# Patient Record
Sex: FEMALE | Race: WHITE | NOT HISPANIC OR LATINO | Employment: UNEMPLOYED | URBAN - METROPOLITAN AREA
[De-identification: names, ages, dates, MRNs, and addresses within clinical notes are randomized per-mention and may not be internally consistent; named-entity substitution may affect disease eponyms.]

---

## 2017-01-03 DIAGNOSIS — G43.709 CHRONIC MIGRAINE WITHOUT AURA WITHOUT STATUS MIGRAINOSUS, NOT INTRACTABLE: Primary | ICD-10-CM

## 2017-01-03 NOTE — TELEPHONE ENCOUNTER
Pt will start Botox in a couple of weeks    Controlled Substance Refill Request for butalbital-acetaminophen-caffeine (FIORICET/ESGIC) -40 MG per tablet  Problem List Complete:  No     PROVIDER TO CONSIDER COMPLETION OF PROBLEM LIST AND OVERVIEW/CONTROLLED SUBSTANCE AGREEMENT    Last Written Prescription Date:  12/22/16  Last Fill Quantity: 20,   # refills: 0    [place] with [facility] [place]: 11/28/16    Future Office visit:     Controlled substance agreement on file: Yes:  Date 9/8/15 but with Dr Husain .     Processing:  Fax Rx to [facility]'s  pharmacy   checked in past 6 months?  Yes 7/25/16

## 2017-01-04 NOTE — TELEPHONE ENCOUNTER
Patient called again stating she is wondering when this prescription will be faxed since Dr Machado isn't in the clinic tomorrow.

## 2017-01-05 RX ORDER — BUTALBITAL, ACETAMINOPHEN AND CAFFEINE 50; 325; 40 MG/1; MG/1; MG/1
TABLET ORAL
Qty: 20 TABLET | Refills: 0 | Status: SHIPPED | OUTPATIENT
Start: 2017-01-05 | End: 2017-01-16

## 2017-01-07 DIAGNOSIS — G89.4 CHRONIC PAIN SYNDROME: Primary | ICD-10-CM

## 2017-01-09 RX ORDER — PREGABALIN 150 MG/1
CAPSULE ORAL
Qty: 60 CAPSULE | Refills: 0 | Status: SHIPPED | OUTPATIENT
Start: 2017-01-13 | End: 2017-02-09

## 2017-01-09 NOTE — TELEPHONE ENCOUNTER
LYRICA 150MG CAPSULES      Last Written Prescription Date:  12/15/2016  Last Fill Quantity: 60,   # refills: 0  Last Office Visit with [facility] or  Health prescribing provider: 11/28/2016  Future Office visit:       Routing refill request to provider for review/approval because:  Drug not on the [facility], [facility] or [facility] refill protocol or controlled substance

## 2017-01-09 NOTE — TELEPHONE ENCOUNTER
Controlled Substance Refill Request for Lyrica  Problem List Complete:  Yes      Patient is followed by [facility] for ongoing prescription of pain medication.  All refills should be approved by this provider, or covering partner.    Medication(s):  Lyrica 150 mg bid, Fioricet with codeine prn, klonopin and ambien to be prescribed by psych, not [place].  Maximum quantity per month: 60, 20  Clinic visit frequency required: Q 3 months     Controlled substance agreement on file: Yes       Date(s): 9/8/2015  New CSA needed.  Pain Clinic evaluation in the past: No    DIRE Total Score(s):  No flowsheet data found.    Last [facility] website verification: 07/25/2016-provider to review

## 2017-01-16 ENCOUNTER — TRANSFERRED RECORDS (OUTPATIENT)
Dept: HEALTH INFORMATION MANAGEMENT | Facility: CLINIC | Age: 42
End: 2017-01-16

## 2017-01-16 DIAGNOSIS — G43.709 CHRONIC MIGRAINE WITHOUT AURA WITHOUT STATUS MIGRAINOSUS, NOT INTRACTABLE: Primary | ICD-10-CM

## 2017-01-16 DIAGNOSIS — F41.1 GENERALIZED ANXIETY DISORDER: Chronic | ICD-10-CM

## 2017-01-16 NOTE — TELEPHONE ENCOUNTER
Reason for Call:  Medication or medication refill:    Do you use a [facility]?  Name of the pharmacy and phone number for the current request: [facility]    Name of the medication requested: butalbital-acetaminophen-caffeine (FIORICET/ESGIC) -40 MG  And clonazepam    Other request: Told patient 24 to 72 hours     Can we leave a detailed message on this number? YES    Phone number patient can be reached at: Home number on file [phone] (home)    Best Time: any    Call taken on 1/16/2017 at 8:18 AM by NANCY MAYO

## 2017-01-18 ENCOUNTER — TELEPHONE (OUTPATIENT)
Dept: FAMILY MEDICINE | Facility: CLINIC | Age: 42
End: 2017-01-18

## 2017-01-18 NOTE — TELEPHONE ENCOUNTER
Reason for Call:  Other prescription    Detailed comments: Patient called to check status of refill request, states needs medication asap    Phone Number Patient can be reached at: Home number on file [phone] (home)    Best Time: Any    Can we leave a detailed message on this number? YES    Call taken on 1/18/2017 at 10:57 AM by Pat Robin

## 2017-01-18 NOTE — TELEPHONE ENCOUNTER
Controlled Substance Refill Request for butalbital-acetaminophen-caffeine (fioricet/esgic)-40 mg,Clonazepam (klonopin) 1 mg  Problem List Complete:  Yes   checked in past 6 months?  Yes 7/25/16

## 2017-01-18 NOTE — TELEPHONE ENCOUNTER
Patient calling again asking for status of meds.  I explained that it may take longer to fill as we are training new staff.    Always allow 72 hours for refill.  Viktoria Lanier RN- Triage [employer]

## 2017-01-19 RX ORDER — CLONAZEPAM 1 MG/1
TABLET ORAL
Qty: 90 TABLET | Refills: 0 | Status: SHIPPED | OUTPATIENT
Start: 2017-01-19 | End: 2017-05-01

## 2017-01-19 RX ORDER — BUTALBITAL, ACETAMINOPHEN AND CAFFEINE 50; 325; 40 MG/1; MG/1; MG/1
TABLET ORAL
Qty: 20 TABLET | Refills: 0 | Status: SHIPPED | OUTPATIENT
Start: 2017-01-19 | End: 2017-01-27

## 2017-01-26 ENCOUNTER — TELEPHONE (OUTPATIENT)
Dept: FAMILY MEDICINE | Facility: CLINIC | Age: 42
End: 2017-01-26

## 2017-01-26 DIAGNOSIS — G43.709 CHRONIC MIGRAINE WITHOUT AURA WITHOUT STATUS MIGRAINOSUS, NOT INTRACTABLE: Primary | ICD-10-CM

## 2017-01-26 RX ORDER — BUTALBITAL, ACETAMINOPHEN AND CAFFEINE 50; 325; 40 MG/1; MG/1; MG/1
TABLET ORAL
Qty: 20 TABLET | Refills: 0 | Status: CANCELLED | OUTPATIENT
Start: 2017-01-26

## 2017-01-26 NOTE — TELEPHONE ENCOUNTER
Reason for Call:  Medication or medication refill:    Do you use a [facility]?  Name of the pharmacy and phone number for the current request:  [facility] in [place]    Name of the medication requested:   butalbital-acetaminophen-caffeine (FIORICET/ESGIC) -40 MG per tablet     Pbutalbital-acetaminophen-caffeine (FIORICET/ESGIC) -40 MG per tablet  atient requests this medication with codeine   She states she is not able to sleep and the medication with codeine seems to help      Other request:   As soon as possible    Can we leave a detailed message on this number? YES    Phone number patient can be reached at: Home number on file [phone] (home)    Best Time:   Anytime    Call taken on 1/26/2017 at 4:05 PM by KYLER DE LA PAZ

## 2017-01-26 NOTE — TELEPHONE ENCOUNTER
"Codeine prescriptions are not appropriate for use with headaches syndromes. Patient should be advised an office visit or ED visit is necessary if recurring headache and need for \"stronger\" or different medications. Nicole Joy Siegler, PA-C     "

## 2017-01-26 NOTE — TELEPHONE ENCOUNTER
Pt asking Rx with codeine.  Controlled Substance Refill Request for butalbital-acetaminophen-caffeine (FIORICET/ESGIC) -40 MG per tablet  Problem List Complete:  No     PROVIDER TO CONSIDER COMPLETION OF PROBLEM LIST AND OVERVIEW/CONTROLLED SUBSTANCE AGREEMENT    Last Written Prescription Date:  01/19/2017  Last Fill Quantity: 20,   # refills: 0    Last Office Visit with [facility] primary care provider: 11/28/2016    Future Office visit:     Controlled substance agreement on file: No. Last CSA 09/2014 with doctor Lisha.     Processing:  Fax Rx to [facility] pharmacy   checked in past 6 months?  Yes 01/26/2017   RX monitoring program ([place]) reviewed:  reviewed- MODAFINIL 200 MG TABLET is prescribed by other provider.  Lyrica clonazepam and butalbital  approved by by PCP.       [place] profile:  [email or web address]

## 2017-01-27 RX ORDER — BUTALBITAL, ACETAMINOPHEN AND CAFFEINE 50; 325; 40 MG/1; MG/1; MG/1
TABLET ORAL
Qty: 20 TABLET | Refills: 0 | Status: SHIPPED | OUTPATIENT
Start: 2017-01-27 | End: 2017-02-21

## 2017-01-27 NOTE — TELEPHONE ENCOUNTER
Patient calling to request that this phone message be routed to Dr. Otilio Hollins, who she states knows her history and that this would work for her.  She stated that her son was diagnosed with epilepsy on Tues and that has been stressful for her.  She declined an offer for an office visit as her son is scheduled for an overnight study and therefore she would not be able to come in.  She is requesting a call back.  Amena Siddiqi, TC

## 2017-01-27 NOTE — TELEPHONE ENCOUNTER
She had Rx filled on 1/19/17 for # 20 tabs with max of 4 per day.  She is using near the maximum  I will do a one time this time only fill.  She needs to see Dr Machado next week to discuss and work out plan for future refills.  No other refills until she sees him  Rx approved, printed given to Team 2 staff to be faxed

## 2017-02-09 DIAGNOSIS — G89.4 CHRONIC PAIN SYNDROME: Primary | ICD-10-CM

## 2017-02-09 RX ORDER — PREGABALIN 150 MG/1
CAPSULE ORAL
Qty: 60 CAPSULE | Refills: 0 | Status: SHIPPED | OUTPATIENT
Start: 2017-02-09 | End: 2017-03-06

## 2017-02-09 NOTE — TELEPHONE ENCOUNTER
Lyrica      Last Written Prescription Date:  1/13/2017  Last Fill Quantity: 60,   # refills: 0  Last Office Visit with [facility] provider: 11/28/2016  Future Office visit:       Routing refill request to provider for review/approval because:  Drug not on the [facility] refill protocol or controlled substance

## 2017-02-13 ENCOUNTER — TELEPHONE (OUTPATIENT)
Dept: FAMILY MEDICINE | Facility: CLINIC | Age: 42
End: 2017-02-13

## 2017-02-13 NOTE — TELEPHONE ENCOUNTER
Melody Muñoz is a 41 year old female who calls with chest pain with deep breathing.    NURSING ASSESSMENT:  Description: New pain or pressurein the middle of chest at sternum or between breasts, pain occurs everytime she breathes and touches it, started last night. Feels like she is hyperventilating.   Onset/duration: started last night at the ER for her son's seizure  Precip. factors: Had a panic attack at the ER - whole body was tingling and disoriented. She left room and put water on face with helped.  Associated symptoms: No difficulty breathing or coughing, pt does not don't think it's pneumonia or URI related. No chest pain going down left chest, back, jaw or arms. Not wheezing, no fever, no numbness or tingling at the face.   Improves/worsens symptoms: Regular clonazepam helped a little bit.  Pain scale (0-10)   4/10  LMP/preg/breast feeding: NA  Last exam/Treatment:  11/28/16  Allergies: No Known Allergies    MEDICATIONS:   Taking medication(s) as prescribed? Yes  Taking over the counter medication(s?) Yes - will try tylenol/ibuprofen   Any medication side effects? No significant side effects    Any barriers to taking medication(s) as prescribed?  No  Medication(s) improving/managing symptoms?  Yes  Medication reconciliation completed: No      NURSING PLAN: Nursing advice to patient given     Rest, relax, take usually anxiety and pain medications, avoid eating spicy foods, drink plenty of water.   Patient to be seen sooner if chest pain increases, SOB, heart palpitations, pain in the neck, shoulder, jaw, back or arms, fever.     RECOMMENDED DISPOSITION:  See in 72 hours - patient made an appt for today.   Will comply with recommendation: Yes  If further questions/concerns or if symptoms do not improve, worsen or new symptoms develop, call your PCP or [facility] as soon as possible.      Guideline used:  Telephone Triage Protocols for Nurses, Fourth Edition, Marge FERGUSON  LESLI Hoyt

## 2017-02-21 DIAGNOSIS — F41.1 GENERALIZED ANXIETY DISORDER: Chronic | ICD-10-CM

## 2017-02-21 DIAGNOSIS — G43.709 CHRONIC MIGRAINE WITHOUT AURA WITHOUT STATUS MIGRAINOSUS, NOT INTRACTABLE: ICD-10-CM

## 2017-02-21 NOTE — TELEPHONE ENCOUNTER
Reason for Call:  Medication or medication refill:    Do you use a [facility]?  Name of the pharmacy and phone number for the current request:  [facility], [place]    Name of the medication requested: butalbital-acetaminophen-caffeine (FIORICET/ESGIC) -40 MG per tablet    Other request:     Can we leave a detailed message on this number? YES    Phone number patient can be reached at: Home number on file [phone] (home)    Best Time:     Call taken on 2/21/2017 at 1:03 PM by JACQUELINE SMITH

## 2017-02-21 NOTE — TELEPHONE ENCOUNTER
Fioricet      Last Written Prescription Date:  1/27/2016  Last Fill Quantity: 20,   # refills: 0  Last Office Visit with [facility] provider: 11/28/2016  Future Office visit:       Routing refill request to provider for review/approval because:  Drug not on the [facility] or [facility] refill protocol or controlled substance

## 2017-02-22 RX ORDER — BUTALBITAL, ACETAMINOPHEN AND CAFFEINE 50; 325; 40 MG/1; MG/1; MG/1
TABLET ORAL
Qty: 20 TABLET | Refills: 0 | Status: SHIPPED | OUTPATIENT
Start: 2017-02-22 | End: 2017-03-01

## 2017-03-01 ENCOUNTER — TELEPHONE (OUTPATIENT)
Dept: FAMILY MEDICINE | Facility: CLINIC | Age: 42
End: 2017-03-01

## 2017-03-01 DIAGNOSIS — G43.709 CHRONIC MIGRAINE WITHOUT AURA WITHOUT STATUS MIGRAINOSUS, NOT INTRACTABLE: ICD-10-CM

## 2017-03-01 RX ORDER — BUTALBITAL, ACETAMINOPHEN AND CAFFEINE 50; 325; 40 MG/1; MG/1; MG/1
TABLET ORAL
Qty: 20 TABLET | Refills: 0 | Status: SHIPPED | OUTPATIENT
Start: 2017-03-01 | End: 2017-03-13

## 2017-03-01 NOTE — TELEPHONE ENCOUNTER
fioricet plain Q 6 hrs, no more than 4/day and gets 20 at a time    butalbital-acetaminophen-caffeine (FIORICET/ESGIC) -40 MG per tablet  Last Written Prescription Date: 02/22/2017  Last Fill Quantity: 20,  # refills: 0   Last Office Visit with [facility] or [facility] prescribing provider: 11/28/2016                                         Next 5 appointments (look out 90 days)     Mar 02, 2017 11:00 AM CST   SHORT with [facility]til[facility]ins [facility]   [facility]    [address] [place] [id number]   [phone]                 Routing refill request to provider for review/approval because:  Drug not on the [facility] refill protocol     RX monitoring program ([place]NPMP) reviewed:  reviewed- recommend provider review    [place]MP profile:  [email or web address]

## 2017-03-01 NOTE — TELEPHONE ENCOUNTER
Reason for Call:  Other call back    Detailed comments: High glucose. Patient states her glucose has been high the last few times and is wondering what she should do. Does she need to come in for a lab or see the doctor. Please call to advise    Phone Number Patient can be reached at: Home number on file [phone] (home)    Best Time: Any     Can we leave a detailed message on this number? YES    Call taken on 3/1/2017 at 7:54 AM by TRIXIE KRUEGER

## 2017-03-01 NOTE — TELEPHONE ENCOUNTER
Patient called requesting advise about high glucose. She reports increased thrust and fatigue. Fatigue started 5 days ago. Was dx with flu. Also reports increased stress with son having seizures. Known other symptoms. Advised office visit to review symptoms with provider appt was scheduled 03/02/2017. Please advice if ok to keep appt as scheduled or reschedule until flu symptoms clear.

## 2017-03-01 NOTE — TELEPHONE ENCOUNTER
Reason for Call:  Medication or medication refill:    Do you use a [facility]?  Name of the pharmacy and phone number for the current request:  [facility] [address]     Name of the medication requested: Fioricet    Other request: Please send to the pharmacy. If any questions please call. Patient was diagnosed with the flu on Monday. States she is having to use her medication.    Can we leave a detailed message on this number? YES    Phone number patient can be reached at: Home number on file [phone] (home)    Best Time: Any    Call taken on 3/1/2017 at 7:53 AM by TRIXIE KRUEGER

## 2017-03-02 ENCOUNTER — OFFICE VISIT (OUTPATIENT)
Dept: FAMILY MEDICINE | Facility: CLINIC | Age: 42
End: 2017-03-02
Payer: COMMERCIAL

## 2017-03-02 VITALS
RESPIRATION RATE: 16 BRPM | SYSTOLIC BLOOD PRESSURE: 100 MMHG | HEART RATE: 99 BPM | TEMPERATURE: 99.4 F | WEIGHT: 215.5 LBS | HEIGHT: 62 IN | OXYGEN SATURATION: 97 % | DIASTOLIC BLOOD PRESSURE: 62 MMHG | BODY MASS INDEX: 39.66 KG/M2

## 2017-03-02 DIAGNOSIS — R73.09 ELEVATED GLUCOSE: Primary | ICD-10-CM

## 2017-03-02 DIAGNOSIS — F31.9 BIPOLAR 1 DISORDER (H): Chronic | ICD-10-CM

## 2017-03-02 LAB
ALBUMIN SERPL-MCNC: 3.5 G/DL (ref 3.4–5)
ALP SERPL-CCNC: 104 U/L (ref 40–150)
ALT SERPL W P-5'-P-CCNC: 21 U/L (ref 0–50)
ANION GAP SERPL CALCULATED.3IONS-SCNC: 11 MMOL/L (ref 3–14)
AST SERPL W P-5'-P-CCNC: 13 U/L (ref 0–45)
BILIRUB SERPL-MCNC: 0.2 MG/DL (ref 0.2–1.3)
BUN SERPL-MCNC: 12 MG/DL (ref 7–30)
CALCIUM SERPL-MCNC: 9.5 MG/DL (ref 8.5–10.1)
CHLORIDE SERPL-SCNC: 101 MMOL/L (ref 94–109)
CO2 SERPL-SCNC: 24 MMOL/L (ref 20–32)
CREAT SERPL-MCNC: 0.56 MG/DL (ref 0.52–1.04)
GFR SERPL CREATININE-BSD FRML MDRD: ABNORMAL ML/MIN/1.7M2
GLUCOSE SERPL-MCNC: 173 MG/DL (ref 70–99)
HBA1C MFR BLD: 8.1 % (ref 4.3–6)
POTASSIUM SERPL-SCNC: 4 MMOL/L (ref 3.4–5.3)
PROT SERPL-MCNC: 7.5 G/DL (ref 6.8–8.8)
SODIUM SERPL-SCNC: 136 MMOL/L (ref 133–144)
TSH SERPL DL<=0.005 MIU/L-ACNC: 1.36 MU/L (ref 0.4–4)

## 2017-03-02 PROCEDURE — 84443 ASSAY THYROID STIM HORMONE: CPT | Performed by: FAMILY MEDICINE

## 2017-03-02 PROCEDURE — 36415 COLL VENOUS BLD VENIPUNCTURE: CPT | Performed by: FAMILY MEDICINE

## 2017-03-02 PROCEDURE — 99213 OFFICE O/P EST LOW 20 MIN: CPT | Performed by: FAMILY MEDICINE

## 2017-03-02 PROCEDURE — 83036 HEMOGLOBIN GLYCOSYLATED A1C: CPT | Performed by: FAMILY MEDICINE

## 2017-03-02 PROCEDURE — 80053 COMPREHEN METABOLIC PANEL: CPT | Performed by: FAMILY MEDICINE

## 2017-03-02 PROCEDURE — 80178 ASSAY OF LITHIUM: CPT | Performed by: FAMILY MEDICINE

## 2017-03-02 NOTE — PROGRESS NOTES
"  SUBJECTIVE:                                                    Melody Muñoz is a 41 year old female who presents to clinic today for the following health issues:        ED/UC Followup:    Facility:  [facility]  Date of visit: 02/27/2017  Reason for visit: Flu  Current Status: On Tamiflu       Elevated glucose       Duration: intermittent    Description (location/character/radiation): Pt has had elevated glucose results on lab tests and want to have them rechecked    Intensity:  moderate    Accompanying signs and symptoms: none    History (similar episodes/previous evaluation): labs over past 2-3 years    Precipitating or alleviating factors: None    Therapies tried and outcome: None       Pt also wants her thyroid tested and Lithium level    Problem list and histories reviewed & adjusted, as indicated.  Additional history: as documented    Labs reviewed in EPIC    Reviewed and updated as needed this visit by clinical staff  Tobacco  Allergies  Meds  Problems  Med Hx  Surg Hx  Fam Hx  Soc Hx        Reviewed and updated as needed this visit by Provider  Allergies  Meds  Problems         ROS:  CONSTITUTIONAL:POSITIVE  for fever   ENT/MOUTH: POSITIVE for nasal congestion, postnasal drainage and sore throat  RESP:NEGATIVE for significant cough or SOB  PSYCHIATRIC: POSITIVE foranxiety, Hx anxiety and Hx depression    OBJECTIVE:                                                    /62 (BP Location: Left arm, Patient Position: Chair, Cuff Size: Adult Large)  Pulse 99  Temp 99.4  F (37.4  C) (Tympanic)  Resp 16  Ht 5' 2\" (1.575 m)  Wt 215 lb 8 oz (97.8 kg)  LMP 02/06/2017 (Approximate)  SpO2 97%  Breastfeeding? No  BMI 39.42 kg/m2  Body mass index is 39.42 kg/(m^2).  GENERAL APPEARANCE: healthy, alert and no distress  HENT: ear canals and TM's normal and nose and mouth without ulcers or lesions  RESP: lungs clear to auscultation - no rales, rhonchi or wheezes  PSYCH: mentation " appears normal, affect flat and anxious    Diagnostic test results:  Lab: see below, results pending     ASSESSMENT/PLAN:                                                        ICD-10-CM    1. Elevated glucose R73.09 Hemoglobin A1c     Comprehensive metabolic panel     TSH with free T4 reflex   2. Bipolar 1 disorder  with psychosis F31.9 Lithium level       Follow up with Provider - as needed  Pt asks about restarting Soma.  Advised that she needs to see Dr Machado to discuss that    Patient Instructions   Symptomatic treatment.  Will use saline gargles, tylenol and/or advil. Suck on  lozenges as needed. Push fluids. Salt water nasal spray as needed.      Otilio Hollins MD  [facility]

## 2017-03-02 NOTE — NURSING NOTE
"Chief Complaint   Patient presents with     Blood Draw     Requesting labs for glucose/liver enzymen     ER F/U     Urgent [facility] on Monday+       Initial /62 (BP Location: Left arm, Patient Position: Chair, Cuff Size: Adult Large)  Pulse 99  Temp 99.4  F (37.4  C) (Tympanic)  Resp 16  Ht 5' 2\" (1.575 m)  Wt 215 lb 8 oz (97.8 kg)  LMP 02/06/2017 (Approximate)  SpO2 97%  Breastfeeding? No  BMI 39.42 kg/m2 Estimated body mass index is 39.42 kg/(m^2) as calculated from the following:    Height as of this encounter: 5' 2\" (1.575 m).    Weight as of this encounter: 215 lb 8 oz (97.8 kg).  Medication Reconciliation: complete     Hannah Sood LPN  "

## 2017-03-02 NOTE — MR AVS SNAPSHOT
After Visit Summary   3/2/2017    Melody Muñoz    MRN: [id number]           Patient Information     Date Of Birth          1975        Visit Information        Provider Department      3/2/2017 11:00 AM Otilio Hollins MD [facility]        Today's Diagnoses     Elevated glucose    -  1    Bipolar 1 disorder  with psychosis          Care Instructions    Symptomatic treatment.  Will use saline gargles, tylenol and/or advil. Suck on  lozenges as needed. Push fluids. Salt water nasal spray as needed.        Follow-ups after your visit        Follow-up notes from your care team     Return if symptoms worsen or fail to improve.      Who to contact     If you have questions or need follow up information about today's clinic visit or your schedule please contact [facility] directly at [phone].  Normal or non-critical lab and imaging results will be communicated to you by [employer], letter or phone within 4 business days after the clinic has received the results. If you do not hear from us within 7 days, please contact the clinic through [employer] or phone. If you have a critical or abnormal lab result, we will notify you by phone as soon as possible.  Submit refill requests through [employer] or call your pharmacy and they will forward the refill request to us. Please allow 3 business days for your refill to be completed.          Additional Information About Your Visit        MyChart Information     [employer] gives you secure access to your electronic health record. If you see a primary care provider, you can also send messages to your care team and make appointments. If you have questions, please call your primary care clinic.  If you do not have a primary care provider, please call [phone] and they will assist you.        Care EveryWhere ID     This is your Care EveryWhere ID. This could be used by other organizations to access your  "[facility] medical records  [id number]        Your Vitals Were     Pulse Temperature Respirations Height Last Period Pulse Oximetry    99 99.4  F (37.4  C) (Tympanic) 16 5' 2\" (1.575 m) 02/06/2017 (Approximate) 97%    Breastfeeding? BMI (Body Mass Index)                No 39.42 kg/m2           Blood Pressure from Last 3 Encounters:   03/02/17 100/62   11/28/16 134/86   10/29/16 116/66    Weight from Last 3 Encounters:   03/02/17 215 lb 8 oz (97.8 kg)   11/28/16 201 lb (91.2 kg)   10/29/16 193 lb 8 oz (87.8 kg)              We Performed the Following     Comprehensive metabolic panel     Hemoglobin A1c     Lithium level     TSH with free T4 reflex        Primary Care Provider Office Phone # Fax #    Evaristo Machado [phone]       [facility] [address] [facility] [address]        Thank you!     Thank you for choosing [facility]  for your care. Our goal is always to provide you with excellent care. Hearing back from our patients is one way we can continue to improve our services. Please take a few minutes to complete the written survey that you may receive in the mail after your visit with us. Thank you!             Your Updated Medication List - Protect others around you: Learn how to safely use, store and throw away your medicines at www.disposemymeds.org.          This list is accurate as of: 3/2/17 12:16 PM.  Always use your most recent med list.                   Brand Name Dispense Instructions for use    * albuterol 108 (90 BASE) MCG/ACT Inhaler    PROAIR HFA/PROVENTIL HFA/VENTOLIN HFA    54 g    Inhale 1-2 puffs into the lungs every 4 hours as needed for wheezing or shortness of breath / dyspnea       * albuterol (2.5 MG/3ML) 0.083% neb solution     25 vial    Take 1 vial (2.5 mg) by nebulization every 6 hours as needed for shortness of breath / dyspnea or wheezing       butalbital-acetaminophen-caffeine -40 MG per tablet    " FIORICET/ESGIC    20 tablet    TAKE ONE TABLET BY MOUTH EVERY 6 HOURS AS NEEDED(MAX OF 4 TABLETS PER DAY)       clonazePAM 1 MG tablet    klonoPIN    90 tablet    TAKE 1 TABLET BY MOUTH THREE TIMES DAILY AS NEEDED FOR ANXIETY       fluticasone 110 MCG/ACT Inhaler    FLOVENT HFA    2 Inhaler    Inhale 2 puffs into the lungs 2 times daily       fluticasone 50 MCG/ACT spray    FLONASE    16 g    Spray 2 sprays into both nostrils daily       guanFACINE 2 MG tablet    TENEX    180 tablet    TAKE TWO TABLETS BY MOUTH AT BEDTIME       isometheptene-dichloralphenazone-acetaminophen -325 MG per capsule    MIDRIN    30 capsule    Take 1 capsule by mouth every 4 hours as needed for headaches       lamoTRIgine 200 MG tablet    LAMICTAL    90 tablet    Take 1 tablet (200 mg) by mouth every morning       levothyroxine 25 MCG tablet    SYNTHROID/LEVOTHROID    90 tablet    TAKE 1 TABLET(25 MCG) BY MOUTH DAILY       lithium 450 MG CR tablet    ESKALITH    60 tablet    TAKE 2 TABLETS BY MOUTH AT BEDTIME       LYRICA 150 MG capsule   Generic drug:  pregabalin     60 capsule    TAKE 1 CAPSULE BY MOUTH TWICE DAILY       meclizine 25 MG tablet    ANTIVERT    30 tablet    Take 1 tablet (25 mg) by mouth every 6 hours as needed for dizziness       MELATONIN PO      Take 6 mg by mouth At Bedtime       modafinil 200 MG tablet    PROVIGIL    30 tablet    Take 0.5 tablets (100 mg) by mouth daily       ondansetron 4 MG ODT tab    ZOFRAN-ODT    30 tablet    DISSOLVE 1 TO 2 TABLETS UNDER THE TONGUE EVERY 8 HOURS AS NEEDED FOR NAUSEA       pantoprazole 40 MG EC tablet    PROTONIX    90 tablet    TAKE 1 TABLET(40 MG) BY MOUTH DAILY       tretinoin 0.05 % cream    RETIN-A    45 g    Spread a pea size amount into affected area topically at bedtime.  Use sunscreen SPF>20.       * Notice:  This list has 2 medication(s) that are the same as other medications prescribed for you. Read the directions carefully, and ask your doctor or other care  provider to review them with you.

## 2017-03-03 LAB — LITHIUM SERPL-SCNC: 0.6 MMOL/L (ref 0.6–1.2)

## 2017-03-06 DIAGNOSIS — G89.4 CHRONIC PAIN SYNDROME: ICD-10-CM

## 2017-03-06 DIAGNOSIS — E03.9 ACQUIRED HYPOTHYROIDISM: ICD-10-CM

## 2017-03-06 DIAGNOSIS — G43.709 CHRONIC MIGRAINE WITHOUT AURA WITHOUT STATUS MIGRAINOSUS, NOT INTRACTABLE: ICD-10-CM

## 2017-03-06 NOTE — TELEPHONE ENCOUNTER
Patient called checking on the status of this message, informed it has been routed to the provider and were waiting for a response.

## 2017-03-06 NOTE — TELEPHONE ENCOUNTER
Reason for Call:  Other call back    Detailed comments: Patient states she used to be on Soma and would like to get back on it. She states she is having back spasms. Please call to advise    Phone Number Patient can be reached at: Home number on file [phone] (home)    Best Time: Any    Can we leave a detailed message on this number? YES    Call taken on 3/6/2017 at 8:56 AM by TRIXIE KRUEGER

## 2017-03-06 NOTE — TELEPHONE ENCOUNTER
Patient called reporting Back Spasms    Back Pain      Duration: Ever since the patient has stopped the Soma, progressively worse in the past week        Specific cause: none    Description:   Location of pain: low back bilateral  Character of pain: cramping  Pain radiation:none  New numbness or weakness in legs, not attributed to pain:  no     Intensity: Currently 4/10    History:   Pain interferes with job: YES, hard to work, stay at home Mom. Hard to do any heavy lifting, go up and down stairs  History of back problems: recurrent self limited episodes of low back pain in the past  Any previous MRI or X-rays: None  Sees a specialist for back pain:  No  Therapies tried without relief: muscle relaxants and Physical Therapy    Alleviating factors:   Improved by: Soma       Precipitating factors:  Worsened by: Lifting, Bending, Standing and Walking     Accompanying Signs & Symptoms:  Risk of Fracture:  None  Risk of Cauda Equina:  None  Risk of Infection:  None  Risk of Cancer:  None  Risk of Ankylosing Spondylitis:  Onset at age <35, male, AND morning back stiffness. no               Recent Medication changes: none    Home Care information given: Heat to area  Advised: Follow up with clinic if: gets worse.  Follow up with Emergent Care if: unable to move or sudden onset numbness or tingling.    References used: Telephone Triage Protocols for Nurses Fourth     Please advise as appropriate with further recommendations as appropriate.    Brunilda Messina, RN  Triage RN  [facility]

## 2017-03-06 NOTE — TELEPHONE ENCOUNTER
carisoprodol (SOMA) 350 MG tablet (Discontinued)     Last Written Prescription Date:  10/26/16  Last Fill Quantity: 90,   # refills: 5  Last Office Visit with [facility], [facility] or [facility] prescribing provider: 10/29/16  Future Office visit:       Routing refill request to provider for review/approval because:  Drug not on the [facility], [facility] or [facility] refill protocol or controlled substance    Controlled Substance Refill Request for: carisoprodol (SOMA) 350 MG tablet (Discontinued)   Problem List Complete:  Yes  Patient is followed by [facility] for ongoing prescription of pain medication.  All refills should be approved by this provider, or covering partner.    Medication(s):  Lyrica 150 mg bid, Fioricet with codeine prn, klonopin and ambien to be prescribed by psych, not [facility].  Maximum quantity per month: 60, 20  Clinic visit frequency required: Q 3 months     Controlled substance agreement on file: Yes       Date(s): 9/8/2015  New CSA needed.  Pain Clinic evaluation in the past: No    DIRE Total Score(s):  No flowsheet data found.    Last [facility] website verification: 3/7/17 [email or web address]       checked in past 6 months?  Yes 3/6/17

## 2017-03-07 ENCOUNTER — TELEPHONE (OUTPATIENT)
Dept: FAMILY MEDICINE | Facility: CLINIC | Age: 42
End: 2017-03-07

## 2017-03-07 RX ORDER — LEVOTHYROXINE SODIUM 25 UG/1
TABLET ORAL
Qty: 90 TABLET | Refills: 3 | Status: SHIPPED | OUTPATIENT
Start: 2017-03-07 | End: 2018-05-14

## 2017-03-07 NOTE — TELEPHONE ENCOUNTER
[place]      Last Written Prescription Date:  7/25/16  Last Fill Quantity: 90,   # refills: 5  Last Office Visit with [facility], [facility] or [facility] prescribing provider: 3/2/17  Future Office visit:       Routing refill request to provider for review/approval because:  Drug not on the [facility], [facility] or [facility] refill protocol or controlled substance  Drug not active on patient's medication list

## 2017-03-07 NOTE — TELEPHONE ENCOUNTER
BUTALBITAL/ACETAMINOPHEN/CAFF TABS      Last Written Prescription Date:  3/1/2017  Last Fill Quantity: 20,   # refills: 0  Last Office Visit with [facility] or  Health prescribing provider: 3/2/2017  Future Office visit:       Routing refill request to provider for review/approval because:  Drug not on the [facility], [facility] or [facility] refill protocol or controlled substance      LEVOTHYROXINE 0.025MG (25MCG) TAB     Last Written Prescription Date: 9/2/2016  Last Quantity: 90, # refills: 1  Last Office Visit with [facility], [facility] or  Health prescribing provider: 3/2/2017        TSH   Date Value Ref Range Status   03/02/2017 1.36 0.40 - 4.00 mU/L Final

## 2017-03-07 NOTE — TELEPHONE ENCOUNTER
A refill was faxed in March 1 according to the report in EPIC.         Please get more information regarding this issue.

## 2017-03-07 NOTE — TELEPHONE ENCOUNTER
LYRICA 150MG CAPSULES      Last Written Prescription Date:  2/19/2017  Last Fill Quantity: 60,   # refills: 0  Last Office Visit with [facility], [facility] or  Health prescribing provider: 3/2/2017  Future Office visit:       Routing refill request to provider for review/approval because:  Drug not on the [facility], [facility] or [facility] refill protocol or controlled substance

## 2017-03-08 RX ORDER — BUTALBITAL, ACETAMINOPHEN AND CAFFEINE 50; 325; 40 MG/1; MG/1; MG/1
TABLET ORAL
Qty: 20 TABLET | Refills: 0 | Status: CANCELLED | OUTPATIENT
Start: 2017-03-08

## 2017-03-08 RX ORDER — PREGABALIN 150 MG/1
CAPSULE ORAL
Qty: 60 CAPSULE | Refills: 0 | Status: SHIPPED | OUTPATIENT
Start: 2017-03-08 | End: 2017-04-08

## 2017-03-08 NOTE — TELEPHONE ENCOUNTER
Melody called and is anxious about getting her Lyrica refilled.  She doesn't want to run out.   She stated that Dr Machado is out of the office until 3/13/2017.    She wonders if this message could be routed to another provider to refill.  I'm routing this to [facility] Patient Care Team 2 [id number]  Dotty SCHNEIDER RN [facility]

## 2017-03-09 ENCOUNTER — TELEPHONE (OUTPATIENT)
Dept: FAMILY MEDICINE | Facility: CLINIC | Age: 42
End: 2017-03-09

## 2017-03-09 NOTE — TELEPHONE ENCOUNTER
Reason for Call:  Request for results:    Name of test or procedure: Lab Results    Date of test of procedure: 3/2/17    Location of the test or procedure: [facility]    OK to leave the result message on voice mail or with a family member? YES    Phone number Patient can be reached at:  Cell number on file:    Telephone Information:   Mobile [phone]     Additional comments: Patient requesting lab results from 3/2/17    Call taken on 3/9/2017 at 4:08 PM by Mor Cronin

## 2017-03-09 NOTE — TELEPHONE ENCOUNTER
Called patient and went over lab results and schedule an appointment to go over the new diagnosis of Diabetes with her.  Lab Notes from Dr. Hollins are below, they are what I read to the patient.  Entered by Otilio Hollins MD at 3/3/2017  7:14 AM   Andres SanzMelody,     I just wanted to let you know that your lab results have been reviewed and are attached.     Your Hemoglobin A1c has gone up, now showing that you do indeed have diabetes and it is not in best control.  Goal is to have that number be < 7.0   Metabolic panel shows elevated glucose   Lithium level is in therapeutic goal range   Thyroid test is normal     Schedule a follow up appointment in the next 1-2 weeks, we need to discuss treatment for your diabetes     Please let me know if you have any questions.     Sincerely,     Otilio Hollins MD     [facility]   [address]   [place] [address]   [phone] (p)

## 2017-03-10 NOTE — TELEPHONE ENCOUNTER
Patient scheduled appointment with Dr. Evaristo Machado for 3/21/17 to discuss diabetes and results below

## 2017-03-13 ENCOUNTER — TELEPHONE (OUTPATIENT)
Dept: FAMILY MEDICINE | Facility: CLINIC | Age: 42
End: 2017-03-13

## 2017-03-13 DIAGNOSIS — G43.709 CHRONIC MIGRAINE WITHOUT AURA WITHOUT STATUS MIGRAINOSUS, NOT INTRACTABLE: Primary | Chronic | ICD-10-CM

## 2017-03-13 DIAGNOSIS — G43.709 CHRONIC MIGRAINE WITHOUT AURA WITHOUT STATUS MIGRAINOSUS, NOT INTRACTABLE: ICD-10-CM

## 2017-03-13 RX ORDER — BUTALBITAL, ACETAMINOPHEN AND CAFFEINE 50; 325; 40 MG/1; MG/1; MG/1
TABLET ORAL
Qty: 20 TABLET | Refills: 0 | Status: SHIPPED | OUTPATIENT
Start: 2017-03-13 | End: 2017-03-22

## 2017-03-13 NOTE — TELEPHONE ENCOUNTER
Pt called the clinic stating she has a migraine and she ran out of FIORICET today.   States she has not been taking it everyday, but could not nate how often she used it.     butalbital-acetaminophen-caffeine (FIORICET/ESGIC) -40 MG per tablet  Last Written Prescription Date:  3/1/17  Last Fill Quantity: 20,   # refills: 0  Last Office Visit with [facility] or [facility] prescribing provider: 3/2/17  Future Office visit:    Next 5 appointments (look out 90 days)     Mar 21, 2017 11:00 AM CDT   Office Visit with Evaristo Machado MD   [facility] ([facility])    [address] [place] [id number] [phone]                   Routing refill request to provider for review/approval because:  Drug not on the [facility], [facility] or [facility] refill protocol or controlled substance

## 2017-03-16 DIAGNOSIS — G89.4 CHRONIC PAIN SYNDROME: Primary | ICD-10-CM

## 2017-03-16 RX ORDER — CARISOPRODOL 350 MG/1
350 TABLET ORAL 3 TIMES DAILY PRN
Qty: 90 TABLET | Refills: 0 | Status: SHIPPED | OUTPATIENT
Start: 2017-03-16 | End: 2017-04-08

## 2017-03-21 ENCOUNTER — OFFICE VISIT (OUTPATIENT)
Dept: FAMILY MEDICINE | Facility: CLINIC | Age: 42
End: 2017-03-21
Payer: COMMERCIAL

## 2017-03-21 VITALS
BODY MASS INDEX: 38.28 KG/M2 | TEMPERATURE: 98.4 F | RESPIRATION RATE: 14 BRPM | HEIGHT: 62 IN | DIASTOLIC BLOOD PRESSURE: 80 MMHG | SYSTOLIC BLOOD PRESSURE: 130 MMHG | WEIGHT: 208 LBS | HEART RATE: 100 BPM

## 2017-03-21 DIAGNOSIS — E11.9 DIABETES MELLITUS WITHOUT COMPLICATION (H): Primary | ICD-10-CM

## 2017-03-21 PROCEDURE — 99213 OFFICE O/P EST LOW 20 MIN: CPT | Performed by: FAMILY MEDICINE

## 2017-03-21 NOTE — PROGRESS NOTES
SUBJECTIVE:                                                    Melody Muñoz is a 41 year old female who presents to clinic today for the following health issues:      Diabetes Follow-up      Patient is checking blood sugars: not at all    Diabetic concerns: other - new diagnosis     Symptoms of hypoglycemia (low blood sugar): none     Paresthesias (numbness or burning in feet) or sores: No     Date of last diabetic eye exam: 2016       Amount of exercise or physical activity: 4-5 days/week for an average of 30-45 minutes    Problems taking medications regularly: No    Medication side effects: none    Diet: regular (no restrictions)          Problem list and histories reviewed & adjusted, as indicated.  Additional history: The patient had gestational diabetes in the past.  Otherwise has not been diabetic.  She is completely asymptomatic.    Patient Active Problem List   Diagnosis     Endometriosis     Mantoux: positive     Latent tuberculosis     Major depression     Generalized anxiety disorder     Constipation     Nightmares     Knee pain     Bipolar 1 disorder  with psychosis     Chronic fatigue     Female stress incontinence     Drug-seeking behavior     Vertigo     Suicidal ideation     Acne     Chronic pain syndrome     Insomnia, unspecified     Chronic migraine without aura without status migrainosus, not intractable     Nausea     JASWANT (obstructive sleep apnea)     Elevated liver enzymes     TMJ (temporomandibular joint syndrome)     Tobacco use disorder     Hypothyroidism due to acquired atrophy of thyroid     Hostile behavior     Mild persistent asthma without complication     Diabetes mellitus without complication (H)     Past Surgical History   Procedure Laterality Date     Gyn surgery       laparoscopy- endometriosis     Gyn surgery        for twins     [place] teeth removal       Little finger surgery left  1980     Tubal ligation bilateral       Biopsy       Genitourinary surgery   "May/2014     bladder sling     Colonoscopy  2010       Social History   Substance Use Topics     Smoking status: Current Some Day Smoker     Packs/day: 0.25     Years: 17.00     Types: Cigarettes     Smokeless tobacco: Never Used     Alcohol use No      Comment: no etoh since 2013     Family History   Problem Relation Age of Onset     Hypertension Mother      HEART DISEASE Father      palpitations     Depression Sister      Anxiety Disorder Sister      HEART DISEASE Maternal Grandmother      CHF     CANCER Maternal Grandfather 72     Pancreatic Cancer     Alzheimer Disease Paternal Grandfather      Bipolar Disorder Other      Autism Spectrum Disorder Other            Reviewed and updated as needed this visit by clinical staff  Tobacco  Allergies  Meds       Reviewed and updated as needed this visit by Provider         ROS:  Constitutional, neuro, ENT, endocrine, pulmonary, cardiac, gastrointestinal, genitourinary, musculoskeletal, integument and psychiatric systems are negative, except as otherwise noted.  ENDOCRINE: NEGATIVE for temperature intolerance, skin/hair changes    OBJECTIVE:                                                    /80  Pulse 100  Temp 98.4  F (36.9  C) (Tympanic)  Resp 14  Ht 5' 2\" (1.575 m)  Wt 208 lb (94.3 kg)  LMP 02/06/2017 (Approximate)  BMI 38.04 kg/m2  Body mass index is 38.04 kg/(m^2).  GENERAL APPEARANCE: healthy, alert and no distress         ASSESSMENT/PLAN:                                                        ICD-10-CM    1. Diabetes mellitus without complication (H) E11.9 DIABETES EDUCATOR REFERRAL       Patient Instructions   The patient joined [employer] last week.  She has also started exercising in the past week.  She has not checked her blood sugars since she was a gestational diabetic.    We discussed treatment options.  At present we will send her for complete diabetic education and have her start checking her blood sugars.  She will follow-up with us in 2 " months.  We did we did not start medication today hoping that diet and exercise will be sufficient at least in the short haul.  Once she starts checking her blood sugars we can make adjustments as needed.  She has lost 7 pounds in the past month.      Evaristo Machado MD  [facility]

## 2017-03-21 NOTE — MR AVS SNAPSHOT
After Visit Summary   3/21/2017    Melody Muñoz    MRN: [id number]           Patient Information     Date Of Birth          1975        Visit Information        Provider Department      3/21/2017 11:00 AM Evaristo Machado MD [facility]        Today's Diagnoses     Diabetes mellitus without complication (H)    -  1       Follow-ups after your visit        Additional Services     DIABETES EDUCATOR REFERRAL       DIABETES SELF MANAGEMENT TRAINING (DSMT)      Your provider has referred you to Diabetes Education: [facility]: Diabetes Education - All [facility] [place] [facility] [phone]   [email or web address]    Type of training and number of hours: New Diagnosis: Initial group DSMT - 10 hours.      Medicare covers: 10 hours of initial DSMT in 12 month period from the time of first visit, plus 2 hours of follow-up DSMT annually, and additional hours as requested for insulin training.    Diabetes Type: Type 2 - Diet Control             Diabetes Co-Morbidities: none               A1C Goal:  <8.0       A1C is: Lab Results       Component                Value               Date                       A1C                      8.1                 03/02/2017              If an urgent visit is needed or A1C is above 12, Care Team to call the Diabetes Education Team at [phone] or send an In Basket message to the Diabetes Education Pool (P DIAB ED-PATIENT CARE).    Diabetes Education Topics: Comprehensive Knowledge Assessment and Instruction and Blood glucose meter instruction     Special Educational Needs Requiring Individual DSMT: None       MEDICAL NUTRITION THERAPY (MNT) for Diabetes    Medical Nutrition Therapy with a Registered Dietitian can be provided in coordination with Diabetes Self-Management Training to assist in achieving optimal diabetes management.    MNT Type and Hours: Do not initiate MNT at this time.                        Medicare will cover: 3 hours initial MNT in 12 month period after first visit, plus 2 hours of follow-up MNT annually    Please be aware that coverage of these services is subject to the terms and limitations of your health insurance plan.  Call member services at your health plan to determine Diabetes Self-Management Training benefits and ask which blood glucose monitor brands are covered by your plan.      Please bring the following with you to your appointment:    (1)  List of current medications   (2)  List of Blood Glucose Monitor brands that are covered by your insurance plan  (3)  Blood Glucose Monitor and log book  (4)   Food records for the 3 days prior to your visit    The Certified Diabetes Educator may make diabetes medication adjustments per the CDE Protocol and Collaborative Practice Agreement.                  Who to contact     If you have questions or need follow up information about today's clinic visit or your schedule please contact [facility] directly at [phone].  Normal or non-critical lab and imaging results will be communicated to you by MyChart, letter or phone within 4 business days after the clinic has received the results. If you do not hear from us within 7 days, please contact the clinic through [employer] or phone. If you have a critical or abnormal lab result, we will notify you by phone as soon as possible.  Submit refill requests through [employer] or call your pharmacy and they will forward the refill request to us. Please allow 3 business days for your refill to be completed.          Additional Information About Your Visit        [employer] Information     [employer] gives you secure access to your electronic health record. If you see a primary care provider, you can also send messages to your care team and make appointments. If you have questions, please call your primary care clinic.  If you do not have a primary care provider, please call  "[phone] and they will assist you.        Care EveryWhere ID     This is your Care EveryWhere ID. This could be used by other organizations to access your [place] medical records  [id number]        Your Vitals Were     Pulse Temperature Respirations Height Last Period BMI (Body Mass Index)    100 98.4  F (36.9  C) (Tympanic) 14 5' 2\" (1.575 m) 02/06/2017 (Approximate) 38.04 kg/m2       Blood Pressure from Last 3 Encounters:   03/21/17 130/80   03/02/17 100/62   11/28/16 134/86    Weight from Last 3 Encounters:   03/21/17 208 lb (94.3 kg)   03/02/17 215 lb 8 oz (97.8 kg)   11/28/16 201 lb (91.2 kg)              We Performed the Following     DIABETES EDUCATOR REFERRAL        Primary Care Provider Office Phone # Fax #    Evaristo Machado [phone]       [facility] [address] [facility] [address]        Thank you!     Thank you for choosing [facility]  for your care. Our goal is always to provide you with excellent care. Hearing back from our patients is one way we can continue to improve our services. Please take a few minutes to complete the written survey that you may receive in the mail after your visit with us. Thank you!             Your Updated Medication List - Protect others around you: Learn how to safely use, store and throw away your medicines at www.disposemymeds.org.          This list is accurate as of: 3/21/17 11:29 AM.  Always use your most recent med list.                   Brand Name Dispense Instructions for use    * albuterol 108 (90 BASE) MCG/ACT Inhaler    PROAIR HFA/PROVENTIL HFA/VENTOLIN HFA    54 g    Inhale 1-2 puffs into the lungs every 4 hours as needed for wheezing or shortness of breath / dyspnea       * albuterol (2.5 MG/3ML) 0.083% neb solution     25 vial    Take 1 vial (2.5 mg) by nebulization every 6 hours as needed for shortness of breath / dyspnea or wheezing       butalbital-acetaminophen-caffeine " -40 MG per tablet    FIORICET/ESGIC    20 tablet    TAKE ONE TABLET BY MOUTH EVERY 6 HOURS AS NEEDED(MAX OF 4 TABLETS PER DAY)       carisoprodol 350 MG tablet    SOMA    90 tablet    Take 1 tablet (350 mg) by mouth 3 times daily as needed for muscle spasms       clonazePAM 1 MG tablet    klonoPIN    90 tablet    TAKE 1 TABLET BY MOUTH THREE TIMES DAILY AS NEEDED FOR ANXIETY       fluticasone 110 MCG/ACT Inhaler    FLOVENT HFA    2 Inhaler    Inhale 2 puffs into the lungs 2 times daily       fluticasone 50 MCG/ACT spray    FLONASE    16 g    Spray 2 sprays into both nostrils daily       guanFACINE 2 MG tablet    TENEX    180 tablet    TAKE TWO TABLETS BY MOUTH AT BEDTIME       lamoTRIgine 200 MG tablet    LAMICTAL    90 tablet    Take 1 tablet (200 mg) by mouth every morning       levothyroxine 25 MCG tablet    SYNTHROID/LEVOTHROID    90 tablet    TAKE 1 TABLET(25 MCG) BY MOUTH DAILY       lithium 450 MG CR tablet    ESKALITH    60 tablet    TAKE 2 TABLETS BY MOUTH AT BEDTIME       LYRICA 150 MG capsule   Generic drug:  pregabalin     60 capsule    TAKE 1 CAPSULE BY MOUTH TWICE DAILY       meclizine 25 MG tablet    ANTIVERT    30 tablet    Take 1 tablet (25 mg) by mouth every 6 hours as needed for dizziness       MELATONIN PO      Take 6 mg by mouth At Bedtime       modafinil 200 MG tablet    PROVIGIL    30 tablet    Take 0.5 tablets (100 mg) by mouth daily       ondansetron 4 MG ODT tab    ZOFRAN-ODT    30 tablet    DISSOLVE 1 TO 2 TABLETS UNDER THE TONGUE EVERY 8 HOURS AS NEEDED FOR NAUSEA       pantoprazole 40 MG EC tablet    PROTONIX    90 tablet    TAKE 1 TABLET(40 MG) BY MOUTH DAILY       tretinoin 0.05 % cream    RETIN-A    45 g    Spread a pea size amount into affected area topically at bedtime.  Use sunscreen SPF>20.       * Notice:  This list has 2 medication(s) that are the same as other medications prescribed for you. Read the directions carefully, and ask your doctor or other care provider to  review them with you.

## 2017-03-21 NOTE — LETTER
My Asthma Action Plan  Name: Melody Muñoz   YOB: 1975  Date: 3/21/2017   My doctor: Evaristo Machado MD   My clinic: [facility]        My Control Medicine: { :933000}  My Rescue Medicine: { :826045}  {AAP include Oral Steroid:744312} My Asthma Severity: { :033930}  Avoid your asthma triggers: { :948607}        {Is patient a child or adult?:644660:::0}       GREEN ZONE     Good Control    I feel good    No cough or wheeze    Can work, sleep and play without asthma symptoms       Take your asthma control medicine every day.     1. If exercise triggers your asthma, take your rescue medication    15 minutes before exercise or sports, and    During exercise if you have asthma symptoms  2. Spacer to use with inhaler: If you have a spacer, make sure to use it with your inhaler             YELLOW ZONE     Getting Worse  I have ANY of these:    I do not feel good    Cough or wheeze    Chest feels tight    Wake up at night   1. Keep taking your Green Zone medications  2. Start taking your rescue medicine:    every 20 minutes for up to 1 hour. Then every 4 hours for 24-48 hours.  3. If you stay in the Yellow Zone for more than 12-24 hours, contact your doctor.  4. If you do not return to the Green Zone in 12-24 hours or you get worse, start taking your oral steroid medicine if prescribed by your provider.           RED ZONE     Medical Alert - Get Help  I have ANY of these:    I feel awful    Medicine is not helping    Breathing getting harder    Trouble walking or talking    Nose opens wide to breathe       1. Take your rescue medicine NOW  2. If your provider has prescribed an oral steroid medicine, start taking it NOW  3. Call your doctor NOW  4. If you are still in the Red Zone after 20 minutes and you have not reached your doctor:    Take your rescue medicine again and    Call 911 or go to the emergency room right away    See your regular doctor within 2 weeks of  an Emergency Room or Urgent Care visit for follow-up treatment.        Electronically signed by: Genesis Stone, March 21, 2017    Annual Reminders:  Meet with Asthma Educator,  Flu Shot in the Fall, consider Pneumonia Vaccination for patients with asthma (aged 19 and older).    Pharmacy: [facility] [address] [place] [address] [id number] AT [place] [address]                    Asthma Triggers  How To Control Things That Make Your Asthma Worse    Triggers are things that make your asthma worse.  Look at the list below to help you find your triggers and what you can do about them.  You can help prevent asthma flare-ups by staying away from your triggers.      Trigger                                                          What you can do   Cigarette Smoke  Tobacco smoke can make asthma worse. Do not allow smoking in your home, car or around you.  Be sure no one smokes at a child s day care or school.  If you smoke, ask your health care provider for ways to help you quit.  Ask family members to quit too.  Ask your health care provider for a referral to Quit Plan to help you quit smoking, or call 8-561-462-PLAN.     Colds, Flu, Bronchitis  These are common triggers of asthma. Wash your hands often.  Don t touch your eyes, nose or mouth.  Get a flu shot every year.     Dust Mites  These are tiny bugs that live in cloth or carpet. They are too small to see. Wash sheets and blankets in hot water every week.   Encase pillows and mattress in dust mite proof covers.  Avoid having carpet if you can. If you have carpet, vacuum weekly.   Use a dust mask and HEPA vacuum.   Pollen and Outdoor Mold  Some people are allergic to trees, grass, or weed pollen, or molds. Try to keep your windows closed.  Limit time out doors when pollen count is high.   Ask you health care provider about taking medicine during allergy season.     Animal Dander  Some people are allergic to skin flakes, urine or saliva from  pets with fur or feathers. Keep pets with fur or feathers out of your home.    If you can t keep the pet outdoors, then keep the pet out of your bedroom.  Keep the bedroom door closed.  Keep pets off cloth furniture and away from stuffed toys.     Mice, Rats, and Cockroaches  Some people are allergic to the waste from these pests.   Cover food and garbage.  Clean up spills and food crumbs.  Store grease in the refrigerator.   Keep food out of the bedroom.   Indoor Mold  This can be a trigger if your home has high moisture. Fix leaking faucets, pipes, or other sources of water.   Clean moldy surfaces.  Dehumidify basement if it is damp and smelly.   Smoke, Strong Odors, and Sprays  These can reduce air quality. Stay away from strong odors and sprays, such as perfume, powder, hair spray, paints, smoke incense, paint, cleaning products, candles and new carpet.   Exercise or Sports  Some people with asthma have this trigger. Be active!  Ask your doctor about taking medicine before sports or exercise to prevent symptoms.    Warm up for 5-10 minutes before and after sports or exercise.     Other Triggers of Asthma  Cold air:  Cover your nose and mouth with a scarf.  Sometimes laughing or crying can be a trigger.  Some medicines and food can trigger asthma.

## 2017-03-21 NOTE — NURSING NOTE
"Chief Complaint   Patient presents with     Diabetes       Initial /80  Pulse 100  Temp 98.4  F (36.9  C) (Tympanic)  Resp 14  Ht 5' 2\" (1.575 m)  Wt 208 lb (94.3 kg)  LMP 02/06/2017 (Approximate)  BMI 38.04 kg/m2 Estimated body mass index is 38.04 kg/(m^2) as calculated from the following:    Height as of this encounter: 5' 2\" (1.575 m).    Weight as of this encounter: 208 lb (94.3 kg).  Medication Reconciliation: complete     Genesis Stone CMA      "

## 2017-03-21 NOTE — PATIENT INSTRUCTIONS
The patient joined Weight [employer] last week.  She has also started exercising in the past week.  She has not checked her blood sugars since she was a gestational diabetic.    We discussed treatment options.  At present we will send her for complete diabetic education and have her start checking her blood sugars.  She will follow-up with us in 2 months.  We did we did not start medication today hoping that diet and exercise will be sufficient at least in the short haul.  Once she starts checking her blood sugars we can make adjustments as needed.  She has lost 7 pounds in the past month.

## 2017-03-22 DIAGNOSIS — G43.709 CHRONIC MIGRAINE WITHOUT AURA WITHOUT STATUS MIGRAINOSUS, NOT INTRACTABLE: ICD-10-CM

## 2017-03-22 RX ORDER — BUTALBITAL, ACETAMINOPHEN AND CAFFEINE 50; 325; 40 MG/1; MG/1; MG/1
TABLET ORAL
Qty: 20 TABLET | Refills: 0 | Status: SHIPPED | OUTPATIENT
Start: 2017-03-22 | End: 2017-04-04

## 2017-03-22 NOTE — TELEPHONE ENCOUNTER
Reason for Call:  Medication or medication refill:    Do you use a [facility]?  Name of the pharmacy and phone number for the current request:  [facility], [place]    Name of the medication requested: butalbital-acetaminophen-caffeine (FIORICET/ESGIC) -40 MG per tablet    Other request: Patient states she is going out of town for a week and will run out before she returns. She would also like to know if she can get refills on this medication.    Can we leave a detailed message on this number? YES    Phone number patient can be reached at: Home number on file [phone] (home)    Best Time:     Call taken on 3/22/2017 at 8:22 AM by JACQUELINE SMITH

## 2017-03-22 NOTE — TELEPHONE ENCOUNTER
Controlled Substance Refill Request for butalbital-acetaminophen-caffeine (FIORICET/ESGIC) -40 MG per tablet    Problem List Complete:  Yes       Patient is followed by [facility] for ongoing prescription of pain medication.  All refills should be approved by this provider, or covering partner.    Medication(s):  Lyrica 150 mg bid, Fioricet with codeine prn, klonopin and ambien to be prescribed by psych, not [facility].  Maximum quantity per month: 60, 20  Clinic visit frequency required: Q 3 months     Controlled substance agreement on file: Yes       Date(s): 9/8/2015  New CSA needed.  Pain Clinic evaluation in the past: No    DIRE Total Score(s):  No flowsheet data found.    Last [facility] website verification: 3/7/17 [email or web address] in past 6 months?  Yes 3/7/17

## 2017-03-23 ENCOUNTER — TELEPHONE (OUTPATIENT)
Dept: FAMILY MEDICINE | Facility: CLINIC | Age: 42
End: 2017-03-23

## 2017-03-23 NOTE — TELEPHONE ENCOUNTER
"Patient called reporting she was seen at urgent care [place] for a\"really bad ear infections\". States she has left ear pain and was prescribed amoxicillin and naproxen which is not helping relief her pain. She is requesting something stronger for pain refief. Advised she contact UC location were she had OV for medication change. Advised she schedule OV if symptoms persist. Pt verbalized understanding and agreement with plan.  "

## 2017-03-27 ENCOUNTER — TELEPHONE (OUTPATIENT)
Dept: FAMILY MEDICINE | Facility: CLINIC | Age: 42
End: 2017-03-27

## 2017-03-28 ENCOUNTER — OFFICE VISIT (OUTPATIENT)
Dept: FAMILY MEDICINE | Facility: CLINIC | Age: 42
End: 2017-03-28
Payer: COMMERCIAL

## 2017-03-28 VITALS
HEART RATE: 107 BPM | SYSTOLIC BLOOD PRESSURE: 120 MMHG | HEIGHT: 62 IN | WEIGHT: 207 LBS | RESPIRATION RATE: 14 BRPM | BODY MASS INDEX: 38.09 KG/M2 | DIASTOLIC BLOOD PRESSURE: 70 MMHG | TEMPERATURE: 99 F

## 2017-03-28 DIAGNOSIS — E11.9 TYPE 2 DIABETES MELLITUS WITHOUT COMPLICATION, WITHOUT LONG-TERM CURRENT USE OF INSULIN (H): Primary | ICD-10-CM

## 2017-03-28 PROCEDURE — 99214 OFFICE O/P EST MOD 30 MIN: CPT | Performed by: FAMILY MEDICINE

## 2017-03-28 NOTE — LETTER
My Asthma Action Plan  Name: Melody Muñoz   YOB: 1975  Date: 3/28/2017   My doctor: Evaristo Machado MD   My clinic: [facility]        My Control Medicine: { :264256}  My Rescue Medicine: { :050637}  {AAP include Oral Steroid:072659} My Asthma Severity: { :676449}  Avoid your asthma triggers: { :211824}               GREEN ZONE     Good Control    I feel good    No cough or wheeze    Can work, sleep and play without asthma symptoms       Take your asthma control medicine every day.     1. If exercise triggers your asthma, take your rescue medication    15 minutes before exercise or sports, and    During exercise if you have asthma symptoms  2. Spacer to use with inhaler: If you have a spacer, make sure to use it with your inhaler             YELLOW ZONE     Getting Worse  I have ANY of these:    I do not feel good    Cough or wheeze    Chest feels tight    Wake up at night   1. Keep taking your Green Zone medications  2. Start taking your rescue medicine:    every 20 minutes for up to 1 hour. Then every 4 hours for 24-48 hours.  3. If you stay in the Yellow Zone for more than 12-24 hours, contact your doctor.  4. If you do not return to the Green Zone in 12-24 hours or you get worse, start taking your oral steroid medicine if prescribed by your provider.           RED ZONE     Medical Alert - Get Help  I have ANY of these:    I feel awful    Medicine is not helping    Breathing getting harder    Trouble walking or talking    Nose opens wide to breathe       1. Take your rescue medicine NOW  2. If your provider has prescribed an oral steroid medicine, start taking it NOW  3. Call your doctor NOW  4. If you are still in the Red Zone after 20 minutes and you have not reached your doctor:    Take your rescue medicine again and    Call 911 or go to the emergency room right away    See your regular doctor within 2 weeks of an Emergency Room or Urgent Care visit  for follow-up treatment.        Electronically signed by: Genesis Stone, March 28, 2017    Annual Reminders:  Meet with Asthma Educator,  Flu Shot in the Fall, consider Pneumonia Vaccination for patients with asthma (aged 19 and older).    Pharmacy: [facility] [address] [place] [address] [address] AT [place] [address]                    Asthma Triggers  How To Control Things That Make Your Asthma Worse    Triggers are things that make your asthma worse.  Look at the list below to help you find your triggers and what you can do about them.  You can help prevent asthma flare-ups by staying away from your triggers.      Trigger                                                          What you can do   Cigarette Smoke  Tobacco smoke can make asthma worse. Do not allow smoking in your home, car or around you.  Be sure no one smokes at a child s day care or school.  If you smoke, ask your health care provider for ways to help you quit.  Ask family members to quit too.  Ask your health care provider for a referral to Quit Plan to help you quit smoking, or call 7-031-528-PLAN.     Colds, Flu, Bronchitis  These are common triggers of asthma. Wash your hands often.  Don t touch your eyes, nose or mouth.  Get a flu shot every year.     Dust Mites  These are tiny bugs that live in cloth or carpet. They are too small to see. Wash sheets and blankets in hot water every week.   Encase pillows and mattress in dust mite proof covers.  Avoid having carpet if you can. If you have carpet, vacuum weekly.   Use a dust mask and HEPA vacuum.   Pollen and Outdoor Mold  Some people are allergic to trees, grass, or weed pollen, or molds. Try to keep your windows closed.  Limit time out doors when pollen count is high.   Ask you health care provider about taking medicine during allergy season.     Animal Dander  Some people are allergic to skin flakes, urine or saliva from pets with fur or feathers. Keep pets with  fur or feathers out of your home.    If you can t keep the pet outdoors, then keep the pet out of your bedroom.  Keep the bedroom door closed.  Keep pets off cloth furniture and away from stuffed toys.     Mice, Rats, and Cockroaches  Some people are allergic to the waste from these pests.   Cover food and garbage.  Clean up spills and food crumbs.  Store grease in the refrigerator.   Keep food out of the bedroom.   Indoor Mold  This can be a trigger if your home has high moisture. Fix leaking faucets, pipes, or other sources of water.   Clean moldy surfaces.  Dehumidify basement if it is damp and smelly.   Smoke, Strong Odors, and Sprays  These can reduce air quality. Stay away from strong odors and sprays, such as perfume, powder, hair spray, paints, smoke incense, paint, cleaning products, candles and new carpet.   Exercise or Sports  Some people with asthma have this trigger. Be active!  Ask your doctor about taking medicine before sports or exercise to prevent symptoms.    Warm up for 5-10 minutes before and after sports or exercise.     Other Triggers of Asthma  Cold air:  Cover your nose and mouth with a scarf.  Sometimes laughing or crying can be a trigger.  Some medicines and food can trigger asthma.

## 2017-03-28 NOTE — PROGRESS NOTES
SUBJECTIVE:                                                    Melody Muñoz is a 41 year old female who presents to clinic today for the following health issues:      Diabetes Follow-up      Patient is checking blood sugars: not at all-  New diagnosed     Diabetic concerns: None     Symptoms of hypoglycemia (low blood sugar): none     Paresthesias (numbness or burning in feet) or sores: No     Date of last diabetic eye exam: last eye exam was 6/2016       Amount of exercise or physical activity: 6-7 days/week for an average of 15-30 minutes    Problems taking medications regularly: No    Medication side effects: none    Diet: regular (no restrictions)          Problem list and histories reviewed & adjusted, as indicated.  Additional history:   Patient did have a diagnosis of gestational diabetes when she was pregnant with her twins.  So she is somewhat familiar with diabetes and testing.  She already has her appointment set up with a CDE.  Patient Active Problem List   Diagnosis     Endometriosis     Mantoux: positive     Latent tuberculosis     Major depression     Generalized anxiety disorder     Constipation     Nightmares     Knee pain     Bipolar 1 disorder  with psychosis     Chronic fatigue     Female stress incontinence     Drug-seeking behavior     Vertigo     Suicidal ideation     Acne     Chronic pain syndrome     Insomnia, unspecified     Chronic migraine without aura without status migrainosus, not intractable     Nausea     JASWANT (obstructive sleep apnea)     Elevated liver enzymes     TMJ (temporomandibular joint syndrome)     Tobacco use disorder     Hypothyroidism due to acquired atrophy of thyroid     Hostile behavior     Mild persistent asthma without complication     Diabetes mellitus without complication (H)     Past Surgical History:   Procedure Laterality Date     BIOPSY       COLONOSCOPY  2010     GENITOURINARY SURGERY  May/2014    bladder sling     GYN SURGERY      laparoscopy-  "endometriosis     GYN SURGERY  2009     for twins     little finger surgery left  1980     tubal ligation bilateral       wisdom teeth removal         Social History   Substance Use Topics     Smoking status: Current Some Day Smoker     Packs/day: 0.25     Years: 17.00     Types: Cigarettes     Smokeless tobacco: Never Used     Alcohol use No      Comment: no etoh since      Family History   Problem Relation Age of Onset     Hypertension Mother      HEART DISEASE Father      palpitations     Depression Sister      Anxiety Disorder Sister      HEART DISEASE Maternal Grandmother      CHF     CANCER Maternal Grandfather 72     Pancreatic Cancer     Alzheimer Disease Paternal Grandfather      Bipolar Disorder Other      Autism Spectrum Disorder Other            Reviewed and updated as needed this visit by clinical staff  Tobacco  Allergies  Meds  Med Hx  Surg Hx  Fam Hx  Soc Hx      Reviewed and updated as needed this visit by Provider         ROS:  Constitutional, HEENT, cardiovascular, pulmonary, gi and gu systems are negative, except as otherwise noted.    OBJECTIVE:                                                    /70  Pulse 107  Temp 99  F (37.2  C) (Tympanic)  Resp 14  Ht 5' 2\" (1.575 m)  Wt 207 lb (93.9 kg)  LMP 2017 (Approximate)  BMI 37.86 kg/m2  Body mass index is 37.86 kg/(m^2).  GENERAL APPEARANCE: healthy, alert and no distress         ASSESSMENT/PLAN:                                                        ICD-10-CM    1. Type 2 diabetes mellitus without complication, without long-term current use of insulin (H) E11.9 metFORMIN (GLUCOPHAGE) 500 MG tablet       There are no Patient Instructions on file for this visit.    Evaristo Machado MD  [facility]    "

## 2017-03-28 NOTE — NURSING NOTE
"Chief Complaint   Patient presents with     Diabetes       Initial /70  Pulse 107  Temp 99  F (37.2  C) (Tympanic)  Resp 14  Ht 5' 2\" (1.575 m)  Wt 207 lb (93.9 kg)  LMP 02/06/2017 (Approximate)  BMI 37.86 kg/m2 Estimated body mass index is 37.86 kg/(m^2) as calculated from the following:    Height as of this encounter: 5' 2\" (1.575 m).    Weight as of this encounter: 207 lb (93.9 kg).  Medication Reconciliation: complete     Health Maintenance Due   Topic Date Due     URINE DRUG SCREEN Q1 YR  10/25/1991     ASTHMA ACTION PLAN Q1 YR (NO INBASKET)  01/11/2017     PAP Q3 YR  01/29/2017     PHQ-9 Q6 MONTHS (NO INBASKET)  04/11/2017     Health Maintenance reviewed at today's visit patient asked to schedule/complete:   Cervical Cancer:  Patient agrees to schedule- [facility]        Genesis Stone CMA      "

## 2017-03-28 NOTE — MR AVS SNAPSHOT
After Visit Summary   3/28/2017    Melody Muñoz    MRN: [id number]           Patient Information     Date Of Birth          1975        Visit Information        Provider Department      3/28/2017 4:30 PM Evaristo Machado MD [facility]        Today's Diagnoses     Type 2 diabetes mellitus without complication, without long-term current use of insulin (H)    -  1      Care Instructions    Melody and I had a 25 minute discussion about diabetes and its management.  We also discussed how diabetes affects every single organ system and how crucial it is to maintain good control.  She was anxious to start metformin.  I put her on 500 mg of metformin twice daily.  She will get a full session of education from the CDE.  We will check her blood tests again in June.        Follow-ups after your visit        Your next 10 appointments already scheduled     Apr 05, 2017  9:30 AM CDT   Diabetic Education with Y[facility]elin Salazar   [facility]    [address] [place] [address] [phone]              Who to contact     If you have questions or need follow up information about today's clinic visit or your schedule please contact [facility] directly at [phone].  Normal or non-critical lab and imaging results will be communicated to you by MyChart, letter or phone within 4 business days after the clinic has received the results. If you do not hear from us within 7 days, please contact the clinic through [employer] or phone. If you have a critical or abnormal lab result, we will notify you by phone as soon as possible.  Submit refill requests through [employer] or call your pharmacy and they will forward the refill request to us. Please allow 3 business days for your refill to be completed.          Additional Information About Your Visit        MyChart Information     [employer] gives  "you secure access to your electronic health record. If you see a primary care provider, you can also send messages to your care team and make appointments. If you have questions, please call your primary care clinic.  If you do not have a primary care provider, please call [phone] and they will assist you.        Care EveryWhere ID     This is your Care EveryWhere ID. This could be used by other organizations to access your [facility] medical records  [id number]        Your Vitals Were     Pulse Temperature Respirations Height Last Period BMI (Body Mass Index)    107 99  F (37.2  C) (Tympanic) 14 5' 2\" (1.575 m) 02/06/2017 (Approximate) 37.86 kg/m2       Blood Pressure from Last 3 Encounters:   03/28/17 120/70   03/21/17 130/80   03/02/17 100/62    Weight from Last 3 Encounters:   03/28/17 207 lb (93.9 kg)   03/21/17 208 lb (94.3 kg)   03/02/17 215 lb 8 oz (97.8 kg)              Today, you had the following     No orders found for display         Today's Medication Changes          These changes are accurate as of: 3/28/17 11:59 PM.  If you have any questions, ask your nurse or doctor.               Start taking these medicines.        Dose/Directions    metFORMIN 500 MG tablet   Commonly known as:  GLUCOPHAGE   Used for:  Type 2 diabetes mellitus without complication, without long-term current use of insulin (H)   Started by:  Evaristo Machado MD        Dose:  500 mg   Take 1 tablet (500 mg) by mouth 2 times daily (with meals)   Quantity:  60 tablet   Refills:  3            Where to get your medicines      [facility] [address] [place] [address] [address] AT [place] [address]  [id number] [place] [id number]    Hours:  24-hours Phone:  [phone]     metFORMIN 500 MG tablet                Primary Care Provider Office Phone # Fax #    Evaristo Machado [phone]       [facility] [address]  [facility] [address]        Thank you!     Thank you for choosing [facility]  for your care. Our goal is always to provide you with excellent care. Hearing back from our patients is one way we can continue to improve our services. Please take a few minutes to complete the written survey that you may receive in the mail after your visit with us. Thank you!             Your Updated Medication List - Protect others around you: Learn how to safely use, store and throw away your medicines at www.disposemymeds.org.          This list is accurate as of: 3/28/17 11:59 PM.  Always use your most recent med list.                   Brand Name Dispense Instructions for use    * albuterol 108 (90 BASE) MCG/ACT Inhaler    PROAIR HFA/PROVENTIL HFA/VENTOLIN HFA    54 g    Inhale 1-2 puffs into the lungs every 4 hours as needed for wheezing or shortness of breath / dyspnea       * albuterol (2.5 MG/3ML) 0.083% neb solution     25 vial    Take 1 vial (2.5 mg) by nebulization every 6 hours as needed for shortness of breath / dyspnea or wheezing       butalbital-acetaminophen-caffeine -40 MG per tablet    FIORICET/ESGIC    20 tablet    TAKE ONE TABLET BY MOUTH EVERY 6 HOURS AS NEEDED(MAX OF 4 TABLETS PER DAY)       carisoprodol 350 MG tablet    SOMA    90 tablet    Take 1 tablet (350 mg) by mouth 3 times daily as needed for muscle spasms       clonazePAM 1 MG tablet    klonoPIN    90 tablet    TAKE 1 TABLET BY MOUTH THREE TIMES DAILY AS NEEDED FOR ANXIETY       fluticasone 110 MCG/ACT Inhaler    FLOVENT HFA    2 Inhaler    Inhale 2 puffs into the lungs 2 times daily       fluticasone 50 MCG/ACT spray    FLONASE    16 g    Spray 2 sprays into both nostrils daily       guanFACINE 2 MG tablet    TENEX    180 tablet    TAKE TWO TABLETS BY MOUTH AT BEDTIME       lamoTRIgine 200 MG tablet    LAMICTAL    90 tablet    Take 1 tablet (200 mg) by mouth every morning       levothyroxine 25 MCG tablet     SYNTHROID/LEVOTHROID    90 tablet    TAKE 1 TABLET(25 MCG) BY MOUTH DAILY       lithium 450 MG CR tablet    ESKALITH    60 tablet    TAKE 2 TABLETS BY MOUTH AT BEDTIME       LYRICA 150 MG capsule   Generic drug:  pregabalin     60 capsule    TAKE 1 CAPSULE BY MOUTH TWICE DAILY       meclizine 25 MG tablet    ANTIVERT    30 tablet    Take 1 tablet (25 mg) by mouth every 6 hours as needed for dizziness       MELATONIN PO      Take 6 mg by mouth At Bedtime       metFORMIN 500 MG tablet    GLUCOPHAGE    60 tablet    Take 1 tablet (500 mg) by mouth 2 times daily (with meals)       modafinil 200 MG tablet    PROVIGIL    30 tablet    Take 0.5 tablets (100 mg) by mouth daily       ondansetron 4 MG ODT tab    ZOFRAN-ODT    30 tablet    DISSOLVE 1 TO 2 TABLETS UNDER THE TONGUE EVERY 8 HOURS AS NEEDED FOR NAUSEA       pantoprazole 40 MG EC tablet    PROTONIX    90 tablet    TAKE 1 TABLET(40 MG) BY MOUTH DAILY       tretinoin 0.05 % cream    RETIN-A    45 g    Spread a pea size amount into affected area topically at bedtime.  Use sunscreen SPF>20.       * Notice:  This list has 2 medication(s) that are the same as other medications prescribed for you. Read the directions carefully, and ask your doctor or other care provider to review them with you.

## 2017-03-29 NOTE — PATIENT INSTRUCTIONS
Melody and I had a 25 minute discussion about diabetes and its management.  We also discussed how diabetes affects every single organ system and how crucial it is to maintain good control.  She was anxious to start metformin.  I put her on 500 mg of metformin twice daily.  She will get a full session of education from the CDE.  We will check her blood tests again in June.

## 2017-03-30 ENCOUNTER — TELEPHONE (OUTPATIENT)
Dept: FAMILY MEDICINE | Facility: CLINIC | Age: 42
End: 2017-03-30

## 2017-03-30 DIAGNOSIS — G43.709 CHRONIC MIGRAINE WITHOUT AURA WITHOUT STATUS MIGRAINOSUS, NOT INTRACTABLE: ICD-10-CM

## 2017-03-30 DIAGNOSIS — R11.0 NAUSEA: ICD-10-CM

## 2017-03-30 NOTE — TELEPHONE ENCOUNTER
Called patient with providers response. She verbalized understanding and agreement with plan. Message will be routed to PCP for further direction on long term use.

## 2017-03-30 NOTE — TELEPHONE ENCOUNTER
Yes, I would advise her to reduce to one tab per day if she is having diarrhea, then follow up with Dr Machado upon his return to discuss her questions/concerns about long term use.   Nicole Joy Siegler, PA-C

## 2017-03-30 NOTE — TELEPHONE ENCOUNTER
Patient was called states father suggested she stop taking metformin b/c is bad for kidneys and liver. Would like to know if alternative available. She has diarrhea with metformin unknown other side effects. Advised she push fluids have BRATdiet and hold medication until further directed. Please advice. Can she try taking  1 tablets daily? Please advice.

## 2017-03-30 NOTE — TELEPHONE ENCOUNTER
Reason for Call:  Other call back    Detailed comments: Patient was just put on Metformin for diabetes and her father told her it was bad for her. Now she is concerned that she should not be taking it.     Phone Number Patient can be reached at: Home number on file [phone] (home)    Best Time: any     Can we leave a detailed message on this number? YES    Call taken on 3/30/2017 at 4:20 PM by Naz Lutz

## 2017-04-01 ENCOUNTER — TRANSFERRED RECORDS (OUTPATIENT)
Dept: HEALTH INFORMATION MANAGEMENT | Facility: CLINIC | Age: 42
End: 2017-04-01

## 2017-04-01 LAB — PAP SMEAR - HIM PATIENT REPORTED: NEGATIVE

## 2017-04-03 DIAGNOSIS — R11.0 NAUSEA: ICD-10-CM

## 2017-04-03 DIAGNOSIS — G43.709 CHRONIC MIGRAINE WITHOUT AURA WITHOUT STATUS MIGRAINOSUS, NOT INTRACTABLE: ICD-10-CM

## 2017-04-03 RX ORDER — ONDANSETRON 4 MG/1
TABLET, ORALLY DISINTEGRATING ORAL
Qty: 30 TABLET | Refills: 5 | Status: CANCELLED | OUTPATIENT
Start: 2017-04-03

## 2017-04-03 RX ORDER — BUTALBITAL, ACETAMINOPHEN AND CAFFEINE 50; 325; 40 MG/1; MG/1; MG/1
TABLET ORAL
Qty: 20 TABLET | Refills: 0 | Status: CANCELLED | OUTPATIENT
Start: 2017-04-03

## 2017-04-04 RX ORDER — ONDANSETRON 4 MG/1
TABLET, ORALLY DISINTEGRATING ORAL
Qty: 30 TABLET | Refills: 5 | Status: SHIPPED | OUTPATIENT
Start: 2017-04-04 | End: 2017-06-19

## 2017-04-04 RX ORDER — BUTALBITAL, ACETAMINOPHEN AND CAFFEINE 50; 325; 40 MG/1; MG/1; MG/1
TABLET ORAL
Qty: 20 TABLET | Refills: 0 | Status: SHIPPED | OUTPATIENT
Start: 2017-04-04 | End: 2017-04-17

## 2017-04-04 NOTE — TELEPHONE ENCOUNTER
Ondansetron 4 mg  Last Written Prescription Date: 12/12/16  Last Fill Quantity: 30,  # refills: 5   Last Office Visit with [facility], P or  Health prescribing provider: 3/28/17                                              Fioricet   Last Written Prescription Date:  3/22/17  Last Fill Quantity: 20,   # refills: 0  Last Office Visit with [facility], P or  [place] prescribing provider: 3/28/17  Future Office visit:       Routing refill request to provider for review/approval because:  Drug not on the [facility], [facility] or [employer] refill protocol or controlled substance

## 2017-04-05 ENCOUNTER — ALLIED HEALTH/NURSE VISIT (OUTPATIENT)
Dept: EDUCATION SERVICES | Facility: CLINIC | Age: 42
End: 2017-04-05
Payer: COMMERCIAL

## 2017-04-05 VITALS — BODY MASS INDEX: 36.95 KG/M2 | WEIGHT: 202 LBS

## 2017-04-05 DIAGNOSIS — E11.9 DIABETES MELLITUS WITHOUT COMPLICATION (H): Primary | ICD-10-CM

## 2017-04-05 PROCEDURE — G0108 DIAB MANAGE TRN  PER INDIV: HCPCS | Performed by: DIETITIAN, REGISTERED

## 2017-04-05 NOTE — Clinical Note
Andres Machado, Just fyi- patient seen for initial DM education. She has already started Weight Watchers and has lost 6# since diagnosis. BG at today's visit (post breakfast) was 123 mg/dl.   Thanks! Yoselin Salazar RD, CDE Diabetes

## 2017-04-05 NOTE — NURSING NOTE
Diabetes Self Management Training: Initial Assessment Visit for Newly Diagnosed Patients (Complete AADE Goals Flowsheet)    Melody Muñoz presents today for education related to Type 2 diabetes.    She is accompanied by self    Patient's diabetes management related comments/concerns: reports that she had GDM 8 yrs ago, and suspected her BG's have been high for awhile. Has started Weight Watchers, weight is down 6# so far.    Patient's emotional response to diabetes: expresses readiness to learn and concern for health and well-being    Patient would like this visit to be focused around the following diabetes-related behaviors and goals: Healthy Eating and Monitoring    ASSESSMENT:  Patient Problem List and Family Medical History reviewed for relevant medical history, current medical status, and diabetes risk factors.    Current Diabetes Management per Patient:  Taking diabetes medications?   yes:     Diabetes Medication(s)     Biguanides Sig    metFORMIN (GLUCOPHAGE) 500 MG tablet Take 1 tablet (500 mg) by mouth 2 times daily (with meals)        Problems taking diabetes medications regularly? No , Side effects? No     Past Diabetes Education: Newly diagnosed (GDM education 8 yrs ago)    Patient glucose self monitoring as follows: BG meter taught today.   BG meter: Contour Next EZ meter  BG result today 123 mg/dl (post-breakfast glucose)     Lab Results   Component Value Date    A1C 8.1 03/02/2017       Nutrition:  Patient eats 3 meals per day, is using Weight Watcher's point system since last week.     Breakfast - energy shake, WW vanilla iris bar  Lunch - pulled chicken, V8 juice  Dinner - sugar snap peas, raw carrots, 1 slice ww pizza, ww dessert  Snacks - cottage cheese and fruit    Beverages: usually water, has reduced diet soda from 8 cans/day to 2 cans/day    Cultural/Church diet restrictions: No     Biggest Challenge to Healthy Eating: increased hunger due to a medication that she was on    Physical  "Activity:    Limitations: back pain and shin splints  Is walking and doing exercise video    Diabetes Risk Factors:  overweight/obesity, inactivity and history of gestational diabetes    Diabetes Complications:  Not discussed today.    Vitals:  Wt Readings from Last 3 Encounters:   04/05/17 91.6 kg (202 lb)   03/28/17 93.9 kg (207 lb)   03/21/17 94.3 kg (208 lb)       Estimated body mass index is 37.86 kg/(m^2) as calculated from the following:    Height as of 3/28/17: 1.575 m (5' 2\").    Weight as of 3/28/17: 93.9 kg (207 lb).   Last 3 BP:   BP Readings from Last 3 Encounters:   03/28/17 120/70   03/21/17 130/80   03/02/17 100/62       History   Smoking Status     Current Some Day Smoker     Packs/day: 0.25     Years: 17.00     Types: Cigarettes   Smokeless Tobacco     Never Used       Labs:  Lab Results   Component Value Date    A1C 8.1 03/02/2017     Lab Results   Component Value Date     03/02/2017     Lab Results   Component Value Date    LDL  06/14/2016     Cannot estimate LDL when triglyceride exceeds 400 mg/dL   Above desirable:  100-129 mg/dl   Borderline High:  130-159 mg/dL   High:             160-189 mg/dL   Very high:       >189 mg/dl       HDL Cholesterol   Date Value Ref Range Status   06/14/2016 35 (L) >49 mg/dL Final   ]  GFR Estimate   Date Value Ref Range Status   03/02/2017 >90  Non  GFR Calc   >60 mL/min/1.7m2 Final     GFR Estimate If Black   Date Value Ref Range Status   03/02/2017 >90   GFR Calc   >60 mL/min/1.7m2 Final     Lab Results   Component Value Date    CR 0.56 03/02/2017     No results found for: MICROALBUMIN    Socio/Economic Considerations:    Support system: not assessed    Health Beliefs and Attitudes:   Patient Activation Measure Survey Score:  MARTIN Score (Last Two) 10/15/2012 1/29/2014   MARTIN Raw Score 41 47   Activation Score 63.2 77.5   MARTIN Level 3 4       Stage of Change: PREPARATION (Decided to change - considering how)      Diabetes " knowledge and skills assessment:     Patient is knowledgeable in diabetes management concepts related to: new dx    Patient needs further education on the following diabetes management concepts: Healthy Eating, Being Active, Monitoring and Taking Medication    Barriers to Learning Assessment: No Barriers identified    Based on learning assessment above, most appropriate setting for further diabetes education would be: Group class or Individual setting.    INTERVENTION:   Education provided today on:  AADE Self-Care Behaviors:  Healthy Eating: carbohydrate counting, consistency in amount, composition, and timing of food intake, weight reduction, portion control, plate planning method and label reading  Being Active: relationship to blood glucose  Monitoring: purpose, proper technique, log and interpret results, individual blood glucose targets, frequency of monitoring, use of glucose control solution and proper sharps disposal  Taking Medication: action of prescribed medication  Patient was instructed on Contour Next EZ meter and was able to provide an accurate return demonstration. Patient's blood glucose reading today was 123 mg/dL.    Opportunities for ongoing education and support in diabetes-self management were discussed.    Pt verbalized understanding of concepts discussed and recommendations provided today.       Education Materials Provided:  Ivette Understanding Diabetes Booklet, Safe Disposal Options for Needles & Syringes, BG Log Sheet, Carbohydrate Counting and Contour Next EZ meter kit    PLAN:  Meal Plan Recommendation: continue Weight Watchers program, and Aim for 2-3 carb servings at meals and 0-1 carb servings at snacks  Exercise / activity plan: continue to increase as able  Check blood sugars 1-2x/day, can vary testing times  Keep a blood glucose record for next visit.    FOLLOW-UP:  Follow-up appointment scheduled on July 12.  Chart routed to referring provider.    Ongoing plan for education  and support: Written resources (magazines, books, etc.), Follow-up visit with diabetes educator  and Follow-up with primary care provider    Yoselin Salazar RD, CDE  Diabetes     Time Spent: 50 minutes  Encounter Type: Individual    Any diabetes medication dose changes were made via the CDE Protocol and Collaborative Practice Agreement with the patient's primary care provider. A copy of this encounter was shared with the provider.

## 2017-04-08 DIAGNOSIS — G89.4 CHRONIC PAIN SYNDROME: ICD-10-CM

## 2017-04-10 ENCOUNTER — TELEPHONE (OUTPATIENT)
Dept: FAMILY MEDICINE | Facility: CLINIC | Age: 42
End: 2017-04-10

## 2017-04-10 RX ORDER — PREGABALIN 150 MG/1
CAPSULE ORAL
Qty: 60 CAPSULE | Refills: 0 | Status: SHIPPED | OUTPATIENT
Start: 2017-04-10 | End: 2017-05-17

## 2017-04-10 NOTE — TELEPHONE ENCOUNTER
LYRICA 150MG CAPSULES      Last Written Prescription Date:  3/8/2017  Last Fill Quantity: 60,   # refills: 0  Last Office Visit with [facility], [facility] or  Health prescribing provider: 3/28/2017  Future Office visit:       Routing refill request to provider for review/approval because:  Drug not on the [facility], [facility] or [facility] refill protocol or controlled substance

## 2017-04-10 NOTE — TELEPHONE ENCOUNTER
Reason for Call:  Other call back    Detailed comments: Patient states she has been dieting and on metformin for a while. She states her blood sugars are still high. Patient is wondering what she should do. Please call to advise    Phone Number Patient can be reached at: Home number on file [phone] (home)    Best Time: any    Can we leave a detailed message on this number? YES    Call taken on 4/10/2017 at 7:47 AM by TRIXIE KRUEGER

## 2017-04-10 NOTE — TELEPHONE ENCOUNTER
Controlled Substance Refill Request for carisoprodol (SOMA) 350 MG tablet  Problem List Complete:  No     PROVIDER TO CONSIDER COMPLETION OF PROBLEM LIST AND OVERVIEW/CONTROLLED SUBSTANCE AGREEMENT        Controlled substance agreement on file: No. Not with current provider     Processing:  Patient will  in clinic   checked in past 6 months?  Yes 04/10/2017   [place] reviewed:  reviewed- recommend provider review getting medication from other providers    [place]  [email or web address]

## 2017-04-10 NOTE — TELEPHONE ENCOUNTER
CARISOPRODOL 350MG TABLETS      Last Written Prescription Date:  3/16/2017  Last Fill Quantity: 90,   # refills: 0  Last Office Visit with [facility], [facility] or  Health prescribing provider: 3/28/2017  Future Office visit:       Routing refill request to provider for review/approval because:  Drug not on the [facility], [facility] or [facility] refill protocol or controlled substance

## 2017-04-10 NOTE — TELEPHONE ENCOUNTER
Patient reports blood sugars are high. Fasting blood sugars for the past week range 152-170. She has nausea and fatigue. Denies any other symptoms. Notes she has been exercising and dieting. Advised pt continue exercising, taking medication as prescribed, avoid carbohydrates and and sweets. Message will sent to PCP for further directives.Please advice.

## 2017-04-11 DIAGNOSIS — E11.9 TYPE 2 DIABETES MELLITUS WITHOUT COMPLICATION, WITHOUT LONG-TERM CURRENT USE OF INSULIN (H): ICD-10-CM

## 2017-04-11 RX ORDER — CARISOPRODOL 350 MG/1
TABLET ORAL
Qty: 90 TABLET | Refills: 0 | Status: SHIPPED | OUTPATIENT
Start: 2017-04-15 | End: 2017-05-08

## 2017-04-11 NOTE — TELEPHONE ENCOUNTER
Called patient and let her know of the message below. Patient is requesting that her Soma be refilled as well, has been previously routed to provider for approval.  Brunilda Messina RN  04/11/17  9:09 AM

## 2017-04-17 ENCOUNTER — TELEPHONE (OUTPATIENT)
Dept: FAMILY MEDICINE | Facility: CLINIC | Age: 42
End: 2017-04-17

## 2017-04-17 DIAGNOSIS — G43.709 CHRONIC MIGRAINE WITHOUT AURA WITHOUT STATUS MIGRAINOSUS, NOT INTRACTABLE: ICD-10-CM

## 2017-04-17 RX ORDER — BUTALBITAL, ACETAMINOPHEN AND CAFFEINE 50; 325; 40 MG/1; MG/1; MG/1
TABLET ORAL
Qty: 20 TABLET | Refills: 0 | Status: SHIPPED | OUTPATIENT
Start: 2017-04-17 | End: 2017-05-01

## 2017-04-17 NOTE — TELEPHONE ENCOUNTER
Last OV  3-28-17  Last refill  4-4-17  #20 only    Controlled Substance Refill Request for Fioricet  Problem List Complete:  Yes    fioricet plain Q 6 hrs, no more than 4/day and gets 20 at a time      CSA on file from Dr Husain, dated 9/8/15  (update with current primary care provider)  Last  check - 4-17-17  Provider to review please

## 2017-04-17 NOTE — TELEPHONE ENCOUNTER
Pt called reporting BS of 220 today. Asking for further directives on medication. States she is really tired but denies other hyperglycemic symptoms.Verfied pt takes metformin TID. Pt admits she took a big meal yesterday. Suggested continue taking medication as prescribe and decrease starchy products and sweets. Please advice if any other directions.

## 2017-04-17 NOTE — TELEPHONE ENCOUNTER
Reason for Call:  Medication or medication refill:    Do you use a [facility]?  Name of the pharmacy and phone number for the current request:  [facility], [place]    Name of the medication requested: butalbital-acetaminophen-caffeine (FIORICET/ESGIC) -40 MG per tablet    Other request: Patient states she is completely out of her medication and she was up all night with a migraine.    Can we leave a detailed message on this number? YES    Phone number patient can be reached at: Home number on file [phone] (home)    Best Time:     Call taken on 4/17/2017 at 8:16 AM by JACQUELINE SMITH

## 2017-05-01 ENCOUNTER — TELEPHONE (OUTPATIENT)
Dept: FAMILY MEDICINE | Facility: CLINIC | Age: 42
End: 2017-05-01

## 2017-05-01 DIAGNOSIS — G43.709 CHRONIC MIGRAINE WITHOUT AURA WITHOUT STATUS MIGRAINOSUS, NOT INTRACTABLE: ICD-10-CM

## 2017-05-01 DIAGNOSIS — F41.1 GENERALIZED ANXIETY DISORDER: Chronic | ICD-10-CM

## 2017-05-01 RX ORDER — BUTALBITAL, ACETAMINOPHEN AND CAFFEINE 50; 325; 40 MG/1; MG/1; MG/1
TABLET ORAL
Qty: 20 TABLET | Refills: 0 | Status: SHIPPED | OUTPATIENT
Start: 2017-05-01 | End: 2017-05-17

## 2017-05-01 RX ORDER — BUTALBITAL, ACETAMINOPHEN AND CAFFEINE 50; 325; 40 MG/1; MG/1; MG/1
TABLET ORAL
Qty: 20 TABLET | Refills: 0 | Status: CANCELLED | OUTPATIENT
Start: 2017-05-01

## 2017-05-01 RX ORDER — CLONAZEPAM 1 MG/1
TABLET ORAL
Qty: 90 TABLET | Refills: 0 | Status: SHIPPED | OUTPATIENT
Start: 2017-05-01 | End: 2017-05-30

## 2017-05-01 RX ORDER — CLONAZEPAM 1 MG/1
TABLET ORAL
Qty: 90 TABLET | Refills: 0 | Status: CANCELLED | OUTPATIENT
Start: 2017-05-01

## 2017-05-01 NOTE — TELEPHONE ENCOUNTER
Reason for Call:  Medication or medication refill:    Do you use a [facility]?  Name of the pharmacy and phone number for the current request:  [facility]    Name of the medication requested: butalbital-acetaminophen-caffeine (FIORICET/ESGIC) -40 MG per tablet   clonazePAM (KLONOPIN) 1 MG tablet  Other request: call pt so knows is done    Can we leave a detailed message on this number? YES    Phone number patient can be reached at: Home number on file [phone] (home)    Best Time:     Call taken on 5/1/2017 at 8:21 AM by MADELIN PADRON

## 2017-05-01 NOTE — TELEPHONE ENCOUNTER
Reason for call:  Patient reporting a symptom    Symptom or request: Blood sugar this morning was 199.     Duration (how long have symptoms been present): today    Have you been treated for this before? Yes    Additional comments: Patient's blood sugar was 199 this morning. Dr. Machado told her to call if 200 or higher and this was closes. She is exercising 45 minutes daily, on Weight Watchers and taking Metformin 3 times a day. Not sure if this is concerning and would like a return call regarding this.     Phone Number patient can be reached at:  Home number on file [phone] (home)    Best Time:  any    Can we leave a detailed message on this number:  YES    Call taken on 5/1/2017 at 10:04 AM by Naz Lutz

## 2017-05-03 ENCOUNTER — TELEPHONE (OUTPATIENT)
Dept: FAMILY MEDICINE | Facility: CLINIC | Age: 42
End: 2017-05-03

## 2017-05-03 NOTE — TELEPHONE ENCOUNTER
Pt was called with message below.   Continue monitoring BS and take medication as prescribed.  Follow up with PCP is symptoms persist.

## 2017-05-03 NOTE — TELEPHONE ENCOUNTER
Patient calling to report high BS's -272 today.  Yesterday she was 153.  They have been ranging between 150-180 pre meal.    Went to  yesterday and was diagnosed with Bronchitis.  No fever.  Has h/o of Asthma.  Using inhaler 1-2 times per day.  No oral asthma meds.  On amoxicillin per  (not Martha).    She has been taking the metformin 500 mg TID.  I let her know that her illness may have made her BS spike.  Continue to monitor. I will let Dr Machado know and will call her back with his advice  Triage: call [phone] with Dr Machado's advice  Viktoria Lanier RN- Triage [employer]

## 2017-05-03 NOTE — TELEPHONE ENCOUNTER
Reason for call:  Patient reporting a symptom  Symptom or request: blood sugar high 272  Duration (how long have symptoms been present): 1 day  Have you been treated for this before? Yes  Additional comments: please call patient  Phone Number patient can be reached at:  Home number on file [phone] (home)  Best Time:  any  Can we leave a detailed message on this number:  YES  Call taken on 5/3/2017 at 7:50 AM by BREANN WARREN

## 2017-05-08 ENCOUNTER — TELEPHONE (OUTPATIENT)
Dept: FAMILY MEDICINE | Facility: CLINIC | Age: 42
End: 2017-05-08

## 2017-05-08 DIAGNOSIS — G89.4 CHRONIC PAIN SYNDROME: ICD-10-CM

## 2017-05-08 NOTE — TELEPHONE ENCOUNTER
carisoprodol (SOMA) 350 MG tablet   Last Written Prescription Date:  4/15/17  Last Fill Quantity: 90,   # refills: 0  Last Office Visit with [facility], [facility] or [facility] prescribing provider: 3/28/17  Future Office visit:       Routing refill request to provider for review/approval because:  Drug not on the [facility], [facility] or [facility] refill protocol or controlled substance    Controlled Substance Refill Request for carisoprodol (SOMA) 350 MG tablet  Problem List Complete:  Yes  Patient is followed by VIRAL Deleon for ongoing prescription of pain medication.  All refills should be approved by this provider, or covering partner.    Medication(s):  Lyrica 150 mg bid, Fioricet with codeine prn, klonopin and ambien to be prescribed by psych, not [facility].  Maximum quantity per month: 60, 20  Clinic visit frequency required: Q 3 months     Controlled substance agreement on file: Yes       Date(s): 9/8/2015  New CSA needed.  Pain Clinic evaluation in the past: No    DIRE Total Score(s):  No flowsheet data found.    Last [facility] website verification: 3/7/17 [email or web address]     checked in past 6 months?  Yes 3/7/17, no concerns

## 2017-05-08 NOTE — TELEPHONE ENCOUNTER
Reason for Call:  Other     Detailed comments: patient received a letter from the state re: her drivers license.  Said Dr. Husain wrote one a year ago.  Said needs the letter by 5:00 today.  Will be in to .  if ok to drive on current medication.    Phone Number Patient can be reached at: Home number on file [phone] (home)    Best Time: any    Can we leave a detailed message on this number? YES    Call taken on 5/8/2017 at 8:11 AM by NANCY MAYO

## 2017-05-08 NOTE — TELEPHONE ENCOUNTER
Patient would like a letter similar to the one Dr. Husain wrote in January 2016 in regard to her using her CPAP and being able to drive, except for one that states it is safe for her to drive while taking all of her medications that she is currently on.

## 2017-05-08 NOTE — LETTER
[facility]  [address]  [place] [address]  [phone]                                                                                                           Melody CASTANEDA [address]  [place] [address]    May 8, 2017      To whom it may concern:  Melody is my patient. She is safe to drive while taking all of her prescribed medications.          Sincerely,    Evaristo Machado MD

## 2017-05-09 RX ORDER — CARISOPRODOL 350 MG/1
TABLET ORAL
Qty: 90 TABLET | Refills: 0 | Status: SHIPPED | OUTPATIENT
Start: 2017-05-09 | End: 2017-06-07

## 2017-05-17 ENCOUNTER — OFFICE VISIT (OUTPATIENT)
Dept: FAMILY MEDICINE | Facility: CLINIC | Age: 42
End: 2017-05-17
Payer: COMMERCIAL

## 2017-05-17 VITALS
DIASTOLIC BLOOD PRESSURE: 70 MMHG | SYSTOLIC BLOOD PRESSURE: 120 MMHG | WEIGHT: 196 LBS | TEMPERATURE: 98.7 F | HEART RATE: 92 BPM | RESPIRATION RATE: 16 BRPM | BODY MASS INDEX: 35.85 KG/M2

## 2017-05-17 DIAGNOSIS — G89.4 CHRONIC PAIN SYNDROME: ICD-10-CM

## 2017-05-17 DIAGNOSIS — G43.709 CHRONIC MIGRAINE WITHOUT AURA WITHOUT STATUS MIGRAINOSUS, NOT INTRACTABLE: Primary | ICD-10-CM

## 2017-05-17 DIAGNOSIS — E11.9 DIABETES MELLITUS WITHOUT COMPLICATION (H): ICD-10-CM

## 2017-05-17 LAB — HBA1C MFR BLD: 6.6 % (ref 4.3–6)

## 2017-05-17 PROCEDURE — 99214 OFFICE O/P EST MOD 30 MIN: CPT | Performed by: FAMILY MEDICINE

## 2017-05-17 PROCEDURE — 36415 COLL VENOUS BLD VENIPUNCTURE: CPT | Performed by: FAMILY MEDICINE

## 2017-05-17 PROCEDURE — 83036 HEMOGLOBIN GLYCOSYLATED A1C: CPT | Performed by: FAMILY MEDICINE

## 2017-05-17 RX ORDER — PREGABALIN 150 MG/1
150 CAPSULE ORAL 2 TIMES DAILY
Qty: 60 CAPSULE | Refills: 0 | Status: SHIPPED | OUTPATIENT
Start: 2017-05-17 | End: 2017-06-12

## 2017-05-17 RX ORDER — BUTALBITAL, ACETAMINOPHEN, CAFFEINE AND CODEINE PHOSPHATE 300; 50; 40; 30 MG/1; MG/1; MG/1; MG/1
1 CAPSULE ORAL 3 TIMES DAILY PRN
Qty: 15 CAPSULE | Refills: 0 | Status: SHIPPED | OUTPATIENT
Start: 2017-05-17 | End: 2017-05-25

## 2017-05-17 NOTE — PROGRESS NOTES
SUBJECTIVE:                                                    Melody Muñoz is a 41 year old female who presents to clinic today for the following health issues:      Medication Followup of Lyrica and Fioricet    Taking Medication as prescribed: yes    Side Effects:  None    Medication Helping Symptoms:  Yes but Plain fioricet without codeine is not helping with migraines       Migraine Follow-Up    Headaches symptoms:  Stable 2-3/week    Frequency: 2-3/ week     Duration of headaches: hours    Able to do normal daily activities/work with migraines: No - not usually    Rescue/Relief medication:fioricet works some but fioricet with codeine works better              Effectiveness: moderate relief    Preventative medication: Lyrica    Neurologic complications: No new stroke-like symptoms, loss of vision or speech, numbness or weakness    In the past 4 weeks, how often have you gone to Urgent Care or the emergency room because of your headaches?  1       Problem list and histories reviewed & adjusted, as indicated.  Additional history: as documented    Patient Active Problem List   Diagnosis     Endometriosis     Mantoux: positive     Latent tuberculosis     Major depression     Generalized anxiety disorder     Constipation     Nightmares     Knee pain     Bipolar 1 disorder  with psychosis     Chronic fatigue     Female stress incontinence     Drug-seeking behavior     Vertigo     Suicidal ideation     Acne     Chronic pain syndrome     Insomnia, unspecified     Chronic migraine without aura without status migrainosus, not intractable     Nausea     JASWANT (obstructive sleep apnea)     Elevated liver enzymes     TMJ (temporomandibular joint syndrome)     Tobacco use disorder     Hypothyroidism due to acquired atrophy of thyroid     Hostile behavior     Mild persistent asthma without complication     Diabetes mellitus without complication (H)     Past Surgical History:   Procedure Laterality Date     BIOPSY        COLONOSCOPY  2010     GENITOURINARY SURGERY  May/2014    bladder sling     GYN SURGERY      laparoscopy- endometriosis     GYN SURGERY  2009     for twins     little finger surgery left  1980     tubal ligation bilateral       wisdom teeth removal         Social History   Substance Use Topics     Smoking status: Current Some Day Smoker     Packs/day: 0.25     Years: 17.00     Types: Cigarettes     Smokeless tobacco: Never Used     Alcohol use No      Comment: no etoh since      Family History   Problem Relation Age of Onset     Hypertension Mother      HEART DISEASE Father      palpitations     Depression Sister      Anxiety Disorder Sister      HEART DISEASE Maternal Grandmother      CHF     CANCER Maternal Grandfather 72     Pancreatic Cancer     Alzheimer Disease Paternal Grandfather      Bipolar Disorder Other      Autism Spectrum Disorder Other            Reviewed and updated as needed this visit by clinical staff  Tobacco  Allergies  Meds       Reviewed and updated as needed this visit by Provider         ROS:  CONSTITUTIONAL:NEGATIVE for fever, chills, change in weight  NEURO: NEGATIVE for weakness, dizziness or paresthesias and POSITIVE for HX headaches-migraine  PSYCHIATRIC: NEGATIVE for changes in mood or affect  ENDO: blood sugars have been higher of late  OBJECTIVE:                                                    /70  Pulse 92  Temp 98.7  F (37.1  C) (Tympanic)  Resp 16  Wt 196 lb (88.9 kg)  BMI 35.85 kg/m2  Body mass index is 35.85 kg/(m^2).  GENERAL APPEARANCE: healthy, alert and no distress         ASSESSMENT/PLAN:                                                        ICD-10-CM    1. Chronic migraine without aura without status migrainosus, not intractable G43.709 Butalbital-APAP-Caff-Cod (FIORICET/CODEINE) -34-30 MG CAPS     NEUROLOGY ADULT REFERRAL   2. Chronic pain syndrome G89.4 pregabalin (LYRICA) 150 MG capsule   3. Diabetes mellitus without complication (H)  E11.9 Hemoglobin A1c     Albumin Random Urine Quantitative     CANCELED: Albumin Random Urine Quantitative       Patient Instructions   We'll check labs and see back after review to determine when.  I wrote her a new prescription for Fioricet with codeine for her migraines.  She has been to [place] [facility] where they tried numerous things to help with her headaches, none of which worked.  I referred her to the [facility] neurology clinics for a consult.  She will check back after that consult.  Lyrica was refilled.      Evaristo Machado MD  [facility]

## 2017-05-17 NOTE — PATIENT INSTRUCTIONS
We'll check labs and see back after review to determine when.  I wrote her a new prescription for Fioricet with codeine for her migraines.  She has been to [facility] where they tried numerous things to help with her headaches, none of which worked.  I referred her to the [facility] neurology clinics for a consult.  She will check back after that consult.  Lyrica was refilled.

## 2017-05-17 NOTE — MR AVS SNAPSHOT
After Visit Summary   5/17/2017    Melody Muñoz    MRN: [id number]           Patient Information     Date Of Birth          1975        Visit Information        Provider Department      5/17/2017 10:00 AM Evaristo Machado MD [facility]        Today's Diagnoses     Chronic pain syndrome        Chronic migraine without aura without status migrainosus, not intractable           Follow-ups after your visit        Additional Services     NEUROLOGY ADULT REFERRAL       Your provider has referred you to: [facility]: Neurology Clinic [facility] [phone]   [email or web address]  General Neurology    Reason for Referral: Consult    Please be aware that coverage of these services is subject to the terms and limitations of your health insurance plan.  Call member services at your health plan with any benefit or coverage questions.      Please bring the following with you to your appointment:    (1) Any X-Rays, CTs or MRIs which have been performed.  Contact the facility where they were done to arrange for  prior to your scheduled appointment.    (2) List of current medications  (3) This referral request   (4) Any documents/labs given to you for this referral                  Your next 10 appointments already scheduled     Jul 12, 2017 10:30 AM CDT   [facility] with [facility]    [address] [address] [phone]              Who to contact     If you have questions or need follow up information about today's clinic visit or your schedule please contact [facility] directly at [phone].  Normal or non-critical lab and imaging results will be communicated to you by MyChart, letter or phone within 4 business days after the clinic has received the results. If you do not hear from us within 7  days, please contact the clinic through Spangle or phone. If you have a critical or abnormal lab result, we will notify you by phone as soon as possible.  Submit refill requests through Spangle or call your pharmacy and they will forward the refill request to us. Please allow 3 business days for your refill to be completed.          Additional Information About Your Visit        [employer] Information     Spangle gives you secure access to your electronic health record. If you see a primary care provider, you can also send messages to your care team and make appointments. If you have questions, please call your primary care clinic.  If you do not have a primary care provider, please call [phone] and they will assist you.        Care EveryWhere ID     This is your Care EveryWhere ID. This could be used by other organizations to access your [facility] medical records  [id number]        Your Vitals Were     Pulse Temperature Respirations BMI (Body Mass Index)          92 98.7  F (37.1  C) (Tympanic) 16 35.85 kg/m2         Blood Pressure from Last 3 Encounters:   05/17/17 120/70   03/28/17 120/70   03/21/17 130/80    Weight from Last 3 Encounters:   05/17/17 196 lb (88.9 kg)   04/05/17 202 lb (91.6 kg)   03/28/17 207 lb (93.9 kg)              We Performed the Following     NEUROLOGY ADULT REFERRAL          Today's Medication Changes          These changes are accurate as of: 5/17/17 10:17 AM.  If you have any questions, ask your nurse or doctor.               Start taking these medicines.        Dose/Directions    Butalbital-APAP-Caff-Cod -98-30 MG Caps   Commonly known as:  FIORICET/CODEINE   Used for:  Chronic migraine without aura without status migrainosus, not intractable   Started by:  Evaristo Machado MD        Dose:  1 capsule   Take 1 capsule by mouth 3 times daily as needed   Quantity:  15 capsule   Refills:  0         These medicines have changed or have updated prescriptions.         Dose/Directions    pregabalin 150 MG capsule   Commonly known as:  LYRICA   This may have changed:  See the new instructions.   Used for:  Chronic pain syndrome   Changed by:  Evaristo Machado MD        Dose:  150 mg   Take 1 capsule (150 mg) by mouth 2 times daily   Quantity:  60 capsule   Refills:  0         Stop taking these medicines if you haven't already. Please contact your care team if you have questions.     butalbital-acetaminophen-caffeine -40 MG per tablet   Commonly known as:  FIORICET/ESGIC   Stopped by:  Evaristo Machado MD                Where to get your medicines      Some of these will need a paper prescription and others can be bought over the counter.  Ask your nurse if you have questions.     Bring a paper prescription for each of these medications     Butalbital-APAP-Caff-Cod -60-30 MG Caps    pregabalin 150 MG capsule                Primary Care Provider Office Phone # Fax #    Evaristo Machado [phone]       [facility] [address] [address]        Thank you!     Thank you for choosing [facility]  for your care. Our goal is always to provide you with excellent care. Hearing back from our patients is one way we can continue to improve our services. Please take a few minutes to complete the written survey that you may receive in the mail after your visit with us. Thank you!             Your Updated Medication List - Protect others around you: Learn how to safely use, store and throw away your medicines at www.disposemymeds.org.          This list is accurate as of: 5/17/17 10:17 AM.  Always use your most recent med list.                   Brand Name Dispense Instructions for use    * albuterol 108 (90 BASE) MCG/ACT Inhaler    PROAIR HFA/PROVENTIL HFA/VENTOLIN HFA    54 g    Inhale 1-2 puffs into the lungs every 4 hours as needed for wheezing or shortness of breath / dyspnea       *  albuterol (2.5 MG/3ML) 0.083% neb solution     25 vial    Take 1 vial (2.5 mg) by nebulization every 6 hours as needed for shortness of breath / dyspnea or wheezing       blood glucose monitoring lancets     1 Box    Use to test blood sugar 2 times daily or as directed.  Ok to substitute alternative if insurance prefers.       blood glucose monitoring test strip    LAURA CONTOUR NEXT    200 each    Use to test blood sugar 2 times daily or as directed.       Butalbital-APAP-Caff-Cod -36-30 MG Caps    FIORICET/CODEINE    15 capsule    Take 1 capsule by mouth 3 times daily as needed       carisoprodol 350 MG tablet    SOMA    90 tablet    TAKE 1 TABLET BY MOUTH THREE TIMES DAILY       clonazePAM 1 MG tablet    klonoPIN    90 tablet    TAKE 1 TABLET BY MOUTH THREE TIMES DAILY AS NEEDED FOR ANXIETY       fluticasone 110 MCG/ACT Inhaler    FLOVENT HFA    2 Inhaler    Inhale 2 puffs into the lungs 2 times daily       fluticasone 50 MCG/ACT spray    FLONASE    16 g    Spray 2 sprays into both nostrils daily       guanFACINE 2 MG tablet    TENEX    180 tablet    TAKE TWO TABLETS BY MOUTH AT BEDTIME       lamoTRIgine 200 MG tablet    LAMICTAL    90 tablet    Take 1 tablet (200 mg) by mouth every morning       levothyroxine 25 MCG tablet    SYNTHROID/LEVOTHROID    90 tablet    TAKE 1 TABLET(25 MCG) BY MOUTH DAILY       lithium 450 MG CR tablet    ESKALITH    60 tablet    TAKE 2 TABLETS BY MOUTH AT BEDTIME       meclizine 25 MG tablet    ANTIVERT    30 tablet    Take 1 tablet (25 mg) by mouth every 6 hours as needed for dizziness       MELATONIN PO      Take 6 mg by mouth At Bedtime       metFORMIN 500 MG tablet    GLUCOPHAGE    90 tablet    Take 1 tablet (500 mg) by mouth 3 times daily (with meals)       modafinil 200 MG tablet    PROVIGIL    30 tablet    Take 0.5 tablets (100 mg) by mouth daily       ondansetron 4 MG ODT tab    ZOFRAN-ODT    30 tablet    DISSOLVE 1 TO 2 TABLETS UNDER THE TONGUE EVERY 8 HOURS AS NEEDED  FOR NAUSEA       pantoprazole 40 MG EC tablet    PROTONIX    90 tablet    TAKE 1 TABLET(40 MG) BY MOUTH DAILY       pregabalin 150 MG capsule    LYRICA    60 capsule    Take 1 capsule (150 mg) by mouth 2 times daily       tretinoin 0.05 % cream    RETIN-A    45 g    Spread a pea size amount into affected area topically at bedtime.  Use sunscreen SPF>20.       * Notice:  This list has 2 medication(s) that are the same as other medications prescribed for you. Read the directions carefully, and ask your doctor or other care provider to review them with you.

## 2017-05-17 NOTE — NURSING NOTE
"Chief Complaint   Patient presents with     Recheck Medication     LYRICA     Headache     DISCUSS [facility]att with juliette        Initial /70  Pulse 92  Temp 98.7  F (37.1  C) (Tympanic)  Resp 16  Wt 196 lb (88.9 kg)  BMI 35.85 kg/m2 Estimated body mass index is 35.85 kg/(m^2) as calculated from the following:    Height as of 3/28/17: 5' 2\" (1.575 m).    Weight as of this encounter: 196 lb (88.9 kg).  Medication Reconciliation: complete     Genesis Stone CMA      "

## 2017-05-18 ASSESSMENT — ASTHMA QUESTIONNAIRES: ACT_TOTALSCORE: 19

## 2017-05-22 ENCOUNTER — TELEPHONE (OUTPATIENT)
Dept: FAMILY MEDICINE | Facility: CLINIC | Age: 42
End: 2017-05-22

## 2017-05-22 NOTE — TELEPHONE ENCOUNTER
Reason for Call:  Request for results:    Name of test or procedure: A1C     Date of test of procedure: 5/17/17    Location of the test or procedure: [facility]    OK to leave the result message on voice mail or with a family member? YES    Phone number Patient can be reached at:  Cell number on file:    Telephone Information:   Mobile [phone]     Additional comments: Patient questioning the results of the A1C lab     Call taken on 5/22/2017 at 11:59 AM by Mor Cronin

## 2017-05-22 NOTE — TELEPHONE ENCOUNTER
Called patient and went over the lab results with her.   Component Results   Component Value Flag Ref Range Units Status Collected Lab   Hemoglobin A1C 6.6 (H) 4.3 - 6.0 % Final 05/17/2017 10:21    Lab and Collection   Hemoglobin A1c (Order #[id number]) on 5/17/2017 - Lab and Collection Information   Result History   Hemoglobin A1c (Order #[id number]) on 5/17/2017 - Order Result History Report   Hemoglobin A1c [[id number]]   Electronically signed by: Evaristo Machado MD on 05/17/17 1019 Status: Completed   Ordering user: Evaristo Machado MD 05/17/17 1019           Order Providers   Authorizing Provider Encounter Provider   Evaristo Machado MD Bjorklund, John Robert, MD   Patient Result Comments   Entered by Evaristo Machado MD at 5/17/2017  8:07 PM   Melody,     Great job! Let's see you in 3 months and repeat the tests.   Keep up the great work!     Sincerely,       Evaristo Machado M.D.     Patient will call back and make an appointment to be seen in 3 months.

## 2017-05-24 ENCOUNTER — TELEPHONE (OUTPATIENT)
Dept: FAMILY MEDICINE | Facility: CLINIC | Age: 42
End: 2017-05-24

## 2017-05-24 DIAGNOSIS — G43.709 CHRONIC MIGRAINE WITHOUT AURA WITHOUT STATUS MIGRAINOSUS, NOT INTRACTABLE: ICD-10-CM

## 2017-05-25 RX ORDER — BUTALBITAL, ACETAMINOPHEN, CAFFEINE AND CODEINE PHOSPHATE 300; 50; 40; 30 MG/1; MG/1; MG/1; MG/1
1 CAPSULE ORAL 3 TIMES DAILY PRN
Qty: 5 CAPSULE | Refills: 0 | Status: SHIPPED | OUTPATIENT
Start: 2017-05-25 | End: 2017-06-03

## 2017-05-25 NOTE — TELEPHONE ENCOUNTER
Clinic Action Needed: Yes  Reason for Call: Patient requesting refill of Fioricet. Please call patient at [phone]. Recommended urgent care tonight if unable to wait until clinic open in AM. Pharmacy [facility] on file.    Routed to: Patient care team    aMry Jennings RN  [facility]  [phone]

## 2017-05-25 NOTE — TELEPHONE ENCOUNTER
Patient calling to let the doctor know that she does have a prescription but her and her spouse keep all medications locked up. She states that her spouse went out of town and accidentally took the key with him so she doesn't have access to the medications

## 2017-05-25 NOTE — TELEPHONE ENCOUNTER
Sent pt. My Chart Message that script had been faxed to her pharmacy, and that the Quantity is #5.    Hannah Sood LPN

## 2017-05-25 NOTE — TELEPHONE ENCOUNTER
I will do a very limited Rx refill of her Fioricet.  This should last thru the weekend until her spouse gets back.   Rx approved, printed given to Team 2 staff to be faxed

## 2017-05-30 DIAGNOSIS — F41.1 GENERALIZED ANXIETY DISORDER: Chronic | ICD-10-CM

## 2017-05-30 NOTE — TELEPHONE ENCOUNTER
Reason for Call:  Medication or medication refill:    Do you use a [facility]?  Name of the pharmacy and phone number for the current request:  [facility] [address]    Name of the medication requested: Clonazepam 1 mg    Other request: Please send to pharmacy. If any questions please.    Can we leave a detailed message on this number? YES    Phone number patient can be reached at: Home number on file [phone] (home)    Best Time: Any    Call taken on 5/30/2017 at 7:51 AM by TRIXIE KRUEGER

## 2017-05-31 RX ORDER — CLONAZEPAM 1 MG/1
TABLET ORAL
Qty: 90 TABLET | Refills: 0 | Status: SHIPPED | OUTPATIENT
Start: 2017-06-01 | End: 2017-06-23

## 2017-05-31 NOTE — TELEPHONE ENCOUNTER
Clonazepam 1 mg      Last Written Prescription Date:  5/1/17  Last Fill Quantity: 90,   # refills: 0  Last Office Visit with [facility] or  Health prescribing provider: 5/17/17  Future Office visit:       Routing refill request to provider for review/approval because:  Drug not on the [facility], [facility] or  [facility] refill protocol or controlled substance

## 2017-05-31 NOTE — TELEPHONE ENCOUNTER
Patient called looking for her prescription; Informed it was reordered and printed  By provider  But no documentation that it was faxed to pharmacy yet; advised to call pharmacy  in a.m.  Neema Suaer RN  FNA

## 2017-05-31 NOTE — TELEPHONE ENCOUNTER
Patient calling to follow up on the refill request below, requesting that thi be addressed and approved today

## 2017-06-01 NOTE — TELEPHONE ENCOUNTER
Rx faxed to pharmacy. Also called patient and informed her this was done.    Princess LUPIS Conn CMA

## 2017-06-03 DIAGNOSIS — G43.709 CHRONIC MIGRAINE WITHOUT AURA WITHOUT STATUS MIGRAINOSUS, NOT INTRACTABLE: ICD-10-CM

## 2017-06-03 NOTE — TELEPHONE ENCOUNTER
Reason for Call:  Medication or medication refill:  Do you use a [facility]?  Name of the pharmacy and phone number for the current request[facility] [address] [place] [address] AT [place] [address]  Name of the medication requested: Butalbital-APAP-Caff-Cod (FIORICET/CODEINE) -59-30 MG CAPS  Other request: none  Can we leave a detailed message on this number? YES  Phone number patient can be reached at: Home number on file [phone] (home)  Best Time: any  Call taken on 6/3/2017 at 10:51 AM by BREANN WARREN

## 2017-06-05 NOTE — TELEPHONE ENCOUNTER
Last OV  5-17-17  Last refill  5-25-17  #5 only    Controlled Substance Refill Request for Fioricet with Codeine  Problem List Complete:  Yes    Fioricet plain Q 6 hrs, no more than 4/day and gets 20 at a time      CSA on file from Dr Husain, dated 9/8/15  (update with current primary care provider)  Last  check - 4-17-17 provider to review

## 2017-06-06 RX ORDER — BUTALBITAL, ACETAMINOPHEN, CAFFEINE AND CODEINE PHOSPHATE 300; 50; 40; 30 MG/1; MG/1; MG/1; MG/1
1 CAPSULE ORAL 3 TIMES DAILY PRN
Qty: 5 CAPSULE | Refills: 0 | Status: SHIPPED | OUTPATIENT
Start: 2017-06-06 | End: 2017-06-07

## 2017-06-07 ENCOUNTER — TELEPHONE (OUTPATIENT)
Dept: FAMILY MEDICINE | Facility: CLINIC | Age: 42
End: 2017-06-07

## 2017-06-07 DIAGNOSIS — G43.709 CHRONIC MIGRAINE WITHOUT AURA WITHOUT STATUS MIGRAINOSUS, NOT INTRACTABLE: ICD-10-CM

## 2017-06-07 DIAGNOSIS — G89.4 CHRONIC PAIN SYNDROME: ICD-10-CM

## 2017-06-07 RX ORDER — BUTALBITAL, ACETAMINOPHEN, CAFFEINE AND CODEINE PHOSPHATE 300; 50; 40; 30 MG/1; MG/1; MG/1; MG/1
1 CAPSULE ORAL 3 TIMES DAILY PRN
Qty: 15 CAPSULE | Refills: 0 | Status: SHIPPED | OUTPATIENT
Start: 2017-06-07 | End: 2017-06-23

## 2017-06-07 RX ORDER — CARISOPRODOL 350 MG/1
TABLET ORAL
Qty: 90 TABLET | Refills: 0 | Status: SHIPPED | OUTPATIENT
Start: 2017-06-07 | End: 2017-06-30

## 2017-06-07 NOTE — TELEPHONE ENCOUNTER
Patient called the clinic asking why Fioricet/Codeine was only a 5 capsule Rx, normally she gets 15.   She said she tries to limited it, but never knows when stress and menstrual headaches will come on.  [place] does not want to be out of town and not prepared.   Will route to PCP.

## 2017-06-07 NOTE — TELEPHONE ENCOUNTER
Reason for Call:  Other prescription    Detailed comments: Patient is calling to find out why she only go 5 tablets of Butalbital-APAP-Caff-Cod (FIORICET/CODEINE) -61-30 MG CAPS    She states [place] and will need more    Phone Number Patient can be reached at: Home number on file [phone] (home)    Best Time:    Anytime    Can we leave a detailed message on this number? YES    Call taken on 6/7/2017 at 9:45 AM by KYLER DE LA PAZ

## 2017-06-12 DIAGNOSIS — G89.4 CHRONIC PAIN SYNDROME: ICD-10-CM

## 2017-06-13 NOTE — TELEPHONE ENCOUNTER
LYRICA 150MG CAPSULES       Last Written Prescription Date:  05/17/2017  Last Fill Quantity: 60,   # refills: 0  Last Office Visit with [facility], [facility] or  Health prescribing provider: 05/17/2017  Future Office visit:       Routing refill request to provider for review/approval because:  Drug not on the [facility], [facility] or [facility] refill protocol or controlled substance

## 2017-06-14 ENCOUNTER — TRANSFERRED RECORDS (OUTPATIENT)
Dept: HEALTH INFORMATION MANAGEMENT | Facility: CLINIC | Age: 42
End: 2017-06-14

## 2017-06-14 RX ORDER — PREGABALIN 150 MG/1
CAPSULE ORAL
Qty: 60 CAPSULE | Refills: 5 | Status: SHIPPED | OUTPATIENT
Start: 2017-06-16 | End: 2017-11-27

## 2017-06-14 NOTE — TELEPHONE ENCOUNTER
Controlled Substance Refill Request for Lyrica  Problem List Complete:  Yes    Patient is followed by VIRAL Deleon for ongoing prescription of pain medication.  All refills should be approved by this provider, or covering partner.    Medication(s):  Lyrica 150 mg bid, Fioricet with codeine prn, klonopin and ambien to be prescribed by psych, not [place].  Maximum quantity per month: 60, 20  Clinic visit frequency required: Q 3 months     Controlled substance agreement on file: Yes       Date(s): 9/8/2015  New CSA needed.  Pain Clinic evaluation in the past: No    DIRE Total Score(s):  No flowsheet data found.    Last [facility] website verification: 3/7/17 [email or web address]

## 2017-06-17 ENCOUNTER — HEALTH MAINTENANCE LETTER (OUTPATIENT)
Age: 42
End: 2017-06-17

## 2017-06-19 DIAGNOSIS — R11.0 NAUSEA: ICD-10-CM

## 2017-06-20 NOTE — TELEPHONE ENCOUNTER
ONDANSETRON ODT 4MG TABLETS    Last Written Prescription Date: 04/04/2017  Last Fill Quantity: 30,  # refills: 5   Last Office Visit with [facility], [facility] or [facility] prescribing provider: 05/17/2017

## 2017-06-21 RX ORDER — ONDANSETRON 4 MG/1
TABLET, ORALLY DISINTEGRATING ORAL
Qty: 30 TABLET | Refills: 3 | Status: SHIPPED | OUTPATIENT
Start: 2017-06-21 | End: 2017-08-28

## 2017-06-23 DIAGNOSIS — F41.1 GENERALIZED ANXIETY DISORDER: Chronic | ICD-10-CM

## 2017-06-23 DIAGNOSIS — G43.709 CHRONIC MIGRAINE WITHOUT AURA WITHOUT STATUS MIGRAINOSUS, NOT INTRACTABLE: ICD-10-CM

## 2017-06-23 NOTE — TELEPHONE ENCOUNTER
Reason for Call:  Medication or medication refill:    Do you use a [facility]?  Name of the pharmacy and phone number for the current request:  [facility], [place]    Name of the medication requested: Butalbital-APAP-Caff-Cod (FIORICET/CODEINE) -58-30 MG CAPS and clonazePAM (KLONOPIN) 1 MG tablet    Other request: Patient states she is leaving town on 6/29/2017 and that's why she is requesting her medication early.    Can we leave a detailed message on this number? YES    Phone number patient can be reached at: Home number on file [phone] (home)    Best Time:     Call taken on 6/23/2017 at 10:24 AM by JACQUELINE SMITH

## 2017-06-26 RX ORDER — BUTALBITAL, ACETAMINOPHEN, CAFFEINE AND CODEINE PHOSPHATE 300; 50; 40; 30 MG/1; MG/1; MG/1; MG/1
1 CAPSULE ORAL 3 TIMES DAILY PRN
Qty: 15 CAPSULE | Refills: 0 | Status: SHIPPED | OUTPATIENT
Start: 2017-06-26 | End: 2017-07-17

## 2017-06-26 RX ORDER — CLONAZEPAM 1 MG/1
TABLET ORAL
Qty: 90 TABLET | Refills: 0 | Status: SHIPPED | OUTPATIENT
Start: 2017-07-01 | End: 2018-04-23 | Stop reason: ALTCHOICE

## 2017-06-26 NOTE — TELEPHONE ENCOUNTER
Patient states she is leaving [facility] 6-29-17 so requesting early    Controlled Substance Refill Request for Fioricet  Last refill  6-7-17  #15 only  Problem List Complete:  Yes    fioricet plain Q 6 hrs, no more than 4/day and gets 20 at a time  CSA on file from Dr Husain, dated 9/8/15  (update with current primary care provider)  Last  check - 4-17-17 provider to review        Controlled Substance Refill Request for Klonopin  Last refill  6-1-17  #90 only    Problem List Complete:  Yes  1/11/16 Dr. Husain: Melody you must find a psychiatrist to take over your complex medications before I retire at the end of the month. I recommend you make another appointment with the NP Adriana at [facility] and have her prescribe your meds even though you don't like her until you can be seen by Dr. Cisneros at the end of March and talk to him about Latuda.    2/23/16 - Dr Adriana Caputo will be the only provider ordering pt's Zolpidem from now on. Pt is going back up to 10 mg tab. Do not refill Zolpidem ([facility])    3/29/16- Adriana Caputo CNP will be prescribing clonazepam, not FVBX    CSA on file from Dr Husain dated 9/8/16 - needs with current primary care provider   Last  check - 4-17-17

## 2017-06-30 DIAGNOSIS — G89.4 CHRONIC PAIN SYNDROME: ICD-10-CM

## 2017-06-30 NOTE — TELEPHONE ENCOUNTER
Reason for Call:  Medication or medication refill:    Do you use a [facility]?  Name of the pharmacy and phone number for the current request:  [facility],  [place]    Name of the medication requested: carisoprodol (SOMA) 350 MG tablet    Other request:     Can we leave a detailed message on this number? YES    Phone number patient can be reached at: Home number on file [phone] (home)    Best Time:     Call taken on 6/30/2017 at 3:01 PM by JACQUELINE SMITH

## 2017-07-01 NOTE — TELEPHONE ENCOUNTER
carisoprodol (SOMA) 350 MG tablet      Last Written Prescription Date:  6/7/2017  Last Fill Quantity: 90,   # refills: 0  Last Office Visit with [facility], [facility] or [facility] prescribing provider: 5/17/2017  Future Office visit:       Routing refill request to provider for review/approval because:  Drug not on the [facility], [facility] or [facility] refill protocol or controlled substance

## 2017-07-03 RX ORDER — CARISOPRODOL 350 MG/1
TABLET ORAL
Qty: 90 TABLET | Refills: 0 | Status: SHIPPED | OUTPATIENT
Start: 2017-07-03 | End: 2017-07-31

## 2017-07-10 ENCOUNTER — TELEPHONE (OUTPATIENT)
Dept: FAMILY MEDICINE | Facility: CLINIC | Age: 42
End: 2017-07-10

## 2017-07-10 NOTE — LETTER
[facility]  [address]  [facility] [place] [address]  [phone]                                                                                                           Melody Muñoz  [address] [place], [place] [address]  [place] [address]    July 10, 2017          Dear Melody Muñoz,      We care about your well-being!  In order to ensure we are providing the best quality care, we have reviewed your chart and see that you are due for:     _____ Physical   __X___ Pap Smear   _____ Mammogram   _____ Diabetes Followup   _____ Hypertension Followup   _____ Cholesterol Followup (Provider Appointment)   _____ Cholesterol Test (Lab-Only Appointment)   _____ Other:       We can assist you in scheduling these appointments at [phone].    If you have had (or plan to have) any of these tests done at a facility other than [facility] or a [facility], please have the results from these tests sent to your primary physician at [facility].         Sincerely,    Evaristo Machado MD

## 2017-07-10 NOTE — TELEPHONE ENCOUNTER
Panel Management Review      Patient has the following on her problem list:     Depression / Dysthymia review  PHQ-9 SCORE 1/11/2016 6/14/2016 10/11/2016   Total Score - - -   Total Score - - -   Total Score 9 9 5   Total Score - - -      Patient is due for:  None    Asthma review     ACT Total Scores 5/17/2017   ACT TOTAL SCORE (Goal Greater than or Equal to 20) 19   In the past 12 months, how many times did you visit the emergency room for your asthma without being admitted to the hospital? 0   In the past 12 months, how many times were you hospitalized overnight because of your asthma? 0      1. Is Asthma diagnosis on the Problem List? Yes    2. Is Asthma listed on Health Maintenance? Yes    3. Patient is due for:  none      Composite cancer screening  Chart review shows that this patient is due/due soon for the following Pap Smear  Summary:    Patient is due/failing the following:   PAP and PHYSICAL    Action needed:   Patient needs office visit for PAP.    Type of outreach:    Sent letter.    Questions for provider review:    None                                                                                                                                    Genesis Stone CMA

## 2017-07-17 DIAGNOSIS — G43.709 CHRONIC MIGRAINE WITHOUT AURA WITHOUT STATUS MIGRAINOSUS, NOT INTRACTABLE: ICD-10-CM

## 2017-07-17 NOTE — TELEPHONE ENCOUNTER
Clinic Action Needed:Yes  Reason for Call: Pt is calling for her Butalbital-APAP-Caff-Cod (FIORICET/CODEINE) -55-30 MG CAPS. She has a headache today and is leaving town in 2 days. Would like it filled tomorrow morning.Refused RN triage at this time.   Routed to: Dr Jeannette Marquez RN, [facility]

## 2017-07-18 NOTE — TELEPHONE ENCOUNTER
Last OV   5-17-17  Last refill  6-26-17  #15 only    Controlled Substance Refill Request for Fioricet with Codeine  Problem List Complete:  Yes    Patient is followed by VIRAL Deleon for ongoing prescription of pain medication.  All refills should be approved by this provider, or covering partner.    Medication(s):  Lyrica 150 mg bid, Fioricet with codeine prn, klonopin and ambien to be prescribed by psych, not [place].  Maximum quantity per month: 60, 20  Clinic visit frequency required: Q 3 months     Controlled substance agreement on file: Yes       Date(s): 9/8/2015  New CSA needed.  Pain Clinic evaluation in the past: No    DIRE Total Score(s):  No flowsheet data found.    Last [facility] website verification: 3/7/17 [email or web address]

## 2017-07-18 NOTE — TELEPHONE ENCOUNTER
Patient states she has a migraine since last night and would like a call when the prescription is set to the [facility].

## 2017-07-19 RX ORDER — BUTALBITAL, ACETAMINOPHEN, CAFFEINE AND CODEINE PHOSPHATE 300; 50; 40; 30 MG/1; MG/1; MG/1; MG/1
CAPSULE ORAL
Qty: 15 CAPSULE | Refills: 0 | Status: SHIPPED | OUTPATIENT
Start: 2017-07-19 | End: 2017-08-21

## 2017-07-24 DIAGNOSIS — G43.709 CHRONIC MIGRAINE WITHOUT AURA WITHOUT STATUS MIGRAINOSUS, NOT INTRACTABLE: Primary | Chronic | ICD-10-CM

## 2017-07-26 ENCOUNTER — ALLIED HEALTH/NURSE VISIT (OUTPATIENT)
Dept: EDUCATION SERVICES | Facility: CLINIC | Age: 42
End: 2017-07-26
Payer: COMMERCIAL

## 2017-07-26 ENCOUNTER — TELEPHONE (OUTPATIENT)
Dept: FAMILY MEDICINE | Facility: CLINIC | Age: 42
End: 2017-07-26

## 2017-07-26 VITALS — WEIGHT: 194.9 LBS | BODY MASS INDEX: 35.65 KG/M2

## 2017-07-26 DIAGNOSIS — E11.9 DIABETES MELLITUS WITHOUT COMPLICATION (H): Primary | ICD-10-CM

## 2017-07-26 PROCEDURE — G0108 DIAB MANAGE TRN  PER INDIV: HCPCS | Performed by: DIETITIAN, REGISTERED

## 2017-07-26 NOTE — MR AVS SNAPSHOT
After Visit Summary   7/26/2017    Melody Muñoz    MRN: [id number]           Patient Information     Date Of Birth          1975        Visit Information        Provider Department      7/26/2017 9:30 AM Yoselin Salazar [facility]        Care Instructions    My Diabetes Care Goals:    Healthy Eating: Aim for 2-3 carb choices per meal. Re-start Weight Watchers as planned.   Being Active: Re-start exercise program as discussed.   Monitoring: Check blood sugar 1-2 times per day.   Taking Medication: Re-start Metformin 1 tab with breakfast, 2 tabs with evening meal.     Follow up:  Return in 1 month.   Call ([phone]), e-mail ([email or web address]), or send [employer] message with questions, concerns or if follow-up is needed.     Bring blood glucose meter and logbook with you to all doctor and follow-up appointments.     [facility] [facility] for the [facility]:  For Your Diabetes Education and Nutrition Appointments Call:  [phone]   For Diabetes Education or Nutrition Related Questions:   Phone: [phone]  E-mail: [email or web address]  Fax: [phone]   If you need a medication refill please contact your pharmacy. Please allow 3 business days for your refills to be completed.    Instructions for emailing the Diabetes Educators    If you need to communicate a non-urgent message to a Diabetes Educator via email, please send to [email or web address].    Please follow the following email guidelines:    Subject line: Secure: your clinic name (example: Secure: [place])  In the email please include: First name, middle initial, last name and date of birth.    We will be in touch with you within one (1) business day.             Follow-ups after your visit        Your next 10 appointments already scheduled     Aug 23, 2017  9:30 AM CDT   Diabetic Education with Ysoelin Salazar   [facility] ([facility]  [place])    [address]  [facility] [address] [phone]              Who to contact     If you have questions or need follow up information about today's clinic visit or your schedule please contact [facility] directly at [phone].  Normal or non-critical lab and imaging results will be communicated to you by MyChart, letter or phone within 4 business days after the clinic has received the results. If you do not hear from us within 7 days, please contact the clinic through MyChart or phone. If you have a critical or abnormal lab result, we will notify you by phone as soon as possible.  Submit refill requests through Vaavud or call your pharmacy and they will forward the refill request to us. Please allow 3 business days for your refill to be completed.          Additional Information About Your Visit        MyChart Information     Vaavud gives you secure access to your electronic health record. If you see a primary care provider, you can also send messages to your care team and make appointments. If you have questions, please call your primary care clinic.  If you do not have a primary care provider, please call [phone] and they will assist you.        Care EveryWhere ID     This is your Care EveryWhere ID. This could be used by other organizations to access your [place] medical records  [id number]         Blood Pressure from Last 3 Encounters:   05/17/17 120/70   03/28/17 120/70   03/21/17 130/80    Weight from Last 3 Encounters:   05/17/17 88.9 kg (196 lb)   04/05/17 91.6 kg (202 lb)   03/28/17 93.9 kg (207 lb)              Today, you had the following     No orders found for display       Primary Care Provider Office Phone # Fax #    Evaristo Machado [phone]       [facility] [address] [facility] [address]        Equal Access to Services     OCTAVIO TRUJILLO AH: Madi Vee, mitesh graham, qaybta  aida teranpal german. So [facility] [phone].    ATENCIÓN: Si corie ogden, tiene a mendoza disposición servicios gratuitos de asistencia lingüística. Joan al [phone].    We comply with applicable federal civil rights laws and [place] laws. We do not discriminate on the basis of race, color, national origin, age, disability sex, sexual orientation or gender identity.            Thank you!     Thank you for choosing [facility]  for your care. Our goal is always to provide you with excellent care. Hearing back from our patients is one way we can continue to improve our services. Please take a few minutes to complete the written survey that you may receive in the mail after your visit with us. Thank you!             Your Updated Medication List - Protect others around you: Learn how to safely use, store and throw away your medicines at www.disposemymeds.org.          This list is accurate as of: 7/26/17 10:04 AM.  Always use your most recent med list.                   Brand Name Dispense Instructions for use Diagnosis    * albuterol 108 (90 BASE) MCG/ACT Inhaler    PROAIR HFA/PROVENTIL HFA/VENTOLIN HFA    54 g    Inhale 1-2 puffs into the lungs every 4 hours as needed for wheezing or shortness of breath / dyspnea    Intermittent asthma, uncomplicated       * albuterol (2.5 MG/3ML) 0.083% neb solution     25 vial    Take 1 vial (2.5 mg) by nebulization every 6 hours as needed for shortness of breath / dyspnea or wheezing    Mild persistent asthma without complication       blood glucose monitoring lancets     1 Box    Use to test blood sugar 2 times daily or as directed.  Ok to substitute alternative if insurance prefers.    Diabetes mellitus without complication (H)       blood glucose monitoring test strip    LAURA CONTOUR NEXT    200 each    Use to test blood sugar 2 times daily or as directed.    Diabetes mellitus without complication (H)        Butalbital-APAP-Caff-Cod -26-30 MG Caps     15 capsule    TAKE 1 CAPSULE BY MOUTH THREE TIMES DAILY AS NEEDED    Chronic migraine without aura without status migrainosus, not intractable       carisoprodol 350 MG tablet    SOMA    90 tablet    TAKE 1 TABLET BY MOUTH THREE TIMES DAILY    Chronic pain syndrome       clonazePAM 1 MG tablet    klonoPIN    90 tablet    TAKE 1 TABLET BY MOUTH THREE TIMES DAILY AS NEEDED FOR ANXIETY    Generalized anxiety disorder       fluticasone 110 MCG/ACT Inhaler    FLOVENT HFA    2 Inhaler    Inhale 2 puffs into the lungs 2 times daily    Mild persistent asthma without complication       fluticasone 50 MCG/ACT spray    FLONASE    16 g    Spray 2 sprays into both nostrils daily    Chronic maxillary sinusitis       guanFACINE 2 MG tablet    TENEX    180 tablet    TAKE TWO TABLETS BY MOUTH AT BEDTIME    Nightmares       lamoTRIgine 200 MG tablet    LAMICTAL    90 tablet    Take 1 tablet (200 mg) by mouth every morning    Bipolar 1 disorder (H)       levothyroxine 25 MCG tablet    SYNTHROID/LEVOTHROID    90 tablet    TAKE 1 TABLET(25 MCG) BY MOUTH DAILY    Acquired hypothyroidism       lithium 450 MG CR tablet    ESKALITH    60 tablet    TAKE 2 TABLETS BY MOUTH AT BEDTIME    Bipolar disorder with psychotic features (H)       LYRICA 150 MG capsule   Generic drug:  pregabalin     60 capsule    TAKE ONE CAPSULE BY MOUTH TWICE DAILY    Chronic pain syndrome       meclizine 25 MG tablet    ANTIVERT    30 tablet    Take 1 tablet (25 mg) by mouth every 6 hours as needed for dizziness    Dizziness       MELATONIN PO      Take 6 mg by mouth At Bedtime        metFORMIN 500 MG tablet    GLUCOPHAGE    90 tablet    Take 1 tablet (500 mg) by mouth 3 times daily (with meals)    Type 2 diabetes mellitus without complication, without long-term current use of insulin (H)       modafinil 200 MG tablet    PROVIGIL    30 tablet    Take 0.5 tablets (100 mg) by mouth daily    Chronic fatigue        ondansetron 4 MG ODT tab    ZOFRAN-ODT    30 tablet    DISSOLVE 1 TO 2 TABLETS UNDER THE TONGUE EVERY 8 HOURS AS NEEDED FOR NAUSEA    Nausea       pantoprazole 40 MG EC tablet    PROTONIX    90 tablet    TAKE 1 TABLET(40 MG) BY MOUTH DAILY    Other chronic gastritis without hemorrhage       tretinoin 0.05 % cream    RETIN-A    45 g    Spread a pea size amount into affected area topically at bedtime.  Use sunscreen SPF>20.    Acne       * Notice:  This list has 2 medication(s) that are the same as other medications prescribed for you. Read the directions carefully, and ask your doctor or other care provider to review them with you.

## 2017-07-26 NOTE — TELEPHONE ENCOUNTER
Reason for call:  Patient reporting a symptom    Symptom or request: diarrhea for three days wants to know if this is normal    Duration (how long have symptoms been present): 3 days    Have you been treated for this before? unclear    Additional comments: wants call from nurse    Phone Number patient can be reached at:  Home number on file [phone] (home)    Best Time:      Can we leave a detailed message on this number:  YES    Call taken on 7/26/2017 at 7:38 AM by MADELIN PADRON

## 2017-07-26 NOTE — TELEPHONE ENCOUNTER
"Patient complains of diarrhea for 3 days. She was at a family reunion and thinks she picked up a \"bug\". Denied vomiting or any other signs of dehydration. Denied use of any new medications States she has tried BRAT . Advised patient to start with liquid diet in the next 24 hrs and add food as tolerated. HC instructions given- push fluids , BRAT diet, avoid fresh fruits and vegetables. Avoid diuretics.  Schedule OV if symptoms persist or worsen. Warning signs reviewed. Patient verbalized understanding and agreement with plan.  "

## 2017-07-26 NOTE — PROGRESS NOTES
Diabetes Self Management Training: Follow-up Visit    Melody Muñoz presents today for education and evaluation of glucose control related to Type 2 diabetes.    She is accompanied by 7 yo son and daughter (twins)    Patient's diabetes management related comments/concerns: reports that she has gained 4# back, is stressed about 15 yo son in an intensive depression treatment facility for the next 8 months. She has not been taking her medication, or testing, or tracking her food intake. Has been eating a lot of sugary snacks and grazing in the evening Has not attended WW meeting for 3 weeks.     Patient would like this visit to be focused around the following diabetes-related behaviors and goals: Self-care behavioral goal setting    ASSESSMENT:  Patient Problem List reviewed for relevant medical history and current medical status.    Current Diabetes Management per Patient:       Diabetes Medication(s)     Biguanides Sig    metFORMIN (GLUCOPHAGE) 500 MG tablet Take 1 tablet (500 mg) by mouth 3 times daily (with meals)        Medication adherence? Not taking Metformin     Patient glucose self monitoring as follows: rarely, BG in office today 161 mg/dl (fasting)  BG meter: Contour Next EZ meter  BG results: not available     BG values are: unable to assess  Patient's most recent   Lab Results   Component Value Date    A1C 6.6 05/17/2017    is meeting goal of <7.0    Nutrition:  Patient reports not following recommended meal plan. Plans to go back to Weight Watchers.     Biggest Challenge to Healthy Eating: portion control, emotional eating and evening snacking    Physical Activity:    Limitations: none  Not currently exercising    Diabetes Complications:  Not discussed today.    Vitals:  Wt Readings from Last 3 Encounters:   05/17/17 88.9 kg (196 lb)   04/05/17 91.6 kg (202 lb)   03/28/17 93.9 kg (207 lb)       Estimated body mass index is 35.85 kg/(m^2) as calculated from the following:    Height as of 3/28/17:  "1.575 m (5' 2\").    Weight as of 5/17/17: 88.9 kg (196 lb).   Last 3 BP:   BP Readings from Last 3 Encounters:   05/17/17 120/70   03/28/17 120/70   03/21/17 130/80       History   Smoking Status     Current Some Day Smoker     Packs/day: 0.25     Years: 17.00     Types: Cigarettes   Smokeless Tobacco     Never Used       Labs:  Lab Results   Component Value Date    A1C 6.6 05/17/2017     Lab Results   Component Value Date     03/02/2017     Lab Results   Component Value Date    LDL  06/14/2016     Cannot estimate LDL when triglyceride exceeds 400 mg/dL   Above desirable:  100-129 mg/dl   Borderline High:  130-159 mg/dL   High:             160-189 mg/dL   Very high:       >189 mg/dl       HDL Cholesterol   Date Value Ref Range Status   06/14/2016 35 (L) >49 mg/dL Final   ]  GFR Estimate   Date Value Ref Range Status   03/02/2017 >90  Non  GFR Calc   >60 mL/min/1.7m2 Final     GFR Estimate If Black   Date Value Ref Range Status   03/02/2017 >90   GFR Calc   >60 mL/min/1.7m2 Final     Lab Results   Component Value Date    CR 0.56 03/02/2017     No results found for: MICROALBUMIN    Health Beliefs and Attitudes:   Patient Activation Measure Survey Score:  MARTIN Score (Last Two) 10/15/2012 1/29/2014   MARTIN Raw Score 41 47   Activation Score 63.2 77.5   MARTIN Level 3 4       Stage of Change: PREPARATION (Decided to change - considering how)    Progress toward meeting diabetes-related behavioral goals:  Currently meeting 0% of goals.     Diabetes knowledge and skills assessment:     Patient is knowledgeable in diabetes management concepts related to: Healthy Eating, Being Active, Monitoring and Taking Medication    Patient needs further education on the following diabetes management concepts: Reducing Risks and Healthy Coping    Barriers to Learning Assessment: No Barriers identified    Based on learning assessment above, most appropriate setting for further diabetes education would be: " Individual setting.    INTERVENTION:  Education provided today on:  AADE Self-Care Behaviors:  Reducing Risks: prevention, early diagnostic measures and treatment of complications  Healthy Coping: recognize feelings about diagnosis, benefits of making appropriate lifestyle changes, utilize support systems, methods for coping with stress and when to seek professional counseling    Opportunities for ongoing education and support in diabetes-self management were discussed.    Pt verbalized understanding of concepts discussed and recommendations provided today.       Education Materials Provided:  No new materials provided today    PLAN:  See Patient Instructions for co-developed, patient-stated behavior change goals.  Healthy Eating: Aim for 2-3 carb choices per meal. Re-start Weight Watchers as planned.   Being Active: Re-start exercise program as discussed.   Monitoring: Check blood sugar 1-2 times per day.   Taking Medication: Re-start Metformin 1 tab with breakfast, 2 tabs with evening meal.   AVS printed and provided to patient today.    FOLLOW-UP:  Follow-up appointment scheduled 1 month.  Health  referral - patient agreeable to additional support,   Chart routed to referring provider.    Ongoing plan for education and support: Smartphone Karyn(s), Weight loss program, Written resources (magazines, books, etc.), Follow-up visit with diabetes educator  and Follow-up with primary care provider    Yoselin Saalzar RD, RUDDY  Diabetes     Time Spent: 30 minutes  Encounter Type: Individual    Any diabetes medication dose changes were made via the CDE Protocol and Collaborative Practice Agreement with the patient's primary care provider. A copy of this encounter was shared with the provider.

## 2017-07-26 NOTE — PATIENT INSTRUCTIONS
My Diabetes Care Goals:    Healthy Eating: Aim for 2-3 carb choices per meal. Re-start Weight Watchers as planned.   Being Active: Re-start exercise program as discussed.   Monitoring: Check blood sugar 1-2 times per day.   Taking Medication: Re-start Metformin 1 tab with breakfast, 2 tabs with evening meal.     Follow up:  Return in 1 month.   Call [phone]), e-mail ([email or web address]), or send [employer] message with questions, concerns or if follow-up is needed.     Bring blood glucose meter and logbook with you to all doctor and follow-up appointments.     [place] [place] [facility] for the [facility]:  For Your Diabetes Education and Nutrition Appointments Call:  [phone]   For Diabetes Education or Nutrition Related Questions:   Phone: [phone]  E-mail: [email or web address]  Fax: [phone]   If you need a medication refill please contact your pharmacy. Please allow 3 business days for your refills to be completed.    Instructions for emailing the Diabetes Educators    If you need to communicate a non-urgent message to a Diabetes Educator via email, please send to [email or web address].    Please follow the following email guidelines:    Subject line: Secure: your clinic name (example: Secure: [place])  In the email please include: First name, middle initial, last name and date of birth.    We will be in touch with you within one (1) business day.

## 2017-07-31 ENCOUNTER — NURSE TRIAGE (OUTPATIENT)
Dept: NURSING | Facility: CLINIC | Age: 42
End: 2017-07-31

## 2017-07-31 ENCOUNTER — TELEPHONE (OUTPATIENT)
Dept: NURSING | Facility: CLINIC | Age: 42
End: 2017-07-31

## 2017-07-31 DIAGNOSIS — G89.4 CHRONIC PAIN SYNDROME: ICD-10-CM

## 2017-07-31 NOTE — TELEPHONE ENCOUNTER
Caller is requesting information on refill of SOMA;  Review of EMR reveals it is being processed by RX pool; should have at least one day left per  RX.  Has a  Pain Contract.    Advised to call clinic in a.m: understands  and will comply    Reason for Disposition    Caller requesting a NON-URGENT new prescription or refill and triager unable to refill per unit policy    Protocols used: MEDICATION QUESTION CALL-ADULT-  Neema Sauer RN  FNA

## 2017-07-31 NOTE — TELEPHONE ENCOUNTER
Reason for Call:  Medication or medication refill:    Do you use a [facility]?  Name of the pharmacy and phone number for the current request:  [facility]    Name of the medication requested: carisoprodol (SOMA) 350 MG tablet    Other request: Patient states she will be out of her medication today.    Can we leave a detailed message on this number? YES    Phone number patient can be reached at: Home number on file [phone] (home)    Best Time:     Call taken on 7/31/2017 at 8:34 AM by JACQUELINE SMITH

## 2017-08-01 NOTE — TELEPHONE ENCOUNTER
Carisoprodol 350 mg      Last Written Prescription Date:  7/3/17  Last Fill Quantity: 90,   # refills: 0  Last Office Visit with [facility], [facility] or [facility] prescribing provider: 5/17/17  Future Office visit:       Routing refill request to provider for review/approval because:  Drug not on the [facility], [facility] or [facility] refill protocol or controlled substance

## 2017-08-01 NOTE — TELEPHONE ENCOUNTER
Controlled Substance Refill Request for Soma  Problem List Complete:  Yes    Patient is followed by VIRAL Deleon for ongoing prescription of pain medication.  All refills should be approved by this provider, or covering partner.    Medication(s):  Lyrica 150 mg bid, Fioricet with codeine prn, klonopin and ambien to be prescribed by psych, not [place].  Maximum quantity per month: 60, 20  Clinic visit frequency required: Q 3 months     Controlled substance agreement on file: Yes       Date(s): 9/8/2015  New CSA needed.  Pain Clinic evaluation in the past: No    DIRE Total Score(s):  No flowsheet data found.    Last [facility] website verification: 3/7/17 [email or web address]

## 2017-08-01 NOTE — TELEPHONE ENCOUNTER
CSA on file from Dr Husain, dated 9/8/15  (update with current primary care provider)  Last  check - 4-17-17 provider to review

## 2017-08-02 RX ORDER — CARISOPRODOL 350 MG/1
TABLET ORAL
Qty: 90 TABLET | Refills: 5 | Status: SHIPPED | OUTPATIENT
Start: 2017-08-02 | End: 2018-01-10

## 2017-08-02 NOTE — TELEPHONE ENCOUNTER
carisoprodol (SOMA) 350 MG tablet    Last Written Prescription Date:  07/03/2017  Last Fill Quantity: 90,   # refills: 0  Last Office Visit with [facility] or [facility] prescribing provider: 03/28/2017  Future Office visit:       Routing refill request to provider for review/approval because:  Drug not on the [facility], [facility] or [facility] refill protocol or controlled substance

## 2017-08-02 NOTE — TELEPHONE ENCOUNTER
Clinic Action Needed: Yes, please contact patient  FNA Triage Call  Presenting Problem:  Requesting a refill of Soma  Routed to: LESLI Smith RN/FNA

## 2017-08-08 ENCOUNTER — TELEPHONE (OUTPATIENT)
Dept: NURSING | Facility: CLINIC | Age: 42
End: 2017-08-08

## 2017-08-14 ENCOUNTER — TELEPHONE (OUTPATIENT)
Dept: FAMILY MEDICINE | Facility: CLINIC | Age: 42
End: 2017-08-14

## 2017-08-14 NOTE — TELEPHONE ENCOUNTER
Panel Management Review      Patient has the following on her problem list:     Asthma review     ACT Total Scores 5/17/2017   ACT TOTAL SCORE -   ASTHMA ER VISITS -   ASTHMA HOSPITALIZATIONS -   ACT TOTAL SCORE (Goal Greater than or Equal to 20) 19   In the past 12 months, how many times did you visit the emergency room for your asthma without being admitted to the hospital? 0   In the past 12 months, how many times were you hospitalized overnight because of your asthma? 0      1. Is Asthma diagnosis on the Problem List? Yes    2. Is Asthma listed on Health Maintenance? Yes    3. Patient is due for:  ACT        Composite cancer screening  Chart review shows that this patient is due/due soon for the following None  Summary:    Patient is due/failing the following:   ACT    Action needed:   Patient needs office visit for asthma f/u.    Type of outreach:    Sent letter.    Questions for provider review:    None                                                                                                                                    Genesis Stone CMA

## 2017-08-14 NOTE — LETTER
[facility]  [address]  [place] [facility] [address]  [phone]                                                                                                           Melody Muñoz  [address] [place] [address] [address]  [place] [address]    August 14, 2017      Dear Melody,    We care about your well-being!  In order to ensure we are providing the best quality care, we have reviewed your chart and see that you are due for:     _____ Physical   _____ Pap Smear   _____ Mammogram   _____ Diabetes Followup   _____ Hypertension Followup   _____ Cholesterol Followup (Provider Appointment)   _____ Cholesterol Test (Lab-Only Appointment)   __X___ Other:  Asthma follow up     We can assist you in scheduling these appointments at [phone].    If you have had (or plan to have) any of these tests done at a facility other than [facility] or a , please have the results from these tests sent to your primary physician at [facility].       Thank you for choosing [facility].  We appreciate the opportunity to serve you and look forward to supporting your healthcare needs in the future.    If you have any questions or concerns, please call me or my staff at [phone].      Sincerely,    Evaristo Machado MD

## 2017-08-21 DIAGNOSIS — G43.709 CHRONIC MIGRAINE WITHOUT AURA WITHOUT STATUS MIGRAINOSUS, NOT INTRACTABLE: ICD-10-CM

## 2017-08-22 NOTE — TELEPHONE ENCOUNTER
Clinic Action Needed: Yes, contact patient and send script if advised.   FNA Triage Call  Presenting Problem: Requesting a refill of Fioricet with Codeine  Pharmacy: [facility] off of [address]   Routed to: LESLI Smith RN/FNA

## 2017-08-23 ENCOUNTER — TELEPHONE (OUTPATIENT)
Dept: NURSING | Facility: CLINIC | Age: 42
End: 2017-08-23

## 2017-08-23 RX ORDER — BUTALBITAL, ACETAMINOPHEN, CAFFEINE AND CODEINE PHOSPHATE 300; 50; 40; 30 MG/1; MG/1; MG/1; MG/1
CAPSULE ORAL
Qty: 15 CAPSULE | Refills: 0 | Status: SHIPPED | OUTPATIENT
Start: 2017-08-23 | End: 2017-09-24

## 2017-08-23 NOTE — TELEPHONE ENCOUNTER
Reason for Call:  Other prescription    Detailed comments: PT Calling to check on the status of her prescription she is out     Phone Number Patient can be reached at: Home number on file [phone] (home)    Best Time: anytime    Can we leave a detailed message on this number? YES    Call taken on 8/23/2017 at 12:21 PM by Leann Mackenzie

## 2017-08-23 NOTE — TELEPHONE ENCOUNTER
Controlled Substance Refill Request for Fioicet with codeine  Last OV  5-17-17  Last refill  7-19-17  #15    Problem List Complete:  Yes    Patient is followed by VIRAL Deleon for ongoing prescription of pain medication.  All refills should be approved by this provider, or covering partner.    Medication(s):  Lyrica 150 mg bid, Fioricet with codeine prn, klonopin and ambien to be prescribed by psych, not [place].  Maximum quantity per month: 60, 20  Clinic visit frequency required: Q 3 months     Controlled substance agreement on file: Yes       Date(s): 9/8/2015  New CSA needed.  Pain Clinic evaluation in the past: No    DIRE Total Score(s):  No flowsheet data found.    Last [facility] website verification: 3/7/17 [email or web address]

## 2017-08-28 DIAGNOSIS — R11.0 NAUSEA: ICD-10-CM

## 2017-08-29 ENCOUNTER — TELEPHONE (OUTPATIENT)
Dept: NURSING | Facility: CLINIC | Age: 42
End: 2017-08-29

## 2017-08-29 RX ORDER — ONDANSETRON 4 MG/1
TABLET, ORALLY DISINTEGRATING ORAL
Qty: 30 TABLET | Refills: 3 | Status: SHIPPED | OUTPATIENT
Start: 2017-08-29 | End: 2017-11-15

## 2017-09-06 ENCOUNTER — TELEPHONE (OUTPATIENT)
Dept: FAMILY MEDICINE | Facility: CLINIC | Age: 42
End: 2017-09-06

## 2017-09-06 NOTE — LETTER
September 6, 2017    Melody Muñoz  [address]  [place] [address]    Dear Ivette Oliver cares about your health and your health plan.  I have reviewed your medical conditions, medication list and lab results, and am making recommendations based on this review to better manage your health.    You are in particular need of attention regarding:  -Asthma  -Depression/Anxiety  -Diabetes  -Cervical Cancer Screening  -Wellness (Physical) Visit     I am recommending that you:     -schedule a WELLNESS (Physical) APPOINTMENT with me.   I will check fasting labs the same day - nothing to eat except water and meds for 8-10 hours prior.    -schedule a PAP SMEAR EXAM which is due.  Please disregard this reminder if you have had this exam elsewhere within the last year.  It would be helpful for us to have a copy of your recent pap smear report in our file so that we can best coordinate your care.    If you are under/uninsured, we recommend you contact the [facility]. They offer pap smears at no charge or on a sliding fee charge. You can schedule with them at [phone]. Please have them send us the results.      Please call us at the [email or web address] location:  [phone] or use [employer] to address the above recommendations.     Thank you for trusting [facility].  We appreciate the opportunity to serve you and look forward to supporting your healthcare in the future.    If you have (or plan to have) any of these tests done at a facility other than a [facility] or a [facility], please have the results sent to the [facility] location noted above.      Best Regards,    Evaristo Machado MD

## 2017-09-13 ENCOUNTER — TELEPHONE (OUTPATIENT)
Dept: NURSING | Facility: CLINIC | Age: 42
End: 2017-09-13

## 2017-09-14 ENCOUNTER — HOSPITAL PATHOLOGY (OUTPATIENT)
Dept: OTHER | Facility: CLINIC | Age: 42
End: 2017-09-14

## 2017-09-15 LAB — COPATH REPORT: NORMAL

## 2017-09-20 ENCOUNTER — TELEPHONE (OUTPATIENT)
Dept: NURSING | Facility: CLINIC | Age: 42
End: 2017-09-20

## 2017-09-24 DIAGNOSIS — G43.709 CHRONIC MIGRAINE WITHOUT AURA WITHOUT STATUS MIGRAINOSUS, NOT INTRACTABLE: ICD-10-CM

## 2017-09-26 RX ORDER — BUTALBITAL, ACETAMINOPHEN, CAFFEINE AND CODEINE PHOSPHATE 300; 50; 40; 30 MG/1; MG/1; MG/1; MG/1
CAPSULE ORAL
Qty: 15 CAPSULE | Refills: 0 | Status: SHIPPED | OUTPATIENT
Start: 2017-09-26 | End: 2017-10-27

## 2017-09-26 NOTE — TELEPHONE ENCOUNTER
"Controlled Substance Refill Request for But/caff/codeine    Last OV  5-17-17  Last refill  8-23-17  #15 with note \"OV needed before more refills\"    Problem List Complete:  Yes    fioricet plain Q 6 hrs, no more than 4/day and gets 20 at a time      CSA on file from Dr Husain, dated 9/8/15  (update with current primary care provider)  Last  check - 4-17-17 provider to review          "

## 2017-09-26 NOTE — TELEPHONE ENCOUNTER
Patient is calling to check the status of her medication refill. She states she have a terrible migraine and need this approved as soon as possible.

## 2017-10-10 DIAGNOSIS — J45.20 INTERMITTENT ASTHMA, UNCOMPLICATED: ICD-10-CM

## 2017-10-10 NOTE — TELEPHONE ENCOUNTER
PROAIR HFA ORAL INH (200 PFS) 8.5G       Last Written Prescription Date: 8/16/2016  Last Fill Quantity: 54g, # refills: 6    Last Office Visit with [facility], [facility] or [facility] prescribing provider:  5/17/2017   Future Office Visit:       Date of Last Asthma Action Plan Letter:   Asthma Action Plan Q1 Year    Topic Date Due     Asthma Action Plan - yearly  01/11/2017      Asthma Control Test:   ACT Total Scores 5/17/2017   ACT TOTAL SCORE -   ASTHMA ER VISITS -   ASTHMA HOSPITALIZATIONS -   ACT TOTAL SCORE (Goal Greater than or Equal to 20) 19   In the past 12 months, how many times did you visit the emergency room for your asthma without being admitted to the hospital? 0   In the past 12 months, how many times were you hospitalized overnight because of your asthma? 0       Date of Last Spirometry Test:   No results found for this or any previous visit.

## 2017-10-11 RX ORDER — ALBUTEROL SULFATE 90 UG/1
AEROSOL, METERED RESPIRATORY (INHALATION)
Qty: 51 G | Refills: 2 | Status: SHIPPED | OUTPATIENT
Start: 2017-10-11 | End: 2018-11-11

## 2017-10-25 ENCOUNTER — TELEPHONE (OUTPATIENT)
Dept: FAMILY MEDICINE | Facility: CLINIC | Age: 42
End: 2017-10-25

## 2017-10-25 NOTE — LETTER
October 25, 2017    Melody Muñoz  [address]  [place] [address]    Dear Ivette Oliver cares about your health and your health plan.  I have reviewed your medical conditions, medication list and lab results, and am making recommendations based on this review to better manage your health.    You are in particular need of attention regarding:  -Depression/Anxiety  -Cervical Cancer Screening    I am recommending that you:     -schedule a WELLNESS (Physical) APPOINTMENT with me.   I will check fasting labs the same day - nothing to eat except water and meds for 8-10 hours prior.    -schedule a PAP SMEAR EXAM which is due.  Please disregard this reminder if you have had this exam elsewhere within the last year.  It would be helpful for us to have a copy of your recent pap smear report in our file so that we can best coordinate your care.    If you are under/uninsured, we recommend you contact the [facility]. They offer pap smears at no charge or on a sliding fee charge. You can schedule with them at [phone]. Please have them send us the results.      Please call us at the [place] location:  [phone] or use [employer] to address the above recommendations.     Thank you for trusting [facility].  We appreciate the opportunity to serve you and look forward to supporting your healthcare in the future.    If you have (or plan to have) any of these tests done at a facility other than a [facility] or a [facility], please have the results sent to the [facility] location noted above.      Best Regards,    Evaristo Machado MD

## 2017-10-25 NOTE — TELEPHONE ENCOUNTER
Panel Management Review      Patient has the following on her problem list:     Depression / Dysthymia review    Measure:  Needs PHQ-9 score of 4 or less during index window.  Administer PHQ-9 and if score is 5 or more, send encounter to provider for next steps.    5 - 7 month window range:       PHQ-9 SCORE 1/11/2016 6/14/2016 10/11/2016   Total Score - - -   Total Score - - -   Total Score 9 9 5   Total Score - - -       If PHQ-9 recheck is 5 or more, route to provider for next steps.    Patient is due for:  PHQ9    Diabetes    ASA: Passed    Last A1C  Lab Results   Component Value Date    A1C 6.6 05/17/2017    A1C 8.1 03/02/2017    A1C 6.0 09/23/2015    A1C 5.4 07/27/2013     A1C tested: Passed    Last LDL:    Lab Results   Component Value Date    CHOL 253 06/14/2016     Lab Results   Component Value Date    HDL 35 06/14/2016     Lab Results   Component Value Date    LDL  06/14/2016     Cannot estimate LDL when triglyceride exceeds 400 mg/dL   Above desirable:  100-129 mg/dl   Borderline High:  130-159 mg/dL   High:             160-189 mg/dL   Very high:       >189 mg/dl       Lab Results   Component Value Date    TRIG 545 06/14/2016     Lab Results   Component Value Date    CHOLHDLRATIO 4.7 04/04/2014     Lab Results   Component Value Date    NHDL 218 06/14/2016       Is the patient on a Statin? NO             Is the patient on Aspirin? NO        Last three blood pressure readings:  BP Readings from Last 3 Encounters:   05/17/17 120/70   03/28/17 120/70   03/21/17 130/80       Date of last diabetes office visit: 5/17/17     Tobacco History:     History   Smoking Status     Current Some Day Smoker     Packs/day: 0.25     Years: 17.00     Types: Cigarettes   Smokeless Tobacco     Never Used               Composite cancer screening  Chart review shows that this patient is due/due soon for the following Pap Smear  Summary:    Patient is due/failing the following:   PAP and PHQ9    Action needed:   Patient needs  office visit for PAP.    Type of outreach:    Sent letter.    Questions for provider review:    None                                                                                                                                    Genesis Stone CMA

## 2017-10-27 DIAGNOSIS — G43.709 CHRONIC MIGRAINE WITHOUT AURA WITHOUT STATUS MIGRAINOSUS, NOT INTRACTABLE: ICD-10-CM

## 2017-10-27 RX ORDER — BUTALBITAL, ACETAMINOPHEN, CAFFEINE AND CODEINE PHOSPHATE 300; 50; 40; 30 MG/1; MG/1; MG/1; MG/1
1 CAPSULE ORAL 3 TIMES DAILY PRN
Qty: 15 CAPSULE | Refills: 0 | Status: SHIPPED | OUTPATIENT
Start: 2017-10-27 | End: 2017-11-10

## 2017-10-27 NOTE — TELEPHONE ENCOUNTER
[facility] called to see if the acetaminophen can be changed from 300mg to 325mg.  Per Dr. Machado this is ok.

## 2017-10-27 NOTE — TELEPHONE ENCOUNTER
Reason for Call:  Medication or medication refill:    Do you use a [facility]?  Name of the pharmacy and phone number for the current request:  [facility]    Name of the medication requested: butalbital with codeine     Other request: nonw    Can we leave a detailed message on this number? YES    Phone number patient can be reached at: Home number on file [phone] (home)    Best Time: any    Call taken on 10/27/2017 at 11:52 AM by NANCY MAYO

## 2017-10-27 NOTE — TELEPHONE ENCOUNTER
Controlled Substance Refill Request for Butalbital-APAP-Caff-Cod -40-3 Problem List Complete:  Yes     checked in past 6 months?  Yes 10/27/17 Received # 20 10/16/17 from another provider outside of clinic.    Butalbital-APAP-cAFF-cod -40-3  Last Written Prescription Date:  10/16/17  Last Fill Quantity: 20,   # refills: 0  Future Office visit:    Next 5 appointments (look out 90 days)     Oct 30, 2017 11:45 AM CDT   SHORT with Evaristo Machado MD   [facility] ([facility]    [address] [place] [address] [id number] [phone]                   Routing refill request to provider for review/approval because:  Drug not on the [facility], [facility] or [facility] refill protocol or controlled substance

## 2017-10-30 ENCOUNTER — TELEPHONE (OUTPATIENT)
Dept: FAMILY MEDICINE | Facility: CLINIC | Age: 42
End: 2017-10-30

## 2017-10-30 NOTE — TELEPHONE ENCOUNTER
Reason for Call:  Other call back    Detailed comments: Patient state she is having pins and needles in her feet and is wondering if she should increase her Lyrica from 2 times a day to 3 times a day. Please call to advise    Phone Number Patient can be reached at: Home number on file [phone] (home)    Best Time: any    Can we leave a detailed message on this number? YES    Call taken on 10/30/2017 at 3:38 PM by TRIXIE KRUEGER

## 2017-10-30 NOTE — TELEPHONE ENCOUNTER
Patient was called and informed she is due for OV. Future OV was scheduled. Asked that patient talk to PCP re: dose increase then. She verbalized understanding and agreement with plan.

## 2017-11-05 DIAGNOSIS — K29.50 OTHER CHRONIC GASTRITIS WITHOUT HEMORRHAGE: ICD-10-CM

## 2017-11-07 ENCOUNTER — TELEPHONE (OUTPATIENT)
Dept: FAMILY MEDICINE | Facility: CLINIC | Age: 42
End: 2017-11-07

## 2017-11-07 RX ORDER — PANTOPRAZOLE SODIUM 40 MG/1
TABLET, DELAYED RELEASE ORAL
Qty: 90 TABLET | Refills: 1 | Status: SHIPPED | OUTPATIENT
Start: 2017-11-07 | End: 2018-06-09

## 2017-11-07 NOTE — TELEPHONE ENCOUNTER
Panel Management Review      Patient has the following on her problem list:     Asthma review     ACT Total Scores 5/17/2017   ACT TOTAL SCORE -   ASTHMA ER VISITS -   ASTHMA HOSPITALIZATIONS -   ACT TOTAL SCORE (Goal Greater than or Equal to 20) 19   In the past 12 months, how many times did you visit the emergency room for your asthma without being admitted to the hospital? 0   In the past 12 months, how many times were you hospitalized overnight because of your asthma? 0      1. Is Asthma diagnosis on the Problem List? Yes    2. Is Asthma listed on Health Maintenance? Yes    3. Patient is due for:  none    Diabetes    ASA: Passed    Last A1C  Lab Results   Component Value Date    A1C 6.6 05/17/2017    A1C 8.1 03/02/2017    A1C 6.0 09/23/2015    A1C 5.4 07/27/2013     A1C tested: Passed    Last LDL:    Lab Results   Component Value Date    CHOL 253 06/14/2016     Lab Results   Component Value Date    HDL 35 06/14/2016     Lab Results   Component Value Date    LDL  06/14/2016     Cannot estimate LDL when triglyceride exceeds 400 mg/dL   Above desirable:  100-129 mg/dl   Borderline High:  130-159 mg/dL   High:             160-189 mg/dL   Very high:       >189 mg/dl       Lab Results   Component Value Date    TRIG 545 06/14/2016     Lab Results   Component Value Date    CHOLHDLRATIO 4.7 04/04/2014     Lab Results   Component Value Date    NHDL 218 06/14/2016       Is the patient on a Statin? NO             Is the patient on Aspirin? NO        Last three blood pressure readings:  BP Readings from Last 3 Encounters:   05/17/17 120/70   03/28/17 120/70   03/21/17 130/80       Date of last diabetes office visit:      Tobacco History:     History   Smoking Status     Current Some Day Smoker     Packs/day: 0.25     Years: 17.00     Types: Cigarettes   Smokeless Tobacco     Never Used               Composite cancer screening  Chart review shows that this patient is due/due soon for the following Pap  Smear  Summary:    Patient is due/failing the following:   PAP    Action needed:   Patient needs office visit for pap.    Type of outreach:    Sent letter.    Questions for provider review:    None                                                                                                                                    Genesis Stone CMA

## 2017-11-07 NOTE — LETTER
November 7, 2017    Melody Muñoz  [address]  [place] [address]    Dear Ivette Oliver cares about your health and your health plan.  I have reviewed your medical conditions, medication list and lab results, and am making recommendations based on this review to better manage your health.    You are in particular need of attention regarding:  -Cervical Cancer Screening    I am recommending that you:     -schedule a PAP SMEAR EXAM which is due.  Please disregard this reminder if you have had this exam elsewhere within the last year.  It would be helpful for us to have a copy of your recent pap smear report in our file so that we can best coordinate your care.    If you are under/uninsured, we recommend you contact the [facility]. They offer pap smears at no charge or on a sliding fee charge. You can schedule with them at [phone]. Please have them send us the results.      Please call us at the [place] location:  [phone] or use [employer] to address the above recommendations.     Thank you for trusting [facility].  We appreciate the opportunity to serve you and look forward to supporting your healthcare in the future.    If you have (or plan to have) any of these tests done at a facility other than a [facility] or a [facility], please have the results sent to the [facility] location noted above.      Best Regards,    Evaristo Machado MD

## 2017-11-10 ENCOUNTER — OFFICE VISIT (OUTPATIENT)
Dept: FAMILY MEDICINE | Facility: CLINIC | Age: 42
End: 2017-11-10
Payer: COMMERCIAL

## 2017-11-10 VITALS
HEIGHT: 62 IN | TEMPERATURE: 97.8 F | OXYGEN SATURATION: 98 % | HEART RATE: 108 BPM | BODY MASS INDEX: 36.8 KG/M2 | DIASTOLIC BLOOD PRESSURE: 80 MMHG | RESPIRATION RATE: 14 BRPM | SYSTOLIC BLOOD PRESSURE: 120 MMHG | WEIGHT: 200 LBS

## 2017-11-10 DIAGNOSIS — R74.8 ELEVATED LIVER ENZYMES: ICD-10-CM

## 2017-11-10 DIAGNOSIS — G43.709 CHRONIC MIGRAINE WITHOUT AURA WITHOUT STATUS MIGRAINOSUS, NOT INTRACTABLE: ICD-10-CM

## 2017-11-10 DIAGNOSIS — G89.4 CHRONIC PAIN SYNDROME: ICD-10-CM

## 2017-11-10 DIAGNOSIS — N80.9 ENDOMETRIOSIS: ICD-10-CM

## 2017-11-10 DIAGNOSIS — F31.9 BIPOLAR 1 DISORDER (H): Chronic | ICD-10-CM

## 2017-11-10 DIAGNOSIS — E11.9 DIABETES MELLITUS WITHOUT COMPLICATION (H): ICD-10-CM

## 2017-11-10 DIAGNOSIS — E03.4 HYPOTHYROIDISM DUE TO ACQUIRED ATROPHY OF THYROID: ICD-10-CM

## 2017-11-10 DIAGNOSIS — F41.1 GENERALIZED ANXIETY DISORDER: Chronic | ICD-10-CM

## 2017-11-10 DIAGNOSIS — E78.2 MIXED HYPERLIPIDEMIA: ICD-10-CM

## 2017-11-10 DIAGNOSIS — F32.0 MILD SINGLE CURRENT EPISODE OF MAJOR DEPRESSIVE DISORDER (H): Chronic | ICD-10-CM

## 2017-11-10 DIAGNOSIS — G43.709 CHRONIC MIGRAINE WITHOUT AURA WITHOUT STATUS MIGRAINOSUS, NOT INTRACTABLE: Primary | ICD-10-CM

## 2017-11-10 DIAGNOSIS — J45.30 MILD PERSISTENT ASTHMA WITHOUT COMPLICATION: Chronic | ICD-10-CM

## 2017-11-10 PROBLEM — R32 INCONTINENCE OF URINE IN FEMALE: Status: ACTIVE | Noted: 2017-11-10

## 2017-11-10 PROBLEM — Z23 NEED FOR PROPHYLACTIC VACCINATION AGAINST STREPTOCOCCUS PNEUMONIAE (PNEUMOCOCCUS): Status: ACTIVE | Noted: 2017-11-10

## 2017-11-10 PROBLEM — Z13.89 SCREENING FOR DIABETIC PERIPHERAL NEUROPATHY: Status: ACTIVE | Noted: 2017-11-10

## 2017-11-10 LAB
HBA1C MFR BLD: 7.2 % (ref 4.3–6)
LITHIUM SERPL-SCNC: 0.68 MMOL/L (ref 0.6–1.2)

## 2017-11-10 PROCEDURE — 80061 LIPID PANEL: CPT | Performed by: FAMILY MEDICINE

## 2017-11-10 PROCEDURE — 99214 OFFICE O/P EST MOD 30 MIN: CPT | Performed by: FAMILY MEDICINE

## 2017-11-10 PROCEDURE — 36415 COLL VENOUS BLD VENIPUNCTURE: CPT | Performed by: FAMILY MEDICINE

## 2017-11-10 PROCEDURE — 84450 TRANSFERASE (AST) (SGOT): CPT | Performed by: FAMILY MEDICINE

## 2017-11-10 PROCEDURE — 83036 HEMOGLOBIN GLYCOSYLATED A1C: CPT | Performed by: FAMILY MEDICINE

## 2017-11-10 PROCEDURE — 80048 BASIC METABOLIC PNL TOTAL CA: CPT | Performed by: FAMILY MEDICINE

## 2017-11-10 PROCEDURE — 84443 ASSAY THYROID STIM HORMONE: CPT | Performed by: FAMILY MEDICINE

## 2017-11-10 PROCEDURE — 80178 ASSAY OF LITHIUM: CPT | Performed by: FAMILY MEDICINE

## 2017-11-10 PROCEDURE — 84460 ALANINE AMINO (ALT) (SGPT): CPT | Performed by: FAMILY MEDICINE

## 2017-11-10 RX ORDER — BUTALBITAL, ACETAMINOPHEN, CAFFEINE AND CODEINE PHOSPHATE 300; 50; 40; 30 MG/1; MG/1; MG/1; MG/1
1 CAPSULE ORAL 3 TIMES DAILY PRN
Qty: 15 CAPSULE | Refills: 0 | Status: CANCELLED | OUTPATIENT
Start: 2017-11-10

## 2017-11-10 RX ORDER — BUTALBITAL, ACETAMINOPHEN, CAFFEINE AND CODEINE PHOSPHATE 300; 50; 40; 30 MG/1; MG/1; MG/1; MG/1
1 CAPSULE ORAL 3 TIMES DAILY PRN
Qty: 15 CAPSULE | Refills: 0 | Status: SHIPPED | OUTPATIENT
Start: 2017-11-10 | End: 2017-11-27

## 2017-11-10 ASSESSMENT — ANXIETY QUESTIONNAIRES
5. BEING SO RESTLESS THAT IT IS HARD TO SIT STILL: NOT AT ALL
6. BECOMING EASILY ANNOYED OR IRRITABLE: SEVERAL DAYS
3. WORRYING TOO MUCH ABOUT DIFFERENT THINGS: SEVERAL DAYS
7. FEELING AFRAID AS IF SOMETHING AWFUL MIGHT HAPPEN: SEVERAL DAYS
GAD7 TOTAL SCORE: 4
1. FEELING NERVOUS, ANXIOUS, OR ON EDGE: SEVERAL DAYS
2. NOT BEING ABLE TO STOP OR CONTROL WORRYING: NOT AT ALL
IF YOU CHECKED OFF ANY PROBLEMS ON THIS QUESTIONNAIRE, HOW DIFFICULT HAVE THESE PROBLEMS MADE IT FOR YOU TO DO YOUR WORK, TAKE CARE OF THINGS AT HOME, OR GET ALONG WITH OTHER PEOPLE: SOMEWHAT DIFFICULT

## 2017-11-10 ASSESSMENT — PATIENT HEALTH QUESTIONNAIRE - PHQ9
SUM OF ALL RESPONSES TO PHQ QUESTIONS 1-9: 5
5. POOR APPETITE OR OVEREATING: NOT AT ALL

## 2017-11-10 NOTE — TELEPHONE ENCOUNTER
Reason for Call:  Medication or medication refill:    Do you use a [facility]?  Name of the pharmacy and phone number for the current request:  [facility] [address]    Name of the medication requested: Butalbital-APAP -86-30 mg    Other request: Please send to pharmacy. Patient is out of medication and wants to  this morning as she has a migraine    Can we leave a detailed message on this number? YES    Phone number patient can be reached at: Home number on file [phone] (home)    Best Time: any    Call taken on 11/10/2017 at 7:42 AM by TRIXIE KRUEGER

## 2017-11-10 NOTE — PATIENT INSTRUCTIONS
1.  Weight Loss Tips  1. Do not eat after 6 hrs before your expected bedtime  2. Have your heaviest meal for breakfast, a slightly lighter meal at lunch and a snack 6 hrs before bed  3. No sugar/calorie drinks except milk ie no fruit juice, pop, alcohol.  4. Drink milk 30min before meals to decrease your hunger. Also it is excellent as part of your last meal of the day snack  5. Drink lots of water  6. Increase fiber in diet: all bran cereal, salads, popcorn etc  7. Have only one small serving of fruit a day about 1/2 cup (as this is high in sugar)  8. EXERCISE is the bottom line. Without it, you will gain weight even on a low calorie diet. Best if done 2-3X a day as can    Being overweight contributes to high blood pressure and high cholesterol, both of which cause heart attacks, strokes and kidney failure, prediabetes and diabetes, arthritis, and liver disease     2. SEE DR QUIJANO  FOR MORE !!!! As you have not seen him for 6 mo     PT NEEDS TO BE SEEN AS HAS Controlled substance agreement  AND NEEDS TO BE SEEN Q 3 MO !!!    NEEDS a PAP

## 2017-11-10 NOTE — LETTER
My Asthma Action Plan  Name: Melody Muñoz   YOB: 1975  Date: 11/10/2017   My doctor: Renee Winn MD   My clinic: [facility]        My Control Medicine: None  My Rescue Medicine: Albuterol (Proair/Ventolin/Proventil) inhaler prn   My Asthma Severity: mild persistent  Avoid your asthma triggers: Patient is unaware of triggers  upper respiratory infections   n         GREEN ZONE   Good Control    I feel good    No cough or wheeze    Can work, sleep and play without asthma symptoms       Take your asthma control medicine every day.     1. If exercise triggers your asthma, take your rescue medication    15 minutes before exercise or sports, and    During exercise if you have asthma symptoms  2. Spacer to use with inhaler: If you have a spacer, make sure to use it with your inhaler             YELLOW ZONE Getting Worse  I have ANY of these:    I do not feel good    Cough or wheeze    Chest feels tight    Wake up at night   1. Keep taking your Green Zone medications  2. Start taking your rescue medicine:    every 20 minutes for up to 1 hour. Then every 4 hours for 24-48 hours.  3. If you stay in the Yellow Zone for more than 12-24 hours, contact your doctor.  4. If you do not return to the Green Zone in 12-24 hours or you get worse, start taking your oral steroid medicine if prescribed by your provider.           RED ZONE Medical Alert - Get Help  I have ANY of these:    I feel awful    Medicine is not helping    Breathing getting harder    Trouble walking or talking    Nose opens wide to breathe       1. Take your rescue medicine NOW  2. If your provider has prescribed an oral steroid medicine, start taking it NOW  3. Call your doctor NOW  4. If you are still in the Red Zone after 20 minutes and you have not reached your doctor:    Take your rescue medicine again and    Call 911 or go to the emergency room right away    See your regular doctor within 2 weeks of an  Emergency Room or Urgent Care visit for follow-up treatment.        Electronically signed by: Renee Winn, November 10, 2017    Annual Reminders:  Meet with Asthma Educator,  Flu Shot in the Fall, consider Pneumonia Vaccination for patients with asthma (aged 19 and older).    [facility] [address] [place] [address] [id number] AT [facility] [address] [place] [id number]                    Asthma Triggers  How To Control Things That Make Your Asthma Worse    Triggers are things that make your asthma worse.  Look at the list below to help you find your triggers and what you can do about them.  You can help prevent asthma flare-ups by staying away from your triggers.      Trigger                                                          What you can do   Cigarette Smoke  Tobacco smoke can make asthma worse. Do not allow smoking in your home, car or around you.  Be sure no one smokes at a child s day care or school.  If you smoke, ask your health care provider for ways to help you quit.  Ask family members to quit too.  Ask your health care provider for a referral to Quit Plan to help you quit smoking, or call 4-660-216-PLAN.     Colds, Flu, Bronchitis  These are common triggers of asthma. Wash your hands often.  Don t touch your eyes, nose or mouth.  Get a flu shot every year.     Dust Mites  These are tiny bugs that live in cloth or carpet. They are too small to see. Wash sheets and blankets in hot water every week.   Encase pillows and mattress in dust mite proof covers.  Avoid having carpet if you can. If you have carpet, vacuum weekly.   Use a dust mask and HEPA vacuum.   Pollen and Outdoor Mold  Some people are allergic to trees, grass, or weed pollen, or molds. Try to keep your windows closed.  Limit time out doors when pollen count is high.   Ask you health care provider about taking medicine during allergy season.     Animal Dander  Some people are allergic to skin flakes, urine or saliva from  pets with fur or feathers. Keep pets with fur or feathers out of your home.    If you can t keep the pet outdoors, then keep the pet out of your bedroom.  Keep the bedroom door closed.  Keep pets off cloth furniture and away from stuffed toys.     Mice, Rats, and Cockroaches  Some people are allergic to the waste from these pests.   Cover food and garbage.  Clean up spills and food crumbs.  Store grease in the refrigerator.   Keep food out of the bedroom.   Indoor Mold  This can be a trigger if your home has high moisture. Fix leaking faucets, pipes, or other sources of water.   Clean moldy surfaces.  Dehumidify basement if it is damp and smelly.   Smoke, Strong Odors, and Sprays  These can reduce air quality. Stay away from strong odors and sprays, such as perfume, powder, hair spray, paints, smoke incense, paint, cleaning products, candles and new carpet.   Exercise or Sports  Some people with asthma have this trigger. Be active!  Ask your doctor about taking medicine before sports or exercise to prevent symptoms.    Warm up for 5-10 minutes before and after sports or exercise.     Other Triggers of Asthma  Cold air:  Cover your nose and mouth with a scarf.  Sometimes laughing or crying can be a trigger.  Some medicines and food can trigger asthma.

## 2017-11-10 NOTE — Clinical Note
Please abstract the following data from this visit with this patient into the appropriate field in Epic:  Pap smear done on this date: 04/2017 (approximately), by this group: NA, results were Negative.

## 2017-11-10 NOTE — LETTER
My Depression Action Plan  Name: Melody Muñoz   Date of Birth 1975  Date: 11/10/2017    My doctor: Evaristo Machado   My clinic: [address] [address]  [phone]          GREEN    ZONE   Good Control    What it looks like:     Things are going generally well. You have normal up s and down s. You may even feel depressed from time to time, but bad moods usually last less than a day.   What you need to do:  1. Continue to care for yourself (see self care plan)  2. Check your depression survival kit and update it as needed  3. Follow your physician s recommendations including any medication.  4. Do not stop taking medication unless you consult with your physician first.           YELLOW         ZONE Getting Worse    What it looks like:     Depression is starting to interfere with your life.     It may be hard to get out of bed; you may be starting to isolate yourself from others.    Symptoms of depression are starting to last most all day and this has happened for several days.     You may have suicidal thoughts but they are not constant.   What you need to do:     1. Call your care team, your response to treatment will improve if you keep your care team informed of your progress. Yellow periods are signs an adjustment may need to be made.     2. Continue your self-care, even if you have to fake it!    3. Talk to someone in your support network    4. Open up your depression survival kit           RED    ZONE Medical Alert - Get Help    What it looks like:     Depression is seriously interfering with your life.     You may experience these or other symptoms: You can t get out of bed most days, can t work or engage in other necessary activities, you have trouble taking care of basic hygiene, or basic responsibilities, thoughts of suicide or death that will not go away, self-injurious behavior.     What you  need to do:  1. Call your care team and request a same-day appointment. If they are not available (weekends or after hours) call your local crisis line, emergency room or 911.      Electronically signed by: Renee Winn, November 10, 2017    Depression Self Care Plan / Survival Kit    Self-Care for Depression  Here s the deal. Your body and mind are really not as separate as most people think.  What you do and think affects how you feel and how you feel influences what you do and think. This means if you do things that people who feel good do, it will help you feel better.  Sometimes this is all it takes.  There is also a place for medication and therapy depending on how severe your depression is, so be sure to consult with your medical provider and/ or Behavioral Health Consultant if your symptoms are worsening or not improving.     In order to better manage my stress, I will:    Exercise  Get some form of exercise, every day. This will help reduce pain and release endorphins, the  feel good  chemicals in your brain. This is almost as good as taking antidepressants!  This is not the same as joining a gym and then never going! (they count on that by the way ) It can be as simple as just going for a walk or doing some gardening, anything that will get you moving.      Hygiene   Maintain good hygiene (Get out of bed in the morning, Make your bed, Brush your teeth, Take a shower, and Get dressed like you were going to work, even if you are unemployed).  If your clothes don't fit try to get ones that do.    Diet  I will strive to eat foods that are good for me, drink plenty of water, and avoid excessive sugar, caffeine, alcohol, and other mood-altering substances.  Some foods that are helpful in depression are: complex carbohydrates, B vitamins, flaxseed, fish or fish oil, fresh fruits and vegetables.    Psychotherapy  I agree to participate in Individual Therapy (if recommended).    Medication  If prescribed  medications, I agree to take them.  Missing doses can result in serious side effects.  I understand that drinking alcohol, or other illicit drug use, may cause potential side effects.  I will not stop my medication abruptly without first discussing it with my provider.    Staying Connected With Others  I will stay in touch with my friends, family members, and my primary care provider/team.    Use your imagination  Be creative.  We all have a creative side; it doesn t matter if it s oil painting, sand castles, or mud pies! This will also kick up the endorphins.    Witness Beauty  (AKA stop and smell the roses) Take a look outside, even in mid-winter. Notice colors, textures. Watch the squirrels and birds.     Service to others  Be of service to others.  There is always someone else in need.  By helping others we can  get out of ourselves  and remember the really important things.  This also provides opportunities for practicing all the other parts of the program.    Humor  Laugh and be silly!  Adjust your TV habits for less news and crime-drama and more comedy.    Control your stress  Try breathing deep, massage therapy, biofeedback, and meditation. Find time to relax each day.     My support system    Clinic Contact:  Phone number:    Contact 1:  Phone number:    Contact 2:  Phone number:    Zoro[employer]rianism/:  Phone number:    Therapist:  Phone number:    [facility] crisis center:    Phone number:    Other community support:  Phone number:

## 2017-11-10 NOTE — PROGRESS NOTES
SUBJECTIVE:   Melody Muñoz is a 42 year old female who presents to clinic today for the following health issues:    Diabetes Follow-up      Patient is checking blood sugars: 3 times a week    HgbA!C= 6.6 in 5-17     Fears will be higher now     Diabetic concerns: blood sugar frequently over 200     Symptoms of hypoglycemia (low blood sugar): none     Paresthesias (numbness or burning in feet) or sores: No     Date of last diabetic eye exam: 05/2017    Depression and Anxiety/Bipolar Follow-Up      Status since last visit: improved--> in remission  Under care of psych     Other associated symptoms:None    Complicating factors:     Significant life event: No     Current substance abuse: None    PHQ-9 Score and MyChart F/U Questions 10/11/2016 5/17/2017 11/10/2017   Total Score 5 - 5   Q9: Suicide Ideation Not at all Not at all Not at all     ROWDY-7 SCORE 6/14/2016 11/28/2016 11/10/2017   Total Score - - -   Total Score 10 5 4   Total Score - - -   Total Score BEH Adult - - -     Already being seen by a psychiatrist who added the lithium which is working    PHQ-9  English=4  PHQ-9   Any Language  GAD7=4  Suicide Assessment Five-step Evaluation and Treatment (SAFE-T)    Asthma Follow-Up    Was ACT completed today?    Yes  = 22  ACT Total Scores 11/10/2017   ACT TOTAL SCORE -   ASTHMA ER VISITS -   ASTHMA HOSPITALIZATIONS -   ACT TOTAL SCORE (Goal Greater than or Equal to 20) 22   In the past 12 months, how many times did you visit the emergency room for your asthma without being admitted to the hospital? 0   In the past 12 months, how many times were you hospitalized overnight because of your asthma? 0       Recent asthma triggers that patient is dealing with: upper respiratory infections     Rare albut MDI        Migraine Follow-Up      Headaches symptoms:  Worsened     Frequency: Daily interittent for 2 weeks     Has had several wk episodes like this for yrs     Duration of headaches: Hours-Days    Able to do  "normal daily activities/work with migraines: No -     Rescue/Relief medication:Butalibital             Effectiveness: Some Relief    Preventative medication: None--takes prn fiorecette     Per Controlled substance agreement is to only get #20 / mo     Was getting #15 / mo till rec'd this on 9-27-17 , 10-16-17 and 10-27-17     Last ov with PCP for the Controlled substance agreement= Dr Machado was in 5-17     Pt states that she cannot get in between sick children AND HER  HAs     Has followed with [facility] till 5-17 when was given referral to [facility]--she states she \" was just called\" by them and her appt is not till 12-17????    Neurologic complications: No new stroke-like symptoms, loss of vision or speech, numbness or weakness    In the past 4 weeks, how often have you gone to Urgent Care or the emergency room because of your headaches?  0    Hypothyroidism Follow-up      Since last visit, patient describes the following symptoms: Weight stable, no hair loss, no skin changes, no constipation, no loose stools      Amount of exercise or physical activity: None    Problems taking medications regularly: No    Medication side effects: none    Diet: regular (no restrictions)    NONMORBID OBESITY    -BMI= 36.6  -sedentary  -conts to gain  -comorbid hi LDL     MIxed Hyperlipidemia Follow-Up  With 545 trigs and cannot calc the LDL       Rate your low fat/cholesterol diet?: good    Taking statin?  No    Other lipid medications/supplements?:  none          Problem list and histories reviewed & adjusted, as indicated.  Additional history: as documented    Labs reviewed in EPIC    Reviewed and updated as needed this visit by clinical staffTobacco  Allergies  Meds  Problems  Med Hx  Surg Hx  Fam Hx  Soc Hx        Reviewed and updated as needed this visit by Provider         ROS:  C: NEGATIVE for fever, chills, change in weight- gain   I: NEGATIVE for worrisome rashes, moles or lesions  E: NEGATIVE for " "vision changes or irritation  E/M: NEGATIVE for ear, mouth and throat problems  R: NEGATIVE for significant cough or SOB  B: NEGATIVE for masses, tenderness or discharge  CV: NEGATIVE for chest pain, palpitations or peripheral edema  GI: NEGATIVE for nausea, abdominal pain, heartburn, or change in bowel habits  : NEGATIVE for frequency, dysuria, or hematuria  M: NEGATIVE for significant arthralgias or myalgia  N: NEGATIVE for weakness, dizziness or paresthesias- HA   E: NEGATIVE for temperature intolerance, skin/hair changes  H: NEGATIVE for bleeding problems  P: NEGATIVE for changes in mood or affect--depressed mood     OBJECTIVE:     /80  Pulse 108  Temp 97.8  F (36.6  C) (Tympanic)  Resp 14  Ht 5' 2\" (1.575 m)  Wt 200 lb (90.7 kg)  LMP  (LMP Unknown)  SpO2 98%  Breastfeeding? No  BMI 36.58 kg/m2  Body mass index is 36.58 kg/(m^2).   GENERAL: healthy, alert and no distress  EYES: Eyes grossly normal to inspection, PERRL and conjunctivae and sclerae normal  NECK: no adenopathy, no asymmetry, masses, or scars and thyroid normal to palpation  RESP: lungs clear to auscultation - no rales, rhonchi or wheezes  CV: regular rate and rhythm, normal S1 S2, no S3 or S4, no murmur, click or rub, no peripheral edema and peripheral pulses strong  MS: no gross musculoskeletal defects noted, no edema  SKIN: no suspicious lesions or rashes  NEURO: Normal strength and tone, mentation intact and speech normal  PSYCH: mentation appears normal, affect normal/htbrig    Diagnostic Test Results:  none     ASSESSMENT/PLAN:     Problem List Items Addressed This Visit     Migraine - Primary   Relevant Medications   Butalbital-APAP-Caff-Cod -09-30 MG CAPS       Major depression (Chronic)    Relevant Orders    Basic metabolic panel (Completed)    TSH with free T4 reflex (Completed)    Lithium level (Completed)    ALT (Completed)    AST (Completed)    Generalized anxiety disorder (Chronic)    Relevant Orders    Basic " metabolic panel (Completed)    TSH with free T4 reflex (Completed)    Lithium level (Completed)    ALT (Completed)    AST (Completed)    Bipolar 1 disorder  with psychosis (Chronic)    Relevant Orders    Basic metabolic panel (Completed)    TSH with free T4 reflex (Completed)    Lithium level (Completed)    ALT (Completed)    AST (Completed)    Mild persistent asthma without complication (Chronic)    Endometriosis s/po EDWIGE 10-17    Chronic pain syndrome    Elevated liver enzymes    Relevant Orders    ALT (Completed)    AST (Completed)    Hypothyroidism due to acquired atrophy of thyroid    Diabetes mellitus without complication (H)    Relevant Orders    HEMOGLOBIN A1C (Completed)    Albumin Random Urine Quantitative with Creat Ratio (Completed)    Basic metabolic panel (Completed)    TSH with free T4 reflex (Completed)                  Other Visit Diagnoses     Mixed hyperlipidemia        Relevant Orders    Lipid panel reflex to direct LDL Fasting (Completed)    ALT (Completed)    AST (Completed)    Class 2 obesity due to excess calories with serious comorbidity and body mass index (BMI) of 36.0 to 36.9 in adult               .    Patient Instructions   1.  Weight Loss Tips  1. Do not eat after 6 hrs before your expected bedtime  2. Have your heaviest meal for breakfast, a slightly lighter meal at lunch and a snack 6 hrs before bed  3. No sugar/calorie drinks except milk ie no fruit juice, pop, alcohol.  4. Drink milk 30min before meals to decrease your hunger. Also it is excellent as part of your last meal of the day snack  5. Drink lots of water  6. Increase fiber in diet: all bran cereal, salads, popcorn etc  7. Have only one small serving of fruit a day about 1/2 cup (as this is high in sugar)  8. EXERCISE is the bottom line. Without it, you will gain weight even on a low calorie diet. Best if done 2-3X a day as can    Being overweight contributes to high blood pressure and high cholesterol, both of which cause  heart attacks, strokes and kidney failure, prediabetes and diabetes, arthritis, and liver disease     2. SEE DR QUIJANO  FOR MORE !!!! As you have not seen him for 6 mo     PT NEEDS TO BE SEEN AS HAS Controlled substance agreement  AND NEEDS TO BE SEEN Q 3 MO !!!    NEEDS a PAP       Renee Winn MD  [facility] [address]    Time spent with the patient 28mins, more than 50% in counseling and coordinating care, Re above medical problems.    Reviewed all her meds, incl [place]   And her use of them. Discussed all with pt  And the patient expresses understanding  Re the need to be seen re the Controlled substance agreement  For the fifacundocette  Last ov 5-17 & needs q 3 mo   Also re the neurol appt at [facility]  -- not till 12-17  When reeferred 5-17--she has not followed up on this either it appears     Weight management plan: Discussed healthy diet and exercise guidelines and patient will follow up in 1 month in clinic to re-evaluate.    Renee Winn MD

## 2017-11-10 NOTE — LETTER
November 15, 2017      Melody Muñoz  [address]  [place] [address]        Dear ,    We are writing to inform you of your test results.    All of your lab results are normal, except as noted below.     The following are explanations of some of our lab tests     THIS DOES NOT MEAN THAT YOU HAD ALL OF THESE DONE     Please continue on the same medications unless a change is noted above     These are some general explanations for tests:     Hgb is the blood iron level   WBC means White Blood Cells   Platelets are small blood cells that help with forming the blood clots along with other blood factors.   Electrolytes are Sodium, Potassium, Calcium, Magnesium, Phosphorus.   Liver tests are: AST, ALT, Bilirubin, Alkaline Phosphatase.   Kidney tests are Creatinine, GFR.   HDL Cholesterol - is the good cholesterol and it is good to have it high.   LDL cholesterol is the bad cholesterol and it is good to have it low.   It is recommended to have LDL less than 130 for people with hypertension and to have it less than 100 for people with heart disease, diabetes and chronic kidney disease.   Triglycerides are another type of lipid and can also cause heart disease so should be kept low.   Thyroid tests are TSH, T4, T3   Glucose is sugar   A1c is a test that gives us an idea about how well was controlled the diabetes for the last 3 months.   PSA stands for Prostate Specific Antigen and it can be elevated with prostate cancer or prostate inflammation.     Resulted Orders   HEMOGLOBIN A1C   Result Value Ref Range    Hemoglobin A1C 7.2 (H) 4.3 - 6.0 %   Lipid panel reflex to direct LDL Fasting   Result Value Ref Range    Cholesterol 255 (H) <200 mg/dL      Comment:      Desirable:       <200 mg/dl    Triglycerides 339 (H) <150 mg/dL      Comment:      Borderline high:  150-199 mg/dl  High:             200-499 mg/dl  Very high:       >499 mg/dl  Fasting specimen      HDL Cholesterol 54 >49 mg/dL    LDL  Cholesterol Calculated 133 (H) <100 mg/dL      Comment:      Above desirable:  100-129 mg/dl  Borderline High:  130-159 mg/dL  High:             160-189 mg/dL  Very high:       >189 mg/dl      Non HDL Cholesterol 201 (H) <130 mg/dL      Comment:      Above Desirable:  130-159 mg/dl  Borderline high:  160-189 mg/dl  High:             190-219 mg/dl  Very high:       >219 mg/dl     Basic metabolic panel   Result Value Ref Range    Sodium 138 133 - 144 mmol/L    Potassium 4.5 3.4 - 5.3 mmol/L    Chloride 104 94 - 109 mmol/L    Carbon Dioxide 21 20 - 32 mmol/L    Anion Gap 13 3 - 14 mmol/L    Glucose 159 (H) 70 - 99 mg/dL      Comment:      Fasting specimen    Urea Nitrogen 11 7 - 30 mg/dL    Creatinine 0.60 0.52 - 1.04 mg/dL    GFR Estimate >90 >60 mL/min/1.7m2      Comment:      Non  GFR Calc    GFR Estimate If Black >90 >60 mL/min/1.7m2      Comment:       GFR Calc    Calcium 9.8 8.5 - 10.1 mg/dL   TSH with free T4 reflex   Result Value Ref Range    TSH 1.20 0.40 - 4.00 mU/L   Lithium level   Result Value Ref Range    Lithium Level 0.68 0.60 - 1.20 mmol/L   ALT   Result Value Ref Range    ALT 19 0 - 50 U/L   AST   Result Value Ref Range    AST 13 0 - 45 U/L       If you have any questions or concerns, please call the clinic at the number listed above.       Sincerely,        Renee Winn MD

## 2017-11-10 NOTE — NURSING NOTE
"Chief Complaint   Patient presents with     Headache     /80  Pulse 108  Temp 97.8  F (36.6  C) (Tympanic)  Resp 14  Ht 5' 2\" (1.575 m)  Wt 200 lb (90.7 kg)  LMP  (LMP Unknown)  SpO2 98%  Breastfeeding? No  BMI 36.58 kg/m2 Estimated body mass index is 36.58 kg/(m^2) as calculated from the following:    Height as of this encounter: 5' 2\" (1.575 m).    Weight as of this encounter: 200 lb (90.7 kg).  BP completed using cuff size: regular   Torie Odell CMA    Health Maintenance Due   Topic Date Due     FOOT EXAM Q1 YEAR  [birth year]25/1977     EYE EXAM Q1 YEAR  [birth year]25/1977     MICROALBUMIN Q1 YEAR  10/25/1977     PNEUMOVAX 1X HI RISK PATIENT < 65 (NO IB MSG)  10/25/1978     URINE DRUG SCREEN Q1 YR  06/02/2016     ASTHMA ACTION PLAN Q1 YR  01/11/2017     PAP Q3 YR  01/29/2017     PHQ-9 Q6 MONTHS  04/11/2017     ROWDY QUESTIONNAIRE 6 MONTHS  05/28/2017     LIPID MONITORING Q1 YEAR  06/14/2017     INFLUENZA VACCINE (SYSTEM ASSIGNED)  09/01/2017     DEPRESSION ACTION PLAN Q1 YR  10/11/2017     ASTHMA CONTROL TEST Q6 MOS  11/17/2017     A1C Q6 MO  11/17/2017     Health Maintenance reviewed at today's visit patient asked to schedule/complete:   Asthma:  Patient agrees to schedule  Breast Cancer:  Patient agrees to schedule  Diabetes:  Patient agrees to schedule  Immunizations:  Patient agrees to schedule    "

## 2017-11-10 NOTE — TELEPHONE ENCOUNTER
FYI- patient has OV scheduled today.  Patient was called and advice to schedule OV. States she is using Rx daily or sometimes every other day. She filled medication 10/16/2017 & 10/27/2017.      Controlled Substance Refill Request for Butalbital-APAP -46-30 mg  Problem List Complete:  Yes    Patient is followed by VIRAL Deleon for ongoing prescription of pain medication.  All refills should be approved by this provider, or covering partner.    Medication(s):  Lyrica 150 mg bid, Fioricet with codeine prn, klonopin and ambien to be prescribed by psych, not [place].  Maximum quantity per month: 60, 20  Clinic visit frequency required: Q 3 months     Controlled substance agreement on file: Yes       Date(s): 9/8/2015  New CSA needed.  Pain Clinic evaluation in the past: No    DIRE Total Score(s):  No flowsheet data found.    Last [facility] website verification: 3/7/17 [email or web address]     checked in past 6 months?  Yes 11/10/2017   [place]:  reviewed- recommend provider review- patient has filled clonazepam and butalbital from other non [facility] providers.

## 2017-11-10 NOTE — MR AVS SNAPSHOT
After Visit Summary   11/10/2017    Melody Muñoz    MRN: [id number]           Patient Information     Date Of Birth          1975        Visit Information        Provider Department      11/10/2017 10:00 AM Renee Winn MD [facility]        Today's Diagnoses     Mild single current episode of major depressive disorder (H)    -  1    Screening for diabetic peripheral neuropathy        Need for prophylactic vaccination against Streptococcus pneumoniae (pneumococcus)        Chronic migraine without aura without status migrainosus, not intractable        Generalized anxiety disorder        Bipolar 1 disorder  with psychosis        Mild persistent asthma without complication        Endometriosis s/po EDWIGE 10-17        Chronic pain syndrome        Hypothyroidism due to acquired atrophy of thyroid        Diabetes mellitus without complication (H)        Elevated liver enzymes        Mixed hyperlipidemia          Care Instructions    1.  Weight Loss Tips  1. Do not eat after 6 hrs before your expected bedtime  2. Have your heaviest meal for breakfast, a slightly lighter meal at lunch and a snack 6 hrs before bed  3. No sugar/calorie drinks except milk ie no fruit juice, pop, alcohol.  4. Drink milk 30min before meals to decrease your hunger. Also it is excellent as part of your last meal of the day snack  5. Drink lots of water  6. Increase fiber in diet: all bran cereal, salads, popcorn etc  7. Have only one small serving of fruit a day about 1/2 cup (as this is high in sugar)  8. EXERCISE is the bottom line. Without it, you will gain weight even on a low calorie diet. Best if done 2-3X a day as can    Being overweight contributes to high blood pressure and high cholesterol, both of which cause heart attacks, strokes and kidney failure, prediabetes and diabetes, arthritis, and liver disease     2. SEE DR QUIJANO  FOR MORE !!!! As you have not seen him  "for 6 mo           Follow-ups after your visit        Follow-up notes from your care team     Return in about 4 days (around 11/14/2017) for med chek for narcotic usage .      Who to contact     If you have questions or need follow up information about today's clinic visit or your schedule please [facility] at [phone].  Normal or non-critical lab and imaging results will be communicated to you by HoneyCombhart, letter or phone within 4 business days after the clinic has received the results. If you do not hear from us within 7 days, please contact the clinic through RaisedDigitalt or phone. If you have a critical or abnormal lab result, we will notify you by phone as soon as possible.  Submit refill requests through Hansoft or call your pharmacy and they will forward the refill request to us. Please allow 3 business days for your refill to be completed.          Additional Information About Your Visit        HoneyCombhart Information     Hansoft gives you secure access to your electronic health record. If you see a primary care provider, you can also send messages to your care team and make appointments. If you have questions, please call your primary care clinic.  If you do not have a primary care provider, please call [phone] and they will assist you.        Care EveryWhere ID     This is your Care EveryWhere ID. This could be used by other organizations to access your [facility] medical records  [id number]        Your Vitals Were     Pulse Temperature Respirations Height Last Period Pulse Oximetry    108 97.8  F (36.6  C) (Tympanic) 14 5' 2\" (1.575 m) (LMP Unknown) 98%    Breastfeeding? BMI (Body Mass Index)                No 36.58 kg/m2           Blood Pressure from Last 3 Encounters:   11/10/17 120/80   05/17/17 120/70   03/28/17 120/70    Weight from Last 3 Encounters:   11/10/17 200 lb (90.7 kg)   07/26/17 194 lb 14.4 oz (88.4 kg)   05/17/17 196 lb (88.9 kg)            "   We Performed the Following     Albumin Random Urine Quantitative with Creat Ratio     ALT     AST     Asthma Action Plan (AAP)     Basic metabolic panel     DEPRESSION ACTION PLAN (DAP)     HEMOGLOBIN A1C     Lipid panel reflex to direct LDL Fasting     Lithium level     TSH with free T4 reflex          Where to get your medicines      Some of these will need a paper prescription and others can be bought over the counter.  Ask your nurse if you have questions.     Bring a paper prescription for each of these medications     Butalbital-APAP-Caff-Cod -68-30 MG Caps          Primary Care Provider Office Phone # Fax #    Evaristo Machado [phone] [address] [facility] [address]        Equal Access to Services     OCTAVIO TRUJILLO : Hadii aad ku hadasho Sodominique, waaxda luqadaha, qaybta kaalmada adeegyada, aida german. So [facility] [phone].    ATENCIÓN: Si habla español, tiene a mendoza disposición servicios gratuitos de asistencia lingüística. Llame al [phone].    We comply with applicable federal civil rights laws and [place] laws. We do not discriminate on the basis of race, color, national origin, age, disability, sex, sexual orientation, or gender identity.            Thank you!     Thank you for choosing [facility]  for your care. Our goal is always to provide you with excellent care. Hearing back from our patients is one way we can continue to improve our services. Please take a few minutes to complete the written survey that you may receive in the mail after your visit with us. Thank you!             Your Updated Medication List - Protect others around you: Learn how to safely use, store and throw away your medicines at www.disposemymeds.org.          This list is accurate as of: 11/10/17 11:09 AM.  Always use your most recent med list.                   Brand Name Dispense Instructions for use Diagnosis    *  albuterol (2.5 MG/3ML) 0.083% neb solution     25 vial    Take 1 vial (2.5 mg) by nebulization every 6 hours as needed for shortness of breath / dyspnea or wheezing    Mild persistent asthma without complication       * PROAIR  (90 BASE) MCG/ACT Inhaler   Generic drug:  albuterol     51 g    INHALE 1 TO 2 PUFFS INTO THE LUNGS EVERY 4 HOURS AS NEEDED FOR WHEEZING, SHORTNESS OF BREATH OR DYSPNEA    Intermittent asthma, uncomplicated       blood glucose monitoring lancets     1 Box    Use to test blood sugar 2 times daily or as directed.  Ok to substitute alternative if insurance prefers.    Diabetes mellitus without complication (H)       blood glucose monitoring test strip    LAURA CONTOUR NEXT    200 each    Use to test blood sugar 2 times daily or as directed.    Diabetes mellitus without complication (H)       Butalbital-APAP-Caff-Cod -50-30 MG Caps     15 capsule    Take 1 capsule by mouth 3 times daily as needed    Chronic migraine without aura without status migrainosus, not intractable       carisoprodol 350 MG tablet    SOMA    90 tablet    TAKE 1 TABLET BY MOUTH THREE TIMES DAILY    Chronic pain syndrome       clonazePAM 1 MG tablet    klonoPIN    90 tablet    TAKE 1 TABLET BY MOUTH THREE TIMES DAILY AS NEEDED FOR ANXIETY    Generalized anxiety disorder       fluticasone 110 MCG/ACT Inhaler    FLOVENT HFA    2 Inhaler    Inhale 2 puffs into the lungs 2 times daily    Mild persistent asthma without complication       fluticasone 50 MCG/ACT spray    FLONASE    16 g    Spray 2 sprays into both nostrils daily    Chronic maxillary sinusitis       guanFACINE 2 MG tablet    TENEX    180 tablet    TAKE TWO TABLETS BY MOUTH AT BEDTIME    Nightmares       lamoTRIgine 200 MG tablet    LAMICTAL    90 tablet    Take 1 tablet (200 mg) by mouth every morning    Bipolar 1 disorder (H)       levothyroxine 25 MCG tablet    SYNTHROID/LEVOTHROID    90 tablet    TAKE 1 TABLET(25 MCG) BY MOUTH DAILY    Acquired  hypothyroidism       lithium 450 MG CR tablet    ESKALITH    60 tablet    TAKE 2 TABLETS BY MOUTH AT BEDTIME    Bipolar disorder with psychotic features (H)       LYRICA 150 MG capsule   Generic drug:  pregabalin     60 capsule    TAKE ONE CAPSULE BY MOUTH TWICE DAILY    Chronic pain syndrome       meclizine 25 MG tablet    ANTIVERT    30 tablet    Take 1 tablet (25 mg) by mouth every 6 hours as needed for dizziness    Dizziness       MELATONIN PO      Take 6 mg by mouth At Bedtime        metFORMIN 500 MG tablet    GLUCOPHAGE    90 tablet    Take 1 tablet (500 mg) by mouth 3 times daily (with meals)    Type 2 diabetes mellitus without complication, without long-term current use of insulin (H)       modafinil 200 MG tablet    PROVIGIL    30 tablet    Take 0.5 tablets (100 mg) by mouth daily    Chronic fatigue       ondansetron 4 MG ODT tab    ZOFRAN-ODT    30 tablet    DISSOLVE 1 TO 2 TABLETS UNDER THE TONGUE EVERY 8 HOURS AS NEEDED FOR NAUSEA    Nausea       pantoprazole 40 MG EC tablet    PROTONIX    90 tablet    TAKE 1 TABLET(40 MG) BY MOUTH DAILY    Other chronic gastritis without hemorrhage       tretinoin 0.05 % cream    RETIN-A    45 g    Spread a pea size amount into affected area topically at bedtime.  Use sunscreen SPF>20.    Acne       * Notice:  This list has 2 medication(s) that are the same as other medications prescribed for you. Read the directions carefully, and ask your doctor or other care provider to review them with you.

## 2017-11-10 NOTE — LETTER
November 14, 2017      Melody Muñoz  [address]  [place] [address]        Dear ,    We are writing to inform you of your test results.    1) normal lithium level     2) Hgb A1 C  At 7.2 is not bad   Would like it to be < 7 , but not  As high as past of 8.1   Ave sugar over 2 mo of 160 ( <80)     Resulted Orders   HEMOGLOBIN A1C   Result Value Ref Range    Hemoglobin A1C 7.2 (H) 4.3 - 6.0 %   Lithium level   Result Value Ref Range    Lithium Level 0.68 0.60 - 1.20 mmol/L       If you have any questions or concerns, please call the clinic at the number listed above.       Sincerely,        Renee Winn MD

## 2017-11-10 NOTE — LETTER
My Migraine Action Plan      Date: 11/10/2017     My Name: Melody Muñoz   YOB: 1975  My Pharmacy: [facility] [address] [place] [address] [id number] AT [facility] & [place] [address]       My (Preventative) Control Medicine: lyrica        My Rescue Medicine: fiorocette    My Doctor: Evaristo Machado     My Clinic: [address] [id number] [phone]        GREEN ZONE = Good Control    My headache plan is working.   I can do what I need to do.           I WILL:     ? Keep managing my triggers.  ? Write in my migraine diary each time I have a headache.  ? Keep taking my preventive (controller) medicine daily.  ? Take my relief and rescue medicine as needed.             YELLOW ZONE = Not Enough Control    My headache plan isn t always working.   My headaches keep me from doing   some of the things I need to do.       I WILL:     ? Set goals to control my triggers and act on them.  ? Write in my migraine diary each time I have a headache and review it for                      patterns or new triggers.  ? Keep taking my preventive (controller) medicine daily.  ? Take my relief and rescue medicine as needed.  ? Call my doctor or clinic at if I stay in the Yellow Zone.             RED ZONE = Poor or No Control    My headache plan has  failed. I can t do anything  when I have one. My  medicines aren t working.           I WILL:   ? Set goals to control my triggers and act on them.  ? Write in my migraine diary each time I have a headache and review it for                      patterns or new triggers.  ? Keep taking my preventive (controller) medicine daily.  ? Take my relief and rescue medicine as needed.  ? Call my doctor or clinic or go to urgent care or an ER if I m having the worst                  headache of my life.  ? Call my doctor or clinic or go to urgent care or an ER if my medicine  doesn t work.  ? Let my doctor or clinic know within 2 weeks if I have gone to an urgent care or             emergency department.          Provider specific instructions:  ---

## 2017-11-11 LAB
ALT SERPL W P-5'-P-CCNC: 19 U/L (ref 0–50)
ANION GAP SERPL CALCULATED.3IONS-SCNC: 13 MMOL/L (ref 3–14)
AST SERPL W P-5'-P-CCNC: 13 U/L (ref 0–45)
BUN SERPL-MCNC: 11 MG/DL (ref 7–30)
CALCIUM SERPL-MCNC: 9.8 MG/DL (ref 8.5–10.1)
CHLORIDE SERPL-SCNC: 104 MMOL/L (ref 94–109)
CHOLEST SERPL-MCNC: 255 MG/DL
CO2 SERPL-SCNC: 21 MMOL/L (ref 20–32)
CREAT SERPL-MCNC: 0.6 MG/DL (ref 0.52–1.04)
GFR SERPL CREATININE-BSD FRML MDRD: >90 ML/MIN/1.7M2
GLUCOSE SERPL-MCNC: 159 MG/DL (ref 70–99)
HDLC SERPL-MCNC: 54 MG/DL
LDLC SERPL CALC-MCNC: 133 MG/DL
NONHDLC SERPL-MCNC: 201 MG/DL
POTASSIUM SERPL-SCNC: 4.5 MMOL/L (ref 3.4–5.3)
SODIUM SERPL-SCNC: 138 MMOL/L (ref 133–144)
TRIGL SERPL-MCNC: 339 MG/DL
TSH SERPL DL<=0.005 MIU/L-ACNC: 1.2 MU/L (ref 0.4–4)

## 2017-11-11 ASSESSMENT — ANXIETY QUESTIONNAIRES: GAD7 TOTAL SCORE: 4

## 2017-11-11 ASSESSMENT — ASTHMA QUESTIONNAIRES: ACT_TOTALSCORE: 22

## 2017-11-14 ENCOUNTER — HOSPITAL ENCOUNTER (OUTPATIENT)
Facility: CLINIC | Age: 42
Discharge: HOME | End: 2017-11-14
Attending: PHYSICIAN ASSISTANT | Admitting: SURGERY
Payer: COMMERCIAL

## 2017-11-14 ENCOUNTER — ANESTHESIA EVENT (OUTPATIENT)
Dept: SURGERY | Facility: CLINIC | Age: 42
End: 2017-11-14
Payer: COMMERCIAL

## 2017-11-14 ENCOUNTER — APPOINTMENT (OUTPATIENT)
Dept: CT IMAGING | Facility: CLINIC | Age: 42
End: 2017-11-14
Attending: PHYSICIAN ASSISTANT
Payer: COMMERCIAL

## 2017-11-14 ENCOUNTER — ANESTHESIA (OUTPATIENT)
Dept: SURGERY | Facility: CLINIC | Age: 42
End: 2017-11-14
Payer: COMMERCIAL

## 2017-11-14 VITALS
SYSTOLIC BLOOD PRESSURE: 123 MMHG | TEMPERATURE: 99.1 F | RESPIRATION RATE: 16 BRPM | DIASTOLIC BLOOD PRESSURE: 72 MMHG | OXYGEN SATURATION: 95 % | HEART RATE: 93 BPM

## 2017-11-14 DIAGNOSIS — K35.30 ACUTE APPENDICITIS WITH LOCALIZED PERITONITIS: ICD-10-CM

## 2017-11-14 LAB
ALBUMIN SERPL-MCNC: 3.1 G/DL (ref 3.4–5)
ALBUMIN UR-MCNC: NEGATIVE MG/DL
ALP SERPL-CCNC: 122 U/L (ref 40–150)
ALT SERPL W P-5'-P-CCNC: 20 U/L (ref 0–50)
ANION GAP SERPL CALCULATED.3IONS-SCNC: 9 MMOL/L (ref 3–14)
APPEARANCE UR: ABNORMAL
AST SERPL W P-5'-P-CCNC: 21 U/L (ref 0–45)
BACTERIA #/AREA URNS HPF: ABNORMAL /HPF
BASOPHILS # BLD AUTO: 0 10E9/L (ref 0–0.2)
BASOPHILS NFR BLD AUTO: 0.3 %
BILIRUB SERPL-MCNC: 0.3 MG/DL (ref 0.2–1.3)
BILIRUB UR QL STRIP: NEGATIVE
BUN SERPL-MCNC: 10 MG/DL (ref 7–30)
CALCIUM SERPL-MCNC: 8.8 MG/DL (ref 8.5–10.1)
CHLORIDE SERPL-SCNC: 101 MMOL/L (ref 94–109)
CO2 SERPL-SCNC: 25 MMOL/L (ref 20–32)
COLOR UR AUTO: YELLOW
CREAT SERPL-MCNC: 0.53 MG/DL (ref 0.52–1.04)
DIFFERENTIAL METHOD BLD: ABNORMAL
EOSINOPHIL # BLD AUTO: 0.3 10E9/L (ref 0–0.7)
EOSINOPHIL NFR BLD AUTO: 3.1 %
ERYTHROCYTE [DISTWIDTH] IN BLOOD BY AUTOMATED COUNT: 13.5 % (ref 10–15)
GFR SERPL CREATININE-BSD FRML MDRD: >90 ML/MIN/1.7M2
GLUCOSE BLDC GLUCOMTR-MCNC: 167 MG/DL (ref 70–99)
GLUCOSE SERPL-MCNC: 272 MG/DL (ref 70–99)
GLUCOSE UR STRIP-MCNC: >499 MG/DL
HCT VFR BLD AUTO: 38.5 % (ref 35–47)
HGB BLD-MCNC: 12.1 G/DL (ref 11.7–15.7)
HGB UR QL STRIP: NEGATIVE
IMM GRANULOCYTES # BLD: 0.1 10E9/L (ref 0–0.4)
IMM GRANULOCYTES NFR BLD: 0.8 %
KETONES UR STRIP-MCNC: 5 MG/DL
LEUKOCYTE ESTERASE UR QL STRIP: ABNORMAL
LIPASE SERPL-CCNC: 143 U/L (ref 73–393)
LYMPHOCYTES # BLD AUTO: 2.7 10E9/L (ref 0.8–5.3)
LYMPHOCYTES NFR BLD AUTO: 24.5 %
MCH RBC QN AUTO: 25.8 PG (ref 26.5–33)
MCHC RBC AUTO-ENTMCNC: 31.4 G/DL (ref 31.5–36.5)
MCV RBC AUTO: 82 FL (ref 78–100)
MONOCYTES # BLD AUTO: 0.4 10E9/L (ref 0–1.3)
MONOCYTES NFR BLD AUTO: 3.5 %
MUCOUS THREADS #/AREA URNS LPF: PRESENT /LPF
NEUTROPHILS # BLD AUTO: 7.5 10E9/L (ref 1.6–8.3)
NEUTROPHILS NFR BLD AUTO: 67.8 %
NITRATE UR QL: NEGATIVE
NRBC # BLD AUTO: 0 10*3/UL
NRBC BLD AUTO-RTO: 0 /100
PH UR STRIP: 7 PH (ref 5–7)
PLATELET # BLD AUTO: 343 10E9/L (ref 150–450)
POTASSIUM SERPL-SCNC: 4.3 MMOL/L (ref 3.4–5.3)
PROT SERPL-MCNC: 7.2 G/DL (ref 6.8–8.8)
RBC # BLD AUTO: 4.69 10E12/L (ref 3.8–5.2)
RBC #/AREA URNS AUTO: 2 /HPF (ref 0–2)
SODIUM SERPL-SCNC: 135 MMOL/L (ref 133–144)
SOURCE: ABNORMAL
SP GR UR STRIP: 1.02 (ref 1–1.03)
SQUAMOUS #/AREA URNS AUTO: 18 /HPF (ref 0–1)
UROBILINOGEN UR STRIP-MCNC: 0 MG/DL (ref 0–2)
WBC # BLD AUTO: 11.1 10E9/L (ref 4–11)
WBC #/AREA URNS AUTO: 10 /HPF (ref 0–2)

## 2017-11-14 PROCEDURE — 71000013 ZZH RECOVERY PHASE 1 LEVEL 1 EA ADDTL HR: Performed by: SURGERY

## 2017-11-14 PROCEDURE — 27210794 ZZH OR GENERAL SUPPLY STERILE: Performed by: SURGERY

## 2017-11-14 PROCEDURE — 25000132 ZZH RX MED GY IP 250 OP 250 PS 637: Performed by: ANESTHESIOLOGY

## 2017-11-14 PROCEDURE — 37000008 ZZH ANESTHESIA TECHNICAL FEE, 1ST 30 MIN: Performed by: SURGERY

## 2017-11-14 PROCEDURE — 85025 COMPLETE CBC W/AUTO DIFF WBC: CPT | Performed by: PHYSICIAN ASSISTANT

## 2017-11-14 PROCEDURE — 25000125 ZZHC RX 250: Performed by: NURSE ANESTHETIST, CERTIFIED REGISTERED

## 2017-11-14 PROCEDURE — 25000566 ZZH SEVOFLURANE, EA 15 MIN: Performed by: SURGERY

## 2017-11-14 PROCEDURE — 25000128 H RX IP 250 OP 636: Performed by: ANESTHESIOLOGY

## 2017-11-14 PROCEDURE — 37000009 ZZH ANESTHESIA TECHNICAL FEE, EACH ADDTL 15 MIN: Performed by: SURGERY

## 2017-11-14 PROCEDURE — 00000146 ZZHCL STATISTIC GLUCOSE BY METER IP

## 2017-11-14 PROCEDURE — 25800025 ZZH RX 258: Performed by: SURGERY

## 2017-11-14 PROCEDURE — 88304 TISSUE EXAM BY PATHOLOGIST: CPT | Mod: 26 | Performed by: SURGERY

## 2017-11-14 PROCEDURE — 36000058 ZZH SURGERY LEVEL 3 EA 15 ADDTL MIN: Performed by: SURGERY

## 2017-11-14 PROCEDURE — 36000056 ZZH SURGERY LEVEL 3 1ST 30 MIN: Performed by: SURGERY

## 2017-11-14 PROCEDURE — 87086 URINE CULTURE/COLONY COUNT: CPT | Performed by: PHYSICIAN ASSISTANT

## 2017-11-14 PROCEDURE — 74177 CT ABD & PELVIS W/CONTRAST: CPT

## 2017-11-14 PROCEDURE — 81001 URINALYSIS AUTO W/SCOPE: CPT | Performed by: PHYSICIAN ASSISTANT

## 2017-11-14 PROCEDURE — 25000128 H RX IP 250 OP 636: Performed by: PHYSICIAN ASSISTANT

## 2017-11-14 PROCEDURE — 83690 ASSAY OF LIPASE: CPT | Performed by: PHYSICIAN ASSISTANT

## 2017-11-14 PROCEDURE — 40000306 ZZH STATISTIC PRE PROC ASSESS II: Performed by: SURGERY

## 2017-11-14 PROCEDURE — 96374 THER/PROPH/DIAG INJ IV PUSH: CPT

## 2017-11-14 PROCEDURE — 93010 ELECTROCARDIOGRAM REPORT: CPT | Performed by: INTERNAL MEDICINE

## 2017-11-14 PROCEDURE — 99285 EMERGENCY DEPT VISIT HI MDM: CPT | Mod: 25

## 2017-11-14 PROCEDURE — 88304 TISSUE EXAM BY PATHOLOGIST: CPT | Performed by: SURGERY

## 2017-11-14 PROCEDURE — 71000012 ZZH RECOVERY PHASE 1 LEVEL 1 FIRST HR: Performed by: SURGERY

## 2017-11-14 PROCEDURE — 44970 LAPAROSCOPY APPENDECTOMY: CPT | Performed by: SURGERY

## 2017-11-14 PROCEDURE — 25000128 H RX IP 250 OP 636: Performed by: NURSE ANESTHETIST, CERTIFIED REGISTERED

## 2017-11-14 PROCEDURE — 99203 OFFICE O/P NEW LOW 30 MIN: CPT | Mod: 57 | Performed by: SURGERY

## 2017-11-14 PROCEDURE — 25000125 ZZHC RX 250: Performed by: SURGERY

## 2017-11-14 PROCEDURE — 80053 COMPREHEN METABOLIC PANEL: CPT | Performed by: PHYSICIAN ASSISTANT

## 2017-11-14 PROCEDURE — 71000027 ZZH RECOVERY PHASE 2 EACH 15 MINS: Performed by: SURGERY

## 2017-11-14 PROCEDURE — 44970 LAPAROSCOPY APPENDECTOMY: CPT | Mod: AS | Performed by: PHYSICIAN ASSISTANT

## 2017-11-14 PROCEDURE — 25000125 ZZHC RX 250: Performed by: ANESTHESIOLOGY

## 2017-11-14 PROCEDURE — 96375 TX/PRO/DX INJ NEW DRUG ADDON: CPT

## 2017-11-14 RX ORDER — MEPERIDINE HYDROCHLORIDE 25 MG/ML
12.5 INJECTION INTRAMUSCULAR; INTRAVENOUS; SUBCUTANEOUS
Status: DISCONTINUED | OUTPATIENT
Start: 2017-11-14 | End: 2017-11-14 | Stop reason: HOSPADM

## 2017-11-14 RX ORDER — FENTANYL CITRATE 50 UG/ML
25-50 INJECTION, SOLUTION INTRAMUSCULAR; INTRAVENOUS
Status: DISCONTINUED | OUTPATIENT
Start: 2017-11-14 | End: 2017-11-14 | Stop reason: HOSPADM

## 2017-11-14 RX ORDER — ONDANSETRON 2 MG/ML
4 INJECTION INTRAMUSCULAR; INTRAVENOUS EVERY 30 MIN PRN
Status: DISCONTINUED | OUTPATIENT
Start: 2017-11-14 | End: 2017-11-14 | Stop reason: HOSPADM

## 2017-11-14 RX ORDER — ONDANSETRON 4 MG/1
4 TABLET, ORALLY DISINTEGRATING ORAL EVERY 30 MIN PRN
Status: DISCONTINUED | OUTPATIENT
Start: 2017-11-14 | End: 2017-11-14 | Stop reason: HOSPADM

## 2017-11-14 RX ORDER — SODIUM CHLORIDE, SODIUM LACTATE, POTASSIUM CHLORIDE, CALCIUM CHLORIDE 600; 310; 30; 20 MG/100ML; MG/100ML; MG/100ML; MG/100ML
INJECTION, SOLUTION INTRAVENOUS CONTINUOUS
Status: DISCONTINUED | OUTPATIENT
Start: 2017-11-14 | End: 2017-11-14 | Stop reason: HOSPADM

## 2017-11-14 RX ORDER — ONDANSETRON 2 MG/ML
INJECTION INTRAMUSCULAR; INTRAVENOUS PRN
Status: DISCONTINUED | OUTPATIENT
Start: 2017-11-14 | End: 2017-11-14

## 2017-11-14 RX ORDER — OXYCODONE HYDROCHLORIDE 5 MG/1
5 TABLET ORAL ONCE
Status: COMPLETED | OUTPATIENT
Start: 2017-11-14 | End: 2017-11-14

## 2017-11-14 RX ORDER — PROPOFOL 10 MG/ML
INJECTION, EMULSION INTRAVENOUS PRN
Status: DISCONTINUED | OUTPATIENT
Start: 2017-11-14 | End: 2017-11-14

## 2017-11-14 RX ORDER — BUPIVACAINE HYDROCHLORIDE AND EPINEPHRINE 5; 5 MG/ML; UG/ML
INJECTION, SOLUTION PERINEURAL PRN
Status: DISCONTINUED | OUTPATIENT
Start: 2017-11-14 | End: 2017-11-14 | Stop reason: HOSPADM

## 2017-11-14 RX ORDER — GLYCOPYRROLATE 0.2 MG/ML
INJECTION, SOLUTION INTRAMUSCULAR; INTRAVENOUS PRN
Status: DISCONTINUED | OUTPATIENT
Start: 2017-11-14 | End: 2017-11-14

## 2017-11-14 RX ORDER — LIDOCAINE HYDROCHLORIDE 10 MG/ML
INJECTION, SOLUTION INFILTRATION; PERINEURAL PRN
Status: DISCONTINUED | OUTPATIENT
Start: 2017-11-14 | End: 2017-11-14

## 2017-11-14 RX ORDER — IOPAMIDOL 755 MG/ML
500 INJECTION, SOLUTION INTRAVASCULAR ONCE
Status: COMPLETED | OUTPATIENT
Start: 2017-11-14 | End: 2017-11-14

## 2017-11-14 RX ORDER — NALOXONE HYDROCHLORIDE 0.4 MG/ML
.1-.4 INJECTION, SOLUTION INTRAMUSCULAR; INTRAVENOUS; SUBCUTANEOUS
Status: DISCONTINUED | OUTPATIENT
Start: 2017-11-14 | End: 2017-11-14 | Stop reason: HOSPADM

## 2017-11-14 RX ORDER — ONDANSETRON 2 MG/ML
4 INJECTION INTRAMUSCULAR; INTRAVENOUS ONCE
Status: COMPLETED | OUTPATIENT
Start: 2017-11-14 | End: 2017-11-14

## 2017-11-14 RX ORDER — HYDROMORPHONE HYDROCHLORIDE 1 MG/ML
.3-.5 INJECTION, SOLUTION INTRAMUSCULAR; INTRAVENOUS; SUBCUTANEOUS EVERY 10 MIN PRN
Status: DISCONTINUED | OUTPATIENT
Start: 2017-11-14 | End: 2017-11-14 | Stop reason: HOSPADM

## 2017-11-14 RX ORDER — LIDOCAINE 40 MG/G
CREAM TOPICAL
Status: DISCONTINUED | OUTPATIENT
Start: 2017-11-14 | End: 2017-11-14 | Stop reason: HOSPADM

## 2017-11-14 RX ORDER — KETOROLAC TROMETHAMINE 30 MG/ML
30 INJECTION, SOLUTION INTRAMUSCULAR; INTRAVENOUS ONCE
Status: COMPLETED | OUTPATIENT
Start: 2017-11-14 | End: 2017-11-14

## 2017-11-14 RX ORDER — HYDROMORPHONE HYDROCHLORIDE 1 MG/ML
0.5 INJECTION, SOLUTION INTRAMUSCULAR; INTRAVENOUS; SUBCUTANEOUS ONCE
Status: COMPLETED | OUTPATIENT
Start: 2017-11-14 | End: 2017-11-14

## 2017-11-14 RX ORDER — FENTANYL CITRATE 50 UG/ML
INJECTION, SOLUTION INTRAMUSCULAR; INTRAVENOUS PRN
Status: DISCONTINUED | OUTPATIENT
Start: 2017-11-14 | End: 2017-11-14

## 2017-11-14 RX ORDER — MORPHINE SULFATE 4 MG/ML
4 INJECTION, SOLUTION INTRAMUSCULAR; INTRAVENOUS ONCE
Status: COMPLETED | OUTPATIENT
Start: 2017-11-14 | End: 2017-11-14

## 2017-11-14 RX ORDER — NEOSTIGMINE METHYLSULFATE 1 MG/ML
VIAL (ML) INJECTION PRN
Status: DISCONTINUED | OUTPATIENT
Start: 2017-11-14 | End: 2017-11-14

## 2017-11-14 RX ORDER — FENTANYL CITRATE 50 UG/ML
50 INJECTION, SOLUTION INTRAMUSCULAR; INTRAVENOUS ONCE
Status: COMPLETED | OUTPATIENT
Start: 2017-11-14 | End: 2017-11-14

## 2017-11-14 RX ORDER — OXYCODONE HYDROCHLORIDE 5 MG/1
5-10 TABLET ORAL
Qty: 30 TABLET | Refills: 0 | Status: ON HOLD | OUTPATIENT
Start: 2017-11-14 | End: 2017-11-22

## 2017-11-14 RX ADMIN — FENTANYL CITRATE 50 MCG: 50 INJECTION INTRAMUSCULAR; INTRAVENOUS at 14:04

## 2017-11-14 RX ADMIN — SODIUM CHLORIDE 65 ML: 9 INJECTION, SOLUTION INTRAVENOUS at 11:22

## 2017-11-14 RX ADMIN — ONDANSETRON 4 MG: 4 TABLET, ORALLY DISINTEGRATING ORAL at 17:56

## 2017-11-14 RX ADMIN — GLYCOPYRROLATE 0.4 MG: 0.2 INJECTION, SOLUTION INTRAMUSCULAR; INTRAVENOUS at 16:12

## 2017-11-14 RX ADMIN — HYDROMORPHONE HYDROCHLORIDE 0.5 MG: 1 INJECTION, SOLUTION INTRAMUSCULAR; INTRAVENOUS; SUBCUTANEOUS at 12:33

## 2017-11-14 RX ADMIN — FENTANYL CITRATE 50 MCG: 50 INJECTION INTRAMUSCULAR; INTRAVENOUS at 16:47

## 2017-11-14 RX ADMIN — FENTANYL CITRATE 50 MCG: 50 INJECTION, SOLUTION INTRAMUSCULAR; INTRAVENOUS at 15:43

## 2017-11-14 RX ADMIN — MORPHINE SULFATE 4 MG: 4 INJECTION INTRAVENOUS at 11:39

## 2017-11-14 RX ADMIN — ROCURONIUM BROMIDE 40 MG: 10 INJECTION INTRAVENOUS at 15:22

## 2017-11-14 RX ADMIN — MIDAZOLAM HYDROCHLORIDE 2 MG: 1 INJECTION, SOLUTION INTRAMUSCULAR; INTRAVENOUS at 15:15

## 2017-11-14 RX ADMIN — FENTANYL CITRATE 50 MCG: 50 INJECTION, SOLUTION INTRAMUSCULAR; INTRAVENOUS at 15:57

## 2017-11-14 RX ADMIN — KETOROLAC TROMETHAMINE 30 MG: 30 INJECTION, SOLUTION INTRAMUSCULAR at 10:52

## 2017-11-14 RX ADMIN — GLYCOPYRROLATE 0.2 MG: 0.2 INJECTION, SOLUTION INTRAMUSCULAR; INTRAVENOUS at 15:22

## 2017-11-14 RX ADMIN — FENTANYL CITRATE 50 MCG: 50 INJECTION INTRAMUSCULAR; INTRAVENOUS at 17:53

## 2017-11-14 RX ADMIN — ONDANSETRON 4 MG: 2 INJECTION INTRAMUSCULAR; INTRAVENOUS at 15:59

## 2017-11-14 RX ADMIN — Medication 3 MG: at 16:12

## 2017-11-14 RX ADMIN — LIDOCAINE HYDROCHLORIDE 50 MG: 10 INJECTION, SOLUTION INFILTRATION; PERINEURAL at 15:22

## 2017-11-14 RX ADMIN — FENTANYL CITRATE 150 MCG: 50 INJECTION, SOLUTION INTRAMUSCULAR; INTRAVENOUS at 15:22

## 2017-11-14 RX ADMIN — ROCURONIUM BROMIDE 10 MG: 10 INJECTION INTRAVENOUS at 15:57

## 2017-11-14 RX ADMIN — PROPOFOL 200 MG: 10 INJECTION, EMULSION INTRAVENOUS at 15:22

## 2017-11-14 RX ADMIN — SODIUM CHLORIDE, POTASSIUM CHLORIDE, SODIUM LACTATE AND CALCIUM CHLORIDE: 600; 310; 30; 20 INJECTION, SOLUTION INTRAVENOUS at 15:32

## 2017-11-14 RX ADMIN — TAZOBACTAM SODIUM AND PIPERACILLIN SODIUM 3.38 G: 375; 3 INJECTION, SOLUTION INTRAVENOUS at 12:47

## 2017-11-14 RX ADMIN — SODIUM CHLORIDE, POTASSIUM CHLORIDE, SODIUM LACTATE AND CALCIUM CHLORIDE: 600; 310; 30; 20 INJECTION, SOLUTION INTRAVENOUS at 14:59

## 2017-11-14 RX ADMIN — IOPAMIDOL 100 ML: 755 INJECTION, SOLUTION INTRAVENOUS at 11:22

## 2017-11-14 RX ADMIN — ONDANSETRON 4 MG: 2 INJECTION INTRAMUSCULAR; INTRAVENOUS at 10:52

## 2017-11-14 RX ADMIN — OXYCODONE HYDROCHLORIDE 5 MG: 5 TABLET ORAL at 17:28

## 2017-11-14 ASSESSMENT — ENCOUNTER SYMPTOMS
NAUSEA: 1
BLOOD IN STOOL: 0
HEMATURIA: 0
ABDOMINAL PAIN: 1
DYSURIA: 0
VOMITING: 0
DIARRHEA: 1
FEVER: 0

## 2017-11-14 NOTE — PROGRESS NOTES
Please see attached lab results  All of your lab results are normal, except as noted below.    The following are explanations of some of our lab tests    THIS DOES NOT MEAN THAT YOU HAD ALL OF THESE DONE    Please continue on the same medications unless a change is noted above    These are some general explanations for tests:    Hgb is the blood iron level  WBC means White Blood Cells  Platelets are small blood cells that help with forming the blood clots along with other blood factors.  Electrolytes are Sodium, Potassium, Calcium, Magnesium, Phosphorus.  Liver tests are: AST, ALT, Bilirubin, Alkaline Phosphatase.  Kidney tests are Creatinine, GFR.  HDL Cholesterol - is the good cholesterol and it is good to have it high.  LDL cholesterol is the bad cholesterol and it is good to have it low.  It is recommended to have LDL less than 130 for people with hypertension and to have it less than 100 for people with heart disease, diabetes and chronic kidney disease.  Triglycerides are another type of lipid and can also cause heart disease so should be kept low.   Thyroid tests are TSH, T4, T3  Glucose is sugar  A1c is a test that gives us an idea about how well was controlled the diabetes for the last 3 months.   PSA stands for Prostate Specific Antigen and it can be elevated with prostate cancer or prostate inflammation.

## 2017-11-14 NOTE — ANESTHESIA POSTPROCEDURE EVALUATION
Patient: Melody Muñoz    Procedure(s):  LAPAROSCOPIC APPENDECTOMY and resection of epiploic tag - Wound Class: III-Contaminated    Diagnosis:appendicitis  Diagnosis Additional Information: Pre-operative diagnosis: acute appendicitis  Post-operative diagnosis: Epiploic infarction vs mass  Procedure: laparoscopic appendectomy and resection of epiploic mass        Anesthesia Type:  General, ETT, RSI    Note:  Anesthesia Post Evaluation    Patient location during evaluation: PACU  Patient participation: Able to fully participate in evaluation  Level of consciousness: awake  Pain management: adequate  Airway patency: patent  Cardiovascular status: acceptable  Respiratory status: acceptable  Hydration status: euvolemic  PONV: controlled     Anesthetic complications: None          Last vitals:  Vitals:    11/14/17 1628 11/14/17 1630 11/14/17 1645   BP: 145/88 141/84 128/82   Pulse:      Resp: 18 16 14   Temp: 98.3  F (36.8  C)     SpO2: 94% 96% 97%         Electronically Signed By: Evaristo Whelan MD  November 14, 2017  4:55 PM

## 2017-11-14 NOTE — IP AVS SNAPSHOT
[facility] PreOP/PostOP    [address]    [facility] [address]    Phone:  [phone]    Fax:  [phone]                                       After Visit Summary   11/14/2017    Melody Muñoz    MRN: [id number]           After Visit Summary Signature Page     I have received my discharge instructions, and my questions have been answered. I have discussed any challenges I see with this plan with the nurse or doctor.    ..........................................................................................................................................  Patient/Patient Representative Signature      ..........................................................................................................................................  Patient Representative Print Name and Relationship to Patient    ..................................................               ................................................  Date                                            Time    ..........................................................................................................................................  Reviewed by Signature/Title    ...................................................              ..............................................  Date                                                            Time

## 2017-11-14 NOTE — ANESTHESIA CARE TRANSFER NOTE
Patient: Melody Muñoz    Procedure(s):  LAPAROSCOPIC APPENDECTOMY and resection of epiploic tag - Wound Class: III-Contaminated    Diagnosis: appendicitis  Diagnosis Additional Information: No value filed.    Anesthesia Type:   General, ETT, RSI     Note:  Airway :Face Mask  Patient transferred to:PACU  Comments:   4/4, moving all extremities, pt follows commands, suctioned orally, extubated without complications.Pt tx to PACU, VSS, pt comfortable. Report given to  PACU RN.Handoff Report: Identifed the Patient, Identified the Reponsible Provider, Reviewed the pertinent medical history, Discussed the surgical course, Reviewed Intra-OP anesthesia mangement and issues during anesthesia, Set expectations for post-procedure period and Allowed opportunity for questions and acknowledgement of understanding      Vitals: (Last set prior to Anesthesia Care Transfer)    CRNA VITALS  11/14/2017 1601 - 11/14/2017 1632      11/14/2017             EKG: NSR                Electronically Signed By: KIRAN Benito CRNA  November 14, 2017  4:32 PM

## 2017-11-14 NOTE — H&P
"[facility] - H&P     Melody Muñoz MRN# [id number]   Age: 42 year old YOB: 1975     HPI:  Patient has been experiencing acute RLQ and right flank abdominal pain for the past 2 days associated with nausea, diarrhea and anorexia.  These symptoms have been increasing in severity. She has a history of kidney stones. She is here with mother.       History is obtained from the patient, electronic health record and emergency department physician    Review Of Systems:  Respiratory: No shortness of breath, dyspnea on exertion, cough, or hemoptysis, Rxed for asthma  Cardiovascular: negative  Gastrointestinal: as above, has \"IBS' and had a colonoscopy (normal)  Genitourinary: endometriosis and negative    PMH:  Past Medical History:   Diagnosis Date     Abnormal pap age 24    and paps normal ever since and no other testing needed per patient     Anxiety      Bipolar 1 disorder (H)     with psychosis      Diabetes mellitus without complication (H) 3/21/2017     Endometriosis      Intermittent asthma 2012     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     Major depression     Ted Pope  [phone]     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     Migraines     otc excedrin prn     Sleep apnea 2014    uses Cpap     Substance abuse     no use since summer 2013     Suicidal ideation 2013    in past, not current, sees psychiatrist and therapist       PSH:  Past Surgical History:   Procedure Laterality Date     BIOPSY       COLONOSCOPY       GENITOURINARY SURGERY  May/2014    bladder sling     GYN SURGERY      laparoscopy- endometriosis     GYN SURGERY  2009     for twins     little finger surgery left  1980     tubal ligation bilateral       wisdom teeth removal         Allergies:  No Known Allergies    Home Medications:  Current Outpatient Prescriptions   Medication Sig Dispense Refill     " Butalbital-APAP-Caff-Cod -87-30 MG CAPS Take 1 capsule by mouth 3 times daily as needed 15 capsule 0     pantoprazole (PROTONIX) 40 MG EC tablet TAKE 1 TABLET(40 MG) BY MOUTH DAILY 90 tablet 1     PROAIR  (90 BASE) MCG/ACT inhaler INHALE 1 TO 2 PUFFS INTO THE LUNGS EVERY 4 HOURS AS NEEDED FOR WHEEZING, SHORTNESS OF BREATH OR DYSPNEA 51 g 2     ondansetron (ZOFRAN-ODT) 4 MG ODT tab DISSOLVE 1 TO 2 TABLETS UNDER THE TONGUE EVERY 8 HOURS AS NEEDED FOR NAUSEA 30 tablet 3     carisoprodol (SOMA) 350 MG tablet TAKE 1 TABLET BY MOUTH THREE TIMES DAILY 90 tablet 5     clonazePAM (KLONOPIN) 1 MG tablet TAKE 1 TABLET BY MOUTH THREE TIMES DAILY AS NEEDED FOR ANXIETY 90 tablet 0     LYRICA 150 MG capsule TAKE ONE CAPSULE BY MOUTH TWICE DAILY 60 capsule 5     metFORMIN (GLUCOPHAGE) 500 MG tablet Take 1 tablet (500 mg) by mouth 3 times daily (with meals) 90 tablet 1     blood glucose monitoring (LAURA CONTOUR NEXT) test strip Use to test blood sugar 2 times daily or as directed. 200 each 12     blood glucose monitoring (LAURA MICROLET) lancets Use to test blood sugar 2 times daily or as directed.  Ok to substitute alternative if insurance prefers. 1 Box 3     levothyroxine (SYNTHROID/LEVOTHROID) 25 MCG tablet TAKE 1 TABLET(25 MCG) BY MOUTH DAILY 90 tablet 3     meclizine (ANTIVERT) 25 MG tablet Take 1 tablet (25 mg) by mouth every 6 hours as needed for dizziness 30 tablet 3     fluticasone (FLOVENT HFA) 110 MCG/ACT inhaler Inhale 2 puffs into the lungs 2 times daily 2 Inhaler 3     modafinil (PROVIGIL) 200 MG tablet Take 0.5 tablets (100 mg) by mouth daily 30 tablet 0     albuterol (2.5 MG/3ML) 0.083% nebulizer solution Take 1 vial (2.5 mg) by nebulization every 6 hours as needed for shortness of breath / dyspnea or wheezing 25 vial 0     guanFACINE (TENEX) 2 MG tablet TAKE TWO TABLETS BY MOUTH AT BEDTIME 180 tablet 3     lithium (ESKALITH) 450 MG CR tablet TAKE 2 TABLETS BY MOUTH AT BEDTIME 60 tablet 5      lamoTRIgine (LAMICTAL) 200 MG tablet Take 1 tablet (200 mg) by mouth every morning 90 tablet 4     fluticasone (FLONASE) 50 MCG/ACT nasal spray Spray 2 sprays into both nostrils daily 16 g 11     tretinoin (RETIN-A) 0.05 % cream Spread a pea size amount into affected area topically at bedtime.  Use sunscreen SPF>20. 45 g 11     MELATONIN PO Take 6 mg by mouth At Bedtime         Social History:  Social History   Substance Use Topics     Smoking status: Current Some Day Smoker     Packs/day: 0.25     Years: 17.00     Types: Cigarettes     Smokeless tobacco: Never Used     Alcohol use No      Comment: no etoh since 2013       Family History:  No family history chronic diarrhea, inflammatory bowel disease or colon cancer.several members with appendicitis    Objective:  /75  Pulse 93  Temp 99.6  F (37.6  C) (Temporal)  Resp 16  LMP  (LMP Unknown)  SpO2 96%    General appearance: healthy, alert and mild distress  Hydration: well hydrated  Neck: normal, supple and no adenopathy  Lungs: normal and clear to auscultation  Heart: regular rate and rhythm and no murmurs, clicks, or gallops  Abdomen: rounded and obese, .   Tenderness: present: RLQ mild  Masses: none  Organomegaly: none    Labs Reviewed:  Lab Results   Component Value Date    WBC 11.1 11/14/2017     Lab Results   Component Value Date    HGB 12.1 11/14/2017     Lab Results   Component Value Date     11/14/2017       Last Basic Metabolic Panel:  Lab Results   Component Value Date     11/14/2017      Lab Results   Component Value Date    POTASSIUM 4.3 11/14/2017     Lab Results   Component Value Date    CHLORIDE 101 11/14/2017     Lab Results   Component Value Date    DENITA 8.8 11/14/2017     Lab Results   Component Value Date    CO2 25 11/14/2017     Lab Results   Component Value Date    BUN 10 11/14/2017     Lab Results   Component Value Date    CR 0.53 11/14/2017     Lab Results   Component Value Date     11/14/2017          Radiology:  CT abdomen reveals a slightly dilated appendix with surrounding fat stranding.no abscess. Fatty infiltration of the liver is noted  US of GB is normal    ASSESSMENT/PLAN:  The patient's history, physical exam, laboratory and imaging studies are suspicious for acute appendicitis.  I have offered the patient  appendectomy.  The risks, benefits, and alternatives have been discussed in detail. She understands the CT shows only mild changes of appendicitis and this may represent a viral infection that could resolve on its own without surgery. Discussed her prior surgery and possible adhesions which could make this surgery more difficult and possibly lead to adjacent organ injury.  All of the patient's questions have been answered.  They elect to proceed and we will go to the OR at the soonest availability.  Pre-operative antibiotics have been ordered.   Additional problems: JASWANT - mother is going to get CPAP                                     Diabetes, asthma, hypothyroidism, chronic pain, migraine, bipolar with psychosis, depression. Will ask for hospitalists assistance if requires overnight stay.     Roberto Robins MD

## 2017-11-14 NOTE — OP NOTE
General Surgery Operative Note      Pre-operative diagnosis: acute appendicitis   Post-operative diagnosis: Epiploic infarction vs mass   Procedure: laparoscopic appendectomy and resection of epiploic mass   Surgeon: Roberto Robins MD   Assistant(s): Lexis Meadows PA-C  The physicians assistant was medically necessary for their expertise in camera management, suctioning, suturing, and retraction.     Anesthesia: general    Estimated blood loss:  Complications: 10 cc  none   Specimens:   ID Type Source Tests Collected by Time Destination   A : Appendix and epiploic tag Tissue Appendix SURGICAL PATHOLOGY EXAM Roberto Robins MD 11/14/2017  4:00 PM         DESCRIPTION OF PROCEDURE:  The patient was placed on the table in supine position.  General endotracheal anesthesia was induced and the abdomen was prepped and draped in standard sterile fashion.  An incision was made just above the umbilicus.  The incision was carried down to the fascia.  Fascia was incised.  The peritoneum was entered bluntly with a Carmalt clamp.  Two interrupted 0 Vicryl sutures were placed at the extremes of this fascial incision.  The Ella trocar was introduced and the abdomen was insufflated with CO2. The 10mm straight viewing laparoscope was advanced.   Two 5 mm trocars were placed in the infraumbilical midline with laparoscopic guidance.  The patient was placed in Trendelenburg and right side up.  The appendix was identified in the right lower quadrant.  It appeared dilated but not inflamed. Adjacent to the appendix was a mass of inflamed tissue. This was mobilized from the cecum and appeared to be an epiploic tag. This was resected by dividing the base of the epiploic tag with a BONITA stapler. The appendix was further mobilized.  A window was created between the base of the appendix and its mesentery.  The laparoscopic BONITA stapled was carefully postioned to remove the entire appendix and a thin cuff of cecum but not to impinge on  the ileo-cecal valve. Once this positioning was achieved, the stapler was closed and fired thus  the appendix from the cecum.  The mesoappendix was ligated and divided with a reload of the Endo-BONITA stapler.  The appendix and epiploic tag  was passed into an Endocatch bag and removed through the umbilical trocar site.  The right lower quadrant was inspected for hemostasis.  Hemostasis was assured.  We irrigated with copious amounts of sterile saline and aspirated the effluent.  Local anesthetic was placed at the trochar sites for post op pain control.  The 2 infraumbilical trocars were removed under direct visualization.  There was no bleeding from either of these sites. Gas was aspirated and the Ella trocar was removed. The fascia was closed under direct vision with heavy vicryl sutures.  The skin of all 3 incisions was  closed with interrupted 4-0 Vicryl subcuticular sutures and Steri-Strips.  The patient tolerated the procedure well.  Sponge and instrument counts were correct.        Roberto Robins MD

## 2017-11-14 NOTE — ED PROVIDER NOTES
History     Chief Complaint:  Abdominal pain     HPI:  The history is provided by the patient.     Melody Muñoz is a 42 year old female with a history of kidney stones who presents with abdominal pain. The patient reports onset of right-sided abdominal pain about two days ago which radiates to her right back. She has had some associated nausea and an episode of diarrhea this morning. She took tylenol before presenting today for management of symptoms. The patient rates her pain a 8/10 in severity. She has not eaton since last night. She denies any vomiting, dysuria, fever, hematuria, or blood in stool and has no other complaints. Of note, she has had an abdominal hysterectomy and bladder suspension surgery.     Allergies:  No known drug allergies     Medications:    Butalbital-APAP-Caff-Cod -89-30 MG CAPS  pantoprazole (PROTONIX) 40 MG EC tablet  PROAIR  (90 BASE) MCG/ACT inhaler  ondansetron (ZOFRAN-ODT) 4 MG ODT tab  carisoprodol (SOMA) 350 MG tablet  clonazePAM (KLONOPIN) 1 MG tablet  LYRICA 150 MG capsule  metFORMIN (GLUCOPHAGE) 500 MG tablet  blood glucose monitoring (LAURA CONTOUR NEXT) test strip  blood glucose monitoring (LAURA MICROLET) lancets  levothyroxine (SYNTHROID/LEVOTHROID) 25 MCG tablet    Past Medical History:    Abnormal pap   Anxiety   Bipolar disorder   DM  Endmoetriosis   Asthma   Latent tuberculosis   Depression   Sleep apnea  Substance abuse   Suicidal ideation   Chronic fatigue     Past Surgical History:    Biopsy   Colonoscopy    GI surgery bladder   Laparoscopy   Hysterectomy     Tubal ligation bilateral   Little finger surgery left    Family History:    HTN  CHF  Anxiety     Social History:  Presents with no one    Tobacco use: 0.25 PPD  Alcohol use: Not since   PCP: Evaristo Machado    Marital Status:       Review of Systems   Constitutional: Negative for fever.   Gastrointestinal: Positive for abdominal pain, diarrhea and nausea. Negative  for blood in stool and vomiting.   Genitourinary: Negative for dysuria and hematuria.   All other systems reviewed and are negative.    Physical Exam     Patient Vitals for the past 24 hrs:   BP Temp Temp src Pulse Resp SpO2   11/14/17 1300 116/61 - - - - 95 %   11/14/17 1245 117/69 - - - - 96 %   11/14/17 1230 119/74 - - - - 94 %   11/14/17 1215 122/79 - - - - 93 %   11/14/17 1200 114/75 - - - - 96 %   11/14/17 1145 116/78 - - - - 95 %   11/14/17 1130 128/69 - - 93 16 98 %   11/14/17 1100 127/76 - - - - 94 %   11/14/17 1044 138/82 - - - - 98 %   11/14/17 1009 133/76 99.6  F (37.6  C) Temporal 105 16 97 %        Physical Exam  Nursing note and vitals reviewed.     GENERAL: Alert, mild distress, non toxic appearing.   HEENT: Normal conjunctiva. No scleral icterus. MMM.   NECK: Supple.  CARDIAC: Normal rate and regular rhythm. Normal heart sounds. No murmurs, rubs, or gallops appreciated.   PULMONARY: CTA bilaterally. Normal breath sounds. No wheezing, crackles, or rhonchi appreciated.  ABDOMEN: Soft, non distended abdomen. Right lower quadrant abdominal tenderness, rebound, no guarding. Positive bowel sounds.   NEURO: Alert and oriented. Non-focal.   MUSCULOSKELETAL: Normal range of motion. No peripheral edema. No CVA tenderness.   SKIN: Skin is warm and dry. No rashes. No pallor or jaundice.   PSYCH: Normal affect and mood.      Emergency Department Course     Imaging:  Radiographic findings were communicated with the patient who voiced understanding of the findings.     CT Abd/pelvis with contrast:  IMPRESSION:  1. The appendix is upper normal in size at 6 mm and there is mild surrounding fatty infiltration. This may represent early appendicitis. No evidence of perforation or abscess.  2. Mild hepatomegaly.    Imaging independently reviewed and agree with radiologist interpretation.      Laboratory:  CBC: WBC 11.1 (high), ow WNL  (HGB 12.1, )    CMP: Glucose 272 (high), Albumin 3.1 (low), ow WNL (Creatinine  0.563)   Lipase: 143   UA with Microscopic: Glucose >499 (A), Urineketon 5 (A), Leukocyte esterase moderate (A), WBC 10 (high), Bacteria moderate (A), Squamous epithelial 18 (high), Mucous present (A), ow Negative     Interventions:  1052: Toradol 30 mg IV   1052: Zofran 4 mg IV   1122: ISOVUE-370 100 IV   1122: NS 1L IV Bolus    1139: Morphine 4 mg IV   1233: Dilaudid 0.5 mg IV   1247: Zosyn 3.375 g IV Infusion     Emergency Department Course:  Past medical records, nursing notes, and vitals reviewed.  1039: I performed an exam of the patient and obtained history, as documented above.  IV inserted and blood drawn.   Above interventions provided.   The patient was sent for a CT while in the emergency department, findings above.   I personally reviewed the laboratory results with the Patient and answered all related questions prior to admission.    Toradol did not help with pain.   1223: I rechecked the patient. Explained findings to patient.   1235: I spoke with Dr. Robins of general surgery regarding this patient.   Findings and plan explained to the Patient who consents to admission.   1235: Discussed the patient with Dr. Robins, who will admit the patient to a observation bed for further monitoring, evaluation, and treatment.    1325: I spoke with Dr. Robins of general surgery, regarding the patient.   1326: I rechecked and updated the patient.    Impression & Plan      Medical Decision Making:  Melody Muñoz is a 42 year old female who presents with abdominal pain concerning for right sided abdominal pain, greatest in right lower quadrant over the past 24-48 hours with nausea and vomiting. White count is elevated. Given her focal tenderness on exam, CT abdomen pelvis obtained and showed evidence of possible early appendicitis, with borderline enlargement of appendix and some inflammatory changes surrounding. There is no evidence of perforation or abscess and no  other acute abnormalities to explain her  pain. Given these findings and her exam, I am concerned for early appendicitis. The patient s symptoms have been controlled with interventions in the Emergency Department, and she appears more comfortable on recheck. I discussed patient with Dr. Robins of general surgery who will take patient to OR. I discussed with patient that given her history of surgeries, the surgery may be slightly more difficult due to adhesions. She was still in agreement with this. Parenteral antibiotics have been ordered and administered in ED. She remained stable in the ED and will go to OR from here.     Diagnosis:    ICD-10-CM    1. Acute appendicitis with localized peritonitis K35.3      Disposition:  Admitted to Dr. Julienne Ryan  11/14/2017   [facility] EMERGENCY DEPARTMENT  I, Oscar Ryan, am serving as a scribe at 10:39 AM on 11/14/2017 to document services personally performed by Kassidy Avalos PA-C based on my observations and the provider's statements to me.         Kassidy Avalos PA-C  11/14/17 [id number]

## 2017-11-14 NOTE — ANESTHESIA PREPROCEDURE EVALUATION
Anesthesia Evaluation     . Pt has had prior anesthetic.            ROS/MED HX    ENT/Pulmonary:     (+)sleep apnea, Intermittent asthma , . .    Neurologic:  - neg neurologic ROS     Cardiovascular:  - neg cardiovascular ROS       METS/Exercise Tolerance:     Hematologic:  - neg hematologic  ROS       Musculoskeletal:  - neg musculoskeletal ROS       GI/Hepatic:     (+) appendicitis,       Renal/Genitourinary:  - ROS Renal section negative       Endo:     (+) type II DM thyroid problem hypothyroidism, Obesity, .      Psychiatric:  - neg psychiatric ROS       Infectious Disease:  - neg infectious disease ROS       Malignancy:         Other:    - neg other ROS                 Physical Exam  Normal systems: cardiovascular, pulmonary and dental    Airway   Mallampati: II  TM distance: >3 FB  Neck ROM: full    Dental     Cardiovascular       Pulmonary                     Anesthesia Plan      History & Physical Review  History and physical reviewed and following examination; no interval change.    ASA Status:  3 .    NPO Status:  > 8 hours    Plan for General, ETT and RSI with Intravenous induction. Maintenance will be Balanced.    PONV prophylaxis:  Ondansetron (or other 5HT-3) and Dexamethasone or Solumedrol       Postoperative Care  Postoperative pain management:  IV analgesics, Oral pain medications and Multi-modal analgesia.      Consents  Anesthetic plan, risks, benefits and alternatives discussed with:  Patient..                          .

## 2017-11-14 NOTE — DISCHARGE INSTRUCTIONS
HOME CARE FOLLOWING APPENDECTOMY  MICHAEL Xiong E. Gavin, N. Guttormson, D. Maurer, IRAESMA Hughes, LUPIS Cardoza    INCISIONAL CARE:  Replace the bandage over your incision (or incisions) until all drainage stops, or if more comfortable to have in place.  If present, leave the steri-strips (white paper tapes) in place for 14 days after surgery.  If you have staples in your incision at the time of discharge, they will be removed at your follow-up appointment.     BATHING:  Avoid baths for 1 week after surgery.  Showers are okay.  You may wash your hair at any time.  Gently pat your incision dry after bathing.    ACTIVITY:  Light Activity -- you may immediately be up and about as tolerated.  Driving -- you may drive when comfortable and off narcotic pain medications.  Light Work -- resume when comfortable off pain medications.  (If you can drive, you probably can work.)  Strenuous Work/Activity -- limit lifting to 20 pounds for 1 week.  Progressively increase with time.  Active Sports (running, biking, etc.) -- cautiously resume after 2 weeks.    DISCOMFORT:  Use pain medications as prescribed by your surgeon.  Take the pain medication with some food, when possible, to minimize side effects.  Intermittent use of ice packs at the incision sites may help during the first 48 hours.  Expect gradual improvement.    DIET:  No restrictions.  Drink plenty of fluids.  While taking pain medications, increase dietary fiber or add a fiber supplementation like Metamucil or Citrucel to help prevent constipation - a possible side effect of pain medications.    NAUSEA:  If nauseated from the anesthetic/pain meds; rest in bed, get up cautiously with assistance, and drink clear liquids (juice, tea, broth).    RETURN APPOINTMENT:  Schedule a follow-up visit 2-3 weeks post-op.  Office Phone:  [phone]     CONTACT US IF THE FOLLOWING DEVELOPS:   1. A fever that is above 101     2. If there is a large amount of drainage,  bleeding, or swelling.   3. Severe pain that is not relieved by your prescription.   4. Drainage that is thick, cloudy, yellow, green or white.   5. Any other questions not answered by  Frequently Asked Questions  sheet.      FREQUENTLY ASKED QUESTIONS:    Q:  How should my incision look?    A:  Normally your incision will appear slightly swollen with light redness directly along the incision itself as it heals.  It may feel like a bump or ridge as the healing/scarring happens, and over time (3-4 months) this bump or ridge feeling should slowly go away.  In general, clear or pink watery drainage can be normal at first as your incision heals, but should decrease over time.    Q:  How do I know if my incision is infected?  A:  Look at your incision for signs of infection, like redness around the incision spreading to surrounding skin, or drainage of cloudy or foul-smelling drainage.  If you feel warm, check your temperature to see if you are running a fever.    **If any of these things occur, please notify the nurse at our office.  We may need you to come into the office for an incision check.      Q:  How do I take care of my incision?  A:  If you have a dressing in place - Starting the day after surgery, replace the dressing 1-2 times a day until there is no further drainage from the incision.  At that time, a dressing is no longer needed.  Try to minimize tape on the skin if irritation is occurring at the tape sites.  If you have significant irritation from tape on the skin, please call the office to discuss other method of dressing your incision.    Small pieces of tape called  steri-strips  may be present directly overlying your incision; these may be removed 10 days after surgery unless otherwise specified by your surgeon.  If these tapes start to loosen at the ends, you may trim them back until they fall off or are removed.      Q:  There is a piece of tape or a sticky  lead  still on my skin.  Can I remove  this?  A:  Sometimes the sticky  leads  used for monitoring during surgery or for evaluation in the emergency department are not all removed while you are in the hospital.  These sometimes have a tab or metal dot on them.  You can easily remove these on your own, like taking off a band-aid.  If there is a gel substance under the  lead , simply wipe/clean it off with a washcloth or paper towel.      Q:  What can I do to minimize constipation (very hard stools, or lack of stools)?  A:  Stay well hydrated.  Increase your dietary fiber intake or take a fiber supplement -with plenty of water.  Walk around frequently.  You may consider an over-the-counter stool-softener.  Your Pharmacist can assist you with choosing one that is stocked at your pharmacy.  Constipation is also one of the most common side effects of pain medication.  If you are using pain medication, be pro-active and try to PREVENT problems with constipation by taking the steps above BEFORE constipation becomes a problem.    Q:  What do I do if I need more pain medications?  A:  Call the office to receive refills.  Be aware that certain pain meds cannot be called into a pharmacy and actually require a paper prescription.  A change may be made in your pain med as you progress thru your recovery period or if you have side effects to certain meds.    --Pain meds are NOT refilled after 5pm on weekdays, and NOT AT ALL on the weekends, so please look ahead to prevent problems.      Q:  Why am I having a hard time sleeping now that I am at home?  A:  Many medications you receive while you are in the hospital can impact your sleep for a number of days after your surgery/hospitalization.  Decreased level of activity and naps during the day may also make sleeping at night difficult.  Try to minimize day-time naps, and get up frequently during the day to walk around your home during your recovery time.  Sleep aides may be of some help, but are not recommended for  long-term use.      Q:  I am having some back discomfort.  What should I do?  A:  This may be related to certain positioning that was required for your surgery, extended periods of time in bed, or other changes in your overall activity level.  You may try ice, heat, acetaminophen, or ibuprofen to treat this temporarily.  Note that many pain medications have acetaminophen in them and would state this on the prescription bottle.  Be sure not to exceed the maximum of 4000mg per day of acetaminophen.     **If the pain you are having does not resolve, is severe, or is a flare of back pain you have had on other occasions prior to surgery, please contact your primary physician for further recommendations or for an appointment to be examined at their office.    Q:  Why am I having headaches?  A:  Headaches can be caused by many things:  caffeine withdrawal, use of pain meds, dehydration, high blood pressure, lack of sleep, over-activity/exhaustion, flare-up of usual migraine headaches.  If you feel this is related to muscle tension (a band-like feeling around the head, or a pressure at the low-back of the head) you may try ice or heat to this area.  You may need to drink more fluids (try electrolyte drink like Gatorade), rest, or take your usual migraine medications.   **If your headaches do not resolve, worsen, are accompanied by other symptoms, or if your blood pressure is high, please call your primary physician for recommendation and/or examination.    Q:  I am unable to urinate.  What do I do?  A:  A small percentage of people can have difficulty urinating initially after surgery.  This includes being able to urinate only a very small amount at a time and feeling discomfort or pressure in the very low abdomen.  This is called  urinary retention , and is actually an urgent situation.  Proceed to your nearest Emergency department for evaluation (not an Urgent Care Center).  Sometimes the bladder does not work correctly  after certain medications you receive during surgery, or related to certain procedures.  You may need to have a catheter placed until your bladder recovers.  When planning to go to an Emergency department, it may help to call the ER to let them know you are coming in for this problem after a surgery.  This may help you get in quicker to be evaluated.  **If you have symptoms of a urinary tract infection, please contact your primary physician for the proper evaluation and treatment.          If you have other questions, please call the office Monday thru Friday between 8am and 5pm to discuss with the nurse or physician assistant.  #[phone]    There is a surgeon ON CALL on weekday evenings and over the weekend in case of urgent need only, and may be contacted at the same number.    If you are having an emergency, call 911 or proceed to your nearest emergency department.      GENERAL ANESTHESIA OR SEDATION ADULT DISCHARGE INSTRUCTIONS   SPECIAL PRECAUTIONS FOR 24 HOURS AFTER SURGERY    IT IS NOT UNUSUAL TO FEEL LIGHT-HEADED OR FAINT, UP TO 24 HOURS AFTER SURGERY OR WHILE TAKING PAIN MEDICATION.  IF YOU HAVE THESE SYMPTOMS; SIT FOR A FEW MINUTES BEFORE STANDING AND HAVE SOMEONE ASSIST YOU WHEN YOU GET UP TO WALK OR USE THE BATHROOM.    YOU SHOULD REST AND RELAX FOR THE NEXT 24 HOURS AND YOU MUST MAKE ARRANGEMENTS TO HAVE SOMEONE STAY WITH YOU FOR AT LEAST 24 HOURS AFTER YOUR DISCHARGE.  AVOID HAZARDOUS AND STRENUOUS ACTIVITIES.  DO NOT MAKE IMPORTANT DECISIONS FOR 24 HOURS.    DO NOT DRIVE ANY VEHICLE OR OPERATE MECHANICAL EQUIPMENT FOR 24 HOURS FOLLOWING THE END OF YOUR SURGERY.  EVEN THOUGH YOU MAY FEEL NORMAL, YOUR REACTIONS MAY BE AFFECTED BY THE MEDICATION YOU HAVE RECEIVED.    DO NOT DRINK ALCOHOLIC BEVERAGES FOR 24 HOURS FOLLOWING YOUR SURGERY.    DRINK CLEAR LIQUIDS (APPLE JUICE, GINGER ALE, 7-UP, BROTH, ETC.).  PROGRESS TO YOUR REGULAR DIET AS YOU FEEL ABLE.    YOU MAY HAVE A DRY MOUTH, A SORE THROAT,  MUSCLES ACHES OR TROUBLE SLEEPING.  THESE SHOULD GO AWAY AFTER 24 HOURS.    CALL YOUR DOCTOR FOR ANY OF THE FOLLOWING:  SIGNS OF INFECTION (FEVER, GROWING TENDERNESS AT THE SURGERY SITE, A LARGE AMOUNT OF DRAINAGE OR BLEEDING, SEVERE PAIN, FOUL-SMELLING DRAINAGE, REDNESS OR SWELLING.    IT HAS BEEN OVER 8 TO 10 HOURS SINCE SURGERY AND YOU ARE STILL NOT ABLE TO URINATE (PASS WATER).     You had a pain pill (Oxycodone 5 mg) at 5:30 PM

## 2017-11-14 NOTE — ED NOTES
Patient presents to the ED reporting mid and RLQ abdominal pain x 2 days. States pain radiates to the back and has been vomiting.

## 2017-11-14 NOTE — IP AVS SNAPSHOT
MRN:[id number]                      After Visit Summary   11/14/2017    Melody Muñoz    MRN: [id number]           Thank you!     Thank you for choosing [facility] for your care. Our goal is always to provide you with excellent care. Hearing back from our patients is one way we can continue to improve our services. Please take a few minutes to complete the written survey that you may receive in the mail after you visit. If you would like to speak to someone directly about your visit please contact Patient Relations at [phone]. Thank you!          Patient Information     Date Of Birth          1975        About your hospital stay     You were admitted on:  N/A You last received care in the:  [facility] [place] Pre[facility]/Post[facility]    You were discharged on:  November 14, 2017       Who to Call     For medical emergencies, please call 911.  For non-urgent questions about your medical care, please call your primary care provider or clinic, [phone]  For questions related to your surgery, please call your surgery clinic        Attending Provider     Provider Kassidy Huber PA-C Physician Assistant       Primary Care Provider Office Phone # Fax #    Evaristo Orestes Machado [phone]      Further instructions from your care team       HOME CARE FOLLOWING APPENDECTOMY  MICHAEL Xiong E. Gavin, N. Guttormson, D. Maurer, R. O Donnell, L. Thomas    INCISIONAL CARE:  Replace the bandage over your incision (or incisions) until all drainage stops, or if more comfortable to have in place.  If present, leave the steri-strips (white paper tapes) in place for 14 days after surgery.  If you have staples in your incision at the time of discharge, they will be removed at your follow-up appointment.     BATHING:  Avoid baths for 1 week after surgery.  Showers are okay.  You may wash your hair at any time.  Gently pat your incision dry  after bathing.    ACTIVITY:  Light Activity -- you may immediately be up and about as tolerated.  Driving -- you may drive when comfortable and off narcotic pain medications.  Light Work -- resume when comfortable off pain medications.  (If you can drive, you probably can work.)  Strenuous Work/Activity -- limit lifting to 20 pounds for 1 week.  Progressively increase with time.  Active Sports (running, biking, etc.) -- cautiously resume after 2 weeks.    DISCOMFORT:  Use pain medications as prescribed by your surgeon.  Take the pain medication with some food, when possible, to minimize side effects.  Intermittent use of ice packs at the incision sites may help during the first 48 hours.  Expect gradual improvement.    DIET:  No restrictions.  Drink plenty of fluids.  While taking pain medications, increase dietary fiber or add a fiber supplementation like Metamucil or Citrucel to help prevent constipation - a possible side effect of pain medications.    NAUSEA:  If nauseated from the anesthetic/pain meds; rest in bed, get up cautiously with assistance, and drink clear liquids (juice, tea, broth).    RETURN APPOINTMENT:  Schedule a follow-up visit 2-3 weeks post-op.  Office Phone:  [phone]     CONTACT US IF THE FOLLOWING DEVELOPS:   1. A fever that is above 101     2. If there is a large amount of drainage, bleeding, or swelling.   3. Severe pain that is not relieved by your prescription.   4. Drainage that is thick, cloudy, yellow, green or white.   5. Any other questions not answered by  Frequently Asked Questions  sheet.      FREQUENTLY ASKED QUESTIONS:    Q:  How should my incision look?    A:  Normally your incision will appear slightly swollen with light redness directly along the incision itself as it heals.  It may feel like a bump or ridge as the healing/scarring happens, and over time (3-4 months) this bump or ridge feeling should slowly go away.  In general, clear or pink watery drainage can be  normal at first as your incision heals, but should decrease over time.    Q:  How do I know if my incision is infected?  A:  Look at your incision for signs of infection, like redness around the incision spreading to surrounding skin, or drainage of cloudy or foul-smelling drainage.  If you feel warm, check your temperature to see if you are running a fever.    **If any of these things occur, please notify the nurse at our office.  We may need you to come into the office for an incision check.      Q:  How do I take care of my incision?  A:  If you have a dressing in place - Starting the day after surgery, replace the dressing 1-2 times a day until there is no further drainage from the incision.  At that time, a dressing is no longer needed.  Try to minimize tape on the skin if irritation is occurring at the tape sites.  If you have significant irritation from tape on the skin, please call the office to discuss other method of dressing your incision.    Small pieces of tape called  steri-strips  may be present directly overlying your incision; these may be removed 10 days after surgery unless otherwise specified by your surgeon.  If these tapes start to loosen at the ends, you may trim them back until they fall off or are removed.      Q:  There is a piece of tape or a sticky  lead  still on my skin.  Can I remove this?  A:  Sometimes the sticky  leads  used for monitoring during surgery or for evaluation in the emergency department are not all removed while you are in the hospital.  These sometimes have a tab or metal dot on them.  You can easily remove these on your own, like taking off a band-aid.  If there is a gel substance under the  lead , simply wipe/clean it off with a washcloth or paper towel.      Q:  What can I do to minimize constipation (very hard stools, or lack of stools)?  A:  Stay well hydrated.  Increase your dietary fiber intake or take a fiber supplement -with plenty of water.  Walk around  frequently.  You may consider an over-the-counter stool-softener.  Your Pharmacist can assist you with choosing one that is stocked at your pharmacy.  Constipation is also one of the most common side effects of pain medication.  If you are using pain medication, be pro-active and try to PREVENT problems with constipation by taking the steps above BEFORE constipation becomes a problem.    Q:  What do I do if I need more pain medications?  A:  Call the office to receive refills.  Be aware that certain pain meds cannot be called into a pharmacy and actually require a paper prescription.  A change may be made in your pain med as you progress thru your recovery period or if you have side effects to certain meds.    --Pain meds are NOT refilled after 5pm on weekdays, and NOT AT ALL on the weekends, so please look ahead to prevent problems.      Q:  Why am I having a hard time sleeping now that I am at home?  A:  Many medications you receive while you are in the hospital can impact your sleep for a number of days after your surgery/hospitalization.  Decreased level of activity and naps during the day may also make sleeping at night difficult.  Try to minimize day-time naps, and get up frequently during the day to walk around your home during your recovery time.  Sleep aides may be of some help, but are not recommended for long-term use.      Q:  I am having some back discomfort.  What should I do?  A:  This may be related to certain positioning that was required for your surgery, extended periods of time in bed, or other changes in your overall activity level.  You may try ice, heat, acetaminophen, or ibuprofen to treat this temporarily.  Note that many pain medications have acetaminophen in them and would state this on the prescription bottle.  Be sure not to exceed the maximum of 4000mg per day of acetaminophen.     **If the pain you are having does not resolve, is severe, or is a flare of back pain you have had on other  occasions prior to surgery, please contact your primary physician for further recommendations or for an appointment to be examined at their office.    Q:  Why am I having headaches?  A:  Headaches can be caused by many things:  caffeine withdrawal, use of pain meds, dehydration, high blood pressure, lack of sleep, over-activity/exhaustion, flare-up of usual migraine headaches.  If you feel this is related to muscle tension (a band-like feeling around the head, or a pressure at the low-back of the head) you may try ice or heat to this area.  You may need to drink more fluids (try electrolyte drink like Gatorade), rest, or take your usual migraine medications.   **If your headaches do not resolve, worsen, are accompanied by other symptoms, or if your blood pressure is high, please call your primary physician for recommendation and/or examination.    Q:  I am unable to urinate.  What do I do?  A:  A small percentage of people can have difficulty urinating initially after surgery.  This includes being able to urinate only a very small amount at a time and feeling discomfort or pressure in the very low abdomen.  This is called  urinary retention , and is actually an urgent situation.  Proceed to your nearest Emergency department for evaluation (not an Urgent Care Center).  Sometimes the bladder does not work correctly after certain medications you receive during surgery, or related to certain procedures.  You may need to have a catheter placed until your bladder recovers.  When planning to go to an Emergency department, it may help to call the ER to let them know you are coming in for this problem after a surgery.  This may help you get in quicker to be evaluated.  **If you have symptoms of a urinary tract infection, please contact your primary physician for the proper evaluation and treatment.          If you have other questions, please call the office Monday thru Friday between 8am and 5pm to discuss with the nurse or  physician assistant.  #[phone]    There is a surgeon ON CALL on weekday evenings and over the weekend in case of urgent need only, and may be contacted at the same number.    If you are having an emergency, call 911 or proceed to your nearest emergency department.      GENERAL ANESTHESIA OR SEDATION ADULT DISCHARGE INSTRUCTIONS   SPECIAL PRECAUTIONS FOR 24 HOURS AFTER SURGERY    IT IS NOT UNUSUAL TO FEEL LIGHT-HEADED OR FAINT, UP TO 24 HOURS AFTER SURGERY OR WHILE TAKING PAIN MEDICATION.  IF YOU HAVE THESE SYMPTOMS; SIT FOR A FEW MINUTES BEFORE STANDING AND HAVE SOMEONE ASSIST YOU WHEN YOU GET UP TO WALK OR USE THE BATHROOM.    YOU SHOULD REST AND RELAX FOR THE NEXT 24 HOURS AND YOU MUST MAKE ARRANGEMENTS TO HAVE SOMEONE STAY WITH YOU FOR AT LEAST 24 HOURS AFTER YOUR DISCHARGE.  AVOID HAZARDOUS AND STRENUOUS ACTIVITIES.  DO NOT MAKE IMPORTANT DECISIONS FOR 24 HOURS.    DO NOT DRIVE ANY VEHICLE OR OPERATE MECHANICAL EQUIPMENT FOR 24 HOURS FOLLOWING THE END OF YOUR SURGERY.  EVEN THOUGH YOU MAY FEEL NORMAL, YOUR REACTIONS MAY BE AFFECTED BY THE MEDICATION YOU HAVE RECEIVED.    DO NOT DRINK ALCOHOLIC BEVERAGES FOR 24 HOURS FOLLOWING YOUR SURGERY.    DRINK CLEAR LIQUIDS (APPLE JUICE, GINGER ALE, 7-UP, BROTH, ETC.).  PROGRESS TO YOUR REGULAR DIET AS YOU FEEL ABLE.    YOU MAY HAVE A DRY MOUTH, A SORE THROAT, MUSCLES ACHES OR TROUBLE SLEEPING.  THESE SHOULD GO AWAY AFTER 24 HOURS.    CALL YOUR DOCTOR FOR ANY OF THE FOLLOWING:  SIGNS OF INFECTION (FEVER, GROWING TENDERNESS AT THE SURGERY SITE, A LARGE AMOUNT OF DRAINAGE OR BLEEDING, SEVERE PAIN, FOUL-SMELLING DRAINAGE, REDNESS OR SWELLING.    IT HAS BEEN OVER 8 TO 10 HOURS SINCE SURGERY AND YOU ARE STILL NOT ABLE TO URINATE (PASS WATER).     You had a pain pill (Oxycodone 5 mg) at 5:30 PM      Pending Results     Date and Time Order Name Status Description    11/14/2017 1603 Surgical pathology exam In process     11/14/2017 1152 Urine Culture Preliminary              Admission Information     Date & Time Department Dept. Phone    11/14/2017 [facility] [phone]      Your Vitals Were     Blood Pressure Pulse Temperature Respirations Last Period Pulse Oximetry    110/64 93 99.1  F (37.3  C) (Temporal) 16 (LMP Unknown) 91%      MyChart Information     Your Policy Managerhart gives you secure access to your electronic health record. If you see a primary care provider, you can also send messages to your care team and make appointments. If you have questions, please call your primary care clinic.  If you do not have a primary care provider, please call [phone] and they will assist you.        Care EveryWhere ID     This is your Care EveryWhere ID. This could be used by other organizations to access your [facility] medical records  [id number]        Equal Access to Services     OCTAVIO TRUJILLO : Madi Vee, mitesh graham, raquel farnsworth, aida german. So [facility] [phone].    ATENCIÓN: Si habla español, tiene a mendoza disposición servicios gratuitos de asistencia lingüística. Llame al [phone].    We comply with applicable federal civil rights laws and [place] laws. We do not discriminate on the basis of race, color, national origin, age, disability, sex, sexual orientation, or gender identity.               Review of your medicines      START taking        Dose / Directions    oxyCODONE IR 5 MG tablet   Commonly known as:  ROXICODONE   Used for:  Acute appendicitis with localized peritonitis        Dose:  5-10 mg   Take 1-2 tablets (5-10 mg) by mouth every 3 hours as needed for pain or other (Moderate to Severe)   Quantity:  30 tablet   Refills:  0         CONTINUE these medicines which have NOT CHANGED        Dose / Directions    * albuterol (2.5 MG/3ML) 0.083% neb solution   Used for:  Mild persistent asthma without complication        Dose:  1 vial   Take 1 vial (2.5 mg) by nebulization every 6 hours as needed  for shortness of breath / dyspnea or wheezing   Quantity:  25 vial   Refills:  0       * PROAIR  (90 BASE) MCG/ACT Inhaler   Used for:  Intermittent asthma, uncomplicated   Generic drug:  albuterol        INHALE 1 TO 2 PUFFS INTO THE LUNGS EVERY 4 HOURS AS NEEDED FOR WHEEZING, SHORTNESS OF BREATH OR DYSPNEA   Quantity:  51 g   Refills:  2       blood glucose monitoring lancets   Used for:  Diabetes mellitus without complication (H)        Use to test blood sugar 2 times daily or as directed.  Ok to substitute alternative if insurance prefers.   Quantity:  1 Box   Refills:  3       blood glucose monitoring test strip   Commonly known as:  LAURA CONTOUR NEXT   Used for:  Diabetes mellitus without complication (H)        Use to test blood sugar 2 times daily or as directed.   Quantity:  200 each   Refills:  12       Butalbital-APAP-Caff-Cod -84-30 MG Caps   Used for:  Chronic migraine without aura without status migrainosus, not intractable        Dose:  1 capsule   Take 1 capsule by mouth 3 times daily as needed   Quantity:  15 capsule   Refills:  0       carisoprodol 350 MG tablet   Commonly known as:  SOMA   Used for:  Chronic pain syndrome        TAKE 1 TABLET BY MOUTH THREE TIMES DAILY   Quantity:  90 tablet   Refills:  5       clonazePAM 1 MG tablet   Commonly known as:  klonoPIN   Used for:  Generalized anxiety disorder        TAKE 1 TABLET BY MOUTH THREE TIMES DAILY AS NEEDED FOR ANXIETY   Quantity:  90 tablet   Refills:  0       fluticasone 110 MCG/ACT Inhaler   Commonly known as:  FLOVENT HFA   Used for:  Mild persistent asthma without complication        Dose:  2 puff   Inhale 2 puffs into the lungs 2 times daily   Quantity:  2 Inhaler   Refills:  3       fluticasone 50 MCG/ACT spray   Commonly known as:  FLONASE   Used for:  Chronic maxillary sinusitis        Dose:  2 spray   Spray 2 sprays into both nostrils daily   Quantity:  16 g   Refills:  11       guanFACINE 2 MG tablet   Commonly known  as:  TENEX   Used for:  Nightmares        TAKE TWO TABLETS BY MOUTH AT BEDTIME   Quantity:  180 tablet   Refills:  3       lamoTRIgine 200 MG tablet   Commonly known as:  LAMICTAL   Used for:  Bipolar 1 disorder (H)        Dose:  200 mg   Take 1 tablet (200 mg) by mouth every morning   Quantity:  90 tablet   Refills:  4       levothyroxine 25 MCG tablet   Commonly known as:  SYNTHROID/LEVOTHROID   Used for:  Acquired hypothyroidism        TAKE 1 TABLET(25 MCG) BY MOUTH DAILY   Quantity:  90 tablet   Refills:  3       lithium 450 MG CR tablet   Commonly known as:  ESKALITH   Used for:  Bipolar disorder with psychotic features (H)        TAKE 2 TABLETS BY MOUTH AT BEDTIME   Quantity:  60 tablet   Refills:  5       LYRICA 150 MG capsule   Used for:  Chronic pain syndrome   Generic drug:  pregabalin        TAKE ONE CAPSULE BY MOUTH TWICE DAILY   Quantity:  60 capsule   Refills:  5       meclizine 25 MG tablet   Commonly known as:  ANTIVERT   Used for:  Dizziness        Dose:  25 mg   Take 1 tablet (25 mg) by mouth every 6 hours as needed for dizziness   Quantity:  30 tablet   Refills:  3       MELATONIN PO        Dose:  6 mg   Take 6 mg by mouth At Bedtime   Refills:  0       metFORMIN 500 MG tablet   Commonly known as:  GLUCOPHAGE   Used for:  Type 2 diabetes mellitus without complication, without long-term current use of insulin (H)        Dose:  500 mg   Take 1 tablet (500 mg) by mouth 3 times daily (with meals)   Quantity:  90 tablet   Refills:  1       modafinil 200 MG tablet   Commonly known as:  PROVIGIL   Used for:  Chronic fatigue        Dose:  100 mg   Take 0.5 tablets (100 mg) by mouth daily   Quantity:  30 tablet   Refills:  0       ondansetron 4 MG ODT tab   Commonly known as:  ZOFRAN-ODT   Used for:  Nausea        DISSOLVE 1 TO 2 TABLETS UNDER THE TONGUE EVERY 8 HOURS AS NEEDED FOR NAUSEA   Quantity:  30 tablet   Refills:  3       pantoprazole 40 MG EC tablet   Commonly known as:  PROTONIX   Used for:   Other chronic gastritis without hemorrhage        TAKE 1 TABLET(40 MG) BY MOUTH DAILY   Quantity:  90 tablet   Refills:  1       tretinoin 0.05 % cream   Commonly known as:  RETIN-A   Used for:  Acne        Spread a pea size amount into affected area topically at bedtime.  Use sunscreen SPF>20.   Quantity:  45 g   Refills:  11       * Notice:  This list has 2 medication(s) that are the same as other medications prescribed for you. Read the directions carefully, and ask your doctor or other care provider to review them with you.         Where to get your medicines      Some of these will need a paper prescription and others can be bought over the counter. Ask your nurse if you have questions.     Bring a paper prescription for each of these medications     oxyCODONE IR 5 MG tablet                Protect others around you: Learn how to safely use, store and throw away your medicines at www.disposemymeds.org.             Medication List: This is a list of all your medications and when to take them. Check marks below indicate your daily home schedule. Keep this list as a reference.      Medications           Morning Afternoon Evening Bedtime As Needed    * albuterol (2.5 MG/3ML) 0.083% neb solution   Take 1 vial (2.5 mg) by nebulization every 6 hours as needed for shortness of breath / dyspnea or wheezing                                * PROAIR  (90 BASE) MCG/ACT Inhaler   INHALE 1 TO 2 PUFFS INTO THE LUNGS EVERY 4 HOURS AS NEEDED FOR WHEEZING, SHORTNESS OF BREATH OR DYSPNEA   Generic drug:  albuterol                                blood glucose monitoring lancets   Use to test blood sugar 2 times daily or as directed.  Ok to substitute alternative if insurance prefers.                                blood glucose monitoring test strip   Commonly known as:  [employer] CONTOUR NEXT   Use to test blood sugar 2 times daily or as directed.                                Butalbital-APAP-Caff-Cod -59-30 MG Caps   Take  1 capsule by mouth 3 times daily as needed                                carisoprodol 350 MG tablet   Commonly known as:  SOMA   TAKE 1 TABLET BY MOUTH THREE TIMES DAILY                                clonazePAM 1 MG tablet   Commonly known as:  klonoPIN   TAKE 1 TABLET BY MOUTH THREE TIMES DAILY AS NEEDED FOR ANXIETY                                fluticasone 110 MCG/ACT Inhaler   Commonly known as:  FLOVENT HFA   Inhale 2 puffs into the lungs 2 times daily                                fluticasone 50 MCG/ACT spray   Commonly known as:  FLONASE   Spray 2 sprays into both nostrils daily                                guanFACINE 2 MG tablet   Commonly known as:  TENEX   TAKE TWO TABLETS BY MOUTH AT BEDTIME                                lamoTRIgine 200 MG tablet   Commonly known as:  LAMICTAL   Take 1 tablet (200 mg) by mouth every morning                                levothyroxine 25 MCG tablet   Commonly known as:  SYNTHROID/LEVOTHROID   TAKE 1 TABLET(25 MCG) BY MOUTH DAILY                                lithium 450 MG CR tablet   Commonly known as:  ESKALITH   TAKE 2 TABLETS BY MOUTH AT BEDTIME                                LYRICA 150 MG capsule   TAKE ONE CAPSULE BY MOUTH TWICE DAILY   Generic drug:  pregabalin                                meclizine 25 MG tablet   Commonly known as:  ANTIVERT   Take 1 tablet (25 mg) by mouth every 6 hours as needed for dizziness                                MELATONIN PO   Take 6 mg by mouth At Bedtime                                metFORMIN 500 MG tablet   Commonly known as:  GLUCOPHAGE   Take 1 tablet (500 mg) by mouth 3 times daily (with meals)                                modafinil 200 MG tablet   Commonly known as:  PROVIGIL   Take 0.5 tablets (100 mg) by mouth daily                                ondansetron 4 MG ODT tab   Commonly known as:  ZOFRAN-ODT   DISSOLVE 1 TO 2 TABLETS UNDER THE TONGUE EVERY 8 HOURS AS NEEDED FOR NAUSEA   Last time this was given:  4 mg  on 11/14/2017  5:56 PM                                oxyCODONE IR 5 MG tablet   Commonly known as:  ROXICODONE   Take 1-2 tablets (5-10 mg) by mouth every 3 hours as needed for pain or other (Moderate to Severe)   Last time this was given:  5 mg on 11/14/2017  5:28 PM                                pantoprazole 40 MG EC tablet   Commonly known as:  PROTONIX   TAKE 1 TABLET(40 MG) BY MOUTH DAILY                                tretinoin 0.05 % cream   Commonly known as:  RETIN-A   Spread a pea size amount into affected area topically at bedtime.  Use sunscreen SPF>20.                                * Notice:  This list has 2 medication(s) that are the same as other medications prescribed for you. Read the directions carefully, and ask your doctor or other care provider to review them with you.

## 2017-11-15 ENCOUNTER — TELEPHONE (OUTPATIENT)
Dept: FAMILY MEDICINE | Facility: CLINIC | Age: 42
End: 2017-11-15

## 2017-11-15 DIAGNOSIS — G89.18 ACUTE POST-OPERATIVE PAIN: Primary | ICD-10-CM

## 2017-11-15 DIAGNOSIS — R11.0 NAUSEA: ICD-10-CM

## 2017-11-15 LAB
BACTERIA SPEC CULT: NORMAL
BACTERIA SPEC CULT: NORMAL
INTERPRETATION ECG - MUSE: NORMAL
Lab: NORMAL
SPECIMEN SOURCE: NORMAL

## 2017-11-15 RX ORDER — HYDROCODONE BITARTRATE AND ACETAMINOPHEN 5; 325 MG/1; MG/1
1-2 TABLET ORAL EVERY 4 HOURS PRN
Qty: 30 TABLET | Refills: 0 | Status: ON HOLD | OUTPATIENT
Start: 2017-11-15 | End: 2017-11-22

## 2017-11-15 NOTE — TELEPHONE ENCOUNTER
Patient states that she had an appendectomy yesterday and she states that she is having surgical pain but is still having the abdominal pain that she has been dealing with for awhile.Advised because of the surgery to monitor for a few days until sxs from the surgery settles down.

## 2017-11-15 NOTE — TELEPHONE ENCOUNTER
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 Robins    Patient reports that oxycodone that was prescribed for her on discharge is causing nausea.  She has been taking with food.  States that she has had this problem with oxycodone in the past.  Requests rx for hydrocodone.  Discussed Norco . She is aware this contains Tylenol and will take stool softener/laxative prn while on this medication.  She understands that it will require someone to  the paper prescription in our office.      Added oxycodone to pts list of allergies - listed as intolerance.

## 2017-11-15 NOTE — TELEPHONE ENCOUNTER
Reason for Call:  Medication or medication refill:    Do you use a [facility]?  Name of the pharmacy and phone number for the current request:  [facility] [address] [facility] [phone]    Name of the medication requested: Zofran    Other request:     Can we leave a detailed message on this number? YES    Phone number patient can be reached at: Home number on file [phone] (home)    Best Time: any    Call taken on 11/15/2017 at 11:04 AM by Azul Lepe

## 2017-11-15 NOTE — TELEPHONE ENCOUNTER
Patient was called with providers message. She was expecting to have results for A1C. Informed her A1C was not done.

## 2017-11-15 NOTE — TELEPHONE ENCOUNTER
Reason for Call:  Request for results:    Name of test or procedure: Multiple    Date of test of procedure: 11/10/17    Location of the test or procedure: [facility]    OK to leave the result message on voice mail or with a family member? YES    Phone number Patient can be reached at:  Home number on file [phone] (home)    Additional comments: Patient is wondering if her test results are back. Please call to discuss    Call taken on 11/15/2017 at 12:56 PM by TRIXIE KRUEGER

## 2017-11-15 NOTE — TELEPHONE ENCOUNTER
Reason for call:  Patient reporting a symptom    Symptom or request: pt would like to have a nurse call her, she states that she has a question.  I think she said about surgery and pain, but not for sure.    Duration (how long have symptoms been present):     Have you been treated for this before? No    Additional comments: none    Phone Number patient can be reached at:  Home number on file [phone] (home)    Best Time:  Any      Can we leave a detailed message on this number:  YES    Call taken on 11/15/2017 at 11:05 AM by Azul Lepe

## 2017-11-16 LAB — COPATH REPORT: NORMAL

## 2017-11-16 RX ORDER — ONDANSETRON 4 MG/1
TABLET, ORALLY DISINTEGRATING ORAL
Qty: 30 TABLET | Refills: 3 | Status: SHIPPED | OUTPATIENT
Start: 2017-11-16 | End: 2018-01-08

## 2017-11-16 RX ORDER — ONDANSETRON 4 MG/1
TABLET, ORALLY DISINTEGRATING ORAL
Qty: 30 TABLET | Refills: 0 | OUTPATIENT
Start: 2017-11-16

## 2017-11-16 NOTE — TELEPHONE ENCOUNTER
Requested Prescriptions   Pending Prescriptions Disp Refills     ondansetron (ZOFRAN-ODT) 4 MG ODT tab 30 tablet 3    There is no refill protocol information for this order

## 2017-11-19 ENCOUNTER — APPOINTMENT (OUTPATIENT)
Dept: CT IMAGING | Facility: CLINIC | Age: 42
End: 2017-11-19
Attending: INTERNAL MEDICINE
Payer: COMMERCIAL

## 2017-11-19 ENCOUNTER — HOSPITAL ENCOUNTER (INPATIENT)
Facility: CLINIC | Age: 42
LOS: 1 days | Discharge: HOME | End: 2017-11-22
Attending: INTERNAL MEDICINE | Admitting: SURGERY
Payer: COMMERCIAL

## 2017-11-19 ENCOUNTER — APPOINTMENT (OUTPATIENT)
Dept: GENERAL RADIOLOGY | Facility: CLINIC | Age: 42
End: 2017-11-19
Attending: INTERNAL MEDICINE
Payer: COMMERCIAL

## 2017-11-19 DIAGNOSIS — R10.30 LOWER ABDOMINAL PAIN: Primary | ICD-10-CM

## 2017-11-19 DIAGNOSIS — R65.20 SEVERE SEPSIS (H): ICD-10-CM

## 2017-11-19 DIAGNOSIS — A41.9 SEVERE SEPSIS (H): ICD-10-CM

## 2017-11-19 DIAGNOSIS — G43.709 CHRONIC MIGRAINE WITHOUT AURA WITHOUT STATUS MIGRAINOSUS, NOT INTRACTABLE: Chronic | ICD-10-CM

## 2017-11-19 LAB
ALBUMIN SERPL-MCNC: 3.3 G/DL (ref 3.4–5)
ALBUMIN UR-MCNC: NEGATIVE MG/DL
ALP SERPL-CCNC: 117 U/L (ref 40–150)
ALT SERPL W P-5'-P-CCNC: 20 U/L (ref 0–50)
ANION GAP SERPL CALCULATED.3IONS-SCNC: 7 MMOL/L (ref 3–14)
APPEARANCE UR: CLEAR
AST SERPL W P-5'-P-CCNC: 15 U/L (ref 0–45)
BASOPHILS # BLD AUTO: 0.1 10E9/L (ref 0–0.2)
BASOPHILS NFR BLD AUTO: 0.3 %
BILIRUB SERPL-MCNC: 0.4 MG/DL (ref 0.2–1.3)
BILIRUB UR QL STRIP: NEGATIVE
BUN SERPL-MCNC: 6 MG/DL (ref 7–30)
CALCIUM SERPL-MCNC: 9 MG/DL (ref 8.5–10.1)
CHLORIDE SERPL-SCNC: 101 MMOL/L (ref 94–109)
CO2 SERPL-SCNC: 26 MMOL/L (ref 20–32)
COLOR UR AUTO: ABNORMAL
CREAT SERPL-MCNC: 0.73 MG/DL (ref 0.52–1.04)
DIFFERENTIAL METHOD BLD: ABNORMAL
EOSINOPHIL # BLD AUTO: 0.2 10E9/L (ref 0–0.7)
EOSINOPHIL NFR BLD AUTO: 0.8 %
ERYTHROCYTE [DISTWIDTH] IN BLOOD BY AUTOMATED COUNT: 13.7 % (ref 10–15)
GFR SERPL CREATININE-BSD FRML MDRD: 88 ML/MIN/1.7M2
GLUCOSE SERPL-MCNC: 179 MG/DL (ref 70–99)
GLUCOSE UR STRIP-MCNC: NEGATIVE MG/DL
HCT VFR BLD AUTO: 38.3 % (ref 35–47)
HGB BLD-MCNC: 12.2 G/DL (ref 11.7–15.7)
HGB UR QL STRIP: NEGATIVE
IMM GRANULOCYTES # BLD: 0.2 10E9/L (ref 0–0.4)
IMM GRANULOCYTES NFR BLD: 0.7 %
KETONES UR STRIP-MCNC: NEGATIVE MG/DL
LACTATE BLD-SCNC: 2.2 MMOL/L (ref 0.7–2)
LACTATE BLD-SCNC: 3.4 MMOL/L (ref 0.7–2)
LEUKOCYTE ESTERASE UR QL STRIP: NEGATIVE
LYMPHOCYTES # BLD AUTO: 1.5 10E9/L (ref 0.8–5.3)
LYMPHOCYTES NFR BLD AUTO: 7.2 %
MCH RBC QN AUTO: 26 PG (ref 26.5–33)
MCHC RBC AUTO-ENTMCNC: 31.9 G/DL (ref 31.5–36.5)
MCV RBC AUTO: 82 FL (ref 78–100)
MONOCYTES # BLD AUTO: 0.6 10E9/L (ref 0–1.3)
MONOCYTES NFR BLD AUTO: 2.8 %
NEUTROPHILS # BLD AUTO: 18.9 10E9/L (ref 1.6–8.3)
NEUTROPHILS NFR BLD AUTO: 88.2 %
NITRATE UR QL: NEGATIVE
NRBC # BLD AUTO: 0 10*3/UL
NRBC BLD AUTO-RTO: 0 /100
PH UR STRIP: 7 PH (ref 5–7)
PLATELET # BLD AUTO: 438 10E9/L (ref 150–450)
POTASSIUM SERPL-SCNC: 4 MMOL/L (ref 3.4–5.3)
PROT SERPL-MCNC: 7.5 G/DL (ref 6.8–8.8)
RBC # BLD AUTO: 4.69 10E12/L (ref 3.8–5.2)
RBC #/AREA URNS AUTO: 1 /HPF (ref 0–2)
SODIUM SERPL-SCNC: 134 MMOL/L (ref 133–144)
SOURCE: ABNORMAL
SP GR UR STRIP: 1.02 (ref 1–1.03)
SQUAMOUS #/AREA URNS AUTO: 4 /HPF (ref 0–1)
UROBILINOGEN UR STRIP-MCNC: 0 MG/DL (ref 0–2)
WBC # BLD AUTO: 21.4 10E9/L (ref 4–11)
WBC #/AREA URNS AUTO: 3 /HPF (ref 0–2)

## 2017-11-19 PROCEDURE — 96376 TX/PRO/DX INJ SAME DRUG ADON: CPT

## 2017-11-19 PROCEDURE — 25000128 H RX IP 250 OP 636: Performed by: INTERNAL MEDICINE

## 2017-11-19 PROCEDURE — 85025 COMPLETE CBC W/AUTO DIFF WBC: CPT | Performed by: INTERNAL MEDICINE

## 2017-11-19 PROCEDURE — 25000132 ZZH RX MED GY IP 250 OP 250 PS 637: Performed by: SURGERY

## 2017-11-19 PROCEDURE — 80053 COMPREHEN METABOLIC PANEL: CPT | Performed by: INTERNAL MEDICINE

## 2017-11-19 PROCEDURE — 36415 COLL VENOUS BLD VENIPUNCTURE: CPT | Performed by: INTERNAL MEDICINE

## 2017-11-19 PROCEDURE — 74177 CT ABD & PELVIS W/CONTRAST: CPT

## 2017-11-19 PROCEDURE — 96365 THER/PROPH/DIAG IV INF INIT: CPT | Mod: 59

## 2017-11-19 PROCEDURE — G0378 HOSPITAL OBSERVATION PER HR: HCPCS

## 2017-11-19 PROCEDURE — 96361 HYDRATE IV INFUSION ADD-ON: CPT

## 2017-11-19 PROCEDURE — 81001 URINALYSIS AUTO W/SCOPE: CPT | Performed by: INTERNAL MEDICINE

## 2017-11-19 PROCEDURE — 99285 EMERGENCY DEPT VISIT HI MDM: CPT | Mod: 25

## 2017-11-19 PROCEDURE — 83605 ASSAY OF LACTIC ACID: CPT | Performed by: INTERNAL MEDICINE

## 2017-11-19 PROCEDURE — 87040 BLOOD CULTURE FOR BACTERIA: CPT | Performed by: INTERNAL MEDICINE

## 2017-11-19 PROCEDURE — 96375 TX/PRO/DX INJ NEW DRUG ADDON: CPT

## 2017-11-19 PROCEDURE — 25000128 H RX IP 250 OP 636: Performed by: SURGERY

## 2017-11-19 PROCEDURE — 71020 XR CHEST 2 VW: CPT

## 2017-11-19 RX ORDER — FLUTICASONE PROPIONATE 50 MCG
2 SPRAY, SUSPENSION (ML) NASAL DAILY
Status: DISCONTINUED | OUTPATIENT
Start: 2017-11-20 | End: 2017-11-22 | Stop reason: HOSPADM

## 2017-11-19 RX ORDER — IOPAMIDOL 755 MG/ML
500 INJECTION, SOLUTION INTRAVASCULAR ONCE
Status: COMPLETED | OUTPATIENT
Start: 2017-11-19 | End: 2017-11-19

## 2017-11-19 RX ORDER — HYDROMORPHONE HYDROCHLORIDE 1 MG/ML
0.5 INJECTION, SOLUTION INTRAMUSCULAR; INTRAVENOUS; SUBCUTANEOUS
Status: COMPLETED | OUTPATIENT
Start: 2017-11-19 | End: 2017-11-19

## 2017-11-19 RX ORDER — ALBUTEROL SULFATE 90 UG/1
1-2 AEROSOL, METERED RESPIRATORY (INHALATION) EVERY 4 HOURS PRN
Status: DISCONTINUED | OUTPATIENT
Start: 2017-11-19 | End: 2017-11-22 | Stop reason: HOSPADM

## 2017-11-19 RX ORDER — PREGABALIN 75 MG/1
150 CAPSULE ORAL 2 TIMES DAILY
Status: DISCONTINUED | OUTPATIENT
Start: 2017-11-19 | End: 2017-11-22 | Stop reason: HOSPADM

## 2017-11-19 RX ORDER — MODAFINIL 100 MG/1
100 TABLET ORAL DAILY
Status: DISCONTINUED | OUTPATIENT
Start: 2017-11-19 | End: 2017-11-22 | Stop reason: HOSPADM

## 2017-11-19 RX ORDER — GUANFACINE 1 MG/1
2 TABLET ORAL AT BEDTIME
Status: DISCONTINUED | OUTPATIENT
Start: 2017-11-19 | End: 2017-11-22 | Stop reason: HOSPADM

## 2017-11-19 RX ORDER — ACETAMINOPHEN 325 MG/1
650 TABLET ORAL EVERY 4 HOURS PRN
Status: DISCONTINUED | OUTPATIENT
Start: 2017-11-19 | End: 2017-11-22

## 2017-11-19 RX ORDER — HYDROMORPHONE HYDROCHLORIDE 1 MG/ML
.3-.5 INJECTION, SOLUTION INTRAMUSCULAR; INTRAVENOUS; SUBCUTANEOUS
Status: DISCONTINUED | OUTPATIENT
Start: 2017-11-19 | End: 2017-11-21

## 2017-11-19 RX ORDER — MECLIZINE HYDROCHLORIDE 25 MG/1
25 TABLET ORAL EVERY 6 HOURS PRN
Status: DISCONTINUED | OUTPATIENT
Start: 2017-11-19 | End: 2017-11-22 | Stop reason: HOSPADM

## 2017-11-19 RX ORDER — PANTOPRAZOLE SODIUM 40 MG/1
40 TABLET, DELAYED RELEASE ORAL
Status: DISCONTINUED | OUTPATIENT
Start: 2017-11-20 | End: 2017-11-22 | Stop reason: HOSPADM

## 2017-11-19 RX ORDER — LITHIUM CARBONATE 450 MG
900 TABLET, EXTENDED RELEASE ORAL AT BEDTIME
Status: DISCONTINUED | OUTPATIENT
Start: 2017-11-19 | End: 2017-11-22 | Stop reason: HOSPADM

## 2017-11-19 RX ORDER — HYDROMORPHONE HYDROCHLORIDE 1 MG/ML
0.5 INJECTION, SOLUTION INTRAMUSCULAR; INTRAVENOUS; SUBCUTANEOUS ONCE
Status: COMPLETED | OUTPATIENT
Start: 2017-11-19 | End: 2017-11-19

## 2017-11-19 RX ORDER — ONDANSETRON 2 MG/ML
4 INJECTION INTRAMUSCULAR; INTRAVENOUS EVERY 6 HOURS PRN
Status: DISCONTINUED | OUTPATIENT
Start: 2017-11-19 | End: 2017-11-22 | Stop reason: HOSPADM

## 2017-11-19 RX ORDER — LEVOTHYROXINE SODIUM 25 UG/1
25 TABLET ORAL DAILY
Status: DISCONTINUED | OUTPATIENT
Start: 2017-11-20 | End: 2017-11-22 | Stop reason: HOSPADM

## 2017-11-19 RX ORDER — ONDANSETRON 4 MG/1
4 TABLET, ORALLY DISINTEGRATING ORAL EVERY 6 HOURS PRN
Status: DISCONTINUED | OUTPATIENT
Start: 2017-11-19 | End: 2017-11-22 | Stop reason: HOSPADM

## 2017-11-19 RX ORDER — NALOXONE HYDROCHLORIDE 0.4 MG/ML
.1-.4 INJECTION, SOLUTION INTRAMUSCULAR; INTRAVENOUS; SUBCUTANEOUS
Status: DISCONTINUED | OUTPATIENT
Start: 2017-11-19 | End: 2017-11-22 | Stop reason: HOSPADM

## 2017-11-19 RX ORDER — CLONAZEPAM 1 MG/1
1 TABLET ORAL 3 TIMES DAILY PRN
Status: DISCONTINUED | OUTPATIENT
Start: 2017-11-19 | End: 2017-11-22 | Stop reason: HOSPADM

## 2017-11-19 RX ORDER — ONDANSETRON 2 MG/ML
4 INJECTION INTRAMUSCULAR; INTRAVENOUS EVERY 30 MIN PRN
Status: DISCONTINUED | OUTPATIENT
Start: 2017-11-19 | End: 2017-11-20

## 2017-11-19 RX ORDER — ACETAMINOPHEN 650 MG/1
650 SUPPOSITORY RECTAL EVERY 4 HOURS PRN
Status: DISCONTINUED | OUTPATIENT
Start: 2017-11-19 | End: 2017-11-22

## 2017-11-19 RX ORDER — ALBUTEROL SULFATE 0.83 MG/ML
1 SOLUTION RESPIRATORY (INHALATION) EVERY 6 HOURS PRN
Status: DISCONTINUED | OUTPATIENT
Start: 2017-11-19 | End: 2017-11-22 | Stop reason: HOSPADM

## 2017-11-19 RX ORDER — SODIUM CHLORIDE, SODIUM LACTATE, POTASSIUM CHLORIDE, CALCIUM CHLORIDE 600; 310; 30; 20 MG/100ML; MG/100ML; MG/100ML; MG/100ML
INJECTION, SOLUTION INTRAVENOUS CONTINUOUS
Status: DISCONTINUED | OUTPATIENT
Start: 2017-11-19 | End: 2017-11-22 | Stop reason: HOSPADM

## 2017-11-19 RX ORDER — CARISOPRODOL 350 MG/1
350 TABLET ORAL 3 TIMES DAILY
Status: DISCONTINUED | OUTPATIENT
Start: 2017-11-19 | End: 2017-11-22 | Stop reason: HOSPADM

## 2017-11-19 RX ORDER — LANOLIN ALCOHOL/MO/W.PET/CERES
6 CREAM (GRAM) TOPICAL AT BEDTIME
Status: DISCONTINUED | OUTPATIENT
Start: 2017-11-19 | End: 2017-11-22 | Stop reason: HOSPADM

## 2017-11-19 RX ADMIN — PREGABALIN 150 MG: 75 CAPSULE ORAL at 20:11

## 2017-11-19 RX ADMIN — SODIUM CHLORIDE, POTASSIUM CHLORIDE, SODIUM LACTATE AND CALCIUM CHLORIDE 2721 ML: 600; 310; 30; 20 INJECTION, SOLUTION INTRAVENOUS at 11:33

## 2017-11-19 RX ADMIN — HYDROMORPHONE HYDROCHLORIDE 0.5 MG: 1 INJECTION, SOLUTION INTRAMUSCULAR; INTRAVENOUS; SUBCUTANEOUS at 11:33

## 2017-11-19 RX ADMIN — HYDROMORPHONE HYDROCHLORIDE 0.5 MG: 1 INJECTION, SOLUTION INTRAMUSCULAR; INTRAVENOUS; SUBCUTANEOUS at 18:14

## 2017-11-19 RX ADMIN — HYDROMORPHONE HYDROCHLORIDE 0.5 MG: 1 INJECTION, SOLUTION INTRAMUSCULAR; INTRAVENOUS; SUBCUTANEOUS at 15:10

## 2017-11-19 RX ADMIN — GUANFACINE 2 MG: 1 TABLET ORAL at 21:17

## 2017-11-19 RX ADMIN — TAZOBACTAM SODIUM AND PIPERACILLIN SODIUM 4.5 G: 500; 4 INJECTION, SOLUTION INTRAVENOUS at 23:59

## 2017-11-19 RX ADMIN — SODIUM CHLORIDE 65 ML: 9 INJECTION, SOLUTION INTRAVENOUS at 11:09

## 2017-11-19 RX ADMIN — LITHIUM CARBONATE 900 MG: 450 TABLET, EXTENDED RELEASE ORAL at 21:17

## 2017-11-19 RX ADMIN — HYDROMORPHONE HYDROCHLORIDE 0.5 MG: 1 INJECTION, SOLUTION INTRAMUSCULAR; INTRAVENOUS; SUBCUTANEOUS at 12:15

## 2017-11-19 RX ADMIN — HYDROMORPHONE HYDROCHLORIDE 0.5 MG: 1 INJECTION, SOLUTION INTRAMUSCULAR; INTRAVENOUS; SUBCUTANEOUS at 10:43

## 2017-11-19 RX ADMIN — MELATONIN TAB 3 MG 6 MG: 3 TAB at 21:17

## 2017-11-19 RX ADMIN — SODIUM CHLORIDE, POTASSIUM CHLORIDE, SODIUM LACTATE AND CALCIUM CHLORIDE: 600; 310; 30; 20 INJECTION, SOLUTION INTRAVENOUS at 14:04

## 2017-11-19 RX ADMIN — CARISOPRODOL 350 MG: 350 TABLET ORAL at 21:17

## 2017-11-19 RX ADMIN — HYDROMORPHONE HYDROCHLORIDE 0.5 MG: 1 INJECTION, SOLUTION INTRAMUSCULAR; INTRAVENOUS; SUBCUTANEOUS at 20:11

## 2017-11-19 RX ADMIN — SODIUM CHLORIDE, POTASSIUM CHLORIDE, SODIUM LACTATE AND CALCIUM CHLORIDE: 600; 310; 30; 20 INJECTION, SOLUTION INTRAVENOUS at 21:23

## 2017-11-19 RX ADMIN — TAZOBACTAM SODIUM AND PIPERACILLIN SODIUM 4.5 G: 500; 4 INJECTION, SOLUTION INTRAVENOUS at 11:33

## 2017-11-19 RX ADMIN — CLONAZEPAM 1 MG: 1 TABLET ORAL at 21:22

## 2017-11-19 RX ADMIN — HYDROMORPHONE HYDROCHLORIDE 0.5 MG: 1 INJECTION, SOLUTION INTRAMUSCULAR; INTRAVENOUS; SUBCUTANEOUS at 13:21

## 2017-11-19 RX ADMIN — ONDANSETRON 4 MG: 2 INJECTION INTRAMUSCULAR; INTRAVENOUS at 10:43

## 2017-11-19 RX ADMIN — TAZOBACTAM SODIUM AND PIPERACILLIN SODIUM 4.5 G: 500; 4 INJECTION, SOLUTION INTRAVENOUS at 18:16

## 2017-11-19 RX ADMIN — IOPAMIDOL 100 ML: 755 INJECTION, SOLUTION INTRAVENOUS at 11:09

## 2017-11-19 RX ADMIN — CARISOPRODOL 350 MG: 350 TABLET ORAL at 16:03

## 2017-11-19 RX ADMIN — HYDROMORPHONE HYDROCHLORIDE 0.5 MG: 1 INJECTION, SOLUTION INTRAMUSCULAR; INTRAVENOUS; SUBCUTANEOUS at 16:18

## 2017-11-19 RX ADMIN — MODAFINIL 100 MG: 100 TABLET ORAL at 16:03

## 2017-11-19 ASSESSMENT — ENCOUNTER SYMPTOMS
NAUSEA: 1
RHINORRHEA: 0
COUGH: 0
FEVER: 1
VOMITING: 0
CHILLS: 1
HEADACHES: 0
DIARRHEA: 0
ABDOMINAL PAIN: 1
SORE THROAT: 0
SHORTNESS OF BREATH: 0

## 2017-11-19 ASSESSMENT — PAIN DESCRIPTION - DESCRIPTORS
DESCRIPTORS: THROBBING;STABBING
DESCRIPTORS: STABBING;RADIATING
DESCRIPTORS: STABBING;THROBBING
DESCRIPTORS: THROBBING;STABBING

## 2017-11-19 NOTE — H&P
[facility]    History and Physical  Surgery     Date of Admission:  11/19/2017  Date of Service (when I saw the patient): 11/19/17    Assessment & Plan   Melody Muñoz is a 42 year old female who presents with sepsis.    No obvious source on labs or imaging but given recent abdominal surgery this is a potential source.     Admission with IV antibiotics. Follow WBC and labs. IV fluid resuscitation. Potentially rescan versus potentially even diagnostic laparoscopy if condition worsens.      Bill Perez MD    This note was created using voice recognition software. Unintended word substitutions or other errors may have occurred.    Code Status   Full Code    Primary Care Physician   Dr. Evaristo Machado    Chief Complaint   Fevers    History is obtained from the patient    History of Present Illness   Melody Muñoz is a 42 year old female who presents with abdominal pain and fevers. She had a laparoscopic appendectomy 5 days ago. There were no problems or deviations from the typical course of surgery based on the operative report. She was able to discharge home. She called today saying that she was having fevers as well as lower abdominal pain and was directed to the emergency room. From there the emergency room doctor was able to examine the patient and ordered testing including a CT scan of the abdomen and pelvis as well as a chest x-ray. Both of these did not show an obvious source of infection. The urinalysis was also not suspicious. She complains of lower abdominal pain which is different from her initial incisional pain and it hurts to move and hurts to walk although she is able to compress her abdomen and was deeply palpating it for me to show me where it hurt. There has been no wound drainage. There was no nausea or vomiting.    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  Past Surgical History:   Procedure Laterality Date     BIOPSY       COLONOSCOPY       GENITOURINARY SURGERY  May/2014    bladder sling     GYN SURGERY      laparoscopy- endometriosis     GYN SURGERY       for twins     LAPAROSCOPIC APPENDECTOMY N/A 2017    Procedure: LAPAROSCOPIC APPENDECTOMY;  LAPAROSCOPIC APPENDECTOMY and resection of epiploic tag;  Surgeon: Roberto Robins MD;  Location: [facility] OR     little finger surgery left  1980     tubal ligation bilateral       wisdom teeth removal         Prior to Admission Medications   Prior to Admission Medications   Prescriptions Last Dose Informant Patient Reported? Taking?   Butalbital-APAP-Caff-Cod -12-30 MG CAPS   No No   Sig: Take 1 capsule by mouth 3 times daily as needed   HYDROcodone-acetaminophen (NORCO) 5-325 MG per tablet   No No   Sig: Take 1-2 tablets by mouth every 4 hours as needed for moderate to severe pain maximum 12 tablet(s) per day   LYRICA 150 MG capsule 2017 at Unknown time  No Yes   Sig: TAKE ONE CAPSULE BY MOUTH TWICE DAILY   MELATONIN PO  Self Yes No   Sig: Take 6 mg by mouth At Bedtime   PROAIR  (90 BASE) MCG/ACT inhaler   No No   Sig: INHALE 1 TO 2 PUFFS INTO THE LUNGS EVERY 4 HOURS AS NEEDED  FOR WHEEZING, SHORTNESS OF BREATH OR DYSPNEA   albuterol (2.5 MG/3ML) 0.083% nebulizer solution   No No   Sig: Take 1 vial (2.5 mg) by nebulization every 6 hours as needed for shortness of breath / dyspnea or wheezing   blood glucose monitoring (LAURA CONTOUR NEXT) test strip   No No   Sig: Use to test blood sugar 2 times daily or as directed.   blood glucose monitoring (LAURA MICROLET) lancets   No No   Sig: Use to test blood sugar 2 times daily or as directed.  Ok to substitute alternative if insurance prefers.   carisoprodol (SOMA) 350 MG tablet 11/18/2017 at Unknown time  No Yes   Sig: TAKE 1 TABLET BY MOUTH THREE TIMES DAILY   clonazePAM (KLONOPIN) 1 MG tablet 11/19/2017 at Unknown time  No Yes   Sig: TAKE 1 TABLET BY MOUTH THREE TIMES DAILY AS NEEDED FOR ANXIETY   fluticasone (FLONASE) 50 MCG/ACT nasal spray 11/19/2017 at Unknown time  No Yes   Sig: Spray 2 sprays into both nostrils daily   fluticasone (FLOVENT HFA) 110 MCG/ACT inhaler   No No   Sig: Inhale 2 puffs into the lungs 2 times daily   guanFACINE (TENEX) 2 MG tablet   No No   Sig: TAKE TWO TABLETS BY MOUTH AT BEDTIME   lamoTRIgine (LAMICTAL) 200 MG tablet   No No   Sig: Take 1 tablet (200 mg) by mouth every morning   levothyroxine (SYNTHROID/LEVOTHROID) 25 MCG tablet 11/19/2017 at Unknown time  No Yes   Sig: TAKE 1 TABLET(25 MCG) BY MOUTH DAILY   lithium (ESKALITH) 450 MG CR tablet 11/18/2017 at Unknown time  No Yes   Sig: TAKE 2 TABLETS BY MOUTH AT BEDTIME   meclizine (ANTIVERT) 25 MG tablet   No No   Sig: Take 1 tablet (25 mg) by mouth every 6 hours as needed for dizziness   metFORMIN (GLUCOPHAGE) 500 MG tablet 11/19/2017 at Unknown time  No Yes   Sig: Take 1 tablet (500 mg) by mouth 3 times daily (with meals)   modafinil (PROVIGIL) 200 MG tablet   No No   Sig: Take 0.5 tablets (100 mg) by mouth daily   ondansetron (ZOFRAN-ODT) 4 MG ODT tab   No No   Sig: DISSOLVE 1 TO 2 TABLETS UNDER THE TONGUE EVERY 8 HOURS AS NEEDED FOR NAUSEA   oxyCODONE IR  (ROXICODONE) 5 MG tablet 11/18/2017 at Unknown time  No Yes   Sig: Take 1-2 tablets (5-10 mg) by mouth every 3 hours as needed for pain or other (Moderate to Severe)   pantoprazole (PROTONIX) 40 MG EC tablet 11/19/2017 at Unknown time  No Yes   Sig: TAKE 1 TABLET(40 MG) BY MOUTH DAILY   tretinoin (RETIN-A) 0.05 % cream   Yes No   Sig: Spread a pea size amount into affected area topically at bedtime.  Use sunscreen SPF>20.      Facility-Administered Medications: None     Allergies   Allergies   Allergen Reactions     Oxycodone GI Disturbance     Nausea.       Social History   I have reviewed this patient's social history and updated it with pertinent information if needed. Melody Muñoz  reports that she has been smoking Cigarettes.  She has a 4.25 pack-year smoking history. She has never used smokeless tobacco. She reports that she does not drink alcohol or use illicit drugs.    Family History   I have reviewed this patient's family history and updated it with pertinent information if needed.   Family History   Problem Relation Age of Onset     Hypertension Mother      HEART DISEASE Father      palpitations     Depression Sister      Anxiety Disorder Sister      HEART DISEASE Maternal Grandmother      CHF     CANCER Maternal Grandfather 72     Pancreatic Cancer     Alzheimer Disease Paternal Grandfather      Bipolar Disorder Other      Autism Spectrum Disorder Other      DIABETES No family hx of      Coronary Artery Disease No family hx of      Hyperlipidemia No family hx of      CEREBROVASCULAR DISEASE No family hx of      Breast Cancer No family hx of      Colon Cancer No family hx of      Prostate Cancer No family hx of      Other Cancer No family hx of      MENTAL ILLNESS No family hx of      Substance Abuse No family hx of      Anesthesia Reaction No family hx of      Asthma No family hx of      OSTEOPOROSIS No family hx of      Genetic Disorder No family hx of      Thyroid Disease No family hx of       Obesity No family hx of      Unknown/Adopted No family hx of        Review of Systems   The 10 point Review of Systems is negative other than noted in the HPI or here.     Physical Exam   Temp: 97.7  F (36.5  C) Temp src: Oral BP: 110/87 Pulse: 90 Heart Rate: 107 Resp: 16 SpO2: 96 % O2 Device: None (Room air)    Vital Signs with Ranges  Temp:  [97.7  F (36.5  C)-98.9  F (37.2  C)] 97.7  F (36.5  C)  Pulse:  [90] 90  Heart Rate:  [107] 107  Resp:  [16-20] 16  BP: (110-153)/(72-99) 110/87  SpO2:  [95 %-97 %] 96 %  200 lbs 0 oz    Constitutional: No acute distress  Eyes: Sclerae anicteric  ENT: Atraumatic  Neck: Supple neck without lymphadenopathy, no thyromegaly  Respiratory: Clear to auscultation bilaterally with normal respiratory effort  Cardiovascular: Regular rate and rhythm, no MRGs  GI: Soft, nondistended; no masses or HSM. There is no obvious cellulitis around the incisions. There is no obvious drainage. The umbilical incision is nontender. There is mild-to-moderate tenderness in the lower abdomen.  Lymph/Hematologic: No pretibial edema  Genitourinary: Deferred  Skin: Normal temperature, turgor and texture; no rash, ulcers or subcutaneous nodules  Musculoskeletal: The old IV sites appear benign without obvious cellulitis or thrombophlebitis from her previous admission.  Neurologic: CN II-XII grossly intact without deficits; sensation intact to light touch over all extremities  Neuropsychiatric: Appropriate affect, alert and oriented to person, place and time     Data   Results for orders placed or performed during the hospital encounter of 11/19/17 (from the past 24 hour(s))   CBC with platelets differential   Result Value Ref Range    WBC 21.4 (H) 4.0 - 11.0 10e9/L    RBC Count 4.69 3.8 - 5.2 10e12/L    Hemoglobin 12.2 11.7 - 15.7 g/dL    Hematocrit 38.3 35.0 - 47.0 %    MCV 82 78 - 100 fl    MCH 26.0 (L) 26.5 - 33.0 pg    MCHC 31.9 31.5 - 36.5 g/dL    RDW 13.7 10.0 - 15.0 %    Platelet Count 438 150 - 450  10e9/L    Diff Method Automated Method     % Neutrophils 88.2 %    % Lymphocytes 7.2 %    % Monocytes 2.8 %    % Eosinophils 0.8 %    % Basophils 0.3 %    % Immature Granulocytes 0.7 %    Nucleated RBCs 0 0 /100    Absolute Neutrophil 18.9 (H) 1.6 - 8.3 10e9/L    Absolute Lymphocytes 1.5 0.8 - 5.3 10e9/L    Absolute Monocytes 0.6 0.0 - 1.3 10e9/L    Absolute Eosinophils 0.2 0.0 - 0.7 10e9/L    Absolute Basophils 0.1 0.0 - 0.2 10e9/L    Abs Immature Granulocytes 0.2 0 - 0.4 10e9/L    Absolute Nucleated RBC 0.0    Comprehensive metabolic panel   Result Value Ref Range    Sodium 134 133 - 144 mmol/L    Potassium 4.0 3.4 - 5.3 mmol/L    Chloride 101 94 - 109 mmol/L    Carbon Dioxide 26 20 - 32 mmol/L    Anion Gap 7 3 - 14 mmol/L    Glucose 179 (H) 70 - 99 mg/dL    Urea Nitrogen 6 (L) 7 - 30 mg/dL    Creatinine 0.73 0.52 - 1.04 mg/dL    GFR Estimate 88 >60 mL/min/1.7m2    GFR Estimate If Black >90 >60 mL/min/1.7m2    Calcium 9.0 8.5 - 10.1 mg/dL    Bilirubin Total 0.4 0.2 - 1.3 mg/dL    Albumin 3.3 (L) 3.4 - 5.0 g/dL    Protein Total 7.5 6.8 - 8.8 g/dL    Alkaline Phosphatase 117 40 - 150 U/L    ALT 20 0 - 50 U/L    AST 15 0 - 45 U/L   Lactic acid whole blood   Result Value Ref Range    Lactic Acid 3.4 (H) 0.7 - 2.0 mmol/L   Blood culture ONE site   Result Value Ref Range    Specimen Description Blood Left Arm     Special Requests Aerobic and anaerobic bottles received     Culture Micro PENDING    CT Abdomen Pelvis w Contrast    Narrative    CT ABDOMEN AND PELVIS WITH CONTRAST   11/19/2017 11:14 AM     HISTORY: Recent lap appy, now abdominal pain and fever.     TECHNIQUE:   100 mL Isovue-370. Radiation dose for this scan was  reduced using automated exposure control, adjustment of the mA and/or  kV according to patient size, or iterative reconstruction technique.    COMPARISON: 11/14/2017.    FINDINGS:   There is mild dependent atelectasis in both lung bases.  Trace pleural effusions. The liver, spleen, pancreas, adrenal  glands,  and kidneys are unremarkable. Postoperative changes in the anterior  abdominal wall, including a small amount of subcutaneous gas.  Postoperative changes of appendectomy. There is no evidence of  abscess. No free intraperitoneal gas. Large and small bowel are normal  in caliber.      Impression    IMPRESSION:   1. Postoperative changes of recent appendectomy. No evidence of  abscess.  2. Trace pleural effusions and bibasilar atelectasis.    REID ACOSTA MD   Blood culture ONE site   Result Value Ref Range    Specimen Description Blood Right Arm     Special Requests Aerobic and anaerobic bottles received     Culture Micro PENDING    UA with Microscopic reflex to Culture   Result Value Ref Range    Color Urine Straw     Appearance Urine Clear     Glucose Urine Negative NEG^Negative mg/dL    Bilirubin Urine Negative NEG^Negative    Ketones Urine Negative NEG^Negative mg/dL    Specific Gravity Urine 1.025 1.003 - 1.035    Blood Urine Negative NEG^Negative    pH Urine 7.0 5.0 - 7.0 pH    Protein Albumin Urine Negative NEG^Negative mg/dL    Urobilinogen mg/dL 0.0 0.0 - 2.0 mg/dL    Nitrite Urine Negative NEG^Negative    Leukocyte Esterase Urine Negative NEG^Negative    Source Midstream Urine     WBC Urine 3 (H) 0 - 2 /HPF    RBC Urine 1 0 - 2 /HPF    Squamous Epithelial /HPF Urine 4 (H) 0 - 1 /HPF   Chest XR,  PA & LAT    Narrative    CHEST TWO VIEWS 11/19/2017 11:59 AM     COMPARISON: Two-view chest x-ray 3/9/2016.    HISTORY: Fever postoperative.     FINDINGS: The cardiac silhouette, pulmonary vasculature, lungs and  pleural spaces are within normal limits.      Impression    IMPRESSION: Clear lungs.    ANT MENDES MD   Lactic acid whole blood   Result Value Ref Range    Lactic Acid 2.2 (H) 0.7 - 2.0 mmol/L

## 2017-11-19 NOTE — ED PROVIDER NOTES
History     Chief Complaint:  Post-op Fever    HPI   Melody Muñoz is a 42 year old female 5 days s/p laparoscopic appendectomy with a history of type 2 diabetes who presents for evaluation of a fever and abdominal pain. The patient reports she was overall recovering well from surgery until she developed a fever of 103.7 and chills in the middle of the night last night. She talked to the on call surgeon this morning and they recommended she come to the ED for evaluation and a CT abdomen. On arrival, the patient also notes she is having lower abdominal pain that is fairly constant, worse in her suprapubic area. She reports she is nauseous as well but denies vomiting. The patient denies any cough, sore throat, urinary symptoms, diarrhea, rash, shortness of breath, vaginal discharge, or leg pain or swelling. Her surgeon was Dr. Robins.     Allergies:  Oxycodone      Medications:    oxyCODONE IR (ROXICODONE) 5 MG tablet  pantoprazole (PROTONIX) 40 MG EC tablet  carisoprodol (SOMA) 350 MG tablet  clonazePAM (KLONOPIN) 1 MG tablet  LYRICA 150 MG capsule  metFORMIN (GLUCOPHAGE) 500 MG tablet  levothyroxine (SYNTHROID/LEVOTHROID) 25 MCG tablet  lithium (ESKALITH) 450 MG CR tablet  ondansetron (ZOFRAN-ODT) 4 MG ODT tab  HYDROcodone-acetaminophen (NORCO) 5-325 MG per tablet  PROAIR  (90 BASE) MCG/ACT inhaler  meclizine (ANTIVERT) 25 MG tablet  fluticasone (FLOVENT HFA) 110 MCG/ACT inhaler  modafinil (PROVIGIL) 200 MG tablet  albuterol (2.5 MG/3ML) 0.083% nebulizer solution  guanFACINE (TENEX) 2 MG tablet  lamoTRIgine (LAMICTAL) 200 MG tablet  MELATONIN PO    Past Medical History:    Type 2 diabetes  Endometriosis  Anxiety  Bipolar disorder  Asthma  Depression  Migraines  Sleep apnea  Substance abuse  Suicidal ideation  Latent TB  Vertigo  History of chronic pain syndrome    Past Surgical History:    Appendectomy,   Laparoscopy for endometriosis   section  Tubal ligation  [place] teeth  "extraction  Finger surgery    Family History:    Hypertension  Palpitations  Anxiety  Depression    Social History:  Smoking status: Current some day smoker  Alcohol use: No  Marital Status:   [2]     Review of Systems   Constitutional: Positive for chills and fever.   HENT: Negative for congestion, rhinorrhea and sore throat.    Respiratory: Negative for cough and shortness of breath.    Gastrointestinal: Positive for abdominal pain and nausea. Negative for diarrhea and vomiting.   Genitourinary: Negative for vaginal bleeding and vaginal discharge.   Skin: Negative for rash.   Neurological: Negative for headaches.   All other systems reviewed and are negative.      Physical Exam   Patient Vitals for the past 24 hrs:   BP Temp Temp src Pulse Heart Rate Resp SpO2 Height Weight   11/19/17 1200 - - - - - - 96 % - -   11/19/17 1147 - - - 90 - 16 - - -   11/19/17 1145 110/87 - - - - - 95 % - -   11/19/17 1130 124/72 - - - - - 96 % - -   11/19/17 1120 - 97.7  F (36.5  C) Oral - - - - - -   11/19/17 1045 134/80 - - - - - 97 % - -   11/19/17 1018 (!) 153/99 - - - - - - - -   11/19/17 1014 - 98.9  F (37.2  C) Temporal - 107 20 97 % 1.575 m (5' 2\") 90.7 kg (200 lb)       Physical Exam   Constitutional: She is cooperative.   HENT:   Right Ear: Tympanic membrane normal.   Left Ear: Tympanic membrane normal.   Mouth/Throat: Oropharynx is clear and moist and mucous membranes are normal.   Eyes: Conjunctivae are normal.   Neck: Normal range of motion.   Cardiovascular: Regular rhythm and normal heart sounds.    Pulmonary/Chest: Effort normal and breath sounds normal.   Abdominal: Soft. Normal appearance and bowel sounds are normal. There is tenderness in the suprapubic area. There is no rebound and no guarding.   Incisions clean, dry intact.  Tender throughout, but most marked suprapubic.   Musculoskeletal: Normal range of motion.   Lymphadenopathy:     She has no cervical adenopathy.   Neurological: She is alert.   Skin: " Skin is warm and dry.   Psychiatric: She has a normal mood and affect.         Emergency Department Course   Imaging:  Radiographic findings were communicated with the patient who voiced understanding of the findings.    CT Abdomen Pelvis w contrast  1. Postoperative changes of recent appendectomy. No evidence of  abscess.  2. Trace pleural effusions and bibasilar atelectasis.  As read by Radiology.    Chest XR 2 views  Clear lungs  As read by Radiology.    Laboratory:  CBC: WBC 21.4(H), o/w  WNL (HGB 12.2, )   CMP: Glucose 179(H), BUN 6(L), Albumin 3.3(L), o/w WNL (Creatinine 0.73)  Lactic acid: 3.4(H)  UA: WBC 3(H), Squamous epithelial 4(H), o/w WNL    Repeat lactate: in process     Blood cultures x2: in process  Urine culture: in process    Interventions:  1043: Zofran 4 mg IV  1043: Dilaudid 0.5 mg IV  1133: Zosyn 4.5 g IV  1133: Lactated ringers bolus 2,721 mL IV  1133: Dilaudid 0.5 mg IV  1215: Dilaudid 0.5 mg IV    Emergency Department Course:  Past medical records, nursing notes, and vitals reviewed.  1023: I performed an exam of the patient and obtained history, as documented above.  IV inserted and blood drawn.    1059: Lactic acid returned at 3.4. Sepsis bundle initiated.   The patient was sent for a CT Abdomen pelvis and chest x-ray while in the emergency department, findings above.    1210: I spoke to Dr. Perez on call for general surgery.     1214: Findings and plan explained to the Patient who consents to admission.   Discussed the patient with Dr. Perez, who will admit the patient to a [facility] bed for further monitoring, evaluation, and treatment.     Impression & Plan      CMS Diagnoses:   The patient has signs of Severe Sepsis as evidenced by:    1. 2 SIRS criteria, AND  2. Suspected infection, AND   3. Organ dysfunction: Lactic Acid >2    Time severe sepsis diagnosis confirmed = 1059 as this was the time when Lactate resulted, and the level was >2      3 Hour Severe Sepsis Bundle  Completion:  1. Initial Lactic Acid Result:   Recent Labs   Lab Test  17   1032  14   1715  11   2342   LACT  3.4*  2.0  0.7     2. Blood Cultures before Antibiotics: Yes  3. Broad Spectrum Antibiotics Administered: Yes     Anti-infectives     Start     Dose/Rate Route Frequency Ordered Stop    17 1055  piperacillin-tazobactam (ZOSYN) intermittent infusion 4.5 g      4.5 g  200 mL/hr over 30 Minutes Intravenous ONCE 17 1054          4. 2,721 ml of IV fluids.      6 Hour Severe Sepsis Bundle Completion:  1. Repeat Lactic Acid Level: 2.2               Medical Decision Makin year old woman status post recent laparoscopic appendectomy presents having had high fever with chills and sweats in the night. She is afebrile here but has marked leukocytosis as well as elevated lactate consistent with bacterial sepsis. Her CT does not show any evidence of intraabdominal abscess but given her symptoms, I still suspect the abdomen as the source of infection. No symptoms of pneumonia and no findings on chest x-ray. Urine has a minimal elevation of white blood cells. Blood and urine cultures were ordered and pending. Initial Zosyn has been given. Repeat lactate is ordered and pending. As noted, I spoke with Dr. Perez of surgical consultants. She will be admitted to a medical bed for further evaluation and management.     Diagnosis:    ICD-10-CM   1. Severe sepsis (H) A41.9    R65.20     Disposition: Admitted    Trisha Hernandezmore  2017   [facility] EMERGENCY DEPARTMENT    I, Trisha Robledo, am serving as a scribe at 10:23 AM on 2017 to document services personally performed by Sally Davis MD based on my observations and the provider's statements to me.        Sally Davis MD  17 [phone]

## 2017-11-19 NOTE — IP AVS SNAPSHOT
[place] [address] [facility]    [address]    [facility] [address]    Phone:  [phone]    Fax:  [phone]                                       After Visit Summary   11/19/2017    Melody Muñoz    MRN: [id number]           After Visit Summary Signature Page     I have received my discharge instructions, and my questions have been answered. I have discussed any challenges I see with this plan with the nurse or doctor.    ..........................................................................................................................................  Patient/Patient Representative Signature      ..........................................................................................................................................  Patient Representative Print Name and Relationship to Patient    ..................................................               ................................................  Date                                            Time    ..........................................................................................................................................  Reviewed by Signature/Title    ...................................................              ..............................................  Date                                                            Time

## 2017-11-19 NOTE — PHARMACY-ADMISSION MEDICATION HISTORY
Admission medication history interview status for this patient is complete. See [facility] admission navigator for allergy information, prior to admission medications and immunization status.     Medication history interview source(s):Patient  Medication history resources (including written lists, pill bottles, clinic record):None  [facility]    Changes made to PTA medication list:  Added: NA  Deleted: Retin-A cream  Changed: NA    Actions taken by pharmacist (provider contacted, etc):None     Additional medication history information:  -Patient stated that she is not allergic to any medications and please take off the oxycodone allergy    Medication reconciliation/reorder completed by provider prior to medication history? Yes    Do you take OTC medications (eg tylenol, ibuprofen, fish oil, eye/ear drops, etc)? Y(Y/N)    For patients on insulin therapy: N (Y/N)  Lantus/levemir/NPH/Mix 70/30 dose:   (Y/N) (see Med list for doses)   Sliding scale Novolog Y/N  If Yes, do you have a baseline novolog pre-meal dose:  units with meals  Patients eat three meals a day:   Y/N    How many episodes of hypoglycemia do you have per week: _______  How many missed doses do you have per week: ______  How many times do you check your blood glucose per day: _______   Any Barriers to therapy - Be specific :  cost of medications, comfortable with giving injections (if applicable), comfortable and confident with current diabetes regimen: Y/N ______________      Prior to Admission medications    Medication Sig Last Dose Taking? Auth Provider   ondansetron (ZOFRAN-ODT) 4 MG ODT tab DISSOLVE 1 TO 2 TABLETS UNDER THE TONGUE EVERY 8 HOURS AS NEEDED FOR NAUSEA 11/19/2017 at Unknown time Yes Evaristo Machado MD   HYDROcodone-acetaminophen (NORCO) 5-325 MG per tablet Take 1-2 tablets by mouth every 4 hours as needed for moderate to severe pain maximum 12 tablet(s) per day 11/19/2017 at AM Yes Abhishek Garcia  KIANNA Peñaloza   oxyCODONE IR (ROXICODONE) 5 MG tablet Take 1-2 tablets (5-10 mg) by mouth every 3 hours as needed for pain or other (Moderate to Severe) 11/18/2017 at Unknown time Yes Roberto Robins MD   pantoprazole (PROTONIX) 40 MG EC tablet TAKE 1 TABLET(40 MG) BY MOUTH DAILY 11/19/2017 at Unknown time Yes Evaristo Machado MD   PROAIR  (90 BASE) MCG/ACT inhaler INHALE 1 TO 2 PUFFS INTO THE LUNGS EVERY 4 HOURS AS NEEDED FOR WHEEZING, SHORTNESS OF BREATH OR DYSPNEA Past Week at Unknown time Yes Evaristo Machado MD   carisoprodol (SOMA) 350 MG tablet TAKE 1 TABLET BY MOUTH THREE TIMES DAILY 11/18/2017 at Unknown time Yes Evaristo Machado MD   clonazePAM (KLONOPIN) 1 MG tablet TAKE 1 TABLET BY MOUTH THREE TIMES DAILY AS NEEDED FOR ANXIETY 11/19/2017 at Unknown time Yes Evaristo Machado MD   LYRICA 150 MG capsule TAKE ONE CAPSULE BY MOUTH TWICE DAILY 11/19/2017 at Unknown time Yes Evaristo Machado MD   metFORMIN (GLUCOPHAGE) 500 MG tablet Take 1 tablet (500 mg) by mouth 3 times daily (with meals) 11/19/2017 at Unknown time Yes Evaristo Machado MD   levothyroxine (SYNTHROID/LEVOTHROID) 25 MCG tablet TAKE 1 TABLET(25 MCG) BY MOUTH DAILY 11/19/2017 at Unknown time Yes Evaristo Machado MD   fluticasone (FLOVENT HFA) 110 MCG/ACT inhaler Inhale 2 puffs into the lungs 2 times daily 11/19/2017 at Unknown time Yes Evaristo Machado MD   modafinil (PROVIGIL) 200 MG tablet Take 0.5 tablets (100 mg) by mouth daily 11/19/2017 at Unknown time Yes Evaristo Machado MD   albuterol (2.5 MG/3ML) 0.083% nebulizer solution Take 1 vial (2.5 mg) by nebulization every 6 hours as needed for shortness of breath / dyspnea or wheezing Past Month at Unknown time Yes Renee Winn MD   guanFACINE (TENEX) 2 MG tablet TAKE TWO TABLETS BY MOUTH AT BEDTIME 11/18/2017 at Unknown time Yes Evaristo Machado MD   lithium (ESKALITH) 450 MG CR tablet TAKE 2 TABLETS BY  MOUTH AT BEDTIME 11/18/2017 at Unknown time Yes Evaristo Machado MD   lamoTRIgine (LAMICTAL) 200 MG tablet Take 1 tablet (200 mg) by mouth every morning 11/19/2017 at Unknown time Yes Kinsey Husain MD   fluticasone (FLONASE) 50 MCG/ACT nasal spray Spray 2 sprays into both nostrils daily 11/19/2017 at Unknown time Yes Kinsey Husain MD   MELATONIN PO Take 6 mg by mouth At Bedtime 11/18/2017 at Unknown time Yes Reported, Patient   Butalbital-APAP-Caff-Cod -68-30 MG CAPS Take 1 capsule by mouth 3 times daily as needed More than a month at Unknown time  Renee Winn MD   blood glucose monitoring (LAURA CONTOUR NEXT) test strip Use to test blood sugar 2 times daily or as directed. [place] [facility]  Evaristo Machado MD   blood glucose monitoring (LAURA MICROLET) lancets Use to test blood sugar 2 times daily or as directed.  Ok to substitute alternative if insurance prefers. [place] [facility]  Evaristo Machado MD   meclizine (ANTIVERT) 25 MG tablet Take 1 tablet (25 mg) by mouth every 6 hours as needed for dizziness More than a month at Unknown time  Evaristo Machado MD

## 2017-11-19 NOTE — ED NOTES
Pt had appendectomy on Tuesday.  She developed fever during the night to 103.7.  She is having lower abdominal pain along with nausea.

## 2017-11-19 NOTE — PROGRESS NOTES
ROOM # 502    Living Situation (if not independent, order SW consult): Independent  Facility name: N/A  : Gee (spouse)    Activity level at baseline: Independent  Activity level on admit: SBA      Patient registered to observation; given Patient Bill of Rights; given the opportunity to ask questions about observation status and their plan of care.  Patient has been oriented to the observation room, bathroom and call light is in place.    Discussed discharge goals and expectations with patient/family.

## 2017-11-19 NOTE — PROGRESS NOTES
"  PRIMARY DIAGNOSIS: \"GENERIC\" NURSING  OUTPATIENT/OBSERVATION GOALS TO BE MET BEFORE DISCHARGE:  1. ADLs back to baseline: Yes    2. Activity and level of assistance: Up with standby assistance.    3. Pain status: Improved but still requiring IV narcotics.    4. Return to near baseline physical activity: Yes     VSS. Afebrile. Abdominal pain managed with IV dilaudid. Lap sites x3 with steri strips - no redness or swelling noted. NPO with ice chips. Will continue to monitor.    Please review provider order for any additional goals.   Nurse to notify provider when observation goals have been met and patient is ready for discharge.  "

## 2017-11-19 NOTE — ED NOTES
[facility]  ED Nurse Handoff Report    Melody Muñoz is a 42 year old female   ED Chief complaint: Post-op Problem  . ED Diagnosis:   Final diagnoses:   Severe sepsis (H)     Allergies:   Allergies   Allergen Reactions     Oxycodone GI Disturbance     Nausea.       Code Status: Full code on 6/2/15  Activity level - Baseline/Home:  Independent. Activity Level - Current:   Stand with Assist. Lift room needed: No. Bariatric: No   Needed: No   Isolation: No. Infection: Not Applicable.     Vital Signs:   Vitals:    11/19/17 1130 11/19/17 1145 11/19/17 1147 11/19/17 1200   BP: 124/72 110/87     Pulse:   90    Resp:   16    Temp:       TempSrc:       SpO2: 96% 95%  96%   Weight:       Height:           Cardiac Rhythm:  ,      Pain level: 0-10 Pain Scale: 5  Patient confused: No. Patient Falls Risk: No.   Elimination Status: Has voided   Patient Report - Initial Complaint: Recent appendectomy. Comes in with lower abdominal pain and reported fever. Focused Assessment: Lower abdominal pain, some nausea.   Tests Performed: CT, CXR, labs. Abnormal Results:   Labs Ordered and Resulted from Time of ED Arrival Up to the Time of Departure from the ED   CBC WITH PLATELETS DIFFERENTIAL - Abnormal; Notable for the following:        Result Value    WBC 21.4 (*)     MCH 26.0 (*)     Absolute Neutrophil 18.9 (*)     All other components within normal limits   COMPREHENSIVE METABOLIC PANEL - Abnormal; Notable for the following:     Glucose 179 (*)     Urea Nitrogen 6 (*)     Albumin 3.3 (*)     All other components within normal limits   LACTIC ACID WHOLE BLOOD - Abnormal; Notable for the following:     Lactic Acid 3.4 (*)     All other components within normal limits   ROUTINE UA WITH MICROSCOPIC REFLEX TO CULTURE - Abnormal; Notable for the following:     WBC Urine 3 (*)     Squamous Epithelial /HPF Urine 4 (*)     All other components within normal limits   LACTIC ACID WHOLE BLOOD   FREE WATER   BLOOD  CULTURE   BLOOD CULTURE     Chest XR,  PA & LAT   Final Result   IMPRESSION: Clear lungs.      NAT MENDES MD      CT Abdomen Pelvis w Contrast   Final Result   IMPRESSION:    1. Postoperative changes of recent appendectomy. No evidence of   abscess.   2. Trace pleural effusions and bibasilar atelectasis.      REID ACOSTA MD          Treatments provided: LR fluid boluses, zosyn, dilaudid  Family Comments: at bedside  OBS brochure/video discussed/provided to patient:  N/A  ED Medications:   Medications   ondansetron (ZOFRAN) injection 4 mg (4 mg Intravenous Given 11/19/17 1043)   lactated ringers infusion (not administered)   HYDROmorphone (PF) (DILAUDID) injection 0.5 mg (0.5 mg Intravenous Given 11/19/17 1215)   piperacillin-tazobactam (ZOSYN) intermittent infusion 4.5 g (0 g Intravenous Stopped 11/19/17 1214)   lactated ringers BOLUS 2,721 mL (2,721 mLs Intravenous New Bag 11/19/17 1133)   0.9% sodium chloride BOLUS (0 mLs Intravenous Stopped 11/19/17 1110)   iopamidol (ISOVUE-370) solution 500 mL (100 mLs Intravenous Given 11/19/17 1109)     Drips infusing:  No  For the majority of the shift, the patient's behavior Green. Interventions performed were NA.     Severe Sepsis OR Septic Shock Diagnosis Present:   Yes    Per the ED Provider, Time Zero for severe sepsis or septic shock is:  1059    3 Hour Severe Sepsis Bundle Completion:  1. Initial Lactic Acid Result:   Recent Labs   Lab Test  11/19/17   1032  03/17/14   1715  01/28/11   2342   LACT  3.4*  2.0  0.7     2. Blood Cultures before Antibiotics: Yes  3. Broad Spectrum Antibiotics Administered:     Anti-infectives     None        4. 2721 ml of IV fluids have been given so far      6 Hour Severe Sepsis Bundle Completion:    1. Repeat Lactic Acid Level: will repeat before going up to the floor  2. Patient currently on Vasopressors =  No        ED Nurse Name/Phone Number: Angela Bucio,   12:47 PM    RECEIVING UNIT ED HANDOFF REVIEW    Above ED Nurse  Handoff Report was reviewed: Yes  Reviewed by: Daniella Green on November 19, 2017 at 1:20 PM

## 2017-11-19 NOTE — IP AVS SNAPSHOT
MRN:[id number]                      After Visit Summary   11/19/2017    Melody Muñoz    MRN: [id number]           Thank you!     Thank you for choosing [facility] for your care. Our goal is always to provide you with excellent care. Hearing back from our patients is one way we can continue to improve our services. Please take a few minutes to complete the written survey that you may receive in the mail after you visit. If you would like to speak to someone directly about your visit please contact Patient Relations at [phone]. Thank you!          Patient Information     Date Of Birth          1975        Designated Caregiver       Most Recent Value    Caregiver    Will someone help with your care after discharge? yes    Name of designated caregiver Gee ()    Phone number of caregiver [phone]    Caregiver address same as patient      About your hospital stay     You were admitted on:  November 19, 2017 You last received care in the:  [place] [address] [facility]    You were discharged on:  November 22, 2017       Who to Call     For medical emergencies, please call 911.  For non-urgent questions about your medical care, please call your primary care provider or clinic, [phone]          Attending Provider     Provider Specialty    Sally Davis MD Emergency Medicine    Roberto Robins MD General Surgery       Primary Care Provider Office Phone # Fax #    Evaristo Orestes Machado [phone]      Further instructions from your care team       HOME CARE FOLLOWING APPENDECTOMY  MICHAEL Xiong E. Gavin, N. Guttormson, D. Maurer, LUPIS Grajeda    INCISIONAL CARE:  Replace the bandage over your incision (or incisions) until all drainage stops, or if more comfortable to have in place.  If present, leave the steri-strips (white paper tapes) in place for 14 days after surgery.  If you have staples in your  incision at the time of discharge, they will be removed at your follow-up appointment.  If Dermabond (a type of skin glue) is present, leave in place until it wears/flakes off.     BATHING:  Avoid baths for 1 week after surgery.  Showers are okay.  You may wash your hair at any time.  Gently pat your incision dry after bathing.    ACTIVITY:  Light Activity -- you may immediately be up and about as tolerated.  Driving -- you may drive when comfortable and off narcotic pain medications.  Light Work -- resume when comfortable off pain medications.  (If you can drive, you probably can work.)  Strenuous Work/Activity -- limit lifting to 20 pounds for 1 week.  Progressively increase with time.  Active Sports (running, biking, etc.) -- cautiously resume after 2 weeks.    DISCOMFORT:  Use pain medications as prescribed by your surgeon.  Take the pain medication with some food, when possible, to minimize side effects.  Intermittent use of ice packs at the incision sites may help during the first 48 hours.  Expect gradual improvement.    DIET:  No restrictions.  Drink plenty of fluids.  While taking pain medications, increase dietary fiber or add a fiber supplementation like Metamucil or Citrucel to help prevent constipation - a possible side effect of pain medications.    NAUSEA:  If nauseated from the anesthetic/pain meds; rest in bed, get up cautiously with assistance, and drink clear liquids (juice, tea, broth).    RETURN APPOINTMENT:  Schedule a follow-up visit 2-3 weeks post-op.  Office Phone:  [phone]     CONTACT US IF THE FOLLOWING DEVELOPS:   1. A fever that is above 101     2. If there is a large amount of drainage, bleeding, or swelling.   3. Severe pain that is not relieved by your prescription.   4. Drainage that is thick, cloudy, yellow, green or white.   5. Any other questions not answered by  Frequently Asked Questions  sheet.      FREQUENTLY ASKED QUESTIONS:    Q:  How should my incision look?    A:   Normally your incision will appear slightly swollen with light redness directly along the incision itself as it heals.  It may feel like a bump or ridge as the healing/scarring happens, and over time (3-4 months) this bump or ridge feeling should slowly go away.  In general, clear or pink watery drainage can be normal at first as your incision heals, but should decrease over time.    Q:  How do I know if my incision is infected?  A:  Look at your incision for signs of infection, like redness around the incision spreading to surrounding skin, or drainage of cloudy or foul-smelling drainage.  If you feel warm, check your temperature to see if you are running a fever.    **If any of these things occur, please notify the nurse at our office.  We may need you to come into the office for an incision check.      Q:  How do I take care of my incision?  A:  If you have a dressing in place - Starting the day after surgery, replace the dressing 1-2 times a day until there is no further drainage from the incision.  At that time, a dressing is no longer needed.  Try to minimize tape on the skin if irritation is occurring at the tape sites.  If you have significant irritation from tape on the skin, please call the office to discuss other method of dressing your incision.    Small pieces of tape called  steri-strips  may be present directly overlying your incision; these may be removed 10 days after surgery unless otherwise specified by your surgeon.  If these tapes start to loosen at the ends, you may trim them back until they fall off or are removed.    A:  If you had  Dermabond  tissue glue used as a dressing (this causes your incision to look shiny with a clear covering over it) - This type of dressing wears off with time and does not require more dressings over the top unless it is draining around the glue as it wears off.  Do not apply ointments or lotions over the incisions until the glue has completely worn off.    Q:   There is a piece of tape or a sticky  lead  still on my skin.  Can I remove this?  A:  Sometimes the sticky  leads  used for monitoring during surgery or for evaluation in the emergency department are not all removed while you are in the hospital.  These sometimes have a tab or metal dot on them.  You can easily remove these on your own, like taking off a band-aid.  If there is a gel substance under the  lead , simply wipe/clean it off with a washcloth or paper towel.      Q:  What can I do to minimize constipation (very hard stools, or lack of stools)?  A:  Stay well hydrated.  Increase your dietary fiber intake or take a fiber supplement -with plenty of water.  Walk around frequently.  You may consider an over-the-counter stool-softener.  Your Pharmacist can assist you with choosing one that is stocked at your pharmacy.  Constipation is also one of the most common side effects of pain medication.  If you are using pain medication, be pro-active and try to PREVENT problems with constipation by taking the steps above BEFORE constipation becomes a problem.    Q:  What do I do if I need more pain medications?  A:  Call the office to receive refills.  Be aware that certain pain meds cannot be called into a pharmacy and actually require a paper prescription.  A change may be made in your pain med as you progress thru your recovery period or if you have side effects to certain meds.    --Pain meds are NOT refilled after 5pm on weekdays, and NOT AT ALL on the weekends, so please look ahead to prevent problems.      Q:  Why am I having a hard time sleeping now that I am at home?  A:  Many medications you receive while you are in the hospital can impact your sleep for a number of days after your surgery/hospitalization.  Decreased level of activity and naps during the day may also make sleeping at night difficult.  Try to minimize day-time naps, and get up frequently during the day to walk around your home during your  recovery time.  Sleep aides may be of some help, but are not recommended for long-term use.      Q:  I am having some back discomfort.  What should I do?  A:  This may be related to certain positioning that was required for your surgery, extended periods of time in bed, or other changes in your overall activity level.  You may try ice, heat, acetaminophen, or ibuprofen to treat this temporarily.  Note that many pain medications have acetaminophen in them and would state this on the prescription bottle.  Be sure not to exceed the maximum of 4000mg per day of acetaminophen.     **If the pain you are having does not resolve, is severe, or is a flare of back pain you have had on other occasions prior to surgery, please contact your primary physician for further recommendations or for an appointment to be examined at their office.    Q:  Why am I having headaches?  A:  Headaches can be caused by many things:  caffeine withdrawal, use of pain meds, dehydration, high blood pressure, lack of sleep, over-activity/exhaustion, flare-up of usual migraine headaches.  If you feel this is related to muscle tension (a band-like feeling around the head, or a pressure at the low-back of the head) you may try ice or heat to this area.  You may need to drink more fluids (try electrolyte drink like Gatorade), rest, or take your usual migraine medications.   **If your headaches do not resolve, worsen, are accompanied by other symptoms, or if your blood pressure is high, please call your primary physician for recommendation and/or examination.    Q:  I am unable to urinate.  What do I do?  A:  A small percentage of people can have difficulty urinating initially after surgery.  This includes being able to urinate only a very small amount at a time and feeling discomfort or pressure in the very low abdomen.  This is called  urinary retention , and is actually an urgent situation.  Proceed to your nearest Emergency department for evaluation  "(not an Urgent Care Center).  Sometimes the bladder does not work correctly after certain medications you receive during surgery, or related to certain procedures.  You may need to have a catheter placed until your bladder recovers.  When planning to go to an Emergency department, it may help to call the ER to let them know you are coming in for this problem after a surgery.  This may help you get in quicker to be evaluated.  **If you have symptoms of a urinary tract infection, please contact your primary physician for the proper evaluation and treatment.          If you have other questions, please call the office Monday thru Friday between 8am and 5pm to discuss with the nurse or physician assistant.  #[phone]    There is a surgeon ON CALL on weekday evenings and over the weekend in case of urgent need only, and may be contacted at the same number.    If you are having an emergency, call 911 or proceed to your nearest emergency department.            Pending Results     Date and Time Order Name Status Description    11/19/2017 1107 Blood culture ONE site Preliminary     11/19/2017 1055 Blood culture ONE site Preliminary             Statement of Approval     Ordered          11/22/17 4766  I have reviewed and agree with all the recommendations and orders detailed in this document.  EFFECTIVE NOW     Approved and electronically signed by:  Laura Marion PA-C             Admission Information     Date & Time Provider Department Dept. Phone    11/19/2017 Roberto Robins MD [place] [address] [facility] [phone]      Your Vitals Were     Blood Pressure Pulse Temperature Respirations Height Weight    121/78 (BP Location: Left arm) 86 98.6  F (37  C) (Oral) 16 1.575 m (5' 2\") 90.7 kg (200 lb)    Last Period Pulse Oximetry BMI (Body Mass Index)             (LMP Unknown) 93% 36.58 kg/m2         MyChart Information     [facility] gives you secure access to your electronic health record. If you see a " primary care provider, you can also send messages to your care team and make appointments. If you have questions, please call your primary care clinic.  If you do not have a primary care provider, please call [phone] and they will assist you.        Care EveryWhere ID     This is your Care EveryWhere ID. This could be used by other organizations to access your [place] medical records  [id number]        Equal Access to Services     OCTAVIO TRUJILLO : Hadii aad ku hadasho Soomaali, waaxda luqadaha, qaybta kaalmada adeegyada, waxay ashleein haycitlalyn bayron pauline nilton german. So [facility] [phone].    ATENCIÓN: Si habla melvi, tiene a mendoza disposición servicios gratuitos de asistencia lingüística. Joan al [phone].    We comply with applicable federal civil rights laws and [place] laws. We do not discriminate on the basis of race, color, national origin, age, disability, sex, sexual orientation, or gender identity.               Review of your medicines      START taking        Dose / Directions    amoxicillin-clavulanate 875-125 MG per tablet   Commonly known as:  AUGMENTIN   Used for:  Severe sepsis (H)        Dose:  1 tablet   Take 1 tablet by mouth 2 times daily for 10 days   Quantity:  20 tablet   Refills:  0         CONTINUE these medicines which may have CHANGED, or have new prescriptions. If we are uncertain of the size of tablets/capsules you have at home, strength may be listed as something that might have changed.        Dose / Directions    * Butalbital-APAP-Caff-Cod -89-30 MG Caps   This may have changed:  Another medication with the same name was added. Make sure you understand how and when to take each.   Used for:  Chronic migraine without aura without status migrainosus, not intractable        Dose:  1 capsule   Take 1 capsule by mouth 3 times daily as needed   Quantity:  15 capsule   Refills:  0       * butalbital-APAP-caffeine-codeine -91-30 MG per capsule   Commonly known as:  FIORICET  WITH codeine   This may have changed:  You were already taking a medication with the same name, and this prescription was added. Make sure you understand how and when to take each.   Used for:  Chronic migraine without aura without status migrainosus, not intractable        Dose:  1 capsule   Take 1 capsule by mouth every 8 hours   Quantity:  6 capsule   Refills:  0       oxyCODONE IR 5 MG tablet   Commonly known as:  ROXICODONE   This may have changed:    - reasons to take this  - additional instructions   Used for:  Severe sepsis (H)        Dose:  5-10 mg   Take 1-2 tablets (5-10 mg) by mouth every 3 hours as needed for severe pain Decrease dosing as pain improves.  Take with food to minimize side effects.   Quantity:  30 tablet   Refills:  0       * Notice:  This list has 2 medication(s) that are the same as other medications prescribed for you. Read the directions carefully, and ask your doctor or other care provider to review them with you.      CONTINUE these medicines which have NOT CHANGED        Dose / Directions    * albuterol (2.5 MG/3ML) 0.083% neb solution   Used for:  Mild persistent asthma without complication        Dose:  1 vial   Take 1 vial (2.5 mg) by nebulization every 6 hours as needed for shortness of breath / dyspnea or wheezing   Quantity:  25 vial   Refills:  0       * PROAIR  (90 BASE) MCG/ACT Inhaler   Used for:  Intermittent asthma, uncomplicated   Generic drug:  albuterol        INHALE 1 TO 2 PUFFS INTO THE LUNGS EVERY 4 HOURS AS NEEDED FOR WHEEZING, SHORTNESS OF BREATH OR DYSPNEA   Quantity:  51 g   Refills:  2       blood glucose monitoring lancets   Used for:  Diabetes mellitus without complication (H)        Use to test blood sugar 2 times daily or as directed.  Ok to substitute alternative if insurance prefers.   Quantity:  1 Box   Refills:  3       blood glucose monitoring test strip   Commonly known as:  LAURA CONTOUR NEXT   Used for:  Diabetes mellitus without  complication (H)        Use to test blood sugar 2 times daily or as directed.   Quantity:  200 each   Refills:  12       carisoprodol 350 MG tablet   Commonly known as:  SOMA   Used for:  Chronic pain syndrome        TAKE 1 TABLET BY MOUTH THREE TIMES DAILY   Quantity:  90 tablet   Refills:  5       clonazePAM 1 MG tablet   Commonly known as:  klonoPIN   Used for:  Generalized anxiety disorder        TAKE 1 TABLET BY MOUTH THREE TIMES DAILY AS NEEDED FOR ANXIETY   Quantity:  90 tablet   Refills:  0       fluticasone 110 MCG/ACT Inhaler   Commonly known as:  FLOVENT HFA   Used for:  Mild persistent asthma without complication        Dose:  2 puff   Inhale 2 puffs into the lungs 2 times daily   Quantity:  2 Inhaler   Refills:  3       fluticasone 50 MCG/ACT spray   Commonly known as:  FLONASE   Used for:  Chronic maxillary sinusitis        Dose:  2 spray   Spray 2 sprays into both nostrils daily   Quantity:  16 g   Refills:  11       guanFACINE 2 MG tablet   Commonly known as:  TENEX   Used for:  Nightmares        TAKE TWO TABLETS BY MOUTH AT BEDTIME   Quantity:  180 tablet   Refills:  3       lamoTRIgine 200 MG tablet   Commonly known as:  LAMICTAL   Used for:  Bipolar 1 disorder (H)        Dose:  200 mg   Take 1 tablet (200 mg) by mouth every morning   Quantity:  90 tablet   Refills:  4       levothyroxine 25 MCG tablet   Commonly known as:  SYNTHROID/LEVOTHROID   Used for:  Acquired hypothyroidism        TAKE 1 TABLET(25 MCG) BY MOUTH DAILY   Quantity:  90 tablet   Refills:  3       lithium 450 MG CR tablet   Commonly known as:  ESKALITH   Used for:  Bipolar disorder with psychotic features (H)        TAKE 2 TABLETS BY MOUTH AT BEDTIME   Quantity:  60 tablet   Refills:  5       LYRICA 150 MG capsule   Used for:  Chronic pain syndrome   Generic drug:  pregabalin        TAKE ONE CAPSULE BY MOUTH TWICE DAILY   Quantity:  60 capsule   Refills:  5       meclizine 25 MG tablet   Commonly known as:  ANTIVERT   Used for:   Dizziness        Dose:  25 mg   Take 1 tablet (25 mg) by mouth every 6 hours as needed for dizziness   Quantity:  30 tablet   Refills:  3       MELATONIN PO        Dose:  6 mg   Take 6 mg by mouth At Bedtime   Refills:  0       metFORMIN 500 MG tablet   Commonly known as:  GLUCOPHAGE   Used for:  Type 2 diabetes mellitus without complication, without long-term current use of insulin (H)        Dose:  500 mg   Take 1 tablet (500 mg) by mouth 3 times daily (with meals)   Quantity:  90 tablet   Refills:  1       modafinil 200 MG tablet   Commonly known as:  PROVIGIL   Used for:  Chronic fatigue        Dose:  100 mg   Take 0.5 tablets (100 mg) by mouth daily   Quantity:  30 tablet   Refills:  0       ondansetron 4 MG ODT tab   Commonly known as:  ZOFRAN-ODT   Used for:  Nausea        DISSOLVE 1 TO 2 TABLETS UNDER THE TONGUE EVERY 8 HOURS AS NEEDED FOR NAUSEA   Quantity:  30 tablet   Refills:  3       pantoprazole 40 MG EC tablet   Commonly known as:  PROTONIX   Used for:  Other chronic gastritis without hemorrhage        TAKE 1 TABLET(40 MG) BY MOUTH DAILY   Quantity:  90 tablet   Refills:  1       * Notice:  This list has 2 medication(s) that are the same as other medications prescribed for you. Read the directions carefully, and ask your doctor or other care provider to review them with you.      STOP taking     HYDROcodone-acetaminophen 5-325 MG per tablet   Commonly known as:  NORCO                Where to get your medicines      These medications were sent to [facility] [address] [place] [id number] [facility] [id number]     Phone:  [phone]     amoxicillin-clavulanate 875-125 MG per tablet         Some of these will need a paper prescription and others can be bought over the counter. Ask your nurse if you have questions.     Bring a paper prescription for each of these medications     butalbital-APAP-caffeine-codeine -63-30 MG per capsule    oxyCODONE  IR 5 MG tablet               ANTIBIOTIC INSTRUCTION     You've Been Prescribed an Antibiotic - Now What?  Your healthcare team thinks that you or your loved one might have an infection. Some infections can be treated with antibiotics, which are powerful, life-saving drugs. Like all medications, antibiotics have side effects and should only be used when necessary. There are some important things you should know about your antibiotic treatment.      Your healthcare team may run tests before you start taking an antibiotic.    Your team may take samples (e.g., from your blood, urine or other areas) to run tests to look for bacteria. These test can be important to determine if you need an antibiotic at all and, if you do, which antibiotic will work best.      Within a few days, your healthcare team might change or even stop your antibiotic.    Your team may start you on an antibiotic while they are working to find out what is making you sick.    Your team might change your antibiotic because test results show that a different antibiotic would be better to treat your infection.    In some cases, once your team has more information, they learn that you do not need an antibiotic at all. They may find out that you don't have an infection, or that the antibiotic you're taking won't work against your infection. For example, an infection caused by a virus can't be treated with antibiotics. Staying on an antibiotic when you don't need it is more likely to be harmful than helpful.      You may experience side effects from your antibiotic.    Like all medications, antibiotics have side effects. Some of these can be serious.    Let you healthcare team know if you have any known allergies when you are admitted to the hospital.    One significant side effect of nearly all antibiotics is the risk of severe and sometimes deadly diarrhea caused by Clostridium difficile (C. Difficile). This occurs when a person takes antibiotics because  some good germs are destroyed. Antibiotic use allows C. diificile to take over, putting patients at high risk for this serious infection.    As a patient or caregiver, it is important to understand your or your loved one's antibiotic treatment. It is especially important for caregivers to speak up when patients can't speak for themselves. Here are some important questions to ask your healthcare team.    What infection is this antibiotic treating and how do you know I have that infection?    What side effects might occur from this antibiotic?    How long will I need to take this antibiotic?    Is it safe to take this antibiotic with other medications or supplements (e.g., vitamins) that I am taking?     Are there any special directions I need to know about taking this antibiotic? For example, should I take it with food?    How will I be monitored to know whether my infection is responding to the antibiotic?    What tests may help to make sure the right antibiotic is prescribed for me?      Information provided by:  www.cdc.gov/getsmart  U.S. Department of Health and Human Services  Centers for disease Control and Prevention  National Center for Emerging and Zoonotic Infectious Diseases  Division of Healthcare Quality Promotion         Protect others around you: Learn how to safely use, store and throw away your medicines at www.disposemymeds.org.             Medication List: This is a list of all your medications and when to take them. Check marks below indicate your daily home schedule. Keep this list as a reference.      Medications           Morning Afternoon Evening Bedtime As Needed    * albuterol (2.5 MG/3ML) 0.083% neb solution   Take 1 vial (2.5 mg) by nebulization every 6 hours as needed for shortness of breath / dyspnea or wheezing                                   * PROAIR  (90 BASE) MCG/ACT Inhaler   INHALE 1 TO 2 PUFFS INTO THE LUNGS EVERY 4 HOURS AS NEEDED FOR WHEEZING, SHORTNESS OF BREATH OR  DYSPNEA   Generic drug:  albuterol                                   amoxicillin-clavulanate 875-125 MG per tablet   Commonly known as:  AUGMENTIN   Take 1 tablet by mouth 2 times daily for 10 days   Next Dose Due:  Start this evening 11/22/17                                        blood glucose monitoring lancets   Use to test blood sugar 2 times daily or as directed.  Ok to substitute alternative if insurance prefers.                                blood glucose monitoring test strip   Commonly known as:  LAURA CONTOUR NEXT   Use to test blood sugar 2 times daily or as directed.                                * Butalbital-APAP-Caff-Cod -23-30 MG Caps   Take 1 capsule by mouth 3 times daily as needed                                   * butalbital-APAP-caffeine-codeine -87-30 MG per capsule   Commonly known as:  FIORICET WITH codeine   Take 1 capsule by mouth every 8 hours   Last time this was given:  1 capsule on 11/22/2017  1:09 PM                                carisoprodol 350 MG tablet   Commonly known as:  SOMA   TAKE 1 TABLET BY MOUTH THREE TIMES DAILY   Last time this was given:  350 mg on 11/22/2017  4:40 PM   Next Dose Due:  Take before bedtime 11/23/17                                         clonazePAM 1 MG tablet   Commonly known as:  klonoPIN   TAKE 1 TABLET BY MOUTH THREE TIMES DAILY AS NEEDED FOR ANXIETY   Last time this was given:  1 mg on 11/22/2017  3:25 PM                                   fluticasone 110 MCG/ACT Inhaler   Commonly known as:  FLOVENT HFA   Inhale 2 puffs into the lungs 2 times daily   Next Dose Due:  Resume as previously prescribed                                 fluticasone 50 MCG/ACT spray   Commonly known as:  FLONASE   Spray 2 sprays into both nostrils daily   Last time this was given:  2 sprays on 11/22/2017  8:38 AM   Next Dose Due:  Can take this evening 11/22                                      guanFACINE 2 MG tablet   Commonly known as:  TENEX   TAKE TWO  TABLETS BY MOUTH AT BEDTIME   Last time this was given:  2 mg on 11/21/2017  9:37 PM   Next Dose Due:  Take tonight 11/22/17                                   lamoTRIgine 200 MG tablet   Commonly known as:  LAMICTAL   Take 1 tablet (200 mg) by mouth every morning   Last time this was given:  200 mg on 11/22/2017  8:38 AM   Next Dose Due:  Take tomorrow morning 11/23/17                                   levothyroxine 25 MCG tablet   Commonly known as:  SYNTHROID/LEVOTHROID   TAKE 1 TABLET(25 MCG) BY MOUTH DAILY   Last time this was given:  25 mcg on 11/22/2017  8:38 AM   Next Dose Due:  Take tomorrow morning 11/23/17                                lithium 450 MG CR tablet   Commonly known as:  ESKALITH   TAKE 2 TABLETS BY MOUTH AT BEDTIME   Last time this was given:  900 mg on 11/21/2017  9:37 PM   Next Dose Due:  Take tonight 11/22/17                                    LYRICA 150 MG capsule   TAKE ONE CAPSULE BY MOUTH TWICE DAILY   Last time this was given:  150 mg on 11/22/2017  8:38 AM   Generic drug:  pregabalin   Next Dose Due:  Take this evening 11/22/17                                      meclizine 25 MG tablet   Commonly known as:  ANTIVERT   Take 1 tablet (25 mg) by mouth every 6 hours as needed for dizziness                                   MELATONIN PO   Take 6 mg by mouth At Bedtime   Last time this was given:  6 mg on 11/21/2017  9:37 PM   Next Dose Due:  Take at bedtime tonight 11/23/17                                   metFORMIN 500 MG tablet   Commonly known as:  GLUCOPHAGE   Take 1 tablet (500 mg) by mouth 3 times daily (with meals)                                         modafinil 200 MG tablet   Commonly known as:  PROVIGIL   Take 0.5 tablets (100 mg) by mouth daily   Last time this was given:  100 mg on 11/22/2017  8:38 AM   Next Dose Due:  Take tomorrow morning 11/23/17                                   ondansetron 4 MG ODT tab   Commonly known as:  ZOFRAN-ODT   DISSOLVE 1 TO 2 TABLETS UNDER  THE TONGUE EVERY 8 HOURS AS NEEDED FOR NAUSEA   Last time this was given:  4 mg on 11/22/2017  4:41 PM                                   oxyCODONE IR 5 MG tablet   Commonly known as:  ROXICODONE   Take 1-2 tablets (5-10 mg) by mouth every 3 hours as needed for severe pain Decrease dosing as pain improves.  Take with food to minimize side effects.   Last time this was given:  10 mg on 11/22/2017  4:40 PM   Next Dose Due:  Can take at earliest 11/22/17 7:40 pm                                   pantoprazole 40 MG EC tablet   Commonly known as:  PROTONIX   TAKE 1 TABLET(40 MG) BY MOUTH DAILY   Last time this was given:  40 mg on 11/22/2017  7:05 AM   Next Dose Due:  Take tomorrow morning 11/23/17                                   * Notice:  This list has 4 medication(s) that are the same as other medications prescribed for you. Read the directions carefully, and ask your doctor or other care provider to review them with you.

## 2017-11-20 LAB
BASOPHILS # BLD AUTO: 0 10E9/L (ref 0–0.2)
BASOPHILS NFR BLD AUTO: 0.4 %
DIFFERENTIAL METHOD BLD: ABNORMAL
EOSINOPHIL # BLD AUTO: 0.4 10E9/L (ref 0–0.7)
EOSINOPHIL NFR BLD AUTO: 4.2 %
ERYTHROCYTE [DISTWIDTH] IN BLOOD BY AUTOMATED COUNT: 13.7 % (ref 10–15)
HCT VFR BLD AUTO: 35.3 % (ref 35–47)
HGB BLD-MCNC: 10.9 G/DL (ref 11.7–15.7)
IMM GRANULOCYTES # BLD: 0.1 10E9/L (ref 0–0.4)
IMM GRANULOCYTES NFR BLD: 0.8 %
LYMPHOCYTES # BLD AUTO: 1.9 10E9/L (ref 0.8–5.3)
LYMPHOCYTES NFR BLD AUTO: 22.5 %
MCH RBC QN AUTO: 25.7 PG (ref 26.5–33)
MCHC RBC AUTO-ENTMCNC: 30.9 G/DL (ref 31.5–36.5)
MCV RBC AUTO: 83 FL (ref 78–100)
MONOCYTES # BLD AUTO: 0.3 10E9/L (ref 0–1.3)
MONOCYTES NFR BLD AUTO: 3.9 %
NEUTROPHILS # BLD AUTO: 5.7 10E9/L (ref 1.6–8.3)
NEUTROPHILS NFR BLD AUTO: 68.2 %
NRBC # BLD AUTO: 0 10*3/UL
NRBC BLD AUTO-RTO: 0 /100
PLATELET # BLD AUTO: 349 10E9/L (ref 150–450)
RBC # BLD AUTO: 4.24 10E12/L (ref 3.8–5.2)
WBC # BLD AUTO: 8.4 10E9/L (ref 4–11)

## 2017-11-20 PROCEDURE — 96376 TX/PRO/DX INJ SAME DRUG ADON: CPT

## 2017-11-20 PROCEDURE — 25000132 ZZH RX MED GY IP 250 OP 250 PS 637: Performed by: SURGERY

## 2017-11-20 PROCEDURE — 25000128 H RX IP 250 OP 636: Performed by: SURGERY

## 2017-11-20 PROCEDURE — 85025 COMPLETE CBC W/AUTO DIFF WBC: CPT | Performed by: SURGERY

## 2017-11-20 PROCEDURE — 25000128 H RX IP 250 OP 636: Performed by: INTERNAL MEDICINE

## 2017-11-20 PROCEDURE — 36415 COLL VENOUS BLD VENIPUNCTURE: CPT | Performed by: SURGERY

## 2017-11-20 PROCEDURE — 25000125 ZZHC RX 250: Performed by: SURGERY

## 2017-11-20 PROCEDURE — 25000132 ZZH RX MED GY IP 250 OP 250 PS 637: Performed by: PHYSICIAN ASSISTANT

## 2017-11-20 PROCEDURE — G0378 HOSPITAL OBSERVATION PER HR: HCPCS

## 2017-11-20 RX ORDER — BUTALBITAL, ACETAMINOPHEN, CAFFEINE AND CODEINE PHOSPHATE 50; 325; 40; 30 MG/1; MG/1; MG/1; MG/1
1 CAPSULE ORAL EVERY 8 HOURS
Status: DISCONTINUED | OUTPATIENT
Start: 2017-11-20 | End: 2017-11-22 | Stop reason: HOSPADM

## 2017-11-20 RX ADMIN — TAZOBACTAM SODIUM AND PIPERACILLIN SODIUM 4.5 G: 500; 4 INJECTION, SOLUTION INTRAVENOUS at 23:42

## 2017-11-20 RX ADMIN — FLUTICASONE PROPIONATE 2 SPRAY: 50 SPRAY, METERED NASAL at 08:43

## 2017-11-20 RX ADMIN — SODIUM CHLORIDE, POTASSIUM CHLORIDE, SODIUM LACTATE AND CALCIUM CHLORIDE: 600; 310; 30; 20 INJECTION, SOLUTION INTRAVENOUS at 11:28

## 2017-11-20 RX ADMIN — HYDROMORPHONE HYDROCHLORIDE 0.5 MG: 1 INJECTION, SOLUTION INTRAMUSCULAR; INTRAVENOUS; SUBCUTANEOUS at 08:34

## 2017-11-20 RX ADMIN — PANTOPRAZOLE SODIUM 40 MG: 40 TABLET, DELAYED RELEASE ORAL at 06:21

## 2017-11-20 RX ADMIN — CARISOPRODOL 350 MG: 350 TABLET ORAL at 16:08

## 2017-11-20 RX ADMIN — SODIUM CHLORIDE, POTASSIUM CHLORIDE, SODIUM LACTATE AND CALCIUM CHLORIDE: 600; 310; 30; 20 INJECTION, SOLUTION INTRAVENOUS at 04:02

## 2017-11-20 RX ADMIN — CARISOPRODOL 350 MG: 350 TABLET ORAL at 21:33

## 2017-11-20 RX ADMIN — CARISOPRODOL 350 MG: 350 TABLET ORAL at 08:33

## 2017-11-20 RX ADMIN — HYDROMORPHONE HYDROCHLORIDE 0.5 MG: 1 INJECTION, SOLUTION INTRAMUSCULAR; INTRAVENOUS; SUBCUTANEOUS at 19:57

## 2017-11-20 RX ADMIN — CLONAZEPAM 1 MG: 1 TABLET ORAL at 14:31

## 2017-11-20 RX ADMIN — HYDROMORPHONE HYDROCHLORIDE 0.5 MG: 1 INJECTION, SOLUTION INTRAMUSCULAR; INTRAVENOUS; SUBCUTANEOUS at 23:57

## 2017-11-20 RX ADMIN — HYDROMORPHONE HYDROCHLORIDE 0.5 MG: 1 INJECTION, SOLUTION INTRAMUSCULAR; INTRAVENOUS; SUBCUTANEOUS at 00:05

## 2017-11-20 RX ADMIN — LITHIUM CARBONATE 900 MG: 450 TABLET, EXTENDED RELEASE ORAL at 21:33

## 2017-11-20 RX ADMIN — MELATONIN TAB 3 MG 6 MG: 3 TAB at 21:33

## 2017-11-20 RX ADMIN — GUANFACINE 2 MG: 1 TABLET ORAL at 21:33

## 2017-11-20 RX ADMIN — MODAFINIL 100 MG: 100 TABLET ORAL at 08:33

## 2017-11-20 RX ADMIN — TAZOBACTAM SODIUM AND PIPERACILLIN SODIUM 4.5 G: 500; 4 INJECTION, SOLUTION INTRAVENOUS at 17:58

## 2017-11-20 RX ADMIN — HYDROMORPHONE HYDROCHLORIDE 0.5 MG: 1 INJECTION, SOLUTION INTRAMUSCULAR; INTRAVENOUS; SUBCUTANEOUS at 05:37

## 2017-11-20 RX ADMIN — PREGABALIN 150 MG: 75 CAPSULE ORAL at 08:33

## 2017-11-20 RX ADMIN — CLONAZEPAM 1 MG: 1 TABLET ORAL at 21:33

## 2017-11-20 RX ADMIN — HYDROMORPHONE HYDROCHLORIDE 0.5 MG: 1 INJECTION, SOLUTION INTRAMUSCULAR; INTRAVENOUS; SUBCUTANEOUS at 10:20

## 2017-11-20 RX ADMIN — BUTALBITAL, ACETAMINOPHEN, CAFFEINE, AND CODEINE PHOSPHATE 1 CAPSULE: 30; 50; 40; 325 CAPSULE ORAL at 10:18

## 2017-11-20 RX ADMIN — TAZOBACTAM SODIUM AND PIPERACILLIN SODIUM 4.5 G: 500; 4 INJECTION, SOLUTION INTRAVENOUS at 11:28

## 2017-11-20 RX ADMIN — ONDANSETRON 4 MG: 4 TABLET, ORALLY DISINTEGRATING ORAL at 23:57

## 2017-11-20 RX ADMIN — LEVOTHYROXINE SODIUM 25 MCG: 25 TABLET ORAL at 08:33

## 2017-11-20 RX ADMIN — TAZOBACTAM SODIUM AND PIPERACILLIN SODIUM 4.5 G: 500; 4 INJECTION, SOLUTION INTRAVENOUS at 06:21

## 2017-11-20 RX ADMIN — HYDROMORPHONE HYDROCHLORIDE 0.5 MG: 1 INJECTION, SOLUTION INTRAMUSCULAR; INTRAVENOUS; SUBCUTANEOUS at 12:15

## 2017-11-20 RX ADMIN — PREGABALIN 150 MG: 75 CAPSULE ORAL at 20:00

## 2017-11-20 RX ADMIN — ACETAMINOPHEN 650 MG: 325 TABLET, FILM COATED ORAL at 14:29

## 2017-11-20 RX ADMIN — BUTALBITAL, ACETAMINOPHEN, CAFFEINE, AND CODEINE PHOSPHATE 1 CAPSULE: 30; 50; 40; 325 CAPSULE ORAL at 17:50

## 2017-11-20 RX ADMIN — LAMOTRIGINE 200 MG: 25 TABLET ORAL at 08:33

## 2017-11-20 RX ADMIN — SODIUM CHLORIDE, POTASSIUM CHLORIDE, SODIUM LACTATE AND CALCIUM CHLORIDE: 600; 310; 30; 20 INJECTION, SOLUTION INTRAVENOUS at 17:58

## 2017-11-20 RX ADMIN — HYDROMORPHONE HYDROCHLORIDE 0.5 MG: 1 INJECTION, SOLUTION INTRAMUSCULAR; INTRAVENOUS; SUBCUTANEOUS at 14:26

## 2017-11-20 RX ADMIN — CLONAZEPAM 1 MG: 1 TABLET ORAL at 06:25

## 2017-11-20 RX ADMIN — HYDROMORPHONE HYDROCHLORIDE 0.5 MG: 1 INJECTION, SOLUTION INTRAMUSCULAR; INTRAVENOUS; SUBCUTANEOUS at 17:55

## 2017-11-20 ASSESSMENT — PAIN DESCRIPTION - DESCRIPTORS
DESCRIPTORS: SHARP;THROBBING
DESCRIPTORS: SHARP;SHOOTING
DESCRIPTORS: STABBING
DESCRIPTORS: SHARP;SHOOTING
DESCRIPTORS: SHARP;SHOOTING
DESCRIPTORS: STABBING;THROBBING

## 2017-11-20 NOTE — PROGRESS NOTES
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  Main c/o this am is migraine, requests home med Butalbital.  abd pain controlled.  NPO so far but interested in starting diet.  + passing flatus, last BM was yesterday.           Physical Exam:   Blood pressure 98/58, pulse 90, temperature 96.8  F (36  C), temperature source Oral, resp. rate 18, height 1.575 m (5' 2\"), weight 90.7 kg (200 lb), SpO2 95 %, not currently breastfeeding.    I/O last 3 completed shifts:  In: 1108 [I.V.:1108]  Out: -     Abdomen:   soft, obese, tenderness noted in the suprapubic region and normal bowel sounds   Inc(s) - clean, dry, intact                Data:     Recent Labs   Lab Test  11/20/17   0707  11/19/17   1032  11/14/17   1040   HGB  10.9*  12.2  12.1   WBC  8.4  21.4*  11.1*     CT yesterday showed bibasilar atelectasis.  No abdominal abscess.    Marge Mendosa PA-C     Improved but still sore, wants more to eat, will advance to full liquids  Difficult to identify etiology for illness. Now with no fever and normal WBC and Diff. Unusually rapid normalization of WBC. UA nl.   Reviewed CT with pt, no etiology for Sx apparent  Following  Roberto Robins MD  11/20/2017 5:07 PM    "

## 2017-11-20 NOTE — PROGRESS NOTES
PRIMARY DIAGNOSIS: ACUTE PAIN  OUTPATIENT/OBSERVATION GOALS TO BE MET BEFORE DISCHARGE:  1. Pain Status: Improved but still requiring IV narcotics.    2. Return to near baseline physical activity: Yes    3. Cleared for discharge by consultants (if involved): No    Discharge Planner Nurse   Safe discharge environment identified: Yes  Barriers to discharge: No       Entered by: Josephine Abernathy 11/20/2017 10:54 AM   C/o abd pain, IV dilaudid given. Also c/o migraine-Butalbital given. +Nausea but states it's from the migraine.     Please review provider order for any additional goals.   Nurse to notify provider when observation goals have been met and patient is ready for discharge.

## 2017-11-20 NOTE — PROGRESS NOTES
PRIMARY DIAGNOSIS: ACUTE PAIN  OUTPATIENT/OBSERVATION GOALS TO BE MET BEFORE DISCHARGE:  1. Pain Status: Improved but still requiring IV narcotics.     2. Return to near baseline physical activity: Yes     3. Cleared for discharge by consultants (if involved): No        Discharge Planner Nurse      Safe discharge environment identified: Yes  Barriers to discharge: No       Entered by: Josephine Abernathy 11/20/2017 1322 PM     C/o abd pain, IV dilaudid given. Up ind. Joel clear liquid diet. Zosyn. Lap sites from previous surgery CDI.      Please review provider order for any additional goals.   Nurse to notify provider when observation goals have been met and patient is ready for discharge.

## 2017-11-20 NOTE — PLAN OF CARE
PRIMARY DIAGNOSIS: ACUTE PAIN  OUTPATIENT/OBSERVATION GOALS TO BE MET BEFORE DISCHARGE:  1. Pain Status: Improved but still requiring IV narcotics.    2. Return to near baseline physical activity: Yes    3. Cleared for discharge by consultants (if involved): N/A    Discharge Planner Nurse   Safe discharge environment identified: Yes  Barriers to discharge: No    Patient's VS are stable, A/O, afebrile, reports pain to abdomen and given PRN dilaudid x2 this shift and sleeping comfortably after. Independent with mobility, NPO except ice chips, IVF infusing and and continues IV zosyn Q6H. Discharge TBD.         Entered by: Ana Elam 11/20/2017 6:19 AM     Please review provider order for any additional goals.   Nurse to notify provider when observation goals have been met and patient is ready for discharge.

## 2017-11-20 NOTE — PLAN OF CARE
PRIMARY DIAGNOSIS: ACUTE PAIN  OUTPATIENT/OBSERVATION GOALS TO BE MET BEFORE DISCHARGE:  1. Pain Status: Improved but still requiring IV narcotics.    2. Return to near baseline physical activity: Yes    3. Cleared for discharge by consultants (if involved): N/A    Discharge Planner Nurse   Safe discharge environment identified: Yes  Barriers to discharge: No    Continues IV zosyn Q6H         Entered by: Ana Elam 11/20/2017 6:21 AM     Please review provider order for any additional goals.   Nurse to notify provider when observation goals have been met and patient is ready for discharge.

## 2017-11-20 NOTE — PLAN OF CARE
Problem: Infection, Risk/Actual (Adult)  Goal: Identify Related Risk Factors and Signs and Symptoms  Related risk factors and signs and symptoms are identified upon initiation of Human Response Clinical Practice Guideline (CPG).   Outcome: No Change  PRIMARY DIAGNOSIS: ACUTE PAIN  OUTPATIENT/OBSERVATION GOALS TO BE MET BEFORE DISCHARGE:  1. Pain Status: Improved but still requiring IV narcotics.    2. Return to near baseline physical activity: Yes    3. Cleared for discharge by consultants (if involved): N/A    Discharge Planner Nurse   Safe discharge environment identified: Yes  Barriers to discharge: Yes       Entered by: Bess Gibbons 11/19/2017 10:23 PM     Please review provider order for any additional goals.   Nurse to notify provider when observation goals have been met and patient is ready for discharge.    Pain managed with IV dilaudid. Ambulating independently, NPO with ice chips, denies nausea, Afebrile. Zosyn Q6.

## 2017-11-21 PROBLEM — R10.9 ABDOMINAL PAIN: Status: ACTIVE | Noted: 2017-11-21

## 2017-11-21 PROCEDURE — 96376 TX/PRO/DX INJ SAME DRUG ADON: CPT

## 2017-11-21 PROCEDURE — 25000132 ZZH RX MED GY IP 250 OP 250 PS 637: Performed by: PHYSICIAN ASSISTANT

## 2017-11-21 PROCEDURE — 25000128 H RX IP 250 OP 636: Performed by: SURGERY

## 2017-11-21 PROCEDURE — 25000132 ZZH RX MED GY IP 250 OP 250 PS 637: Performed by: SURGERY

## 2017-11-21 PROCEDURE — 12000000 ZZH R&B MED SURG/OB

## 2017-11-21 PROCEDURE — G0378 HOSPITAL OBSERVATION PER HR: HCPCS

## 2017-11-21 RX ORDER — HYDROMORPHONE HYDROCHLORIDE 1 MG/ML
.3-.5 INJECTION, SOLUTION INTRAMUSCULAR; INTRAVENOUS; SUBCUTANEOUS
Status: DISCONTINUED | OUTPATIENT
Start: 2017-11-21 | End: 2017-11-22 | Stop reason: HOSPADM

## 2017-11-21 RX ORDER — OXYCODONE HYDROCHLORIDE 5 MG/1
5-10 TABLET ORAL
Status: DISCONTINUED | OUTPATIENT
Start: 2017-11-21 | End: 2017-11-22 | Stop reason: HOSPADM

## 2017-11-21 RX ADMIN — PANTOPRAZOLE SODIUM 40 MG: 40 TABLET, DELAYED RELEASE ORAL at 06:43

## 2017-11-21 RX ADMIN — CLONAZEPAM 1 MG: 1 TABLET ORAL at 21:36

## 2017-11-21 RX ADMIN — LEVOTHYROXINE SODIUM 25 MCG: 25 TABLET ORAL at 09:37

## 2017-11-21 RX ADMIN — CLONAZEPAM 1 MG: 1 TABLET ORAL at 09:46

## 2017-11-21 RX ADMIN — TAZOBACTAM SODIUM AND PIPERACILLIN SODIUM 3.38 G: 375; 3 INJECTION, SOLUTION INTRAVENOUS at 11:48

## 2017-11-21 RX ADMIN — MODAFINIL 100 MG: 100 TABLET ORAL at 09:37

## 2017-11-21 RX ADMIN — TAZOBACTAM SODIUM AND PIPERACILLIN SODIUM 3.38 G: 375; 3 INJECTION, SOLUTION INTRAVENOUS at 17:15

## 2017-11-21 RX ADMIN — OXYCODONE HYDROCHLORIDE 10 MG: 5 TABLET ORAL at 12:57

## 2017-11-21 RX ADMIN — LAMOTRIGINE 200 MG: 25 TABLET ORAL at 09:37

## 2017-11-21 RX ADMIN — FLUTICASONE PROPIONATE 2 SPRAY: 50 SPRAY, METERED NASAL at 11:14

## 2017-11-21 RX ADMIN — PREGABALIN 150 MG: 75 CAPSULE ORAL at 21:36

## 2017-11-21 RX ADMIN — NICOTINE POLACRILEX 2 MG: 2 GUM, CHEWING ORAL at 21:36

## 2017-11-21 RX ADMIN — CARISOPRODOL 350 MG: 350 TABLET ORAL at 09:38

## 2017-11-21 RX ADMIN — MELATONIN TAB 3 MG 6 MG: 3 TAB at 21:37

## 2017-11-21 RX ADMIN — TAZOBACTAM SODIUM AND PIPERACILLIN SODIUM 4.5 G: 500; 4 INJECTION, SOLUTION INTRAVENOUS at 05:37

## 2017-11-21 RX ADMIN — OXYCODONE HYDROCHLORIDE 10 MG: 5 TABLET ORAL at 19:23

## 2017-11-21 RX ADMIN — CARISOPRODOL 350 MG: 350 TABLET ORAL at 15:53

## 2017-11-21 RX ADMIN — BUTALBITAL, ACETAMINOPHEN, CAFFEINE, AND CODEINE PHOSPHATE 1 CAPSULE: 30; 50; 40; 325 CAPSULE ORAL at 15:02

## 2017-11-21 RX ADMIN — GUANFACINE 2 MG: 1 TABLET ORAL at 21:37

## 2017-11-21 RX ADMIN — CLONAZEPAM 1 MG: 1 TABLET ORAL at 15:53

## 2017-11-21 RX ADMIN — OXYCODONE HYDROCHLORIDE 10 MG: 5 TABLET ORAL at 15:56

## 2017-11-21 RX ADMIN — HYDROMORPHONE HYDROCHLORIDE 0.5 MG: 1 INJECTION, SOLUTION INTRAMUSCULAR; INTRAVENOUS; SUBCUTANEOUS at 06:43

## 2017-11-21 RX ADMIN — HYDROMORPHONE HYDROCHLORIDE 0.5 MG: 1 INJECTION, SOLUTION INTRAMUSCULAR; INTRAVENOUS; SUBCUTANEOUS at 09:34

## 2017-11-21 RX ADMIN — LITHIUM CARBONATE 900 MG: 450 TABLET, EXTENDED RELEASE ORAL at 21:37

## 2017-11-21 RX ADMIN — HYDROMORPHONE HYDROCHLORIDE 0.5 MG: 1 INJECTION, SOLUTION INTRAMUSCULAR; INTRAVENOUS; SUBCUTANEOUS at 11:28

## 2017-11-21 RX ADMIN — CARISOPRODOL 350 MG: 350 TABLET ORAL at 21:37

## 2017-11-21 RX ADMIN — PREGABALIN 150 MG: 75 CAPSULE ORAL at 09:37

## 2017-11-21 RX ADMIN — BUTALBITAL, ACETAMINOPHEN, CAFFEINE, AND CODEINE PHOSPHATE 1 CAPSULE: 30; 50; 40; 325 CAPSULE ORAL at 21:36

## 2017-11-21 RX ADMIN — ACETAMINOPHEN 650 MG: 325 TABLET, FILM COATED ORAL at 15:02

## 2017-11-21 RX ADMIN — BUTALBITAL, ACETAMINOPHEN, CAFFEINE, AND CODEINE PHOSPHATE 1 CAPSULE: 30; 50; 40; 325 CAPSULE ORAL at 05:43

## 2017-11-21 RX ADMIN — OXYCODONE HYDROCHLORIDE 10 MG: 5 TABLET ORAL at 22:28

## 2017-11-21 ASSESSMENT — ACTIVITIES OF DAILY LIVING (ADL): ADLS_ACUITY_SCORE: 8

## 2017-11-21 ASSESSMENT — PAIN DESCRIPTION - DESCRIPTORS
DESCRIPTORS: THROBBING
DESCRIPTORS: STABBING;THROBBING

## 2017-11-21 NOTE — PLAN OF CARE
Problem: Infection, Risk/Actual (Adult)  Goal: Identify Related Risk Factors and Signs and Symptoms  Related risk factors and signs and symptoms are identified upon initiation of Human Response Clinical Practice Guideline (CPG).   PRIMARY DIAGNOSIS: ACUTE PAIN  OUTPATIENT/OBSERVATION GOALS TO BE MET BEFORE DISCHARGE:  1. Pain Status: Improved but still requiring IV narcotics.    2. Return to near baseline physical activity: Yes    3. Cleared for discharge by consultants (if involved): N/A    Discharge Planner Nurse   Safe discharge environment identified: Yes  Barriers to discharge: Yes       Entered by: Bess Gibobns 11/20/2017 8:27 PM     Please review provider order for any additional goals.   Nurse to notify provider when observation goals have been met and patient is ready for discharge.

## 2017-11-21 NOTE — PROGRESS NOTES
PRIMARY DIAGNOSIS: ACUTE PAIN  OUTPATIENT/OBSERVATION GOALS TO BE MET BEFORE DISCHARGE:  1. Pain Status: Improved but still requiring IV narcotics.    2. Return to near baseline physical activity: Yes    3. Cleared for discharge by consultants (if involved): No    Discharge Planner Nurse   Safe discharge environment identified: Yes  Barriers to discharge: Yes       Entered by: Bess Gibbons 11/20/2017 11:04 PM     Please review provider order for any additional goals.   Nurse to notify provider when observation goals have been met and patient is ready for discharge.    Zosyn continues. IV dilaudid used to manage pain. Tolerating full liquid diet. Ambulating independently. Afebrile.

## 2017-11-21 NOTE — PLAN OF CARE
Problem: Patient Care Overview  Goal: Plan of Care/Patient Progress Review  PRIMARY DIAGNOSIS: ACUTE PAIN  OUTPATIENT/OBSERVATION GOALS TO BE MET BEFORE DISCHARGE:  1. Pain Status: Improved but still requiring IV narcotics.    2. Return to near baseline physical activity: Yes    3. Cleared for discharge by consultants (if involved): No    Discharge Planner Nurse   Safe discharge environment identified: Yes  Barriers to discharge: No       Entered by: Jojo Hearn 11/21/2017 4:16 PM     Please review provider order for any additional goals.   Nurse to notify provider when observation goals have been met and patient is ready for discharge.

## 2017-11-21 NOTE — PROGRESS NOTES
PRIMARY DIAGNOSIS: ACUTE PAIN  OUTPATIENT/OBSERVATION GOALS TO BE MET BEFORE DISCHARGE:  1. Pain Status: c/o abdominal pain 5/10 IV Dilaudid x 1 given    2. Return to near baseline physical activity: Yes    3. Cleared for discharge by consultants (if involved): CARMELITA    Discharge Planner Nurse        Entered by: Alana Chapman 11/21/2017 3:49 AM     Please review provider order for any additional goals.   Nurse to notify provider when observation goals have been met and patient is ready for discharge.

## 2017-11-21 NOTE — PROGRESS NOTES
"[facility]  General Surgery Progress Note           Assessment and Plan:   Assessment:   Abdominal pain of unclear etiology      Plan:   -pain consultation to help with pain management but avoid oversedation and interaction with other meds, will reduce IV dilaudid frequency and make oxycodone available  Decrease IV fluid rate  Advance diet  ID consult ? Benefit from antibiotics         Interval History:   Still with some pain but also quite sedated at times  No fevers  She would like to try switching to oxycodone in hopes of DC soon  She requests to advance diet         Physical Exam:   Blood pressure 133/77, pulse 86, temperature 97.7  F (36.5  C), temperature source Oral, resp. rate 18, height 1.575 m (5' 2\"), weight 90.7 kg (200 lb), SpO2 96 %, not currently breastfeeding.    I/O last 3 completed shifts:  In: 5482 [P.O.:820; I.V.:4662]  Out: -     Abdomen:   Soft , obese and protuberant (normal per patient) with mild suprapubic tenderness               Data:     Recent Labs   Lab Test  11/20/17   0707  11/19/17   1032  11/14/17   1040   HGB  10.9*  12.2  12.1   WBC  8.4  21.4*  11.1*       Roberto Robins MD     "

## 2017-11-21 NOTE — PROGRESS NOTES
PRIMARY DIAGNOSIS: ACUTE PAIN  OUTPATIENT/OBSERVATION GOALS TO BE MET BEFORE DISCHARGE:  1. Pain Status: Continues to need IV Dilaudid     2. Return to near baseline physical activity: Yes up independently    3. Cleared for discharge by consultants (if involved): NA    Discharge Planner Nurse      Please review provider order for any additional goals.   Nurse to notify provider when observation goals have been met and patient is ready for discharge.    A&O but lethargic this shift. Vital signs stable, up independently, voiding without difficulty. Pt reports loose stool x 1 this shift. /hr, continues IV Zosyn. IV Dilaudid x 2 for abdominal pain and oral Zofran x 1. BS hypoactive, lap sites with steri strips. Will continue to monitor.

## 2017-11-21 NOTE — PLAN OF CARE
Problem: Patient Care Overview  Goal: Plan of Care/Patient Progress Review  Outcome: Improving  PRIMARY DIAGNOSIS: ACUTE PAIN  OUTPATIENT/OBSERVATION GOALS TO BE MET BEFORE DISCHARGE:  1. Pain Status: Improved but still requiring IV narcotics.    2. Return to near baseline physical activity: Yes    3. Cleared for discharge by consultants (if involved): No    Discharge Planner Nurse   Safe discharge environment identified: Yes  Barriers to discharge: No       Entered by: Jojo Hearn 11/21/2017 4:15 PM     Please review provider order for any additional goals.   Nurse to notify provider when observation goals have been met and patient is ready for discharge.

## 2017-11-22 VITALS
BODY MASS INDEX: 36.8 KG/M2 | RESPIRATION RATE: 16 BRPM | DIASTOLIC BLOOD PRESSURE: 78 MMHG | HEART RATE: 86 BPM | HEIGHT: 62 IN | TEMPERATURE: 98.6 F | OXYGEN SATURATION: 93 % | WEIGHT: 200 LBS | SYSTOLIC BLOOD PRESSURE: 121 MMHG

## 2017-11-22 LAB
C DIFF TOX B STL QL: NEGATIVE
SPECIMEN SOURCE: NORMAL

## 2017-11-22 PROCEDURE — 25000132 ZZH RX MED GY IP 250 OP 250 PS 637: Performed by: SURGERY

## 2017-11-22 PROCEDURE — 25000132 ZZH RX MED GY IP 250 OP 250 PS 637: Performed by: PHYSICIAN ASSISTANT

## 2017-11-22 PROCEDURE — 25000125 ZZHC RX 250: Performed by: SURGERY

## 2017-11-22 PROCEDURE — 25000128 H RX IP 250 OP 636: Performed by: SURGERY

## 2017-11-22 PROCEDURE — 87493 C DIFF AMPLIFIED PROBE: CPT | Performed by: SURGERY

## 2017-11-22 RX ORDER — ACETAMINOPHEN 325 MG/1
325 TABLET ORAL EVERY 8 HOURS
Status: DISCONTINUED | OUTPATIENT
Start: 2017-11-22 | End: 2017-11-22 | Stop reason: HOSPADM

## 2017-11-22 RX ORDER — ACETAMINOPHEN 325 MG/1
325 TABLET ORAL EVERY 6 HOURS
Status: DISCONTINUED | OUTPATIENT
Start: 2017-11-22 | End: 2017-11-22

## 2017-11-22 RX ORDER — BUTALBITAL, ACETAMINOPHEN, CAFFEINE AND CODEINE PHOSPHATE 50; 325; 40; 30 MG/1; MG/1; MG/1; MG/1
1 CAPSULE ORAL EVERY 8 HOURS
Qty: 6 CAPSULE | Refills: 0 | Status: SHIPPED | OUTPATIENT
Start: 2017-11-22 | End: 2017-12-04

## 2017-11-22 RX ORDER — OXYCODONE HYDROCHLORIDE 5 MG/1
5-10 TABLET ORAL
Qty: 30 TABLET | Refills: 0 | Status: SHIPPED | OUTPATIENT
Start: 2017-11-22 | End: 2018-04-23

## 2017-11-22 RX ORDER — DROSPIRENONE AND ETHINYL ESTRADIOL 0.02-3(28)
1 KIT ORAL DAILY
Status: DISCONTINUED | OUTPATIENT
Start: 2017-11-22 | End: 2017-11-22 | Stop reason: HOSPADM

## 2017-11-22 RX ADMIN — CARISOPRODOL 350 MG: 350 TABLET ORAL at 08:38

## 2017-11-22 RX ADMIN — OXYCODONE HYDROCHLORIDE 10 MG: 5 TABLET ORAL at 10:02

## 2017-11-22 RX ADMIN — ACETAMINOPHEN 325 MG: 325 TABLET, FILM COATED ORAL at 10:22

## 2017-11-22 RX ADMIN — LEVOTHYROXINE SODIUM 25 MCG: 25 TABLET ORAL at 08:38

## 2017-11-22 RX ADMIN — CARISOPRODOL 350 MG: 350 TABLET ORAL at 16:40

## 2017-11-22 RX ADMIN — MODAFINIL 100 MG: 100 TABLET ORAL at 08:38

## 2017-11-22 RX ADMIN — ONDANSETRON 4 MG: 4 TABLET, ORALLY DISINTEGRATING ORAL at 16:41

## 2017-11-22 RX ADMIN — OXYCODONE HYDROCHLORIDE 10 MG: 5 TABLET ORAL at 01:37

## 2017-11-22 RX ADMIN — LAMOTRIGINE 200 MG: 25 TABLET ORAL at 08:38

## 2017-11-22 RX ADMIN — CLONAZEPAM 1 MG: 1 TABLET ORAL at 08:38

## 2017-11-22 RX ADMIN — BUTALBITAL, ACETAMINOPHEN, CAFFEINE, AND CODEINE PHOSPHATE 1 CAPSULE: 30; 50; 40; 325 CAPSULE ORAL at 05:51

## 2017-11-22 RX ADMIN — SODIUM CHLORIDE, POTASSIUM CHLORIDE, SODIUM LACTATE AND CALCIUM CHLORIDE: 600; 310; 30; 20 INJECTION, SOLUTION INTRAVENOUS at 14:34

## 2017-11-22 RX ADMIN — TAZOBACTAM SODIUM AND PIPERACILLIN SODIUM 3.38 G: 375; 3 INJECTION, SOLUTION INTRAVENOUS at 05:51

## 2017-11-22 RX ADMIN — FLUTICASONE PROPIONATE 2 SPRAY: 50 SPRAY, METERED NASAL at 08:38

## 2017-11-22 RX ADMIN — BUTALBITAL, ACETAMINOPHEN, CAFFEINE, AND CODEINE PHOSPHATE 1 CAPSULE: 30; 50; 40; 325 CAPSULE ORAL at 13:09

## 2017-11-22 RX ADMIN — OXYCODONE HYDROCHLORIDE 10 MG: 5 TABLET ORAL at 16:40

## 2017-11-22 RX ADMIN — OXYCODONE HYDROCHLORIDE 10 MG: 5 TABLET ORAL at 13:42

## 2017-11-22 RX ADMIN — PANTOPRAZOLE SODIUM 40 MG: 40 TABLET, DELAYED RELEASE ORAL at 07:05

## 2017-11-22 RX ADMIN — PREGABALIN 150 MG: 75 CAPSULE ORAL at 08:38

## 2017-11-22 RX ADMIN — SODIUM CHLORIDE, POTASSIUM CHLORIDE, SODIUM LACTATE AND CALCIUM CHLORIDE: 600; 310; 30; 20 INJECTION, SOLUTION INTRAVENOUS at 00:06

## 2017-11-22 RX ADMIN — CLONAZEPAM 1 MG: 1 TABLET ORAL at 15:25

## 2017-11-22 RX ADMIN — TAZOBACTAM SODIUM AND PIPERACILLIN SODIUM 3.38 G: 375; 3 INJECTION, SOLUTION INTRAVENOUS at 00:06

## 2017-11-22 RX ADMIN — OXYCODONE HYDROCHLORIDE 10 MG: 5 TABLET ORAL at 05:51

## 2017-11-22 RX ADMIN — TAZOBACTAM SODIUM AND PIPERACILLIN SODIUM 3.38 G: 375; 3 INJECTION, SOLUTION INTRAVENOUS at 11:47

## 2017-11-22 ASSESSMENT — ACTIVITIES OF DAILY LIVING (ADL)
ADLS_ACUITY_SCORE: 8

## 2017-11-22 ASSESSMENT — PAIN DESCRIPTION - DESCRIPTORS
DESCRIPTORS: THROBBING
DESCRIPTORS: THROBBING

## 2017-11-22 NOTE — CONSULTS
ID consult dictated IMP 1 43 yo female acute fever/leukocytosis and increased abd pain after appi(torsion, no inflamm or leak), cxs neg, fever resolved    REc OK home 10 days augmentin 875 bid, if not improving pain or recurrent fever reeval and ? rescan

## 2017-11-22 NOTE — PLAN OF CARE
Problem: Patient Care Overview  Goal: Plan of Care/Patient Progress Review  Outcome: Improving  Ambulatory Status:  Pt up Independtly.  VS:  VSS - afebrile  Pain:  Pt c/o pain 4/10 in lower abdomin. Oxy 10mg given x2 Klonopin x1  Resp: LS Diminished  GI:  Denies nausea.  Fair appetite and on Regular diet.  BS Active.  Passing flatus.  Last BM this shift per pt report.  :  Voiding adequately  Skin:  3 Lap sites on abdomin, upper and lower lap sites covered with steri strips, middle lap site steri strips have fallen off.  Tx:  Currently on IV zosyn.   Consults:  Pain for better pain control and ID  Disposition:  Possibly this afternoon waiting ID consult for recommendation for PO home abx

## 2017-11-22 NOTE — PROGRESS NOTES
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  -Continue diet as tolerated.  Encouraged small meals and soft diet until bowel activity/sensitivity resolved.    -oral oxycodone.  Taking fioricet for HA, but encouraged to continue scheduled acetaminophen for pain control after HA resolved and as she is tapering off oxycodone.  Discussed limits of acetaminophen intake.  -IV zosyn, ID consult pending regarding recommendation for possible DC ABX  -Possible DC later today after ID consult complete.  DC Rx: oxycodone.         Interval History:   Headache is main concern, starting to resolve.  Having somewhat poor appetite this morning which she says is normal with her headaches.  No nausea along with this currently.  +BM this morning and passing flatus normally.    Continued occasional soreness at midline suprapubic location, in vicinity of lowest incision; describes this as a 3/10 and controlled with oxycodone use.  No need for IV pain med for >24 hours.           Physical Exam:   Blood pressure 128/73, pulse 86, temperature 97.7  F (36.5  C), temperature source Oral, resp. rate 14, height 1.575 m (5' 2\"), weight 90.7 kg (200 lb), SpO2 92 %, not currently breastfeeding.    I/O last 3 completed shifts:  In: 3006 [P.O.:840; I.V.:2166]  Out: -     Abdomen:   soft, obese, mild tenderness noted in the suprapubic region and normal bowel sounds   Incs - clean, dry, intact              Data:     Recent Labs  Lab 11/20/17  0707 11/19/17  1032   WBC 8.4 21.4*   HGB 10.9* 12.2   HCT 35.3 38.3   MCV 83 82    438       Recent Labs  Lab 11/19/17  1032      POTASSIUM 4.0   CHLORIDE " 101   CO2 26   ANIONGAP 7   *   BUN 6*   CR 0.73   GFRESTIMATED 88   GFRESTBLACK >90   DENITA 9.0   PROTTOTAL 7.5   ALBUMIN 3.3*   BILITOTAL 0.4   ALKPHOS 117   AST 15   ALT 20       Recent Labs  Lab 11/19/17  1126   COLOR Straw   APPEARANCE Clear   URINEGLC Negative   URINEBILI Negative   URINEKETONE Negative   SG 1.025   UBLD Negative   URINEPH 7.0   PROTEIN Negative   NITRITE Negative   LEUKEST Negative   RBCU 1   WBCU 3*     CT showed bibasilar atelectasis.  No abdominal abscess.    Laura Marion PA-C       Seen and agree.  Her clinical course is confusing.  ID to decide on if continued antibiotics are needed.  Coby Watt MD

## 2017-11-22 NOTE — PLAN OF CARE
Problem: Infection, Risk/Actual (Adult)  Goal: Infection Prevention/Resolution  Patient will demonstrate the desired outcomes by discharge/transition of care.   Outcome: No Change  Pt admitted with post op fever, has been afebrile today.  Abdomen rounded, bowel sounds active.  Lap sites WDL, steri strips in place. Continues to have abdominal pain in lower abdomen, given oxycodone x2 with some relief. Denies nausea and tolerating a regular diet.  Pt reported 4-5 loose stools today, placed on enteric precautions, will try to collect sample.  Independent with ambulation. Continue zosyn, ID consulted.

## 2017-11-22 NOTE — DISCHARGE INSTRUCTIONS
HOME CARE FOLLOWING APPENDECTOMY  MICHAEL Xiong E. Gavin, N. Guttormson, D. Maurer, IRASEMA Hughes, LUPIS Cardoza    INCISIONAL CARE:  Replace the bandage over your incision (or incisions) until all drainage stops, or if more comfortable to have in place.  If present, leave the steri-strips (white paper tapes) in place for 14 days after surgery.  If you have staples in your incision at the time of discharge, they will be removed at your follow-up appointment.  If Dermabond (a type of skin glue) is present, leave in place until it wears/flakes off.     BATHING:  Avoid baths for 1 week after surgery.  Showers are okay.  You may wash your hair at any time.  Gently pat your incision dry after bathing.    ACTIVITY:  Light Activity -- you may immediately be up and about as tolerated.  Driving -- you may drive when comfortable and off narcotic pain medications.  Light Work -- resume when comfortable off pain medications.  (If you can drive, you probably can work.)  Strenuous Work/Activity -- limit lifting to 20 pounds for 1 week.  Progressively increase with time.  Active Sports (running, biking, etc.) -- cautiously resume after 2 weeks.    DISCOMFORT:  Use pain medications as prescribed by your surgeon.  Take the pain medication with some food, when possible, to minimize side effects.  Intermittent use of ice packs at the incision sites may help during the first 48 hours.  Expect gradual improvement.    DIET:  No restrictions.  Drink plenty of fluids.  While taking pain medications, increase dietary fiber or add a fiber supplementation like Metamucil or Citrucel to help prevent constipation - a possible side effect of pain medications.    NAUSEA:  If nauseated from the anesthetic/pain meds; rest in bed, get up cautiously with assistance, and drink clear liquids (juice, tea, broth).    RETURN APPOINTMENT:  Schedule a follow-up visit 2-3 weeks post-op.  Office Phone:  [phone]     CONTACT US IF THE FOLLOWING  DEVELOPS:   1. A fever that is above 101     2. If there is a large amount of drainage, bleeding, or swelling.   3. Severe pain that is not relieved by your prescription.   4. Drainage that is thick, cloudy, yellow, green or white.   5. Any other questions not answered by  Frequently Asked Questions  sheet.      FREQUENTLY ASKED QUESTIONS:    Q:  How should my incision look?    A:  Normally your incision will appear slightly swollen with light redness directly along the incision itself as it heals.  It may feel like a bump or ridge as the healing/scarring happens, and over time (3-4 months) this bump or ridge feeling should slowly go away.  In general, clear or pink watery drainage can be normal at first as your incision heals, but should decrease over time.    Q:  How do I know if my incision is infected?  A:  Look at your incision for signs of infection, like redness around the incision spreading to surrounding skin, or drainage of cloudy or foul-smelling drainage.  If you feel warm, check your temperature to see if you are running a fever.    **If any of these things occur, please notify the nurse at our office.  We may need you to come into the office for an incision check.      Q:  How do I take care of my incision?  A:  If you have a dressing in place - Starting the day after surgery, replace the dressing 1-2 times a day until there is no further drainage from the incision.  At that time, a dressing is no longer needed.  Try to minimize tape on the skin if irritation is occurring at the tape sites.  If you have significant irritation from tape on the skin, please call the office to discuss other method of dressing your incision.    Small pieces of tape called  steri-strips  may be present directly overlying your incision; these may be removed 10 days after surgery unless otherwise specified by your surgeon.  If these tapes start to loosen at the ends, you may trim them back until they fall off or are removed.     A:  If you had  Dermabond  tissue glue used as a dressing (this causes your incision to look shiny with a clear covering over it) - This type of dressing wears off with time and does not require more dressings over the top unless it is draining around the glue as it wears off.  Do not apply ointments or lotions over the incisions until the glue has completely worn off.    Q:  There is a piece of tape or a sticky  lead  still on my skin.  Can I remove this?  A:  Sometimes the sticky  leads  used for monitoring during surgery or for evaluation in the emergency department are not all removed while you are in the hospital.  These sometimes have a tab or metal dot on them.  You can easily remove these on your own, like taking off a band-aid.  If there is a gel substance under the  lead , simply wipe/clean it off with a washcloth or paper towel.      Q:  What can I do to minimize constipation (very hard stools, or lack of stools)?  A:  Stay well hydrated.  Increase your dietary fiber intake or take a fiber supplement -with plenty of water.  Walk around frequently.  You may consider an over-the-counter stool-softener.  Your Pharmacist can assist you with choosing one that is stocked at your pharmacy.  Constipation is also one of the most common side effects of pain medication.  If you are using pain medication, be pro-active and try to PREVENT problems with constipation by taking the steps above BEFORE constipation becomes a problem.    Q:  What do I do if I need more pain medications?  A:  Call the office to receive refills.  Be aware that certain pain meds cannot be called into a pharmacy and actually require a paper prescription.  A change may be made in your pain med as you progress thru your recovery period or if you have side effects to certain meds.    --Pain meds are NOT refilled after 5pm on weekdays, and NOT AT ALL on the weekends, so please look ahead to prevent problems.      Q:  Why am I having a hard time  sleeping now that I am at home?  A:  Many medications you receive while you are in the hospital can impact your sleep for a number of days after your surgery/hospitalization.  Decreased level of activity and naps during the day may also make sleeping at night difficult.  Try to minimize day-time naps, and get up frequently during the day to walk around your home during your recovery time.  Sleep aides may be of some help, but are not recommended for long-term use.      Q:  I am having some back discomfort.  What should I do?  A:  This may be related to certain positioning that was required for your surgery, extended periods of time in bed, or other changes in your overall activity level.  You may try ice, heat, acetaminophen, or ibuprofen to treat this temporarily.  Note that many pain medications have acetaminophen in them and would state this on the prescription bottle.  Be sure not to exceed the maximum of 4000mg per day of acetaminophen.     **If the pain you are having does not resolve, is severe, or is a flare of back pain you have had on other occasions prior to surgery, please contact your primary physician for further recommendations or for an appointment to be examined at their office.    Q:  Why am I having headaches?  A:  Headaches can be caused by many things:  caffeine withdrawal, use of pain meds, dehydration, high blood pressure, lack of sleep, over-activity/exhaustion, flare-up of usual migraine headaches.  If you feel this is related to muscle tension (a band-like feeling around the head, or a pressure at the low-back of the head) you may try ice or heat to this area.  You may need to drink more fluids (try electrolyte drink like Gatorade), rest, or take your usual migraine medications.   **If your headaches do not resolve, worsen, are accompanied by other symptoms, or if your blood pressure is high, please call your primary physician for recommendation and/or examination.    Q:  I am unable to  urinate.  What do I do?  A:  A small percentage of people can have difficulty urinating initially after surgery.  This includes being able to urinate only a very small amount at a time and feeling discomfort or pressure in the very low abdomen.  This is called  urinary retention , and is actually an urgent situation.  Proceed to your nearest Emergency department for evaluation (not an Urgent Care Center).  Sometimes the bladder does not work correctly after certain medications you receive during surgery, or related to certain procedures.  You may need to have a catheter placed until your bladder recovers.  When planning to go to an Emergency department, it may help to call the ER to let them know you are coming in for this problem after a surgery.  This may help you get in quicker to be evaluated.  **If you have symptoms of a urinary tract infection, please contact your primary physician for the proper evaluation and treatment.          If you have other questions, please call the office Monday thru Friday between 8am and 5pm to discuss with the nurse or physician assistant.  #[phone]    There is a surgeon ON CALL on weekday evenings and over the weekend in case of urgent need only, and may be contacted at the same number.    If you are having an emergency, call 911 or proceed to your nearest emergency department.

## 2017-11-22 NOTE — CONSULTS
[facility]  Pain Service Consultation   Text Page    Date of Admission:  11/19/2017    Assessment & Plan   Melody Muñoz is a 42 year old female who was admitted on 11/19/2017. I was asked by Surgeon Roberto Robins MD  to see the patient for pain management.      Discussed case with Coby Watt MD. As patient ready for dismissal consult discontinues.    Shelia James MS, RN, CNS, APRN, ACHPN, FAACVPR  Pain and Palliative Care  Pager [phone]  Office [phone]

## 2017-11-22 NOTE — PLAN OF CARE
Problem: Patient Care Overview  Goal: Plan of Care/Patient Progress Review  PRIMARY DIAGNOSIS: ACUTE BACK PAIN  OUTPATIENT/OBSERVATION GOALS TO BE MET BEFORE DISCHARGE:    1. Adequate pain control via oral regiment Yes  2. Return to near baseline physical activity (including ADL and safe ambulation) Yes  3. Cleared for discharge by consultants (if involved) Yes  4. Safe discharge environment identified by care coordination (if unable to fully meet other goals) Yes    Please review provider order for any additional goals.     Nurse to notify provider when observation goals have been met and patient is ready for discharge.     VSS. Pain being managed with oxycodone. Loose stool noted, sample sent; Enteric precaution in place. Independently ambulating. PIV - LR @ 75 ml/hr. Getting IV Zosyn. Denies nausea. Ate boxed lunch at bedtime, tolerated well.

## 2017-11-22 NOTE — CONSULTS
INFECTIOUS DISEASE CONSULTATION       REFERRING PHYSICIAN:  Dr. Coby Watt.       INFECTIOUS IMPRESSION:   1.  A 42-year-old female with recent appendectomy, had presented with acute abdominal pain without major systemic symptoms, appendix pathology shows torsion, but no acute inflammation, she had no obvious leak or perforation of the appendix at the time of the surgery.   2.  Presents now 2 days post-appendectomy with increased fever to 103 and ongoing or worsening abdominal pain, computerized tomography scan negative, fever is resolved in the hospital, as has the initial leukocytosis, cultures are negative, not entirely clear whether some complication of appendectomy versus the initial illness is cause or something else.  No clear infection, and fever has resolved.      RECOMMENDATIONS:   1.  Unclear picture, but overall clinically improved, still having some abdominal pain, but with negative CT scan and cultures and fever resolved, acceptable to go home on Augmentin 875 b.i.d. for 10 days empiric therapy, assuming may be an early postop complication, although none is seen.   2.  Obviously if major recurrent fever or unresolving abdominal pain, further workup, reimaging, etc.      HISTORY:  Melody Muñoz is a 42-year-old female seen in consultation.  She has a history of generally being healthy without much in the way of major chronic long-term medical problems.  She has had a couple of episodes of pneumonia in the past, but otherwise not major infection problems in general.  She initially presented now a week ago onset of pain, was hospitalized and ended up with an appendectomy being done.  The appendix pathology showed only torsion, no significant inflammation.  Following the surgery she went home, then developed further fever, also increased abdominal pain.  Admission CAT scan without obvious abnormality and cultures have been negative.  Her white count has normalized and she is clinically  improved, except for some degree of lower abdominal pain that is ongoing.      PAST MEDICAL HISTORY:  No major infection problems in general, although has had 2 episodes of pneumonia in the past.      ALLERGIES:  No antibiotic allergies.      SOCIAL AND FAMILY HISTORY:  No recent travel or exposures.      REVIEW OF SYSTEMS:  Otherwise as listed above; no focal symptoms elsewhere, no respiratory or cardiovascular symptoms, fever is down and not having fever symptoms.      PHYSICAL EXAMINATION:   IN GENERAL:  The patient appears stated her age, does not look particularly toxic or ill.   VITAL SIGNS:  Currently are within normal limits including being afebrile, not tachycardic.   HEENT:  No thrush or intraoral lesions.  Pupils reactive.   NECK:  Supple and nontender without lymphadenopathy.   HEART:  Regular rhythm, no murmur.   LUNGS:  Clear bilaterally.   ABDOMEN:  Definite tenderness, more in the lower than upper abdomen, no palpable abnormalities.   EXTREMITIES:  No major rash or skin lesions.      LABORATORY:  Blood cultures negative over 48 hours.  Urinalysis unremarkable.  White count 21,000 initially, now down to 8400.      Thank you very much for this consultation.  We will follow this patient with you.         ASTRID VALADEZ MD             D: 2017 14:54   T: 2017 15:11   MT: [id number]      Name:     ZUNILDA PEARSON   MRN:      [id number]        Account:       [id number]   :      1975           Consult Date:  2017      Document: [id number]       cc: Coby Watt MD

## 2017-11-22 NOTE — PLAN OF CARE
Problem: Infection, Risk/Actual (Adult)  Goal: Infection Prevention/Resolution  Patient will demonstrate the desired outcomes by discharge/transition of care.   Outcome: Adequate for Discharge Date Met: 11/22/17  Discharge education provided to patient and patient verbalized understanding of medications and orders. Augmentin and oxycodone filled at [facility] and sent with patient.  Script for Fioricet sent with patient.  Pt will schedule post op follow up next week.  Patient discharging to home and transportation provided by mom. No further questions at this time.

## 2017-11-22 NOTE — PLAN OF CARE
Problem: Patient Care Overview  Goal: Plan of Care/Patient Progress Review  Outcome: Improving  PRIMARY DIAGNOSIS: ACUTE BACK PAIN  OUTPATIENT/OBSERVATION GOALS TO BE MET BEFORE DISCHARGE:    1. Adequate pain control via oral regiment Yes  2. Return to near baseline physical activity (including ADL and safe ambulation) Yes  3. Cleared for discharge by consultants (if involved) Yes  4. Safe discharge environment identified by care coordination (if unable to fully meet other goals) Yes    Please review provider order for any additional goals.     Nurse to notify provider when observation goals have been met and patient is ready for discharge.     VSS. Received oxy x1. C. Diff results negative. Independently ambulating. Denies nausea. Getting IV Zosyn. Patient is hoping to DC home today.

## 2017-11-25 LAB
BACTERIA SPEC CULT: NO GROWTH
BACTERIA SPEC CULT: NO GROWTH
Lab: NORMAL
Lab: NORMAL
SPECIMEN SOURCE: NORMAL
SPECIMEN SOURCE: NORMAL

## 2017-11-27 DIAGNOSIS — G89.4 CHRONIC PAIN SYNDROME: ICD-10-CM

## 2017-11-27 DIAGNOSIS — G43.709 CHRONIC MIGRAINE WITHOUT AURA WITHOUT STATUS MIGRAINOSUS, NOT INTRACTABLE: ICD-10-CM

## 2017-11-27 DIAGNOSIS — G43.709 CHRONIC MIGRAINE WITHOUT AURA WITHOUT STATUS MIGRAINOSUS, NOT INTRACTABLE: Chronic | ICD-10-CM

## 2017-11-27 RX ORDER — PREGABALIN 150 MG/1
150 CAPSULE ORAL 2 TIMES DAILY
Qty: 60 CAPSULE | Refills: 5 | Status: CANCELLED | OUTPATIENT
Start: 2017-11-27

## 2017-11-27 RX ORDER — BUTALBITAL, ACETAMINOPHEN, CAFFEINE AND CODEINE PHOSPHATE 300; 50; 40; 30 MG/1; MG/1; MG/1; MG/1
1 CAPSULE ORAL 3 TIMES DAILY PRN
Qty: 15 CAPSULE | Refills: 0 | Status: SHIPPED | OUTPATIENT
Start: 2017-11-27 | End: 2017-12-04

## 2017-11-27 RX ORDER — PREGABALIN 150 MG/1
CAPSULE ORAL
Qty: 60 CAPSULE | Refills: 3 | Status: SHIPPED | OUTPATIENT
Start: 2017-11-27 | End: 2018-01-29

## 2017-11-27 NOTE — DISCHARGE SUMMARY
[facility]    Discharge Summary  Surgery    Date of Admission:  11/19/2017  Date of Discharge:  11/22/2017  5:37 PM  Discharging Provider: Laura Marion PA-C and Coby Watt MD  Discharge Summary Note completed by: Laura Marion PA-C on 11/27/2017  Date of Service: The patient was personally seen by Discharging Providers on the day of discharge.    Discharge Diagnoses   Sepsis, uncertain etiology; resolved prior to release  Abdominal pain, s/p lap appy and excision of torsed epiploic tag  Generalized anxiety disorder   Constipation   Migraine   Nausea    History of Present Illness   Melody Muñoz is a 42 year old female who presented with fever, chills, elevated WBC with left shift, tachycardia, (Sepsis) and abdominal discomfort, 5 days postop lap appy with removal of torsed epiploic tag.  Patient was admitted for antibiotic therapy, pain control and eval of sepsis source.     Hospital Course   Melody Muñoz was admitted on 11/19/2017.  The following problems were addressed during her hospitalization:  Patient Active Problem List   Diagnosis     Generalized anxiety disorder     Constipation     Chronic migraine without aura without status migrainosus, not intractable     Nausea     Migraine     Sepsis (H)     Abdominal pain     Antibiotic therapy included: Zosyn.  Pain control: was via IV until able to tolerate PO intake and transitioned to PO pain meds.  Pain team was consulted and involved in her care throughout her hospital course.  Remarkable hospital course events: CT revealed pulmonary findings of trace pleural effusions and bibasilar atelectasis along with abdominal findings of postoperative changes of recent appendectomy without evidence of abscess.  Her leukocytosis, tachycardia, fevers/chills completely resolved within 24 hours of admission, but central low abdominal pain continued to be difficult to control on oral medication.  She also had nausea/HA (flare of  usual migraine) during admission, which complicated transition to oral pain meds.  By 11/22, HA/nausea issues were resolving and she was able to fully remain on oral pain meds without IV pain med needed.  ID was consulted to obtain recommendations for outpatient antibiotic Rx.  She was also restarted on bowel program due to narcotic use and h/o constipation.    Melody met all criteria for release on 11/22/2017  5:37 PM.  She was afebrile, tolerating diet, pain controlled on PO meds, ambulating well, and had return of bowel function.    Medications discontinued or adjusted during this hospitalization: see discharge med list below.    Antibiotics prescribed at discharge: Augmentin, Duration: 10 days     Discharge Instructions and Follow-Up:  Discharge diet: Soft/regular   Discharge activity: Return to postop restrictions.   Discharge follow-up: Follow up with surgery office PA in 2 weeks   Wound/Incision care: Ice to area for comfort  May get incision wet in shower but do not soak or scrub  Steri-strips off in 7 days       Laura Marion PA-C      Discharge Disposition   Discharged to home   Condition at discharge: Stable    Pending Results   Unresulted Labs Ordered in the Past 30 Days of this Admission     No orders found from 9/20/2017 to 11/20/2017.          Primary Care Physician   Evaristo Machado    Consultations This Hospital Stay   PAIN MANAGEMENT ADULT IP CONSULT  INFECTIOUS DISEASES IP CONSULT    Discharge Medications   Discharge Medication List as of 11/22/2017  4:52 PM      START taking these medications    Details   butalbital-APAP-caffeine-codeine (FIORICET WITH CODEINE) -50-30 MG per capsule Take 1 capsule by mouth every 8 hours, Disp-6 capsule, R-0, Local Print      amoxicillin-clavulanate (AUGMENTIN) 875-125 MG per tablet Take 1 tablet by mouth 2 times daily for 10 days, Disp-20 tablet, R-0, E-Prescribe         CONTINUE these medications which have CHANGED    Details   oxyCODONE IR  (ROXICODONE) 5 MG tablet Take 1-2 tablets (5-10 mg) by mouth every 3 hours as needed for severe pain Decrease dosing as pain improves.  Take with food to minimize side effects., Disp-30 tablet, R-0, Local Print         CONTINUE these medications which have NOT CHANGED    Details   ondansetron (ZOFRAN-ODT) 4 MG ODT tab DISSOLVE 1 TO 2 TABLETS UNDER THE TONGUE EVERY 8 HOURS AS NEEDED FOR NAUSEA, Disp-30 tablet, R-3, E-Prescribe      pantoprazole (PROTONIX) 40 MG EC tablet TAKE 1 TABLET(40 MG) BY MOUTH DAILY, Disp-90 tablet, R-1, E-Prescribe      PROAIR  (90 BASE) MCG/ACT inhaler INHALE 1 TO 2 PUFFS INTO THE LUNGS EVERY 4 HOURS AS NEEDED FOR WHEEZING, SHORTNESS OF BREATH OR DYSPNEA, Disp-51 g, R-2, E-PrescribeDue end of Nov 2017 for asthma check OV      carisoprodol (SOMA) 350 MG tablet TAKE 1 TABLET BY MOUTH THREE TIMES DAILY, Disp-90 tablet, R-5, Local Print      clonazePAM (KLONOPIN) 1 MG tablet TAKE 1 TABLET BY MOUTH THREE TIMES DAILY AS NEEDED FOR ANXIETY, Disp-90 tablet, R-0, Local Print      LYRICA 150 MG capsule TAKE ONE CAPSULE BY MOUTH TWICE DAILY, Disp-60 capsule, R-5, Local Print      metFORMIN (GLUCOPHAGE) 500 MG tablet Take 1 tablet (500 mg) by mouth 3 times daily (with meals), Disp-90 tablet, R-1, E-Prescribe      levothyroxine (SYNTHROID/LEVOTHROID) 25 MCG tablet TAKE 1 TABLET(25 MCG) BY MOUTH DAILY, Disp-90 tablet, R-3, E-Prescribe      fluticasone (FLOVENT HFA) 110 MCG/ACT inhaler Inhale 2 puffs into the lungs 2 times daily, Disp-2 Inhaler, R-3, E-Prescribe      modafinil (PROVIGIL) 200 MG tablet Take 0.5 tablets (100 mg) by mouth daily, Disp-30 tablet, R-0, No Print Out      albuterol (2.5 MG/3ML) 0.083% nebulizer solution Take 1 vial (2.5 mg) by nebulization every 6 hours as needed for shortness of breath / dyspnea or wheezing, Disp-25 vial, R-0, E-Prescribe      guanFACINE (TENEX) 2 MG tablet TAKE TWO TABLETS BY MOUTH AT BEDTIME, Disp-180 tablet, R-3, E-Prescribe      lithium (ESKALITH) 450  MG CR tablet TAKE 2 TABLETS BY MOUTH AT BEDTIME, Disp-60 tablet, R-5, E-Prescribe      lamoTRIgine (LAMICTAL) 200 MG tablet Take 1 tablet (200 mg) by mouth every morning, Disp-90 tablet, R-4, E-Prescribe      fluticasone (FLONASE) 50 MCG/ACT nasal spray Spray 2 sprays into both nostrils daily, Disp-16 g, R-11, E-Prescribe      MELATONIN PO Take 6 mg by mouth At Bedtime, Historical      Butalbital-APAP-Caff-Cod -16-30 MG CAPS Take 1 capsule by mouth 3 times daily as needed, Disp-15 capsule, R-0, Local Print      blood glucose monitoring (LAURA CONTOUR NEXT) test strip Use to test blood sugar 2 times daily or as directed., Disp-200 each, R-12, E-Prescribe      blood glucose monitoring (LAURA MICROLET) lancets Use to test blood sugar 2 times daily or as directed.  Ok to substitute alternative if insurance prefers., Disp-1 Box, R-3, E-Prescribe      meclizine (ANTIVERT) 25 MG tablet Take 1 tablet (25 mg) by mouth every 6 hours as needed for dizziness, Disp-30 tablet, R-3, E-Prescribe         STOP taking these medications       HYDROcodone-acetaminophen (NORCO) 5-325 MG per tablet Comments:   Reason for Stopping:             Allergies   Allergies   Allergen Reactions     Oxycodone GI Disturbance     Nausea.     Data   Most Recent 3 CBC's:  Recent Labs   Lab Test  11/20/17   0707  11/19/17   1032  11/14/17   1040   WBC  8.4  21.4*  11.1*   HGB  10.9*  12.2  12.1   MCV  83  82  82   PLT  349  438  343      Most Recent 3 BMP's:  Recent Labs   Lab Test  11/19/17   1032  11/14/17   1040  11/10/17   1028   NA  134  135  138   POTASSIUM  4.0  4.3  4.5   CHLORIDE  101  101  104   CO2  26  25  21   BUN  6*  10  11   CR  0.73  0.53  0.60   ANIONGAP  7  9  13   DENITA  9.0  8.8  9.8   GLC  179*  272*  159*     Most Recent 2 LFT's:  Recent Labs   Lab Test  11/19/17   1032  11/14/17   1040   AST  15  21   ALT  20  20   ALKPHOS  117  122   BILITOTAL  0.4  0.3     Most Recent 6 Bacteria Isolates From Any Culture (See EPIC Reports  for Culture Details):  Recent Labs   Lab Test  11/19/17   1120  11/19/17   1032  11/14/17   1040  05/03/15   2038  03/26/14   0652  03/25/14   2153   CULT  No growth  No growth  <10,000 colonies/mL  mixed urogenital carmen    Susceptibility testing not routinely done  No growth  Canceled, Test credited >10 Squamous epithelial cells/low power field indicates oral contamination.  Please recollect.*  No growth     Results for orders placed or performed during the hospital encounter of 11/19/17   CT Abdomen Pelvis w Contrast    Narrative    CT ABDOMEN AND PELVIS WITH CONTRAST   11/19/2017 11:14 AM     HISTORY: Recent lap appy, now abdominal pain and fever.     TECHNIQUE:   100 mL Isovue-370. Radiation dose for this scan was  reduced using automated exposure control, adjustment of the mA and/or  kV according to patient size, or iterative reconstruction technique.    COMPARISON: 11/14/2017.    FINDINGS:   There is mild dependent atelectasis in both lung bases.  Trace pleural effusions. The liver, spleen, pancreas, adrenal glands,  and kidneys are unremarkable. Postoperative changes in the anterior  abdominal wall, including a small amount of subcutaneous gas.  Postoperative changes of appendectomy. There is no evidence of  abscess. No free intraperitoneal gas. Large and small bowel are normal  in caliber.      Impression    IMPRESSION:   1. Postoperative changes of recent appendectomy. No evidence of  abscess.  2. Trace pleural effusions and bibasilar atelectasis.    REID ACOSTA MD   Chest XR,  PA & LAT    Narrative    CHEST TWO VIEWS 11/19/2017 11:59 AM     COMPARISON: Two-view chest x-ray 3/9/2016.    HISTORY: Fever postoperative.     FINDINGS: The cardiac silhouette, pulmonary vasculature, lungs and  pleural spaces are within normal limits.      Impression    IMPRESSION: Clear lungs.    NAT MENDES MD

## 2017-11-27 NOTE — TELEPHONE ENCOUNTER
Reason for Call:  Medication or medication refill:  Do you use a [facility]?  Name of the pharmacy and phone number for the current request:  [facility] [address] [place] [address] AT [place] [address]  Name of the medication requested: Butalbital-APAP-Caff-Cod -30-30 MG CAPS and LYRICA 150 MG capsule  Other request: none  Can we leave a detailed message on this number? YES  Phone number patient can be reached at: Home number on file [phone] (home)  Best Time: any  Call taken on 11/27/2017 at 8:38 AM by BREANN WARREN

## 2017-11-27 NOTE — TELEPHONE ENCOUNTER
Controlled Substance Refill Request for Fioricet with   Codeine    Last OV  11-10-17  Last refill  11-22-17  #6 only      Problem List Complete:  Yes      fioricet plain Q 6 hrs, no more than 4/day and gets 20 at a time      CSA on file from Dr Husain, dated 9/8/15  (update with current primary care provider)  Last  check - 11-27-17 provider to review=- multiple meds from multiple providers   fax to [place]     checked in past 6 months?

## 2017-11-27 NOTE — TELEPHONE ENCOUNTER
Controlled Substance Refill Request for Lyrica    Last OV  11-10-17  Last refill  6-16-17  #60 with 5 rfs      Problem List Complete:  Yes    Patient is followed by VIRAL Deleon for ongoing prescription of pain medication.  All refills should be approved by this provider, or covering partner.    Medication(s):  Lyrica 150 mg bid, Fioricet with codeine prn, klonopin and ambien to be prescribed by psych, not [facility].  Maximum quantity per month: 60, 20  Clinic visit frequency required: Q 3 months     Controlled substance agreement on file: Yes       Date(s): 9/8/2015  New CSA needed.  Pain Clinic evaluation in the past: No    DIRE Total Score(s):  No flowsheet data found.    Last [facility] website verification: 11-27-17 multiple meds from multiple outside providers [email or web address]

## 2017-11-29 ENCOUNTER — TELEPHONE (OUTPATIENT)
Dept: SURGERY | Facility: CLINIC | Age: 42
End: 2017-11-29

## 2017-11-30 ENCOUNTER — OFFICE VISIT (OUTPATIENT)
Dept: SURGERY | Facility: CLINIC | Age: 42
End: 2017-11-30
Payer: COMMERCIAL

## 2017-11-30 VITALS
BODY MASS INDEX: 36.8 KG/M2 | DIASTOLIC BLOOD PRESSURE: 78 MMHG | SYSTOLIC BLOOD PRESSURE: 122 MMHG | OXYGEN SATURATION: 97 % | RESPIRATION RATE: 16 BRPM | WEIGHT: 200 LBS | HEIGHT: 62 IN | HEART RATE: 102 BPM

## 2017-11-30 DIAGNOSIS — Z09 SURGICAL FOLLOWUP VISIT: Primary | ICD-10-CM

## 2017-11-30 DIAGNOSIS — L03.316 CELLULITIS OF UMBILICUS: ICD-10-CM

## 2017-11-30 PROCEDURE — 99024 POSTOP FOLLOW-UP VISIT: CPT | Performed by: PHYSICIAN ASSISTANT

## 2017-11-30 RX ORDER — CEPHALEXIN 500 MG/1
500 CAPSULE ORAL 3 TIMES DAILY
Qty: 21 CAPSULE | Refills: 0 | Status: SHIPPED | OUTPATIENT
Start: 2017-11-30 | End: 2017-12-07

## 2017-11-30 NOTE — PROGRESS NOTES
[facility] Note     Subjective:  Melody Muñoz is here for her first postoperative visit. She underwent a laparoscopic appendectomy and resection of epiploic mass by Dr. Robins on 11/14/17. It is of note that she was subsequently readmitted to [facility] on 11/19/17 for sepsis. Today she  tells me she has some redness and pain in her incision. She is currently taking a course of Augmentin. She is having formed BMs. Voiding independently. Tolerating a diet. She denies fevers/chills/sweats. Using tylenol for discomfort. She was given 30 tabs of oxycodone on 11/22/17 but states after 3 days she threw them away. She requests a refill at this time.    Objective:  Abd - soft, mild central tenderness, non-distended  Inc - c/d/i, +erythema (outlined with marker), +healing ridge, +tenderness, no drainage, negative valsalva.    Assessment:  Cellulitis  S/p laparoscopic appendectomy and removal of torsed epiploic tag. The pathology confirms no acute appendicitis seen. Appendix epiploica with inflammation and fibrosis consistent with torsed epiploica.    Plan:  Rx: Keflex for cellulitis, will monitor closely  Pain Mgmt: No narcotics given at this time. She was instructed to take 1,000mg of Tylenol TID, but can have up to 4,000mg maximum in a day. Cool packs throughout the day for discomfort.  She will follow up with Dr. Robins next week if she is still having pain and/or redness that is not resolving.      Abhishek Garcia PA-C  11/30/2017      Please route or send letter to:  Primary Care Provider (PCP)

## 2017-11-30 NOTE — MR AVS SNAPSHOT
After Visit Summary   11/30/2017    Melody Muñoz    MRN: [id number]           Patient Information     Date Of Birth          1975        Visit Information        Provider Department      11/30/2017 2:15 PM Abhishek Garcia PA-C [facility] General Surgery      Today's Diagnoses     Surgical followup visit    -  1    Cellulitis of umbilicus           Follow-ups after your visit        Follow-up notes from your care team     Return if symptoms worsen or fail to improve.      Your next 10 appointments already scheduled     Dec 04, 2017 10:45 AM CST   Office Visit with Evaristo Machado MD   [facility]    [address]  [address]  [facility] [id number] [phone]           Bring a current list of meds and any records pertaining to this visit. For Physicals, please bring immunization records and any forms needing to be filled out. Please arrive 10 minutes early to complete paperwork.            Dec 07, 2017  4:15 PM CST   Post Op with Roberto Robins MD   [facility]    [address]  [facility] [address] [phone]              Who to contact     If you have questions or need follow up information about today's clinic visit or your schedule please contact [facility] directly at [phone].  Normal or non-critical lab and imaging results will be communicated to you by MyChart, letter or phone within 4 business days after the clinic has received the results. If you do not hear from us within 7 days, please contact the clinic through MyChart or phone. If you have a critical or abnormal lab result, we will notify you by phone as soon as possible.  Submit refill requests through [employer] or call your pharmacy and they will forward the refill  "request to us. Please allow 3 business days for your refill to be completed.          Additional Information About Your Visit        [employer] Information     [employer] gives you secure access to your electronic health record. If you see a primary care provider, you can also send messages to your care team and make appointments. If you have questions, please call your primary care clinic.  If you do not have a primary care provider, please call [phone] and they will assist you.        Care EveryWhere ID     This is your Care EveryWhere ID. This could be used by other organizations to access your [place] medical records  [id number]        Your Vitals Were     Pulse Respirations Height Last Period Pulse Oximetry Breastfeeding?    102 16 5' 2\" (1.575 m) (LMP Unknown) 97% No    BMI (Body Mass Index)                   36.58 kg/m2            Blood Pressure from Last 3 Encounters:   11/30/17 122/78   11/22/17 121/78   11/14/17 123/72    Weight from Last 3 Encounters:   11/30/17 200 lb (90.7 kg)   11/19/17 200 lb (90.7 kg)   11/10/17 200 lb (90.7 kg)              Today, you had the following     No orders found for display         Today's Medication Changes          These changes are accurate as of: 11/30/17  2:36 PM.  If you have any questions, ask your nurse or doctor.               Start taking these medicines.        Dose/Directions    cephALEXin 500 MG capsule   Commonly known as:  KEFLEX   Used for:  Cellulitis of umbilicus   Started by:  Abhishek Garcia PA-C        Dose:  500 mg   Take 1 capsule (500 mg) by mouth 3 times daily for 7 days   Quantity:  21 capsule   Refills:  0            Where to get your medicines      [facility] [address] [facility] [address] AT [place] [address]  [address] [facility] [address]    Hours:  24-hours Phone:  [phone]     cephALEXin 500 MG capsule                Primary Care Provider Office " Phone # Fax #    Evaristo Machado [phone] [address] [facility] [address]        Equal Access to Services     SANJIVDAVID CASSANDRA : Hadalexey micaela ku tino Sopatali, waaxda luqadaha, qaybta kaalmada ademaliha, aida williamviv maxi. So [facility] [phone].    ATENCIÓN: Si habla español, tiene a mendoza disposición servicios gratuitos de asistencia lingüística. Llame al [phone].    We comply with applicable federal civil rights laws and [place] laws. We do not discriminate on the basis of race, color, national origin, age, disability, sex, sexual orientation, or gender identity.            Thank you!     Thank you for choosing [facility]  for your care. Our goal is always to provide you with excellent care. Hearing back from our patients is one way we can continue to improve our services. Please take a few minutes to complete the written survey that you may receive in the mail after your visit with us. Thank you!             Your Updated Medication List - Protect others around you: Learn how to safely use, store and throw away your medicines at www.disposemymeds.org.          This list is accurate as of: 11/30/17  2:36 PM.  Always use your most recent med list.                   Brand Name Dispense Instructions for use Diagnosis    * albuterol (2.5 MG/3ML) 0.083% neb solution     25 vial    Take 1 vial (2.5 mg) by nebulization every 6 hours as needed for shortness of breath / dyspnea or wheezing    Mild persistent asthma without complication       * PROAIR  (90 BASE) MCG/ACT Inhaler   Generic drug:  albuterol     51 g    INHALE 1 TO 2 PUFFS INTO THE LUNGS EVERY 4 HOURS AS NEEDED FOR WHEEZING, SHORTNESS OF BREATH OR DYSPNEA    Intermittent asthma, uncomplicated       amoxicillin-clavulanate 875-125 MG per tablet    AUGMENTIN    20 tablet    Take 1 tablet by mouth 2 times daily for 10 days    Lower abdominal pain, Severe sepsis (H)        blood glucose monitoring lancets     1 Box    Use to test blood sugar 2 times daily or as directed.  Ok to substitute alternative if insurance prefers.    Diabetes mellitus without complication (H)       blood glucose monitoring test strip    LAURA CONTOUR NEXT    200 each    Use to test blood sugar 2 times daily or as directed.    Diabetes mellitus without complication (H)       * butalbital-APAP-caffeine-codeine -67-30 MG per capsule    FIORICET WITH codeine    6 capsule    Take 1 capsule by mouth every 8 hours    Chronic migraine without aura without status migrainosus, not intractable       * Butalbital-APAP-Caff-Cod -27-30 MG Caps     15 capsule    Take 1 capsule by mouth 3 times daily as needed    Chronic migraine without aura without status migrainosus, not intractable       carisoprodol 350 MG tablet    SOMA    90 tablet    TAKE 1 TABLET BY MOUTH THREE TIMES DAILY    Chronic pain syndrome       cephALEXin 500 MG capsule    KEFLEX    21 capsule    Take 1 capsule (500 mg) by mouth 3 times daily for 7 days    Cellulitis of umbilicus       clonazePAM 1 MG tablet    klonoPIN    90 tablet    TAKE 1 TABLET BY MOUTH THREE TIMES DAILY AS NEEDED FOR ANXIETY    Generalized anxiety disorder       fluticasone 110 MCG/ACT Inhaler    FLOVENT HFA    2 Inhaler    Inhale 2 puffs into the lungs 2 times daily    Mild persistent asthma without complication       fluticasone 50 MCG/ACT spray    FLONASE    16 g    Spray 2 sprays into both nostrils daily    Chronic maxillary sinusitis       guanFACINE 2 MG tablet    TENEX    180 tablet    TAKE TWO TABLETS BY MOUTH AT BEDTIME    Nightmares       lamoTRIgine 200 MG tablet    LAMICTAL    90 tablet    Take 1 tablet (200 mg) by mouth every morning    Bipolar 1 disorder (H)       levothyroxine 25 MCG tablet    SYNTHROID/LEVOTHROID    90 tablet    TAKE 1 TABLET(25 MCG) BY MOUTH DAILY    Acquired hypothyroidism       lithium 450 MG CR tablet    ESKALITH    60 tablet    TAKE 2  TABLETS BY MOUTH AT BEDTIME    Bipolar disorder with psychotic features (H)       LYRICA 150 MG capsule   Generic drug:  pregabalin     60 capsule    TAKE ONE CAPSULE BY MOUTH TWICE DAILY    Chronic pain syndrome       meclizine 25 MG tablet    ANTIVERT    30 tablet    Take 1 tablet (25 mg) by mouth every 6 hours as needed for dizziness    Dizziness       MELATONIN PO      Take 6 mg by mouth At Bedtime        metFORMIN 500 MG tablet    GLUCOPHAGE    90 tablet    Take 1 tablet (500 mg) by mouth 3 times daily (with meals)    Type 2 diabetes mellitus without complication, without long-term current use of insulin (H)       modafinil 200 MG tablet    PROVIGIL    30 tablet    Take 0.5 tablets (100 mg) by mouth daily    Chronic fatigue       ondansetron 4 MG ODT tab    ZOFRAN-ODT    30 tablet    DISSOLVE 1 TO 2 TABLETS UNDER THE TONGUE EVERY 8 HOURS AS NEEDED FOR NAUSEA    Nausea       oxyCODONE IR 5 MG tablet    ROXICODONE    30 tablet    Take 1-2 tablets (5-10 mg) by mouth every 3 hours as needed for severe pain Decrease dosing as pain improves.  Take with food to minimize side effects.    Severe sepsis (H), Lower abdominal pain       pantoprazole 40 MG EC tablet    PROTONIX    90 tablet    TAKE 1 TABLET(40 MG) BY MOUTH DAILY    Other chronic gastritis without hemorrhage       * Notice:  This list has 4 medication(s) that are the same as other medications prescribed for you. Read the directions carefully, and ask your doctor or other care provider to review them with you.

## 2017-11-30 NOTE — LETTER
2017    Re: Melody Muñoz - 1975    Melody Muñoz is here for her first postoperative visit. She underwent a laparoscopic appendectomy and resection of epiploic mass by Dr. Robins on 17. It is of note that she was subsequently readmitted to [facility] on 17 for sepsis. Today she  tells me she has some redness and pain in her incision. She is currently taking a course of Augmentin. She is having formed BMs. Voiding independently. Tolerating a diet. She denies fevers/chills/sweats. Using tylenol for discomfort. She was given 30 tabs of oxycodone on 17 but states after 3 days she threw them away. She requests a refill at this time.     Objective:  Abd - soft, mild central tenderness, non-distended  Inc - c/d/i, +erythema (outlined with marker), +healing ridge, +tenderness, no drainage, negative valsalva.     Assessment:  Cellulitis  S/p laparoscopic appendectomy and removal of torsed epiploic tag. The pathology confirms no acute appendicitis seen. Appendix epiploica with inflammation and fibrosis consistent with torsed epiploica.     Plan:  Rx: Keflex for cellulitis, will monitor closely  Pain Mgmt: No narcotics given at this time. She was instructed to take 1,000mg of Tylenol TID, but can have up to 4,000mg maximum in a day. Cool packs throughout the day for discomfort.  She will follow up with Dr. Robins next week if she is still having pain and/or redness that is not resolving.        Abhishek Garcia PA-C

## 2017-12-04 ENCOUNTER — OFFICE VISIT (OUTPATIENT)
Dept: FAMILY MEDICINE | Facility: CLINIC | Age: 42
End: 2017-12-04
Payer: COMMERCIAL

## 2017-12-04 ENCOUNTER — TELEPHONE (OUTPATIENT)
Dept: FAMILY MEDICINE | Facility: CLINIC | Age: 42
End: 2017-12-04

## 2017-12-04 VITALS
SYSTOLIC BLOOD PRESSURE: 136 MMHG | RESPIRATION RATE: 16 BRPM | TEMPERATURE: 98.8 F | DIASTOLIC BLOOD PRESSURE: 88 MMHG | HEART RATE: 108 BPM | WEIGHT: 205 LBS | BODY MASS INDEX: 37.49 KG/M2

## 2017-12-04 DIAGNOSIS — G43.709 CHRONIC MIGRAINE WITHOUT AURA WITHOUT STATUS MIGRAINOSUS, NOT INTRACTABLE: ICD-10-CM

## 2017-12-04 DIAGNOSIS — L03.316 CELLULITIS OF UMBILICUS: ICD-10-CM

## 2017-12-04 PROCEDURE — 99214 OFFICE O/P EST MOD 30 MIN: CPT | Performed by: FAMILY MEDICINE

## 2017-12-04 RX ORDER — BUTALBITAL, ACETAMINOPHEN, CAFFEINE AND CODEINE PHOSPHATE 300; 50; 40; 30 MG/1; MG/1; MG/1; MG/1
1 CAPSULE ORAL 3 TIMES DAILY PRN
Qty: 15 CAPSULE | Refills: 3 | Status: SHIPPED | OUTPATIENT
Start: 2017-12-04 | End: 2018-01-31

## 2017-12-04 NOTE — NURSING NOTE
"Chief Complaint   Patient presents with     Hospital F/U       Initial /88  Pulse 108  Temp 98.8  F (37.1  C) (Tympanic)  Resp 16  Wt 205 lb (93 kg)  LMP  (LMP Unknown)  BMI 37.49 kg/m2 Estimated body mass index is 37.49 kg/(m^2) as calculated from the following:    Height as of 11/30/17: 5' 2\" (1.575 m).    Weight as of this encounter: 205 lb (93 kg).  Medication Reconciliation: complete     Genesis Stone CMA      "

## 2017-12-04 NOTE — PROGRESS NOTES
SUBJECTIVE:   Melody Muñoz is a 42 year old female who presents to clinic today for the following health issues:          Hospital Follow-up Visit:    Hospital/Nursing Home/IP Rehab Facility: [facility]  Date of Admission: 11/19/17  Date of Discharge: 11/22/17  Reason(s) for Admission: abdominal pain, sepsis            Problems taking medications regularly:  None       Medication changes since discharge: None       Problems adhering to non-medication therapy:  None    Summary of hospitalization:  [facility] discharge summary reviewed  Diagnostic Tests/Treatments reviewed.  Follow up needed: none  Other Healthcare Providers Involved in Patient s Care:         Surgical follow-up appointment - this Thursday  Update since discharge: improved.     Post Discharge Medication Reconciliation: discharge medications reconciled, continue medications without change.  Plan of care communicated with patient     Coding guidelines for this visit:  Type of Medical   Decision Making Face-to-Face Visit       within 7 Days of discharge Face-to-Face Visit        within 14 days of discharge   Moderate Complexity [id number]   High Complexity [id number]                  Problem list and histories reviewed & adjusted, as indicated.  Additional history: as documented    Patient Active Problem List   Diagnosis     Endometriosis s/po EDWIGE 10-17     Mantoux: positive     Latent tuberculosis     Major depression     Generalized anxiety disorder     Constipation     Nightmares     Knee pain     Bipolar 1 disorder  with psychosis     Chronic fatigue     Female stress incontinence     Drug-seeking behavior     Vertigo     Suicidal ideation     Acne     Chronic pain syndrome     Insomnia, unspecified     Chronic migraine without aura without status migrainosus, not intractable     Nausea     JASWANT (obstructive sleep apnea)     Elevated liver enzymes     TMJ (temporomandibular joint syndrome)     Tobacco use disorder      Hypothyroidism due to acquired atrophy of thyroid     Hostile behavior     Mild persistent asthma without complication     Diabetes mellitus without complication (H)     Allergic rhinitis     Incontinence of urine in female     Migraine     Screening for diabetic peripheral neuropathy     Need for prophylactic vaccination against Streptococcus pneumoniae (pneumococcus)     Sepsis (H)     Abdominal pain     Past Surgical History:   Procedure Laterality Date     BIOPSY       COLONOSCOPY       GENITOURINARY SURGERY  May/2014    bladder sling     GYN SURGERY      laparoscopy- endometriosis     GYN SURGERY       for twins     LAPAROSCOPIC APPENDECTOMY N/A 2017    Procedure: LAPAROSCOPIC APPENDECTOMY;  LAPAROSCOPIC APPENDECTOMY and resection of epiploic tag;  Surgeon: Roberto Robins MD;  Location: [facility] OR     little finger surgery left       tubal ligation bilateral       wisdom teeth removal         Social History   Substance Use Topics     Smoking status: Current Some Day Smoker     Packs/day: 0.25     Years: 17.00     Types: Cigarettes     Smokeless tobacco: Never Used     Alcohol use No      Comment: no etoh since      Family History   Problem Relation Age of Onset     Hypertension Mother      HEART DISEASE Father      palpitations     Depression Sister      Anxiety Disorder Sister      HEART DISEASE Maternal Grandmother      CHF     CANCER Maternal Grandfather 72     Pancreatic Cancer     Alzheimer Disease Paternal Grandfather      Bipolar Disorder Other      Autism Spectrum Disorder Other      DIABETES No family hx of      Coronary Artery Disease No family hx of      Hyperlipidemia No family hx of      CEREBROVASCULAR DISEASE No family hx of      Breast Cancer No family hx of      Colon Cancer No family hx of      Prostate Cancer No family hx of      Other Cancer No family hx of      MENTAL ILLNESS No family hx of      Substance Abuse No family hx of      Anesthesia Reaction No family  hx of      Asthma No family hx of      OSTEOPOROSIS No family hx of      Genetic Disorder No family hx of      Thyroid Disease No family hx of      Obesity No family hx of      Unknown/Adopted No family hx of              Reviewed and updated as needed this visit by clinical staffTobacco  Allergies  Meds  Med Hx  Surg Hx  Fam Hx  Soc Hx      Reviewed and updated as needed this visit by Provider         ROS:  Constitutional, neuro, ENT, endocrine, pulmonary, cardiac, gastrointestinal, genitourinary, musculoskeletal, integument and psychiatric systems are negative, except as otherwise noted.      OBJECTIVE:                                                    /88  Pulse 108  Temp 98.8  F (37.1  C) (Tympanic)  Resp 16  Wt 205 lb (93 kg)  LMP  (LMP Unknown)  BMI 37.49 kg/m2  Body mass index is 37.49 kg/(m^2).  GENERAL APPEARANCE: healthy, alert and no distress  ABDOMEN: The abdomen is soft and nontender.  A line has been drawn around the previous redness and swelling by the previous PA that saw the patient.  There is no extension beyond that and may have actually receded some lines that were drawn before.  However, it is still red and slightly swollen.         ASSESSMENT/PLAN:                                                        ICD-10-CM    1. Cellulitis of umbilicus L03.316    2. Chronic migraine without aura without status migrainosus, not intractable G43.709 Butalbital-APAP-Caff-Cod -73-30 MG CAPS       Patient Instructions   The patient is been having more migraines of late.  I did refill her butalbital prescription so that she can get 30 per month over the next 3 months.  I did that with 15 at a time visits which he has been getting in the past.  We clarified that on 12/5 with the pharmacy.    With respect to her infection around the umbilicus I am suspicious she will need another course of cephalexin.  She has enough antibiotics to last until she sees Dr. Contreras on Thursday.  I did not  refill her medications today.  Her infection appears to be holding steady but has not dramatically improved.  She still has a little bit of redness and swelling.  I will defer to Dr. Contreras as to whether or not she needs further antibiotics.  She is nontender in that area.  That was an area where she had a piercing prior to her surgery which may be contributing to the ongoing infection.  She will follow-up with us on her previous schedule at a minimum in 3 months.      Evaristo Machado MD  [facility]

## 2017-12-04 NOTE — TELEPHONE ENCOUNTER
Pharmacy was stating this was too early to fill, but dr. Machado states that it is fine to fill today. I let the pharmacy know this.

## 2017-12-04 NOTE — TELEPHONE ENCOUNTER
Reason for Call:  MED CLARIFICATION    Detailed comments: NEEDS CLARIFICATION ON THE Butalbital-APAP-Caff-Cod -30-30 MG     Phone Number Patient can be reached at: Other phone number:  [phone] PHARMACY    Best Time: ASAP    Can we leave a detailed message on this number? YES    Call taken on 12/4/2017 at 11:45 AM by MARIAN FUENTES

## 2017-12-05 ENCOUNTER — TELEPHONE (OUTPATIENT)
Dept: FAMILY MEDICINE | Facility: CLINIC | Age: 42
End: 2017-12-05

## 2017-12-05 NOTE — TELEPHONE ENCOUNTER
Patient was called she would like to fill more than 15 tabs a month. Huddled with Dr. Machado, pt may fill the 15tabs and get one more refill in a month for a total of 30tabs per month. Pt agrees with this plan.

## 2017-12-05 NOTE — TELEPHONE ENCOUNTER
Reason for Call:  Other prescription    Detailed comments:   Butalbital-APAP-Caff-Cod -22-30 MG CAPS   Patient states newest prescription for this states one fill per month    She wants to have it refilled more frequently if it is necessary   Please call her to discuss    Phone Number Patient can be reached at: Home number on file [phone] (home)    Best Time:   anytime    Can we leave a detailed message on this number? YES    Call taken on 12/5/2017 at 3:01 PM by KYLER DE LA PAZ

## 2017-12-05 NOTE — TELEPHONE ENCOUNTER
Patient called back, requested these directions be called into [facility] in [place] [phone].    [facility] was called for clarification. Huddled with terri Carter for 2 refills a month for a total of 30tabs a month. Pharmacy will make this change.

## 2017-12-06 NOTE — PATIENT INSTRUCTIONS
The patient is been having more migraines of late.  I did refill her butalbital prescription so that she can get 30 per month over the next 3 months.  I did that with 15 at a time visits which he has been getting in the past.  We clarified that on 12/5 with the pharmacy.    With respect to her infection around the umbilicus I am suspicious she will need another course of cephalexin.  She has enough antibiotics to last until she sees Dr. Contreras on Thursday.  I did not refill her medications today.  Her infection appears to be holding steady but has not dramatically improved.  She still has a little bit of redness and swelling.  I will defer to Dr. Contreras as to whether or not she needs further antibiotics.  She is nontender in that area.  That was an area where she had a piercing prior to her surgery which may be contributing to the ongoing infection.  She will follow-up with us on her previous schedule at a minimum in 3 months.

## 2017-12-07 ENCOUNTER — OFFICE VISIT (OUTPATIENT)
Dept: SURGERY | Facility: CLINIC | Age: 42
End: 2017-12-07
Payer: COMMERCIAL

## 2017-12-07 VITALS
OXYGEN SATURATION: 96 % | RESPIRATION RATE: 16 BRPM | HEART RATE: 102 BPM | DIASTOLIC BLOOD PRESSURE: 80 MMHG | BODY MASS INDEX: 37.73 KG/M2 | WEIGHT: 205 LBS | HEIGHT: 62 IN | SYSTOLIC BLOOD PRESSURE: 140 MMHG

## 2017-12-07 DIAGNOSIS — Z09 SURGICAL FOLLOWUP VISIT: Primary | ICD-10-CM

## 2017-12-07 PROCEDURE — 99024 POSTOP FOLLOW-UP VISIT: CPT | Performed by: SURGERY

## 2017-12-07 RX ORDER — OXYCODONE HYDROCHLORIDE 5 MG/1
5 TABLET ORAL EVERY 6 HOURS PRN
Qty: 15 TABLET | Refills: 0 | Status: SHIPPED | OUTPATIENT
Start: 2017-12-07 | End: 2017-12-22

## 2017-12-07 RX ORDER — CEPHALEXIN 500 MG/1
500 CAPSULE ORAL 3 TIMES DAILY
Qty: 30 CAPSULE | Refills: 0 | Status: SHIPPED | OUTPATIENT
Start: 2017-12-07 | End: 2018-04-23

## 2017-12-07 ASSESSMENT — PAIN SCALES - GENERAL: PAINLEVEL: MODERATE PAIN (4)

## 2017-12-07 NOTE — PROGRESS NOTES
She returns today in follow-up after laparoscopic appendectomy and removal of a torsed epiploic tags from her right colon.  Pathology showed no evidence for appendicitis but it does show a torsed epiploic tag.  She is having problems at her supraumbilical incision, she has developed redness here.  This extends for about 1.5 cm around the incision.  There is a small amount of superficial separation.  There is no palpable underlying abscess and no expressible fluid.  She describes significant tenderness at this site and is requesting pain medication.    Exam: As above, there is a 1.5 cm area of old redness surrounding her incision.  There is no purulence or palpable underlying abscess cavity.  There is a small area of superficial separation which should heal well by secondary intent.  Her abdomen is otherwise obese and protuberant but nontender.  She reports normal GI function.    Impression/plan: While its unclear whether this represents active infection, I think it would be most prudent to continue her on cephalexin for another 7-10 days.  Additionally, she is requesting pain medication and I think it's reasonable to give her a small amount.  I did stress to her that this should not be taken at the same time as her butalbital as this also contains oxycodone.  I've asked her to use this only one pill at a time and primarily taken at night when she needs to go to sleep.  I've asked her to return to our office in 7-10 days for a follow-up to check on her progress.    Roberto Robins MD  12/7/2017 4:28 PM    Please route or send letter to:  Primary Care Provider (PCP) and Include Progress Note

## 2017-12-07 NOTE — LETTER
2017    Re: Melody Muñoz, : 1975    She returns today in follow-up after laparoscopic appendectomy and removal of a torsed epiploic tags from her right colon.  Pathology showed no evidence for appendicitis but it does show a torsed epiploic tag.  She is having problems at her supraumbilical incision, she has developed redness here.  This extends for about 1.5 cm around the incision.  There is a small amount of superficial separation.  There is no palpable underlying abscess and no expressible fluid.  She describes significant tenderness at this site and is requesting pain medication.     Exam: As above, there is a 1.5 cm area of old redness surrounding her incision.  There is no purulence or palpable underlying abscess cavity.  There is a small area of superficial separation which should heal well by secondary intent.  Her abdomen is otherwise obese and protuberant but nontender.  She reports normal GI function.     Impression/plan: While its unclear whether this represents active infection, I think it would be most prudent to continue her on cephalexin for another 7-10 days.  Additionally, she is requesting pain medication and I think it's reasonable to give her a small amount.  I did stress to her that this should not be taken at the same time as her butalbital as this also contains oxycodone.  I've asked her to use this only one pill at a time and primarily taken at night when she needs to go to sleep.  I've asked her to return to our office in 7-10 days for a follow-up to check on her progress.     Roberto Robins MD

## 2017-12-07 NOTE — MR AVS SNAPSHOT
"              After Visit Summary   12/7/2017    Melody Muñoz    MRN: [id number]           Patient Information     Date Of Birth          1975        Visit Information        Provider Department      12/7/2017 4:15 PM Roberto Robins MD [facility] General Surgery      Today's Diagnoses     Surgical followup visit    -  1       Follow-ups after your visit        Who to contact     If you have questions or need follow up information about today's clinic visit or your schedule please contact [facility] directly at [phone].  Normal or non-critical lab and imaging results will be communicated to you by ClickBushart, letter or phone within 4 business days after the clinic has received the results. If you do not hear from us within 7 days, please contact the clinic through [employer] or phone. If you have a critical or abnormal lab result, we will notify you by phone as soon as possible.  Submit refill requests through [employer] or call your pharmacy and they will forward the refill request to us. Please allow 3 business days for your refill to be completed.          Additional Information About Your Visit        MyChart Information     [employer] gives you secure access to your electronic health record. If you see a primary care provider, you can also send messages to your care team and make appointments. If you have questions, please call your primary care clinic.  If you do not have a primary care provider, please call [phone] and they will assist you.        Care EveryWhere ID     This is your Care EveryWhere ID. This could be used by other organizations to access your [facility] medical records  [id number]        Your Vitals Were     Pulse Respirations Height Last Period Pulse Oximetry Breastfeeding?    102 16 5' 2\" (1.575 m) (LMP Unknown) 96% No    BMI (Body Mass Index)                   37.49 kg/m2            Blood Pressure from Last 3 " Encounters:   12/07/17 140/80   12/04/17 136/88   11/30/17 122/78    Weight from Last 3 Encounters:   12/07/17 205 lb (93 kg)   12/04/17 205 lb (93 kg)   11/30/17 200 lb (90.7 kg)              Today, you had the following     No orders found for display         Today's Medication Changes          These changes are accurate as of: 12/7/17  4:32 PM.  If you have any questions, ask your nurse or doctor.               Start taking these medicines.        Dose/Directions    cephALEXin 500 MG capsule   Commonly known as:  KEFLEX   Used for:  Surgical followup visit   Started by:  Roberto Robins MD        Dose:  500 mg   Take 1 capsule (500 mg) by mouth 3 times daily   Quantity:  30 capsule   Refills:  0         These medicines have changed or have updated prescriptions.        Dose/Directions    * oxyCODONE IR 5 MG tablet   Commonly known as:  ROXICODONE   This may have changed:  Another medication with the same name was added. Make sure you understand how and when to take each.   Used for:  Severe sepsis (H), Lower abdominal pain        Dose:  5-10 mg   Take 1-2 tablets (5-10 mg) by mouth every 3 hours as needed for severe pain Decrease dosing as pain improves.  Take with food to minimize side effects.   Quantity:  30 tablet   Refills:  0       * oxyCODONE IR 5 MG tablet   Commonly known as:  ROXICODONE   This may have changed:  You were already taking a medication with the same name, and this prescription was added. Make sure you understand how and when to take each.   Used for:  Surgical followup visit   Changed by:  Roberto Robins MD        Dose:  5 mg   Take 1 tablet (5 mg) by mouth every 6 hours as needed for moderate to severe pain   Quantity:  15 tablet   Refills:  0       * Notice:  This list has 2 medication(s) that are the same as other medications prescribed for you. Read the directions carefully, and ask your doctor or other care provider to review them with you.         Where to get your medicines       Some of these will need a paper prescription and others can be bought over the counter.  Ask your nurse if you have questions.     Bring a paper prescription for each of these medications     cephALEXin 500 MG capsule    oxyCODONE IR 5 MG tablet                Primary Care Provider Office Phone # Fax #    Evaristo Machado [phone] [address]  [facility] [address]        Equal Access to Services     DAVID TRUJILLO : Hadii aad ku hadasho Soomaali, waaxda luqadaha, qaybta kaalmada adeegyada, waxay idiin hayaan adeeg pauline lanithinviv . So [facility] [phone].    ATENCIÓN: Si habla español, tiene a mendoza disposición servicios gratuitos de asistencia lingüística. Lydiaame al [phone].    We comply with applicable federal civil rights laws and [place] laws. We do not discriminate on the basis of race, color, national origin, age, disability, sex, sexual orientation, or gender identity.            Thank you!     Thank you for choosing [facility]  for your care. Our goal is always to provide you with excellent care. Hearing back from our patients is one way we can continue to improve our services. Please take a few minutes to complete the written survey that you may receive in the mail after your visit with us. Thank you!             Your Updated Medication List - Protect others around you: Learn how to safely use, store and throw away your medicines at www.disposemymeds.org.          This list is accurate as of: 12/7/17  4:32 PM.  Always use your most recent med list.                   Brand Name Dispense Instructions for use Diagnosis    * albuterol (2.5 MG/3ML) 0.083% neb solution     25 vial    Take 1 vial (2.5 mg) by nebulization every 6 hours as needed for shortness of breath / dyspnea or wheezing    Mild persistent asthma without complication       * PROAIR  (90 BASE) MCG/ACT Inhaler   Generic drug:  albuterol     51 g    INHALE 1 TO 2 PUFFS INTO THE  LUNGS EVERY 4 HOURS AS NEEDED FOR WHEEZING, SHORTNESS OF BREATH OR DYSPNEA    Intermittent asthma, uncomplicated       blood glucose monitoring lancets     1 Box    Use to test blood sugar 2 times daily or as directed.  Ok to substitute alternative if insurance prefers.    Diabetes mellitus without complication (H)       blood glucose monitoring test strip    LAURA CONTOUR NEXT    200 each    Use to test blood sugar 2 times daily or as directed.    Diabetes mellitus without complication (H)       Butalbital-APAP-Caff-Cod -55-30 MG Caps     15 capsule    Take 1 capsule by mouth 3 times daily as needed    Chronic migraine without aura without status migrainosus, not intractable       carisoprodol 350 MG tablet    SOMA    90 tablet    TAKE 1 TABLET BY MOUTH THREE TIMES DAILY    Chronic pain syndrome       cephALEXin 500 MG capsule    KEFLEX    30 capsule    Take 1 capsule (500 mg) by mouth 3 times daily    Surgical followup visit       clonazePAM 1 MG tablet    klonoPIN    90 tablet    TAKE 1 TABLET BY MOUTH THREE TIMES DAILY AS NEEDED FOR ANXIETY    Generalized anxiety disorder       fluticasone 110 MCG/ACT Inhaler    FLOVENT HFA    2 Inhaler    Inhale 2 puffs into the lungs 2 times daily    Mild persistent asthma without complication       fluticasone 50 MCG/ACT spray    FLONASE    16 g    Spray 2 sprays into both nostrils daily    Chronic maxillary sinusitis       guanFACINE 2 MG tablet    TENEX    180 tablet    TAKE TWO TABLETS BY MOUTH AT BEDTIME    Nightmares       lamoTRIgine 200 MG tablet    LAMICTAL    90 tablet    Take 1 tablet (200 mg) by mouth every morning    Bipolar 1 disorder (H)       levothyroxine 25 MCG tablet    SYNTHROID/LEVOTHROID    90 tablet    TAKE 1 TABLET(25 MCG) BY MOUTH DAILY    Acquired hypothyroidism       lithium 450 MG CR tablet    ESKALITH    60 tablet    TAKE 2 TABLETS BY MOUTH AT BEDTIME    Bipolar disorder with psychotic features (H)       LYRICA 150 MG capsule   Generic drug:   pregabalin     60 capsule    TAKE ONE CAPSULE BY MOUTH TWICE DAILY    Chronic pain syndrome       meclizine 25 MG tablet    ANTIVERT    30 tablet    Take 1 tablet (25 mg) by mouth every 6 hours as needed for dizziness    Dizziness       MELATONIN PO      Take 6 mg by mouth At Bedtime        metFORMIN 500 MG tablet    GLUCOPHAGE    90 tablet    Take 1 tablet (500 mg) by mouth 3 times daily (with meals)    Type 2 diabetes mellitus without complication, without long-term current use of insulin (H)       modafinil 200 MG tablet    PROVIGIL    30 tablet    Take 0.5 tablets (100 mg) by mouth daily    Chronic fatigue       ondansetron 4 MG ODT tab    ZOFRAN-ODT    30 tablet    DISSOLVE 1 TO 2 TABLETS UNDER THE TONGUE EVERY 8 HOURS AS NEEDED FOR NAUSEA    Nausea       * oxyCODONE IR 5 MG tablet    ROXICODONE    30 tablet    Take 1-2 tablets (5-10 mg) by mouth every 3 hours as needed for severe pain Decrease dosing as pain improves.  Take with food to minimize side effects.    Severe sepsis (H), Lower abdominal pain       * oxyCODONE IR 5 MG tablet    ROXICODONE    15 tablet    Take 1 tablet (5 mg) by mouth every 6 hours as needed for moderate to severe pain    Surgical followup visit       pantoprazole 40 MG EC tablet    PROTONIX    90 tablet    TAKE 1 TABLET(40 MG) BY MOUTH DAILY    Other chronic gastritis without hemorrhage       * Notice:  This list has 4 medication(s) that are the same as other medications prescribed for you. Read the directions carefully, and ask your doctor or other care provider to review them with you.

## 2017-12-18 ENCOUNTER — TELEPHONE (OUTPATIENT)
Dept: FAMILY MEDICINE | Facility: CLINIC | Age: 42
End: 2017-12-18

## 2017-12-18 NOTE — TELEPHONE ENCOUNTER
See 12-5-17 phone encounter:  [facility] was notified that patient may have 2 refills (30 capsules) per month of the Butalbital.    Call made to [facility]- she picked up #15 12-5-17 at another [facility] and #15 on 12-8-17 at [place] [facility].  She will have one more refill available for #30 then the first part of January 2018.      Patient is requesting that her Butalbital be written for #30/month instead of 'making her go in to pharmacy every 2 weeks for 15 capsules'. Patient was advised to request it this way for next refill.    FYI only to Dr. Evaristo Machado --thanks

## 2017-12-18 NOTE — TELEPHONE ENCOUNTER
Reason for Call:  Med question    Detailed comments: Pt would like to discuss the rx for the  Butalbital-APAP-Caff-Cod -83-30 MG .  She says the pharmacy isn't understanding how many she gets per month.     Phone Number Patient can be reached at: Cell number on file:    Telephone Information:   Mobile [phone]       Best Time: any    Can we leave a detailed message on this number? YES    Call taken on 12/18/2017 at 9:06 AM by MARIAN FUENTES

## 2017-12-19 LAB — PHQ9 SCORE: 19

## 2017-12-22 ENCOUNTER — OFFICE VISIT (OUTPATIENT)
Dept: SURGERY | Facility: CLINIC | Age: 42
End: 2017-12-22
Payer: COMMERCIAL

## 2017-12-22 ENCOUNTER — TELEPHONE (OUTPATIENT)
Dept: SURGERY | Facility: CLINIC | Age: 42
End: 2017-12-22

## 2017-12-22 VITALS
DIASTOLIC BLOOD PRESSURE: 84 MMHG | RESPIRATION RATE: 16 BRPM | SYSTOLIC BLOOD PRESSURE: 130 MMHG | WEIGHT: 205 LBS | OXYGEN SATURATION: 95 % | TEMPERATURE: 98.1 F | BODY MASS INDEX: 37.73 KG/M2 | HEART RATE: 93 BPM | HEIGHT: 62 IN

## 2017-12-22 DIAGNOSIS — Z09 SURGICAL FOLLOWUP VISIT: Primary | ICD-10-CM

## 2017-12-22 DIAGNOSIS — Z09 SURGICAL FOLLOWUP VISIT: ICD-10-CM

## 2017-12-22 PROCEDURE — 99024 POSTOP FOLLOW-UP VISIT: CPT | Performed by: PHYSICIAN ASSISTANT

## 2017-12-22 RX ORDER — OXYCODONE HYDROCHLORIDE 5 MG/1
5 TABLET ORAL EVERY 6 HOURS PRN
Qty: 15 TABLET | Refills: 0 | Status: SHIPPED | OUTPATIENT
Start: 2017-12-22 | End: 2018-04-23

## 2017-12-22 NOTE — TELEPHONE ENCOUNTER
Name of caller: Patient    Reason for Call:  Infected incision leaking yellowish puss and blood.  Surgeon:  Dr. Robins    Recent Surgery:  Yes.    If yes, when & what type:   laparoscopic appendectomy and resection of epiploic mass        Best phone number to reach pt at is: [phone]  Ok to leave a message with medical info? Yes.    Pharmacy preferred (if calling for a refill): N/A

## 2017-12-22 NOTE — PROGRESS NOTES
"12/22/2017    [facility] Note     Subjective:  Melody Muñoz is here for evaluation of incision drainage/pain. She underwent lap appendectomy and resection of epiploic mass by Dr. Robins on 11/14/2017, and was hospitalized for several days at that time for sepsis of unclear etiology.  She was noted to have cellulitis at her follow up visit and treated with Keflex.  She received an extension of her Keflex Rx when seen for a recheck of cellulitis.  She finished her Keflex last week, and reports that the redness has gotten worse since that time.  She reports a fever of 102 last night which resolved after taking Tylenol.  She also reports bloody \"pus\" draining from her incision today.  She reports pain as well, and requests pain meds.    Objective:  Abd - Abdomen soft, non-tender.   Inc - minimal superficisal separation of incision, no drainage.  + erythema, 2cm X 4 cm.      After numbing area with 5mL of 1% lidocaine, her incision was opened with blade.  + bloody drainage.  Incision was made through woody tissues until wound was 2 cm deep, with no purulent drainage found.  Wound was packed with corner of gauze and gauze dressing was taped in place.    Assessment:  S/p lap appendectomy and resection of epiploic mass.   Cellulitis, now with open wound at umbilical incision    Plan:  Pack wound with corner of gauze daily after shower  Rx Oxycodone #15  RTC next week for recheck      Marge Mendosa PA-C      Please route or send letter to:  *None*      "

## 2017-12-22 NOTE — MR AVS SNAPSHOT
After Visit Summary   12/22/2017    Melody Muñoz    MRN: [id number]           Patient Information     Date Of Birth          1975        Visit Information        Provider Department      12/22/2017 2:15 PM Marge Mendosa PA-C [facility] General Surgery      Today's Diagnoses     Surgical followup visit    -  1    Surgical followup visit           Follow-ups after your visit        Follow-up notes from your care team     Return in about 1 week (around 12/29/2017).      Who to contact     If you have questions or need follow up information about today's clinic visit or your schedule please contact [facility] directly at [phone].  Normal or non-critical lab and imaging results will be communicated to you by [employer], letter or phone within 4 business days after the clinic has received the results. If you do not hear from us within 7 days, please contact the clinic through [employer] or phone. If you have a critical or abnormal lab result, we will notify you by phone as soon as possible.  Submit refill requests through [employer] or call your pharmacy and they will forward the refill request to us. Please allow 3 business days for your refill to be completed.          Additional Information About Your Visit        MyChart Information     [employer] gives you secure access to your electronic health record. If you see a primary care provider, you can also send messages to your care team and make appointments. If you have questions, please call your primary care clinic.  If you do not have a primary care provider, please call [phone] and they will assist you.        Care EveryWhere ID     This is your Care EveryWhere ID. This could be used by other organizations to access your [place] medical records  [id number]        Your Vitals Were     Pulse Temperature Respirations Height Last Period Pulse Oximetry    93 98.1  F  "(36.7  C) (Oral) 16 1.575 m (5' 2\") (LMP Unknown) 95%    Breastfeeding? BMI (Body Mass Index)                No 37.49 kg/m2           Blood Pressure from Last 3 Encounters:   12/22/17 130/84   12/07/17 140/80   12/04/17 136/88    Weight from Last 3 Encounters:   12/22/17 93 kg (205 lb)   12/07/17 93 kg (205 lb)   12/04/17 93 kg (205 lb)              Today, you had the following     No orders found for display         Where to get your medicines      Some of these will need a paper prescription and others can be bought over the counter.  Ask your nurse if you have questions.     Bring a paper prescription for each of these medications     oxyCODONE IR 5 MG tablet          Primary Care Provider Office Phone # Fax #    Evaristo Machado [phone] [address] [facility] [address]        Equal Access to Services     [facility] Hospital[facility] : Hadii micaela mccauley hadasho Soomaali, waaxda luqadaha, qaybta kaalmada adeegyada, aida callaway . So [facility] [phone].    ATENCIÓN: Si habla español, tiene a mendoza disposición servicios gratuitos de asistencia lingüística. Llame al [phone].    We comply with applicable federal civil rights laws and [place] laws. We do not discriminate on the basis of race, color, national origin, age, disability, sex, sexual orientation, or gender identity.            Thank you!     Thank you for choosing [facility]  for your care. Our goal is always to provide you with excellent care. Hearing back from our patients is one way we can continue to improve our services. Please take a few minutes to complete the written survey that you may receive in the mail after your visit with us. Thank you!             Your Updated Medication List - Protect others around you: Learn how to safely use, store and throw away your medicines at www.disposemymeds.org.          This list is accurate as of: 12/22/17  3:33 PM.  Always use your most recent " med list.                   Brand Name Dispense Instructions for use Diagnosis    * albuterol (2.5 MG/3ML) 0.083% neb solution     25 vial    Take 1 vial (2.5 mg) by nebulization every 6 hours as needed for shortness of breath / dyspnea or wheezing    Mild persistent asthma without complication       * PROAIR  (90 BASE) MCG/ACT Inhaler   Generic drug:  albuterol     51 g    INHALE 1 TO 2 PUFFS INTO THE LUNGS EVERY 4 HOURS AS NEEDED FOR WHEEZING, SHORTNESS OF BREATH OR DYSPNEA    Intermittent asthma, uncomplicated       blood glucose monitoring lancets     1 Box    Use to test blood sugar 2 times daily or as directed.  Ok to substitute alternative if insurance prefers.    Diabetes mellitus without complication (H)       blood glucose monitoring test strip    LAURA CONTOUR NEXT    200 each    Use to test blood sugar 2 times daily or as directed.    Diabetes mellitus without complication (H)       Butalbital-APAP-Caff-Cod -87-30 MG Caps     15 capsule    Take 1 capsule by mouth 3 times daily as needed    Chronic migraine without aura without status migrainosus, not intractable       carisoprodol 350 MG tablet    SOMA    90 tablet    TAKE 1 TABLET BY MOUTH THREE TIMES DAILY    Chronic pain syndrome       cephALEXin 500 MG capsule    KEFLEX    30 capsule    Take 1 capsule (500 mg) by mouth 3 times daily    Surgical followup visit       clonazePAM 1 MG tablet    klonoPIN    90 tablet    TAKE 1 TABLET BY MOUTH THREE TIMES DAILY AS NEEDED FOR ANXIETY    Generalized anxiety disorder       fluticasone 110 MCG/ACT Inhaler    FLOVENT HFA    2 Inhaler    Inhale 2 puffs into the lungs 2 times daily    Mild persistent asthma without complication       fluticasone 50 MCG/ACT spray    FLONASE    16 g    Spray 2 sprays into both nostrils daily    Chronic maxillary sinusitis       guanFACINE 2 MG tablet    TENEX    180 tablet    TAKE TWO TABLETS BY MOUTH AT BEDTIME    Nightmares       lamoTRIgine 200 MG tablet    LAMICTAL     90 tablet    Take 1 tablet (200 mg) by mouth every morning    Bipolar 1 disorder (H)       levothyroxine 25 MCG tablet    SYNTHROID/LEVOTHROID    90 tablet    TAKE 1 TABLET(25 MCG) BY MOUTH DAILY    Acquired hypothyroidism       lithium 450 MG CR tablet    ESKALITH    60 tablet    TAKE 2 TABLETS BY MOUTH AT BEDTIME    Bipolar disorder with psychotic features (H)       LYRICA 150 MG capsule   Generic drug:  pregabalin     60 capsule    TAKE ONE CAPSULE BY MOUTH TWICE DAILY    Chronic pain syndrome       meclizine 25 MG tablet    ANTIVERT    30 tablet    Take 1 tablet (25 mg) by mouth every 6 hours as needed for dizziness    Dizziness       MELATONIN PO      Take 6 mg by mouth At Bedtime        metFORMIN 500 MG tablet    GLUCOPHAGE    90 tablet    Take 1 tablet (500 mg) by mouth 3 times daily (with meals)    Type 2 diabetes mellitus without complication, without long-term current use of insulin (H)       modafinil 200 MG tablet    PROVIGIL    30 tablet    Take 0.5 tablets (100 mg) by mouth daily    Chronic fatigue       ondansetron 4 MG ODT tab    ZOFRAN-ODT    30 tablet    DISSOLVE 1 TO 2 TABLETS UNDER THE TONGUE EVERY 8 HOURS AS NEEDED FOR NAUSEA    Nausea       * oxyCODONE IR 5 MG tablet    ROXICODONE    30 tablet    Take 1-2 tablets (5-10 mg) by mouth every 3 hours as needed for severe pain Decrease dosing as pain improves.  Take with food to minimize side effects.    Severe sepsis (H), Lower abdominal pain       * oxyCODONE IR 5 MG tablet    ROXICODONE    15 tablet    Take 1 tablet (5 mg) by mouth every 6 hours as needed for moderate to severe pain    Surgical followup visit       pantoprazole 40 MG EC tablet    PROTONIX    90 tablet    TAKE 1 TABLET(40 MG) BY MOUTH DAILY    Other chronic gastritis without hemorrhage       * Notice:  This list has 4 medication(s) that are the same as other medications prescribed for you. Read the directions carefully, and ask your doctor or other care provider to review them  with you.

## 2017-12-27 ENCOUNTER — OFFICE VISIT (OUTPATIENT)
Dept: SURGERY | Facility: CLINIC | Age: 42
End: 2017-12-27
Payer: COMMERCIAL

## 2017-12-27 VITALS
RESPIRATION RATE: 16 BRPM | HEART RATE: 96 BPM | WEIGHT: 205 LBS | HEIGHT: 62 IN | DIASTOLIC BLOOD PRESSURE: 84 MMHG | BODY MASS INDEX: 37.73 KG/M2 | OXYGEN SATURATION: 96 % | TEMPERATURE: 98.6 F | SYSTOLIC BLOOD PRESSURE: 136 MMHG

## 2017-12-27 DIAGNOSIS — Z09 SURGICAL FOLLOWUP VISIT: Primary | ICD-10-CM

## 2017-12-27 PROCEDURE — 99024 POSTOP FOLLOW-UP VISIT: CPT | Performed by: PHYSICIAN ASSISTANT

## 2017-12-27 NOTE — PROGRESS NOTES
"12/22/2017    [facility] Note     Subjective:  Melody Muñoz is here for a wound check. She underwent lap appendectomy and resection of epiploic mass by Dr. Robins on 11/14/2017, and was hospitalized for several days at that time for sepsis of unclear etiology. She was noted to have cellulitis at her follow up visit and treated with Keflex. She furthermore received an extension of her Keflex Rx when seen for a recheck of cellulitis. She finished her Keflex last week, and reported that the redness has gotten worse since that time. The wound was then opened up and drained by our clinic PA. She returns today stating her dad has been changing the bandage daily. She does not have much drainage. She has noted some green/yellow on the bandage that comes out of the wound. She has pain as expected. She denies fevers/chills.     Objective:  /84 (BP Location: Left arm, Cuff Size: Adult Large)  Pulse 96  Temp 98.6  F (37  C) (Oral)  Resp 16  Ht 1.575 m (5' 2\")  Wt 93 kg (205 lb)  LMP  (LMP Unknown)  SpO2 96%  Breastfeeding? No  BMI 37.49 kg/m2    Abd - Abdomen soft, non-tender.   Wound - small wound about 1.5cm wide, +irritation around the wound, wound bed is clean with granulation. There was one vicryl stitch a the edge of the wound and this was removed with iris scisors. I then irrigated with MicroKlens spray. I placed the moistened corner of guaze in the wound and dry gauze surrounding it. She had minimal pain with wound cares.    Assessment:  S/p lap appendectomy and resection of epiploic mass.   Open wound at umbilical site    Plan:  Ok to simply cover wound. Recommended placing moist corner of gauze in wound if it does not cause her too much pain. Additional gauze, tape, and MicroKlens spray given to patient.  No pain meds given at this visit  RTC 1-2 weeks for recheck      Abhishek Garcia PA-C  12/27/17    Please route or send letter to:  *None*      "

## 2017-12-27 NOTE — MR AVS SNAPSHOT
After Visit Summary   12/27/2017    Melody Muñoz    MRN: [id number]           Patient Information     Date Of Birth          1975        Visit Information        Provider Department      12/27/2017 4:00 PM Abhishek Garcia PA-C [facility] General Surgery      Today's Diagnoses     Surgical followup visit    -  1       Follow-ups after your visit        Follow-up notes from your care team     Return in about 1 week (around 1/3/2018) for Wound check.      Who to contact     If you have questions or need follow up information about today's clinic visit or your schedule please contact [facility] directly at [phone].  Normal or non-critical lab and imaging results will be communicated to you by MyChart, letter or phone within 4 business days after the clinic has received the results. If you do not hear from us within 7 days, please contact the clinic through [employer] or phone. If you have a critical or abnormal lab result, we will notify you by phone as soon as possible.  Submit refill requests through [employer] or call your pharmacy and they will forward the refill request to us. Please allow 3 business days for your refill to be completed.          Additional Information About Your Visit        MyChart Information     [employer] gives you secure access to your electronic health record. If you see a primary care provider, you can also send messages to your care team and make appointments. If you have questions, please call your primary care clinic.  If you do not have a primary care provider, please call [phone] and they will assist you.        Care EveryWhere ID     This is your Care EveryWhere ID. This could be used by other organizations to access your [place] medical records  [id number]        Your Vitals Were     Pulse Temperature Respirations Height Last Period Pulse Oximetry    96 98.6  F (37  C)  "(Oral) 16 1.575 m (5' 2\") (LMP Unknown) 96%    Breastfeeding? BMI (Body Mass Index)                No 37.49 kg/m2           Blood Pressure from Last 3 Encounters:   12/27/17 136/84   12/22/17 130/84   12/07/17 140/80    Weight from Last 3 Encounters:   12/27/17 93 kg (205 lb)   12/22/17 93 kg (205 lb)   12/07/17 93 kg (205 lb)              Today, you had the following     No orders found for display       Primary Care Provider Office Phone # Fax #    Evaristo Machado [phone] [address] [facility] [address]        Equal Access to Services     OCTAVIO TRUJILLO : Hadii micaela castleo Sodominique, waaxda luqadaha, qaybta kaalmada adeegyada, aida callaway . So [facility] [phone].    ATENCIÓN: Si habla español, tiene a mendoza disposición servicios gratuitos de asistencia lingüística. [facility] [phone].    We comply with applicable federal civil rights laws and [place] laws. We do not discriminate on the basis of race, color, national origin, age, disability, sex, sexual orientation, or gender identity.            Thank you!     Thank you for choosing [facility]  for your care. Our goal is always to provide you with excellent care. Hearing back from our patients is one way we can continue to improve our services. Please take a few minutes to complete the written survey that you may receive in the mail after your visit with us. Thank you!             Your Updated Medication List - Protect others around you: Learn how to safely use, store and throw away your medicines at www.disposemymeds.org.          This list is accurate as of: 12/27/17  4:35 PM.  Always use your most recent med list.                   Brand Name Dispense Instructions for use Diagnosis    * albuterol (2.5 MG/3ML) 0.083% neb solution     25 vial    Take 1 vial (2.5 mg) by nebulization every 6 hours as needed for shortness of breath / dyspnea or wheezing    Mild persistent " asthma without complication       * PROAIR  (90 BASE) MCG/ACT Inhaler   Generic drug:  albuterol     51 g    INHALE 1 TO 2 PUFFS INTO THE LUNGS EVERY 4 HOURS AS NEEDED FOR WHEEZING, SHORTNESS OF BREATH OR DYSPNEA    Intermittent asthma, uncomplicated       blood glucose monitoring lancets     1 Box    Use to test blood sugar 2 times daily or as directed.  Ok to substitute alternative if insurance prefers.    Diabetes mellitus without complication (H)       blood glucose monitoring test strip    LAURA CONTOUR NEXT    200 each    Use to test blood sugar 2 times daily or as directed.    Diabetes mellitus without complication (H)       Butalbital-APAP-Caff-Cod -42-30 MG Caps     15 capsule    Take 1 capsule by mouth 3 times daily as needed    Chronic migraine without aura without status migrainosus, not intractable       carisoprodol 350 MG tablet    SOMA    90 tablet    TAKE 1 TABLET BY MOUTH THREE TIMES DAILY    Chronic pain syndrome       cephALEXin 500 MG capsule    KEFLEX    30 capsule    Take 1 capsule (500 mg) by mouth 3 times daily    Surgical followup visit       clonazePAM 1 MG tablet    klonoPIN    90 tablet    TAKE 1 TABLET BY MOUTH THREE TIMES DAILY AS NEEDED FOR ANXIETY    Generalized anxiety disorder       fluticasone 110 MCG/ACT Inhaler    FLOVENT HFA    2 Inhaler    Inhale 2 puffs into the lungs 2 times daily    Mild persistent asthma without complication       fluticasone 50 MCG/ACT spray    FLONASE    16 g    Spray 2 sprays into both nostrils daily    Chronic maxillary sinusitis       guanFACINE 2 MG tablet    TENEX    180 tablet    TAKE TWO TABLETS BY MOUTH AT BEDTIME    Nightmares       lamoTRIgine 200 MG tablet    LAMICTAL    90 tablet    Take 1 tablet (200 mg) by mouth every morning    Bipolar 1 disorder (H)       levothyroxine 25 MCG tablet    SYNTHROID/LEVOTHROID    90 tablet    TAKE 1 TABLET(25 MCG) BY MOUTH DAILY    Acquired hypothyroidism       lithium 450 MG CR tablet     ESKALITH    60 tablet    TAKE 2 TABLETS BY MOUTH AT BEDTIME    Bipolar disorder with psychotic features (H)       LYRICA 150 MG capsule   Generic drug:  pregabalin     60 capsule    TAKE ONE CAPSULE BY MOUTH TWICE DAILY    Chronic pain syndrome       meclizine 25 MG tablet    ANTIVERT    30 tablet    Take 1 tablet (25 mg) by mouth every 6 hours as needed for dizziness    Dizziness       MELATONIN PO      Take 6 mg by mouth At Bedtime        metFORMIN 500 MG tablet    GLUCOPHAGE    90 tablet    Take 1 tablet (500 mg) by mouth 3 times daily (with meals)    Type 2 diabetes mellitus without complication, without long-term current use of insulin (H)       modafinil 200 MG tablet    PROVIGIL    30 tablet    Take 0.5 tablets (100 mg) by mouth daily    Chronic fatigue       ondansetron 4 MG ODT tab    ZOFRAN-ODT    30 tablet    DISSOLVE 1 TO 2 TABLETS UNDER THE TONGUE EVERY 8 HOURS AS NEEDED FOR NAUSEA    Nausea       * oxyCODONE IR 5 MG tablet    ROXICODONE    30 tablet    Take 1-2 tablets (5-10 mg) by mouth every 3 hours as needed for severe pain Decrease dosing as pain improves.  Take with food to minimize side effects.    Severe sepsis (H), Lower abdominal pain       * oxyCODONE IR 5 MG tablet    ROXICODONE    15 tablet    Take 1 tablet (5 mg) by mouth every 6 hours as needed for moderate to severe pain    Surgical followup visit       pantoprazole 40 MG EC tablet    PROTONIX    90 tablet    TAKE 1 TABLET(40 MG) BY MOUTH DAILY    Other chronic gastritis without hemorrhage       * Notice:  This list has 4 medication(s) that are the same as other medications prescribed for you. Read the directions carefully, and ask your doctor or other care provider to review them with you.

## 2018-01-10 DIAGNOSIS — G89.4 CHRONIC PAIN SYNDROME: ICD-10-CM

## 2018-01-11 RX ORDER — CARISOPRODOL 350 MG/1
TABLET ORAL
Qty: 90 TABLET | Refills: 0 | Status: SHIPPED | OUTPATIENT
Start: 2018-01-11 | End: 2018-02-05

## 2018-01-11 NOTE — TELEPHONE ENCOUNTER
Controlled Substance Refill Request for carisoprodol (SOMA) 350 MG tablet  Problem List Complete:  Yes  Patient is followed by VIRAL Deleon for ongoing prescription of pain medication.  All refills should be approved by this provider, or covering partner.    Medication(s):  Lyrica 150 mg bid, Fioricet with codeine prn, klonopin and ambien to be prescribed by psych, not [facility].  Maximum quantity per month: 60, 20  Clinic visit frequency required: Q 3 months     Controlled substance agreement on file: Yes       Date(s): 9/8/2015  New CSA needed.  Pain Clinic evaluation in the past: No    DIRE Total Score(s):  No flowsheet data found.    Last [facility] website verification: 11-27-17 multiple meds from multiple outside providers     checked in past 6 months?  Yes 11/27/2017     Last OV 12/04/2017    carisoprodol (SOMA) 350 MG tablet 90 tablet 5 8/2/2017

## 2018-01-11 NOTE — TELEPHONE ENCOUNTER
Patient is calling to check on the status of refill. States she needs filled this morning. See if another physician will fill

## 2018-01-29 DIAGNOSIS — G89.4 CHRONIC PAIN SYNDROME: ICD-10-CM

## 2018-01-29 RX ORDER — PREGABALIN 150 MG/1
150 CAPSULE ORAL 2 TIMES DAILY
Qty: 60 CAPSULE | Refills: 3 | Status: SHIPPED | OUTPATIENT
Start: 2018-01-29 | End: 2018-04-04
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
## 2018-01-29 NOTE — TELEPHONE ENCOUNTER
LYRICA 150 MG capsule  Last Written Prescription Date:  11/27/17  Last Fill Quantity: 60,   # refills: 3  Last Office Visit: 12/4/17  Future Office visit:       Routing refill request to provider for review/approval because:  Pt requesting an early fill!

## 2018-01-31 DIAGNOSIS — G43.709 CHRONIC MIGRAINE WITHOUT AURA WITHOUT STATUS MIGRAINOSUS, NOT INTRACTABLE: ICD-10-CM

## 2018-01-31 NOTE — TELEPHONE ENCOUNTER
Patient is wanted a quantity of 30 a month with 1 refill. She doesn't want to do the quantity 15 will refills. Call if any questions

## 2018-01-31 NOTE — TELEPHONE ENCOUNTER
Clinic Action Needed:Yes  Reason for Call: Pt calling to see if refill of Butalbital-APAP is available yet. I explained that the clinic is closed now and she would need to wait until tomorrow to speak with someone directly at the clinic about this refill. Explained it can take up to 72 hours for a refill. She will call tomorrow am to speak with Dr Machado,s care team as she thinks Dr Machado is not in clinic tomorrow.   Routed to: Dr Machado's care team     Jennifer Marquez RN, [facility]

## 2018-01-31 NOTE — TELEPHONE ENCOUNTER
Reason for Call:  Medication or medication refill:    Do you use a [facility]?  Name of the pharmacy and phone number for the current request:  [facility]    Name of the medication requested: Butalbital-APAP-Caff-Cod -34-30 MG CAPS    Other request: Patient states her pharmacy is confused every month when it is time to get her refill. She would not like her prescription to say 1 refill per month    Can we leave a detailed message on this number? YES    Phone number patient can be reached at: Home number on file [phone] (home)    Best Time:     Call taken on 1/31/2018 at 2:31 PM by JACQUELINE SMITH

## 2018-01-31 NOTE — TELEPHONE ENCOUNTER
Pt called again.  She wants this filled today.  I requested she allow more  time as in may take up to 72 hours.

## 2018-01-31 NOTE — TELEPHONE ENCOUNTER
Last [facility] website verification: 11-27-17 multiple meds from multiple outside providers    Requested Prescriptions   Pending Prescriptions Disp Refills     Butalbital-APAP-Caff-Cod -09-30 MG CAPS 15 capsule 3     Sig: Take 1 capsule by mouth 3 times daily as needed    There is no refill protocol information for this order        Butalbital-APAP-Caff-Cod -43-30 MG caps     Last Written Prescription Date12/4/17Last Fill Quantity:15   # refills: 3  Last Office Visit: 12/27/17  Future Office visit:       Routing refill request to provider for review/approval because:  Drug not on the [facility], P or  Health refill protocol or controlled substance

## 2018-02-01 RX ORDER — BUTALBITAL, ACETAMINOPHEN, CAFFEINE AND CODEINE PHOSPHATE 300; 50; 40; 30 MG/1; MG/1; MG/1; MG/1
1 CAPSULE ORAL 3 TIMES DAILY PRN
Qty: 15 CAPSULE | Refills: 3 | Status: SHIPPED | OUTPATIENT
Start: 2018-02-01 | End: 2018-02-27

## 2018-02-01 NOTE — TELEPHONE ENCOUNTER
Script printed and signed, in my Outbasket.      Please remind pt that she needs to give 72 hour notice for refills.

## 2018-02-01 NOTE — TELEPHONE ENCOUNTER
Patient calling again to check on the status of her refill. Patient is wondering if Dr Hollins can refill as she states she cannot wait until Monday

## 2018-02-01 NOTE — TELEPHONE ENCOUNTER
Faxed Rx (butalbital-apap-caff-cod) to the [facility] [address] in [place].  Called pt to let her know that this has been done and that she needs to give us a 72 hour notice before her refill is due.

## 2018-02-05 DIAGNOSIS — R11.0 NAUSEA: ICD-10-CM

## 2018-02-05 DIAGNOSIS — G89.4 CHRONIC PAIN SYNDROME: ICD-10-CM

## 2018-02-05 NOTE — TELEPHONE ENCOUNTER
Reason for Call:  Medication or medication refill:    Do you use a [facility]?  Name of the pharmacy and phone number for the current request:  [facility]    Name of the medication requested: carisoprodol (SOMA) 350 MG tablet and zofran 4mg    Other request: none    Can we leave a detailed message on this number? YES    Phone number patient can be reached at: Home number on file [phone] (home)    Best Time: any    Call taken on 2/5/2018 at 11:04 AM by NANCY MAYO

## 2018-02-07 RX ORDER — ONDANSETRON 4 MG/1
TABLET, ORALLY DISINTEGRATING ORAL
Qty: 30 TABLET | Refills: 0 | Status: SHIPPED | OUTPATIENT
Start: 2018-02-07 | End: 2018-04-04

## 2018-02-07 RX ORDER — CARISOPRODOL 350 MG/1
TABLET ORAL
Qty: 90 TABLET | Refills: 0 | Status: SHIPPED | OUTPATIENT
Start: 2018-02-07 | End: 2018-03-02

## 2018-02-07 NOTE — TELEPHONE ENCOUNTER
"Requested Prescriptions   Pending Prescriptions Disp Refills     ondansetron (ZOFRAN-ODT) 4 MG ODT tab  Last Written Prescription Date:  1/11/2018  Last Fill Quantity: 30,  # refills: 0   Last Office Visit  12/4/2017        with  [facility] or [facility] prescribing provider:     Future Office Visit:        30 tablet 0     Antivertigo/Antiemetic Agents Passed    2/5/2018 11:05 AM       Passed - Recent or future visit with authorizing provider's specialty    Patient had office visit in the last year or has a visit in the next 30 days with authorizing provider.  See \"Patient Info\" tab in inbasket, or \"Choose Columns\" in Meds & Orders section of the refill encounter.            Passed - Patient is 18 years of age or older        carisoprodol (SOMA) 350 MG tablet 90 tablet 0    There is no refill protocol information for this order        carisoprodol (SOMA) 350 MG tablet      Last Written Prescription Date:  1/11/2018  Last Fill Quantity: 90,   # refills: 0  Last Office Visit: 12/4/2017  Future Office visit:       Routing refill request to provider for review/approval because:  Drug not on the [facility], [facility] or [facility] refill protocol or controlled substance    "

## 2018-02-07 NOTE — TELEPHONE ENCOUNTER
Routing refill request to provider for review/approval because:  Dr. Evaristo Machado - please advise on how many Zofran to give patient ; with last refill she is using #30/30 days    Controlled Substance Refill Request for Soma    Last OV 12-4-17  Last refill  1-11-18  #90 only      Problem List Complete:  Yes  Patient is followed by VIRAL Deleon for ongoing prescription of pain medication.  All refills should be approved by this provider, or covering partner.    Medication(s):  Lyrica 150 mg bid, Fioricet with codeine prn, klonopin and ambien to be prescribed by psych, not [place].  Maximum quantity per month: 60, 20  Clinic visit frequency required: Q 3 months     Controlled substance agreement on file: Yes       Date(s): 9/8/2015  New CSA needed.  Pain Clinic evaluation in the past: No    DIRE Total Score(s):  No flowsheet data found.    Last [facility] website verification: 11-27-17 multiple meds from multiple outside providers

## 2018-02-22 ENCOUNTER — TELEPHONE (OUTPATIENT)
Dept: FAMILY MEDICINE | Facility: CLINIC | Age: 43
End: 2018-02-22

## 2018-02-22 NOTE — TELEPHONE ENCOUNTER
Advised to check with her insurance to see if she needs a referral and if not she can call [phone],podiatry at [facility]. Advised to soak her foot too in warm soapy water.

## 2018-02-22 NOTE — TELEPHONE ENCOUNTER
Reason for Call:  Other call back    Detailed comments: Patient states she has ingrown toe nails and they hurt really bad. Wondering what to do. Possible referral. Please call to advise    Phone Number Patient can be reached at: Home number on file [phone] (home)    Best Time: any    Can we leave a detailed message on this number? YES    Call taken on 2/22/2018 at 2:34 PM by TRIXIE KRUEGER

## 2018-02-27 DIAGNOSIS — G43.709 CHRONIC MIGRAINE WITHOUT AURA WITHOUT STATUS MIGRAINOSUS, NOT INTRACTABLE: ICD-10-CM

## 2018-02-27 DIAGNOSIS — G89.4 CHRONIC PAIN SYNDROME: ICD-10-CM

## 2018-02-27 RX ORDER — BUTALBITAL, ACETAMINOPHEN, CAFFEINE AND CODEINE PHOSPHATE 300; 50; 40; 30 MG/1; MG/1; MG/1; MG/1
1 CAPSULE ORAL 3 TIMES DAILY PRN
Qty: 15 CAPSULE | Refills: 3 | Status: SHIPPED | OUTPATIENT
Start: 2018-02-27 | End: 2018-05-09

## 2018-02-27 NOTE — TELEPHONE ENCOUNTER
Reason for Call:  Medication or medication refill:  Do you use a [facility]?  Name of the pharmacy and phone number for the current request:  [facility] [address] [place] [address] [id number] [place], [place] [address] AT [place] [address]  Name of the medication requested: Butalbital-APAP-Caff-Cod -20-30 MG CAPS  Other request: none  Can we leave a detailed message on this number? YES  Phone number patient can be reached at: Home number on file [phone] (home)  Best Time: any  Call taken on 2/27/2018 at 10:54 AM by BREANN WARREN

## 2018-02-27 NOTE — TELEPHONE ENCOUNTER
Controlled Substance Refill Request for butalbitial APAP CaffCod  Pharmacy concerrned because limited to 30 tabs a month.  Wants more and has been # 45 so far this month  2/1 #15, 2/6 # 15 and 2/22 # 15. Should not be due until 3/3/2018    Problem List Complete:  Yes     checked in past 6 months?  Yes 2/27/2018 Concern with multi docs  and multi locations

## 2018-03-02 ENCOUNTER — TELEPHONE (OUTPATIENT)
Dept: FAMILY MEDICINE | Facility: CLINIC | Age: 43
End: 2018-03-02

## 2018-03-02 DIAGNOSIS — G89.4 CHRONIC PAIN SYNDROME: ICD-10-CM

## 2018-03-02 NOTE — TELEPHONE ENCOUNTER
Panel Management Review      Patient has the following on her problem list:     Diabetes    ASA: Passed    Last A1C  Lab Results   Component Value Date    A1C 7.2 11/10/2017    A1C 6.6 05/17/2017    A1C 8.1 03/02/2017    A1C 6.0 09/23/2015    A1C 5.4 07/27/2013     A1C tested: Passed    Last LDL:    Lab Results   Component Value Date    CHOL 255 11/10/2017     Lab Results   Component Value Date    HDL 54 11/10/2017     Lab Results   Component Value Date     11/10/2017     Lab Results   Component Value Date    TRIG 339 11/10/2017     Lab Results   Component Value Date    CHOLHDLRATIO 4.7 04/04/2014     Lab Results   Component Value Date    NHDL 201 11/10/2017       Is the patient on a Statin? NO             Is the patient on Aspirin? YES        Last three blood pressure readings:  BP Readings from Last 3 Encounters:   12/27/17 136/84   12/22/17 130/84   12/07/17 140/80       Date of last diabetes office visit: 11/10/17     Tobacco History:     History   Smoking Status     Current Some Day Smoker     Packs/day: 0.25     Years: 17.00     Types: Cigarettes   Smokeless Tobacco     Never Used           Composite cancer screening  Chart review shows that this patient is due/due soon for the following None  Summary:    Patient is due/failing the following:   A1C and FOLLOW UP    Action needed:   Patient needs office visit for diabetes f/u.    Type of outreach:    Sent letter.    Questions for provider review:    None                                                                                                                                    Genesis Stone CMA

## 2018-03-02 NOTE — TELEPHONE ENCOUNTER
Reason for Call:  Medication or medication refill:  Do you use a [facility]?  Name of the pharmacy and phone number for the current request:  [facility] [address] [place] [address] AT [place] [address]  Name of the medication requested: carisoprodol (SOMA) 350 MG tablet  Other request: none  Can we leave a detailed message on this number? YES  Phone number patient can be reached at: Home number on file [phone] (home)  Best Time: any  Call taken on 3/2/2018 at 10:56 AM by BREANN WARREN

## 2018-03-02 NOTE — LETTER
March 2, 2018    Melody Muñoz  [address]  [place] [address]    Dear Ivette Oliver cares about your health and your health plan.  I have reviewed your medical conditions, medication list and lab results, and am making recommendations based on this review to better manage your health.    You are in particular need of attention regarding:  -Diabetes    I am recommending that you:     -schedule a FOLLOWUP OFFICE APPOINTMENT with me.  I will recheck your: A1c and Lipids (fasting cholesterol - nothing to eat except water and/or meds for 8+ hours) tests.      Please call us at the [facility] location:  [phone] or use [employer] to address the above recommendations.     Thank you for trusting [facility].  We appreciate the opportunity to serve you and look forward to supporting your healthcare in the future.    If you have (or plan to have) any of these tests done at a facility other than a [facility] or a [facility], please have the results sent to the [facility] location noted above.      Best Regards,    Evaristo Machado MD

## 2018-03-05 RX ORDER — CARISOPRODOL 350 MG/1
TABLET ORAL
Qty: 90 TABLET | Refills: 0 | Status: SHIPPED | OUTPATIENT
Start: 2018-03-09 | End: 2018-03-29

## 2018-03-05 NOTE — TELEPHONE ENCOUNTER
Controlled Substance Refill Request for Avtar    Last office visit:  12/4/2017  Last refill  2-7-18  #90        Problem List Complete:  Yes    Patient is followed by VIRAL Deleon for ongoing prescription of pain medication.  All refills should be approved by this provider, or covering partner.    Medication(s):  Lyrica 150 mg bid, Fioricet with codeine prn, klonopin and ambien to be prescribed by psych, not [place].  Maximum quantity per month: 60, 20  Clinic visit frequency required: Q 3 months     Controlled substance agreement on file: Yes       Date(s): 9/8/2015  New CSA needed.  Pain Clinic evaluation in the past: No    DIRE Total Score(s):  No flowsheet data found.    Last [facility] website verification: 2/27/2018 multiple meds from multiple outside providers

## 2018-03-05 NOTE — TELEPHONE ENCOUNTER
Faxed Rx (smoa) to the pharmacy[facility] in [place] on [address]

## 2018-03-09 ENCOUNTER — MEDICAL CORRESPONDENCE (OUTPATIENT)
Dept: HEALTH INFORMATION MANAGEMENT | Facility: CLINIC | Age: 43
End: 2018-03-09

## 2018-03-09 ENCOUNTER — TRANSFERRED RECORDS (OUTPATIENT)
Dept: HEALTH INFORMATION MANAGEMENT | Facility: CLINIC | Age: 43
End: 2018-03-09

## 2018-03-12 DIAGNOSIS — F51.01 PRIMARY INSOMNIA: Primary | ICD-10-CM

## 2018-03-12 RX ORDER — ZOLPIDEM TARTRATE 10 MG/1
10 TABLET ORAL
Qty: 30 TABLET | Refills: 1 | Status: SHIPPED | OUTPATIENT
Start: 2018-03-12 | End: 2018-04-23

## 2018-03-12 NOTE — TELEPHONE ENCOUNTER
Please see patient's phone call msg    Controlled Substance Refill Request for Zolpidem  Problem List Complete:  No , not for insomnia    PROVIDER TO CONSIDER COMPLETION OF PROBLEM LIST AND OVERVIEW/CONTROLLED SUBSTANCE AGREEMENT    Last Written Prescription Date:   Not active on med list- cannot find in med hx= checking  patient has gotten last two refills in June and July 2017 from a Dr Peguero for the AMbien Quantity: 30,   # refills: 0 both times    Last Office Visit with [facility] care provider: 1-24-17 with Dr. Evaristo Machado     Future Office visit:     Controlled substance agreement on file: Yes:  Date 9-8-2015.     Processing:  Fax Rx to [facility] pharmacy   checked in past 6 months?  Yes 3-12-18 multiple prescribers for her meds

## 2018-03-12 NOTE — TELEPHONE ENCOUNTER
Reason for Call:  Medication or medication refill:    Do you use a [facility]?  Name of the pharmacy and phone number for the [facility]    Name of the medication requested: generic ambien    Other request: states mental health provider is not available she cant sleep and her bi polar will get out of sorts must have     Can we leave a detailed message on this number? YES    Phone number patient can be reached at: Home number on file [phone] (home)    Best Time: call pt states needs today    Call taken on 3/12/2018 at 12:58 PM by MADELIN PADRON

## 2018-03-23 DIAGNOSIS — G43.709 CHRONIC MIGRAINE WITHOUT AURA WITHOUT STATUS MIGRAINOSUS, NOT INTRACTABLE: ICD-10-CM

## 2018-03-23 RX ORDER — BUTALBITAL, ACETAMINOPHEN, CAFFEINE AND CODEINE PHOSPHATE 300; 50; 40; 30 MG/1; MG/1; MG/1; MG/1
1 CAPSULE ORAL 3 TIMES DAILY PRN
Qty: 15 CAPSULE | Refills: 3 | OUTPATIENT
Start: 2018-03-23

## 2018-03-23 NOTE — TELEPHONE ENCOUNTER
PHARMACY REQUESTING CLARIFICATION ON CONTROLLED SUBSTANCE. PHARMACY MESSAGE: REQUESTING A REFILL ON BUT/APAP/CAFF CODEINE, JUST FILLED ON 3-17, OK TO FILL? CONCERNED ON USAGE AND NEED.    Controlled Substance Refill Request for Butalbital-APAP-Caff-Cod -86-30 MG CAPS  Problem List Complete:  Yes  Overview Addendum 2/27/2018 11:45 AM by Rivka Coles RN      Patient is followed by VIRAL Deleon for ongoing prescription of pain medication.  All refills should be approved by this provider, or covering partner.     Medication(s):  Lyrica 150 mg bid, Fioricet with codeine prn, klonopin and ambien to be prescribed by psych, not [place].  Maximum quantity per month: 60, 20  Clinic visit frequency required: Q 3 months      Controlled substance agreement on file: Yes       Date(s): 9/8/2015  New CSA needed.  Pain Clinic evaluation in the past: No     DIRE Total Score(s):  No flowsheet data found.     Last [facility] website verification: 2/27/2018 multiple meds from multiple outside providers       Last Written Prescription Date:  2/27/18  Last Fill Quantity: 15 CAPSULE,   # refills: 3    Last Office Visit with [facility] primary care provider: 12/04/2017 WITH SAMM    Clinic visit frequency required: Q 3 months     Future Office visit:     Controlled substance agreement on file: Yes:  Date 09/08/2015.     Processing:  Fax Rx to [facility] pharmacy   checked in past 6 months?  Yes 11/10/2017

## 2018-03-23 NOTE — TELEPHONE ENCOUNTER
[place] reviewed:  reviewed- recommend provider review Pt last filled prescription 03/15/2018 #15 tablets.    Pt was called states she has enough medication refill not needed. Writer asked that she clarify with pharmacy. She verbalized agreement with plan.

## 2018-03-29 ENCOUNTER — TELEPHONE (OUTPATIENT)
Dept: FAMILY MEDICINE | Facility: CLINIC | Age: 43
End: 2018-03-29

## 2018-03-29 DIAGNOSIS — Z09 SURGICAL FOLLOWUP VISIT: ICD-10-CM

## 2018-03-29 DIAGNOSIS — G89.4 CHRONIC PAIN SYNDROME: ICD-10-CM

## 2018-03-29 RX ORDER — CARISOPRODOL 350 MG/1
TABLET ORAL
Qty: 90 TABLET | Refills: 0 | Status: SHIPPED | OUTPATIENT
Start: 2018-03-29 | End: 2018-04-03

## 2018-03-29 NOTE — TELEPHONE ENCOUNTER
carisoprodol (SOMA) 350 MG tablet      Last Written Prescription Date:  3/9/2018  Last Fill Quantity: 90,   # refills: 0  Last Office Visit: 12/4/2017  Future Office visit:       Routing refill request to provider for review/approval because:  Drug not on the [facility], P or [facility] refill protocol or controlled substance  Last [facility] website verification: 2/27/2018 multiple meds from multiple outside providers      Previous Version

## 2018-03-29 NOTE — TELEPHONE ENCOUNTER
Reason for Call:  Medication or medication refill:    Do you use a [facility]?  Name of the pharmacy and phone number for the current request:  [facility]    Name of the medication requested: carisoprodol (SOMA) 350 MG tablet    Other request:     Can we leave a detailed message on this number? YES    Phone number patient can be reached at: Home number on file [phone] (home)    Best Time:     Call taken on 3/29/2018 at 9:43 AM by JACQUELINE SMITH

## 2018-04-03 DIAGNOSIS — G89.4 CHRONIC PAIN SYNDROME: ICD-10-CM

## 2018-04-03 RX ORDER — CARISOPRODOL 350 MG/1
TABLET ORAL
Qty: 90 TABLET | Refills: 0 | Status: SHIPPED | OUTPATIENT
Start: 2018-04-03 | End: 2018-04-27

## 2018-04-03 NOTE — TELEPHONE ENCOUNTER
Last [facility] website verification: 2/27/2018 multiple meds from multiple outside providers      Previous Version

## 2018-04-04 ENCOUNTER — TELEPHONE (OUTPATIENT)
Dept: FAMILY MEDICINE | Facility: CLINIC | Age: 43
End: 2018-04-04

## 2018-04-04 DIAGNOSIS — G89.4 CHRONIC PAIN SYNDROME: ICD-10-CM

## 2018-04-04 DIAGNOSIS — G89.4 CHRONIC PAIN SYNDROME: Primary | ICD-10-CM

## 2018-04-04 DIAGNOSIS — R11.0 NAUSEA: ICD-10-CM

## 2018-04-04 RX ORDER — ONDANSETRON 4 MG/1
TABLET, ORALLY DISINTEGRATING ORAL
Qty: 30 TABLET | Refills: 1 | Status: SHIPPED | OUTPATIENT
Start: 2018-04-04 | End: 2018-05-07

## 2018-04-04 RX ORDER — PREGABALIN 100 MG/1
100 CAPSULE ORAL 3 TIMES DAILY
Qty: 270 CAPSULE | Refills: 3 | Status: CANCELLED | OUTPATIENT
Start: 2018-04-04

## 2018-04-04 RX ORDER — PREGABALIN 100 MG/1
100 CAPSULE ORAL 3 TIMES DAILY
Qty: 90 CAPSULE | Refills: 1 | Status: SHIPPED | OUTPATIENT
Start: 2018-04-04 | End: 2018-05-21

## 2018-04-04 NOTE — TELEPHONE ENCOUNTER
"Last Written Prescription Date:  zofran  2-7-18  Last Fill Quantity: 30,  # refills: 0   Last office visit: 12/4/2017 with prescribing provider:  12-4-17   Future Office Visit:   Next 5 appointments (look out 90 days)     Apr 06, 2018 10:15 AM CDT   SHORT with Otilio Hollins MD   [facility]    [address] [place] [id number] [phone]                 Requested Prescriptions   Pending Prescriptions Disp Refills     ondansetron (ZOFRAN-ODT) 4 MG ODT tab 30 tablet 0     Sig: DISSOLVE 1 TO 2 TABLETS UNDER THE TONGUE EVERY 8 HOURS AS NEEDED FOR NAUSEA     Antivertigo/Antiemetic Agents Passed    4/4/2018  9:11 AM       Passed - Recent (12 mo) or future (30 days) visit within the authorizing provider's specialty    Patient had office visit in the last 12 months or has a visit in the next 30 days with authorizing provider or within the authorizing provider's specialty.  See \"Patient Info\" tab in inbasket, or \"Choose Columns\" in Meds & Orders section of the refill encounter.           Passed - Patient is 18 years of age or older      Prescription approved per [facility] Refill Protocol.    "

## 2018-04-04 NOTE — TELEPHONE ENCOUNTER
Jayeshdled with terri Carter for early Lyrica fill. [employer] [facility] [email or web address] was called with this approval.     Per pharmacist Yury pt filled #60 on 1/29/18, #60 on 3/1/18, & #60 on 3/29/18. On 2/26/18 pt filled 4 capsules for cash. She does have #56 available for an early fill.

## 2018-04-04 NOTE — TELEPHONE ENCOUNTER
Left detailed voice message informing pt Rx for Lyrica 100 mg capsule was approved and sent to her  [facility]'s pharmacy.

## 2018-04-04 NOTE — TELEPHONE ENCOUNTER
Reason for Call:  Medication or medication refill:    Do you use a [facility]?  Name of the pharmacy and phone number for the current request:  [facility]    Name of the medication requested: ondansetron (ZOFRAN-ODT) 4 MG ODT tab    Other request: Patient states she feels sick to her stomach and she took her last pill today. She would like this filled as soon as possible.    Can we leave a detailed message on this number? YES    Phone number patient can be reached at: Home number on file [phone] (home)    Best Time:     Call taken on 4/4/2018 at 9:10 AM by JACQUELINE SMITH

## 2018-04-04 NOTE — TELEPHONE ENCOUNTER
Pt states she called her insurance and they will not cover Lyrica script. Pt asked if provider would change prescription to Lyrica 100 mg TID to find out if insurance would approve then. She is requesting a call with provider response today. Please advice.

## 2018-04-04 NOTE — TELEPHONE ENCOUNTER
Pharmacy was called with Lyrica early refill approval. Pharmacist states insurance will not do override because pt threw her medication. Pt was called with information above. She agreed to contact her insurance.

## 2018-04-06 ENCOUNTER — TELEPHONE (OUTPATIENT)
Dept: FAMILY MEDICINE | Facility: CLINIC | Age: 43
End: 2018-04-06

## 2018-04-06 DIAGNOSIS — F31.9 BIPOLAR 1 DISORDER (H): Chronic | ICD-10-CM

## 2018-04-06 DIAGNOSIS — F41.1 GENERALIZED ANXIETY DISORDER: Primary | Chronic | ICD-10-CM

## 2018-04-06 RX ORDER — OLANZAPINE 10 MG/1
10 TABLET ORAL AT BEDTIME
Qty: 5 TABLET | Refills: 0 | Status: SHIPPED | OUTPATIENT
Start: 2018-04-06 | End: 2018-12-31

## 2018-04-06 NOTE — TELEPHONE ENCOUNTER
Patient called requesting Rx for Zyprexa 10 mg tablets QD. Reports her psychiatrist is out of the office and no one at his office will fill it. She is requesting 5 tablet until she sees her psychiatrist 04/10/2018. Please advice.  Routing refill request to provider for review/approval because:  Drug not active on patient's medication list

## 2018-04-06 NOTE — TELEPHONE ENCOUNTER
Is there a reason why she is out of it?  Did her Psychiatrist stop it?  She should have refills available if Psychiatry wanted her to continue this med.  --Dr. Perez

## 2018-04-06 NOTE — TELEPHONE ENCOUNTER
Call to patient and told that a prescription was sent in for 5 tablets of her zyprexa to cover until she sees psych.

## 2018-04-06 NOTE — TELEPHONE ENCOUNTER
Pt reported she lost her prescription. [facility] pharmacy was called and verified pt has no refills.

## 2018-04-06 NOTE — TELEPHONE ENCOUNTER
Reason for Call:  Other,  patient called said picked up zyprexa 10mg from pharmacy and lost medication and her psychiatrist isn't in today. Doesn't get medication from our clinic,    Detailed comments: also told her Dr. Machado on vacation.    Phone Number Patient can be reached at: Home number on file [phone] (home)    Best Time: any said [email or web address]    Can we leave a detailed message on this number? YES    Call taken on 4/6/2018 at 8:47 AM by NANCY MAYO

## 2018-04-06 NOTE — TELEPHONE ENCOUNTER
I will fill her Zyprexa 10 mgwith just the limited amount until she sees her psychiatrist next week Rx sent to her [facility] pharmacy

## 2018-04-19 ENCOUNTER — NURSE TRIAGE (OUTPATIENT)
Dept: NURSING | Facility: CLINIC | Age: 43
End: 2018-04-19

## 2018-04-19 NOTE — TELEPHONE ENCOUNTER
"  Reason for Disposition    Caller has NON-URGENT medication question about med that PCP prescribed and triager unable to answer question     \"I need to have the on call doctor paged. I have been trying for 2 days to call my psych doctor and no one is getting back to me. I am taking clonazepam and it's not working. My son is in the hospital because he tried to commit suicide. Another friend did commit suicide, and another friend  of a heart attack. My anxiety is really bad.\" Patient denies triage. I advised her to either call/page the MD that originally gave the RX(non FV), or option of ER. \"I am not going to do that, I want the MD on call for [place] paged.\" I advised that we do not page out for this medication, and the on call MD will be calling me(FNA) back not the patient.\"Well then page to see who it is.\" I advised her that Dr. Hollins was on call for the group. But we do not page out for medications over the phone, either ER or call the MD in am for the request.\" I am not doing that, I always get meds over the phone when I have the doctor paged! I will just go to .\" Patient hung up on me at this point. Reviewed patients' problem list per epic(See epic).    Additional Information    Negative: Drug overdose and nurse unable to answer question    Negative: Caller requesting information not related to medicine    Negative: Caller requesting a prescription for Strep throat and has a positive culture result    Negative: Rash while taking a medication or within 3 days of stopping it    Negative: Immunization reaction suspected    Negative: [1] Asthma and [2] having symptoms of asthma (cough, wheezing, etc)    Negative: MORE THAN A DOUBLE DOSE of a prescription or over-the-counter (OTC) drug    Negative: [1] DOUBLE DOSE (an extra dose or lesser amount) of over-the-counter (OTC) drug AND [2] any symptoms (e.g., dizziness, nausea, pain, sleepiness)    Negative: [1] DOUBLE DOSE (an extra dose or lesser amount) of " "prescription drug AND [2] any symptoms (e.g., dizziness, nausea, pain, sleepiness)    Negative: Took another person's prescription drug    Negative: [1] DOUBLE DOSE (an extra dose or lesser amount) of prescription drug AND [2] NO symptoms (Exception: a double dose of antibiotics)    Negative: Diabetes drug error or overdose (e.g., insulin or extra dose)    Negative: [1] Request for URGENT new prescription or refill of \"essential\" medication (i.e., likelihood of harm to patient if not taken) AND [2] triager unable to fill per unit policy    Negative: [1] Prescription not at pharmacy AND [2] was prescribed today by PCP    Negative: Pharmacy calling with prescription questions and triager unable to answer question    Negative: Caller has URGENT medication question about med that PCP prescribed and triager unable to answer question    Protocols used: MEDICATION QUESTION CALL-ADULT-    "

## 2018-04-23 ENCOUNTER — OFFICE VISIT (OUTPATIENT)
Dept: FAMILY MEDICINE | Facility: CLINIC | Age: 43
End: 2018-04-23
Payer: COMMERCIAL

## 2018-04-23 VITALS
BODY MASS INDEX: 38.14 KG/M2 | HEART RATE: 111 BPM | OXYGEN SATURATION: 97 % | RESPIRATION RATE: 20 BRPM | SYSTOLIC BLOOD PRESSURE: 138 MMHG | DIASTOLIC BLOOD PRESSURE: 88 MMHG | WEIGHT: 208.5 LBS | TEMPERATURE: 99.6 F

## 2018-04-23 DIAGNOSIS — F41.1 GENERALIZED ANXIETY DISORDER: Primary | Chronic | ICD-10-CM

## 2018-04-23 PROCEDURE — 99213 OFFICE O/P EST LOW 20 MIN: CPT | Performed by: FAMILY MEDICINE

## 2018-04-23 RX ORDER — SERTRALINE HYDROCHLORIDE 100 MG/1
TABLET, FILM COATED ORAL
Refills: 2 | COMMUNITY
Start: 2018-03-21 | End: 2019-05-08 | Stop reason: ALTCHOICE

## 2018-04-23 RX ORDER — DIAZEPAM 10 MG
10 TABLET ORAL EVERY 6 HOURS PRN
COMMUNITY
End: 2018-04-23

## 2018-04-23 RX ORDER — DIAZEPAM 10 MG
TABLET ORAL
Qty: 21 TABLET | Refills: 0 | Status: SHIPPED | OUTPATIENT
Start: 2018-04-23 | End: 2018-10-01

## 2018-04-23 ASSESSMENT — ANXIETY QUESTIONNAIRES
GAD7 TOTAL SCORE: 11
3. WORRYING TOO MUCH ABOUT DIFFERENT THINGS: MORE THAN HALF THE DAYS
5. BEING SO RESTLESS THAT IT IS HARD TO SIT STILL: NOT AT ALL
GAD7 TOTAL SCORE: 11
7. FEELING AFRAID AS IF SOMETHING AWFUL MIGHT HAPPEN: SEVERAL DAYS
2. NOT BEING ABLE TO STOP OR CONTROL WORRYING: NEARLY EVERY DAY
1. FEELING NERVOUS, ANXIOUS, OR ON EDGE: NEARLY EVERY DAY
6. BECOMING EASILY ANNOYED OR IRRITABLE: SEVERAL DAYS
7. FEELING AFRAID AS IF SOMETHING AWFUL MIGHT HAPPEN: SEVERAL DAYS
4. TROUBLE RELAXING: SEVERAL DAYS
GAD7 TOTAL SCORE: 11

## 2018-04-23 ASSESSMENT — PATIENT HEALTH QUESTIONNAIRE - PHQ9
SUM OF ALL RESPONSES TO PHQ QUESTIONS 1-9: 9
SUM OF ALL RESPONSES TO PHQ QUESTIONS 1-9: 9
10. IF YOU CHECKED OFF ANY PROBLEMS, HOW DIFFICULT HAVE THESE PROBLEMS MADE IT FOR YOU TO DO YOUR WORK, TAKE CARE OF THINGS AT HOME, OR GET ALONG WITH OTHER PEOPLE: SOMEWHAT DIFFICULT

## 2018-04-23 NOTE — MR AVS SNAPSHOT
After Visit Summary   4/23/2018    Melody Muñoz    MRN: [id number]           Patient Information     Date Of Birth          1975        Visit Information        Provider Department      4/23/2018 8:45 AM Otilio Hollins MD [facility]        Today's Diagnoses     Generalized anxiety disorder    -  1      Care Instructions    Limited Rx for the Diazepam.  Decrease, take just 1/2 tab max of 3 times daily after funerals are done          Follow-ups after your visit        Follow-up notes from your care team     Return if symptoms worsen or fail to improve.      Who to contact     If you have questions or need follow up information about today's clinic visit or your schedule please contact [facility] directly at [phone].  Normal or non-critical lab and imaging results will be communicated to you by MyChart, letter or phone within 4 business days after the clinic has received the results. If you do not hear from us within 7 days, please contact the clinic through MyChart or phone. If you have a critical or abnormal lab result, we will notify you by phone as soon as possible.  Submit refill requests through [employer] or call your pharmacy and they will forward the refill request to us. Please allow 3 business days for your refill to be completed.          Additional Information About Your Visit        MyChart Information     [employer] gives you secure access to your electronic health record. If you see a primary care provider, you can also send messages to your care team and make appointments. If you have questions, please call your primary care clinic.  If you do not have a primary care provider, please call [phone] and they will assist you.        Care EveryWhere ID     This is your Care EveryWhere ID. This could be used by other organizations to access your [facility] medical records  [id number]        Your Vitals Were     Pulse  Temperature Respirations Last Period Pulse Oximetry BMI (Body Mass Index)    111 99.6  F (37.6  C) (Tympanic) 20 (LMP Unknown) 97% 38.14 kg/m2       Blood Pressure from Last 3 Encounters:   04/23/18 138/88   12/27/17 136/84   12/22/17 130/84    Weight from Last 3 Encounters:   04/23/18 208 lb 8 oz (94.6 kg)   12/27/17 205 lb (93 kg)   12/22/17 205 lb (93 kg)              Today, you had the following     No orders found for display         Today's Medication Changes          These changes are accurate as of 4/23/18  9:11 AM.  If you have any questions, ask your nurse or doctor.               These medicines have changed or have updated prescriptions.        Dose/Directions    diazepam 10 MG tablet   Commonly known as:  VALIUM   This may have changed:    - how much to take  - how to take this  - when to take this  - reasons to take this  - additional instructions   Used for:  Generalized anxiety disorder   Changed by:  Otilio Hollins MD        Max of 3 times daily   Quantity:  21 tablet   Refills:  0         Stop taking these medicines if you haven't already. Please contact your care team if you have questions.     clonazePAM 1 MG tablet   Commonly known as:  klonoPIN   Stopped by:  Otilio Hollins MD                Where to get your medicines      Some of these will need a paper prescription and others can be bought over the counter.  Ask your nurse if you have questions.     Bring a paper prescription for each of these medications     diazepam 10 MG tablet                Primary Care Provider Office Phone # Fax #    Evaristo Machado [phone] [address] [facility] [address]        Equal Access to Services     [facility]: Hadii micaela mccauley hadasho Soomaali, waaxda luqadaha, qaybta kaalmada aida farnsworth. So [facility] [phone].    ATENCIÓN: Si habla español, tiene a mendoza disposición servicios gratuitos de asistencia lingüística. Llame al  [phone].    We comply with applicable federal civil rights laws and [place] laws. We do not discriminate on the basis of race, color, national origin, age, disability, sex, sexual orientation, or gender identity.            Thank you!     Thank you for choosing [facility]  for your care. Our goal is always to provide you with excellent care. Hearing back from our patients is one way we can continue to improve our services. Please take a few minutes to complete the written survey that you may receive in the mail after your visit with us. Thank you!             Your Updated Medication List - Protect others around you: Learn how to safely use, store and throw away your medicines at www.disposemymeds.org.          This list is accurate as of 4/23/18  9:11 AM.  Always use your most recent med list.                   Brand Name Dispense Instructions for use Diagnosis    * albuterol (2.5 MG/3ML) 0.083% neb solution     25 vial    Take 1 vial (2.5 mg) by nebulization every 6 hours as needed for shortness of breath / dyspnea or wheezing    Mild persistent asthma without complication       * PROAIR  (90 Base) MCG/ACT Inhaler   Generic drug:  albuterol     51 g    INHALE 1 TO 2 PUFFS INTO THE LUNGS EVERY 4 HOURS AS NEEDED FOR WHEEZING, SHORTNESS OF BREATH OR DYSPNEA    Intermittent asthma, uncomplicated       blood glucose monitoring lancets     1 Box    Use to test blood sugar 2 times daily or as directed.  Ok to substitute alternative if insurance prefers.    Diabetes mellitus without complication (H)       blood glucose monitoring test strip    LAURA CONTOUR NEXT    200 each    Use to test blood sugar 2 times daily or as directed.    Diabetes mellitus without complication (H)       Butalbital-APAP-Caff-Cod -72-30 MG Caps     15 capsule    Take 1 capsule by mouth 3 times daily as needed    Chronic migraine without aura without status migrainosus, not intractable        carisoprodol 350 MG tablet    SOMA    90 tablet    TAKE 1 TABLET BY MOUTH THREE TIMES DAILY    Chronic pain syndrome       diazepam 10 MG tablet    VALIUM    21 tablet    Max of 3 times daily    Generalized anxiety disorder       fluticasone 110 MCG/ACT Inhaler    FLOVENT HFA    2 Inhaler    Inhale 2 puffs into the lungs 2 times daily    Mild persistent asthma without complication       fluticasone 50 MCG/ACT spray    FLONASE    16 g    Spray 2 sprays into both nostrils daily    Chronic maxillary sinusitis       guanFACINE 2 MG tablet    TENEX    180 tablet    TAKE TWO TABLETS BY MOUTH AT BEDTIME    Nightmares       lamoTRIgine 200 MG tablet    LAMICTAL    90 tablet    Take 1 tablet (200 mg) by mouth every morning    Bipolar 1 disorder (H)       levothyroxine 25 MCG tablet    SYNTHROID/LEVOTHROID    90 tablet    TAKE 1 TABLET(25 MCG) BY MOUTH DAILY    Acquired hypothyroidism       lithium 450 MG CR tablet    ESKALITH    60 tablet    TAKE 2 TABLETS BY MOUTH AT BEDTIME    Bipolar disorder with psychotic features (H)       meclizine 25 MG tablet    ANTIVERT    30 tablet    Take 1 tablet (25 mg) by mouth every 6 hours as needed for dizziness    Dizziness       MELATONIN PO      Take 6 mg by mouth At Bedtime        metFORMIN 500 MG tablet    GLUCOPHAGE    90 tablet    Take 1 tablet (500 mg) by mouth 3 times daily (with meals)    Type 2 diabetes mellitus without complication, without long-term current use of insulin (H)       modafinil 200 MG tablet    PROVIGIL    30 tablet    Take 0.5 tablets (100 mg) by mouth daily    Chronic fatigue       OLANZapine 10 MG tablet    zyPREXA    5 tablet    Take 1 tablet (10 mg) by mouth At Bedtime    Bipolar 1 disorder (H), Generalized anxiety disorder       ondansetron 4 MG ODT tab    ZOFRAN-ODT    30 tablet    DISSOLVE 1 TO 2 TABLETS UNDER THE TONGUE EVERY 8 HOURS AS NEEDED FOR NAUSEA    Nausea       pantoprazole 40 MG EC tablet    PROTONIX    90 tablet    TAKE 1 TABLET(40 MG) BY MOUTH  DAILY    Other chronic gastritis without hemorrhage       pregabalin 100 MG capsule    LYRICA    90 capsule    Take 1 capsule (100 mg) by mouth 3 times daily    Chronic pain syndrome       sertraline 100 MG tablet    ZOLOFT     TK 1 T PO  QD        * Notice:  This list has 2 medication(s) that are the same as other medications prescribed for you. Read the directions carefully, and ask your doctor or other care provider to review them with you.

## 2018-04-23 NOTE — NURSING NOTE
"Chief Complaint   Patient presents with     Anxiety       Initial /88 (BP Location: Left arm, Patient Position: Sitting, Cuff Size: Adult Regular)  Pulse 111  Temp 99.6  F (37.6  C) (Tympanic)  Resp 20  Wt 208 lb 8 oz (94.6 kg)  LMP  (LMP Unknown)  SpO2 97%  BMI 38.14 kg/m2 Estimated body mass index is 38.14 kg/(m^2) as calculated from the following:    Height as of 12/27/17: 5' 2\" (1.575 m).    Weight as of this encounter: 208 lb 8 oz (94.6 kg).  Medication Reconciliation: complete     Princess LUPIS Conn CMA      "

## 2018-04-23 NOTE — PROGRESS NOTES
SUBJECTIVE:   Melody Muñoz is a 42 year old female who presents to clinic today for the following health issues:      Medication Followup of Diazepam    Taking Medication as prescribed: yes    Side Effects:  None    Medication Helping Symptoms:  yes     Has not been able to see psychiatrist. OB prescribed Diazepam last week, gave her Rx for just 10 tablets.  She stopped taking the Clonazepam as of Thursday 4/19/18    She has call into the psychiatrist's office, has not heard back yet today from them    She is not pregnant    Depression and Anxiety Follow-Up    Status since last visit: Worsened     Other associated symptoms:None    Complicating factors:     Significant life event: Yes-  2 friends recently passed, one committed suicide    Current substance abuse: None    PHQ-9 5/17/2017 11/10/2017 4/23/2018   Total Score - 5 9   Q9: Suicide Ideation Not at all Not at all Not at all     ROWDY-7 SCORE 11/28/2016 11/10/2017 4/23/2018   Total Score - - -   Total Score - - 11 (moderate anxiety)   Total Score 5 4 11   Total Score - - -   Total Score BEH Adult - - -       PHQ-9  English  PHQ-9   Any Language  ROWDY-7  Suicide Assessment Five-step Evaluation and Treatment (SAFE-T)    Amount of exercise or physical activity: None    Problems taking medications regularly: No    Medication side effects: none    Diet: regular (no restrictions)    Answers for HPI/ROS submitted by the patient on 4/23/2018   If you checked off any problems, how difficult have these problems made it for you to do your work, take care of things at home, or get along with other people?: Somewhat difficult  PHQ9 TOTAL SCORE: 9  ROWDY 7 TOTAL SCORE: 11          Problem list and histories reviewed & adjusted, as indicated.  Additional history: as documented    Labs reviewed in EPIC    Reviewed and updated as needed this visit by clinical staff  Tobacco  Allergies  Meds  Problems  Med Hx  Surg Hx  Fam Hx  Soc Hx        Reviewed and updated as  needed this visit by Provider  Allergies  Meds  Problems         ROS:  CONSTITUTIONAL:NEGATIVE for fever, chills, change in weight  PSYCHIATRIC: POSITIVE foranxiety, Hx anxiety and Hx depression    OBJECTIVE:                                                    /88 (BP Location: Left arm, Patient Position: Sitting, Cuff Size: Adult Regular)  Pulse 111  Temp 99.6  F (37.6  C) (Tympanic)  Resp 20  Wt 208 lb 8 oz (94.6 kg)  LMP  (LMP Unknown)  SpO2 97%  BMI 38.14 kg/m2  Body mass index is 38.14 kg/(m^2).  GENERAL APPEARANCE: healthy, alert, mild distress and crying  PSYCH: mentation appears normal, affect flat, anxious and crying    Diagnostic test results:  none      ASSESSMENT/PLAN:                                                        ICD-10-CM    1. Generalized anxiety disorder F41.1 diazepam (VALIUM) 10 MG tablet       Follow up with Provider - as needed  Limited Rx.  She needs to be seen by psychiatry   Patient Instructions   Limited Rx for the Diazepam.  Decrease, take just 1/2 tab max of 3 times daily after funerals are done      Otilio Hollins MD  [facility]

## 2018-04-24 ASSESSMENT — ANXIETY QUESTIONNAIRES: GAD7 TOTAL SCORE: 11

## 2018-04-24 ASSESSMENT — PATIENT HEALTH QUESTIONNAIRE - PHQ9: SUM OF ALL RESPONSES TO PHQ QUESTIONS 1-9: 9

## 2018-04-27 DIAGNOSIS — G89.4 CHRONIC PAIN SYNDROME: ICD-10-CM

## 2018-04-27 NOTE — TELEPHONE ENCOUNTER
Reason for Call:  Medication or medication refill:    Do you use a [facility]?  Name of the pharmacy and phone number for the current request:  [facility]    Name of the medication requested: SOMA    Other request: NONE    Can we leave a detailed message on this number? YES    Phone number patient can be reached at: Cell number on file:    Telephone Information:   Mobile [phone]       Best Time: ANY    Call taken on 4/27/2018 at 4:11 PM by MARIAN FUENTES

## 2018-04-30 RX ORDER — CARISOPRODOL 350 MG/1
TABLET ORAL
Qty: 90 TABLET | Refills: 0 | Status: SHIPPED | OUTPATIENT
Start: 2018-04-30 | End: 2018-05-21

## 2018-04-30 NOTE — TELEPHONE ENCOUNTER
carisoprodol (SOMA) 350 MG tablet  Last Written Prescription Date:  04/03/2018  Last Fill Quantity: 90,   # refills: 0  Last Office Visit: 04/23/2018  Future Office visit:       Routing refill request to provider for review/approval because:  Drug not on the [facility], [facility] or [facility] refill protocol or controlled substance

## 2018-04-30 NOTE — TELEPHONE ENCOUNTER
Controlled Substance Refill Request for Avtar    Last office visit:  4/23/2018  Last refill  4-3-18  #90      Problem List Complete:  Yes  Patient is followed by VIRAL Deleon for ongoing prescription of pain medication.  All refills should be approved by this provider, or covering partner.    Medication(s):  Lyrica 150 mg bid, Fioricet with codeine prn, klonopin and ambien to be prescribed by psych, not [place].  Maximum quantity per month: 60, 20  Clinic visit frequency required: Q 3 months     Controlled substance agreement on file: Yes       Date(s): 9/8/2015  New CSA needed.  Pain Clinic evaluation in the past: No    DIRE Total Score(s):  No flowsheet data found.    Last [facility] website verification: 2/27/2018 multiple meds from multiple outside providers  Patient is followed by VIRAL Deleon for ongoing prescription of pain medication.  All refills should be approved by this provider, or covering partner.    Medication(s):  Lyrica 150 mg bid, Fioricet with codeine prn, klonopin and ambien to be prescribed by psych, not [place].  Maximum quantity per month: 60, 20  Clinic visit frequency required: Q 3 months     Controlled substance agreement on file: Yes       Date(s): 9/8/2015  New CSA needed.  Pain Clinic evaluation in the past: No    DIRE Total Score(s):  No flowsheet data found.    Last [facility] website verification: 2/27/2018 multiple meds from multiple outside providers

## 2018-04-30 NOTE — TELEPHONE ENCOUNTER
Faxed Rx (soma) to the pharmacy-[facility] in [place] on [address]  Called pt to let her know that this was faxed. Left VM.

## 2018-05-07 DIAGNOSIS — R11.0 NAUSEA: ICD-10-CM

## 2018-05-07 NOTE — TELEPHONE ENCOUNTER
"Requested Prescriptions   Pending Prescriptions Disp Refills     ondansetron (ZOFRAN-ODT) 4 MG ODT tab [Pharmacy Med Name: ONDANSETRON ODT 4MG TABLETS]  Last Written Prescription Date:  4/4/2018  Last Fill Quantity: 30,  # refills: 1   Last office visit: 4/23/2018 with prescribing provider:  Dr Hollins   Future Office Visit:     30 tablet 0     Sig: DISSOLVE 1 TO 2 TABLETS UNDER THE TONGUE EVERY 8 HOURS AS NEEDED FOR NAUSEA     Antivertigo/Antiemetic Agents Passed    5/7/2018  1:13 PM       Passed - Recent (12 mo) or future (30 days) visit within the authorizing provider's specialty    Patient had office visit in the last 12 months or has a visit in the next 30 days with authorizing provider or within the authorizing provider's specialty.  See \"Patient Info\" tab in inbasket, or \"Choose Columns\" in Meds & Orders section of the refill encounter.           Passed - Patient is 18 years of age or older          "

## 2018-05-08 RX ORDER — ONDANSETRON 4 MG/1
TABLET, ORALLY DISINTEGRATING ORAL
Qty: 30 TABLET | Refills: 1 | Status: SHIPPED | OUTPATIENT
Start: 2018-05-08 | End: 2019-02-21

## 2018-05-09 DIAGNOSIS — G43.709 CHRONIC MIGRAINE WITHOUT AURA WITHOUT STATUS MIGRAINOSUS, NOT INTRACTABLE: ICD-10-CM

## 2018-05-09 RX ORDER — BUTALBITAL, ACETAMINOPHEN, CAFFEINE AND CODEINE PHOSPHATE 300; 50; 40; 30 MG/1; MG/1; MG/1; MG/1
1 CAPSULE ORAL 3 TIMES DAILY PRN
Qty: 15 CAPSULE | Refills: 3 | Status: SHIPPED | OUTPATIENT
Start: 2018-05-09 | End: 2018-06-13

## 2018-05-09 NOTE — TELEPHONE ENCOUNTER
Butalbital-APAP-Caff-Cod -68-30 MG CAPS  Last Written Prescription Date:  02/27/18  Last Fill Quantity: 15,  # refills: 3   Last office visit: 4/23/2018 with prescribing provider:     Future Office Visit:    Requested Prescriptions   Pending Prescriptions Disp Refills     Butalbital-APAP-Caff-Cod -74-30 MG CAPS 15 capsule 3     Sig: Take 1 capsule by mouth 3 times daily as needed    There is no refill protocol information for this order

## 2018-05-09 NOTE — TELEPHONE ENCOUNTER
Controlled Substance Refill Request for Butalbital- please see patient's note below    Last office visit:  4/23/2018  Last OV  4-23-18      Problem List Complete:  Yes    fioricet plain Q 6 hrs, no more than 4/day and gets 20 at a time      CSA on file from Dr Husain, dated 9/8/15  (update with current primary care provider)  Last  check - 11-27-17 provider to review=- multiple meds from multiple providers

## 2018-05-09 NOTE — TELEPHONE ENCOUNTER
Reason for Call:  Medication or medication refill:    Do you use a [facility]?  Name of the pharmacy and phone number for the current request:  [facility]    Name of the medication requested: Butalbital-APAP-Caff-Cod -46-30 MG CAPS    Other request: Patient states she has a terrible migraine and know this is early but she really need it refilled and she would like a call when this is done.    Can we leave a detailed message on this number? YES    Phone number patient can be reached at: Home number on file [phone] (home)    Best Time:     Call taken on 5/9/2018 at 12:57 PM by JACQUELINE SMITH

## 2018-05-10 NOTE — TELEPHONE ENCOUNTER
Call made to pharmacy. Although the prescription was written for 5/9/18, they do not feel comfortable filling it as they think it should be filled on 5/15/18, since historically the patient gets this filled on the 15th and the 24th of the month. Dr. ALBERTS has stated that the patient can only have a max qty of 30 tablets a month.       They wanted to make sure before filling it, the patient has called a couple of times. Routing to provider for approval/denial.     Routing to team as well, since PCP is not in office today.

## 2018-05-10 NOTE — TELEPHONE ENCOUNTER
Patient called stating she had requested this be filled early   She states she is going out of town    The pharmacy has it but they were not instructed it was OK to fill early     Please look into this and call pharmacy

## 2018-05-14 DIAGNOSIS — E03.9 ACQUIRED HYPOTHYROIDISM: ICD-10-CM

## 2018-05-15 NOTE — TELEPHONE ENCOUNTER
"Requested Prescriptions   Pending Prescriptions Disp Refills     levothyroxine (SYNTHROID/LEVOTHROID) 25 MCG tablet [Pharmacy Med Name: LEVOTHYROXINE 0.025MG (25MCG) TAB]  Last Written Prescription Date:  3/7/17  Last Fill Quantity: 90,  # refills: 3   Last office visit: 4/23/2018 with prescribing provider:  Gurvinder   Future Office Visit:     90 tablet 0     Sig: TAKE 1 TABLET(25 MCG) BY MOUTH DAILY    Thyroid Protocol Passed    5/14/2018 10:00 AM       Passed - Patient is 12 years or older       Passed - Recent (12 mo) or future (30 days) visit within the authorizing provider's specialty    Patient had office visit in the last 12 months or has a visit in the next 30 days with authorizing provider or within the authorizing provider's specialty.  See \"Patient Info\" tab in inbasket, or \"Choose Columns\" in Meds & Orders section of the refill encounter.           Passed - Normal TSH on file in past 12 months    Recent Labs   Lab Test  11/10/17   1028   TSH  1.20             Passed - No active pregnancy on record    If patient is pregnant or has had a positive pregnancy test, please check TSH.         Passed - No positive pregnancy test in past 12 months    If patient is pregnant or has had a positive pregnancy test, please check TSH.            "

## 2018-05-16 RX ORDER — LEVOTHYROXINE SODIUM 25 UG/1
TABLET ORAL
Qty: 90 TABLET | Refills: 1 | Status: SHIPPED | OUTPATIENT
Start: 2018-05-16 | End: 2018-12-09

## 2018-05-17 ENCOUNTER — TELEPHONE (OUTPATIENT)
Dept: FAMILY MEDICINE | Facility: CLINIC | Age: 43
End: 2018-05-17

## 2018-05-17 DIAGNOSIS — F51.01 PRIMARY INSOMNIA: ICD-10-CM

## 2018-05-17 RX ORDER — ZOLPIDEM TARTRATE 10 MG/1
10 TABLET ORAL
Qty: 30 TABLET | Refills: 0 | Status: SHIPPED | OUTPATIENT
Start: 2018-05-17 | End: 2018-08-21

## 2018-05-17 NOTE — TELEPHONE ENCOUNTER
Left voice message asking pt to call triage back. Triage please note Ambien is not listed on pt's medication list.

## 2018-05-17 NOTE — TELEPHONE ENCOUNTER
Med was on historical meds (at bottom with the alphabetized). [facility] showed that Rx was last filled on 4/8/18.  I will ok 1 Rx but she should be scheduling appt to come in to see Dr Machado to review her medications.   Rx approved, printed given to Team 2 staff to be faxed   Notify Pt Rx was sent

## 2018-05-17 NOTE — TELEPHONE ENCOUNTER
Reason for Call:  Medication or medication refill:    Do you use a [facility]?  Name of the pharmacy and phone number for the current request:  [facility]     Name of the medication requested: zolpidem    Other request: none    Can we leave a detailed message on this number? YES    Phone number patient can be reached at: Cell number on file:    Telephone Information:   Mobile [phone]       Best Time: any    Call taken on 5/17/2018 at 1:09 PM by MARIAN FUENTES

## 2018-05-21 ENCOUNTER — NURSE TRIAGE (OUTPATIENT)
Dept: NURSING | Facility: CLINIC | Age: 43
End: 2018-05-21

## 2018-05-21 DIAGNOSIS — G89.4 CHRONIC PAIN SYNDROME: ICD-10-CM

## 2018-05-21 NOTE — TELEPHONE ENCOUNTER
Patient states she is going out of town and need this prescription to be dated 5/25/2018 for a start date.

## 2018-05-21 NOTE — TELEPHONE ENCOUNTER
"Caller is inquiring \"the status of my refills\" Has spoken to pharmacy and told RX not there   Review  Of EMR reveals  RX  For Lyrica  and SOMA are pending;   Understands   Will call clinic in morning  Neema Sauer RN  FNA    Additional Information    Caller has medication question only, adult not sick, and triager answers question    Protocols used: MEDICATION QUESTION CALL-ADULT-    "

## 2018-05-21 NOTE — TELEPHONE ENCOUNTER
Requested Prescriptions   Pending Prescriptions Disp Refills     pregabalin (LYRICA) 100 MG capsule      Last Written Prescription Date:  4/4/18  Last Fill Quantity: 90,   # refills: 1  Last Office Visit: 4/23/18 [facility]  Future Office visit:       Routing refill request to provider for review/approval because:  Drug not on the [facility], [facility] or [facility] refill protocol or controlled substance   90 capsule 1     Sig: Take 1 capsule (100 mg) by mouth 3 times daily    There is no refill protocol information for this order        carisoprodol (SOMA) 350 MG tablet      Last Written Prescription Date:  4/30/18  Last Fill Quantity: 90,   # refills: 0  Last Office Visit: 4/23/18 [facility]  Future Office visit:       Routing refill request to provider for review/approval because:  Drug not on the [facility], [facility] or [facility] refill protocol or controlled substance   90 tablet 0     Sig: TAKE 1 TABLET BY MOUTH THREE TIMES DAILY    There is no refill protocol information for this order

## 2018-05-21 NOTE — TELEPHONE ENCOUNTER
Reason for Call:  Medication or medication refill:  Do you use a [facility]?  Name of the pharmacy and phone number for the current request:  [facility] [address] [place] [address] AT [place] [address]  Name of the medication requested: carisoprodol (SOMA) 350 MG tablet and pregabalin (LYRICA) 100 MG capsule  Other request: none  Can we leave a detailed message on this number? YES  Phone number patient can be reached at: Home number on file [phone] (home)  Best Time: any  Call taken on 5/21/2018 at 7:43 AM by BREANN WARREN

## 2018-05-22 RX ORDER — PREGABALIN 100 MG/1
100 CAPSULE ORAL 3 TIMES DAILY
Qty: 90 CAPSULE | Refills: 1 | Status: SHIPPED | OUTPATIENT
Start: 2018-05-22 | End: 2018-06-04

## 2018-05-22 RX ORDER — CARISOPRODOL 350 MG/1
TABLET ORAL
Qty: 90 TABLET | Refills: 0 | Status: SHIPPED | OUTPATIENT
Start: 2018-05-22 | End: 2018-06-20

## 2018-05-22 NOTE — TELEPHONE ENCOUNTER
Patient states she is going out of town and need this prescription to be dated 5/25/2018 for a start date.             Controlled Substance Refill Request for Soma and Lyrica  Problem List Complete:  Yes    Patient is followed by VIRAL Deleon for ongoing prescription of pain medication.  All refills should be approved by this provider, or covering partner.    Medication(s):  Lyrica 150 mg bid, Fioricet with codeine prn, klonopin and ambien to be prescribed by psych, not [facility].  Maximum quantity per month: 60, 20  Clinic visit frequency required: Q 3 months     Controlled substance agreement on file: Yes       Date(s): 9/8/2015  New CSA needed.  Pain Clinic evaluation in the past: No    DIRE Total Score(s):  No flowsheet data found.    Last [facility] website verification: 2/27/2018 multiple meds from multiple outside providers

## 2018-05-22 NOTE — TELEPHONE ENCOUNTER
Faxed Rx (soma and lyrica) to the pharmacy-[facility] in [place] on [address].

## 2018-06-01 ENCOUNTER — TELEPHONE (OUTPATIENT)
Dept: FAMILY MEDICINE | Facility: CLINIC | Age: 43
End: 2018-06-01

## 2018-06-01 NOTE — TELEPHONE ENCOUNTER
Reason for Call:  Other     Detailed comments: for the past year having trouble with deodorant.  Breaks out in a rash, what kind should she use     Phone Number Patient can be reached at: Home number on file [phone] (home)    Best Time  any    Can we leave a detailed message on this number? YES    Call taken on 6/1/2018 at 1:35 PM by NANCY MAYO

## 2018-06-01 NOTE — TELEPHONE ENCOUNTER
Pt complains of itchiness and rash with deodorant. Reports she has tried natural deodorants online but that didn't work.  Writer suggeted trying baby powder. Please advice if any Rx appropriate.

## 2018-06-02 NOTE — TELEPHONE ENCOUNTER
Called pt and let her know that Dr. Machado suggests using [employer] products. She states that she will look into this.

## 2018-06-04 ENCOUNTER — TELEPHONE (OUTPATIENT)
Dept: FAMILY MEDICINE | Facility: CLINIC | Age: 43
End: 2018-06-04

## 2018-06-04 DIAGNOSIS — G89.4 CHRONIC PAIN SYNDROME: ICD-10-CM

## 2018-06-04 RX ORDER — PREGABALIN 150 MG/1
150 CAPSULE ORAL 2 TIMES DAILY
Qty: 60 CAPSULE | Refills: 1 | Status: SHIPPED | OUTPATIENT
Start: 2018-06-04 | End: 2018-06-27

## 2018-06-04 NOTE — TELEPHONE ENCOUNTER
Rx approved and faxed to [facility] pharmacy.Pharmacy notified of change of Lyrica dose from 100 mg to 150 mg.

## 2018-06-09 DIAGNOSIS — K29.50 OTHER CHRONIC GASTRITIS WITHOUT HEMORRHAGE: ICD-10-CM

## 2018-06-10 NOTE — TELEPHONE ENCOUNTER
"PANTOPRAZOLE 40MG TABLETS  Last Written Prescription Date:  11/7/2017  Last Fill Quantity: 90,  # refills: 1   Last office visit: 4/23/2018 with prescribing provider:     Future Office Visit:      Requested Prescriptions   Pending Prescriptions Disp Refills     pantoprazole (PROTONIX) 40 MG EC tablet [Pharmacy Med Name: PANTOPRAZOLE 40MG TABLETS] 90 tablet 0     Sig: TAKE 1 TABLET(40 MG) BY MOUTH DAILY    PPI Protocol Passed    6/9/2018  7:46 AM       Passed - Not on Clopidogrel (unless Pantoprazole ordered)       Passed - No diagnosis of osteoporosis on record       Passed - Recent (12 mo) or future (30 days) visit within the authorizing provider's specialty    Patient had office visit in the last 12 months or has a visit in the next 30 days with authorizing provider or within the authorizing provider's specialty.  See \"Patient Info\" tab in inbasket, or \"Choose Columns\" in Meds & Orders section of the refill encounter.           Passed - Patient is age 18 or older       Passed - No active pregnacy on record       Passed - No positive pregnancy test in past 12 months          "

## 2018-06-11 ENCOUNTER — TELEPHONE (OUTPATIENT)
Dept: FAMILY MEDICINE | Facility: CLINIC | Age: 43
End: 2018-06-11

## 2018-06-11 DIAGNOSIS — G43.009 MIGRAINE WITHOUT AURA AND WITHOUT STATUS MIGRAINOSUS, NOT INTRACTABLE: Primary | ICD-10-CM

## 2018-06-11 DIAGNOSIS — G43.909 MIGRAINE: ICD-10-CM

## 2018-06-11 NOTE — TELEPHONE ENCOUNTER
Reason for Call: Request for an order or referral:    Order or referral being requested: referral to [facility]    Date needed: as soon as possible    Has the patient been seen by the PCP for this problem? YES    Additional comments: migraines    Phone number Patient can be reached at:  Home number on file [phone] (home)    Best Time:  any    Can we leave a detailed message on this number?  YES    Call taken on 6/11/2018 at 10:08 AM by MARIAN FUENTES

## 2018-06-11 NOTE — TELEPHONE ENCOUNTER
Prescription approved per [facility] Refill Protocol for 12 months of refills since last appointment, which was 4/23/18

## 2018-06-11 NOTE — TELEPHONE ENCOUNTER
Melody,    If we are going to get a consult from the specialist I would probably suggest sending you to the [facility] before sending you down to [facility].

## 2018-06-11 NOTE — TELEPHONE ENCOUNTER
Pt was called with providers message below. She verbalized agreement with plan. Neurology referral pended. Please complete if agree with order. Referral can be mailed to patient if approved. No need to call pt back.

## 2018-06-12 DIAGNOSIS — R11.0 NAUSEA: ICD-10-CM

## 2018-06-12 RX ORDER — PANTOPRAZOLE SODIUM 40 MG/1
TABLET, DELAYED RELEASE ORAL
Qty: 90 TABLET | Refills: 2 | Status: SHIPPED | OUTPATIENT
Start: 2018-06-12 | End: 2019-09-06

## 2018-06-12 NOTE — TELEPHONE ENCOUNTER
"Requested Prescriptions   Pending Prescriptions Disp Refills     ondansetron (ZOFRAN-ODT) 4 MG ODT tab [Pharmacy Med Name: ONDANSETRON ODT 4MG TABLETS]  Last Written Prescription Date:  5/8/18  Last Fill Quantity: 30,  # refills: 1   Last office visit: 4/23/2018 with prescribing provider:  Gurvinder   Future Office Visit:     30 tablet 0     Sig: DISSOLVE 1 TO 2 TABLETS UNDER THE TONGUE EVERY 8 HOURS AS NEEDED FOR NAUSEA     Antivertigo/Antiemetic Agents Passed    6/12/2018  9:33 AM       Passed - Recent (12 mo) or future (30 days) visit within the authorizing provider's specialty    Patient had office visit in the last 12 months or has a visit in the next 30 days with authorizing provider or within the authorizing provider's specialty.  See \"Patient Info\" tab in inbasket, or \"Choose Columns\" in Meds & Orders section of the refill encounter.           Passed - Patient is 18 years of age or older          "

## 2018-06-13 ENCOUNTER — TELEPHONE (OUTPATIENT)
Dept: FAMILY MEDICINE | Facility: CLINIC | Age: 43
End: 2018-06-13

## 2018-06-13 DIAGNOSIS — G43.709 CHRONIC MIGRAINE WITHOUT AURA WITHOUT STATUS MIGRAINOSUS, NOT INTRACTABLE: ICD-10-CM

## 2018-06-13 DIAGNOSIS — G89.4 CHRONIC PAIN SYNDROME: ICD-10-CM

## 2018-06-13 RX ORDER — BUTALBITAL, ACETAMINOPHEN, CAFFEINE AND CODEINE PHOSPHATE 300; 50; 40; 30 MG/1; MG/1; MG/1; MG/1
1 CAPSULE ORAL 3 TIMES DAILY PRN
Qty: 15 CAPSULE | Refills: 3 | Status: SHIPPED | OUTPATIENT
Start: 2018-06-13 | End: 2018-06-25

## 2018-06-13 RX ORDER — ONDANSETRON 4 MG/1
TABLET, ORALLY DISINTEGRATING ORAL
Qty: 30 TABLET | Refills: 9 | Status: SHIPPED | OUTPATIENT
Start: 2018-06-13 | End: 2018-10-01

## 2018-06-13 RX ORDER — HYDROCODONE BITARTRATE AND ACETAMINOPHEN 5; 325 MG/1; MG/1
1 TABLET ORAL EVERY 4 HOURS PRN
Qty: 18 TABLET | Refills: 0 | Status: CANCELLED | OUTPATIENT
Start: 2018-06-13

## 2018-06-13 NOTE — TELEPHONE ENCOUNTER
I won't fill based on pt history.  She should not need norco for mono and she should not know that that is the only think that works on headaches from mono when a person usually only gets mono once in their life.  Can see if another provider wants to fill it for her.

## 2018-06-13 NOTE — TELEPHONE ENCOUNTER
Reason for Call:  Other prescription    Detailed comments: Patient called in this morning requesting Hydrocodone. She states she has Donley and was seen at urgent care yesterday and was given Hydrocodone and that is the only thing that will work for her headaches from the Mono. States she was given 7 pills to get her through until she spoke to her primary regarding refill. Patient does not want the Rx for Butalbital that was sent to her pharmacy. Please call to advise if Hydrocodone will be filled.     Phone Number Patient can be reached at: Home number on file [phone] (home)    Best Time: any    Can we leave a detailed message on this number? YES    Call taken on 6/13/2018 at 10:23 AM by Taylor Moreira

## 2018-06-13 NOTE — TELEPHONE ENCOUNTER
No I will not do Rx for Hydrocodone for her without her being seen. My schedule is packed today, but I could see her on Thursday.  No guarantees about giving Rx then. Would not expect severe headaches from Page

## 2018-06-13 NOTE — TELEPHONE ENCOUNTER
Pt was called with providers message below insist Norco is the only thing that works to relieve her pain. Asked that message be reviewed by other providers. She does not want to go to [facility] clinic. Routing message to  provider pool.

## 2018-06-13 NOTE — TELEPHONE ENCOUNTER
Called [facility] pharmacy with approval for early fill. Informed pt this was dose. Advised she keep appointment scheduled.

## 2018-06-13 NOTE — TELEPHONE ENCOUNTER
Call back to patient who has scheduled an appointment with Dr erickson tomorrow.She has requested a refill for her butalbital which has been faxed to the pharmacy but they are telling her it is too early to fill so she is asking if provider would ok it to be filled early. She said she needs something for her headache.

## 2018-06-13 NOTE — TELEPHONE ENCOUNTER
Reason for Call:  Other call back    Detailed comments: Pt is out of Butalbital-APAP-Caff-Cod -62-30 MG CAPS and needs a refill approved for early refill. She is having head and sinus pain. Refill will be at [facility] in [place] [address].    Phone Number Patient can be reached at: Cell number on file:    Telephone Information:   Mobile [phone]       Best Time: any    Can we leave a detailed message on this number? YES    Call taken on 6/13/2018 at 2:24 PM by Mary Rodriguez

## 2018-06-13 NOTE — TELEPHONE ENCOUNTER
Pt is requesting Tylenol 3 so she doesn't need to come in. Advised she schedule OV to discuss as advised by provider. She declines. UC information given.States she will contact UC.

## 2018-06-13 NOTE — TELEPHONE ENCOUNTER
Faxed Rx (Butalbital-APAP-Caff-Cod -37-30 MG CAPS) to the pharmacy-[facility] in [place] on [address]

## 2018-06-13 NOTE — TELEPHONE ENCOUNTER
Please review message below. Would provider consider short supply of Hydrocodone? Script for Butalbital was approved today. Last script for butalbital was filled 06/09/2018. She will not be able to fill now. Pt reports last night she was given 7 pills of Hydrocodone but needs more until seen with PCP. Please advice. No record found on  re: Norco.

## 2018-06-13 NOTE — TELEPHONE ENCOUNTER
Reason for Call:  Medication or medication refill:    Do you use a [facility]?  Name of the pharmacy and phone number for the current request:   [facility]    Name of the medication requested: hydrocodone for headaches    Other request: none    Can we leave a detailed message on this number? YES    Phone number patient can be reached at: Home number on file [phone] (home)    Best Time: any    Call taken on 6/13/2018 at 8:46 AM by MARIAN FUENTES

## 2018-06-13 NOTE — TELEPHONE ENCOUNTER
Pt was informed of providers message below. States UC prescribed Norco and also taking prednisone during acute phase of mono. Asked that message be reviewed by doctor Gurvinder since PCP is out of the office.She takes Norco every 4 hours and would like to  Rx  today. She is willing to  at [facility] if approved. Routing message to doctor Hollins.

## 2018-06-13 NOTE — TELEPHONE ENCOUNTER
Routing refill request to provider for review/approval because:  Drug not on the [facility] refill protocol   Drug not active on patient's medication list    Please advice if OV needed.     Last OV 04/23/2018.        Controlled Substance Refill Request for hydrocodone   Problem List Complete:  Yes  Patient is followed by VIRAL Deleon for ongoing prescription of pain medication.  All refills should be approved by this provider, or covering partner.    Medication(s):  Lyrica 150 mg bid, Fioricet with codeine prn, klonopin and ambien to be prescribed by psych, not [facility].  Maximum quantity per month: 60, 20  Clinic visit frequency required: Q 3 months     Controlled substance agreement on file: Yes       Date(s): 9/8/2015  New CSA needed.  Pain Clinic evaluation in the past: No    DIRE Total Score(s):  No flowsheet data found.    Last [facility] website verification: 2/27/2018 multiple meds from multiple outside providers   checked in past 6 months?  Yes 02/27/2018

## 2018-06-14 ENCOUNTER — TELEPHONE (OUTPATIENT)
Dept: FAMILY MEDICINE | Facility: CLINIC | Age: 43
End: 2018-06-14

## 2018-06-14 NOTE — TELEPHONE ENCOUNTER
Reason for Call:  Other call back    Detailed comments:   Patient called stating she has to work today and cannot keep the appointment with Dr Hollins today 06/14/18   She is wondering if prednisone would help her sinus headache   She states had seen two doctors in the last two days    Please call her to discuss    Phone Number Patient can be reached at: Home number on file [phone] (home)    Best Time:   anytime    Can we leave a detailed message on this number? YES    Call taken on 6/14/2018 at 9:33 AM by KYLER DE LA PAZ

## 2018-06-14 NOTE — TELEPHONE ENCOUNTER
Butalbital-APAP-Caff-Cod -70-30 MG CAPS  We gave the 325 mg of tylenol for first fill , we will fill the refills with 300 mg tylenol   this is an FYI

## 2018-06-14 NOTE — TELEPHONE ENCOUNTER
Call back to patient who states that she is wanting to know if provider could prescribe prednisone for her h/a she just does not want to drive anywhere with how she is feeling and her h/a. Will send request to provider.

## 2018-06-15 ENCOUNTER — TELEPHONE (OUTPATIENT)
Dept: FAMILY MEDICINE | Facility: CLINIC | Age: 43
End: 2018-06-15

## 2018-06-15 NOTE — TELEPHONE ENCOUNTER
Agree that she can cut the Amoxicillin in half, take at least 3 times daily (4 would be preferable)

## 2018-06-15 NOTE — TELEPHONE ENCOUNTER
Reason for Call:  Other call back    Detailed comments: Patient called in wondering if she needs to take the whole pill of amoxicillin as it is making her throw up. Please call to advise.     Phone Number Patient can be reached at: Home number on file [phone] (home)    Best Time: any    Can we leave a detailed message on this number? YES    Call taken on 6/15/2018 at 9:00 AM by Taylor Moreira

## 2018-06-15 NOTE — TELEPHONE ENCOUNTER
Call to patient who states that she is not able to come in to clinic today as she is just too sick.Wants to know if she can cut her amoxicillin in half as it is making her sick to her stomach.Advised she could but would need to take it 4 x a day instead of the one tab 2x a day.Pt said she would try that. Will cancel her appointment.

## 2018-06-18 DIAGNOSIS — G89.4 CHRONIC PAIN SYNDROME: ICD-10-CM

## 2018-06-18 RX ORDER — CARISOPRODOL 350 MG/1
TABLET ORAL
Qty: 90 TABLET | Refills: 0 | Status: CANCELLED | OUTPATIENT
Start: 2018-06-18

## 2018-06-18 NOTE — TELEPHONE ENCOUNTER
Reason for Call:  Medication or medication refill:    Do you use a [facility]?  Name of the pharmacy and phone number for the current request:  [address]    Name of the medication requested: SOMA 350 MG    Other request: None    Can we leave a detailed message on this number? YES    Phone number patient can be reached at: Home number on file [phone] (home)    Best Time: any    Call taken on 6/18/2018 at 8:53 AM by Taylor Moreira

## 2018-06-19 ENCOUNTER — OFFICE VISIT (OUTPATIENT)
Dept: FAMILY MEDICINE | Facility: CLINIC | Age: 43
End: 2018-06-19
Payer: COMMERCIAL

## 2018-06-19 VITALS
BODY MASS INDEX: 36.4 KG/M2 | TEMPERATURE: 99.4 F | RESPIRATION RATE: 18 BRPM | DIASTOLIC BLOOD PRESSURE: 80 MMHG | WEIGHT: 199 LBS | SYSTOLIC BLOOD PRESSURE: 136 MMHG | HEART RATE: 89 BPM | OXYGEN SATURATION: 99 %

## 2018-06-19 DIAGNOSIS — Z13.89 SCREENING FOR DIABETIC PERIPHERAL NEUROPATHY: ICD-10-CM

## 2018-06-19 DIAGNOSIS — E11.9 DIABETES MELLITUS WITHOUT COMPLICATION (H): ICD-10-CM

## 2018-06-19 DIAGNOSIS — Z11.4 SCREENING FOR HIV (HUMAN IMMUNODEFICIENCY VIRUS): ICD-10-CM

## 2018-06-19 DIAGNOSIS — R51.9 SINUS HEADACHE: ICD-10-CM

## 2018-06-19 DIAGNOSIS — J01.90 ACUTE SINUSITIS TREATED WITH ANTIBIOTICS IN THE PAST 60 DAYS: Primary | ICD-10-CM

## 2018-06-19 LAB — HBA1C MFR BLD: 7.5 % (ref 0–5.6)

## 2018-06-19 PROCEDURE — 99207 C FOOT EXAM  NO CHARGE: CPT | Performed by: FAMILY MEDICINE

## 2018-06-19 PROCEDURE — 82043 UR ALBUMIN QUANTITATIVE: CPT | Performed by: FAMILY MEDICINE

## 2018-06-19 PROCEDURE — 99214 OFFICE O/P EST MOD 30 MIN: CPT | Mod: 25 | Performed by: FAMILY MEDICINE

## 2018-06-19 PROCEDURE — 83036 HEMOGLOBIN GLYCOSYLATED A1C: CPT | Performed by: FAMILY MEDICINE

## 2018-06-19 PROCEDURE — 87389 HIV-1 AG W/HIV-1&-2 AB AG IA: CPT | Performed by: FAMILY MEDICINE

## 2018-06-19 PROCEDURE — 36415 COLL VENOUS BLD VENIPUNCTURE: CPT | Performed by: FAMILY MEDICINE

## 2018-06-19 RX ORDER — AMOXICILLIN 500 MG/1
1000 CAPSULE ORAL 3 TIMES DAILY
Qty: 60 CAPSULE | Refills: 0 | Status: SHIPPED | OUTPATIENT
Start: 2018-06-19 | End: 2019-02-21

## 2018-06-19 RX ORDER — HYDROCODONE BITARTRATE AND ACETAMINOPHEN 5; 325 MG/1; MG/1
1 TABLET ORAL EVERY 4 HOURS PRN
Qty: 18 TABLET | Refills: 0 | Status: SHIPPED | OUTPATIENT
Start: 2018-06-19 | End: 2018-09-13

## 2018-06-19 NOTE — MR AVS SNAPSHOT
After Visit Summary   6/19/2018    Melody Muñoz    MRN: [id number]           Patient Information     Date Of Birth          1975        Visit Information        Provider Department      6/19/2018 11:15 AM Evaristo Machado MD [facility]        Today's Diagnoses     Acute sinusitis treated with antibiotics in the past 60 days    -  1    Diabetes mellitus without complication (H)        Sinus headache        Screening for HIV (human immunodeficiency virus)        Screening for diabetic peripheral neuropathy          Care Instructions    I think the patient's course of treatment was inadequate.  I placed her on amoxicillin 1000 mg 3 times daily for the next 10 days.  Gave her a prescription for Norco No. 18.  If she is not showing any improvement over the next week would consider getting a CT scan of her sinuses.  Further plan will be pending response to treatment.  We did do labs to follow-up on her diabetes.  CMS was intact to both feet.          Follow-ups after your visit        Who to contact     If you have questions or need follow up information about today's clinic visit or your schedule please contact [facility] directly at [phone].  Normal or non-critical lab and imaging results will be communicated to you by [employer], letter or phone within 4 business days after the clinic has received the results. If you do not hear from us within 7 days, please contact the clinic through [employer] or phone. If you have a critical or abnormal lab result, we will notify you by phone as soon as possible.  Submit refill requests through [employer] or call your pharmacy and they will forward the refill request to us. Please allow 3 business days for your refill to be completed.          Additional Information About Your Visit        [employer] Information     [employer] gives you secure access to your electronic health record. If you see a  primary care provider, you can also send messages to your care team and make appointments. If you have questions, please call your primary care clinic.  If you do not have a primary care provider, please call [phone] and they will assist you.        Care EveryWhere ID     This is your Care EveryWhere ID. This could be used by other organizations to access your [facility] medical records  [id number]        Your Vitals Were     Pulse Temperature Respirations Last Period Pulse Oximetry BMI (Body Mass Index)    89 99.4  F (37.4  C) (Tympanic) 18 (LMP Unknown) 99% 36.4 kg/m2       Blood Pressure from Last 3 Encounters:   06/19/18 136/80   04/23/18 138/88   12/27/17 136/84    Weight from Last 3 Encounters:   06/19/18 199 lb (90.3 kg)   04/23/18 208 lb 8 oz (94.6 kg)   12/27/17 205 lb (93 kg)              We Performed the Following     Albumin Random Urine Quantitative with Creat Ratio     FOOT EXAM  NO CHARGE [18174.114]     HEMOGLOBIN A1C     HIV Screening          Today's Medication Changes          These changes are accurate as of 6/19/18 12:16 PM.  If you have any questions, ask your nurse or doctor.               Start taking these medicines.        Dose/Directions    amoxicillin 500 MG capsule   Commonly known as:  AMOXIL   Used for:  Acute sinusitis treated with antibiotics in the past 60 days   Started by:  Evaristo Machado MD        Dose:  1000 mg   Take 2 capsules (1,000 mg) by mouth 3 times daily for 10 days   Quantity:  60 capsule   Refills:  0       HYDROcodone-acetaminophen 5-325 MG per tablet   Commonly known as:  NORCO   Used for:  Acute sinusitis treated with antibiotics in the past 60 days, Sinus headache   Started by:  Evaristo Machado MD        Dose:  1 tablet   Take 1 tablet by mouth every 4 hours as needed for pain   Quantity:  18 tablet   Refills:  0            Where to get your medicines      These medications were sent to [facility] [address] [place] [id number]  [address] [facility] [place] [address]  [address] [address] [facility] [address]     Phone:  [phone]     amoxicillin 500 MG capsule         Some of these will need a paper prescription and others can be bought over the counter.  Ask your nurse if you have questions.     Bring a paper prescription for each of these medications     HYDROcodone-acetaminophen 5-325 MG per tablet               Information about OPIOIDS     PRESCRIPTION OPIOIDS: WHAT YOU NEED TO KNOW   We gave you an opioid (narcotic) pain medicine. It is important to manage your pain, but opioids are not always the best choice. You should first try all the other options your care team gave you. Take this medicine for as short a time (and as few doses) as possible.     These medicines have risks:    DO NOT drive when on new or higher doses of pain medicine. These medicines can affect your alertness and reaction times, and you could be arrested for driving under the influence (DUI). If you need to use opioids long-term, talk to your care team about driving.    DO NOT operate heave machinery    DO NOT do any other dangerous activities while taking these medicines.     DO NOT drink any alcohol while taking these medicines.      If the opioid prescribed includes acetaminophen, DO NOT take with any other medicines that contain acetaminophen. Read all labels carefully. Look for the word  acetaminophen  or  Tylenol.  Ask your pharmacist if you have questions or are unsure.    You can get addicted to pain medicines, especially if you have a history of addiction (chemical, alcohol or substance dependence). Talk to your care team about ways to reduce this risk.    Store your pills in a secure place, locked if possible. We will not replace any lost or stolen medicine. If you don t finish your medicine, please throw away (dispose) as directed by your pharmacist. The [place] Pollution Control Agency has more information about safe disposal:  https://www.pca.[facility].mn.us/living-green/managing-unwanted-medications.     All opioids tend to cause constipation. Drink plenty of water and eat foods that have a lot of fiber, such as fruits, vegetables, prune juice, apple juice and high-fiber cereal. Take a laxative (Miralax, milk of magnesia, Colace, Senna) if you don t move your bowels at least every other day.          Primary Care Provider Office Phone # Fax #    Evaristo Machado [phone] [address] [facility] [address]        Equal Access to Services     OCTAVIO TRUJILLO : Hadii micaela mccauley hadasho Sodominique, waaxda luqadaha, qaybta kaalmada ademaliha, aida german. So [facility] [phone].    ATENCIÓN: Si habla español, tiene a mendoza disposición servicios gratuitos de asistencia lingüística. Llame al [phone].    We comply with applicable federal civil rights laws and [place] laws. We do not discriminate on the basis of race, color, national origin, age, disability, sex, sexual orientation, or gender identity.            Thank you!     Thank you for choosing [facility]  for your care. Our goal is always to provide you with excellent care. Hearing back from our patients is one way we can continue to improve our services. Please take a few minutes to complete the written survey that you may receive in the mail after your visit with us. Thank you!             Your Updated Medication List - Protect others around you: Learn how to safely use, store and throw away your medicines at www.disposemymeds.org.          This list is accurate as of 6/19/18 12:16 PM.  Always use your most recent med list.                   Brand Name Dispense Instructions for use Diagnosis    * albuterol (2.5 MG/3ML) 0.083% neb solution     25 vial    Take 1 vial (2.5 mg) by nebulization every 6 hours as needed for shortness of breath / dyspnea or wheezing    Mild persistent asthma without  complication       * PROAIR  (90 Base) MCG/ACT Inhaler   Generic drug:  albuterol     51 g    INHALE 1 TO 2 PUFFS INTO THE LUNGS EVERY 4 HOURS AS NEEDED FOR WHEEZING, SHORTNESS OF BREATH OR DYSPNEA    Intermittent asthma, uncomplicated       amoxicillin 500 MG capsule    AMOXIL    60 capsule    Take 2 capsules (1,000 mg) by mouth 3 times daily for 10 days    Acute sinusitis treated with antibiotics in the past 60 days       blood glucose monitoring lancets     1 Box    Use to test blood sugar 2 times daily or as directed.  Ok to substitute alternative if insurance prefers.    Diabetes mellitus without complication (H)       blood glucose monitoring test strip    LAURA CONTOUR NEXT    200 each    Use to test blood sugar 2 times daily or as directed.    Diabetes mellitus without complication (H)       Butalbital-APAP-Caff-Cod -34-30 MG Caps     15 capsule    Take 1 capsule by mouth 3 times daily as needed    Chronic migraine without aura without status migrainosus, not intractable       carisoprodol 350 MG tablet    SOMA    90 tablet    TAKE 1 TABLET BY MOUTH THREE TIMES DAILY    Chronic pain syndrome       diazepam 10 MG tablet    VALIUM    21 tablet    Max of 3 times daily    Generalized anxiety disorder       fluticasone 110 MCG/ACT Inhaler    FLOVENT HFA    2 Inhaler    Inhale 2 puffs into the lungs 2 times daily    Mild persistent asthma without complication       fluticasone 50 MCG/ACT spray    FLONASE    16 g    Spray 2 sprays into both nostrils daily    Chronic maxillary sinusitis       guanFACINE 2 MG tablet    TENEX    180 tablet    TAKE TWO TABLETS BY MOUTH AT BEDTIME    Nightmares       HYDROcodone-acetaminophen 5-325 MG per tablet    NORCO    18 tablet    Take 1 tablet by mouth every 4 hours as needed for pain    Acute sinusitis treated with antibiotics in the past 60 days, Sinus headache       lamoTRIgine 200 MG tablet    LAMICTAL    90 tablet    Take 1 tablet (200 mg) by mouth every morning     Bipolar 1 disorder (H)       levothyroxine 25 MCG tablet    SYNTHROID/LEVOTHROID    90 tablet    TAKE 1 TABLET(25 MCG) BY MOUTH DAILY    Acquired hypothyroidism       lithium 450 MG CR tablet    ESKALITH    60 tablet    TAKE 2 TABLETS BY MOUTH AT BEDTIME    Bipolar disorder with psychotic features (H)       meclizine 25 MG tablet    ANTIVERT    30 tablet    Take 1 tablet (25 mg) by mouth every 6 hours as needed for dizziness    Dizziness       MELATONIN PO      Take 6 mg by mouth At Bedtime        metFORMIN 500 MG tablet    GLUCOPHAGE    90 tablet    Take 1 tablet (500 mg) by mouth 3 times daily (with meals)    Type 2 diabetes mellitus without complication, without long-term current use of insulin (H)       modafinil 200 MG tablet    PROVIGIL    30 tablet    Take 0.5 tablets (100 mg) by mouth daily    Chronic fatigue       OLANZapine 10 MG tablet    zyPREXA    5 tablet    Take 1 tablet (10 mg) by mouth At Bedtime    Bipolar 1 disorder (H), Generalized anxiety disorder       * ondansetron 4 MG ODT tab    ZOFRAN-ODT    30 tablet    DISSOLVE 1 TO 2 TABLETS UNDER THE TONGUE EVERY 8 HOURS AS NEEDED FOR NAUSEA    Nausea       * ondansetron 4 MG ODT tab    ZOFRAN-ODT    30 tablet    DISSOLVE 1 TO 2 TABLETS UNDER THE TONGUE EVERY 8 HOURS AS NEEDED FOR NAUSEA    Nausea       pantoprazole 40 MG EC tablet    PROTONIX    90 tablet    TAKE 1 TABLET(40 MG) BY MOUTH DAILY    Other chronic gastritis without hemorrhage       pregabalin 150 MG capsule    LYRICA    60 capsule    Take 1 capsule (150 mg) by mouth 2 times daily    Chronic pain syndrome       sertraline 100 MG tablet    ZOLOFT     TK 1 T PO  QD        zolpidem 10 MG tablet    AMBIEN    30 tablet    Take 1 tablet (10 mg) by mouth nightly as needed for sleep    Primary insomnia       * Notice:  This list has 4 medication(s) that are the same as other medications prescribed for you. Read the directions carefully, and ask your doctor or other care provider to review them  with you.

## 2018-06-19 NOTE — PROGRESS NOTES
SUBJECTIVE:   Melody Muñoz is a 42 year old female who presents to clinic today for the following health issues:      Acute Illness   Acute illness concerns: sinus pain and pressure  Onset: 9 days    Fever: YES    Chills/Sweats: YES    Headache (location?): YES- frontal    Sinus Pressure:YES- tender, facial pain and teeth pain    Conjunctivitis:  no    Ear Pain: no    Rhinorrhea: no    Congestion: YES    Sore Throat: no     Cough: YES    Wheeze: no    Decreased Appetite: no     Nausea: YES    Vomiting: YES    Diarrhea:  no    Dysuria/Freq.: no    Fatigue/Achiness: YES    Sick/Strep Exposure: no     Therapies Tried and outcome: amoxicillin- no relief for sinus pain, hydrocodone for headache- effective          Problem list and histories reviewed & adjusted, as indicated.  Additional history: The patient was seen at an urgent care in [place].  She was placed on amoxicillin 875 mg twice daily for 1 week.  She was also given a prescription for Norco for her headaches.  She has not improved with those treatments.  The Norco does help with the headache.  She had tried her codeine medicine but that did not work.    Patient Active Problem List   Diagnosis     Endometriosis s/po EDWIGE 10-17     Mantoux: positive     Latent tuberculosis     Major depression     Generalized anxiety disorder     Constipation     Nightmares     Knee pain     Bipolar 1 disorder  with psychosis     Chronic fatigue     Female stress incontinence     Drug-seeking behavior     Vertigo     Suicidal ideation     Acne     Chronic pain syndrome     Insomnia     Chronic migraine without aura without status migrainosus, not intractable     Nausea     JASWANT (obstructive sleep apnea)     Elevated liver enzymes     TMJ (temporomandibular joint syndrome)     Tobacco use disorder     Hypothyroidism due to acquired atrophy of thyroid     Hostile behavior     Mild persistent asthma without complication     Diabetes mellitus without complication (H)      Allergic rhinitis     Incontinence of urine in female     Migraine     Screening for diabetic peripheral neuropathy     Need for prophylactic vaccination against Streptococcus pneumoniae (pneumococcus)     Sepsis (H)     Abdominal pain     Past Surgical History:   Procedure Laterality Date     BIOPSY       COLONOSCOPY       GENITOURINARY SURGERY  May/2014    bladder sling     GYN SURGERY      laparoscopy- endometriosis     GYN SURGERY  2009     for twins     LAPAROSCOPIC APPENDECTOMY N/A 2017    Procedure: LAPAROSCOPIC APPENDECTOMY;  LAPAROSCOPIC APPENDECTOMY and resection of epiploic tag;  Surgeon: Roberto Robins MD;  Location: [facility] OR     little finger surgery left  1980     tubal ligation bilateral       wisdom teeth removal         Social History   Substance Use Topics     Smoking status: Current Some Day Smoker     Packs/day: 0.25     Years: 17.00     Types: Cigarettes     Smokeless tobacco: Never Used     Alcohol use No      Comment: no etoh since      Family History   Problem Relation Age of Onset     Hypertension Mother      HEART DISEASE Father      palpitations     Depression Sister      Anxiety Disorder Sister      HEART DISEASE Maternal Grandmother      CHF     Cancer Maternal Grandfather 72     Pancreatic Cancer     Alzheimer Disease Paternal Grandfather      Bipolar Disorder Other      Autism Spectrum Disorder Other            Reviewed and updated as needed this visit by clinical staff  Tobacco  Allergies  Meds  Med Hx  Surg Hx  Fam Hx  Soc Hx      Reviewed and updated as needed this visit by Provider         ROS:  Constitutional, HEENT, cardiovascular, pulmonary, gi and gu systems are negative, except as otherwise noted.  CONSTITUTIONAL:POSITIVE  for fatigue  HEME/ALLERGY/IMMUNE: POSITIVE  for diagnosis of mono at urgent care    OBJECTIVE:                                                    /80 (Cuff Size: Adult Large)  Pulse 89  Temp 99.4  F (37.4  C) (Tympanic)   Resp 18  Wt 199 lb (90.3 kg)  LMP  (LMP Unknown)  SpO2 99%  BMI 36.4 kg/m2  Body mass index is 36.4 kg/(m^2).  GENERAL APPEARANCE: healthy, alert, no distress and over weight  EYES: Eyes grossly normal to inspection, PERRL and conjunctivae and sclerae normal  HENT: ear canals and TM's normal, nose and mouth without ulcers or lesions, frontal sinus tenderness left and maxillary sinus tenderness left  NECK: no adenopathy and no asymmetry, masses, or scars  RESP: lungs clear to auscultation - no rales, rhonchi or wheezes  LYMPHATICS: no cervical adenopathy         ASSESSMENT/PLAN:                                                        ICD-10-CM    1. Acute sinusitis treated with antibiotics in the past 60 days J01.90 amoxicillin (AMOXIL) 500 MG capsule     HYDROcodone-acetaminophen (NORCO) 5-325 MG per tablet   2. Diabetes mellitus without complication (H) E11.9 HEMOGLOBIN A1C     Albumin Random Urine Quantitative with Creat Ratio   3. Sinus headache R51 HYDROcodone-acetaminophen (NORCO) 5-325 MG per tablet   4. Screening for HIV (human immunodeficiency virus) Z11.4 HIV Screening   5. Screening for diabetic peripheral neuropathy Z13.89 FOOT EXAM  NO CHARGE [46318.114]       Patient Instructions   I think the patient's course of treatment was inadequate.  I placed her on amoxicillin 1000 mg 3 times daily for the next 10 days.  Gave her a prescription for Norco No. 18.  If she is not showing any improvement over the next week would consider getting a CT scan of her sinuses.  Further plan will be pending response to treatment.  We did do labs to follow-up on her diabetes.  CMS was intact to both feet.      Evaristo Machdao MD  [facility]

## 2018-06-19 NOTE — PATIENT INSTRUCTIONS
I think the patient's course of treatment was inadequate.  I placed her on amoxicillin 1000 mg 3 times daily for the next 10 days.  Gave her a prescription for Norco No. 18.  If she is not showing any improvement over the next week would consider getting a CT scan of her sinuses.  Further plan will be pending response to treatment.  We did do labs to follow-up on her diabetes.  CMS was intact to both feet.

## 2018-06-19 NOTE — TELEPHONE ENCOUNTER
carisoprodol (SOMA) 350 MG tablet      Last Written Prescription Date:  5-22-18  Last Fill Quantity: 90tab,   # refills: 0  Last Office Visit: 4-23-18  Future Office visit:    Next 5 appointments (look out 90 days)     Jun 19, 2018 11:15 AM CDT   SHORT with Evaristo Machado MD   [facility]    [address] [place] [id number]   [phone]                   Routing refill request to provider for review/approval because:  Drug not on the [facility], [facility] or  Health refill protocol or controlled substance

## 2018-06-20 ENCOUNTER — TELEPHONE (OUTPATIENT)
Dept: FAMILY MEDICINE | Facility: CLINIC | Age: 43
End: 2018-06-20

## 2018-06-20 DIAGNOSIS — G89.4 CHRONIC PAIN SYNDROME: ICD-10-CM

## 2018-06-20 DIAGNOSIS — J01.90 ACUTE SINUSITIS TREATED WITH ANTIBIOTICS IN THE PAST 60 DAYS: Primary | ICD-10-CM

## 2018-06-20 LAB
CREAT UR-MCNC: 21 MG/DL
HIV 1+2 AB+HIV1 P24 AG SERPL QL IA: NONREACTIVE
MICROALBUMIN UR-MCNC: 8 MG/L
MICROALBUMIN/CREAT UR: 37.38 MG/G CR (ref 0–25)

## 2018-06-20 RX ORDER — CARISOPRODOL 350 MG/1
TABLET ORAL
Qty: 90 TABLET | Refills: 0 | Status: SHIPPED | OUTPATIENT
Start: 2018-06-20 | End: 2018-07-16

## 2018-06-20 RX ORDER — PREDNISONE 20 MG/1
20 TABLET ORAL DAILY
Qty: 10 TABLET | Refills: 0 | Status: SHIPPED | OUTPATIENT
Start: 2018-06-20 | End: 2018-06-30

## 2018-06-20 ASSESSMENT — ASTHMA QUESTIONNAIRES: ACT_TOTALSCORE: 22

## 2018-06-20 NOTE — TELEPHONE ENCOUNTER
Reason for call:  Patient reporting a symptom    Symptom or request:   Patient is asking for prednisone   She forgot to ask Dr Machado yesterday at an office visit    Duration (how long have symptoms been present):   unknown    Have you been treated for this before? Yes    Additional comments:    Please call    Phone Number patient can be reached at:  Home number on file [phone] (home)    Best Time:  anytime  Can we leave a detailed message on this number:  YES    Call taken on 6/20/2018 at 11:39 AM by KYLER DE LA PAZ

## 2018-06-20 NOTE — TELEPHONE ENCOUNTER
"Pt is requesting a refill for Soma and Rx for prednisone to help with headache with sinus infection. She has tried tylenol and ibuprofen but \"nothing helps\".     Controlled Substance Refill Request for carisoprodol (SOMA) 350 MG tablet  Problem List Complete:  Yes     chPatient is followed by VIRAL Deleon for ongoing prescription of pain medication.  All refills should be approved by this provider, or covering partner.    Medication(s):  Lyrica 150 mg bid, Fioricet with codeine prn, klonopin and ambien to be prescribed by psych, not [facility].  Maximum quantity per month: 60, 20  Clinic visit frequency required: Q 3 months     Controlled substance agreement on file: Yes       Date(s): 9/8/2015  New CSA needed.  Pain Clinic evaluation in the past: No    DIRE Total Score(s):  No flowsheet data found.    Last [facility] website verification: 2/27/2018 multiple meds from multiple outside providersecked in past 6 months?  Yes 02/27/2018    Last OV 06/19/2018.    carisoprodol (SOMA) 350 MG tablet 90 tablet 0 5/22/2018       "

## 2018-06-25 ENCOUNTER — TRANSFERRED RECORDS (OUTPATIENT)
Dept: HEALTH INFORMATION MANAGEMENT | Facility: CLINIC | Age: 43
End: 2018-06-25

## 2018-06-25 DIAGNOSIS — G43.709 CHRONIC MIGRAINE WITHOUT AURA WITHOUT STATUS MIGRAINOSUS, NOT INTRACTABLE: ICD-10-CM

## 2018-06-25 DIAGNOSIS — G89.4 CHRONIC PAIN SYNDROME: ICD-10-CM

## 2018-06-25 RX ORDER — PREGABALIN 150 MG/1
150 CAPSULE ORAL 2 TIMES DAILY
Qty: 60 CAPSULE | Refills: 1 | Status: CANCELLED | OUTPATIENT
Start: 2018-06-25

## 2018-06-25 NOTE — TELEPHONE ENCOUNTER
Reason for Call:  Medication or medication refill:    Do you use a [facility]?  Name of the pharmacy and phone number for the current request:  [facility] [id number]    Name of the medication requested: Butalbital-APAP-Caff-Cod -16-30 mg (wants name brand not generic) and Pregabalin 150 mg    Other request: Patient states she is going out of town this Friday until the weekend after the 4th of July and needs an early refill. Patient also states that the prescriptions need to be called in with the ok to refill early. Please call if any questions. Patient says she wants the name brand of her migraine medicine as she was nauseous on the generic brand    Can we leave a detailed message on this number? YES    Phone number patient can be reached at: Home number on file [phone] (home)    Best Time: any    Call taken on 6/25/2018 at 7:41 AM by TRIXIE KRUEGER

## 2018-06-26 NOTE — TELEPHONE ENCOUNTER
Requested Prescriptions   Pending Prescriptions Disp Refills     pregabalin (LYRICA) 150 MG capsule      Last Written Prescription Date:  6/4/18  Last Fill Quantity: 60,   # refills: 1  Last Office Visit: 6/19/18 [facility]  Future Office visit:       Routing refill request to provider for review/approval because:  Drug not on the [facility], [facility] or [facility] refill protocol or controlled substance   60 capsule 1     Sig: Take 1 capsule (150 mg) by mouth 2 times daily    There is no refill protocol information for this order        Butalbital-APAP-Caff-Cod -86-30 MG CAPS      Last Written Prescription Date:  6/13/18  Last Fill Quantity: 15,   # refills: 3  Last Office Visit: 6/19/18 [facility]  Future Office visit:       Routing refill request to provider for review/approval because:  Drug not on the [facility], [facility] or  Health refill protocol or controlled substance   15 capsule 3     Sig: Take 1 capsule by mouth 3 times daily as needed    There is no refill protocol information for this order

## 2018-06-27 ENCOUNTER — TELEPHONE (OUTPATIENT)
Dept: FAMILY MEDICINE | Facility: CLINIC | Age: 43
End: 2018-06-27

## 2018-06-27 DIAGNOSIS — G89.4 CHRONIC PAIN SYNDROME: ICD-10-CM

## 2018-06-27 RX ORDER — BUTALBITAL, ACETAMINOPHEN, CAFFEINE AND CODEINE PHOSPHATE 300; 50; 40; 30 MG/1; MG/1; MG/1; MG/1
1 CAPSULE ORAL 3 TIMES DAILY PRN
Qty: 15 CAPSULE | Refills: 3 | Status: SHIPPED | OUTPATIENT
Start: 2018-06-27 | End: 2018-09-05

## 2018-06-27 RX ORDER — PREGABALIN 150 MG/1
150 CAPSULE ORAL 2 TIMES DAILY
Qty: 60 CAPSULE | Refills: 1 | Status: SHIPPED | OUTPATIENT
Start: 2018-07-04 | End: 2018-08-21

## 2018-06-27 NOTE — TELEPHONE ENCOUNTER
Faxed Rx (Butalbital-APAP-Caff-Cod -49-30 mg) to the pharmacy-[facility] in [place] on [address]

## 2018-06-27 NOTE — TELEPHONE ENCOUNTER
Reason for Call:  Other call back  Detailed comments: please call patient has a few questions  Phone Number Patient can be reached at: Home number on file [phone] (home)  Best Time: any  Can we leave a detailed message on this number? YES  Call taken on 6/27/2018 at 9:23 AM by BREANN WARREN

## 2018-06-27 NOTE — TELEPHONE ENCOUNTER
Pt called requesting Rx with and early refill approval for Lyrica. She will [place] out in her cabin next week.. She also reports has not taken metformin daily due to symptoms of nauses and diarrhea. Advised she schedule appt to discuss alternative. States she will call back for appt but unable to schedule now.     Routing refill request to provider for review/approval because:  Drug not on the [facility] refill protocol    was reviewed, last Lryrica refill was 06/04/2018.

## 2018-07-10 DIAGNOSIS — R11.0 NAUSEA: ICD-10-CM

## 2018-07-10 RX ORDER — ONDANSETRON 4 MG/1
TABLET, ORALLY DISINTEGRATING ORAL
Qty: 30 TABLET | Refills: 9 | Status: CANCELLED | OUTPATIENT
Start: 2018-07-10

## 2018-07-10 NOTE — TELEPHONE ENCOUNTER
Reason for Call:  Medication or medication refill:    Do you use a [facility]?  Name of the pharmacy and phone number for the current request:  [place] [address]    Name of the medication requested: Zofran-ODT 4 mg    Other request: Patient states that the pharmacy doesn't have any disintegrating pills so now they need a rx for the tabs that you swallow    Can we leave a detailed message on this number? YES    Phone number patient can be reached at: Home number on file [phone] (home)    Best Time: any    Call taken on 7/10/2018 at 1:15 PM by TRIXIE KRUEGER

## 2018-07-11 ENCOUNTER — TELEPHONE (OUTPATIENT)
Dept: FAMILY MEDICINE | Facility: CLINIC | Age: 43
End: 2018-07-11

## 2018-07-11 RX ORDER — ONDANSETRON 4 MG/1
4 TABLET, FILM COATED ORAL EVERY 8 HOURS PRN
Qty: 30 TABLET | Refills: 3 | Status: SHIPPED | OUTPATIENT
Start: 2018-07-11 | End: 2018-10-17

## 2018-07-11 NOTE — TELEPHONE ENCOUNTER
"Requested Prescriptions   Pending Prescriptions Disp Refills     ondansetron (ZOFRAN-ODT) 4 MG ODT tab  Last Written Prescription Date:  6/13/18  Last Fill Quantity: 30,  # refills: 9   Last office visit: 6/19/2018 with prescribing provider:  Jeannette   Future Office Visit:     30 tablet 9     Antivertigo/Antiemetic Agents Passed    7/10/2018  1:17 PM       Passed - Recent (12 mo) or future (30 days) visit within the authorizing provider's specialty    Patient had office visit in the last 12 months or has a visit in the next 30 days with authorizing provider or within the authorizing provider's specialty.  See \"Patient Info\" tab in inbasket, or \"Choose Columns\" in Meds & Orders section of the refill encounter.           Passed - Patient is 18 years of age or older          "

## 2018-07-11 NOTE — TELEPHONE ENCOUNTER
ondansetron (ZOFRAN-ODT) 4 MG ODT tab is on back order. Requesting a medication change until manufacturing is back to normal

## 2018-07-16 DIAGNOSIS — G89.4 CHRONIC PAIN SYNDROME: ICD-10-CM

## 2018-07-17 RX ORDER — CARISOPRODOL 350 MG/1
TABLET ORAL
Qty: 90 TABLET | Refills: 5 | Status: SHIPPED | OUTPATIENT
Start: 2018-07-17 | End: 2018-12-24

## 2018-07-17 NOTE — TELEPHONE ENCOUNTER
CARISOPRODOL 350MG TABLETS      Last Written Prescription Date:  6/20/2018  Last Fill Quantity: 90,   # refills: 0  Last Office Visit: 6/19/2018  Future Office visit:       Routing refill request to provider for review/approval because:  Drug not on the [facility], [facility] or [facility] refill protocol or controlled substance

## 2018-07-17 NOTE — TELEPHONE ENCOUNTER
checked today: 7/17/18    Multiple prescribers, routing to PCP.  I placed  on your desk    Viktoria Lanier RN- Triage [employer]

## 2018-08-02 ENCOUNTER — TELEPHONE (OUTPATIENT)
Dept: FAMILY MEDICINE | Facility: CLINIC | Age: 43
End: 2018-08-02

## 2018-08-02 NOTE — TELEPHONE ENCOUNTER
Called [facility] with fill approval of butalbital. Pt was called and informed this was done.

## 2018-08-02 NOTE — TELEPHONE ENCOUNTER
Reason for call:  Medication   If this is a refill request, has the caller requested the refill from the pharmacy already? No  Will the patient be using a [facility]? No  Name of the pharmacy and phone number for the current request: [facility] [address]    Name of the medication requested: Butalbital. Patient out of migrane medication.     Other request: It will have to be called in as an early refill. Please call in asa as patient is leaving for out of town.     Phone number to reach patient:  Home number on file [phone] (home)    Best Time:  anytime    Can we leave a detailed message on this number?  YES

## 2018-08-09 ENCOUNTER — OFFICE VISIT (OUTPATIENT)
Dept: FAMILY MEDICINE | Facility: CLINIC | Age: 43
End: 2018-08-09
Payer: COMMERCIAL

## 2018-08-09 VITALS
WEIGHT: 198 LBS | SYSTOLIC BLOOD PRESSURE: 132 MMHG | HEART RATE: 108 BPM | DIASTOLIC BLOOD PRESSURE: 84 MMHG | TEMPERATURE: 99.3 F | RESPIRATION RATE: 18 BRPM | HEIGHT: 62 IN | OXYGEN SATURATION: 98 % | BODY MASS INDEX: 36.44 KG/M2

## 2018-08-09 DIAGNOSIS — E11.65 TYPE 2 DIABETES MELLITUS WITH HYPERGLYCEMIA, WITHOUT LONG-TERM CURRENT USE OF INSULIN (H): ICD-10-CM

## 2018-08-09 DIAGNOSIS — E66.01 MORBID OBESITY (H): ICD-10-CM

## 2018-08-09 DIAGNOSIS — G89.4 CHRONIC PAIN SYNDROME: ICD-10-CM

## 2018-08-09 DIAGNOSIS — R11.0 NAUSEA: ICD-10-CM

## 2018-08-09 DIAGNOSIS — J40 BRONCHITIS: Primary | ICD-10-CM

## 2018-08-09 DIAGNOSIS — J01.01 ACUTE RECURRENT MAXILLARY SINUSITIS: ICD-10-CM

## 2018-08-09 DIAGNOSIS — Z87.891 FORMER TOBACCO USE: ICD-10-CM

## 2018-08-09 DIAGNOSIS — Z76.5 DRUG-SEEKING BEHAVIOR: ICD-10-CM

## 2018-08-09 PROCEDURE — 99214 OFFICE O/P EST MOD 30 MIN: CPT | Performed by: FAMILY MEDICINE

## 2018-08-09 RX ORDER — GUAIFENESIN/DEXTROMETHORPHAN 100-10MG/5
5 SYRUP ORAL 3 TIMES DAILY PRN
Qty: 560 ML | Refills: 0 | Status: SHIPPED | OUTPATIENT
Start: 2018-08-09 | End: 2018-08-09

## 2018-08-09 RX ORDER — PREDNISONE 20 MG/1
40 TABLET ORAL DAILY
Qty: 10 TABLET | Refills: 0 | Status: SHIPPED | OUTPATIENT
Start: 2018-08-09 | End: 2018-08-14

## 2018-08-09 RX ORDER — CODEINE PHOSPHATE AND GUAIFENESIN 10; 100 MG/5ML; MG/5ML
1 SOLUTION ORAL
Qty: 30 ML | Refills: 0 | Status: SHIPPED | OUTPATIENT
Start: 2018-08-09 | End: 2018-12-31

## 2018-08-09 NOTE — MR AVS SNAPSHOT
After Visit Summary   8/9/2018    Melody Muñoz    MRN: [id number]           Patient Information     Date Of Birth          1975        Visit Information        Provider Department      8/9/2018 10:10 AM Rizwana Perez DO [facility]        Today's Diagnoses     Acute recurrent maxillary sinusitis    -  1      Care Instructions      Bronchitis, Antibiotic Treatment (Adult)    Bronchitis is an infection of the air passages (bronchial tubes) in your lungs. It often occurs when you have a cold. This illness is contagious during the first few days and is spread through the air by coughing and sneezing, or by direct contact (touching the sick person and then touching your own eyes, nose, or mouth).  Symptoms of bronchitis include cough with mucus (phlegm) and low-grade fever. Bronchitis usually lasts 7 to 14 days. Mild cases can be treated with simple home remedies. More severe infection is treated with an antibiotic.  Home care  Follow these guidelines when caring for yourself at home:    If your symptoms are severe, rest at home for the first 2 to 3 days. When you go back to your usual activities, don't let yourself get too tired.    Do not smoke. Also avoid being exposed to secondhand smoke.    You may use over-the-counter medicines to control fever or pain, unless another medicine was prescribed. If you have chronic liver or kidney disease or have ever had a stomach ulcer or gastrointestinal bleeding, talk with your healthcare provider before using these medicines. Also talk to your provider if you are taking medicine to prevent blood clots. Aspirin should never be given to anyone younger than 18 years of age who is ill with a viral infection or fever. It may cause severe liver or brain damage.    Your appetite may be poor, so a light diet is fine. Avoid dehydration by drinking 6 to 8 glasses of fluids per day (such as water, soft drinks, sports drinks,  juices, tea, or soup). Extra fluids will help loosen secretions in the nose and lungs.    Over-the-counter cough, cold, and sore-throat medicines will not shorten the length of the illness, but they may be helpful to reduce symptoms. (Note: Do not use decongestants if you have high blood pressure.)    Finish all antibiotic medicine. Do this even if you are feeling better after only a few days.  Follow-up care  Follow up with your healthcare provider, or as advised. If you had an X-ray or ECG (electrocardiogram), a specialist will review it. You will be notified of any new findings that may affect your care.  If you are age 65 or older, or if you have a chronic lung disease or condition that affects your immune system, or you smoke, ask your healthcare provider about getting a pneumococcal vaccine and a yearly flu shot (influenza vaccine).  When to seek medical advice  Call your healthcare provider right away if any of these occur:    Fever of 100.4 F (38 C) or higher, or as directed by your healthcare provider    Coughing up increased amounts of colored sputum    Weakness, drowsiness, headache, facial pain, ear pain, or a stiff neck  Call 911  Call 911 if any of these occur.    Coughing up blood    Worsening weakness, drowsiness, headache, or stiff neck    Trouble breathing, wheezing, or pain with breathing  Date Last Reviewed: 9/13/2015 2000-2017 [employer]. [address] [place] [address]. All rights reserved. This information is not intended as a substitute for professional medical care. Always follow your healthcare professional's instructions.                Follow-ups after your visit        Who to contact     If you have questions or need follow up information about today's clinic visit or your schedule please contact [facility] directly at [phone].  Normal or non-critical lab and imaging results will be communicated to you by MyChart, letter or  "phone within 4 business days after the clinic has received the results. If you do not hear from us within 7 days, please contact the clinic through [employer] or phone. If you have a critical or abnormal lab result, we will notify you by phone as soon as possible.  Submit refill requests through [employer] or call your pharmacy and they will forward the refill request to us. Please allow 3 business days for your refill to be completed.          Additional Information About Your Visit        [employer] Information     [employer] gives you secure access to your electronic health record. If you see a primary care provider, you can also send messages to your care team and make appointments. If you have questions, please call your primary care clinic.  If you do not have a primary care provider, please call [phone] and they will assist you.        Care EveryWhere ID     This is your Care EveryWhere ID. This could be used by other organizations to access your [facility] medical records  [id number]        Your Vitals Were     Pulse Temperature Respirations Height Last Period Pulse Oximetry    108 99.3  F (37.4  C) (Tympanic) 18 5' 2\" (1.575 m) (LMP Unknown) 98%    Breastfeeding? BMI (Body Mass Index)                No 36.21 kg/m2           Blood Pressure from Last 3 Encounters:   08/09/18 132/84   06/19/18 136/80   04/23/18 138/88    Weight from Last 3 Encounters:   08/09/18 198 lb (89.8 kg)   06/19/18 199 lb (90.3 kg)   04/23/18 208 lb 8 oz (94.6 kg)              Today, you had the following     No orders found for display         Today's Medication Changes          These changes are accurate as of 8/9/18 10:37 AM.  If you have any questions, ask your nurse or doctor.               Start taking these medicines.        Dose/Directions    amoxicillin-clavulanate 875-125 MG per tablet   Commonly known as:  AUGMENTIN   Used for:  Acute recurrent maxillary sinusitis   Started by:  Rizwana Perez, DO        Dose:  1 tablet "   Take 1 tablet by mouth 2 times daily   Quantity:  20 tablet   Refills:  0       predniSONE 20 MG tablet   Commonly known as:  DELTASONE   Used for:  Acute recurrent maxillary sinusitis   Started by:  Rizwana Perez DO        Dose:  40 mg   Take 2 tablets (40 mg) by mouth daily for 5 days   Quantity:  10 tablet   Refills:  0            Where to get your medicines      These medications were sent to [facility] [address] [place] [address] AT [place] [address]  [id number] [place] [id number]     Phone:  [phone]     amoxicillin-clavulanate 875-125 MG per tablet    predniSONE 20 MG tablet                Primary Care Provider Office Phone # Fax #    Evaristo Machado [phone] [address] [facility] [address]        Equal Access to Services     [facility]: Hadii micaela mccauley hadasho Soomaali, waaxda luqadaha, qaybta kaalmada adeegyada, waxay ashleein hayjorge l callaway . So [facility] [phone].    ATENCIÓN: Si habla español, tiene a mendoza disposición servicios gratuitos de asistencia lingüística. Llame al [phone].    We comply with applicable federal civil rights laws and [place] laws. We do not discriminate on the basis of race, color, national origin, age, disability, sex, sexual orientation, or gender identity.            Thank you!     Thank you for choosing [facility]  for your care. Our goal is always to provide you with excellent care. Hearing back from our patients is one way we can continue to improve our services. Please take a few minutes to complete the written survey that you may receive in the mail after your visit with us. Thank you!             Your Updated Medication List - Protect others around you: Learn how to safely use, store and throw away your medicines at www.disposemymeds.org.          This list is accurate as of 8/9/18 10:37 AM.  Always use your most recent med  list.                   Brand Name Dispense Instructions for use Diagnosis    * albuterol (2.5 MG/3ML) 0.083% neb solution     25 vial    Take 1 vial (2.5 mg) by nebulization every 6 hours as needed for shortness of breath / dyspnea or wheezing    Mild persistent asthma without complication       * PROAIR  (90 Base) MCG/ACT Inhaler   Generic drug:  albuterol     51 g    INHALE 1 TO 2 PUFFS INTO THE LUNGS EVERY 4 HOURS AS NEEDED FOR WHEEZING, SHORTNESS OF BREATH OR DYSPNEA    Intermittent asthma, uncomplicated       amoxicillin-clavulanate 875-125 MG per tablet    AUGMENTIN    20 tablet    Take 1 tablet by mouth 2 times daily    Acute recurrent maxillary sinusitis       blood glucose monitoring lancets     1 Box    Use to test blood sugar 2 times daily or as directed.  Ok to substitute alternative if insurance prefers.    Diabetes mellitus without complication (H)       blood glucose monitoring test strip    LAURA CONTOUR NEXT    200 each    Use to test blood sugar 2 times daily or as directed.    Diabetes mellitus without complication (H)       Butalbital-APAP-Caff-Cod -00-30 MG Caps     15 capsule    Take 1 capsule by mouth 3 times daily as needed    Chronic migraine without aura without status migrainosus, not intractable       carisoprodol 350 MG tablet    SOMA    90 tablet    TAKE 1 TABLET BY MOUTH THREE TIMES DAILY    Chronic pain syndrome       diazepam 10 MG tablet    VALIUM    21 tablet    Max of 3 times daily    Generalized anxiety disorder       fluticasone 110 MCG/ACT Inhaler    FLOVENT HFA    2 Inhaler    Inhale 2 puffs into the lungs 2 times daily    Mild persistent asthma without complication       fluticasone 50 MCG/ACT spray    FLONASE    16 g    Spray 2 sprays into both nostrils daily    Chronic maxillary sinusitis       guanFACINE 2 MG tablet    TENEX    180 tablet    TAKE TWO TABLETS BY MOUTH AT BEDTIME    Nightmares       HYDROcodone-acetaminophen 5-325 MG per tablet    NORCO    18  tablet    Take 1 tablet by mouth every 4 hours as needed for pain    Acute sinusitis treated with antibiotics in the past 60 days, Sinus headache       lamoTRIgine 200 MG tablet    LAMICTAL    90 tablet    Take 1 tablet (200 mg) by mouth every morning    Bipolar 1 disorder (H)       levothyroxine 25 MCG tablet    SYNTHROID/LEVOTHROID    90 tablet    TAKE 1 TABLET(25 MCG) BY MOUTH DAILY    Acquired hypothyroidism       lithium 450 MG CR tablet    ESKALITH    60 tablet    TAKE 2 TABLETS BY MOUTH AT BEDTIME    Bipolar disorder with psychotic features (H)       meclizine 25 MG tablet    ANTIVERT    30 tablet    Take 1 tablet (25 mg) by mouth every 6 hours as needed for dizziness    Dizziness       MELATONIN PO      Take 6 mg by mouth At Bedtime        metFORMIN 500 MG tablet    GLUCOPHAGE    90 tablet    Take 1 tablet (500 mg) by mouth 3 times daily (with meals)    Type 2 diabetes mellitus without complication, without long-term current use of insulin (H)       modafinil 200 MG tablet    PROVIGIL    30 tablet    Take 0.5 tablets (100 mg) by mouth daily    Chronic fatigue       OLANZapine 10 MG tablet    zyPREXA    5 tablet    Take 1 tablet (10 mg) by mouth At Bedtime    Bipolar 1 disorder (H), Generalized anxiety disorder       * ondansetron 4 MG ODT tab    ZOFRAN-ODT    30 tablet    DISSOLVE 1 TO 2 TABLETS UNDER THE TONGUE EVERY 8 HOURS AS NEEDED FOR NAUSEA    Nausea       * ondansetron 4 MG ODT tab    ZOFRAN-ODT    30 tablet    DISSOLVE 1 TO 2 TABLETS UNDER THE TONGUE EVERY 8 HOURS AS NEEDED FOR NAUSEA    Nausea       ondansetron 4 MG tablet    ZOFRAN    30 tablet    Take 1 tablet (4 mg) by mouth every 8 hours as needed for nausea    Nausea       pantoprazole 40 MG EC tablet    PROTONIX    90 tablet    TAKE 1 TABLET(40 MG) BY MOUTH DAILY    Other chronic gastritis without hemorrhage       predniSONE 20 MG tablet    DELTASONE    10 tablet    Take 2 tablets (40 mg) by mouth daily for 5 days    Acute recurrent maxillary  sinusitis       pregabalin 150 MG capsule    LYRICA    60 capsule    Take 1 capsule (150 mg) by mouth 2 times daily    Chronic pain syndrome       sertraline 100 MG tablet    ZOLOFT     TK 1 T PO  QD        zolpidem 10 MG tablet    AMBIEN    30 tablet    Take 1 tablet (10 mg) by mouth nightly as needed for sleep    Primary insomnia       * Notice:  This list has 4 medication(s) that are the same as other medications prescribed for you. Read the directions carefully, and ask your doctor or other care provider to review them with you.

## 2018-08-09 NOTE — PROGRESS NOTES
"  SUBJECTIVE:   Melody Muñoz is a 42 year old female who presents to clinic today for the following health issues:      RESPIRATORY SYMPTOMS      Duration: X4 days    Description  nasal congestion, cough, fever, chills, headache, fatigue/malaise and nausea    Severity: moderate    Accompanying signs and symptoms: See above    History (predisposing factors):  asthma    Precipitating or alleviating factors: None    Therapies tried and outcome:  acetaminophen      Used to smoke tobacco,  Quit 3-4 months ago.  Has been using albuterol 3-4 times daily.  Takes Flovent daily.      Problem list and histories reviewed & adjusted, as indicated.  Additional history: as documented    Labs reviewed in EPIC    Reviewed and updated as needed this visit by clinical staff  Tobacco  Allergies  Meds  Problems  Med Hx  Surg Hx  Fam Hx  Soc Hx        Reviewed and updated as needed this visit by Provider  Allergies  Meds  Problems         ROS:  CONSTITUTIONAL: NEGATIVE for fever, chills, change in weight  INTEGUMENTARY/SKIN: NEGATIVE for worrisome rashes, moles or lesions  RESP: NEGATIVE for significant cough or SOB  CV: NEGATIVE for chest pain, palpitations or peripheral edema  GI: NEGATIVE for nausea, abdominal pain, heartburn, or change in bowel habits  : NEGATIVE for frequency, dysuria, or hematuria  HEME: NEGATIVE for bleeding problems    OBJECTIVE:     /84 (BP Location: Right arm, Patient Position: Sitting, Cuff Size: Adult Regular)  Pulse 108  Temp 99.3  F (37.4  C) (Tympanic)  Resp 18  Ht 5' 2\" (1.575 m)  Wt 198 lb (89.8 kg)  LMP  (LMP Unknown)  SpO2 98%  Breastfeeding? No  BMI 36.21 kg/m2  Body mass index is 36.21 kg/(m^2).   GENERAL: alert and no acute distress  EYES: Eyes grossly normal to inspection, PERRL and conjunctivae and sclerae normal  HENT: ear canals and TM's normal, mouth without ulcers or lesions.  +b/l boggy turbinates, no active nasal drainage.  NECK: no adenopathy, no asymmetry, " masses, or scars and thyroid normal to palpation  RESP: lungs clear to auscultation - no rales, rhonchi or wheezes  CV: regular rate and rhythm, normal S1 S2, no S3 or S4, no murmur, click or rub, no peripheral edema and peripheral pulses strong  SKIN: no suspicious lesions or rashes      Diagnostic Test Results:  none     ASSESSMENT/PLAN:     Problem List Items Addressed This Visit     Drug-seeking behavior    Chronic pain syndrome    Nausea    Morbid obesity (H)    Former tobacco use      Other Visit Diagnoses     Bronchitis    -  Primary    Acute recurrent maxillary sinusitis        Relevant Medications    amoxicillin-clavulanate (AUGMENTIN) 875-125 MG per tablet    predniSONE (DELTASONE) 20 MG tablet    guaiFENesin-codeine (ROBITUSSIN AC) 100-10 MG/5ML SOLN solution    Type 2 diabetes mellitus with hyperglycemia, without long-term current use of insulin (H)        Relevant Medications    predniSONE (DELTASONE) 20 MG tablet    STATIN NOT PRESCRIBED, INTENTIONAL,           --Requesting cough syrup with codeine.  Tells me she is allergic to robitussin (makes her nauseous) and codeine helps her sleep. Declined tessalon pearls.  Has chronic pain syndrome, sees Pain clinic.  Only 30 ml provided.  Will take her Zofran as needed for nausea.  No refills for cough syrup with codeine allowed.  --push fluids, rest, and symptomatic treatment as needed.  --Has morbid obesity. Working on diet/exercise.    --Does not take statins due to muscle pain side effects and has chronic pain syndrome.  Chart updated.   Continue Metformin for DM 2  --Will RTC as needed.      Rizwana Perez, DO  [facility]

## 2018-08-09 NOTE — PATIENT INSTRUCTIONS

## 2018-08-13 ENCOUNTER — TELEPHONE (OUTPATIENT)
Dept: FAMILY MEDICINE | Facility: CLINIC | Age: 43
End: 2018-08-13

## 2018-08-13 NOTE — TELEPHONE ENCOUNTER
Per  review, last fill for butalbital, was 08/02/2018 #15 tablets. Please advice if ok to call pharmacy with early fill approval.

## 2018-08-13 NOTE — TELEPHONE ENCOUNTER
Pharmacist was called with Butalbital early fill approval. Pharmacist wanted PCP aware pt will be filling two weeks early. Explained to him PCP is aware per message below and may proceed with early fill.

## 2018-08-13 NOTE — TELEPHONE ENCOUNTER
Reason for Call:  Other call back    Detailed comments: please call pharmacy pt wants early refill of Butalbital-APAP-Caff-Cod -91-30 MG CAPS states has had two refills since end of June and this is not due until at least the 21st. States early refills does not mean extra refills.    Phone Number Patient can be reached at: Other phone number:      Best Time:     Can we leave a detailed message on this number? YES    Call taken on 8/13/2018 at 8:48 AM by MADELIN PADRON

## 2018-08-14 ENCOUNTER — TELEPHONE (OUTPATIENT)
Dept: FAMILY MEDICINE | Facility: CLINIC | Age: 43
End: 2018-08-14

## 2018-08-14 DIAGNOSIS — B37.31 CANDIDIASIS OF VULVA AND VAGINA: Primary | ICD-10-CM

## 2018-08-14 RX ORDER — FLUCONAZOLE 150 MG/1
150 TABLET ORAL ONCE
Qty: 1 TABLET | Refills: 0 | Status: SHIPPED | OUTPATIENT
Start: 2018-08-14 | End: 2019-02-21

## 2018-08-14 NOTE — TELEPHONE ENCOUNTER
Call to patient who reports that she has vaginal itching with a white discharge.Was seen on last Thursday and put on amoxicillin for 10 days. Has tried vagisol with only temporary relief.Is asking if she could have diflucan prescribed.Will forward to provider for advice.

## 2018-08-14 NOTE — TELEPHONE ENCOUNTER
Reason for call:  Patient reporting a symptom  Symptom or request: yeast infection  Duration (how long have symptoms been present): 1 day  Have you been treated for this before? No  Additional comments: please call patient  Phone Number patient can be reached at:  Home number on file [phone] (home)  Best Time:  any  Can we leave a detailed message on this number:  YES  Call taken on 8/14/2018 at 7:56 AM by BREANN WARREN

## 2018-08-14 NOTE — TELEPHONE ENCOUNTER
Called patient and told her [facility] sent prescription to her [facility] in [place]( # in [place]ht.).  Kaley Cano LPN

## 2018-08-17 ENCOUNTER — TELEPHONE (OUTPATIENT)
Dept: FAMILY MEDICINE | Facility: CLINIC | Age: 43
End: 2018-08-17

## 2018-08-17 NOTE — TELEPHONE ENCOUNTER
Reason for call:  Patient reporting a symptom  Symptom or request: fever  Duration (how long have symptoms been present): 11 days  Have you been treated for this before? Yes  Additional comments: not better from last vist  Phone Number patient can be reached at:  Home number on file [phone] (home)  Best Time:  any  Can we leave a detailed message on this number:  YES  Call taken on 8/17/2018 at 7:56 AM by BREANN WARREN

## 2018-08-17 NOTE — TELEPHONE ENCOUNTER
With a fever and continued symptoms, needs to be seen.  Urgent Care or ED recommended and they will help provide her with any cough syrup if needed.  --Dr. Perez

## 2018-08-21 DIAGNOSIS — G89.4 CHRONIC PAIN SYNDROME: ICD-10-CM

## 2018-08-21 DIAGNOSIS — F51.01 PRIMARY INSOMNIA: ICD-10-CM

## 2018-08-21 NOTE — TELEPHONE ENCOUNTER
Requested Prescriptions   Pending Prescriptions Disp Refills     LYRICA 150 MG capsule [Pharmacy Med Name: LYRICA 150MG CAPSULES]      Last Written Prescription Date:  7/4/18  Last Fill Quantity: 60,   # refills: 1  Last Office Visit: 8/9/18 Chris  Future Office visit:       Routing refill request to provider for review/approval because:  Drug not on the [facility], P or  Health refill protocol or controlled substance     60 capsule 0     Sig: TAKE ONE CAPSULE BY MOUTH TWICE DAILY    There is no refill protocol information for this order

## 2018-08-22 ENCOUNTER — NURSE TRIAGE (OUTPATIENT)
Dept: NURSING | Facility: CLINIC | Age: 43
End: 2018-08-22

## 2018-08-22 RX ORDER — PREGABALIN 150 MG/1
CAPSULE ORAL
Qty: 60 CAPSULE | Refills: 0 | Status: SHIPPED | OUTPATIENT
Start: 2018-08-22 | End: 2018-09-19

## 2018-08-22 RX ORDER — ZOLPIDEM TARTRATE 10 MG/1
TABLET ORAL
Qty: 30 TABLET | Refills: 0 | Status: SHIPPED | OUTPATIENT
Start: 2018-08-22 | End: 2018-09-20

## 2018-08-22 NOTE — TELEPHONE ENCOUNTER
Called pt and left a message on vm for her to return my call. It states in her chart that psych prescribes her Ambien. Dr Motta wanted me to call pt to find out if they still do.

## 2018-08-22 NOTE — TELEPHONE ENCOUNTER
"  Additional Information    Negative: Drug overdose and nurse unable to answer question    Negative: Caller requesting information not related to medicine    Negative: Caller requesting a prescription for Strep throat and has a positive culture result    Negative: Rash while taking a medication or within 3 days of stopping it    Negative: Immunization reaction suspected    Negative: [1] Asthma and [2] having symptoms of asthma (cough, wheezing, etc)    Negative: MORE THAN A DOUBLE DOSE of a prescription or over-the-counter (OTC) drug    Negative: [1] DOUBLE DOSE (an extra dose or lesser amount) of over-the-counter (OTC) drug AND [2] any symptoms (e.g., dizziness, nausea, pain, sleepiness)    Negative: [1] DOUBLE DOSE (an extra dose or lesser amount) of prescription drug AND [2] any symptoms (e.g., dizziness, nausea, pain, sleepiness)    Negative: Took another person's prescription drug    Negative: [1] DOUBLE DOSE (an extra dose or lesser amount) of prescription drug AND [2] NO symptoms (Exception: a double dose of antibiotics)    Negative: Diabetes drug error or overdose (e.g., insulin or extra dose)    Negative: [1] Request for URGENT new prescription or refill of \"essential\" medication (i.e., likelihood of harm to patient if not taken) AND [2] triager unable to fill per unit policy    Negative: [1] Prescription not at pharmacy AND [2] was prescribed today by PCP    Negative: Pharmacy calling with prescription questions and triager unable to answer question    Negative: Caller has URGENT medication question about med that PCP prescribed and triager unable to answer question    Negative: Caller has NON-URGENT medication question about med that PCP prescribed and triager unable to answer question    Negative: Caller requesting a NON-URGENT new prescription or refill and triager unable to refill per unit policy    Negative: Caller has medication question about med not prescribed by PCP and triager unable to answer " question (e.g., compatibility with other med, storage)    Negative: [1] DOUBLE DOSE (an extra dose or lesser amount) of over-the-counter (OTC) drug AND [2] NO symptoms (all triage questions negative)    Negative: [1] DOUBLE DOSE (an extra dose or lesser amount) of antibiotic drug AND [2] NO symptoms (all triage questions negative)    Caller has medication question only, adult not sick, and triager answers question    Protocols used: [facility]ST[facility]

## 2018-08-22 NOTE — TELEPHONE ENCOUNTER
Patient is checking on her refill requests of Lyrica and zolpidem.  FNA advised both were re-ordered today and per chart says they were printed.  Patient stated that she will check with her pharmacy.

## 2018-08-22 NOTE — TELEPHONE ENCOUNTER
Reason for call:  Other   Patient called regarding (reason for call): prescription  Additional comments: Patient is calling back. She stated that she spoke with someone in the office that stated both prescriptions (Lyrica and Zolpidem) were approved. She went to the pharmacy and they're not there. Informed her they have not been approved yet. Can a covering physician approve these for her? Please call to confirm once they have been approved.    Phone number to reach patient:  Home number on file [phone] (home)    Best Time:  Any time, preferably as soon as possible    Can we leave a detailed message on this number?  YES     Huma BAUGH  Central Scheduler

## 2018-08-22 NOTE — TELEPHONE ENCOUNTER
Controlled Substance Refill Request for   zolpidem (AMBIEN) 10 MG tablet 30 tablet 0 5/17/2018       Problem List Complete:  Not for this medication    PROVIDER TO CONSIDER COMPLETION OF PROBLEM LIST AND OVERVIEW/CONTROLLED SUBSTANCE AGREEMENT  Patient is followed by VIRAL Deleon for ongoing prescription of pain medication.  All refills should be approved by this provider, or covering partner.    Medication(s):  Lyrica 150 mg bid, Fioricet with codeine prn, klonopin and ambien to be prescribed by psych, not [place].  Maximum quantity per month: 60, 20  Clinic visit frequency required: Q 3 months     Controlled substance agreement on file: Yes       Date(s): 9/8/2015  New CSA needed.  Pain Clinic evaluation in the past: No    DIRE Total Score(s):  No flowsheet data found.    Last [facility] website verification: 08/22/2018 multiple meds from multiple outside providers  Controlled substance agreement on file: No.     Processing:  Fax Rx to [facility]  pharmacy   checked in past 3 months?  Yes 08/22/2018

## 2018-08-22 NOTE — TELEPHONE ENCOUNTER
Reason for call:  Other   Patient called regarding (reason for call): prescription  Additional comments: Patient is calling back. She stated that she spoke with someone in the office that stated both prescriptions (Lyrica and Zolpidem) were approved. She went to the pharmacy and they're not there. Informed her they have not been approved yet. Dr. Hollins will not be in the office tomorrow. Can a covering physician approve these for her? Please call to confirm once they have been approved.    Phone number to reach patient:  Home number on file [phone] (home)    Best Time:  Any time, preferably as soon as possible    Can we leave a detailed message on this number?  YES     Huma BAUGH  Central Scheduler

## 2018-08-22 NOTE — TELEPHONE ENCOUNTER
Pt states Dr Hollins refilled it last time and she wants to continue having it filled though here.

## 2018-08-23 NOTE — TELEPHONE ENCOUNTER
Pt called to say not filled and wants to know why-states someone told her it was told her that is not what it says in chart

## 2018-08-23 NOTE — TELEPHONE ENCOUNTER
Faxed Rx (zolpidem) to the pharmacy-[facility] in [place] on [address]  Called pt to let her know.

## 2018-09-05 ENCOUNTER — NURSE TRIAGE (OUTPATIENT)
Dept: NURSING | Facility: CLINIC | Age: 43
End: 2018-09-05

## 2018-09-05 DIAGNOSIS — G43.709 CHRONIC MIGRAINE WITHOUT AURA WITHOUT STATUS MIGRAINOSUS, NOT INTRACTABLE: ICD-10-CM

## 2018-09-05 DIAGNOSIS — G89.4 CHRONIC PAIN SYNDROME: ICD-10-CM

## 2018-09-05 NOTE — TELEPHONE ENCOUNTER
Reason for Call:  Medication or medication refill:    Do you use a [facility]?  Name of the pharmacy and phone number for the current request:  [email or web address]    Name of the medication requested: Butalbital-APAP-Caff-Cod -94-30 MG CAPS    Other request: Patient states she will be out of town for 1.5 weeks and will run out before she returns. She would like a nurse to call this in asap.    Can we leave a detailed message on this number? YES    Phone number patient can be reached at: Cell number on file:    Telephone Information:   Mobile [phone]       Best Time:     Call taken on 9/5/2018 at 3:09 PM by JACQUELINE SMITH

## 2018-09-05 NOTE — TELEPHONE ENCOUNTER
Butalbital-APAP-Caff-Cod -51-30 MG CAPS      Last Written Prescription Date:  6/27/2018  Last Fill Quantity: 15,   # refills: 3  Last Office Visit: 8/9/2018  Future Office visit:       Routing refill request to provider for review/approval because:  Drug not on the [facility], P or [facility] refill protocol or controlled substance

## 2018-09-05 NOTE — TELEPHONE ENCOUNTER
Pt called back to see if Dr. Machado had reviewed her medication vacation refill request, advised that the message was sent but there was no update yet from the provider on if medication approved or not. She will check with clinic tomorrow by noon if she has not heard back yet, as she is concerned about the doctor's normal day off is tomorrow.     Protocol and care advice reviewed.  Caller states understanding of the recommended disposition.   Advised to call back if further questions or concerns.    Reason for Disposition    [1] Follow-up call from patient regarding patient's clinical status AND [2] information NON-URGENT    Protocols used: PCP CALL - NO TRIAGE-ADULT-

## 2018-09-06 RX ORDER — BUTALBITAL, ACETAMINOPHEN, CAFFEINE AND CODEINE PHOSPHATE 300; 50; 40; 30 MG/1; MG/1; MG/1; MG/1
1 CAPSULE ORAL 3 TIMES DAILY PRN
Qty: 15 CAPSULE | Refills: 3 | Status: SHIPPED | OUTPATIENT
Start: 2018-09-06 | End: 2018-11-23

## 2018-09-06 NOTE — TELEPHONE ENCOUNTER
Butalbital-APAP-Caff-Cod -52-30 MG CAPS 15 capsule 3 9/6/2018  No      Sig - Route: Take 1 capsule by mouth 3 times daily as needed - Oral     Class: Local Print     Order: [id number]     Pharmacy had previous instruction from Dr. ALBERTS stating this medication should only be filled twice a month and not more frequently than every 15 days.     However, it was just filled on 8/31/18, which was only 5 days ago.     Is this okay?     Huddled with covering provider, No, it is not okay. The patient should wait 15 days from the last time it was filled.     Called the pharmacy and let them know, also called the patient and let her know.

## 2018-09-06 NOTE — TELEPHONE ENCOUNTER
Melody called and said the [facility] in [place] [phone] needs a nurse to call the pharmacy regarding the Butalbital refill.

## 2018-09-06 NOTE — TELEPHONE ENCOUNTER
Controlled Substance Refill Request for   Butalbital-APAP-Caff-Cod -80-30 MG CAPS 15 capsule       Problem List Complete:  Yes  Patient is followed by VIRAL Deleon for ongoing prescription of pain medication.  All refills should be approved by this provider, or covering partner.  Patient is requesting early fill. Please advice.   Medication(s):  Lyrica 150 mg bid, Fioricet with codeine prn, klonopin and ambien to be prescribed by psych, not [facility].  Maximum quantity per month: 60, 20  Clinic visit frequency required: Q 3 months     Controlled substance agreement on file: Yes       Date(s): 9/8/2015  New CSA needed.  Pain Clinic evaluation in the past: No    DIRE Total Score(s):  No flowsheet data found.    Last [facility] website verification: 08/22/2018 multiple meds from multiple outside providers   checked in past 3 months?  Yes 08/22/2018 and 09/06/2018- Patient filled Rx last 08/31/2018 #15 tablets.

## 2018-09-10 ENCOUNTER — TELEPHONE (OUTPATIENT)
Dept: FAMILY MEDICINE | Facility: CLINIC | Age: 43
End: 2018-09-10

## 2018-09-10 DIAGNOSIS — J01.01 ACUTE RECURRENT MAXILLARY SINUSITIS: ICD-10-CM

## 2018-09-10 RX ORDER — CODEINE PHOSPHATE AND GUAIFENESIN 10; 100 MG/5ML; MG/5ML
1 SOLUTION ORAL
Qty: 30 ML | Refills: 0 | OUTPATIENT
Start: 2018-09-10

## 2018-09-10 NOTE — TELEPHONE ENCOUNTER
Requested Prescriptions   Pending Prescriptions Disp Refills     guaiFENesin-codeine (ROBITUSSIN AC) 100-10 MG/5ML SOLN solution  Last Written Prescription Date:  8/9/18  Last Fill Quantity: 30,  # refills: 0   Last office visit: 8/9/2018 with prescribing provider:     Future Office Visit:   Next 5 appointments (look out 90 days)     Sep 11, 2018 11:30 AM CDT   [facility]    [address] [place] [id number]   [phone]                  30 mL 0     Sig: Take 5 mLs by mouth nightly as needed for cough    There is no refill protocol information for this order        Controlled Substance Refill Request for guaiFENesin-codeine (ROBITUSSIN AC) 100-10 MG/5ML SOLN solution  Problem List Complete:  No   checked in past 3 months?  Yes 9/10/18 no concerns

## 2018-09-10 NOTE — TELEPHONE ENCOUNTER
Huddled with provider, will not prescribe cough syrup with codeine for the patient at this time.     09/10/18  3:14 PM      Message given to the patient.

## 2018-09-10 NOTE — TELEPHONE ENCOUNTER
Reason for Call:  Other call back    Detailed comments: Melody went to Urgent care today, and requests Dr. Machado prescribe a cough medicine.    Phone Number Patient can be reached at: Cell number on file:    Telephone Information:   Mobile [phone]       Best Time: any    Can we leave a detailed message on this number? YES    Call taken on 9/10/2018 at 12:37 PM by Mary Rodriguez

## 2018-09-10 NOTE — TELEPHONE ENCOUNTER
The patient was seen in urgent care (at [facility]) and said that they would not prescribe a cough medicine for her. They said that they wanted the primary to prescribe one for her.     She states that in the past promethazine with codeine is what has worked for her, and the robitussin with codeine has made her jittery in the past.

## 2018-09-10 NOTE — TELEPHONE ENCOUNTER
Went to [facility]    Promethazine with Codeine syrup is what she is looking for, cannot take robitussin, states she gets too jittery.     Patient would like to prescription sent over today if possible.

## 2018-09-10 NOTE — TELEPHONE ENCOUNTER
If the patient got her cough medicine from [facility] that is also where she should be getting her refill

## 2018-09-10 NOTE — TELEPHONE ENCOUNTER
Physician, Renee Thomas MD in [facility] ([phone]) calling because she is currently seeing that patient who is stating she has bronchitis, called [facility] for an appointment but was told to come in to [facility] for suspected pneumonia and is in need of narcotocs - Patient mentioned Joana Bravo's name to provider. Dr. Thomas is wondering if this patient is hunting for meds?     Huddled with Joana. Has never seen this patient.    Notified Dr. Thomas that patient's PCP is Dr. Evaristo Machado out of  [place] on [address] and Joana has never seen this patient before. Notified of latest communication regarding Butalbital-APAP-Caff-Cod -48-30 refill. Provided Dr. DAY Jacobs phone number to follow up.     Alyx CHAPA, Triage RN

## 2018-09-11 ENCOUNTER — TELEPHONE (OUTPATIENT)
Dept: FAMILY MEDICINE | Facility: CLINIC | Age: 43
End: 2018-09-11

## 2018-09-11 DIAGNOSIS — J40 BRONCHITIS: Primary | ICD-10-CM

## 2018-09-11 RX ORDER — BENZONATATE 100 MG/1
100 CAPSULE ORAL 3 TIMES DAILY PRN
Qty: 42 CAPSULE | Refills: 0 | Status: SHIPPED | OUTPATIENT
Start: 2018-09-11 | End: 2019-09-06

## 2018-09-11 NOTE — TELEPHONE ENCOUNTER
Patient was called with provider's message. She went to urgent care on 9/10/18 and was prescribed doxycycline monohydrate 100mg cap (take 1 by mouth 2 times daily for 10 days) for high WBC. She now feels sick to her stomach, hard to eat, nausea (taking zofran), diarrhea (4-5x today), pushing fluids. Advised to call that urgent care to report side effects since they have her labs. Or she can obtain records of labs and fax them to [facility]. Pt agreed with this plan.

## 2018-09-11 NOTE — TELEPHONE ENCOUNTER
Call to patient who states that she is on doxycycline 100 mg twice daily. She wants to know what non narcotic cough medicine she could take she has tried mucinex,robitussin and delsym and no relief she is not getting any sleep because of the coughing.C/O feeling miserable has body aches and fever. She is wondering if it is her C pat that is making her sick.She cleans it once a week and changes the hose.

## 2018-09-11 NOTE — TELEPHONE ENCOUNTER
Reason for Call:  Other call back    Detailed comments: Melody was in Urgent Care yesterday and given an antibiotic.  She is questioning if she is on the correct antibiotic.  Also she is wondering for an alternative non-narcotic cough medicine.    Phone Number Patient can be reached at: Cell number on file:    Telephone Information:   Mobile [phone]       Best Time: any    Can we leave a detailed message on this number? YES    Call taken on 9/11/2018 at 8:39 AM by Mary Rodriguez

## 2018-09-12 ENCOUNTER — TELEPHONE (OUTPATIENT)
Dept: FAMILY MEDICINE | Facility: CLINIC | Age: 43
End: 2018-09-12

## 2018-09-12 DIAGNOSIS — B37.31 CANDIDIASIS OF VULVA AND VAGINA: Primary | ICD-10-CM

## 2018-09-12 NOTE — TELEPHONE ENCOUNTER
Pt complains of painful in between her tight measuring 2 inches. She would like this drained. Per pt there possible redness and blood. Pt was advised to schedule OV for further evaluation of symptoms.

## 2018-09-12 NOTE — TELEPHONE ENCOUNTER
Please review message below and advice if provider would need OV, evisit or would consider Rx for diflucan ?

## 2018-09-12 NOTE — TELEPHONE ENCOUNTER
Reason for Call: Request for an order or referral:  Order or referral being requested: drain a cyst  Date needed: as soon as possible  Has the patient been seen by the PCP for this problem? YES  Additional comments: please call patient  Phone number Patient can be reached at:  Home number on file [phone] (home)  Best Time:  any  Can we leave a detailed message on this number?  YES  Call taken on 9/12/2018 at 7:40 AM by BREANN WARREN

## 2018-09-12 NOTE — TELEPHONE ENCOUNTER
Reason for call:  Patient reporting a symptom    Symptom or request: Yeast inf    Duration (how long have symptoms been present): 1 day    Have you been treated for this before? No    Additional comments: Pt went to urgent caremonday and was dx with bronchitis and flu and was given  Antibiotics.  Now she has yeast inf symptoms.       Phone Number patient can be reached at:  Home number on file [phone] (home)    Best Time:  any    Can we leave a detailed message on this number:  YES    Call taken on 9/12/2018 at 2:34 PM by MARIAN FUENTES

## 2018-09-13 ENCOUNTER — OFFICE VISIT (OUTPATIENT)
Dept: FAMILY MEDICINE | Facility: CLINIC | Age: 43
End: 2018-09-13
Payer: COMMERCIAL

## 2018-09-13 ENCOUNTER — NURSE TRIAGE (OUTPATIENT)
Dept: NURSING | Facility: CLINIC | Age: 43
End: 2018-09-13

## 2018-09-13 VITALS
DIASTOLIC BLOOD PRESSURE: 86 MMHG | SYSTOLIC BLOOD PRESSURE: 134 MMHG | OXYGEN SATURATION: 98 % | HEIGHT: 62 IN | RESPIRATION RATE: 18 BRPM | BODY MASS INDEX: 36.44 KG/M2 | WEIGHT: 198 LBS | TEMPERATURE: 98.7 F | HEART RATE: 118 BPM

## 2018-09-13 DIAGNOSIS — G43.709 CHRONIC MIGRAINE WITHOUT AURA WITHOUT STATUS MIGRAINOSUS, NOT INTRACTABLE: Chronic | ICD-10-CM

## 2018-09-13 DIAGNOSIS — F41.1 GENERALIZED ANXIETY DISORDER: Chronic | ICD-10-CM

## 2018-09-13 DIAGNOSIS — F31.9 BIPOLAR 1 DISORDER (H): Chronic | ICD-10-CM

## 2018-09-13 DIAGNOSIS — E78.2 MIXED HYPERLIPIDEMIA: ICD-10-CM

## 2018-09-13 DIAGNOSIS — J41.8 MIXED SIMPLE AND MUCOPURULENT CHRONIC BRONCHITIS (H): ICD-10-CM

## 2018-09-13 DIAGNOSIS — F32.0 MILD SINGLE CURRENT EPISODE OF MAJOR DEPRESSIVE DISORDER (H): Chronic | ICD-10-CM

## 2018-09-13 DIAGNOSIS — E11.9 DIABETES MELLITUS WITHOUT COMPLICATION (H): ICD-10-CM

## 2018-09-13 DIAGNOSIS — N30.00 ACUTE CYSTITIS WITHOUT HEMATURIA: Primary | ICD-10-CM

## 2018-09-13 LAB
ALBUMIN UR-MCNC: NEGATIVE MG/DL
APPEARANCE UR: CLEAR
BACTERIA #/AREA URNS HPF: ABNORMAL /HPF
BILIRUB UR QL STRIP: NEGATIVE
COLOR UR AUTO: YELLOW
GLUCOSE UR STRIP-MCNC: 100 MG/DL
HGB UR QL STRIP: ABNORMAL
KETONES UR STRIP-MCNC: NEGATIVE MG/DL
LEUKOCYTE ESTERASE UR QL STRIP: ABNORMAL
NITRATE UR QL: NEGATIVE
NON-SQ EPI CELLS #/AREA URNS LPF: ABNORMAL /LPF
PH UR STRIP: 7 PH (ref 5–7)
RBC #/AREA URNS AUTO: ABNORMAL /HPF
SOURCE: ABNORMAL
SP GR UR STRIP: <=1.005 (ref 1–1.03)
UROBILINOGEN UR STRIP-ACNC: 0.2 EU/DL (ref 0.2–1)
WBC #/AREA URNS AUTO: ABNORMAL /HPF

## 2018-09-13 PROCEDURE — 81001 URINALYSIS AUTO W/SCOPE: CPT | Performed by: FAMILY MEDICINE

## 2018-09-13 PROCEDURE — 99214 OFFICE O/P EST MOD 30 MIN: CPT | Performed by: FAMILY MEDICINE

## 2018-09-13 PROCEDURE — 87086 URINE CULTURE/COLONY COUNT: CPT | Performed by: FAMILY MEDICINE

## 2018-09-13 RX ORDER — CIPROFLOXACIN 250 MG/1
250 TABLET, FILM COATED ORAL 2 TIMES DAILY
Qty: 6 TABLET | Refills: 0 | Status: SHIPPED | OUTPATIENT
Start: 2018-09-13 | End: 2018-10-02

## 2018-09-13 RX ORDER — HYDROCODONE BITARTRATE AND ACETAMINOPHEN 5; 325 MG/1; MG/1
1 TABLET ORAL EVERY 4 HOURS PRN
Qty: 10 TABLET | Refills: 0 | Status: SHIPPED | OUTPATIENT
Start: 2018-09-13 | End: 2018-10-01

## 2018-09-13 ASSESSMENT — ANXIETY QUESTIONNAIRES
6. BECOMING EASILY ANNOYED OR IRRITABLE: SEVERAL DAYS
2. NOT BEING ABLE TO STOP OR CONTROL WORRYING: NOT AT ALL
3. WORRYING TOO MUCH ABOUT DIFFERENT THINGS: NOT AT ALL
1. FEELING NERVOUS, ANXIOUS, OR ON EDGE: MORE THAN HALF THE DAYS
5. BEING SO RESTLESS THAT IT IS HARD TO SIT STILL: SEVERAL DAYS
IF YOU CHECKED OFF ANY PROBLEMS ON THIS QUESTIONNAIRE, HOW DIFFICULT HAVE THESE PROBLEMS MADE IT FOR YOU TO DO YOUR WORK, TAKE CARE OF THINGS AT HOME, OR GET ALONG WITH OTHER PEOPLE: SOMEWHAT DIFFICULT
7. FEELING AFRAID AS IF SOMETHING AWFUL MIGHT HAPPEN: NOT AT ALL
GAD7 TOTAL SCORE: 5

## 2018-09-13 ASSESSMENT — PATIENT HEALTH QUESTIONNAIRE - PHQ9: 5. POOR APPETITE OR OVEREATING: SEVERAL DAYS

## 2018-09-13 NOTE — PROGRESS NOTES
SUBJECTIVE:   Melody Muñoz is a 42 year old female who presents to clinic today for the following health issues:      URINARY TRACT SYMPTOMS       Duration: 09/12/18    Description  dysuria, frequency, urgency and back pain    Intensity:  Moderate- Severe    Accompanying signs and symptoms:  Fever/chills: YES  Flank pain no   Nausea and vomiting: YES  Vaginal symptoms: none  Abdominal/Pelvic Pain: YES    History  History of frequent UTI's: YES  History of kidney stones: YES  Sexually Active: no   Possibility of pregnancy: No    Precipitating or alleviating factors: None    Therapies tried and outcome: Tylenol   Outcome: No Relief    Diabetes Follow-up      Patient is checking blood sugars: rarely.  Results range no record     Diabetic concerns: None     Symptoms of hypoglycemia (low blood sugar): none     Paresthesias (numbness or burning in feet) or sores: No     Date of last diabetic eye exam: ?    BP Readings from Last 2 Encounters:   09/13/18 134/86   08/09/18 132/84     Hemoglobin A1C (%)   Date Value   06/19/2018 7.5 (H)   11/10/2017 7.2 (H)     LDL Cholesterol Calculated (mg/dL)   Date Value   11/10/2017 133 (H)   06/14/2016     Cannot estimate LDL when triglyceride exceeds 400 mg/dL   Above desirable:  100-129 mg/dl   Borderline High:  130-159 mg/dL   High:             160-189 mg/dL   Very high:       >189 mg/dl         Diabetes Management Resources  MIxed Hyperlipidemia Follow-Up      Rate your low fat/cholesterol diet?: not monitoring fat    Taking statin?  No    Other lipid medications/supplements?:  none     COPD Follow-Up    Symptoms are currently: stable    Current fatigue or dyspnea with ambulation: stable     Shortness of breath: stable    Increased or change in Cough/Sputum: No    Fever(s): No    Baseline ambulation without stopping to rest:  Many  blocks. Able to walk up 2 flights of stairs without stopping to rest.    Any ER/UC or hospital admissions since your last visit? No     History    Smoking Status     Current Some Day Smoker     Packs/day: 0.25     Years: 17.00     Types: Cigarettes   Smokeless Tobacco     Never Used     Lab Results   Component Value Date    FEV1 2.09 06/14/2016    [id number] 73 06/14/2016     NONMORBID OBESITY    -BMI= 36  -comorbid DM , migraine, hi LDL       Migraine Follow-Up    Headaches symptoms:  Worsened lately with sinus and UTI     Frequency: q d      Duration of headaches: much of d    Able to do normal daily activities/work with migraines: Yes    Rescue/Relief medication:narcotics ( hydrocodone)              Effectiveness: moderate relief    Preventative medication: None    Neurologic complications: No new stroke-like symptoms, loss of vision or speech, numbness or weakness    In the past 4 weeks, how often have you gone to Urgent Care or the emergency room because of your headaches?  0      Depression and Anxiety /Bipolar with  Psychosis         Status since last visit: No change    Other associated symptoms:None    Complicating factors:     Significant life event: No     Current substance abuse: narcotic use    PHQ-9 11/10/2017 4/23/2018 9/13/2018   Total Score 5 9 7   Q9: Suicide Ideation Not at all Not at all Not at all     ROWDY-7 SCORE 11/10/2017 4/23/2018 9/13/2018   Total Score - - -   Total Score - 11 (moderate anxiety) -   Total Score 4 11 5   Total Score - - -   Total Score BEH Adult - - -       PHQ-9  English  PHQ-9   Any Language  ROWDY-7  Suicide Assessment Five-step Evaluation and Treatment (SAFE-T)      Problem list and histories reviewed & adjusted, as indicated.  Additional history: as documented    Labs reviewed in EPIC    Reviewed and updated as needed this visit by clinical staff  Tobacco  Allergies  Meds  Problems       Reviewed and updated as needed this visit by Provider  Allergies  Problems         ROS:  CONSTITUTIONAL: NEGATIVE for fever, chills, change in weight  INTEGUMENTARY/SKIN: NEGATIVE for worrisome rashes, moles or lesions  EYES:  "NEGATIVE for vision changes or irritation  ENT/MOUTH: NEGATIVE for ear, mouth and throat problems  RESP: NEGATIVE for significant cough or SOB  BREAST: NEGATIVE for masses, tenderness or discharge  CV: NEGATIVE for chest pain, palpitations or peripheral edema  GI: NEGATIVE for nausea, abdominal pain, heartburn, or change in bowel habits   female: dysuria, frequency and urgency  MUSCULOSKELETAL: NEGATIVE for significant arthralgias or myalgia  NEURO: NEGATIVE for weakness, dizziness or paresthesias  POS HA  ENDOCRINE: NEGATIVE for temperature intolerance, skin/hair changes  HEME: NEGATIVE for bleeding problems  PSYCHIATRIC: NEGATIVE for changes in mood or affect    OBJECTIVE:     /86  Pulse 118  Temp 98.7  F (37.1  C) (Tympanic)  Resp 18  Ht 5' 2\" (1.575 m)  Wt 198 lb (89.8 kg)  LMP  (LMP Unknown)  SpO2 98%  Breastfeeding? No  BMI 36.21 kg/m2  Body mass index is 36.21 kg/(m^2).  GENERAL: healthy, alert, no distress and obese  EYES: Eyes grossly normal to inspection, PERRL and conjunctivae and sclerae normal  RESP: lungs clear to auscultation - no rales, rhonchi or wheezes  CV: regular rate and rhythm, normal S1 S2, no S3 or S4, no murmur, click or rub, no peripheral edema and peripheral pulses strong  ABDOMEN: soft, nontender, no hepatosplenomegaly, no masses and bowel sounds normal--POS  Slight tender CVAs and suprapubic  MS: no gross musculoskeletal defects noted, no edema  SKIN: no suspicious lesions or rashes  NEURO: Normal strength and tone, mentation intact and speech normal  PSYCH: mentation appears normal, tangential, affect normal/bright, anxious and appearance well groomed    Diagnostic Test Results:  Results for orders placed or performed in visit on 09/13/18   *UA reflex to Microscopic and Culture ([facility] (except [place] and [place])   Result Value Ref Range    Color Urine Yellow     Appearance Urine Clear     Glucose Urine 100 (A) NEG^Negative mg/dL    Bilirubin " Urine Negative NEG^Negative    Ketones Urine Negative NEG^Negative mg/dL    Specific Gravity Urine <=1.005 1.003 - 1.035    Blood Urine Large (A) NEG^Negative    pH Urine 7.0 5.0 - 7.0 pH    Protein Albumin Urine Negative NEG^Negative mg/dL    Urobilinogen Urine 0.2 0.2 - 1.0 EU/dL    Nitrite Urine Negative NEG^Negative    Leukocyte Esterase Urine Moderate (A) NEG^Negative    Source Midstream Urine    Urine Microscopic   Result Value Ref Range    WBC Urine 5-10 (A) OTO5^0 - 5 /HPF    RBC Urine 10-25 (A) OTO2^O - 2 /HPF    Squamous Epithelial /LPF Urine Moderate (A) FEW^Few /LPF    Bacteria Urine Few (A) NEG^Negative /HPF   Urine Culture Aerobic Bacterial   Result Value Ref Range    Specimen Description Midstream Urine     Culture Micro No growth        ASSESSMENT/PLAN:               ICD-10-CM    1. Acute cystitis without hematuria N30.00 ciprofloxacin (CIPRO) 250 MG tablet     Urine Culture Aerobic Bacterial   2. Bipolar 1 disorder  with psychosis F31.9 DEPRESSION ACTION PLAN (DAP)   3. Generalized anxiety disorder F41.1    4. Mild single current episode of major depressive disorder (H) F32.0    5. Chronic migraine without aura without status migrainosus, not intractable G43.709 MIGRAINE ACTION PLAN   6. Mixed simple and mucopurulent chronic bronchitis (H): Spirometry 6-4-16 lung age = 63y/o in 41 y/o FVC=90%&FEV1=78% J41.8 COPD ACTION PLAN   7. Mixed hyperlipidemia E78.2    8. Class 2 obesity due to excess calories with serious comorbidity and body mass index (BMI) of 36.0 to 36.9 in adult E66.01     Z68.36        Patient Instructions   1. You have  a bladder infection Please increase your water intake  Do not drink cranberry juice as this does nothing different from water except cause weight gain.   We  Will notify you re the results of today's urine culture  You may need to return to be sure the infection is gone.  Oftentimes sexual intercourse causes the bacteria from the rectum to go into the bladder and cause  infection.  To avoid this, please get up and go to the bathroom after intercourse and drink a large glass of water.  This allows the urine to wash out the infective bacteria and thus speed healing of an infection, and prevent one from occurring.  You may buy pyridium over the counter to help with the burning  It is a purple pill that turns the urine orange and helps with the burning     2.  Weight Loss Tips  1. Do not eat after 6 hrs before your expected bedtime  2. Have your heaviest meal for breakfast, a slightly lighter meal at lunch and a snack 6 hrs before bed  3. No sugar/calorie drinks except milk ie no fruit juice, pop, alcohol.  4. Drink milk 30min before meals to decrease your hunger. Also it is excellent as part of your last meal of the day snack  5. Drink lots of water  6. Increase fiber in diet: all bran cereal, salads, popcorn etc  7. Have only one small serving of fruit a day about 1/2 cup (as this is high in sugar)  8. EXERCISE is the bottom line. Without it, you will gain weight even on a low calorie diet. Best if done 2-3X a day as can    Being overweight contributes to high blood pressure and high cholesterol, both of which cause heart attacks, strokes and kidney failure, prediabetes and diabetes, arthritis, and liver disease     3. See DR Machado soon in follow up of medical/ refills  Etc     4.  Run a cold air vaporizer as much as possible. If you cannot,  boil water and breath the warm vapors 2-3 times a day to try to open up the sinuses take 2400mgm of guaifenesin per 24 hours   You can do this by taking  Mucinex plain blue  1200 mg  One tablet twice a day (This may come as 600mg/tablet and you need to take 2 tabs twice a day) or you could buy the cheaper  generic 400mgm / tab and take 2 tablets 3 x a day or 1 and 1/2 tablets 4 x a day . .Guaifenesin is  the major component of most cough syrups, because it makes the mucus less thick, and therefore it drains out better and you are less likely  to cough from it dripping on the back of your throat.  Irrigate the  nose with plain water under the kitchen sink faucet or the shower.  Daisy pots, spray bottles, etc accumulate bacteria and are not recommended.   The tickle in the throat is also helped by gargling with vinegar and honey mixture, or pop or mouth wash as these coat the throat.  Please try to rinse teeth with water after using these .   Do not use sudafed or pseudephedrine as it dries the mucus up so it is harder to get it out, and it can raise your BP     4. No difference was  Noted by patients in a double blind study when given codeine, tylenol ( acetaminophen) or ibuprofen (all in identical pills). They felt no difference in pain relief. Since ibuprofen and the NSAIDs  causes kidney damage, esophageal damage with heartburn, and can increase the risk of esophageal and stomach cancer and ulcers,and colonic strictures. They also cause increased risk of heart attack .   I recommend that you use tylenol(acetaminophen) for pain. Use the acetaminophen ES  Which has 500mgm/tablet You can take up to 2 tablets 4 times a day as need for pain.  If this is not enough, you can add in ibuprofen or aleve(naprosyn) with 2 glasses of fluid and some food-to protect the stomach and esophagus. Please let us know if you are continuing to take ibuprofen or aleve, as we will need to periodically check your kidney function with a blood test.        Renee Winn MD  [facility]    Weight management plan: Discussed healthy diet and exercise guidelines and patient will follow up in 1 month in clinic to re-evaluate.     Given only #10 of norco as is overdue for chronic pain ov  Wants it for the UTI dysuria, also told to get pyridium     Renee Winn MD

## 2018-09-13 NOTE — PATIENT INSTRUCTIONS
1. You have  a bladder infection Please increase your water intake  Do not drink cranberry juice as this does nothing different from water except cause weight gain.   We  Will notify you re the results of today's urine culture  You may need to return to be sure the infection is gone.  Oftentimes sexual intercourse causes the bacteria from the rectum to go into the bladder and cause infection.  To avoid this, please get up and go to the bathroom after intercourse and drink a large glass of water.  This allows the urine to wash out the infective bacteria and thus speed healing of an infection, and prevent one from occurring.  You may buy pyridium over the counter to help with the burning  It is a purple pill that turns the urine orange and helps with the burning     2.  Weight Loss Tips  1. Do not eat after 6 hrs before your expected bedtime  2. Have your heaviest meal for breakfast, a slightly lighter meal at lunch and a snack 6 hrs before bed  3. No sugar/calorie drinks except milk ie no fruit juice, pop, alcohol.  4. Drink milk 30min before meals to decrease your hunger. Also it is excellent as part of your last meal of the day snack  5. Drink lots of water  6. Increase fiber in diet: all bran cereal, salads, popcorn etc  7. Have only one small serving of fruit a day about 1/2 cup (as this is high in sugar)  8. EXERCISE is the bottom line. Without it, you will gain weight even on a low calorie diet. Best if done 2-3X a day as can    Being overweight contributes to high blood pressure and high cholesterol, both of which cause heart attacks, strokes and kidney failure, prediabetes and diabetes, arthritis, and liver disease     3. See DR Machado soon in follow up of medical/ refills  Etc     4.  Run a cold air vaporizer as much as possible. If you cannot,  boil water and breath the warm vapors 2-3 times a day to try to open up the sinuses take 2400mgm of guaifenesin per 24 hours   You can do this by taking  Mucinex  plain blue  1200 mg  One tablet twice a day (This may come as 600mg/tablet and you need to take 2 tabs twice a day) or you could buy the cheaper  generic 400mgm / tab and take 2 tablets 3 x a day or 1 and 1/2 tablets 4 x a day . .Guaifenesin is  the major component of most cough syrups, because it makes the mucus less thick, and therefore it drains out better and you are less likely to cough from it dripping on the back of your throat.  Irrigate the  nose with plain water under the kitchen sink faucet or the shower.  Daisy pots, spray bottles, etc accumulate bacteria and are not recommended.   The tickle in the throat is also helped by gargling with vinegar and honey mixture, or pop or mouth wash as these coat the throat.  Please try to rinse teeth with water after using these .   Do not use sudafed or pseudephedrine as it dries the mucus up so it is harder to get it out, and it can raise your BP     4. No difference was  Noted by patients in a double blind study when given codeine, tylenol ( acetaminophen) or ibuprofen (all in identical pills). They felt no difference in pain relief. Since ibuprofen and the NSAIDs  causes kidney damage, esophageal damage with heartburn, and can increase the risk of esophageal and stomach cancer and ulcers,and colonic strictures. They also cause increased risk of heart attack .   I recommend that you use tylenol(acetaminophen) for pain. Use the acetaminophen ES  Which has 500mgm/tablet You can take up to 2 tablets 4 times a day as need for pain.  If this is not enough, you can add in ibuprofen or aleve(naprosyn) with 2 glasses of fluid and some food-to protect the stomach and esophagus. Please let us know if you are continuing to take ibuprofen or aleve, as we will need to periodically check your kidney function with a blood test.

## 2018-09-13 NOTE — MR AVS SNAPSHOT
After Visit Summary   9/13/2018    Melody Muñoz    MRN: [id number]           Patient Information     Date Of Birth          1975        Visit Information        Provider Department      9/13/2018 1:00 PM Renee Winn MD [facility]        Today's Diagnoses     Acute cystitis without hematuria    -  1    Bipolar 1 disorder  with psychosis        Generalized anxiety disorder        Mild single current episode of major depressive disorder (H)        Chronic migraine without aura without status migrainosus, not intractable        Mixed simple and mucopurulent chronic bronchitis (H): Spirometry 6-4-16 lung age = 63y/o in 41 y/o FVC=90%&FEV1=78%        Mixed hyperlipidemia        Mild persistent asthma without complication        Class 2 obesity due to excess calories with serious comorbidity and body mass index (BMI) of 36.0 to 36.9 in adult        Acute sinusitis treated with antibiotics in the past 60 days        Sinus headache          Care Instructions    1. You have  a bladder infection Please increase your water intake  Do not drink cranberry juice as this does nothing different from water except cause weight gain.   We  Will notify you re the results of today's urine culture  You may need to return to be sure the infection is gone.  Oftentimes sexual intercourse causes the bacteria from the rectum to go into the bladder and cause infection.  To avoid this, please get up and go to the bathroom after intercourse and drink a large glass of water.  This allows the urine to wash out the infective bacteria and thus speed healing of an infection, and prevent one from occurring.  You may buy pyridium over the counter to help with the burning  It is a purple pill that turns the urine orange and helps with the burning     2.  Weight Loss Tips  1. Do not eat after 6 hrs before your expected bedtime  2. Have your heaviest meal for breakfast, a slightly  lighter meal at lunch and a snack 6 hrs before bed  3. No sugar/calorie drinks except milk ie no fruit juice, pop, alcohol.  4. Drink milk 30min before meals to decrease your hunger. Also it is excellent as part of your last meal of the day snack  5. Drink lots of water  6. Increase fiber in diet: all bran cereal, salads, popcorn etc  7. Have only one small serving of fruit a day about 1/2 cup (as this is high in sugar)  8. EXERCISE is the bottom line. Without it, you will gain weight even on a low calorie diet. Best if done 2-3X a day as can    Being overweight contributes to high blood pressure and high cholesterol, both of which cause heart attacks, strokes and kidney failure, prediabetes and diabetes, arthritis, and liver disease     3. See DR Machado soon in follow up of medical/ refills  Etc     4.  Run a cold air vaporizer as much as possible. If you cannot,  boil water and breath the warm vapors 2-3 times a day to try to open up the sinuses take 2400mgm of guaifenesin per 24 hours   You can do this by taking  Mucinex plain blue  1200 mg  One tablet twice a day (This may come as 600mg/tablet and you need to take 2 tabs twice a day) or you could buy the cheaper  generic 400mgm / tab and take 2 tablets 3 x a day or 1 and 1/2 tablets 4 x a day . .Guaifenesin is  the major component of most cough syrups, because it makes the mucus less thick, and therefore it drains out better and you are less likely to cough from it dripping on the back of your throat.  Irrigate the  nose with plain water under the kitchen sink faucet or the shower.  Daisy pots, spray bottles, etc accumulate bacteria and are not recommended.   The tickle in the throat is also helped by gargling with vinegar and honey mixture, or pop or mouth wash as these coat the throat.  Please try to rinse teeth with water after using these .   Do not use sudafed or pseudephedrine as it dries the mucus up so it is harder to get it out, and it can raise your  BP     4. No difference was  Noted by patients in a double blind study when given codeine, tylenol ( acetaminophen) or ibuprofen (all in identical pills). They felt no difference in pain relief. Since ibuprofen and the NSAIDs  causes kidney damage, esophageal damage with heartburn, and can increase the risk of esophageal and stomach cancer and ulcers,and colonic strictures. They also cause increased risk of heart attack .   I recommend that you use tylenol(acetaminophen) for pain. Use the acetaminophen ES  Which has 500mgm/tablet You can take up to 2 tablets 4 times a day as need for pain.  If this is not enough, you can add in ibuprofen or aleve(naprosyn) with 2 glasses of fluid and some food-to protect the stomach and esophagus. Please let us know if you are continuing to take ibuprofen or aleve, as we will need to periodically check your kidney function with a blood test.            Follow-ups after your visit        Follow-up notes from your care team     Return in about 4 days (around 9/17/2018) for abel Machado .      Who to contact     If you have questions or need follow up information about today's clinic visit or your schedule please contact [facility] directly at [phone].  Normal or non-critical lab and imaging results will be communicated to you by MyChart, letter or phone within 4 business days after the clinic has received the results. If you do not hear from us within 7 days, please contact the clinic through [facility] or phone. If you have a critical or abnormal lab result, we will notify you by phone as soon as possible.  Submit refill requests through [employer] or call your pharmacy and they will forward the refill request to us. Please allow 3 business days for your refill to be completed.          Additional Information About Your Visit        [employer] Information     [employer] gives you secure access to your electronic health record. If you see a primary  "care provider, you can also send messages to your care team and make appointments. If you have questions, please call your primary care clinic.  If you do not have a primary care provider, please call [phone] and they will assist you.        Care EveryWhere ID     This is your Care EveryWhere ID. This could be used by other organizations to access your [facility] medical records  [id number]        Your Vitals Were     Pulse Temperature Respirations Height Last Period Pulse Oximetry    118 98.7  F (37.1  C) (Tympanic) 18 5' 2\" (1.575 m) (LMP Unknown) 98%    Breastfeeding? BMI (Body Mass Index)                No 36.21 kg/m2           Blood Pressure from Last 3 Encounters:   09/13/18 134/86   08/09/18 132/84   06/19/18 136/80    Weight from Last 3 Encounters:   09/13/18 198 lb (89.8 kg)   08/09/18 198 lb (89.8 kg)   06/19/18 199 lb (90.3 kg)              We Performed the Following     *UA reflex to Microscopic and Culture ([place] and [facility] (except [place] and [place])     Asthma Action Plan (AAP)     COPD ACTION PLAN     DEPRESSION ACTION PLAN (DAP)     MIGRAINE ACTION PLAN     Urine Microscopic          Today's Medication Changes          These changes are accurate as of 9/13/18  1:43 PM.  If you have any questions, ask your nurse or doctor.               Start taking these medicines.        Dose/Directions    ciprofloxacin 250 MG tablet   Commonly known as:  CIPRO   Used for:  Acute cystitis without hematuria   Started by:  Renee Winn MD        Dose:  250 mg   Take 1 tablet (250 mg) by mouth 2 times daily   Quantity:  6 tablet   Refills:  0            Where to get your medicines      These medications were sent to [facility] [id number] [place] [address] [id number] AT [place] [address]  [id number] [place] [address]     Phone:  [phone]     ciprofloxacin 250 MG tablet                Primary Care Provider Office Phone # Fax #    Evaristo Carlisle " MD Jeannette [phone]       [address] [facility] [address] [facility] [address]        Equal Access to Services     OCTAVIO TRUJILLO : Hadalexey micaela mccauley sudha Sodominique, wajulida luqadaha, qaybta kaalmada daja, aida carpenter lanithinviv german. So [facility] [phone].    ATENCIÓN: Si habla español, tiene a mendoza disposición servicios gratuitos de asistencia lingüística. Llame al [phone].    We comply with applicable federal civil rights laws and [place] laws. We do not discriminate on the basis of race, color, national origin, age, disability, sex, sexual orientation, or gender identity.            Thank you!     Thank you for choosing [facility]  for your care. Our goal is always to provide you with excellent care. Hearing back from our patients is one way we can continue to improve our services. Please take a few minutes to complete the written survey that you may receive in the mail after your visit with us. Thank you!             Your Updated Medication List - Protect others around you: Learn how to safely use, store and throw away your medicines at www.disposemymeds.org.          This list is accurate as of 9/13/18  1:43 PM.  Always use your most recent med list.                   Brand Name Dispense Instructions for use Diagnosis    * albuterol (2.5 MG/3ML) 0.083% neb solution     25 vial    Take 1 vial (2.5 mg) by nebulization every 6 hours as needed for shortness of breath / dyspnea or wheezing    Mild persistent asthma without complication       * PROAIR  (90 Base) MCG/ACT inhaler   Generic drug:  albuterol     51 g    INHALE 1 TO 2 PUFFS INTO THE LUNGS EVERY 4 HOURS AS NEEDED FOR WHEEZING, SHORTNESS OF BREATH OR DYSPNEA    Intermittent asthma, uncomplicated       benzonatate 100 MG capsule    TESSALON    42 capsule    Take 1 capsule (100 mg) by mouth 3 times daily as needed for cough    Bronchitis       blood glucose monitoring lancets     1 Box    Use to  test blood sugar 2 times daily or as directed.  Ok to substitute alternative if insurance prefers.    Diabetes mellitus without complication (H)       blood glucose monitoring test strip    LAURA CONTOUR NEXT    200 each    Use to test blood sugar 2 times daily or as directed.    Diabetes mellitus without complication (H)       Butalbital-APAP-Caff-Cod -84-30 MG Caps     15 capsule    Take 1 capsule by mouth 3 times daily as needed    Chronic migraine without aura without status migrainosus, not intractable       carisoprodol 350 MG tablet    SOMA    90 tablet    TAKE 1 TABLET BY MOUTH THREE TIMES DAILY    Chronic pain syndrome       ciprofloxacin 250 MG tablet    CIPRO    6 tablet    Take 1 tablet (250 mg) by mouth 2 times daily    Acute cystitis without hematuria       diazepam 10 MG tablet    VALIUM    21 tablet    Max of 3 times daily    Generalized anxiety disorder       fluticasone 110 MCG/ACT Inhaler    FLOVENT HFA    2 Inhaler    Inhale 2 puffs into the lungs 2 times daily    Mild persistent asthma without complication       fluticasone 50 MCG/ACT spray    FLONASE    16 g    Spray 2 sprays into both nostrils daily    Chronic maxillary sinusitis       guaiFENesin-codeine 100-10 MG/5ML Soln solution    ROBITUSSIN AC    30 mL    Take 5 mLs by mouth nightly as needed for cough    Acute recurrent maxillary sinusitis       guanFACINE 2 MG tablet    TENEX    180 tablet    TAKE TWO TABLETS BY MOUTH AT BEDTIME    Nightmares       HYDROcodone-acetaminophen 5-325 MG per tablet    NORCO    18 tablet    Take 1 tablet by mouth every 4 hours as needed for pain    Acute sinusitis treated with antibiotics in the past 60 days, Sinus headache       lamoTRIgine 200 MG tablet    LAMICTAL    90 tablet    Take 1 tablet (200 mg) by mouth every morning    Bipolar 1 disorder (H)       levothyroxine 25 MCG tablet    SYNTHROID/LEVOTHROID    90 tablet    TAKE 1 TABLET(25 MCG) BY MOUTH DAILY    Acquired hypothyroidism       lithium  450 MG CR tablet    ESKALITH    60 tablet    TAKE 2 TABLETS BY MOUTH AT BEDTIME    Bipolar disorder with psychotic features (H)       LYRICA 150 MG capsule   Generic drug:  pregabalin     60 capsule    TAKE ONE CAPSULE BY MOUTH TWICE DAILY    Chronic pain syndrome       meclizine 25 MG tablet    ANTIVERT    30 tablet    Take 1 tablet (25 mg) by mouth every 6 hours as needed for dizziness    Dizziness       MELATONIN PO      Take 6 mg by mouth At Bedtime        metFORMIN 500 MG tablet    GLUCOPHAGE    90 tablet    Take 1 tablet (500 mg) by mouth 3 times daily (with meals)    Type 2 diabetes mellitus without complication, without long-term current use of insulin (H)       miconazole 100 MG vaginal suppository    MICATIN    7 suppository    Place 1 suppository (100 mg) vaginally At Bedtime    Candidiasis of vulva and vagina       modafinil 200 MG tablet    PROVIGIL    30 tablet    Take 0.5 tablets (100 mg) by mouth daily    Chronic fatigue       OLANZapine 10 MG tablet    zyPREXA    5 tablet    Take 1 tablet (10 mg) by mouth At Bedtime    Bipolar 1 disorder (H), Generalized anxiety disorder       * ondansetron 4 MG ODT tab    ZOFRAN-ODT    30 tablet    DISSOLVE 1 TO 2 TABLETS UNDER THE TONGUE EVERY 8 HOURS AS NEEDED FOR NAUSEA    Nausea       * ondansetron 4 MG ODT tab    ZOFRAN-ODT    30 tablet    DISSOLVE 1 TO 2 TABLETS UNDER THE TONGUE EVERY 8 HOURS AS NEEDED FOR NAUSEA    Nausea       ondansetron 4 MG tablet    ZOFRAN    30 tablet    Take 1 tablet (4 mg) by mouth every 8 hours as needed for nausea    Nausea       pantoprazole 40 MG EC tablet    PROTONIX    90 tablet    TAKE 1 TABLET(40 MG) BY MOUTH DAILY    Other chronic gastritis without hemorrhage       sertraline 100 MG tablet    ZOLOFT     TK 1 T PO  QD        STATIN NOT PRESCRIBED (INTENTIONAL)      1 each daily Please choose reason not prescribed, below    Type 2 diabetes mellitus with hyperglycemia, without long-term current use of insulin (H)       zolpidem  10 MG tablet    AMBIEN    30 tablet    TAKE 1 TABLET BY MOUTH NIGHTLY AS NEEDED FOR SLEEP    Primary insomnia       * Notice:  This list has 4 medication(s) that are the same as other medications prescribed for you. Read the directions carefully, and ask your doctor or other care provider to review them with you.

## 2018-09-13 NOTE — LETTER
My COPD Action Plan     Name: Melody Muñoz    YOB: 1975   Date: 9/13/2018    My doctor: Renee Winn MD   My clinic: [address] [address]  [phone]  My Controller Medicine: 0   Dose: 0       My Rescue Medicine: Albuterol (Proair/Ventolin/Proventil) inhaler   Dose: prn     My Flare Up Medicine: 0   Dose: 0 FEV-1 (no units)   Date Value   06/14/2016 2.09     FEV1/FVC (no units)   Date Value   06/14/2016 73      My COPD Severity: Moderate = FeV1 < 79% -50%      Use of Oxygen: Oxygen Not Prescribed      Make sure you've had your pneumonia   vaccines.          GREEN ZONE       Doing well today      Usual level of activity and exercise    Usual amount of cough and mucus    No shortness of breath    Usual level of health (thinking clearly, sleeping well, feel like eating) Actions:      Take daily medicines    Use oxygen as prescribed    Follow regular exercise and diet plan    Avoid cigarette smoke and other irritants that harm the lungs           YELLOW ZONE          Having a bad day or flare up      Short of breath more than usual    A lot more sputum (mucus) than usual    Sputum looks yellow, green, tan, brown or bloody    More coughing or wheezing    Fever or chills    Less energy; trouble completing activities    Trouble thinking or focusing    Using quick relief inhaler or nebulizer more often    Poor sleep; symptoms wake me up    Do not feel like eating Actions:      Get plenty of rest    Take daily medicines    Use quick relief inhaler every -- hours    If you use oxygen, call you doctor to see if you should adjust your oxygen    Do breathing exercises or other things to help you relax    Let a loved one, friend or neighbor know you are feeling worse    Call your care team if you have 2 or more symptoms.  Start taking steroids or antibiotics if directed by your care team           RED ZONE        Need medical care now      Severe shortness of breath (feel you can't breathe)    Fever, chills    Not enough breath to do any activity    Trouble coughing up mucus, walking or talking    Blood in mucus    Frequent coughing   Rescue medicines are not working    Not able to sleep because of breathing    Feel confused or drowsy    Chest pain    Actions:      Call your health care team.  If you cannot reach your care team, call 911 or go to the emergency room.        Annual Reminders:  Meet with Care Team, Flu Shot every Fall  Pharmacy:    [facility] [address] [place] [address] - [id number] AT [address] - [address] [facility]

## 2018-09-13 NOTE — LETTER
My Depression Action Plan  Name: Melody Muñoz   Date of Birth 1975  Date: 9/13/2018    My doctor: Evaristo Machado   My clinic: [address] [address]  [phone]          GREEN    ZONE   Good Control    What it looks like:     Things are going generally well. You have normal up s and down s. You may even feel depressed from time to time, but bad moods usually last less than a day.   What you need to do:  1. Continue to care for yourself (see self care plan)  2. Check your depression survival kit and update it as needed  3. Follow your physician s recommendations including any medication.  4. Do not stop taking medication unless you consult with your physician first.           YELLOW         ZONE Getting Worse    What it looks like:     Depression is starting to interfere with your life.     It may be hard to get out of bed; you may be starting to isolate yourself from others.    Symptoms of depression are starting to last most all day and this has happened for several days.     You may have suicidal thoughts but they are not constant.   What you need to do:     1. Call your care team, your response to treatment will improve if you keep your care team informed of your progress. Yellow periods are signs an adjustment may need to be made.     2. Continue your self-care, even if you have to fake it!    3. Talk to someone in your support network    4. Open up your depression survival kit           RED    ZONE Medical Alert - Get Help    What it looks like:     Depression is seriously interfering with your life.     You may experience these or other symptoms: You can t get out of bed most days, can t work or engage in other necessary activities, you have trouble taking care of basic hygiene, or basic responsibilities, thoughts of suicide or death that will not go away, self-injurious behavior.     What you  need to do:  1. Call your care team and request a same-day appointment. If they are not available (weekends or after hours) call your local crisis line, emergency room or 911.            Depression Self Care Plan / Survival Kit    Self-Care for Depression  Here s the deal. Your body and mind are really not as separate as most people think.  What you do and think affects how you feel and how you feel influences what you do and think. This means if you do things that people who feel good do, it will help you feel better.  Sometimes this is all it takes.  There is also a place for medication and therapy depending on how severe your depression is, so be sure to consult with your medical provider and/ or Behavioral Health Consultant if your symptoms are worsening or not improving.     In order to better manage my stress, I will:    Exercise  Get some form of exercise, every day. This will help reduce pain and release endorphins, the  feel good  chemicals in your brain. This is almost as good as taking antidepressants!  This is not the same as joining a gym and then never going! (they count on that by the way ) It can be as simple as just going for a walk or doing some gardening, anything that will get you moving.      Hygiene   Maintain good hygiene (Get out of bed in the morning, Make your bed, Brush your teeth, Take a shower, and Get dressed like you were going to work, even if you are unemployed).  If your clothes don't fit try to get ones that do.    Diet  I will strive to eat foods that are good for me, drink plenty of water, and avoid excessive sugar, caffeine, alcohol, and other mood-altering substances.  Some foods that are helpful in depression are: complex carbohydrates, B vitamins, flaxseed, fish or fish oil, fresh fruits and vegetables.    Psychotherapy  I agree to participate in Individual Therapy (if recommended).    Medication  If prescribed medications, I agree to take them.  Missing doses can result in  serious side effects.  I understand that drinking alcohol, or other illicit drug use, may cause potential side effects.  I will not stop my medication abruptly without first discussing it with my provider.    Staying Connected With Others  I will stay in touch with my friends, family members, and my primary care provider/team.    Use your imagination  Be creative.  We all have a creative side; it doesn t matter if it s oil painting, sand castles, or mud pies! This will also kick up the endorphins.    Witness Beauty  (AKA stop and smell the roses) Take a look outside, even in mid-winter. Notice colors, textures. Watch the squirrels and birds.     Service to others  Be of service to others.  There is always someone else in need.  By helping others we can  get out of ourselves  and remember the really important things.  This also provides opportunities for practicing all the other parts of the program.    Humor  Laugh and be silly!  Adjust your TV habits for less news and crime-drama and more comedy.    Control your stress  Try breathing deep, massage therapy, biofeedback, and meditation. Find time to relax each day.     My support system    Clinic Contact:  Phone number:    Contact 1:  Phone number:    Contact 2:  Phone number:    Bahai/:  Phone number:    Therapist:  Phone number:    Local crisis center:    Phone number:    Other community support:  Phone number:

## 2018-09-13 NOTE — LETTER
My Migraine Action Plan      Date: 9/13/2018     My Name: Melody Muñoz   YOB: 1975  My Pharmacy:    [facility] [id number] [place] [address] [address] AT [facility] & [address] [facility] [place], [place] - [address] [address]       My (Preventative) Control Medicine: 0        My Rescue Medicine: narcotics   My Doctor: Evaristo Machado     My Clinic: [facility]  [address] [place] [address]  [phone]        GREEN ZONE = Good Control    My headache plan is working.   I can do what I need to do.           I WILL:     ? Keep managing my triggers.  ? Write in my migraine diary each time I have a headache.  ? Keep taking my preventive (controller) medicine daily.  ? Take my relief and rescue medicine as needed.             YELLOW ZONE = Not Enough Control    My headache plan isn t always working.   My headaches keep me from doing   some of the things I need to do.       I WILL:     ? Set goals to control my triggers and act on them.  ? Write in my migraine diary each time I have a headache and review it for                      patterns or new triggers.  ? Keep taking my preventive (controller) medicine daily.  ? Take my relief and rescue medicine as needed.  ? Call my doctor or clinic at if I stay in the Yellow Zone.             RED ZONE = Poor or No Control    My headache plan has  failed. I can t do anything  when I have one. My  medicines aren t working.           I WILL:   ? Set goals to control my triggers and act on them.  ? Write in my migraine diary each time I have a headache and review it for                      patterns or new triggers.  ? Keep taking my preventive (controller) medicine daily.  ? Take my relief and rescue medicine as needed.  ? Call my doctor or clinic or go to urgent care or an ER if I m having the worst                  headache of my life.  ? Call my  doctor or clinic or go to urgent care or an ER if my medicine doesn t work.  ? Let my doctor or clinic know within 2 weeks if I have gone to an urgent care or             emergency department.          Provider specific instructions:  ---

## 2018-09-13 NOTE — TELEPHONE ENCOUNTER
"Requested Prescriptions   Pending Prescriptions Disp Refills     MICROLET LANCETS MISC [Pharmacy Med Name: MICROLET COLORED LANCETS 100'S]  Last Written Prescription Date:  4/5/2017  Last Fill Quantity: 1 box,  # refills: 3   Last Office Visit  No previous visit found        with  G, P or [facility] prescribing provider:     Future Office Visit:        100 each 0     Sig: TEST TWICE DAILY AS DIRECTED    Diabetic Supplies Protocol Passed    9/13/2018 11:22 AM       Passed - Patient is 18 years of age or older       Passed - Recent (6 mo) or future (30 days) visit within the authorizing provider's specialty    Patient had office visit in the last 6 months or has a visit in the next 30 days with authorizing provider.  See \"Patient Info\" tab in inbasket, or \"Choose Columns\" in Meds & Orders section of the refill encounter.              "

## 2018-09-13 NOTE — LETTER
My Asthma Action Plan  Name: Melody Muñoz   YOB: 1975  Date: 9/13/2018   My doctor: Renee Winn MD   My clinic: [facility]        My Control Medicine: None  My Rescue Medicine: 0   My Asthma Severity: intermittent  Avoid your asthma triggers: Patient is unaware of triggers  upper respiratory infections            GREEN ZONE   Good Control    I feel good    No cough or wheeze    Can work, sleep and play without asthma symptoms       Take your asthma control medicine every day.     1. If exercise triggers your asthma, take your rescue medication    15 minutes before exercise or sports, and    During exercise if you have asthma symptoms  2. Spacer to use with inhaler: If you have a spacer, make sure to use it with your inhaler             YELLOW ZONE Getting Worse  I have ANY of these:    I do not feel good    Cough or wheeze    Chest feels tight    Wake up at night   1. Keep taking your Green Zone medications  2. Start taking your rescue medicine:    every 20 minutes for up to 1 hour. Then every 4 hours for 24-48 hours.  3. If you stay in the Yellow Zone for more than 12-24 hours, contact your doctor.  4. If you do not return to the Green Zone in 12-24 hours or you get worse, start taking your oral steroid medicine if prescribed by your provider.           RED ZONE Medical Alert - Get Help  I have ANY of these:    I feel awful    Medicine is not helping    Breathing getting harder    Trouble walking or talking    Nose opens wide to breathe       1. Take your rescue medicine NOW  2. If your provider has prescribed an oral steroid medicine, start taking it NOW  3. Call your doctor NOW  4. If you are still in the Red Zone after 20 minutes and you have not reached your doctor:    Take your rescue medicine again and    Call 911 or go to the emergency room right away    See your regular doctor within 2 weeks of an Emergency Room or Urgent Care visit for follow-up  treatment.          Annual Reminders:  Meet with Asthma Educator,  Flu Shot in the Fall, consider Pneumonia Vaccination for patients with asthma (aged 19 and older).    Pharmacy:    [facility] [address] [place] [address] [id number] AT [address] - [address] [facility]                      Asthma Triggers  How To Control Things That Make Your Asthma Worse    Triggers are things that make your asthma worse.  Look at the list below to help you find your triggers and what you can do about them.  You can help prevent asthma flare-ups by staying away from your triggers.      Trigger                                                          What you can do   Cigarette Smoke  Tobacco smoke can make asthma worse. Do not allow smoking in your home, car or around you.  Be sure no one smokes at a child s day care or school.  If you smoke, ask your health care provider for ways to help you quit.  Ask family members to quit too.  Ask your health care provider for a referral to Quit Plan to help you quit smoking, or call 7-637-149-PLAN.     Colds, Flu, Bronchitis  These are common triggers of asthma. Wash your hands often.  Don t touch your eyes, nose or mouth.  Get a flu shot every year.     Dust Mites  These are tiny bugs that live in cloth or carpet. They are too small to see. Wash sheets and blankets in hot water every week.   Encase pillows and mattress in dust mite proof covers.  Avoid having carpet if you can. If you have carpet, vacuum weekly.   Use a dust mask and HEPA vacuum.   Pollen and Outdoor Mold  Some people are allergic to trees, grass, or weed pollen, or molds. Try to keep your windows closed.  Limit time out doors when pollen count is high.   Ask you health care provider about taking medicine during allergy season.     Animal Dander  Some people are allergic to skin flakes, urine or saliva from pets with fur or feathers. Keep pets  with fur or feathers out of your home.    If you can t keep the pet outdoors, then keep the pet out of your bedroom.  Keep the bedroom door closed.  Keep pets off cloth furniture and away from stuffed toys.     Mice, Rats, and Cockroaches  Some people are allergic to the waste from these pests.   Cover food and garbage.  Clean up spills and food crumbs.  Store grease in the refrigerator.   Keep food out of the bedroom.   Indoor Mold  This can be a trigger if your home has high moisture. Fix leaking faucets, pipes, or other sources of water.   Clean moldy surfaces.  Dehumidify basement if it is damp and smelly.   Smoke, Strong Odors, and Sprays  These can reduce air quality. Stay away from strong odors and sprays, such as perfume, powder, hair spray, paints, smoke incense, paint, cleaning products, candles and new carpet.   Exercise or Sports  Some people with asthma have this trigger. Be active!  Ask your doctor about taking medicine before sports or exercise to prevent symptoms.    Warm up for 5-10 minutes before and after sports or exercise.     Other Triggers of Asthma  Cold air:  Cover your nose and mouth with a scarf.  Sometimes laughing or crying can be a trigger.  Some medicines and food can trigger asthma.

## 2018-09-13 NOTE — NURSING NOTE
"Chief Complaint   Patient presents with     UTI     /86  Pulse 118  Temp 98.7  F (37.1  C) (Tympanic)  Resp 18  Ht 5' 2\" (1.575 m)  Wt 198 lb (89.8 kg)  LMP  (LMP Unknown)  SpO2 98%  Breastfeeding? No  BMI 36.21 kg/m2 Estimated body mass index is 36.21 kg/(m^2) as calculated from the following:    Height as of this encounter: 5' 2\" (1.575 m).    Weight as of this encounter: 198 lb (89.8 kg).  BP completed using cuff size: large   Torie Odell CMA    Health Maintenance Due   Topic Date Due     EYE EXAM Q1 YEAR  10/25/1977     PNEUMOVAX 1X HI RISK PATIENT < 65 (NO IB MSG)  10/25/1978     URINE DRUG SCREEN Q1 YR  06/02/2016     INFLUENZA VACCINE (1) 09/01/2018     Health Maintenance reviewed at today's visit patient asked to schedule/complete:   Diabetes:  Patient agrees to schedule  Immunizations:  Patient agrees to schedule    "

## 2018-09-13 NOTE — LETTER
[facility]  [address]  [facility] [id number] [phone]                                                                                                           Melody Muñoz  [address] [place] [address] [address]  [place] [address]    September 14, 2018      Dear Melody,    The urine was positive but the culture has come back negative   I am hoping you are feeling better     Enclosed is a copy of the results.     Thank you for choosing [facility].  We appreciate the opportunity to serve you and look forward to supporting your healthcare needs in the future.    If you have any questions or concerns, please call me or my staff at [phone].      Sincerely,    Renee Winn MD    Results for orders placed or performed in visit on 09/13/18   *UA reflex to Microscopic and Culture ([place] and [facility] (except [place] and [place])   Result Value Ref Range    Color Urine Yellow     Appearance Urine Clear     Glucose Urine 100 (A) NEG^Negative mg/dL    Bilirubin Urine Negative NEG^Negative    Ketones Urine Negative NEG^Negative mg/dL    Specific Gravity Urine <=1.005 1.003 - 1.035    Blood Urine Large (A) NEG^Negative    pH Urine 7.0 5.0 - 7.0 pH    Protein Albumin Urine Negative NEG^Negative mg/dL    Urobilinogen Urine 0.2 0.2 - 1.0 EU/dL    Nitrite Urine Negative NEG^Negative    Leukocyte Esterase Urine Moderate (A) NEG^Negative    Source Midstream Urine    Urine Microscopic   Result Value Ref Range    WBC Urine 5-10 (A) OTO5^0 - 5 /HPF    RBC Urine 10-25 (A) OTO2^O - 2 /HPF    Squamous Epithelial /LPF Urine Moderate (A) FEW^Few /LPF    Bacteria Urine Few (A) NEG^Negative /HPF   Urine Culture Aerobic Bacterial   Result Value Ref Range    Specimen Description Midstream Urine     Culture Micro No growth

## 2018-09-14 ENCOUNTER — TELEPHONE (OUTPATIENT)
Dept: FAMILY MEDICINE | Facility: CLINIC | Age: 43
End: 2018-09-14

## 2018-09-14 LAB
BACTERIA SPEC CULT: NO GROWTH
SPECIMEN SOURCE: NORMAL

## 2018-09-14 RX ORDER — LANCETS
EACH MISCELLANEOUS
Qty: 100 EACH | Refills: 0 | Status: SHIPPED | OUTPATIENT
Start: 2018-09-14 | End: 2020-03-12

## 2018-09-14 ASSESSMENT — ANXIETY QUESTIONNAIRES: GAD7 TOTAL SCORE: 5

## 2018-09-14 ASSESSMENT — PATIENT HEALTH QUESTIONNAIRE - PHQ9: SUM OF ALL RESPONSES TO PHQ QUESTIONS 1-9: 7

## 2018-09-14 NOTE — TELEPHONE ENCOUNTER
Reason for Call:  Other     Detailed comments: in 9/13/18 saw Dr. FERNANDO Winn, told her she had a UTI.  In to much pain thinks it a Kidney infection.  Dr. Winn gave her hydrocodone.  Is there a stronger antibiotic she can take     Phone Number Patient can be reached at: Home number on file [phone] (home)    Best Time: today    Can we leave a detailed message on this number? YES    Call taken on 9/14/2018 at 7:47 AM by NANCY MAYO

## 2018-09-14 NOTE — TELEPHONE ENCOUNTER
Patient Contact    Attempt # 1    Was call answered?  No. Unable to leave message, mailbox full.

## 2018-09-14 NOTE — TELEPHONE ENCOUNTER
Additional Information    Caller has medication question only, adult not sick, and triager answers question     Caller needed to clarify RX ciprofloxacin (CIPRO) 250 MG tablet 6 tablet 0 9/13/2018  --  Sig - Route: Take 1 tablet (250 mg) by mouth 2 times daily - Oral    Gave per epic    Protocols used: MEDICATION QUESTION CALL-ADULT-

## 2018-09-14 NOTE — TELEPHONE ENCOUNTER
Patient called reporting UTI, possibly a kidney infection    URINARY TRACT SYMPTOMS  Onset: was seen yesterday by Dr. Winn.     Description:   Painful urination (Dysuria): YES  Blood in urine (Hematuria): no   Delay in urine (Hesitency): no     Intensity: moderate    Progression of Symptoms:  same    Accompanying Signs & Symptoms:  Fever/chills: no   Flank pain YES  Nausea and vomiting: YES  Any vaginal symptoms: none  Abdominal/Pelvic Pain: no     History:   History of frequent UTI's: no   History of kidney stones: YES, had one when she was 18.   Sexually Active: not indicated   Possibility of pregnancy: Don't Know    Precipitating factors:   None identified    Therapies Tried and outcome: AZO, cipro, hydrocodone and Increase fluid intake (states it is hard to keep hydrated because she is throwing up)    Recent Medication changes: Cipro & Hydrocodone    Home Care information given: increase fluid intake (as much as you can).   Advised: Follow up with clinic if: wants to schedule with Primary, going to call back and schedule.  Follow up with Emergent Care if: blood in urine. Advised to rest and let the antibiotics start to work, advised that it can be up to 3-4 days before feeling the effects of them, patient still wanted to schedule a follow up with Dr. BURDEN       References used: Telephone Triage Protocols for Nurses fifth edition         Brunilda Messina RN  Triage RN  [facility]

## 2018-09-17 ENCOUNTER — TELEPHONE (OUTPATIENT)
Dept: FAMILY MEDICINE | Facility: CLINIC | Age: 43
End: 2018-09-17

## 2018-09-17 NOTE — TELEPHONE ENCOUNTER
Patient called reporting symptoms of the UTI.     URINARY TRACT SYMPTOMS  Onset: 7 days    Description:   Painful urination (Dysuria): YES  Blood in urine (Hematuria): no   Delay in urine (Hesitency): no     Intensity: moderate    Progression of Symptoms:  same    Accompanying Signs & Symptoms:  Fever/chills: YES  Flank pain YES  Nausea and vomiting: YES  Any vaginal symptoms: none  Abdominal/Pelvic Pain: no     History:   History of frequent UTI's: no   History of kidney stones: no   Sexually Active: not indicated  Possibility of pregnancy: No    Precipitating factors:   Finished  Cipro for apparent UTI. Still having symptoms, wants another course of antibiotics or direction on what to do next.     *routing to provider to ask for another round of antibiotics or what to advise the patient to do. Patient refused an appointment.      Therapies Tried and outcome: Cipro and Increase fluid intake & no relief, symptoms still the same    Recent Medication changes: none      References used: Telephone Triage Protocols for Nurses fifth edition         Brunilda Messina, RN  Triage RN  [facility]    ciprofloxacin (CIPRO) 250 MG tablet 6 tablet 0 9/13/2018  --      Sig - Route: Take 1 tablet (250 mg) by mouth 2 times daily - Oral     Class: E-Prescribe     Order: [id number]     E-Prescribing Status: Receipt confirmed by pharmacy (9/13/2018  1:35 PM CDT)

## 2018-09-17 NOTE — TELEPHONE ENCOUNTER
Reason for Call:  Other call back    Detailed comments: Melody started antibiotics last week for UTI, and has finished them. Symptoms are getting worse and she would like a nurse to return a call.    Phone Number Patient can be reached at: Cell number on file:    Telephone Information:   Mobile [phone]       Best Time: any    Can we leave a detailed message on this number? YES    Call taken on 9/17/2018 at 7:32 AM by Mary Rodriguez

## 2018-09-17 NOTE — TELEPHONE ENCOUNTER
Patient called back, she is not able to come to the 1:50 appt. Her kids are sick and she could not find a . She will go to the urgent care [facility] when her  gets home. She still has radiating back and side pain, cramping, burning and pain with urination. Norco maker her so feel sick.

## 2018-09-17 NOTE — TELEPHONE ENCOUNTER
Patient should be seen again and leave another urine sample.     Urine culture from last week had no growth.    Ok to add to my schedule today.    Christofer Hennessy PA-C

## 2018-09-17 NOTE — TELEPHONE ENCOUNTER
Called pt and she doesn't want to come in, she doesn't understand what else can be done.  Pt is scheduled for 1:50 today and will call if she cant make it

## 2018-09-20 ENCOUNTER — NURSE TRIAGE (OUTPATIENT)
Dept: NURSING | Facility: CLINIC | Age: 43
End: 2018-09-20

## 2018-09-20 DIAGNOSIS — G89.4 CHRONIC PAIN SYNDROME: ICD-10-CM

## 2018-09-20 DIAGNOSIS — F51.01 PRIMARY INSOMNIA: ICD-10-CM

## 2018-09-20 NOTE — TELEPHONE ENCOUNTER
Reason for Call:  Medication or medication refill:    Do you use a [facility]?  Name of the pharmacy and phone number for the current request:  [facility] in [place]    Name of the medication requested: zolpidem (AMBIEN) 10 MG tablet    Other request:  Patient states she does not get this prescription from her psychiatrist    Can we leave a detailed message on this number? YES    Phone number patient can be reached at: Home number on file [phone] (home)    Best Time: anytime    Call taken on 9/20/2018 at 11:02 AM by KYLER DE LA PAZ

## 2018-09-20 NOTE — TELEPHONE ENCOUNTER
Requested Prescriptions   Pending Prescriptions Disp Refills     zolpidem (AMBIEN) 10 MG tablet  Last Written Prescription Date:  8/22/18  Last Fill Quantity: 30,   # refills: 0  Last Office Visit: 9/13/18 [facility] Office visit:       Routing refill request to provider for review/approval because:  Drug not on the [facility], [facility] or [facility] refill protocol or controlled substance   30 tablet 0     Sig: Take 1 tablet (10 mg) by mouth nightly as needed    There is no refill protocol information for this order

## 2018-09-20 NOTE — TELEPHONE ENCOUNTER
Message red-flag routed to provider via telephone encounterto refill Ambien, which she would like before the weekend.  Teri Carmona RN  [facility]      Reason for Disposition    Caller requesting a NON-URGENT new prescription or refill and triager unable to refill per unit policy    Protocols used: MEDICATION QUESTION CALL-ADULT-AH

## 2018-09-20 NOTE — TELEPHONE ENCOUNTER
Controlled Substance Refill Request for zolpidem (AMBIEN) 10 MG tablet  Problem List Complete:  Yes  Patient is followed by VIRAL Deleon for ongoing prescription of pain medication.  All refills should be approved by this provider, or covering partner.    Medication(s):  Lyrica 150 mg bid, Fioricet with codeine prn, klonopin and ambien to be prescribed by psych, not [facility].  Maximum quantity per month: 60, 20  Clinic visit frequency required: Q 3 months     Controlled substance agreement on file: Yes       Date(s): 9/8/2015  New CSA needed.  Pain Clinic evaluation in the past: No    DIRE Total Score(s):  No flowsheet data found.    Last [facility] website verification: 08/22/2018 multiple meds from multiple outside provider provider review   checked in past 3 months?  Yes 08/22/2018

## 2018-09-21 RX ORDER — ZOLPIDEM TARTRATE 10 MG/1
10 TABLET ORAL
Qty: 30 TABLET | Refills: 0 | Status: SHIPPED | OUTPATIENT
Start: 2018-09-21 | End: 2018-10-19

## 2018-09-21 NOTE — TELEPHONE ENCOUNTER
Rx faxed to pharmacy. I will call patient and inform her this was done.    Princess LUPIS Conn, CMA

## 2018-09-24 NOTE — TELEPHONE ENCOUNTER
Could not find contour next Ez blood glucose testing machine under patient's med history list. I put in a order for DME in order to send to escribe pool for RN's to look at.

## 2018-09-24 NOTE — TELEPHONE ENCOUNTER
Reason for Call:  Medication or medication refill:    Do you use a [facility]?  Name of the pharmacy and phone number for the current request:     [facility] [address] [place] [address] AVE AT [place] [address]    Name of the medication requested: Contour Next Ez blood glucose testing machine    Other request:     Can we leave a detailed message on this number? YES    Phone number patient can be reached at: Home number on file [phone] (home)    Best Time: anytime     Call taken on 9/24/2018 at 1:25 PM by Mattie Lockhart

## 2018-09-25 NOTE — TELEPHONE ENCOUNTER
Requested Prescriptions   Pending Prescriptions Disp Refills     order for DME      Last Written Prescription Date:  n/a  Last Fill Quantity: n/a,   # refills: n/a  Last Office Visit: 9/13/18 [facility] Office visit:       Routing refill request to provider for review/approval because:  Not listed in pt's medicine history list         Sig: Equipment being ordered: Glucose monitoring kit    There is no refill protocol information for this order

## 2018-09-26 DIAGNOSIS — E11.9 DIABETES MELLITUS WITHOUT COMPLICATION (H): Primary | ICD-10-CM

## 2018-09-28 ENCOUNTER — TRANSFERRED RECORDS (OUTPATIENT)
Dept: HEALTH INFORMATION MANAGEMENT | Facility: CLINIC | Age: 43
End: 2018-09-28

## 2018-10-01 ENCOUNTER — OFFICE VISIT (OUTPATIENT)
Dept: FAMILY MEDICINE | Facility: CLINIC | Age: 43
End: 2018-10-01
Payer: COMMERCIAL

## 2018-10-01 VITALS
DIASTOLIC BLOOD PRESSURE: 80 MMHG | TEMPERATURE: 99.2 F | BODY MASS INDEX: 36.21 KG/M2 | SYSTOLIC BLOOD PRESSURE: 130 MMHG | HEART RATE: 99 BPM | OXYGEN SATURATION: 98 % | WEIGHT: 198 LBS

## 2018-10-01 DIAGNOSIS — R10.11 RUQ ABDOMINAL PAIN: ICD-10-CM

## 2018-10-01 DIAGNOSIS — R53.82 CHRONIC FATIGUE: ICD-10-CM

## 2018-10-01 DIAGNOSIS — E11.9 TYPE 2 DIABETES MELLITUS WITHOUT COMPLICATION, WITHOUT LONG-TERM CURRENT USE OF INSULIN (H): Primary | ICD-10-CM

## 2018-10-01 DIAGNOSIS — K58.2 IRRITABLE BOWEL SYNDROME WITH BOTH CONSTIPATION AND DIARRHEA: ICD-10-CM

## 2018-10-01 DIAGNOSIS — E03.9 ACQUIRED HYPOTHYROIDISM: ICD-10-CM

## 2018-10-01 LAB
ALBUMIN UR-MCNC: NEGATIVE MG/DL
APPEARANCE UR: CLEAR
BACTERIA #/AREA URNS HPF: ABNORMAL /HPF
BASOPHILS # BLD AUTO: 0 10E9/L (ref 0–0.2)
BASOPHILS NFR BLD AUTO: 0.2 %
BILIRUB UR QL STRIP: NEGATIVE
COLOR UR AUTO: YELLOW
DIFFERENTIAL METHOD BLD: ABNORMAL
EOSINOPHIL # BLD AUTO: 0.7 10E9/L (ref 0–0.7)
EOSINOPHIL NFR BLD AUTO: 4.8 %
ERYTHROCYTE [DISTWIDTH] IN BLOOD BY AUTOMATED COUNT: 14.1 % (ref 10–15)
GLUCOSE UR STRIP-MCNC: NEGATIVE MG/DL
HBA1C MFR BLD: 9.6 % (ref 0–5.6)
HCT VFR BLD AUTO: 36.2 % (ref 35–47)
HGB BLD-MCNC: 12.2 G/DL (ref 11.7–15.7)
HGB UR QL STRIP: NEGATIVE
KETONES UR STRIP-MCNC: NEGATIVE MG/DL
LEUKOCYTE ESTERASE UR QL STRIP: ABNORMAL
LYMPHOCYTES # BLD AUTO: 1.9 10E9/L (ref 0.8–5.3)
LYMPHOCYTES NFR BLD AUTO: 14 %
MCH RBC QN AUTO: 26.9 PG (ref 26.5–33)
MCHC RBC AUTO-ENTMCNC: 33.7 G/DL (ref 31.5–36.5)
MCV RBC AUTO: 80 FL (ref 78–100)
MONOCYTES # BLD AUTO: 1.6 10E9/L (ref 0–1.3)
MONOCYTES NFR BLD AUTO: 11.4 %
NEUTROPHILS # BLD AUTO: 9.7 10E9/L (ref 1.6–8.3)
NEUTROPHILS NFR BLD AUTO: 69.6 %
NITRATE UR QL: NEGATIVE
NON-SQ EPI CELLS #/AREA URNS LPF: ABNORMAL /LPF
PH UR STRIP: 5.5 PH (ref 5–7)
PLATELET # BLD AUTO: 394 10E9/L (ref 150–450)
RBC # BLD AUTO: 4.53 10E12/L (ref 3.8–5.2)
RBC #/AREA URNS AUTO: ABNORMAL /HPF
SOURCE: ABNORMAL
SP GR UR STRIP: 1.01 (ref 1–1.03)
UROBILINOGEN UR STRIP-ACNC: 0.2 EU/DL (ref 0.2–1)
WBC # BLD AUTO: 13.9 10E9/L (ref 4–11)
WBC #/AREA URNS AUTO: ABNORMAL /HPF

## 2018-10-01 PROCEDURE — 84520 ASSAY OF UREA NITROGEN: CPT | Performed by: FAMILY MEDICINE

## 2018-10-01 PROCEDURE — 82565 ASSAY OF CREATININE: CPT | Performed by: FAMILY MEDICINE

## 2018-10-01 PROCEDURE — 84439 ASSAY OF FREE THYROXINE: CPT | Performed by: FAMILY MEDICINE

## 2018-10-01 PROCEDURE — 80051 ELECTROLYTE PANEL: CPT | Performed by: FAMILY MEDICINE

## 2018-10-01 PROCEDURE — 85025 COMPLETE CBC W/AUTO DIFF WBC: CPT | Performed by: FAMILY MEDICINE

## 2018-10-01 PROCEDURE — 36415 COLL VENOUS BLD VENIPUNCTURE: CPT | Performed by: FAMILY MEDICINE

## 2018-10-01 PROCEDURE — 84443 ASSAY THYROID STIM HORMONE: CPT | Performed by: FAMILY MEDICINE

## 2018-10-01 PROCEDURE — 81001 URINALYSIS AUTO W/SCOPE: CPT | Performed by: FAMILY MEDICINE

## 2018-10-01 PROCEDURE — 82043 UR ALBUMIN QUANTITATIVE: CPT | Performed by: FAMILY MEDICINE

## 2018-10-01 PROCEDURE — 99214 OFFICE O/P EST MOD 30 MIN: CPT | Performed by: FAMILY MEDICINE

## 2018-10-01 PROCEDURE — 83036 HEMOGLOBIN GLYCOSYLATED A1C: CPT | Performed by: FAMILY MEDICINE

## 2018-10-01 NOTE — PROGRESS NOTES
SUBJECTIVE:   Melody Muñoz is a 42 year old female who presents to clinic today for the following health issues:      Abdominal Pain/ bowels      Duration: 6-8 months    Description (location/character/radiation): abdominal       Associated flank pain: None    Intensity:  moderate    Accompanying signs and symptoms:        Fever/Chills: no        Gas/Bloating: YES       Nausea/vomitting: YES       Diarrhea: YES       Dysuria or Hematuria: no     History (previous similar pain/trauma/previous testing): never treated, has urge to go but nothing happens    Precipitating or alleviating factors:       Pain worse with eating/BM/urination: unsure       Pain relieved by BM: no     Therapies tried and outcome: None    LMP:  not applicable    fatigue      Duration: 3 weeks    Description (location/character/radiation): body    Intensity:  moderate    Accompanying signs and symptoms: sleep apnea    History (similar episodes/previous evaluation): sleep study    Precipitating or alleviating factors: None    Therapies tried and outcome: None           Problem list and histories reviewed & adjusted, as indicated.  Additional history: as documented    Patient Active Problem List   Diagnosis     Endometriosis s/po EDWIGE 10-17     Mantoux: positive     Latent tuberculosis     Major depression     Generalized anxiety disorder     Constipation     Nightmares     Knee pain     Bipolar 1 disorder  with psychosis     Chronic fatigue     Female stress incontinence     Drug-seeking behavior     Vertigo     Suicidal ideation     Acne     Chronic pain syndrome     Insomnia     Chronic migraine without aura without status migrainosus, not intractable     Nausea     JASWANT (obstructive sleep apnea)     Elevated liver enzymes     TMJ (temporomandibular joint syndrome)     Hypothyroidism due to acquired atrophy of thyroid     Hostile behavior     Mild persistent asthma without complication     Diabetes mellitus without complication (H)      Allergic rhinitis     Incontinence of urine in female     Migraine     Screening for diabetic peripheral neuropathy     Need for prophylactic vaccination against Streptococcus pneumoniae (pneumococcus)     Sepsis (H)     Abdominal pain     Morbid obesity (H)     Former tobacco use     Mixed simple and mucopurulent chronic bronchitis (H): Spirometry 16 lung age = 60y/o in 41 y/o FVC=90%&FEV1=78%     Class 2 obesity due to excess calories with serious comorbidity and body mass index (BMI) of 36.0 to 36.9 in adult     Mixed hyperlipidemia     Acquired hypothyroidism     Past Surgical History:   Procedure Laterality Date     BIOPSY       COLONOSCOPY       GENITOURINARY SURGERY  May/2014    bladder sling     GYN SURGERY      laparoscopy- endometriosis     GYN SURGERY       for twins     LAPAROSCOPIC APPENDECTOMY N/A 2017    Procedure: LAPAROSCOPIC APPENDECTOMY;  LAPAROSCOPIC APPENDECTOMY and resection of epiploic tag;  Surgeon: Roberto Robins MD;  Location: [facility] OR     little finger surgery left  1980     tubal ligation bilateral       wisdom teeth removal         Social History   Substance Use Topics     Smoking status: Current Some Day Smoker     Packs/day: 0.25     Years: 17.00     Types: Cigarettes     Smokeless tobacco: Never Used     Alcohol use No      Comment: no etoh since      Family History   Problem Relation Age of Onset     Hypertension Mother      HEART DISEASE Father      palpitations     Depression Sister      Anxiety Disorder Sister      HEART DISEASE Maternal Grandmother      CHF     Cancer Maternal Grandfather 72     Pancreatic Cancer     Alzheimer Disease Paternal Grandfather      Bipolar Disorder Other      Autism Spectrum Disorder Other            Reviewed and updated as needed this visit by clinical staff  Tobacco  Allergies  Meds  Med Hx  Surg Hx  Fam Hx  Soc Hx      Reviewed and updated as needed this visit by Provider         ROS:  Constitutional, neuro, ENT,  endocrine, pulmonary, cardiac, gastrointestinal, genitourinary, musculoskeletal, integument and psychiatric systems are negative, except as otherwise noted.    OBJECTIVE:                                                    /80 (Cuff Size: Adult Large)  Pulse 99  Temp 99.2  F (37.3  C) (Tympanic)  Wt 198 lb (89.8 kg)  LMP  (LMP Unknown)  SpO2 98%  BMI 36.21 kg/m2  Body mass index is 36.21 kg/(m^2).  GENERAL APPEARANCE: healthy, alert, no distress and over weight  EYES: Eyes grossly normal to inspection, PERRL and conjunctivae and sclerae normal  HENT: nose and mouth without ulcers or lesions  NECK: no adenopathy, no asymmetry, masses, or scars and thyroid normal to palpation  CV: regular rates and rhythm, normal S1 S2, no S3 or S4 and no murmur, click or rub  ABDOMEN: bowel sounds normal, no hepatosplenomegaly or masses and there is tenderness to palpation in the right upper quadrant.         ASSESSMENT/PLAN:                                                        ICD-10-CM    1. Type 2 diabetes mellitus without complication, without long-term current use of insulin (H) E11.9 blood glucose (NO BRAND SPECIFIED) lancets standard     blood glucose monitoring (NO BRAND SPECIFIED) test strip     UA with Microscopic     Albumin Random Urine Quantitative with Creat Ratio     Hemoglobin A1c   2. Chronic fatigue R53.82 CBC with platelets differential     Electrolyte panel (Na, K, Cl, CO2, Anion gap)     Creatinine     Urea nitrogen     TSH     T4, free   3. Acquired hypothyroidism E03.9 TSH     T4, free   4. RUQ abdominal pain R10.11 US Abdomen Limited   5. Irritable bowel syndrome with both constipation and diarrhea K58.2      Return in about 4 weeks (around 10/29/2018) for follow up of last visit.  Patient Instructions   I recommended that the patient start fiber supplementation.  She will increase every 5 days until she has soft, regular and no straining bowel movements.  We will get an ultrasound of her right  upper quadrant done.  Labs were checked as noted.  We will follow-up in 1 month sooner as needed.  We did check tests on both her thyroid and her diabetes and for her chronic fatigue.  I refilled her lancets and her test strips.  She tests her blood 3 times a week.      Evaristo Machado MD  [facility]

## 2018-10-01 NOTE — MR AVS SNAPSHOT
After Visit Summary   10/1/2018    Melody Muñoz    MRN: [id number]           Patient Information     Date Of Birth          1975        Visit Information        Provider Department      10/1/2018 2:15 PM Evaristo Machado MD [facility]        Today's Diagnoses     Type 2 diabetes mellitus without complication, without long-term current use of insulin (H)    -  1    Chronic fatigue        Acquired hypothyroidism        RUQ abdominal pain        Irritable bowel syndrome with both constipation and diarrhea          Care Instructions    I recommended that the patient start fiber supplementation.  She will increase every 5 days until she has soft, regular and no straining bowel movements.  We will get an ultrasound of her right upper quadrant done.  Labs were checked as noted.  We will follow-up in 1 month sooner as needed.  We did check tests on both her thyroid and her diabetes and for her chronic fatigue.  I refilled her lancets and her test strips.  She tests her blood 3 times a week.          Follow-ups after your visit        Follow-up notes from your care team     Return in about 4 weeks (around 10/29/2018) for follow up of last visit.      Your next 10 appointments already scheduled     Oct 02, 2018 11:00 AM CDT   US ABDOMEN LIMITED with RSCCUS2   [facility] ([facility])    [address] [address] [address] [phone]           How do I prepare for my exam? (Food and drink instructions) Adults: No eating, smoking, gum chewing or drinking for 8 hours before the exam. You may take medicine with a small sip of water.  Children: * Infants, breast-fed: may have breast milk up to 2 hours before exam. * Infants, formula: may have bottle until 4 hours before exam. * Children 1-5 years: No food or drink for 4 hours before exam. * Children 6 -12 years: No food or drink for 6 hours before  exam. * Children over 12 years: No food or drink for 8 hours before exam.  * J Tube Fed: No need to stop feedings.  What should I wear: Wear comfortable clothes.  How long does the exam take: Most ultrasounds take 30 to 60 minutes.  What should I bring: Bring a list of your medicines, including vitamins, minerals and over-the-counter drugs. It is safest to leave personal items at home.  Do I need a :  No  is needed.  What do I need to tell my doctor: Tell your doctor about any allergies you may have.  What should I do after the exam: No restrictions, You may resume normal activities.  What is this test: An ultrasound uses sound waves to make pictures of the body. Sound waves do not cause pain. The only discomfort may be the pressure of the wand against your skin or full bladder.  Who should I call with questions: If you have any questions, please call the Imaging Department where you will have your exam. Directions, parking instructions, and other information is available on our website, [email or web address].              Future tests that were ordered for you today     Open Future Orders        Priority Expected Expires Ordered    US Abdomen Limited Routine  10/1/2019 10/1/2018            Who to contact     If you have questions or need follow up information about today's clinic visit or your schedule please contact [facility] directly at [phone].  Normal or non-critical lab and imaging results will be communicated to you by MyChart, letter or phone within 4 business days after the clinic has received the results. If you do not hear from us within 7 days, please contact the clinic through MyChart or phone. If you have a critical or abnormal lab result, we will notify you by phone as soon as possible.  Submit refill requests through [employer] or call your pharmacy and they will forward the refill request to us. Please allow 3 business days for your refill to be completed.           Additional Information About Your Visit        [employer] Information     [employer] gives you secure access to your electronic health record. If you see a primary care provider, you can also send messages to your care team and make appointments. If you have questions, please call your primary care clinic.  If you do not have a primary care provider, please call [phone] and they will assist you.        Care EveryWhere ID     This is your Care EveryWhere ID. This could be used by other organizations to access your [facility] medical records  [id number]        Your Vitals Were     Pulse Temperature Last Period Pulse Oximetry BMI (Body Mass Index)       99 99.2  F (37.3  C) (Tympanic) (LMP Unknown) 98% 36.21 kg/m2        Blood Pressure from Last 3 Encounters:   10/01/18 130/80   09/13/18 134/86   08/09/18 132/84    Weight from Last 3 Encounters:   10/01/18 198 lb (89.8 kg)   09/13/18 198 lb (89.8 kg)   08/09/18 198 lb (89.8 kg)              We Performed the Following     Albumin Random Urine Quantitative with Creat Ratio     CBC with platelets differential     Creatinine     Electrolyte panel (Na, K, Cl, CO2, Anion gap)     Hemoglobin A1c     T4, free     TSH     UA with Microscopic     Urea nitrogen          Today's Medication Changes          These changes are accurate as of 10/1/18 11:59 PM.  If you have any questions, ask your nurse or doctor.               Start taking these medicines.        Dose/Directions    blood glucose lancets standard   Commonly known as:  no brand specified   Used for:  Type 2 diabetes mellitus without complication, without long-term current use of insulin (H)   Started by:  Evaristo Machado MD        Use to test blood sugar 3 times weekly or as directed.   Quantity:  100 each   Refills:  3         These medicines have changed or have updated prescriptions.        Dose/Directions    * blood glucose monitoring test strip   Commonly known as:  [employer] CONTOUR NEXT   This may  have changed:  Another medication with the same name was added. Make sure you understand how and when to take each.   Used for:  Diabetes mellitus without complication (H)   Changed by:  Evaristo Machado MD        Use to test blood sugar 2 times daily or as directed.   Quantity:  200 each   Refills:  12       * blood glucose monitoring test strip   Commonly known as:  no brand specified   This may have changed:  You were already taking a medication with the same name, and this prescription was added. Make sure you understand how and when to take each.   Used for:  Type 2 diabetes mellitus without complication, without long-term current use of insulin (H)   Changed by:  Evaristo Machado MD        Use to test blood sugars 3 times weekly or as directed   Quantity:  100 strip   Refills:  3       ondansetron 4 MG ODT tab   Commonly known as:  ZOFRAN-ODT   This may have changed:  Another medication with the same name was removed. Continue taking this medication, and follow the directions you see here.   Used for:  Nausea   Changed by:  Evaristo Machado MD        DISSOLVE 1 TO 2 TABLETS UNDER THE TONGUE EVERY 8 HOURS AS NEEDED FOR NAUSEA   Quantity:  30 tablet   Refills:  1       * Notice:  This list has 2 medication(s) that are the same as other medications prescribed for you. Read the directions carefully, and ask your doctor or other care provider to review them with you.      Stop taking these medicines if you haven't already. Please contact your care team if you have questions.     ciprofloxacin 250 MG tablet   Commonly known as:  CIPRO   Stopped by:  Evaristo Machado MD           diazepam 10 MG tablet   Commonly known as:  VALIUM   Stopped by:  Evaristo Machado MD           HYDROcodone-acetaminophen 5-325 MG per tablet   Commonly known as:  NORCO   Stopped by:  Evaristo Machado MD                Where to get your medicines      These medications were sent to [facility] [id number] [facility] [address] AT [place] [address]  [address], [facility] [address]     Phone:  [phone]     blood glucose lancets standard    blood glucose monitoring test strip                Primary Care Provider Office Phone # Fax #    Evaristo Machado [phone]       [address] [facility] [address]        Equal Access to Services     OCTAVIO TRUJILLO : Hadii aad ku hadasho Soomaali, waaxda luqadaha, qaybta kaalmada adeegyada, waxay idiin hayaan adeeg kharash la'aan . So [facility] [phone].    ATENCIÓN: Si habla español, tiene a mendoza disposición servicios gratuitos de asistencia lingüística. [facility] [phone].    We comply with applicable federal civil rights laws and [place] laws. We do not discriminate on the basis of race, color, national origin, age, disability, sex, sexual orientation, or gender identity.            Thank you!     Thank you for choosing [facility]  for your care. Our goal is always to provide you with excellent care. Hearing back from our patients is one way we can continue to improve our services. Please take a few minutes to complete the written survey that you may receive in the mail after your visit with us. Thank you!             Your Updated Medication List - Protect others around you: Learn how to safely use, store and throw away your medicines at www.disposemymeds.org.          This list is accurate as of 10/1/18 11:59 PM.  Always use your most recent med list.                   Brand Name Dispense Instructions for use Diagnosis    * albuterol (2.5 MG/3ML) 0.083% neb solution     25 vial    Take 1 vial (2.5 mg) by nebulization every 6 hours as needed for shortness of breath / dyspnea or wheezing    Mild persistent asthma without complication       * PROAIR  (90 Base) MCG/ACT inhaler   Generic drug:  albuterol     51 g    INHALE 1 TO 2 PUFFS INTO THE LUNGS EVERY 4  HOURS AS NEEDED FOR WHEEZING, SHORTNESS OF BREATH OR DYSPNEA    Intermittent asthma, uncomplicated       benzonatate 100 MG capsule    TESSALON    42 capsule    Take 1 capsule (100 mg) by mouth 3 times daily as needed for cough    Bronchitis       blood glucose lancets standard    no brand specified    100 each    Use to test blood sugar 3 times weekly or as directed.    Type 2 diabetes mellitus without complication, without long-term current use of insulin (H)       * blood glucose monitoring test strip    LAURA CONTOUR NEXT    200 each    Use to test blood sugar 2 times daily or as directed.    Diabetes mellitus without complication (H)       * blood glucose monitoring test strip    no brand specified    100 strip    Use to test blood sugars 3 times weekly or as directed    Type 2 diabetes mellitus without complication, without long-term current use of insulin (H)       Butalbital-APAP-Caff-Cod -15-30 MG Caps     15 capsule    Take 1 capsule by mouth 3 times daily as needed    Chronic migraine without aura without status migrainosus, not intractable       carisoprodol 350 MG tablet    SOMA    90 tablet    TAKE 1 TABLET BY MOUTH THREE TIMES DAILY    Chronic pain syndrome       fluticasone 110 MCG/ACT Inhaler    FLOVENT HFA    2 Inhaler    Inhale 2 puffs into the lungs 2 times daily    Mild persistent asthma without complication       fluticasone 50 MCG/ACT spray    FLONASE    16 g    Spray 2 sprays into both nostrils daily    Chronic maxillary sinusitis       guaiFENesin-codeine 100-10 MG/5ML Soln solution    ROBITUSSIN AC    30 mL    Take 5 mLs by mouth nightly as needed for cough    Acute recurrent maxillary sinusitis       guanFACINE 2 MG tablet    TENEX    180 tablet    TAKE TWO TABLETS BY MOUTH AT BEDTIME    Nightmares       lamoTRIgine 200 MG tablet    LAMICTAL    90 tablet    Take 1 tablet (200 mg) by mouth every morning    Bipolar 1 disorder (H)       levothyroxine 25 MCG tablet     SYNTHROID/LEVOTHROID    90 tablet    TAKE 1 TABLET(25 MCG) BY MOUTH DAILY    Acquired hypothyroidism       lithium 450 MG CR tablet    ESKALITH    60 tablet    TAKE 2 TABLETS BY MOUTH AT BEDTIME    Bipolar disorder with psychotic features (H)       LYRICA 150 MG capsule   Generic drug:  pregabalin     60 capsule    TAKE 1 CAPSULE BY MOUTH TWICE DAILY    Chronic pain syndrome       meclizine 25 MG tablet    ANTIVERT    30 tablet    Take 1 tablet (25 mg) by mouth every 6 hours as needed for dizziness    Dizziness       MELATONIN PO      Take 6 mg by mouth At Bedtime        metFORMIN 500 MG tablet    GLUCOPHAGE    90 tablet    Take 1 tablet (500 mg) by mouth 3 times daily (with meals)    Type 2 diabetes mellitus without complication, without long-term current use of insulin (H)       miconazole 100 MG vaginal suppository    MICATIN    7 suppository    Place 1 suppository (100 mg) vaginally At Bedtime    Candidiasis of vulva and vagina       MICROLET LANCETS Misc     100 each    TEST TWICE DAILY AS DIRECTED    Diabetes mellitus without complication (H)       modafinil 200 MG tablet    PROVIGIL    30 tablet    Take 0.5 tablets (100 mg) by mouth daily    Chronic fatigue       OLANZapine 10 MG tablet    zyPREXA    5 tablet    Take 1 tablet (10 mg) by mouth At Bedtime    Bipolar 1 disorder (H), Generalized anxiety disorder       ondansetron 4 MG ODT tab    ZOFRAN-ODT    30 tablet    DISSOLVE 1 TO 2 TABLETS UNDER THE TONGUE EVERY 8 HOURS AS NEEDED FOR NAUSEA    Nausea       ondansetron 4 MG tablet    ZOFRAN    30 tablet    Take 1 tablet (4 mg) by mouth every 8 hours as needed for nausea    Nausea       order for DME     1 Device    Equipment being ordered: Blood glucose monitoring kit    Diabetes mellitus without complication (H)       pantoprazole 40 MG EC tablet    PROTONIX    90 tablet    TAKE 1 TABLET(40 MG) BY MOUTH DAILY    Other chronic gastritis without hemorrhage       sertraline 100 MG tablet    ZOLOFT     TK 1 T  PO  QD        STATIN NOT PRESCRIBED (INTENTIONAL)      1 each daily Please choose reason not prescribed, below    Type 2 diabetes mellitus with hyperglycemia, without long-term current use of insulin (H)       zolpidem 10 MG tablet    AMBIEN    30 tablet    Take 1 tablet (10 mg) by mouth nightly as needed    Primary insomnia       * Notice:  This list has 4 medication(s) that are the same as other medications prescribed for you. Read the directions carefully, and ask your doctor or other care provider to review them with you.

## 2018-10-02 ENCOUNTER — HOSPITAL ENCOUNTER (OUTPATIENT)
Dept: ULTRASOUND IMAGING | Facility: CLINIC | Age: 43
Discharge: HOME | End: 2018-10-02
Attending: FAMILY MEDICINE | Admitting: FAMILY MEDICINE
Payer: COMMERCIAL

## 2018-10-02 ENCOUNTER — TELEPHONE (OUTPATIENT)
Dept: FAMILY MEDICINE | Facility: CLINIC | Age: 43
End: 2018-10-02

## 2018-10-02 ENCOUNTER — TELEPHONE (OUTPATIENT)
Dept: MAMMOGRAPHY | Facility: CLINIC | Age: 43
End: 2018-10-02

## 2018-10-02 DIAGNOSIS — R10.11 RUQ ABDOMINAL PAIN: ICD-10-CM

## 2018-10-02 DIAGNOSIS — N30.00 ACUTE CYSTITIS WITHOUT HEMATURIA: Primary | ICD-10-CM

## 2018-10-02 LAB
ANION GAP SERPL CALCULATED.3IONS-SCNC: 13 MMOL/L (ref 3–14)
BUN SERPL-MCNC: 10 MG/DL (ref 7–30)
CHLORIDE SERPL-SCNC: 97 MMOL/L (ref 94–109)
CO2 SERPL-SCNC: 21 MMOL/L (ref 20–32)
CREAT SERPL-MCNC: 0.63 MG/DL (ref 0.52–1.04)
CREAT UR-MCNC: 97 MG/DL
GFR SERPL CREATININE-BSD FRML MDRD: >90 ML/MIN/1.7M2
MICROALBUMIN UR-MCNC: 17 MG/L
MICROALBUMIN/CREAT UR: 17.65 MG/G CR (ref 0–25)
POTASSIUM SERPL-SCNC: 3.5 MMOL/L (ref 3.4–5.3)
SODIUM SERPL-SCNC: 131 MMOL/L (ref 133–144)
T4 FREE SERPL-MCNC: 0.81 NG/DL (ref 0.76–1.46)
TSH SERPL DL<=0.005 MIU/L-ACNC: 1.67 MU/L (ref 0.4–4)

## 2018-10-02 PROCEDURE — 76705 ECHO EXAM OF ABDOMEN: CPT

## 2018-10-02 PROCEDURE — 87086 URINE CULTURE/COLONY COUNT: CPT | Performed by: FAMILY MEDICINE

## 2018-10-02 RX ORDER — SULFAMETHOXAZOLE/TRIMETHOPRIM 800-160 MG
1 TABLET ORAL 2 TIMES DAILY
Qty: 20 TABLET | Refills: 0 | Status: SHIPPED | OUTPATIENT
Start: 2018-10-02 | End: 2018-10-17

## 2018-10-02 NOTE — TELEPHONE ENCOUNTER
Reason for Call:  Other     Detailed comments: patient called said her physic doctor is taking her of modafinil and changing to dexedrine.  Didn't know the dose    Phone Number Patient can be reached at: Home number on file [phone] (home)    Best Time: any    Can we leave a detailed message on this number? YES    Call taken on 10/2/2018 at 10:22 AM by NANCY MAYO

## 2018-10-02 NOTE — PATIENT INSTRUCTIONS
I recommended that the patient start fiber supplementation.  She will increase every 5 days until she has soft, regular and no straining bowel movements.  We will get an ultrasound of her right upper quadrant done.  Labs were checked as noted.  We will follow-up in 1 month sooner as needed.  We did check tests on both her thyroid and her diabetes and for her chronic fatigue.  I refilled her lancets and her test strips.  She tests her blood 3 times a week.

## 2018-10-02 NOTE — TELEPHONE ENCOUNTER
Patient returned call and would like the modafinil removed from medication list.  Patient will call back with dose of dexedrine when she picks it up.    Pam AZEVEDO RN  [facility]

## 2018-10-02 NOTE — TELEPHONE ENCOUNTER
Patient calling for lab results. Labs are complete but provider has not reviewed. Will route to provider.  Patient can be reached at:[phone]    Pam AZEVEDO RN  [facility]

## 2018-10-03 ENCOUNTER — TELEPHONE (OUTPATIENT)
Dept: FAMILY MEDICINE | Facility: CLINIC | Age: 43
End: 2018-10-03

## 2018-10-03 DIAGNOSIS — G89.4 CHRONIC PAIN SYNDROME: ICD-10-CM

## 2018-10-03 DIAGNOSIS — N30.00 ACUTE CYSTITIS WITHOUT HEMATURIA: Primary | ICD-10-CM

## 2018-10-03 LAB
BACTERIA SPEC CULT: NORMAL
SPECIMEN SOURCE: NORMAL

## 2018-10-03 RX ORDER — PHENAZOPYRIDINE HYDROCHLORIDE 100 MG/1
100 TABLET, FILM COATED ORAL 3 TIMES DAILY PRN
Qty: 12 TABLET | Refills: 0 | Status: SHIPPED | OUTPATIENT
Start: 2018-10-03 | End: 2018-10-17

## 2018-10-03 NOTE — TELEPHONE ENCOUNTER
Patient called the clinic, she said she is already taking Azo which is helping with the burning (pharmacist told her it is the same as Pyridium). She is requesting a pain medication for lower abdominal pain and lower back pain that started yesterday (6/10 and intermittnent). She had this pain before 1 month ago with UTI and she said she took hydrocodone. She feels that it was too strong and made her sick. She is requesting something different for pain. She is using heat and laying on the couch which is not helping. Started Bactrim yesterday. PCP to review.

## 2018-10-03 NOTE — TELEPHONE ENCOUNTER
I will send a prescription for Pyridium to the pharmacy for her which will numb up the urinary tract.  Make sure we warn her that it will turn her urine orange.

## 2018-10-03 NOTE — TELEPHONE ENCOUNTER
Reason for Call:  Other call back    Detailed comments: Patient states she has a UTI and is having a lot of pain. Patient states she cannot take ibuprofen and Tylenol is not helping. Cannot take hydrocodone. Maybe Tylenol 3. Patient is taking Azo not really helping. Is laving for [place] next Tuesday. Patient says she didn't really sleep because she was in pain. Please call.    Phone Number Patient can be reached at: Home number on file [phone] (home)    Best Time: any    Can we leave a detailed message on this number? YES    Call taken on 10/3/2018 at 7:34 AM by TRIXIE KRUEGER

## 2018-10-03 NOTE — TELEPHONE ENCOUNTER
Patient calling for Urine cx results.  Informed patient results are not finalized yet and clinic will call with results when provider has reviewed them.  Patient stated understanding.    Pam AZEVEDO RN  [employer] T[facility]

## 2018-10-03 NOTE — TELEPHONE ENCOUNTER
Patient called requesting  A stronger pain medication for dysuria with UTI. She asked if tylenol 3 can be sent to her pharmacy. Verified she is taking sulfamethoxazole-trimethoprim (BACTRIM DS/SEPTRA DS) 800-160 MG per tablet prescribed yesterday. Please advice on request.     Routing refill request to provider for review/approval because:  Drug not on the [facility] refill protocol   Drug not active on patient's medication list      Last [facility] website verification: 08/22/2018 multiple meds from multiple outside providers

## 2018-10-04 ENCOUNTER — TELEPHONE (OUTPATIENT)
Dept: FAMILY MEDICINE | Facility: CLINIC | Age: 43
End: 2018-10-04

## 2018-10-04 DIAGNOSIS — E11.9 DIABETES MELLITUS WITHOUT COMPLICATION (H): Primary | ICD-10-CM

## 2018-10-04 NOTE — TELEPHONE ENCOUNTER
Pt was informed of lab results. She asked for A1C results. Pt would like to know if she needs to schedule an appointment to discuss diabetes medication. Melody stopped taking metformin due to diarrhea. Asked if anything else she should take. She would like to meet again with a diabetic educator. Last referral in epic was dated 07/26/2018. New diabetic educator pended. Please approve if agree with order. Ok to wait for PCP review Monday.

## 2018-10-04 NOTE — TELEPHONE ENCOUNTER
Pt was called with providers message. States she will review her schedule and call clinic back for appointment.

## 2018-10-04 NOTE — TELEPHONE ENCOUNTER
Reason for Call:  Other call back    Detailed comments: Melody called wanting a call back with recent lab results    Phone Number Patient can be reached at: Cell number on file:    Telephone Information:   Mobile [phone]       Best Time: any    Can we leave a detailed message on this number? YES       Call taken on 10/4/2018 at 9:40 AM by Mary Rodriguez

## 2018-10-04 NOTE — TELEPHONE ENCOUNTER
Yes, patient should schedule an appointment to discuss medication options for diabetes treatment, and also meet with the diabetic educator.     Referral was placed.     Christofer Hennessy PA-C

## 2018-10-09 NOTE — MR AVS SNAPSHOT
After Visit Summary   4/5/2017    Melody Muñoz    MRN: [id number]           Patient Information     Date Of Birth          1975        Visit Information        Provider Department      4/5/2017 9:30 AM Yoselin Salazar [facility]        Today's Diagnoses     Diabetes mellitus without complication (H)    -  1       Follow-ups after your visit        Your next 10 appointments already scheduled     Jul 12, 2017 10:30 AM CDT   Diabetic Education with Yoselin CHIU Martin   [facility]    [address]  [facility] [address] [phone]              Who to contact     If you have questions or need follow up information about today's clinic visit or your schedule please contact [facility] directly at [phone].  Normal or non-critical lab and imaging results will be communicated to you by MyChart, letter or phone within 4 business days after the clinic has received the results. If you do not hear from us within 7 days, please contact the clinic through [employer] or phone. If you have a critical or abnormal lab result, we will notify you by phone as soon as possible.  Submit refill requests through [employer] or call your pharmacy and they will forward the refill request to us. Please allow 3 business days for your refill to be completed.          Additional Information About Your Visit        MyChart Information     [employer] gives you secure access to your electronic health record. If you see a primary care provider, you can also send messages to your care team and make appointments. If you have questions, please call your primary care clinic.  If you do not have a primary care provider, please call [phone] and they will assist you.        Care EveryWhere ID     This is your Care EveryWhere ID. This could be used by other organizations to access your [facility] medical records  [id number]        Your  Report received from night RN, assumed Care.   Patient is AOx4, responds appropriately.      Pain controlled at this time with oral medication, heat packs, and positioning.  Patient is reporting increased gas pain to R shoulder, laying flat per MD at bedside.   Heat applied by patient.   Reports incisional pain also, medicated per MAR.  Monitoring any bleeding onto brandon pads- educated patient how to monitor at home.  IVF per active order, educated to have good po intake  Patient is tolerating regular diet, denies nausea/vomiting. + flatus  Up standby with steady gait.    Plan of care discussed, all questions answered.    Explained importance of calling before getting OOB and pt verbalizes understanding.       Call light and belongings within reach, treaded slipper socks on, SCD in use, bed in lowest locked position.  Hourly rounding in place, all needs met at this time   Vitals Were     BMI (Body Mass Index)                   36.95 kg/m2            Blood Pressure from Last 3 Encounters:   03/28/17 120/70   03/21/17 130/80   03/02/17 100/62    Weight from Last 3 Encounters:   04/05/17 91.6 kg (202 lb)   03/28/17 93.9 kg (207 lb)   03/21/17 94.3 kg (208 lb)              We Performed the Following     DIABETES EDUCATION - Individual  []          Today's Medication Changes          These changes are accurate as of: 4/5/17 11:07 AM.  If you have any questions, ask your nurse or doctor.               Start taking these medicines.        Dose/Directions    blood glucose monitoring lancets   Used for:  Diabetes mellitus without complication (H)        Use to test blood sugar 2 times daily or as directed.  Ok to substitute alternative if insurance prefers.   Quantity:  1 Box   Refills:  3       blood glucose monitoring test strip   Commonly known as:  LAURA CONTOUR NEXT   Used for:  Diabetes mellitus without complication (H)        Use to test blood sugar 2 times daily or as directed.   Quantity:  200 each   Refills:  12            Where to get your medicines      These medications were sent to [facility] [address] [place] [address] AT [address] [address]    Hours:  24-hours Phone:  [phone]     blood glucose monitoring lancets    blood glucose monitoring test strip                Primary Care Provider Office Phone # Fax #    Evraisto Machado [phone]       [facility] [place] [address] [facility] [address]        Thank you!     Thank you for choosing [facility]  for your care. Our goal is always to provide you with excellent care. Hearing back from our patients is one way we can continue to improve our services. Please take a few minutes to complete the written survey that you may receive in the mail after your visit with us. Thank you!              Your Updated Medication List - Protect others around you: Learn how to safely use, store and throw away your medicines at www.disposemymeds.org.          This list is accurate as of: 4/5/17 11:07 AM.  Always use your most recent med list.                   Brand Name Dispense Instructions for use    * albuterol 108 (90 BASE) MCG/ACT Inhaler    PROAIR HFA/PROVENTIL HFA/VENTOLIN HFA    54 g    Inhale 1-2 puffs into the lungs every 4 hours as needed for wheezing or shortness of breath / dyspnea       * albuterol (2.5 MG/3ML) 0.083% neb solution     25 vial    Take 1 vial (2.5 mg) by nebulization every 6 hours as needed for shortness of breath / dyspnea or wheezing       blood glucose monitoring lancets     1 Box    Use to test blood sugar 2 times daily or as directed.  Ok to substitute alternative if insurance prefers.       blood glucose monitoring test strip    LAURA CONTOUR NEXT    200 each    Use to test blood sugar 2 times daily or as directed.       butalbital-acetaminophen-caffeine -40 MG per tablet    FIORICET/ESGIC    20 tablet    TAKE ONE TABLET BY MOUTH EVERY 6 HOURS AS NEEDED(MAX OF 4 TABLETS PER DAY)       carisoprodol 350 MG tablet    SOMA    90 tablet    Take 1 tablet (350 mg) by mouth 3 times daily as needed for muscle spasms       clonazePAM 1 MG tablet    klonoPIN    90 tablet    TAKE 1 TABLET BY MOUTH THREE TIMES DAILY AS NEEDED FOR ANXIETY       fluticasone 110 MCG/ACT Inhaler    FLOVENT HFA    2 Inhaler    Inhale 2 puffs into the lungs 2 times daily       fluticasone 50 MCG/ACT spray    FLONASE    16 g    Spray 2 sprays into both nostrils daily       guanFACINE 2 MG tablet    TENEX    180 tablet    TAKE TWO TABLETS BY MOUTH AT BEDTIME       lamoTRIgine 200 MG tablet    LAMICTAL    90 tablet    Take 1 tablet (200 mg) by mouth every morning       levothyroxine 25 MCG tablet    SYNTHROID/LEVOTHROID    90 tablet    TAKE 1 TABLET(25 MCG) BY MOUTH DAILY       lithium 450 MG CR tablet     ESKALITH    60 tablet    TAKE 2 TABLETS BY MOUTH AT BEDTIME       LYRICA 150 MG capsule   Generic drug:  pregabalin     60 capsule    TAKE 1 CAPSULE BY MOUTH TWICE DAILY       meclizine 25 MG tablet    ANTIVERT    30 tablet    Take 1 tablet (25 mg) by mouth every 6 hours as needed for dizziness       MELATONIN PO      Take 6 mg by mouth At Bedtime       metFORMIN 500 MG tablet    GLUCOPHAGE    60 tablet    Take 1 tablet (500 mg) by mouth 2 times daily (with meals)       modafinil 200 MG tablet    PROVIGIL    30 tablet    Take 0.5 tablets (100 mg) by mouth daily       ondansetron 4 MG ODT tab    ZOFRAN-ODT    30 tablet    DISSOLVE 1 TO 2 TABLETS UNDER THE TONGUE EVERY 8 HOURS AS NEEDED FOR NAUSEA       pantoprazole 40 MG EC tablet    PROTONIX    90 tablet    TAKE 1 TABLET(40 MG) BY MOUTH DAILY       tretinoin 0.05 % cream    RETIN-A    45 g    Spread a pea size amount into affected area topically at bedtime.  Use sunscreen SPF>20.       * Notice:  This list has 2 medication(s) that are the same as other medications prescribed for you. Read the directions carefully, and ask your doctor or other care provider to review them with you.

## 2018-10-17 ENCOUNTER — OFFICE VISIT (OUTPATIENT)
Dept: FAMILY MEDICINE | Facility: CLINIC | Age: 43
End: 2018-10-17
Payer: COMMERCIAL

## 2018-10-17 VITALS
RESPIRATION RATE: 14 BRPM | HEART RATE: 104 BPM | WEIGHT: 195 LBS | TEMPERATURE: 99.2 F | OXYGEN SATURATION: 97 % | BODY MASS INDEX: 35.67 KG/M2 | DIASTOLIC BLOOD PRESSURE: 74 MMHG | SYSTOLIC BLOOD PRESSURE: 116 MMHG

## 2018-10-17 DIAGNOSIS — Z23 NEED FOR PROPHYLACTIC VACCINATION AND INOCULATION AGAINST INFLUENZA: ICD-10-CM

## 2018-10-17 DIAGNOSIS — M54.10 RADICULAR LOW BACK PAIN: ICD-10-CM

## 2018-10-17 DIAGNOSIS — F32.0 CURRENT MILD EPISODE OF MAJOR DEPRESSIVE DISORDER WITHOUT PRIOR EPISODE (H): Chronic | ICD-10-CM

## 2018-10-17 DIAGNOSIS — E11.9 DIABETES MELLITUS WITHOUT COMPLICATION (H): ICD-10-CM

## 2018-10-17 DIAGNOSIS — R30.0 DYSURIA: Primary | ICD-10-CM

## 2018-10-17 LAB

## 2018-10-17 PROCEDURE — 90471 IMMUNIZATION ADMIN: CPT | Performed by: FAMILY MEDICINE

## 2018-10-17 PROCEDURE — 90686 IIV4 VACC NO PRSV 0.5 ML IM: CPT | Performed by: FAMILY MEDICINE

## 2018-10-17 PROCEDURE — 99214 OFFICE O/P EST MOD 30 MIN: CPT | Mod: 25 | Performed by: FAMILY MEDICINE

## 2018-10-17 PROCEDURE — 81001 URINALYSIS AUTO W/SCOPE: CPT | Performed by: FAMILY MEDICINE

## 2018-10-17 PROCEDURE — 87086 URINE CULTURE/COLONY COUNT: CPT | Performed by: FAMILY MEDICINE

## 2018-10-17 RX ORDER — METHYLPREDNISOLONE 4 MG
TABLET, DOSE PACK ORAL
Qty: 21 TABLET | Refills: 1 | Status: SHIPPED | OUTPATIENT
Start: 2018-10-17 | End: 2018-12-31

## 2018-10-17 RX ORDER — OXYCODONE HYDROCHLORIDE 5 MG/1
5 TABLET ORAL
Qty: 10 TABLET | Refills: 0 | Status: SHIPPED | OUTPATIENT
Start: 2018-10-17 | End: 2018-10-22 | Stop reason: SINTOL

## 2018-10-17 NOTE — PATIENT INSTRUCTIONS
I placed the patient on a Medrol Dosepak with 1 refill.  I gave her 10 tablets of oxycodone 5 mg to take at bedtime only.  She was cautioned about the highly addictive nature of this medicine prior to prescribing it.  She however does get an upset stomach from Vicodin and from tramadol.  We talked about not treating her urinary symptoms until we get the culture back.  She will continue to push fluids.  If she is not having some improvement over the next 2 weeks she will contact us so we can get her back scanned.  Further treatment of her urinary symptoms will be pending review of the culture.  She is intending to make an appointment with the diabetic educators.  I discontinued her Metformin today as that was causing her to have diarrhea.  I placed her on Januvia 50 mg daily.  She will need follow-up in 2 months.

## 2018-10-17 NOTE — PROGRESS NOTES
SUBJECTIVE:   Melody Muñoz is a 42 year old female who presents to clinic today for the following health issues:      Diabetes Follow-up      Patient is checking blood sugars: not at all    Diabetic concerns: None     Symptoms of hypoglycemia (low blood sugar): none     Paresthesias (numbness or burning in feet) or sores: No     Date of last diabetic eye exam: nothing    BP Readings from Last 2 Encounters:   10/17/18 116/74   10/01/18 130/80     Hemoglobin A1C (%)   Date Value   10/01/2018 9.6 (H)   06/19/2018 7.5 (H)     LDL Cholesterol Calculated (mg/dL)   Date Value   11/10/2017 133 (H)   06/14/2016     Cannot estimate LDL when triglyceride exceeds 400 mg/dL   Above desirable:  100-129 mg/dl   Borderline High:  130-159 mg/dL   High:             160-189 mg/dL   Very high:       >189 mg/dl         Diabetes Management Resources  Depression Followup    Status since last visit: Stable    See PHQ-9 for current symptoms.  Other associated symptoms: None    Complicating factors:   Significant life event:  No   Current substance abuse:  None  Anxiety or Panic symptoms:  No    PHQ 11/10/2017 4/23/2018 9/13/2018   PHQ-9 Total Score 5 9 7   Q9: Suicide Ideation Not at all Not at all Not at all       PHQ-9  English  PHQ-9   Any Language  Suicide Assessment Five-step Evaluation and Treatment (SAFE-T)  Asthma Follow-Up    Was ACT completed today?  No      Respiratory symptoms:   Cough: No   Wheezing: No   Shortness of breath: No    Use of short- acting(rescue) inhaler: Yes    Taking controlled (daily) meds as prescribed: Yes    ER/UC visits or hospital admissions since last visit: none     Recent asthma triggers that patient is dealing with: None     Chronic Pain Follow-Up       Type / Location of Pain: back pain-center of back into buttocks sharp shooting pains, migraines  Analgesia/pain control:       Recent changes:  worse      Overall control: Inadequate pain control  Activity level/function:      Daily activities:   Able to do light housework, cooking    Work:  not applicable  Adverse effects:  No  Adherance    Taking medication as directed?  Yes    Participating in other treatments: not applicable  Risk Factors:    Sleep:  Poor    Mood/anxiety:  controlled    Recent family or social stressors:  none noted    Other aggravating factors: prolonged walking while on vacation led to a marked increase in her back pain.  It has radiated into the right leg and buttocks.  PHQ-9 SCORE 11/10/2017 4/23/2018 9/13/2018   Total Score - - -   Total Score MyChart - 9 (Mild depression) -   Total Score - - -   Total Score 5 9 7   Total Score - - -     ROWDY-7 SCORE 11/10/2017 4/23/2018 9/13/2018   Total Score - - -   Total Score - 11 (moderate anxiety) -   Total Score 4 11 5   Total Score - - -   Total Score BEH Adult - - -     Encounter-Level CSA - 09/08/2015:          Controlled Substance Agreement - Scan on 9/9/2015  2:41 PM : [facility], Controlled Substance Contract, 09-08-15 (below)                Amount of exercise or physical activity: 6-7 days/week for an average of 30-45 minutes    Problems taking medications regularly: No    Medication side effects: none    Diet: regular (no restrictions)      URINARY TRACT SYMPTOMS      Duration: since 10/1/18    Description  Frequency, hesitancy, abdominal pain    Intensity:  mild, moderate    Accompanying signs and symptoms:  Fever/chills: no   Flank pain no   Nausea and vomiting: no   Vaginal symptoms: none  Abdominal/Pelvic Pain: YES    History  History of frequent UTI's: no   History of kidney stones: YES- 2016  Sexually Active: YES  Possibility of pregnancy: No    Precipitating or alleviating factors: None    Therapies tried and outcome: course of antibiotics-bactrim    Outcome: n/a          Problem list and histories reviewed & adjusted, as indicated.  Additional history: as documented    Patient Active Problem List   Diagnosis     Endometriosis s/po EDWIGE 10-17     Mantoux: positive     Latent  tuberculosis     Major depression     Generalized anxiety disorder     Constipation     Nightmares     Knee pain     Bipolar 1 disorder  with psychosis     Chronic fatigue     Female stress incontinence     Drug-seeking behavior     Vertigo     Suicidal ideation     Acne     Chronic pain syndrome     Insomnia     Chronic migraine without aura without status migrainosus, not intractable     Nausea     JASWANT (obstructive sleep apnea)     Elevated liver enzymes     TMJ (temporomandibular joint syndrome)     Hypothyroidism due to acquired atrophy of thyroid     Hostile behavior     Mild persistent asthma without complication     Diabetes mellitus without complication (H)     Allergic rhinitis     Incontinence of urine in female     Migraine     Screening for diabetic peripheral neuropathy     Need for prophylactic vaccination against Streptococcus pneumoniae (pneumococcus)     Sepsis (H)     Abdominal pain     Morbid obesity (H)     Former tobacco use     Mixed simple and mucopurulent chronic bronchitis (H): Spirometry 16 lung age = 63y/o in 39 y/o FVC=90%&FEV1=78%     Class 2 obesity due to excess calories with serious comorbidity and body mass index (BMI) of 36.0 to 36.9 in adult     Mixed hyperlipidemia     Acquired hypothyroidism     Past Surgical History:   Procedure Laterality Date     BIOPSY       COLONOSCOPY       GENITOURINARY SURGERY  May/2014    bladder sling     GYN SURGERY      laparoscopy- endometriosis     GYN SURGERY       for twins     LAPAROSCOPIC APPENDECTOMY N/A 2017    Procedure: LAPAROSCOPIC APPENDECTOMY;  LAPAROSCOPIC APPENDECTOMY and resection of epiploic tag;  Surgeon: Roberto Robins MD;  Location: [facility] OR     little finger surgery left  1980     tubal ligation bilateral       wisdom teeth removal         Social History   Substance Use Topics     Smoking status: Current Some Day Smoker     Packs/day: 0.25     Years: 17.00     Types: Cigarettes     Smokeless tobacco:  Never Used     Alcohol use No      Comment: no etoh since 2013     Family History   Problem Relation Age of Onset     Hypertension Mother      HEART DISEASE Father      palpitations     Depression Sister      Anxiety Disorder Sister      HEART DISEASE Maternal Grandmother      CHF     Cancer Maternal Grandfather 72     Pancreatic Cancer     Alzheimer Disease Paternal Grandfather      Bipolar Disorder Other      Autism Spectrum Disorder Other            Reviewed and updated as needed this visit by clinical staff  Tobacco  Allergies  Med Hx  Surg Hx  Fam Hx  Soc Hx      Reviewed and updated as needed this visit by Provider         ROS:  Constitutional, HEENT, cardiovascular, pulmonary, gi and gu systems are negative, except as otherwise noted.    OBJECTIVE:                                                    /74  Pulse 104  Temp 99.2  F (37.3  C) (Tympanic)  Resp 14  Wt 195 lb (88.5 kg)  LMP  (LMP Unknown)  SpO2 97%  BMI 35.67 kg/m2  Body mass index is 35.67 kg/(m^2).  GENERAL APPEARANCE: healthy, alert and no distress  MS: extremities normal- no gross deformities noted  ORTHO: Lumber/Thoracic Spine Exam: Tender:  right SI joint, right sciatic notch  Non-tender:  lumbar spinous processes, lumbar facet joints, left para lumbar muscles, right para lumbar muscles, left SI joint, left sciatic notch  Range of Motion: Full  Strength: Normal  Special tests:  positive right straight leg raise    Hip Exam: Not examined    NEURO: Normal strength and tone, mentation intact, speech normal, cranial nerves 2-12 intact and gait was normal    Diagnostic test results:  Results for orders placed or performed in visit on 10/17/18 (from the past 24 hour(s))   UA with Microscopic reflex to Culture   Result Value Ref Range    Color Urine Yellow     Appearance Urine Slightly Cloudy     Glucose Urine Negative NEG^Negative mg/dL    Bilirubin Urine Negative NEG^Negative    Ketones Urine Negative NEG^Negative mg/dL    Specific  Gravity Urine 1.025 1.003 - 1.035    pH Urine 5.5 5.0 - 7.0 pH    Protein Albumin Urine Negative NEG^Negative mg/dL    Urobilinogen Urine 0.2 0.2 - 1.0 EU/dL    Nitrite Urine Negative NEG^Negative    Blood Urine Negative NEG^Negative    Leukocyte Esterase Urine Moderate (A) NEG^Negative    Source Midstream Urine     WBC Urine 10-25 (A) OTO5^0 - 5 /HPF    RBC Urine O - 2 OTO2^O - 2 /HPF    Squamous Epithelial /LPF Urine Many (A) FEW^Few /LPF    Bacteria Urine Moderate (A) NEG^Negative /HPF        ASSESSMENT/PLAN:                                                        ICD-10-CM    1. Dysuria R30.0 UA with Microscopic reflex to Culture     Urine Culture Aerobic Bacterial   2. Need for prophylactic vaccination and inoculation against influenza Z23 FLU VACCINE, SPLIT VIRUS, IM (QUADRIVALENT) [99629]- >3 YRS     Vaccine Administration, Initial [65493]   3. Diabetes mellitus without complication (H) E11.9 sitagliptin (JANUVIA) 50 MG tablet   4. Radicular low back pain M54.10 methylPREDNISolone (MEDROL DOSEPAK) 4 MG tablet     oxyCODONE IR (ROXICODONE) 5 MG tablet   5. Current mild episode of major depressive disorder without prior episode (H) F32.0        Patient Instructions   I placed the patient on a Medrol Dosepak with 1 refill.  I gave her 10 tablets of oxycodone 5 mg to take at bedtime only.  She was cautioned about the highly addictive nature of this medicine prior to prescribing it.  She however does get an upset stomach from Vicodin and from tramadol.  We talked about not treating her urinary symptoms until we get the culture back.  She will continue to push fluids.  If she is not having some improvement over the next 2 weeks she will contact us so we can get her back scanned.  Further treatment of her urinary symptoms will be pending review of the culture.  She is intending to make an appointment with the diabetic educators.  I discontinued her Metformin today as that was causing her to have diarrhea.  I placed  her on Januvia 50 mg daily.  She will need follow-up in 2 months.      Evaristo Machado MD  [facility]

## 2018-10-17 NOTE — PROGRESS NOTES

## 2018-10-17 NOTE — MR AVS SNAPSHOT
After Visit Summary   10/17/2018    Melody Muñoz    MRN: [id number]           Patient Information     Date Of Birth          1975        Visit Information        Provider Department      10/17/2018 3:30 PM Evaristo Machado MD [facility]        Today's Diagnoses     Dysuria    -  1    Need for prophylactic vaccination and inoculation against influenza        Diabetes mellitus without complication (H)        Radicular low back pain        Current mild episode of major depressive disorder without prior episode (H)          Care Instructions    I placed the patient on a Medrol Dosepak with 1 refill.  I gave her 10 tablets of oxycodone 5 mg to take at bedtime only.  She was cautioned about the highly addictive nature of this medicine prior to prescribing it.  She however does get an upset stomach from Vicodin and from tramadol.  We talked about not treating her urinary symptoms until we get the culture back.  She will continue to push fluids.  If she is not having some improvement over the next 2 weeks she will contact us so we can get her back scanned.  Further treatment of her urinary symptoms will be pending review of the culture.  She is intending to make an appointment with the diabetic educators.  I discontinued her Metformin today as that was causing her to have diarrhea.  I placed her on Januvia 50 mg daily.  She will need follow-up in 2 months.          Follow-ups after your visit        Follow-up notes from your care team     Return in about 2 weeks (around 10/31/2018), or if symptoms worsen or fail to improve.      Who to contact     If you have questions or need follow up information about today's clinic visit or your schedule please contact [facility] directly at [phone].  Normal or non-critical lab and imaging results will be communicated to you by MyChart, letter or phone within 4 business days after the  clinic has received the results. If you do not hear from us within 7 days, please contact the clinic through [employer] or phone. If you have a critical or abnormal lab result, we will notify you by phone as soon as possible.  Submit refill requests through [employer] or call your pharmacy and they will forward the refill request to us. Please allow 3 business days for your refill to be completed.          Additional Information About Your Visit        [employer] Information     [employer] gives you secure access to your electronic health record. If you see a primary care provider, you can also send messages to your care team and make appointments. If you have questions, please call your primary care clinic.  If you do not have a primary care provider, please call [phone] and they will assist you.        Care EveryWhere ID     This is your Care EveryWhere ID. This could be used by other organizations to access your [facility] medical records  [id number]        Your Vitals Were     Pulse Temperature Respirations Last Period Pulse Oximetry BMI (Body Mass Index)    104 99.2  F (37.3  C) (Tympanic) 14 (LMP Unknown) 97% 35.67 kg/m2       Blood Pressure from Last 3 Encounters:   10/17/18 116/74   10/01/18 130/80   09/13/18 134/86    Weight from Last 3 Encounters:   10/17/18 195 lb (88.5 kg)   10/01/18 198 lb (89.8 kg)   09/13/18 198 lb (89.8 kg)              We Performed the Following     FLU VACCINE, SPLIT VIRUS, IM (QUADRIVALENT) [10148]- >3 YRS     UA with Microscopic reflex to Culture     Urine Culture Aerobic Bacterial     Vaccine Administration, Initial [66086]          Today's Medication Changes          These changes are accurate as of 10/17/18  5:17 PM.  If you have any questions, ask your nurse or doctor.               Start taking these medicines.        Dose/Directions    methylPREDNISolone 4 MG tablet   Commonly known as:  MEDROL DOSEPAK   Used for:  Radicular low back pain   Started by:  Evaristo Machado MD         Follow package instructions   Quantity:  21 tablet   Refills:  1       oxyCODONE IR 5 MG tablet   Commonly known as:  ROXICODONE   Used for:  Radicular low back pain   Started by:  Evaristo Machado MD        Dose:  5 mg   Take 1 tablet (5 mg) by mouth nightly as needed for severe pain   Quantity:  10 tablet   Refills:  0       sitagliptin 50 MG tablet   Commonly known as:  JANUVIA   Used for:  Diabetes mellitus without complication (H)   Started by:  Evaristo Machado MD        Dose:  50 mg   Take 1 tablet (50 mg) by mouth daily   Quantity:  30 tablet   Refills:  1         Stop taking these medicines if you haven't already. Please contact your care team if you have questions.     metFORMIN 500 MG tablet   Commonly known as:  GLUCOPHAGE   Stopped by:  Evaristo Machado MD           ondansetron 4 MG tablet   Commonly known as:  ZOFRAN   Stopped by:  Evaristo Machado MD           phenazopyridine 100 MG tablet   Commonly known as:  PYRIDIUM   Stopped by:  Evaristo Machado MD           sulfamethoxazole-trimethoprim 800-160 MG per tablet   Commonly known as:  BACTRIM DS/SEPTRA DS   Stopped by:  Evaristo Machado MD                Where to get your medicines      These medications were sent to [facility] [address] [place] [id number]     Phone:  [phone]     methylPREDNISolone 4 MG tablet    sitagliptin 50 MG tablet         Some of these will need a paper prescription and others can be bought over the counter.  Ask your nurse if you have questions.     Bring a paper prescription for each of these medications     oxyCODONE IR 5 MG tablet               Information about OPIOIDS     PRESCRIPTION OPIOIDS: WHAT YOU NEED TO KNOW   We gave you an opioid (narcotic) pain medicine. It is important to manage your pain, but opioids are not always the best choice. You should first try  all the other options your care team gave you. Take this medicine for as short a time (and as few doses) as possible.    Some activities can increase your pain, such as bandage changes or therapy sessions. It may help to take your pain medicine 30 to 60 minutes before these activities. Reduce your stress by getting enough sleep, working on hobbies you enjoy and practicing relaxation or meditation. Talk to your care team about ways to manage your pain beyond prescription opioids.    These medicines have risks:    DO NOT drive when on new or higher doses of pain medicine. These medicines can affect your alertness and reaction times, and you could be arrested for driving under the influence (DUI). If you need to use opioids long-term, talk to your care team about driving.    DO NOT operate heavy machinery    DO NOT do any other dangerous activities while taking these medicines.    DO NOT drink any alcohol while taking these medicines.     If the opioid prescribed includes acetaminophen, DO NOT take with any other medicines that contain acetaminophen. Read all labels carefully. Look for the word  acetaminophen  or  Tylenol.  Ask your pharmacist if you have questions or are unsure.    You can get addicted to pain medicines, especially if you have a history of addiction (chemical, alcohol or substance dependence). Talk to your care team about ways to reduce this risk.    All opioids tend to cause constipation. Drink plenty of water and eat foods that have a lot of fiber, such as fruits, vegetables, prune juice, apple juice and high-fiber cereal. Take a laxative (Miralax, milk of magnesia, Colace, Senna) if you don t move your bowels at least every other day. Other side effects include upset stomach, sleepiness, dizziness, throwing up, tolerance (needing more of the medicine to have the same effect), physical dependence and slowed breathing.    Store your pills in a secure place, locked if possible. We will not replace any  lost or stolen medicine. If you don t finish your medicine, please throw away (dispose) as directed by your pharmacist. The [place] Pollution Control Agency has more information about safe disposal: [email or web address]         Primary Care Provider Office Phone # Fax #    Evaristo Machado [phone]       [address] [facility] [address]        Equal Access to Services     OCTAVOI TRUJILLO : Hadii aad ku hadasho Soomaali, waaxda luqadaha, qaybta kaalmada adeegyada, waxay idiin hayaan adeeg kharash la'jorge l . So [facility] [phone].    ATENCIÓN: Si habla español, tiene a mendoza disposición servicios gratuitos de asistencia lingüística. [facility] [phone].    We comply with applicable federal civil rights laws and [place] laws. We do not discriminate on the basis of race, color, national origin, age, disability, sex, sexual orientation, or gender identity.            Thank you!     Thank you for choosing [facility]  for your care. Our goal is always to provide you with excellent care. Hearing back from our patients is one way we can continue to improve our services. Please take a few minutes to complete the written survey that you may receive in the mail after your visit with us. Thank you!             Your Updated Medication List - Protect others around you: Learn how to safely use, store and throw away your medicines at www.disposemymeds.org.          This list is accurate as of 10/17/18  5:17 PM.  Always use your most recent med list.                   Brand Name Dispense Instructions for use Diagnosis    * albuterol (2.5 MG/3ML) 0.083% neb solution     25 vial    Take 1 vial (2.5 mg) by nebulization every 6 hours as needed for shortness of breath / dyspnea or wheezing    Mild persistent asthma without complication       * PROAIR  (90 Base) MCG/ACT inhaler   Generic drug:  albuterol     51 g    INHALE  1 TO 2 PUFFS INTO THE LUNGS EVERY 4 HOURS AS NEEDED FOR WHEEZING, SHORTNESS OF BREATH OR DYSPNEA    Intermittent asthma, uncomplicated       benzonatate 100 MG capsule    TESSALON    42 capsule    Take 1 capsule (100 mg) by mouth 3 times daily as needed for cough    Bronchitis       blood glucose lancets standard    no brand specified    100 each    Use to test blood sugar 3 times weekly or as directed.    Type 2 diabetes mellitus without complication, without long-term current use of insulin (H)       * blood glucose monitoring test strip    LAURA CONTOUR NEXT    200 each    Use to test blood sugar 2 times daily or as directed.    Diabetes mellitus without complication (H)       * blood glucose monitoring test strip    no brand specified    100 strip    Use to test blood sugars 3 times weekly or as directed    Type 2 diabetes mellitus without complication, without long-term current use of insulin (H)       Butalbital-APAP-Caff-Cod -40-30 MG Caps     15 capsule    Take 1 capsule by mouth 3 times daily as needed    Chronic migraine without aura without status migrainosus, not intractable       carisoprodol 350 MG tablet    SOMA    90 tablet    TAKE 1 TABLET BY MOUTH THREE TIMES DAILY    Chronic pain syndrome       fluticasone 110 MCG/ACT Inhaler    FLOVENT HFA    2 Inhaler    Inhale 2 puffs into the lungs 2 times daily    Mild persistent asthma without complication       fluticasone 50 MCG/ACT spray    FLONASE    16 g    Spray 2 sprays into both nostrils daily    Chronic maxillary sinusitis       guaiFENesin-codeine 100-10 MG/5ML Soln solution    ROBITUSSIN AC    30 mL    Take 5 mLs by mouth nightly as needed for cough    Acute recurrent maxillary sinusitis       guanFACINE 2 MG tablet    TENEX    180 tablet    TAKE TWO TABLETS BY MOUTH AT BEDTIME    Nightmares       lamoTRIgine 200 MG tablet    LAMICTAL    90 tablet    Take 1 tablet (200 mg) by mouth every morning    Bipolar 1 disorder (H)       levothyroxine  25 MCG tablet    SYNTHROID/LEVOTHROID    90 tablet    TAKE 1 TABLET(25 MCG) BY MOUTH DAILY    Acquired hypothyroidism       lithium 450 MG CR tablet    ESKALITH    60 tablet    TAKE 2 TABLETS BY MOUTH AT BEDTIME    Bipolar disorder with psychotic features (H)       LYRICA 150 MG capsule   Generic drug:  pregabalin     60 capsule    TAKE 1 CAPSULE BY MOUTH TWICE DAILY    Chronic pain syndrome       meclizine 25 MG tablet    ANTIVERT    30 tablet    Take 1 tablet (25 mg) by mouth every 6 hours as needed for dizziness    Dizziness       MELATONIN PO      Take 6 mg by mouth At Bedtime        methylPREDNISolone 4 MG tablet    MEDROL DOSEPAK    21 tablet    Follow package instructions    Radicular low back pain       miconazole 100 MG vaginal suppository    MICATIN    7 suppository    Place 1 suppository (100 mg) vaginally At Bedtime    Candidiasis of vulva and vagina       MICROLET LANCETS Misc     100 each    TEST TWICE DAILY AS DIRECTED    Diabetes mellitus without complication (H)       OLANZapine 10 MG tablet    zyPREXA    5 tablet    Take 1 tablet (10 mg) by mouth At Bedtime    Bipolar 1 disorder (H), Generalized anxiety disorder       ondansetron 4 MG ODT tab    ZOFRAN-ODT    30 tablet    DISSOLVE 1 TO 2 TABLETS UNDER THE TONGUE EVERY 8 HOURS AS NEEDED FOR NAUSEA    Nausea       order for DME     1 Device    Equipment being ordered: Blood glucose monitoring kit    Diabetes mellitus without complication (H)       oxyCODONE IR 5 MG tablet    ROXICODONE    10 tablet    Take 1 tablet (5 mg) by mouth nightly as needed for severe pain    Radicular low back pain       pantoprazole 40 MG EC tablet    PROTONIX    90 tablet    TAKE 1 TABLET(40 MG) BY MOUTH DAILY    Other chronic gastritis without hemorrhage       sertraline 100 MG tablet    ZOLOFT     TK 1 T PO  QD        sitagliptin 50 MG tablet    JANUVIA    30 tablet    Take 1 tablet (50 mg) by mouth daily    Diabetes mellitus without complication (H)       STATIN NOT  PRESCRIBED (INTENTIONAL)      1 each daily Please choose reason not prescribed, below    Type 2 diabetes mellitus with hyperglycemia, without long-term current use of insulin (H)       zolpidem 10 MG tablet    AMBIEN    30 tablet    Take 1 tablet (10 mg) by mouth nightly as needed    Primary insomnia       * Notice:  This list has 4 medication(s) that are the same as other medications prescribed for you. Read the directions carefully, and ask your doctor or other care provider to review them with you.

## 2018-10-18 DIAGNOSIS — R30.0 DYSURIA: Primary | ICD-10-CM

## 2018-10-18 LAB
BACTERIA SPEC CULT: NORMAL
Lab: NORMAL
SPECIMEN SOURCE: NORMAL

## 2018-10-18 RX ORDER — DOXYCYCLINE 100 MG/1
100 TABLET ORAL 2 TIMES DAILY
Qty: 28 TABLET | Refills: 0 | Status: SHIPPED | OUTPATIENT
Start: 2018-10-18 | End: 2019-02-21

## 2018-10-19 DIAGNOSIS — G89.4 CHRONIC PAIN SYNDROME: ICD-10-CM

## 2018-10-19 DIAGNOSIS — F51.01 PRIMARY INSOMNIA: ICD-10-CM

## 2018-10-19 NOTE — TELEPHONE ENCOUNTER
ZOLPIDEM 10MG TABLETS  Last Written Prescription Date:  09/21/2018  Last Fill Quantity: 30,   # refills: 0  Last Office Visit: 10/17/2019  Future Office visit:       Routing refill request to provider for review/approval because:  Drug not on the [facility], [facility] or [facility] refill protocol or controlled substance        LYRICA 150MG CAPSULES  Last Written Prescription Date:  09/20/2018  Last Fill Quantity: 60,   # refills: 0  Last Office Visit: 10/17/2018  Future Office visit:       Routing refill request to provider for review/approval because:  Drug not on the [facility], [facility] or [facility] refill protocol or controlled substance

## 2018-10-22 ENCOUNTER — TELEPHONE (OUTPATIENT)
Dept: FAMILY MEDICINE | Facility: CLINIC | Age: 43
End: 2018-10-22

## 2018-10-22 DIAGNOSIS — G89.4 CHRONIC PAIN SYNDROME: Primary | ICD-10-CM

## 2018-10-22 RX ORDER — TRAMADOL HYDROCHLORIDE 50 MG/1
50 TABLET ORAL
Qty: 10 TABLET | Refills: 0 | Status: SHIPPED | OUTPATIENT
Start: 2018-10-22 | End: 2018-11-07

## 2018-10-22 NOTE — TELEPHONE ENCOUNTER
Patient called the clinic. She has tried taking oxycodone before bed for a pinched nerve, but it makes her nauseated and keeps her up at night. She is willing to try tramadol. Pharmacy pended. (fygraciela Toradol makes her sick too). PCP to review.

## 2018-10-22 NOTE — TELEPHONE ENCOUNTER
Reason for Call:  Other     Detailed comments: patient in clinic on 10/17/18 for her back, saw Dr. Machado.  Medication getting her sick, needs something else    Phone Number Patient can be reached at: Home number on file [phone] (home)    Best Time:today    Can we leave a detailed message on this number? YES    Call taken on 10/22/2018 at 8:32 AM by NANCY MAYO

## 2018-10-22 NOTE — TELEPHONE ENCOUNTER
Clinic Action Needed:Yes, Please return call  Reason for Call: Melody called larisaight to check on status of her Tramadol order.  Dr. Machado ordered medication tonight and it appears that it was signed at 1740 and locally printed at clinic (after clinic closed).  Checked with [facility] in [place], they have not received a fax or phone in of Rx.  Please follow up with Melody in the morning to verify that Rx was either faxed or phoned into pharmacy.  I was unable to phone in Rx without a [facility] number.  Please return her call, thank you.     Routed to: [facility] Patient Care Team 1    Layne Hyatt, RN  [facility]

## 2018-10-22 NOTE — TELEPHONE ENCOUNTER
Patient was called back. She would like to be tested for celiac disease/gluten intolerance via a blood test. She is not willing to cut out gluten for 1 week to see if it helps. She is also willing to do an endoscopy or biopsy if needed, but not colonoscopy. PCP to review.

## 2018-10-22 NOTE — TELEPHONE ENCOUNTER
Reason for Call:  Other     Detailed comments: would like a call back.  Saw Dr. Machado, said could have IBS.  Said she could go gluten free, doesn't want to do that, said there is a blood test for this.  Could you put in a order    Phone Number Patient can be reached at: Home number on file [phone] (home)    Best Time: today    Can we leave a detailed message on this number? YES    Call taken on 10/22/2018 at 3:25 PM by NANCY MAYO

## 2018-10-22 NOTE — TELEPHONE ENCOUNTER
I called the patient and discussed gluten sensitivity versus celiac disease.  Treatment is the same if she has celiac as it would be if she was gluten sensitive which is getting the gluten out of her diet.  She is going to try that.

## 2018-10-22 NOTE — TELEPHONE ENCOUNTER
Patient called back and would like tramadol. See message below  Viktoria Lanier RN- Triage [employer]

## 2018-10-23 RX ORDER — ZOLPIDEM TARTRATE 10 MG/1
TABLET ORAL
Qty: 30 TABLET | Refills: 0 | Status: SHIPPED | OUTPATIENT
Start: 2018-10-23 | End: 2018-11-15

## 2018-10-23 RX ORDER — PREGABALIN 150 MG/1
CAPSULE ORAL
Qty: 60 CAPSULE | Refills: 0 | Status: SHIPPED | OUTPATIENT
Start: 2018-10-23 | End: 2018-11-19

## 2018-10-25 ENCOUNTER — TELEPHONE (OUTPATIENT)
Dept: FAMILY MEDICINE | Facility: CLINIC | Age: 43
End: 2018-10-25

## 2018-10-25 NOTE — TELEPHONE ENCOUNTER
Reason for Call:  Other call back    Detailed comments:  Patient is wondering if she should see an endocrinologist   She complains of symptoms of a thyroid issue such as weight gain and fatigue   This has been going on for years   Her lab tests are Ok but she is doubting she is getting the correct care     Please call her to discuss    Phone Number Patient can be reached at: Home number on file [phone] (home)    Best Time: anytime    Can we leave a detailed message on this number? YES    Call taken on 10/25/2018 at 8:31 AM by KYLER DE LA PAZ

## 2018-10-25 NOTE — TELEPHONE ENCOUNTER
Call back to patient and discussed her issues with her and advised to continue following diabetic nutritionist and diet. Explained that her diabetes is not in good control and that could be causing her symptoms also.Pt states that she will comply.

## 2018-10-29 ENCOUNTER — TELEPHONE (OUTPATIENT)
Dept: FAMILY MEDICINE | Facility: CLINIC | Age: 43
End: 2018-10-29

## 2018-10-29 NOTE — TELEPHONE ENCOUNTER
Melody is calling to report that her BS's are still elevated. She started exercising right after she saw you. She is walking for 40 minutes every day.      She is also trying gluten free for IBS. She thinks this is helping her IBS. She will keep this up for 8 weeks to blayne if it is working for her. She is eating healthier.     Started januvia the 18th.       In the am before eating BS: 170-180  She tests 6 x/day  Before each meal and she also tests 2 hours after. BS's 200-230.    She is always thirsty and drinking lots of water.   Her uti symptoms are gone.     She sees Yoselin Salazar-diabetic educator next Monday    Routing to Dr Machado for advice    Viktoria Lanier RN- Triage [employer]

## 2018-10-29 NOTE — TELEPHONE ENCOUNTER
Reason for call:  Patient reporting a symptom  Symptom or request: diabetes blood sugar high 187  Duration (how long have symptoms been present): patient not sure how many days  Have you been treated for this before? Yes  Additional comments: please call patient  Phone Number patient can be reached at:  Home number on file [phone] (home)  Best Time:  any  Can we leave a detailed message on this number:  YES  Call taken on 10/29/2018 at 7:34 AM by BREANN WARREN

## 2018-10-30 LAB — PHQ9 SCORE: 7

## 2018-10-30 NOTE — TELEPHONE ENCOUNTER
I let her know to see diabetic educator and let her know she is doing a good job  Viktoria Lanier RN- Triage [employer]

## 2018-11-05 ENCOUNTER — TELEPHONE (OUTPATIENT)
Dept: FAMILY MEDICINE | Facility: CLINIC | Age: 43
End: 2018-11-05

## 2018-11-05 ENCOUNTER — ALLIED HEALTH/NURSE VISIT (OUTPATIENT)
Dept: EDUCATION SERVICES | Facility: CLINIC | Age: 43
End: 2018-11-05
Payer: COMMERCIAL

## 2018-11-05 DIAGNOSIS — E11.65 UNCONTROLLED TYPE 2 DIABETES MELLITUS WITH HYPERGLYCEMIA (H): Primary | ICD-10-CM

## 2018-11-05 DIAGNOSIS — E11.9 DIABETES MELLITUS WITHOUT COMPLICATION (H): Primary | ICD-10-CM

## 2018-11-05 PROCEDURE — G0108 DIAB MANAGE TRN  PER INDIV: HCPCS | Performed by: DIETITIAN, REGISTERED

## 2018-11-05 NOTE — LETTER
"    11/5/2018         RE: Melody Muñoz  [address] [place] [address]        Dear Colleague,    Thank you for referring your patient, Melody Muñoz, to the [facility]. Please see a copy of my visit note below.    Diabetes Self-Management Education & Support    Diabetes Education Self Management & Training    SUBJECTIVE/OBJECTIVE:  Diabetes education in the past 24mo: No  Diabetes type: Type 2  Disease course: Worsening  Diabetes management related comments/concerns: specific diet? exercise ammount? weight loss? belly fat?  Cultural Influences/Ethnic Background:  American    Diabetes Symptoms & Complications  Blurred vision: No  Fatigue: Yes  Foot ulcerations: No  Polydipsia: Yes  Polyphagia: No  Polyuria: Yes  Visual change: No  Weakness: No  Weight loss: Yes  Weight trend: Stable  Autonomic neuropathy: No  CVA: No  Heart disease: No  Nephropathy: No  Peripheral neuropathy: No  Peripheral Vascular Disease: No  Retinopathy: No  Sexual dysfunction: No    Patient Problem List and Family Medical History reviewed for relevant medical history, current medical status, and diabetes risk factors.    Vitals:  LMP  (LMP Unknown)  Estimated body mass index is 35.67 kg/(m^2) as calculated from the following:    Height as of 9/13/18: 1.575 m (5' 2\").    Weight as of 10/17/18: 88.5 kg (195 lb).   Last 3 BP:   BP Readings from Last 3 Encounters:   10/17/18 116/74   10/01/18 130/80   09/13/18 134/86       History   Smoking Status     Current Some Day Smoker     Packs/day: 0.25     Years: 17.00     Types: Cigarettes   Smokeless Tobacco     Never Used       Labs:  Lab Results   Component Value Date    A1C 9.6 10/01/2018     Lab Results   Component Value Date     11/19/2017     Lab Results   Component Value Date     11/10/2017     HDL Cholesterol   Date Value Ref Range Status   11/10/2017 54 >49 mg/dL Final   ]  GFR Estimate   Date Value Ref Range Status   10/01/2018 " >90 >60 mL/min/1.7m2 Final     Comment:     Non  GFR Calc     GFR Estimate If Black   Date Value Ref Range Status   10/01/2018 >90 >60 mL/min/1.7m2 Final     Comment:      GFR Calc     Lab Results   Component Value Date    CR 0.63 10/01/2018     No results found for: MICROALBUMIN    Healthy Eating  Cultural/Orthodox diet restrictions?: No  Meal planning: Smaller portions  Meals include: Breakfast, Lunch, Dinner  Beverages: Water, Diet soda    Being Active  Barrier to exercise: Physical limitation    Monitoring  Blood Glucose Meter: Accu-check  Home Glucose (Sugar) Monitoring: 3-4 times per week  Blood glucose trend: Increasing rapidly  Low Glucose Range (mg/dL): 180-200  High Glucose Range (mg/dL): >200  Overall Range (mg/dL): 180-200    Pt did not bring meter or BG log to today's appointment    Taking Medications  Diabetes Medication(s)     Dipeptidyl Peptidase-4 (DPP-4) Inhibitors Sig    sitagliptin (JANUVIA) 50 MG tablet Take 1 tablet (50 mg) by mouth daily        Current Treatments: Diet, Oral Agent (monotherapy)    Problem Solving  Hypoglycemia Frequency: Never  Patient carries a carbohydrate source: No  Medical alert: No  Severe weather/disaster plan for diabetes management?: No  DKA prevention plan?: No  Sick day plan for diabetes management?: (P) No    Reducing Risks  CAD Risks: Family history, Obesity, Sedentary lifestyle, Stress  Has dilated eye exam at least once a year?: No  Sees dentist every 6 months?: No  Sees podiatrist (foot doctor)?: No    Healthy Coping  Informal Support system:: Children, Family, Friends, Neighbors, Parent, Partner, Spouse  Difficulty affording diabetes management supplies?: No  Patient Activation Measure Survey Score:  MARTIN Score (Last Two) 10/15/2012 1/29/2014   MARTIN Raw Score 41 47   Activation Score 63.2 77.5   MARTIN Level 3 4       INTERVENTION:   Diabetes knowledge and skills assessment:     Patient is knowledgeable in diabetes management  "concepts related to: Being Active and Monitoring    Patient needs further education on the following diabetes management concepts: Healthy Eating, Monitoring, Taking Medication and Problem Solving    Based on learning assessment above, most appropriate setting for further diabetes education would be: Individual setting.    Education provided today on:  AADE Self-Care Behaviors:  Healthy Eating: carbohydrate counting and portion control  Monitoring: individual blood glucose targets and frequency of monitoring  Taking Medication: drawing up, administering and storing injectable diabetes medications and proper site selection and rotation for injections  Problem Solving: high blood glucose - causes, signs/symptoms, treatment and prevention and when to call health care provider    GLP-1 administration technique taught today. Patient verbalized understanding and was able to perform an accurate return demonstration of administration technique. Side effects were discussed, if patient has any abdominal pain, with or without nausea and/or vomiting, stop medication, call provider, clinic or go to the emergency room.    Opportunities for ongoing education and support in diabetes-self management were discussed.    Pt verbalized understanding of concepts discussed and recommendations provided today.       Education Materials Provided:  Carbohydrate Counting  Trulicity saving card      ASSESSMENT:  Per pt report- \"doesn't think the Januvia is helping her blood sugars very much\", previous intolerance to Metformin, use of a sulfonylurea may be challenging due to inconsistencies with schedule, diet, and potential risk of hypoglycemia.     Along with lifestyle modifications- discussed potential start of a once weekly GLP-1, Tulicity or Ozempic.     Patient's most recent   Lab Results   Component Value Date    A1C 9.6 10/01/2018    is not meeting goal of <7.0      Goals Addressed as of 11/5/2018 at 2:17 PM        Patient Stated      " Problem Solving (pt-stated)     Added 11/5/18 by Yoselin Salazar, ANA PAULA           My Goal: I will reduce total carbs in my diet- aim for no more than 45 gms per meal    What I need to meet my goal: read labels, review carb counting informaiton    I plan to meet my goal by this date: 2 weeks (next CDE appt)              PLAN:  See Patient Instructions for co-developed, patient-stated behavior change goals.  Consider GLP-1 start (stop Januvia), pt agreeable to Trulicity (or Ozempic if preferred by insurance).   Will route note and pended Rx to Dr. Machado for signature, if agreeable with plan.  CDE follow up 2 weeks.  AVS printed and provided to patient today. See Follow-Up section for recommended follow-up.    Yoselin Salazar RD, CDE  Diabetes     Time Spent: 60 minutes  Encounter Type: Individual    Any diabetes medication dose changes were made via the CDE Protocol and Collaborative Practice Agreement with the patient's primary care provider. A copy of this encounter was shared with the provider.

## 2018-11-05 NOTE — Clinical Note
Hi Dr. Machado,  Pt seen today for DM ed. Unfortunately, she did not have her BG meter with her today- but reports blood sugars continue to be around 180- 200 mg/dl. I am wondering what you would think of a once weekly GLP-1 in place of the Januvia? She is in favor of trying Trulicity- if on her insurance formulary.  Let me know if you would like me to route a pended Rx to you if you are agreeable to that plan.   Thanks! Yoselin Salazar RD, CDE Diabetes

## 2018-11-05 NOTE — TELEPHONE ENCOUNTER
Reason for Call:  Medication or medication refill:    Do you use a [facility]?  Name of the pharmacy and phone number for the current request:  [facility] [address] [place], [place] [address] AT [place] [id number]    Name of the medication requested: Trulicity     Other request: Patient saw diabetic educator and was told Melody should start trulicity     Can we leave a detailed message on this number? YES    Phone number patient can be reached at: Home number on file [phone] (home)    Best Time: anytime     Call taken on 11/5/2018 at 2:06 PM by Mattie Lockhart

## 2018-11-05 NOTE — PROGRESS NOTES
"Diabetes Self-Management Education & Support    Diabetes Education Self Management & Training    SUBJECTIVE/OBJECTIVE:  Diabetes education in the past 24mo: No  Diabetes type: Type 2  Disease course: Worsening  Diabetes management related comments/concerns: specific diet? exercise ammount? weight loss? belly fat?  Cultural Influences/Ethnic Background:  American    Diabetes Symptoms & Complications  Blurred vision: No  Fatigue: Yes  Foot ulcerations: No  Polydipsia: Yes  Polyphagia: No  Polyuria: Yes  Visual change: No  Weakness: No  Weight loss: Yes  Weight trend: Stable  Autonomic neuropathy: No  CVA: No  Heart disease: No  Nephropathy: No  Peripheral neuropathy: No  Peripheral Vascular Disease: No  Retinopathy: No  Sexual dysfunction: No    Patient Problem List and Family Medical History reviewed for relevant medical history, current medical status, and diabetes risk factors.    Vitals:  LMP  (LMP Unknown)  Estimated body mass index is 35.67 kg/(m^2) as calculated from the following:    Height as of 9/13/18: 1.575 m (5' 2\").    Weight as of 10/17/18: 88.5 kg (195 lb).   Last 3 BP:   BP Readings from Last 3 Encounters:   10/17/18 116/74   10/01/18 130/80   09/13/18 134/86       History   Smoking Status     Current Some Day Smoker     Packs/day: 0.25     Years: 17.00     Types: Cigarettes   Smokeless Tobacco     Never Used       Labs:  Lab Results   Component Value Date    A1C 9.6 10/01/2018     Lab Results   Component Value Date     11/19/2017     Lab Results   Component Value Date     11/10/2017     HDL Cholesterol   Date Value Ref Range Status   11/10/2017 54 >49 mg/dL Final   ]  GFR Estimate   Date Value Ref Range Status   10/01/2018 >90 >60 mL/min/1.7m2 Final     Comment:     Non  GFR Calc     GFR Estimate If Black   Date Value Ref Range Status   10/01/2018 >90 >60 mL/min/1.7m2 Final     Comment:      GFR Calc     Lab Results   Component Value Date    CR 0.63 " 10/01/2018     No results found for: MICROALBUMIN    Healthy Eating  Cultural/Jain diet restrictions?: No  Meal planning: Smaller portions  Meals include: Breakfast, Lunch, Dinner  Beverages: Water, Diet soda    Being Active  Barrier to exercise: Physical limitation    Monitoring  Blood Glucose Meter: Accu-check  Home Glucose (Sugar) Monitoring: 3-4 times per week  Blood glucose trend: Increasing rapidly  Low Glucose Range (mg/dL): 180-200  High Glucose Range (mg/dL): >200  Overall Range (mg/dL): 180-200    Pt did not bring meter or BG log to today's appointment    Taking Medications  Diabetes Medication(s)     Dipeptidyl Peptidase-4 (DPP-4) Inhibitors Sig    sitagliptin (JANUVIA) 50 MG tablet Take 1 tablet (50 mg) by mouth daily        Current Treatments: Diet, Oral Agent (monotherapy)    Problem Solving  Hypoglycemia Frequency: Never  Patient carries a carbohydrate source: No  Medical alert: No  Severe weather/disaster plan for diabetes management?: No  DKA prevention plan?: No  Sick day plan for diabetes management?: (P) No    Reducing Risks  CAD Risks: Family history, Obesity, Sedentary lifestyle, Stress  Has dilated eye exam at least once a year?: No  Sees dentist every 6 months?: No  Sees podiatrist (foot doctor)?: No    Healthy Coping  Informal Support system:: Children, Family, Friends, Neighbors, Parent, Partner, Spouse  Difficulty affording diabetes management supplies?: No  Patient Activation Measure Survey Score:  MARTIN Score (Last Two) 10/15/2012 1/29/2014   MARTIN Raw Score 41 47   Activation Score 63.2 77.5   MARTIN Level 3 4       INTERVENTION:   Diabetes knowledge and skills assessment:     Patient is knowledgeable in diabetes management concepts related to: Being Active and Monitoring    Patient needs further education on the following diabetes management concepts: Healthy Eating, Monitoring, Taking Medication and Problem Solving    Based on learning assessment above, most appropriate setting for  "further diabetes education would be: Individual setting.    Education provided today on:  CHONG Self-Care Behaviors:  Healthy Eating: carbohydrate counting and portion control  Monitoring: individual blood glucose targets and frequency of monitoring  Taking Medication: drawing up, administering and storing injectable diabetes medications and proper site selection and rotation for injections  Problem Solving: high blood glucose - causes, signs/symptoms, treatment and prevention and when to call health care provider    GLP-1 administration technique taught today. Patient verbalized understanding and was able to perform an accurate return demonstration of administration technique. Side effects were discussed, if patient has any abdominal pain, with or without nausea and/or vomiting, stop medication, call provider, clinic or go to the emergency room.    Opportunities for ongoing education and support in diabetes-self management were discussed.    Pt verbalized understanding of concepts discussed and recommendations provided today.       Education Materials Provided:  Carbohydrate Counting  Trulicity saving card      ASSESSMENT:  Per pt report- \"doesn't think the Januvia is helping her blood sugars very much\", previous intolerance to Metformin, use of a sulfonylurea may be challenging due to inconsistencies with schedule, diet, and potential risk of hypoglycemia.     Along with lifestyle modifications- discussed potential start of a once weekly GLP-1, Tulicity or Ozempic.     Patient's most recent   Lab Results   Component Value Date    A1C 9.6 10/01/2018    is not meeting goal of <7.0      Goals Addressed as of 11/5/2018 at 2:17 PM        Patient Stated      Problem Solving (pt-stated)     Added 11/5/18 by Yoselin Salazar RD           My Goal: I will reduce total carbs in my diet- aim for no more than 45 gms per meal    What I need to meet my goal: read labels, review carb counting informaiton    I plan to meet my goal " by this date: 2 weeks (next CDE appt)              PLAN:  See Patient Instructions for co-developed, patient-stated behavior change goals.  Consider GLP-1 start (stop Januvia), pt agreeable to Trulicity (or Ozempic if preferred by insurance).   Will route note and pended Rx to Dr. Machado for signature, if agreeable with plan.  CDE follow up 2 weeks.  AVS printed and provided to patient today. See Follow-Up section for recommended follow-up.    Yoselin Salazar RD, CDE  Diabetes     Time Spent: 60 minutes  Encounter Type: Individual    Any diabetes medication dose changes were made via the CDE Protocol and Collaborative Practice Agreement with the patient's primary care provider. A copy of this encounter was shared with the provider.

## 2018-11-05 NOTE — PATIENT INSTRUCTIONS
1.  Will check with your doctor about the possibility of starting Trulicity.   If starting a once weekly GLP-1 medication (Trulicity or Ozempic), will stop the Januvia.     2. Please continue checking your blood sugars 2-3 times per day, and bring meter to next appointment.    3. Aim for no more than 45 gms of carbs per meal, 15 gms per snack.     Yoselin Salazar RD, LD, CDE  Diabetes     Bring blood glucose meter and logbook with you to all doctor and follow-up appointments.     [facility] for the [facility]:  For Your Diabetes Education and Nutrition Appointments Call:  [phone]   For Diabetes Education or Nutrition Related Questions:   Phone: [phone]  E-mail: [email or web address]  Fax: [phone]   If you need a medication refill please contact your pharmacy. Please allow 3 business days for your refills to be completed.    Instructions for emailing the Diabetes Educators    If you need to communicate a non-urgent message to a Diabetes Educator via email, please send to [email or web address].    Please follow the following email guidelines:    Subject line: Secure: your clinic name (example: Secure: [facility])  In the email please include: First name, middle initial, last name and date of birth.    We will be in touch with you within one (1) business day.

## 2018-11-05 NOTE — MR AVS SNAPSHOT
After Visit Summary   11/5/2018    Melody Muñoz    MRN: [id number]           Patient Information     Date Of Birth          1975        Visit Information        Provider Department      11/5/2018 12:30 PM Yoselin Salazar RD [facility]        Today's Diagnoses     Diabetes mellitus without complication (H)    -  1      Care Instructions    1.  Will check with your doctor about the possibility of starting Trulicity.   If starting a once weekly GLP-1 medication (Trulicity or Ozempic), will stop the Januvia.     2. Please continue checking your blood sugars 2-3 times per day, and bring meter to next appointment.    3. Aim for no more than 45 gms of carbs per meal, 15 gms per snack.     Yoselin Salazar RD, LD, CDE  Diabetes     Bring blood glucose meter and logbook with you to all doctor and follow-up appointments.     [facility] for the [facility]:  For Your Diabetes Education and Nutrition Appointments Call:  [phone]   For Diabetes Education or Nutrition Related Questions:   Phone: [phone]  E-mail: [email or web address]  Fax: [phone]   If you need a medication refill please contact your pharmacy. Please allow 3 business days for your refills to be completed.    Instructions for emailing the Diabetes Educators    If you need to communicate a non-urgent message to a Diabetes Educator via email, please send to [email or web address].    Please follow the following email guidelines:    Subject line: Secure: your clinic name (example: Secure: [facility])  In the email please include: First name, middle initial, last name and date of birth.    We will be in touch with you within one (1) business day.             Follow-ups after your visit        Your next 10 appointments already scheduled     Nov 19, 2018  9:30 AM CST   Diabetes Education with Yoselin Salazar RD   [facility] [place] ([facility])    [address]  [facility] [address] [phone]              Who to contact     If you have questions or need follow up information about today's clinic visit or your schedule please contact [facility] directly at [phone].  Normal or non-critical lab and imaging results will be communicated to you by MyChart, letter or phone within 4 business days after the clinic has received the results. If you do not hear from us within 7 days, please contact the clinic through MyChart or phone. If you have a critical or abnormal lab result, we will notify you by phone as soon as possible.  Submit refill requests through [employer] or call your pharmacy and they will forward the refill request to us. Please allow 3 business days for your refill to be completed.          Additional Information About Your Visit        [employer] Information     [employer] gives you secure access to your electronic health record. If you see a primary care provider, you can also send messages to your care team and make appointments. If you have questions, please call your primary care clinic.  If you do not have a primary care provider, please call [phone] and they will assist you.        Care EveryWhere ID     This is your Care EveryWhere ID. This could be used by other organizations to access your [place] medical records  [id number]        Your Vitals Were     Last Period                   (LMP Unknown)            Blood Pressure from Last 3 Encounters:   10/17/18 116/74   10/01/18 130/80   09/13/18 134/86    Weight from Last 3 Encounters:   10/17/18 88.5 kg (195 lb)   10/01/18 89.8 kg (198 lb)   09/13/18 89.8 kg (198 lb)              Today, you had the following     No orders found for display       Primary Care Provider Office Phone # Fax #    Evaristo Machado [phone] [address]  [facility] [address]         Goals        General    Problem Solving (pt-stated)     Notes - Note created  11/5/2018  2:17 PM by Yoselin Salazar RD    My Goal: I will reduce total carbs in my diet- aim for no more than 45 gms per meal    What I need to meet my goal: read labels, review carb counting informaiton    I plan to meet my goal by this date: 2 weeks (next CDE appt)          Equal Access to Services     OCTAVIO TRUJILLO : Hadii aad ku hadasho Soomaali, waaxda luqadaha, qaybta kaalmada adeegyada, waxay idiin hayaan adeeg kharash la'aan ah. So [facility] [phone].    ATENCIÓN: Si habla espdeborah, tiene a mendoza disposición servicios gratuitos de asistencia lingüística. Llame al [phone].    We comply with applicable federal civil rights laws and [place] laws. We do not discriminate on the basis of race, color, national origin, age, disability, sex, sexual orientation, or gender identity.            Thank you!     Thank you for choosing [facility]  for your care. Our goal is always to provide you with excellent care. Hearing back from our patients is one way we can continue to improve our services. Please take a few minutes to complete the written survey that you may receive in the mail after your visit with us. Thank you!             Your Updated Medication List - Protect others around you: Learn how to safely use, store and throw away your medicines at www.disposemymeds.org.          This list is accurate as of 11/5/18  2:31 PM.  Always use your most recent med list.                   Brand Name Dispense Instructions for use Diagnosis    * albuterol (2.5 MG/3ML) 0.083% neb solution     25 vial    Take 1 vial (2.5 mg) by nebulization every 6 hours as needed for shortness of breath / dyspnea or wheezing    Mild persistent asthma without complication       * PROAIR  (90 Base) MCG/ACT inhaler   Generic drug:  albuterol     51 g    INHALE 1 TO 2 PUFFS INTO THE LUNGS EVERY 4 HOURS AS NEEDED FOR WHEEZING,  SHORTNESS OF BREATH OR DYSPNEA    Intermittent asthma, uncomplicated       benzonatate 100 MG capsule    TESSALON    42 capsule    Take 1 capsule (100 mg) by mouth 3 times daily as needed for cough    Bronchitis       blood glucose lancets standard    no brand specified    100 each    Use to test blood sugar 3 times weekly or as directed.    Type 2 diabetes mellitus without complication, without long-term current use of insulin (H)       * blood glucose monitoring test strip    LAURA CONTOUR NEXT    200 each    Use to test blood sugar 2 times daily or as directed.    Diabetes mellitus without complication (H)       * blood glucose monitoring test strip    no brand specified    100 strip    Use to test blood sugars 3 times weekly or as directed    Type 2 diabetes mellitus without complication, without long-term current use of insulin (H)       Butalbital-APAP-Caff-Cod -33-30 MG Caps     15 capsule    Take 1 capsule by mouth 3 times daily as needed    Chronic migraine without aura without status migrainosus, not intractable       carisoprodol 350 MG tablet    SOMA    90 tablet    TAKE 1 TABLET BY MOUTH THREE TIMES DAILY    Chronic pain syndrome       fluticasone 110 MCG/ACT Inhaler    FLOVENT HFA    2 Inhaler    Inhale 2 puffs into the lungs 2 times daily    Mild persistent asthma without complication       fluticasone 50 MCG/ACT spray    FLONASE    16 g    Spray 2 sprays into both nostrils daily    Chronic maxillary sinusitis       guaiFENesin-codeine 100-10 MG/5ML Soln solution    ROBITUSSIN AC    30 mL    Take 5 mLs by mouth nightly as needed for cough    Acute recurrent maxillary sinusitis       guanFACINE 2 MG tablet    TENEX    180 tablet    TAKE TWO TABLETS BY MOUTH AT BEDTIME    Nightmares       lamoTRIgine 200 MG tablet    LAMICTAL    90 tablet    Take 1 tablet (200 mg) by mouth every morning    Bipolar 1 disorder (H)       levothyroxine 25 MCG tablet    SYNTHROID/LEVOTHROID    90 tablet    TAKE 1  TABLET(25 MCG) BY MOUTH DAILY    Acquired hypothyroidism       lithium 450 MG CR tablet    ESKALITH    60 tablet    TAKE 2 TABLETS BY MOUTH AT BEDTIME    Bipolar disorder with psychotic features (H)       LYRICA 150 MG capsule   Generic drug:  pregabalin     60 capsule    TAKE 1 CAPSULE BY MOUTH TWICE DAILY    Chronic pain syndrome       meclizine 25 MG tablet    ANTIVERT    30 tablet    Take 1 tablet (25 mg) by mouth every 6 hours as needed for dizziness    Dizziness       MELATONIN PO      Take 6 mg by mouth At Bedtime        methylPREDNISolone 4 MG tablet    MEDROL DOSEPAK    21 tablet    Follow package instructions    Radicular low back pain       miconazole 100 MG vaginal suppository    MICATIN    7 suppository    Place 1 suppository (100 mg) vaginally At Bedtime    Candidiasis of vulva and vagina       MICROLET LANCETS Misc     100 each    TEST TWICE DAILY AS DIRECTED    Diabetes mellitus without complication (H)       OLANZapine 10 MG tablet    zyPREXA    5 tablet    Take 1 tablet (10 mg) by mouth At Bedtime    Bipolar 1 disorder (H), Generalized anxiety disorder       ondansetron 4 MG ODT tab    ZOFRAN-ODT    30 tablet    DISSOLVE 1 TO 2 TABLETS UNDER THE TONGUE EVERY 8 HOURS AS NEEDED FOR NAUSEA    Nausea       order for DME     1 Device    Equipment being ordered: Blood glucose monitoring kit    Diabetes mellitus without complication (H)       pantoprazole 40 MG EC tablet    PROTONIX    90 tablet    TAKE 1 TABLET(40 MG) BY MOUTH DAILY    Other chronic gastritis without hemorrhage       sertraline 100 MG tablet    ZOLOFT     TK 1 T PO  QD        sitagliptin 50 MG tablet    JANUVIA    30 tablet    Take 1 tablet (50 mg) by mouth daily    Diabetes mellitus without complication (H)       STATIN NOT PRESCRIBED (INTENTIONAL)      1 each daily Please choose reason not prescribed, below    Type 2 diabetes mellitus with hyperglycemia, without long-term current use of insulin (H)       traMADol 50 MG tablet    ULTRAM     10 tablet    Take 1 tablet (50 mg) by mouth nightly as needed for severe pain    Chronic pain syndrome       zolpidem 10 MG tablet    AMBIEN    30 tablet    TAKE 1 TABLET BY MOUTH EVERY DAY AT BEDTIME    Primary insomnia       * Notice:  This list has 4 medication(s) that are the same as other medications prescribed for you. Read the directions carefully, and ask your doctor or other care provider to review them with you.

## 2018-11-06 ENCOUNTER — TELEPHONE (OUTPATIENT)
Dept: FAMILY MEDICINE | Facility: CLINIC | Age: 43
End: 2018-11-06

## 2018-11-06 ENCOUNTER — OFFICE VISIT (OUTPATIENT)
Dept: FAMILY MEDICINE | Facility: CLINIC | Age: 43
End: 2018-11-06
Payer: COMMERCIAL

## 2018-11-06 VITALS
HEART RATE: 104 BPM | SYSTOLIC BLOOD PRESSURE: 126 MMHG | WEIGHT: 189 LBS | RESPIRATION RATE: 16 BRPM | DIASTOLIC BLOOD PRESSURE: 84 MMHG | OXYGEN SATURATION: 98 % | BODY MASS INDEX: 34.57 KG/M2 | TEMPERATURE: 98.2 F

## 2018-11-06 DIAGNOSIS — E11.65 UNCONTROLLED TYPE 2 DIABETES MELLITUS WITH HYPERGLYCEMIA (H): ICD-10-CM

## 2018-11-06 DIAGNOSIS — R35.0 URINARY FREQUENCY: Primary | ICD-10-CM

## 2018-11-06 DIAGNOSIS — F31.9 BIPOLAR 1 DISORDER (H): Chronic | ICD-10-CM

## 2018-11-06 LAB
ALBUMIN UR-MCNC: NEGATIVE MG/DL
APPEARANCE UR: CLEAR
BACTERIA #/AREA URNS HPF: ABNORMAL /HPF
BILIRUB UR QL STRIP: NEGATIVE
COLOR UR AUTO: YELLOW
GLUCOSE UR STRIP-MCNC: NEGATIVE MG/DL
HGB UR QL STRIP: NEGATIVE
KETONES UR STRIP-MCNC: NEGATIVE MG/DL
LEUKOCYTE ESTERASE UR QL STRIP: ABNORMAL
NITRATE UR QL: NEGATIVE
NON-SQ EPI CELLS #/AREA URNS LPF: ABNORMAL /LPF
PH UR STRIP: 6.5 PH (ref 5–7)
RBC #/AREA URNS AUTO: ABNORMAL /HPF
SOURCE: ABNORMAL
SP GR UR STRIP: 1.01 (ref 1–1.03)
UROBILINOGEN UR STRIP-ACNC: 0.2 EU/DL (ref 0.2–1)
WBC #/AREA URNS AUTO: ABNORMAL /HPF

## 2018-11-06 PROCEDURE — 99214 OFFICE O/P EST MOD 30 MIN: CPT | Performed by: FAMILY MEDICINE

## 2018-11-06 PROCEDURE — 81001 URINALYSIS AUTO W/SCOPE: CPT | Performed by: FAMILY MEDICINE

## 2018-11-06 PROCEDURE — 87086 URINE CULTURE/COLONY COUNT: CPT | Performed by: FAMILY MEDICINE

## 2018-11-06 RX ORDER — CLONAZEPAM 1 MG/1
TABLET ORAL
Refills: 3 | COMMUNITY
Start: 2018-11-01 | End: 2019-05-08 | Stop reason: ALTCHOICE

## 2018-11-06 RX ORDER — SITAGLIPTIN 50 MG/1
TABLET, FILM COATED ORAL
Refills: 1 | COMMUNITY
Start: 2018-10-17 | End: 2018-11-19 | Stop reason: ALTCHOICE

## 2018-11-06 RX ORDER — PROMETHAZINE HCL 50 MG
TABLET ORAL
Refills: 1 | COMMUNITY
Start: 2018-11-01 | End: 2019-09-06

## 2018-11-06 RX ORDER — DEXTROAMPHETAMINE SULFATE 5 MG/1
CAPSULE, EXTENDED RELEASE ORAL
Refills: 0 | COMMUNITY
Start: 2018-10-04 | End: 2020-10-19

## 2018-11-06 RX ORDER — CIPROFLOXACIN 500 MG/1
500 TABLET, FILM COATED ORAL 2 TIMES DAILY
Qty: 6 TABLET | Refills: 0 | Status: SHIPPED | OUTPATIENT
Start: 2018-11-06 | End: 2018-12-31

## 2018-11-06 RX ORDER — VENLAFAXINE HYDROCHLORIDE 75 MG/1
CAPSULE, EXTENDED RELEASE ORAL
Refills: 0 | COMMUNITY
Start: 2018-10-15 | End: 2019-08-23

## 2018-11-06 RX ORDER — DEXTROAMPHETAMINE SULFATE 10 MG/1
TABLET ORAL
Refills: 0 | COMMUNITY
Start: 2018-10-26 | End: 2021-05-27

## 2018-11-06 RX ORDER — TRAZODONE HYDROCHLORIDE 100 MG/1
TABLET ORAL
Refills: 3 | COMMUNITY
Start: 2018-09-20 | End: 2019-05-03

## 2018-11-06 RX ORDER — TOPIRAMATE 50 MG/1
TABLET, FILM COATED ORAL
Refills: 3 | COMMUNITY
Start: 2018-10-14 | End: 2019-09-06

## 2018-11-06 RX ORDER — LURASIDONE HYDROCHLORIDE 120 MG/1
120 TABLET, FILM COATED ORAL
Refills: 3 | COMMUNITY
Start: 2018-09-30

## 2018-11-06 RX ORDER — ALMOTRIPTAN 6.25 MG/1
TABLET, FILM COATED ORAL
Refills: 1 | COMMUNITY
Start: 2018-11-01 | End: 2019-11-12

## 2018-11-06 RX ORDER — NORGESTIMATE AND ETHINYL ESTRADIOL 0.25-0.035
1 KIT ORAL DAILY
COMMUNITY
End: 2019-09-06

## 2018-11-06 ASSESSMENT — ANXIETY QUESTIONNAIRES
GAD7 TOTAL SCORE: 5
6. BECOMING EASILY ANNOYED OR IRRITABLE: SEVERAL DAYS
5. BEING SO RESTLESS THAT IT IS HARD TO SIT STILL: NOT AT ALL
GAD7 TOTAL SCORE: 5
2. NOT BEING ABLE TO STOP OR CONTROL WORRYING: SEVERAL DAYS
3. WORRYING TOO MUCH ABOUT DIFFERENT THINGS: SEVERAL DAYS
7. FEELING AFRAID AS IF SOMETHING AWFUL MIGHT HAPPEN: NOT AT ALL
4. TROUBLE RELAXING: SEVERAL DAYS
GAD7 TOTAL SCORE: 5
7. FEELING AFRAID AS IF SOMETHING AWFUL MIGHT HAPPEN: NOT AT ALL
1. FEELING NERVOUS, ANXIOUS, OR ON EDGE: SEVERAL DAYS

## 2018-11-06 ASSESSMENT — PATIENT HEALTH QUESTIONNAIRE - PHQ9
10. IF YOU CHECKED OFF ANY PROBLEMS, HOW DIFFICULT HAVE THESE PROBLEMS MADE IT FOR YOU TO DO YOUR WORK, TAKE CARE OF THINGS AT HOME, OR GET ALONG WITH OTHER PEOPLE: NOT DIFFICULT AT ALL
SUM OF ALL RESPONSES TO PHQ QUESTIONS 1-9: 3
SUM OF ALL RESPONSES TO PHQ QUESTIONS 1-9: 3

## 2018-11-06 NOTE — MR AVS SNAPSHOT
After Visit Summary   11/6/2018    Melody Muñoz    MRN: [id number]           Patient Information     Date Of Birth          1975        Visit Information        Provider Department      11/6/2018 11:00 AM Evaristo Machado MD [facility]        Today's Diagnoses     Urinary frequency    -  1    Uncontrolled type 2 diabetes mellitus with hyperglycemia (H)        Bipolar 1 disorder  with psychosis          Care Instructions    Urine culture is pending.  I placed her on ciprofloxacin 500 mg twice daily for the next 3 days.  We will treat further pending review of her urine culture.    With respect to her type 2 diabetes, her Trulicity prescription was denied and requires a prior authorization.  Patient was more interested in trying that than trying to do another prescription for Ozempic.  We will await the paperwork to come through on a prior authorization for Trulicity.  Patient has a follow-up with diabetic educator in 2 weeks.  We will follow-up with us in another 2 months.  Will need blood testing at that time.    With respect to her mental health issues, she is been trying to see if she can wean herself off of some of her medications with her psychiatrist.  She tried decreasing her lithium a little bit and that had a very negative effect on her behavior.  We did have a discussion about the fact that she may need all of her medications in order to feel well, which she does at the present time.          Follow-ups after your visit        Follow-up notes from your care team     Return in about 2 months (around 1/6/2019) for diabetes.      Your next 10 appointments already scheduled     Nov 19, 2018  9:30 AM CST   Diabetes Education with Yoselin Salazar RD   [facility]    [address] [facility] [address] [phone]              Who to contact     If you have questions or need  follow up information about today's clinic visit or your schedule please contact [facility] directly at [phone].  Normal or non-critical lab and imaging results will be communicated to you by MyChart, letter or phone within 4 business days after the clinic has received the results. If you do not hear from us within 7 days, please contact the clinic through [employer] or phone. If you have a critical or abnormal lab result, we will notify you by phone as soon as possible.  Submit refill requests through [employer] or call your pharmacy and they will forward the refill request to us. Please allow 3 business days for your refill to be completed.          Additional Information About Your Visit        [employer] Information     [employer] gives you secure access to your electronic health record. If you see a primary care provider, you can also send messages to your care team and make appointments. If you have questions, please call your primary care clinic.  If you do not have a primary care provider, please call [phone] and they will assist you.        Care EveryWhere ID     This is your Care EveryWhere ID. This could be used by other organizations to access your [place] medical records  [id number]        Your Vitals Were     Pulse Temperature Respirations Last Period Pulse Oximetry BMI (Body Mass Index)    104 98.2  F (36.8  C) 16 (LMP Unknown) 98% 34.57 kg/m2       Blood Pressure from Last 3 Encounters:   11/06/18 126/84   10/17/18 116/74   10/01/18 130/80    Weight from Last 3 Encounters:   11/06/18 189 lb (85.7 kg)   10/17/18 195 lb (88.5 kg)   10/01/18 198 lb (89.8 kg)              We Performed the Following     *UA reflex to Microscopic and Culture ([place] [facility] (except [place] and [place])     Urine Culture Aerobic Bacterial     Urine Microscopic          Today's Medication Changes          These changes are accurate as of 11/6/18  3:36 PM.  If you have any  questions, ask your nurse or doctor.               Start taking these medicines.        Dose/Directions    ciprofloxacin 500 MG tablet   Commonly known as:  CIPRO   Used for:  Urinary frequency   Started by:  Evaristo Machado MD        Dose:  500 mg   Take 1 tablet (500 mg) by mouth 2 times daily   Quantity:  6 tablet   Refills:  0            Where to get your medicines      These medications were sent to [facility] [address] [place] [address] [address] AT [place] [address]  [id number] [place] [address]     Phone:  [phone]     ciprofloxacin 500 MG tablet                Primary Care Provider Office Phone # Fax #    Evaristo Machado [phone] [address] [facility] [address]        Goals        General    Problem Solving (pt-stated)     Notes - Note created  11/5/2018  2:17 PM by Yoselin Salazar RD    My Goal: I will reduce total carbs in my diet- aim for no more than 45 gms per meal    What I need to meet my goal: read labels, review carb counting informaiton    I plan to meet my goal by this date: 2 weeks (next CDE appt)          Equal Access to Services     OCTAVIO TRUJILLO AH: Hadii aad ku hadasho Soomaali, waaxda luqadaha, qaybta kaalmada adeegyada, waxay stefan haycitlalyn bayron german. So [place] [phone].    ATENCIÓN: Si habla español, tiene a mendoza disposición servicios gratuitos de asistencia lingüística. Llame al [phone].    We comply with applicable federal civil rights laws and [place] laws. We do not discriminate on the basis of race, color, national origin, age, disability, sex, sexual orientation, or gender identity.            Thank you!     Thank you for choosing [facility]  for your care. Our goal is always to provide you with excellent care. Hearing back from our patients is one way we can continue to improve our services. Please take a few minutes to complete the written  survey that you may receive in the mail after your visit with us. Thank you!             Your Updated Medication List - Protect others around you: Learn how to safely use, store and throw away your medicines at www.disposemymeds.org.          This list is accurate as of 11/6/18  3:36 PM.  Always use your most recent med list.                   Brand Name Dispense Instructions for use Diagnosis    * albuterol (2.5 MG/3ML) 0.083% neb solution     25 vial    Take 1 vial (2.5 mg) by nebulization every 6 hours as needed for shortness of breath / dyspnea or wheezing    Mild persistent asthma without complication       * PROAIR  (90 Base) MCG/ACT inhaler   Generic drug:  albuterol     51 g    INHALE 1 TO 2 PUFFS INTO THE LUNGS EVERY 4 HOURS AS NEEDED FOR WHEEZING, SHORTNESS OF BREATH OR DYSPNEA    Intermittent asthma, uncomplicated       almotriptan 6.25 MG tablet    AXERT     TK 1 T PO ONCE.  MAY REPEAT IN 2 HOURS AS DIRECTED.  MAX 2 DOSES PER DAY        benzonatate 100 MG capsule    TESSALON    42 capsule    Take 1 capsule (100 mg) by mouth 3 times daily as needed for cough    Bronchitis       blood glucose lancets standard    no brand specified    100 each    Use to test blood sugar 3 times weekly or as directed.    Type 2 diabetes mellitus without complication, without long-term current use of insulin (H)       * blood glucose monitoring test strip    LAURA CONTOUR NEXT    200 each    Use to test blood sugar 2 times daily or as directed.    Diabetes mellitus without complication (H)       * blood glucose monitoring test strip    no brand specified    100 strip    Use to test blood sugars 3 times weekly or as directed    Type 2 diabetes mellitus without complication, without long-term current use of insulin (H)       Butalbital-APAP-Caff-Cod -88-30 MG Caps     15 capsule    Take 1 capsule by mouth 3 times daily as needed    Chronic migraine without aura without status migrainosus, not intractable        carisoprodol 350 MG tablet    SOMA    90 tablet    TAKE 1 TABLET BY MOUTH THREE TIMES DAILY    Chronic pain syndrome       ciprofloxacin 500 MG tablet    CIPRO    6 tablet    Take 1 tablet (500 mg) by mouth 2 times daily    Urinary frequency       clonazePAM 1 MG tablet    klonoPIN     TK 1 T PO UP TO TID        * dextroamphetamine 5 MG 24 hr capsule    DEXEDRINE SPANSULE     TK 1 C PO BID        * dextroamphetamine 10 MG tablet    DEXTROSTAT     TK 1 T PO TID        dulaglutide 0.75 MG/0.5ML pen    TRULICITY    0.5 mL    Inject 0.75 mg Subcutaneous every 7 days    Uncontrolled type 2 diabetes mellitus with hyperglycemia (H)       FISH OIL OMEGA-3 PO           fluticasone 110 MCG/ACT Inhaler    FLOVENT HFA    2 Inhaler    Inhale 2 puffs into the lungs 2 times daily    Mild persistent asthma without complication       fluticasone 50 MCG/ACT spray    FLONASE    16 g    Spray 2 sprays into both nostrils daily    Chronic maxillary sinusitis       guaiFENesin-codeine 100-10 MG/5ML Soln solution    ROBITUSSIN AC    30 mL    Take 5 mLs by mouth nightly as needed for cough    Acute recurrent maxillary sinusitis       guanFACINE 2 MG tablet    TENEX    180 tablet    TAKE TWO TABLETS BY MOUTH AT BEDTIME    Nightmares       JANUVIA 50 MG tablet   Generic drug:  sitagliptin           lamoTRIgine 200 MG tablet    LAMICTAL    90 tablet    Take 1 tablet (200 mg) by mouth every morning    Bipolar 1 disorder (H)       LATUDA 120 MG Tabs tablet   Generic drug:  lurasidone      TK 1 T PO QD        levothyroxine 25 MCG tablet    SYNTHROID/LEVOTHROID    90 tablet    TAKE 1 TABLET(25 MCG) BY MOUTH DAILY    Acquired hypothyroidism       lithium 450 MG CR tablet    ESKALITH    60 tablet    TAKE 2 TABLETS BY MOUTH AT BEDTIME    Bipolar disorder with psychotic features (H)       LYRICA 150 MG capsule   Generic drug:  pregabalin     60 capsule    TAKE 1 CAPSULE BY MOUTH TWICE DAILY    Chronic pain syndrome       MAGNESIUM CITRATE PO            meclizine 25 MG tablet    ANTIVERT    30 tablet    Take 1 tablet (25 mg) by mouth every 6 hours as needed for dizziness    Dizziness       MELATONIN PO      Take 6 mg by mouth At Bedtime        methylPREDNISolone 4 MG tablet    MEDROL DOSEPAK    21 tablet    Follow package instructions    Radicular low back pain       miconazole 100 MG vaginal suppository    MICATIN    7 suppository    Place 1 suppository (100 mg) vaginally At Bedtime    Candidiasis of vulva and vagina       MICROLET LANCETS Misc     100 each    TEST TWICE DAILY AS DIRECTED    Diabetes mellitus without complication (H)       norgestimate-ethinyl estradiol 0.25-35 MG-MCG per tablet    ORTHO-CYCLEN, SPRINTEC     Take 1 tablet by mouth daily        OLANZapine 10 MG tablet    zyPREXA    5 tablet    Take 1 tablet (10 mg) by mouth At Bedtime    Bipolar 1 disorder (H), Generalized anxiety disorder       ondansetron 4 MG ODT tab    ZOFRAN-ODT    30 tablet    DISSOLVE 1 TO 2 TABLETS UNDER THE TONGUE EVERY 8 HOURS AS NEEDED FOR NAUSEA    Nausea       order for DME     1 Device    Equipment being ordered: Blood glucose monitoring kit    Diabetes mellitus without complication (H)       pantoprazole 40 MG EC tablet    PROTONIX    90 tablet    TAKE 1 TABLET(40 MG) BY MOUTH DAILY    Other chronic gastritis without hemorrhage       POTASSIUM PO           Promethazine HCl 50 MG Tabs      TK 1 T PO Q 8 H PRN        sertraline 100 MG tablet    ZOLOFT     TK 1 T PO  QD        STATIN NOT PRESCRIBED (INTENTIONAL)      1 each daily Please choose reason not prescribed, below    Type 2 diabetes mellitus with hyperglycemia, without long-term current use of insulin (H)       topiramate 50 MG tablet    TOPAMAX     TK 2 TS PO QHS        traMADol 50 MG tablet    ULTRAM    10 tablet    Take 1 tablet (50 mg) by mouth nightly as needed for severe pain    Chronic pain syndrome       traZODone 100 MG tablet    DESYREL     TK TWO TS PO QHS        venlafaxine 75 MG 24 hr capsule     EFFEXOR-XR     TK 1 C PO D WF        VITAMIN D (CHOLECALCIFEROL) PO      Take 2,000 Units by mouth daily        zolpidem 10 MG tablet    AMBIEN    30 tablet    TAKE 1 TABLET BY MOUTH EVERY DAY AT BEDTIME    Primary insomnia       * Notice:  This list has 6 medication(s) that are the same as other medications prescribed for you. Read the directions carefully, and ask your doctor or other care provider to review them with you.

## 2018-11-06 NOTE — TELEPHONE ENCOUNTER
Prior Authorization Retail Medication Request    Medication/Dose: Trulicity 0.75MG/0.5ML pen-injectors  ICD code (if different than what is on RX):    Previously Tried and Failed:    Rationale:      Insurance Name:    Insurance ID:        Pharmacy Information (if different than what is on RX)  Name:    Phone:        PA started on CMM. Key: [id number]

## 2018-11-06 NOTE — PROGRESS NOTES
SUBJECTIVE:   Melody Muñoz is a 43 year old female who presents to clinic today for the following health issues:      URINARY TRACT SYMPTOMS      Duration: couple days    Description  frequency, back pain and pelvic pain    Intensity:  moderate    Accompanying signs and symptoms:  Fever/chills: no   Flank pain no   Nausea and vomiting: no   Vaginal symptoms: none  Abdominal/Pelvic Pain: YES    History  History of frequent UTI's: YES- 2 in the last 3 months  History of kidney stones: YES  Sexually Active: no   Possibility of pregnancy: No    Precipitating or alleviating factors: None    Therapies tried and outcome: none   Outcome: N/A      Diabetes Follow-up      Patient is checking blood sugars: 3-4 times a week    Diabetic concerns: None     Symptoms of hypoglycemia (low blood sugar): none     Paresthesias (numbness or burning in feet) or sores: No     Date of last diabetic eye exam: 2017    BP Readings from Last 2 Encounters:   11/06/18 126/84   10/17/18 116/74     Hemoglobin A1C (%)   Date Value   10/01/2018 9.6 (H)   06/19/2018 7.5 (H)     LDL Cholesterol Calculated (mg/dL)   Date Value   11/10/2017 133 (H)   06/14/2016     Cannot estimate LDL when triglyceride exceeds 400 mg/dL   Above desirable:  100-129 mg/dl   Borderline High:  130-159 mg/dL   High:             160-189 mg/dL   Very high:       >189 mg/dl         Diabetes Management Resources    Problem list and histories reviewed & adjusted, as indicated.  Additional history: as documented    Patient Active Problem List   Diagnosis     Endometriosis s/po EDWIGE 10-17     Mantoux: positive     Latent tuberculosis     Major depression     Generalized anxiety disorder     Constipation     Nightmares     Knee pain     Bipolar 1 disorder  with psychosis     Chronic fatigue     Female stress incontinence     Drug-seeking behavior     Vertigo     Suicidal ideation     Acne     Chronic pain syndrome     Insomnia     Chronic migraine without aura without status  migrainosus, not intractable     Nausea     JASWANT (obstructive sleep apnea)     Elevated liver enzymes     TMJ (temporomandibular joint syndrome)     Hypothyroidism due to acquired atrophy of thyroid     Hostile behavior     Mild persistent asthma without complication     Uncontrolled type 2 diabetes mellitus with hyperglycemia (H)     Allergic rhinitis     Incontinence of urine in female     Migraine     Screening for diabetic peripheral neuropathy     Need for prophylactic vaccination against Streptococcus pneumoniae (pneumococcus)     Sepsis (H)     Abdominal pain     Morbid obesity (H)     Former tobacco use     Mixed simple and mucopurulent chronic bronchitis (H): Spirometry 16 lung age = 61y/o in 39 y/o FVC=90%&FEV1=78%     Class 2 obesity due to excess calories with serious comorbidity and body mass index (BMI) of 36.0 to 36.9 in adult     Mixed hyperlipidemia     Acquired hypothyroidism     Past Surgical History:   Procedure Laterality Date     BIOPSY       COLONOSCOPY       GENITOURINARY SURGERY  May/2014    bladder sling     GYN SURGERY      laparoscopy- endometriosis     GYN SURGERY       for twins     LAPAROSCOPIC APPENDECTOMY N/A 2017    Procedure: LAPAROSCOPIC APPENDECTOMY;  LAPAROSCOPIC APPENDECTOMY and resection of epiploic tag;  Surgeon: Roberto Robins MD;  Location: [facility] OR     little finger surgery left  1980     tubal ligation bilateral       wisdom teeth removal         Social History   Substance Use Topics     Smoking status: Current Some Day Smoker     Packs/day: 0.25     Years: 17.00     Types: Cigarettes     Smokeless tobacco: Never Used     Alcohol use No      Comment: no etoh since      Family History   Problem Relation Age of Onset     Hypertension Mother      HEART DISEASE Father      palpitations     Depression Sister      Anxiety Disorder Sister      HEART DISEASE Maternal Grandmother      CHF     Cancer Maternal Grandfather 72     Pancreatic Cancer      Alzheimer Disease Paternal Grandfather      Bipolar Disorder Other      Autism Spectrum Disorder Other            Reviewed and updated as needed this visit by clinical staff  Tobacco  Allergies  Meds  Med Hx  Surg Hx  Fam Hx  Soc Hx      Reviewed and updated as needed this visit by Provider         ROS:  Constitutional, HEENT, cardiovascular, pulmonary, gi and gu systems are negative, except as otherwise noted.    OBJECTIVE:                                                    /84  Pulse 104  Temp 98.2  F (36.8  C)  Resp 16  Wt 189 lb (85.7 kg)  LMP  (LMP Unknown)  SpO2 98%  BMI 34.57 kg/m2  Body mass index is 34.57 kg/(m^2).  GENERAL APPEARANCE: healthy, alert, no distress and over weight  ABDOMEN: soft, nontender, without hepatosplenomegaly or masses   (female): There is mild right CVA discomfort to palpation.  There is none on the left.    Diagnostic test results:  Results for orders placed or performed in visit on 11/06/18 (from the past 24 hour(s))   *UA reflex to Microscopic and Culture ([place] [facility] (except [place] and [place])   Result Value Ref Range    Color Urine Yellow     Appearance Urine Clear     Glucose Urine Negative NEG^Negative mg/dL    Bilirubin Urine Negative NEG^Negative    Ketones Urine Negative NEG^Negative mg/dL    Specific Gravity Urine 1.010 1.003 - 1.035    Blood Urine Negative NEG^Negative    pH Urine 6.5 5.0 - 7.0 pH    Protein Albumin Urine Negative NEG^Negative mg/dL    Urobilinogen Urine 0.2 0.2 - 1.0 EU/dL    Nitrite Urine Negative NEG^Negative    Leukocyte Esterase Urine Moderate (A) NEG^Negative    Source Midstream Urine    Urine Microscopic   Result Value Ref Range    WBC Urine 5-10 (A) OTO5^0 - 5 /HPF    RBC Urine O - 2 OTO2^O - 2 /HPF    Squamous Epithelial /LPF Urine Many (A) FEW^Few /LPF    Bacteria Urine Moderate (A) NEG^Negative /HPF        ASSESSMENT/PLAN:                                                        ICD-10-CM    1. Urinary  frequency R35.0 *UA reflex to Microscopic and Culture ([place] [facility] (except [place] and [place])     Urine Microscopic     ciprofloxacin (CIPRO) 500 MG tablet     Urine Culture Aerobic Bacterial   2. Uncontrolled type 2 diabetes mellitus with hyperglycemia (H) E11.65    3. Bipolar 1 disorder  with psychosis F31.9        Patient Instructions   Urine culture is pending.  I placed her on ciprofloxacin 500 mg twice daily for the next 3 days.  We will treat further pending review of her urine culture.    With respect to her type 2 diabetes, her Trulicity prescription was denied and requires a prior authorization.  Patient was more interested in trying that than trying to do another prescription for Ozempic.  We will await the paperwork to come through on a prior authorization for Trulicity.  Patient has a follow-up with diabetic educator in 2 weeks.  We will follow-up with us in another 2 months.  Will need blood testing at that time.    With respect to her mental health issues, she is been trying to see if she can wean herself off of some of her medications with her psychiatrist.  She tried decreasing her lithium a little bit and that had a very negative effect on her behavior.  We did have a discussion about the fact that she may need all of her medications in order to feel well, which she does at the present time.      Evaristo Machado MD  [facility]

## 2018-11-06 NOTE — PATIENT INSTRUCTIONS
Urine culture is pending.  I placed her on ciprofloxacin 500 mg twice daily for the next 3 days.  We will treat further pending review of her urine culture.    With respect to her type 2 diabetes, her Trulicity prescription was denied and requires a prior authorization.  Patient was more interested in trying that than trying to do another prescription for Ozempic.  We will await the paperwork to come through on a prior authorization for Trulicity.  Patient has a follow-up with diabetic educator in 2 weeks.  We will follow-up with us in another 2 months.  Will need blood testing at that time.    With respect to her mental health issues, she is been trying to see if she can wean herself off of some of her medications with her psychiatrist.  She tried decreasing her lithium a little bit and that had a very negative effect on her behavior.  We did have a discussion about the fact that she may need all of her medications in order to feel well, which she does at the present time.

## 2018-11-06 NOTE — TELEPHONE ENCOUNTER
PA Initiation    Medication: TRULICITY 0.75MG/0.5ML  Insurance Company: [employer] - Phone [phone] Fax [phone]  [facility] [address] [place] [address] [address] AT [place] [address]  Filling Pharmacy Phone: [phone]  Filling Pharmacy Fax:    Start Date: 11/6/2018

## 2018-11-07 DIAGNOSIS — G89.4 CHRONIC PAIN SYNDROME: ICD-10-CM

## 2018-11-07 LAB
BACTERIA SPEC CULT: NO GROWTH
SPECIMEN SOURCE: NORMAL

## 2018-11-07 RX ORDER — TRAMADOL HYDROCHLORIDE 50 MG/1
50 TABLET ORAL
Qty: 10 TABLET | Refills: 0 | Status: SHIPPED | OUTPATIENT
Start: 2018-11-07 | End: 2018-11-09

## 2018-11-07 ASSESSMENT — ANXIETY QUESTIONNAIRES: GAD7 TOTAL SCORE: 5

## 2018-11-07 NOTE — TELEPHONE ENCOUNTER
Reason for call:  Patient reporting a symptom    Symptom or request: abd pain along with UTI    Duration (how long have symptoms been present): today    Have you been treated for this before? Yes    Additional comments: states I dont know what to do    Phone Number patient can be reached at:  Home number on file [phone] (home)    Best Time:      Can we leave a detailed message on this number:  YES    Call taken on 11/7/2018 at 10:10 AM by MADELIN PADRON

## 2018-11-07 NOTE — TELEPHONE ENCOUNTER
Patient was called, she reports pain in the lower abdomen and left side of the back. Urinary frequency, 6/10 back pain that comes in waves, 3/10 pain with urination. Taking prn tylenol for pain every four hours. Taking Azo for the bladder pain. Requesting tramadol. Urine culture is still pending.     Controlled Substance Refill Request for traMADol (ULTRAM) 50 MG tablet  Problem List Complete:  Yes    Patient is followed by VIRAL Deleon for ongoing prescription of pain medication.  All refills should be approved by this provider, or covering partner.    Medication(s):  Lyrica 150 mg bid, Fioricet with codeine prn, klonopin and ambien to be prescribed by psych, not [facility].  Maximum quantity per month: 60, 20  Clinic visit frequency required: Q 3 months     Controlled substance agreement on file: Yes       Date(s): 9/8/2015  New CSA needed.  Pain Clinic evaluation in the past: No    DIRE Total Score(s):  No flowsheet data found.    Last [facility] website verification: 08/22/2018 multiple meds from multiple outside providers     checked in past 3 months?  Yes 8/22/18

## 2018-11-07 NOTE — TELEPHONE ENCOUNTER
Prior Authorization Approval    Authorization Effective Date: 11/6/2018  Authorization Expiration Date: 11/6/2019  Medication: TRULICITY 0.75MG/0.5ML - APPROVED  Approved Dose/Quantity: DAILY  Reference #: [id number]   Insurance Company: [employer] - Phone [phone] Fax [phone]  Expected CoPay: NA     CoPay Card Available:      Foundation Assistance Needed:    Which Pharmacy is filling the prescription (Not needed for infusion/clinic administered): [facility] [address] [place] [address] [id number] AT [place] [address]  Pharmacy Notified: Yes  Patient Notified: No

## 2018-11-08 ENCOUNTER — TELEPHONE (OUTPATIENT)
Dept: NURSING | Facility: CLINIC | Age: 43
End: 2018-11-08

## 2018-11-08 ENCOUNTER — TELEPHONE (OUTPATIENT)
Dept: FAMILY MEDICINE | Facility: CLINIC | Age: 43
End: 2018-11-08

## 2018-11-08 NOTE — TELEPHONE ENCOUNTER
"Patient called and stated she went to urgent care because the pain is so bad. They stated they cannot give her anything for the pain.     She states \"they won't do anything, you won't do anything, no one will do anything\"     Writer explained that she has already requested pain meds earlier today and was denied that there is not much more the writer can do for her at this point.     Patient agreed to wait until her appointment tomorrow with dr. madrid to address this.     See previous encounter today for type of pain and location and request.     "

## 2018-11-08 NOTE — TELEPHONE ENCOUNTER
Patient called reporting UTI symptoms    URINARY TRACT SYMPTOMS  Onset: 11/3    Description:   Painful urination (Dysuria): YES  Blood in urine (Hematuria): no   Delay in urine (Hesitency): YES    Intensity: moderate    Progression of Symptoms:  worsening    Accompanying Signs & Symptoms:  Fever/chills: no   Flank pain YES, left side  Nausea and vomiting: no   Any vaginal symptoms: none  Abdominal/Pelvic Pain: YES    History:   History of frequent UTI's: YES  History of kidney stones: no   Sexually Active: not indicated   Possibility of pregnancy: No    Precipitating factors:   Blood sugars have been high    Therapies Tried and outcome:   ciprofloxacin (CIPRO) 500 MG tablet 6 tablet 0 11/6/2018  --      Sig - Route: Take 1 tablet (500 mg) by mouth 2 times daily - Oral     Class: E-Prescribe     Order: [id number]     E-Prescribing Status: Receipt confirmed by pharmacy (11/6/2018 11:26 AM CST)     traMADol (ULTRAM) 50 MG tablet 10 tablet 0 11/7/2018  No      Sig - Route: Take 1 tablet (50 mg) by mouth nightly as needed for severe pain - Oral     Class: Local Print     Order: [id number]        Increase fluid intake and OTC advil or tylenol      Advised: increase her fluid intake, take ABx;     Requested  A prescription for tylenol #3 to treat her pain due to UTI to last her until she can see if the cipro works.     References used: Telephone Triage Protocols for Nurses fifth edition       Routing  to provider for review/advice because:  For prescription request.         Brunilda Messina, INDIRAN RN  Triage RN  [facility]

## 2018-11-08 NOTE — TELEPHONE ENCOUNTER
Reason for Call:  Request for results:    Name of test or procedure: UA     Date of test of procedure: 11/6/18    Location of the test or procedure: [facility]     OK to leave the result message on voice mail or with a family member? YES    Phone number Patient can be reached at:  Home number on file [phone] (home)    Additional comments: Patient states she is having severe lower abdominal pain, would like a pain reliever such as tylenol 3     Call taken on 11/8/2018 at 7:40 AM by Mattie Lockhart

## 2018-11-08 NOTE — TELEPHONE ENCOUNTER
The patient asked if she can take the tramadol more than once a day. Should I tell her that she can?

## 2018-11-08 NOTE — TELEPHONE ENCOUNTER
Patient called and given results of U/C which did not grow out bacteria. Patient is in a lot of pain rating it 6 in her lower abdomin and urethra.She says it hurts to urinate and she is having frequency.She says she knows that sometimes urine cultures can come back with negative readings when it is really positive.She is asking for something for the pain.She says that she takes tramadol at night which helps.She can not take hydrocodone but is asking for tylenol #3 because plain tylenol doesn't help at all. Advised she come in and be seen she said that she can not drive for 24 hours when taking tramadol. Asked her how she would get to the pharmacy to  a prescription and she said that her Mother could pick it up when she got off of work.Will forward her concerns to her provider's team.

## 2018-11-09 ENCOUNTER — OFFICE VISIT (OUTPATIENT)
Dept: FAMILY MEDICINE | Facility: CLINIC | Age: 43
End: 2018-11-09
Payer: COMMERCIAL

## 2018-11-09 ENCOUNTER — TELEPHONE (OUTPATIENT)
Dept: FAMILY MEDICINE | Facility: CLINIC | Age: 43
End: 2018-11-09

## 2018-11-09 VITALS
BODY MASS INDEX: 34.6 KG/M2 | OXYGEN SATURATION: 97 % | HEIGHT: 62 IN | RESPIRATION RATE: 18 BRPM | WEIGHT: 188 LBS | TEMPERATURE: 98.6 F | HEART RATE: 111 BPM | SYSTOLIC BLOOD PRESSURE: 120 MMHG | DIASTOLIC BLOOD PRESSURE: 80 MMHG

## 2018-11-09 DIAGNOSIS — R30.0 DYSURIA: Primary | ICD-10-CM

## 2018-11-09 DIAGNOSIS — G89.4 CHRONIC PAIN SYNDROME: ICD-10-CM

## 2018-11-09 DIAGNOSIS — R82.90 NONSPECIFIC FINDING ON EXAMINATION OF URINE: ICD-10-CM

## 2018-11-09 LAB
ALBUMIN UR-MCNC: NEGATIVE MG/DL
APPEARANCE UR: CLEAR
BACTERIA #/AREA URNS HPF: ABNORMAL /HPF
BILIRUB UR QL STRIP: NEGATIVE
COLOR UR AUTO: YELLOW
GLUCOSE UR STRIP-MCNC: NEGATIVE MG/DL
HGB UR QL STRIP: ABNORMAL
KETONES UR STRIP-MCNC: NEGATIVE MG/DL
LEUKOCYTE ESTERASE UR QL STRIP: ABNORMAL
NITRATE UR QL: POSITIVE
NON-SQ EPI CELLS #/AREA URNS LPF: ABNORMAL /LPF
PH UR STRIP: 7 PH (ref 5–7)
RBC #/AREA URNS AUTO: ABNORMAL /HPF
SOURCE: ABNORMAL
SP GR UR STRIP: 1.01 (ref 1–1.03)
UROBILINOGEN UR STRIP-ACNC: 0.2 EU/DL (ref 0.2–1)
WBC #/AREA URNS AUTO: ABNORMAL /HPF

## 2018-11-09 PROCEDURE — 99213 OFFICE O/P EST LOW 20 MIN: CPT | Performed by: FAMILY MEDICINE

## 2018-11-09 PROCEDURE — 87086 URINE CULTURE/COLONY COUNT: CPT | Performed by: FAMILY MEDICINE

## 2018-11-09 PROCEDURE — 81001 URINALYSIS AUTO W/SCOPE: CPT | Performed by: FAMILY MEDICINE

## 2018-11-09 RX ORDER — TRAMADOL HYDROCHLORIDE 50 MG/1
50 TABLET ORAL 3 TIMES DAILY
Qty: 15 TABLET | Refills: 0 | Status: SHIPPED | OUTPATIENT
Start: 2018-11-09 | End: 2018-12-31

## 2018-11-09 RX ORDER — TAMSULOSIN HYDROCHLORIDE 0.4 MG/1
0.4 CAPSULE ORAL DAILY
Qty: 30 CAPSULE | Refills: 0 | Status: SHIPPED | OUTPATIENT
Start: 2018-11-09 | End: 2019-03-21

## 2018-11-09 RX ORDER — SULFAMETHOXAZOLE/TRIMETHOPRIM 800-160 MG
1 TABLET ORAL 2 TIMES DAILY
Qty: 14 TABLET | Refills: 0 | Status: SHIPPED | OUTPATIENT
Start: 2018-11-09 | End: 2018-12-31

## 2018-11-09 NOTE — PROGRESS NOTES
"  SUBJECTIVE:   Melody Muñoz is a 43 year old female who presents to clinic today for the following health issues:    URINARY TRACT SYMPTOMS      Duration: x 5 days    Description  dysuria, frequency and back pain    Intensity:  severe    Accompanying signs and symptoms:  Fever/chills: no   Flank pain yes on Rt side  Nausea and vomiting: no   Vaginal symptoms: none  Abdominal/Pelvic Pain: YES    History  History of frequent UTI's: YES  History of kidney stones: YES  Sexually Active: no   Possibility of pregnancy: No    Precipitating or alleviating factors: None    Therapies tried and outcome: Tylenol and Tramodol   Outcome: No Relief    She has finished the course of Cipro.  She was seen at [facility] urgent care yesterday.  See report in Care Everywhere  UA done there showed increased white blood cells. Few red cells         Problem list and histories reviewed & adjusted, as indicated.  Additional history: as documented    Labs reviewed in EPIC    Reviewed and updated as needed this visit by clinical staff  Tobacco  Allergies  Meds  Problems  Med Hx  Surg Hx  Fam Hx  Soc Hx        Reviewed and updated as needed this visit by Provider  Allergies  Meds  Problems         ROS:  CONSTITUTIONAL:NEGATIVE for fever, chills, change in weight  GI: POSITIVE for abdominal pain RUQ and nausea  : dysuria and frequency 1-2 hrs  MUSCULOSKELETAL: POSITIVE  for back pain Rt side    OBJECTIVE:                                                    /80  Pulse 111  Temp 98.6  F (37  C) (Tympanic)  Resp 18  Ht 5' 2\" (1.575 m)  Wt 188 lb (85.3 kg)  LMP  (LMP Unknown)  SpO2 97%  Breastfeeding? No  BMI 34.39 kg/m2  Body mass index is 34.39 kg/(m^2).  GENERAL APPEARANCE: healthy, alert and no distress  ABDOMEN: bowel sounds normal, no hepatosplenomegaly or masses and slight tenderness Rt upper quadrant   (female): exam deferred  MS: back pain, slight Rt sided CVA tenderness    Diagnostic test " results:  Diagnostic Test Results:  Urinalysis - moderate pyuria, hematuria     ASSESSMENT/PLAN:                                                        ICD-10-CM    1. Dysuria R30.0 sulfamethoxazole-trimethoprim (BACTRIM DS/SEPTRA DS) 800-160 MG per tablet     tamsulosin (FLOMAX) 0.4 MG capsule   2. Chronic pain syndrome G89.4 traMADol (ULTRAM) 50 MG tablet   3. Nonspecific finding on examination of urine R82.90 Urine Culture Aerobic Bacterial     Ok to increase the Tramadol to 3 times daily just for next 5 days at most, then decrease    Follow up with Provider - 1 week if not improving   If UC is negative, then need to consider getting imaging to look for kidney stone  Patient Instructions   Keep pushing fluids      Otilio Hollins MD  [facility]

## 2018-11-09 NOTE — TELEPHONE ENCOUNTER
Patient called reporting burning while urinating, pain in lower abdomen that comes up the lower part of her back, difficulty urinating. Patient had appointment this morning with Dr. Hollins and had a UA and culture. I reviewed Dr. Hollins's note with the patient:     Urine shows moderate white blood cells, some red cells also. Urine culture started.  Take the Septra antibiotic and the Tamsulosin. Keep pushing fluids.    Patient states she has had kidney stones in the past and says the pain is similar. Asked nurse if the tramadol she was prescribed for the pain would be strong enough to help the back pain. I advised her to take her Tramadol as prescribed, continue taking the antibiotic, and continue taking azo for urinary pain. I advised that if symptoms worsen, it would be appropriate to seek care in the ED.     URINARY TRACT SYMPTOMS  Onset: about a week    Description:   Painful urination (Dysuria): YES  Blood in urine (Hematuria): unable to determine due to patient taking azo  Delay in urine (Hesitency): YES    Intensity: moderate    Progression of Symptoms:  worsening    Accompanying Signs & Symptoms:  Fever/chills: no   Flank pain YES  Nausea and vomiting: no   Any vaginal symptoms: none  Abdominal/Pelvic Pain: YES    History:   History of frequent UTI's: YES  History of kidney stones: YES  Possibility of pregnancy: No    Therapies Tried and outcome: Azo, Tramadol, Antibiotic    Advised: Follow up with clinic if: symptoms continue.  Follow up with Emergent Care if: symptoms worsen    References used: Telephone Triage Protocols for Nurses fifth edition pg 626      Carola JACOBSONN, RN  [facility]

## 2018-11-09 NOTE — NURSING NOTE
"Chief Complaint   Patient presents with     UTI     /80  Pulse 111  Temp 98.6  F (37  C) (Tympanic)  Resp 18  Ht 5' 2\" (1.575 m)  Wt 188 lb (85.3 kg)  LMP  (LMP Unknown)  SpO2 97%  Breastfeeding? No  BMI 34.39 kg/m2 Estimated body mass index is 34.39 kg/(m^2) as calculated from the following:    Height as of this encounter: 5' 2\" (1.575 m).    Weight as of this encounter: 188 lb (85.3 kg).  BP completed using cuff size: harshal Odell CMA    Health Maintenance Due   Topic Date Due     EYE EXAM Q1 YEAR  10/25/1977     PNEUMOVAX 1X HI RISK PATIENT < 65 (NO IB MSG)  10/25/1978     ADVANCE DIRECTIVE PLANNING Q5 YRS  10/25/1994     URINE DRUG SCREEN Q1 YR  06/02/2016     LIPID MONITORING Q1 YEAR  11/10/2018     Health Maintenance reviewed at today's visit patient asked to schedule/complete:   Diabetes:  Patient agrees to schedule    "

## 2018-11-09 NOTE — MR AVS SNAPSHOT
After Visit Summary   11/9/2018    Melody Muñoz    MRN: [id number]           Patient Information     Date Of Birth          1975        Visit Information        Provider Department      11/9/2018 9:15 AM Otilio Hollins MD [facility]        Today's Diagnoses     Dysuria    -  1    Chronic pain syndrome           Follow-ups after your visit        Your next 10 appointments already scheduled     Nov 09, 2018  9:15 AM CST   SHORT with Otilio Hollins MD   [facility] ([facility])    [address] [place] [id number]   [phone]            Nov 19, 2018  9:30 AM CST   Diabetes Education with Yoselin Salazar RD   [facility]    [address] [facility] [id number] [phone]              Who to contact     If you have questions or need follow up information about today's clinic visit or your schedule please contact [facility] directly at [phone].  Normal or non-critical lab and imaging results will be communicated to you by [employer], letter or phone within 4 business days after the clinic has received the results. If you do not hear from us within 7 days, please contact the clinic through [employer] or phone. If you have a critical or abnormal lab result, we will notify you by phone as soon as possible.  Submit refill requests through [employer] or call your pharmacy and they will forward the refill request to us. Please allow 3 business days for your refill to be completed.          Additional Information About Your Visit        MyChart Information     [employer] gives you secure access to your electronic health record. If you see a primary care provider, you can also send messages to your care team and make appointments. If you have questions, please call your primary care  "clinic.  If you do not have a primary care provider, please call [phone] and they will assist you.        Care EveryWhere ID     This is your Care EveryWhere ID. This could be used by other organizations to access your [facility] medical records  [id number]        Your Vitals Were     Pulse Temperature Respirations Height Last Period Pulse Oximetry    111 98.6  F (37  C) (Tympanic) 18 5' 2\" (1.575 m) (LMP Unknown) 97%    Breastfeeding? BMI (Body Mass Index)                No 34.39 kg/m2           Blood Pressure from Last 3 Encounters:   11/09/18 120/80   11/06/18 126/84   10/17/18 116/74    Weight from Last 3 Encounters:   11/09/18 188 lb (85.3 kg)   11/06/18 189 lb (85.7 kg)   10/17/18 195 lb (88.5 kg)              We Performed the Following     *UA reflex to Microscopic and Culture ([facility] (except [place] and [place])     Urine Microscopic          Today's Medication Changes          These changes are accurate as of 11/9/18  9:12 AM.  If you have any questions, ask your nurse or doctor.               Start taking these medicines.        Dose/Directions    sulfamethoxazole-trimethoprim 800-160 MG per tablet   Commonly known as:  BACTRIM DS/SEPTRA DS   Used for:  Dysuria   Started by:  Otilio Hollins MD        Dose:  1 tablet   Take 1 tablet by mouth 2 times daily   Quantity:  14 tablet   Refills:  0       tamsulosin 0.4 MG capsule   Commonly known as:  FLOMAX   Used for:  Dysuria   Started by:  Otilio Hollins MD        Dose:  0.4 mg   Take 1 capsule (0.4 mg) by mouth daily   Quantity:  30 capsule   Refills:  0         These medicines have changed or have updated prescriptions.        Dose/Directions    traMADol 50 MG tablet   Commonly known as:  ULTRAM   This may have changed:    - when to take this  - reasons to take this   Used for:  Chronic pain syndrome   Changed by:  Otilio Hollins MD        Dose:  50 mg   Take 1 tablet (50 mg) by mouth 3 times daily   Quantity:  15 tablet "   Refills:  0            Where to get your medicines      These medications were sent to [facility] [address] [place] [address] [address] [place] [address]  [address] [address] [facility] [facility] [address]     Phone:  [phone]     sulfamethoxazole-trimethoprim 800-160 MG per tablet    tamsulosin 0.4 MG capsule         Some of these will need a paper prescription and others can be bought over the counter.  Ask your nurse if you have questions.     Bring a paper prescription for each of these medications     traMADol 50 MG tablet               Information about OPIOIDS     PRESCRIPTION OPIOIDS: WHAT YOU NEED TO KNOW   We gave you an opioid (narcotic) pain medicine. It is important to manage your pain, but opioids are not always the best choice. You should first try all the other options your care team gave you. Take this medicine for as short a time (and as few doses) as possible.    Some activities can increase your pain, such as bandage changes or therapy sessions. It may help to take your pain medicine 30 to 60 minutes before these activities. Reduce your stress by getting enough sleep, working on hobbies you enjoy and practicing relaxation or meditation. Talk to your care team about ways to manage your pain beyond prescription opioids.    These medicines have risks:    DO NOT drive when on new or higher doses of pain medicine. These medicines can affect your alertness and reaction times, and you could be arrested for driving under the influence (DUI). If you need to use opioids long-term, talk to your care team about driving.    DO NOT operate heavy machinery    DO NOT do any other dangerous activities while taking these medicines.    DO NOT drink any alcohol while taking these medicines.     If the opioid prescribed includes acetaminophen, DO NOT take with any other medicines that contain acetaminophen. Read all labels carefully. Look for the word  acetaminophen  or  Tylenol.  Ask  your pharmacist if you have questions or are unsure.    You can get addicted to pain medicines, especially if you have a history of addiction (chemical, alcohol or substance dependence). Talk to your care team about ways to reduce this risk.    All opioids tend to cause constipation. Drink plenty of water and eat foods that have a lot of fiber, such as fruits, vegetables, prune juice, apple juice and high-fiber cereal. Take a laxative (Miralax, milk of magnesia, Colace, Senna) if you don t move your bowels at least every other day. Other side effects include upset stomach, sleepiness, dizziness, throwing up, tolerance (needing more of the medicine to have the same effect), physical dependence and slowed breathing.    Store your pills in a secure place, locked if possible. We will not replace any lost or stolen medicine. If you don t finish your medicine, please throw away (dispose) as directed by your pharmacist. The [place] [employer]ution [employer] has more information about safe disposal: [email or web address]-medications         Primary Care Provider Office Phone # Fax #    Evaristo Machado [phone]       [address] [facility] [address]        Goals        General    Problem Solving (pt-stated)     Notes - Note created  11/5/2018  2:17 PM by Yoselin Salazar, ANA PAULA    My Goal: I will reduce total carbs in my diet- aim for no more than 45 gms per meal    What I need to meet my goal: read labels, review carb counting informaiton    I plan to meet my goal by this date: 2 weeks (next CDE appt)          Equal Access to Services     OCTAVIO TRUJILLO : Hadii aad ku hadasho Soomaali, waaxda luqadaha, qaybta kaalmada aida farnsworth. So [facility] [phone].    ATENCIÓN: Si habla español, tiene a mendoza disposición servicios gratuitos de asistencia lingüística. Llame al [phone].    We comply with applicable federal civil  rights laws and [place] laws. We do not discriminate on the basis of race, color, national origin, age, disability, sex, sexual orientation, or gender identity.            Thank you!     Thank you for choosing [facility]  for your care. Our goal is always to provide you with excellent care. Hearing back from our patients is one way we can continue to improve our services. Please take a few minutes to complete the written survey that you may receive in the mail after your visit with us. Thank you!             Your Updated Medication List - Protect others around you: Learn how to safely use, store and throw away your medicines at www.disposemymeds.org.          This list is accurate as of 11/9/18  9:12 AM.  Always use your most recent med list.                   Brand Name Dispense Instructions for use Diagnosis    * albuterol (2.5 MG/3ML) 0.083% neb solution     25 vial    Take 1 vial (2.5 mg) by nebulization every 6 hours as needed for shortness of breath / dyspnea or wheezing    Mild persistent asthma without complication       * PROAIR  (90 Base) MCG/ACT inhaler   Generic drug:  albuterol     51 g    INHALE 1 TO 2 PUFFS INTO THE LUNGS EVERY 4 HOURS AS NEEDED FOR WHEEZING, SHORTNESS OF BREATH OR DYSPNEA    Intermittent asthma, uncomplicated       almotriptan 6.25 MG tablet    AXERT     TK 1 T PO ONCE.  MAY REPEAT IN 2 HOURS AS DIRECTED.  MAX 2 DOSES PER DAY        benzonatate 100 MG capsule    TESSALON    42 capsule    Take 1 capsule (100 mg) by mouth 3 times daily as needed for cough    Bronchitis       blood glucose lancets standard    no brand specified    100 each    Use to test blood sugar 3 times weekly or as directed.    Type 2 diabetes mellitus without complication, without long-term current use of insulin (H)       * blood glucose monitoring test strip    LAURA CONTOUR NEXT    200 each    Use to test blood sugar 2 times daily or as directed.    Diabetes mellitus  without complication (H)       * blood glucose monitoring test strip    no brand specified    100 strip    Use to test blood sugars 3 times weekly or as directed    Type 2 diabetes mellitus without complication, without long-term current use of insulin (H)       Butalbital-APAP-Caff-Cod -84-30 MG Caps     15 capsule    Take 1 capsule by mouth 3 times daily as needed    Chronic migraine without aura without status migrainosus, not intractable       carisoprodol 350 MG tablet    SOMA    90 tablet    TAKE 1 TABLET BY MOUTH THREE TIMES DAILY    Chronic pain syndrome       ciprofloxacin 500 MG tablet    CIPRO    6 tablet    Take 1 tablet (500 mg) by mouth 2 times daily    Urinary frequency       clonazePAM 1 MG tablet    klonoPIN     TK 1 T PO UP TO TID        * dextroamphetamine 5 MG 24 hr capsule    DEXEDRINE SPANSULE     TK 1 C PO BID        * dextroamphetamine 10 MG tablet    DEXTROSTAT     TK 1 T PO TID        dulaglutide 0.75 MG/0.5ML pen    TRULICITY    0.5 mL    Inject 0.75 mg Subcutaneous every 7 days    Uncontrolled type 2 diabetes mellitus with hyperglycemia (H)       FISH OIL OMEGA-3 PO           fluticasone 110 MCG/ACT Inhaler    FLOVENT HFA    2 Inhaler    Inhale 2 puffs into the lungs 2 times daily    Mild persistent asthma without complication       fluticasone 50 MCG/ACT spray    FLONASE    16 g    Spray 2 sprays into both nostrils daily    Chronic maxillary sinusitis       guaiFENesin-codeine 100-10 MG/5ML Soln solution    ROBITUSSIN AC    30 mL    Take 5 mLs by mouth nightly as needed for cough    Acute recurrent maxillary sinusitis       guanFACINE 2 MG tablet    TENEX    180 tablet    TAKE TWO TABLETS BY MOUTH AT BEDTIME    Nightmares       JANUVIA 50 MG tablet   Generic drug:  sitagliptin           lamoTRIgine 200 MG tablet    LAMICTAL    90 tablet    Take 1 tablet (200 mg) by mouth every morning    Bipolar 1 disorder (H)       LATUDA 120 MG Tabs tablet   Generic drug:  lurasidone      TK 1 T  PO QD        levothyroxine 25 MCG tablet    SYNTHROID/LEVOTHROID    90 tablet    TAKE 1 TABLET(25 MCG) BY MOUTH DAILY    Acquired hypothyroidism       lithium 450 MG CR tablet    ESKALITH    60 tablet    TAKE 2 TABLETS BY MOUTH AT BEDTIME    Bipolar disorder with psychotic features (H)       LYRICA 150 MG capsule   Generic drug:  pregabalin     60 capsule    TAKE 1 CAPSULE BY MOUTH TWICE DAILY    Chronic pain syndrome       MAGNESIUM CITRATE PO           meclizine 25 MG tablet    ANTIVERT    30 tablet    Take 1 tablet (25 mg) by mouth every 6 hours as needed for dizziness    Dizziness       MELATONIN PO      Take 6 mg by mouth At Bedtime        methylPREDNISolone 4 MG tablet    MEDROL DOSEPAK    21 tablet    Follow package instructions    Radicular low back pain       miconazole 100 MG vaginal suppository    MICATIN    7 suppository    Place 1 suppository (100 mg) vaginally At Bedtime    Candidiasis of vulva and vagina       MICROLET LANCETS Misc     100 each    TEST TWICE DAILY AS DIRECTED    Diabetes mellitus without complication (H)       norgestimate-ethinyl estradiol 0.25-35 MG-MCG per tablet    ORTHO-CYCLEN, SPRINTEC     Take 1 tablet by mouth daily        OLANZapine 10 MG tablet    zyPREXA    5 tablet    Take 1 tablet (10 mg) by mouth At Bedtime    Bipolar 1 disorder (H), Generalized anxiety disorder       ondansetron 4 MG ODT tab    ZOFRAN-ODT    30 tablet    DISSOLVE 1 TO 2 TABLETS UNDER THE TONGUE EVERY 8 HOURS AS NEEDED FOR NAUSEA    Nausea       order for DME     1 Device    Equipment being ordered: Blood glucose monitoring kit    Diabetes mellitus without complication (H)       pantoprazole 40 MG EC tablet    PROTONIX    90 tablet    TAKE 1 TABLET(40 MG) BY MOUTH DAILY    Other chronic gastritis without hemorrhage       POTASSIUM PO           Promethazine HCl 50 MG Tabs      TK 1 T PO Q 8 H PRN        sertraline 100 MG tablet    ZOLOFT     TK 1 T PO  QD        STATIN NOT PRESCRIBED (INTENTIONAL)      1  each daily Please choose reason not prescribed, below    Type 2 diabetes mellitus with hyperglycemia, without long-term current use of insulin (H)       sulfamethoxazole-trimethoprim 800-160 MG per tablet    BACTRIM DS/SEPTRA DS    14 tablet    Take 1 tablet by mouth 2 times daily    Dysuria       tamsulosin 0.4 MG capsule    FLOMAX    30 capsule    Take 1 capsule (0.4 mg) by mouth daily    Dysuria       topiramate 50 MG tablet    TOPAMAX     TK 2 TS PO QHS        traMADol 50 MG tablet    ULTRAM    15 tablet    Take 1 tablet (50 mg) by mouth 3 times daily    Chronic pain syndrome       traZODone 100 MG tablet    DESYREL     TK TWO TS PO QHS        venlafaxine 75 MG 24 hr capsule    EFFEXOR-XR     TK 1 C PO D WF        VITAMIN D (CHOLECALCIFEROL) PO      Take 2,000 Units by mouth daily        zolpidem 10 MG tablet    AMBIEN    30 tablet    TAKE 1 TABLET BY MOUTH EVERY DAY AT BEDTIME    Primary insomnia       * Notice:  This list has 6 medication(s) that are the same as other medications prescribed for you. Read the directions carefully, and ask your doctor or other care provider to review them with you.

## 2018-11-10 LAB
BACTERIA SPEC CULT: NO GROWTH
SPECIMEN SOURCE: NORMAL

## 2018-11-11 DIAGNOSIS — J45.20 INTERMITTENT ASTHMA, UNCOMPLICATED: ICD-10-CM

## 2018-11-12 ENCOUNTER — TELEPHONE (OUTPATIENT)
Dept: FAMILY MEDICINE | Facility: CLINIC | Age: 43
End: 2018-11-12

## 2018-11-12 NOTE — TELEPHONE ENCOUNTER
Patient called, lab message from 11/9/18 UC was given. She went to urgent care, CT scan was done and showed some kidney inflammation. She was given a shot of Rocephin. Abdominal and back pain has decreased.

## 2018-11-12 NOTE — TELEPHONE ENCOUNTER
"Requested Prescriptions   Pending Prescriptions Disp Refills     PROAIR  (90 Base) MCG/ACT inhaler [Pharmacy Med Name: PROAIR HFA ORAL INH (200  PFS) 8.5G]  Last Written Prescription Date:  10/11/2017  Last Fill Quantity: 51 G,  # refills: 2   Last office visit: 11/9/2018 with prescribing provider:  DIANA   Future Office Visit:     51 g 0     Sig: INHALE 1 TO 2 PUFFS INTO THE LUNGS EVERY 4 HOURS AS NEEDED FOR WHEEZING, SHORTNESS OF BREATH, OR DYSPNEA    Asthma Maintenance Inhalers - Anticholinergics Passed    11/11/2018  1:18 PM       Passed - Patient is age 12 years or older       Passed - Asthma control assessment score within normal limits in last 6 months    Please review ACT score.          Passed - Recent (6 mo) or future (30 days) visit within the authorizing provider's specialty    Patient had office visit in the last 6 months or has a visit in the next 30 days with authorizing provider or within the authorizing provider's specialty.  See \"Patient Info\" tab in inbasket, or \"Choose Columns\" in Meds & Orders section of the refill encounter.               "

## 2018-11-13 RX ORDER — ALBUTEROL SULFATE 90 UG/1
AEROSOL, METERED RESPIRATORY (INHALATION)
Qty: 51 G | Refills: 3 | Status: SHIPPED | OUTPATIENT
Start: 2018-11-13 | End: 2020-02-13

## 2018-11-13 NOTE — TELEPHONE ENCOUNTER
Prescription approved per [facility] Refill Protocol.  Viktoria Lanier RN- Triage [employer]

## 2018-11-14 ENCOUNTER — TELEPHONE (OUTPATIENT)
Dept: FAMILY MEDICINE | Facility: CLINIC | Age: 43
End: 2018-11-14

## 2018-11-14 ENCOUNTER — PATIENT OUTREACH (OUTPATIENT)
Dept: EDUCATION SERVICES | Facility: CLINIC | Age: 43
End: 2018-11-14
Payer: COMMERCIAL

## 2018-11-14 DIAGNOSIS — E11.9 DIABETES MELLITUS WITHOUT COMPLICATION (H): Primary | ICD-10-CM

## 2018-11-14 NOTE — TELEPHONE ENCOUNTER
Reason for Call:  Other call back    Detailed comments: is the antibiotic inj she got Friday the cause of her loose stools or is it something more serious states very worried please call her     Phone Number Patient can be reached at: Home number on file [phone] (home)    Best Time:     Can we leave a detailed message on this number? YES    Call taken on 11/14/2018 at 7:52 AM by MADELIN PADRON

## 2018-11-14 NOTE — PROGRESS NOTES
Returned patient phone call. Reviewed BG targets:     Fasting/ before meals:  mg/dl  2 hrs after start of meal: less than 180 mg/dl    Patient verbalized understanding. Also reports started Trulicity this week. No concerns about new medication.     CDE follow up 11/19 as planned.    Yoselin Salazar RD, CDE  Diabetes

## 2018-11-15 DIAGNOSIS — G89.4 CHRONIC PAIN SYNDROME: ICD-10-CM

## 2018-11-15 DIAGNOSIS — F51.01 PRIMARY INSOMNIA: ICD-10-CM

## 2018-11-15 RX ORDER — PREGABALIN 150 MG/1
CAPSULE ORAL
Qty: 60 CAPSULE | Refills: 0 | Status: CANCELLED | OUTPATIENT
Start: 2018-11-15

## 2018-11-15 NOTE — TELEPHONE ENCOUNTER
Called patient regarding her concerns about diarrhea. She stated that she had diarrhea Mon-Wed.  On Friday she stated that she was given an injection of Rocephin and was asking if the diarrhea could have been caused by the injection. I advised her that diarrhea can be a common side effect of this medication (referencing Micromedex). Patient states she has no further concerns. Advised her to call us if her symptoms return.

## 2018-11-15 NOTE — TELEPHONE ENCOUNTER
Requested Prescriptions   Pending Prescriptions Disp Refills     zolpidem (AMBIEN) 10 MG tablet [Pharmacy Med Name: ZOLPIDEM 10MG TABLETS] 30 tablet 0     Sig: TAKE 1 TABLET BY MOUTH EVERY NIGHT AT BEDTIME AS NEEDED FOR SLEEP    There is no refill protocol information for this order     Last Written Prescription Date:  10/23/18  Last Fill Quantity: 30,   # refills: 0  Last Office Visit: 11/9/18  Future Office visit:       Routing refill request to provider for review/approval because:  Drug not on the G, P or [facility] refill protocol or controlled substance          LYRICA 150 MG capsule [Pharmacy Med Name: LYRICA 150MG CAPSULES] 60 capsule 0     Sig: TAKE 1 CAPSULE BY MOUTH TWICE DAILY    There is no refill protocol information for this order          Last Written Prescription Date:  10/23/18  Last Fill Quantity: 60,   # refills: 0  Last Office Visit: 11/9/18  Future Office visit:       Routing refill request to provider for review/approval because:  Drug not on the [facility], [facility] or [facility] refill protocol or controlled substance  Last [facility] website verification: 08/22/2018 multiple meds from multiple outside providers

## 2018-11-19 ENCOUNTER — TELEPHONE (OUTPATIENT)
Dept: EDUCATION SERVICES | Facility: CLINIC | Age: 43
End: 2018-11-19

## 2018-11-19 ENCOUNTER — ALLIED HEALTH/NURSE VISIT (OUTPATIENT)
Dept: EDUCATION SERVICES | Facility: CLINIC | Age: 43
End: 2018-11-19
Payer: COMMERCIAL

## 2018-11-19 DIAGNOSIS — E11.65 UNCONTROLLED TYPE 2 DIABETES MELLITUS WITH HYPERGLYCEMIA (H): Primary | ICD-10-CM

## 2018-11-19 DIAGNOSIS — E11.9 DIABETES MELLITUS WITHOUT COMPLICATION (H): Primary | ICD-10-CM

## 2018-11-19 DIAGNOSIS — G89.4 CHRONIC PAIN SYNDROME: ICD-10-CM

## 2018-11-19 PROCEDURE — G0108 DIAB MANAGE TRN  PER INDIV: HCPCS | Performed by: DIETITIAN, REGISTERED

## 2018-11-19 RX ORDER — FLASH GLUCOSE SENSOR
1 KIT MISCELLANEOUS
Qty: 2 EACH | Refills: 11 | Status: SHIPPED | OUTPATIENT
Start: 2018-11-19 | End: 2020-01-23

## 2018-11-19 RX ORDER — FLASH GLUCOSE SCANNING READER
1 EACH MISCELLANEOUS PRN
Qty: 1 DEVICE | Refills: 0 | Status: SHIPPED | OUTPATIENT
Start: 2018-11-19 | End: 2019-11-21

## 2018-11-19 NOTE — PROGRESS NOTES
"Diabetes Self-Management Education & Support    Diabetes Education Self Management & Training    SUBJECTIVE/OBJECTIVE:  Presents for: Follow-up  Accompanied by: Daughter  Diabetes education in the past 24mo: Yes  Focus of Visit: Problem Solving, Taking Medication  Diabetes type: Type 2  Disease course: Improving  How confident are you filling out medical forms by yourself:: Quite a bit  Transportation concerns: No  Other concerns:: None  Cultural Influences/Ethnic Background:  American      Diabetes Symptoms & Complications  Fatigue: Yes       Patient Problem List and Family Medical History reviewed for relevant medical history, current medical status, and diabetes risk factors.    Vitals:  LMP  (LMP Unknown)  Estimated body mass index is 34.39 kg/(m^2) as calculated from the following:    Height as of 11/9/18: 1.575 m (5' 2\").    Weight as of 11/9/18: 85.3 kg (188 lb).   Last 3 BP:   BP Readings from Last 3 Encounters:   11/09/18 120/80   11/06/18 126/84   10/17/18 116/74       History   Smoking Status     Current Some Day Smoker     Packs/day: 0.25     Years: 17.00     Types: Cigarettes   Smokeless Tobacco     Never Used       Labs:  Lab Results   Component Value Date    A1C 9.6 10/01/2018     Lab Results   Component Value Date     11/19/2017     Lab Results   Component Value Date     11/10/2017     HDL Cholesterol   Date Value Ref Range Status   11/10/2017 54 >49 mg/dL Final   ]  GFR Estimate   Date Value Ref Range Status   10/01/2018 >90 >60 mL/min/1.7m2 Final     Comment:     Non  GFR Calc     GFR Estimate If Black   Date Value Ref Range Status   10/01/2018 >90 >60 mL/min/1.7m2 Final     Comment:      GFR Calc     Lab Results   Component Value Date    CR 0.63 10/01/2018     No results found for: MICROALBUMIN    Healthy Eating  Healthy Eating Assessed Today: Yes  Cultural/Pentecostalism diet restrictions?: No  Patient on a regular basis: Counts carbohydrates   Meal " planning: Smaller portions  Meals include: Breakfast, Lunch, Dinner  Beverages: Water, Diet soda    Being Active  Barrier to exercise: Physical limitation    Monitoring  Monitoring Assessed Today: Yes  Did patient bring glucose meter to appointment? : Yes  Blood Glucose Meter: Accu-check  Home Glucose (Sugar) Monitorin+ times per day  Blood glucose trend:  (improving dramatically since start of Trulicity on )                  Taking Medications  Diabetes Medication(s)     Dipeptidyl Peptidase-4 (DPP-4) Inhibitors Sig    JANUVIA 50 MG tablet Januvia stopped once Trulicity started- Med list updated    Incretin Mimetic Agents (GLP-1 Receptor Agonists) Sig    dulaglutide (TRULICITY) 0.75 MG/0.5ML pen Inject 0.75 mg Subcutaneous every 7 days        Taking Medication Assessed Today: Yes  Current Treatments: Diet, Non-insulin Injectables  Problems taking diabetes medications regularly?: No  Diabetes medication side effects?: Yes (mild nausea)  Treatment Compliance: All of the time    Problem Solving  Problem Solving Assessed Today: Yes  Hypoglycemia Frequency: Never  Patient carries a carbohydrate source: No  Medical alert: No  Severe weather/disaster plan for diabetes management?: No  DKA prevention plan?: No    Reducing Risks  Reducing Risks Assessed Today: No  CAD Risks: Family history, Obesity, Sedentary lifestyle, Stress  Has dilated eye exam at least once a year?: No  Sees dentist every 6 months?: No  Sees podiatrist (foot doctor)?: No    Healthy Coping  Healthy Coping Assessed Today: Yes  Emotional response to diabetes: Concern for health and well-being  Informal Support system:: Children, Family, Friends, Neighbors, Parent, Partner, Spouse  Stage of change: ACTION (Actively working towards change)  Difficulty affording diabetes management supplies?: No  Patient Activation Measure Survey Score:  MARTIN Score (Last Two) 10/15/2012 2014   MARTIN Raw Score 41 47   Activation Score 63.2 77.5   MARTIN Level 3 4  "      ASSESSMENT:  Noted significant BG improvement since starting Trulicity. Fasting BG values are now within or close to target. Pt pleased with progress as well. Is tracking her food intake (carbs)- aiming for 45 gms or less of carbs per meal.     Pt is doing frequent BG testing- \"likes to know what her numbers are doing\". Pt may benefit from personal CGM- is interested in the Jacques system.       Patient's most recent   Lab Results   Component Value Date    A1C 9.6 10/01/2018    is not meeting goal of <7.0    INTERVENTION:   Diabetes knowledge and skills assessment:     Patient is knowledgeable in diabetes management concepts related to: Healthy Eating, Being Active, Monitoring, Taking Medication, Problem Solving, Reducing Risks and Healthy Coping    Patient needs further education on the following diabetes management concepts: Healthy Eating, Monitoring and Taking Medication    Based on learning assessment above, most appropriate setting for further diabetes education would be: Individual setting.    Education provided today on:  AADE Self-Care Behaviors:  Healthy Eating: answered questions about  carbohydrate counting  Monitoring: discussed personal CGM vs fingerstick testing  Taking Medication: action of prescribed medication and side effects of prescribed medications    Opportunities for ongoing education and support in diabetes-self management were discussed.    Pt verbalized understanding of concepts discussed and recommendations provided today.       Education Materials Provided:  Personal Jacques brochure    PLAN:  See Patient Instructions for co-developed, patient-stated behavior change goals.  Continue current dose of Trulicity.  Will send pended Rx for Jacques personal CGM to PCP for signature.   Continue healthy eating efforts- aim for no more than 45 gms of carbs per meal.  AVS printed and provided to patient today. See Follow-Up section for recommended follow-up.    Yoselin Salazar RD, CDE  Diabetes "     Time Spent: 30 minutes  Encounter Type: Individual    Any diabetes medication dose changes were made via the CDE Protocol and Collaborative Practice Agreement with the patient's primary care provider. A copy of this encounter was shared with the provider.

## 2018-11-19 NOTE — Clinical Note
Andres Machado  Melody's blood sugars have improved significantly since starting the Trulicity. She is monitoring up to 6x/day- thus interested in the personal CGM (Jacques). I will send a separate phone encounter with pended Rx for signature- if agreeable.   Thanks! Yoselin Salazar RD, CDE Diabetes

## 2018-11-19 NOTE — TELEPHONE ENCOUNTER
LYRICA 150 MG capsule  Last Written Prescription Date:  10/23/2018  Last Fill Quantity: 60,   # refills: 0  Last Office Visit: 11/09/2018  Future Office visit:       Routing refill request to provider for review/approval because:  Drug not on the [facility], [facility] or [facility] refill protocol or controlled substance

## 2018-11-19 NOTE — LETTER
"    11/19/2018         RE: Melody Muñoz  [address] [place] [address]        Dear Colleague,    Thank you for referring your patient, Melody Muñoz, to the [facility]. Please see a copy of my visit note below.    Diabetes Self-Management Education & Support    Diabetes Education Self Management & Training    SUBJECTIVE/OBJECTIVE:  Presents for: Follow-up  Accompanied by: Daughter  Diabetes education in the past 24mo: Yes  Focus of Visit: Problem Solving, Taking Medication  Diabetes type: Type 2  Disease course: Improving  How confident are you filling out medical forms by yourself:: Quite a bit  Transportation concerns: No  Other concerns:: None  Cultural Influences/Ethnic Background:  American      Diabetes Symptoms & Complications  Fatigue: Yes       Patient Problem List and Family Medical History reviewed for relevant medical history, current medical status, and diabetes risk factors.    Vitals:  LMP  (LMP Unknown)  Estimated body mass index is 34.39 kg/(m^2) as calculated from the following:    Height as of 11/9/18: 1.575 m (5' 2\").    Weight as of 11/9/18: 85.3 kg (188 lb).   Last 3 BP:   BP Readings from Last 3 Encounters:   11/09/18 120/80   11/06/18 126/84   10/17/18 116/74       History   Smoking Status     Current Some Day Smoker     Packs/day: 0.25     Years: 17.00     Types: Cigarettes   Smokeless Tobacco     Never Used       Labs:  Lab Results   Component Value Date    A1C 9.6 10/01/2018     Lab Results   Component Value Date     11/19/2017     Lab Results   Component Value Date     11/10/2017     HDL Cholesterol   Date Value Ref Range Status   11/10/2017 54 >49 mg/dL Final   ]  GFR Estimate   Date Value Ref Range Status   10/01/2018 >90 >60 mL/min/1.7m2 Final     Comment:     Non  GFR Calc     GFR Estimate If Black   Date Value Ref Range Status   10/01/2018 >90 >60 mL/min/1.7m2 Final     Comment:      " GFR Calc     Lab Results   Component Value Date    CR 0.63 10/01/2018     No results found for: MICROALBUMIN    Healthy Eating  Healthy Eating Assessed Today: Yes  Cultural/Denominational diet restrictions?: No  Patient on a regular basis: Counts carbohydrates   Meal planning: Smaller portions  Meals include: Breakfast, Lunch, Dinner  Beverages: Water, Diet soda    Being Active  Barrier to exercise: Physical limitation    Monitoring  Monitoring Assessed Today: Yes  Did patient bring glucose meter to appointment? : Yes  Blood Glucose Meter: Accu-check  Home Glucose (Sugar) Monitorin+ times per day  Blood glucose trend:  (improving dramatically since start of Trulicity on )                  Taking Medications  Diabetes Medication(s)     Dipeptidyl Peptidase-4 (DPP-4) Inhibitors Sig    JANUVIA 50 MG tablet Januvia stopped once Trulicity started- Med list updated    Incretin Mimetic Agents (GLP-1 Receptor Agonists) Sig    dulaglutide (TRULICITY) 0.75 MG/0.5ML pen Inject 0.75 mg Subcutaneous every 7 days        Taking Medication Assessed Today: Yes  Current Treatments: Diet, Non-insulin Injectables  Problems taking diabetes medications regularly?: No  Diabetes medication side effects?: Yes (mild nausea)  Treatment Compliance: All of the time    Problem Solving  Problem Solving Assessed Today: Yes  Hypoglycemia Frequency: Never  Patient carries a carbohydrate source: No  Medical alert: No  Severe weather/disaster plan for diabetes management?: No  DKA prevention plan?: No    Reducing Risks  Reducing Risks Assessed Today: No  CAD Risks: Family history, Obesity, Sedentary lifestyle, Stress  Has dilated eye exam at least once a year?: No  Sees dentist every 6 months?: No  Sees podiatrist (foot doctor)?: No    Healthy Coping  Healthy Coping Assessed Today: Yes  Emotional response to diabetes: Concern for health and well-being  Informal Support system:: Children, Family, Friends, Neighbors, Parent, Partner, Spouse  Stage  "of change: ACTION (Actively working towards change)  Difficulty affording diabetes management supplies?: No  Patient Activation Measure Survey Score:  MARTIN Score (Last Two) 10/15/2012 1/29/2014   MARTIN Raw Score 41 47   Activation Score 63.2 77.5   MARTIN Level 3 4       ASSESSMENT:  Noted significant BG improvement since starting Trulicity. Fasting BG values are now within or close to target. Pt pleased with progress as well. Is tracking her food intake (carbs)- aiming for 45 gms or less of carbs per meal.     Pt is doing frequent BG testing- \"likes to know what her numbers are doing\". Pt may benefit from personal CGM- is interested in the Jacques system.       Patient's most recent   Lab Results   Component Value Date    A1C 9.6 10/01/2018    is not meeting goal of <7.0    INTERVENTION:   Diabetes knowledge and skills assessment:     Patient is knowledgeable in diabetes management concepts related to: Healthy Eating, Being Active, Monitoring, Taking Medication, Problem Solving, Reducing Risks and Healthy Coping    Patient needs further education on the following diabetes management concepts: Healthy Eating, Monitoring and Taking Medication    Based on learning assessment above, most appropriate setting for further diabetes education would be: Individual setting.    Education provided today on:  AADE Self-Care Behaviors:  Healthy Eating: answered questions about  carbohydrate counting  Monitoring: discussed personal CGM vs fingerstick testing  Taking Medication: action of prescribed medication and side effects of prescribed medications    Opportunities for ongoing education and support in diabetes-self management were discussed.    Pt verbalized understanding of concepts discussed and recommendations provided today.       Education Materials Provided:  Personal Jacques brochure    PLAN:  See Patient Instructions for co-developed, patient-stated behavior change goals.  Continue current dose of Trulicity.  Will send pended Rx " for Jacques personal CGM to PCP for signature.   Continue healthy eating efforts- aim for no more than 45 gms of carbs per meal.  AVS printed and provided to patient today. See Follow-Up section for recommended follow-up.    Yoselin Salazar RD, CDE  Diabetes     Time Spent: 30 minutes  Encounter Type: Individual    Any diabetes medication dose changes were made via the CDE Protocol and Collaborative Practice Agreement with the patient's primary care provider. A copy of this encounter was shared with the provider.

## 2018-11-19 NOTE — TELEPHONE ENCOUNTER
Pt seen for DM Ed today- pt interested in personal continuous glucose monitoring (CGM).    Routing pended Rx for Jacques reader and sensors to Dr. Machado for signature, if agreeable with plan.    Yoselin Salazar RD, CDE  Diabetes

## 2018-11-19 NOTE — MR AVS SNAPSHOT
After Visit Summary   11/19/2018    Melody Muñoz    MRN: [id number]           Patient Information     Date Of Birth          1975        Visit Information        Provider Department      11/19/2018 9:30 AM Yoselin Salazar RD [place] [facility]         Follow-ups after your visit        Your next 10 appointments already scheduled     Jan 07, 2019  9:30 AM CST   Diabetes Education with Yoselin Salazar RD   [facility] ([facility])    [address]  [facility] [address] [phone]            Jan 25, 2019  8:30 AM CST   New Visit with KIRAN Carballo CNP   [facility] ([facility])    [address]  [facility] [address] [phone]              Who to contact     If you have questions or need follow up information about today's clinic visit or your schedule please contact [facility] directly at [phone].  Normal or non-critical lab and imaging results will be communicated to you by Continuenthart, letter or phone within 4 business days after the clinic has received the results. If you do not hear from us within 7 days, please contact the clinic through ActiveTrakt or phone. If you have a critical or abnormal lab result, we will notify you by phone as soon as possible.  Submit refill requests through DigitalAdvisor or call your pharmacy and they will forward the refill request to us. Please allow 3 business days for your refill to be completed.          Additional Information About Your Visit        Continuenthartocario Information     DigitalAdvisor gives you secure access to your electronic health record. If you see a primary care provider, you can also send messages to your care team and make appointments. If you have questions, please call your primary care clinic.  If you do not have a primary care provider, please call [phone] and they will assist  you.        Care EveryWhere ID     This is your Care EveryWhere ID. This could be used by other organizations to access your [facility] medical records  [id number]        Your Vitals Were     Last Period                   (LMP Unknown)            Blood Pressure from Last 3 Encounters:   11/09/18 120/80   11/06/18 126/84   10/17/18 116/74    Weight from Last 3 Encounters:   11/09/18 85.3 kg (188 lb)   11/06/18 85.7 kg (189 lb)   10/17/18 88.5 kg (195 lb)              Today, you had the following     No orders found for display       Primary Care Provider Office Phone # Fax #    Evaristo Orestes Machado [phone]       [address]  [facility] [address]        Goals        General    Problem Solving (pt-stated)     Notes - Note created  11/5/2018  2:17 PM by Yoselin Salazar RD    My Goal: I will reduce total carbs in my diet- aim for no more than 45 gms per meal    What I need to meet my goal: read labels, review carb counting informaiton    I plan to meet my goal by this date: 2 weeks (next CDE appt)          Equal Access to Services     OCTAVIO TRUJILLO AH: Hadii aad ku hadasho Soomaali, waaxda luqadaha, qaybta kaalmada adeegyada, aida german. So [facility] [phone].    ATENCIÓN: Si habla español, tiene a mendoza disposición servicios gratuitos de asistencia lingüística. Llame al [phone].    We comply with applicable federal civil rights laws and [place] laws. We do not discriminate on the basis of race, color, national origin, age, disability, sex, sexual orientation, or gender identity.            Thank you!     Thank you for choosing [facility]  for your care. Our goal is always to provide you with excellent care. Hearing back from our patients is one way we can continue to improve our services. Please take a few minutes to complete the written survey that you may receive in the mail after your visit with us. Thank you!             Your  Updated Medication List - Protect others around you: Learn how to safely use, store and throw away your medicines at www.disposemymeds.org.          This list is accurate as of 11/19/18 10:06 AM.  Always use your most recent med list.                   Brand Name Dispense Instructions for use Diagnosis    * albuterol (2.5 MG/3ML) 0.083% neb solution     25 vial    Take 1 vial (2.5 mg) by nebulization every 6 hours as needed for shortness of breath / dyspnea or wheezing    Mild persistent asthma without complication       * PROAIR  (90 Base) MCG/ACT inhaler   Generic drug:  albuterol     51 g    INHALE 1 TO 2 PUFFS INTO THE LUNGS EVERY 4 HOURS AS NEEDED FOR WHEEZING, SHORTNESS OF BREATH, OR DYSPNEA    Intermittent asthma, uncomplicated       almotriptan 6.25 MG tablet    AXERT     TK 1 T PO ONCE.  MAY REPEAT IN 2 HOURS AS DIRECTED.  MAX 2 DOSES PER DAY        benzonatate 100 MG capsule    TESSALON    42 capsule    Take 1 capsule (100 mg) by mouth 3 times daily as needed for cough    Bronchitis       blood glucose lancets standard    no brand specified    100 each    Use to test blood sugar 3 times weekly or as directed.    Type 2 diabetes mellitus without complication, without long-term current use of insulin (H)       * blood glucose monitoring test strip    LAURA CONTOUR NEXT    200 each    Use to test blood sugar 2 times daily or as directed.    Diabetes mellitus without complication (H)       * blood glucose monitoring test strip    no brand specified    100 strip    Use to test blood sugars 3 times weekly or as directed    Type 2 diabetes mellitus without complication, without long-term current use of insulin (H)       Butalbital-APAP-Caff-Cod -38-30 MG Caps     15 capsule    Take 1 capsule by mouth 3 times daily as needed    Chronic migraine without aura without status migrainosus, not intractable       carisoprodol 350 MG tablet    SOMA    90 tablet    TAKE 1 TABLET BY MOUTH THREE TIMES DAILY     Chronic pain syndrome       ciprofloxacin 500 MG tablet    CIPRO    6 tablet    Take 1 tablet (500 mg) by mouth 2 times daily    Urinary frequency       clonazePAM 1 MG tablet    klonoPIN     TK 1 T PO UP TO TID        * dextroamphetamine 5 MG 24 hr capsule    DEXEDRINE SPANSULE     TK 1 C PO BID        * dextroamphetamine 10 MG tablet    DEXTROSTAT     TK 1 T PO TID        dulaglutide 0.75 MG/0.5ML pen    TRULICITY    0.5 mL    Inject 0.75 mg Subcutaneous every 7 days    Uncontrolled type 2 diabetes mellitus with hyperglycemia (H)       FISH OIL OMEGA-3 PO           fluticasone 110 MCG/ACT Inhaler    FLOVENT HFA    2 Inhaler    Inhale 2 puffs into the lungs 2 times daily    Mild persistent asthma without complication       fluticasone 50 MCG/ACT spray    FLONASE    16 g    Spray 2 sprays into both nostrils daily    Chronic maxillary sinusitis       guaiFENesin-codeine 100-10 MG/5ML Soln solution    ROBITUSSIN AC    30 mL    Take 5 mLs by mouth nightly as needed for cough    Acute recurrent maxillary sinusitis       guanFACINE 2 MG tablet    TENEX    180 tablet    TAKE TWO TABLETS BY MOUTH AT BEDTIME    Nightmares       JANUVIA 50 MG tablet   Generic drug:  sitagliptin           lamoTRIgine 200 MG tablet    LAMICTAL    90 tablet    Take 1 tablet (200 mg) by mouth every morning    Bipolar 1 disorder (H)       LATUDA 120 MG Tabs tablet   Generic drug:  lurasidone      TK 1 T PO QD        levothyroxine 25 MCG tablet    SYNTHROID/LEVOTHROID    90 tablet    TAKE 1 TABLET(25 MCG) BY MOUTH DAILY    Acquired hypothyroidism       lithium 450 MG CR tablet    ESKALITH    60 tablet    TAKE 2 TABLETS BY MOUTH AT BEDTIME    Bipolar disorder with psychotic features (H)       LYRICA 150 MG capsule   Generic drug:  pregabalin     60 capsule    TAKE 1 CAPSULE BY MOUTH TWICE DAILY    Chronic pain syndrome       MAGNESIUM CITRATE PO           meclizine 25 MG tablet    ANTIVERT    30 tablet    Take 1 tablet (25 mg) by mouth every 6  hours as needed for dizziness    Dizziness       MELATONIN PO      Take 6 mg by mouth At Bedtime        methylPREDNISolone 4 MG tablet    MEDROL DOSEPAK    21 tablet    Follow package instructions    Radicular low back pain       miconazole 100 MG vaginal suppository    MICATIN    7 suppository    Place 1 suppository (100 mg) vaginally At Bedtime    Candidiasis of vulva and vagina       MICROLET LANCETS Misc     100 each    TEST TWICE DAILY AS DIRECTED    Diabetes mellitus without complication (H)       norgestimate-ethinyl estradiol 0.25-35 MG-MCG per tablet    ORTHO-CYCLEN, SPRINTEC     Take 1 tablet by mouth daily        OLANZapine 10 MG tablet    zyPREXA    5 tablet    Take 1 tablet (10 mg) by mouth At Bedtime    Bipolar 1 disorder (H), Generalized anxiety disorder       ondansetron 4 MG ODT tab    ZOFRAN-ODT    30 tablet    DISSOLVE 1 TO 2 TABLETS UNDER THE TONGUE EVERY 8 HOURS AS NEEDED FOR NAUSEA    Nausea       order for DME     1 Device    Equipment being ordered: Blood glucose monitoring kit    Diabetes mellitus without complication (H)       pantoprazole 40 MG EC tablet    PROTONIX    90 tablet    TAKE 1 TABLET(40 MG) BY MOUTH DAILY    Other chronic gastritis without hemorrhage       POTASSIUM PO           promethazine HCl 50 MG tablet    PHENERGAN     TK 1 T PO Q 8 H PRN        sertraline 100 MG tablet    ZOLOFT     TK 1 T PO  QD        STATIN NOT PRESCRIBED (INTENTIONAL)      1 each daily Please choose reason not prescribed, below    Type 2 diabetes mellitus with hyperglycemia, without long-term current use of insulin (H)       sulfamethoxazole-trimethoprim 800-160 MG per tablet    BACTRIM DS/SEPTRA DS    14 tablet    Take 1 tablet by mouth 2 times daily    Dysuria       tamsulosin 0.4 MG capsule    FLOMAX    30 capsule    Take 1 capsule (0.4 mg) by mouth daily    Dysuria       topiramate 50 MG tablet    TOPAMAX     TK 2 TS PO QHS        traMADol 50 MG tablet    ULTRAM    15 tablet    Take 1 tablet (50  mg) by mouth 3 times daily    Chronic pain syndrome       traZODone 100 MG tablet    DESYREL     TK TWO TS PO QHS        venlafaxine 75 MG 24 hr capsule    EFFEXOR-XR     TK 1 C PO D WF        VITAMIN D (CHOLECALCIFEROL) PO      Take 2,000 Units by mouth daily        zolpidem 10 MG tablet    AMBIEN    30 tablet    TAKE 1 TABLET BY MOUTH EVERY DAY AT BEDTIME    Primary insomnia       * Notice:  This list has 6 medication(s) that are the same as other medications prescribed for you. Read the directions carefully, and ask your doctor or other care provider to review them with you.

## 2018-11-19 NOTE — PATIENT INSTRUCTIONS
Continue current Trulicity dose.     Will send pended order to Dr. Machado for the personal ADOP continuous glucose monitor.     Yoselin Salazar RD, LD, CDE  Diabetes     Diabetes Support Resources:  [employer]     Bring blood glucose meter and logbook with you to all doctor and follow-up appointments.     [facility] for the [facility] Area:  For Your Diabetes Education and Nutrition Appointments Call:  [phone]   For Diabetes Education or Nutrition Related Questions:   Phone: [phone]  E-mail: [email or web address]  Fax: [phone]   If you need a medication refill please contact your pharmacy. Please allow 3 business days for your refills to be completed.    Instructions for emailing the Diabetes Educators    If you need to communicate a non-urgent message to a Diabetes Educator via email, please send to [email or web address].    Please follow the following email guidelines:    Subject line: Secure: your clinic name (example: Secure: [facility])  In the email please include: First name, middle initial, last name and date of birth.    We will be in touch with you within one (1) business day.

## 2018-11-20 RX ORDER — PREGABALIN 150 MG/1
CAPSULE ORAL
Qty: 60 CAPSULE | Refills: 0 | Status: SHIPPED | OUTPATIENT
Start: 2018-11-22 | End: 2018-12-16

## 2018-11-20 RX ORDER — ZOLPIDEM TARTRATE 10 MG/1
TABLET ORAL
Qty: 30 TABLET | Refills: 0 | Status: SHIPPED | OUTPATIENT
Start: 2018-11-20 | End: 2018-12-16

## 2018-11-23 ENCOUNTER — TELEPHONE (OUTPATIENT)
Dept: FAMILY MEDICINE | Facility: CLINIC | Age: 43
End: 2018-11-23

## 2018-11-23 DIAGNOSIS — G43.709 CHRONIC MIGRAINE WITHOUT AURA WITHOUT STATUS MIGRAINOSUS, NOT INTRACTABLE: ICD-10-CM

## 2018-11-23 DIAGNOSIS — G89.4 CHRONIC PAIN SYNDROME: ICD-10-CM

## 2018-11-23 DIAGNOSIS — F41.1 GENERALIZED ANXIETY DISORDER: Chronic | ICD-10-CM

## 2018-11-23 NOTE — TELEPHONE ENCOUNTER
BUT/ACETAMI/CAFF COD -15-30 C  Last Written Prescription Date:  09/06/18  Last Fill Quantity: 15,  # refills: 3   Last office visit: 11/9/2018 with prescribing provider:  11/09/18   Future Office Visit:    Requested Prescriptions   Pending Prescriptions Disp Refills     Butalbital-APAP-Caff-Cod -58-30 MG CAPS [Pharmacy Med Name: BUT/ACETAMI/CAFF COD -20-30 C] 15 capsule 0     Sig: TAKE 1 CAPSULE BY MOUTH THREE TIMES DAILY AS NEEDED    There is no refill protocol information for this order

## 2018-11-26 ENCOUNTER — TRANSFERRED RECORDS (OUTPATIENT)
Dept: HEALTH INFORMATION MANAGEMENT | Facility: CLINIC | Age: 43
End: 2018-11-26

## 2018-11-26 NOTE — TELEPHONE ENCOUNTER
Controlled Substance Refill Request for Butalbital-APAP-Caff-Cod -47-30 MG CAPS  Problem List Complete:  Yes    Patient is followed by VIRAL Deleon for ongoing prescription of pain medication.  All refills should be approved by this provider, or covering partner.    Medication(s):  Lyrica 150 mg bid, Fioricet with codeine prn, klonopin and ambien to be prescribed by psych, not [place].  Maximum quantity per month: 60, 20  Clinic visit frequency required: Q 3 months     Controlled substance agreement on file: Yes       Date(s): 9/8/2015  New CSA needed.  Pain Clinic evaluation in the past: No    DIRE Total Score(s):  No flowsheet data found.    Last [facility] website verification: 08/22/2018 multiple meds from multiple outside providers     checked in past 3 months?  No, route to RN     RX [place] reviewed:  reviewed- recommend provider review    [place]:  [email or web address]

## 2018-11-27 RX ORDER — BUTALBITAL, ACETAMINOPHEN, CAFFEINE AND CODEINE PHOSPHATE 300; 50; 40; 30 MG/1; MG/1; MG/1; MG/1
CAPSULE ORAL
Qty: 15 CAPSULE | Refills: 1 | Status: SHIPPED | OUTPATIENT
Start: 2018-11-27 | End: 2019-01-29

## 2018-11-27 NOTE — TELEPHONE ENCOUNTER
Faxed Rx (Butalbital-APAP-Caff-Cod -80-30 MG CAPS) to the pharmacy-[facility] in [facility]

## 2018-11-28 ENCOUNTER — NURSE TRIAGE (OUTPATIENT)
Dept: NURSING | Facility: CLINIC | Age: 43
End: 2018-11-28

## 2018-11-28 NOTE — TELEPHONE ENCOUNTER
Melody is calling wanting to know why she was only given 1 refill on this medication. She states that she usually gets 3 refills at a time. States she doesn't need them now but doesn't want to get stuck with the holidays coming up not having the refills.

## 2018-11-28 NOTE — TELEPHONE ENCOUNTER
Should 11/27/18 Rx read 15 capsules and 3 refills?     Butalbital-APAP-Caff-Cod -33-30 MG CAPS 15 capsule 1 11/27/2018  No   Sig: TAKE 1 CAPSULE BY MOUTH THREE TIMES DAILY AS NEEDED     I believe the patient would need a new script for the other 2 refills? PCP to review.

## 2018-11-28 NOTE — TELEPHONE ENCOUNTER
Please advice. Pt is questioning number of refills for  Butalbital-APAP-Caff-Cod -33-30 MG CAPS. Should pt schedule an OV to discuss refills?

## 2018-11-29 NOTE — TELEPHONE ENCOUNTER
Called [facility] with providers approval of 3 refills for butalbital Rx. Informed pt this was done.

## 2018-12-05 ENCOUNTER — HOSPITAL ENCOUNTER (OUTPATIENT)
Dept: MAMMOGRAPHY | Facility: CLINIC | Age: 43
Discharge: HOME | End: 2018-12-05
Attending: OBSTETRICS & GYNECOLOGY | Admitting: OBSTETRICS & GYNECOLOGY
Payer: COMMERCIAL

## 2018-12-05 DIAGNOSIS — Z12.31 SCREENING MAMMOGRAM, ENCOUNTER FOR: ICD-10-CM

## 2018-12-05 PROCEDURE — 77067 SCR MAMMO BI INCL CAD: CPT

## 2018-12-09 DIAGNOSIS — E03.9 ACQUIRED HYPOTHYROIDISM: ICD-10-CM

## 2018-12-10 RX ORDER — LEVOTHYROXINE SODIUM 25 UG/1
TABLET ORAL
Qty: 90 TABLET | Refills: 2 | Status: SHIPPED | OUTPATIENT
Start: 2018-12-10 | End: 2019-03-27

## 2018-12-10 NOTE — TELEPHONE ENCOUNTER
"Requested Prescriptions   Pending Prescriptions Disp Refills     levothyroxine (SYNTHROID/LEVOTHROID) 25 MCG tablet [Pharmacy Med Name: LEVOTHYROXINE 0.025MG (25MCG) TAB] 90 tablet 0      Last Written Prescription Date:  5/16/18  Last Fill Quantity: 90,  # refills: 1   Last office visit: 11/9/2018 with prescribing provider:     Future Office Visit:     Sig: TAKE 1 TABLET(25 MCG) BY MOUTH DAILY    Thyroid Protocol Passed - 12/9/2018  9:02 AM       Passed - Patient is 12 years or older       Passed - Recent (12 mo) or future (30 days) visit within the authorizing provider's specialty    Patient had office visit in the last 12 months or has a visit in the next 30 days with authorizing provider or within the authorizing provider's specialty.  See \"Patient Info\" tab in inbasket, or \"Choose Columns\" in Meds & Orders section of the refill encounter.             Passed - Normal TSH on file in past 12 months    Recent Labs   Lab Test 10/01/18  1451   TSH 1.67             Passed - No active pregnancy on record    If patient is pregnant or has had a positive pregnancy test, please check TSH.         Passed - No positive pregnancy test in past 12 months    If patient is pregnant or has had a positive pregnancy test, please check TSH.            "

## 2018-12-16 ENCOUNTER — TELEPHONE (OUTPATIENT)
Dept: FAMILY MEDICINE | Facility: CLINIC | Age: 43
End: 2018-12-16

## 2018-12-16 DIAGNOSIS — F51.01 PRIMARY INSOMNIA: ICD-10-CM

## 2018-12-16 DIAGNOSIS — G89.4 CHRONIC PAIN SYNDROME: ICD-10-CM

## 2018-12-17 NOTE — TELEPHONE ENCOUNTER
ZOLPIDEM 10MG TABLETS  Last Written Prescription Date:  11/20/2018  Last Fill Quantity: 30,   # refills: 0  Last Office Visit: 11/09/2018  Future Office visit:       Routing refill request to provider for review/approval because:  Drug not on the [facility], [facility] or [facility] refill protocol or controlled substance  LYRICA 150MG CAPSULES    Last Written Prescription Date:  11/22/2018  Last Fill Quantity: 60,   # refills: 0  Last Office Visit: 11/20/2018  Future Office visit:       Routing refill request to provider for review/approval because:  Drug not on the [facility], [facility] or [facility] refill protocol or controlled substance

## 2018-12-19 ENCOUNTER — TELEPHONE (OUTPATIENT)
Dept: FAMILY MEDICINE | Facility: CLINIC | Age: 43
End: 2018-12-19

## 2018-12-19 RX ORDER — ZOLPIDEM TARTRATE 10 MG/1
TABLET ORAL
Qty: 30 TABLET | Refills: 0 | Status: SHIPPED | OUTPATIENT
Start: 2018-12-20 | End: 2019-01-17

## 2018-12-19 RX ORDER — PREGABALIN 150 MG/1
CAPSULE ORAL
Qty: 60 CAPSULE | Refills: 0 | Status: SHIPPED | OUTPATIENT
Start: 2018-12-19 | End: 2019-01-18

## 2018-12-19 NOTE — TELEPHONE ENCOUNTER
9/28/2017      Ms. Roxanne G Madhu  [address]  [facility] [address]        Dear MsJolanta Roxanne ROMERO Madhu,     Our records indicate it is time for your follow-up colonoscopy. Your last procedure was on 01-.    Please call the office at [phone].     If you have chosen to complete your colonoscopy outside of [facility], please give us a call so we can update your records. If you have any questions or concerns, we would be happy to discuss them with you.    Sincerely,       Dr. Yoselin Fay  Gastroenterology Department  [facility]   p called states jorge will not transfer so please fax paige script to [facility] fax  phone [phone] pharm name is [facility]

## 2018-12-24 DIAGNOSIS — G89.4 CHRONIC PAIN SYNDROME: ICD-10-CM

## 2018-12-25 ENCOUNTER — NURSE TRIAGE (OUTPATIENT)
Dept: NURSING | Facility: CLINIC | Age: 43
End: 2018-12-25

## 2018-12-25 NOTE — TELEPHONE ENCOUNTER
Melody reports being seen at [facility] on 12/23 for a sinus infection.  She said they prescribed Amoxicillin 875 mg BID x14 days and Prednisone 20 mg BID x7 days.    She is requesting Tylenol 3 to control her pain.  She insists that Dr. Machado would treat her over the phone and any of his co-workers who know her will too.    Advised that we don't page on call providers for medication requests, also advised that on call provider will not prescribe T3 over the phone.    Again, she insists that I page and wants the provider to speak to her directly.  Advised that I would page provider to speak to me at [facility] per our protocol.    Paged on call provider for [facility] to speak to me at [facility].  Dr. Moncada is on call, page sent @ 12:15 and 12:25.  Dr. Moncada returned page and advised that Melody should be seen at either /ER today or follow up with provider at clinic tomorrow.  No  medications will be prescribed over the phone per [facility] policy.    Called Melody back and discussed Dr. Moncada's recommendations, she requested to make an appointment with clinic, warm transfer to scheduling to make an appointment.    Layne Hyatt, RN  [facility]

## 2018-12-26 NOTE — TELEPHONE ENCOUNTER
Requested Prescriptions   Pending Prescriptions Disp Refills     carisoprodol (SOMA) 350 MG tablet [Pharmacy Med Name: CARISOPRODOL 350MG TABLETS]      Last Written Prescription Date:  7/17/18  Last Fill Quantity: 90,   # refills: 5  Last Office Visit: 11/9/18 [facility]  Future Office visit:       Routing refill request to provider for review/approval because:  Drug not on the [facility], [facility] or [facility] refill protocol or controlled substance   90 tablet 0     Sig: TAKE 1 TABLET BY MOUTH THREE TIMES DAILY.    There is no refill protocol information for this order

## 2018-12-28 RX ORDER — CARISOPRODOL 350 MG/1
TABLET ORAL
Qty: 90 TABLET | Refills: 0 | Status: SHIPPED | OUTPATIENT
Start: 2018-12-28 | End: 2019-01-29

## 2018-12-28 NOTE — TELEPHONE ENCOUNTER
Controlled Substance Refill Request for carisoprodol (SOMA) 350 MG tablet  Problem List Complete:  Yes   Patient is followed by VIRAL Deleon for ongoing prescription of pain medication.  All refills should be approved by this provider, or covering partner.    Medication(s):  Lyrica 150 mg bid, Fioricet with codeine prn, klonopin and ambien to be prescribed by psych, not [place].  Maximum quantity per month: 60, 20  Clinic visit frequency required: Q 3 months     Controlled substance agreement on file: Yes       Date(s): 9/8/2015  New CSA needed.  Pain Clinic evaluation in the past: No    DIRE Total Score(s):  No flowsheet data found.    Last [facility] website verification: 11/26/18 multiple meds from multiple outside providers   checked in past 3 months?  Yes 11/26/2018    Last OV 11/09/2018.  carisoprodol (SOMA) 350 MG tablet 90 tablet 5 7/17/2018

## 2018-12-28 NOTE — TELEPHONE ENCOUNTER
Melody called asking that this medication of Carisoprodol please be filled today.  Please call Melody at [phone] today if it can not be filled today.

## 2018-12-31 ENCOUNTER — OFFICE VISIT (OUTPATIENT)
Dept: FAMILY MEDICINE | Facility: CLINIC | Age: 43
End: 2018-12-31
Payer: COMMERCIAL

## 2018-12-31 VITALS
RESPIRATION RATE: 16 BRPM | WEIGHT: 169.5 LBS | DIASTOLIC BLOOD PRESSURE: 80 MMHG | HEART RATE: 100 BPM | SYSTOLIC BLOOD PRESSURE: 122 MMHG | OXYGEN SATURATION: 98 % | BODY MASS INDEX: 31 KG/M2 | TEMPERATURE: 98 F

## 2018-12-31 DIAGNOSIS — G89.4 CHRONIC PAIN SYNDROME: ICD-10-CM

## 2018-12-31 DIAGNOSIS — J01.01 ACUTE RECURRENT MAXILLARY SINUSITIS: Primary | ICD-10-CM

## 2018-12-31 DIAGNOSIS — G43.709 CHRONIC MIGRAINE WITHOUT AURA WITHOUT STATUS MIGRAINOSUS, NOT INTRACTABLE: ICD-10-CM

## 2018-12-31 PROCEDURE — 99214 OFFICE O/P EST MOD 30 MIN: CPT | Performed by: FAMILY MEDICINE

## 2018-12-31 RX ORDER — ACETAMINOPHEN 325 MG/1
325-650 TABLET ORAL EVERY 8 HOURS PRN
Qty: 10 TABLET | Refills: 0 | Status: SHIPPED | OUTPATIENT
Start: 2018-12-31 | End: 2018-12-31 | Stop reason: ALTCHOICE

## 2018-12-31 RX ORDER — LEVOFLOXACIN 500 MG/1
500 TABLET, FILM COATED ORAL DAILY
Qty: 10 TABLET | Refills: 0 | Status: SHIPPED | OUTPATIENT
Start: 2018-12-31 | End: 2019-02-21

## 2018-12-31 NOTE — PROGRESS NOTES
SUBJECTIVE:   Melody Muñoz is a 43 year old female who presents to clinic today for the following health issues:    Pt is feeling anxious today.    ED/UC Followup:    Facility:  [facility]  Date of visit: 12/23/2018  Reason for visit: URI  Current Status: no improvement with Amoxicillin     Acute Illness   Acute illness concerns: URI  Onset: 2 weeks    Severity: severe    Accompanying signs and symptoms: None    History (predisposing factors):  asthma and tobacco abuse      Fever: no     Chills/Sweats: YES-chills     Headache (location?): YES- frontal    Sinus Pressure:YES- tender, post-nasal drainage, facial pain and teeth pain    Conjunctivitis:  no    Ear Pain: YES    Rhinorrhea: YES    Congestion: YES    Sore Throat: YES     Cough: no    Wheeze: YES    Decreased Appetite: no     Nausea: no     Vomiting: no     Diarrhea:  no     Dysuria/Freq.: no     Fatigue/Achiness: no     Sick/Strep Exposure: no        Precipitating or alleviating factors: None  Therapies tried and outcome:  oral decongestant, inhaler, Amoxicillin, and Prednisone     Pt also asking for something stronger for pain from her sinus headaches        Problem list and histories reviewed & adjusted, as indicated.  Additional history: as documented    Labs reviewed in EPIC    Reviewed and updated as needed this visit by clinical staff  Tobacco  Allergies  Meds  Med Hx  Surg Hx  Fam Hx  Soc Hx      Reviewed and updated as needed this visit by Provider         ROS:  CONSTITUTIONAL:NEGATIVE for fever, chills, change in weight  ENT/MOUTH: POSITIVE for Hx sinus infections, nasal congestion, postnasal drainage, rhinorrhea-purulent and sinus pressure  RESP:POSITIVE for cough-non productive  NEURO: POSITIVE for HX headaches-migraine    OBJECTIVE:                                                    /80 (BP Location: Left arm, Patient Position: Sitting, Cuff Size: Adult Regular)   Pulse 100   Temp 98  F (36.7  C) (Tympanic)    Resp 16   Wt 76.9 kg (169 lb 8 oz)   LMP  (LMP Unknown)   SpO2 98%   BMI 31.00 kg/m    Body mass index is 31 kg/m .  GENERAL APPEARANCE: healthy, alert and no distress  HENT: ear canals and TM's normal, nose and mouth without ulcers or lesions, nasal mucosa edematous without rhinorrhea, rhinorrhea yellow, frontal sinus tenderness bilateral and maxillary sinus tenderness bilateral  RESP: lungs clear to auscultation - no rales, rhonchi or wheezes    Diagnostic test results:  none      ASSESSMENT/PLAN:                                                        ICD-10-CM    1. Acute recurrent maxillary sinusitis J01.01 levofloxacin (LEVAQUIN) 500 MG tablet     acetaminophen-codeine (TYLENOL #3) 300-30 MG tablet     DISCONTINUED: acetaminophen (TYLENOL) 325 MG tablet   2. Chronic migraine without aura without status migrainosus, not intractable G43.709    3. Chronic pain syndrome G89.4        Follow up with Provider - 2 weeks with Dr Machado  Very limited Rx for Tylenol # 3.  Just # 10 tabs to use sparingly.  Further medication needs to be from Dr Machado   Patient Instructions   Symptomatic treatment.  Will use saline gargles, tylenol and/or advil. Suck on  lozenges as needed. Push fluids. Salt water nasal spray as needed.  Use expectorant such as Mucinex for congestion      Otilio Hollins MD  [facility]

## 2018-12-31 NOTE — PATIENT INSTRUCTIONS
Symptomatic treatment.  Will use saline gargles, tylenol and/or advil. Suck on  lozenges as needed. Push fluids. Salt water nasal spray as needed.  Use expectorant such as Mucinex for congestion

## 2019-01-07 RX ORDER — BLOOD SUGAR DIAGNOSTIC
STRIP MISCELLANEOUS
Qty: 100 STRIP | Refills: 0 | OUTPATIENT
Start: 2019-01-07

## 2019-01-07 NOTE — TELEPHONE ENCOUNTER
"Requested Prescriptions   Pending Prescriptions Disp Refills     ACCU-CHEK RAISSA PLUS test strip [Pharmacy Med Name: ACCU-CHEK RAISSA PLUS STRIPS 100'S]  Last Written Prescription Date:  10/1/2018  Last Fill Quantity: 100 strip,  # refills: 3   Last office visit: 12/31/2018 with prescribing provider:  Gurvinder   Future Office Visit:     100 strip 0     Sig: TEST 4-7 TIMES DAILY WHILE STARTING TRULICITY    Diabetic Supplies Protocol Passed - 1/5/2019  1:14 PM       Passed - Medication is active on med list       Passed - Patient is 18 years of age or older       Passed - Recent (6 mo) or future (30 days) visit within the authorizing provider's specialty    Patient had office visit in the last 6 months or has a visit in the next 30 days with authorizing provider.  See \"Patient Info\" tab in inbasket, or \"Choose Columns\" in Meds & Orders section of the refill encounter.               "

## 2019-01-08 ENCOUNTER — TELEPHONE (OUTPATIENT)
Dept: FAMILY MEDICINE | Facility: CLINIC | Age: 44
End: 2019-01-08

## 2019-01-08 DIAGNOSIS — J01.01 ACUTE RECURRENT MAXILLARY SINUSITIS: ICD-10-CM

## 2019-01-08 DIAGNOSIS — E11.65 UNCONTROLLED TYPE 2 DIABETES MELLITUS WITH HYPERGLYCEMIA (H): Primary | ICD-10-CM

## 2019-01-08 NOTE — TELEPHONE ENCOUNTER
Talked with Melody.  She is having pain under her eyes. No temp, Greenish mucous,.  Was first on amoxicillin and then Dr Hollins gave her leva ian.  She has 1 day left on that-  No change.  Gurvinder also gave her tylenol #3 but she is out of that.   Using plain mucinex and tessalon perles for cough and saline nasal spray. She does not have insurance until 2/1/2019. She has already been seen twice for this infection. She wants to know if she needs another antibiotic?

## 2019-01-08 NOTE — TELEPHONE ENCOUNTER
Phone message left asking Melody to call back so that we could get some additional information and symptoms

## 2019-01-08 NOTE — TELEPHONE ENCOUNTER
Message to Melody.  She is asking if it would be possible to extra a refill on the tylenol #3 until she gets the insurance

## 2019-01-08 NOTE — TELEPHONE ENCOUNTER
"ACCU-CHEK RAISSA PLUS STRIPS 100'S  Last Written Prescription Date:  04/05/17  Last Fill Quantity: 200,  # refills: 12   Last office visit: 12/31/2018 with prescribing provider:  12/31/18   Future Office Visit:    Requested Prescriptions   Pending Prescriptions Disp Refills     ACCU-CHEK RAISSA PLUS test strip [Pharmacy Med Name: ACCU-CHEK RAISSA PLUS STRIPS 100'S] 100 strip 0     Sig: TEST 4-7 TIMES DAILY WHILE STARTING TRULICITY    Diabetic Supplies Protocol Passed - 1/8/2019  7:15 AM       Passed - Medication is active on med list       Passed - Patient is 18 years of age or older       Passed - Recent (6 mo) or future (30 days) visit within the authorizing provider's specialty    Patient had office visit in the last 6 months or has a visit in the next 30 days with authorizing provider.  See \"Patient Info\" tab in inbasket, or \"Choose Columns\" in Meds & Orders section of the refill encounter.              "

## 2019-01-08 NOTE — TELEPHONE ENCOUNTER
I would say that more antibiotics would not be indicated at this point.  Once she has insurance coverage if she wants to return we can then decide if we need further testing to see if she needs other treatments.

## 2019-01-08 NOTE — TELEPHONE ENCOUNTER
Reason for Call:  Other call back    Detailed comments: Patient states she still is having a sinus infection and has been seen twice for it. She states she doesn't have insurance until February 1st and is just wondering if she can get a different antibiotic. Please call to discuss.    Phone Number Patient can be reached at: Home number on file [phone] (home)    Best Time: any    Can we leave a detailed message on this number? YES    Call taken on 1/8/2019 at 8:19 AM by TRIXIE KRUEGER

## 2019-01-09 RX ORDER — BLOOD SUGAR DIAGNOSTIC
STRIP MISCELLANEOUS
Qty: 100 STRIP | Refills: 3 | Status: SHIPPED | OUTPATIENT
Start: 2019-01-09 | End: 2020-01-23

## 2019-01-09 NOTE — TELEPHONE ENCOUNTER
Melody called saying she is out of the test strips and requests they be refilled today to [facility] in [place] [place] [address]

## 2019-01-13 ENCOUNTER — APPOINTMENT (OUTPATIENT)
Dept: ULTRASOUND IMAGING | Facility: CLINIC | Age: 44
End: 2019-01-13

## 2019-01-13 ENCOUNTER — HOSPITAL ENCOUNTER (EMERGENCY)
Facility: CLINIC | Age: 44
Discharge: HOME | End: 2019-01-13
Attending: EMERGENCY MEDICINE | Admitting: EMERGENCY MEDICINE

## 2019-01-13 VITALS
DIASTOLIC BLOOD PRESSURE: 82 MMHG | HEART RATE: 91 BPM | SYSTOLIC BLOOD PRESSURE: 133 MMHG | TEMPERATURE: 98.2 F | OXYGEN SATURATION: 96 % | RESPIRATION RATE: 16 BRPM

## 2019-01-13 DIAGNOSIS — R10.2 PELVIC PAIN IN FEMALE: ICD-10-CM

## 2019-01-13 DIAGNOSIS — G89.29 OTHER CHRONIC PAIN: ICD-10-CM

## 2019-01-13 LAB
ANION GAP SERPL CALCULATED.3IONS-SCNC: 8 MMOL/L (ref 3–14)
BUN SERPL-MCNC: 10 MG/DL (ref 7–30)
CALCIUM SERPL-MCNC: 8.7 MG/DL (ref 8.5–10.1)
CHLORIDE SERPL-SCNC: 108 MMOL/L (ref 94–109)
CO2 SERPL-SCNC: 24 MMOL/L (ref 20–32)
CREAT SERPL-MCNC: 0.69 MG/DL (ref 0.52–1.04)
ERYTHROCYTE [DISTWIDTH] IN BLOOD BY AUTOMATED COUNT: 14 % (ref 10–15)
GFR SERPL CREATININE-BSD FRML MDRD: >90 ML/MIN/{1.73_M2}
GLUCOSE SERPL-MCNC: 111 MG/DL (ref 70–99)
HCT VFR BLD AUTO: 43.1 % (ref 35–47)
HGB BLD-MCNC: 13.6 G/DL (ref 11.7–15.7)
MCH RBC QN AUTO: 25.9 PG (ref 26.5–33)
MCHC RBC AUTO-ENTMCNC: 31.6 G/DL (ref 31.5–36.5)
MCV RBC AUTO: 82 FL (ref 78–100)
PLATELET # BLD AUTO: 435 10E9/L (ref 150–450)
POTASSIUM SERPL-SCNC: 3.7 MMOL/L (ref 3.4–5.3)
RBC # BLD AUTO: 5.26 10E12/L (ref 3.8–5.2)
SODIUM SERPL-SCNC: 140 MMOL/L (ref 133–144)
WBC # BLD AUTO: 15.2 10E9/L (ref 4–11)

## 2019-01-13 PROCEDURE — 93005 ELECTROCARDIOGRAM TRACING: CPT

## 2019-01-13 PROCEDURE — 96374 THER/PROPH/DIAG INJ IV PUSH: CPT

## 2019-01-13 PROCEDURE — 85027 COMPLETE CBC AUTOMATED: CPT | Performed by: EMERGENCY MEDICINE

## 2019-01-13 PROCEDURE — 25000128 H RX IP 250 OP 636: Performed by: EMERGENCY MEDICINE

## 2019-01-13 PROCEDURE — 80048 BASIC METABOLIC PNL TOTAL CA: CPT | Performed by: EMERGENCY MEDICINE

## 2019-01-13 PROCEDURE — 93976 VASCULAR STUDY: CPT

## 2019-01-13 PROCEDURE — 99285 EMERGENCY DEPT VISIT HI MDM: CPT | Mod: 25

## 2019-01-13 RX ORDER — DIPHENHYDRAMINE HYDROCHLORIDE 50 MG/ML
25 INJECTION INTRAMUSCULAR; INTRAVENOUS ONCE
Status: DISCONTINUED | OUTPATIENT
Start: 2019-01-13 | End: 2019-01-13 | Stop reason: HOSPADM

## 2019-01-13 RX ORDER — KETOROLAC TROMETHAMINE 15 MG/ML
15 INJECTION, SOLUTION INTRAMUSCULAR; INTRAVENOUS ONCE
Status: COMPLETED | OUTPATIENT
Start: 2019-01-13 | End: 2019-01-13

## 2019-01-13 RX ORDER — HALOPERIDOL 5 MG/ML
5 INJECTION INTRAMUSCULAR ONCE
Status: DISCONTINUED | OUTPATIENT
Start: 2019-01-13 | End: 2019-01-13 | Stop reason: HOSPADM

## 2019-01-13 RX ADMIN — KETOROLAC TROMETHAMINE 15 MG: 15 INJECTION, SOLUTION INTRAMUSCULAR; INTRAVENOUS at 12:12

## 2019-01-13 ASSESSMENT — ENCOUNTER SYMPTOMS
DYSURIA: 0
VOMITING: 0
FEVER: 0

## 2019-01-13 NOTE — ED NOTES
RN and MD at bedside multiple times to discuss chronic pain management in the ER. Pt instructed and reinstructed about pain policy and narcotics. MD states he would not prescribe dilaudid, hyrodrocodone, or oxycodone for pain control even though her OB/GYN told her it was okay. Pt reinstructed she has a pain policy with her PMD which states no one else should prescribe narcotics. No evidence of learning. Pt refuses discharge paperwork and signature. Pt discharged.

## 2019-01-13 NOTE — ED AVS SNAPSHOT
[facility] Emergency Department  [address]  [facility] [address]  Phone:  [phone]  Fax:  [phone]                                    Melody Muñoz   MRN: [id number]    Department:  [facility] Emergency Department   Date of Visit:  1/13/2019           After Visit Summary Signature Page    I have received my discharge instructions, and my questions have been answered. I have discussed any challenges I see with this plan with the nurse or doctor.    ..........................................................................................................................................  Patient/Patient Representative Signature      ..........................................................................................................................................  Patient Representative Print Name and Relationship to Patient    ..................................................               ................................................  Date                                   Time    ..........................................................................................................................................  Reviewed by Signature/Title    ...................................................              ..............................................  Date                                               Time          22EPIC Rev 08/18

## 2019-01-13 NOTE — ED PROVIDER NOTES
"  History     Chief Complaint:  Vaginal Bleeding    The history is provided by the patient.      Melody Muñoz is a 43 year old female, s/p hysterectomy due to advanced endometriosis and appendectomy, who presents for evaluation of vaginal bleeding.  The patient reports that she was having sexual intercourse last night when she began to have significant vaginal bleeding shortly afterwards. She denies anything abnormal about this sexual intercourse or any sudden increase in pain during intercourse. Patient has since been having intermittent vaginal bleeding. Patient notes that she has gone through 3 pads since the bleeding began. She then called her OB, Dr. Alexis, who suggested she come into the emergency department. The patient also notes that in the past couple of day she has been having pelvic pain which has persisted through to presentation. This pain radiates somewhat to her back and is described as \"menstruals pains 100x\". Patient has been taking Tylenol for this which has not helped. The patient had an ultrasound 3 week ago which showed a left-sided ovarian cyst; cannot recall the exact size of this and denies past history of ovarian cysts.  Although she initially reported that her pain has only preseent for 3 days she states that she has been having this left lower quadrant pain for the last 3 weeks ever since her ultrasound.  Patient denies vaginal discharge, dysuria, or other complaint.     Allergies:  Hydrocodone      Medications:    Albuterol nebulizer  Tessalon  Almotriptan  Klonopin  Dextroamphetamine   Trulicity  Flonase  Levothyroxine  Lithium  Magnesium citrate  Micatin  Melatonin  Estradiol  Fish oil  Protonix  Promethazine  Zoloft  Flomax  Desyrel  Effexor  Cholecalciferol  Ambien      Past Medical History:    Major depression  ROWDY  Bipolar I disorder  Chronic fatigue  Female stress incontinence  Chronic migraine  JASWANT  Mild persistent  Abnormal pap " smear  Anxiety  Endometriosis  Intermittent asthma  Latent tuberculosis  Suicidal ideation    Past Surgical History:    Biopsy  Colonoscopy  Laparoscopy - endometriosis    Laparoscopic appendectomy   Little finger left surgery  Bilateral tubal ligation  [place] tooth removal     Family History:    Hypertension  Palpitations  Depression  Anxiety disorder  CHF  Pancreatic cancer  Alzheimer disease     Social History:  Presents alone   Tobacco use: Current every day smoker (0.25 ppd for 17 years)   Alcohol use: No (none since )  PCP: Evaristo Machado    Marital Status:        Review of Systems   Constitutional: Negative for fever.   Gastrointestinal: Negative for vomiting.   Genitourinary: Positive for pelvic pain and vaginal bleeding. Negative for dysuria and vaginal discharge.   All other systems reviewed and are negative.    Physical Exam     Patient Vitals for the past 24 hrs:   BP Temp Temp src Heart Rate Resp SpO2   19 1245 -- -- -- -- -- 97 %   19 1230 -- -- -- -- -- 98 %   19 1215 -- -- -- -- -- 99 %   19 1147 (!) 159/99 98.2  F (36.8  C) Oral 109 16 99 %        Physical Exam   Genitourinary:             Constitutional:  Pleasant, age appropriate female who appears in discomfort.  HEENT:    Oropharynx is moist  Eyes:    Conjunctiva normal  Neck:    Supple, no meningismus.     CV:     Regular rate and rhythm.      No murmurs, rubs or gallops.     No lower extremity edema.  PULM:    Clear to auscultation bilateral.       No respiratory distress.      Good air exchange.  ABD:    Soft, non-distended.       Moderate tenderness in the LLQ.      Severity of tenderness is diminished when patient is distracted     Bowel sounds normal.     No pulsatile masses.       No rebound, guarding or rigidity.     No CVA tenderness.   :    Normal external genitalia.     No blood in the in the vaginal vault.     No vaginal discharge.     Cervix surgically absent.  MSK:     No gross  deformity to all four extremities.   LYMPH:   No cervical lymphadenopathy.  NEURO:   Alert.  Good muscular tone, no atrophy.   Skin:    Warm, dry and intact.    Psych:    Mood is good and affect is appropriate.      Emergency Department Course   ECG (13:05:11):  Rate 83 bpm. DE interval 156. QRS duration 96. QT/QTc 400/470. P-R-T axes 48 2 57. Normal sinus rhythm. Normal ECG. Agree with computer interpretation. No significant change when compared to EKG dated 2017.  Interpreted at 1304 by Tavo Alonso MD.     Imaging:  Radiographic findings were communicated with the patient who voiced understanding of the findings.    US Pelvic complete with transvaginal & Abd/Pel Duplex Limited:  IMPRESSION: No torsion demonstrated. No ovarian cysts demonstrated.    Imaging independently reviewed and agree with radiologist interpretation.     Laboratory:  CBC: WBC 15.2 (H) o/w WNL (HGB 13.6, )   BMP:  (H) o/w WNL (Creatinine 0.69)      Interventions:  1212: Toradol 15 mg IV      Emergency Department Course:  Past medical records, nursing notes, and vitals reviewed.  1155: I performed an exam of the patient and obtained history, as documented above.    IV inserted and blood drawn. Above interventions provided. Blood was sent to the lab for further testing, results above.   The patient was sent for a US while in the emergency department, findings above.    1445: I rechecked the patient. Patient is refusing discharge.    1510 I rechecked the patient with nursing staff. Findings and plan explained to the Patient. Patient discharged home with instructions regarding supportive care, medications, and reasons to return. The importance of close follow-up was reviewed.       Impression & Plan      Medical Decision Makin-year-old female presented to the ED with concerns of vaginal bleeding after sexual intercourse.  Her uterus is surgically absent.  On pelvic examination there is no active bleeding at this  "time.  The vaginal exam is unremarkable with no obvious lesions.  There is a small linear abrasion at the labia on the right which could have accounted for some bleeding at the time.  There is no sign of vaginal wall laceration or vaginal cuff perforation.    Patient also complained of left lower quadrant abdominal pain that has been present for 3 weeks since her ultrasound through primary OB/GYN clinic had revealed an ovarian cyst.  She had no signs of vaginitis on examination.  She denies urinary symptoms to draw concern for acute cystitis or pyelonephritis.  Pelvic ultrasound was unrevealing including no ovarian cyst noted.  She does have a history of endometriosis.  I do not have concerns at this point for ureteral stone, diverticulitis, free wall perforation.  I believe that advanced imaging with CT scan is not indicated as radiation risk outweighs the benefit.  Differential diagnosis would include endometriosis, abdominal wall strain, IBS, IBD, pain related to surgical scarring or nerve injury.    Patient was very upset about her level of care particularly surrounding her pain control.  She does have a history of chronic pain and is on a pain contract.  She was made aware of the [facility] chronic pain policy.  She was initially given Toradol without symptom improvement.  I offered Haldol for which she declined.  She specifically asked for opioids during her ED stay.  She told me that her OB/GYN physician told her to come to the emergency department to \"get Dilaudid\" and then go home.  I told her that this is highly unlikely and if so, I strongly disagree with this opinion.  She asked that I call her gynecologist so that a could provide her opioid pain medications.  I again explained to her that opioids are inappropriate for her type of pain and her pain contract.  She needs to follow-up with her primary care providers.  Patient was clearly upset and discharged home.    Diagnosis:    ICD-10-CM   1. Pelvic pain in " female R10.2   2. Other chronic pain G89.29       Disposition:  Discharged to home with plan as outlined.     Scribe Disclosure:  I, Darian Lua, am serving as a scribe at 2:18 PM on 1/13/2019 to document services personally performed by Tavo Alonso MD based on my observations and the provider's statements to me.  1/13/2019   EMERGENCY DEPARTMENT      Tavo Alonso MD  01/13/19 1525

## 2019-01-13 NOTE — ED TRIAGE NOTES
Had sexual intercourse around 2200 last night. States she had bleeding immediately following and there was a significant amount. Patient has a history of hysterectomy. Known cyst on her left ovary. Here with continued bright red bleeding and abdominal pain.

## 2019-01-13 NOTE — ED NOTES
"Patient refused Haldol, \"it's not a pain medicine. I am not taking Haldol\". Spoke to Dr Alonso and returned to patient to explain that at this time she can either take the Haldol or oral Tylenol. Patient is angry. Demands to speak to MD. States she has been waiting \"forever\" to get something for her pain; \"this is ridiculous, he can come in here and tell me why he won't give me something else for pain\". Dr. Alonso updated.   "

## 2019-01-13 NOTE — ED NOTES
Pt requests pain medicine stronger than toradol. Pt instructed toradol is the only medication ordered at this time. Pt verbalizes understanding, but is frustrated.

## 2019-01-14 ENCOUNTER — TELEPHONE (OUTPATIENT)
Dept: FAMILY MEDICINE | Facility: CLINIC | Age: 44
End: 2019-01-14

## 2019-01-14 LAB — INTERPRETATION ECG - MUSE: NORMAL

## 2019-01-14 NOTE — TELEPHONE ENCOUNTER
Was seen in Er 1-13-19 seeking opiods and denied  Was seen  at obgyn 1-14-19 did not see a need for opiods and was denied  Yari Delgado called with concerns from obgyn and wanted to give information for provider for FYI

## 2019-01-14 NOTE — TELEPHONE ENCOUNTER
Pt is requesting a strong pain medication like Oxycodone  since tylenol 3 doesn't help relieve her pain. Norco makes her sick.  Reports she was at [facility] today. Per pt, she explained to provider her symptoms and gynecologist was very dismissive and said she should see PCP for pain medication. Pt complains of sharp abdominal cramps. She was also seen at ED. Please review notes. Per pt, she does not have insurance until  02/2019 for why she expressed frustration that her pain is not being addressed. Routing to provider pool for review.

## 2019-01-14 NOTE — TELEPHONE ENCOUNTER
Reason for call:  Patient reporting a symptom    Symptom or request: pain cramping and bleeding    Duration (how long have symptoms been present): went to er yesterday  obgyn will not give her pain meds.    Have you been treated for this before? Yes    Additional comments: pt wants something for pain    Phone Number patient can be reached at:  Home number on file [phone] (home)    Best Time:  any    Can we leave a detailed message on this number:  YES    Call taken on 1/14/2019 at 9:37 AM by MARIAN FUENTES

## 2019-01-14 NOTE — TELEPHONE ENCOUNTER
I won't fill due to her long history of drug seeking.  Can wait for tomorrow for JRB or see if someone else wants to prescribe something.

## 2019-01-15 ENCOUNTER — TRANSFERRED RECORDS (OUTPATIENT)
Dept: HEALTH INFORMATION MANAGEMENT | Facility: CLINIC | Age: 44
End: 2019-01-15

## 2019-01-15 NOTE — TELEPHONE ENCOUNTER
Dr Machado, Please note that there are 2 messages for this patient. The other one involves a OBGYN Dr calling us regarding her seeking pain meds and she left a phone number for Dr ALBERTS to call.    Patient is calling in today teary she does not have insurance right now and she is in pain and wants oxycodone. She has been to ED and been to OBGY. She is going to GI in Feb. When she has insurnace.        I let her know that controlled substances are not the answer.     Routing to Dr LEXUS Lanier RN- Triage [employer]

## 2019-01-15 NOTE — TELEPHONE ENCOUNTER
Talked with Dr Machado. He said that she could have up to 8 extra strength tylenol a day.  She siad that Gastro said 4 extra strength

## 2019-01-15 NOTE — TELEPHONE ENCOUNTER
Patient calling and would like to talk to a nurse as soon as possible. Please call. Doesn't want to wait all day. Hasn't gotten any sleep and is still having pain. Doesn't have insurance and has already paid put of pocket for ER and OBGYN.

## 2019-01-15 NOTE — TELEPHONE ENCOUNTER
I have reviewed all of her care at both the OB/GYN office and in the emergency room.  There is nothing there that should require narcotic pain medication.  She should certainly keep her appointment with gastroenterology in February.

## 2019-01-15 NOTE — TELEPHONE ENCOUNTER
Do you have any non narcotic option that might work for Melody.  Tylenol is not working and she can not take Advil

## 2019-01-17 ENCOUNTER — TELEPHONE (OUTPATIENT)
Dept: FAMILY MEDICINE | Facility: CLINIC | Age: 44
End: 2019-01-17

## 2019-01-17 DIAGNOSIS — G89.4 CHRONIC PAIN SYNDROME: ICD-10-CM

## 2019-01-17 DIAGNOSIS — F51.01 PRIMARY INSOMNIA: ICD-10-CM

## 2019-01-17 NOTE — TELEPHONE ENCOUNTER
Reason for Call:  Medication or medication refill:    Do you use a [facility]?  Name of the pharmacy and phone number for the current request:     [facility] [address] [place] [address] AT [place] [address]    Name of the medication requested: zolpidem (AMBIEN) 10 MG tablet,     pregabalin (LYRICA) 150 MG capsule    Other request: Lyrica goes to [place]- [phone]    Can we leave a detailed message on this number? YES    Phone number patient can be reached at: Home number on file [phone] (home)    Best Time: anytime     Call taken on 1/17/2019 at 4:39 PM by Mattie Lockhart

## 2019-01-18 RX ORDER — ZOLPIDEM TARTRATE 10 MG/1
TABLET ORAL
Qty: 30 TABLET | Refills: 0 | Status: SHIPPED | OUTPATIENT
Start: 2019-01-18 | End: 2019-02-20

## 2019-01-18 RX ORDER — PREGABALIN 150 MG/1
CAPSULE ORAL
Qty: 60 CAPSULE | Refills: 0 | Status: SHIPPED | OUTPATIENT
Start: 2019-01-18 | End: 2019-02-01

## 2019-01-18 NOTE — TELEPHONE ENCOUNTER
Faxed Rx (lyrica and zolpidem) to the pharmacy-[facility] in [place]

## 2019-01-18 NOTE — TELEPHONE ENCOUNTER
Controlled Substance Refill Request for Ambien and Lyrica  Problem List Complete:  Yes  Patient is followed by VIRAL Deleon for ongoing prescription of pain medication.  All refills should be approved by this provider, or covering partner.    Medication(s):  Lyrica 150 mg bid, Fioricet with codeine prn, klonopin and ambien to be prescribed by psych, not [place].  Maximum quantity per month: 60, 20  Clinic visit frequency required: Q 3 months     Controlled substance agreement on file: Yes       Date(s): 9/8/2015  New CSA needed.  Pain Clinic evaluation in the past: No    DIRE Total Score(s):  No flowsheet data found.    Last [facility] website verification: 11/26/18 multiple meds from multiple outside providers   checked in past 3 months?  Yes 11/26/2018- Rx's were last filled 12/19/2019 for 1 month supply.       Last OV 12/31/2018.  zolpidem (AMBIEN) 10 MG tablet 30 tablet 0 12/20/2018     pregabalin (LYRICA) 150 MG capsule 60 capsule 0 12/19/2018

## 2019-01-21 ENCOUNTER — TELEPHONE (OUTPATIENT)
Dept: FAMILY MEDICINE | Facility: CLINIC | Age: 44
End: 2019-01-21

## 2019-01-21 DIAGNOSIS — J01.01 ACUTE RECURRENT MAXILLARY SINUSITIS: ICD-10-CM

## 2019-01-21 RX ORDER — ZOLPIDEM TARTRATE 10 MG/1
TABLET ORAL
Qty: 30 TABLET | Refills: 0
Start: 2019-01-21

## 2019-01-21 NOTE — TELEPHONE ENCOUNTER
Reason for call:  Patient reporting a symptom    Symptom or request: left ear pain    Duration (how long have symptoms been present):last night    Have you been treated for this before? Yes    Additional comments: states has no ins until 2/1-has 6 days of amoxicillin 875 wants to know if can take that also wants tylenol #3 to help with the pain    Phone Number patient can be reached at:  Home number on file [phone] (home)    Best Time:      Can we leave a detailed message on this number:  YES    Call taken on 1/21/2019 at 12:54 PM by MADELIN PADRON

## 2019-01-21 NOTE — TELEPHONE ENCOUNTER
Pt was called with providers message below. She asked that provider advise if she should start  amoxicillin 875 six day supply she has on hand or would provider recommend and send a longer Rx course?

## 2019-01-21 NOTE — TELEPHONE ENCOUNTER
"Pt is requesting a refill for tylenol 3. Complains of left ear pain for 3 days. She is unclear of fever but notes \" I am coming down with a cold\". Symptoms include ear pain and nasal congestion. She plans to use abx prescribed at urgent care a month ago. That was amoxicillin 875 6 day supply. Advised OV or e-visit but states she doesn't have insurance and will have pay over $5,000.00 in Rx this month. Please advice on Rx request.     Controlled Substance Refill Request for tylenol 3  Problem List Complete:  Yes  Patient is followed by VIRAL Deleon for ongoing prescription of pain medication.  All refills should be approved by this provider, or covering partner.    Medication(s):  Lyrica 150 mg bid, Fioricet with codeine prn, klonopin and ambien to be prescribed by psych, not [place].  Maximum quantity per month: 60, 20  Clinic visit frequency required: Q 3 months     Controlled substance agreement on file: Yes       Date(s): 9/8/2015  New CSA needed.  Pain Clinic evaluation in the past: No    DIRE Total Score(s):  No flowsheet data found.    Last [facility] website verification: 11/26/18 multiple meds from multiple outside providers   checked in past 3 months?  Yes 11/26/2018  Rx was last filled 01/09/2019 & 01/14/2014 #25 tablets.    Last OV 12/31/2018.    acetaminophen-codeine (TYLENOL #3) 300-30 MG tablet 25 tablet 1 1/8/2019       "

## 2019-01-21 NOTE — TELEPHONE ENCOUNTER
Tylenol with codeine for any earache is not an appropriate medication.  She can take 2 extra strength Tylenol up to 4 times a day for the ear pain and she could use some olive oil drops in her ear.

## 2019-01-22 NOTE — TELEPHONE ENCOUNTER
Pt called to ask JRB to reconsider ordering the tyl#3 as pain is so bad-is heating up hot pacs to put on ear with no relief

## 2019-01-22 NOTE — TELEPHONE ENCOUNTER
Routing to provider for review/approval because:  Pt. is again requesting Tylenol #3. States she has been using home remedies with no relief. Patient was triaged yesterday 1/21.

## 2019-01-23 NOTE — TELEPHONE ENCOUNTER
"Noted. Pt was called with providers message. States she has \" severe pain piercing pain\". Advised she schedule OV, UC or ED. States pt is the only one that can prescribe controlled medications. Explained to pt again provider will not approve Rx for tylenol 3 and HC instructions given try heat , olive oil and extra strength tylenol. States She is in severe pain and she will go to UC. .   "

## 2019-01-23 NOTE — TELEPHONE ENCOUNTER
Pt has been calling for pain medication to help with ear pain. She was advised to schedule OV and e-visit but pt declines since she has no insurance. She was advised to take tylenol and notes she can't take ibuprofen due to GI problems. Please advice if any further recommendations.

## 2019-01-23 NOTE — TELEPHONE ENCOUNTER
Melody is again calling requesting pain medication. States she is not getting sleep, can't lay down.

## 2019-01-25 ENCOUNTER — TELEPHONE (OUTPATIENT)
Dept: FAMILY MEDICINE | Facility: CLINIC | Age: 44
End: 2019-01-25

## 2019-01-25 NOTE — TELEPHONE ENCOUNTER
"Left voice message asking pt to call triage back. Triage, please ask pt what \"pain medication\" she needs refilled.   "

## 2019-01-25 NOTE — TELEPHONE ENCOUNTER
Reason for Call:  Other call back    Detailed comments: Patient has a bad tooth that needs to be extracted. Patient can't have it extracted until next Friday. Patient states she needs her pain medication but Can't get it from the dentist or oral surgeon as she has a pain contract. Patient thought it was an ear ache but it wasn't. Patient doesn't have any insurance. Patient is wondering if one of the other provider will refill her pain medication. Please call to discuss as patient can explain.     Phone Number Patient can be reached at: Home number on file [phone] (home)    Best Time: any    Can we leave a detailed message on this number? YES    Call taken on 1/25/2019 at 7:35 AM by TRIXIE KRUEGER

## 2019-01-25 NOTE — TELEPHONE ENCOUNTER
Reviewing MPMP, she did get Rx for Hydrocodone/APAP 7.5/325 on 1/23/19 for # 15 tabs from her dentist.  That should be enough at least thru the weekend.  She needs to see Dr Machado next week before more mediation would be prescribed

## 2019-01-25 NOTE — TELEPHONE ENCOUNTER
Pt states Norco made her sick and said that to reception. Advised she schedule OV with PCP if she would like to discuss a different medication. Pt declines.

## 2019-01-28 NOTE — TELEPHONE ENCOUNTER
Reason for Call:  Other call back  Detailed comments: patient would like the Nurse call her concerning some questions she has  Phone Number Patient can be reached at: Home number on file [phone] (home)  Best Time: any  Can we leave a detailed message on this number? YES  Call taken on 1/28/2019 at 8:54 AM by BREANN WARREN

## 2019-01-28 NOTE — TELEPHONE ENCOUNTER
JEAN CARLOS for provider: patient just wanted to make provider aware that oral surgeon ordered [place] for her at first, but patient could not take because it makes her nauseous. The surgeon ended up giving her Percocet instead for pain.     Also wanted it noted that her ear ache in the last few weeks was also from the tooth infection she was having. States she was started on Augmentin for the infection.     No follow up needed.

## 2019-01-29 DIAGNOSIS — G43.709 CHRONIC MIGRAINE WITHOUT AURA WITHOUT STATUS MIGRAINOSUS, NOT INTRACTABLE: ICD-10-CM

## 2019-01-29 DIAGNOSIS — G89.4 CHRONIC PAIN SYNDROME: ICD-10-CM

## 2019-01-29 NOTE — TELEPHONE ENCOUNTER
Controlled Substance Refill Request for carisoprodol (SOMA) 350 MG tablet  Problem List Complete:  Yes    Patient is followed by VIRAL Deleon for ongoing prescription of pain medication.  All refills should be approved by this provider, or covering partner.     Medication(s):  Lyrica 150 mg bid, Fioricet with codeine prn, klonopin and ambien to be prescribed by psych, not [place].  Maximum quantity per month: 60, 20  Clinic visit frequency required: Q 3 months      Controlled substance agreement on file: Yes       Date(s): 9/8/2015  New CSA needed.  Pain Clinic evaluation in the past: No     DIRE Total Score(s):  No flowsheet data found.     Last [facility] website verification: 11/26/18 multiple meds from multiple outside providers    Last Written Prescription Date:  12/28/2018  Last Fill Quantity: 90 tablet,  # refills: 0   Last office visit: 12/31/2018 with prescribing provider:  Gurvinder   Future Office Visit:      Controlled substance agreement: [unfilled]    Last Urine Drug Screen: No results found for: CDAUT, No results found for: COMDAT, No results found for: THC13, PCP13, COC13, MAMP13, OPI13, AMP13, BZO13, TCA13, MTD13, BAR13, OXY13, PPX13, BUP13     Processing:  Fax Rx to [facility] [address] [place] [address] [id number] AT [place] [address] pharmacy    [email or web address]   checked in past 3 months?  Yes 11/26/2018

## 2019-01-29 NOTE — TELEPHONE ENCOUNTER
Reason for Call:  Medication or medication refill:    Do you use a [facility]?  Name of the pharmacy and phone number for the current request:  [facility], [place]    Name of the medication requested: Butalbital    n/a    Can we leave a detailed message on this number? YES    Phone number patient can be reached at: Cell number on file:    Telephone Information:   Mobile [phone]       Best Time: any    Call taken on 1/29/2019 at 12:00 PM by Mary Rodriguez

## 2019-01-29 NOTE — TELEPHONE ENCOUNTER
Controlled Substance Refill Request for Butalbital-APAP-Caff-Cod -65-30 MG CAPS  Problem List Complete:  Yes    fioricet plain Q 6 hrs, no more than 4/day and gets 20 at a time        CSA on file from Dr Husain, dated 9/8/15  (update with current primary care provider)  Last  check - 11/26/18 provider to review=- multiple meds from multiple providers    Last Written Prescription Date:  11/27/2018  Last Fill Quantity: 15 capsule,  # refills: 1   Last office visit: 12/31/2018 with prescribing provider:  Gurvinder   Future Office Visit:      Controlled substance agreement: [unfilled]    Last Urine Drug Screen: No results found for: CDAUT, No results found for: COMDAT, No results found for: THC13, PCP13, COC13, MAMP13, OPI13, AMP13, BZO13, TCA13, MTD13, BAR13, OXY13, PPX13, BUP13     Processing[facility]  [facility] [address] [place] [address] [id number] AT [place] [address] pharmacy    [email or web address]   checked in past 3 months?  Yes 11/26/2018

## 2019-01-30 ENCOUNTER — NURSE TRIAGE (OUTPATIENT)
Dept: NURSING | Facility: CLINIC | Age: 44
End: 2019-01-30

## 2019-01-30 RX ORDER — BUTALBITAL, ACETAMINOPHEN, CAFFEINE AND CODEINE PHOSPHATE 300; 50; 40; 30 MG/1; MG/1; MG/1; MG/1
1 CAPSULE ORAL 3 TIMES DAILY PRN
Qty: 15 CAPSULE | Refills: 1 | Status: SHIPPED | OUTPATIENT
Start: 2019-01-30 | End: 2019-02-21

## 2019-01-30 RX ORDER — CARISOPRODOL 350 MG/1
TABLET ORAL
Qty: 90 TABLET | Refills: 0 | Status: SHIPPED | OUTPATIENT
Start: 2019-01-30 | End: 2019-02-27

## 2019-01-31 NOTE — TELEPHONE ENCOUNTER
Melody calls and says that she wants to know if her Soma and her Butalbital have been reordered. RN then checked Epic and told pt. That both medications were ordered this noe. Pt. Voiced understanding.    Additional Information    Negative: [1] Caller is not with the adult (patient) AND [2] reporting urgent symptoms    Negative: Lab result questions    Negative: Medication questions    Negative: Caller cannot be reached by phone    Negative: Caller has already spoken to PCP or another triager    Negative: RN needs further essential information from caller in order to complete triage    Negative: Requesting regular office appointment    Negative: [1] Caller requesting NON-URGENT health information AND [2] PCP's office is the best resource    Negative: Health Information question, no triage required and triager able to answer question    Negative: General information question, no triage required and triager able to answer question    Negative: Question about upcoming scheduled test, no triage required and triager able to answer question    Negative: [1] Caller is not with the adult (patient) AND [2] probable NON-URGENT symptoms    [1] Follow-up call to recent contact AND [2] information only call, no triage required    Protocols used: IN[facility]

## 2019-02-01 DIAGNOSIS — G89.4 CHRONIC PAIN SYNDROME: ICD-10-CM

## 2019-02-01 RX ORDER — PREGABALIN 150 MG/1
CAPSULE ORAL
Qty: 60 CAPSULE | Refills: 0 | Status: SHIPPED | OUTPATIENT
Start: 2019-02-01 | End: 2019-03-25

## 2019-02-01 NOTE — TELEPHONE ENCOUNTER
Controlled Substance Refill Request for pregabalin (LYRICA) 150 MG capsule  Problem List Complete:  Yes    Patient is followed by VIRAL Deleon for ongoing prescription of pain medication.  All refills should be approved by this provider, or covering partner.     Medication(s):  Lyrica 150 mg bid, Fioricet with codeine prn, klonopin and ambien to be prescribed by psych, not [facility].  Maximum quantity per month: 60, 20  Clinic visit frequency required: Q 3 months      Controlled substance agreement on file: Yes       Date(s): 9/8/2015  New CSA needed.  Pain Clinic evaluation in the past: No     DIRE Total Score(s):  No flowsheet data found.     Last [facility] website verification: 11/26/18 multiple meds from multiple outside providers    Last Written Prescription Date:  1/18/2019  Last Fill Quantity: 60 capsule,  # refills: 0   Last office visit: 12/31/2018 with prescribing provider:  Gurvinder   Future Office Visit:        Controlled substance agreement:   Encounter-Level CSA - 09/08/2015:    Controlled Substance Agreement - Scan on 9/9/2015  2:41 PM: MARISSA, Controlled Substance Contract, 09-08-15 (below)       Patient-Level CSA:    There are no patient-level csa.         Last Urine Drug Screen: No results found for: CDAUT, No results found for: COMDAT, No results found for: THC13, PCP13, COC13, MAMP13, OPI13, AMP13, BZO13, TCA13, MTD13, BAR13, OXY13, PPX13, BUP13     Processing:  Fax Rx to [facility] [address] [facility] [address] pharmacy    [email or web address]   checked in past 3 months?  Yes 11/26/2018

## 2019-02-01 NOTE — TELEPHONE ENCOUNTER
The Lyrica needs to be refaxed to the [place] pharmacy in pt's chart.  It's much cheaper.  Original was faxed to [place].  Pt wants today.

## 2019-02-01 NOTE — TELEPHONE ENCOUNTER
Called [facility] pharmacy: Lyrica was received via fax, but was not picked up per the pharmacist. Also noted this is was not picked up on .     Patient is requesting this be sent to new pharmacy [facility].      for Lyrica states: 12/20/2018 fill date. Quantity of 60, for 30 days by PCP. Fill seems to be too soon.       Medication ready for approval set for [place] pharmacy.     Routing refill request to provider for review/approval because:  Patient has called multiple times this afternoon  And does not understand what the old up is regarding this medication being filled and sent to new pharmacy requested. Can this med be filled? Approval needed.

## 2019-02-01 NOTE — TELEPHONE ENCOUNTER
RX faxed to pharmacy and patient notified via Phone. There were no additional question or concerns.

## 2019-02-01 NOTE — TELEPHONE ENCOUNTER
[place] pharmacy called to make sure they either get the hard copy faxed to them or a call with vo by end of day for the lyrica phone is  or fax is

## 2019-02-04 ENCOUNTER — APPOINTMENT (OUTPATIENT)
Dept: CT IMAGING | Facility: CLINIC | Age: 44
End: 2019-02-04
Payer: COMMERCIAL

## 2019-02-04 ENCOUNTER — HOSPITAL ENCOUNTER (EMERGENCY)
Facility: CLINIC | Age: 44
Discharge: HOME | End: 2019-02-04
Attending: EMERGENCY MEDICINE | Admitting: EMERGENCY MEDICINE
Payer: COMMERCIAL

## 2019-02-04 ENCOUNTER — APPOINTMENT (OUTPATIENT)
Dept: GENERAL RADIOLOGY | Facility: CLINIC | Age: 44
End: 2019-02-04
Payer: COMMERCIAL

## 2019-02-04 VITALS
TEMPERATURE: 98.1 F | OXYGEN SATURATION: 93 % | SYSTOLIC BLOOD PRESSURE: 117 MMHG | RESPIRATION RATE: 16 BRPM | DIASTOLIC BLOOD PRESSURE: 75 MMHG | HEART RATE: 77 BPM

## 2019-02-04 DIAGNOSIS — S01.01XA LACERATION OF SCALP, INITIAL ENCOUNTER: ICD-10-CM

## 2019-02-04 DIAGNOSIS — S20.229A CONTUSION OF BACK, UNSPECIFIED LATERALITY, INITIAL ENCOUNTER: ICD-10-CM

## 2019-02-04 DIAGNOSIS — R42 DIZZINESS: ICD-10-CM

## 2019-02-04 DIAGNOSIS — S06.0X0A CONCUSSION WITHOUT LOSS OF CONSCIOUSNESS, INITIAL ENCOUNTER: ICD-10-CM

## 2019-02-04 PROCEDURE — 25000132 ZZH RX MED GY IP 250 OP 250 PS 637: Performed by: EMERGENCY MEDICINE

## 2019-02-04 PROCEDURE — 70450 CT HEAD/BRAIN W/O DYE: CPT

## 2019-02-04 PROCEDURE — 12002 RPR S/N/AX/GEN/TRNK2.6-7.5CM: CPT

## 2019-02-04 PROCEDURE — 72072 X-RAY EXAM THORAC SPINE 3VWS: CPT

## 2019-02-04 PROCEDURE — 25000128 H RX IP 250 OP 636: Performed by: EMERGENCY MEDICINE

## 2019-02-04 PROCEDURE — 96372 THER/PROPH/DIAG INJ SC/IM: CPT

## 2019-02-04 PROCEDURE — 25000131 ZZH RX MED GY IP 250 OP 636 PS 637: Performed by: EMERGENCY MEDICINE

## 2019-02-04 PROCEDURE — 99285 EMERGENCY DEPT VISIT HI MDM: CPT | Mod: 25

## 2019-02-04 RX ORDER — OXYCODONE AND ACETAMINOPHEN 5; 325 MG/1; MG/1
1 TABLET ORAL ONCE
Status: COMPLETED | OUTPATIENT
Start: 2019-02-04 | End: 2019-02-04

## 2019-02-04 RX ORDER — METHOCARBAMOL 750 MG/1
750 TABLET, FILM COATED ORAL ONCE
Status: COMPLETED | OUTPATIENT
Start: 2019-02-04 | End: 2019-02-04

## 2019-02-04 RX ORDER — METHOCARBAMOL 500 MG/1
1000 TABLET, FILM COATED ORAL 3 TIMES DAILY PRN
Qty: 30 TABLET | Refills: 0 | Status: SHIPPED | OUTPATIENT
Start: 2019-02-04 | End: 2019-03-21

## 2019-02-04 RX ORDER — ONDANSETRON 4 MG/1
4 TABLET, ORALLY DISINTEGRATING ORAL ONCE
Status: COMPLETED | OUTPATIENT
Start: 2019-02-04 | End: 2019-02-04

## 2019-02-04 RX ORDER — LIDOCAINE HYDROCHLORIDE AND EPINEPHRINE 10; 10 MG/ML; UG/ML
INJECTION, SOLUTION INFILTRATION; PERINEURAL
Status: DISCONTINUED
Start: 2019-02-04 | End: 2019-02-04 | Stop reason: HOSPADM

## 2019-02-04 RX ORDER — TRAMADOL HYDROCHLORIDE 50 MG/1
50 TABLET ORAL EVERY 6 HOURS PRN
Qty: 10 TABLET | Refills: 0 | Status: SHIPPED | OUTPATIENT
Start: 2019-02-04 | End: 2019-02-08

## 2019-02-04 RX ADMIN — OXYCODONE HYDROCHLORIDE AND ACETAMINOPHEN 1 TABLET: 5; 325 TABLET ORAL at 10:55

## 2019-02-04 RX ADMIN — METHOCARBAMOL 750 MG: 750 TABLET ORAL at 10:55

## 2019-02-04 RX ADMIN — ONDANSETRON 4 MG: 4 TABLET, ORALLY DISINTEGRATING ORAL at 12:17

## 2019-02-04 RX ADMIN — ONDANSETRON 4 MG: 4 TABLET, ORALLY DISINTEGRATING ORAL at 10:53

## 2019-02-04 RX ADMIN — HYDROMORPHONE HYDROCHLORIDE 1 MG: 1 INJECTION, SOLUTION INTRAMUSCULAR; INTRAVENOUS; SUBCUTANEOUS at 11:59

## 2019-02-04 ASSESSMENT — ENCOUNTER SYMPTOMS
NECK PAIN: 1
HEADACHES: 1
BACK PAIN: 1
NAUSEA: 1

## 2019-02-04 NOTE — ED NOTES
Bed: ED15  Expected date: 2/4/19  Expected time: 9:39 AM  Means of arrival: Ambulance  Comments:  [id number]

## 2019-02-04 NOTE — DISCHARGE INSTRUCTIONS
Discharge Instructions  Trauma    You were seen today for an injury due to some kind of trauma (crash, fall, etc.).  Some injuries may not show up until after you leave the Emergency Department.  It is important that you pay attention to these instructions and follow-up with your regular doctor as instructed.    Return to the Emergency Department right away if:  You have abdominal pain or bruises, chest pain, pain in a new area, or pain that is getting worse.  You get short of breath.  You develop a fever over 101 degrees.  You have weakness in your arms or legs.  You faint or you are very lightheaded.  You have any new symptoms, you are feeling weak or unusually ill, or something worries you.   Injuries to the brain are possible with any accident.  Return right away if you have confusion, vomiting more than once, difficulty walking or a headache that is getting worse. Bring a child or a person who can?t talk back if they seem to be behaving in an abnormal way.      MORE INFORMATION:    General Injuries:  Aches and pains are usually worse the day after your accident, but should not be severe, and should start getting better after that. Aches and pains are common in the neck and back.  Injuries from your accident may prevent you from working.  Follow-up with your regular doctor to get a work note and to find out how long you will not be able to work.  Pain medications or your injuries may make it unsafe for you to drive or operate machinery.  Use ice to injured areas for the first one or two days. Apply a bag of ice wrapped in a cloth for about 15 minutes at a time. You can do this as often as once an hour. Do not sleep with an ice pack, since it can burn you.   You can use non-prescription pain medicine, like Tylenol  (acetaminophen), Advil  (ibuprofen), Motrin  (ibuprofen), Nuprin  (ibuprofen) if your emergency doctor or your own doctor told you this is okay. Tylenol  (acetaminophen) is in many prescription  medicines and non-prescription medicines--check all of your medicines to be sure you aren?t taking more than 3000 mg per day.  Limit your activity for at least one or two days. Avoid doing things that hurt.  You need to see your doctor if any injured area is not back to normal in 1 week.    Car Accident:  If you have been on a backboard or had a neck collar on, this may make you stiff and sore.  This should get better in 1-2 days.  Return to the Emergency Department if the pain or discomfort is severe or gets worse.  Be careful of shards of glass on your body or in your belongings.    Fractures, Sprains, and Strains:  Return to the Emergency Department right away if your injured area gets more painful, if the splint or dressing seems to be too tight, if it gets numb or tingly past the injury, or if the area past the injury gets pale, blue, or cold.  Use your crutches if you were given them today. Don?t put weight on the injured area until the pain is gone.  Keep the injured area above the level of your heart while laying or sitting down.  This well help lessen the swelling (puffiness) and the pain.  You may use an elastic bandage (Ace  Wrap) if it makes you more comfortable. Wrap it just tight enough to provide mild compression, and loosen it if you get swelling past the bandage.    Note about X-rays: If you had x-rays done today, they were read by your emergency physician. They will also be read later by a radiologist. We will contact you if the radiologist thinks they show something different than the emergency physician did. Remember that there are some fractures (breaks in the bone) that can?t be seen right away. Even if your x-rays today were normal, you must see your doctor in clinic to re-check.     Splints:  A splint put on in the Emergency Department is temporary. Your regular doctor or orthopedic doctor will remove it, and replace it with a cast or boot if needed.  Keep the splint dry. Cover it with a  plastic bag when you wash. Even with a plastic bag, you still can?t get in water or let water get right on it. If it does get wet, you should come back or see your doctor to have it replaced.  Do not put objects inside the splint to scratch.  If there is an elastic bandage (Ace  Wrap) holding the splint on this may be loosened a little to relieve pressure or pain.  If pain continues return to the Emergency Department right away.  Return if the splint starts cutting into your skin.  Do not remove your splint by yourself unless told to by your doctor. You can?t take it off and put it back on again.     Wounds:  Infections can follow many injuries. Watch for fevers, redness spreading from the wound, pus or stitches that open up. Return here or see your doctor if these happen.  There can always be glass, wood, dirt or other things in any wound.  They won?t always show up even on x-rays.  If a wound doesn?t heal, this may be why, and it is important to follow-up with your regular doctor. Small pieces of glass or other materials may work their way out on their own.  Cuts or scrapes may start to bleed after leaving the Emergency Department.  If this happens, hold pressure on the bleeding area with a clean cloth or put pressure over the bandage.  If the bleeding doesn?t stop after you use constant pressure for   hour, you should return to the Emergency Department for further treatment.  Any bandage or dressing put on here should be removed in 12-24 hours, or as your doctor instructs. Remove the dressing sooner if it seems too tight or painful, or if it is getting numb, tingly, or pale past the dressing.  After you take off the dressing, wash the cut or scrape with soap and water once or twice a day.  Apply ointment like Bacitracin  (polypeptide antibiotic) to scrapes or cuts, and keep them covered with a Band-Aid  or gauze if possible, until they heal up or until your stitches are taken out.  Dermabond  or Steri-Strips   should be left alone and will come off by themselves.  Dissolving stitches should go away or fall out within about a week.  Regular stitches need to be taken out by your doctor in clinic.  Call today and schedule an appointment.  Leave your stitches in for as long as you were told today.    Most injuries are preventable!  As your local emergency physicians, we encourage you to:  Wear your seat belt.  Do not talk on your cell phone while driving.  Do not read or send text messages while driving.  Wear a bike or motorcycle helmet.  Wear a helmet while skiing and snowboarding.  Wear personal flotation devices at all times while on the water.  Always have your child in a car seat.  Do not allow children less than 12 years old to ride in the front seat.  Go to the CDC website to find more information on preventing injures:  http://www.cdc.gov/injury/index.html    If you were given a prescription for medicine here today, be sure to read all of the information (including the package insert) that comes with your prescription.  This will include important information about the medicine, its side effects, and any warnings that you need to know about.  The pharmacist who fills the prescription can provide more information and answer questions you may have about the medicine.  If you have questions or concerns that the pharmacist cannot address, please call or return to the Emergency Department.     Opioid Medication Information    Pain medications are among the most commonly prescribed medicines, so we are including this information for all our patients. If you did not receive pain medication or get a prescription for pain medicine, you can ignore it.     You may have been given a prescription for an opioid (narcotic) pain medicine and/or have received a pain medicine while here in the Emergency Department. These medicines can make you drowsy or impaired. You must not drive, operate dangerous equipment, or engage in any other  dangerous activities while taking these medications. If you drive while taking these medications, you could be arrested for DUI, or driving under the influence. Do not drink any alcohol while you are taking these medications.     Opioid pain medications can cause addiction. If you have a history of chemical dependency of any type, you are at a higher risk of becoming addicted to pain medications.  Only take these prescribed medications to treat your pain when all other options have been tried. Take it for as short a time and as few doses as possible. Store your pain pills in a secure place, as they are frequently stolen and provide a dangerous opportunity for children or visitors in your house to start abusing these powerful medications. We will not replace any lost or stolen medicine.  As soon as your pain is better, you should flush all your remaining medication.     Many prescription pain medications contain Tylenol  (acetaminophen), including Vicodin , Tylenol #3 , Norco , Lortab , and Percocet .  You should not take any extra pills of Tylenol  if you are using these prescription medications or you can get very sick.  Do not ever take more than 3000 mg of acetaminophen in any 24 hour period.    All opioids tend to cause constipation. Drink plenty of water and eat foods that have a lot of fiber, such as fruits, vegetables, prune juice, apple juice and high fiber cereal.  Take a laxative if you don?t move your bowels at least every other day. Miralax , Milk of Magnesia, Colace , or Senna  can be used to keep you regular.      Remember that you can always come back to the Emergency Department if you are not able to see your regular doctor in the amount of time listed above, if you get any new symptoms, or if there is anything that worries you.      Staples need to be removed in 7-10 days

## 2019-02-04 NOTE — ED AVS SNAPSHOT
[facility] Emergency Department  [address]  [facility] [address]  Phone:  [phone]  Fax:  [phone]                                    Melody Muñoz   MRN: [id number]    Department:  [facility] Emergency Department   Date of Visit:  2/4/2019           After Visit Summary Signature Page    I have received my discharge instructions, and my questions have been answered. I have discussed any challenges I see with this plan with the nurse or doctor.    ..........................................................................................................................................  Patient/Patient Representative Signature      ..........................................................................................................................................  Patient Representative Print Name and Relationship to Patient    ..................................................               ................................................  Date                                   Time    ..........................................................................................................................................  Reviewed by Signature/Title    ...................................................              ..............................................  Date                                               Time          22EPIC Rev 08/18

## 2019-02-04 NOTE — ED PROVIDER NOTES
History     Chief Complaint:  Fall      HPI   Melody Muñoz is a 43 year old female who presents to the emergency department today for evaluation of fall. The patient reports she was on her way to the dentist to follow up on her pain she was having after a tooth extraction. She was getting out of her car when she slipped on the ice hitting her posterior head on her car door. She immediatly had a throbbing pain in her head, nauseous, and felt like her vision was uneven and fuzzy. Due to this EMS was called and she presented to the emergency department today. En route she was given Zofran, 25 mcg of fentanyl, and her blood sugar was 119. She also endorses neck and back pain.     Allergies:  Hydrocodone        Medications:    acetaminophen-codeine (TYLENOL #3) 300-30 MG tablet  albuterol (2.5 MG/3ML) 0.083% nebulizer solution  almotriptan (AXERT) 6.25 MG tablet  benzonatate (TESSALON) 100 MG capsule  blood glucose (NO BRAND SPECIFIED) lancets standard  Butalbital-APAP-Caff-Cod -84-30 MG CAPS  carisoprodol (SOMA) 350 MG tablet  clonazePAM (KLONOPIN) 1 MG tablet  Continuous Blood Gluc  (FREESTYLE JOSE 14 DAY READER) PUMA  dextroamphetamine (DEXTROSTAT) 10 MG tablet  dulaglutide (TRULICITY) 0.75 MG/0.5ML pen  fluticasone (FLOVENT HFA) 110 MCG/ACT inhaler  guanFACINE (TENEX) 2 MG tablet  lamoTRIgine (LAMICTAL) 200 MG tablet  LATUDA 120 MG TABS tablet  levothyroxine (SYNTHROID/LEVOTHROID) 25 MCG tablet  lithium (ESKALITH) 450 MG CR tablet  MAGNESIUM CITRATE PO  meclizine (ANTIVERT) 25 MG tablet  MELATONIN PO  miconazole (MICATIN) 100 MG vaginal suppository  MICROLET LANCETS MISC  norgestimate-ethinyl estradiol (ORTHO-CYCLEN, SPRINTEC) 0.25-35 MG-MCG per tablet  Omega-3 Fatty Acids (FISH OIL OMEGA-3 PO)  ondansetron (ZOFRAN-ODT) 4 MG ODT tab  pantoprazole (PROTONIX) 40 MG EC tablet  POTASSIUM PO  pregabalin (LYRICA) 150 MG capsule  PROAIR  (90 Base) MCG/ACT inhaler  Promethazine HCl 50 MG  TABS  sertraline (ZOLOFT) 100 MG tablet  STATIN NOT PRESCRIBED, INTENTIONAL,  tamsulosin (FLOMAX) 0.4 MG capsule  topiramate (TOPAMAX) 50 MG tablet  traZODone (DESYREL) 100 MG tablet  venlafaxine (EFFEXOR-XR) 75 MG 24 hr capsule  VITAMIN D, CHOLECALCIFEROL, PO  zolpidem (AMBIEN) 10 MG tablet      Past Medical History:    Abnormal pap  Anxiety  Bipolar  Diabetes  Endometriosis  Intermittent asthma  Latent tuberculosis  Depression  Mantoux positive  Migraines  Sleep apnea  Substance abuse  Suicidal ideation      Past Surgical History:    Biopsy  Colonoscopy  Bladder sling  GYN Surgery  C section  Laparoscopic appendectomy  Little finger surgery left  Tubal ligation bilateral  [place] teeth removal      Family History:    Hypertension  Palpitations  Anxiety      Social History:  The patient was accompanied to the ED by EMS.  Smoking Status: Current Some Day Smoker  Smokeless Tobacco: Never Used  Alcohol Use: no   Marital Status:   [2]       Review of Systems   Gastrointestinal: Positive for nausea.   Musculoskeletal: Positive for back pain and neck pain.   Neurological: Positive for headaches.   All other systems reviewed and are negative.        Physical Exam     Patient Vitals for the past 24 hrs:   BP Temp Temp src Pulse Heart Rate Resp SpO2   02/04/19 1337 117/75 -- -- -- 71 16 --   02/04/19 1115 121/77 -- -- 77 -- -- 93 %   02/04/19 1100 124/77 -- -- 80 -- -- 97 %   02/04/19 1045 110/65 -- -- 78 -- -- 94 %   02/04/19 1030 109/68 -- -- 81 -- -- 96 %   02/04/19 1018 132/80 98.1  F (36.7  C) Oral -- 90 18 95 %        Physical Exam  General: The patient is alert, in no respiratory distress.    HENT: Mucous membranes moist. Tooth number 5 absent.     Cardiovascular: Regular rate and rhythm. Good pulses in all four extremities. Normal capillary refill and skin turgor.     Respiratory: Lungs are clear. No nasal flaring. No retractions. No wheezing, no crackles.    Gastrointestinal: Abdomen soft. No guarding, no  rebound. No palpable hernias.     Musculoskeletal: No gross deformity. Lower thoracic tenderness.    Skin: No rashes or petechiae. Linear abrasion of left occiput.     Neurologic: The patient is alert and oriented x3. GCS 15. No testable cranial nerve deficit. Follows commands with clear and appropriate speech. Gives appropriate answers. Good strength in all extremities. No gross neurologic deficit. Gross sensation intact. Pupils are round and reactive. No meningismus.     Lymphatic: No cervical adenopathy. No lower extremity swelling.    Psychiatric: The patient is non-tearful.     Emergency Department Course     Imaging:  Radiology findings were communicated with the patient who voiced understanding of the findings.    XR Thoracic Spine 3 Views:   IMPRESSION:  No acute osseous or alignment abnormality.  reading per radiology.      CT head w/o Contrast  IMPRESSION:   Normal CT scan of the head  Report per radiology        Procedures:     Laceration Repair      LACERATION:  A simple and superficial minimally Contaminated 3 cm laceration.    LOCATION:  Posterior scalp.    FUNCTION:  Distally sensation and circulation are intact.    ANESTHESIA:  Local using lidocaine with epinephrine.    PREPARATION:  Irrigation and Scrubbing with Normal Saline and Shur Clens.    DEBRIDEMENT:  wound explored, no foreign body found.    CLOSURE:  Wound was closed with 3 Staples.       Interventions:  1053 Zofran 4mg PO  1055 Robaxin 750 mg PO  1055 Percocet 5-325 mg per 1 tablet PO  1159 Dilaudid 1mg Intamuscular  1217 Zofran 4mg PO       Emergency Department Course:  Nursing notes and vitals reviewed.  1022: I performed an exam of the patient as documented above.   The patient was sent for a XR Thoracic spine and CT Head while in the emergency department, results above.    1151 Patient rechecked and updated.    1312 I performed the above laceration repair.  Patient rechecked and updated.    Findings and plan explained to the Patient.  Patient discharged home with instructions regarding supportive care, medications, and reasons to return. The importance of close follow-up was reviewed.   I personally reviewed the imaging results with the Patient and answered all related questions prior to discharge.   Impression & Plan      Medical Decision Making:  Melody Muñoz is a 43 year old female who presents having a dental problem. She was getting out of her car when she slipped and fell hitting her head against a door jam. She did suffer a scalp laceration, and describes clear signs of a concussion, but there was no loss of consciousness. I discussed the risks and benefits of CT scan, and the patient reports significant symptoms so I ordered a CT scan. X-rays were negative as well. The patient suffered a scalp laceration which was stapled here. She was discharged in good condition in care of her mother. We discussed head injury, and she was given instructions and will follow up with primary care. She will have the staple removed in 7-10 days.     Diagnosis:    ICD-10-CM    1. Concussion without loss of consciousness, initial encounter S06.0X0A    2. Dizziness R42    3. Contusion of back, unspecified laterality, initial encounter S20.229A    4. Laceration of scalp, initial encounter S01.01XA        Disposition:  Discharged to home.     Discharge Medications:      Scribe Disclosure:  I, Nicole Tee, am serving as a scribe at 11:25 AM on 2/4/2019 to document services personally performed by Jose Cruz Kearns MD based on my observations and the provider's statements to me.    Nicole Tee  2/4/2019   [facility] EMERGENCY DEPARTMENT       Jose Cruz Kearns MD  02/05/19 2903

## 2019-02-04 NOTE — ED NOTES
Patient educated on narcotic pain medicine, Tramadol, as well as medication instructions for Robaxin and follow-up with PCP. Educated to not drive or operate equipment while taking this medication. Patient educated that medication can make them drowsy or impaired. Educated that pain medications can cause addiction and that opioids can cause constipation, and to drink plenty of fluids and consume fiber.

## 2019-02-04 NOTE — ED NOTES
Patient presents via EMS after fall this morning around 08:45. Patient fell on ice as she was getting out of her car. Patient struck head, and has small laceration to back of head. Patient states dizziness and nausea. Patient also has dental pain, was supposed to see dentist at 10 today. Patient received 4 mg of Zofran and 25 mcg of Fentanyl en-route. ABCDs intact, alert and oriented x 4.

## 2019-02-05 ENCOUNTER — TELEPHONE (OUTPATIENT)
Dept: FAMILY MEDICINE | Facility: CLINIC | Age: 44
End: 2019-02-05

## 2019-02-05 ENCOUNTER — NURSE TRIAGE (OUTPATIENT)
Dept: NURSING | Facility: CLINIC | Age: 44
End: 2019-02-05

## 2019-02-05 DIAGNOSIS — G89.4 CHRONIC PAIN SYNDROME: Primary | ICD-10-CM

## 2019-02-05 NOTE — TELEPHONE ENCOUNTER
Called to say forgot to mention that when sajan fell not only did head hurt but lower med back then and now severe pain

## 2019-02-05 NOTE — TELEPHONE ENCOUNTER
Called patient to relay provider message: see below    Let us have the patient stop both her extra strength Tylenol and her tramadol.  I will send a prescription forTylenol with codeine to her pharmacy that she can take instead of those 2 medications.The medication will be for approximately 3 days.  At that point she can go back to the tramadol and 2 extra strength Tylenol up to 4 times a day.

## 2019-02-05 NOTE — TELEPHONE ENCOUNTER
Called patient and informed her that she should keep her appointment for tomorrow since she has a concussion. Patient agreed to keep appointment.

## 2019-02-05 NOTE — TELEPHONE ENCOUNTER
Patient Contact    Attempt # 1    Was call answered?  No.  Left message on voicemail with information to call triage back.

## 2019-02-05 NOTE — TELEPHONE ENCOUNTER
Reason for Call:  Other call back    Detailed comments: Melody called wondering if she should keep her 2/6/19 appointment.  Dr. Machado has prescribed some pain medication which she has filled.  Melody does not know if she should keep the 2/6/19 appointment and reschedule to a future date.    Please call her to advise.    Phone Number Patient can be reached at: Cell number on file:    Telephone Information:   Mobile [phone]       Best Time: any    Can we leave a detailed message on this number? YES    Call taken on 2/5/2019 at 12:17 PM by Mary Rodriguez

## 2019-02-05 NOTE — TELEPHONE ENCOUNTER
Reason for Call:  Other call back    Detailed comments: was in ER last night from fall and hit to head said meds she is taking are not strong enough for the head pain needs a call asap as to what to do-had appointment with [facility] this afternoon she cancelled as she said no ride    Phone Number Patient can be reached at: Home number on file [phone] (home)    Best Time: asap    Can we leave a detailed message on this number? YES    Call taken on 2/5/2019 at 8:21 AM by MADELIN PADRON

## 2019-02-05 NOTE — TELEPHONE ENCOUNTER
Patient called the clinic. She fell on ice yesterday, went to ER, now has 4 stapes in the head. Dx with concussion & dizziness. She hardly slept last night, too dizzy to drive (able to function at home), has pain 7/10. Taking tramadol every 6 hours given at the ER (has 7 pills left) and taking 2 extra strength tylenol every 4 hours. Asking if Tylenol #3 will help her? Requesting an Rx for something that will help with pain today.     Future Office Visit:   Next 5 appointments (look out 90 days)    Feb 06, 2019  9:30 AM CST  Office Visit with Evaristo Machado MD  [facility] [address] [place] [address] [phone]

## 2019-02-06 ENCOUNTER — OFFICE VISIT (OUTPATIENT)
Dept: FAMILY MEDICINE | Facility: CLINIC | Age: 44
End: 2019-02-06
Payer: COMMERCIAL

## 2019-02-06 VITALS
HEART RATE: 89 BPM | OXYGEN SATURATION: 95 % | TEMPERATURE: 100.3 F | RESPIRATION RATE: 16 BRPM | HEIGHT: 62 IN | BODY MASS INDEX: 30.18 KG/M2 | WEIGHT: 164 LBS | SYSTOLIC BLOOD PRESSURE: 108 MMHG | DIASTOLIC BLOOD PRESSURE: 70 MMHG

## 2019-02-06 DIAGNOSIS — S01.01XD LACERATION OF SCALP, SUBSEQUENT ENCOUNTER: ICD-10-CM

## 2019-02-06 DIAGNOSIS — G89.4 CHRONIC PAIN SYNDROME: ICD-10-CM

## 2019-02-06 DIAGNOSIS — J45.40 MODERATE PERSISTENT ASTHMA, UNSPECIFIED WHETHER COMPLICATED: ICD-10-CM

## 2019-02-06 DIAGNOSIS — S06.0X0D CONCUSSION WITHOUT LOSS OF CONSCIOUSNESS, SUBSEQUENT ENCOUNTER: ICD-10-CM

## 2019-02-06 DIAGNOSIS — E11.65 UNCONTROLLED TYPE 2 DIABETES MELLITUS WITH HYPERGLYCEMIA (H): ICD-10-CM

## 2019-02-06 LAB — HBA1C MFR BLD: 6.5 % (ref 0–5.6)

## 2019-02-06 PROCEDURE — 99214 OFFICE O/P EST MOD 30 MIN: CPT | Performed by: FAMILY MEDICINE

## 2019-02-06 PROCEDURE — 36415 COLL VENOUS BLD VENIPUNCTURE: CPT | Performed by: FAMILY MEDICINE

## 2019-02-06 PROCEDURE — 83036 HEMOGLOBIN GLYCOSYLATED A1C: CPT | Performed by: FAMILY MEDICINE

## 2019-02-06 PROCEDURE — 80061 LIPID PANEL: CPT | Performed by: FAMILY MEDICINE

## 2019-02-06 RX ORDER — FLUTICASONE PROPIONATE 220 UG/1
2 AEROSOL, METERED RESPIRATORY (INHALATION) 2 TIMES DAILY
Qty: 2 INHALER | Refills: 3 | Status: SHIPPED | OUTPATIENT
Start: 2019-02-06 | End: 2019-03-11

## 2019-02-06 ASSESSMENT — PATIENT HEALTH QUESTIONNAIRE - PHQ9
SUM OF ALL RESPONSES TO PHQ QUESTIONS 1-9: 11
10. IF YOU CHECKED OFF ANY PROBLEMS, HOW DIFFICULT HAVE THESE PROBLEMS MADE IT FOR YOU TO DO YOUR WORK, TAKE CARE OF THINGS AT HOME, OR GET ALONG WITH OTHER PEOPLE: SOMEWHAT DIFFICULT
SUM OF ALL RESPONSES TO PHQ QUESTIONS 1-9: 11

## 2019-02-06 ASSESSMENT — ANXIETY QUESTIONNAIRES
2. NOT BEING ABLE TO STOP OR CONTROL WORRYING: MORE THAN HALF THE DAYS
GAD7 TOTAL SCORE: 13
3. WORRYING TOO MUCH ABOUT DIFFERENT THINGS: MORE THAN HALF THE DAYS
5. BEING SO RESTLESS THAT IT IS HARD TO SIT STILL: NOT AT ALL
1. FEELING NERVOUS, ANXIOUS, OR ON EDGE: MORE THAN HALF THE DAYS
GAD7 TOTAL SCORE: 13
7. FEELING AFRAID AS IF SOMETHING AWFUL MIGHT HAPPEN: MORE THAN HALF THE DAYS
4. TROUBLE RELAXING: MORE THAN HALF THE DAYS
6. BECOMING EASILY ANNOYED OR IRRITABLE: NEARLY EVERY DAY
GAD7 TOTAL SCORE: 13

## 2019-02-06 ASSESSMENT — MIFFLIN-ST. JEOR: SCORE: 1357.15

## 2019-02-06 NOTE — TELEPHONE ENCOUNTER
Fell yesterday and hit head. Seen at ER for concussion and scalp laceration. 4 staples to close laceration. States tonight she is dizzy, tired and head really hurts. CT negative at ED. Pt states symptoms are the same as yesterday. Denies worsening of sx Speech clear, alert/oriented x3, no vomiting. Advised home care per guideline; call if new sx, worsening sx or more questions. Scheduled w/ PCP tomorrow for follow up. Intends to keep this appt. Liss Vargas RN/FNA      Additional Information    Negative: Weakness (i.e., paralysis, loss of muscle strength) of the face, arm or leg on one side of the body    Negative: Loss of speech or garbled speech    Negative: Difficult to awaken or acting confused  (e.g., disoriented, slurred speech)    Negative: Sounds like a life-threatening emergency to the triager    Negative: [1] Concussion suspected AND [2] has not been examined by a HCP    Negative: [1] Mild traumatic brain injury (mTBI; concussion) AND [2] more than 14 days since head injury    Negative: [1] Black eyes on both sides AND [2] onset within 24 hours of head injury    Negative: Concussion symptoms are worsening    Negative: [1] Knocked out (unconscious) > 1 minute AND [2] no head CT Scan or MRI has been performed    Negative: [1] Vomiting once or more AND [2] no head CT Scan or MRI has been performed    Negative: [1] Unsteady on feet (e.g., unable to stand or requires support to walk) AND [2] no head CT Scan or MRI has been performed    Negative: [1] Neck pain after dangerous injury (e.g., MVA, diving, trampoline, contact sports, fall > 10 feet or 3 meters) AND [2] no neck xray has been performed (e.g., c-spine xray or CT)    Negative: Patient sounds very sick or weak to the triager    Negative: [1] SEVERE headache (e.g., excruciating, pain scale 8-10) AND [2] not improved after pain medications    Negative: [1] Concussion symptoms SAME (not worse) AND [2]  taking Coumadin (warfarin) or other strong blood  thinner, or known bleeding disorder (e.g., thrombocytopenia)    Negative: [1] Concussion symptoms SAME (not worse) AND [2]  known bleeding disorder (e.g., thrombocytopenia)    Negative: [1] Concussion symptoms staying SAME (not worse) AND [2] age > 60 years    Negative: [1] Concussion symptoms staying SAME (not worse or better) AND [2] present > 3 days    Negative: [1] Concussion symptoms getting BETTER (improving) BUT [2] present > 2 weeks    [1] Concussion symptoms staying SAME  (not worse or better) AND [2] present < 3 days (all triage questions negative)    Protocols used: CONCUSSION (MTBI) LESS THAN 14 DAYS AGO FOLLOW-UP CALL-ADULT-AH

## 2019-02-06 NOTE — NURSING NOTE
"Chief Complaint   Patient presents with     Hospital F/U     /70   Pulse 89   Temp 100.3  F (37.9  C) (Tympanic)   Resp 16   Ht 1.575 m (5' 2\")   Wt 74.4 kg (164 lb)   LMP  (LMP Unknown)   SpO2 95%   Breastfeeding? No   BMI 30.00 kg/m   Estimated body mass index is 30 kg/m  as calculated from the following:    Height as of this encounter: 1.575 m (5' 2\").    Weight as of this encounter: 74.4 kg (164 lb).  BP completed using cuff size: harshal Odell CMA    Health Maintenance Due   Topic Date Due     EYE EXAM Q1 YEAR  10/25/1977     ADVANCE DIRECTIVE PLANNING Q5 YRS  10/25/1994     URINE DRUG SCREEN Q1 YR  06/02/2016     LIPID MONITORING Q1 YEAR  11/10/2018     ASTHMA CONTROL TEST Q6 MOS  12/19/2018     Health Maintenance reviewed at today's visit patient asked to schedule/complete:   Asthma:  Patient agrees to schedule  Diabetes:  Patient agrees to schedule    "

## 2019-02-06 NOTE — PROGRESS NOTES
SUBJECTIVE:   Melody Muñoz is a 43 year old female who presents to clinic today for the following health issues:    ED/UC Followup:    Facility:  [facility]  Date of visit: 02/04/19  Reason for visit: Concussion without Loss of Consciousness after Fall  Current Status: Not Better     Concussion      Duration: 02/04/19    Description (location/character/radiation): Concussion from Fall    Intensity:  mild    Accompanying signs and symptoms: Dizziness, Headache, Short Term Memory,Blurry Vision    History (similar episodes/previous evaluation): None    Precipitating or alleviating factors: None    Therapies tried and outcome: Tylenol#3,Tramodol and Robaxin/Some Relief    Diabetes Follow-up    Patient is checking blood sugars: once daily.  Results are as follows:    110-140    Diabetic concerns: None     Symptoms of hypoglycemia (low blood sugar): none     Paresthesias (numbness or burning in feet) or sores: No     Date of last diabetic eye exam: 2018    BP Readings from Last 2 Encounters:   02/06/19 108/70   02/04/19 117/75     Hemoglobin A1C (%)   Date Value   10/01/2018 9.6 (H)   06/19/2018 7.5 (H)     LDL Cholesterol Calculated (mg/dL)   Date Value   11/10/2017 133 (H)   06/14/2016     Cannot estimate LDL when triglyceride exceeds 400 mg/dL   Above desirable:  100-129 mg/dl   Borderline High:  130-159 mg/dL   High:             160-189 mg/dL   Very high:       >189 mg/dl         Diabetes Management Resources  Asthma Follow-Up    Was ACT completed today?    Yes    ACT Total Scores 2/6/2019   ACT TOTAL SCORE -   ASTHMA ER VISITS -   ASTHMA HOSPITALIZATIONS -   ACT TOTAL SCORE (Goal Greater than or Equal to 20) 16   In the past 12 months, how many times did you visit the emergency room for your asthma without being admitted to the hospital? 0   In the past 12 months, how many times were you hospitalized overnight because of your asthma? 0       Recent asthma triggers that patient is dealing with: Question of  allergies        Problem list and histories reviewed & adjusted, as indicated.  Additional history: as documented    Patient Active Problem List   Diagnosis     Endometriosis s/po EDWIGE 10-17     Mantoux: positive     Latent tuberculosis     Major depression     Generalized anxiety disorder     Constipation     Nightmares     Knee pain     Bipolar 1 disorder  with psychosis     Chronic fatigue     Female stress incontinence     Drug-seeking behavior     Vertigo     Suicidal ideation     Acne     Chronic pain syndrome     Insomnia     Chronic migraine without aura without status migrainosus, not intractable     Nausea     JASWANT (obstructive sleep apnea)     Elevated liver enzymes     TMJ (temporomandibular joint syndrome)     Hypothyroidism due to acquired atrophy of thyroid     Hostile behavior     Mild persistent asthma without complication     Uncontrolled type 2 diabetes mellitus with hyperglycemia (H)     Allergic rhinitis     Incontinence of urine in female     Migraine     Screening for diabetic peripheral neuropathy     Need for prophylactic vaccination against Streptococcus pneumoniae (pneumococcus)     Sepsis (H)     Abdominal pain     Morbid obesity (H)     Former tobacco use     Mixed simple and mucopurulent chronic bronchitis (H): Spirometry 16 lung age = 60y/o in 41 y/o FVC=90%&FEV1=78%     Class 2 obesity due to excess calories with serious comorbidity and body mass index (BMI) of 36.0 to 36.9 in adult     Mixed hyperlipidemia     Acquired hypothyroidism     Dysuria     Past Surgical History:   Procedure Laterality Date     BIOPSY       COLONOSCOPY       GENITOURINARY SURGERY  May/2014    bladder sling     GYN SURGERY      laparoscopy- endometriosis     GYN SURGERY       for twins     LAPAROSCOPIC APPENDECTOMY N/A 2017    Procedure: LAPAROSCOPIC APPENDECTOMY;  LAPAROSCOPIC APPENDECTOMY and resection of epiploic tag;  Surgeon: Roberto Robins MD;  Location:  OR     [facility]  "finger surgery left  1980     tubal ligation bilateral       wisdom teeth removal         Social History     Tobacco Use     Smoking status: Current Some Day Smoker     Packs/day: 0.25     Years: 17.00     Pack years: 4.25     Types: Cigarettes     Smokeless tobacco: Never Used   Substance Use Topics     Alcohol use: No     Comment: no etoh since 2013     Family History   Problem Relation Age of Onset     Hypertension Mother      Heart Disease Father         palpitations     Depression Sister      Anxiety Disorder Sister      Heart Disease Maternal Grandmother         CHF     Cancer Maternal Grandfather 72        Pancreatic Cancer     Alzheimer Disease Paternal Grandfather      Bipolar Disorder Other      Autism Spectrum Disorder Other            Reviewed and updated as needed this visit by clinical staff  Tobacco  Allergies  Meds  Problems  Med Hx  Surg Hx  Fam Hx  Soc Hx        Reviewed and updated as needed this visit by Provider         ROS:  Constitutional, HEENT, cardiovascular, pulmonary, gi and gu systems are negative, except as otherwise noted.    OBJECTIVE:                                                    /70   Pulse 89   Temp 100.3  F (37.9  C) (Tympanic)   Resp 16   Ht 1.575 m (5' 2\")   Wt 74.4 kg (164 lb)   LMP  (LMP Unknown)   SpO2 95%   Breastfeeding? No   BMI 30.00 kg/m    Body mass index is 30 kg/m .  GENERAL APPEARANCE: healthy, alert and no distress  SKIN: Laceration on the back of the scalp is healing nicely.  Staples are in place and there is no sign of any infection.  NEURO: Normal strength and tone, mentation intact, speech normal, DTR symmetrically normal in lower extremities and gait is normal         ASSESSMENT/PLAN:                                                        ICD-10-CM    1. Concussion without loss of consciousness, subsequent encounter S06.0X0D    2. Laceration of scalp, subsequent encounter S01.01XD    3. Uncontrolled type 2 diabetes mellitus with " hyperglycemia (H) E11.65 Hemoglobin A1c     Lipid Profile     CANCELED: Albumin Random Urine Quantitative with Creat Ratio   4. Chronic pain syndrome G89.4 acetaminophen-codeine (TYLENOL #3) 300-30 MG tablet   5. Moderate persistent asthma, unspecified whether complicated J45.40 fluticasone (FLOVENT HFA) 220 MCG/ACT inhaler       Patient Instructions   The patient will return in a week for staple removal of her laceration on the back of her scalp.  She will follow-up in 1 month on her asthma.  I increased her Flovent to 220 mcg 2 puffs twice daily.  I also refilled her Tylenol with codeine for her headaches from her recent fall.      Evaristo Machado MD  [facility]    Answers for HPI/ROS submitted by the patient on 2/6/2019   ROWDY 7 TOTAL SCORE: 13  If you checked off any problems, how difficult have these problems made it for you to do your work, take care of things at home, or get along with other people?: Somewhat difficult  PHQ9 TOTAL SCORE: 11

## 2019-02-06 NOTE — PATIENT INSTRUCTIONS
The patient will return in a week for staple removal of her laceration on the back of her scalp.  She will follow-up in 1 month on her asthma.  I increased her Flovent to 220 mcg 2 puffs twice daily.  I also refilled her Tylenol with codeine for her headaches from her recent fall.

## 2019-02-07 DIAGNOSIS — G89.4 CHRONIC PAIN SYNDROME: ICD-10-CM

## 2019-02-07 LAB
CHOLEST SERPL-MCNC: 186 MG/DL
HDLC SERPL-MCNC: 36 MG/DL
LDLC SERPL CALC-MCNC: 75 MG/DL
NONHDLC SERPL-MCNC: 150 MG/DL
TRIGL SERPL-MCNC: 376 MG/DL

## 2019-02-07 NOTE — TELEPHONE ENCOUNTER
Melody is calling to check on status of refill request. States Dr. Machado accidentally prescribed tylenol with codeine again instead of the tramadol.

## 2019-02-07 NOTE — TELEPHONE ENCOUNTER
Reason for Call:  Medication or medication refill:    Do you use a [facility]?  Name of the pharmacy and phone number for the current request:  [facility]    Name of the medication requested: traMADol (ULTRAM) 50 MG tablet    Other request: na    Can we leave a detailed message on this number? YES    Phone number patient can be reached at: Home number on file [phone] (home)    Best Time: any    Call taken on 2/7/2019 at 1:10 PM by MARIAN FUENTES

## 2019-02-07 NOTE — TELEPHONE ENCOUNTER
traMADol (ULTRAM) 50 MG tablet   Last Written Prescription Date:  2/4/2019  Last Fill Quantity: 10,   # refills: 0  Last Office Visit:2/6/2019 with  Evaristo Machado MD  Future Office visit:  3/6/2019  Next 5 appointments (look out 90 days)    Feb 13, 2019 10:00 AM CST  SHORT with BX NURSE  [facility] [address] [place] [phone]  [phone]   Mar 06, 2019 10:15 AM CST  SHORT with Evaristo Machado MD  [facility]) [address] [place] [phone]  [phone]           Routing refill request to provider for review/approval because:  Drug not on the G, P or [facility] refill protocol or controlled substance    Requested Prescriptions   Pending Prescriptions Disp Refills     traMADol (ULTRAM) 50 MG tablet 10 tablet 0     Sig: Take 1 tablet (50 mg) by mouth every 6 hours as needed for severe pain    There is no refill protocol information for this order

## 2019-02-08 ENCOUNTER — TELEPHONE (OUTPATIENT)
Dept: FAMILY MEDICINE | Facility: CLINIC | Age: 44
End: 2019-02-08

## 2019-02-08 RX ORDER — TRAMADOL HYDROCHLORIDE 50 MG/1
50 TABLET ORAL EVERY 6 HOURS PRN
Qty: 10 TABLET | Refills: 0 | Status: SHIPPED | OUTPATIENT
Start: 2019-02-08 | End: 2019-02-21

## 2019-02-08 ASSESSMENT — ASTHMA QUESTIONNAIRES: ACT_TOTALSCORE: 16

## 2019-02-08 ASSESSMENT — ANXIETY QUESTIONNAIRES: GAD7 TOTAL SCORE: 13

## 2019-02-08 NOTE — TELEPHONE ENCOUNTER
Please call Melody back  She would like this today.  -She might have a bruise under eye and it hurts. [phone]

## 2019-02-08 NOTE — TELEPHONE ENCOUNTER
Controlled Substance Refill Request for traMADol (ULTRAM) 50 MG tablet   Last Written Prescription Date:  2/4/2019  Last Fill Quantity: 10,   # refills: 0  Last Office Visit:2/6/2019 with  Evaristo Machado MD  Future Office visit:  3/6/2019      Next 5 appointments (look out 90 days)    Feb 13, 2019 10:00 AM CST  [facility] with BX NURSE  [facility] [address] [place] [phone]  [phone]   Mar 06, 2019 10:15 AM CST  SHORT with Evaristo Machado MD  [facility] [address] [place] [phone]  [phone]       Problem List Complete:  Yes   Patient is followed by VIRAL Deleon for ongoing prescription of pain medication.  All refills should be approved by this provider, or covering partner.    Medication(s):  Lyrica 150 mg bid, Fioricet with codeine prn, klonopin and ambien to be prescribed by [facility], not [facility].  Maximum quantity per month: 60, 20  Clinic visit frequency required: Q 3 months     Controlled substance agreement on file: Yes       Date(s): 9/8/2015  New CSA needed.  Pain Clinic evaluation in the past: No    DIRE Total Score(s):  No flowsheet data found.    Last [facility] website verification: 2/8/19 multiple meds from multiple outside providers     checked in past 3 months?  Yes 2/8/19      [place] reviewed:  reviewed- recommend provider review    [place]:  [email or web address]  Routing refill request to provider for review/approval because:  Drug not on the [facility] refill protocol

## 2019-02-08 NOTE — TELEPHONE ENCOUNTER
Pt called again states needs tramadol asap please call her is angry no one has responded as of her 8:43 AM call

## 2019-02-08 NOTE — TELEPHONE ENCOUNTER
She saw Dr Machado on 2/5 and his visit note clearly states that he intended to fill the Rx for Tylenol # 3.  MPMP shows that she filled that Rx on 2/5 for the # 20 tabs.  She also had an Rx for Tramadol filled on 2/4 for # 10 tabs.   I will refill this one time only, needs to last until Dr Machado is back on Tuesday

## 2019-02-08 NOTE — TELEPHONE ENCOUNTER
Pt called to check status of tramadol prescription. She was informed Rx was approved and faxed today.      traMADol (ULTRAM) 50 MG tablet 10 tablet 0 2/8/2019     She also notes dizziness, HA , eye pain and sensitivity to  Light. Notes she had a concussion . Reviewed warning signs and asked that seek care at ED if any of the following symptoms.       Repeated vomiting (it s common to vomit once after a head injury)    Headache or dizziness that is severe or gets worse    Loss of consciousness    Unusual drowsiness, or unable to wake up as usual    Weakness or decreased ability to walk or move any limb    Confusion, agitation, or change in behavior or speech, or memory loss    Blurred vision    Convulsion (seizure)    Swelling on the scalp or face that gets worse    Changes in pupil size (the black part of the eye)    Fluid draining from or bleeding from the nose or ears    Follow up with PCP if:     Headache or dizziness that won t go away    Pt verbalized understating and agreement with plan.

## 2019-02-09 ENCOUNTER — NURSE TRIAGE (OUTPATIENT)
Dept: NURSING | Facility: CLINIC | Age: 44
End: 2019-02-09

## 2019-02-09 NOTE — TELEPHONE ENCOUNTER
Fell on ice on mon, 4 staples, 1 coming out,what should I do? Not bleeding. Staple raised up and wiggly but attached on both sides, told to wash hair carefully and leave the staple alone.  She said I have 1 more question, it's about the concussion.  I am very sensitive to light and tired, can't remember when I read a book, and I am so tired. I am having headaches, that rate 3-5/10 even with Tramadol.  Per protocol, care advice given with verbalized understanding.  recommendation is to be seen within 3 days, and pt has a follow up on Tuesday 2/12.    Linnea Parsons RN/ [facility]    Reason for Disposition    [1] Concussion symptoms staying SAME (not worse or better) AND [2] present > 3 days    Additional Information    Negative: [1] Black eyes on both sides AND [2] onset within 24 hours of head injury    Negative: Concussion symptoms are worsening    Negative: [1] Knocked out (unconscious) > 1 minute AND [2] no head CT Scan or MRI has been performed    Negative: [1] Vomiting once or more AND [2] no head CT Scan or MRI has been performed    Negative: [1] Unsteady on feet (e.g., unable to stand or requires support to walk) AND [2] no head CT Scan or MRI has been performed    Negative: [1] Neck pain after dangerous injury (e.g., MVA, diving, trampoline, contact sports, fall > 10 feet or 3 meters) AND [2] no neck xray has been performed (e.g., c-spine xray or CT)    Negative: Patient sounds very sick or weak to the triager    Negative: [1] SEVERE headache (e.g., excruciating, pain scale 8-10) AND [2] not improved after pain medications    Negative: [1] Concussion symptoms SAME (not worse) AND [2]  taking Coumadin (warfarin) or other strong blood thinner, or known bleeding disorder (e.g., thrombocytopenia)    Negative: [1] Concussion symptoms SAME (not worse) AND [2]  known bleeding disorder (e.g., thrombocytopenia)    Negative: [1] Concussion symptoms staying SAME (not worse) AND [2] age > 60 years     Negative: Weakness (i.e., paralysis, loss of muscle strength) of the face, arm or leg on one side of the body    Negative: Loss of speech or garbled speech    Negative: Difficult to awaken or acting confused  (e.g., disoriented, slurred speech)    Negative: Sounds like a life-threatening emergency to the triager    Protocols used: CONCUSSION (MTBI) LESS THAN 14 DAYS AGO FOLLOW-UP CALL-ADULT-AH

## 2019-02-11 ENCOUNTER — OFFICE VISIT (OUTPATIENT)
Dept: FAMILY MEDICINE | Facility: CLINIC | Age: 44
End: 2019-02-11
Payer: COMMERCIAL

## 2019-02-11 ENCOUNTER — HOSPITAL ENCOUNTER (EMERGENCY)
Facility: CLINIC | Age: 44
Discharge: HOME | End: 2019-02-11
Attending: EMERGENCY MEDICINE | Admitting: EMERGENCY MEDICINE
Payer: COMMERCIAL

## 2019-02-11 ENCOUNTER — APPOINTMENT (OUTPATIENT)
Dept: CT IMAGING | Facility: CLINIC | Age: 44
End: 2019-02-11
Attending: EMERGENCY MEDICINE
Payer: COMMERCIAL

## 2019-02-11 ENCOUNTER — TELEPHONE (OUTPATIENT)
Dept: FAMILY MEDICINE | Facility: CLINIC | Age: 44
End: 2019-02-11

## 2019-02-11 VITALS
SYSTOLIC BLOOD PRESSURE: 114 MMHG | HEART RATE: 106 BPM | DIASTOLIC BLOOD PRESSURE: 80 MMHG | OXYGEN SATURATION: 98 % | RESPIRATION RATE: 16 BRPM | TEMPERATURE: 98.8 F

## 2019-02-11 VITALS
OXYGEN SATURATION: 96 % | RESPIRATION RATE: 16 BRPM | SYSTOLIC BLOOD PRESSURE: 111 MMHG | TEMPERATURE: 98.3 F | DIASTOLIC BLOOD PRESSURE: 74 MMHG | HEART RATE: 79 BPM

## 2019-02-11 DIAGNOSIS — G89.4 CHRONIC PAIN SYNDROME: ICD-10-CM

## 2019-02-11 DIAGNOSIS — G44.319 ACUTE POST-TRAUMATIC HEADACHE, NOT INTRACTABLE: ICD-10-CM

## 2019-02-11 DIAGNOSIS — S06.0X0D CONCUSSION WITHOUT LOSS OF CONSCIOUSNESS, SUBSEQUENT ENCOUNTER: Primary | ICD-10-CM

## 2019-02-11 PROCEDURE — 70450 CT HEAD/BRAIN W/O DYE: CPT

## 2019-02-11 PROCEDURE — 25000132 ZZH RX MED GY IP 250 OP 250 PS 637: Performed by: EMERGENCY MEDICINE

## 2019-02-11 PROCEDURE — 99214 OFFICE O/P EST MOD 30 MIN: CPT | Performed by: FAMILY MEDICINE

## 2019-02-11 PROCEDURE — 96374 THER/PROPH/DIAG INJ IV PUSH: CPT

## 2019-02-11 PROCEDURE — 96361 HYDRATE IV INFUSION ADD-ON: CPT

## 2019-02-11 PROCEDURE — 99285 EMERGENCY DEPT VISIT HI MDM: CPT | Mod: 25

## 2019-02-11 PROCEDURE — 96375 TX/PRO/DX INJ NEW DRUG ADDON: CPT

## 2019-02-11 PROCEDURE — 25000128 H RX IP 250 OP 636: Performed by: EMERGENCY MEDICINE

## 2019-02-11 RX ORDER — METOCLOPRAMIDE HYDROCHLORIDE 5 MG/ML
5 INJECTION INTRAMUSCULAR; INTRAVENOUS ONCE
Status: DISCONTINUED | OUTPATIENT
Start: 2019-02-11 | End: 2019-02-11 | Stop reason: HOSPADM

## 2019-02-11 RX ORDER — OXYCODONE HYDROCHLORIDE 5 MG/1
5 TABLET ORAL ONCE
Status: COMPLETED | OUTPATIENT
Start: 2019-02-11 | End: 2019-02-11

## 2019-02-11 RX ORDER — SODIUM CHLORIDE 9 MG/ML
1000 INJECTION, SOLUTION INTRAVENOUS CONTINUOUS
Status: DISCONTINUED | OUTPATIENT
Start: 2019-02-11 | End: 2019-02-11 | Stop reason: HOSPADM

## 2019-02-11 RX ORDER — ONDANSETRON 2 MG/ML
4 INJECTION INTRAMUSCULAR; INTRAVENOUS ONCE
Status: COMPLETED | OUTPATIENT
Start: 2019-02-11 | End: 2019-02-11

## 2019-02-11 RX ORDER — DEXAMETHASONE SODIUM PHOSPHATE 10 MG/ML
10 INJECTION, SOLUTION INTRAMUSCULAR; INTRAVENOUS ONCE
Status: COMPLETED | OUTPATIENT
Start: 2019-02-11 | End: 2019-02-11

## 2019-02-11 RX ORDER — LIDOCAINE HYDROCHLORIDE 40 MG/ML
0.5 INJECTION, SOLUTION RETROBULBAR ONCE
Status: DISCONTINUED | OUTPATIENT
Start: 2019-02-11 | End: 2019-02-11 | Stop reason: ALTCHOICE

## 2019-02-11 RX ORDER — KETOROLAC TROMETHAMINE 15 MG/ML
15 INJECTION, SOLUTION INTRAMUSCULAR; INTRAVENOUS ONCE
Status: COMPLETED | OUTPATIENT
Start: 2019-02-11 | End: 2019-02-11

## 2019-02-11 RX ORDER — LIDOCAINE HYDROCHLORIDE 20 MG/ML
2 INJECTION, SOLUTION EPIDURAL; INFILTRATION; INTRACAUDAL; PERINEURAL ONCE
Status: DISCONTINUED | OUTPATIENT
Start: 2019-02-11 | End: 2019-02-11 | Stop reason: HOSPADM

## 2019-02-11 RX ORDER — LIDOCAINE HYDROCHLORIDE 40 MG/ML
0.5 SOLUTION TOPICAL ONCE
Status: DISCONTINUED | OUTPATIENT
Start: 2019-02-11 | End: 2019-02-11

## 2019-02-11 RX ORDER — DIPHENHYDRAMINE HYDROCHLORIDE 50 MG/ML
25 INJECTION INTRAMUSCULAR; INTRAVENOUS ONCE
Status: DISCONTINUED | OUTPATIENT
Start: 2019-02-11 | End: 2019-02-11 | Stop reason: HOSPADM

## 2019-02-11 RX ADMIN — KETOROLAC TROMETHAMINE 15 MG: 15 INJECTION, SOLUTION INTRAMUSCULAR; INTRAVENOUS at 14:36

## 2019-02-11 RX ADMIN — OXYCODONE HYDROCHLORIDE 5 MG: 5 TABLET ORAL at 16:35

## 2019-02-11 RX ADMIN — DEXAMETHASONE SODIUM PHOSPHATE 10 MG: 10 INJECTION, SOLUTION INTRAMUSCULAR; INTRAVENOUS at 16:52

## 2019-02-11 RX ADMIN — ONDANSETRON 4 MG: 2 INJECTION INTRAMUSCULAR; INTRAVENOUS at 14:28

## 2019-02-11 RX ADMIN — SODIUM CHLORIDE 1000 ML: 9 INJECTION, SOLUTION INTRAVENOUS at 14:24

## 2019-02-11 ASSESSMENT — ENCOUNTER SYMPTOMS
VOMITING: 1
FEVER: 0
DIZZINESS: 1
NECK PAIN: 1
HEADACHES: 1
CONFUSION: 1

## 2019-02-11 NOTE — ED NOTES
"Patient has requested \"IV dilaudid\" multiple times since arrival.  Refusing any other adjuncts for pain control.   "

## 2019-02-11 NOTE — ED NOTES
"Entered room with MD.  Patient stated pain is \"not much better\".  When this nurse questioned if pain has increased from the 2/10 minutes ago patient stated, \"I never said it was a 2.  I wouldn't have said that. I must have a concussion if I said that.\"  Patient had been resting comfortably minutes ago.  She has requested 3 times to go home. When asking what patient would rate her pain at at this time patient states, \"I never really know how to rate pain using the pain scale.\"  Patient had rated pain numerous times during her stay today without hesitating or stating has trouble with pain scales.  States, \"I can tell you this.  It's moderate to severe.\"   "

## 2019-02-11 NOTE — TELEPHONE ENCOUNTER
"Pt called reporting dizziness , vomiting 3 -4 times today, feeling disoriented, having pressure behind her eyes. \"Im just not feeling my self\". She was seen today at clinic. Advised she seek care at ED with current symptoms. 911 was called per pt's request. She was informed 911 was called and on heir way to assist her.   "

## 2019-02-11 NOTE — ED NOTES
"Ambulated halls with steady gait, states \"some\" dizziness which has been present for past few weeks. Denies nausea.  Patient declining getting back into bed.  Is putting clothes on at this time.   "

## 2019-02-11 NOTE — ED NOTES
Patient placed call light on.  Transport answered call light and informed nurse patient states is ready to go home.

## 2019-02-11 NOTE — ED NOTES
"Patient refused to go to CT to transport; stating is not happy with pain meds.  Updated patient we are awaiting meds from pharmacy.  MD will administer lidocaine to nares.  Patient stated, \"what's that going to help?  Let's just go to the CT now then.\"  Headache pain 8/10 \"to entire head\".   "

## 2019-02-11 NOTE — ED AVS SNAPSHOT
[facility] Department  [address]  [facility] [address]  Phone:  [phone]  Fax:  [phone]                                    Melody Muñoz   MRN: [id number]    Department:  [facility] Department   Date of Visit:  2/11/2019           After Visit Summary Signature Page    I have received my discharge instructions, and my questions have been answered. I have discussed any challenges I see with this plan with the nurse or doctor.    ..........................................................................................................................................  Patient/Patient Representative Signature      ..........................................................................................................................................  Patient Representative Print Name and Relationship to Patient    ..................................................               ................................................  Date                                   Time    ..........................................................................................................................................  Reviewed by Signature/Title    ...................................................              ..............................................  Date                                               Time          22EPIC Rev 08/18

## 2019-02-11 NOTE — ED TRIAGE NOTES
Pt arrvies with HA. Pt with fall on 2/4 seen here. Was at clinic getting staples out. Pt states after visit became nauseated and headache became much worse. States headache is behind eyes, noise, lights makes it worse. ABCs intact.

## 2019-02-11 NOTE — ED NOTES
Bed: ED15  Expected date: 2/11/19  Expected time:   Means of arrival: Ambulance  Comments:  Maggie Dennis

## 2019-02-11 NOTE — PROGRESS NOTES
SUBJECTIVE:   Melody Muñoz is a 43 year old female who presents to clinic today for the following health issues:    Patient was scheduled to see Dr. Machado tomorrow but rescheduled for today as weather is going to be bad.     Planning to taper off pain meds but once she is feeling better from her recent fall/concussion.    She had called Friday to get refill of the Tramadol picked up that Rx for # 10 tabs.  She states she has about 5 left    She feels she gets more side effects from the Tramadol, that makes her feel foggy and drowsy and she has that feeling from her head injury anyway.  She wants refill of the Tylenol # 3 to take for now, but then wants to taper off of that    Staples placed at ED visit. Would like them removed today.    ED/UC Followup:    Facility:  [facility]  Date of visit: 02/04/2019  Reason for visit: Concussion, laceration  Current Status: dizziness, major HA, tired   See report in Epic.  Pt was seen for follow up by Dr Machado on 2/6/19      Concussion/head injury/posterior scalp laceration      Duration: 2/4/19    Description (location/character/radiation): head injury, posterior scalp laceration    Intensity:  moderate    Accompanying signs and symptoms: headache, photophobia    History (similar episodes/previous evaluation): Hx of chronic pain    Precipitating or alleviating factors: fall    Therapies tried and outcome: ice packs, pain medication       Problem list and histories reviewed & adjusted, as indicated.  Additional history: as documented    Labs reviewed in EPIC    Reviewed and updated as needed this visit by clinical staff       Reviewed and updated as needed this visit by Provider         ROS:  CONSTITUTIONAL:NEGATIVE for fever, chills, change in weight  EYES: NEGATIVE for vision changes or irritation  MUSCULOSKELETAL: POSITIVE  for neck pain  NEURO: POSITIVE for HX headaches-musculoskelatal and HX head injury  2/4/19    OBJECTIVE:                                                     /80 (BP Location: Right arm, Patient Position: Sitting, Cuff Size: Adult Regular)   Pulse 106   Temp 98.8  F (37.1  C) (Oral)   Resp 16   LMP  (LMP Unknown)   SpO2 98%   There is no height or weight on file to calculate BMI.  GENERAL APPEARANCE: healthy, alert and no distress  EYES: Eyes grossly normal to inspection, fundi benign-no diabetic or hypertensive changes seen, PERRL and conjunctivae and sclerae normal  MS: extremities normal- no gross deformities noted  SKIN: healing laceration posterior scalp.  Skin staples removed x 3  NEURO: Normal strength and tone, mentation intact, speech normal and Romberg negative    Diagnostic test results:  none      ASSESSMENT/PLAN:                                                        ICD-10-CM    1. Concussion without loss of consciousness, subsequent encounter S06.0X0D    2. Chronic pain syndrome G89.4 acetaminophen-codeine (TYLENOL #3) 300-30 MG tablet     No further refills for Tramadol  Rx for Tylenol # 3 for # 25 tabs.  1 tab every 6 hrs for 3 days, then 1 tab every 8 hrs for 3 days, then cut tablets in half.  Pt can take a Tylenol 325 mg with each of these      Follow up with Provider - 1 week with Dr Machado   Patient Instructions   Frequent ice packs to head      Otilio Hollins MD  [facility]

## 2019-02-11 NOTE — ED PROVIDER NOTES
History     Chief Complaint:  Headache      HPI   Melody Muñoz is a 43 year old female who presents with a headache onset 2-3 hours ago in the context of a recent fall on , 7 days ago. She has been getting headaches ever since then but today it is worse. She went to the clinic today to get staples removed and she had a mild headache at that time. She states the pain is behind her eyes with associated dizziness, neck pain, confusion and vomiting. She also endorses some tingling feeling in her legs when she walks which started today.   She took Tylenol with codeine around noon, two hours ago.    She has a history of migraines but this feels different. The last migraine she had was 3 weeks ago. For this she takes almotriptan and promethazine as needed which she did not take today. Patient is not anticoagulated. Patient denies fever.     Allergies:  Hydrocodone     Medications:    Albuterol  Almotriptan   Tessalon  Soma  Klonopin   Dextroamphetamine  Trulicity   Flovent  Tenex  Lamictal  Levothyroxine  Antivert  Robaxin  Protonix  Promethazine  Zoloft  Topamax  Desyrel   Effexor  Ambien      Past Medical History:    Anxiety  Bipolar 1 disorder  DM type 2  Endometriosis   Asthma   Depression  Latent TB  Migraines  Substance abuse  Suicidal ideation  Hypothyroidism     Past Surgical History:    Biopsy   Bladder sling  Laparoscopy endometriosis     Laparoscopic appendectomy  Tubal ligation    Family History:  Anxiety    Depression  HTN  Palpitations     Social History:  Presents to ED alone.   Tobacco Use: Current some day smoker (0.25 ppd)  Alcohol Use: No EtOH since   PCP: Evaristo Machado  Marital Status:   [2]     Review of Systems   Constitutional: Negative for fever.   Gastrointestinal: Positive for vomiting.   Musculoskeletal: Positive for neck pain.   Neurological: Positive for dizziness and headaches.   Psychiatric/Behavioral: Positive for confusion.   All other systems reviewed  and are negative.    Physical Exam   First Vitals:  BP: 132/76  Heart Rate: 93  Temp: 98.3  F (36.8  C)  Resp: 16  SpO2: 95 %      Physical Exam  General: Patient is alert and interactive when I enter the room  Head:  The scalp, face, and head appear normal, healed posterior left scalp laceration, c/d/i  Eyes:  Conjunctivae are normal, pupils 5mm bilaterally and equal, reactive  ENT:    The nose is normal    Pinnae are normal    External acoustic canals are normal  Neck:  Trachea midline, good ROM   CV:  Pulses are normal.    Resp:  No respiratory distress   Abdomen:      Soft, non-tender, non-distended  Musc:  Normal muscular tone    No major joint effusions    No asymmetric leg swelling    Good capillary refill noted  Skin:  No rash or lesions noted  Neuro:  Speech is normal and fluent. Face is symmetric.     Moving all extremities well. No limb ataxia. No pronator drift. Gait intact.   Psych: Awake. Alert.  Normal affect.  Appropriate interactions.      Emergency Department Course     Imaging:  Radiographic findings were communicated with the patient who voiced understanding of the findings.    CT Head w/o Contrast   Final Result   IMPRESSION:  Normal CT scan of the head.  No change.         KRZYSZTOF OSHEA MD        Interventions:  1424: Normal Saline, 1 liter, IV bolus   1428: Zofran, 4 mg, IV injection   1436: Toradol, 15 mg, IV injection  1635: Oxycodone, 5 mg, oral   1652: Decadron, 10 mg, IV injection    Emergency Department Course:  The patient arrived in triage where vitals were measured and recorded.   The patient was then escorted back to the emergency department.   The patient's medical records were reviewed.  Nursing notes and vitals were reviewed.  1401: I performed an exam of the patient as documented above.  The above workup was undertaken.  1536: I rechecked the patient and discussed results.  1610: I rechecked the patient and discussed results.   1740: I rechecked the patient and discussed  results.   1907: I rechecked the patient and discussed results.   Findings and plan explained to the Patient. Patient discharged home, status improved, with instructions regarding supportive care, medications, and reasons to return as well as the importance of close follow-up was reviewed.     Impression & Plan      Medical Decision Making:  Melody Muñoz is a 44 yo F chronic pain who presents with headache.  Patient had a fall about 7 days ago was seen here with a negative head CT.  She reports a headache from further migraines.  She has no infectious symptoms.  Her headache started about 2-3 hours ago.  He did do a head CT given her recent fall and the sudden onset of her headache.  Head CT was negative.  This was well within 6 hours think this is enough to rule her out for subarachnoid.  Patient refused any migraine medication including Reglan and Compazine as it makes her feel jittery.  He gave her Toradol and IV fluids and she shortly then requested IV Dilaudid.  She stated that last time she got IM Dilaudid.  I attempted to do intranasal lidocaine but she refused this.  She would only take Dilaudid.  I told her that Dilaudid is not a treatment for headache and I will not be administering this IV.  She became very upset with this and continued to ask the nurse multiple times for IV narcotics.  Of note patient was recently seen here in January for pelvic pain and requested and demanded IV narcotics and as well.  She is on a pain contract.  I did give her oral oxycodone and Decadron with somewhat mild improvement of her headache.  She could have a concussion or this could be just recurrence of her migraine.  She is neurologically intact and otherwise appears well.  She requested to be discharged and I think this is reasonable given her well appearance.  I think without true migraine treatment it is hard to know exactly where her headache is coming from.  However given her request to be discharged and her well  appearance we will discharge at this time.  Patient discharged with primary care follow-up as well as concussion clinic duration.    Diagnosis:    ICD-10-CM    1. Acute post-traumatic headache, not intractable G44.319        Disposition:  Discharged to home.         I, Teresa Velarde, am serving as a scribe on 2/11/2019 at 2:01 PM to personally document services performed by Dr. Viera based on my observations and the provider's statements to me.   [facility] EMERGENCY DEPARTMENT       Lisa Viera MD  02/12/19 1949

## 2019-02-12 ENCOUNTER — TELEPHONE (OUTPATIENT)
Dept: FAMILY MEDICINE | Facility: CLINIC | Age: 44
End: 2019-02-12

## 2019-02-12 DIAGNOSIS — G89.4 CHRONIC PAIN SYNDROME: ICD-10-CM

## 2019-02-12 NOTE — TELEPHONE ENCOUNTER
Patient called and states she wants to be off pain medication, and she is set up to do a tapered schedule. She was seen in ED yesterday for a headache. She said that the doctor there told her that she needs to rest, and if that means waiting to taper pain meds then that's what she needs to do. Taking one every 6 hours is not helping her pain. She wants to go back to the dose that she was originally taking. Claims she is not ready to taper. Routing to provider for review.

## 2019-02-12 NOTE — TELEPHONE ENCOUNTER
Reason for Call:  Other call back    Detailed comments: Melody called and asked that Dr. Machado call her back yet today due to her pain management.    Phone Number Patient can be reached at: Cell number on file:    Telephone Information:   Mobile [phone]       Best Time: any    Can we leave a detailed message on this number? YES    Call taken on 2/12/2019 at 4:35 PM by Mary Rodriguez

## 2019-02-12 NOTE — TELEPHONE ENCOUNTER
Called pt and left VM letting her know that her results is 6.5 much better than it was in October. I told her in the message I would send this to Dr. Machado if he wanted to make any additional comments regarding this.

## 2019-02-12 NOTE — TELEPHONE ENCOUNTER
Reason for Call:  Request for results:    Name of test or procedure: A1c    Date of test of procedure: 1 week ago    Location of the test or procedure: [facility]    OK to leave the result message on voice mail or with a family member? YES    Phone number Patient can be reached at:  Home number on file [phone] (home)    Additional comments:     Call taken on 2/12/2019 at 10:46 AM by HENOK HERNANDEZ

## 2019-02-13 ENCOUNTER — TELEPHONE (OUTPATIENT)
Dept: FAMILY MEDICINE | Facility: CLINIC | Age: 44
End: 2019-02-13

## 2019-02-13 DIAGNOSIS — S09.90XD TRAUMATIC INJURY OF HEAD, SUBSEQUENT ENCOUNTER: Primary | ICD-10-CM

## 2019-02-13 NOTE — TELEPHONE ENCOUNTER
Called pt and let her know that I am faxing this to her pharmacy, she states that 1 tablet every 6 hours does not work for her and she would like the directions changed to 1-2 tablets every 6 hours as needed for severe pain. Please advise.

## 2019-02-13 NOTE — TELEPHONE ENCOUNTER
Faxed Rx (tylenol #3) to the pharmacy-[facility] in [place]   Called pt and let her know this was done

## 2019-02-13 NOTE — TELEPHONE ENCOUNTER
Reason for Call: Request for an order or referral: referral     Order or referral being requested: concussion clinic  Date needed: as soon as possible    Has the patient been seen by the PCP for this problem? NO    Additional comments: PT does not want the [facility] as it is too far.  Pt requested this be put thru urgently as she is in pain.      Phone number Patient can be reached at:  Home number on file [phone] (home)    Best Time:  asap    Can we leave a detailed message on this number?  YES    Call taken on 2/13/2019 at 1:30 PM by MARIAN FUENTES

## 2019-02-13 NOTE — TELEPHONE ENCOUNTER
Patient called back the clinic. She still feels dizzy, back hurts, abdomen hurts, and she feels out of it. She is extremely sensitive to light, wears sun glasses at home and gets a headache from the bright light. These don't feel like the the usual migraines. Pain is 5/10. She does not feel like she can taper off pain medication. She is requesting an Rx for Tylenol #3 1-2 tabs every 6 hours. She wants to pick it up before the weekend.    Also requests a concussion clinic referral near [place] is too far.

## 2019-02-14 ENCOUNTER — TELEPHONE (OUTPATIENT)
Dept: FAMILY MEDICINE | Facility: CLINIC | Age: 44
End: 2019-02-14

## 2019-02-14 ENCOUNTER — NURSE TRIAGE (OUTPATIENT)
Dept: NURSING | Facility: CLINIC | Age: 44
End: 2019-02-14

## 2019-02-14 NOTE — TELEPHONE ENCOUNTER
FYI-  Pt reports she is having facial tingling of her left side for 1 day, ongoing headache and intermittent dizziness. Denies weakness, vision problems, confusion or trouble speaking. She read in online symptoms can be associated with traumatic head injury. She asked if she should have an MRI, go to ED or wait at home. Advise she schedule appointment with concussion clinic. If symptoms persist or worsen she should be evaluated at ED today. Pt verbalized agreement with plan. No further action needed unless further directives.

## 2019-02-14 NOTE — TELEPHONE ENCOUNTER
Patient called regarding Rx for acetaminophen-codeine (TYLENOL #3) 300-30 MG tablet. Last Rx written on 2/11/19 for 25 tablets and was to last 7 days. She said she is almost out because she thought she was getting more.   She is angry that the pharmacy will not fill her Rx written on 2/13/19 for 25 more tablets. She demands that I speak with a provider and get approval to fill this Rx today. Will huddle with provider and get back to patient.

## 2019-02-14 NOTE — TELEPHONE ENCOUNTER
Patient called back stating the numbness in her face is really bothering her and she wants a call back

## 2019-02-14 NOTE — TELEPHONE ENCOUNTER
Patient returned call and provider message was relayed. Patient is seeing her Neurologist on Monday and will follow up with concussion clinic.

## 2019-02-14 NOTE — TELEPHONE ENCOUNTER
Reason for call:  Patient reporting a symptom  Symptom or request: tingling on left side of face   Duration (how long have symptoms been present): this morning  Have you been treated for this before? No  Additional comments: please call patient  Phone Number patient can be reached at:  Home number on file [phone] (home)  Best Time:  any  Can we leave a detailed message on this number:  YES  Call taken on 2/14/2019 at 7:05 AM by BREANN WARREN

## 2019-02-14 NOTE — TELEPHONE ENCOUNTER
Pt called reporting tingling in her hand. Advised she contact neurology with current symptoms and ask if she would be able to be seen today. If worsening symptoms she may see ED. Pt verbalized agreement with plan.

## 2019-02-14 NOTE — TELEPHONE ENCOUNTER
Huddled with provider and got approval to fill Rx written on 2/13 for 25 tablets of Tylenol #3.    Called pharmacy and notified them of this.

## 2019-02-14 NOTE — TELEPHONE ENCOUNTER
"Patient has a question about her T3 Rx.  Patient says pharmacy says there are no refills on this medication but there should be one.  FNA advised there should be one per patient's chart.  FNA advised will have clinic contact pharmacy about this and call her back.  Caller verbalizes understanding.  TE routed to clinic nurse.    Reason for Disposition    Caller has NON-URGENT medication question about med that PCP prescribed and triager unable to answer question    Additional Information    Negative: Drug overdose and nurse unable to answer question    Negative: Caller requesting information not related to medicine    Negative: Caller requesting a prescription for Strep throat and has a positive culture result    Negative: Rash while taking a medication or within 3 days of stopping it    Negative: Immunization reaction suspected    Negative: [1] Asthma and [2] having symptoms of asthma (cough, wheezing, etc)    Negative: MORE THAN A DOUBLE DOSE of a prescription or over-the-counter (OTC) drug    Negative: [1] DOUBLE DOSE (an extra dose or lesser amount) of over-the-counter (OTC) drug AND [2] any symptoms (e.g., dizziness, nausea, pain, sleepiness)    Negative: [1] DOUBLE DOSE (an extra dose or lesser amount) of prescription drug AND [2] any symptoms (e.g., dizziness, nausea, pain, sleepiness)    Negative: Took another person's prescription drug    Negative: [1] DOUBLE DOSE (an extra dose or lesser amount) of prescription drug AND [2] NO symptoms (Exception: a double dose of antibiotics)    Negative: Diabetes drug error or overdose (e.g., insulin or extra dose)    Negative: [1] Request for URGENT new prescription or refill of \"essential\" medication (i.e., likelihood of harm to patient if not taken) AND [2] triager unable to fill per unit policy    Negative: [1] Prescription not at pharmacy AND [2] was prescribed today by PCP    Negative: Pharmacy calling with prescription questions and triager unable to answer " question    Negative: Caller has URGENT medication question about med that PCP prescribed and triager unable to answer question    Protocols used: MEDICATION QUESTION CALL-ADULT-

## 2019-02-14 NOTE — TELEPHONE ENCOUNTER
Left voice message asking pt to call triage back. Huddled with PCP, he advised pt see concussion clinic. Pt has seen ED three times and had CT. She needs to see concussion clinic for current symptoms.  Writer called the concussion clinic. They advised she see neurology first Monday and follow up with concussion clinic after.

## 2019-02-14 NOTE — TELEPHONE ENCOUNTER
FYI-  Pt states she is not getting a call back from neurology. She doesn't know what to do? She is having tingling and is not willing to wait until Monday for appointment. She asked again if she should have MRI. Explained neurologist would determine test she would need. Suggest she wait to see neurologist unless worsening symptoms she can see ED. Pt verbalized agreement with plan.

## 2019-02-14 NOTE — TELEPHONE ENCOUNTER
Pharmacist called to question what is going on with this patient's Rx for acetaminophen-codeine (TYLENOL #3) 300-30 MG tablet.  check completed  2/5 20 tablets  2/7 20 tablets  2/11 25 tablets  And now trying to fill 2/13 for 25 tablets.    Pharmacist concerned for patient safety. Will huddle with provider.

## 2019-02-15 NOTE — TELEPHONE ENCOUNTER
Call made to pharmacy, there was a prescription filled and picked yesterday for this medication.           Patient Contact    Attempt # 1    Was call answered?  No.  Mailbox full.

## 2019-02-15 NOTE — TELEPHONE ENCOUNTER
Called pharmacy and there is a refill on the tylenol 3 prescription.  Pharmacy would like to know how long these prescriptions should last patient.  Please advise.

## 2019-02-15 NOTE — TELEPHONE ENCOUNTER
Clinic Action Needed: yes, call back  FNA Triage Call  Presenting Problem:  Patient has a question about her T3 Rx.  Patient says pharmacy says there are no refills on this medication but there should be one.  FNA advised there should be one per patient's chart.  FNA advised will have clinic contact pharmacy to clarify and call her back.      Routed to: LESLI Walker RN/[facility]

## 2019-02-18 DIAGNOSIS — F51.01 PRIMARY INSOMNIA: ICD-10-CM

## 2019-02-18 DIAGNOSIS — G89.4 CHRONIC PAIN SYNDROME: ICD-10-CM

## 2019-02-18 RX ORDER — ZOLPIDEM TARTRATE 10 MG/1
TABLET ORAL
Qty: 30 TABLET | Refills: 0 | OUTPATIENT
Start: 2019-02-18

## 2019-02-19 NOTE — TELEPHONE ENCOUNTER
Controlled Substance Refill Request for carisoprodol (SOMA) 350 MG tablet     Last Written Prescription Date:  1/30/2019  Last Fill Quantity: 90 tablet,  # refills: 0   Last office visit: 2/11/2019 with prescribing provider:  Gurvinder   Future Office Visit:   Problem List Complete:  No     PROVIDER TO CONSIDER COMPLETION OF PROBLEM LIST AND OVERVIEW/CONTROLLED SUBSTANCE AGREEMENT    Future Office visit:   Next 5 appointments (look out 90 days)    Mar 06, 2019 10:15 AM CST  SHORT with Evaristo [facility]ado MD  [facility] [address] [place] [id number]  [phone]          Controlled substance agreement:   Encounter-Level CSA - 09/08/2015:    Controlled Substance Agreement - Scan on 9/9/2015  2:41 PM: MARISSA, Controlled Substance Contract, 09-08-15 (below)       Patient-Level CSA:    There are no patient-level csa.         Last Urine Drug Screen: No results found for: CDAUT, No results found for: COMDAT, No results found for: THC13, PCP13, COC13, MAMP13, OPI13, AMP13, BZO13, TCA13, MTD13, BAR13, OXY13, PPX13, BUP13     Processing:  Fax Rx to requested pharmacy     [email or web address]       checked in past 3 months?  Yes 2/19/19      [place] reviewed:  reviewed- recommend provider review    [place]:  [email or web address]  Routing refill request to provider for review/approval because:  Drug not on the [facility] refill protocol

## 2019-02-19 NOTE — TELEPHONE ENCOUNTER
carisoprodol (SOMA) 350 MG tablet     Last Written Prescription Date:  1/30/2019  Last Fill Quantity: 90 tablet,  # refills: 0   Last office visit: 2/11/2019 with prescribing provider:  Gurvinder   Future Office Visit:   Next 5 appointments (look out 90 days)    Mar 06, 2019 10:15 AM CST  SHORT with Evaristo Machado MD  [facility] [address] [place] [id number] [phone]             Routing refill request to provider for review/approval because:  Drug not on the [facility], [facility] or [facility] refill protocol or controlled substance

## 2019-02-20 ENCOUNTER — TELEPHONE (OUTPATIENT)
Dept: FAMILY MEDICINE | Facility: CLINIC | Age: 44
End: 2019-02-20

## 2019-02-20 DIAGNOSIS — F51.01 PRIMARY INSOMNIA: ICD-10-CM

## 2019-02-20 RX ORDER — CARISOPRODOL 350 MG/1
TABLET ORAL
Qty: 90 TABLET | Refills: 0 | OUTPATIENT
Start: 2019-02-20

## 2019-02-20 RX ORDER — ZOLPIDEM TARTRATE 10 MG/1
TABLET ORAL
Qty: 30 TABLET | Refills: 0 | OUTPATIENT
Start: 2019-02-20

## 2019-02-20 NOTE — TELEPHONE ENCOUNTER
Reason for Call:  Other call back    Detailed comments:   Pain meds not working    Concussion from 02/04/2019   Not sleeping     Phone Number Patient can be reached at: Home number on file [phone] (home)    Best Time:   anytime    Can we leave a detailed message on this number? YES    Call taken on 2/20/2019 at 4:28 PM by KYLER DE LA PAZ

## 2019-02-20 NOTE — TELEPHONE ENCOUNTER
Clinic Action Needed:Yes, follow up with Melody at appointment about her Ambien request  Reason for Call: Melody is calling to get her Ambien refilled.  It appears that this was addressed during clinic and noted that she needs to wait to deal with this with Dr. Machado tomorrow (see note from Dr. Hollins).  She has an appointment scheduled for 11:30 am tomorrow,  2/21/19.    Routed to: [facility] Patient Care Team 1    Layne Hyatt RN  [facility]

## 2019-02-20 NOTE — TELEPHONE ENCOUNTER
Pt was advised to schedule OV with PCP. States she called her neurologist but she has pain contract. Future appt was scheduled.

## 2019-02-21 ENCOUNTER — OFFICE VISIT (OUTPATIENT)
Dept: FAMILY MEDICINE | Facility: CLINIC | Age: 44
End: 2019-02-21
Payer: COMMERCIAL

## 2019-02-21 VITALS
RESPIRATION RATE: 16 BRPM | BODY MASS INDEX: 30.46 KG/M2 | TEMPERATURE: 98.6 F | HEIGHT: 62 IN | OXYGEN SATURATION: 96 % | HEART RATE: 89 BPM | WEIGHT: 165.5 LBS | DIASTOLIC BLOOD PRESSURE: 84 MMHG | SYSTOLIC BLOOD PRESSURE: 122 MMHG

## 2019-02-21 DIAGNOSIS — R11.0 NAUSEA: ICD-10-CM

## 2019-02-21 DIAGNOSIS — G89.29 OTHER CHRONIC BACK PAIN: ICD-10-CM

## 2019-02-21 DIAGNOSIS — R51.9 HEADACHE DISORDER: Primary | ICD-10-CM

## 2019-02-21 DIAGNOSIS — G89.4 CHRONIC PAIN SYNDROME: ICD-10-CM

## 2019-02-21 DIAGNOSIS — M54.89 OTHER CHRONIC BACK PAIN: ICD-10-CM

## 2019-02-21 PROCEDURE — 99214 OFFICE O/P EST MOD 30 MIN: CPT | Performed by: FAMILY MEDICINE

## 2019-02-21 RX ORDER — ZOLPIDEM TARTRATE 10 MG/1
TABLET ORAL
Qty: 30 TABLET | Refills: 5 | Status: SHIPPED | OUTPATIENT
Start: 2019-02-21 | End: 2019-02-27

## 2019-02-21 RX ORDER — OXYCODONE HYDROCHLORIDE 5 MG/1
5 TABLET ORAL 3 TIMES DAILY PRN
Qty: 30 TABLET | Refills: 0 | Status: SHIPPED | OUTPATIENT
Start: 2019-02-21 | End: 2019-03-21

## 2019-02-21 RX ORDER — ONDANSETRON 4 MG/1
TABLET, ORALLY DISINTEGRATING ORAL
Qty: 30 TABLET | Refills: 1 | Status: SHIPPED | OUTPATIENT
Start: 2019-02-21 | End: 2019-11-20

## 2019-02-21 ASSESSMENT — MIFFLIN-ST. JEOR: SCORE: 1363.95

## 2019-02-21 ASSESSMENT — PAIN SCALES - GENERAL: PAINLEVEL: EXTREME PAIN (8)

## 2019-02-21 NOTE — PROGRESS NOTES
SUBJECTIVE:   Melody Muñoz is a 43 year old female who presents to clinic today for the following health issues:      Back Pain Follow Up      Description:   Location of pain:  center  Character of pain: sharp, dull ache and stabbing  Pain radiation: Does not radiate and neck and head  Since last visit, pain is:  worsened  New numbness or weakness in legs, not attributed to pain:  no     Intensity: Currently 8/10    History:   Pain interferes with job: Not applicable,   Therapies tried without relief: cold and opioids  Therapies tried with relief: opioids           Accompanying Signs & Symptoms:  Risk of Fracture:  None  Risk of Cauda Equina:  None  Risk of Infection:  None  Risk of Cancer:  None        Amount of exercise or physical activity: None    Problems taking medications regularly: No    Medication side effects: none    Diet: diabetic            Problem list and histories reviewed & adjusted, as indicated.  Additional history: as documented    Patient Active Problem List   Diagnosis     Endometriosis s/po EDWIGE 10-17     Mantoux: positive     Latent tuberculosis     Major depression     Generalized anxiety disorder     Constipation     Nightmares     Knee pain     Bipolar 1 disorder  with psychosis     Chronic fatigue     Female stress incontinence     Drug-seeking behavior     Vertigo     Suicidal ideation     Acne     Chronic pain syndrome     Insomnia     Chronic migraine without aura without status migrainosus, not intractable     Nausea     JASWANT (obstructive sleep apnea)     Elevated liver enzymes     TMJ (temporomandibular joint syndrome)     Hypothyroidism due to acquired atrophy of thyroid     Hostile behavior     Mild persistent asthma without complication     Uncontrolled type 2 diabetes mellitus with hyperglycemia (H)     Allergic rhinitis     Incontinence of urine in female     Migraine     Screening for diabetic peripheral neuropathy     Need for prophylactic vaccination against  Streptococcus pneumoniae (pneumococcus)     Sepsis (H)     Abdominal pain     Morbid obesity (H)     Former tobacco use     Mixed simple and mucopurulent chronic bronchitis (H): Spirometry 16 lung age = 61y/o in 39 y/o FVC=90%&FEV1=78%     Class 2 obesity due to excess calories with serious comorbidity and body mass index (BMI) of 36.0 to 36.9 in adult     Mixed hyperlipidemia     Acquired hypothyroidism     Dysuria     Other chronic back pain     Headache disorder     Past Surgical History:   Procedure Laterality Date     BIOPSY       COLONOSCOPY       GENITOURINARY SURGERY  May/2014    bladder sling     GYN SURGERY      laparoscopy- endometriosis     GYN SURGERY       for twins     LAPAROSCOPIC APPENDECTOMY N/A 2017    Procedure: LAPAROSCOPIC APPENDECTOMY;  LAPAROSCOPIC APPENDECTOMY and resection of epiploic tag;  Surgeon: Roberto Robins MD;  Location: [facility] OR     little finger surgery left  1980     tubal ligation bilateral       wisdom teeth removal         Social History     Tobacco Use     Smoking status: Current Some Day Smoker     Packs/day: 0.25     Years: 17.00     Pack years: 4.25     Types: Cigarettes     Smokeless tobacco: Never Used   Substance Use Topics     Alcohol use: No     Comment: no etoh since      Family History   Problem Relation Age of Onset     Hypertension Mother      Heart Disease Father         palpitations     Depression Sister      Anxiety Disorder Sister      Heart Disease Maternal Grandmother         CHF     Cancer Maternal Grandfather 72        Pancreatic Cancer     Alzheimer Disease Paternal Grandfather      Bipolar Disorder Other      Autism Spectrum Disorder Other            Reviewed and updated as needed this visit by clinical staff  Tobacco  Allergies  Meds       Reviewed and updated as needed this visit by Provider         ROS:  Constitutional, HEENT, cardiovascular, pulmonary, gi and gu systems are negative, except as otherwise noted.Patient  "continues to suffer with headaches and recently saw neurology and had medications adjusted. Medications for pain are not working.    OBJECTIVE:                                                    /84 (Patient Position: Sitting, Cuff Size: Adult Regular)   Pulse 89   Temp 98.6  F (37  C) (Tympanic)   Resp 16   Ht 1.575 m (5' 2\")   Wt 75.1 kg (165 lb 8 oz)   LMP  (LMP Unknown)   SpO2 96%   BMI 30.27 kg/m    Body mass index is 30.27 kg/m .  GENERAL APPEARANCE: healthy, alert, mild distress and over weight  NECK: no adenopathy, no asymmetry, masses, or scars and has decreased ROM.  NEURO: Normal strength and tone, mentation intact, speech normal, DTR symmetrically normal in lower extremities and gait is normal.         ASSESSMENT/PLAN:                                                        ICD-10-CM    1. Headache disorder R51 oxyCODONE (ROXICODONE) 5 MG tablet   2. Nausea R11.0 ondansetron (ZOFRAN-ODT) 4 MG ODT tab   3. Chronic pain syndrome G89.4    4. Other chronic back pain M54.9     G89.29        Patient Instructions   We had a discussion about treatment for her current headaches.  She has tried tramadol, Fiorinal with codeine and Tylenol No. 3 without good effect.  She recently saw neurology and had to move her medications adjusted.  I see the documentation of her visit but I do not actually see the note to review so I am not sure which of her medicines got adjusted.  She would like to try a short trial of plain oxycodone to see if we can get her headaches under control while she is waiting for the changes from the neurologist to take effect.  She would like to get a refill of her nausea medication.  I did place her on oxycodone 5 mg 3 times daily #30.  She is also working with her psychiatrist on tapering down some of her psychiatric medications.  I will see the patient back in 2 weeks for follow-up.      Evaristo Machado MD  [facility]  "

## 2019-02-21 NOTE — TELEPHONE ENCOUNTER
Controlled Substance Refill Request for zolpidem (AMBIEN) 10 MG tablet  Problem List Complete:  Yes    CSA on file from Dr Husain dated 9/8/15  Last  check-2/19/19 multiple meds from multiple outside providers    Last Written Prescription Date:  1/18/2019  Last Fill Quantity: 30 tablet,  # refills: 0   Last office visit: 2/11/2019 with prescribing provider:  Gurvinder   Future Office Visit:   Next 5 appointments (look out 90 days)    Feb 21, 2019 11:30 AM CST  Office Visit [facility]ado [facility] [address] [place] [address] 150  [facility] [address]  [phone]   Mar 06, 2019 10:15 AM CST  SHORT with Evaristo Machado MD  [facility] ([facility]) [address] [place] [address] 116  [facility] [address]  [phone]             Controlled substance agreement:   Encounter-Level CSA - 09/08/2015:    Controlled Substance Agreement - Scan on 9/9/2015  2:41 PM: BXFP, Controlled Substance Contract, 09-08-15 (below)       Patient-Level CSA:    There are no patient-level csa.         Last Urine Drug Screen: No results found for: CDAUT, No results found for: COMDAT, No results found for: THC13, PCP13, COC13, MAMP13, OPI13, AMP13, BZO13, TCA13, MTD13, BAR13, OXY13, PPX13, BUP13     Processing:  [facility] [address] [place] [address] [id number] AT [place] [address] pharmacy    [email or web address]   checked in past 3 months?  Yes 2/19/2019

## 2019-02-21 NOTE — TELEPHONE ENCOUNTER
zolpidem (AMBIEN) 10 MG tablet  Last Written Prescription Date:  ***  Last Fill Quantity: ***,  # refills: ***   Last office visit: 2/11/2019 with prescribing provider:  ***   Future Office Visit:   Next 5 appointments (look out 90 days)    Feb 21, 2019 11:30 AM CST  [facility] [address] [place] [id number]  [phone]   Mar 06, 2019 10:15 AM CST  SHORT with Evaristo Machado MD  [facility] ([facility]) [address] [place] [id number] [phone]         Requested Prescriptions   Signed Prescriptions Disp Refills     zolpidem (AMBIEN) 10 MG tablet 30 tablet 5     Sig: TAKE 1 TABLET BY MOUTH EVERY DAY AT BEDTIME    There is no refill protocol information for this order

## 2019-02-22 NOTE — PATIENT INSTRUCTIONS
We had a discussion about treatment for her current headaches.  She has tried tramadol, Fiorinal with codeine and Tylenol No. 3 without good effect.  She recently saw neurology and had to move her medications adjusted.  I see the documentation of her visit but I do not actually see the note to review so I am not sure which of her medicines got adjusted.  She would like to try a short trial of plain oxycodone to see if we can get her headaches under control while she is waiting for the changes from the neurologist to take effect.  She would like to get a refill of her nausea medication.  I did place her on oxycodone 5 mg 3 times daily #30.  She is also working with her psychiatrist on tapering down some of her psychiatric medications.  I will see the patient back in 2 weeks for follow-up.

## 2019-02-26 DIAGNOSIS — G89.4 CHRONIC PAIN SYNDROME: ICD-10-CM

## 2019-02-26 DIAGNOSIS — E11.65 UNCONTROLLED TYPE 2 DIABETES MELLITUS WITH HYPERGLYCEMIA (H): ICD-10-CM

## 2019-02-26 NOTE — TELEPHONE ENCOUNTER
"TRULICITY 0.75MG/0.5ML SDP 4X0.5ML  Last Written Prescription Date:  11/05/18  Last Fill Quantity: 0.5ML,  # refills: 3   Last office visit: 2/21/2019 with prescribing provider:  02/21/19   Future Office Visit:   Next 5 appointments (look out 90 days)    Mar 06, 2019 10:15 AM CST  SHORT with Evaristo Machado MD  [facility] [address] [place] [id number]  [phone]         Requested Prescriptions   Pending Prescriptions Disp Refills     TRULICITY 0.75 MG/0.5ML pen [Pharmacy Med Name: TRULICITY 0.75MG/0.5ML SDP 4X0.5ML] 2 mL 0     Sig: INJECT 0.75 MG(0.5 ML) SUBCUTANEOUSLY EVERY 7 DAYS    GLP-1 Agonists Protocol Passed - 2/26/2019 10:48 AM       Passed - Blood pressure less than 140/90 in past 6 months    BP Readings from Last 3 Encounters:   02/21/19 122/84   02/11/19 111/74   02/11/19 114/80                Passed - LDL on file in past 12 months    Recent Labs   Lab Test 02/06/19  1001   LDL 75            Passed - Microalbumin on file in past 12 months    Recent Labs   Lab Test 10/01/18  1450   MICROL 17   UMALCR 17.65            Passed - HgbA1C in past 3 or 6 months    If HgbA1C is 8 or greater, it needs to be on file within the past 3 months.  If less than 8, must be on file within the past 6 months.     Recent Labs   Lab Test 02/06/19  1001   A1C 6.5*            Passed - Medication is active on med list       Passed - Patient is age 18 or older       Passed - No active pregnancy on record       Passed - Normal serum creatinine on file in past 12 months    Recent Labs   Lab Test 01/13/19  1213   CR 0.69            Passed - No positive pregnancy test in past 12 months       Passed - Recent (6 mo) or future (30 days) visit within the authorizing provider's specialty    Patient had office visit in the last 6 months or has a visit in the next 30 days with authorizing provider.  See \"Patient Info\" tab in " "inbasket, or \"Choose Columns\" in Meds & Orders section of the refill encounter.              "

## 2019-02-26 NOTE — TELEPHONE ENCOUNTER
Melody would like her zolpidem filled, said will be out and also needs her pain medication.  Said wouldn't fill last week.

## 2019-02-27 DIAGNOSIS — F51.01 PRIMARY INSOMNIA: ICD-10-CM

## 2019-02-27 RX ORDER — CARISOPRODOL 350 MG/1
TABLET ORAL
Qty: 90 TABLET | Refills: 3 | Status: SHIPPED | OUTPATIENT
Start: 2019-02-27 | End: 2019-06-18

## 2019-02-27 RX ORDER — DULAGLUTIDE 0.75 MG/.5ML
INJECTION, SOLUTION SUBCUTANEOUS
Qty: 2 ML | Refills: 1 | OUTPATIENT
Start: 2019-02-27

## 2019-02-27 RX ORDER — ZOLPIDEM TARTRATE 10 MG/1
5 TABLET ORAL
Qty: 30 TABLET | Refills: 5 | Status: SHIPPED | OUTPATIENT
Start: 2019-02-27 | End: 2019-09-06

## 2019-02-27 NOTE — TELEPHONE ENCOUNTER
Controlled Substance Refill Request for   Requested Prescriptions   Pending Prescriptions Disp Refills     carisoprodol (SOMA) 350 MG tablet 90 tablet 0    Last Written Prescription Date:  1/30/19  Last Fill Quantity: 90,  # refills: 0   Last office visit: 2/21/2019 with prescribing provider:  Jeannette   Future Office Visit:   Next 5 appointments (look out 90 days)    Mar 06, 2019 10:15 AM CST  SHORT with Evaristo Machado MD  [facility]) [address] [place] [id number]  [phone]          Sig: TAKE 1 TABLET BY MOUTH THREE TIMES DAILY.    There is no refill protocol information for this order        See refill encounter from 2/18/19. Rx was faxed to [facility] fax number. Requesting this be faxed to [facility] in [place]. Per  check, patient has not filled carisoprodol since 1/31/19. Routing to provider as high priority d/t patient being out on Sunday.

## 2019-02-27 NOTE — TELEPHONE ENCOUNTER
Prescription approved per [facility] Refill Protocol. Rx was sent to [place] pharmacy per her request.

## 2019-02-27 NOTE — TELEPHONE ENCOUNTER
Patient calling to check on status of Trulicity and was informed that it was sent to [place] pharmacy as requested. She is also requesting a  Refill of Soma.  She will be out on Sunday and will need the Rx to have a start day of March 3rd.

## 2019-02-27 NOTE — TELEPHONE ENCOUNTER
Patient called reporting she is off Oxycodone now taking Soma. She called to get providers approval to resume Zolpidem. Pt states she did not get Zolpidem script at OV [id number] and asking to get Rx sent/ called to [facility]. Please advice.     FYI-  Pt  wants PCP aware she had MRI Monday and this showed degenerative disk disease on her neck and back.

## 2019-02-28 NOTE — TELEPHONE ENCOUNTER
Both Rxs for zolpidem and soma were faxed yesterday to patients pharmacy-[facility] in [place].

## 2019-03-01 ENCOUNTER — TRANSFERRED RECORDS (OUTPATIENT)
Dept: HEALTH INFORMATION MANAGEMENT | Facility: CLINIC | Age: 44
End: 2019-03-01

## 2019-03-05 ENCOUNTER — TRANSFERRED RECORDS (OUTPATIENT)
Dept: HEALTH INFORMATION MANAGEMENT | Facility: CLINIC | Age: 44
End: 2019-03-05

## 2019-03-05 LAB — HEP C HIM: NORMAL

## 2019-03-06 ENCOUNTER — TRANSFERRED RECORDS (OUTPATIENT)
Dept: HEALTH INFORMATION MANAGEMENT | Facility: CLINIC | Age: 44
End: 2019-03-06

## 2019-03-08 DIAGNOSIS — G43.709 CHRONIC MIGRAINE WITHOUT AURA WITHOUT STATUS MIGRAINOSUS, NOT INTRACTABLE: Primary | Chronic | ICD-10-CM

## 2019-03-08 RX ORDER — BUTALBITAL, ACETAMINOPHEN, CAFFEINE AND CODEINE PHOSPHATE 300; 50; 40; 30 MG/1; MG/1; MG/1; MG/1
CAPSULE ORAL
Qty: 15 CAPSULE | Refills: 0 | Status: SHIPPED | OUTPATIENT
Start: 2019-03-08 | End: 2019-03-11

## 2019-03-08 NOTE — TELEPHONE ENCOUNTER
Controlled Substance Refill Request for Butalbital-APAP-Caff-Cod -57-30 MG CAPS   Problem List Complete:  Yes    fioricet plain Q 6 hrs, no more than 4/day and gets 20 at a time        CSA on file from Dr Husain, dated 9/8/15  (update with current primary care provider)  Last  check - 11/26/18 provider to review=- multiple meds from multiple providers    DISCONTINUED  Last Written Prescription Date:  1/30/2019 END: 2/21/2019  Last Fill Quantity: 15 capsule,  # refills: 1   Last office visit: 2/21/2019 with prescribing provider:  Jeannette   Future Office Visit:        Controlled substance agreement:   Encounter-Level CSA - 09/08/2015:    Controlled Substance Agreement - Scan on 9/9/2015  2:41 PM: MARISSA, Controlled Substance Contract, 09-08-15 (below)       Patient-Level CSA:    There are no patient-level csa.         Last Urine Drug Screen: No results found for: CDAUT, No results found for: COMDAT, No results found for: THC13, PCP13, COC13, MAMP13, OPI13, AMP13, BZO13, TCA13, MTD13, BAR13, OXY13, PPX13, BUP13     Processing:  [facility] [address] [place] [address] [id number] AT [place] [address] pharmacy    [email or web address]   checked in past 3 months?  No, route to RN

## 2019-03-08 NOTE — TELEPHONE ENCOUNTER
Melody is calling stating that she is out of medication and has a migraine now and wants a refill ASAP.

## 2019-03-11 ENCOUNTER — OFFICE VISIT (OUTPATIENT)
Dept: FAMILY MEDICINE | Facility: CLINIC | Age: 44
End: 2019-03-11
Payer: COMMERCIAL

## 2019-03-11 VITALS
SYSTOLIC BLOOD PRESSURE: 122 MMHG | HEART RATE: 94 BPM | DIASTOLIC BLOOD PRESSURE: 80 MMHG | TEMPERATURE: 99.1 F | OXYGEN SATURATION: 98 %

## 2019-03-11 DIAGNOSIS — G43.809 OTHER MIGRAINE WITHOUT STATUS MIGRAINOSUS, NOT INTRACTABLE: ICD-10-CM

## 2019-03-11 DIAGNOSIS — J45.30 MILD PERSISTENT ASTHMA WITHOUT COMPLICATION: Chronic | ICD-10-CM

## 2019-03-11 DIAGNOSIS — R30.0 DYSURIA: Primary | ICD-10-CM

## 2019-03-11 DIAGNOSIS — E78.2 MIXED HYPERLIPIDEMIA: ICD-10-CM

## 2019-03-11 LAB
ALBUMIN UR-MCNC: NEGATIVE MG/DL
APPEARANCE UR: CLEAR
BACTERIA #/AREA URNS HPF: ABNORMAL /HPF
BILIRUB UR QL STRIP: NEGATIVE
COLOR UR AUTO: YELLOW
GLUCOSE UR STRIP-MCNC: NEGATIVE MG/DL
HGB UR QL STRIP: NEGATIVE
KETONES UR STRIP-MCNC: NEGATIVE MG/DL
LEUKOCYTE ESTERASE UR QL STRIP: ABNORMAL
NITRATE UR QL: NEGATIVE
NON-SQ EPI CELLS #/AREA URNS LPF: ABNORMAL /LPF
PH UR STRIP: 7 PH (ref 5–7)
RBC #/AREA URNS AUTO: ABNORMAL /HPF
SOURCE: ABNORMAL
SP GR UR STRIP: 1.01 (ref 1–1.03)
UROBILINOGEN UR STRIP-ACNC: 0.2 EU/DL (ref 0.2–1)
WBC #/AREA URNS AUTO: ABNORMAL /HPF

## 2019-03-11 PROCEDURE — 87086 URINE CULTURE/COLONY COUNT: CPT | Performed by: FAMILY MEDICINE

## 2019-03-11 PROCEDURE — 99214 OFFICE O/P EST MOD 30 MIN: CPT | Performed by: FAMILY MEDICINE

## 2019-03-11 PROCEDURE — 81001 URINALYSIS AUTO W/SCOPE: CPT | Performed by: FAMILY MEDICINE

## 2019-03-11 NOTE — PROGRESS NOTES
"  SUBJECTIVE:   Melody Muñoz is a 43 year old female who presents to clinic today for the following health issues:      URINARY TRACT SYMPTOMS      Duration: 4 days    Description  back pain and urethra pain, low abdominal pain    Intensity:  moderate    Accompanying signs and symptoms:  Fever/chills: no   Flank pain YES  Nausea and vomiting: YES- from headache  Vaginal symptoms: pain  Abdominal/Pelvic Pain: YES    History  History of frequent UTI's: YES  History of kidney stones: YES- 2 times  Sexually Active: YES  Possibility of pregnancy: No    Precipitating or alleviating factors: None    Therapies tried and outcome: Tylenol   Outcome: unhelpful      Has had kidney stones in the past.   No fevers.  No vaginal symptoms.    The 10-year ASCVD risk score (Paul WOMACK Jr., et al., 2013) is: 2.1%    Values used to calculate the score:      Age: 43 years      Sex: Female      Is Non- : No      Diabetic: Yes      Tobacco smoker: No      Systolic Blood Pressure: 122 mmHg      Is BP treated: No      HDL Cholesterol: 36 mg/dL      Total Cholesterol: 186 mg/dL        Requesting Tylenol #3 because Oxycodone and her Fioricet do not work for her migraines.  Tylenol #3 a least \"takes the edge off.\"    Problem list and histories reviewed & adjusted, as indicated.  Additional history: as documented    Labs reviewed in EPIC    Reviewed and updated as needed this visit by clinical staff  Tobacco  Allergies  Meds  Problems  Med Hx  Surg Hx  Fam Hx  Soc Hx        Reviewed and updated as needed this visit by Provider  Tobacco  Allergies  Meds  Problems  Med Hx  Surg Hx  Fam Hx         ROS:  CONSTITUTIONAL: NEGATIVE for fever, chills, change in weight  INTEGUMENTARY/SKIN: NEGATIVE for worrisome rashes, moles or lesions  EYES: NEGATIVE for vision changes or irritation  ENT/MOUTH: NEGATIVE for ear, mouth and throat problems  RESP: NEGATIVE for significant cough or SOB  CV: NEGATIVE for chest pain, " "palpitations or peripheral edema  MUSCULOSKELETAL: NEGATIVE for significant arthralgias or myalgia  NEURO: NEGATIVE for weakness, dizziness or paresthesias  HEME: NEGATIVE for bleeding problems    OBJECTIVE:     /80 (Cuff Size: Adult Large)   Pulse 94   Temp 99.1  F (37.3  C) (Tympanic)   LMP  (LMP Unknown)   SpO2 98%   There is no height or weight on file to calculate BMI.   GENERAL: Alert and no acute distress  EYES: Eyes grossly normal to inspection, PERRL and conjunctivae and sclerae normal  HENT: Nose and mouth without ulcers or lesions  NECK: no adenopathy, no asymmetry, masses, or scars and thyroid normal to palpation  RESP: lungs clear to auscultation - no rales, rhonchi or wheezes  CV: regular rate and rhythm, normal S1 S2, no S3 or S4, no murmur, click or rub, no peripheral edema and peripheral pulses strong  ABDOMEN: soft, nontender, no hepatosplenomegaly, no masses and bowel sounds normal  MS: no gross musculoskeletal defects noted, no edema  SKIN: no suspicious lesions or rashes  NEURO: Normal strength and tone, and speech normal    Diagnostic Test Results:  Urinalysis with micro and Ucx  ASSESSMENT/PLAN:     Problem List Items Addressed This Visit     Mild persistent asthma without complication (Chronic)    Migraine    Relevant Medications    acetaminophen-codeine (TYLENOL #3) 300-30 MG tablet    Mixed hyperlipidemia    Relevant Medications    STATIN NOT PRESCRIBED (INTENTIONAL)    Dysuria - Primary    Relevant Orders    UA with Microscopic reflex to Culture (Completed)           --UA appears to be negative for UTI.  Ucx pending.  --ACT score 22; asthma well controlled.  --Requesting Tylenol #3 because Oxycodone and her Fioricet do not work for her migraines.  Tylenol #3 a least \"takes the edge off\" she tells me...  I personally reviewed her PCP's note from her last OV and it appears that she was agreeable to give Oxycodone a try since Tylenol #3 and others were not helping her migraines.   "  reviewed, she has multiple controlled substances from multiple providers.  I discussed the fact that Tylenol #3 would probably not be a good choice for her migraines due to her liver disease history.  Pt agreeable to only take Tylenol #3 very sparingly and will discuss this more with her PCP.    I removed Fioricet from her medication list.  Needs to see provider if requesting any further controlled substances.   --Has not had a DM eye exam and refused to schedule an appt to get one completed.    Rizwana Perez, DO  [facility]

## 2019-03-12 LAB
BACTERIA SPEC CULT: NORMAL
Lab: NORMAL
SPECIMEN SOURCE: NORMAL

## 2019-03-12 ASSESSMENT — ASTHMA QUESTIONNAIRES: ACT_TOTALSCORE: 22

## 2019-03-15 ENCOUNTER — TRANSFERRED RECORDS (OUTPATIENT)
Dept: HEALTH INFORMATION MANAGEMENT | Facility: CLINIC | Age: 44
End: 2019-03-15

## 2019-03-15 ENCOUNTER — TELEPHONE (OUTPATIENT)
Dept: FAMILY MEDICINE | Facility: CLINIC | Age: 44
End: 2019-03-15

## 2019-03-15 NOTE — TELEPHONE ENCOUNTER
"I discussed Melody with oncology.  Workup did not show any CLL.  Before doing a \"Zebra\" workup the oncologist is suggesting we just follow this for the time being.  "

## 2019-03-15 NOTE — TELEPHONE ENCOUNTER
Dr. Zambrano office called regarding lab result please call to discuss asap would like call today  [phone]

## 2019-03-16 ENCOUNTER — HOSPITAL ENCOUNTER (EMERGENCY)
Facility: CLINIC | Age: 44
Discharge: HOME | End: 2019-03-16
Attending: EMERGENCY MEDICINE | Admitting: EMERGENCY MEDICINE
Payer: COMMERCIAL

## 2019-03-16 ENCOUNTER — APPOINTMENT (OUTPATIENT)
Dept: ULTRASOUND IMAGING | Facility: CLINIC | Age: 44
End: 2019-03-16
Attending: EMERGENCY MEDICINE
Payer: COMMERCIAL

## 2019-03-16 ENCOUNTER — APPOINTMENT (OUTPATIENT)
Dept: CT IMAGING | Facility: CLINIC | Age: 44
End: 2019-03-16
Attending: EMERGENCY MEDICINE
Payer: COMMERCIAL

## 2019-03-16 VITALS
OXYGEN SATURATION: 99 % | TEMPERATURE: 97.8 F | BODY MASS INDEX: 27.82 KG/M2 | SYSTOLIC BLOOD PRESSURE: 122 MMHG | WEIGHT: 152.12 LBS | HEART RATE: 109 BPM | RESPIRATION RATE: 16 BRPM | DIASTOLIC BLOOD PRESSURE: 68 MMHG

## 2019-03-16 DIAGNOSIS — R10.9 ACUTE ABDOMINAL PAIN: ICD-10-CM

## 2019-03-16 LAB
ALBUMIN SERPL-MCNC: 3.1 G/DL (ref 3.4–5)
ALBUMIN UR-MCNC: NEGATIVE MG/DL
ALP SERPL-CCNC: 119 U/L (ref 40–150)
ALT SERPL W P-5'-P-CCNC: 56 U/L (ref 0–50)
ANION GAP SERPL CALCULATED.3IONS-SCNC: 7 MMOL/L (ref 3–14)
APPEARANCE UR: CLEAR
AST SERPL W P-5'-P-CCNC: 27 U/L (ref 0–45)
BACTERIA #/AREA URNS HPF: ABNORMAL /HPF
BASOPHILS # BLD AUTO: 0 10E9/L (ref 0–0.2)
BASOPHILS NFR BLD AUTO: 0.3 %
BILIRUB SERPL-MCNC: 0.2 MG/DL (ref 0.2–1.3)
BILIRUB UR QL STRIP: NEGATIVE
BUN SERPL-MCNC: 13 MG/DL (ref 7–30)
CALCIUM SERPL-MCNC: 8.6 MG/DL (ref 8.5–10.1)
CHLORIDE SERPL-SCNC: 106 MMOL/L (ref 94–109)
CO2 SERPL-SCNC: 24 MMOL/L (ref 20–32)
COLOR UR AUTO: YELLOW
CREAT SERPL-MCNC: 0.76 MG/DL (ref 0.52–1.04)
DIFFERENTIAL METHOD BLD: ABNORMAL
EOSINOPHIL # BLD AUTO: 0.5 10E9/L (ref 0–0.7)
EOSINOPHIL NFR BLD AUTO: 4 %
ERYTHROCYTE [DISTWIDTH] IN BLOOD BY AUTOMATED COUNT: 14.2 % (ref 10–15)
GFR SERPL CREATININE-BSD FRML MDRD: >90 ML/MIN/{1.73_M2}
GLUCOSE SERPL-MCNC: 141 MG/DL (ref 70–99)
GLUCOSE UR STRIP-MCNC: NEGATIVE MG/DL
HCG SERPL QL: NEGATIVE
HCT VFR BLD AUTO: 41.3 % (ref 35–47)
HGB BLD-MCNC: 13 G/DL (ref 11.7–15.7)
HGB UR QL STRIP: NEGATIVE
IMM GRANULOCYTES # BLD: 0 10E9/L (ref 0–0.4)
IMM GRANULOCYTES NFR BLD: 0.3 %
KETONES UR STRIP-MCNC: NEGATIVE MG/DL
LEUKOCYTE ESTERASE UR QL STRIP: NEGATIVE
LIPASE SERPL-CCNC: 168 U/L (ref 73–393)
LYMPHOCYTES # BLD AUTO: 3.9 10E9/L (ref 0.8–5.3)
LYMPHOCYTES NFR BLD AUTO: 33.4 %
MCH RBC QN AUTO: 26.2 PG (ref 26.5–33)
MCHC RBC AUTO-ENTMCNC: 31.5 G/DL (ref 31.5–36.5)
MCV RBC AUTO: 83 FL (ref 78–100)
MONOCYTES # BLD AUTO: 0.5 10E9/L (ref 0–1.3)
MONOCYTES NFR BLD AUTO: 3.9 %
MUCOUS THREADS #/AREA URNS LPF: PRESENT /LPF
NEUTROPHILS # BLD AUTO: 6.8 10E9/L (ref 1.6–8.3)
NEUTROPHILS NFR BLD AUTO: 58.1 %
NITRATE UR QL: NEGATIVE
NRBC # BLD AUTO: 0 10*3/UL
NRBC BLD AUTO-RTO: 0 /100
PH UR STRIP: 6 PH (ref 5–7)
PLATELET # BLD AUTO: 465 10E9/L (ref 150–450)
POTASSIUM SERPL-SCNC: 3.2 MMOL/L (ref 3.4–5.3)
PROT SERPL-MCNC: 6.8 G/DL (ref 6.8–8.8)
RBC # BLD AUTO: 4.96 10E12/L (ref 3.8–5.2)
RBC #/AREA URNS AUTO: 1 /HPF (ref 0–2)
SODIUM SERPL-SCNC: 137 MMOL/L (ref 133–144)
SOURCE: ABNORMAL
SP GR UR STRIP: 1.01 (ref 1–1.03)
SQUAMOUS #/AREA URNS AUTO: 2 /HPF (ref 0–1)
UROBILINOGEN UR STRIP-MCNC: 0 MG/DL (ref 0–2)
WBC # BLD AUTO: 11.6 10E9/L (ref 4–11)
WBC #/AREA URNS AUTO: 2 /HPF (ref 0–5)

## 2019-03-16 PROCEDURE — 93005 ELECTROCARDIOGRAM TRACING: CPT

## 2019-03-16 PROCEDURE — 25000128 H RX IP 250 OP 636: Performed by: EMERGENCY MEDICINE

## 2019-03-16 PROCEDURE — 74177 CT ABD & PELVIS W/CONTRAST: CPT

## 2019-03-16 PROCEDURE — 81001 URINALYSIS AUTO W/SCOPE: CPT | Performed by: EMERGENCY MEDICINE

## 2019-03-16 PROCEDURE — 25000132 ZZH RX MED GY IP 250 OP 250 PS 637: Performed by: EMERGENCY MEDICINE

## 2019-03-16 PROCEDURE — 84703 CHORIONIC GONADOTROPIN ASSAY: CPT | Performed by: EMERGENCY MEDICINE

## 2019-03-16 PROCEDURE — 80053 COMPREHEN METABOLIC PANEL: CPT | Performed by: EMERGENCY MEDICINE

## 2019-03-16 PROCEDURE — 96374 THER/PROPH/DIAG INJ IV PUSH: CPT

## 2019-03-16 PROCEDURE — 96375 TX/PRO/DX INJ NEW DRUG ADDON: CPT

## 2019-03-16 PROCEDURE — 85025 COMPLETE CBC W/AUTO DIFF WBC: CPT | Performed by: EMERGENCY MEDICINE

## 2019-03-16 PROCEDURE — 83690 ASSAY OF LIPASE: CPT | Performed by: EMERGENCY MEDICINE

## 2019-03-16 PROCEDURE — 99285 EMERGENCY DEPT VISIT HI MDM: CPT | Mod: 25

## 2019-03-16 PROCEDURE — 96361 HYDRATE IV INFUSION ADD-ON: CPT

## 2019-03-16 PROCEDURE — 25000125 ZZHC RX 250: Performed by: EMERGENCY MEDICINE

## 2019-03-16 PROCEDURE — 76705 ECHO EXAM OF ABDOMEN: CPT

## 2019-03-16 RX ORDER — SODIUM CHLORIDE 9 MG/ML
1000 INJECTION, SOLUTION INTRAVENOUS CONTINUOUS
Status: DISCONTINUED | OUTPATIENT
Start: 2019-03-16 | End: 2019-03-16 | Stop reason: HOSPADM

## 2019-03-16 RX ORDER — ONDANSETRON 2 MG/ML
4 INJECTION INTRAMUSCULAR; INTRAVENOUS EVERY 30 MIN PRN
Status: DISCONTINUED | OUTPATIENT
Start: 2019-03-16 | End: 2019-03-16 | Stop reason: HOSPADM

## 2019-03-16 RX ORDER — POTASSIUM CHLORIDE 1.5 G/1.58G
40 POWDER, FOR SOLUTION ORAL ONCE
Status: DISCONTINUED | OUTPATIENT
Start: 2019-03-16 | End: 2019-03-16 | Stop reason: HOSPADM

## 2019-03-16 RX ORDER — IOPAMIDOL 755 MG/ML
500 INJECTION, SOLUTION INTRAVASCULAR ONCE
Status: COMPLETED | OUTPATIENT
Start: 2019-03-16 | End: 2019-03-16

## 2019-03-16 RX ORDER — KETOROLAC TROMETHAMINE 15 MG/ML
10 INJECTION, SOLUTION INTRAMUSCULAR; INTRAVENOUS ONCE
Status: COMPLETED | OUTPATIENT
Start: 2019-03-16 | End: 2019-03-16

## 2019-03-16 RX ORDER — ONDANSETRON 4 MG/1
4 TABLET, ORALLY DISINTEGRATING ORAL EVERY 8 HOURS PRN
Qty: 10 TABLET | Refills: 0 | Status: SHIPPED | OUTPATIENT
Start: 2019-03-16 | End: 2019-04-18

## 2019-03-16 RX ADMIN — IOPAMIDOL 76 ML: 755 INJECTION, SOLUTION INTRAVENOUS at 03:20

## 2019-03-16 RX ADMIN — FAMOTIDINE 20 MG: 10 INJECTION INTRAVENOUS at 01:43

## 2019-03-16 RX ADMIN — LIDOCAINE HYDROCHLORIDE 30 ML: 20 SOLUTION ORAL; TOPICAL at 02:32

## 2019-03-16 RX ADMIN — SODIUM CHLORIDE 1000 ML: 9 INJECTION, SOLUTION INTRAVENOUS at 01:42

## 2019-03-16 RX ADMIN — SODIUM CHLORIDE 59 ML: 9 INJECTION, SOLUTION INTRAVENOUS at 03:20

## 2019-03-16 RX ADMIN — KETOROLAC TROMETHAMINE 10 MG: 15 INJECTION, SOLUTION INTRAMUSCULAR; INTRAVENOUS at 03:09

## 2019-03-16 RX ADMIN — ONDANSETRON 4 MG: 2 INJECTION INTRAMUSCULAR; INTRAVENOUS at 01:43

## 2019-03-16 ASSESSMENT — ENCOUNTER SYMPTOMS
ABDOMINAL PAIN: 1
NAUSEA: 1
VOMITING: 0
CONSTIPATION: 0
DIARRHEA: 1
DYSURIA: 0
DIFFICULTY URINATING: 0

## 2019-03-16 NOTE — ED PROVIDER NOTES
History     Chief Complaint:  Abdominal pain    HPI   Melody Muñoz is a 43 year old female with a history of chronic pain who presents to the emergency department today for evaluation of abdominal pain. Patient states she had an EGD earlier this afternoon because she was having symptoms of peptic ulcer disease. She endorses there were no abnormal findings on the procedure. The patient reports sudden onset of constant sharp right upper quadrant abdominal pain 4 hours prior to arrival along with nausea. She endorses that she has had pain similar to this in the past, however, the pain is much worse this time. Additionally, the patient reports diarrhea, but she states it is because of the residual magnesium prior to procedure. She denies dysuria, difficulty urinating, vaginal discharge, vomiting, or constipation.    Allergies:  Hydrocodone      Medications:    Albuterol  Axert  Klonopin  Trulicity  Tenex  Lamictal  Antivert  Levothyroxine  Sertraline  Topamax  Ambien  Effexor  Desyrel  Promethazine  Lithium     Past Medical History:    Anxiety   Bipolar 1 disorder  Diabetes  Endometriosis  Asthma  Latent tuberculosis  Depression  Migraine  Substance abuse  Chronic pain    Past Surgical History:    Biopsy  Bladder sling surgery  Laparoscopy endometriosis   section  Laparoscopy appendectomy  Left little finger surgery  Bilateral tubal ligation  [place] teeth extraction    Family History:    Mother: hypertension  Father: heart disease  Sister: depression, anxiety    Social History:  The patient was accompanied to the ED by friend.  Smoking Status: Former smoker  Smokeless Tobacco: Never Used  Alcohol Use:Negative  Drug Use: Negative  Marital Status:        Review of Systems   Gastrointestinal: Positive for abdominal pain (right upper quadrant), diarrhea and nausea. Negative for constipation and vomiting.   Genitourinary: Negative for difficulty urinating, dysuria and vaginal discharge.   All other  systems reviewed and are negative.        Physical Exam     Patient Vitals for the past 24 hrs:   BP Temp Temp src Pulse Heart Rate Resp SpO2 Weight   03/16/19 0357 122/68 -- -- -- 75 16 99 % --   03/16/19 0108 (!) 125/92 97.8  F (36.6  C) Temporal 109 -- 18 96 % 69 kg (152 lb 1.9 oz)       Physical Exam  Constitutional:  Oriented to person, place, and time. Well appearing.  HENT:   Head:    Normocephalic.   Mouth/Throat:   Oropharynx is clear and moist.   Eyes:    EOM are normal. Pupils are equal, round, and reactive to light.   Neck:    Neck supple.   Cardiovascular:  Normal rate, regular rhythm and normal heart sounds.      Exam reveals no gallop and no friction rub.       No murmur heard.  Pulmonary/Chest:  Effort normal and breath sounds normal.      No respiratory distress. No wheezes. No rales.      No reproducible chest wall pain.  Abdominal:   Soft. No distension. Right upper quadrant tenderness with voluntary guarding. No involuntary guarding or rebound. Negative Pope's sign.   Musculoskeletal:  Normal range of motion.   Neurological:   Alert and oriented to person, place, and time.           Moves all 4 extremities spontaneously    Skin:    No rash noted. No pallor.     Emergency Department Course     ECG:  ECG taken at 0144, ECG read at 0144  Normal sinus rhythm  Normal ECG  Rate 80 bpm. LA interval 170 ms. QRS duration 102 ms. QT/QTc 408/470 ms. P-R-T axes 47 15 49.    Imaging:  Radiology findings were communicated with the patient who voiced understanding of the findings.    CT Abdomen Pelvis w Contrast  1. No acute abnormality. No bowel obstruction or inflammation.  2. Colonic diverticula without acute diverticulitis.  Reading per radiology    US Abdomen Limited  Normal right upper quadrant. No gallstone or biliary dilatation.  SHANIKA SANDERS MD  Reading per radiology    Laboratory:  Laboratory findings were communicated with the patient who voiced understanding of the findings.    UA with  microscopic: bacteria moderate(A), squamous epithelial 2(A), mucous present(A) o/w WNL  Lipase: 168  HCG qualitative blood: negative  CBC: WBC 11.6(H), HGB 13.0, (H)  CMP: potassium 3.2(L), glucose 141(H), albumin 3.1(L) o/w WNL (Creatinine 0.76)    Interventions:  0142 NS 1000 ml IV  0143 Zofran 4 mg IV  0143 Pepcid 20 mg IV  0232 Xylocaine 30 ml PO  0309 Toradol 10 mg IV    Emergency Department Course:    0113 Nursing notes and vitals reviewed.    0122 I performed an exam of the patient as documented above.     0142 IV was inserted and blood was drawn for laboratory testing, results above.    0206 The patient was sent for a US abdomen while in the emergency department, results above.     0248 Patient rechecked and updated. Pain still persists.    0255 The patient provided a urine sample here in the emergency department. This was sent for laboratory testing, findings above.    0315 The patient was sent for a CT abdomen pelvis while in the emergency department, results above.     0340 Patient rechecked and updated.     0359 I personally reviewed the lab and image results with the patient and answered all related questions prior to discharge.    Impression & Plan      Medical Decision Making:  Melody Muñoz is a 43 year old female who presents one day post endoscopy colonoscopy complaining of right upper quadrant abdominal pain. Differential includes, perforation due to procedure, biliary colic, pancreatitis, gastritis, or other causes. Workup thus far is otherwise nonspecific with continued symptoms, I did feel it was necessary to rule out perforation and therefor CT was obtained which is otherwise reassuring. At this time I believe she is safe for discharge, told to follow up with primary doctor or gastroenterologist, or return with any worsening pain, fever, vomiting, or other concerns.    Diagnosis:    ICD-10-CM    1. Acute abdominal pain R10.9      Disposition:   The patient is discharged to  home.    Discharge Medications:          * ondansetron 4 MG ODT tab  Commonly known as:  ZOFRAN ODT  This may have changed:       Dose:  4 mg  Take 1 tablet (4 mg) by mouth every 8 hours as needed for nausea  Quantity:  10 tablet  Refills:  0                 Where to get your medicines      Some of these will need a paper prescription and others can be bought over the counter. Ask your nurse if you have questions.    Bring a paper prescription for each of these medications    ondansetron 4 MG ODT tab         Scribe Disclosure:  Megha HANKS, am serving as a scribe at 1:25 AM on 3/16/2019 to document services personally performed by Que Kee MD based on my observations and the provider's statements to me.    [facility] EMERGENCY DEPARTMENT       Que Kee MD  03/16/19 8387

## 2019-03-16 NOTE — ED AVS SNAPSHOT
[facility] Emergency Department  [address]  [facility] [address]  Phone:  [phone]  Fax:  [phone]                                    Melody Muñoz   MRN: [id number]    Department:  [facility] Emergency Department   Date of Visit:  3/16/2019           After Visit Summary Signature Page    I have received my discharge instructions, and my questions have been answered. I have discussed any challenges I see with this plan with the nurse or doctor.    ..........................................................................................................................................  Patient/Patient Representative Signature      ..........................................................................................................................................  Patient Representative Print Name and Relationship to Patient    ..................................................               ................................................  Date                                   Time    ..........................................................................................................................................  Reviewed by Signature/Title    ...................................................              ..............................................  Date                                               Time          22EPIC Rev 08/18

## 2019-03-17 LAB — INTERPRETATION ECG - MUSE: NORMAL

## 2019-03-18 ENCOUNTER — TELEPHONE (OUTPATIENT)
Dept: FAMILY MEDICINE | Facility: CLINIC | Age: 44
End: 2019-03-18

## 2019-03-18 ENCOUNTER — OFFICE VISIT (OUTPATIENT)
Dept: FAMILY MEDICINE | Facility: CLINIC | Age: 44
End: 2019-03-18
Payer: COMMERCIAL

## 2019-03-18 VITALS
DIASTOLIC BLOOD PRESSURE: 74 MMHG | HEART RATE: 93 BPM | BODY MASS INDEX: 28.72 KG/M2 | OXYGEN SATURATION: 97 % | WEIGHT: 157 LBS | TEMPERATURE: 98.5 F | SYSTOLIC BLOOD PRESSURE: 120 MMHG | RESPIRATION RATE: 16 BRPM

## 2019-03-18 DIAGNOSIS — E87.6 HYPOKALEMIA: ICD-10-CM

## 2019-03-18 DIAGNOSIS — G43.709 CHRONIC MIGRAINE WITHOUT AURA WITHOUT STATUS MIGRAINOSUS, NOT INTRACTABLE: Chronic | ICD-10-CM

## 2019-03-18 DIAGNOSIS — R53.82 CHRONIC FATIGUE: Primary | ICD-10-CM

## 2019-03-18 DIAGNOSIS — R74.01 ELEVATED ALT MEASUREMENT: ICD-10-CM

## 2019-03-18 DIAGNOSIS — R10.84 ABDOMINAL PAIN, GENERALIZED: ICD-10-CM

## 2019-03-18 PROCEDURE — 83550 IRON BINDING TEST: CPT | Performed by: PHYSICIAN ASSISTANT

## 2019-03-18 PROCEDURE — 82728 ASSAY OF FERRITIN: CPT | Performed by: PHYSICIAN ASSISTANT

## 2019-03-18 PROCEDURE — 36415 COLL VENOUS BLD VENIPUNCTURE: CPT | Performed by: PHYSICIAN ASSISTANT

## 2019-03-18 PROCEDURE — 99214 OFFICE O/P EST MOD 30 MIN: CPT | Performed by: PHYSICIAN ASSISTANT

## 2019-03-18 PROCEDURE — 84132 ASSAY OF SERUM POTASSIUM: CPT | Performed by: PHYSICIAN ASSISTANT

## 2019-03-18 PROCEDURE — 84460 ALANINE AMINO (ALT) (SGPT): CPT | Performed by: PHYSICIAN ASSISTANT

## 2019-03-18 PROCEDURE — 83540 ASSAY OF IRON: CPT | Performed by: PHYSICIAN ASSISTANT

## 2019-03-18 PROCEDURE — 82550 ASSAY OF CK (CPK): CPT | Performed by: PHYSICIAN ASSISTANT

## 2019-03-18 RX ORDER — BUTALBITAL, ACETAMINOPHEN AND CAFFEINE 50; 325; 40 MG/1; MG/1; MG/1
1 TABLET ORAL EVERY 6 HOURS PRN
Qty: 20 TABLET | Refills: 0 | Status: SHIPPED | OUTPATIENT
Start: 2019-03-18 | End: 2019-03-21

## 2019-03-18 ASSESSMENT — ENCOUNTER SYMPTOMS
FATIGUE: 1
DIARRHEA: 1
RESPIRATORY NEGATIVE: 1
ABDOMINAL PAIN: 1
PSYCHIATRIC NEGATIVE: 1
MUSCULOSKELETAL NEGATIVE: 1
EYES NEGATIVE: 1
CARDIOVASCULAR NEGATIVE: 1
NEUROLOGICAL NEGATIVE: 1
NAUSEA: 1

## 2019-03-18 NOTE — TELEPHONE ENCOUNTER
Reason for Call:  Other Pt wants call back     Detailed comments: pt says Dr Machado will be contacted by her oncologist in the next week or 2.  She would like a call back from Dr Machado afer that.     Phone Number Patient can be reached at: Home number on file [phone] (home)    Best Time: any    Can we leave a detailed message on this number? YES    Call taken on 3/18/2019 at 11:47 AM by MARIAN FUENTES

## 2019-03-18 NOTE — PATIENT INSTRUCTIONS
For the abdominal pain -     Consider trying a FODMAPS diet.  If is low risk, but has potential benefit.  Some patients have good results with this.

## 2019-03-18 NOTE — PROGRESS NOTES
SUBJECTIVE:   Melody Muñoz is a 43 year old female who presents to clinic today for the following health issues:    Abdominal Pain      Duration: dull pain since summer 2018, very severe pain started 4 days ago- better today    Description (location/character/radiation): RUQ- dull annoying pain       Associated flank pain: None    Intensity:  moderate    Accompanying signs and symptoms: Extreme fatigue       Fever/Chills: no       Gas/Bloating: no        Nausea/vomitting: YES- mild       Diarrhea: YES       Dysuria or Hematuria: no     History (previous similar pain/trauma/previous testing): CT and ultrasound at ER on 3/16/19, elevated ALT    Precipitating or alleviating factors:       Pain worse with eating/BM/urination: no       Pain relieved by BM: no     Therapies tried and outcome: None    LMP:  not applicable    Her fatigue has been ongoing for many years - however has been worse the past few weeks.   The abdominal pain she had after her GI procedure has improved significantly  She has a consult with endocrinology on Thursday  She cannot see her GI/hepatic specialist until May  She is concerned about some of her lab findings and would like to review them today      Hypokalemia      Duration: 2 days    Description (location/character/radiation): low potassium at ED visit    Intensity:  mild     Potassium   Date Value Ref Range Status   03/16/2019 3.2 (L) 3.4 - 5.3 mmol/L Final       Problem list and histories reviewed & adjusted, as indicated.  Additional history: as documented    Patient Active Problem List   Diagnosis     Endometriosis s/po EDWIGE 10-17     Mantoux: positive     Latent tuberculosis     Major depression     Generalized anxiety disorder     Constipation     Nightmares     Knee pain     Bipolar 1 disorder  with psychosis     Chronic fatigue     Female stress incontinence     Drug-seeking behavior     Vertigo     Suicidal ideation     Acne     Chronic pain syndrome     Insomnia     Chronic  migraine without aura without status migrainosus, not intractable     Nausea     JASWANT (obstructive sleep apnea)     Elevated liver enzymes     TMJ (temporomandibular joint syndrome)     Hypothyroidism due to acquired atrophy of thyroid     Hostile behavior     Mild persistent asthma without complication     Uncontrolled type 2 diabetes mellitus with hyperglycemia (H)     Allergic rhinitis     Incontinence of urine in female     Migraine     Screening for diabetic peripheral neuropathy     Need for prophylactic vaccination against Streptococcus pneumoniae (pneumococcus)     Sepsis (H)     Abdominal pain     Morbid obesity (H)     Former tobacco use     Mixed simple and mucopurulent chronic bronchitis (H): Spirometry 16 lung age = 61y/o in 39 y/o FVC=90%&FEV1=78%     Class 2 obesity due to excess calories with serious comorbidity and body mass index (BMI) of 36.0 to 36.9 in adult     Mixed hyperlipidemia     Acquired hypothyroidism     Dysuria     Other chronic back pain     Headache disorder     Past Surgical History:   Procedure Laterality Date     BIOPSY       COLONOSCOPY       GENITOURINARY SURGERY  May/2014    bladder sling     GYN SURGERY      laparoscopy- endometriosis     GYN SURGERY       for twins     LAPAROSCOPIC APPENDECTOMY N/A 2017    Procedure: LAPAROSCOPIC APPENDECTOMY;  LAPAROSCOPIC APPENDECTOMY and resection of epiploic tag;  Surgeon: Roberto Robins MD;  Location: [facility] OR     little finger surgery left  1980     tubal ligation bilateral       wisdom teeth removal         Social History     Tobacco Use     Smoking status: Former Smoker     Packs/day: 0.25     Years: 17.00     Pack years: 4.25     Types: Cigarettes     Smokeless tobacco: Never Used   Substance Use Topics     Alcohol use: No     Comment: no etoh since      Family History   Problem Relation Age of Onset     Hypertension Mother      Heart Disease Father         palpitations     Depression Sister      Anxiety  Disorder Sister      Heart Disease Maternal Grandmother         CHF     Cancer Maternal Grandfather 72        Pancreatic Cancer     Alzheimer Disease Paternal Grandfather      Bipolar Disorder Other      Autism Spectrum Disorder Other          Current Outpatient Medications   Medication Sig Dispense Refill     ACCU-CHEK RAISSA PLUS test strip TEST 4-7 TIMES DAILY WHILE STARTING TRULICITY 100 strip 3     albuterol (2.5 MG/3ML) 0.083% nebulizer solution Take 1 vial (2.5 mg) by nebulization every 6 hours as needed for shortness of breath / dyspnea or wheezing 25 vial 0     almotriptan (AXERT) 6.25 MG tablet TK 1 T PO ONCE.  MAY REPEAT IN 2 HOURS AS DIRECTED.  MAX 2 DOSES PER DAY  1     benzonatate (TESSALON) 100 MG capsule Take 1 capsule (100 mg) by mouth 3 times daily as needed for cough 42 capsule 0     blood glucose (NO BRAND SPECIFIED) lancets standard Use to test blood sugar 3 times weekly or as directed. 100 each 3     blood glucose monitoring (LAURA CONTOUR NEXT) test strip Use to test blood sugar 2 times daily or as directed. 200 each 12     blood glucose monitoring (NO BRAND SPECIFIED) test strip Use to test blood sugars 3 times weekly or as directed 100 strip 3     carisoprodol (SOMA) 350 MG tablet TAKE 1 TABLET BY MOUTH THREE TIMES DAILY. 90 tablet 3     clonazePAM (KLONOPIN) 1 MG tablet TK 1 T PO UP TO TID  3     Continuous Blood Gluc  (FREESTYLE JOSE 14 DAY READER) PUMA 1 Device as needed (Use for daily blood glucose monitoring as needed) 1 Device 0     Continuous Blood Gluc Sensor (FREESTYLE JOSE 14 DAY SENSOR) MISC 1 each every 14 days 2 each 11     dextroamphetamine (DEXEDRINE SPANSULE) 5 MG 24 hr capsule TK 1 C PO BID  0     dextroamphetamine (DEXTROSTAT) 10 MG tablet TK 1 T PO TID  0     dulaglutide (TRULICITY) 0.75 MG/0.5ML pen Inject 0.75 mg Subcutaneous every 7 days 0.5 mL 1     fluticasone (FLONASE) 50 MCG/ACT nasal spray Spray 2 sprays into both nostrils daily 16 g 11     guanFACINE  (TENEX) 2 MG tablet TAKE TWO TABLETS BY MOUTH AT BEDTIME 180 tablet 3     lamoTRIgine (LAMICTAL) 200 MG tablet Take 1 tablet (200 mg) by mouth every morning 90 tablet 4     LATUDA 120 MG TABS tablet TK 1 T PO QD  3     levothyroxine (SYNTHROID/LEVOTHROID) 25 MCG tablet TAKE 1 TABLET(25 MCG) BY MOUTH DAILY 90 tablet 2     lithium (ESKALITH) 450 MG CR tablet TAKE 2 TABLETS BY MOUTH AT BEDTIME (Patient taking differently: 300mg TAKE 2 TABLETS BY MOUTH AT BEDTIME) 60 tablet 5     MAGNESIUM CITRATE PO        meclizine (ANTIVERT) 25 MG tablet Take 1 tablet (25 mg) by mouth every 6 hours as needed for dizziness 30 tablet 3     MELATONIN PO Take 6 mg by mouth At Bedtime       MICROLET LANCETS MISC TEST TWICE DAILY AS DIRECTED 100 each 0     norgestimate-ethinyl estradiol (ORTHO-CYCLEN, SPRINTEC) 0.25-35 MG-MCG per tablet Take 1 tablet by mouth daily       Omega-3 Fatty Acids (FISH OIL OMEGA-3 PO)        ondansetron (ZOFRAN ODT) 4 MG ODT tab Take 1 tablet (4 mg) by mouth every 8 hours as needed for nausea 10 tablet 0     ondansetron (ZOFRAN-ODT) 4 MG ODT tab DISSOLVE 1 TO 2 TABLETS UNDER THE TONGUE EVERY 8 HOURS AS NEEDED FOR NAUSEA 30 tablet 1     order for DME Equipment being ordered: Blood glucose monitoring kit 1 Device 0     pantoprazole (PROTONIX) 40 MG EC tablet TAKE 1 TABLET(40 MG) BY MOUTH DAILY 90 tablet 2     POTASSIUM PO        pregabalin (LYRICA) 150 MG capsule TAKE 1 CAPSULE BY MOUTH TWICE A DAY 60 capsule 0     PROAIR  (90 Base) MCG/ACT inhaler INHALE 1 TO 2 PUFFS INTO THE LUNGS EVERY 4 HOURS AS NEEDED FOR WHEEZING, SHORTNESS OF BREATH, OR DYSPNEA 51 g 3     Promethazine HCl 50 MG TABS TK 1 T PO Q 8 H PRN  1     sertraline (ZOLOFT) 100 MG tablet TK 1 T PO  QD  2     STATIN NOT PRESCRIBED (INTENTIONAL) Please choose reason not prescribed, below       STATIN NOT PRESCRIBED, INTENTIONAL, 1 each daily Please choose reason not prescribed, below       tamsulosin (FLOMAX) 0.4 MG capsule Take 1 capsule (0.4 mg)  by mouth daily 30 capsule 0     topiramate (TOPAMAX) 50 MG tablet TK 2 TS PO QHS  3     traZODone (DESYREL) 100 MG tablet TK TWO TS PO QHS  3     venlafaxine (EFFEXOR-XR) 75 MG 24 hr capsule TK 1 C PO D WF  0     VITAMIN D, CHOLECALCIFEROL, PO Take 2,000 Units by mouth daily       zolpidem (AMBIEN) 10 MG tablet Take 0.5 tablets (5 mg) by mouth nightly as needed for sleep 30 tablet 5     acetaminophen-codeine (TYLENOL #3) 300-30 MG tablet Take 1 tablet by mouth every 8 hours as needed for severe pain (Patient not taking: Reported on 3/18/2019) 10 tablet 0     butalbital-acetaminophen-caffeine (FIORICET/ESGIC) -40 MG tablet Take 1 tablet by mouth every 6 hours as needed for pain no more than 4 daily. 20 tablet 0     Allergies   Allergen Reactions     Hydrocodone Nausea and Vomiting       Reviewed and updated as needed this visit by clinical staff  Tobacco  Allergies  Meds       Reviewed and updated as needed this visit by Provider         Review of Systems   Constitutional: Positive for fatigue.   HENT: Negative.    Eyes: Negative.    Respiratory: Negative.    Cardiovascular: Negative.    Gastrointestinal: Positive for abdominal pain, diarrhea and nausea.   Genitourinary: Negative.    Musculoskeletal: Negative.    Skin: Negative.    Neurological: Negative.    Psychiatric/Behavioral: Negative.        OBJECTIVE:     /74 (Cuff Size: Adult Regular)   Pulse 93   Temp 98.5  F (36.9  C) (Tympanic)   Resp 16   Wt 71.2 kg (157 lb)   LMP  (LMP Unknown)   SpO2 97%   BMI 28.72 kg/m    Body mass index is 28.72 kg/m .    Physical Exam   Constitutional: She is oriented to person, place, and time. She appears well-developed and well-nourished. No distress.   HENT:   Head: Normocephalic.   Right Ear: External ear normal.   Left Ear: External ear normal.   Nose: Nose normal.   Eyes: Conjunctivae and EOM are normal.   Neck: Normal range of motion.   Pulmonary/Chest: Effort normal.   Abdominal: Soft.   Neurological:  She is alert and oriented to person, place, and time.   Skin: She is not diaphoretic.   Psychiatric: She has a normal mood and affect. Judgment normal.       No results found for this or any previous visit (from the past 24 hour(s)).    ASSESSMENT/PLAN:       ICD-10-CM    1. Chronic fatigue R53.82 Iron and iron binding capacity     Ferritin     CK total   2. Hypokalemia E87.6 Potassium   3. Elevated ALT measurement R74.0 ALT   4. Abdominal pain, generalized R10.84       1. Chronic fatigue workup completed today - waiting for labs.   2. Potassium rechecked to monitor hypokalemia.    3. Recheck ALT  4. Abdominal pain workup in the ED did not reveal any specific diagnosis - but did rule out many worrisome diagnoses.  She has a plan to follow up with her gastroenterologist to discuss liver abnormalities.  We discussed IBS today - certainly this is likely with a completely negative workup, abdominal pain + diarrhea.  Its reasonable to start with a FODMAPS diet and see if she notices any improvement.  She has already tried elimination diets with little success.      Return in about 6 weeks (around 4/29/2019) for Specialist Appointment.    Patient Instructions   For the abdominal pain -     Consider trying a FODMAPS diet.  If is low risk, but has potential benefit.  Some patients have good results with this.       Christofer Hennessy PA-C  [facility]

## 2019-03-18 NOTE — TELEPHONE ENCOUNTER
JEAN CARLOS from pt: Oncologist will be ordering CBC's every few weeks. She just wanted you to be aware of that. We will need to fax results to [facility] in [place]. She states you do not need to call her. She will schedule an appt with you soon.

## 2019-03-18 NOTE — TELEPHONE ENCOUNTER
Reason for Call:  Medication or medication refill:    Do you use a [facility]?  Name of the pharmacy and phone number for the current request:  [facility]    Name of the medication requested: BUTALBITAL    Other request: COULD NOT FIND ON MED LIST     Can we leave a detailed message on this number? YES    Phone number patient can be reached at: Home number on file [phone] (home)    Best Time: ANY    Call taken on 3/18/2019 at 11:45 AM by MARIAN FUENTES

## 2019-03-19 ENCOUNTER — TELEPHONE (OUTPATIENT)
Dept: FAMILY MEDICINE | Facility: CLINIC | Age: 44
End: 2019-03-19

## 2019-03-19 LAB
ALT SERPL W P-5'-P-CCNC: 36 U/L (ref 0–50)
CK SERPL-CCNC: 26 U/L (ref 30–225)
FERRITIN SERPL-MCNC: 54 NG/ML (ref 12–150)
IRON SATN MFR SERPL: 11 % (ref 15–46)
IRON SERPL-MCNC: 34 UG/DL (ref 35–180)
POTASSIUM SERPL-SCNC: 3.9 MMOL/L (ref 3.4–5.3)
TIBC SERPL-MCNC: 314 UG/DL (ref 240–430)

## 2019-03-19 NOTE — TELEPHONE ENCOUNTER
DRUG CHANGE REQUEST    Message from pharmacy:  Is this the correct version? She has always been getting capsule with codeine.

## 2019-03-19 NOTE — TELEPHONE ENCOUNTER
Attempted call to pharmacy to talk with pharmacist. Patient is taking two different medications that have similar components however different medications.             This question should be voided because they are two different medications.   The order is correct as written.

## 2019-03-19 NOTE — RESULT ENCOUNTER NOTE
Melody    Your lab tests are complete and I have reviewed the results.     - Your lab results are as follows:    - CK (creatinine kinase) is normal.     - ALT (liver function test) is now normal (it was elevated 3 days ago)    - Potassium is now normal (is was low 3 days ago)    - Ferritin is normal     - Iron is just slightly low.  To increase iron - take 325 mg of ferrous sulfate every other day (this absorbs better than if you take it every day).   - Please ignore any other labs that are flagged as high or low that I have not mentioned. These are normal variations and not a cause for concern.    If you have any questions or concerns, please feel free to call or send a [employer] message.    Sincerely,  Christofer Hennessy PA-C

## 2019-03-19 NOTE — TELEPHONE ENCOUNTER
Reason for Call:  Request for results:    Name of test or procedure: Multiple labs    Date of test of procedure: 03/18/19    Location of the test or procedure: [facility]    OK to leave the result message on voice mail or with a family member? YES    Phone number Patient can be reached at:  Home number on file [phone] (home)    Additional comments: Patient called stating nobody had called her with her lab results. Please call to advise    Call taken on 3/19/2019 at 2:32 PM by TRIXIE KRUEGER

## 2019-03-20 NOTE — TELEPHONE ENCOUNTER
She is calling back and is very frustrated and crying as she is going through med changes with her psychiatrist also.    Could you wait a couple months to change her to the fioricet without codeine?     Viktoria Lanier RN- Triage [employer]

## 2019-03-20 NOTE — TELEPHONE ENCOUNTER
"Pt called to request lab result and make providers aware she needs FIORICET with codeine.     Pt was infomed of lab results dated  03/18/2019- reviewed providers comments.    She is also requesting FIORICET with codeine because she is going thru multiple problems now -GI, oncology and psych problems. States she dosen't understand why medication was changed without letting her know. She would be willing to try taking less medication per month but needs codine. Reports she may try something else after she is \"more stable\". Please advice on request. Pt is expecting a call back today.   "

## 2019-03-21 ENCOUNTER — OFFICE VISIT (OUTPATIENT)
Dept: ENDOCRINOLOGY | Facility: CLINIC | Age: 44
End: 2019-03-21
Payer: COMMERCIAL

## 2019-03-21 VITALS
OXYGEN SATURATION: 96 % | DIASTOLIC BLOOD PRESSURE: 73 MMHG | BODY MASS INDEX: 28.62 KG/M2 | HEART RATE: 90 BPM | SYSTOLIC BLOOD PRESSURE: 111 MMHG | WEIGHT: 156.5 LBS | TEMPERATURE: 98 F

## 2019-03-21 DIAGNOSIS — G43.709 CHRONIC MIGRAINE WITHOUT AURA WITHOUT STATUS MIGRAINOSUS, NOT INTRACTABLE: Chronic | ICD-10-CM

## 2019-03-21 DIAGNOSIS — E03.9 ACQUIRED HYPOTHYROIDISM: Primary | ICD-10-CM

## 2019-03-21 PROCEDURE — 99243 OFF/OP CNSLTJ NEW/EST LOW 30: CPT | Performed by: CLINICAL NURSE SPECIALIST

## 2019-03-21 RX ORDER — BUTALBITAL, ACETAMINOPHEN, CAFFEINE AND CODEINE PHOSPHATE 300; 50; 40; 30 MG/1; MG/1; MG/1; MG/1
1 CAPSULE ORAL 3 TIMES DAILY PRN
Qty: 15 CAPSULE | Refills: 1 | Status: SHIPPED | OUTPATIENT
Start: 2019-03-21 | End: 2019-04-18

## 2019-03-21 RX ORDER — LEVOTHYROXINE SODIUM 50 UG/1
50 TABLET ORAL DAILY
Qty: 90 TABLET | Refills: 0 | Status: SHIPPED | OUTPATIENT
Start: 2019-03-21 | End: 2019-07-01

## 2019-03-21 NOTE — LETTER
3/21/2019         RE: Melody Muñoz  [address] [place] [address]        Dear Colleague,    Thank you for referring your patient, Melody Muñoz, to the [facility]. Please see a copy of my visit note below.    Duplicate note.    Name: Melody Muñoz  Seen at the request of Evaristo Machado for Diabetes.  HPI:  Melody Muoñz is a 43 year old female who presents for the evaluation/management of:    1. Type 2 DM:  Originally diagnosed: 3/2017.   Initially treated with metformin - did not tolerate due to GI side effects    Current Regimen: Trulicity 0.75 mg weekly,  Trulicity was started 11/2018.  Briefly treated with januvia x 1 month - discontinued due to ineffectiveness.    BS checks: testing 3 x/day   Meter Download:     No FH of diabetes.  Personal history of GD with 4th pregnancy - pregnant with twins at the time, was diet controlled.      Complications:   Diabetes Complications  Description / Detail    Diabetic Retinopathy  Needs to schedule eye exam - referral provided    CAD / PAD  No   Neuropathy  No numbness, burning or tingling in her feet but has noted 2 months history of numbness in her hands   Nephropathy / Microalbuminuria  elevated urine microalbumin x 1   Gastroparesis  No   Hypoglycemia Unawareness  N0     2. Blood Pressure Blood Pressure today:   BP Readings from Last 3 Encounters:   03/21/19 111/73   03/18/19 120/74   03/16/19 122/68   .  Blood pressure medications include none  3. Lipids: Takes no medications for lipid control.      4. Prevention:  Flu Shot- 10/2018  Pneumovax- Recommended  Opthalmology-Due, referral provided.  Dental-Recommended semiannually  ASA-No  Smoking- No  4. Hypothyroidism. Currently treated with levothyroxine 25 mcg/day.  Diagnosed a couple years ago, doesn't remember exactly when,.  Was noting emotional irritability, anxiety, tachycardia at the time.  Thought it was hormone imbalance so saw her OB/BYN - tests  at the time showed hypothyroidism and she was started on levothyroxine.  Symptoms initially improved however, she has been increasing symptomatic the past 6 months - now noting extreme fatigue, increased hair loss, memory problems, and difficulty focusing.   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     Migraines     otc excedrin prn     Sleep apnea 2014    uses Cpap     Substance abuse (H)     no use since summer 2013     Suicidal ideation 2013    in past, not current, sees psychiatrist and therapist     Past Surgical History:   Procedure Laterality Date     BIOPSY       COLONOSCOPY       GENITOURINARY SURGERY  May/2014    bladder sling     GYN SURGERY      laparoscopy- endometriosis     GYN SURGERY       for twins     LAPAROSCOPIC APPENDECTOMY N/A 2017    Procedure: LAPAROSCOPIC APPENDECTOMY;  LAPAROSCOPIC APPENDECTOMY and resection of epiploic tag;  Surgeon: Roberto Robins MD;  Location: [facility] OR     little finger surgery left       tubal ligation bilateral       wisdom teeth removal       Family Hx:  Family History   Problem Relation Age of Onset     Hypertension Mother      Heart Disease Father         palpitations     Depression Sister      Anxiety Disorder Sister      Heart Disease Maternal Grandmother         CHF     Cancer Maternal Grandfather 72        Pancreatic Cancer     Alzheimer Disease Paternal Grandfather      Bipolar Disorder Other      Autism Spectrum Disorder Other      Thyroid disease: No         DM2:  No         Autoimmune: DM1, SLE, RA, Vitiligo cousin with type 1 diabetes    Social Hx:  Social History     Socioeconomic History     Marital status:      Spouse name: Not on file     Number of children: Not on file     Years of education: Not on file     Highest education level: Not on file   Occupational History     Employer: SELF EMPLOYED.     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   Social Needs     Financial resource strain: Not on file     Food insecurity:     Worry: Not on file     Inability: Not on file     Transportation needs:     Medical: Not on file     Non-medical: Not on file   Tobacco Use     Smoking status: Former Smoker     Packs/day: 0.25     Years: 17.00     Pack years: 4.25     Types: Cigarettes     Smokeless tobacco: Never Used   Substance and Sexual Activity     Alcohol use: No     Comment: no etoh since 2013     Drug use: No     Sexual activity: No     Partners: Male     Birth control/protection: Surgical     Comment: tubal ligation   Lifestyle     Physical activity:     Days per week: Not on file     Minutes per session: Not on file     Stress: Not on file   Relationships     Social connections:     Talks on phone: Not on file     Gets together: Not on file     Attends Christianity service: Not on file     Active member of club or organization: Not on file     Attends meetings of clubs or organizations: Not on file     Relationship status: Not on file     Intimate partner violence:     Fear of current or ex partner: Not on file     Emotionally abused: Not on file     Physically abused: Not on file     Forced sexual activity: Not on file   Other Topics Concern     Parent/sibling w/ CABG, MI or angioplasty before 65F 55M? No      Service Not Asked     Blood Transfusions Not Asked     Caffeine Concern Not Asked     Occupational Exposure Not Asked     Hobby Hazards Not Asked     Sleep Concern Not Asked     Stress Concern Not Asked     Weight Concern Not Asked     Special Diet  Not Asked     Back Care Not Asked     Exercise Not Asked     Bike Helmet Yes     Seat Belt Yes     Self-Exams Not Asked   Social History Narrative     Not on file          MEDICATIONS:  has a current medication list which includes the following prescription(s): accu-chek allie plus, acetaminophen-codeine, albuterol, almotriptan, benzonatate, blood glucose, blood glucose, blood glucose, butalbital-apap-caff-cod, carisoprodol, clonazepam, freestyle aurelia 14 day reader, freestyle aurelia 14 day sensor, dextroamphetamine, dextroamphetamine, dulaglutide, fluticasone, guanfacine, lamotrigine, latuda, levothyroxine, lithium, magnesium citrate, meclizine, melatonin, microlet lancets, norgestimate-ethinyl estradiol, omega-3 fatty acids, ondansetron, order for dme, pantoprazole, potassium, pregabalin, proair hfa, promethazine hcl, sertraline, statin not prescribed, statin not prescribed, tamsulosin, topiramate, trazodone, venlafaxine, cholecalciferol, and zolpidem.    ROS     ROS: 10 point ROS neg other than the symptoms noted above in the HPI.    Physical Exam   VS: /73 (BP Location: Right arm, Patient Position: Chair, Cuff Size: Adult Regular)   Pulse 90   Temp 98  F (36.7  C) (Oral)   Wt 71 kg (156 lb 8 oz)   LMP  (LMP Unknown)   SpO2 96%   Breastfeeding? No   BMI 28.62 kg/m     GENERAL: AXOX3, NAD, well dressed, answering questions appropriately, appears stated age.  HEENT: no exophthalmos, no proptosis no lig lag, no retraction  CV: RRR, no rubs, gallops, no murmurs  LUNGS: CTAB, no wheezes, rales, or rhonchi  EXTREMITIES: no edema  NEUROLOGY: CN grossly intact, + monofilament, no tremors  MSK: grossly intact  LABS:  A1c:   Component      Latest Ref Rng & Units 10/1/2018 2/6/2019   Hemoglobin A1C      0 - 5.6 % 9.6 (H) 6.5 (H)     Basic Metabolic Panel:  !COMPREHENSIVE Latest Ref Rng & Units 3/16/2019 3/18/2019   SODIUM 133 - 144 mmol/L 137    POTASSIUM 3.4 - 5.3 mmol/L 3.2 (L) 3.9   CHLORIDE 94 - 109 mmol/L  106    BUN 7 - 30 mg/dL 13    Creatinine 0.52 - 1.04 mg/dL 0.76    Glucose 70 - 99 mg/dL 141 (H)    ANION GAP 3 - 14 mmol/L 7    CALCIUM 8.5 - 10.1 mg/dL 8.6    ALBUMIN 3.4 - 5.0 g/dL 3.1 (L)    PROTEIN, TOTAL 6.8 - 8.8 g/dL 6.8      LFTS/Cholesterol Panel:    Thyroid Function:   !THYROID Latest Ref Rng & Units 1/15/2019   TSH 0.40 - 4.00 mU/L 6.050  (H)   T4 FREE 0.76 - 1.46 ng/dL 1.11     Urine MicroAlbumin:  Component      Latest Ref Rng & Units 6/19/2018 10/1/2018   Creatinine Urine      mg/dL 21 97   Albumin Urine mg/L      mg/L 8 17   Albumin Urine mg/g Cr      0 - 25 mg/g Cr 37.38 (H) 17.65     Vitamin D:    All pertinent notes, labs, and images personally reviewed by me.     A/P  Ms.Jessica TONYA Muñoz is a 43 year old here for the evaluation/management of diabetes:    1. DM2 - Controlled.  Cotinue Trulicity 0.75 mg weekly.  There is some variability among people, most will usually develop symptoms suggestive of hypoglycemia when blood glucose levels are lowered to the mid 60's. The first set of symptoms are called adrenergic. Patients may experience any of the following nervousness, sweating, intense hunger, trembling, weakness, palpitations, and difficulty speaking.   The acute management of hypoglycemia involves the rapid delivery of a source of easily absorbed sugar. Regular soda, juice, lifesavers, table sugar, are good options. 15 grams of glucose is the dose that is given, followed by an assessment of symptoms and a blood glucose check if possible. If after 10 minutes there is no improvement, another 10-15 grams should be given. This can be repeated up to three times. The equivalency of 10-15 grams of glucose (approximate servings) are: 3-5 hard candies, 3 teaspoons of sugar, or 1/2 cup of regular soda or juice.    2. Hypothyroidism.  Recent TSH elevated.  Increase levothyroxine to 50 mcg/day.  Repeat TFT's in 6-8 weeks.    Labs ordered today: No orders of the defined types were placed in this  encounter.      Radiology/Consults ordered today: None    All questions were answered.  The patient indicates understanding of the above issues and agrees with the plan set forth.  Total face to face time greater than or equal to 45 minutes.       Follow-up:  3 months    Tammy Louie NP  Endocrinology  [facility]  CC: Evaristo Machado        Again, thank you for allowing me to participate in the care of your patient.        Sincerely,        KIRAN Carballo CNP

## 2019-03-21 NOTE — PATIENT INSTRUCTIONS
In an effort to stay on schedule, please remember to check in for your next clinic appointment 15-20 minutes early.  This is necessary so your insulin pump/blood glucose meter or CGM can be uploaded and any labs can be done if necessary.  We appreciate your understanding.    Increase levothyroxine to 50 mcg once daily.  Make a lab only appointment in 6-8 weeks to recheck thyroid levels to make sure we don't need to adjust the levothyroxine dose further.

## 2019-03-25 DIAGNOSIS — G89.4 CHRONIC PAIN SYNDROME: ICD-10-CM

## 2019-03-25 NOTE — TELEPHONE ENCOUNTER
RX [place] ([place]) reviewed:  not reviewed/not due - last done on 2/8/19    [place]:  [email or web address]  Routing refill request to provider for review/approval because:  Drug not on the [facility] refill protocol

## 2019-03-26 RX ORDER — PREGABALIN 150 MG/1
CAPSULE ORAL
Qty: 60 CAPSULE | Refills: 5 | Status: SHIPPED | OUTPATIENT
Start: 2019-03-26 | End: 2019-04-25

## 2019-03-27 ENCOUNTER — TELEPHONE (OUTPATIENT)
Dept: FAMILY MEDICINE | Facility: CLINIC | Age: 44
End: 2019-03-27

## 2019-03-27 ENCOUNTER — HOSPITAL ENCOUNTER (EMERGENCY)
Facility: CLINIC | Age: 44
Discharge: HOME | End: 2019-03-27
Attending: EMERGENCY MEDICINE | Admitting: EMERGENCY MEDICINE
Payer: COMMERCIAL

## 2019-03-27 VITALS
DIASTOLIC BLOOD PRESSURE: 78 MMHG | SYSTOLIC BLOOD PRESSURE: 114 MMHG | OXYGEN SATURATION: 97 % | WEIGHT: 157 LBS | HEART RATE: 81 BPM | BODY MASS INDEX: 28.72 KG/M2 | TEMPERATURE: 97.4 F | RESPIRATION RATE: 16 BRPM

## 2019-03-27 DIAGNOSIS — R10.11 RUQ ABDOMINAL PAIN: ICD-10-CM

## 2019-03-27 LAB
ALBUMIN SERPL-MCNC: 3.1 G/DL (ref 3.4–5)
ALBUMIN UR-MCNC: NEGATIVE MG/DL
ALP SERPL-CCNC: 116 U/L (ref 40–150)
ALT SERPL W P-5'-P-CCNC: 47 U/L (ref 0–50)
ANION GAP SERPL CALCULATED.3IONS-SCNC: 8 MMOL/L (ref 3–14)
APPEARANCE UR: ABNORMAL
AST SERPL W P-5'-P-CCNC: 26 U/L (ref 0–45)
BACTERIA #/AREA URNS HPF: ABNORMAL /HPF
BASOPHILS # BLD AUTO: 0.1 10E9/L (ref 0–0.2)
BASOPHILS NFR BLD AUTO: 0.4 %
BILIRUB SERPL-MCNC: 0.1 MG/DL (ref 0.2–1.3)
BILIRUB UR QL STRIP: NEGATIVE
BUN SERPL-MCNC: 6 MG/DL (ref 7–30)
CALCIUM SERPL-MCNC: 8.8 MG/DL (ref 8.5–10.1)
CHLORIDE SERPL-SCNC: 109 MMOL/L (ref 94–109)
CO2 SERPL-SCNC: 22 MMOL/L (ref 20–32)
COLOR UR AUTO: YELLOW
CREAT SERPL-MCNC: 0.58 MG/DL (ref 0.52–1.04)
DIFFERENTIAL METHOD BLD: ABNORMAL
EOSINOPHIL # BLD AUTO: 0.6 10E9/L (ref 0–0.7)
EOSINOPHIL NFR BLD AUTO: 4.1 %
ERYTHROCYTE [DISTWIDTH] IN BLOOD BY AUTOMATED COUNT: 14.2 % (ref 10–15)
GFR SERPL CREATININE-BSD FRML MDRD: >90 ML/MIN/{1.73_M2}
GLUCOSE SERPL-MCNC: 155 MG/DL (ref 70–99)
GLUCOSE UR STRIP-MCNC: NEGATIVE MG/DL
HCT VFR BLD AUTO: 45.8 % (ref 35–47)
HGB BLD-MCNC: 14 G/DL (ref 11.7–15.7)
HGB UR QL STRIP: NEGATIVE
IMM GRANULOCYTES # BLD: 0.1 10E9/L (ref 0–0.4)
IMM GRANULOCYTES NFR BLD: 0.4 %
KETONES UR STRIP-MCNC: NEGATIVE MG/DL
LEUKOCYTE ESTERASE UR QL STRIP: ABNORMAL
LIPASE SERPL-CCNC: 167 U/L (ref 73–393)
LYMPHOCYTES # BLD AUTO: 3.7 10E9/L (ref 0.8–5.3)
LYMPHOCYTES NFR BLD AUTO: 24.5 %
MCH RBC QN AUTO: 26.2 PG (ref 26.5–33)
MCHC RBC AUTO-ENTMCNC: 30.6 G/DL (ref 31.5–36.5)
MCV RBC AUTO: 86 FL (ref 78–100)
MONOCYTES # BLD AUTO: 0.7 10E9/L (ref 0–1.3)
MONOCYTES NFR BLD AUTO: 4.4 %
MUCOUS THREADS #/AREA URNS LPF: PRESENT /LPF
NEUTROPHILS # BLD AUTO: 10.1 10E9/L (ref 1.6–8.3)
NEUTROPHILS NFR BLD AUTO: 66.2 %
NITRATE UR QL: NEGATIVE
NRBC # BLD AUTO: 0 10*3/UL
NRBC BLD AUTO-RTO: 0 /100
PH UR STRIP: 5 PH (ref 5–7)
PLATELET # BLD AUTO: 485 10E9/L (ref 150–450)
POTASSIUM SERPL-SCNC: 3.8 MMOL/L (ref 3.4–5.3)
PROT SERPL-MCNC: 7.2 G/DL (ref 6.8–8.8)
RBC # BLD AUTO: 5.35 10E12/L (ref 3.8–5.2)
RBC #/AREA URNS AUTO: 7 /HPF (ref 0–2)
SODIUM SERPL-SCNC: 139 MMOL/L (ref 133–144)
SOURCE: ABNORMAL
SP GR UR STRIP: 1.01 (ref 1–1.03)
SQUAMOUS #/AREA URNS AUTO: 22 /HPF (ref 0–1)
UROBILINOGEN UR STRIP-MCNC: 0 MG/DL (ref 0–2)
WBC # BLD AUTO: 15.2 10E9/L (ref 4–11)
WBC #/AREA URNS AUTO: 15 /HPF (ref 0–5)

## 2019-03-27 PROCEDURE — 99284 EMERGENCY DEPT VISIT MOD MDM: CPT | Mod: 25

## 2019-03-27 PROCEDURE — 81001 URINALYSIS AUTO W/SCOPE: CPT | Performed by: EMERGENCY MEDICINE

## 2019-03-27 PROCEDURE — 96375 TX/PRO/DX INJ NEW DRUG ADDON: CPT

## 2019-03-27 PROCEDURE — 80053 COMPREHEN METABOLIC PANEL: CPT | Performed by: EMERGENCY MEDICINE

## 2019-03-27 PROCEDURE — 25000125 ZZHC RX 250: Performed by: EMERGENCY MEDICINE

## 2019-03-27 PROCEDURE — 85025 COMPLETE CBC W/AUTO DIFF WBC: CPT | Performed by: EMERGENCY MEDICINE

## 2019-03-27 PROCEDURE — 96374 THER/PROPH/DIAG INJ IV PUSH: CPT

## 2019-03-27 PROCEDURE — 25000128 H RX IP 250 OP 636: Performed by: EMERGENCY MEDICINE

## 2019-03-27 PROCEDURE — 83690 ASSAY OF LIPASE: CPT | Performed by: EMERGENCY MEDICINE

## 2019-03-27 PROCEDURE — 87086 URINE CULTURE/COLONY COUNT: CPT | Performed by: EMERGENCY MEDICINE

## 2019-03-27 PROCEDURE — 96361 HYDRATE IV INFUSION ADD-ON: CPT

## 2019-03-27 RX ORDER — ONDANSETRON 2 MG/ML
4 INJECTION INTRAMUSCULAR; INTRAVENOUS ONCE
Status: COMPLETED | OUTPATIENT
Start: 2019-03-27 | End: 2019-03-27

## 2019-03-27 RX ORDER — KETOROLAC TROMETHAMINE 15 MG/ML
15 INJECTION, SOLUTION INTRAMUSCULAR; INTRAVENOUS ONCE
Status: COMPLETED | OUTPATIENT
Start: 2019-03-27 | End: 2019-03-27

## 2019-03-27 RX ADMIN — KETOROLAC TROMETHAMINE 15 MG: 15 INJECTION, SOLUTION INTRAMUSCULAR; INTRAVENOUS at 05:59

## 2019-03-27 RX ADMIN — SODIUM CHLORIDE 1000 ML: 9 INJECTION, SOLUTION INTRAVENOUS at 05:19

## 2019-03-27 RX ADMIN — ONDANSETRON 4 MG: 2 INJECTION INTRAMUSCULAR; INTRAVENOUS at 05:19

## 2019-03-27 RX ADMIN — FAMOTIDINE 20 MG: 10 INJECTION, SOLUTION INTRAVENOUS at 05:19

## 2019-03-27 ASSESSMENT — ENCOUNTER SYMPTOMS
ABDOMINAL PAIN: 1
BACK PAIN: 1
DIARRHEA: 1
FEVER: 0
DYSURIA: 0
VOMITING: 0
NAUSEA: 1

## 2019-03-27 NOTE — PROGRESS NOTES
Name: Melody Muñoz  Seen at the request of Evaristo Machado for Diabetes.  HPI:  Melody Muñoz is a 43 year old female who presents for the evaluation/management of:    1. Type 2 DM:  Originally diagnosed: 3/2017.   Initially treated with metformin - did not tolerate due to GI side effects    Current Regimen: Trulicity 0.75 mg weekly,  Trulicity was started 11/2018.  Briefly treated with januvia x 1 month - discontinued due to ineffectiveness.    BS checks: testing 3 x/day   Meter Download:     No FH of diabetes.  Personal history of GD with 4th pregnancy - pregnant with twins at the time, was diet controlled.      Complications:   Diabetes Complications  Description / Detail    Diabetic Retinopathy  Needs to schedule eye exam - referral provided    CAD / PAD  No   Neuropathy  No numbness, burning or tingling in her feet but has noted 2 months history of numbness in her hands   Nephropathy / Microalbuminuria  elevated urine microalbumin x 1   Gastroparesis  No   Hypoglycemia Unawareness  N0     2. Blood Pressure Blood Pressure today:   BP Readings from Last 3 Encounters:   03/21/19 111/73   03/18/19 120/74   03/16/19 122/68   .  Blood pressure medications include none  3. Lipids: Takes no medications for lipid control.      4. Prevention:  Flu Shot- 10/2018  Pneumovax- Recommended  Opthalmology-Due, referral provided.  Dental-Recommended semiannually  ASA-No  Smoking- No  4. Hypothyroidism. Currently treated with levothyroxine 25 mcg/day.  Diagnosed a couple years ago, doesn't remember exactly when,.  Was noting emotional irritability, anxiety, tachycardia at the time.  Thought it was hormone imbalance so saw her OB/BYN - tests at the time showed hypothyroidism and she was started on levothyroxine.  Symptoms initially improved however, she has been increasing symptomatic the past 6 months - now noting extreme fatigue, increased hair loss, memory problems, and difficulty focusing.   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     Migraines     otc excedrin prn     Sleep apnea 2014    uses Cpap     Substance abuse (H)     no use since summer 2013     Suicidal ideation 2013    in past, not current, sees psychiatrist and therapist     Past Surgical History:   Procedure Laterality Date     BIOPSY       COLONOSCOPY       GENITOURINARY SURGERY  May/2014    bladder sling     GYN SURGERY      laparoscopy- endometriosis     GYN SURGERY       for twins     LAPAROSCOPIC APPENDECTOMY N/A 2017    Procedure: LAPAROSCOPIC APPENDECTOMY;  LAPAROSCOPIC APPENDECTOMY and resection of epiploic tag;  Surgeon: Roberto Robins MD;  Location: [facility] OR     little finger surgery left  1980     tubal ligation bilateral       wisdom teeth removal       Family Hx:  Family History   Problem Relation Age of Onset     Hypertension Mother      Heart Disease Father         palpitations     Depression Sister      Anxiety Disorder Sister      Heart Disease Maternal Grandmother         CHF     Cancer Maternal Grandfather 72        Pancreatic Cancer     Alzheimer Disease Paternal Grandfather      Bipolar Disorder Other      Autism Spectrum Disorder Other      Thyroid disease: No         DM2: No         Autoimmune: DM1, SLE, RA, Vitiligo cousin with type 1 diabetes    Social Hx:  Social History     Socioeconomic History     Marital status:      Spouse name: Not on file     Number of children: Not on file     Years of education: Not on file     Highest  education level: Not on file   Occupational History     Employer: SELF EMPLOYED.     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   Social Needs     Financial resource strain: Not on file     Food insecurity:     Worry: Not on file     Inability: Not on file     Transportation needs:     Medical: Not on file     Non-medical: Not on file   Tobacco Use     Smoking status: Former Smoker     Packs/day: 0.25     Years: 17.00     Pack years: 4.25     Types: Cigarettes     Smokeless tobacco: Never Used   Substance and Sexual Activity     Alcohol use: No     Comment: no etoh since 2013     Drug use: No     Sexual activity: No     Partners: Male     Birth control/protection: Surgical     Comment: tubal ligation   Lifestyle     Physical activity:     Days per week: Not on file     Minutes per session: Not on file     Stress: Not on file   Relationships     Social connections:     Talks on phone: Not on file     Gets together: Not on file     Attends Confucianism service: Not on file     Active member of club or organization: Not on file     Attends meetings of clubs or organizations: Not on file     Relationship status: Not on file     Intimate partner violence:     Fear of current or ex partner: Not on file     Emotionally abused: Not on file     Physically abused: Not on file     Forced sexual activity: Not on file   Other Topics Concern     Parent/sibling w/ CABG, MI or angioplasty before 65F 55M? No      Service Not Asked     Blood Transfusions Not Asked     Caffeine Concern Not Asked     Occupational Exposure Not Asked     Hobby Hazards Not Asked     Sleep Concern Not Asked     Stress Concern Not Asked     Weight Concern Not Asked     Special Diet Not Asked     Back Care Not Asked     Exercise Not Asked     Bike Helmet Yes     Seat Belt Yes     Self-Exams Not Asked   Social History Narrative     Not on file          MEDICATIONS:  has a current medication list which includes the following prescription(s):  accu-chek allie plus, acetaminophen-codeine, albuterol, almotriptan, benzonatate, blood glucose, blood glucose, blood glucose, butalbital-apap-caff-cod, carisoprodol, clonazepam, freestyle aurelia 14 day reader, freestyle aurelia 14 day sensor, dextroamphetamine, dextroamphetamine, dulaglutide, fluticasone, guanfacine, lamotrigine, latuda, levothyroxine, lithium, magnesium citrate, meclizine, melatonin, microlet lancets, norgestimate-ethinyl estradiol, omega-3 fatty acids, ondansetron, order for dme, pantoprazole, potassium, pregabalin, proair hfa, promethazine hcl, sertraline, statin not prescribed, statin not prescribed, tamsulosin, topiramate, trazodone, venlafaxine, cholecalciferol, and zolpidem.    ROS     ROS: 10 point ROS neg other than the symptoms noted above in the HPI.    Physical Exam   VS: /73 (BP Location: Right arm, Patient Position: Chair, Cuff Size: Adult Regular)   Pulse 90   Temp 98  F (36.7  C) (Oral)   Wt 71 kg (156 lb 8 oz)   LMP  (LMP Unknown)   SpO2 96%   Breastfeeding? No   BMI 28.62 kg/m    GENERAL: AXOX3, NAD, well dressed, answering questions appropriately, appears stated age.  HEENT: no exophthalmos, no proptosis no lig lag, no retraction  CV: RRR, no rubs, gallops, no murmurs  LUNGS: CTAB, no wheezes, rales, or rhonchi  EXTREMITIES: no edema  NEUROLOGY: CN grossly intact, + monofilament, no tremors  MSK: grossly intact  LABS:  A1c:   Component      Latest Ref Rng & Units 10/1/2018 2/6/2019   Hemoglobin A1C      0 - 5.6 % 9.6 (H) 6.5 (H)     Basic Metabolic Panel:  !COMPREHENSIVE Latest Ref Rng & Units 3/16/2019 3/18/2019   SODIUM 133 - 144 mmol/L 137    POTASSIUM 3.4 - 5.3 mmol/L 3.2 (L) 3.9   CHLORIDE 94 - 109 mmol/L 106    BUN 7 - 30 mg/dL 13    Creatinine 0.52 - 1.04 mg/dL 0.76    Glucose 70 - 99 mg/dL 141 (H)    ANION GAP 3 - 14 mmol/L 7    CALCIUM 8.5 - 10.1 mg/dL 8.6    ALBUMIN 3.4 - 5.0 g/dL 3.1 (L)    PROTEIN, TOTAL 6.8 - 8.8 g/dL 6.8      LFTS/Cholesterol  Panel:    Thyroid Function:   !THYROID Latest Ref Rng & Units 1/15/2019   TSH 0.40 - 4.00 mU/L 6.050  (H)   T4 FREE 0.76 - 1.46 ng/dL 1.11     Urine MicroAlbumin:  Component      Latest Ref Rng & Units 6/19/2018 10/1/2018   Creatinine Urine      mg/dL 21 97   Albumin Urine mg/L      mg/L 8 17   Albumin Urine mg/g Cr      0 - 25 mg/g Cr 37.38 (H) 17.65     Vitamin D:    All pertinent notes, labs, and images personally reviewed by me.     A/P  Ms.Jessica TONYA Muñoz is a 43 year old here for the evaluation/management of diabetes:    1. DM2 - Controlled.  Cotinue Trulicity 0.75 mg weekly.  There is some variability among people, most will usually develop symptoms suggestive of hypoglycemia when blood glucose levels are lowered to the mid 60's. The first set of symptoms are called adrenergic. Patients may experience any of the following nervousness, sweating, intense hunger, trembling, weakness, palpitations, and difficulty speaking.   The acute management of hypoglycemia involves the rapid delivery of a source of easily absorbed sugar. Regular soda, juice, lifesavers, table sugar, are good options. 15 grams of glucose is the dose that is given, followed by an assessment of symptoms and a blood glucose check if possible. If after 10 minutes there is no improvement, another 10-15 grams should be given. This can be repeated up to three times. The equivalency of 10-15 grams of glucose (approximate servings) are: 3-5 hard candies, 3 teaspoons of sugar, or 1/2 cup of regular soda or juice.    2. Hypothyroidism.  Recent TSH elevated.  Increase levothyroxine to 50 mcg/day.  Repeat TFT's in 6-8 weeks.    Labs ordered today: No orders of the defined types were placed in this encounter.      Radiology/Consults ordered today: None    All questions were answered.  The patient indicates understanding of the above issues and agrees with the plan set forth.  Total face to face time greater than or equal to 45 minutes.        Follow-up:  3 months    Tammy Louie NP  Endocrinology  [facility]  CC: Evaristo Machado

## 2019-03-27 NOTE — ED AVS SNAPSHOT
[facility] Emergency Department  [address]  [facility] [address]  Phone:  [phone]  Fax:  [phone]                                    Melody Muñoz   MRN: [id number]    Department:  [facility] Emergency Department   Date of Visit:  3/27/2019           After Visit Summary Signature Page    I have received my discharge instructions, and my questions have been answered. I have discussed any challenges I see with this plan with the nurse or doctor.    ..........................................................................................................................................  Patient/Patient Representative Signature      ..........................................................................................................................................  Patient Representative Print Name and Relationship to Patient    ..................................................               ................................................  Date                                   Time    ..........................................................................................................................................  Reviewed by Signature/Title    ...................................................              ..............................................  Date                                               Time          22EPIC Rev 08/18

## 2019-03-27 NOTE — TELEPHONE ENCOUNTER
Pt called crying expressing fustration. She was seen at ED yesterday for RUQ pain. States they didn't do anything -no orders for CT or ultrasound or pain medication. Notes pain is now radiating to upper and right lower back when she moves. Rates  pain 7/10. Per pt, she called GI doctor but no one is returning her call at that office. Writer advised she discuss with GI doctor and seek care at ED if severe pain. Pt states they won't do anything. Writer explained message will be sent to PCP ans triage will call him back if any different  directives. Please advice.

## 2019-03-27 NOTE — ED NOTES
Patient ambulated to restroom and back to bed without assistance. Gait steady and stable. UA collect and sent to lab, now awaiting for results. VS stable. Will continue to monitor.

## 2019-03-27 NOTE — ED PROVIDER NOTES
History     Chief Complaint:  Abdominal Pain    HPI   Melody Muñoz is a 43 year old female who presents for evaluation of abdominal pain. She reports a sudden onset of right upper quadrant pain yesterday with associated nausea and diarrhea. Pain radiates to her back. She has been taking Tylenol at home for her symptoms, however the pain has progressed to the point where she reports it hurts to walk. She does report that she has a hepatobiliary scan scheduled for tomorrow with GI, however she decided she did not want to wait until then if there is something serious going on. She denies any vomiting, fevers, or dysuria. She states that today's pain is different from all the times she has presented for upper abdominal pain in the past; notably, she states today's pain is further to the right side than it was when she was seen here on 3/16. She also denies any sick contacts. Chart review suggests a history of RUQ pain that radiates to pt's back since at least August last year.     From ED Encounter - 3/16/2019:   US Abdomen, RUQ only:  Normal right upper quadrant. No gallstone or biliary dilatation, as per radiology.     CT Abd/Pel with contrast:  1. No acute abnormality. No bowel obstruction or inflammation.  2. Colonic diverticula without acute diverticulitis.    Allergies:  Hydrocodone    Medications:    Albuterol nebulizer  Almotriptan  Carisoprodol  Klonopin  Dextroamphetamine  Dulaglutide  Guanfacine   Lamotrigine   Latuda   Levothyroxine  Lithium  Meclizine   Melatonin   OCP  Protonix  Pregabalin  Proair inhaler  Sertraline  Promethazine  Topamax  Trazodone  Effexor   Ambien    Past Medical History:    Anxiety & Depression  Bipolar I disorder  Diabetes - type II  Hypothyroidism   Endometriosis  Asthma  Latent TB  Migraines  JASWANT  Substance abuse  Suicidal ideations    Past Surgical History:    Bladder sling  Exploratory laparoscopy    Appendectomy  Tubal ligation  Oral surgery  Orthopedic  surgery  Hysterectomy    Family History:    HTN  Anxiety & Depression    Social History:  Marital Status:   [2]  Smokes 0.25 PPD  Negative for alcohol use. Comment: Sober since 2013.      Review of Systems   Constitutional: Negative for fever.   Gastrointestinal: Positive for abdominal pain, diarrhea and nausea. Negative for vomiting.   Genitourinary: Negative for dysuria.   Musculoskeletal: Positive for back pain.   All other systems reviewed and are negative.        Physical Exam     Patient Vitals for the past 24 hrs:   BP Temp Temp src Pulse Resp SpO2 Weight   03/27/19 0545 103/74 -- -- 86 -- 97 % --   03/27/19 0530 117/75 -- -- 89 -- 97 % --   03/27/19 0422 127/85 97.4  F (36.3  C) Temporal 101 16 95 % 71.2 kg (157 lb)        Physical Exam  Nursing note and vitals reviewed.  Constitutional: Cooperative. Lying on right side.   HENT:   Mouth/Throat: Moist mucous membranes.   Eyes: EOMI, nonicteric sclera  Cardiovascular: Normal rate, regular rhythm, no murmurs, rubs, or gallops  Pulmonary/Chest: Effort normal and breath sounds normal. No respiratory distress. No wheezes. No rales.   Abdominal: Soft. Right-sided TTP without guarding or rigidity, nondistended, BS present.   Musculoskeletal: Normal range of motion.   Neurological: Alert. Moves all extremities spontaneously.   Skin: Skin is warm and dry. No rash noted.   Psychiatric: Normal mood and affect.     Emergency Department Course   Laboratory:  CBC: WBC: 15.2 (H), HGB: 14.0, PLT: 485 (H)  CMP: Glucose 155 (H), BUN: 6 (L), Bilirubin: 0.1 (L), Albumin: 3.1 (L), o/w WNL (Creatinine: 0.58)    Lipase: 167    UA with Microscopic: Leukocyte esterase: Small (A), WBC: 15 (H), RBC: 7 (H), Bacteria: Many (A), Squamous epithelial: 22 (H), Mucous: Present (A), o/w WNL  Urine Culture: Pending    Interventions:  0519 NS 1L IV   Zofran, 4 mg, IV injection   Pepcid, 20 mg, IV injection  0559 Toradol, 15 mg, IV injection    Emergency Department Course:  Nursing notes  and vitals reviewed.   (0501) I performed an exam of the patient as documented above.      IV inserted. Medicine administered as documented above. Blood drawn. This was sent to the lab for further testing, results above.     (0538) I rechecked the patient.      (0607) Per nursing, patient was able to ambulate unassisted to the bathroom.     The patient provided a urine sample here in the emergency department. This was sent for laboratory testing, findings above.     (0645) I rechecked the patient and discussed the results of her workup.      Findings and plan explained to the Patient. Patient discharged home with instructions regarding supportive care, medications, and reasons to return. The importance of close follow-up was reviewed.      I personally reviewed the laboratory results with the Patient and answered all related questions prior to discharge.     Impression & Plan      Medical Decision Making:  Melody Muñoz is a 43 year old female who presents to the emergency department with chief complaint right upper quadrant/right flank pain.  Differential includes gastritis/GERD, cholecystitis, choledocholithiasis, kidney stone, pyelonephritis, among many other etiologies.  Patient reports this pain began yesterday, however chart review suggests that pain has been ongoing for some time, and she has been worked up with previous ED visits, gastroenterology, and by her primary care physician.  Patient just had negative CT and ultrasound 11 days prior.  Based on labs and exam today, I do not believe that these need to be repeated given risks of radiation.  Patient is scheduled for a HIDA scan tomorrow for further workup for this.  I explained to patient that this is not a study that is obtained emergently from the emergency department.  Labs do not show any biliary obstructive process.  She does have leukocytosis, however this has been chronic and stretches back to last fall.  She has seen hematology/oncology for  this and it is being worked up.  I do not believe that it suggests any emergent intra-abdominal pathology.  Urinalysis appears contaminated, but I added on a culture.  Primary care appointment recently suggested IBS as possibility given diarrhea and negative workup thus far which I discussed with her as well.  Pt has gastroenterology follow-up next week for ongoing workup.     Patient is quite frustrated with her care today in regards to pain control. She initially refused toradol requesting narcotics. This was later accepted without much benefit. Also tried pepcid as she'd received this previously, but also wasn't helpful. Discussed use of adjunct medications such as haldol or zyprexa which she declines. Notably, patient has a history of chronic pain and is on a pain contract through her primary care physician.  [facility] reviewed and patient does have prescriptions of narcotics from multiple providers.  I explained that narcotic pain medications are best prescribed through her primary care physician or specialist. I also explained [facility]'s chronic pain policy and stated that narcotic pain medication would not be given today. Pt and I had a long discussion where she expressed her frustration with her current pain and not being able to find an answer despite multiple CT scans, ultrasounds, and visits with specialists.  I discussed that continuing to work with her primary care physician and her gastroenterologist is best way forward in terms of ongoing workup. Ultimately pt requested discharge after this conversation. Pt knows she may return to emergency department at any time for re-evaluation but especially if fevers, vomiting, worsening pain. She's discharged in stable condition. All questions answered.     Diagnosis:    ICD-10-CM    1. RUQ abdominal pain R10.11 Urine Culture       Disposition:  discharged to home    Discharge Medications:  There were no discharge medications.     Scribe Disclosure:  Shey HANKS  Cathy, am serving as a scribe on 3/27/2019 at 5:01 AM to personally document services performed by Jass Paiz MD based on my observations and the provider's statements to me.       Shey Morgan  3/27/2019   [facility] EMERGENCY DEPARTMENT       Jass Paiz MD  03/27/19 2019

## 2019-03-27 NOTE — ED TRIAGE NOTES
Pt in with C/O R sided abd pain, nausea and diarrhea onset yesterday. Pt reports she has had similar sx a few weeks prior and was seen in ER. Pt states sx are now more constant. Pt A&Ox4 ABCD's intact

## 2019-03-27 NOTE — TELEPHONE ENCOUNTER
"Patient called asking that \"red flag\" of chronic pain entered by doctor Lisha get removed. Per pt, doctors are not prescribing medications that help her relieve her pain due to this alert. She asked if PCP can remove it. \"Im not seeking pain medications\" \"just answers and want to get something to help relieve my pain. \"  "

## 2019-03-28 ENCOUNTER — TELEPHONE (OUTPATIENT)
Dept: FAMILY MEDICINE | Facility: CLINIC | Age: 44
End: 2019-03-28

## 2019-03-28 ENCOUNTER — HOSPITAL ENCOUNTER (OUTPATIENT)
Dept: NUCLEAR MEDICINE | Facility: CLINIC | Age: 44
Setting detail: NUCLEAR MEDICINE
Discharge: HOME | End: 2019-03-28
Attending: INTERNAL MEDICINE | Admitting: INTERNAL MEDICINE
Payer: COMMERCIAL

## 2019-03-28 ENCOUNTER — TELEPHONE (OUTPATIENT)
Dept: NURSING | Facility: CLINIC | Age: 44
End: 2019-03-28

## 2019-03-28 ENCOUNTER — HOSPITAL ENCOUNTER (EMERGENCY)
Facility: CLINIC | Age: 44
Discharge: HOME | End: 2019-03-28
Attending: EMERGENCY MEDICINE | Admitting: EMERGENCY MEDICINE
Payer: COMMERCIAL

## 2019-03-28 VITALS
BODY MASS INDEX: 28.72 KG/M2 | OXYGEN SATURATION: 96 % | SYSTOLIC BLOOD PRESSURE: 125 MMHG | RESPIRATION RATE: 18 BRPM | HEART RATE: 80 BPM | DIASTOLIC BLOOD PRESSURE: 78 MMHG | TEMPERATURE: 97.7 F | WEIGHT: 157 LBS

## 2019-03-28 DIAGNOSIS — R10.84 ABDOMINAL PAIN, GENERALIZED: ICD-10-CM

## 2019-03-28 DIAGNOSIS — R10.11 RUQ PAIN: ICD-10-CM

## 2019-03-28 LAB
ALBUMIN SERPL-MCNC: 3 G/DL (ref 3.4–5)
ALP SERPL-CCNC: 114 U/L (ref 40–150)
ALT SERPL W P-5'-P-CCNC: 38 U/L (ref 0–50)
ANION GAP SERPL CALCULATED.3IONS-SCNC: 6 MMOL/L (ref 3–14)
AST SERPL W P-5'-P-CCNC: 16 U/L (ref 0–45)
BACTERIA SPEC CULT: NORMAL
BASOPHILS # BLD AUTO: 0.1 10E9/L (ref 0–0.2)
BASOPHILS NFR BLD AUTO: 0.4 %
BILIRUB SERPL-MCNC: 0.1 MG/DL (ref 0.2–1.3)
BUN SERPL-MCNC: 6 MG/DL (ref 7–30)
CALCIUM SERPL-MCNC: 8.9 MG/DL (ref 8.5–10.1)
CHLORIDE SERPL-SCNC: 107 MMOL/L (ref 94–109)
CO2 SERPL-SCNC: 26 MMOL/L (ref 20–32)
CREAT SERPL-MCNC: 0.56 MG/DL (ref 0.52–1.04)
DIFFERENTIAL METHOD BLD: ABNORMAL
EOSINOPHIL # BLD AUTO: 0.5 10E9/L (ref 0–0.7)
EOSINOPHIL NFR BLD AUTO: 3.3 %
ERYTHROCYTE [DISTWIDTH] IN BLOOD BY AUTOMATED COUNT: 13.9 % (ref 10–15)
GFR SERPL CREATININE-BSD FRML MDRD: >90 ML/MIN/{1.73_M2}
GLUCOSE SERPL-MCNC: 129 MG/DL (ref 70–99)
HCG SERPL QL: NEGATIVE
HCT VFR BLD AUTO: 42.6 % (ref 35–47)
HGB BLD-MCNC: 13.1 G/DL (ref 11.7–15.7)
IMM GRANULOCYTES # BLD: 0.1 10E9/L (ref 0–0.4)
IMM GRANULOCYTES NFR BLD: 0.4 %
INTERPRETATION ECG - MUSE: NORMAL
LIPASE SERPL-CCNC: 312 U/L (ref 73–393)
LYMPHOCYTES # BLD AUTO: 3.9 10E9/L (ref 0.8–5.3)
LYMPHOCYTES NFR BLD AUTO: 27.6 %
Lab: NORMAL
MCH RBC QN AUTO: 26.3 PG (ref 26.5–33)
MCHC RBC AUTO-ENTMCNC: 30.8 G/DL (ref 31.5–36.5)
MCV RBC AUTO: 86 FL (ref 78–100)
MONOCYTES # BLD AUTO: 0.6 10E9/L (ref 0–1.3)
MONOCYTES NFR BLD AUTO: 4.3 %
NEUTROPHILS # BLD AUTO: 9.1 10E9/L (ref 1.6–8.3)
NEUTROPHILS NFR BLD AUTO: 64 %
NRBC # BLD AUTO: 0 10*3/UL
NRBC BLD AUTO-RTO: 0 /100
PLATELET # BLD AUTO: 429 10E9/L (ref 150–450)
POTASSIUM SERPL-SCNC: 3.7 MMOL/L (ref 3.4–5.3)
PROT SERPL-MCNC: 6.8 G/DL (ref 6.8–8.8)
RBC # BLD AUTO: 4.98 10E12/L (ref 3.8–5.2)
SODIUM SERPL-SCNC: 139 MMOL/L (ref 133–144)
SPECIMEN SOURCE: NORMAL
WBC # BLD AUTO: 14.3 10E9/L (ref 4–11)

## 2019-03-28 PROCEDURE — 25000128 H RX IP 250 OP 636: Performed by: EMERGENCY MEDICINE

## 2019-03-28 PROCEDURE — 34300033 ZZH RX 343: Performed by: INTERNAL MEDICINE

## 2019-03-28 PROCEDURE — 85025 COMPLETE CBC W/AUTO DIFF WBC: CPT | Performed by: EMERGENCY MEDICINE

## 2019-03-28 PROCEDURE — 96375 TX/PRO/DX INJ NEW DRUG ADDON: CPT

## 2019-03-28 PROCEDURE — 84703 CHORIONIC GONADOTROPIN ASSAY: CPT | Performed by: EMERGENCY MEDICINE

## 2019-03-28 PROCEDURE — A9537 TC99M MEBROFENIN: HCPCS | Performed by: INTERNAL MEDICINE

## 2019-03-28 PROCEDURE — 25000125 ZZHC RX 250: Performed by: EMERGENCY MEDICINE

## 2019-03-28 PROCEDURE — 25000132 ZZH RX MED GY IP 250 OP 250 PS 637: Performed by: EMERGENCY MEDICINE

## 2019-03-28 PROCEDURE — 80053 COMPREHEN METABOLIC PANEL: CPT | Performed by: EMERGENCY MEDICINE

## 2019-03-28 PROCEDURE — 78227 HEPATOBIL SYST IMAGE W/DRUG: CPT

## 2019-03-28 PROCEDURE — 96361 HYDRATE IV INFUSION ADD-ON: CPT

## 2019-03-28 PROCEDURE — 99285 EMERGENCY DEPT VISIT HI MDM: CPT | Mod: 25

## 2019-03-28 PROCEDURE — 83690 ASSAY OF LIPASE: CPT | Performed by: EMERGENCY MEDICINE

## 2019-03-28 PROCEDURE — 96374 THER/PROPH/DIAG INJ IV PUSH: CPT

## 2019-03-28 RX ORDER — ONDANSETRON 4 MG/1
4 TABLET, ORALLY DISINTEGRATING ORAL EVERY 8 HOURS PRN
Qty: 10 TABLET | Refills: 0 | Status: SHIPPED | OUTPATIENT
Start: 2019-03-28 | End: 2019-05-03

## 2019-03-28 RX ORDER — OXYCODONE HYDROCHLORIDE 5 MG/1
5 TABLET ORAL ONCE
Status: COMPLETED | OUTPATIENT
Start: 2019-03-28 | End: 2019-03-28

## 2019-03-28 RX ORDER — LORAZEPAM 2 MG/ML
1 INJECTION INTRAMUSCULAR ONCE
Status: COMPLETED | OUTPATIENT
Start: 2019-03-28 | End: 2019-03-28

## 2019-03-28 RX ORDER — KETOROLAC TROMETHAMINE 15 MG/ML
10 INJECTION, SOLUTION INTRAMUSCULAR; INTRAVENOUS ONCE
Status: COMPLETED | OUTPATIENT
Start: 2019-03-28 | End: 2019-03-28

## 2019-03-28 RX ORDER — SODIUM CHLORIDE 9 MG/ML
1000 INJECTION, SOLUTION INTRAVENOUS CONTINUOUS
Status: DISCONTINUED | OUTPATIENT
Start: 2019-03-28 | End: 2019-03-28 | Stop reason: HOSPADM

## 2019-03-28 RX ORDER — ONDANSETRON 2 MG/ML
4 INJECTION INTRAMUSCULAR; INTRAVENOUS EVERY 30 MIN PRN
Status: DISCONTINUED | OUTPATIENT
Start: 2019-03-28 | End: 2019-03-28 | Stop reason: HOSPADM

## 2019-03-28 RX ORDER — KIT FOR THE PREPARATION OF TECHNETIUM TC 99M MEBROFENIN 45 MG/10ML
6 INJECTION, POWDER, LYOPHILIZED, FOR SOLUTION INTRAVENOUS ONCE
Status: COMPLETED | OUTPATIENT
Start: 2019-03-28 | End: 2019-03-28

## 2019-03-28 RX ADMIN — SODIUM CHLORIDE 1000 ML: 9 INJECTION, SOLUTION INTRAVENOUS at 02:02

## 2019-03-28 RX ADMIN — LIDOCAINE HYDROCHLORIDE 30 ML: 20 SOLUTION ORAL; TOPICAL at 02:06

## 2019-03-28 RX ADMIN — ONDANSETRON 4 MG: 2 INJECTION INTRAMUSCULAR; INTRAVENOUS at 02:03

## 2019-03-28 RX ADMIN — KETOROLAC TROMETHAMINE 10 MG: 15 INJECTION, SOLUTION INTRAMUSCULAR; INTRAVENOUS at 03:16

## 2019-03-28 RX ADMIN — OXYCODONE HYDROCHLORIDE 5 MG: 5 TABLET ORAL at 02:02

## 2019-03-28 RX ADMIN — MEBROFENIN 6 MCI.: 45 INJECTION, POWDER, LYOPHILIZED, FOR SOLUTION INTRAVENOUS at 13:40

## 2019-03-28 RX ADMIN — LORAZEPAM 1 MG: 2 INJECTION INTRAMUSCULAR; INTRAVENOUS at 02:04

## 2019-03-28 RX ADMIN — FAMOTIDINE 20 MG: 10 INJECTION, SOLUTION INTRAVENOUS at 02:05

## 2019-03-28 ASSESSMENT — ENCOUNTER SYMPTOMS
ABDOMINAL PAIN: 1
CONSTIPATION: 0
DIARRHEA: 1

## 2019-03-28 NOTE — TELEPHONE ENCOUNTER
Pt calling about her pain level and frustrated that she has not heard back from Dr Machado or her GI provider yet. I said I would put another note in for Dr Machado's care team and in the mean time advised her to be seen in the ED again tonight for help with nausea, vomiting and continued abd pain. She is refusing to go to the ED again, since they can not help with her pain. Advised they can help with the nausea and give her fluids if dehydrated. She refused RN triage at this time. She would appreciate a call back from someone tomorrow am, even if Dr Machado is not in clinic. She is having a HIDA scan tomorrow at about 1pm. Otherwise will be home.    Jennifer Marquez RN  [facility] RN  Lung Nodule and ED Lab Result F/u RN

## 2019-03-28 NOTE — ED AVS SNAPSHOT
[facility] Emergency Department  [address]  [facility] [address]  Phone:  [phone]  Fax:  [phone]                                    Melody Muñoz   MRN: [id number]    Department:  [facility] Emergency Department   Date of Visit:  3/28/2019           After Visit Summary Signature Page    I have received my discharge instructions, and my questions have been answered. I have discussed any challenges I see with this plan with the nurse or doctor.    ..........................................................................................................................................  Patient/Patient Representative Signature      ..........................................................................................................................................  Patient Representative Print Name and Relationship to Patient    ..................................................               ................................................  Date                                   Time    ..........................................................................................................................................  Reviewed by Signature/Title    ...................................................              ..............................................  Date                                               Time          22EPIC Rev 08/18

## 2019-03-28 NOTE — TELEPHONE ENCOUNTER
Routing to care coordinators- patient has multiple ED visits. Please advise on any follow up recommendations or resources for this patient

## 2019-03-28 NOTE — ED PROVIDER NOTES
History     Chief Complaint:  Abdominal pain    HPI   Melody Muñoz is a 43 year old female with a history of endometriosis, sleep apnea, positive mantoux test with latent TB, Sepsis, Bipolar I, Anxiety, Diabetes, asthma, migraines, hypothyroidism, who presents with her friend to the emergency department with concern for abdominal pain. The patient states that for over a month she has had right upper quadrant abdominal pain. She was here in the ER twice , once two weeks ago and also less than 24 hours ago. She has had multiple negative CTs and one negative RUQ US and is scheduled for an outpatient HIDA scan in 12 hours. The patient states that she is in more pain than during her last visit here, and she had diarrhea all day yesterday but none today. She denies constipation.       Allergies:  Hydrocodone    Medications:    Ambien  Effexor  Zoloft  Albuterol  Meclizine  Lamotrigine  Levothyroxine  Lithium  Trulicity  Dexedrine  Klonopin  Soma  Tessalon  Axert     Past Medical History:    Sepsis  Obesity  Hypothyroidism  Vertigo  Substance abuse  Sleep apnea  Migraines  Mantoux positive  Depression  Tuberculosis  Asthma  Endometriosis  Bipolar I  DM  Anxiety    Past Surgical History:    ENT  Tubal ligation  Little finger surgery, left  C section  Endometriosis laparoscopy  Bladder sling    Family History:    Alzheimer  Cancer  CHF  Anxiety  Depression  Palpitations  HTN    Social History:  Current smoker  .25 ppd, 17 years    Negative for alcohol use.    Review of Systems   Gastrointestinal: Positive for abdominal pain and diarrhea. Negative for constipation.   All other systems reviewed and are negative.      Physical Exam     Patient Vitals for the past 24 hrs:   BP Temp Temp src Pulse Resp SpO2 Weight   03/28/19 0315 125/78 -- -- 80 -- 96 % --   03/28/19 0215 121/83 -- -- 82 -- 96 % --   03/28/19 0210 112/83 -- -- 77 -- 97 % --   03/28/19 0125 131/88 97.7  F (36.5  C) Temporal 96 18 99 % 71.2 kg (157 lb)          Physical Exam  Constitutional:  Oriented to person, place, and time. Anxious but well appearing.   HENT:   Head:    Normocephalic.   Mouth/Throat:   Oropharynx is clear and moist.   Eyes:    EOM are normal. Pupils are equal, round, and reactive to light.   Neck:    Neck supple.   Cardiovascular:  Normal rate, regular rhythm and normal heart sounds.      Exam reveals no gallop and no friction rub.       No murmur heard.  Pulmonary/Chest:  Effort normal and breath sounds normal.      No respiratory distress. No wheezes. No rales.      No reproducible chest wall pain.  Abdominal:   Soft. No distension. Right upper quadrant tenderness, negative durham's sign no guarding no rebound.   Musculoskeletal:  Normal range of motion.   Neurological:   Alert and oriented to person, place, and time.           Moves all 4 extremities spontaneously    Skin:    No rash noted. No pallor.     Emergency Department Course   ECG:  Indication: abdominal pain  Time: 0214  Vent. Rate 79 bpm. MS interval 166. QRS duration 96. QT/QTc 412/472. P-R-T axis 54 9 51.  Normal sinus rhythm. Normal ECG. Read time: 0214    Laboratory:    CBC: WBC: 14.3, HGB: 13.1, PLT: 429  CMP: Glucose 129, Urea nitrogen: 6, Bilirubin total: 0.1, Albumin: 3.0, o/w WNL (Creatinine: 0.56)  Lipase: 312    HCG: Negative    Interventions:    0202 Roxicodone 5 mg Oral   NS 1L IV  0203 Zofran 4 mg IV  0204 Ativan 1 mg IV  0205 Pepcid 20 mg IV  0206 Xylocaine 30 mL Oral  0316 Toradol 10 mg IV    Emergency Department Course:  Nursing notes and vitals reviewed. (0145) I performed an exam of the patient as documented above.     IV inserted. Medicine administered as documented above. Blood drawn. This was sent to the lab for further testing, results above.    (3441) I rechecked the patient and discussed the results of her workup thus far.     Findings and plan explained to the Patient. Patient discharged home with instructions regarding supportive care, medications, and  reasons to return. The importance of close follow-up was reviewed. The patient was prescribed Zofran.      I personally reviewed the laboratory results with the Patient and answered all related questions prior to discharge.       Impression & Plan      Medical Decision Makin year old female return visit for a continued RUQ abdominal pain. She was originally seen on the  by myself after an endoscopy  And had a CT of abdomen and pelvis to rule out perforation as well ultrasound of RUQ otherwise a nonspecific workup. Patient was seen yesterday for continued symptoms and HIDA scan due today. Patient is having continued pain. Differential includes acalculous, cholecystitis, gastritis, PUD, Gastroenteritis, pancreatitis and other causes. Workup is otherwise reassuring. She otherwise has a benign abdomen. I see no role in repeat imaging with her recent imaging done. Patient was requesting IV Narcotics. I did give her one oral dose of Roxicodone and discussed our chronic pain policy which patient has been made aware of in the past and I do not feel that IV opiates were indicated. She was understanding and will follow up for her HIDA scan in the AM today. She was told to return for any worsening vomiting, fever, non tolerating PO. Discharged to home to follow up with GI.       Diagnosis:    ICD-10-CM    1. Abdominal pain, generalized R10.84        Disposition:  discharged to home    Discharge Medications:     Medication List      Modified    * ondansetron 4 MG ODT tab  Commonly known as:  ZOFRAN-ODT  DISSOLVE 1 TO 2 TABLETS UNDER THE TONGUE EVERY 8 HOURS AS NEEDED FOR NAUSEA  What changed:  Another medication with the same name was added. Make sure you understand how and when to take each.     * ondansetron 4 MG ODT tab  Commonly known as:  ZOFRAN ODT  4 mg, Oral, EVERY 8 HOURS PRN  What changed:  You were already taking a medication with the same name, and this prescription was added. Make sure you understand how  and when to take each.         * This list has 2 medication(s) that are the same as other medications prescribed for you. Read the directions carefully, and ask your doctor or other care provider to review them with you.            ASK your doctor about these medications    * ondansetron 4 MG ODT tab  Commonly known as:  ZOFRAN ODT  4 mg, Oral, EVERY 8 HOURS PRN  Ask about: Should I take this medication?         * This list has 1 medication(s) that are the same as other medications prescribed for you. Read the directions carefully, and ask your doctor or other care provider to review them with you.              Scribe Disclosure:  I, Joe Johnson, am serving as a scribe on 3/28/2019 at 1:48 AM to personally document services performed by Que Kee MD based on my observations and the provider's statements to me.     Joe Johnson  3/28/2019   [facility] EMERGENCY DEPARTMENT       Que Kee MD  03/28/19 0534

## 2019-03-28 NOTE — TELEPHONE ENCOUNTER
Reason for Call:  Other call back    Detailed comments:   Patient is very anxious to discuss how much iron she should be taking       Phone Number Patient can be reached at: Home number on file [phone] (home)    Best Time: anytime    Can we leave a detailed message on this number? YES    Call taken on 3/28/2019 at 4:35 PM by KYLER DE LA PAZ

## 2019-03-28 NOTE — DISCHARGE INSTRUCTIONS
Discharge Instructions  Abdominal Pain    Abdominal pain can be caused by many things. Your evaluation today does not show the exact cause for your pain. Your doctor today has decided that it is unlikely your pain is due to a life threatening problem, or a problem requiring surgery or hospital admission. Sometimes those problems cannot be found right away, so it is very important that you follow up as directed.  Sometimes only the changes which occur over time allow the cause of your pain to be found.    Return to the Emergency Department for a recheck in 8-12 hours if your pain continues.  If your pain gets worse, changes in location, or feels different, return to the Emergency Department right away.    ADULTS:  Return to the Emergency Department right away if:    You get an oral temperature above 102oF or as directed by your doctor.  You have blood in your stools (bright red or black, tarry stools).  You keep throwing up or can?t drink liquids.  You see blood when you throw up.  You can?t have a bowel movement or you can?t pass gas.  Your stomach gets bloated or bigger.  Your skin or the whites of your eyes look yellow.  You faint.  You have bloody, frequent or painful urination.  You have new symptoms or anything that worries you.    CHILDREN:  Return to the Emergency Department right away if your child has any of the above-listed symptoms or the following:    Pushes your hand away or screams/cries when his/her belly is touched.  You notice your child is very fussy or weak.  Your child is very tired and is too tired to eat or drink.  Your child is dehydrated.  Signs of dehydration can be:  Your infant has had no wet diapers in 4-5 hours.  Your older child has not passed urine in 6-8 hours.  Your infant or child starts to have dry mouth and lips, or no saliva or tears.    PREGNANT WOMEN:  Return to the Emergency Department right away if you have any of the above-listed symptoms or the following:    You have  bleeding, leaking fluid or passing tissue from the vagina.  You have worse pain or cramping, or pain in your shoulder or back.  You have vomiting that will not stop.  You have painful or bloody urination.  You have a temperature of 100oF or more.  Your baby is not moving as much as usual.  You faint.  You get a bad headache with or without eye problems and abdominal pain.  You have a convulsion or seizure.  You have unusual discharge from your vagina and abdominal pain.    Abdominal pain is pretty common during pregnancy.  Your pain may or may not be related to your pregnancy. You should follow-up closely with your OB doctor so they can evaluate you and your baby.  Until you follow-up with your regular doctor, do the following:     Avoid sex and do not put anything in your vagina.  Drink clear fluids.  Only take medications approved by your doctor.    MORE INFORMATION:    Appendicitis:  A possible cause of abdominal pain in any person who still has their appendix is acute appendicitis. Appendicitis is often hard to diagnose.  Testing does not always rule out early appendicitis or other causes of abdominal pain. Close follow-up with your doctor and re-evaluations may be needed to figure out the reason for your abdominal pain.    Follow-up:  It is very important that you make an appointment with your clinic and go to the appointment.  If you do not follow-up with your primary doctor, it may result in missing an important development which could result in permanent injury or disability and/or lasting pain.  If there is any problem keeping your appointment, call your doctor or return to the Emergency Department.    Medications:  Take your medications as directed by your doctor today.  Before using over-the-counter medications, ask your doctor and make sure to take the medications as directed.  If you have any questions about medications, ask your doctor.    Diet:  Resume your normal diet as much as possible, but do not  "eat fried, fatty or spicy foods while you have pain.  Do not drink alcohol or have caffeine.  Do not smoke tobacco.    Probiotics: If you have been given an antibiotic, you may want to also take a probiotic pill or eat yogurt with live cultures. Probiotics have \"good bacteria\" to help your intestines stay healthy. Studies have shown that probiotics help prevent diarrhea and other intestine problems (including C. diff infection) when you take antibiotics. You can buy these without a prescription in the pharmacy section of the store.     If you were given a prescription for medicine here today, be sure to read all of the information (including the package insert) that comes with your prescription.  This will include important information about the medicine, its side effects, and any warnings that you need to know about.  The pharmacist who fills the prescription can provide more information and answer questions you may have about the medicine.  If you have questions or concerns that the pharmacist cannot address, please call or return to the Emergency Department.         Opioid Medication Information    Pain medications are among the most commonly prescribed medicines, so we are including this information for all our patients. If you did not receive pain medication or get a prescription for pain medicine, you can ignore it.     You may have been given a prescription for an opioid (narcotic) pain medicine and/or have received a pain medicine while here in the Emergency Department. These medicines can make you drowsy or impaired. You must not drive, operate dangerous equipment, or engage in any other dangerous activities while taking these medications. If you drive while taking these medications, you could be arrested for DUI, or driving under the influence. Do not drink any alcohol while you are taking these medications.     Opioid pain medications can cause addiction. If you have a history of chemical dependency of " any type, you are at a higher risk of becoming addicted to pain medications.  Only take these prescribed medications to treat your pain when all other options have been tried. Take it for as short a time and as few doses as possible. Store your pain pills in a secure place, as they are frequently stolen and provide a dangerous opportunity for children or visitors in your house to start abusing these powerful medications. We will not replace any lost or stolen medicine.  As soon as your pain is better, you should flush all your remaining medication.     Many prescription pain medications contain Tylenol  (acetaminophen), including Vicodin , Tylenol #3 , Norco , Lortab , and Percocet .  You should not take any extra pills of Tylenol  if you are using these prescription medications or you can get very sick.  Do not ever take more than 3000 mg of acetaminophen in any 24 hour period.    All opioids tend to cause constipation. Drink plenty of water and eat foods that have a lot of fiber, such as fruits, vegetables, prune juice, apple juice and high fiber cereal.  Take a laxative if you don?t move your bowels at least every other day. Miralax , Milk of Magnesia, Colace , or Senna  can be used to keep you regular.      Remember that you can always come back to the Emergency Department if you are not able to see your regular doctor in the amount of time listed above, if you get any new symptoms, or if there is anything that worries you.      Discharge Instructions  Chronic Pain  You were seen today for an issue regarding chronic or recurrent pain. You may have a condition that gives you pain every day, or a condition that causes pain that keeps coming back, or several conditions involving pain.   Many patients with chronic or recurrent pain come to the Emergency Department thinking that a shot of narcotic pain medicine or a prescription for pain pills to take home is the best answer to their problem. We have discovered,  though, that these approaches put our patients at high risk of long-term problems. This sort of treatment may actually make your pain worse, make it harder to control, and put you at an unacceptable risk of complications.   Because of the risks, we are very hesitant to treat your type of pain with short-acting or potentially habit-forming medications. These medications represent a significant risk to your health, and need to be managed by a physician who can follow your care consistently.  We have established a policy for the treatment of patients with chronic or recurrent pain that does not allow for treatment with injection narcotics or take-home prescriptions for narcotics.  We will treat your symptoms with non-narcotic medications and other treatments, and we will make referrals to pain specialists or other specialized providers if we think such referrals would benefit you.  You should expect to receive treatment consistent with this policy during future visits.    If you have concerns about our policy, please discuss them with your primary care provider or pain specialist, who can contact us if necessary for further information or clarification.  If you were given a prescription for medicine here today, be sure to read all of the information (including the package insert) that comes with your prescription.  This will include important information about the medicine, its side effects, and any warnings that you need to know about.  The pharmacist who fills the prescription can provide more information and answer questions you may have about the medicine.  If you have questions or concerns that the pharmacist cannot address, please call or return to the Emergency Department.   Remember that you can always come back to the Emergency Department if you develop any new symptoms or if there is anything that worries you.

## 2019-03-28 NOTE — TELEPHONE ENCOUNTER
"Patient calling regarding pain medication, she is having abdominal pain.  Patient reports that she has a \"pain contract\" in place with her provider and if she goes to the ER for pain, they will not giver her any.  However, she wants me to page her own personal physician and tell him to tell the ER doctors to give her pain medication.  I advised caller that her physician is not on call today, and can not be paged. In addition, Primary Care physicians do not tell ER physicians what to do, they can advise but she will have to be evaluated by an ER physician in person to be assessed for pain medication. Call was ended.    Yumiko Rodriguez RN - [facility] Nurse Advisor  03/28/2019    6:15AM    "

## 2019-03-28 NOTE — ED TRIAGE NOTES
Pt in with C/O RUQ pain. Pt reports she was seen yesterday in ER for similar sx and has a HIDA scan scheduled for tomorrow. Pt spoke with on call PCP d/t increasing pain, nausea and vomiting and was to to come to the ER for reevaluation. Pt A&Ox4 ABCD's intact

## 2019-03-28 NOTE — TELEPHONE ENCOUNTER
Patient calling clinic again because she is very anxious about how much iron she should be taking. Notes from her last labs were reviewed and relayed to patient. Informed patient if she has further concerns, she should address these at her office visit with Christofer tomorrow.

## 2019-03-28 NOTE — RESULT ENCOUNTER NOTE
Final urine culture report is NEGATIVE per [facility] Lab Result protocol.    If NEGATIVE result, no change in treatment, per [facility] Lab Result protocol.

## 2019-03-28 NOTE — TELEPHONE ENCOUNTER
Patient calling clinic and is very extremely upset that no one has addressed her pain yesterday. She states she is having a scan done today and has an appointment with Christofer tomorrow. Patient had no concerns that could be addressed at this time. Patient just needed to vent her frustration.

## 2019-03-28 NOTE — TELEPHONE ENCOUNTER
Going back to ER would be best course for Pt.  Reviewing Epic, looks like she is being seen in ER this AM

## 2019-04-02 ENCOUNTER — TRANSFERRED RECORDS (OUTPATIENT)
Dept: HEALTH INFORMATION MANAGEMENT | Facility: CLINIC | Age: 44
End: 2019-04-02

## 2019-04-16 ENCOUNTER — TELEPHONE (OUTPATIENT)
Dept: FAMILY MEDICINE | Facility: CLINIC | Age: 44
End: 2019-04-16

## 2019-04-16 ENCOUNTER — TRANSFERRED RECORDS (OUTPATIENT)
Dept: HEALTH INFORMATION MANAGEMENT | Facility: CLINIC | Age: 44
End: 2019-04-16

## 2019-04-16 NOTE — TELEPHONE ENCOUNTER
Reason for Call:  Other call back    Detailed comments: Patient is wondering if provider will prescribe Accutane or if she needs to go to Dermatologist. Please call to advise.    Phone Number Patient can be reached at: Home number on file [phone] (home)    Best Time: any    Can we leave a detailed message on this number? YES    Call taken on 4/16/2019 at 11:38 AM by TRIXIE KRUEGER

## 2019-04-16 NOTE — TELEPHONE ENCOUNTER
I asked Nancy and we do NOT Rx Accutane.    I let Melody know and she will call her Derm    Viktoria Lanier RN- Triage [employer]

## 2019-04-17 ENCOUNTER — TELEPHONE (OUTPATIENT)
Dept: ENDOCRINOLOGY | Facility: CLINIC | Age: 44
End: 2019-04-17

## 2019-04-17 DIAGNOSIS — R53.83 FATIGUE, UNSPECIFIED TYPE: ICD-10-CM

## 2019-04-17 DIAGNOSIS — E03.4 HYPOTHYROIDISM DUE TO ACQUIRED ATROPHY OF THYROID: ICD-10-CM

## 2019-04-17 DIAGNOSIS — E11.65 UNCONTROLLED TYPE 2 DIABETES MELLITUS WITH HYPERGLYCEMIA (H): Primary | ICD-10-CM

## 2019-04-17 NOTE — TELEPHONE ENCOUNTER
Patient's levothroxine was increased and now her TSH level is on 0.786.  She is experiencing irritability and fatigue.  Please call patient at [phone] to discuss next steps.    Yumiko Negro

## 2019-04-18 ENCOUNTER — OFFICE VISIT (OUTPATIENT)
Dept: FAMILY MEDICINE | Facility: CLINIC | Age: 44
End: 2019-04-18
Payer: COMMERCIAL

## 2019-04-18 VITALS
DIASTOLIC BLOOD PRESSURE: 74 MMHG | RESPIRATION RATE: 14 BRPM | HEIGHT: 62 IN | BODY MASS INDEX: 28.52 KG/M2 | SYSTOLIC BLOOD PRESSURE: 116 MMHG | WEIGHT: 155 LBS | TEMPERATURE: 98.1 F | HEART RATE: 89 BPM | OXYGEN SATURATION: 99 %

## 2019-04-18 DIAGNOSIS — G43.709 CHRONIC MIGRAINE WITHOUT AURA WITHOUT STATUS MIGRAINOSUS, NOT INTRACTABLE: Chronic | ICD-10-CM

## 2019-04-18 DIAGNOSIS — J41.8 MIXED SIMPLE AND MUCOPURULENT CHRONIC BRONCHITIS (H): Primary | ICD-10-CM

## 2019-04-18 DIAGNOSIS — J45.30 MILD PERSISTENT ASTHMA WITHOUT COMPLICATION: Chronic | ICD-10-CM

## 2019-04-18 PROCEDURE — 99214 OFFICE O/P EST MOD 30 MIN: CPT | Performed by: FAMILY MEDICINE

## 2019-04-18 RX ORDER — BUTALBITAL, ACETAMINOPHEN, CAFFEINE AND CODEINE PHOSPHATE 300; 50; 40; 30 MG/1; MG/1; MG/1; MG/1
1 CAPSULE ORAL DAILY PRN
Qty: 15 CAPSULE | Refills: 1 | Status: SHIPPED | OUTPATIENT
Start: 2019-04-18 | End: 2019-05-08

## 2019-04-18 RX ORDER — PREDNISONE 20 MG/1
40 TABLET ORAL DAILY
Qty: 10 TABLET | Refills: 0 | Status: SHIPPED | OUTPATIENT
Start: 2019-04-18 | End: 2019-05-03

## 2019-04-18 ASSESSMENT — MIFFLIN-ST. JEOR: SCORE: 1316.33

## 2019-04-18 NOTE — TELEPHONE ENCOUNTER
Left message for patient to return phone call to the [facility].      Jennifer Oglesby M.A.

## 2019-04-18 NOTE — TELEPHONE ENCOUNTER
"Spoke with patient.  All recommendations given.  Patient verbalizes that she is very frustrated.  States she is very irritable, extremely tired and \"hurts all over\".  Patient wants to make sure Tammy Louie is aware that she is on Lithium which can affect her thyroid.  Wonders why she is only skipping one day a week.  In her opinion \"I feel this level is very low\" and states she wonders why she wouldn't skip every other day.  Please advise.  Jennifer Oglesby CMA on 4/18/2019 at 10:32 AM  "

## 2019-04-18 NOTE — TELEPHONE ENCOUNTER
Please call -  We can decrease the dose slightly by having her skip the levothyroxine one day each week - she should take one tablet the other six days per week.    Her TSH was elevated on the previous dose of 25 mcg/day and the next available strength is the dose she currently takes, 50 mcg/day.  Reducing the dose one tab per week will give her a dose in between the 25 mcg and 50 mcg per day.  We can recheck her levels when she follows up in June or in another 6 weeks if she continues to have symptoms.  Let me know if she has any other questions.  Tammy Louie, BRET  Endocrinology

## 2019-04-18 NOTE — PROGRESS NOTES
"  SUBJECTIVE:   Melody Muñoz is a 43 year old female who presents to clinic today for the following   health issues:        RESPIRATORY SYMPTOMS      Duration: X3 days    Description  cough, wheezing and SOB    Severity: moderate    Accompanying signs and symptoms: See above    History (predisposing factors):  asthma    Precipitating or alleviating factors: None    Therapies tried and outcome:  Neb every 2 to 3 hours.      Wants to get her migraine medication refilled.           Additional history: as documented    Reviewed  and updated as needed this visit by clinical staff  Tobacco  Allergies  Meds  Problems  Med Hx  Surg Hx  Fam Hx  Soc Hx          Reviewed and updated as needed this visit by Provider  Tobacco  Allergies  Meds  Problems  Med Hx  Surg Hx  Fam Hx         Labs reviewed in EPIC    ROS:  C: NEGATIVE for fever, chills, change in weight  I: NEGATIVE for worrisome rashes, moles or lesions  E: NEGATIVE for vision changes or irritation  CV: NEGATIVE for chest pain, palpitations or peripheral edema  GI: NEGATIVE for nausea, abdominal pain, heartburn, or change in bowel habits  M: NEGATIVE for significant arthralgias or myalgia  H: NEGATIVE for bleeding problems      OBJECTIVE:     /74 (Patient Position: Sitting, Cuff Size: Adult Regular)   Pulse 89   Temp 98.1  F (36.7  C) (Tympanic)   Resp 14   Ht 1.575 m (5' 2\")   Wt 70.3 kg (155 lb)   LMP  (LMP Unknown)   SpO2 99%   BMI 28.35 kg/m    Body mass index is 28.35 kg/m .   GENERAL: alert and no distress  EYES: Eyes grossly normal to inspection, PERRL and conjunctivae and sclerae normal  HENT: ear canals and TM's normal, mouth without ulcers or lesions.  +b/l boggy turbinates, no active nasal drainage.  NECK: no adenopathy, no asymmetry, masses, or scars and thyroid normal to palpation  RESP: lungs clear to auscultation - no rales, rhonchi or wheezes  CV: regular rate and rhythm, normal S1 S2, no S3 or S4, no murmur, click or " rub, no peripheral edema and peripheral pulses strong  SKIN: no suspicious lesions or rashes  PSYCH: very irritable.      Diagnostic Test Results:  none     ASSESSMENT/PLAN:     Problem List Items Addressed This Visit     Chronic migraine without aura without status migrainosus, not intractable (Chronic)    Relevant Medications    Butalbital-APAP-Caff-Cod -25-30 MG CAPS    Mild persistent asthma without complication (Chronic)    Relevant Medications    predniSONE (DELTASONE) 20 MG tablet    Mixed simple and mucopurulent chronic bronchitis (H): Spirometry 6-4-16 lung age = 60y/o in 41 y/o FVC=90%&FEV1=78% - Primary         Lungs sound perfect--no wheezing or rhonchi.  But pt insistent on getting prednisone taper.  Rx prednisone taper.  Stopped smoking about 1 month ago (updated social history).  --push fluids, rest, and symptomatic treatment as needed:  Mucinex 600 mg 2 tabs bid  Increase humidity to 30-40% in bedroom at night - vaporizer  Saline nasal spray as needed  Benadryl 25mg 1/2 - 1 hour before bed time  Maintain 8 hr minimum of sleep at night  Robitussin for cough  --Will return to clinic as needed.  See Patient Instructions for details and follow-up instructions    --Also persistent about getting her migraine medication refilled.  Fioricet refilled for #15.   checked, last refill was on 4/5/19.  Spoke with Dr. Machado and he said it was ok to provide a refill today.    Rizwana Perez, [facility]

## 2019-04-19 NOTE — TELEPHONE ENCOUNTER
Spoke with patient.   Informed Tammy Louie went home ill yesterday.  Apologized for the delay.      Patient wonders if she is still considered Hypothyroid or with her current level, is she considered Hyperthyroid.  (See below concerns)  Wants clarification on all concerns as documented below.  Patient aware Tammy Louie will address note towards the end of next week due to her part time hours.  Jennifer Oglesby, [place] on 4/19/2019 at 8:15 AM

## 2019-04-22 NOTE — TELEPHONE ENCOUNTER
Please call  Sorry she feels frustrated.  Can try to offer her a phone visit if we can find somewhere to fit her in.  Otherwise, she is hypothyroid not hyperthyroid.  TSH in January was elevated which means she needed a higher dose of levothyroxine.  This was why the does was increased from 25 mcg to 50 mcg.  The recent TSH of 0.786 is in normal range by our lab reference range ( don't see an actual copy of her most recent test results scanned).  I think she is confusing a lower TSH with lower thyroid when the opposite is true but I 'm not sure where her confusion or frustration is.    I would not recommend decreasing to skipping a dose every other day - this will be going back to an average dose of 25 mcg per day and that was too low a dose. I still recommend skipping one day per week and taking a full tablet 6 days each week.  Also, not all symptoms are related to thyroid, there are lots of other causes for fatigue.  Maybe we can even find a sooner appointment for her?   Thanks,  Tammy Louie NP  Endocrinology

## 2019-04-22 NOTE — TELEPHONE ENCOUNTER
"Called patient.  Went to V/M.  States \"this mailbox is not currently active\" and hangs up.  Can move her appt to 5/22/19 1 pm if she wants.  She is currently scheduled for 6/6/19.  Will need to try to call again.  Josephine Noriega RN    "

## 2019-04-23 DIAGNOSIS — E11.65 UNCONTROLLED TYPE 2 DIABETES MELLITUS WITH HYPERGLYCEMIA (H): ICD-10-CM

## 2019-04-23 DIAGNOSIS — G89.4 CHRONIC PAIN SYNDROME: ICD-10-CM

## 2019-04-23 NOTE — TELEPHONE ENCOUNTER
"Requested Prescriptions   Pending Prescriptions Disp Refills     dulaglutide (TRULICITY) 0.75 MG/0.5ML pen  Last Written Prescription Date:  2/27/2019   Last Fill Quantity: 0.5 mL,  # refills: 1   Last office visit: 4/18/2019 with prescribing provider:  Chris   Future Office Visit:   Next 5 appointments (look out 90 days)    Jun 06, 2019 10:30 AM CDT  (Arrive by 10:15 AM)  [facility] [address]  [facility] [address]  [phone]          0.5 mL 1     Sig: Inject 0.75 mg Subcutaneous every 7 days       GLP-1 Agonists Protocol Passed - 4/23/2019 10:01 AM        Passed - Blood pressure less than 140/90 in past 6 months     BP Readings from Last 3 Encounters:   04/18/19 116/74   03/28/19 125/78   03/27/19 114/78                 Passed - LDL on file in past 12 months     Recent Labs   Lab Test 02/06/19  1001   LDL 75             Passed - Microalbumin on file in past 12 months     Recent Labs   Lab Test 10/01/18  1450   MICROL 17   UMALCR 17.65             Passed - HgbA1C in past 3 or 6 months     If HgbA1C is 8 or greater, it needs to be on file within the past 3 months.  If less than 8, must be on file within the past 6 months.     Recent Labs   Lab Test 02/06/19  1001   A1C 6.5*             Passed - Medication is active on med list        Passed - Patient is age 18 or older        Passed - No active pregnancy on record        Passed - Normal serum creatinine on file in past 12 months     Recent Labs   Lab Test 03/28/19  0208   CR 0.56             Passed - No positive pregnancy test in past 12 months        Passed - Recent (6 mo) or future (30 days) visit within the authorizing provider's specialty     Patient had office visit in the last 6 months or has a visit in the next 30 days with authorizing provider.  See \"Patient Info\" tab in inbasket, or \"Choose Columns\" in Meds & Orders section of the refill " encounter.            Controlled Substance Refill Request for pregabalin (LYRICA) 150 MG capsule  Problem List Complete:  Yes    Patient is followed by VIRAL Deleon for ongoing prescription of pain medication.  All refills should be approved by this provider, or covering partner.     Medication(s):  Lyrica 150 mg bid, Fioricet with codeine prn, klonopin and ambien to be prescribed by [facility], not [facility].  Maximum quantity per month: 60, 20  Clinic visit frequency required: Q 3 months      Controlled substance agreement on file: Yes       Date(s): 9/8/2015  New CSA needed.  Pain Clinic evaluation in the past: No     DIRE Total Score(s):  No flowsheet data found.     Last [facility] website verification: 2/8/19 multiple meds from multiple outside providers    Last Written Prescription Date:  3/26/2019  Last Fill Quantity: 60 capsule,  # refills: 5   Last office visit: 4/18/2019 with prescribing provider:  Chris   Future Office Visit:   Next 5 appointments (look out 90 days)    Jun 06, 2019 10:30 AM CDT  (Arrive by 10:15 AM)  Return Visit with KIRAN Carballo [facility] ([facility]) [address] [facility] [address] [phone]              Controlled substance agreement:   Encounter-Level CSA - 09/08/2015:    Controlled Substance Agreement - Scan on 9/9/2015  2:41 PM: [facility], Controlled Substance Contract, 09-08-15 (below)       Patient-Level CSA:    There are no patient-level csa.         Last Urine Drug Screen: No results found for: CDAUT, No results found for: COMDAT, No results found for: THC13, PCP13, COC13, MAMP13, OPI13, AMP13, BZO13, TCA13, MTD13, BAR13, OXY13, PPX13, BUP13     Processing:  Fax Rx to [place], [place] - [address] [facility] [address] pharmacy    [email or web address]   checked in past 3 months?  Yes 2/18/2019

## 2019-04-23 NOTE — TELEPHONE ENCOUNTER
Reason for Call:  Medication or medication refill:    Do you use a [facility]?  Name of the pharmacy and phone number for the current request:  [facility]    Name of the medication requested: dulaglutide (TRULICITY) 0.75 MG/0.5ML pen and lyrica    Other request: none    Can we leave a detailed message on this number? YES    Phone number patient can be reached at: Home number on file [phone] (home)    Best Time: any    Call taken on 4/23/2019 at 10:00 AM by NANCY MAYO

## 2019-04-25 RX ORDER — PREGABALIN 150 MG/1
CAPSULE ORAL
Qty: 60 CAPSULE | Refills: 1 | Status: SHIPPED | OUTPATIENT
Start: 2019-04-25 | End: 2019-07-19

## 2019-04-25 NOTE — TELEPHONE ENCOUNTER
Prescription approved per [facility] Refill Protocol for 6 months of refills since last appointment, which was  4/18/2019 (Trulicity).     [place]:  reviewed 2/18/2019    [place]:  [email or web address]

## 2019-04-25 NOTE — TELEPHONE ENCOUNTER
Spoke with patient on 4/24/19.  All recommendations given.  Scheduled patient for an appointment 4/25/19 at 9 a.m.      Patient calls today (4/25/19) to cancel appointment due to a migraine.   Called patient and rescheduled.  Worked patient in on 5/22/19, checking in at 12:45 p.m for a 1 p.m.  Appointment.  Jennifer Oglesby CMA on 4/25/2019 at 9:24 AM

## 2019-04-25 NOTE — TELEPHONE ENCOUNTER
"  GENERAL SURGERY NURSE PHONE TRIAGE   Melody Muñoz    MRN# [id number]  AGE:  42 year old  YOB: 1975  [phone] (home)   Surgeon: Dr. Robins  Surgical Assist:  Lexis Meadows PA-C     Surgery type: laparoscopic appendectomy and resection of epiploic mass     Surgery Date: November / 14 / 2017     POD: 15     CHIEF CONCERN:  Discomfort     HISTORY OF PRESENT ILLNESS:   Patient called with complaints of originally pain, as call progressed- described as   \"more like a periodic, sharp discomfort\"  Afebrile, no complaints of nausea or vomiting.  LBM yesterday- described as formed.  Currently taking Tylenol for discomfort.    PLAN:   clinic appointment with provider tomorrow afternoon.  Patient will call with any increase in pain or go to ED.   Pt is in agreement with this plan.  Samara Marinelli RN        "
No

## 2019-05-01 NOTE — TELEPHONE ENCOUNTER
L/M to call.  Needs to come precisely at 8 am.  Her lab appt is for 8:30 am.  Josephine Noriega RN

## 2019-05-01 NOTE — TELEPHONE ENCOUNTER
I added a cortisol and DHEA-s - please make sure the lab appointment is at 8 am.  Tammy Louie NP  Endocrinology

## 2019-05-01 NOTE — TELEPHONE ENCOUNTER
Pt calling in to clinic  Is scheduled for labs on 5-2-19    Pt would like to have more labs done tomorrow to check other causes for fatigue - adrenal functions, cortisol, etc  Right now just TSH, T4 ordered    Has f/u appt with Tammy Liban 5.22.19  Pt would prefer not to wait until 5-22-19 to get answers as to why she is fatigued  Explained it would be helpful to wait until appt for exam and evaluation to determine what other labs needed  Pt became tearful, states her son was in the hospital this week for 3 days and diagnosed with epilepsy    Route to provider to review and advise regarding additional labs    Alysia Jean-Baptiste RN Nurse Triage

## 2019-05-02 ENCOUNTER — TELEPHONE (OUTPATIENT)
Dept: FAMILY MEDICINE | Facility: CLINIC | Age: 44
End: 2019-05-02

## 2019-05-02 DIAGNOSIS — E11.65 UNCONTROLLED TYPE 2 DIABETES MELLITUS WITH HYPERGLYCEMIA (H): ICD-10-CM

## 2019-05-02 DIAGNOSIS — E03.4 HYPOTHYROIDISM DUE TO ACQUIRED ATROPHY OF THYROID: ICD-10-CM

## 2019-05-02 DIAGNOSIS — E03.9 ACQUIRED HYPOTHYROIDISM: ICD-10-CM

## 2019-05-02 DIAGNOSIS — R53.83 FATIGUE, UNSPECIFIED TYPE: ICD-10-CM

## 2019-05-02 LAB
CORTIS SERPL-MCNC: 17.2 UG/DL (ref 4–22)
T3FREE SERPL-MCNC: 2.8 PG/ML (ref 2.3–4.2)
T4 FREE SERPL-MCNC: 0.95 NG/DL (ref 0.76–1.46)
TSH SERPL DL<=0.005 MIU/L-ACNC: 0.84 MU/L (ref 0.4–4)

## 2019-05-02 PROCEDURE — 82627 DEHYDROEPIANDROSTERONE: CPT | Performed by: CLINICAL NURSE SPECIALIST

## 2019-05-02 PROCEDURE — 84439 ASSAY OF FREE THYROXINE: CPT | Performed by: CLINICAL NURSE SPECIALIST

## 2019-05-02 PROCEDURE — 82533 TOTAL CORTISOL: CPT | Performed by: CLINICAL NURSE SPECIALIST

## 2019-05-02 PROCEDURE — 36415 COLL VENOUS BLD VENIPUNCTURE: CPT | Performed by: CLINICAL NURSE SPECIALIST

## 2019-05-02 PROCEDURE — 84443 ASSAY THYROID STIM HORMONE: CPT | Performed by: CLINICAL NURSE SPECIALIST

## 2019-05-02 PROCEDURE — 84481 FREE ASSAY (FT-3): CPT | Performed by: CLINICAL NURSE SPECIALIST

## 2019-05-02 NOTE — TELEPHONE ENCOUNTER
Reason for Call:  Other call back    Detailed comments: Patient calling stating she has a sinus infection. Patient states she saw Dr Perez and that they never finished her appt. She states she cannot come in as her son is in the hospital. Patient would like a call back to discuss and see what can be done. Patient states Dr Machado usually would give her an antibiotic.     Phone Number Patient can be reached at: Home number on file [phone] (home)    Best Time: any    Can we leave a detailed message on this number? YES    Call taken on 5/2/2019 at 7:17 AM by TRIXIE KRUEGER

## 2019-05-02 NOTE — TELEPHONE ENCOUNTER
Pt was called with providers message. She agreed to start an e- visit. She was called with My chart access phone number.

## 2019-05-02 NOTE — TELEPHONE ENCOUNTER
RN Sinusitis Treatment Protocol: ages 18 and up  Melody Muñoz, a 43 year old female, is having symptoms reviewed for possible sinus infection.    NURSING ASSESSMENT:  Symptoms: Facial pain or pressue over the sinus areas, especially if worse with position change or cough, Nasal discharge/purulent-yellow and green, Nasal congestion and Post nasal drip  In addition notes: None   Shortness of breath: NO  Onset of symptoms was 1 week(s) ago.    Treatment measures tried include Decongestants, Vaporizer and mucinex   Course of illness is unchanged from previous visit  Predisposing conditions include: History of asthma  ALLERGIES:   Allergies   Allergen Reactions     Hydrocodone Nausea and Vomiting     Complicating Factors and Serious Symptoms:   Patient reports: NONE   Patient denies: NONE      NURSING PLAN: Routed to provider Yes Patient was advised to schedule an office visit or start an e-visit. States she is on her way to the hsopital with her son that is having a test. Pt states she knows what she needs and is requesting Rx for prednisone and azithromycin. Agreed to start an e-visit if requested by PCP. Please advice.   FYI pt will need [facility] Re-vinyl number if e-visit needed.     EDUCATION:  Patient given instructions and education regarding appropriate treatment of sinusitis    RECOMMENDED DISPOSITION:  Home care advice - See above  Will comply with recommendation:  No- Barriers to comply with plan of care see above.  Nurse Triage    Jim Juárez RN

## 2019-05-02 NOTE — TELEPHONE ENCOUNTER
Patient calling back.  Advised Cortisol needs to be drawn precisely at 8 am.  Patient is dropping kids off at school and will try to be here and if can not get here will call to reschedule.  Josephine Noriega RN

## 2019-05-03 ENCOUNTER — OFFICE VISIT (OUTPATIENT)
Dept: FAMILY MEDICINE | Facility: CLINIC | Age: 44
End: 2019-05-03
Payer: COMMERCIAL

## 2019-05-03 ENCOUNTER — TELEPHONE (OUTPATIENT)
Dept: ENDOCRINOLOGY | Facility: CLINIC | Age: 44
End: 2019-05-03

## 2019-05-03 VITALS
HEIGHT: 62 IN | BODY MASS INDEX: 28.52 KG/M2 | DIASTOLIC BLOOD PRESSURE: 83 MMHG | WEIGHT: 155 LBS | SYSTOLIC BLOOD PRESSURE: 130 MMHG | RESPIRATION RATE: 16 BRPM | HEART RATE: 104 BPM | OXYGEN SATURATION: 98 % | TEMPERATURE: 98.8 F

## 2019-05-03 DIAGNOSIS — J01.90 ACUTE SINUSITIS WITH SYMPTOMS > 10 DAYS: Primary | ICD-10-CM

## 2019-05-03 DIAGNOSIS — J45.31 MILD PERSISTENT ASTHMA WITH EXACERBATION: ICD-10-CM

## 2019-05-03 LAB — DHEA-S SERPL-MCNC: 264 UG/DL (ref 35–430)

## 2019-05-03 PROCEDURE — 99214 OFFICE O/P EST MOD 30 MIN: CPT | Performed by: PHYSICIAN ASSISTANT

## 2019-05-03 RX ORDER — PREDNISONE 20 MG/1
20 TABLET ORAL 2 TIMES DAILY
Qty: 10 TABLET | Refills: 0 | Status: SHIPPED | OUTPATIENT
Start: 2019-05-03 | End: 2019-05-06

## 2019-05-03 RX ORDER — AMOXICILLIN 500 MG/1
1000 CAPSULE ORAL 2 TIMES DAILY
Qty: 56 CAPSULE | Refills: 0 | Status: SHIPPED | OUTPATIENT
Start: 2019-05-03 | End: 2019-05-24

## 2019-05-03 ASSESSMENT — MIFFLIN-ST. JEOR: SCORE: 1316.33

## 2019-05-03 NOTE — TELEPHONE ENCOUNTER
Pt called-has questions about her labs which were normal but she is still having symptoms, please call her at [phone]    Ayala Martínez/ONESIMO

## 2019-05-03 NOTE — PROGRESS NOTES
SUBJECTIVE:   Melody Muñoz is a 43 year old female who presents to clinic today for the following   health issues:        Acute Illness   Acute illness concerns: Sinus   Onset: 1 month     Fever: YES- 101 recently    Chills/Sweats: YES    Headache (location?): YES    Sinus Pressure:YES    Conjunctivitis:  no    Ear Pain: YES: both    Rhinorrhea: YES    Congestion: YES    Sore Throat: YES- drainage      Cough: YES-productive of yellow sputum    Wheeze: no     Decreased Appetite: no     Nausea: no     Vomiting: no     Diarrhea:  no     Dysuria/Freq.: no     Fatigue/Achiness: YES    Sick/Strep Exposure: no      Therapies Tried and outcome: Mucinex, vaporizer, Tylenol sinus, increased albuterol usage      Additional history: as documented    Reviewed  and updated as needed this visit by clinical staff         Reviewed and updated as needed this visit by Provider         Patient Active Problem List   Diagnosis     Endometriosis s/po EDWIGE 10-17     Mantoux: positive     Latent tuberculosis     Major depression     Generalized anxiety disorder     Constipation     Nightmares     Knee pain     Bipolar 1 disorder  with psychosis     Chronic fatigue     Female stress incontinence     Suicidal ideation     Acne     Chronic pain syndrome     Insomnia     Chronic migraine without aura without status migrainosus, not intractable     Nausea     JASWANT (obstructive sleep apnea)     Elevated liver enzymes     TMJ (temporomandibular joint syndrome)     Hypothyroidism due to acquired atrophy of thyroid     Hostile behavior     Mild persistent asthma without complication     Uncontrolled type 2 diabetes mellitus with hyperglycemia (H)     Allergic rhinitis     Incontinence of urine in female     Migraine     Screening for diabetic peripheral neuropathy     Need for prophylactic vaccination against Streptococcus pneumoniae (pneumococcus)     Sepsis (H)     Abdominal pain     Morbid obesity (H)     Former tobacco use     Mixed simple  and mucopurulent chronic bronchitis (H): Spirometry 16 lung age = 63y/o in 41 y/o FVC=90%&FEV1=78%     Class 2 obesity due to excess calories with serious comorbidity and body mass index (BMI) of 36.0 to 36.9 in adult     Mixed hyperlipidemia     Acquired hypothyroidism     Dysuria     Other chronic back pain     Headache disorder     Past Surgical History:   Procedure Laterality Date     BIOPSY       COLONOSCOPY       GENITOURINARY SURGERY  May/2014    bladder sling     GYN SURGERY      laparoscopy- endometriosis     GYN SURGERY       for twins     LAPAROSCOPIC APPENDECTOMY N/A 2017    Procedure: LAPAROSCOPIC APPENDECTOMY;  LAPAROSCOPIC APPENDECTOMY and resection of epiploic tag;  Surgeon: Roberto Robins MD;  Location: [facility] OR     little finger surgery left  1980     tubal ligation bilateral       wisdom teeth removal         Social History     Tobacco Use     Smoking status: Former Smoker     Packs/day: 0.00     Years: 17.00     Pack years: 0.00     Types: Cigarettes     Last attempt to quit: 3/18/2019     Years since quittin.1     Smokeless tobacco: Never Used   Substance Use Topics     Alcohol use: No     Comment: no etoh since      Family History   Problem Relation Age of Onset     Hypertension Mother      Heart Disease Father         palpitations     Depression Sister      Anxiety Disorder Sister      Heart Disease Maternal Grandmother         CHF     Cancer Maternal Grandfather 72        Pancreatic Cancer     Alzheimer Disease Paternal Grandfather      Bipolar Disorder Other      Autism Spectrum Disorder Other          Current Outpatient Medications   Medication Sig Dispense Refill     ACCU-CHEK RAISSA PLUS test strip TEST 4-7 TIMES DAILY WHILE STARTING TRULICITY 100 strip 3     albuterol (2.5 MG/3ML) 0.083% nebulizer solution Take 1 vial (2.5 mg) by nebulization every 6 hours as needed for shortness of breath / dyspnea or wheezing 25 vial 0     almotriptan (AXERT) 6.25 MG  tablet TK 1 T PO ONCE.  MAY REPEAT IN 2 HOURS AS DIRECTED.  MAX 2 DOSES PER DAY  1     blood glucose (NO BRAND SPECIFIED) lancets standard Use to test blood sugar 3 times weekly or as directed. 100 each 3     blood glucose monitoring (LAURA CONTOUR NEXT) test strip Use to test blood sugar 2 times daily or as directed. 200 each 12     blood glucose monitoring (NO BRAND SPECIFIED) test strip Use to test blood sugars 3 times weekly or as directed 100 strip 3     Butalbital-APAP-Caff-Cod -26-30 MG CAPS Take 1 capsule by mouth daily as needed (headache) 15 capsule 1     clonazePAM (KLONOPIN) 1 MG tablet TK 1 T PO UP TO TID  3     Continuous Blood Gluc  (FREESTYLE JOSE 14 DAY READER) PUMA 1 Device as needed (Use for daily blood glucose monitoring as needed) 1 Device 0     Continuous Blood Gluc Sensor (FREESTYLE JOSE 14 DAY SENSOR) MISC 1 each every 14 days 2 each 11     dextroamphetamine (DEXEDRINE SPANSULE) 5 MG 24 hr capsule TK 1 C PO BID  0     dextroamphetamine (DEXTROSTAT) 10 MG tablet TK 1 T PO TID  0     dulaglutide (TRULICITY) 0.75 MG/0.5ML pen Inject 0.75 mg Subcutaneous every 7 days 0.5 mL 4     fluticasone (FLONASE) 50 MCG/ACT nasal spray Spray 2 sprays into both nostrils daily 16 g 11     guanFACINE (TENEX) 2 MG tablet TAKE TWO TABLETS BY MOUTH AT BEDTIME 180 tablet 3     lamoTRIgine (LAMICTAL) 200 MG tablet Take 1 tablet (200 mg) by mouth every morning 90 tablet 4     LATUDA 120 MG TABS tablet TK 1 T PO QD  3     levothyroxine (SYNTHROID/LEVOTHROID) 50 MCG tablet Take 1 tablet (50 mcg) by mouth daily 90 tablet 0     lithium (ESKALITH) 450 MG CR tablet TAKE 2 TABLETS BY MOUTH AT BEDTIME (Patient taking differently: Patient only taking 1 tablet at bedtime) 60 tablet 5     MAGNESIUM CITRATE PO        MICROLET LANCETS MISC TEST TWICE DAILY AS DIRECTED 100 each 0     norgestimate-ethinyl estradiol (ORTHO-CYCLEN, SPRINTEC) 0.25-35 MG-MCG per tablet Take 1 tablet by mouth daily       Omega-3 Fatty  Acids (FISH OIL OMEGA-3 PO)        ondansetron (ZOFRAN-ODT) 4 MG ODT tab DISSOLVE 1 TO 2 TABLETS UNDER THE TONGUE EVERY 8 HOURS AS NEEDED FOR NAUSEA 30 tablet 1     order for DME Equipment being ordered: Blood glucose monitoring kit 1 Device 0     pantoprazole (PROTONIX) 40 MG EC tablet TAKE 1 TABLET(40 MG) BY MOUTH DAILY 90 tablet 2     POTASSIUM PO        pregabalin (LYRICA) 150 MG capsule TAKE 1 CAPSULE BY MOUTH TWICE A DAY 60 capsule 1     PROAIR  (90 Base) MCG/ACT inhaler INHALE 1 TO 2 PUFFS INTO THE LUNGS EVERY 4 HOURS AS NEEDED FOR WHEEZING, SHORTNESS OF BREATH, OR DYSPNEA 51 g 3     Promethazine HCl 50 MG TABS TK 1 T PO Q 8 H PRN  1     sertraline (ZOLOFT) 100 MG tablet TK 1 T PO  QD  2     topiramate (TOPAMAX) 50 MG tablet TK 2 TS PO QHS  3     TRULICITY 0.75 MG/0.5ML pen INJECT 0.75MG SUBCUTANEOUS EVERY 7 DAYS 2 mL 1     venlafaxine (EFFEXOR-XR) 75 MG 24 hr capsule TK 1 C PO D WF  0     VITAMIN D, CHOLECALCIFEROL, PO Take 2,000 Units by mouth daily       benzonatate (TESSALON) 100 MG capsule Take 1 capsule (100 mg) by mouth 3 times daily as needed for cough (Patient not taking: Reported on 5/3/2019) 42 capsule 0     carisoprodol (SOMA) 350 MG tablet TAKE 1 TABLET BY MOUTH THREE TIMES DAILY. 90 tablet 3     meclizine (ANTIVERT) 25 MG tablet Take 1 tablet (25 mg) by mouth every 6 hours as needed for dizziness (Patient not taking: Reported on 5/3/2019) 30 tablet 3     MELATONIN PO Take 6 mg by mouth At Bedtime       STATIN NOT PRESCRIBED (INTENTIONAL) Please choose reason not prescribed, below (Patient not taking: Reported on 5/3/2019)       STATIN NOT PRESCRIBED, INTENTIONAL, 1 each daily Please choose reason not prescribed, below (Patient not taking: Reported on 5/3/2019)       Allergies   Allergen Reactions     Hydrocodone Nausea and Vomiting       ROS:  Constitutional, HEENT, cardiovascular, pulmonary, gi and gu systems are negative, except as otherwise noted.    OBJECTIVE:     /83 (BP  "Location: Right arm, Patient Position: Chair, Cuff Size: Adult Regular)   Pulse 104   Temp 98.8  F (37.1  C) (Oral)   Resp 16   Ht 1.575 m (5' 2\")   Wt 70.3 kg (155 lb)   LMP  (LMP Unknown)   SpO2 98%   BMI 28.35 kg/m    Body mass index is 28.35 kg/m .  GENERAL: healthy, alert and no distress  EYES: Eyes grossly normal to inspection, PERRL and conjunctivae and sclerae normal  HENT: normal cephalic/atraumatic, ear canals and TM's normal, nose and mouth without ulcers or lesions, oropharynx clear, oral mucous membranes moist and sinuses: maxillary, frontal tenderness on both sides  RESP: lungs clear to auscultation - no rales, rhonchi or wheezes  CV: regular rate and rhythm, normal S1 S2, no S3 or S4, no murmur, click or rub, no peripheral edema and peripheral pulses strong  MS: no gross musculoskeletal defects noted, no edema  SKIN: no suspicious lesions or rashes  NEURO: Normal strength and tone, mentation intact and speech normal  PSYCH: mentation appears normal, affect normal/bright  LYMPH: anterior cervical: enlarged tender nodes    Diagnostic Test Results:  none     ASSESSMENT/PLAN:       (J01.90) Acute sinusitis with symptoms > 10 days  (primary encounter diagnosis)    Comment: Treat with amoxicillin as this has worked well in the past for her. Prednisone if needed for inflammation and asthma.    Plan: amoxicillin (AMOXIL) 500 MG capsule, predniSONE        (DELTASONE) 20 MG tablet            (J45.31) Mild persistent asthma with exacerbation    Comment: Treat with antibiotic and steroid as above.    Plan: amoxicillin (AMOXIL) 500 MG capsule, predniSONE        (DELTASONE) 20 MG tablet              Patient Instructions     Patient Education     Sinusitis (Antibiotic Treatment)    The sinuses are air-filled spaces within the bones of the face. They connect to the inside of the nose. Sinusitis is an inflammation of the tissue that lines the sinuses. Sinusitis can occur during a cold. It can also happen due " to allergies to pollens and other particles in the air. Sinusitis can cause symptoms of sinus congestion and a feeling of fullness. A sinus infection causes fever, headache, and facial pain. There is often green or yellow fluid draining from the nose or into the back of the throat (post-nasal drip). You have been given antibiotics to treat this condition.  Home care    Take the full course of antibiotics as instructed. Do not stop taking them, even when you feel better.    Drink plenty of water, hot tea, and other liquids. This may help thin nasal mucus. It also may help your sinuses drain fluids.    Heat may help soothe painful areas of your face. Use a towel soaked in hot water. Or,  the shower and direct the warm spray onto your face. Using a vaporizer along with a menthol rub at night may also help soothe symptoms.     An expectorant with guaifenesin may help thin nasal mucus and help your sinuses drain fluids.    You can use an over-the-counter decongestant, unless a similar medicine was prescribed to you. Nasal sprays work the fastest. Use one that contains phenylephrine or oxymetazoline. First blow your nose gently. Then use the spray. Do not use these medicines more often than directed on the label. If you do, your symptoms may get worse. You may also take pills that contain pseudoephedrine. Don t use products that combine multiple medicines. This is because side effects may be increased. Read labels. You can also ask the pharmacist for help. (People with high blood pressure should not use decongestants. They can raise blood pressure.)    Over-the-counter antihistamines may help if allergies contributed to your sinusitis.      Do not use nasal rinses or irrigation during an acute sinus infection, unless your healthcare provider tells you to. Rinsing may spread the infection to other areas in your sinuses.    Use acetaminophen or ibuprofen to control pain, unless another pain medicine was prescribed  to you. If you have chronic liver or kidney disease or ever had a stomach ulcer, talk with your healthcare provider before using these medicines. (Aspirin should never be taken by anyone under age 18 who is ill with a fever. It may cause severe liver damage.)    Don't smoke. This can make symptoms worse.  Follow-up care  Follow up with your healthcare provider or our staff if you are not better in 1 week.  When to seek medical advice  Call your healthcare provider if any of these occur:    Facial pain or headache that gets worse    Stiff neck    Unusual drowsiness or confusion    Swelling of your forehead or eyelids    Vision problems, such as blurred or double vision    Fever of 100.4 F (38 C) or higher, or as directed by your healthcare provider    Seizure    Breathing problems    Symptoms don't go away in 10 days  Prevention  Here are steps you can take to help prevent an infection:    Keep good hand washing habits.    Don t have close contact with people who have sore throats, colds, or other upper respiratory infections.    Don t smoke, and stay away from secondhand smoke.    Stay up to date with of your vaccines.  Date Last Reviewed: 11/1/2017 2000-2018 [employer]. [address] [place] [address], [place] [address]. All rights reserved. This information is not intended as a substitute for professional medical care. Always follow your healthcare professional's instructions.               Michael Alexandre PA-C  [facility]

## 2019-05-03 NOTE — PATIENT INSTRUCTIONS
Patient Education     Sinusitis (Antibiotic Treatment)    The sinuses are air-filled spaces within the bones of the face. They connect to the inside of the nose. Sinusitis is an inflammation of the tissue that lines the sinuses. Sinusitis can occur during a cold. It can also happen due to allergies to pollens and other particles in the air. Sinusitis can cause symptoms of sinus congestion and a feeling of fullness. A sinus infection causes fever, headache, and facial pain. There is often green or yellow fluid draining from the nose or into the back of the throat (post-nasal drip). You have been given antibiotics to treat this condition.  Home care    Take the full course of antibiotics as instructed. Do not stop taking them, even when you feel better.    Drink plenty of water, hot tea, and other liquids. This may help thin nasal mucus. It also may help your sinuses drain fluids.    Heat may help soothe painful areas of your face. Use a towel soaked in hot water. Or,  the shower and direct the warm spray onto your face. Using a vaporizer along with a menthol rub at night may also help soothe symptoms.     An expectorant with guaifenesin may help thin nasal mucus and help your sinuses drain fluids.    You can use an over-the-counter decongestant, unless a similar medicine was prescribed to you. Nasal sprays work the fastest. Use one that contains phenylephrine or oxymetazoline. First blow your nose gently. Then use the spray. Do not use these medicines more often than directed on the label. If you do, your symptoms may get worse. You may also take pills that contain pseudoephedrine. Don t use products that combine multiple medicines. This is because side effects may be increased. Read labels. You can also ask the pharmacist for help. (People with high blood pressure should not use decongestants. They can raise blood pressure.)    Over-the-counter antihistamines may help if allergies contributed to your  sinusitis.      Do not use nasal rinses or irrigation during an acute sinus infection, unless your healthcare provider tells you to. Rinsing may spread the infection to other areas in your sinuses.    Use acetaminophen or ibuprofen to control pain, unless another pain medicine was prescribed to you. If you have chronic liver or kidney disease or ever had a stomach ulcer, talk with your healthcare provider before using these medicines. (Aspirin should never be taken by anyone under age 18 who is ill with a fever. It may cause severe liver damage.)    Don't smoke. This can make symptoms worse.  Follow-up care  Follow up with your healthcare provider or our staff if you are not better in 1 week.  When to seek medical advice  Call your healthcare provider if any of these occur:    Facial pain or headache that gets worse    Stiff neck    Unusual drowsiness or confusion    Swelling of your forehead or eyelids    Vision problems, such as blurred or double vision    Fever of 100.4 F (38 C) or higher, or as directed by your healthcare provider    Seizure    Breathing problems    Symptoms don't go away in 10 days  Prevention  Here are steps you can take to help prevent an infection:    Keep good hand washing habits.    Don t have close contact with people who have sore throats, colds, or other upper respiratory infections.    Don t smoke, and stay away from secondhand smoke.    Stay up to date with of your vaccines.  Date Last Reviewed: 11/1/2017 2000-2018 [employer]. [address] [place] [address], [place] [address]. All rights reserved. This information is not intended as a substitute for professional medical care. Always follow your healthcare professional's instructions.

## 2019-05-06 ENCOUNTER — OFFICE VISIT (OUTPATIENT)
Dept: FAMILY MEDICINE | Facility: CLINIC | Age: 44
End: 2019-05-06
Payer: COMMERCIAL

## 2019-05-06 ENCOUNTER — TELEPHONE (OUTPATIENT)
Dept: FAMILY MEDICINE | Facility: CLINIC | Age: 44
End: 2019-05-06

## 2019-05-06 ENCOUNTER — HOSPITAL ENCOUNTER (EMERGENCY)
Facility: CLINIC | Age: 44
Discharge: HOME | End: 2019-05-06
Attending: EMERGENCY MEDICINE | Admitting: EMERGENCY MEDICINE
Payer: COMMERCIAL

## 2019-05-06 ENCOUNTER — APPOINTMENT (OUTPATIENT)
Dept: ULTRASOUND IMAGING | Facility: CLINIC | Age: 44
End: 2019-05-06
Attending: EMERGENCY MEDICINE
Payer: COMMERCIAL

## 2019-05-06 ENCOUNTER — APPOINTMENT (OUTPATIENT)
Dept: CT IMAGING | Facility: CLINIC | Age: 44
End: 2019-05-06
Attending: EMERGENCY MEDICINE
Payer: COMMERCIAL

## 2019-05-06 VITALS
DIASTOLIC BLOOD PRESSURE: 100 MMHG | HEIGHT: 62 IN | TEMPERATURE: 98.6 F | HEART RATE: 81 BPM | BODY MASS INDEX: 28.52 KG/M2 | RESPIRATION RATE: 18 BRPM | OXYGEN SATURATION: 95 % | WEIGHT: 155 LBS | SYSTOLIC BLOOD PRESSURE: 142 MMHG

## 2019-05-06 VITALS
RESPIRATION RATE: 14 BRPM | HEIGHT: 62 IN | DIASTOLIC BLOOD PRESSURE: 82 MMHG | WEIGHT: 155.5 LBS | HEART RATE: 98 BPM | SYSTOLIC BLOOD PRESSURE: 130 MMHG | OXYGEN SATURATION: 97 % | BODY MASS INDEX: 28.61 KG/M2 | TEMPERATURE: 99.3 F

## 2019-05-06 DIAGNOSIS — R30.0 DYSURIA: ICD-10-CM

## 2019-05-06 DIAGNOSIS — N89.8 VAGINAL DISCHARGE: ICD-10-CM

## 2019-05-06 DIAGNOSIS — R30.0 DYSURIA: Primary | ICD-10-CM

## 2019-05-06 DIAGNOSIS — R10.9 ABDOMINAL PAIN, UNSPECIFIED ABDOMINAL LOCATION: ICD-10-CM

## 2019-05-06 LAB
ALBUMIN UR-MCNC: NEGATIVE MG/DL
ALBUMIN UR-MCNC: NEGATIVE MG/DL
ANION GAP SERPL CALCULATED.3IONS-SCNC: 5 MMOL/L (ref 3–14)
APPEARANCE UR: CLEAR
APPEARANCE UR: CLEAR
BACTERIA #/AREA URNS HPF: ABNORMAL /HPF
BACTERIA #/AREA URNS HPF: ABNORMAL /HPF
BASOPHILS # BLD AUTO: 0.1 10E9/L (ref 0–0.2)
BASOPHILS NFR BLD AUTO: 0.5 %
BILIRUB UR QL STRIP: NEGATIVE
BILIRUB UR QL STRIP: NEGATIVE
BUN SERPL-MCNC: 12 MG/DL (ref 7–30)
CALCIUM SERPL-MCNC: 8.9 MG/DL (ref 8.5–10.1)
CHLORIDE SERPL-SCNC: 109 MMOL/L (ref 94–109)
CO2 SERPL-SCNC: 26 MMOL/L (ref 20–32)
COLOR UR AUTO: ABNORMAL
COLOR UR AUTO: YELLOW
CREAT SERPL-MCNC: 0.69 MG/DL (ref 0.52–1.04)
DIFFERENTIAL METHOD BLD: ABNORMAL
EOSINOPHIL # BLD AUTO: 0.3 10E9/L (ref 0–0.7)
EOSINOPHIL NFR BLD AUTO: 2.4 %
ERYTHROCYTE [DISTWIDTH] IN BLOOD BY AUTOMATED COUNT: 13.6 % (ref 10–15)
GFR SERPL CREATININE-BSD FRML MDRD: >90 ML/MIN/{1.73_M2}
GLUCOSE SERPL-MCNC: 120 MG/DL (ref 70–99)
GLUCOSE UR STRIP-MCNC: NEGATIVE MG/DL
GLUCOSE UR STRIP-MCNC: NEGATIVE MG/DL
HCT VFR BLD AUTO: 44.6 % (ref 35–47)
HGB BLD-MCNC: 13.7 G/DL (ref 11.7–15.7)
HGB UR QL STRIP: ABNORMAL
HGB UR QL STRIP: NEGATIVE
HYALINE CASTS #/AREA URNS LPF: 1 /LPF (ref 0–2)
IMM GRANULOCYTES # BLD: 0.1 10E9/L (ref 0–0.4)
IMM GRANULOCYTES NFR BLD: 0.6 %
KETONES UR STRIP-MCNC: NEGATIVE MG/DL
KETONES UR STRIP-MCNC: NEGATIVE MG/DL
LEUKOCYTE ESTERASE UR QL STRIP: ABNORMAL
LEUKOCYTE ESTERASE UR QL STRIP: ABNORMAL
LYMPHOCYTES # BLD AUTO: 5.6 10E9/L (ref 0.8–5.3)
LYMPHOCYTES NFR BLD AUTO: 39.4 %
MCH RBC QN AUTO: 26.4 PG (ref 26.5–33)
MCHC RBC AUTO-ENTMCNC: 30.7 G/DL (ref 31.5–36.5)
MCV RBC AUTO: 86 FL (ref 78–100)
MONOCYTES # BLD AUTO: 0.6 10E9/L (ref 0–1.3)
MONOCYTES NFR BLD AUTO: 4.1 %
MUCOUS THREADS #/AREA URNS LPF: PRESENT /LPF
NEUTROPHILS # BLD AUTO: 7.5 10E9/L (ref 1.6–8.3)
NEUTROPHILS NFR BLD AUTO: 53 %
NITRATE UR QL: NEGATIVE
NITRATE UR QL: NEGATIVE
NON-SQ EPI CELLS #/AREA URNS LPF: ABNORMAL /LPF
NRBC # BLD AUTO: 0 10*3/UL
NRBC BLD AUTO-RTO: 0 /100
PH UR STRIP: 6.5 PH (ref 5–7)
PH UR STRIP: 7 PH (ref 5–7)
PLATELET # BLD AUTO: 415 10E9/L (ref 150–450)
PLATELET # BLD EST: ABNORMAL 10*3/UL
POTASSIUM SERPL-SCNC: 3.4 MMOL/L (ref 3.4–5.3)
RBC # BLD AUTO: 5.18 10E12/L (ref 3.8–5.2)
RBC #/AREA URNS AUTO: 7 /HPF (ref 0–2)
RBC #/AREA URNS AUTO: ABNORMAL /HPF
RBC MORPH BLD: ABNORMAL
SODIUM SERPL-SCNC: 140 MMOL/L (ref 133–144)
SOURCE: ABNORMAL
SOURCE: ABNORMAL
SP GR UR STRIP: 1 (ref 1–1.03)
SP GR UR STRIP: 1.01 (ref 1–1.03)
SPECIMEN SOURCE: ABNORMAL
SQUAMOUS #/AREA URNS AUTO: 10 /HPF (ref 0–1)
UROBILINOGEN UR STRIP-ACNC: 0.2 EU/DL (ref 0.2–1)
UROBILINOGEN UR STRIP-MCNC: NORMAL MG/DL (ref 0–2)
WBC # BLD AUTO: 14.1 10E9/L (ref 4–11)
WBC #/AREA URNS AUTO: 4 /HPF (ref 0–5)
WBC #/AREA URNS AUTO: ABNORMAL /HPF
WET PREP SPEC: ABNORMAL

## 2019-05-06 PROCEDURE — 99214 OFFICE O/P EST MOD 30 MIN: CPT | Performed by: FAMILY MEDICINE

## 2019-05-06 PROCEDURE — 80048 BASIC METABOLIC PNL TOTAL CA: CPT | Performed by: EMERGENCY MEDICINE

## 2019-05-06 PROCEDURE — 85025 COMPLETE CBC W/AUTO DIFF WBC: CPT | Performed by: EMERGENCY MEDICINE

## 2019-05-06 PROCEDURE — 76830 TRANSVAGINAL US NON-OB: CPT

## 2019-05-06 PROCEDURE — 87086 URINE CULTURE/COLONY COUNT: CPT | Performed by: FAMILY MEDICINE

## 2019-05-06 PROCEDURE — 25000132 ZZH RX MED GY IP 250 OP 250 PS 637: Performed by: EMERGENCY MEDICINE

## 2019-05-06 PROCEDURE — 87210 SMEAR WET MOUNT SALINE/INK: CPT | Performed by: FAMILY MEDICINE

## 2019-05-06 PROCEDURE — 74176 CT ABD & PELVIS W/O CONTRAST: CPT

## 2019-05-06 PROCEDURE — 81001 URINALYSIS AUTO W/SCOPE: CPT | Performed by: EMERGENCY MEDICINE

## 2019-05-06 PROCEDURE — 81001 URINALYSIS AUTO W/SCOPE: CPT | Performed by: FAMILY MEDICINE

## 2019-05-06 PROCEDURE — 99284 EMERGENCY DEPT VISIT MOD MDM: CPT | Mod: 25

## 2019-05-06 RX ORDER — CIPROFLOXACIN 250 MG/1
250 TABLET, FILM COATED ORAL 2 TIMES DAILY
Qty: 6 TABLET | Refills: 0 | Status: SHIPPED | OUTPATIENT
Start: 2019-05-06 | End: 2019-09-06

## 2019-05-06 RX ORDER — OXYCODONE HYDROCHLORIDE 5 MG/1
5 TABLET ORAL ONCE
Status: COMPLETED | OUTPATIENT
Start: 2019-05-06 | End: 2019-05-06

## 2019-05-06 RX ADMIN — OXYCODONE HYDROCHLORIDE 5 MG: 5 TABLET ORAL at 15:43

## 2019-05-06 ASSESSMENT — MIFFLIN-ST. JEOR
SCORE: 1316.33
SCORE: 1318.59

## 2019-05-06 ASSESSMENT — ENCOUNTER SYMPTOMS
FREQUENCY: 0
CHILLS: 0
NAUSEA: 0
FEVER: 0
FLANK PAIN: 1
VOMITING: 0
DIARRHEA: 0
HEMATURIA: 0
ABDOMINAL PAIN: 1
DYSURIA: 1

## 2019-05-06 NOTE — ED TRIAGE NOTES
Pt arrives to the ED via EMS from home for lower abdominal pain. Pt states she was seen at the clinic this morning where they told her she could possibly have a UTI so they started her on cipro. Pt states that she took two of her butalbital w/ codeine 2 hours ago. Pain 7/10.

## 2019-05-06 NOTE — RESULT ENCOUNTER NOTE
Melody,  Here's a copy of your recent lab results for your records.  Cortisol and thyroid levels are in normal range.  We can discuss these results in detail at your upcoming appointment on 5/22.  Tammy Louie NP  Endocrinology

## 2019-05-06 NOTE — NURSING NOTE
Patient was notified that wet prep was negative per Dr. Perez. Patient had requested Tylenol #3 and asked that I check with Dr. Machado her primary.  Script  was denied per Dr. Machado. Patient became angry on the phone regarding Denial of medication. Expressed dissatisfaction with clinic and desire to change clinics. Patient was advised to f/u with primary or new clinic if symptoms did not improve or increase in intensity.     Hannah Sood LPN

## 2019-05-06 NOTE — PROGRESS NOTES
"  SUBJECTIVE:   Melody Muñoz is a 43 year old female who presents to clinic today for the following   health issues:      URINARY TRACT SYMPTOMS      Duration: Sudden onset this morning    Description  dysuria and low back pain    Intensity:  7/10    Accompanying signs and symptoms:  Fever/chills: YES  Flank pain no   Nausea and vomiting: no   Vaginal symptoms: none  Abdominal/Pelvic Pain: YES    History  History of frequent UTI's: no   History of kidney stones: YES  Sexually Active: YES  Possibility of pregnancy: No    Precipitating or alleviating factors: None  Therapies tried and outcome: On Amoxicillin/Prednisone for sinusitis since Friday        Tells me that she took Azo this AM but it did not help.    Additional history: as documented    Reviewed  and updated as needed this visit by clinical staff  Tobacco  Allergies  Meds  Problems  Med Hx  Surg Hx  Fam Hx  Soc Hx          Reviewed and updated as needed this visit by Provider  Tobacco  Allergies  Meds  Problems  Med Hx  Surg Hx  Fam Hx         Labs reviewed in EPIC    ROS:  CONSTITUTIONAL: NEGATIVE for fever, chills, change in weight  INTEGUMENTARY/SKIN: NEGATIVE for worrisome rashes, moles or lesions  EYES: NEGATIVE for vision changes or irritation  ENT/MOUTH: NEGATIVE for ear, mouth and throat problems  RESP: NEGATIVE for significant cough or SOB  CV: NEGATIVE for chest pain, palpitations or peripheral edema  GI: NEGATIVE for nausea, abdominal pain, heartburn, or change in bowel habits  HEME: NEGATIVE for bleeding problems    OBJECTIVE:     /82 (BP Location: Left arm, Patient Position: Sitting, Cuff Size: Adult Regular)   Pulse 98   Temp 99.3  F (37.4  C) (Tympanic)   Resp 14   Ht 1.575 m (5' 2\")   Wt 70.5 kg (155 lb 8 oz)   LMP  (LMP Unknown)   SpO2 97%   BMI 28.44 kg/m    Body mass index is 28.44 kg/m .   GENERAL: alert and no acute distress  EYES: Eyes grossly normal to inspection, PERRL and conjunctivae and sclerae " normal  HENT: ear canals and TM's normal, mouth and nose without ulcers or lesions.    NECK: no adenopathy, no asymmetry, masses, or scars and thyroid normal to palpation  RESP: lungs clear to auscultation - no rales, rhonchi or wheezes  CV: regular rate and rhythm, normal S1 S2, no S3 or S4, no murmur, click or rub, no peripheral edema and peripheral pulses strong  Abdomen: very soft abdomen.  No rebound tenderness or peritoneal signs. Bowel sound normal. +tenderness reproducible over pelvic area.  SKIN: no suspicious lesions or rashes  PSYCH: very irritable.      Diagnostic Test Results:  U/A with micro and Ucx, wet prep    ASSESSMENT/PLAN:     Problem List Items Addressed This Visit     Dysuria - Primary    Relevant Medications    ciprofloxacin (CIPRO) 250 MG tablet    Other Relevant Orders    UA with Microscopic reflex to Culture (Completed)      Other Visit Diagnoses     Vaginal discharge        Relevant Orders    Wet prep (Completed)           Urinalysis does not appear to have a UTI.  Urine culture pending.  Pt requesting abx, Rx Cipro x3 days.  Wet prep ordered, result pending.  Declined STD testing.  Pt requested Tylenol #3 for her pain but I did not think it was appropriate for her so request was denied.   Recommended to keep up fluids and may use Tylenol (non-codeine) and OTC Azo PRN bladder/dysuria pain as well.  Tells me she took Azo this AM but I do not see that it was found in her U/A.  Will have her follow up as needed.      Rizwana Perez, DO  [facility]

## 2019-05-06 NOTE — TELEPHONE ENCOUNTER
Patient saw Dr. Perez this AM, Tylenol #3 was denied by her. She has requested that Dr. Machado to review her request. Please review and advise.

## 2019-05-06 NOTE — TELEPHONE ENCOUNTER
Given the fact that the patient is already on Fioricet with codeine I did not see any reason to get her Tylenol with codeine in addition.

## 2019-05-06 NOTE — ED PROVIDER NOTES
History     Chief Complaint:  Abdominal Pain    HPI   Melody Muñoz is a 43 year old female with a complex past medical history including diabetes mellitus and substance abuse who presents with abdominal pain. This morning, the patient states she woke up with severe lower abdominal pain as well as some dysuria, so she took some Tylenol and Pyridium without much relief. As the day went on, the patient reports her pain persisted, so she went to her primary care clinic where they performed a UA, findings below. Even though, the UA did not show any obvious signs of infection, the provider felt this may represent an early infection and gave the patient a script for 3 days of Cipro. They recommended the patient use the Butalbital-codeine she has at home as needed for pain, which she took around 1330. However, the patient reports she continued to have significant pain in her lower abdomen, now radiating into her right flank, so she came to the ED for further evaluation. Here, she states she had a kidney stone when she was 18 years old with a similar pain presentation, but denies any since then. The patient was seen here multiple times in the past couple months for abdominal pain as well, but she notes this was localized to her right upper quadrant and is completely different than her current pain. She denies any nausea, vomiting, vaginal discharge or bleeding, diarrhea, or other acute symptoms.    5/6/19 Laboratory  UA: Trace leukocyte esterase, 2-5 RBCs, many squamous epithelia, few bacteria, o/w negative  Wet prep: Rare yeast and clue cells seen. No WBCs or Trichomonas seen.    Allergies:  Hydrocodone    Medications:    Albuterol neb solution  Almotriptan  Carisoprodol  Ciprofloxacin - 5/6/19-5/9/19  Clonazepam  Dextroamphetamine  Trulicity  Flonase spray  Lamotrigine  Latuda  Levothyroxine  Lithium  Meclizine  Ortho-cyclen  Lyrica  Prednisone  Proair inhaler  Promethazine  Sertraline  Topiramate  Effexor    Past  "Medical History:    Anxiety  Bipolar 1 disorder  Diabetes mellitus  Endometriosis  Intermittent asthma  Latent tuberculosis  Major depression  Migraines  Sleep apnea  Substance abuse  Vertigo  Chronic migraines  Chronic back pain  Morbid obesity  Hyperlipidemia  Female stress incontinence  Hypothyroidism     Past Surgical History:    Bladder sling procedure  Endometriosis procedure   section  Laparoscopic appendectomy  Tubal ligation, bilateral  Hysterectomy  [place] teeth removal  Left 5th finger surgery    Family History:    Hypertension  Heart disease  Depression  Anxiety    Social History:  Smoking status: Former smoker  Alcohol use: No  Drug use: No  PCP: Evaristo Machado  Marital Status:  [2]     Review of Systems   Constitutional: Negative for chills and fever.   Gastrointestinal: Positive for abdominal pain. Negative for diarrhea, nausea and vomiting.   Genitourinary: Positive for dysuria and flank pain. Negative for frequency, hematuria and urgency.   All other systems reviewed and are negative.    Physical Exam     Patient Vitals for the past 24 hrs:   BP Temp Temp src Pulse Heart Rate Resp SpO2 Height Weight   19 1910 -- -- -- -- -- -- 95 % -- --   19 1905 (!) 142/100 -- -- 81 -- -- -- -- --   19 1745 -- -- -- -- -- -- 94 % -- --   19 1740 -- -- -- -- -- -- 95 % -- --   19 1730 120/74 -- -- 76 -- -- 97 % -- --   19 1615 -- -- -- -- -- -- 96 % -- --   19 1600 -- -- -- -- -- -- 100 % -- --   19 1530 (!) 140/93 -- -- -- -- -- 97 % -- --   19 1526 (!) 140/93 98.6  F (37  C) Oral 88 88 18 98 % 1.575 m (5' 2\") 70.3 kg (155 lb)     Physical Exam  General: Patient is alert and interactive when I enter the room  Head:  The scalp, face, and head appear normal  Eyes:  Conjunctivae are normal  ENT:    The nose is normal    Pinnae are normal    External acoustic canals are normal  Neck:  Trachea midline  CV:  Pulses are normal.    Resp:  No " respiratory distress   Abdomen:      Soft, supra-pubic tenderness, non-distended  Musc:  Normal muscular tone    No major joint effusions    No asymmetric leg swelling  Skin:  No rash or lesions noted  Neuro:  Speech is normal and fluent. Face is symmetric.     Moving all extremities well. Gait intact.   Psych: Awake. Alert.  Normal affect.  Appropriate interactions.    Emergency Department Course   Imaging:  Radiographic findings were communicated with the patient who voiced understanding of the findings.    US Pelvic Complete w Transvaginal & Abd/Pel Duplex Limited:  1. Limited exam as described above.  2. Neither ovary identified. Status post hysterectomy.  As read by Radiology.    Abd/pelvis CT no contrast - Stone Protocol:  1. No acute abnormality in the abdomen or pelvis.  2. Moderate to large amount of stool is seen throughout the colon.  As read by Radiology.    Laboratory:  CBC: WBC 14.1 (H), o/w WNL (HGB 13.7, )  BMP: Glucose 120 (H), o/w WNL (Creatinine 0.69)  UA: Small blood, trace leukocyte esterase, RBC 7 (H), few bacteria, squamous epithelia 10 (H), mucous present, o/w negative    Interventions:  1543: 5 mg Oxycodone PO    Emergency Department Course:  Past medical records, nursing notes, and vitals reviewed.  1529: I performed an exam of the patient and obtained history, as documented above.  IV inserted and blood drawn.  The patient was sent for a pelvic ultrasound while in the emergency department, findings above.    1738: I rechecked the patient. We will obtain a CT abdomen pelvis, findings above.    1849: I rechecked the patient. Findings and plan explained to the patient. Patient discharged home with instructions regarding supportive care, medications, and reasons to return. The importance of close follow-up was reviewed.     Impression & Plan    Medical Decision Making:  Melody Muñoz is a 43 year old female with a history of chronic pain who presents to the ED for evaluation of  abdominal pain. The patient was seen today at her primary care clinic where she had a wet prep and urinalysis performed. UA showed possible infection and wet prep showed BV and yeast. The patient was given treatment for possible uti and denied pain medications at that time. She comes here describing continued burning pain that she says is reminiscent of her kidney infection many years ago. She does not think it is gonorrhea or chlamydia and does not want any STI testing. She denies any vaginal discharge. Her urine actually does not look infected. She also describes sharp pain with urinating and is requesting narcotic pain medications for this. I did do a pelvic ultrasound, which shows no acute abnormalities. CT abdomen pelvis was then performed because they could not identify the ovaries on ultrasound, and this returned unremarkable. I did give her one dose of oral pain medications here. I also called her clinic to have them culture her urine. She is already on Cipro. She does have rare yeast and BV, but I do not think these would be causing such severe pain as she describes. CBC reveals leukocytosis, however, it appears she always has an elevated white count and she is on chronic steroids. After her negative CT (no signs of stone), I spoke with her and she again requested pain medications to go home with. I told her I would not feel comfortable giving her pain medications and she quickly became agitated towards me after I felt there was no indication to send her home with pain medications. She stated that I was not listening to her and that she had nothing to take at home. I suggested using Tylenol and Azo to see if this helps with the dysuria. The patient then became more agitated. I had a very calm discussion with her about why we cannot discharge her home with narcotics. We discussed other alternatives including acupuncture and close follow-up with primary for other options. At this point, she then told me to  "\"get off my fucking high horse\" and that she will write me a letter. I told her she has every right to do that. The nurse then asked her to not curse, and the patient proceeded to scream \"shut up\" at the nurse and got into her face. At this point, the patient left. Again, I do not think she has a urinary tract infection. I have actually seen her in the past at which time she also yelled at me for not prescribing her narcotics, so I think there may have been some element of narcotic-seeking behavior. The patient was discharged.    Diagnosis:    ICD-10-CM   1. Dysuria R30.0   2. Abdominal pain, unspecified abdominal location R10.9     Disposition: Discharged to home    Discharge Medications: None    Sailaja Hidalgo  5/6/2019   [facility] EMERGENCY DEPARTMENT    ISailjaa, am serving as a scribe at 3:29 PM on 5/6/2019 to document services personally performed by Lisa Viera MD based on my observations and the provider's statements to me.      Lisa Viera MD  05/07/19 1130    "

## 2019-05-06 NOTE — ED AVS SNAPSHOT
[facility] Emergency Department  [address]  [facility] [address]  Phone:  [phone]  Fax:  [phone]                                    Melody Muñoz   MRN: [id number]    Department:  [facility] Emergency Department   Date of Visit:  5/6/2019           After Visit Summary Signature Page    I have received my discharge instructions, and my questions have been answered. I have discussed any challenges I see with this plan with the nurse or doctor.    ..........................................................................................................................................  Patient/Patient Representative Signature      ..........................................................................................................................................  Patient Representative Print Name and Relationship to Patient    ..................................................               ................................................  Date                                   Time    ..........................................................................................................................................  Reviewed by Signature/Title    ...................................................              ..............................................  Date                                               Time          22EPIC Rev 08/18

## 2019-05-06 NOTE — TELEPHONE ENCOUNTER
"Pt called complaining of constant bladder pain \"it hurt so bad\". Denies back pain, hematuria or fever. OV was scheduled today for further evaluation.   "

## 2019-05-07 LAB
BACTERIA SPEC CULT: NO GROWTH
SPECIMEN SOURCE: NORMAL

## 2019-05-07 NOTE — ED NOTES
"Pt came up to nursing desk after MD in room discussing plan to go home. Pt tearful and states \"If I can't have any pain meds for home, can I at least get something before I go home so that I can be more comfortable and try to get some rest\". Explained to pt that nurse would voice concern to MD.   "

## 2019-05-07 NOTE — ED NOTES
"Pt provided with discharge paperwork and educated on recommended follow-up with PCP. Pt educated on how to manage symptoms at home. During discharge pt stated \"I want to talk to the doctor because I want her to call my primary doctor tonight since I am not waiting till tomorrow when I am in this much pain\". MD called into room and discussed that she can not call her primary care doctor because it is after hours and that PCP had talked to her today and told her no to pain medication. Pt states \"Well then what am I supposed to do\". MD stated \"You can use tylenol or azo\". Pt frustrated and raising voice during conversation with nurse and MD. Pt states \"Your going to get a letter\" \"I have had it up to here with doctors\" \"I fucking hate doctors\" \"You need to get off your fucking high horse\". Pt then proceeded to storm out of room and not sign discharge paper.   "

## 2019-05-08 ENCOUNTER — TELEPHONE (OUTPATIENT)
Dept: FAMILY MEDICINE | Facility: CLINIC | Age: 44
End: 2019-05-08

## 2019-05-08 DIAGNOSIS — G43.709 CHRONIC MIGRAINE WITHOUT AURA WITHOUT STATUS MIGRAINOSUS, NOT INTRACTABLE: Primary | Chronic | ICD-10-CM

## 2019-05-08 RX ORDER — BUTALBITAL, ASPIRIN, AND CAFFEINE 325; 50; 40 MG/1; MG/1; MG/1
1 CAPSULE ORAL EVERY 4 HOURS PRN
Status: CANCELLED | OUTPATIENT
Start: 2019-05-08

## 2019-05-08 RX ORDER — BUTALBITAL, ACETAMINOPHEN AND CAFFEINE 50; 325; 40 MG/1; MG/1; MG/1
1 TABLET ORAL EVERY 4 HOURS PRN
Qty: 15 TABLET | Refills: 1 | Status: SHIPPED | OUTPATIENT
Start: 2019-05-08 | End: 2019-06-04

## 2019-05-08 NOTE — TELEPHONE ENCOUNTER
Reason for Call:  Medication or medication refill:    Do you use a [facility]?  Name of the pharmacy and phone number for the current request:  [facility]    Name of the medication requested: Butabutol with NO codiene    Other request: previous rx had codeine     Can we leave a detailed message on this number? YES    Phone number patient can be reached at: Home number on file [phone] (home)    Best Time: any    Call taken on 5/8/2019 at 7:23 AM by MARIAN FUENTES

## 2019-05-08 NOTE — TELEPHONE ENCOUNTER
FYI-  Pt called with medication update. States her Psychiatrist stopped sertraline and changed her to Cymbalta and stoppped clonazepam  changed to Diazepam 5 -10 mg TID PRN until her Cymbalta starts working. Also she was referred to pscychiatry outpatient partial hospitalization program, to treat her extreme depression. Pt expressed she felt [place] providers never listened to her or acknowledge her symptoms.  Pt denies suicidal thoughts or plans and will be followed by psychiatry for depression. Advised she have  psychiatry send office visit notes for PCP review. Pt verbalized agreement with plan.

## 2019-05-08 NOTE — TELEPHONE ENCOUNTER
I likely won't have time to call her while in clinic.  Is it possible to find out what particular questions she has and I can try to answer them, otherwise she could send me a mychart message and I can respond directly to her that way.  Thanks,  Tammy Louie NP  Endocrinology

## 2019-05-08 NOTE — TELEPHONE ENCOUNTER
Called the Pt and provided her with the information on getting set up on "[employer] again.   Reset her password.     Relayed lab results to her with feedback from LS. The Pt will send [employer] message to LS regarding concerns.     Brenda Bianchi RN -- [facility]

## 2019-05-09 NOTE — TELEPHONE ENCOUNTER
Called patient and states she spoke to Jennifer yesterday and was advised of below message from Tammy.  Had appt this am but has migraine so cancelled today's appt and will call back to reschedule.  Josephine Noriega RN     Parainfluenza virus bronchitis Parainfluenza virus bronchitis

## 2019-05-13 ENCOUNTER — TELEPHONE (OUTPATIENT)
Dept: BEHAVIORAL HEALTH | Facility: CLINIC | Age: 44
End: 2019-05-13

## 2019-05-13 ENCOUNTER — TRANSFERRED RECORDS (OUTPATIENT)
Dept: HEALTH INFORMATION MANAGEMENT | Facility: CLINIC | Age: 44
End: 2019-05-13

## 2019-05-13 ENCOUNTER — MEDICAL CORRESPONDENCE (OUTPATIENT)
Dept: HEALTH INFORMATION MANAGEMENT | Facility: CLINIC | Age: 44
End: 2019-05-13

## 2019-05-14 ENCOUNTER — TELEPHONE (OUTPATIENT)
Dept: BEHAVIORAL HEALTH | Facility: CLINIC | Age: 44
End: 2019-05-14

## 2019-05-21 ENCOUNTER — HOSPITAL ENCOUNTER (OUTPATIENT)
Dept: BEHAVIORAL HEALTH | Facility: CLINIC | Age: 44
Discharge: HOME | End: 2019-05-21
Attending: PSYCHIATRY & NEUROLOGY | Admitting: PSYCHIATRY & NEUROLOGY
Payer: COMMERCIAL

## 2019-05-21 ENCOUNTER — TELEPHONE (OUTPATIENT)
Dept: BEHAVIORAL HEALTH | Facility: CLINIC | Age: 44
End: 2019-05-21

## 2019-05-21 PROCEDURE — 90791 PSYCH DIAGNOSTIC EVALUATION: CPT | Performed by: COUNSELOR

## 2019-05-21 NOTE — PROGRESS NOTES
LOCUS Worksheet     Name: Melody Muñoz MRN: [id number]    : 1975      Gender:  female    PMI: [id number]   Provider Name: Cece Garcia MA, [facility], [facility]   Provider NPI:  [id number]    Actual level of Care Provided:  Level 4    Service(s) receiving or referred to:  Level     Reason for Variance: N/A      Rating completed by: Cece Garcia MA, [facility], [facility]      I. Risk of Harm:   3      Moderate Risk of Harm    II. Functional Status:   4      Serious Impairment    III. Co-Morbidity:   3      Significant Co-Morbidity    IV - A. Recovery Environment - Level of Stress:   3      Moderately Stress Environment    IV - B. Recovery Environment - Level of Support:   3      Limited Support in Environment    V. Treatment and Recovery History:   3      Moderate to Equivocal Response to Treatment and Recovery Management    VI. Engagement and Recovery Project:   3      Limited Engagement and Recovery       26 Composite Score    Level of Care Recommendation:   20 to 22       Medically Monitored Non-Residential Services

## 2019-05-21 NOTE — TELEPHONE ENCOUNTER
Contacted patient to schedule start date in Partial Hospitalization program for 5/23/19. Patient confirmed.     Cece Garcia, [facility], [facility]  5/21/2019
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
## 2019-05-22 ENCOUNTER — TELEPHONE (OUTPATIENT)
Dept: ENDOCRINOLOGY | Facility: CLINIC | Age: 44
End: 2019-05-22

## 2019-05-22 ENCOUNTER — NURSE TRIAGE (OUTPATIENT)
Dept: FAMILY MEDICINE | Facility: CLINIC | Age: 44
End: 2019-05-22

## 2019-05-22 DIAGNOSIS — E03.4 HYPOTHYROIDISM DUE TO ACQUIRED ATROPHY OF THYROID: Primary | ICD-10-CM

## 2019-05-22 NOTE — TELEPHONE ENCOUNTER
Reason for call:  Patient reporting a symptom    Symptom or request: sinus    Duration (how long have symptoms been present):     Have you been treated for this before? Yes    Additional comments: just finished antibiotics and is still having symptoms    Phone Number patient can be reached at:  Cell number on file:    Telephone Information:   Mobile [phone]       Best Time:  any    Can we leave a detailed message on this number:  YES    Call taken on 5/22/2019 at 9:20 AM by MARIAN FUENTES

## 2019-05-22 NOTE — TELEPHONE ENCOUNTER
"Patient was advised to schedule OV or start e-visit for persistent sinus symptoms. She agreed to start e-visit and asked that PCP review visit. Routing message to provider.        Additional Information    Negative: Sounds like a life-threatening emergency to the triager    Negative: Difficulty breathing, and not from stuffy nose (e.g., not relieved by cleaning out the nose)    Negative: SEVERE headache and has fever    Negative: Patient sounds very sick or weak to the triager    Negative: SEVERE sinus pain    Negative: Severe headache    Negative: Redness or swelling on the cheek, forehead, or around the eye    Negative: Fever > 103 F (39.4 C)    Negative: Fever > 101 F (38.3 C) and over 60 years of age    Negative: Fever > 100.0 F (37.8 C) and has diabetes mellitus or a weak immune system (e.g., HIV positive, cancer chemotherapy, organ transplant, splenectomy, chronic steroids)    Negative: Fever > 100.0 F (37.8 C) and bedridden (e.g., nursing home patient, stroke, chronic illness, recovering from surgery)    Negative: Fever present > 3 days (72 hours)    Negative: Fever returns after gone for over 24 hours and symptoms worse or not improved    Negative: Sinus pain (not just congestion) and fever    Negative: Earache    Sinus congestion (pressure, fullness) present > 10 days    Answer Assessment - Initial Assessment Questions  1. LOCATION: \"Where does it hurt?\"       Head and face  2. ONSET: \"When did the sinus pain start?\"  (e.g., hours, days)       14 days  3. SEVERITY: \"How bad is the pain?\"   (Scale 1-10; mild, moderate or severe)    - MILD (1-3): doesn't interfere with normal activities     - MODERATE (4-7): interferes with normal activities (e.g., work or school) or awakens from sleep    - SEVERE (8-10): excruciating pain and patient unable to do any normal activities         3 days  4. RECURRENT SYMPTOM: \"Have you ever had sinus problems before?\" If so, ask: \"When was the last time?\" and \"What happened that " "time?\"       ongoing  5. NASAL CONGESTION: \"Is the nose blocked?\" If so, ask, \"Can you open it or must you breathe through the mouth?\"      yes  6. NASAL DISCHARGE: \"Do you have discharge from your nose?\" If so ask, \"What color?\"      yellow  7. FEVER: \"Do you have a fever?\" If so, ask: \"What is it, how was it measured, and when did it start?\"       none  8. OTHER SYMPTOMS: \"Do you have any other symptoms?\" (e.g., sore throat, cough, earache, difficulty breathing)      Irritate throat and cough  9. PREGNANCY: \"Is there any chance you are pregnant?\" \"When was your last menstrual period?\"      None- had hysterectomy    Protocols used: SINUS PAIN AND CONGESTION-A-OH    "

## 2019-05-22 NOTE — TELEPHONE ENCOUNTER
Patient cancelled her appointment for 5/22/19 as well as her previous appointment for 4/25/19.  Patient was initially seen in clinic on 3/21/19.  Had blood work done on 5/02/19.  Is now rescheduled to see Endocrinology on 8/23/19.  Patient is asking to be worked in for a 3rd time and if any blood work needs to be done prior to the appointment.  Please advise.       Notes recorded by Tammy Louie APRN CNP on 5/6/2019 at 12:15 PM [place]  Melody,  Here's a copy of your recent lab results for your records.  Cortisol and thyroid levels are in normal range.  We can discuss these results in detail at your upcoming appointment on 5/22.  Tammy Louie NP  Endocrinology    Jennifer Oglesby CMA on 5/22/2019 at 12:58 PM

## 2019-05-22 NOTE — TELEPHONE ENCOUNTER
Patient had appointment scheduled with Tammy Louie today 05/22/19, but had to cancel due to a sick kid. Patient cannot wait till August to see Tammy, and now wondering if there any labs she is needing to have done before this August appointment now.     Please have a member from her team call patient back     Kina Pang/ONESIMO

## 2019-05-22 NOTE — TELEPHONE ENCOUNTER
Per huddle, patient returned call stating she cannot do e-visit due to multiple things going on at home. She would like PCP to review and see if he is willing to provide medication.

## 2019-05-24 ENCOUNTER — HOSPITAL ENCOUNTER (OUTPATIENT)
Dept: BEHAVIORAL HEALTH | Facility: CLINIC | Age: 44
End: 2019-05-24
Attending: PSYCHIATRY & NEUROLOGY
Payer: COMMERCIAL

## 2019-05-24 DIAGNOSIS — J01.90 ACUTE SINUSITIS WITH SYMPTOMS > 10 DAYS: ICD-10-CM

## 2019-05-24 DIAGNOSIS — J45.31 MILD PERSISTENT ASTHMA WITH EXACERBATION: ICD-10-CM

## 2019-05-24 PROBLEM — F34.1 PERSISTENT DEPRESSIVE DISORDER: Status: ACTIVE | Noted: 2019-05-24

## 2019-05-24 PROCEDURE — H0035 MH PARTIAL HOSP TX UNDER 24H: HCPCS

## 2019-05-24 RX ORDER — AMOXICILLIN 500 MG/1
1000 CAPSULE ORAL 3 TIMES DAILY
Qty: 60 CAPSULE | Refills: 0 | Status: SHIPPED | OUTPATIENT
Start: 2019-05-24 | End: 2019-09-06

## 2019-05-24 ASSESSMENT — ANXIETY QUESTIONNAIRES
7. FEELING AFRAID AS IF SOMETHING AWFUL MIGHT HAPPEN: MORE THAN HALF THE DAYS
3. WORRYING TOO MUCH ABOUT DIFFERENT THINGS: NEARLY EVERY DAY
IF YOU CHECKED OFF ANY PROBLEMS ON THIS QUESTIONNAIRE, HOW DIFFICULT HAVE THESE PROBLEMS MADE IT FOR YOU TO DO YOUR WORK, TAKE CARE OF THINGS AT HOME, OR GET ALONG WITH OTHER PEOPLE: VERY DIFFICULT
6. BECOMING EASILY ANNOYED OR IRRITABLE: MORE THAN HALF THE DAYS
2. NOT BEING ABLE TO STOP OR CONTROL WORRYING: MORE THAN HALF THE DAYS
1. FEELING NERVOUS, ANXIOUS, OR ON EDGE: MORE THAN HALF THE DAYS
5. BEING SO RESTLESS THAT IT IS HARD TO SIT STILL: SEVERAL DAYS
GAD7 TOTAL SCORE: 14

## 2019-05-24 ASSESSMENT — PATIENT HEALTH QUESTIONNAIRE - PHQ9
SUM OF ALL RESPONSES TO PHQ QUESTIONS 1-9: 20
5. POOR APPETITE OR OVEREATING: MORE THAN HALF THE DAYS

## 2019-05-24 NOTE — PROGRESS NOTES
"Area of Treatment Focus:  Symptom Management and Develop / Improve Independent Living Skills     Therapeutic Interventions/Treatment Strategies:  Support, Feedback, Safety Assessment, Structured Activity, Problem Solving and Education     Response to Treatment Strategies:  Accepted Feedback, Gave Feedback, Listened, Focused on Goals, Attentive, Accepted Support and Alert     Name of Group:  Group therapy I  Date/Time: 05/24/2019 1000 to 1050, Group Participants: 6  Name of Group:  Group therapy II  Date/Time: 05/24/2019 1100 to 1150, Group Participants: 7  Name of Group:  Group therapy III Date/Time: 05/24/2019 1400 to 1450, Group Participants: client left due to somnolence      Description and Outcome:  Group therapy I  Client reported she has been having more depression and a \"deep depression\" set has set in.  She reported increased distress due to a series of medical issues for herself and her son who was recently diagnosed with epilepsy.  She said her son's goal is to get into the [employer] and she now fears that is gone so her grief is very heavy. Group members validated her stress, discussed coping skills for distress tolerance.  No SI reported.   Client verbalized understanding of session content by problem solving with group members and accepting feedback.  Group therapy II  Client participated in a session on symptom identification and skill building for symptom management.  Client problem solved to create a list of symptoms, warning signs for depression, anxiety, and bipolar disorder.  Discussed symptom levels and distress with group members and helpful ways to manage symptoms.  Client benefitted from problems solving and feedback from group members.    Group therapy III  Client left early reporting feeling somnolent and unable to focus.  She said the day took more energy than she thought it would and she preferred to go home and rest.  She denied safety concerns.  Is this a Weekly Review of the Progress on " the Treatment Plan?  Yes     Are Treatment Plan Goals being addressed?  Yes, continue treatment goals      Are Treatment Plan Strategies to Address Goals Effective?  Yes, continue treatment strategies

## 2019-05-24 NOTE — H&P
Admitted:     05/24/2019      IDENTIFYING DATA:  The patient is a 43-year-old  female.  She lives in [place].  She is a homemaker.  The patient was recently admitted to the partial hospitalization program.      CHIEF COMPLAINT:  Bipolar disorder.      HISTORY OF PRESENT ILLNESS:  The patient presents with symptoms of bipolar disorder.  She reports a history of manic and psychotic symptoms in the past.  She has been hospitalized for this.  She has had multiple hospitalizations.  The patient currently is presenting with depressive symptoms or bipolar disorder.  She notes depressed mood, low energy, low interest, poor concentration, feelings of hopelessness.      The patient also presents with symptoms of general anxiety disorder.  She worries excessively.  She is able to contain anxiety.  When anxious, she has restlessness, has tension, difficulty concentrating, difficulty sleeping.  The patient is currently being seen by Dr. Rendon and notes only a partial response to Cymbalta 60 mg a day, Latuda 120 mg a day, Tenex 4 mg at bedtime and Klonopin 1 mg daily p.r.n.      The patient is looking for some adjustments in her meds to better control her depression and anxiety.  The patient also reports some posttraumatic stress disorder symptoms in the past.      PAST PSYCHIATRIC HISTORY:  The patient has seen Dr. Rendon for 3 years and prior to that Dr. Pastrana.  The patient has a history of psychiatric hospitalizations.      PAST MEDICAL HISTORY:  Hypothyroidism, irritable bowel syndrome, chronic fatigue, migraines.      FAMILY HISTORY:  Nil.      SOCIAL HISTORY:  The patient was born and raised in [place].  The patient's father is a nurse.  The mother is a caregiver.  She has 5 siblings.  The patient graduated and completed grade 12.  She has 3 years of college.        The patient is .  She is  to her , Margarita Flynn.  They have been  for 18 years.  He is an .  They  have 4 children.  The patient denied any drug or alcohol problems.      REVIEW OF SYSTEMS:  A comprehensive review of system was done and was negative except for findings as noted above.      MENTAL STATUS EXAMINATION:  The patient arrived on time for the appointment.  She was appropriately dressed and groomed.  She was responsive to questions asked.  Eye contact was fair.  Speech was normal rate and rhythm.  The patient's mood was dysphoric and anxious.  Affect was appropriate to content.  Thinking was clear and logical and thought content.  The patient denied psychotic symptoms.  The patient denies suicidal or homicidal ideation.  The patient's cognitive function is grossly normal.  The patient was oriented to person, place and time.  Memory, concentration and attention were fair.  Use of language, fund of knowledge appropriate.  Insight and judgment fair.      DIAGNOSIS:  Bipolar disorder, not otherwise specified, generalized anxiety disorder, posttraumatic stress disorder symptoms, panic disorder.  See medical history.      ASSESSMENT AND PLAN:  I will provide treatment.  I will continue the patient on her current meds.  I will increase the patient's Cymbalta to 90 mg a day.  The patient's care will be assumed by the treating providers at the partial program next week.         MEL CARLOS MD             D: 2019   T: 2019   MT: RUDY      Name:     ZUNILDA PEARSON   MRN:      [id number]        Account:      [id number]   :      1975        Admitted:     2019                   Document: [id number]

## 2019-05-24 NOTE — PROGRESS NOTES
"Adult Mental Health Partial Hospitalization Group Therapy Progress Note       Client Initial Individualized Goals for Treatment: \"Learning how to manage the stress in my life right now because there is so much stress. Learning how to relax because I just feel like I can't relax and I am constantly go, go , go, constantly thinking or planning. I don't know how to stop worrying\".    Initial Treatment suggestions for the client during the time between Diagnostic Assessment and completion of the Individualized Treatment Plan:  Follow Safety Plan   Ask for more information, support and/or assistance as needed.  Follow up with providers/community supports as needed  Report increases or changes in symptoms to staff.  Report any personal safety concerns to staff.   Take medications as prescribed.  Report medication changes and/or side effects to staff.  Attend and participate in groups as scheduled or notify staff if unable to do so.  Report any use of substances to staff as this may impact your symptoms and/or            personal safety.  Notify staff if you have any other issues that need to be addressed. This may include    any current abuse / neglect / exploitation or other vulnerability.  Follow recommendations of your treatment team and discuss concerns if not in  agreement.    Area of Treatment Focus:  Symptom Management and Develop / Improve Independent Living Skills    Therapeutic Interventions/Treatment Strategies:  Support, Feedback, Structured Activity, Problem Solving, Clarification and Education    Response to Treatment Strategies:  Accepted Feedback, Listened, Focused on Goals, Attentive, Accepted Support and Alert    Name of Group:  OT life skills  Date: 5/24/19  Time: 11:00-11:50  Group Participants: 8    Description and Outcome: Melody attended and participated in a structured psychoeducation group where skilled intervention provides written and verbal evidence based material, facilitates experiential " learning creates opportunity to develop skills through practice, increases self awareness, and provides support in problem solving ways to apply and generalize taught skills. Through the use of supported reflective social interactions and structured evidenced therapeutic processes in context, group members work towards stabilizing and managing mental health symptoms for improved participation and function in valued roles, routines, relationships, and independent living.       Intervention topic: OT clinic: focus on opportunity for patients to choose how to use their energy through the use of structured activities designed to help them monitor their symptoms, self regulate, practice skills and/or work on personal goals in a semi structured context with individual check in by OT to observe, assess, and provide resources as needed. Melody was provided orientation to the purpose of the clinic and she completed the Occupational Self Assessment to help establish life skills goals. She then chose to use her time and energy on a mindful focused activity to help her calm herself down.      Presentation and Assessment: Melody presents with anxious affect. Participation: good, first day. She would benefit from additional opportunities to practice the content to be able to generalize it to her everyday life with increased intentionality, consistency, and efficacy in support of her psychiatric recovery    Progress towards ITP goals: She worked towards  their initial treatment goals will continue to monitor and assess and work with her in creating treatment goals in subsequent sessions.     Is this a Weekly Review of the Progress on the Treatment Plan?  No

## 2019-05-24 NOTE — TELEPHONE ENCOUNTER
Patient Contact    Attempt # 1    Was call answered?  No.  Unable to leave VM message due to full mailbox.    Upon call back, please inform patient that a prescription for amoxicillin to treat her sinusitis was sent to her pharmacy.

## 2019-05-25 ASSESSMENT — ANXIETY QUESTIONNAIRES: GAD7 TOTAL SCORE: 14

## 2019-05-28 ENCOUNTER — NURSE TRIAGE (OUTPATIENT)
Dept: NURSING | Facility: CLINIC | Age: 44
End: 2019-05-28

## 2019-05-28 ENCOUNTER — TELEPHONE (OUTPATIENT)
Dept: BEHAVIORAL HEALTH | Facility: CLINIC | Age: 44
End: 2019-05-28

## 2019-05-28 DIAGNOSIS — B37.31 YEAST INFECTION OF THE VAGINA: ICD-10-CM

## 2019-05-28 NOTE — TELEPHONE ENCOUNTER
"Requested Prescriptions   Pending Prescriptions Disp Refills     fluconazole (DIFLUCAN) 150 MG tablet  DISCONTINUED  Last Written Prescription Date:  8/14/2018 END: 2/21/2019  Last Fill Quantity: 1 tablet,  # refills: 0   Last office visit: 5/6/2019 with prescribing provider:  Chris   Future Office Visit:   Next 5 appointments (look out 90 days)    Aug 23, 2019 11:00 AM CDT  Return Visit with KIRAN Carballo CNP  [facility] [address] [facility] [id number] [phone]          1 tablet 0     Sig: Take 1 tablet (150 mg) by mouth once for 1 dose       Antifungal Agents Failed - 5/28/2019  8:39 AM        Failed - Not Fluconazole or Terconazole      If oral Fluconazole or Terconazole, may refill if indicated in progress notes.           Failed - Medication is active on med list        Passed - Recent (12 mo) or future (30 days) visit within the authorizing provider's specialty     Patient had office visit in the last 12 months or has a visit in the next 30 days with authorizing provider or within the authorizing provider's specialty.  See \"Patient Info\" tab in inbasket, or \"Choose Columns\" in Meds & Orders section of the refill encounter.                 "

## 2019-05-28 NOTE — TELEPHONE ENCOUNTER
Reason for Call:  Other prescription    Detailed comments: diflucan for yeast infection.. [facility] [place] (call Melody when sent over)    Phone Number Patient can be reached at: Cell number on file:    Telephone Information:   Mobile [phone]       Best Time: any    Can we leave a detailed message on this number? YES    Call taken on 5/28/2019 at 8:30 AM by Benjamin Balderas

## 2019-05-29 ENCOUNTER — TELEPHONE (OUTPATIENT)
Dept: FAMILY MEDICINE | Facility: CLINIC | Age: 44
End: 2019-05-29

## 2019-05-29 ENCOUNTER — NURSE TRIAGE (OUTPATIENT)
Dept: NURSING | Facility: CLINIC | Age: 44
End: 2019-05-29

## 2019-05-29 DIAGNOSIS — T36.95XA ANTIBIOTIC-INDUCED YEAST INFECTION: Primary | ICD-10-CM

## 2019-05-29 DIAGNOSIS — B37.9 ANTIBIOTIC-INDUCED YEAST INFECTION: Primary | ICD-10-CM

## 2019-05-29 NOTE — TELEPHONE ENCOUNTER
Pt calls in inquiring whether or not New Rx for Diflucan had been called in to her pharmacy     No medication ordered in pt's chart     Attempted to call [facility] to see if providers called in prescription - tried twice unable to get through    Called pt back and advised her to call clinic in AM - pt agreed to plan    Broderick Major , RN / [facility]    Reason for Disposition    Caller requesting a NON-URGENT new prescription or refill and triager unable to refill per unit policy    Protocols used: MEDICATION QUESTION CALL-A-AH

## 2019-05-29 NOTE — TELEPHONE ENCOUNTER
Melody is having vaginal itching and white discharge  can't sleep at night and feels she has a yeast infection from Amoxicillin.  Melody is requesting a prescription today for Diflucan.   Melody does not want to come in to be seen.   Please phone Melody this am at [phone].      Additional Information    Negative: SEVERE abdominal pain (e.g., excruciating)    Negative: Patient sounds very sick or weak to the triager    Negative: Yellow or green vaginal discharge and has a fever    Negative: Constant abdominal pain lasting > 2 hours    Negative: Mild lower abdominal pain comes and goes (cramps) that lasts > 24 hours    Negative: Genital area looks infected (e.g., draining sore, spreading redness)    Negative: Rash is tiny water blisters (3 or more)    Negative: Patient wants to be seen    Negative: Rash (e.g., redness, tiny bumps, sore) of genital area present > 24 hours    Negative: Bad smelling vaginal discharge    Negative: Abnormal color vaginal discharge (i.e., yellow, green, gray)    Symptoms of a yeast infection' (i.e., itchy, white discharge, not bad smelling) and not improved > 3 days following Care Advice    Protocols used: VAGINAL [id number]

## 2019-05-29 NOTE — TELEPHONE ENCOUNTER
Spoke with patient.  Patient states she will keep her August appointment as scheduled but would like to do her labs in August prior to her appointment.  Patient will call back to schedule future lab appointment for August.    LS - please enter future labs for August 2019.   Jennifer Oglesby CMA on 5/29/2019 at 2:50 PM

## 2019-05-29 NOTE — TELEPHONE ENCOUNTER
Melody reports she has a yeast infection and is wondering if her PCP, Evaristo Machado MD prescribed Diflucan yet. Patient said she has been requesting this medication for 2 days. Patient says she will use Monistat instead of Diflucan for the yeast infection.    Zaina Marcus RN/[facility]    Reason for Disposition    Caller has medication question only, adult not sick, and triager answers question    Protocols used: MEDICATION QUESTION CALL-A-

## 2019-05-29 NOTE — TELEPHONE ENCOUNTER
We can see if there are any cancellations to offer her.  She would not be due to repeat thyroid labs until early August.  If she wants to do those labs prior to her August appointment, she can.  I'll place the orders.    Thanks,  Tammy Louie NP  Endocrinology

## 2019-05-29 NOTE — TELEPHONE ENCOUNTER
Clinic Action Needed:  Yes, callback this am  [facility]NA Triage Call  Presenting Problem:     Melody is having vaginal itching and white discharge  can't sleep at night and feels she has a yeast infection from Amoxicillin.  Melody is requesting a prescription today for Diflucan.   Melody does not want to come in to be seen.   Please phone Melody this am at [phone].      Routed to:  RN Pool    Please be sure to close this encounter once this patient's issue/question has been addressed.    Yoselin Durand RN/[facility]

## 2019-05-30 RX ORDER — FLUCONAZOLE 150 MG/1
150 TABLET ORAL ONCE
Qty: 1 TABLET | Refills: 0 | Status: SHIPPED | OUTPATIENT
Start: 2019-05-30 | End: 2019-09-06

## 2019-05-30 RX ORDER — FLUCONAZOLE 150 MG/1
150 TABLET ORAL ONCE
Qty: 1 TABLET | Refills: 0 | OUTPATIENT
Start: 2019-05-30 | End: 2019-05-30

## 2019-06-03 ENCOUNTER — TELEPHONE (OUTPATIENT)
Dept: FAMILY MEDICINE | Facility: CLINIC | Age: 44
End: 2019-06-03

## 2019-06-03 ENCOUNTER — NURSE TRIAGE (OUTPATIENT)
Dept: ENDOCRINOLOGY | Facility: CLINIC | Age: 44
End: 2019-06-03

## 2019-06-03 DIAGNOSIS — E11.65 UNCONTROLLED TYPE 2 DIABETES MELLITUS WITH HYPERGLYCEMIA (H): Primary | ICD-10-CM

## 2019-06-03 NOTE — TELEPHONE ENCOUNTER
Patient's sugars have been running from 180-200's Also states she has been having headaches. Wondering if she needs to come in, or needs to have a dosage change.  Please call to discuss / advise.

## 2019-06-03 NOTE — TELEPHONE ENCOUNTER
Routing refill request to provider for review/approval because:  Drug not on the [facility] refill protocol   Please advice if pt should start E-vist or schedule office visit.

## 2019-06-03 NOTE — TELEPHONE ENCOUNTER
Patient states the Foiricet without codeine is not helping she would like a prescription for butalbital-APAP-caffeine-codeine (FIORICET WITH codeine) per capsule 1 capsule. She also says she had the migraine for 2 days and maybe due to the sinus infection she is wondering if that is why the plain fioricet is not working.

## 2019-06-03 NOTE — TELEPHONE ENCOUNTER
Patient reports that her blood sugars have been running 180-200 for the last four days. Patient states she is taking medication as directed, and has had no change in diet. Patient denies confusion, weakness, vomiting, signs of dehydration. Patient states she is experiencing increased stressed due to busy schedule, and health issues regarding her son. Patient endorses headache. Wondering if medication should be changed, or if she should be seen. Please advise.    Okay to leave a detailed message at [phone]       Additional Information    Negative: Unconscious or difficult to awaken    Negative: Acting confused (e.g., disoriented, slurred speech)    Negative: Very weak (can't stand)    Negative: Sounds like a life-threatening emergency to the triager    Negative: Vomiting and signs of dehydration (e.g., very dry mouth, lightheaded, etc.)    Negative: Blood glucose > 240 mg/dl (13 mmol/l) and rapid breathing    Negative: Blood glucose > 500 mg/dl (27.5 mmol/l)    Negative: Blood glucose > 240 mg/dl (13 mmol/l) AND urine ketones moderate-large (or more than 1+)    Negative: Blood glucose > 240 mg/dl (13 mmol/l) and blood ketones > 1.5 mmol/l    Negative: Blood glucose > 240 mg/dl (13 mmol/l) AND vomiting AND unable to check for ketones (in blood or urine)    Negative: Vomiting lasting > 4 hours    Negative: Patient sounds very sick or weak to the triager    Negative: Fever > 100.5 F (38.1 C)    Negative: Caller has URGENT medication or insulin pump question and triager unable to answer question    Negative: Blood glucose > 400 mg/dl (22 mmol/l)    Negative: Blood glucose > 300 mg/dl (16.7 mmol/l) AND two or more times in a row    Negative: Urine ketones moderate - large (or blood ketones > 1.5 mmol/l)    Negative: New-onset diabetes suspected (e.g., frequent urination, weak, weight loss)    Negative: Symptoms of high blood sugar (e.g., frequent urination, weak, weight loss) and not able to test blood glucose     Negative: Patient wants to be seen    Negative: Caller has NON-URGENT medication question about med that PCP prescribed and triager unable to answer question    Negative: Blood glucose > 240 mg/dl (13 mmol/l)    Negative: Blood glucose  mg/dl (3.5 -13 mmol/l)    Negative: Sick-day rules for people with diabetes mellitus, questions about    Protocols used: DIABETES - HIGH BLOOD SUGAR-A-OH

## 2019-06-04 ENCOUNTER — TELEPHONE (OUTPATIENT)
Dept: FAMILY MEDICINE | Facility: CLINIC | Age: 44
End: 2019-06-04

## 2019-06-04 DIAGNOSIS — G43.709 CHRONIC MIGRAINE WITHOUT AURA WITHOUT STATUS MIGRAINOSUS, NOT INTRACTABLE: Primary | Chronic | ICD-10-CM

## 2019-06-04 RX ORDER — BUTALBITAL, ACETAMINOPHEN, CAFFEINE AND CODEINE PHOSPHATE 300; 50; 40; 30 MG/1; MG/1; MG/1; MG/1
1 CAPSULE ORAL 3 TIMES DAILY PRN
Qty: 15 CAPSULE | Refills: 3 | Status: SHIPPED | OUTPATIENT
Start: 2019-06-04 | End: 2019-09-06

## 2019-06-04 NOTE — TELEPHONE ENCOUNTER
Called patient to inform her that her Fioricet was sent to her pharmacy. No further action needed at this time.

## 2019-06-04 NOTE — TELEPHONE ENCOUNTER
Called pharmacy back.     They were given the okay to fill medication per the PCP.     Also contacted patient to let her know this task was completed. No further follow up.

## 2019-06-04 NOTE — TELEPHONE ENCOUNTER
Patient called clinic for a third time this morning regarding her medication. She state that the pharmacy will not fill her Rx for Fioricet that was sent this morning.     Called Pharmacist and he wants clarification regarding Butalbital. He states there is a note for a limit of 30 capsules per 30 days. Patient states that it is only in regards to the Butalbital with codeine. Pharmacist said he will fill this Rx with provider approval. Routing to provider to clarify.      shows that patient had filled 15 capsules of Butalbital with codeine on 5/6. She filled Butalbital on 5/8 for 15 capsules and 5/22 for 15 capsules

## 2019-06-04 NOTE — TELEPHONE ENCOUNTER
Patient calling again to check on the status of refill. Patient is very upset and doesn't want to wait all day. She states her head hurts and she doesn't understand why the pharmacy won't refill the prescription and why the nurse couldn't clarify.

## 2019-06-05 NOTE — TELEPHONE ENCOUNTER
Please call -  We can increase Trulicity to 1.5 mg weekly.  I'll send a new prescription to her pharmacy if she is willing to try this.  Message me back and let me know.  Tammy Louie NP  Endocrinology

## 2019-06-05 NOTE — TELEPHONE ENCOUNTER
Yes, she can start the increased dose of Trulicity Monday when she would be due for her usually weekly injection.  She can have the A1c rechecked.  I don't think cortisol/adrenal testing is necessary at this time - we can discuss this in July.  Tammy Louie, NP  Endocrinology

## 2019-06-05 NOTE — TELEPHONE ENCOUNTER
"Tammy,    Informed pt below.  Pt says she does want to increase trulicity.  She is wanting to know if she should take the increased dose on Monday, as her last does was last Monday.    She says she is \"highly stressed,\" which seems temporary so she is wanting to know if she should have her cortisol rechecked and if this is responsible for high BG.  She is questioning if the increased trulictiy is necessary if the stress decreases, yet she does want the medication because of the constant headaches.    She is wondering if she should have an A1C, cortisol, and adrenal function tested and/or if you want to see her.  She says she is due to see you in July.    Pharm pended ([facility] [place]) for the increased trulicty.    Please advise.  Thanks,  Shabana Jasso RN      "

## 2019-06-13 ENCOUNTER — TELEPHONE (OUTPATIENT)
Dept: FAMILY MEDICINE | Facility: CLINIC | Age: 44
End: 2019-06-13

## 2019-06-13 DIAGNOSIS — R53.82 CHRONIC FATIGUE: Primary | Chronic | ICD-10-CM

## 2019-06-13 NOTE — TELEPHONE ENCOUNTER
Patient is requesting CBC for hematologist appointment.       When directed to her hematologist to order these labs she  States that she has spoken with here PCP who agreed to order these labs    CBC pended for provider approval/denial

## 2019-06-13 NOTE — TELEPHONE ENCOUNTER
Patient would like orders placed in her chart for lab work. She states her hematologist would like a cbc ran.

## 2019-06-15 ENCOUNTER — TRANSFERRED RECORDS (OUTPATIENT)
Dept: HEALTH INFORMATION MANAGEMENT | Facility: CLINIC | Age: 44
End: 2019-06-15

## 2019-06-19 LAB — PHQ9 SCORE: 1

## 2019-07-01 DIAGNOSIS — E03.9 ACQUIRED HYPOTHYROIDISM: ICD-10-CM

## 2019-07-01 RX ORDER — LEVOTHYROXINE SODIUM 50 UG/1
50 TABLET ORAL DAILY
Qty: 90 TABLET | Refills: 0 | Status: SHIPPED | OUTPATIENT
Start: 2019-07-01 | End: 2019-10-10

## 2019-07-01 NOTE — TELEPHONE ENCOUNTER
Patient calls to request a refill on Levothyroxine 50 mcg to be faxed to [facility] pharmacy.  Patient will be out at the end of the week.   Please advise.  Patient can be reached at [phone].  Jennifer Oglesby CMA on 7/1/2019 at 12:21 PM

## 2019-07-08 ENCOUNTER — NURSE TRIAGE (OUTPATIENT)
Dept: FAMILY MEDICINE | Facility: CLINIC | Age: 44
End: 2019-07-08

## 2019-07-08 NOTE — TELEPHONE ENCOUNTER
"    Additional Information    Dizziness or lightheadedness    Negative: Severe difficulty breathing (e.g., struggling for each breath, speaks in single words)    Negative: Passed out (i.e., fainted, collapsed and was not responding)    Negative: Chest pain lasting longer than 5 minutes and ANY of the following:* Over 50 years old* Over 30 years old and at least one cardiac risk factor (i.e., high blood pressure, diabetes, high cholesterol, obesity, smoker or strong family history of heart disease)* Pain is crushing, pressure-like, or heavy * Took nitroglycerin and chest pain was not relieved* History of heart disease (i.e., angina, heart attack, bypass surgery, angioplasty, CHF)    Negative: Visible sweat on face or sweat dripping down face    Negative: Sounds like a life-threatening emergency to the triager    Negative: Followed an injury to chest    Negative: SEVERE chest pain    Negative: Pain also present in shoulder(s) or arm(s) or jaw    Negative: Difficulty breathing    Negative: Cocaine use within last 3 days    Negative: History of prior 'blood clot' in leg or lungs (i.e., deep vein thrombosis, pulmonary embolism)    Negative: Recent illness requiring prolonged bed rest (i.e., immobilization)    Negative: Hip or leg fracture in past 2 months (e.g, or had cast on leg or ankle)    Negative: Major surgery in the past month    Negative: Recent long-distance travel with prolonged time in car, bus, plane, or train (i.e., within past 2 weeks; 6 or more hours duration)    Negative: Heart beating irregularly or very rapidly    Intermittent chest pain and pain has been increasing in severity or frequency    Negative: Chest pain lasting longer than 5 minutes    Answer Assessment - Initial Assessment Questions  1. LOCATION: \"Where does it hurt?\"        \"By my heart\"    2. RADIATION: \"Does the pain go anywhere else?\" (e.g., into neck, jaw, arms, back)      no  3. ONSET: \"When did the chest pain begin?\" (Minutes, hours " "or days)       Ongoing for 3-4 weeks  4. PATTERN \"Does the pain come and go, or has it been constant since it started?\"  \"Does it get worse with exertion?\"       intermitent  5. DURATION: \"How long does it last\" (e.g., seconds, minutes, hours)      minutes  6. SEVERITY: \"How bad is the pain?\"  (e.g., Scale 1-10; mild, moderate, or severe)     - MILD (1-3): doesn't interfere with normal activities      - MODERATE (4-7): interferes with normal activities or awakens from sleep     - SEVERE (8-10): excruciating pain, unable to do any normal activities        8  7. CARDIAC RISK FACTORS: \"Do you have any history of heart problems or risk factors for heart disease?\" (e.g., prior heart attack, angina; high blood pressure, diabetes, being overweight, high cholesterol, smoking, or strong family history of heart disease)      unknown  8. PULMONARY RISK FACTORS: \"Do you have any history of lung disease?\"  (e.g., blood clots in lung, asthma, emphysema, birth control pills)      unknown  9. CAUSE: \"What do you think is causing the chest pain?\"      stress  10. OTHER SYMPTOMS: \"Do you have any other symptoms?\" (e.g., dizziness, nausea, vomiting, sweating, fever, difficulty breathing, cough)        Dizziness and sweating  11. PREGNANCY: \"Is there any chance you are pregnant?\" \"When was your last menstrual period?\"        no    Protocols used: CHEST PAIN-A-OH      "

## 2019-07-26 ENCOUNTER — NURSE TRIAGE (OUTPATIENT)
Dept: NURSING | Facility: CLINIC | Age: 44
End: 2019-07-26

## 2019-07-26 NOTE — TELEPHONE ENCOUNTER
S: Puncture wound from nail.  B: Walked onto [address] this morning  bare foot and stepped on a nail.  The nail went through the bottom of her foot about an inch in.  At the time of the injury her roof was being re shingled. No bleeding any more. Soaking foot in water with antibiotic soap.  Unsure when her last tenus shot was.  A; Per care guidelines advised to see a provider today.  R: Dali will go to the [facility] in [place] now.  Shelia Durán RN, [facility]        Additional Information    Negative: Shock suspected (e.g., cold/pale/clammy skin, too weak to stand, low BP, rapid pulse)    Negative: Puncture wound of head, neck, chest, back, or abdomen that sounds life-threatening to triager    Negative: Sounds like a life-threatening emergency to the triager    Negative: Puncture wound of head, neck, chest, abdomen, or overlying a joint and it could be deep    Negative: Needlestick from used or discarded injection needle (EXCEPTION: clean, unused needle)    Negative: High pressure injection injury (e.g., from grease gun or paint gun, usually work-related)    Negative: SEVERE pain (e.g., excruciating)    Negative: Sounds like a serious injury to the triager    Puncture wound of bare foot (no shoes) and setting was dirty    Negative: Puncture wound of foot and hurts too much to walk on (i.e., unable to bear weight, severe limp)    Protocols used: PUNCTURE WOUND-A-OH

## 2019-07-29 ENCOUNTER — TRANSFERRED RECORDS (OUTPATIENT)
Dept: HEALTH INFORMATION MANAGEMENT | Facility: CLINIC | Age: 44
End: 2019-07-29

## 2019-08-07 DIAGNOSIS — G89.4 CHRONIC PAIN SYNDROME: ICD-10-CM

## 2019-08-07 RX ORDER — CARISOPRODOL 350 MG/1
350 TABLET ORAL 3 TIMES DAILY PRN
Qty: 90 TABLET | Refills: 0 | Status: SHIPPED | OUTPATIENT
Start: 2019-08-07 | End: 2019-09-06

## 2019-08-07 NOTE — TELEPHONE ENCOUNTER
Reason for Call:  Medication or medication refill:    Do you use a [facility]?  Name of the pharmacy and phone number for the current request:     [place] [id number]      Name of the medication requested: carisoprodol (SOMA) 350 MG tablet    Other request: Please send to pharmacy. Patient is out of medication. Patient states she is trying to cut back and bharat out taking it. Still wants it for 3 times a day just in case she needs to take it 3 times a day but is trying to only take it twice a day. Patient also states she is trying to bharat out taking her Ambien also, so instead of every night she is trying to take it every other night. If any questions please call    Can we leave a detailed message on this number? YES    Phone number patient can be reached at: Home number on file [phone] (home)    Best Time: any    Call taken on 8/7/2019 at 12:59 PM by TRIXIE KRUEGER

## 2019-08-07 NOTE — TELEPHONE ENCOUNTER
Controlled Substance Refill Request for Soma  Problem List Complete:  Yes    Patient is followed by VIRAL Deleon for ongoing prescription of pain medication.  All refills should be approved by this provider, or covering partner.    Medication(s):  Lyrica 150 mg bid, Fioricet with codeine prn, klonopin and ambien to be prescribed by psych, not [facility].  Maximum quantity per month: 60, 20  Clinic visit frequency required: Q 3 months     Controlled substance agreement on file: Yes       Date(s): 9/8/2015  New CSA needed.  Pain Clinic evaluation in the past: No    DIRE Total Score(s):  No flowsheet data found.    Last [facility] website verification: 6/24/19 multiple meds from multiple outside providers  ]   checked in past 3 months?  Yes 6/24/2019, multiple meds from muktiple providers.

## 2019-08-20 DIAGNOSIS — F32.0 CURRENT MILD EPISODE OF MAJOR DEPRESSIVE DISORDER WITHOUT PRIOR EPISODE (H): Primary | Chronic | ICD-10-CM

## 2019-08-21 NOTE — TELEPHONE ENCOUNTER
"Requested Prescriptions   Pending Prescriptions Disp Refills     venlafaxine (EFFEXOR-XR) 75 MG 24 hr capsule  Last Written Prescription Date:  10/15/2018  Last Fill Quantity: na,  # refills: 0   Last office visit: 5/6/2019 with prescribing provider:  Chris   Future Office Visit:   Next 5 appointments (look out 90 days)    Aug 23, 2019 11:00 AM CDT  Return Visit with KIRAN Carballo CNP  [facility] [address] [facility] [address]  [phone]           0       Serotonin-Norepinephrine Reuptake Inhibitors  Failed - 8/20/2019  2:33 PM        Failed - Blood pressure under 140/90 in past 12 months     BP Readings from Last 3 Encounters:   05/06/19 (!) 142/100   05/06/19 130/82   05/03/19 130/83                 Passed - Recent (12 mo) or future (30 days) visit within the authorizing provider's specialty     Patient had office visit in the last 12 months or has a visit in the next 30 days with authorizing provider or within the authorizing provider's specialty.  See \"Patient Info\" tab in inbasket, or \"Choose Columns\" in Meds & Orders section of the refill encounter.              Passed - Medication is active on med list        Passed - Patient is age 18 or older        Passed - No active pregnancy on record        Passed - Normal serum creatinine on file in past 12 months     Recent Labs   Lab Test 05/06/19  1554   CR 0.69             Passed - No positive pregnancy test in past 12 months           "

## 2019-08-22 NOTE — TELEPHONE ENCOUNTER
Routing refill request to provider for review/approval because:  Other request: Melody uses this for migraine prevention, and wants to have Dr. Machado monitor her migraine prevention.  Melody no longer desires to no longer go to neurologist due to expense.

## 2019-08-23 RX ORDER — VENLAFAXINE HYDROCHLORIDE 75 MG/1
CAPSULE, EXTENDED RELEASE ORAL
Qty: 10 CAPSULE | Refills: 0 | Status: SHIPPED | OUTPATIENT
Start: 2019-08-23 | End: 2019-09-06

## 2019-08-23 NOTE — TELEPHONE ENCOUNTER
Call from patient this afternoon.     Requesting that this medication be okayed at least through the weekend and she will set up an appointment with PCP next week to discuss in further detail.     Sending to covering pool high priority per patient request.

## 2019-09-02 ENCOUNTER — TRANSFERRED RECORDS (OUTPATIENT)
Dept: HEALTH INFORMATION MANAGEMENT | Facility: CLINIC | Age: 44
End: 2019-09-02

## 2019-09-04 ENCOUNTER — TELEPHONE (OUTPATIENT)
Dept: FAMILY MEDICINE | Facility: CLINIC | Age: 44
End: 2019-09-04

## 2019-09-04 NOTE — TELEPHONE ENCOUNTER
Patient returned call to clinic and provider message was relayed. Patient will continue with the Miralax and come into clinic on Friday.

## 2019-09-04 NOTE — TELEPHONE ENCOUNTER
Patient called with distended stomach symptoms, feels like a baby inside. She took 30ml milk of magnesia and fleet enema which caused diarrhea, but not whole lot of BM. [facility] and [facility] showed stool throughout the colon. She was told to take Miralax 2-3 times a day for 2-3 days and do Magnesium citrate on Tuesday. That produced some BM. She then continued with miralax which produced some BM. She still has some gas and is extremely bloated. No vomiting. Drinking Gatorade and water. Abdomen contracts like labor contractions. She is fearful and anxious. She had IBS - which was controlled since she has gone gluten free and has been regular. Summer has been extremely stressful. Son was dx with epilepsy. She owes 800$ to [facility] in [place] so they refuse to see her. She is unable to pay that right now. Should she be seen by PCP or be referred to a different GI?     Next 5 appointments (look out 90 days)    Sep 06, 2019 10:00 AM CDT  SHORT with Evaristo Machado MD  [facility] [address]  [facility] [address]  [phone]   Oct 24, 2019 11:00 AM CDT  Return Visit with KIRAN [facility] C[facility] [address]  [facility] [address]  [phone]

## 2019-09-04 NOTE — TELEPHONE ENCOUNTER
I would have her continue with MiraLAX as she is doing and keep her appointment with us on Friday.

## 2019-09-06 ENCOUNTER — OFFICE VISIT (OUTPATIENT)
Dept: FAMILY MEDICINE | Facility: CLINIC | Age: 44
End: 2019-09-06
Payer: COMMERCIAL

## 2019-09-06 VITALS
OXYGEN SATURATION: 98 % | HEART RATE: 100 BPM | SYSTOLIC BLOOD PRESSURE: 108 MMHG | WEIGHT: 148.5 LBS | TEMPERATURE: 99.4 F | RESPIRATION RATE: 20 BRPM | BODY MASS INDEX: 27.16 KG/M2 | DIASTOLIC BLOOD PRESSURE: 86 MMHG

## 2019-09-06 DIAGNOSIS — F31.9 BIPOLAR DISORDER WITH PSYCHOTIC FEATURES (H): ICD-10-CM

## 2019-09-06 DIAGNOSIS — G43.009 MIGRAINE WITHOUT AURA AND WITHOUT STATUS MIGRAINOSUS, NOT INTRACTABLE: ICD-10-CM

## 2019-09-06 DIAGNOSIS — K29.50 OTHER CHRONIC GASTRITIS WITHOUT HEMORRHAGE: ICD-10-CM

## 2019-09-06 DIAGNOSIS — F32.0 CURRENT MILD EPISODE OF MAJOR DEPRESSIVE DISORDER WITHOUT PRIOR EPISODE (H): Chronic | ICD-10-CM

## 2019-09-06 DIAGNOSIS — R10.84 GENERALIZED ABDOMINAL PAIN: Primary | ICD-10-CM

## 2019-09-06 DIAGNOSIS — K58.2 IRRITABLE BOWEL SYNDROME WITH BOTH CONSTIPATION AND DIARRHEA: ICD-10-CM

## 2019-09-06 PROCEDURE — 99214 OFFICE O/P EST MOD 30 MIN: CPT | Performed by: FAMILY MEDICINE

## 2019-09-06 RX ORDER — TOPIRAMATE 50 MG/1
TABLET, FILM COATED ORAL
Qty: 180 TABLET | Refills: 3 | Status: SHIPPED | OUTPATIENT
Start: 2019-09-06 | End: 2021-01-14

## 2019-09-06 RX ORDER — LITHIUM CARBONATE 300 MG/1
600 TABLET, FILM COATED, EXTENDED RELEASE ORAL AT BEDTIME
Start: 2019-09-06 | End: 2019-12-03

## 2019-09-06 RX ORDER — VENLAFAXINE HYDROCHLORIDE 75 MG/1
75 CAPSULE, EXTENDED RELEASE ORAL DAILY
Qty: 90 CAPSULE | Refills: 1 | Status: SHIPPED | OUTPATIENT
Start: 2019-09-06 | End: 2020-01-29

## 2019-09-06 RX ORDER — PANTOPRAZOLE SODIUM 40 MG/1
40 TABLET, DELAYED RELEASE ORAL DAILY
Qty: 90 TABLET | Refills: 1 | Status: SHIPPED | OUTPATIENT
Start: 2019-09-06 | End: 2020-02-07

## 2019-09-06 NOTE — PATIENT INSTRUCTIONS
I will have the patient go back on her fiber supplement.  She will also start back on her pantoprazole.  She is continuing to taper her lithium with psychiatry and is down to 300 mg tablets.  I refilled her Topamax and venlafaxine for prevention of her migraine headaches as she has a bill at the neurologist office so I did that today for her.  I think that she feels a little bit bloated but her abdominal exam is completely normal.  I suspect that with continued fiber supplement and occasional MiraLAX that her abdominal symptoms will resolve.  We discussed that the point of all of this is to make her comfortable with decreased pain and bloating and to keep her visits to the emergency room or urgent care under control.  She has lots of outstanding medical bills at present and is paying piecemeal to try to pay for things around the home.  I also refilled her pantoprazole for her stomach pain which has been worse of late.  She will follow-up in 2 weeks.

## 2019-09-06 NOTE — PROGRESS NOTES
Subjective     Melody Muñoz is a 43 year old female who presents to clinic today for the following health issues:    HPI     Unable to get in with specialty clinics due to overdue bills.    ED/UC Followup:    Facility:  [facility]  Date of visit: 09/02/2019 and 09/04/2019  Reason for visit: Abdominal bloating, constipation  Current Status: No improvement. Heartburn, increased anxiety due to abdominal problem.       Diabetes Follow-up      How often are you checking your blood sugar? Not at all    What time of day are you checking your blood sugars (select all that apply)?  N/A    Have you had any blood sugars above 200?  N/A    Have you had any blood sugars below 70?  N/A    What symptoms do you notice when your blood sugar is low?  N/A    What concerns do you have today about your diabetes? None     Do you have any of these symptoms? (Select all that apply)  No numbness or tingling in feet.  No redness, sores or blisters on feet.  No complaints of excessive thirst.  No reports of blurry vision.  No significant changes to weight.     Have you had a diabetic eye exam in the last 12 months? Didn't ask    BP Readings from Last 2 Encounters:   09/23/19 108/74   09/19/19 126/78     Hemoglobin A1C (%)   Date Value   02/06/2019 6.5 (H)   10/01/2018 9.6 (H)     LDL Cholesterol Calculated (mg/dL)   Date Value   02/06/2019 75   11/10/2017 133 (H)       Diabetes Management Resources  Depression and Anxiety Follow-Up    How are you doing with your depression since your last visit? Worsened     How are you doing with your anxiety since your last visit?  Worsened     Are you having other symptoms that might be associated with depression or anxiety? Stomach issues with bloating and cramps    Have you had a significant life event? Financial Concerns and OTHER: Son has been accepted in the [employer] despite his seizure diagnosis and bills are being paid piecemail.     Do you have any concerns with your  use of alcohol or other drugs? No    Social History     Tobacco Use     Smoking status: Former Smoker     Packs/day: 0.00     Years: 17.00     Pack years: 0.00     Types: Cigarettes     Last attempt to quit: 3/18/2019     Years since quittin.5     Smokeless tobacco: Never Used   Substance Use Topics     Alcohol use: No     Comment: no etoh since      Drug use: No     PHQ 2018   PHQ-9 Total Score 3 11 20   Q9: Thoughts of better off dead/self-harm past 2 weeks Not at all Not at all Several days     ROWDY-7 SCORE 2018   Total Score - - -   Total Score 5 (mild anxiety) 13 (moderate anxiety) -   Total Score 5 13 14   Total Score - - -   Total Score BEH Adult - - -           Suicide Assessment Five-step Evaluation and Treatment (SAFE-T)  Migraine     Since your last clinic visit, how have your headaches changed?  No change    How often are you getting headaches or migraines? 1 every two months     Are you able to do normal daily activities when you have a migraine? No    Are you taking rescue/relief medications? (Select all that apply) Other: almotriptan    How helpful is your rescue/relief medication?  I get total relief    Are you taking any medications to prevent migraines? (Select all that apply)  Topomax and Verapamil    In the past 4 weeks, how often have you gone to urgent care or the emergency room because of your headaches?  0      Patient Active Problem List   Diagnosis     Endometriosis s/po EDWIGE 10-     Mantoux: positive     Latent tuberculosis     Major depression     Generalized anxiety disorder     Constipation     Nightmares     Knee pain     Bipolar 1 disorder  with psychosis     Chronic fatigue     Female stress incontinence     Suicidal ideation     Acne     Chronic pain syndrome     Insomnia     Chronic migraine without aura without status migrainosus, not intractable     Nausea     JASWANT (obstructive sleep apnea)     Elevated liver enzymes     TMJ  (temporomandibular joint syndrome)     Hypothyroidism due to acquired atrophy of thyroid     Hostile behavior     Mild persistent asthma without complication     Uncontrolled type 2 diabetes mellitus with hyperglycemia (H)     Allergic rhinitis     Incontinence of urine in female     Migraine     Screening for diabetic peripheral neuropathy     Need for prophylactic vaccination against Streptococcus pneumoniae (pneumococcus)     Sepsis (H)     Abdominal pain     Morbid obesity (H)     Former tobacco use     Mixed simple and mucopurulent chronic bronchitis (H): Spirometry 16 lung age = 61y/o in 41 y/o FVC=90%&FEV1=78%     Class 2 obesity due to excess calories with serious comorbidity and body mass index (BMI) of 36.0 to 36.9 in adult     Mixed hyperlipidemia     Acquired hypothyroidism     Dysuria     Other chronic back pain     Headache disorder     Persistent depressive disorder     Past Surgical History:   Procedure Laterality Date     BIOPSY       COLONOSCOPY       GENITOURINARY SURGERY  May/2014    bladder sling     GYN SURGERY      laparoscopy- endometriosis     GYN SURGERY       for twins     LAPAROSCOPIC APPENDECTOMY N/A 2017    Procedure: LAPAROSCOPIC APPENDECTOMY;  LAPAROSCOPIC APPENDECTOMY and resection of epiploic tag;  Surgeon: Roberto Robins MD;  Location: [facility] OR     little finger surgery left  1980     tubal ligation bilateral       wisdom teeth removal         Social History     Tobacco Use     Smoking status: Former Smoker     Packs/day: 0.00     Years: 17.00     Pack years: 0.00     Types: Cigarettes     Last attempt to quit: 3/18/2019     Years since quittin.5     Smokeless tobacco: Never Used   Substance Use Topics     Alcohol use: No     Comment: no etoh since      Family History   Problem Relation Age of Onset     Hypertension Mother      Heart Disease Father         palpitations     Depression Sister      Anxiety Disorder Sister      Heart Disease Maternal  "Grandmother         CHF     Cancer Maternal Grandfather 72        Pancreatic Cancer     Alzheimer Disease Paternal Grandfather      Bipolar Disorder Other      Autism Spectrum Disorder Other              Reviewed and updated as needed this visit by Provider  Meds         Review of Systems   ROS COMP: Constitutional, HEENT, cardiovascular, pulmonary, gi and gu systems are negative, except as otherwise noted.      Objective    /86 (BP Location: Left arm, Patient Position: Sitting, Cuff Size: Adult Regular)   Pulse 100   Temp 99.4  F (37.4  C) (Tympanic)   Resp 20   Wt 67.4 kg (148 lb 8 oz)   LMP  (LMP Unknown)   SpO2 98%   BMI 27.16 kg/m    Body mass index is 27.16 kg/m .  Physical Exam   GENERAL APPEARANCE: healthy, alert and no distress  ABDOMEN: soft, nontender, without hepatosplenomegaly or masses and bowel sounds normal            Assessment & Plan       ICD-10-CM    1. Generalized abdominal pain R10.84    2. Bipolar disorder with psychotic features (H) F31.9 lithium (LITHOBID) 300 MG CR tablet   3. Current mild episode of major depressive disorder without prior episode (H) F32.0 venlafaxine (EFFEXOR-XR) 75 MG 24 hr capsule   4. Other chronic gastritis without hemorrhage K29.50 pantoprazole (PROTONIX) 40 MG EC tablet   5. Migraine without aura and without status migrainosus, not intractable G43.009 topiramate (TOPAMAX) 50 MG tablet   6. Irritable bowel syndrome with both constipation and diarrhea K58.2         BMI:   Estimated body mass index is 27.16 kg/m  as calculated from the following:    Height as of 5/6/19: 1.575 m (5' 2\").    Weight as of this encounter: 67.4 kg (148 lb 8 oz).   Weight management plan: Discussed healthy diet and exercise guidelines        Patient Instructions   I will have the patient go back on her fiber supplement.  She will also start back on her pantoprazole.  She is continuing to taper her lithium with psychiatry and is down to 300 mg tablets.  I refilled her Topamax " and venlafaxine for prevention of her migraine headaches as she has a bill at the neurologist office so I did that today for her.  I think that she feels a little bit bloated but her abdominal exam is completely normal.  I suspect that with continued fiber supplement and occasional MiraLAX that her abdominal symptoms will resolve.  We discussed that the point of all of this is to make her comfortable with decreased pain and bloating and to keep her visits to the emergency room or urgent care under control.  She has lots of outstanding medical bills at present and is paying piecemeal to try to pay for things around the home.  I also refilled her pantoprazole for her stomach pain which has been worse of late.  She will follow-up in 2 weeks.      Return in about 2 weeks (around 9/20/2019) for abdominal pain.    Evaristo Machado MD  [facility]

## 2019-09-09 ENCOUNTER — NURSE TRIAGE (OUTPATIENT)
Dept: FAMILY MEDICINE | Facility: CLINIC | Age: 44
End: 2019-09-09

## 2019-09-09 NOTE — TELEPHONE ENCOUNTER
"Patient is still extremely bloated and distended. Has had 2 small, formed stools, but does not feel that this is enough. She is still eating high fiber diet and Miralax 3 x a day. Has been doing this since Labor Day. Patient is also feeling tired and nauseated, but denies vomiting. She is wondering if the nausea has anything to do with how bloated she is.     Recommended that the patient try an OTC suppository and senna to help her produce a BM. She will contact the clinic on Wednesday if she has not produced a \"normal\" sized BM.   "

## 2019-09-09 NOTE — TELEPHONE ENCOUNTER
Reason for Call:  Other     Detailed comments: Melody called in on Friday, still having problems    Phone Number Patient can be reached at: Home number on file [phone] (home)    Best Time: today    Can we leave a detailed message on this number? YES    Call taken on 9/9/2019 at 2:05 PM by NANCY MAYO

## 2019-09-09 NOTE — TELEPHONE ENCOUNTER
Patient Contact    Attempt # 1    Was call answered?  No.  Left message on voicemail with information to call triage back.    Please triage patient's symptoms.

## 2019-09-19 ENCOUNTER — NURSE TRIAGE (OUTPATIENT)
Dept: FAMILY MEDICINE | Facility: CLINIC | Age: 44
End: 2019-09-19

## 2019-09-19 ENCOUNTER — OFFICE VISIT (OUTPATIENT)
Dept: FAMILY MEDICINE | Facility: CLINIC | Age: 44
End: 2019-09-19
Payer: COMMERCIAL

## 2019-09-19 VITALS
BODY MASS INDEX: 28.16 KG/M2 | OXYGEN SATURATION: 98 % | RESPIRATION RATE: 18 BRPM | SYSTOLIC BLOOD PRESSURE: 126 MMHG | DIASTOLIC BLOOD PRESSURE: 78 MMHG | TEMPERATURE: 98.3 F | HEART RATE: 101 BPM | WEIGHT: 153 LBS | HEIGHT: 62 IN

## 2019-09-19 DIAGNOSIS — F41.9 ANXIETY: ICD-10-CM

## 2019-09-19 DIAGNOSIS — K58.2 IRRITABLE BOWEL SYNDROME WITH BOTH CONSTIPATION AND DIARRHEA: ICD-10-CM

## 2019-09-19 DIAGNOSIS — R10.84 ABDOMINAL PAIN, GENERALIZED: Primary | ICD-10-CM

## 2019-09-19 PROCEDURE — 99214 OFFICE O/P EST MOD 30 MIN: CPT | Performed by: PHYSICIAN ASSISTANT

## 2019-09-19 RX ORDER — DIAZEPAM 5 MG
5 TABLET ORAL EVERY 6 HOURS PRN
Qty: 5 TABLET | Refills: 0 | Status: SHIPPED | OUTPATIENT
Start: 2019-09-19 | End: 2019-09-23

## 2019-09-19 ASSESSMENT — ENCOUNTER SYMPTOMS
ENDOCRINE NEGATIVE: 1
ABDOMINAL DISTENTION: 1
NEUROLOGICAL NEGATIVE: 1
ABDOMINAL PAIN: 1
RESPIRATORY NEGATIVE: 1
CONSTIPATION: 1
PSYCHIATRIC NEGATIVE: 1
CONSTITUTIONAL NEGATIVE: 1
CARDIOVASCULAR NEGATIVE: 1
EYES NEGATIVE: 1
MUSCULOSKELETAL NEGATIVE: 1

## 2019-09-19 ASSESSMENT — MIFFLIN-ST. JEOR: SCORE: 1307.25

## 2019-09-19 NOTE — TELEPHONE ENCOUNTER
"Call from patient today wondering if she should come in to OV today at 10:30. States that her stomach is bloated and feels hard on the right side. BM's each day, but has had a decrease in stool over the last 24 hours. Has been seen in ED and OV for these symptoms multiple times the last 2 weeks. She has been given all the tools available to her so far to decrease these symptoms and still continues to have these issues. She was told to keep her OV today to be assessed by MD.     Answer Assessment - Initial Assessment Questions  1. LOCATION: \"Where does it hurt?\"       ABD pain: bloating  2. RADIATION: \"Does the pain shoot anywhere else?\" (e.g., chest, back)      Right sided bloating. Pain wraps around to the back  3. ONSET: \"When did the pain begin?\" (e.g., minutes, hours or days ago)       1 day  4. SUDDEN: \"Gradual or sudden onset?\"      States that the pain/contractions comes and goes   5. PATTERN \"Does the pain come and go, or is it constant?\"     - If constant: \"Is it getting better, staying the same, or worsening?\"       (Note: Constant means the pain never goes away completely; most serious pain is constant and it progresses)      - If intermittent: \"How long does it last?\" \"Do you have pain now?\"      (Note: Intermittent means the pain goes away completely between bouts)      Comes and goes  6. SEVERITY: \"How bad is the pain?\"  (e.g., Scale 1-10; mild, moderate, or severe)    - MILD (1-3): doesn't interfere with normal activities, abdomen soft and not tender to touch     - MODERATE (4-7): interferes with normal activities or awakens from sleep, tender to touch     - SEVERE (8-10): excruciating pain, doubled over, unable to do any normal activities       8/10  7. RECURRENT SYMPTOM: \"Have you ever had this type of abdominal pain before?\" If so, ask: \"When was the last time?\" and \"What happened that time?\"       yes  8. CAUSE: \"What do you think is causing the abdominal pain?\"      ABD pain  9. " "RELIEVING/AGGRAVATING FACTORS: \"What makes it better or worse?\" (e.g., movement, antacids, bowel movement)      States that lying on her side makes if feel better. Cannot lay on her back, movement makes it contract and hurt  10. OTHER SYMPTOMS: \"Has there been any vomiting, diarrhea, constipation, or urine problems?\"        Anxiety, and nausea.   11. No pregnancy. Hysterextomy 2 years ago.    Protocols used: ABDOMINAL PAIN - FEMALE-A-AH      "

## 2019-09-19 NOTE — PROGRESS NOTES
Subjective     Melody Muñoz is a 43 year old female who presents to clinic today for the following health issues:    HPI   Abdominal Pain      Duration: on and off since Labor Day     Description (location/character/radiation): right sided pain and discomfort, feels like intermittent muscle contraction/pain     Intensity:  Moderate, 8/10 at worst     Accompanying signs and symptoms:        Fever/Chills: no        Gas/Bloating: YES       Nausea/vomitting: YES- sometimes nausea        Diarrhea: YES- after suppository       Dysuria or Hematuria: no    History (previous similar pain/trauma/previous testing): went to Urgent Care after the first time it happened, a CT was performed, Miralax/magnesium prescribed    Precipitating or alleviating factors:       Pain worse with eating/BM/urination: no       Pain relieved by BM: YES    Therapies tried and outcome: Miralax/magnesium, fiber supplements -- they helped to alleviate symptoms    LMP:  not applicable    She is now using a suppository daily to have a BM  She is extremely bloated  Abdominal pain is crampy, and comes and goes    CT and US negative on 9/5/19    She also has a lot of anxiety right now and just started seeing a therapist  She has follow up with her psychiatrist in one month          Patient Active Problem List   Diagnosis     Endometriosis s/po EDWIEG 10-17     Mantoux: positive     Latent tuberculosis     Major depression     Generalized anxiety disorder     Constipation     Nightmares     Knee pain     Bipolar 1 disorder  with psychosis     Chronic fatigue     Female stress incontinence     Suicidal ideation     Acne     Chronic pain syndrome     Insomnia     Chronic migraine without aura without status migrainosus, not intractable     Nausea     JASWANT (obstructive sleep apnea)     Elevated liver enzymes     TMJ (temporomandibular joint syndrome)     Hypothyroidism due to acquired atrophy of thyroid     Hostile behavior     Mild persistent asthma without  complication     Uncontrolled type 2 diabetes mellitus with hyperglycemia (H)     Allergic rhinitis     Incontinence of urine in female     Migraine     Screening for diabetic peripheral neuropathy     Need for prophylactic vaccination against Streptococcus pneumoniae (pneumococcus)     Sepsis (H)     Abdominal pain     Morbid obesity (H)     Former tobacco use     Mixed simple and mucopurulent chronic bronchitis (H): Spirometry 16 lung age = 63y/o in 39 y/o FVC=90%&FEV1=78%     Class 2 obesity due to excess calories with serious comorbidity and body mass index (BMI) of 36.0 to 36.9 in adult     Mixed hyperlipidemia     Acquired hypothyroidism     Dysuria     Other chronic back pain     Headache disorder     Persistent depressive disorder     Past Surgical History:   Procedure Laterality Date     BIOPSY       COLONOSCOPY       GENITOURINARY SURGERY  May/2014    bladder sling     GYN SURGERY      laparoscopy- endometriosis     GYN SURGERY       for twins     LAPAROSCOPIC APPENDECTOMY N/A 2017    Procedure: LAPAROSCOPIC APPENDECTOMY;  LAPAROSCOPIC APPENDECTOMY and resection of epiploic tag;  Surgeon: Roberto Robins MD;  Location: [facility] OR     little finger surgery left  1980     tubal ligation bilateral       wisdom teeth removal         Social History     Tobacco Use     Smoking status: Former Smoker     Packs/day: 0.00     Years: 17.00     Pack years: 0.00     Types: Cigarettes     Last attempt to quit: 3/18/2019     Years since quittin.5     Smokeless tobacco: Never Used   Substance Use Topics     Alcohol use: No     Comment: no etoh since      Family History   Problem Relation Age of Onset     Hypertension Mother      Heart Disease Father         palpitations     Depression Sister      Anxiety Disorder Sister      Heart Disease Maternal Grandmother         CHF     Cancer Maternal Grandfather 72        Pancreatic Cancer     Alzheimer Disease Paternal Grandfather      Bipolar  Disorder Other      Autism Spectrum Disorder Other          Current Outpatient Medications   Medication Sig Dispense Refill     ACCU-CHEK RAISSA PLUS test strip TEST 4-7 TIMES DAILY WHILE STARTING TRULICITY 100 strip 3     albuterol (2.5 MG/3ML) 0.083% nebulizer solution Take 1 vial (2.5 mg) by nebulization every 6 hours as needed for shortness of breath / dyspnea or wheezing 25 vial 0     almotriptan (AXERT) 6.25 MG tablet TK 1 T PO ONCE.  MAY REPEAT IN 2 HOURS AS DIRECTED.  MAX 2 DOSES PER DAY  1     blood glucose (NO BRAND SPECIFIED) lancets standard Use to test blood sugar 3 times weekly or as directed. 100 each 3     blood glucose monitoring (LAURA CONTOUR NEXT) test strip Use to test blood sugar 2 times daily or as directed. 200 each 12     blood glucose monitoring (NO BRAND SPECIFIED) test strip Use to test blood sugars 3 times weekly or as directed 100 strip 3     Continuous Blood Gluc  (FREESTYLE JOSE 14 DAY READER) PUMA 1 Device as needed (Use for daily blood glucose monitoring as needed) 1 Device 0     Continuous Blood Gluc Sensor (FREESTYLE JOSE 14 DAY SENSOR) MISC 1 each every 14 days 2 each 11     dextroamphetamine (DEXEDRINE SPANSULE) 5 MG 24 hr capsule TK 1 C PO BID  0     dextroamphetamine (DEXTROSTAT) 10 MG tablet TK 1 T PO TID  0     diazepam (VALIUM) 5 MG tablet Take 1 tablet (5 mg) by mouth every 6 hours as needed for anxiety (MUSCLE SPASM) Do not take if you are also using ativan (lorazepam) 5 tablet 0     fluticasone (FLONASE) 50 MCG/ACT nasal spray Spray 2 sprays into both nostrils daily 16 g 11     guanFACINE (TENEX) 2 MG tablet TAKE TWO TABLETS BY MOUTH AT BEDTIME 180 tablet 3     lamoTRIgine (LAMICTAL) 200 MG tablet Take 1 tablet (200 mg) by mouth every morning 90 tablet 4     LATUDA 120 MG TABS tablet TK 1 T PO QD  3     levothyroxine (SYNTHROID/LEVOTHROID) 50 MCG tablet Take 1 tablet (50 mcg) by mouth daily 90 tablet 0     lithium (LITHOBID) 300 MG CR tablet Take 2 tablets (600  "mg) by mouth At Bedtime       MAGNESIUM CITRATE PO        MICROLET LANCETS MISC TEST TWICE DAILY AS DIRECTED 100 each 0     ondansetron (ZOFRAN-ODT) 4 MG ODT tab DISSOLVE 1 TO 2 TABLETS UNDER THE TONGUE EVERY 8 HOURS AS NEEDED FOR NAUSEA 30 tablet 1     order for DME Equipment being ordered: Blood glucose monitoring kit 1 Device 0     pantoprazole (PROTONIX) 40 MG EC tablet Take 1 tablet (40 mg) by mouth daily 90 tablet 1     POTASSIUM PO        pregabalin (LYRICA) 150 MG capsule TAKE 1 CAPSULE BY MOUTH TWICE A DAY 60 capsule 1     PROAIR  (90 Base) MCG/ACT inhaler INHALE 1 TO 2 PUFFS INTO THE LUNGS EVERY 4 HOURS AS NEEDED FOR WHEEZING, SHORTNESS OF BREATH, OR DYSPNEA 51 g 3     STATIN NOT PRESCRIBED (INTENTIONAL) Please choose reason not prescribed, below       STATIN NOT PRESCRIBED, INTENTIONAL, 1 each daily Please choose reason not prescribed, below       topiramate (TOPAMAX) 50 MG tablet 2 tablets every night 180 tablet 3     TRULICITY 0.75 MG/0.5ML pen INJECT 0.75MG SUBCUTANEOUS EVERY 7 DAYS 2 mL 1     venlafaxine (EFFEXOR-XR) 75 MG 24 hr capsule Take 1 capsule (75 mg) by mouth daily 90 capsule 1     VITAMIN D, CHOLECALCIFEROL, PO Take 2,000 Units by mouth daily       Allergies   Allergen Reactions     Hydrocodone Nausea and Vomiting         Reviewed and updated as needed this visit by Provider         Review of Systems   Constitutional: Negative.    HENT: Negative.    Eyes: Negative.    Respiratory: Negative.    Cardiovascular: Negative.    Gastrointestinal: Positive for abdominal distention, abdominal pain and constipation.   Endocrine: Negative.    Genitourinary: Negative.    Musculoskeletal: Negative.    Skin: Negative.    Neurological: Negative.    Psychiatric/Behavioral: Negative.          Objective    /78 (Patient Position: Sitting, Cuff Size: Adult Regular)   Pulse 101   Temp 98.3  F (36.8  C) (Tympanic)   Resp 18   Ht 1.575 m (5' 2\")   Wt 69.4 kg (153 lb)   LMP  (LMP Unknown)   SpO2 " "98%   BMI 27.98 kg/m    Physical Exam   Constitutional: She is oriented to person, place, and time. She appears well-developed and well-nourished. No distress.   HENT:   Head: Normocephalic.   Right Ear: External ear normal.   Left Ear: External ear normal.   Nose: Nose normal.   Eyes: Conjunctivae and EOM are normal.   Neck: Normal range of motion.   Pulmonary/Chest: Effort normal.   Abdominal: She exhibits distension. She exhibits no mass. There is tenderness. There is no rigidity, no rebound and no guarding. A hernia (umbilical) is present.   Neurological: She is alert and oriented to person, place, and time.   Skin: She is not diaphoretic.   Psychiatric: She has a normal mood and affect. Judgment normal.       Diagnostic Test Results:  No results found. However, due to the size of the patient record, not all encounters were searched. Please check Results Review for a complete set of results.        Assessment & Plan   Problem List Items Addressed This Visit     None      Visit Diagnoses     Abdominal pain, generalized    -  Primary    Irritable bowel syndrome with both constipation and diarrhea        Anxiety        Relevant Medications    diazepam (VALIUM) 5 MG tablet         See patient instructions below for details on my recommendations for constipation treatment.   We discussed she needs to combine the treatments until her bowel movements normalize  She should not just use the suppository.   Short term refill for diazepam was provided today for increased anxiety      BMI:   Estimated body mass index is 27.98 kg/m  as calculated from the following:    Height as of this encounter: 1.575 m (5' 2\").    Weight as of this encounter: 69.4 kg (153 lb).   Weight management plan: Discussed healthy diet and exercise guidelines        Patient Instructions     Patient Education     Treating Constipation    Constipation is a common and often uncomfortable problem. Constipation means you have bowel movements fewer than 3 " times per week, or strain to pass hard, dry stool. It can last a short time. Or it can be a problem that never seems to go away. The good news is that it can often be treated and controlled.  Eat more fiber  One of the best ways to help treat constipation is to increase your fiber intake. You can do this either through diet or by using fiber supplements. Fiber (in whole grains, fruits, and vegetables) adds bulk and absorbs water to soften the stool. This helps the stool pass through the colon more easily. When you increase your fiber intake, do it slowly to avoid side effects such as bloating. Also increase the amount of water that you drink. Eating more of the following foods can add fiber to your diet.    High-fiber cereals    Whole grains, bran, and brown rice    Vegetables such as carrots, broccoli, and greens    Fresh fruits (especially apples, pears, and dried fruits like raisins and apricots)    Nuts and legumes (especially beans such as lentils, kidney beans, and lima beans)  Get physically active  Exercise helps improve the working of your colon which helps ease constipation. Try to get some physical activity every day. If you haven t been active for a while, talk to your healthcare provider before starting again.  Laxatives  Your healthcare provider may suggest an over-the-counter product to help ease your constipation. He or she may suggest the use of bulk-forming agents or laxatives. The use of laxatives, if used as directed, is common and safe. Follow directions carefully when using them. See your healthcare provider for new-onset constipation, or long-term constipation, to rule out other causes such as medicines or thyroid disease.  Date Last Reviewed: 7/1/2016 2000-2018 [employer]. [address] [place] [address], [place] [address]. All rights reserved. This information is not intended as a substitute for professional medical care. Always follow your healthcare professional's  instructions.           Take the citrucel 2 tablets 4 times day  Take 1 capful of mirilax 3 times a day  Drink at least 80 oz of fluids daily (not caffienated)    Take colace 3 tablets daily  Use the suppository only as needed - if no BM in 24 hours    Consider drinking warm herbal teas or eating prunes.  These can be helpful for constipation.      Follow Up with Dr. Machado on Monday.                 Return in about 4 days (around 9/23/2019) for abdominal pain recheck .    Paradise Hennessy PA-C  [facility]

## 2019-09-19 NOTE — PATIENT INSTRUCTIONS
Patient Education     Treating Constipation    Constipation is a common and often uncomfortable problem. Constipation means you have bowel movements fewer than 3 times per week, or strain to pass hard, dry stool. It can last a short time. Or it can be a problem that never seems to go away. The good news is that it can often be treated and controlled.  Eat more fiber  One of the best ways to help treat constipation is to increase your fiber intake. You can do this either through diet or by using fiber supplements. Fiber (in whole grains, fruits, and vegetables) adds bulk and absorbs water to soften the stool. This helps the stool pass through the colon more easily. When you increase your fiber intake, do it slowly to avoid side effects such as bloating. Also increase the amount of water that you drink. Eating more of the following foods can add fiber to your diet.    High-fiber cereals    Whole grains, bran, and brown rice    Vegetables such as carrots, broccoli, and greens    Fresh fruits (especially apples, pears, and dried fruits like raisins and apricots)    Nuts and legumes (especially beans such as lentils, kidney beans, and lima beans)  Get physically active  Exercise helps improve the working of your colon which helps ease constipation. Try to get some physical activity every day. If you haven t been active for a while, talk to your healthcare provider before starting again.  Laxatives  Your healthcare provider may suggest an over-the-counter product to help ease your constipation. He or she may suggest the use of bulk-forming agents or laxatives. The use of laxatives, if used as directed, is common and safe. Follow directions carefully when using them. See your healthcare provider for new-onset constipation, or long-term constipation, to rule out other causes such as medicines or thyroid disease.  Date Last Reviewed: 7/1/2016 2000-2018 [employer]. [address] [facility], [place]  [address]. All rights reserved. This information is not intended as a substitute for professional medical care. Always follow your healthcare professional's instructions.           Take the citrucel 2 tablets 4 times day  Take 1 capful of mirilax 3 times a day  Drink at least 80 oz of fluids daily (not caffienated)    Take colace 3 tablets daily  Use the suppository only as needed - if no BM in 24 hours    Consider drinking warm herbal teas or eating prunes.  These can be helpful for constipation.      Follow Up with Dr. Machado on Monday.

## 2019-09-23 ENCOUNTER — OFFICE VISIT (OUTPATIENT)
Dept: FAMILY MEDICINE | Facility: CLINIC | Age: 44
End: 2019-09-23
Payer: COMMERCIAL

## 2019-09-23 VITALS
BODY MASS INDEX: 27.7 KG/M2 | TEMPERATURE: 99 F | HEART RATE: 77 BPM | HEIGHT: 62 IN | DIASTOLIC BLOOD PRESSURE: 74 MMHG | OXYGEN SATURATION: 99 % | WEIGHT: 150.5 LBS | RESPIRATION RATE: 14 BRPM | SYSTOLIC BLOOD PRESSURE: 108 MMHG

## 2019-09-23 DIAGNOSIS — Z23 NEED FOR PROPHYLACTIC VACCINATION AND INOCULATION AGAINST INFLUENZA: ICD-10-CM

## 2019-09-23 DIAGNOSIS — G89.4 CHRONIC PAIN SYNDROME: ICD-10-CM

## 2019-09-23 DIAGNOSIS — K59.01 SLOW TRANSIT CONSTIPATION: ICD-10-CM

## 2019-09-23 DIAGNOSIS — F34.1 PERSISTENT DEPRESSIVE DISORDER: ICD-10-CM

## 2019-09-23 DIAGNOSIS — F41.9 ANXIETY: Primary | ICD-10-CM

## 2019-09-23 DIAGNOSIS — R10.84 GENERALIZED ABDOMINAL PAIN: ICD-10-CM

## 2019-09-23 DIAGNOSIS — K58.2 IRRITABLE BOWEL SYNDROME WITH BOTH CONSTIPATION AND DIARRHEA: ICD-10-CM

## 2019-09-23 DIAGNOSIS — F33.0 MILD EPISODE OF RECURRENT MAJOR DEPRESSIVE DISORDER (H): ICD-10-CM

## 2019-09-23 PROCEDURE — 90471 IMMUNIZATION ADMIN: CPT | Performed by: FAMILY MEDICINE

## 2019-09-23 PROCEDURE — 90686 IIV4 VACC NO PRSV 0.5 ML IM: CPT | Performed by: FAMILY MEDICINE

## 2019-09-23 PROCEDURE — 99214 OFFICE O/P EST MOD 30 MIN: CPT | Mod: 25 | Performed by: FAMILY MEDICINE

## 2019-09-23 RX ORDER — CARISOPRODOL 350 MG/1
350 TABLET ORAL 3 TIMES DAILY PRN
Qty: 90 TABLET | Refills: 0 | Status: SHIPPED | OUTPATIENT
Start: 2019-09-23 | End: 2019-10-21

## 2019-09-23 RX ORDER — PREGABALIN 150 MG/1
CAPSULE ORAL
Qty: 180 CAPSULE | Refills: 1 | Status: SHIPPED | OUTPATIENT
Start: 2019-09-23 | End: 2020-03-27

## 2019-09-23 RX ORDER — DIAZEPAM 5 MG
5 TABLET ORAL EVERY 6 HOURS PRN
Qty: 10 TABLET | Refills: 0 | Status: SHIPPED | OUTPATIENT
Start: 2019-09-23 | End: 2019-12-03

## 2019-09-23 ASSESSMENT — MIFFLIN-ST. JEOR: SCORE: 1295.91

## 2019-09-23 NOTE — PATIENT INSTRUCTIONS
The patient is seeing some slow improvement now that she is back seeing a therapist.  She has an appointment in a couple of weeks with psychiatry.  She will follow-up with me in 1 month.  I refilled her Soma  and her Lyrica.  I left the regimen of Citrucel 4 times daily along with MiraLAX 3 times daily and a stool softener without changes.  She seems to be getting some improvement with that regimen.

## 2019-09-23 NOTE — PROGRESS NOTES
"Subjective     Melody Muñoz is a 43 year old female who presents to clinic today for the following health issues:    HPI   {Chronic Pain:124854}      How many servings of fruits and vegetables do you eat daily?  { :712333}    On average, how many sweetened beverages do you drink each day (soda, juice, sweet tea, etc)?   { 1-11:965818}    How many days per week do you miss taking your medication? {0-7 :828872}        {additonal problems for provider to add (Optional):132214}    {HIST REVIEW/ LINKS 2 (Optional):871597}    {Additional problems for the provider to add (optional):817464}  Reviewed and updated as needed this visit by Provider         Review of Systems   {ROS COMP (Optional):361157}      Objective    LMP  (LMP Unknown)   There is no height or weight on file to calculate BMI.  Physical Exam   {Exam List (Optional):013799}    {Diagnostic Test Results (Optional):305534::\"Diagnostic Test Results:\",\"Labs reviewed in Epic\"}        {PROVIDER CHARTING PREFERENCE:584140}      "

## 2019-09-23 NOTE — PROGRESS NOTES
Subjective     Melody Muñoz is a 43 year old female who presents to clinic today for the following health issues:    HPI   Abdominal Pain      Duration: Intermittent X3 weeks    Description (location/character/radiation): Intermittent abdominal discomfort and bloating       Associated flank pain: None    Intensity:  5 to 8/10    Accompanying signs and symptoms:        Fever/Chills: no        Gas/Bloating: YES       Nausea/vomitting: YES       Diarrhea: no        Dysuria or Hematuria: no     History (previous similar pain/trauma/previous testing): See GI    Precipitating or alleviating factors:       Pain worse with eating/BM/urination: No       Pain relieved by BM: no     Therapies tried and outcome: On stool softners for chronic constipation and is having some improvement in her symptoms on 2 Citrucel tablets 4 times daily and 3 doses of MiraLAX 3 times daily.  She is also on stool softener in addition.  Her pains have improved slightly.    LMP:  not applicable    Anxiety Follow-Up    How are you doing with your anxiety since your last visit? Improved with the addition of Valium once a day.    Are you having other symptoms that might be associated with anxiety? Yes:  Her abdominal pain actually adds to her anxiousness and she ends up in a kind of a vicious [place].    Have you had a significant life event? Financial Concerns     Are you feeling depressed? Yes:  She also has bipolar disorder    Do you have any concerns with your use of alcohol or other drugs? No    Social History     Tobacco Use     Smoking status: Former Smoker     Packs/day: 0.00     Years: 17.00     Pack years: 0.00     Types: Cigarettes     Last attempt to quit: 3/18/2019     Years since quittin.5     Smokeless tobacco: Never Used   Substance Use Topics     Alcohol use: No     Comment: no etoh since      Drug use: No     ROWDY-7 SCORE 2018   Total Score - - -   Total Score 5 (mild anxiety) 13 (moderate anxiety)  -   Total Score 5 13 14   Total Score - - -   Total Score BEH Adult - - -     PHQ 11/6/2018 2/6/2019 5/24/2019   PHQ-9 Total Score 3 11 20   Q9: Thoughts of better off dead/self-harm past 2 weeks Not at all Not at all Several days           Patient Active Problem List   Diagnosis     Endometriosis s/po EDWIGE 10-17     Mantoux: positive     Latent tuberculosis     Major depression     Generalized anxiety disorder     Constipation     Nightmares     Knee pain     Bipolar 1 disorder  with psychosis     Chronic fatigue     Female stress incontinence     Suicidal ideation     Acne     Chronic pain syndrome     Insomnia     Chronic migraine without aura without status migrainosus, not intractable     Nausea     JASWANT (obstructive sleep apnea)     Elevated liver enzymes     TMJ (temporomandibular joint syndrome)     Hypothyroidism due to acquired atrophy of thyroid     Hostile behavior     Mild persistent asthma without complication     Uncontrolled type 2 diabetes mellitus with hyperglycemia (H)     Allergic rhinitis     Incontinence of urine in female     Migraine     Screening for diabetic peripheral neuropathy     Need for prophylactic vaccination against Streptococcus pneumoniae (pneumococcus)     Sepsis (H)     Abdominal pain     Morbid obesity (H)     Former tobacco use     Mixed simple and mucopurulent chronic bronchitis (H): Spirometry 6-4-16 lung age = 61y/o in 39 y/o FVC=90%&FEV1=78%     Class 2 obesity due to excess calories with serious comorbidity and body mass index (BMI) of 36.0 to 36.9 in adult     Mixed hyperlipidemia     Acquired hypothyroidism     Dysuria     Other chronic back pain     Headache disorder     Persistent depressive disorder     Irritable bowel syndrome with both constipation and diarrhea     Anxiety     Past Surgical History:   Procedure Laterality Date     BIOPSY       COLONOSCOPY  2010     GENITOURINARY SURGERY  May/2014    bladder sling     GYN SURGERY      laparoscopy- endometriosis      "GYN SURGERY       for twins     LAPAROSCOPIC APPENDECTOMY N/A 2017    Procedure: LAPAROSCOPIC APPENDECTOMY;  LAPAROSCOPIC APPENDECTOMY and resection of epiploic tag;  Surgeon: Roberto Robins MD;  Location: [facility] OR     little finger surgery left  1980     tubal ligation bilateral       wisdom teeth removal         Social History     Tobacco Use     Smoking status: Former Smoker     Packs/day: 0.00     Years: 17.00     Pack years: 0.00     Types: Cigarettes     Last attempt to quit: 3/18/2019     Years since quittin.5     Smokeless tobacco: Never Used   Substance Use Topics     Alcohol use: No     Comment: no etoh since      Family History   Problem Relation Age of Onset     Hypertension Mother      Heart Disease Father         palpitations     Depression Sister      Anxiety Disorder Sister      Heart Disease Maternal Grandmother         CHF     Cancer Maternal Grandfather 72        Pancreatic Cancer     Alzheimer Disease Paternal Grandfather      Bipolar Disorder Other      Autism Spectrum Disorder Other              Reviewed and updated as needed this visit by Provider         Review of Systems   ROS COMP: Constitutional, HEENT, cardiovascular, pulmonary, gi and gu systems are negative, except as otherwise noted.      Objective    /74 (Patient Position: Sitting, Cuff Size: Adult Regular)   Pulse 77   Temp 99  F (37.2  C) (Tympanic)   Resp 14   Ht 1.575 m (5' 2\")   Wt 68.3 kg (150 lb 8 oz)   LMP  (LMP Unknown)   SpO2 99%   BMI 27.53 kg/m    Body mass index is 27.53 kg/m .  Physical Exam   GENERAL APPEARANCE: healthy, alert and no distress  ABDOMEN: soft, nontender, without hepatosplenomegaly or masses and bowel sounds normal  PSYCH: mentation appears normal and affect normal/bright            Assessment & Plan       ICD-10-CM    1. Anxiety F41.9 diazepam (VALIUM) 5 MG tablet   2. Generalized abdominal pain R10.84    3. Chronic pain syndrome G89.4 pregabalin (LYRICA) 150 MG " "capsule     carisoprodol (SOMA) 350 MG tablet   4. Need for prophylactic vaccination and inoculation against influenza Z23 INFLUENZA VACCINE IM > 6 MONTHS VALENT IIV4 [37602]     Vaccine Administration, Initial [23509]   5. Persistent depressive disorder F34.1    6. Mild episode of recurrent major depressive disorder (H) F33.0    7. Slow transit constipation K59.01    8. Irritable bowel syndrome with both constipation and diarrhea K58.2         BMI:   Estimated body mass index is 27.53 kg/m  as calculated from the following:    Height as of this encounter: 1.575 m (5' 2\").    Weight as of this encounter: 68.3 kg (150 lb 8 oz).   Weight management plan: Discussed healthy diet and exercise guidelines        Patient Instructions   The patient is seeing some slow improvement now that she is back seeing a therapist.  She has an appointment in a couple of weeks with psychiatry.  She will follow-up with me in 1 month.  I refilled her Soma  and her Lyrica.  I left the regimen of Citrucel 4 times daily along with MiraLAX 3 times daily and a stool softener without changes.  She seems to be getting some improvement with that regimen.      Return in about 4 weeks (around 10/21/2019) for chronic pain, anxiety, depression.    Evaristo Machado MD  [facility]      "

## 2019-09-24 ASSESSMENT — ASTHMA QUESTIONNAIRES: ACT_TOTALSCORE: 19

## 2019-10-04 ENCOUNTER — OFFICE VISIT (OUTPATIENT)
Dept: FAMILY MEDICINE | Facility: CLINIC | Age: 44
End: 2019-10-04
Payer: COMMERCIAL

## 2019-10-04 VITALS
SYSTOLIC BLOOD PRESSURE: 114 MMHG | RESPIRATION RATE: 16 BRPM | HEART RATE: 95 BPM | BODY MASS INDEX: 27.44 KG/M2 | DIASTOLIC BLOOD PRESSURE: 70 MMHG | WEIGHT: 150 LBS | TEMPERATURE: 98.5 F

## 2019-10-04 DIAGNOSIS — J01.01 ACUTE RECURRENT MAXILLARY SINUSITIS: Primary | ICD-10-CM

## 2019-10-04 PROCEDURE — 99213 OFFICE O/P EST LOW 20 MIN: CPT | Performed by: PHYSICIAN ASSISTANT

## 2019-10-04 RX ORDER — PREDNISONE 20 MG/1
20 TABLET ORAL DAILY
Qty: 3 TABLET | Refills: 0 | Status: SHIPPED | OUTPATIENT
Start: 2019-10-04 | End: 2019-12-03

## 2019-10-04 ASSESSMENT — ENCOUNTER SYMPTOMS
GASTROINTESTINAL NEGATIVE: 1
MUSCULOSKELETAL NEGATIVE: 1
CARDIOVASCULAR NEGATIVE: 1
ENDOCRINE NEGATIVE: 1
EYES NEGATIVE: 1
PSYCHIATRIC NEGATIVE: 1
NEUROLOGICAL NEGATIVE: 1

## 2019-10-04 NOTE — PROGRESS NOTES
Subjective     Melody Muñoz is a 43 year old female who presents to clinic today for the following health issues:    HPI   SINUS SYMPTOMS      Duration: 1 week    Description  facial pain/pressure, rhinorrhea, congestion, headache and fatigue    Severity: mild    Accompanying signs and symptoms: None    History (predisposing factors):  none    Precipitating or alleviating factors: None    Therapies tried and outcome:  nasal spray/wash - saline, also took 4 amoxicillin that she had on hand, now itching on hands and feet    Started as allergy type symptoms  Now thicker, yellow drainage  Facial pressure  She feels super fatigued  No obvious improvement with the amoxicillin        Patient Active Problem List   Diagnosis     Endometriosis s/po EDWIGE 10-17     Mantoux: positive     Latent tuberculosis     Major depression     Generalized anxiety disorder     Constipation     Nightmares     Knee pain     Bipolar 1 disorder  with psychosis     Chronic fatigue     Female stress incontinence     Suicidal ideation     Acne     Chronic pain syndrome     Insomnia     Chronic migraine without aura without status migrainosus, not intractable     Nausea     JASWANT (obstructive sleep apnea)     Elevated liver enzymes     TMJ (temporomandibular joint syndrome)     Hypothyroidism due to acquired atrophy of thyroid     Hostile behavior     Mild persistent asthma without complication     Uncontrolled type 2 diabetes mellitus with hyperglycemia (H)     Allergic rhinitis     Incontinence of urine in female     Migraine     Screening for diabetic peripheral neuropathy     Need for prophylactic vaccination against Streptococcus pneumoniae (pneumococcus)     Sepsis (H)     Abdominal pain     Morbid obesity (H)     Former tobacco use     Mixed simple and mucopurulent chronic bronchitis (H): Spirometry 6-4-16 lung age = 61y/o in 39 y/o FVC=90%&FEV1=78%     Class 2 obesity due to excess calories with serious comorbidity and body mass index  (BMI) of 36.0 to 36.9 in adult     Mixed hyperlipidemia     Acquired hypothyroidism     Dysuria     Other chronic back pain     Headache disorder     Persistent depressive disorder     Irritable bowel syndrome with both constipation and diarrhea     Anxiety     Past Surgical History:   Procedure Laterality Date     BIOPSY       COLONOSCOPY       GENITOURINARY SURGERY  May/2014    bladder sling     GYN SURGERY      laparoscopy- endometriosis     GYN SURGERY  2009     for twins     LAPAROSCOPIC APPENDECTOMY N/A 2017    Procedure: LAPAROSCOPIC APPENDECTOMY;  LAPAROSCOPIC APPENDECTOMY and resection of epiploic tag;  Surgeon: Roberto Robins MD;  Location: [facility] OR     little finger surgery left  1980     tubal ligation bilateral       wisdom teeth removal         Social History     Tobacco Use     Smoking status: Former Smoker     Packs/day: 0.00     Years: 17.00     Pack years: 0.00     Types: Cigarettes     Last attempt to quit: 3/18/2019     Years since quittin.5     Smokeless tobacco: Never Used   Substance Use Topics     Alcohol use: No     Comment: no etoh since      Family History   Problem Relation Age of Onset     Hypertension Mother      Heart Disease Father         palpitations     Depression Sister      Anxiety Disorder Sister      Heart Disease Maternal Grandmother         CHF     Cancer Maternal Grandfather 72        Pancreatic Cancer     Alzheimer Disease Paternal Grandfather      Bipolar Disorder Other      Autism Spectrum Disorder Other          Current Outpatient Medications   Medication Sig Dispense Refill     ACCU-CHEK RAISSA PLUS test strip TEST 4-7 TIMES DAILY WHILE STARTING TRULICITY 100 strip 3     albuterol (2.5 MG/3ML) 0.083% nebulizer solution Take 1 vial (2.5 mg) by nebulization every 6 hours as needed for shortness of breath / dyspnea or wheezing 25 vial 0     almotriptan (AXERT) 6.25 MG tablet TK 1 T PO ONCE.  MAY REPEAT IN 2 HOURS AS DIRECTED.  MAX 2 DOSES PER DAY   1     amoxicillin-clavulanate (AUGMENTIN) 875-125 MG tablet Take 1 tablet by mouth 2 times daily for 10 days 20 tablet 0     blood glucose (NO BRAND SPECIFIED) lancets standard Use to test blood sugar 3 times weekly or as directed. 100 each 3     blood glucose monitoring (LAURA CONTOUR NEXT) test strip Use to test blood sugar 2 times daily or as directed. 200 each 12     blood glucose monitoring (NO BRAND SPECIFIED) test strip Use to test blood sugars 3 times weekly or as directed 100 strip 3     carisoprodol (SOMA) 350 MG tablet Take 1 tablet (350 mg) by mouth 3 times daily as needed for muscle spasms 90 tablet 0     Continuous Blood Gluc  (FREESTYLE JOSE 14 DAY READER) PUMA 1 Device as needed (Use for daily blood glucose monitoring as needed) 1 Device 0     Continuous Blood Gluc Sensor (FREESTYLE JOSE 14 DAY SENSOR) MISC 1 each every 14 days 2 each 11     dextroamphetamine (DEXEDRINE SPANSULE) 5 MG 24 hr capsule TK 1 C PO BID  0     dextroamphetamine (DEXTROSTAT) 10 MG tablet TK 1 T PO TID  0     diazepam (VALIUM) 5 MG tablet Take 1 tablet (5 mg) by mouth every 6 hours as needed for anxiety (MUSCLE SPASM) Do not take if you are also using ativan (lorazepam) 10 tablet 0     fluticasone (FLONASE) 50 MCG/ACT nasal spray Spray 2 sprays into both nostrils daily 16 g 11     guanFACINE (TENEX) 2 MG tablet TAKE TWO TABLETS BY MOUTH AT BEDTIME 180 tablet 3     lamoTRIgine (LAMICTAL) 200 MG tablet Take 1 tablet (200 mg) by mouth every morning 90 tablet 4     LATUDA 120 MG TABS tablet TK 1 T PO QD  3     levothyroxine (SYNTHROID/LEVOTHROID) 50 MCG tablet Take 1 tablet (50 mcg) by mouth daily 90 tablet 0     lithium (LITHOBID) 300 MG CR tablet Take 2 tablets (600 mg) by mouth At Bedtime       MAGNESIUM CITRATE PO        MICROLET LANCETS MISC TEST TWICE DAILY AS DIRECTED 100 each 0     ondansetron (ZOFRAN-ODT) 4 MG ODT tab DISSOLVE 1 TO 2 TABLETS UNDER THE TONGUE EVERY 8 HOURS AS NEEDED FOR NAUSEA 30 tablet 1      order for DME Equipment being ordered: Blood glucose monitoring kit 1 Device 0     pantoprazole (PROTONIX) 40 MG EC tablet Take 1 tablet (40 mg) by mouth daily 90 tablet 1     POTASSIUM PO        predniSONE (DELTASONE) 20 MG tablet Take 1 tablet (20 mg) by mouth daily for 3 days 3 tablet 0     pregabalin (LYRICA) 150 MG capsule TAKE 1 CAPSULE BY MOUTH TWICE A  capsule 1     PROAIR  (90 Base) MCG/ACT inhaler INHALE 1 TO 2 PUFFS INTO THE LUNGS EVERY 4 HOURS AS NEEDED FOR WHEEZING, SHORTNESS OF BREATH, OR DYSPNEA 51 g 3     STATIN NOT PRESCRIBED (INTENTIONAL) Please choose reason not prescribed, below       topiramate (TOPAMAX) 50 MG tablet 2 tablets every night 180 tablet 3     venlafaxine (EFFEXOR-XR) 75 MG 24 hr capsule Take 1 capsule (75 mg) by mouth daily 90 capsule 1     VITAMIN D, CHOLECALCIFEROL, PO Take 2,000 Units by mouth daily       Allergies   Allergen Reactions     Hydrocodone Nausea and Vomiting       Reviewed and updated as needed this visit by Provider         Review of Systems   Constitutional:        As in HPI   HENT:        As in HPI   Eyes: Negative.    Respiratory:        As in HPI   Cardiovascular: Negative.    Gastrointestinal: Negative.    Endocrine: Negative.    Genitourinary: Negative.    Musculoskeletal: Negative.    Skin: Negative.    Neurological: Negative.    Psychiatric/Behavioral: Negative.          Objective    /70 (Cuff Size: Adult Regular)   Pulse 95   Temp 98.5  F (36.9  C) (Tympanic)   Resp 16   Wt 68 kg (150 lb)   LMP  (LMP Unknown)   BMI 27.44 kg/m    Physical Exam  Constitutional:       Appearance: She is well-developed.   HENT:      Head: Normocephalic.      Right Ear: Tympanic membrane and ear canal normal.      Left Ear: Tympanic membrane and ear canal normal.      Nose: Mucosal edema and rhinorrhea present.      Right Sinus: Maxillary sinus tenderness present.      Left Sinus: Maxillary sinus tenderness present.      Mouth/Throat:      Mouth: Mucous  "membranes are moist.      Pharynx: Uvula midline. No oropharyngeal exudate or posterior oropharyngeal erythema.   Eyes:      Conjunctiva/sclera: Conjunctivae normal.   Cardiovascular:      Rate and Rhythm: Normal rate and regular rhythm.      Heart sounds: Normal heart sounds.   Pulmonary:      Effort: Pulmonary effort is normal.      Breath sounds: Normal breath sounds.   Skin:     General: Skin is warm.   Neurological:      Mental Status: She is alert and oriented to person, place, and time.         Diagnostic Test Results:  No results found. However, due to the size of the patient record, not all encounters were searched. Please check Results Review for a complete set of results.        Assessment & Plan   Problem List Items Addressed This Visit     None      Visit Diagnoses     Acute recurrent maxillary sinusitis    -  Primary    Relevant Medications    amoxicillin-clavulanate (AUGMENTIN) 875-125 MG tablet    predniSONE (DELTASONE) 20 MG tablet             BMI:   Estimated body mass index is 27.44 kg/m  as calculated from the following:    Height as of 9/23/19: 1.575 m (5' 2\").    Weight as of this encounter: 68 kg (150 lb).   Weight management plan: Discussed healthy diet and exercise guidelines        There are no Patient Instructions on file for this visit.    Return in about 2 weeks (around 10/18/2019) for a recheck if you are not improved.    Paradise Hennessy PA-C  [facility]      "

## 2019-10-07 ENCOUNTER — TELEPHONE (OUTPATIENT)
Dept: FAMILY MEDICINE | Facility: CLINIC | Age: 44
End: 2019-10-07

## 2019-10-07 DIAGNOSIS — J01.90 ACUTE SINUSITIS WITH SYMPTOMS > 10 DAYS: ICD-10-CM

## 2019-10-07 DIAGNOSIS — J01.01 ACUTE RECURRENT MAXILLARY SINUSITIS: ICD-10-CM

## 2019-10-07 DIAGNOSIS — J45.31 MILD PERSISTENT ASTHMA WITH EXACERBATION: ICD-10-CM

## 2019-10-07 RX ORDER — AMOXICILLIN 500 MG/1
1000 CAPSULE ORAL 3 TIMES DAILY
Qty: 60 CAPSULE | Refills: 0 | Status: SHIPPED | OUTPATIENT
Start: 2019-10-07 | End: 2019-10-14

## 2019-10-07 RX ORDER — PREDNISONE 20 MG/1
40 TABLET ORAL DAILY
Qty: 10 TABLET | Refills: 0 | OUTPATIENT
Start: 2019-10-07

## 2019-10-07 NOTE — TELEPHONE ENCOUNTER
Reason for Call:  Other call back    Detailed comments: The patient called and stated that she saw Christofer Hennessy on 10/4 for a sinus infection. She prescribed her augmentin and she stated that it is really messing her stomach up. She is wondering if Dr. Machado would be willing to send her a different prescription to her pharmacy that she has taken before like amoxicillin so it is not so hard on her stomach. She would like a call back to discuss if this would be possible.     Phone Number Patient can be reached at: Cell number on file:    Telephone Information:   Mobile [phone]       Best Time: Any    Can we leave a detailed message on this number? YES    Call taken on 10/7/2019 at 11:01 AM by Melody Espinoza

## 2019-10-07 NOTE — TELEPHONE ENCOUNTER
"Called patient. She states she would like antibiotic to be switched to amoxicillin. She states the augmentin is affecting her IBS symptoms. She has tried a probiotic.     She is requesting the \"usual regimen\" that Dr. Machado and Dr. Hollins prescribe. She states this is the amoxicillin and prednisone. Pt states the amount of prednisone prescribed by Christofer on 10/4 was not enough.     Routing to provider to advise.  "

## 2019-10-07 NOTE — TELEPHONE ENCOUNTER
Rx for amoxicillin was sent to her pharmacy.  I do not think refilling her prednisone is appropriate at this time.  The amoxicillin should work all by itself.

## 2019-10-10 DIAGNOSIS — E03.9 ACQUIRED HYPOTHYROIDISM: ICD-10-CM

## 2019-10-10 RX ORDER — LEVOTHYROXINE SODIUM 50 UG/1
50 TABLET ORAL DAILY
Qty: 30 TABLET | Refills: 0 | Status: SHIPPED | OUTPATIENT
Start: 2019-10-10 | End: 2019-10-27

## 2019-10-10 NOTE — TELEPHONE ENCOUNTER
"Last Written Prescription Date:  7/1/19  Last Fill Quantity: 90,  # refills: 0   Last office visit: 3/21/2019 with prescribing provider:  Tammy Louie   Future Office Visit:   Next 5 appointments (look out 90 days)    Oct 24, 2019 11:00 AM CDT  [facility] [address] [facility] [address] [phone]         Requested Prescriptions   Signed Prescriptions Disp Refills    levothyroxine (SYNTHROID/LEVOTHROID) 50 MCG tablet 30 tablet 0     Sig: Take 1 tablet (50 mcg) by mouth daily       Thyroid Protocol Passed - 10/10/2019 12:33 PM        Passed - Patient is 12 years or older        Passed - Recent (12 mo) or future (30 days) visit within the authorizing provider's specialty     Patient has had an office visit with the authorizing provider or a provider within the authorizing providers department within the previous 12 mos or has a future within next 30 days. See \"Patient Info\" tab in inbasket, or \"Choose Columns\" in Meds & Orders section of the refill encounter.              Passed - Medication is active on med list        Passed - Normal TSH on file in past 12 months     Recent Labs   Lab Test 05/02/19  0754   TSH 0.84              Passed - No active pregnancy on record     If patient is pregnant or has had a positive pregnancy test, please check TSH.          Passed - No positive pregnancy test in past 12 months     If patient is pregnant or has had a positive pregnancy test, please check TSH.          #30 sent. Pt was due for f/u with Tammy in June but has since canceled f/u.  Note on Rx for pharmacy to let us know if insurance only covers 90 days.    Prescription approved per [facility] Refill Protocol.  Perez Hanna RN    "

## 2019-10-10 NOTE — TELEPHONE ENCOUNTER
[facility] said they sent a fax over a week ago requesting a refill on medication levothyroxine (SYNTHROID/LEVOTHROID) 50 MCG tablet. Please review and advise.  Marge Turner

## 2019-10-14 ENCOUNTER — NURSE TRIAGE (OUTPATIENT)
Dept: FAMILY MEDICINE | Facility: CLINIC | Age: 44
End: 2019-10-14

## 2019-10-14 NOTE — TELEPHONE ENCOUNTER
"Call from patient this AM.     SOB and burning in chest on and off x 2 months.     Scheduled OB today 2pm.     Reason for Disposition    [1] MILD longstanding difficulty breathing AND [2]  SAME as normal    Additional Information    Negative: [1] Breathing stopped AND [2] hasn't returned    Negative: Choking on something    Negative: Severe difficulty breathing (e.g., struggling for each breath, speaks in single words)    Negative: Bluish (or gray) lips or face now    Negative: Difficult to awaken or acting confused (e.g., disoriented, slurred speech)    Negative: Passed out (i.e., lost consciousness, collapsed and was not responding)    Negative: Wheezing started suddenly after medicine, an allergic food or bee sting    Negative: Stridor    Negative: Slow, shallow and weak breathing    Negative: Sounds like a life-threatening emergency to the triager    Negative: Chest pain    Negative: [1] Wheezing (high pitched whistling sound) AND [2] previous asthma attacks or use of asthma medicines    Negative: [1] Difficulty breathing AND [2] only present when coughing    Negative: [1] Difficulty breathing AND [2] only from stuffy or runny nose    Negative: [1] MODERATE difficulty breathing (e.g., speaks in phrases, SOB even at rest, pulse 100-120) AND [2] NEW-onset or WORSE than normal    Negative: Wheezing can be heard across the room    Negative: Drooling or spitting out saliva (because can't swallow)    Negative: History of prior \"blood clot\" in leg or lungs (i.e., deep vein thrombosis, pulmonary embolism)    Negative: History of inherited increased risk of blood clots (e.g., Factor 5 Leiden, Anti-thrombin 3, Protein C or Protein S deficiency, Prothrombin mutation)    Negative: Recent illness requiring prolonged bedrest (i.e., immobilization)    Negative: Hip or leg fracture in past 2 months (e.g., had cast on leg or ankle)    Negative: Major surgery in the past month    Negative: Recent long-distance travel with " "prolonged time in car, bus, plane, or train (i.e., within past 2 weeks; 6 or  more hours duration)    Negative: Extra heart beats OR irregular heart beating   (i.e., \"palpitations\")    Negative: Fever > 103 F (39.4 C)    Negative: [1] Fever > 101 F (38.3 C) AND [2] age > 60    Negative: [1] Fever > 100.0 F (37.8 C) AND [2] bedridden (e.g., nursing home patient, CVA, chronic illness, recovering from surgery)    Negative: [1] Fever > 100.0 F (37.8 C) AND [2] diabetes mellitus or weak immune system (e.g., HIV positive, cancer chemo, splenectomy, chronic steroids)    Negative: [1] Periods where breathing stops and then resumes normally AND [2] bedridden (e.g., nursing home patient, CVA)    Negative: Pregnant or postpartum (< 1 month since delivery)    Negative: Patient sounds very sick or weak to the triager    Negative: [1] MILD difficulty breathing (e.g., minimal/no SOB at rest, SOB with walking, pulse <100) AND [2] NEW-onset or WORSE than normal    Negative: [1] Longstanding difficulty breathing (e.g., CHF, COPD, emphysema) AND [2] WORSE than normal    Negative: [1] Longstanding difficulty breathing AND [2] not responding to usual therapy    Negative: [1] Continuous (nonstop) coughing AND [2] keeps from working or sleeping    Negative: [1] MODERATE longstanding difficulty breathing (e.g., speaks in phrases, SOB even at rest, pulse 100-120) AND [2] SAME as normal    Answer Assessment - Initial Assessment Questions  1. RESPIRATORY STATUS: \"Describe your breathing?\" (e.g., wheezing, shortness of breath, unable to speak, severe coughing)       States SOB feels that she will faint, tired, may be anxiety related.   2. ONSET: \"When did this breathing problem begin?\"       3 months  3. PATTERN \"Does the difficult breathing come and go, or has it been constant since it started?\"       Come and go  4. SEVERITY: \"How bad is your breathing?\" (e.g., mild, moderate, severe)     - MILD: No SOB at rest, mild SOB with walking, speaks " "normally in sentences, can lay down, no retractions, pulse < 100.     - MODERATE: SOB at rest, SOB with minimal exertion and prefers to sit, cannot lie down flat, speaks in phrases, mild retractions, audible wheezing, pulse 100-120.     - SEVERE: Very SOB at rest, speaks in single words, struggling to breathe, sitting hunched forward, retractions, pulse > 120       Moderate-severe  5. RECURRENT SYMPTOM: \"Have you had difficulty breathing before?\" If so, ask: \"When was the last time?\" and \"What happened that time?\"       no  6. CARDIAC HISTORY: \"Do you have any history of heart disease?\" (e.g., heart attack, angina, bypass surgery, angioplasty)       no  7. LUNG HISTORY: \"Do you have any history of lung disease?\"  (e.g., pulmonary embolus, asthma, emphysema)      asthma  8. CAUSE: \"What do you think is causing the breathing problem?\"       Worry and anxiety  9. OTHER SYMPTOMS: \"Do you have any other symptoms? (e.g., dizziness, runny nose, cough, chest pain, fever)        10. PREGNANCY: \"Is there any chance you are pregnant?\" \"When was your last menstrual period?\"        no  11. TRAVEL: \"Have you traveled out of the country in the last month?\" (e.g., travel history, exposures)        no    Protocols used: BREATHING DIFFICULTY-A-AH      "

## 2019-10-15 ENCOUNTER — HOSPITAL ENCOUNTER (OUTPATIENT)
Dept: MAMMOGRAPHY | Facility: CLINIC | Age: 44
Discharge: HOME | End: 2019-10-15
Attending: REGISTERED NURSE | Admitting: REGISTERED NURSE
Payer: COMMERCIAL

## 2019-10-15 ENCOUNTER — HOSPITAL ENCOUNTER (OUTPATIENT)
Dept: ULTRASOUND IMAGING | Facility: CLINIC | Age: 44
End: 2019-10-15
Attending: REGISTERED NURSE
Payer: COMMERCIAL

## 2019-10-15 ENCOUNTER — TELEPHONE (OUTPATIENT)
Dept: FAMILY MEDICINE | Facility: CLINIC | Age: 44
End: 2019-10-15

## 2019-10-15 DIAGNOSIS — N63.10 LUMP OF RIGHT BREAST: ICD-10-CM

## 2019-10-15 PROCEDURE — 77066 DX MAMMO INCL CAD BI: CPT

## 2019-10-15 PROCEDURE — G0279 TOMOSYNTHESIS, MAMMO: HCPCS

## 2019-10-15 PROCEDURE — 76642 ULTRASOUND BREAST LIMITED: CPT | Mod: RT

## 2019-10-15 NOTE — TELEPHONE ENCOUNTER
Reason for Call:  Other     Detailed comments: patient said up this AM at 2:00 and coughing.  Clear junk coming up    Phone Number Patient can be reached at: Home number on file [phone] (home)    Best Time: today    Can we leave a detailed message on this number? YES    Call taken on 10/15/2019 at 9:05 AM by NANCY MAYO

## 2019-10-15 NOTE — TELEPHONE ENCOUNTER
Call back from patient.     States she did cough up large clear sticky mucus this AM at 0200.     Wanted to make sure this is updated in her chart.     States she will be in tomorrow to meet with provider to speak about recent triage notes as she did not make it to [facility] yesterday afternoon.

## 2019-10-21 DIAGNOSIS — G89.4 CHRONIC PAIN SYNDROME: ICD-10-CM

## 2019-10-21 RX ORDER — CARISOPRODOL 350 MG/1
350 TABLET ORAL 3 TIMES DAILY PRN
Qty: 90 TABLET | Refills: 1 | Status: SHIPPED | OUTPATIENT
Start: 2019-10-23 | End: 2019-12-20

## 2019-10-21 NOTE — TELEPHONE ENCOUNTER
RX [place] ([place] reviewed:  reviewed- recommend provider review    Clonazepam and dextroamphetamine prescribed by outside providers.     [place]:  [email or web address]

## 2019-10-21 NOTE — TELEPHONE ENCOUNTER
Reason for Call:  Medication or medication refill:    Do you use a [facility]?  Name of the pharmacy and phone number for the current request:     [place] [id number]    Name of the medication requested: carisoprodol (SOMA) 350 MG tablet       Other request: The patient called and would like this medication refilled. She stated that she only has a couple of days worth of medication left so she will need it refilled as soon as possible.     Can we leave a detailed message on this number? YES    Phone number patient can be reached at: Cell number on file:    Telephone Information:   Mobile [phone]       Best Time: Any    Call taken on 10/21/2019 at 8:11 AM by Melody Espinoza

## 2019-10-21 NOTE — TELEPHONE ENCOUNTER
Controlled Substance Refill Request for carisoprodol (SOMA) 350 MG tablet  Problem List Complete:  Yes    Patient is followed by VIRAL Deleon for ongoing prescription of pain medication.  All refills should be approved by this provider, or covering partner.     Medication(s):  Lyrica 150 mg bid, Fioricet with codeine prn, klonopin and ambien to be prescribed by [facility], not [facility].  Maximum quantity per month: 60, 20  Clinic visit frequency required: Q 3 months      Controlled substance agreement on file: Yes       Date(s): 9/8/2015  New CSA needed.  Pain Clinic evaluation in the past: No     DIRE Total Score(s):  No flowsheet data found.     Last [facility] website verification: 6/24/19 multiple meds from multiple outside providers    Last Written Prescription Date:  9/23/2019  Last Fill Quantity: 90 tablet,  # refills: 0   Last office visit: 10/4/2019 with prescribing provider:  Minoo   Future Office Visit:   Next 5 appointments (look out 90 days)    Oct 24, 2019 11:00 AM CDT  Return Visit with KIRAN Carballo [facility] ([facility] [address] [facility] [address]  [phone]           Controlled substance agreement:   Encounter-Level CSA - 09/08/2015:    Controlled Substance Agreement - Scan on 9/9/2015  2:41 PM: [facility], Controlled Substance Contract, 09-08-15     Patient-Level CSA:    There are no patient-level csa.         Last Urine Drug Screen: No results found for: CDAUT, No results found for: COMDAT, No results found for: THC13, PCP13, COC13, MAMP13, OPI13, AMP13, BZO13, TCA13, MTD13, BAR13, OXY13, PPX13, BUP13         [email or web address]   checked in past 3 months?  No, route to RN

## 2019-10-23 ENCOUNTER — APPOINTMENT (OUTPATIENT)
Dept: GENERAL RADIOLOGY | Facility: CLINIC | Age: 44
End: 2019-10-23
Attending: EMERGENCY MEDICINE
Payer: COMMERCIAL

## 2019-10-23 ENCOUNTER — HOSPITAL ENCOUNTER (EMERGENCY)
Facility: CLINIC | Age: 44
Discharge: HOME | End: 2019-10-24
Attending: EMERGENCY MEDICINE | Admitting: EMERGENCY MEDICINE
Payer: COMMERCIAL

## 2019-10-23 DIAGNOSIS — R10.13 EPIGASTRIC PAIN: ICD-10-CM

## 2019-10-23 DIAGNOSIS — R07.9 CHEST PAIN, UNSPECIFIED TYPE: ICD-10-CM

## 2019-10-23 LAB
ALBUMIN SERPL-MCNC: 3.9 G/DL (ref 3.4–5)
ALP SERPL-CCNC: 100 U/L (ref 40–150)
ALT SERPL W P-5'-P-CCNC: 28 U/L (ref 0–50)
ANION GAP SERPL CALCULATED.3IONS-SCNC: 5 MMOL/L (ref 3–14)
AST SERPL W P-5'-P-CCNC: 20 U/L (ref 0–45)
BASOPHILS # BLD AUTO: 0.1 10E9/L (ref 0–0.2)
BASOPHILS NFR BLD AUTO: 0.4 %
BILIRUB SERPL-MCNC: 0.2 MG/DL (ref 0.2–1.3)
BUN SERPL-MCNC: 9 MG/DL (ref 7–30)
CALCIUM SERPL-MCNC: 9.5 MG/DL (ref 8.5–10.1)
CHLORIDE SERPL-SCNC: 109 MMOL/L (ref 94–109)
CO2 SERPL-SCNC: 26 MMOL/L (ref 20–32)
CREAT SERPL-MCNC: 0.83 MG/DL (ref 0.52–1.04)
DIFFERENTIAL METHOD BLD: ABNORMAL
EOSINOPHIL # BLD AUTO: 2 10E9/L (ref 0–0.7)
EOSINOPHIL NFR BLD AUTO: 17.2 %
ERYTHROCYTE [DISTWIDTH] IN BLOOD BY AUTOMATED COUNT: 12.5 % (ref 10–15)
GFR SERPL CREATININE-BSD FRML MDRD: 87 ML/MIN/{1.73_M2}
GLUCOSE SERPL-MCNC: 91 MG/DL (ref 70–99)
HCT VFR BLD AUTO: 42.8 % (ref 35–47)
HGB BLD-MCNC: 14.3 G/DL (ref 11.7–15.7)
IMM GRANULOCYTES # BLD: 0 10E9/L (ref 0–0.4)
IMM GRANULOCYTES NFR BLD: 0.3 %
LIPASE SERPL-CCNC: 535 U/L (ref 73–393)
LYMPHOCYTES # BLD AUTO: 4.5 10E9/L (ref 0.8–5.3)
LYMPHOCYTES NFR BLD AUTO: 39.1 %
MCH RBC QN AUTO: 28.3 PG (ref 26.5–33)
MCHC RBC AUTO-ENTMCNC: 33.4 G/DL (ref 31.5–36.5)
MCV RBC AUTO: 85 FL (ref 78–100)
MONOCYTES # BLD AUTO: 0.5 10E9/L (ref 0–1.3)
MONOCYTES NFR BLD AUTO: 4.1 %
NEUTROPHILS # BLD AUTO: 4.4 10E9/L (ref 1.6–8.3)
NEUTROPHILS NFR BLD AUTO: 38.9 %
NRBC # BLD AUTO: 0 10*3/UL
NRBC BLD AUTO-RTO: 0 /100
PLATELET # BLD AUTO: 395 10E9/L (ref 150–450)
POTASSIUM SERPL-SCNC: 3.6 MMOL/L (ref 3.4–5.3)
PROT SERPL-MCNC: 6.9 G/DL (ref 6.8–8.8)
RBC # BLD AUTO: 5.05 10E12/L (ref 3.8–5.2)
SODIUM SERPL-SCNC: 140 MMOL/L (ref 133–144)
TROPONIN I SERPL-MCNC: <0.015 UG/L (ref 0–0.04)
WBC # BLD AUTO: 11.4 10E9/L (ref 4–11)

## 2019-10-23 PROCEDURE — 25000132 ZZH RX MED GY IP 250 OP 250 PS 637: Performed by: EMERGENCY MEDICINE

## 2019-10-23 PROCEDURE — 25000128 H RX IP 250 OP 636: Performed by: EMERGENCY MEDICINE

## 2019-10-23 PROCEDURE — 83690 ASSAY OF LIPASE: CPT | Performed by: EMERGENCY MEDICINE

## 2019-10-23 PROCEDURE — 85025 COMPLETE CBC W/AUTO DIFF WBC: CPT | Performed by: EMERGENCY MEDICINE

## 2019-10-23 PROCEDURE — 71046 X-RAY EXAM CHEST 2 VIEWS: CPT

## 2019-10-23 PROCEDURE — 93005 ELECTROCARDIOGRAM TRACING: CPT

## 2019-10-23 PROCEDURE — 80053 COMPREHEN METABOLIC PANEL: CPT | Performed by: EMERGENCY MEDICINE

## 2019-10-23 PROCEDURE — 25000125 ZZHC RX 250: Performed by: EMERGENCY MEDICINE

## 2019-10-23 PROCEDURE — 84484 ASSAY OF TROPONIN QUANT: CPT | Performed by: EMERGENCY MEDICINE

## 2019-10-23 PROCEDURE — 96374 THER/PROPH/DIAG INJ IV PUSH: CPT

## 2019-10-23 PROCEDURE — 99285 EMERGENCY DEPT VISIT HI MDM: CPT | Mod: 25

## 2019-10-23 RX ORDER — NITROGLYCERIN 0.4 MG/1
0.4 TABLET SUBLINGUAL EVERY 5 MIN PRN
Status: DISCONTINUED | OUTPATIENT
Start: 2019-10-23 | End: 2019-10-24 | Stop reason: HOSPADM

## 2019-10-23 RX ORDER — HYDROMORPHONE HYDROCHLORIDE 1 MG/ML
0.5 INJECTION, SOLUTION INTRAMUSCULAR; INTRAVENOUS; SUBCUTANEOUS
Status: COMPLETED | OUTPATIENT
Start: 2019-10-23 | End: 2019-10-23

## 2019-10-23 RX ADMIN — RANITIDINE 300 MG: 150 TABLET ORAL at 22:06

## 2019-10-23 RX ADMIN — NITROGLYCERIN 0.4 MG: 0.4 TABLET SUBLINGUAL at 23:06

## 2019-10-23 RX ADMIN — LIDOCAINE HYDROCHLORIDE 30 ML: 20 SOLUTION ORAL; TOPICAL at 22:07

## 2019-10-23 RX ADMIN — HYDROMORPHONE HYDROCHLORIDE 0.5 MG: 1 INJECTION, SOLUTION INTRAMUSCULAR; INTRAVENOUS; SUBCUTANEOUS at 23:27

## 2019-10-23 NOTE — ED AVS SNAPSHOT
[facility] Emergency Department  [address]  [facility] [address]  Phone:  [phone]  Fax:  [phone]                                    Melody Muñoz   MRN: [id number]    Department:  [facility] Emergency Department   Date of Visit:  10/23/2019           After Visit Summary Signature Page    I have received my discharge instructions, and my questions have been answered. I have discussed any challenges I see with this plan with the nurse or doctor.    ..........................................................................................................................................  Patient/Patient Representative Signature      ..........................................................................................................................................  Patient Representative Print Name and Relationship to Patient    ..................................................               ................................................  Date                                   Time    ..........................................................................................................................................  Reviewed by Signature/Title    ...................................................              ..............................................  Date                                               Time          22EPIC Rev 08/18

## 2019-10-24 ENCOUNTER — OFFICE VISIT (OUTPATIENT)
Dept: ENDOCRINOLOGY | Facility: CLINIC | Age: 44
End: 2019-10-24
Payer: COMMERCIAL

## 2019-10-24 ENCOUNTER — TELEPHONE (OUTPATIENT)
Dept: ENDOCRINOLOGY | Facility: CLINIC | Age: 44
End: 2019-10-24

## 2019-10-24 ENCOUNTER — TELEPHONE (OUTPATIENT)
Dept: FAMILY MEDICINE | Facility: CLINIC | Age: 44
End: 2019-10-24

## 2019-10-24 VITALS
DIASTOLIC BLOOD PRESSURE: 60 MMHG | BODY MASS INDEX: 26.34 KG/M2 | HEART RATE: 97 BPM | RESPIRATION RATE: 14 BRPM | WEIGHT: 144 LBS | SYSTOLIC BLOOD PRESSURE: 93 MMHG

## 2019-10-24 VITALS
TEMPERATURE: 98.7 F | HEART RATE: 63 BPM | SYSTOLIC BLOOD PRESSURE: 120 MMHG | OXYGEN SATURATION: 99 % | RESPIRATION RATE: 30 BRPM | DIASTOLIC BLOOD PRESSURE: 101 MMHG

## 2019-10-24 DIAGNOSIS — E03.4 HYPOTHYROIDISM DUE TO ACQUIRED ATROPHY OF THYROID: ICD-10-CM

## 2019-10-24 DIAGNOSIS — E11.65 UNCONTROLLED TYPE 2 DIABETES MELLITUS WITH HYPERGLYCEMIA (H): Primary | ICD-10-CM

## 2019-10-24 DIAGNOSIS — E03.9 ACQUIRED HYPOTHYROIDISM: ICD-10-CM

## 2019-10-24 LAB
HBA1C MFR BLD: 5.6 % (ref 0–5.6)
INTERPRETATION ECG - MUSE: NORMAL
TROPONIN I SERPL-MCNC: <0.015 UG/L (ref 0–0.04)

## 2019-10-24 PROCEDURE — 82043 UR ALBUMIN QUANTITATIVE: CPT | Performed by: CLINICAL NURSE SPECIALIST

## 2019-10-24 PROCEDURE — 99214 OFFICE O/P EST MOD 30 MIN: CPT | Performed by: CLINICAL NURSE SPECIALIST

## 2019-10-24 PROCEDURE — 84443 ASSAY THYROID STIM HORMONE: CPT | Performed by: CLINICAL NURSE SPECIALIST

## 2019-10-24 PROCEDURE — 84439 ASSAY OF FREE THYROXINE: CPT | Performed by: CLINICAL NURSE SPECIALIST

## 2019-10-24 PROCEDURE — 83036 HEMOGLOBIN GLYCOSYLATED A1C: CPT | Performed by: CLINICAL NURSE SPECIALIST

## 2019-10-24 PROCEDURE — 36415 COLL VENOUS BLD VENIPUNCTURE: CPT | Performed by: CLINICAL NURSE SPECIALIST

## 2019-10-24 ASSESSMENT — ENCOUNTER SYMPTOMS
BACK PAIN: 0
SORE THROAT: 1
DYSURIA: 0
CHEST TIGHTNESS: 1
FEVER: 0
FATIGUE: 1
NAUSEA: 0
WHEEZING: 0
CHILLS: 1
ABDOMINAL PAIN: 0
SHORTNESS OF BREATH: 1
DIARRHEA: 0
VOMITING: 0
COUGH: 1

## 2019-10-24 NOTE — ED PROVIDER NOTES
History     Chief Complaint:  Chest Pain    HPI   Melody Muñoz is a 43 year old female who presents with chest pain which she describes as burning in her chest with deep breathing and cough for the past 2 days.  Notes waxing and waning symptoms.  No obvious exacerbating or alleviating factors.  Also notes cough and sore throat.  She denies any nausea or vomiting.  She is had chills at home without fever.  Denies any swelling in her legs or calf tenderness.  No hemoptysis.  No personal history of heart disease.    Allergies:  Hydrocodone, nausea and vomiting     Medications:    Albuterol  Almotriptan  Carisoprodol   Dextroamphetamine  Diazepam  Guanfacine  Lamotrigine  Latuda  Levothyroxine  Lithium  Magnesium citrate  Zofran ODT  Pantoprazole  Potassium  Lyrica  Topiramate  Venlafaxine   Vitamin D    Past Medical History:    Abnormal pap smear  Anxiety   Bipolar 1 disorder  Diabetes  Endometriosis  Intermittent asthma  Latent tuberculosis  Major depression  Migraines   Sleep apnea  Substance abuse  Suicidal ideation  Vertigo  TMJ  Hypothyroidism  Sepsis  Hyperlipidemia  IBS    Past Surgical History:    Biopsy  Bladder sling  Laparoscopy endometriosis   section  Laparoscopic appendectomy  Little finger surgery, left  Tubal ligation  [place] teeth removal    Family History:    Hypertension, mother  Heart disease, father  Depression, sister  Anxiety disorder, sister    Social History:  Former smoker, quit 3/18/2019.  Negative for alcohol use, sober since 2013.  Negative for drug use.   Marital Status:   [2]     Review of Systems   Constitutional: Positive for chills and fatigue. Negative for fever.   HENT: Positive for sore throat.    Respiratory: Positive for cough, chest tightness and shortness of breath. Negative for wheezing.    Cardiovascular: Positive for chest pain.   Gastrointestinal: Negative for abdominal pain, diarrhea, nausea and vomiting.   Genitourinary: Negative for dysuria.    Musculoskeletal: Negative for back pain.   Neurological: Negative for syncope.       Physical Exam     Patient Vitals for the past 24 hrs:   BP Temp Temp src Pulse Heart Rate Resp SpO2   10/24/19 0000 (!) 120/101 -- -- 63 57 30 99 %   10/23/19 2315 111/84 -- -- 63 -- -- 97 %   10/23/19 2245 128/87 -- -- 73 72 11 97 %   10/23/19 2230 139/87 -- -- -- -- -- --   10/23/19 2215 -- -- -- -- 64 21 97 %   10/23/19 2200 -- -- -- -- 68 13 98 %   10/23/19 2150 (!) 154/96 98.7  F (37.1  C) Oral 75 75 18 98 %     Physical Exam  General: Patient is awake, alert and interactive when I enter the room  Head: The scalp, face, and head appear normal  Eyes: The pupils are equal, round, and reactive to light. Conjunctivae and sclerae are normal  ENT: External acoustic canals are normal. The oropharynx is normal without erythema. Uvula is in the midline  Neck: Normal range of motion. No anterior cervical lymphadenopathy noted  CV: Regular rate. S1/S2. No murmurs. Peripheral pulses including radial pulses are symmetric.   Resp: Lungs are clear without wheezes or rales. No respiratory distress.   GI: Abdomen is soft, no rigidity, guarding, or rebound. No distension. Very mild epigastric tenderness to palpation.     MS: Chest wall is tender to palpation. Normal tone. Joints grossly normal without effusions. No asymmetric leg swelling, calf or thigh tenderness.    Skin: No rash or lesions noted. Normal capillary refill noted  Neuro: Speech is normal and fluent. Face is symmetric. Moving all extremities.   Psych:  Normal affect.  Appropriate interactions.    Emergency Department Course   ECG:  Indication: Chest Pain  Time: 2159  Vent. Rate 67 bpm. RI interval 176. QRS duration 102. QT/QTc 416/439. P-R-T axis 65 25 63.  Normal sinus rhythm. Normal ECG. No significant change compared to EKG dated 3/28/2019. Read time: 2204    Imaging:  Radiographic findings were communicated with the patient who voiced understanding of the findings.  XR Chest  2 views:   No acute abnormality as per radiology.    Laboratory:  CBC: WBC: 11.4 (H), HGB: 14.3, PLT: 395  CMP: AWNL (Creatinine: 0.83)  Lipase: 535 (H)  2157 Troponin: <0.015  2351 Troponin: <0.015    Interventions:  2206 Zantac tablet 300 mg PO  2207 Lidocaine 2% 15 mL, Mylanta ES 15 mL GI cocktail, 30 mL PO  2306 Nitrostat sublingual tablet 0.4 mg   2327 Dilaudid injection 0.5 mg IV    Emergency Department Course:  Nursing notes and vitals reviewed. (2200) I performed an exam of the patient as documented above.     2159 ECG was obtained.     IV inserted. Medicine administered as documented above. Blood drawn. This was sent to the lab for further testing, results above.     The patient was sent for a chest x-ray while in the emergency department, findings above.     0015 I rechecked the patient and discussed the results of her workup thus far.     Findings and plan explained to the Patient. Patient discharged home with instructions regarding supportive care, medications, and reasons to return. The importance of close follow-up was reviewed.    HEART SCORE:    History:   Moderately suspicious (1)    ECG:   Normal    Age:   <45 (0)    Risk Factors:  1-2 Risk Factors (1)    Troponin:   <1 normal limit (0)    Total:     2      **Risk factors: DM, current or recent smoker (< 90 days), HTN, hyperlipidemia, Fam hx of CAD, BMI > 30, history of atheroslcerosis    A HEART score of < 3 places their risk of major adverse cardiac event at about 1.7% at 6 weeks (<1% at 30 days).  If repeated troponin at 3 hours likely reduces risk to less than 1%.         Impression & Plan    Medical Decision Making:  Patient is a 43-year-old female who presents to the emergency department with chest and epigastric pain that she describes as a burning sensation that is worse with deep breathing and cough.  Upon initial evaluation she is hemodynamically stable with normal vital signs.  She is afebrile.  Physical exam is overall reassuring.  EKG  was obtained which did not show any acute signs of ischemia or dysrhythmia.  Troponin was negative x2.  She did not have any significant change in her symptoms with nitroglycerin.  Given the patient's history, presentation and work-up I feel that acute coronary syndrome as the cause of her symptoms is quite low today.  Chest x-ray was also obtained which did not show any acute signs of pneumonia, pneumothorax, widened mediastinum, pulmonary edema or pleural effusion.  Blood work was also obtained which showed a mildly elevated lipase however I do not feel that this represents pancreatitis here today.  LFTs were within normal limits.  Patient did have improvement of her pain with 1/2 mg of Dilaudid.  Initial GI cocktail did not provide any significant improvement of her symptoms.  At this point I feel that the patient is safe for outpatient management of her chest pain and is okay for PCP further evaluation.  At this point is unclear what is causing her chest pain however viral pleurisy certainly is considered given her overall symptoms.  I discussed the plan with the patient.  All questions were answered patient will be discharged home in stable condition.    Diagnosis:    ICD-10-CM   1. Chest pain, unspecified type R07.9   2. Epigastric pain R10.13       Disposition:  The patient was discharged to home.    Scribe Disclosure:  I, Nguyen Singletary, am serving as a scribe on 10/23/2019 at 10:00 PM to personally document services performed by Jose Cruz Steele MD based on my observations and the provider's statements to me.     Nguyen Singletary  10/23/2019   [facility] EMERGENCY DEPARTMENT       Jose Cruz Steele MD  10/24/19 0155

## 2019-10-24 NOTE — TELEPHONE ENCOUNTER
"Hospital/TCU/ED for chronic condition Discharge Protocol    \"Hi, my name is Pam Antonio RN, a registered nurse, and I am calling from [facility].  I am calling to follow up and see how things are going for you after your recent emergency visit/hospital/TCU stay.\"    Tell me how you are doing now that you are home?\" patient calling stating is still having a lot of pain in the gall bladder region and doesn't like going to the ER because they only rule out the \"acute\" and don't \"diagnose\"      Discharge Instructions    \"Let's review your discharge instructions.  What is/are the follow-up recommendations?  Pt. Response: Recommended patient to follow up with PCP    \"Has an appointment with your primary care provider been scheduled?\"   an appointment was scheduled.    \"When you see the provider, I would recommend that you bring your medications with you.\"    Medications    \"Tell me what changed about your medicines when you discharged?\"    Changes to chronic meds?    0-1    \"What questions do you have about your medications?\"    None     New diagnoses of heart failure, COPD, diabetes, or MI?    No              Post Discharge Medication Reconciliation Status: discharge medications reconciled, continue medications without change.    Was MTM referral placed (*Make sure to put transitions as reason for referral)?   No    Call Summary    \"What questions or concerns do you have about your recent visit and your follow-up care?\"     none    \"If you have questions or things don't continue to improve, we encourage you contact us through the main clinic number (give number).  Even if the clinic is not open, triage nurses are available 24/7 to help you.     We would like you to know that our clinic has extended hours (provide information).  We also have urgent care (provide details on closest location and hours/contact info)\"      \"Thank you for your time and take care!\"       ONELIA Heredia, RN  [employer] " Triage

## 2019-10-24 NOTE — TELEPHONE ENCOUNTER
Called patient and left a voicemail letting her know she can  the Accu-Chek blood glucose meter that Tammy Louie offered at her appointment today.  Meter at  for .  Also sent the following [employer] message:    Tammy Hester, NP has a free Accu-Chek blood glucose meter for you.  She faxed a prescription for the accu-chek test strips to your pharmacy but forgot to give you the free meter we had here in the clinic.  I have placed it at the  for you to  when you have time.  Let me know if you have any questions.  Take care.      Thank you,  Jennifer Oglesby M.A.  Endocrinology  [facility]  [phone] [facility]       Jennifer Oglesby CMA on 10/24/2019 at 3:22 PM

## 2019-10-24 NOTE — PATIENT INSTRUCTIONS
Component      Latest Ref Rng & Units 10/1/2018 2/6/2019 10/24/2019   Hemoglobin A1C      0 - 5.6 % 9.6 (H) 6.5 (H) 5.6   Your A1c is in normal range.    I'm checking your thyroid levels today - when results are available and if your levothyroxine dose needs to change    Stop the Trulicity.  Monitor your blood sugars once to twice per day over the next 3-4 weeks - if blood sugars start increasing, message me and I'll advise you about whether another treatment is needed or if we just continue to monitor it.    Follow up in January.    Tammy Louie NP  Endocrinology

## 2019-10-24 NOTE — ED TRIAGE NOTES
Here for mid chest pain/pressure with sob started this evening while resting associated with dizziness. Feels like burning. Did have a similar pain episode yesterday but resolve. ABCs intact.

## 2019-10-24 NOTE — LETTER
10/24/2019         RE: Melody Muñoz  [address] [place] [address]        Dear Colleague,    Thank you for referring your patient, Melody Muñoz, to the [facility]. Please see a copy of my visit note below.    Name: Melody Muñoz  F/u for Diabetes (Last seen 3/21/2019).  HPI:  Melody Muñoz is a 44 year old female who presents for the management of:    1. Type 2 DM:  Originally diagnosed: 3/2017.   Initially treated with metformin - did not tolerate due to GI side effects    Current Regimen: Trulicity 0.75 mg weekly,  Trulicity was started 11/2018.  Briefly treated with januvia x 1 month - discontinued due to ineffectiveness.    BS checks: testing 0-2 x/day   Meter Download: average glucose 121; BG range .    No FH of diabetes.  Personal history of GD with 4th pregnancy - pregnant with twins at the time, was diet controlled.      ++abdominal bloating and discomfort since the end of September.  Has a history of IBS.  Has been under a great deal of personal stress.  Can't see GI until 11/1 but does have an appointment.  Was seen in ED yesterday.  Work up was unremarkable other than mildly elevated lipase - not felt to represent pancreatitis.      Complications:   Diabetes Complications  Description / Detail    Diabetic Retinopathy  Needs to schedule eye exam - referral previously provided    CAD / PAD  No   Neuropathy  No numbness, burning or tingling in her feet but has noted 2 months history of numbness in her hands   Nephropathy / Microalbuminuria  elevated urine microalbumin x 1 - repeat urine microalbumin pending.   Gastroparesis  No   Hypoglycemia Unawareness  N0     2. Blood Pressure Blood Pressure today:   BP Readings from Last 3 Encounters:   10/24/19 93/60   10/24/19 (!) 120/101   10/04/19 114/70   Blood pressure medications include none  3. Lipids: Takes no medications for lipid control.      4. Prevention:  Flu Shot- 9/23/2019  Pneumovax-  Recommended  Opthalmology-Due, referral provided.  Dental-Recommended semiannually  ASA-No  Smoking- No  4. Hypothyroidism. Currently treated with levothyroxine 50 mcg/day.  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     Migraines     otc excedrin prn     Sleep apnea 2014    uses Cpap     Substance abuse (H)     no use since summer 2013     Suicidal ideation 2013    in past, not current, sees psychiatrist and therapist     Vertigo 2015     Past Surgical History:   Procedure Laterality Date     BIOPSY       COLONOSCOPY       GENITOURINARY SURGERY  May/2014    bladder sling     GYN SURGERY      laparoscopy- endometriosis     GYN SURGERY       for twins     LAPAROSCOPIC APPENDECTOMY N/A 2017    Procedure: LAPAROSCOPIC APPENDECTOMY;  LAPAROSCOPIC APPENDECTOMY and resection of epiploic tag;  Surgeon: Roberto Robins MD;  Location: [facility] OR     little finger surgery left  1980     tubal ligation bilateral       wisdom teeth removal       Family Hx:  Family History   Problem Relation Age of Onset     Hypertension Mother      Heart Disease Father         palpitations     Depression Sister      Anxiety Disorder Sister      Heart Disease Maternal Grandmother         CHF     Cancer Maternal Grandfather 72        Pancreatic Cancer     Alzheimer Disease Paternal Grandfather      Bipolar Disorder Other      Autism Spectrum Disorder Other      Thyroid disease: No         DM2: No         Autoimmune: DM1, SLE, RA, Vitiligo cousin with type  1 diabetes    Social Hx:  Social History     Socioeconomic History     Marital status:      Spouse name: Not on file     Number of children: Not on file     Years of education: Not on file     Highest education level: Not on file   Occupational History     Employer: SELF EMPLOYED.     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   Social Needs     Financial resource strain: Not on file     Food insecurity:     Worry: Not on file     Inability: Not on file     Transportation needs:     Medical: Not on file     Non-medical: Not on file   Tobacco Use     Smoking status: Former Smoker     Packs/day: 0.00     Years: 17.00     Pack years: 0.00     Types: Cigarettes     Last attempt to quit: 3/18/2019     Years since quittin.6     Smokeless tobacco: Never Used   Substance and Sexual Activity     Alcohol use: No     Comment: no etoh since      Drug use: No     Sexual activity: Not Currently     Partners: Male     Birth control/protection: Surgical     Comment: tubal ligation   Lifestyle     Physical activity:     Days per week: Not on file     Minutes per session: Not on file     Stress: Not on file   Relationships     Social connections:     Talks on phone: Not on file     Gets together: Not on file     Attends Hindu service: Not on file     Active member of club or organization: Not on file     Attends meetings of clubs or organizations: Not on file     Relationship status: Not on file     Intimate partner violence:     Fear of current or ex partner: Not on file     Emotionally abused: Not on file     Physically abused: Not on file     Forced sexual activity: Not on file   Other Topics Concern     Parent/sibling w/ CABG, MI or angioplasty before 65F 55M? No      Service Not Asked     Blood Transfusions Not Asked     Caffeine Concern Not Asked     Occupational Exposure Not Asked     Hobby Hazards Not Asked     Sleep Concern Not Asked     Stress Concern Not Asked     Weight Concern Not Asked      Special Diet Not Asked     Back Care Not Asked     Exercise Not Asked     Bike Helmet Yes     Seat Belt Yes     Self-Exams Not Asked   Social History Narrative     Not on file          MEDICATIONS:  has a current medication list which includes the following prescription(s): accu-chek allie plus, albuterol, almotriptan, blood glucose, blood glucose, blood glucose, carisoprodol, freestyle aurelia 14 day reader, freestyle aurelia 14 day sensor, dextroamphetamine, dextroamphetamine, diazepam, fluticasone, guanfacine, lamotrigine, latuda, levothyroxine, lithium er, magnesium citrate, microlet lancets, ondansetron, order for dme, pantoprazole, potassium, pregabalin, proair hfa, statin not prescribed, topiramate, venlafaxine, and cholecalciferol.    ROS     ROS: 10 point ROS neg other than the symptoms noted above in the HPI.    Physical Exam   VS: BP 93/60 (BP Location: Right arm, Patient Position: Chair, Cuff Size: Adult Regular)   Pulse 97   Resp 14   Wt 65.3 kg (144 lb)   LMP  (LMP Unknown)   Breastfeeding? No   BMI 26.34 kg/m     GENERAL: AXOX3, NAD, well dressed, answering questions appropriately, appears stated age.  HEENT: no exophthalmos, no proptosis no lig lag, no retraction  CV: RRR, no rubs, gallops, no murmurs  LUNGS: CTAB, no wheezes, rales, or rhonchi  EXTREMITIES: no edema  NEUROLOGY: CN grossly intact, + monofilament, no tremors  MSK: grossly intact  LABS:  A1c:   Component      Latest Ref Rng & Units 10/1/2018 2/6/2019 10/24/2019   Hemoglobin A1C      0 - 5.6 % 9.6 (H) 6.5 (H) 5.6     Basic Metabolic Panel:  !COMPREHENSIVE Latest Ref Rng & Units 10/23/2019   SODIUM 133 - 144 mmol/L 140   POTASSIUM 3.4 - 5.3 mmol/L 3.6   CHLORIDE 94 - 109 mmol/L 109   BUN 7 - 30 mg/dL 9   Creatinine 0.52 - 1.04 mg/dL 0.83   Glucose 70 - 99 mg/dL 91   ANION GAP 3 - 14 mmol/L 5   CALCIUM 8.5 - 10.1 mg/dL 9.5   ALBUMIN 3.4 - 5.0 g/dL 3.9   PROTEIN, TOTAL 6.8 - 8.8 g/dL 6.9   AST 0 - 45 U/L 20     LFTS/Cholesterol  Panel:  !LIPID/HEPATIC Latest Ref Rng & Units 2/6/2019   CHOLESTEROL <200 mg/dL 186   TRIGLYCERIDES <150 mg/dL 376 (H)   HDL CHOLESTEROL >49 mg/dL 36 (L)   LDL CHOLESTEROL, CALCULATED <100 mg/dL 75   VLDL-CHOLESTEROL 0 - 30 mg/dL    NON HDL CHOLESTEROL <130 mg/dL 150 (H)     !LIPID/HEPATIC Latest Ref Rng & Units 10/23/2019   AST 0 - 45 U/L 20   ALT 0 - 50 U/L 28     Thyroid Function:   !THYROID Latest Ref Rng & Units 5/2/2019 10/1/2018 11/10/2017   TSH 0.40 - 4.00 mU/L 0.84 1.67 1.20   T4 FREE 0.76 - 1.46 ng/dL 0.95 0.81      !THYROID Latest Ref Rng & Units 3/2/2017 2/16/2016   TSH 0.40 - 4.00 mU/L 1.36 5.11 (H)   T4 FREE 0.76 - 1.46 ng/dL  1.02     Urine MicroAlbumin:  Component      Latest Ref Rng & Units 6/19/2018 10/1/2018   Creatinine Urine      mg/dL 21 97   Albumin Urine mg/L      mg/L 8 17   Albumin Urine mg/g Cr      0 - 25 mg/g Cr 37.38 (H) 17.65     Vitamin D:    All pertinent notes, labs, and images personally reviewed by me.     A/P  Ms.Jessica TONYA Muñoz is a 44 year old here for the management of:    1. DM2 - Controlled.  A1c now in normal range.  Discontinue Trulicity.  Continue to monitor blood sugars 1-2 times per day.  She'll contact me if her glucose levels are > 150 fasting or >200 after meals.  There is some variability among people, most will usually develop symptoms suggestive of hypoglycemia when blood glucose levels are lowered to the mid 60's. The first set of symptoms are called adrenergic. Patients may experience any of the following nervousness, sweating, intense hunger, trembling, weakness, palpitations, and difficulty speaking.   The acute management of hypoglycemia involves the rapid delivery of a source of easily absorbed sugar. Regular soda, juice, lifesavers, table sugar, are good options. 15 grams of glucose is the dose that is given, followed by an assessment of symptoms and a blood glucose check if possible. If after 10 minutes there is no improvement, another 10-15 grams  should be given. This can be repeated up to three times. The equivalency of 10-15 grams of glucose (approximate servings) are: 3-5 hard candies, 3 teaspoons of sugar, or 1/2 cup of regular soda or juice.    2. Hypothyroidism.  Obtain TFT's and adjust levothyroxine dose if indicated.    Labs ordered today:   Orders Placed This Encounter   Procedures     Hemoglobin A1c     Albumin Random Urine Quantitative with Creat Ratio       Radiology/Consults ordered today: None    All questions were answered.  The patient indicates understanding of the above issues and agrees with the plan set forth.  Total face to face time greater than or equal to 25 minutes. More than 50% of face to face time spent with Ms. Muñoz on counseling / coordinating her care discussing treatment plan as listed above.       Follow-up:  3 months    Tammy Louie NP  Endocrinology  [facility]  CC: Evaristo Machado      Again, thank you for allowing me to participate in the care of your patient.        Sincerely,        KIRAN Carballo CNP

## 2019-10-24 NOTE — PROGRESS NOTES
Name: Melody Muñoz  F/u for Diabetes (Last seen 3/21/2019).  HPI:  Melody Muñoz is a 44 year old female who presents for the management of:    1. Type 2 DM:  Originally diagnosed: 3/2017.   Initially treated with metformin - did not tolerate due to GI side effects    Current Regimen: Trulicity 0.75 mg weekly,  Trulicity was started 11/2018.  Briefly treated with januvia x 1 month - discontinued due to ineffectiveness.    BS checks: testing 0-2 x/day   Meter Download: average glucose 121; BG range .    No FH of diabetes.  Personal history of GD with 4th pregnancy - pregnant with twins at the time, was diet controlled.      ++abdominal bloating and discomfort since the end of September.  Has a history of IBS.  Has been under a great deal of personal stress.  Can't see GI until 11/1 but does have an appointment.  Was seen in ED yesterday.  Work up was unremarkable other than mildly elevated lipase - not felt to represent pancreatitis.      Complications:   Diabetes Complications  Description / Detail    Diabetic Retinopathy  Needs to schedule eye exam - referral previously provided    CAD / PAD  No   Neuropathy  No numbness, burning or tingling in her feet but has noted 2 months history of numbness in her hands   Nephropathy / Microalbuminuria  elevated urine microalbumin x 1 - repeat urine microalbumin pending.   Gastroparesis  No   Hypoglycemia Unawareness  N0     2. Blood Pressure Blood Pressure today:   BP Readings from Last 3 Encounters:   10/24/19 93/60   10/24/19 (!) 120/101   10/04/19 114/70   Blood pressure medications include none  3. Lipids: Takes no medications for lipid control.      4. Prevention:  Flu Shot- 9/23/2019  Pneumovax- Recommended  Opthalmology-Due, referral provided.  Dental-Recommended semiannually  ASA-No  Smoking- No  4. Hypothyroidism. Currently treated with levothyroxine 50 mcg/day.  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     Migraines     otc excedrin prn     Sleep apnea 2014    uses Cpap     Substance abuse (H)     no use since summer 2013     Suicidal ideation 2013    in past, not current, sees psychiatrist and therapist     Vertigo 2015     Past Surgical History:   Procedure Laterality Date     BIOPSY       COLONOSCOPY       GENITOURINARY SURGERY  May/2014    bladder sling     GYN SURGERY      laparoscopy- endometriosis     GYN SURGERY       for twins     LAPAROSCOPIC APPENDECTOMY N/A 2017    Procedure: LAPAROSCOPIC APPENDECTOMY;  LAPAROSCOPIC APPENDECTOMY and resection of epiploic tag;  Surgeon: Roberto Robins MD;  Location: [facility] OR     little finger surgery left  1980     tubal ligation bilateral       wisdom teeth removal       Family Hx:  Family History   Problem Relation Age of Onset     Hypertension Mother      Heart Disease Father         palpitations     Depression Sister      Anxiety Disorder Sister      Heart Disease Maternal Grandmother         CHF     Cancer Maternal Grandfather 72        Pancreatic Cancer     Alzheimer Disease Paternal Grandfather      Bipolar Disorder Other      Autism Spectrum Disorder Other      Thyroid disease: No         DM2: No         Autoimmune: DM1, SLE, RA, Vitiligo cousin with type 1 diabetes    Social Hx:  Social History     Socioeconomic History     Marital status:      Spouse name: Not on file     Number of children: Not on file     Years of education: Not on file     Highest education level: Not on file   Occupational History      Employer: SELF EMPLOYED.     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   Social Needs     Financial resource strain: Not on file     Food insecurity:     Worry: Not on file     Inability: Not on file     Transportation needs:     Medical: Not on file     Non-medical: Not on file   Tobacco Use     Smoking status: Former Smoker     Packs/day: 0.00     Years: 17.00     Pack years: 0.00     Types: Cigarettes     Last attempt to quit: 3/18/2019     Years since quittin.6     Smokeless tobacco: Never Used   Substance and Sexual Activity     Alcohol use: No     Comment: no etoh since      Drug use: No     Sexual activity: Not Currently     Partners: Male     Birth control/protection: Surgical     Comment: tubal ligation   Lifestyle     Physical activity:     Days per week: Not on file     Minutes per session: Not on file     Stress: Not on file   Relationships     Social connections:     Talks on phone: Not on file     Gets together: Not on file     Attends Catholic service: Not on file     Active member of club or organization: Not on file     Attends meetings of clubs or organizations: Not on file     Relationship status: Not on file     Intimate partner violence:     Fear of current or ex partner: Not on file     Emotionally abused: Not on file     Physically abused: Not on file     Forced sexual activity: Not on file   Other Topics Concern     Parent/sibling w/ CABG, MI or angioplasty before 65F 55M? No      Service Not Asked     Blood Transfusions Not Asked     Caffeine Concern Not Asked     Occupational Exposure Not Asked     Hobby Hazards Not Asked     Sleep Concern Not Asked     Stress Concern Not Asked     Weight Concern Not Asked     Special Diet Not Asked     Back Care Not Asked     Exercise Not Asked     Bike Helmet Yes     Seat Belt Yes     Self-Exams Not Asked   Social History Narrative     Not on file          MEDICATIONS:  has a current medication list which includes the following  prescription(s): accu-chek allie plus, albuterol, almotriptan, blood glucose, blood glucose, blood glucose, carisoprodol, freestyle aurelia 14 day reader, freestyle aurelia 14 day sensor, dextroamphetamine, dextroamphetamine, diazepam, fluticasone, guanfacine, lamotrigine, latuda, levothyroxine, lithium er, magnesium citrate, microlet lancets, ondansetron, order for dme, pantoprazole, potassium, pregabalin, proair hfa, statin not prescribed, topiramate, venlafaxine, and cholecalciferol.    ROS     ROS: 10 point ROS neg other than the symptoms noted above in the HPI.    Physical Exam   VS: BP 93/60 (BP Location: Right arm, Patient Position: Chair, Cuff Size: Adult Regular)   Pulse 97   Resp 14   Wt 65.3 kg (144 lb)   LMP  (LMP Unknown)   Breastfeeding? No   BMI 26.34 kg/m    GENERAL: AXOX3, NAD, well dressed, answering questions appropriately, appears stated age.  HEENT: no exophthalmos, no proptosis no lig lag, no retraction  CV: RRR, no rubs, gallops, no murmurs  LUNGS: CTAB, no wheezes, rales, or rhonchi  EXTREMITIES: no edema  NEUROLOGY: CN grossly intact, + monofilament, no tremors  MSK: grossly intact  LABS:  A1c:   Component      Latest Ref Rng & Units 10/1/2018 2/6/2019 10/24/2019   Hemoglobin A1C      0 - 5.6 % 9.6 (H) 6.5 (H) 5.6     Basic Metabolic Panel:  !COMPREHENSIVE Latest Ref Rng & Units 10/23/2019   SODIUM 133 - 144 mmol/L 140   POTASSIUM 3.4 - 5.3 mmol/L 3.6   CHLORIDE 94 - 109 mmol/L 109   BUN 7 - 30 mg/dL 9   Creatinine 0.52 - 1.04 mg/dL 0.83   Glucose 70 - 99 mg/dL 91   ANION GAP 3 - 14 mmol/L 5   CALCIUM 8.5 - 10.1 mg/dL 9.5   ALBUMIN 3.4 - 5.0 g/dL 3.9   PROTEIN, TOTAL 6.8 - 8.8 g/dL 6.9   AST 0 - 45 U/L 20     LFTS/Cholesterol Panel:  !LIPID/HEPATIC Latest Ref Rng & Units 2/6/2019   CHOLESTEROL <200 mg/dL 186   TRIGLYCERIDES <150 mg/dL 376 (H)   HDL CHOLESTEROL >49 mg/dL 36 (L)   LDL CHOLESTEROL, CALCULATED <100 mg/dL 75   VLDL-CHOLESTEROL 0 - 30 mg/dL    NON HDL CHOLESTEROL <130 mg/dL 150  (H)     !LIPID/HEPATIC Latest Ref Rng & Units 10/23/2019   AST 0 - 45 U/L 20   ALT 0 - 50 U/L 28     Thyroid Function:   !THYROID Latest Ref Rng & Units 5/2/2019 10/1/2018 11/10/2017   TSH 0.40 - 4.00 mU/L 0.84 1.67 1.20   T4 FREE 0.76 - 1.46 ng/dL 0.95 0.81      !THYROID Latest Ref Rng & Units 3/2/2017 2/16/2016   TSH 0.40 - 4.00 mU/L 1.36 5.11 (H)   T4 FREE 0.76 - 1.46 ng/dL  1.02     Urine MicroAlbumin:  Component      Latest Ref Rng & Units 6/19/2018 10/1/2018   Creatinine Urine      mg/dL 21 97   Albumin Urine mg/L      mg/L 8 17   Albumin Urine mg/g Cr      0 - 25 mg/g Cr 37.38 (H) 17.65     Vitamin D:    All pertinent notes, labs, and images personally reviewed by me.     A/P  Ms.Jessica TONYA Muñoz is a 44 year old here for the management of:    1. DM2 - Controlled.  A1c now in normal range.  Discontinue Trulicity.  Continue to monitor blood sugars 1-2 times per day.  She'll contact me if her glucose levels are > 150 fasting or >200 after meals.  There is some variability among people, most will usually develop symptoms suggestive of hypoglycemia when blood glucose levels are lowered to the mid 60's. The first set of symptoms are called adrenergic. Patients may experience any of the following nervousness, sweating, intense hunger, trembling, weakness, palpitations, and difficulty speaking.   The acute management of hypoglycemia involves the rapid delivery of a source of easily absorbed sugar. Regular soda, juice, lifesavers, table sugar, are good options. 15 grams of glucose is the dose that is given, followed by an assessment of symptoms and a blood glucose check if possible. If after 10 minutes there is no improvement, another 10-15 grams should be given. This can be repeated up to three times. The equivalency of 10-15 grams of glucose (approximate servings) are: 3-5 hard candies, 3 teaspoons of sugar, or 1/2 cup of regular soda or juice.    2. Hypothyroidism.  Obtain TFT's and adjust levothyroxine dose if  indicated.    Labs ordered today:   Orders Placed This Encounter   Procedures     Hemoglobin A1c     Albumin Random Urine Quantitative with Creat Ratio       Radiology/Consults ordered today: None    All questions were answered.  The patient indicates understanding of the above issues and agrees with the plan set forth.  Total face to face time greater than or equal to 25 minutes. More than 50% of face to face time spent with Ms. Muñoz on counseling / coordinating her care discussing treatment plan as listed above.       Follow-up:  3 months    Tammy Louie NP  Endocrinology  [facility]  CC: Evaristo Machado

## 2019-10-25 LAB
CREAT UR-MCNC: 149 MG/DL
MICROALBUMIN UR-MCNC: 12 MG/L
MICROALBUMIN/CREAT UR: 8.32 MG/G CR (ref 0–25)
T4 FREE SERPL-MCNC: 1.18 NG/DL (ref 0.76–1.46)
TSH SERPL DL<=0.005 MIU/L-ACNC: 0.45 MU/L (ref 0.4–4)

## 2019-10-25 ASSESSMENT — ASTHMA QUESTIONNAIRES: ACT_TOTALSCORE: 19

## 2019-10-27 RX ORDER — LEVOTHYROXINE SODIUM 50 UG/1
50 TABLET ORAL DAILY
Qty: 90 TABLET | Refills: 3 | Status: SHIPPED | OUTPATIENT
Start: 2019-10-27 | End: 2020-01-23

## 2019-10-27 NOTE — RESULT ENCOUNTER NOTE
Melody,  Your thyroid levels are in normal range.  Please continue levothyroxine 50 mcg/day.  There was no abnormal protein in your urine.  Let me know if you have any questions.  Also, let me know if your blood sugars become elevated off the Trulicity.  Tammy Louie NP  Endocrinology

## 2019-10-29 ENCOUNTER — TELEPHONE (OUTPATIENT)
Dept: ENDOCRINOLOGY | Facility: CLINIC | Age: 44
End: 2019-10-29

## 2019-10-29 DIAGNOSIS — E03.9 ACQUIRED HYPOTHYROIDISM: Primary | ICD-10-CM

## 2019-10-29 NOTE — TELEPHONE ENCOUNTER
"Pt calls, wants lab results from endocrinology,  not happy with thyroid labs, \"what does she know\", explained wnl and no dose adjustment needed, pt verbalizes multiple concerns about health care etc, wants additional thyroid labs, wonders why T3 not done (has future order from May?), wants message sent to [facility], wants more thyroid labs done, routed, inform plan, route to inform pt    Telephone Information:   Mobile [phone]         Yoselin Roberts RN, BSN  Message handled by Nurse Triage.    "

## 2019-10-29 NOTE — LETTER
"November 7, 2019      Melody Muñoz  [address] [place] [address]   [place] [address]        Dear Nikko Oliver we were not able to reach you by phone. Your mail box is full so we are sending you a letter in response to your call on 10/29/19. Here is a message from Tammy Louie:      \"Sure, we can repeat a complete thyroid panel if she wants.  I'll place orders. If she was concerned about the TSH being in the lower end of the range, you can explain that a lower TSH is associated with higher thyroid hormone levels.  If her repeat TSH is low-normal, increasing the levothyroxine dose further would make her TSH suppress below normal range.  I'll let her know the repeat test results when available.  Let me know if you have any other concerns. You are  more than welcome to see either of the two endocrinologist I work with for their option, it's just that they are both booked out 3 months and I don't think either has any sooner availability. [facility] of [place] may have more availability - just want her to have options if she is unhappy with her care.\"      Thanks,  Tammy Louie NP  Endocrinology    Please make a lab appointment that includes a T3 Total and T3 Free if you want to repeat your labs. Please contact Tammy Louie's office staff if any questions or concerns.    Sincerely,    Tammy Louie CNP/Yoselin BALLESTEROS       "

## 2019-11-01 ENCOUNTER — TRANSFERRED RECORDS (OUTPATIENT)
Dept: MULTI SPECIALTY CLINIC | Facility: CLINIC | Age: 44
End: 2019-11-01

## 2019-11-01 LAB — RETINOPATHY: NORMAL

## 2019-11-05 NOTE — TELEPHONE ENCOUNTER
Sure, we can repeat a complete thyroid panel if she wants.  I'll place orders. What are her particular concerns about the lab results?  If she was concerned about the TSH being in the lower end of the range, you can explain that a lower TSH is associated with higher thyroid hormone levels.  If her repeat TSH is low-normal, increasing the levothyroxine dose further would make her TSH suppress below normal range.  I'll let her know the repeat test results when available.  Let me know if she has any other concerns.  She's also more than welcome to see either of the two endocrinologist I work with for their option, it's just that they are both booked out 3 months and I don't think either has any sooner availability.  [facility] clinic of [place] may have more availability - just want her to have options if she is unhappy with her care.  Thanks,  Tammy Louie NP  Endocrinology

## 2019-11-07 NOTE — TELEPHONE ENCOUNTER
Patient Contact    Attempt # 3    Was call answered?  No.  Unable to leave message as voice mail full     Letter sent    Yoselin Roberts RN, BSN  Message handled by Nurse Triage.

## 2019-11-12 DIAGNOSIS — G43.709 CHRONIC MIGRAINE WITHOUT AURA WITHOUT STATUS MIGRAINOSUS, NOT INTRACTABLE: Primary | ICD-10-CM

## 2019-11-12 NOTE — TELEPHONE ENCOUNTER
Reason for Call:  Medication or medication refill:  Do you use a [facility]?  Name of the pharmacy and phone number for the current request:  [place] [address]  [phone]  Name of the medication requested: almotriptan (AXERT) 6.25 MG tablet  Other request: none  Can we leave a detailed message on this number? YES  Phone number patient can be reached at: Home number on file [phone] (home)  Best Time: any  Call taken on 11/12/2019 at 2:59 PM by BREANN WARREN

## 2019-11-12 NOTE — TELEPHONE ENCOUNTER
"Requested Prescriptions   Pending Prescriptions Disp Refills     almotriptan (AXERT) 6.25 MG tablet  Last Written Prescription Date:  11/1/2018  Last Fill Quantity: na,  # refills: 1   Last office visit: 10/4/2019 with prescribing provider:  Fitterer   Future Office Visit:   Next 5 appointments (look out 90 days)    Jan 23, 2020 10:00 AM CST  (Arrive by 9:45 AM)  Return Visit with KIRAN [facility]o CNP  [facility] [address] [facility] [address]  [phone]           1       Serotonin Agonists Failed - 11/12/2019  3:05 PM        Failed - Serotonin Agonist request needs review.     Please review patient's record. If patient has had 8 or more treatments in the past month, please forward to provider.          Passed - Blood pressure under 140/90 in past 12 months     BP Readings from Last 3 Encounters:   10/24/19 93/60   10/24/19 (!) 120/101   10/04/19 114/70                 Passed - Recent (12 mo) or future (30 days) visit within the authorizing provider's specialty     Patient has had an office visit with the authorizing provider or a provider within the authorizing providers department within the previous 12 mos or has a future within next 30 days. See \"Patient Info\" tab in inbasket, or \"Choose Columns\" in Meds & Orders section of the refill encounter.              Passed - Medication is active on med list        Passed - Patient is age 18 or older        Passed - No active pregnancy on record        Passed - No positive pregnancy test in past 12 months           "

## 2019-11-14 ENCOUNTER — NURSE TRIAGE (OUTPATIENT)
Dept: FAMILY MEDICINE | Facility: CLINIC | Age: 44
End: 2019-11-14

## 2019-11-14 NOTE — TELEPHONE ENCOUNTER
Reason for Call:  Other call back    Detailed comments: Patient states one side of her neck is swollen. Doesn't feel sick. Please call to advise. Wondering if she should be concerned or go to urgent care.    Phone Number Patient can be reached at: Home number on file [phone] (home)    Best Time: any    Can we leave a detailed message on this number? YES    Call taken on 11/14/2019 at 1:53 PM by TRIXIE KRUEGER

## 2019-11-15 RX ORDER — ALMOTRIPTAN 6.25 MG/1
TABLET, FILM COATED ORAL
Qty: 30 TABLET | Refills: 1 | Status: SHIPPED | OUTPATIENT
Start: 2019-11-15 | End: 2023-02-06

## 2019-11-15 NOTE — TELEPHONE ENCOUNTER
Routing refill request to provider for review/approval because:  Listed as historical  Provider review needed.

## 2019-11-15 NOTE — TELEPHONE ENCOUNTER
"Patient Contact    Called patient and triaged symptoms. She has a hard time describing her swelling. It is on the right side and she states \"It just feels different\". Denies numbness/tingling, SOB. Patient also notes a lot more stress and panic attacks/anxiety. Past couple weeks have been really hard. 2 or 3 panic attacks that have stopped in tracks. Questions if it is related to decreasing clonazepam. Instructed to callback with change in symptoms.    Routing to provider as FYI. Scheduled appointment 11/25.       Additional Information    Negative: Dangerous mechanism of injury (e.g., MVA, contact sports, diving, fall on trampoline, fall > 10 feet or 3 meters) (EXCEPTION: neck pain began > 1 hour after injury)    Negative: Weakness of arms or legs    Negative: Numbness, tingling, or burning of arms, upper back/chest or legs (EXCEPTION: brief, now gone)    Negative: Major bleeding (actively dripping or spurting) that can't be stopped    Negative: Bullet, knife or other serious penetrating wound    Negative: Difficulty breathing (e.g., choking or stridor)    Negative: Knocked out (unconscious) > 1 minute    Negative: Direct blow to front of neck and cough, hoarseness, abnormal voice, or can't talk    Negative: Sounds like a serious injury to the triager    Negative: Sounds like a life-threatening emergency to the triager    Negative: Neck pain not from an injury    Negative: Dangerous mechanism of injury (e.g., MVA, contact sports, diving, fall on trampoline, fall > 10 feet or 3 meters) and neck pain or stiffness began > 1 hour after injury    Negative: Coughing up blood    Negative: Numbness, tingling, or burning of arms, upper back/chest or legs and not present now (i.e., completely resolved)    Negative: Difficulty swallowing or drooling    Negative: Skin is split open or gaping  (length > 1/2 inch or 12 mm)    Negative: Puncture wound of neck    Negative: Bleeding won't stop after 10 minutes of direct pressure " "(using correct technique)    Negative: Large swelling or bruise (> 2 inches or 5 cm)    Negative: SEVERE neck pain (e.g., excruciating)    Patient wants to be seen    Negative: High-risk adult (e.g., rheumatoid arthritis, ankylosing spondylitis, cervical spine abnormalities)    Negative: Patient is confused or is an unreliable provider of information (e.g., dementia, profound mental retardation, alcohol intoxication)    Answer Assessment - Initial Assessment Questions  1. MECHANISM: \"How did the injury happen?\" (Consider the possibility of domestic violence or elder abuse)      Neck feels different on one side versus the other. Just noticed it yesterday. Has to be careful about things that she noticed - found lump in breast months ago.   2. ONSET: \"When did the injury happen?\" (Minutes or hours ago)      Yesterday.   3. LOCATION: \"What part of the neck is injured?\"      Right side.   4. SEVERITY: \"Can you move the neck normally?\"      Yes  5. PAIN: \"Is there any pain?\" If so, ask: \"How bad is the pain?\"   (Scale 1-10; or mild, moderate, severe)      NO  6. CORD SYMPTOMS: \"Any weakness or numbness of the arms or legs?\"      NO  7. SIZE: For cuts, bruises, or swelling, ask: \"How large is it?\" (e.g., inches or centimeters)       NO - swelling. Cannot see the back of her throat.   8. TETANUS: For any breaks in the skin, ask: \"When was the last tetanus booster?\"      n/a  9. OTHER SYMPTOMS: \"Do you have any other symptoms?\" (e.g., headache)      Doesn't know if it is more muscle tension. \"Feels different\". Definitely something different.   10. PREGNANCY: \"Is there any chance you are pregnant?\" \"When was your last menstrual period?\"        NO    Protocols used: NECK INJURY-A-[place]      "

## 2019-11-19 NOTE — TELEPHONE ENCOUNTER
"Pt calls, discussed at length below, still feels provider not treating her symptoms, \"not looking at her\", agrees to get f/u labs, lab appointment made  Yoselin Roberts RN, BSN  Message handled by Nurse Triage.    "

## 2019-11-20 DIAGNOSIS — R11.0 NAUSEA: ICD-10-CM

## 2019-11-20 RX ORDER — ONDANSETRON 4 MG/1
TABLET, ORALLY DISINTEGRATING ORAL
Qty: 30 TABLET | Refills: 0 | Status: SHIPPED | OUTPATIENT
Start: 2019-11-20 | End: 2020-01-16

## 2019-11-20 NOTE — TELEPHONE ENCOUNTER
"Requested Prescriptions   Pending Prescriptions Disp Refills     ondansetron (ZOFRAN-ODT) 4 MG ODT tab [Pharmacy Med Name: ONDANSETRON ODT 4MG TABLETS]  Last Written Prescription Date:  2/21/2019  Last Fill Quantity: 30 tablet,  # refills: 1   Last office visit: 10/4/2019 with prescribing provider:  Fitterer   Future Office Visit:   Next 5 appointments (look out 90 days)    Nov 21, 2019 11:30 AM CST  Office Visit with Rizwana Perez DO  [facility] ([facility]) [address] [place] [address]  [phone]   Jan 23, 2020 10:00 AM CST  (Arrive by 9:45 AM)  Return Visit with KIRAN Carballo CNP  [facility] ([facility]) [address] [facility] [address] [phone]          30 tablet 0     Sig: DISSOLVE 1 TO 2 TABLETS UNDER THE TONGUE EVERY 8 HOURS AS NEEDED FOR NAUSEA        Antivertigo/Antiemetic Agents Passed - 11/20/2019  1:34 PM        Passed - Recent (12 mo) or future (30 days) visit within the authorizing provider's specialty     Patient has had an office visit with the authorizing provider or a provider within the authorizing providers department within the previous 12 mos or has a future within next 30 days. See \"Patient Info\" tab in inbasket, or \"Choose Columns\" in Meds & Orders section of the refill encounter.              Passed - Medication is active on med list        Passed - Patient is 18 years of age or older           "

## 2019-11-21 ENCOUNTER — OFFICE VISIT (OUTPATIENT)
Dept: FAMILY MEDICINE | Facility: CLINIC | Age: 44
End: 2019-11-21
Payer: COMMERCIAL

## 2019-11-21 VITALS
TEMPERATURE: 97.8 F | SYSTOLIC BLOOD PRESSURE: 100 MMHG | WEIGHT: 153 LBS | RESPIRATION RATE: 14 BRPM | HEIGHT: 62 IN | BODY MASS INDEX: 28.16 KG/M2 | HEART RATE: 94 BPM | OXYGEN SATURATION: 98 % | DIASTOLIC BLOOD PRESSURE: 60 MMHG

## 2019-11-21 DIAGNOSIS — J32.9 RECURRENT SINUS INFECTIONS: ICD-10-CM

## 2019-11-21 DIAGNOSIS — H10.33 ACUTE BACTERIAL CONJUNCTIVITIS OF BOTH EYES: Primary | ICD-10-CM

## 2019-11-21 PROCEDURE — 99213 OFFICE O/P EST LOW 20 MIN: CPT | Performed by: FAMILY MEDICINE

## 2019-11-21 RX ORDER — NEOMYCIN/POLYMYXIN B/HYDROCORT 3.5-10K-1
1-2 SUSPENSION, DROPS(FINAL DOSAGE FORM)(ML) OPHTHALMIC (EYE) 4 TIMES DAILY
Qty: 4 ML | Refills: 0 | Status: SHIPPED | OUTPATIENT
Start: 2019-11-21 | End: 2019-11-22

## 2019-11-21 RX ORDER — CLONAZEPAM 1 MG/1
1 TABLET ORAL 3 TIMES DAILY PRN
Refills: 1 | COMMUNITY
Start: 2019-09-10 | End: 2021-02-26

## 2019-11-21 ASSESSMENT — MIFFLIN-ST. JEOR: SCORE: 1302.25

## 2019-11-21 NOTE — PROGRESS NOTES
"Subjective     Melody Muñoz is a 44 year old female who presents to clinic today for the following health issues:    HPI   Abdominal Pain      Duration: ***    Description (location/character/radiation): ***       Associated flank pain: {NONEORCHOOSE:615324::\"None\"}    Intensity:  {mild,moderate,severe:966835}    Accompanying signs and symptoms:        Fever/Chills: { :956000}       Gas/Bloating: { :866682}       Nausea/vomitting: { :518215}       Diarrhea: { :077215}       Dysuria or Hematuria: { :395652}    History (previous similar pain/trauma/previous testing): ***    Precipitating or alleviating factors:       Pain worse with eating/BM/urination: ***       Pain relieved by BM: { :703505}    Therapies tried and outcome: {NONEORCHOOSE:613197::\"None\"}    LMP:  {NOT applicable:191721::\"not applicable\"}    {additonal problems for provider to add (Optional):930128}    {HIST REVIEW/ LINKS 2 (Optional):088578}    {Additional problems for the provider to add (optional):338112}  Reviewed and updated as needed this visit by Provider         Review of Systems   {ROS COMP (Optional):779848}      Objective    LMP  (LMP Unknown)   There is no height or weight on file to calculate BMI.  Physical Exam   {Exam List (Optional):604882}    {Diagnostic Test Results (Optional):242675::\"Diagnostic Test Results:\",\"Labs reviewed in Epic\"}        {PROVIDER CHARTING PREFERENCE:052758}      "

## 2019-11-21 NOTE — PROGRESS NOTES
"Subjective     Melody Muñoz is a 44 year old female who presents to clinic today for the following health issues:    HPI   RESPIRATORY SYMPTOMS      Duration: X24 hours with eye redness, drainage, sinus congestion for the last week    Description  nasal congestion, facial pain/pressure, cough and conjunctival irritation    Severity: moderate    Accompanying signs and symptoms: See above    History (predisposing factors):  asthma    Precipitating or alleviating factors: None    Therapies tried and outcome:  none    New anxiety symptom states she feels SOB which causes anxiety  Eye Pain and pressure.  Concerned about her recurrent sinus infections and vision issues over the past few months.      Reviewed and updated as needed this visit by Provider  Tobacco  Allergies  Meds  Problems  Med Hx  Surg Hx  Fam Hx         Review of Systems   ROS COMP: CONSTITUTIONAL: NEGATIVE for fever, chills, change in weight  INTEGUMENTARY/SKIN: NEGATIVE for worrisome rashes, moles or lesions  CV: NEGATIVE for chest pain, palpitations or peripheral edema  GI: NEGATIVE for nausea, abdominal pain, heartburn, or change in bowel habits  : NEGATIVE for frequency, dysuria, or hematuria  HEME: NEGATIVE for bleeding problems      Objective    /60 (Patient Position: Sitting, Cuff Size: Adult Regular)   Pulse 94   Temp 97.8  F (36.6  C) (Tympanic)   Resp 14   Ht 1.575 m (5' 2\")   Wt 69.4 kg (153 lb)   LMP  (LMP Unknown)   SpO2 98%   BMI 27.98 kg/m    Body mass index is 27.98 kg/m .  Physical Exam   GENERAL: alert  EYES: PERRL, EOMI.  Conjunctivae and sclerae are erythematous with green discharge  HENT: normal cephalic/atraumatic, ear canals and TM's normal, nasal mucosa edematous , rhinorrhea clear, oropharynx clear and oral mucous membranes moist  NECK: cervical adenopathy and thyroid normal to palpation  RESP: lungs clear to auscultation - no rales, rhonchi or wheezes  CV: regular rate and rhythm, normal S1 S2, no S3 " "or S4, no murmur, click or rub, no peripheral edema and peripheral pulses strong  ABDOMEN: soft, nontender, no hepatosplenomegaly, no masses and bowel sounds normal  MS: no gross musculoskeletal defects noted, no edema  PSYCH: very anxious, crying/tearful, poor concentration    Diagnostic Test Results:  Labs reviewed in Epic        Assessment & Plan   Problem List Items Addressed This Visit     Recurrent sinus infections    Relevant Orders    OTOLARYNGOLOGY REFERRAL      Other Visit Diagnoses     Acute bacterial conjunctivitis of both eyes    -  Primary    Relevant Medications    neomycin-polymyxin-hydrocortisone (CORTISPORIN) 3.5-51939-9 ophthalmic suspension         Rx cortisporin x 10 days  Apply frequent Warm/clean wash cloth to the eyes to help with pain  Alternate with cold compress to decrease the swelling  Tylenol PRN    Refer to ENT for recurrent sinus infections.  Hx deviated septum pe rpt report    And encouraged her to get a routine eye check up.  It has been years since she saw Ophthalmology and she is having concerns about her vision over the past year.    BMI:   Estimated body mass index is 27.98 kg/m  as calculated from the following:    Height as of this encounter: 1.575 m (5' 2\").    Weight as of this encounter: 69.4 kg (153 lb).           See Patient Instructions  Return in about 2 weeks (around 12/5/2019) for re-check / follow-up, If needed.    Rizwana Perez, DO  [facility]      "

## 2019-11-22 ENCOUNTER — TELEPHONE (OUTPATIENT)
Dept: FAMILY MEDICINE | Facility: CLINIC | Age: 44
End: 2019-11-22

## 2019-11-22 ENCOUNTER — TRANSFERRED RECORDS (OUTPATIENT)
Dept: HEALTH INFORMATION MANAGEMENT | Facility: CLINIC | Age: 44
End: 2019-11-22

## 2019-11-22 DIAGNOSIS — H10.33 ACUTE BACTERIAL CONJUNCTIVITIS OF BOTH EYES: ICD-10-CM

## 2019-11-22 RX ORDER — CIPROFLOXACIN HYDROCHLORIDE 3.5 MG/ML
1-2 SOLUTION/ DROPS TOPICAL EVERY 4 HOURS
Qty: 6 ML | Refills: 0 | Status: SHIPPED | OUTPATIENT
Start: 2019-11-22 | End: 2019-12-03

## 2019-11-22 RX ORDER — NEOMYCIN/POLYMYXIN B/HYDROCORT 3.5-10K-1
1-2 SUSPENSION, DROPS(FINAL DOSAGE FORM)(ML) OPHTHALMIC (EYE) 4 TIMES DAILY
Qty: 4 ML | Refills: 0 | Status: SHIPPED | OUTPATIENT
Start: 2019-11-22 | End: 2020-01-23

## 2019-11-22 NOTE — TELEPHONE ENCOUNTER
Returned call to patient.  Patient states the medication she was prescribed yesterday (neomycin-polymyxin-hydrocortisone) ophthalmic solution is on back order and needs alternative medication prescribed.    Routing to provider for review and advise as appropriate.  Pharmacy is pended.    ONELIA Heredia, RN  [employer] [facility]

## 2019-11-22 NOTE — TELEPHONE ENCOUNTER
Patient called clinic again stating the  [facility] does not have this medication either and patient is demanding a different medication sent to [facility].  This writer confirmed the  [facility] pharmacy (and cannot order until Monday) does not have this medication is stock either.    Routing to provider for an alternative medication.  Please send alternative as patient is very upset and would like to start a different eye drop.  Please send to the [facility] pended.    ONELIA Heredia, RN  [employer] [facility]

## 2019-11-22 NOTE — TELEPHONE ENCOUNTER
"Pt bridget again the say wants a different med ordered that [facility] in [place] has as that is convenient for her-says \"I dont care if the docotr likes it or not that's what I want done\"  "

## 2019-11-22 NOTE — TELEPHONE ENCOUNTER
"Patient was notified with information noted by provider and agreed with plan.  Patient stated the only other [facility] that has this medication is in [place] \"and I'm not driving all the way to [place], I was just there, this is just a pain in the butt\".  Will send Rx to [facility] pharmacy.  INDIRA EdwardsN, RN  [employer] [facility]    "

## 2019-11-22 NOTE — TELEPHONE ENCOUNTER
Patient was notified with information noted by provider and agreed with plan.  INDIRA EdwardsN, RN  [employer] [facility]

## 2019-11-22 NOTE — TELEPHONE ENCOUNTER
Reason for Call:  Other call back  Detailed comments: patient would like a different medication for her eyes because pharmacy do not have the one she was prescribed  Phone Number Patient can be reached at: Home number on file [phone] (home)  Best Time: any  Can we leave a detailed message on this number? YES  Call taken on 11/22/2019 at 12:23 PM by BREANN WARREN

## 2019-11-23 ENCOUNTER — HOSPITAL ENCOUNTER (EMERGENCY)
Facility: CLINIC | Age: 44
Discharge: HOME | End: 2019-11-23
Attending: EMERGENCY MEDICINE | Admitting: EMERGENCY MEDICINE
Payer: COMMERCIAL

## 2019-11-23 ENCOUNTER — APPOINTMENT (OUTPATIENT)
Dept: CT IMAGING | Facility: CLINIC | Age: 44
End: 2019-11-23
Attending: EMERGENCY MEDICINE
Payer: COMMERCIAL

## 2019-11-23 ENCOUNTER — APPOINTMENT (OUTPATIENT)
Dept: ULTRASOUND IMAGING | Facility: CLINIC | Age: 44
End: 2019-11-23
Attending: EMERGENCY MEDICINE
Payer: COMMERCIAL

## 2019-11-23 VITALS
BODY MASS INDEX: 27.62 KG/M2 | SYSTOLIC BLOOD PRESSURE: 113 MMHG | RESPIRATION RATE: 18 BRPM | OXYGEN SATURATION: 98 % | WEIGHT: 151 LBS | HEART RATE: 90 BPM | DIASTOLIC BLOOD PRESSURE: 75 MMHG | TEMPERATURE: 98.2 F

## 2019-11-23 DIAGNOSIS — R10.11 RIGHT UPPER QUADRANT PAIN: ICD-10-CM

## 2019-11-23 LAB
ALBUMIN SERPL-MCNC: 3.5 G/DL (ref 3.4–5)
ALBUMIN UR-MCNC: NEGATIVE MG/DL
ALP SERPL-CCNC: 120 U/L (ref 40–150)
ALT SERPL W P-5'-P-CCNC: 31 U/L (ref 0–50)
ANION GAP SERPL CALCULATED.3IONS-SCNC: 8 MMOL/L (ref 3–14)
APPEARANCE UR: CLEAR
AST SERPL W P-5'-P-CCNC: 17 U/L (ref 0–45)
BACTERIA #/AREA URNS HPF: ABNORMAL /HPF
BASOPHILS # BLD AUTO: 0 10E9/L (ref 0–0.2)
BASOPHILS NFR BLD AUTO: 0.4 %
BILIRUB DIRECT SERPL-MCNC: <0.1 MG/DL (ref 0–0.2)
BILIRUB SERPL-MCNC: 0.1 MG/DL (ref 0.2–1.3)
BILIRUB UR QL STRIP: NEGATIVE
BUN SERPL-MCNC: 15 MG/DL (ref 7–30)
CALCIUM SERPL-MCNC: 8.9 MG/DL (ref 8.5–10.1)
CHLORIDE SERPL-SCNC: 105 MMOL/L (ref 94–109)
CO2 SERPL-SCNC: 25 MMOL/L (ref 20–32)
COLOR UR AUTO: ABNORMAL
CREAT SERPL-MCNC: 0.68 MG/DL (ref 0.52–1.04)
DIFFERENTIAL METHOD BLD: ABNORMAL
EOSINOPHIL # BLD AUTO: 0.4 10E9/L (ref 0–0.7)
EOSINOPHIL NFR BLD AUTO: 3.2 %
ERYTHROCYTE [DISTWIDTH] IN BLOOD BY AUTOMATED COUNT: 12.1 % (ref 10–15)
GFR SERPL CREATININE-BSD FRML MDRD: >90 ML/MIN/{1.73_M2}
GLUCOSE SERPL-MCNC: 136 MG/DL (ref 70–99)
GLUCOSE UR STRIP-MCNC: NEGATIVE MG/DL
HCG UR QL: NEGATIVE
HCT VFR BLD AUTO: 43.4 % (ref 35–47)
HGB BLD-MCNC: 13.5 G/DL (ref 11.7–15.7)
HGB UR QL STRIP: NEGATIVE
IMM GRANULOCYTES # BLD: 0.1 10E9/L (ref 0–0.4)
IMM GRANULOCYTES NFR BLD: 0.6 %
KETONES UR STRIP-MCNC: NEGATIVE MG/DL
LEUKOCYTE ESTERASE UR QL STRIP: NEGATIVE
LIPASE SERPL-CCNC: 139 U/L (ref 73–393)
LYMPHOCYTES # BLD AUTO: 3 10E9/L (ref 0.8–5.3)
LYMPHOCYTES NFR BLD AUTO: 27.8 %
MCH RBC QN AUTO: 27.3 PG (ref 26.5–33)
MCHC RBC AUTO-ENTMCNC: 31.1 G/DL (ref 31.5–36.5)
MCV RBC AUTO: 88 FL (ref 78–100)
MONOCYTES # BLD AUTO: 0.7 10E9/L (ref 0–1.3)
MONOCYTES NFR BLD AUTO: 6.4 %
MUCOUS THREADS #/AREA URNS LPF: PRESENT /LPF
NEUTROPHILS # BLD AUTO: 6.7 10E9/L (ref 1.6–8.3)
NEUTROPHILS NFR BLD AUTO: 61.6 %
NITRATE UR QL: NEGATIVE
NRBC # BLD AUTO: 0 10*3/UL
NRBC BLD AUTO-RTO: 0 /100
PH UR STRIP: 5.5 PH (ref 5–7)
PLATELET # BLD AUTO: 391 10E9/L (ref 150–450)
POTASSIUM SERPL-SCNC: 3.8 MMOL/L (ref 3.4–5.3)
PROT SERPL-MCNC: 6.8 G/DL (ref 6.8–8.8)
RBC # BLD AUTO: 4.95 10E12/L (ref 3.8–5.2)
RBC #/AREA URNS AUTO: 1 /HPF (ref 0–2)
SODIUM SERPL-SCNC: 138 MMOL/L (ref 133–144)
SOURCE: ABNORMAL
SP GR UR STRIP: 1.01 (ref 1–1.03)
SQUAMOUS #/AREA URNS AUTO: 1 /HPF (ref 0–1)
UROBILINOGEN UR STRIP-MCNC: NORMAL MG/DL (ref 0–2)
WBC # BLD AUTO: 10.8 10E9/L (ref 4–11)
WBC #/AREA URNS AUTO: 1 /HPF (ref 0–5)

## 2019-11-23 PROCEDURE — 80076 HEPATIC FUNCTION PANEL: CPT | Performed by: EMERGENCY MEDICINE

## 2019-11-23 PROCEDURE — 80048 BASIC METABOLIC PNL TOTAL CA: CPT | Performed by: EMERGENCY MEDICINE

## 2019-11-23 PROCEDURE — 25000132 ZZH RX MED GY IP 250 OP 250 PS 637: Performed by: EMERGENCY MEDICINE

## 2019-11-23 PROCEDURE — 96361 HYDRATE IV INFUSION ADD-ON: CPT

## 2019-11-23 PROCEDURE — 83690 ASSAY OF LIPASE: CPT | Performed by: EMERGENCY MEDICINE

## 2019-11-23 PROCEDURE — 99285 EMERGENCY DEPT VISIT HI MDM: CPT | Mod: 25

## 2019-11-23 PROCEDURE — 81025 URINE PREGNANCY TEST: CPT | Performed by: EMERGENCY MEDICINE

## 2019-11-23 PROCEDURE — 85025 COMPLETE CBC W/AUTO DIFF WBC: CPT | Performed by: EMERGENCY MEDICINE

## 2019-11-23 PROCEDURE — 96374 THER/PROPH/DIAG INJ IV PUSH: CPT

## 2019-11-23 PROCEDURE — 96375 TX/PRO/DX INJ NEW DRUG ADDON: CPT

## 2019-11-23 PROCEDURE — 81001 URINALYSIS AUTO W/SCOPE: CPT | Performed by: EMERGENCY MEDICINE

## 2019-11-23 PROCEDURE — 25000128 H RX IP 250 OP 636: Performed by: EMERGENCY MEDICINE

## 2019-11-23 PROCEDURE — 74176 CT ABD & PELVIS W/O CONTRAST: CPT

## 2019-11-23 PROCEDURE — 25800030 ZZH RX IP 258 OP 636: Performed by: EMERGENCY MEDICINE

## 2019-11-23 PROCEDURE — 76705 ECHO EXAM OF ABDOMEN: CPT

## 2019-11-23 RX ORDER — OXYCODONE AND ACETAMINOPHEN 5; 325 MG/1; MG/1
1 TABLET ORAL ONCE
Status: COMPLETED | OUTPATIENT
Start: 2019-11-23 | End: 2019-11-23

## 2019-11-23 RX ORDER — HYDROMORPHONE HYDROCHLORIDE 1 MG/ML
0.5 INJECTION, SOLUTION INTRAMUSCULAR; INTRAVENOUS; SUBCUTANEOUS ONCE
Status: COMPLETED | OUTPATIENT
Start: 2019-11-23 | End: 2019-11-23

## 2019-11-23 RX ORDER — ONDANSETRON 2 MG/ML
4 INJECTION INTRAMUSCULAR; INTRAVENOUS ONCE
Status: COMPLETED | OUTPATIENT
Start: 2019-11-23 | End: 2019-11-23

## 2019-11-23 RX ORDER — KETOROLAC TROMETHAMINE 15 MG/ML
15 INJECTION, SOLUTION INTRAMUSCULAR; INTRAVENOUS ONCE
Status: COMPLETED | OUTPATIENT
Start: 2019-11-23 | End: 2019-11-23

## 2019-11-23 RX ADMIN — ONDANSETRON HYDROCHLORIDE 4 MG: 2 INJECTION, SOLUTION INTRAMUSCULAR; INTRAVENOUS at 18:01

## 2019-11-23 RX ADMIN — SODIUM CHLORIDE 1000 ML: 9 INJECTION, SOLUTION INTRAVENOUS at 18:05

## 2019-11-23 RX ADMIN — KETOROLAC TROMETHAMINE 15 MG: 15 INJECTION, SOLUTION INTRAMUSCULAR; INTRAVENOUS at 19:14

## 2019-11-23 RX ADMIN — OXYCODONE HYDROCHLORIDE AND ACETAMINOPHEN 1 TABLET: 5; 325 TABLET ORAL at 19:47

## 2019-11-23 RX ADMIN — HYDROMORPHONE HYDROCHLORIDE 0.5 MG: 1 INJECTION, SOLUTION INTRAMUSCULAR; INTRAVENOUS; SUBCUTANEOUS at 18:03

## 2019-11-23 ASSESSMENT — ENCOUNTER SYMPTOMS
ABDOMINAL PAIN: 1
BACK PAIN: 1
NAUSEA: 1
VOMITING: 1

## 2019-11-23 NOTE — ED PROVIDER NOTES
History     Chief Complaint:  Abdominal Pain    HPI  Melody Muñoz is a 44 year old female with a history of IBS, who presents with abdominal pain. The patient states that 4 days ago she started to experience some abdominal pain that she assumed to be IBS. She states that the pain has increased since then and is now located in her RUQ. She states that the pain is worse with movement and after eating. The patient reports 2 episodes of emesis. She also notes some back pain. She states that she has had RUQ pain in the past with an ultrasound done, see below. She notes that this pain is significantly different. Of note, the patient was recently seen for sinus and eye infection and started on cortisporin drops.     US abdomen limited RUQ 3/16/19:  The liver is normal in size and texture without focal mass.  There is no intra or extrahepatic biliary dilatation. The common  hepatic duct measures 0.5 cm.  The gallbladder is normal in appearance  without gallstones.  The pancreas head and body appear normal. The  tail is obscured by bowel gas.  The right kidney measures 11.7 cm and  is normal in appearance. The proximal abdominal aorta and IVC appear  normal.     Allergies:  Hydrocodone     Medications:    Albuterol   axert  Soma  dextrorotate  Valium  Flonase  Tenex  Lamictal  latuda  synthroid  Lithium   Zofran   Cortisporin   Protonix  Lyrica  Topamax  Effexor     Past Medical History:    Bipolar disorder  Diabetes  endometriosis  Asthma  Depression  generalized anxiety disorder  Latent Tb  JASWANT  Substance abuse  Migraine   IBS  hypothyroidism     Past Surgical History:    Bladder sling  Laparoscopy- endometriosis  C section   appendectomy  Tubal ligation  Orthopedic finger surgery     Family History:    Hypertension- mother  CAD- father  Depression- sister  generalized anxiety disorder- sister    Social History:  The patient was accompanied to the ED by .  Smoking Status: Former Smoker  Smokeless Tobacco:  Never Used  Alcohol Use: No   Marital Status:        Review of Systems   Gastrointestinal: Positive for abdominal pain, nausea and vomiting.   Musculoskeletal: Positive for back pain.   All other systems reviewed and are negative.      Physical Exam     Patient Vitals for the past 24 hrs:   BP Temp Temp src Pulse Resp SpO2 Weight   11/23/19 1949 113/75 -- -- 90 18 98 % --   11/23/19 1651 117/73 98  F (36.7  C) Temporal 99 16 98 % 68.5 kg (151 lb)       Physical Exam  Vital signs and nursing notes reviewed.     Constitutional: laying on gurney appears comfortable  HENT: Oropharynx is clear and moist  Eyes: Conjunctivae are normal bilaterally. Pupils equal  Neck: normal range of motion  Cardiovascular: Normal rate, regular rhythm, normal heart sounds.   Pulmonary/Chest: Effort normal and breath sounds normal. No respiratory distress.   Abdominal: Soft, non distended.  RLQ tenderness to palpation without rebound or guarding  Musculoskeletal: No joint swelling or edema.   Neurological: Alert and oriented. No focal weakness  Skin: Skin is warm and dry. No rash noted.   Psych: normal affect    Emergency Department Course     Imaging:  Radiology findings were communicated with the patient who voiced understanding of the findings.    RUQ abdomen US:  1.  No gallstones.  2.  Ringdown artifact in the wall of the gallbladder suggests adenomyomatosis  3.  Echogenic focus in the right kidney 0.3 cm may represent an obstructing calculus.   Reading per radiology    CT abdomen pelvis w/o contrast:  1.  No renal calculi or hydronephrosis.  2.  No bowel obstruction.  Reading per radiology    Laboratory:  Laboratory findings were communicated with the patient and  who voiced understanding of the findings.    CBC: WBC 10.8, HGB 13.5,   BMP: glucose 136 (H) o/w WNL (Creatinine 0.68)    Lipase: 139     Hepatic panel: bilirubin 0.1 (L) o/w WNL    UA with micro: bacteria many (A) mucus present (A) o/w negative    HCG  Qualitative Urine: negative    Procedures    Interventions:  1801 Zofran 4 mg IV  1803 Dilaudid 0.5 mg IV  1805 NS, 1 L, IV  1914 Toradol 15 mg IV  1947 percocet 1 tablet oral     Emergency Department Course:     Nursing notes and vitals reviewed.    1733 The patient provided a urine sample here in the emergency department. This was sent for laboratory testing, findings above.    1742 IV was inserted and blood was drawn for laboratory testing, results above.    1750 I performed an exam of the patient as documented above.     1832 The patient was sent for a US RUQ while in the emergency department, results above.     1906 I returned to check on patient.     1932 The patient was sent for a CT abdomen pelvis while in the emergency department, results above.     2018 I personally reviewed the laboratory and imaging results with the patient and answered all related questions prior to discharge.    Impression & Plan      Medical Decision Making:  Melody Muñoz is a 44 year old female who presents to the emergency department today for evaluation of RUQ abdominal pain. On exam, she had reproducible discomfort in the RUQ and epigastric area. There are no masses or rebound noted, and no lower abdominal pain. No flank tenderness though she did say that she did have occasional stabbing pain in her right flank area. Several laboratory tests and imaging tests were done which showed no suggestion of cause of her pain. Patient felt better at recheck. I discussed the possible causes of her pain and planned treatment. She is to follow up with her primary care physician in 48 hours. If she has any significantly worsening symptoms such as pain, distention, vomiting, fever she is to return here. Patient understands plan and is discharged home.     Diagnosis:    ICD-10-CM    1. Right upper quadrant pain R10.11      Disposition:   Findings and plan explained to the Patient and spouse. Patient discharged home with instructions  regarding supportive care, medications, and reasons to return. The importance of close follow-up was reviewed.     Scribe Disclosure:  I, Rosenda Andrea, am serving as a scribe at 6:02 PM on 11/23/2019 to document services personally performed by Jass Joseph MD based on my observations and the provider's statements to me.    [facility] EMERGENCY DEPARTMENT       Jass Joseph MD  11/23/19 2039

## 2019-11-23 NOTE — ED TRIAGE NOTES
Pt having abdominal pain for about 4 days and today pain seems worse.  Right upper abdomen and back.  Also reports that she has pain when trying to hold her urine.

## 2019-11-25 ENCOUNTER — TELEPHONE (OUTPATIENT)
Dept: FAMILY MEDICINE | Facility: CLINIC | Age: 44
End: 2019-11-25

## 2019-11-25 DIAGNOSIS — J32.9 RECURRENT SINUS INFECTIONS: Primary | ICD-10-CM

## 2019-11-25 NOTE — TELEPHONE ENCOUNTER
Patient called again. She was seen by Dr. Perez on 11/21/19, the eyes have improved. She now has moderate sinus pain and inflammation. She saw ENT on Friday - he didn't see any sinus infection. She is blowing out yellow/green mucus and has a yellow/green productive cough. The ears were checked by Dr. Perez, but she still has ear pressure and soreness just as at OV. Chest soreness from coughing, but no wheezing and no SOB or fever. All these symptoms began about 8 days before she saw Dr. Perez. Taking tylenol. Pt is requesting an Abx and possible prednisone. She will need diflucan if Abx is prescribed. She is requesting PCP to review.     Future Office Visit:   Next 5 appointments (look out 90 days)    Dec 03, 2019  2:15 PM CST  Office Visit with Evaristo Machado MD  [facility] ([facility]) [address] [place] [id number]  [phone]   Jan 23, 2020 10:00 AM CST  (Arrive by 9:45 AM)  Return Visit with KIRAN Carballo CNP  [facility] ([facility]) [address]  [facility] [id number] [phone]

## 2019-11-26 ENCOUNTER — TRANSFERRED RECORDS (OUTPATIENT)
Dept: HEALTH INFORMATION MANAGEMENT | Facility: CLINIC | Age: 44
End: 2019-11-26

## 2019-11-26 NOTE — TELEPHONE ENCOUNTER
"Routing to provider as FYI.       Patient returning call to clinic. She states \"I think there is some rapid deterioration in my gums going on and I don't know why\". One of my teeth is ready to fall out.\" Yesterday, her upper jaw started hurting really bad and started to feel 'nasaly'. Has a 'yucky taste' in her mouth. Patient Told ENT all of this information and they recommended that she tell them this information before her CT. Her dentist said they wouldn't see her because she wasn't doing preventative care at the time (relates that to depression and psychiatric medications). Mentions she may be able to go to [facility]? Patient states her IBS is horrible. Patient started to cry at end of conversation stating \"I am overwhelmed and I am just trying to live a normal life\". Redirected her take deep breaths and to focus on CT which is happening at 10 AM.      "

## 2019-11-26 NOTE — TELEPHONE ENCOUNTER
Melody just saw the ENT and they decided on getting a CT scan of her sinuses before treating her for anything as he did not see any signs of an active infection other than what is probably a virus.  Consequently I do not think an antibiotic is appropriate until she gets her CT scan done.

## 2019-11-27 NOTE — TELEPHONE ENCOUNTER
Patient is requesting Tylenol #3    Requesting pain medication for sinus infection/mouth pain. Please see notes below.     Sending Pain medication request to PCP for review.

## 2019-11-27 NOTE — TELEPHONE ENCOUNTER
Melody called asking that someone return a call to her and let her know the status of her pain medication request.    She would like Tylenol 3.  She would alternate this with ibuprofen.    Melody is at her cell phone: [phone]. She is not available between 10-11am. A detailed message can be left.

## 2019-11-29 ENCOUNTER — TELEPHONE (OUTPATIENT)
Dept: FAMILY MEDICINE | Facility: CLINIC | Age: 44
End: 2019-11-29

## 2019-11-29 NOTE — TELEPHONE ENCOUNTER
Reason for call:  Patient reporting a symptom    Symptom or request: sinus    Duration (how long have symptoms been present): 1 week    Have you been treated for this before? Yes    Additional comments: was seen at ent    Phone Number patient can be reached at:  Home number on file [phone] (home)    Best Time:  any    Can we leave a detailed message on this number:  YES    Call taken on 11/29/2019 at 3:40 PM by MARIAN FUENTES

## 2019-12-02 NOTE — TELEPHONE ENCOUNTER
Patient was called back. She is concerned ENT is overlooking her concerns and minimizing her symptoms. She is finishing 14 coarse Augmentin and prednisone. She is not feeling better yet. There is still severe pain and pressure and inflammation on the left side of jaw, gums, and teeth. She had an oral surgery back in spring - extracted tooth on left side that exposed a lot of the bone - waiting for it to heal and has appt for implants. She feelt extremely irritable with this sickness and got a pack of cigarettes and smoked 2 (she quit on labor day). When she smoked she felt smoke going up her mouth and sinuses, but not in to the nose. She threw away the pack because that did not make her feel more relaxed and she realized that she is improperly dealing with irritability. She feels there is some kind of a small opening from mouth to sinuses that CT did not show. She is afraid to get an infection in the bone, and concerned about her frequent sinus infections. No sores in the mouth.     She went to the dentist this week, but he didn't examine her because at the same time she got a call from ENT stating she has a sinus infection.     She did call [facility], but they need a referral from a dentist. Patient was advised to call the dentist that just seen her and ask for a referral. Pt agreed to do this.     Will see Dr. Machado for gall bladder tomorrow, if needed she will request referral to oral surgery from PCP.     Future Office Visit:   Next 5 appointments (look out 90 days)    Dec 03, 2019  2:15 PM CST  Office Visit with Evaristo Machado MD  [facility] ([facility]) [address] [place] [id number]  [phone]   Jan 23, 2020 10:00 AM CST  (Arrive by 9:45 AM)  Return Visit with KIRAN Carballo CNP  [facility] ([facility]) [address]  [facility] [id number] [phone]

## 2019-12-03 ENCOUNTER — OFFICE VISIT (OUTPATIENT)
Dept: FAMILY MEDICINE | Facility: CLINIC | Age: 44
End: 2019-12-03
Payer: COMMERCIAL

## 2019-12-03 VITALS
SYSTOLIC BLOOD PRESSURE: 116 MMHG | RESPIRATION RATE: 16 BRPM | DIASTOLIC BLOOD PRESSURE: 70 MMHG | OXYGEN SATURATION: 99 % | TEMPERATURE: 96.6 F | HEART RATE: 71 BPM | BODY MASS INDEX: 28.17 KG/M2 | WEIGHT: 154 LBS

## 2019-12-03 DIAGNOSIS — F41.9 ANXIETY: ICD-10-CM

## 2019-12-03 DIAGNOSIS — G43.709 CHRONIC MIGRAINE WITHOUT AURA WITHOUT STATUS MIGRAINOSUS, NOT INTRACTABLE: ICD-10-CM

## 2019-12-03 DIAGNOSIS — G89.4 CHRONIC PAIN SYNDROME: ICD-10-CM

## 2019-12-03 DIAGNOSIS — J32.9 RECURRENT SINUS INFECTIONS: ICD-10-CM

## 2019-12-03 DIAGNOSIS — F34.1 PERSISTENT DEPRESSIVE DISORDER: ICD-10-CM

## 2019-12-03 DIAGNOSIS — R51.9 HEADACHE DISORDER: Primary | ICD-10-CM

## 2019-12-03 DIAGNOSIS — K58.2 IRRITABLE BOWEL SYNDROME WITH BOTH CONSTIPATION AND DIARRHEA: ICD-10-CM

## 2019-12-03 PROCEDURE — 99215 OFFICE O/P EST HI 40 MIN: CPT | Performed by: FAMILY MEDICINE

## 2019-12-03 RX ORDER — BUTALBITAL, ACETAMINOPHEN AND CAFFEINE 50; 325; 40 MG/1; MG/1; MG/1
1 TABLET ORAL EVERY 4 HOURS PRN
Qty: 20 TABLET | Refills: 3 | Status: SHIPPED | OUTPATIENT
Start: 2019-12-03 | End: 2020-10-19

## 2019-12-03 RX ORDER — TRAZODONE HYDROCHLORIDE 50 MG/1
TABLET, FILM COATED ORAL
Refills: 2 | COMMUNITY
Start: 2019-09-23 | End: 2021-05-27

## 2019-12-03 ASSESSMENT — PATIENT HEALTH QUESTIONNAIRE - PHQ9
10. IF YOU CHECKED OFF ANY PROBLEMS, HOW DIFFICULT HAVE THESE PROBLEMS MADE IT FOR YOU TO DO YOUR WORK, TAKE CARE OF THINGS AT HOME, OR GET ALONG WITH OTHER PEOPLE: EXTREMELY DIFFICULT
SUM OF ALL RESPONSES TO PHQ QUESTIONS 1-9: 12
SUM OF ALL RESPONSES TO PHQ QUESTIONS 1-9: 12

## 2019-12-03 NOTE — PATIENT INSTRUCTIONS
She currently has gotten a new psychiatrist.  Apparently that person thinks she has been misdiagnosed as bipolar.  He thinks she may have depression and anxiety but not that she is not bipolar.  When she completely stopped clonazepam one time she got manic right after that and that was when she was diagnosed.  She now thinks it is that was just  because she stopped so abruptly.  She is currently working with a therapist.  That is uncovering a lot of very uncomfortable feelings for the patient from going through things in her past history.  Her stress level is remarkably high.  However, today she is much more put together at her visit than I have seen her in a long time.      She is also in the process of seeing ENT about her recurrent sinusitis issues.  She does have a problem with opacification of her ethmoid sinus on one side and diffuse minor changes in many of the other sinuses.  She is currently on Augmentin twice daily.  We discussed that if she is not improving on that that we might change her to 2 weeks of doxycycline after she is done with the Augmentin.  She will let us know about that.  She will continue with symptomatic treatment.  She has been using a [place] pot.    We also had a discussion about her headaches.  She is been getting sometimes 2 and 3 headaches a day.  The question is whether they are rebound headaches.  She would like to try Fioricet without any codeine.  I did caution her about rebound headaches due to pain medications.  I did give her a prescription for Fioricet.  She specifically did not want anything with any narcotics in it.  She will follow-up with me in approximately 1 month.  She will call us back to get that appointment set up as she has to look at her schedules.  I spent 40 minutes with the patient going over all of her health concerns and issues today.    We had a very vini discussion that most of her physical symptomatology is probably being caused by mental health type  issues.  She was in agreement  that treating that was paramount to starting to feel better.

## 2019-12-03 NOTE — PROGRESS NOTES
Subjective     Melody Muñoz is a 44 year old female who presents to clinic today for the following health issues:    HPI   ED/UC Followup:    Facility:  [facility]  Date of visit: 11/23  Reason for visit: abdominal pain  Current Status: still having some pain, US scan in ER discovered an abnormality however, her CT scan did not reproduce that finding, no gall stones       Concern - irritable, getting 2-3 migraines a day  Onset: couple weeks    Description:   Med changes recently    Intensity: moderate    Progression of Symptoms:  na    Accompanying Signs & Symptoms:  Started therapy 5 weeks ago    Previous history of similar problem:   yes    Precipitating factors:   Worsened by: Stress.  In the last 5 to 6 weeks the patient has been in therapy and that is going through a lot of bad memories from her previous histories.  Lots of increased stress.    Alleviating factors:  Improved by: na     Therapies Tried and outcome: na    Patient Active Problem List   Diagnosis     Endometriosis s/po EDWIGE 10-17     Mantoux: positive     Latent tuberculosis     Major depression     Generalized anxiety disorder     Constipation     Nightmares     Knee pain     Bipolar 1 disorder  with psychosis     Chronic fatigue     Female stress incontinence     Suicidal ideation     Acne     Chronic pain syndrome     Insomnia     Chronic migraine without aura without status migrainosus, not intractable     Nausea     JASWANT (obstructive sleep apnea)     Elevated liver enzymes     TMJ (temporomandibular joint syndrome)     Hypothyroidism due to acquired atrophy of thyroid     Hostile behavior     Mild persistent asthma without complication     Uncontrolled type 2 diabetes mellitus with hyperglycemia (H)     Allergic rhinitis     Incontinence of urine in female     Migraine     Screening for diabetic peripheral neuropathy     Need for prophylactic vaccination against Streptococcus pneumoniae (pneumococcus)     Sepsis (H)     Abdominal pain      Morbid obesity (H)     Former tobacco use     Mixed simple and mucopurulent chronic bronchitis (H): Spirometry 16 lung age = 61y/o in 39 y/o FVC=90%&FEV1=78%     Class 2 obesity due to excess calories with serious comorbidity and body mass index (BMI) of 36.0 to 36.9 in adult     Mixed hyperlipidemia     Acquired hypothyroidism     Dysuria     Other chronic back pain     Headache disorder     Persistent depressive disorder     Irritable bowel syndrome with both constipation and diarrhea     Anxiety     Recurrent sinus infections     Past Surgical History:   Procedure Laterality Date     BIOPSY       COLONOSCOPY       GENITOURINARY SURGERY  May/2014    bladder sling     GYN SURGERY      laparoscopy- endometriosis     GYN SURGERY       for twins     LAPAROSCOPIC APPENDECTOMY N/A 2017    Procedure: LAPAROSCOPIC APPENDECTOMY;  LAPAROSCOPIC APPENDECTOMY and resection of epiploic tag;  Surgeon: Roberto Robins MD;  Location: [facility] OR     little finger surgery left  1980     tubal ligation bilateral       wisdom teeth removal         Social History     Tobacco Use     Smoking status: Former Smoker     Packs/day: 0.00     Years: 17.00     Pack years: 0.00     Types: Cigarettes     Last attempt to quit: 3/18/2019     Years since quittin.7     Smokeless tobacco: Never Used   Substance Use Topics     Alcohol use: No     Comment: no etoh since      Family History   Problem Relation Age of Onset     Hypertension Mother      Heart Disease Father         palpitations     Depression Sister      Anxiety Disorder Sister      Heart Disease Maternal Grandmother         CHF     Cancer Maternal Grandfather 72        Pancreatic Cancer     Alzheimer Disease Paternal Grandfather      Bipolar Disorder Other      Autism Spectrum Disorder Other              Reviewed and updated as needed this visit by Provider         Review of Systems   ROS COMP: Constitutional, HEENT, cardiovascular, pulmonary, GI, ,  "musculoskeletal, neuro, skin, endocrine and psych systems are negative, except as otherwise noted.      Objective    /70   Pulse 71   Temp 96.6  F (35.9  C) (Tympanic)   Resp 16   Wt 69.9 kg (154 lb)   LMP  (LMP Unknown)   SpO2 99%   BMI 28.17 kg/m    Body mass index is 28.17 kg/m .  Physical Exam   GENERAL APPEARANCE: healthy, alert, no distress, mild distress and over weight  ABDOMEN: soft, nontender, without hepatosplenomegaly or masses  PSYCH: Mild distress but otherwise is relatively calm compared to her usual behavior when she is in the office.  She did become teary-eyed talking about her current therapy but even that was well controlled.          Assessment & Plan       ICD-10-CM    1. Headache disorder R51 butalbital-acetaminophen-caffeine (FIORICET/ESGIC) -40 MG tablet   2. Recurrent sinus infections J32.9    3. Irritable bowel syndrome with both constipation and diarrhea K58.2    4. Anxiety F41.9    5. Persistent depressive disorder F34.1    6. Chronic migraine without aura without status migrainosus, not intractable G43.709    7. Chronic pain syndrome G89.4         BMI:   Estimated body mass index is 28.17 kg/m  as calculated from the following:    Height as of 11/21/19: 1.575 m (5' 2\").    Weight as of this encounter: 69.9 kg (154 lb).   Weight management plan: Discussed healthy diet and exercise guidelines        Patient Instructions   She currently has gotten a new psychiatrist.  Apparently that person thinks she has been misdiagnosed as bipolar.  He thinks she may have depression and anxiety but not that she is not bipolar.  When she completely stopped clonazepam one time she got manic right after that and that was when she was diagnosed.  She now thinks it is that was just  because she stopped so abruptly.  She is currently working with a therapist.  That is uncovering a lot of very uncomfortable feelings for the patient from going through things in her past history.  Her stress " level is remarkably high.  However, today she is much more put together at her visit than I have seen her in a long time.      She is also in the process of seeing ENT about her recurrent sinusitis issues.  She does have a problem with opacification of her ethmoid sinus on one side and diffuse minor changes in many of the other sinuses.  She is currently on Augmentin twice daily.  We discussed that if she is not improving on that that we might change her to 2 weeks of doxycycline after she is done with the Augmentin.  She will let us know about that.  She will continue with symptomatic treatment.  She has been using a Sharonda pot.    We also had a discussion about her headaches.  She is been getting sometimes 2 and 3 headaches a day.  The question is whether they are rebound headaches.  She would like to try Fioricet without any codeine.  I did caution her about rebound headaches due to pain medications.  I did give her a prescription for Fioricet.  She specifically did not want anything with any narcotics in it.  She will follow-up with me in approximately 1 month.  She will call us back to get that appointment set up as she has to look at her schedules.  I spent 40 minutes with the patient going over all of her health concerns and issues today.    We had a very vini discussion that most of her physical symptomatology is probably being caused by mental health type issues.  She was in agreement  that treating that was paramount to starting to feel better.      Return in about 4 weeks (around 12/31/2019) for anxiety, depression.    Evaristo Machado MD  [facility]

## 2019-12-04 ASSESSMENT — ASTHMA QUESTIONNAIRES: ACT_TOTALSCORE: 21

## 2019-12-10 ENCOUNTER — TRANSFERRED RECORDS (OUTPATIENT)
Dept: HEALTH INFORMATION MANAGEMENT | Facility: CLINIC | Age: 44
End: 2019-12-10

## 2019-12-16 ENCOUNTER — TELEPHONE (OUTPATIENT)
Dept: FAMILY MEDICINE | Facility: CLINIC | Age: 44
End: 2019-12-16

## 2019-12-16 DIAGNOSIS — J32.8 OTHER CHRONIC SINUSITIS: Primary | ICD-10-CM

## 2019-12-16 RX ORDER — DOXYCYCLINE HYCLATE 75 MG/1
75 TABLET, DELAYED RELEASE ORAL 2 TIMES DAILY
Status: CANCELLED | OUTPATIENT
Start: 2019-12-16

## 2019-12-16 RX ORDER — DOXYCYCLINE HYCLATE 100 MG
100 TABLET ORAL 2 TIMES DAILY
Qty: 28 TABLET | Refills: 0 | Status: SHIPPED | OUTPATIENT
Start: 2019-12-16 | End: 2020-01-23

## 2019-12-16 NOTE — TELEPHONE ENCOUNTER
Huddled with Dr. Machado, he ordered doxycycline. Patient was called. Dr. Machado did write another ENT referral, but she chose to go to the previous ENT clinic, but just see a different doctor.

## 2019-12-16 NOTE — TELEPHONE ENCOUNTER
Reason for call:  Patient reporting a symptom    Symptom or request: chronic sinus infections and red puffy eyes.  Saw ent.    Duration (how long have symptoms been present): ongoing    Have you been treated for this before? Yes    Additional comments: wants something for eyes.  See eye doctor    Phone Number patient can be reached at:  Home number on file [phone] (home)    Best Time:  asap    Can we leave a detailed message on this number:  YES    Call taken on 12/16/2019 at 8:28 AM by MARIAN FUENTES

## 2019-12-16 NOTE — TELEPHONE ENCOUNTER
Pt was called. She was done with Augmentin last Tuesday and has not seen an improvement. She still has sinus pressure above and below the eyes and on side of the nose. She is blowing out green from nose, the drainage never got clear. She is wondering if the next step would be doxycycline? She would like to be referred to a different ENT, she does not trust the previous one. Her sleep doctor said that she has a deviated septum which can make her sleep apnea machine not work as well. She is tired all the time because the machine is not working well and because she is tired maybe this is why she is getting infections all the time.     Her eyes are red and swollen again. Right eye is red and has yellow discharge and left eye is less red and a little crusty. The eyes hurt, but are not itchy. Pt plans to see an eye doctor for this. She was previously on eye drops that took 12 days to work. But, now infection is back.      Pt expressed how she wants to get on with her life and possibly get a job in the future, but infections are preventing her from doing so.

## 2019-12-16 NOTE — TELEPHONE ENCOUNTER
Patient called, said would like rx called to [facility] on [address] in [place].  She thought Dr. Machado was going to prescribe doxycyline.  Said will  tonight?

## 2019-12-20 DIAGNOSIS — G89.4 CHRONIC PAIN SYNDROME: ICD-10-CM

## 2019-12-20 RX ORDER — CARISOPRODOL 350 MG/1
TABLET ORAL
Qty: 90 TABLET | Refills: 0 | OUTPATIENT
Start: 2019-12-20

## 2019-12-20 RX ORDER — CARISOPRODOL 350 MG/1
350 TABLET ORAL 3 TIMES DAILY PRN
Qty: 90 TABLET | Refills: 0 | Status: SHIPPED | OUTPATIENT
Start: 2019-12-20 | End: 2020-01-21

## 2019-12-20 NOTE — TELEPHONE ENCOUNTER
"Pt states she asked for it at [facility].  States she asked for it to be sent to [place] pharmacy since is open later. She is upset that it was not sent to the pharmacy she requested. \"fine is just one more thing on my plate\". Informed pt providers are gone for the day she can get from [facility].   "

## 2019-12-20 NOTE — TELEPHONE ENCOUNTER
I was going to have this wait for Dr Machado on Sat but I will ok refill this time.  Rx sent to her pharmacy electronically  I had to deny Rx first because it originally came from different pharmacy.

## 2019-12-20 NOTE — TELEPHONE ENCOUNTER
RX [place] reviewed:  reviewed 12/20/2019- recommend provider review due to multiple prescribers    Patient called and stated she is going to be out of medication tomorrow. Patient's pharmacy is closed on the weekend. Patient needs today. Please send to pharmacy. Please send to [facility]    Controlled Substance Refill Request for   Requested Prescriptions   Pending Prescriptions Disp Refills     carisoprodol (SOMA) 350 MG tablet [Pharmacy Med Name: CARISOPRODOL 350MG TAB]  Last Written Prescription Date:  10/23/2019  Last Fill Quantity: 90,  # refills: 1   Last office visit: 12/3/2019 with prescribing provider:  Jeannette   Future Office Visit:   Next 5 appointments (look out 90 days)    Jan 23, 2020 10:00 AM CST  (Arrive by 9:45 AM)  Return Visit with KIRAN Carballo [facility] [address] [facility] [address]  [phone]          90 tablet      Sig: TAKE 1 TABLET BY MOUTH THREE TIMES A DAY AS NEEDED FOR MUSCLE SPASMS       There is no refill protocol information for this order          Problem List Complete:  Yes  Patient is followed by VIRAL Deleon for ongoing prescription of pain medication.  All refills should be approved by this provider, or covering partner.    Medication(s):  Lyrica 150 mg bid, Fioricet with codeine prn, klonopin and ambien to be prescribed by [facility], not [facility].  Maximum quantity per month: 60, 20  Clinic visit frequency required: Q 3 months     Controlled substance agreement on file: Yes       Date(s): 9/8/2015  New CSA needed.  Pain Clinic evaluation in the past: No    DIRE Total Score(s):  No flowsheet data found.    Last [facility] website verification: 12/20/2019 multiple meds from multiple outside providers    Clonazepam and dextroamphetamine prescribed by outside providers.    checked in past 3 months?  Yes 12/20/2019, recommend review  RX [place] reviewed:   reviewed- recommend provider review    MNP profile:  [email or web address]  Routing refill request to provider for review/approval because:  Drug not on the [facility] refill protocol

## 2019-12-20 NOTE — TELEPHONE ENCOUNTER
Patient Contact    Attempt # 1    Was call answered?  No.  Left message informing patient rx was sent to [facility] in [place]. Instructed to call back with questions.     NO further action needed.

## 2019-12-20 NOTE — TELEPHONE ENCOUNTER
Patient called and stated she is going to be out of medication tomorrow. Patient's pharmacy is closed on the weekend. Patient needs today. Please send to pharmacy. Please send to [facility]

## 2020-01-02 ENCOUNTER — TELEPHONE (OUTPATIENT)
Dept: ENDOCRINOLOGY | Facility: CLINIC | Age: 45
End: 2020-01-02

## 2020-01-02 DIAGNOSIS — E11.65 UNCONTROLLED TYPE 2 DIABETES MELLITUS WITH HYPERGLYCEMIA (H): Primary | ICD-10-CM

## 2020-01-02 NOTE — TELEPHONE ENCOUNTER
Patient previously phoned the clinic to report that her blood sugars were running higher since stopping the Trulicity - around the 140's compared with 's while taking Trulicity.  She was wanting advice on what to do.  I returned her phone call and recommended she restart the Trulicity 0.75 mg once weekly.  She was in agreement with this plan.  Patient advised to call back if any further blood sugar issues.  Tammy Louie NP  Endocrinology

## 2020-01-02 NOTE — TELEPHONE ENCOUNTER
Pt calls, has f/u appointment with LS, mainly sees for thyroid issues, last A1C 5.6 10/24/19, Trulicity was discontinued, checking sugars often, informs fasting am sugars creeping up, 136 on occasion, discussed, will f/u at upcoming appointment, bring meter per visit note to download, FYI to LS      Next 5 appointments (look out 90 days)    Jan 23, 2020 10:00 AM CST  (Arrive by 9:45 AM)  [facility] [address] [facility] [address] [phone]        Yoselin Roberts, RN, BSN  Message handled by Nurse Triage.

## 2020-01-03 ENCOUNTER — TELEPHONE (OUTPATIENT)
Dept: FAMILY MEDICINE | Facility: CLINIC | Age: 45
End: 2020-01-03

## 2020-01-03 NOTE — TELEPHONE ENCOUNTER
"Route as FYI:    Pt calls with many concerns.     States that she thinks her sinus infection is coming back. States she finished her course of abx but she is still in a lot of pain. She is having difficulty using her CPAP at night because it hurts her nose. States she is still doing her best to use it, but has to take it off nursing home through the night. Thinks she is getting good sleep, but also states that she is increasingly tired all the time. Blames this on bad sleep and stress.     Has a lot of difficulty in life right now with kids and . Has 4 kids and some marital problems. States her daughter has a new diagnosis of an eating disorder, and pt's sister had told pt that it is her fault, which pt has believed. Due to these issues, she is having a lot of trouble making it to her appointments. \"I am living for my kids and I am putting myself second, and it is really hard\".    Additional trouble with paying for appts as well as well as her kid's treatment programs. States this is causing her a lot of stress.     Endorses good support outside her family, for herself and her kids. Endorses that she feels her friends have been very supportive, caring for her kids as needed, and checking up on her constantly. Endorses that her kids are receiving appropriate help.    RN assured pt that treatment program for her daughter is very helpful, and pt agreeable. After speaking for nearly 20 minutes, pt appears significantly more calm. She states, \"I think I just need to put myself first. I have an appointment with my psychiatrist and my counselor scheduled for next week, and it is important I get to those\". RN agreed. Pt additionally states, \"I should make an appointment with the ENT, because they are the ones that will give me specialized help with my sinuses\" RN also agreed with this statement.    At this time, pt has to go as she was driving to parent support group. Pt educated on e-visit as pt does not have time to go " see MD. RN gave pt her user name and password to attempt and e-visit. Pt states she will try to do this when she gets home.    At this point, no further action needed in my opinion. Pt did not feel that she would hurt herself or others- did not seem as ED would be appropriate as pt does not feel she is in crisis, just stressed. It is good to hear that pt has support, and pt already has appt for psychiatry and counseling.     Hopefully will follow up with e-visit as instructed. Otherwise, pt stated to this RN that pt has the phone number for the after hours nurse line, if she needs additional help. States she understands what her options are if she needs to speak to someone again.

## 2020-01-06 ENCOUNTER — TRANSFERRED RECORDS (OUTPATIENT)
Dept: HEALTH INFORMATION MANAGEMENT | Facility: CLINIC | Age: 45
End: 2020-01-06

## 2020-01-08 ENCOUNTER — TRANSFERRED RECORDS (OUTPATIENT)
Dept: HEALTH INFORMATION MANAGEMENT | Facility: CLINIC | Age: 45
End: 2020-01-08

## 2020-01-13 ENCOUNTER — OFFICE VISIT (OUTPATIENT)
Dept: FAMILY MEDICINE | Facility: CLINIC | Age: 45
End: 2020-01-13
Payer: COMMERCIAL

## 2020-01-13 VITALS
DIASTOLIC BLOOD PRESSURE: 78 MMHG | TEMPERATURE: 98.4 F | WEIGHT: 153 LBS | HEART RATE: 103 BPM | RESPIRATION RATE: 14 BRPM | SYSTOLIC BLOOD PRESSURE: 124 MMHG | BODY MASS INDEX: 27.98 KG/M2

## 2020-01-13 DIAGNOSIS — J45.31 MILD PERSISTENT ASTHMA WITH EXACERBATION: ICD-10-CM

## 2020-01-13 DIAGNOSIS — J32.9 RECURRENT SINUS INFECTIONS: ICD-10-CM

## 2020-01-13 DIAGNOSIS — R53.83 FATIGUE, UNSPECIFIED TYPE: ICD-10-CM

## 2020-01-13 DIAGNOSIS — J32.8 OTHER CHRONIC SINUSITIS: Primary | ICD-10-CM

## 2020-01-13 PROCEDURE — 36415 COLL VENOUS BLD VENIPUNCTURE: CPT | Performed by: PHYSICIAN ASSISTANT

## 2020-01-13 PROCEDURE — 99214 OFFICE O/P EST MOD 30 MIN: CPT | Performed by: PHYSICIAN ASSISTANT

## 2020-01-13 PROCEDURE — 84439 ASSAY OF FREE THYROXINE: CPT | Performed by: PHYSICIAN ASSISTANT

## 2020-01-13 PROCEDURE — 84443 ASSAY THYROID STIM HORMONE: CPT | Performed by: PHYSICIAN ASSISTANT

## 2020-01-13 RX ORDER — CEFDINIR 300 MG/1
300 CAPSULE ORAL 2 TIMES DAILY
Qty: 20 CAPSULE | Refills: 0 | Status: SHIPPED | OUTPATIENT
Start: 2020-01-13 | End: 2020-01-29

## 2020-01-13 RX ORDER — CLINDAMYCIN HCL 300 MG
300 CAPSULE ORAL 4 TIMES DAILY
Qty: 40 CAPSULE | Refills: 0 | Status: SHIPPED | OUTPATIENT
Start: 2020-01-13 | End: 2020-01-13

## 2020-01-13 RX ORDER — PREDNISONE 20 MG/1
20 TABLET ORAL DAILY
Qty: 5 TABLET | Refills: 0 | Status: SHIPPED | OUTPATIENT
Start: 2020-01-13 | End: 2020-01-23

## 2020-01-13 ASSESSMENT — ENCOUNTER SYMPTOMS
EYES NEGATIVE: 1
CARDIOVASCULAR NEGATIVE: 1
ENDOCRINE NEGATIVE: 1
GASTROINTESTINAL NEGATIVE: 1
MUSCULOSKELETAL NEGATIVE: 1
PSYCHIATRIC NEGATIVE: 1
NEUROLOGICAL NEGATIVE: 1

## 2020-01-13 NOTE — PATIENT INSTRUCTIONS
Take a probiotic one hour after each antibiotic dose - there is some evidence this may decrease diarrhea.   Make sure you drink plenty of water.    Treatments for sinus infection:  1. Flonase - use one spray in each nostril once a day  2. Sinus Rinse or Neti Pot - these can be purchased at any pharmacy.  Use 1-2 times a day to relieve sinus congestion.     Additional treatment options for symptom relieve:  3. Take ibuprofen and acetaminophen (Tylenol) as needed for pain and/or fever.   4. Mucinex (guaifenisen) may help to thin the nasal mucus  5. Humidifiers or diffusers.  There is some evidence to support using essential oils such as eucalyptus, peppermint, citrus blends, or oregano in a diffuser for nasal congestion.  I do not recommend drinking the oils or placing them directly on the skin, since the concentrations of the oils are not regulated and vary between brands.

## 2020-01-13 NOTE — LETTER
January 14, 2020      Melody Muñoz  [address] [place] [address] [address] [place] [address]        Dear ,    We are writing to inform you of your test results.    The TSH is slightly low, which indicates we should either decrease or stop the levothyroxine.    I think it would be easiest to stop it for 6-8 weeks and then recheck your labs.     Resulted Orders   TSH with free T4 reflex   Result Value Ref Range    TSH 0.23 (L) 0.40 - 4.00 mU/L   T4 free   Result Value Ref Range    T4 Free 1.10 0.76 - 1.46 ng/dL       If you have any questions or concerns, please call the clinic at the number listed above.       Sincerely,        Paradise Hennessy PA-C

## 2020-01-13 NOTE — PROGRESS NOTES
Subjective     Melody Muñoz is a 44 year old female who presents to clinic today for the following health issues:    HPI   RESPIRATORY SYMPTOMS      Duration: couple months    Description  nasal congestion, facial pain/pressure, cough, headache and fatigue/malaise, conjunctivitis     Severity: moderate    Accompanying signs and symptoms: ongoing sinusitis    History (predisposing factors):  Went to ENT twice    Precipitating or alleviating factors: None    Therapies tried and outcome:  Round of doxycycline    Improves with antibiotics and then comes back   She had a head CT - which shows sinus infection  Constant headache  ENT visit felt rushed and was told if was stress related  Unsure why it keeps getting better with antibiotics if it is stress related          Patient Active Problem List   Diagnosis     Endometriosis s/po EDWIGE 10-17     Mantoux: positive     Latent tuberculosis     Major depression     Generalized anxiety disorder     Constipation     Nightmares     Knee pain     Bipolar 1 disorder  with psychosis     Chronic fatigue     Female stress incontinence     Suicidal ideation     Acne     Chronic pain syndrome     Insomnia     Chronic migraine without aura without status migrainosus, not intractable     Nausea     JASWANT (obstructive sleep apnea)     Elevated liver enzymes     TMJ (temporomandibular joint syndrome)     Hypothyroidism due to acquired atrophy of thyroid     Hostile behavior     Mild persistent asthma without complication     Uncontrolled type 2 diabetes mellitus with hyperglycemia (H)     Allergic rhinitis     Incontinence of urine in female     Migraine     Screening for diabetic peripheral neuropathy     Need for prophylactic vaccination against Streptococcus pneumoniae (pneumococcus)     Sepsis (H)     Abdominal pain     Morbid obesity (H)     Former tobacco use     Mixed simple and mucopurulent chronic bronchitis (H): Spirometry 6-4-16 lung age = 63y/o in 39 y/o FVC=90%&FEV1=78%      Class 2 obesity due to excess calories with serious comorbidity and body mass index (BMI) of 36.0 to 36.9 in adult     Mixed hyperlipidemia     Acquired hypothyroidism     Dysuria     Other chronic back pain     Headache disorder     Persistent depressive disorder     Irritable bowel syndrome with both constipation and diarrhea     Anxiety     Recurrent sinus infections     Past Surgical History:   Procedure Laterality Date     BIOPSY       COLONOSCOPY       GENITOURINARY SURGERY  May/2014    bladder sling     GYN SURGERY      laparoscopy- endometriosis     GYN SURGERY       for twins     LAPAROSCOPIC APPENDECTOMY N/A 2017    Procedure: LAPAROSCOPIC APPENDECTOMY;  LAPAROSCOPIC APPENDECTOMY and resection of epiploic tag;  Surgeon: Roberto Robins MD;  Location: [facility] OR     little finger surgery left  1980     tubal ligation bilateral       wisdom teeth removal         Social History     Tobacco Use     Smoking status: Former Smoker     Packs/day: 0.00     Years: 17.00     Pack years: 0.00     Types: Cigarettes     Last attempt to quit: 3/18/2019     Years since quittin.8     Smokeless tobacco: Never Used   Substance Use Topics     Alcohol use: No     Comment: no etoh since      Family History   Problem Relation Age of Onset     Hypertension Mother      Heart Disease Father         palpitations     Depression Sister      Anxiety Disorder Sister      Heart Disease Maternal Grandmother         CHF     Cancer Maternal Grandfather 72        Pancreatic Cancer     Alzheimer Disease Paternal Grandfather      Bipolar Disorder Other      Autism Spectrum Disorder Other          Current Outpatient Medications   Medication Sig Dispense Refill     ACCU-CHEK RAISSA PLUS test strip TEST 4-7 TIMES DAILY WHILE STARTING TRULICITY 100 strip 3     albuterol (2.5 MG/3ML) 0.083% nebulizer solution Take 1 vial (2.5 mg) by nebulization every 6 hours as needed for shortness of breath / dyspnea or wheezing 25 vial  0     almotriptan (AXERT) 6.25 MG tablet TK 1 T PO ONCE.  MAY REPEAT IN 2 HOURS AS DIRECTED.  MAX 2 DOSES PER DAY 30 tablet 1     blood glucose (ACCU-CHEK GUIDE) test strip Use to test blood sugar 1 times daily or as directed. 100 strip 3     blood glucose (NO BRAND SPECIFIED) lancets standard Use to test blood sugar 3 times weekly or as directed. 100 each 3     blood glucose monitoring (NO BRAND SPECIFIED) test strip Use to test blood sugars 3 times weekly or as directed 100 strip 3     butalbital-acetaminophen-caffeine (FIORICET/ESGIC) -40 MG tablet Take 1 tablet by mouth every 4 hours as needed for headaches 20 tablet 3     carisoprodol (SOMA) 350 MG tablet Take 1 tablet (350 mg) by mouth 3 times daily as needed for muscle spasms 90 tablet 0     cefdinir (OMNICEF) 300 MG capsule Take 1 capsule (300 mg) by mouth 2 times daily for 10 days 20 capsule 0     clonazePAM (KLONOPIN) 1 MG tablet Take 1 mg by mouth 2 times daily  1     Continuous Blood Gluc Sensor (GIDEENSTYLE JOSE 14 DAY SENSOR) MISC 1 each every 14 days 2 each 11     dextroamphetamine (DEXEDRINE SPANSULE) 5 MG 24 hr capsule TK 1 C PO BID  0     dextroamphetamine (DEXTROSTAT) 10 MG tablet TK 1 T PO TID  0     dulaglutide (TRULICITY) 0.75 MG/0.5ML pen Inject 0.75 mg Subcutaneous every 7 days 2 mL 11     fluticasone (FLONASE) 50 MCG/ACT nasal spray Spray 2 sprays into both nostrils daily 16 g 11     guanFACINE (TENEX) 2 MG tablet TAKE TWO TABLETS BY MOUTH AT BEDTIME 180 tablet 3     lamoTRIgine (LAMICTAL) 200 MG tablet Take 1 tablet (200 mg) by mouth every morning 90 tablet 4     LATUDA 120 MG TABS tablet TK 1 T PO QD  3     levothyroxine (SYNTHROID/LEVOTHROID) 50 MCG tablet Take 1 tablet (50 mcg) by mouth daily 90 tablet 3     MICROLET LANCETS MISC TEST TWICE DAILY AS DIRECTED 100 each 0     neomycin-polymyxin-hydrocortisone (CORTISPORIN) 3.5-31974-0 ophthalmic suspension Place 1-2 drops into both eyes 4 times daily 4 mL 0     order for DME Equipment  being ordered: Blood glucose monitoring kit 1 Device 0     pantoprazole (PROTONIX) 40 MG EC tablet Take 1 tablet (40 mg) by mouth daily 90 tablet 1     predniSONE (DELTASONE) 20 MG tablet Take 1 tablet (20 mg) by mouth daily for 5 days 5 tablet 0     pregabalin (LYRICA) 150 MG capsule TAKE 1 CAPSULE BY MOUTH TWICE A  capsule 1     PROAIR  (90 Base) MCG/ACT inhaler INHALE 1 TO 2 PUFFS INTO THE LUNGS EVERY 4 HOURS AS NEEDED FOR WHEEZING, SHORTNESS OF BREATH, OR DYSPNEA 51 g 3     topiramate (TOPAMAX) 50 MG tablet 2 tablets every night 180 tablet 3     traZODone (DESYREL) 50 MG tablet   2     ondansetron (ZOFRAN-ODT) 4 MG ODT tab DISSOLVE 1 TO 2 TABLETS UNDER THE TONGUE EVERY 8 HOURS AS NEEDED FOR NAUSEA 30 tablet 11     venlafaxine (EFFEXOR-XR) 75 MG 24 hr capsule Take 1 capsule (75 mg) by mouth daily 90 capsule 1     Allergies   Allergen Reactions     Hydrocodone Nausea and Vomiting       Reviewed and updated as needed this visit by Provider         Review of Systems   Constitutional:        As in HPI   HENT:        As in HPI   Eyes: Negative.    Respiratory:        As in HPI   Cardiovascular: Negative.    Gastrointestinal: Negative.    Endocrine: Negative.    Genitourinary: Negative.    Musculoskeletal: Negative.    Skin: Negative.    Neurological: Negative.    Psychiatric/Behavioral: Negative.          Objective    /78 (Cuff Size: Adult Regular)   Pulse 103   Temp 98.4  F (36.9  C) (Tympanic)   Resp 14   Wt 69.4 kg (153 lb)   LMP  (LMP Unknown)   BMI 27.98 kg/m    Physical Exam  Constitutional:       Appearance: She is well-developed.   HENT:      Head: Normocephalic.      Right Ear: Tympanic membrane, ear canal and external ear normal.      Left Ear: Tympanic membrane, ear canal and external ear normal.      Nose: Congestion and rhinorrhea present. No mucosal edema.      Right Turbinates: Swollen.      Left Turbinates: Swollen.      Right Sinus: Maxillary sinus tenderness and frontal  sinus tenderness present.      Left Sinus: Maxillary sinus tenderness and frontal sinus tenderness present.      Mouth/Throat:      Pharynx: Uvula midline. No oropharyngeal exudate or posterior oropharyngeal erythema.   Eyes:      Conjunctiva/sclera: Conjunctivae normal.   Cardiovascular:      Rate and Rhythm: Normal rate and regular rhythm.      Heart sounds: Normal heart sounds.   Pulmonary:      Effort: Pulmonary effort is normal.      Breath sounds: Normal breath sounds.   Skin:     General: Skin is warm.   Neurological:      Mental Status: She is alert and oriented to person, place, and time.         Diagnostic Test Results:  No results found. However, due to the size of the patient record, not all encounters were searched. Please check Results Review for a complete set of results.        Assessment & Plan   Problem List Items Addressed This Visit        Respiratory    Recurrent sinus infections    Relevant Medications    cefdinir (OMNICEF) 300 MG capsule    predniSONE (DELTASONE) 20 MG tablet    Other Relevant Orders    OTOLARYNGOLOGY REFERRAL      Other Visit Diagnoses     Other chronic sinusitis    -  Primary    Relevant Medications    cefdinir (OMNICEF) 300 MG capsule    predniSONE (DELTASONE) 20 MG tablet    Other Relevant Orders    OTOLARYNGOLOGY REFERRAL    T4 free (Completed)    T4 free (Completed)    Fatigue, unspecified type        Relevant Orders    TSH with free T4 reflex (Completed)    Mild persistent asthma with exacerbation        Relevant Medications    predniSONE (DELTASONE) 20 MG tablet         TSH ordered to eval for new fatigue.  ENT treatments as above.       Patient Instructions   Take a probiotic one hour after each antibiotic dose - there is some evidence this may decrease diarrhea.   Make sure you drink plenty of water.    Treatments for sinus infection:  1. Flonase - use one spray in each nostril once a day  2. Sinus Rinse or Neti Pot - these can be purchased at any pharmacy.  Use 1-2  times a day to relieve sinus congestion.     Additional treatment options for symptom relieve:  3. Take ibuprofen and acetaminophen (Tylenol) as needed for pain and/or fever.   4. Mucinex (guaifenisen) may help to thin the nasal mucus  5. Humidifiers or diffusers.  There is some evidence to support using essential oils such as eucalyptus, peppermint, citrus blends, or oregano in a diffuser for nasal congestion.  I do not recommend drinking the oils or placing them directly on the skin, since the concentrations of the oils are not regulated and vary between brands.           Return in about 2 weeks (around 1/27/2020) for a recheck if you are not improved - follow up with ENT.    Paradise Hennessy PA-C  [facility]

## 2020-01-14 ENCOUNTER — TRANSFERRED RECORDS (OUTPATIENT)
Dept: HEALTH INFORMATION MANAGEMENT | Facility: CLINIC | Age: 45
End: 2020-01-14

## 2020-01-14 LAB
T4 FREE SERPL-MCNC: 1.1 NG/DL (ref 0.76–1.46)
TSH SERPL DL<=0.005 MIU/L-ACNC: 0.23 MU/L (ref 0.4–4)

## 2020-01-15 DIAGNOSIS — R11.0 NAUSEA: ICD-10-CM

## 2020-01-15 NOTE — TELEPHONE ENCOUNTER
FUTURE VISIT INFORMATION      FUTURE VISIT INFORMATION:    Date: 2/4/20    Time: 2 PM    [place] [facility]  REFERRAL INFORMATION:    Referring provider:  CELESTINA Colmenares    Referring providers clinic:  [facility]    Reason for visit/diagnosis: Other Chronic Sinusitis    RECORDS REQUESTED FROM:       Clinic name Comments Records Status Imaging Status   [facility] 1/13/20 - OV with CELESTINA Colmenares  12/3/19 - OV with Dr. Evaristo Machado [facility] 2/11/19 - CT Head WO  2/4/19 - CT Head WO  5/3/15 - MRA Brain Venogram WO  5/3/15 - MRA Head WO  5/2/15 - MR Brain W/WO  5/2/15 - CTA Head Neck W/WO  5/2/15 - CT Head WO [facility]   [facility] [place] 1/8/20 - OV with Dr. Tejeda  11/22/19 - OV with Dr. Bowman 1/15 Sent to Scan    [facility] 11/26/19 - CT Sinuses Scanned In 1/15 Req - In PACs   [facility] 12/10/19 - OV with Dr. Peguero Scanned In    [facility] 5/19/16 -  OV with Dr. Jazmine Peñaloza Care Everywhere      * 1/15/20 8:29 AM Faxed req to [facility] records, and [facility] for images to be pushed - Angela  * 1/15/20 11:39 AM Received records from [facility] and sent to [facility] to be scanned into the chart - Angela

## 2020-01-15 NOTE — TELEPHONE ENCOUNTER
"Requested Prescriptions   Pending Prescriptions Disp Refills     ondansetron (ZOFRAN-ODT) 4 MG ODT tab [Pharmacy Med Name: ONDANSETRON ODT 4MG TABLETS]  Last Written Prescription Date:  11/20/2019  Last Fill Quantity: 30 tablet,  # refills: 0   Last office visit: 1/13/2020 with prescribing provider:  Minoo   Future Office Visit:   Next 5 appointments (look out 90 days)    Jan 20, 2020  9:30 AM CST  SHORT with Evaristo Machado MD  [facility] [address] [place] [id number]  [phone]   Jan 23, 2020 10:00 AM CST  (Arrive by 9:45 AM)  Return Visit with KIRAN Carballo CNP  [facility]) [id number] [place], [place] [address]. [facility] [id number] [phone]          30 tablet 0     Sig: DISSOLVE 1 TO 2 TABLETS UNDER THE TONGUE EVERY 8 HOURS AS NEEDED FOR NAUSEA        Antivertigo/Antiemetic Agents Passed - 1/15/2020  9:38 AM        Passed - Recent (12 mo) or future (30 days) visit within the authorizing provider's specialty     Patient has had an office visit with the authorizing provider or a provider within the authorizing providers department within the previous 12 mos or has a future within next 30 days. See \"Patient Info\" tab in inbasket, or \"Choose Columns\" in Meds & Orders section of the refill encounter.              Passed - Medication is active on med list        Passed - Patient is 18 years of age or older           "

## 2020-01-16 RX ORDER — ONDANSETRON 4 MG/1
TABLET, ORALLY DISINTEGRATING ORAL
Qty: 30 TABLET | Refills: 11 | Status: SHIPPED | OUTPATIENT
Start: 2020-01-16 | End: 2020-05-04

## 2020-01-16 NOTE — TELEPHONE ENCOUNTER
Prescription approved per [facility] [place] Protocol for 12 months of refills since last appointment, which was 01/13/20

## 2020-01-21 DIAGNOSIS — G89.4 CHRONIC PAIN SYNDROME: ICD-10-CM

## 2020-01-21 RX ORDER — CARISOPRODOL 350 MG/1
350 TABLET ORAL 3 TIMES DAILY PRN
Qty: 90 TABLET | Refills: 5 | Status: SHIPPED | OUTPATIENT
Start: 2020-01-21 | End: 2020-03-06

## 2020-01-21 NOTE — TELEPHONE ENCOUNTER
Reason for Call:  Medication or medication refill:    Do you use a [facility]?  Name of the pharmacy and phone number for the current request:  [email or web address]    Name of the medication requested:   carisoprodol (SOMA) 350 MG tablet 90 tablet         Other request: Patient states she is completely out of her medication.    Can we leave a detailed message on this number? YES    Phone number patient can be reached at: Home number on file [phone] (home)    Best Time:     Call taken on 1/21/2020 at 9:27 AM by JACQUELINE SMITH

## 2020-01-21 NOTE — TELEPHONE ENCOUNTER
Controlled Substance Refill Request for carisoprodol (SOMA) 350 MG tablet  Problem List Complete:  Yes      Patient is followed by VIRAL Deleon for ongoing prescription of pain medication.  All refills should be approved by this provider, or covering partner.     Medication(s):  Lyrica 150 mg bid, Fioricet with codeine prn, klonopin and ambien to be prescribed by [facility], not [facility].  Maximum quantity per month: 60, 20  Clinic visit frequency required: Q 3 months      Controlled substance agreement on file: Yes       Date(s): 9/8/2015  New CSA needed.  Pain Clinic evaluation in the past: No     DIRE Total Score(s):  No flowsheet data found.     Last [facility] website verification: 12/20/2019 multiple meds from multiple outside providers     Clonazepam and dextroamphetamine prescribed by outside providers.       Last Written Prescription Date:  12/20/2019  Last Fill Quantity: 90 tablet,  # refills: 0   Last office visit: 1/13/2020 with prescribing provider:  Minoo   Future Office Visit:   Next 5 appointments (look out 90 days)    Jan 23, 2020 10:00 AM CST  (Arrive by 9:45 AM)  Return Visit with KIRAN Carballo CNP  [facility] ([facility]) [address]  [facility] [id number] [phone]             Controlled substance agreement:   Encounter-Level CSA - 09/08/2015:    Controlled Substance Agreement - Scan on 9/9/2015  2:41 PM: [facility], Controlled Substance Contract, 09-08-15     Patient-Level CSA:    There are no patient-level csa.         Last Urine Drug Screen: No results found for: CDAUT, No results found for: COMDAT, No results found for: THC13, PCP13, COC13, MAMP13, OPI13, AMP13, BZO13, TCA13, MTD13, BAR13, OXY13, PPX13, BUP13         [email or web address]   checked in past 3 months?  Yes 12/20/2019

## 2020-01-21 NOTE — TELEPHONE ENCOUNTER
[place] reviewed:  reviewed- recommend provider review    Multiple meds from multiple outside providers.     [place]  [email or web address]    Routing refill request to provider for review/approval because:  Drug not on the [facility] refill protocol

## 2020-01-23 ENCOUNTER — OFFICE VISIT (OUTPATIENT)
Dept: ENDOCRINOLOGY | Facility: CLINIC | Age: 45
End: 2020-01-23
Payer: COMMERCIAL

## 2020-01-23 VITALS
TEMPERATURE: 98.5 F | SYSTOLIC BLOOD PRESSURE: 118 MMHG | RESPIRATION RATE: 14 BRPM | HEIGHT: 62 IN | HEART RATE: 89 BPM | WEIGHT: 151.3 LBS | BODY MASS INDEX: 27.84 KG/M2 | DIASTOLIC BLOOD PRESSURE: 77 MMHG

## 2020-01-23 DIAGNOSIS — E06.3 HASHIMOTO'S THYROIDITIS: ICD-10-CM

## 2020-01-23 DIAGNOSIS — E11.9 TYPE 2 DIABETES MELLITUS WITHOUT COMPLICATION, WITHOUT LONG-TERM CURRENT USE OF INSULIN (H): Primary | ICD-10-CM

## 2020-01-23 DIAGNOSIS — E03.4 HYPOTHYROIDISM DUE TO ACQUIRED ATROPHY OF THYROID: ICD-10-CM

## 2020-01-23 LAB — HBA1C MFR BLD: 6 % (ref 0–5.6)

## 2020-01-23 PROCEDURE — 83036 HEMOGLOBIN GLYCOSYLATED A1C: CPT | Performed by: CLINICAL NURSE SPECIALIST

## 2020-01-23 PROCEDURE — 36415 COLL VENOUS BLD VENIPUNCTURE: CPT | Performed by: CLINICAL NURSE SPECIALIST

## 2020-01-23 PROCEDURE — 99214 OFFICE O/P EST MOD 30 MIN: CPT | Performed by: CLINICAL NURSE SPECIALIST

## 2020-01-23 PROCEDURE — 99207 C FOOT EXAM  NO CHARGE: CPT | Performed by: CLINICAL NURSE SPECIALIST

## 2020-01-23 RX ORDER — LEVOTHYROXINE SODIUM 50 UG/1
TABLET ORAL
Qty: 90 TABLET | Refills: 3 | Status: SHIPPED | OUTPATIENT
Start: 2020-01-23 | End: 2020-04-23

## 2020-01-23 ASSESSMENT — MIFFLIN-ST. JEOR: SCORE: 1298.51

## 2020-01-23 NOTE — PROGRESS NOTES
Name: Melody Muñoz  F/u for Diabetes (Last seen 3/21/2019).  HPI:  Melody Muñoz is a 44 year old female who presents for the management of:    1. Type 2 DM:  Originally diagnosed: 3/2017.   Initially treated with metformin - did not tolerate due to GI side effects    Current Regimen: Trulicity 0.75 mg weekly,  Trulicity was started 11/2018.  Briefly treated with januvia x 1 month - discontinued due to ineffectiveness.  Trial off Trulicity - BG levels were running higher so Trulicity was restarted.    BS checks: Meter not working, needs a new meter.   Meter Download:     No FH of diabetes.  Personal history of GD with 4th pregnancy - pregnant with twins at the time, was diet controlled.        Complications:   Diabetes Complications  Description / Detail    Diabetic Retinopathy  No retinopathy.  Last exam < 1 year ago - reports having an eye exam since last seen ? [facility]   CAD / PAD  No   Neuropathy  No numbness, burning or tingling in her feet but has noted 2 months history of numbness in her hands.  States feet are cold.   Nephropathy / Microalbuminuria  elevated urine microalbumin x 1 (6/2018) - repeat urine microalbumin in normal range 10/2018 and 10/2019   Gastroparesis  No   Hypoglycemia Unawareness  N0     2. Blood Pressure Blood Pressure today:   BP Readings from Last 3 Encounters:   01/23/20 118/77   01/13/20 124/78   12/03/19 116/70   Blood pressure medications include none  3. Lipids: Takes no medications for lipid control.      4. Prevention:  Flu Shot- 9/23/2019  Pneumovax- Recommended  Opthalmology-annually  Dental-Recommended semiannually  ASA-No  Smoking- No  4. Hypothyroidism. Currently treated with levothyroxine 50 mcg/day.  Noting increased irritability but also under more recent stress (daugher diagnosed with an eating disorder, son leaving soon for boot camp).  Recent TSH was slightly below normal.  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     Migraines     otc excedrin prn     Sleep apnea 2014    uses Cpap     Substance abuse (H)     no use since summer 2013     Suicidal ideation 2013    in past, not current, sees psychiatrist and therapist     Vertigo 2015     Past Surgical History:   Procedure Laterality Date     BIOPSY       COLONOSCOPY       GENITOURINARY SURGERY  May/2014    bladder sling     GYN SURGERY      laparoscopy- endometriosis     GYN SURGERY       for twins     LAPAROSCOPIC APPENDECTOMY N/A 2017    Procedure: LAPAROSCOPIC APPENDECTOMY;  LAPAROSCOPIC APPENDECTOMY and resection of epiploic tag;  Surgeon: Roberto Robins MD;  Location: [facility] OR     little finger surgery left  1980     tubal ligation bilateral       wisdom teeth removal       Family Hx:  Family History   Problem Relation Age of Onset     Hypertension Mother      Heart Disease Father         palpitations     Depression Sister      Anxiety Disorder Sister      Heart Disease Maternal Grandmother         CHF     Cancer Maternal Grandfather 72        Pancreatic Cancer     Alzheimer Disease Paternal Grandfather      Bipolar Disorder Other      Autism Spectrum Disorder Other      Thyroid disease: No         DM2: No         Autoimmune: DM1, SLE, RA, Vitiligo cousin with type 1 diabetes    Social Hx:  Social History     Socioeconomic History     Marital status:      Spouse name: Not on file     Number of children: Not on file     Years of education: Not on file     Highest education level: Not on file    Occupational History     Employer: SELF EMPLOYED.     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   Social Needs     Financial resource strain: Not on file     Food insecurity:     Worry: Not on file     Inability: Not on file     Transportation needs:     Medical: Not on file     Non-medical: Not on file   Tobacco Use     Smoking status: Former Smoker     Packs/day: 0.00     Years: 17.00     Pack years: 0.00     Types: Cigarettes     Last attempt to quit: 3/18/2019     Years since quittin.8     Smokeless tobacco: Never Used   Substance and Sexual Activity     Alcohol use: No     Comment: no etoh since      Drug use: No     Sexual activity: Not Currently     Partners: Male     Birth control/protection: Surgical     Comment: tubal ligation   Lifestyle     Physical activity:     Days per week: Not on file     Minutes per session: Not on file     Stress: Not on file   Relationships     Social connections:     Talks on phone: Not on file     Gets together: Not on file     Attends Alevism service: Not on file     Active member of club or organization: Not on file     Attends meetings of clubs or organizations: Not on file     Relationship status: Not on file     Intimate partner violence:     Fear of current or ex partner: Not on file     Emotionally abused: Not on file     Physically abused: Not on file     Forced sexual activity: Not on file   Other Topics Concern     Parent/sibling w/ CABG, MI or angioplasty before 65F 55M? No      Service Not Asked     Blood Transfusions Not Asked     Caffeine Concern Not Asked     Occupational Exposure Not Asked     Hobby Hazards Not Asked     Sleep Concern Not Asked     Stress Concern Not Asked     Weight Concern Not Asked     Special Diet Not Asked     Back Care Not Asked     Exercise Not Asked     Bike Helmet Yes     Seat Belt Yes     Self-Exams Not Asked   Social History Narrative     Not on file          MEDICATIONS:  has a current medication list which  "includes the following prescription(s): blood glucose, blood glucose monitoring, dulaglutide, levothyroxine, albuterol, almotriptan, butalbital-acetaminophen-caffeine, carisoprodol, cefdinir, clonazepam, dextroamphetamine, dextroamphetamine, fluticasone, guanfacine, lamotrigine, latuda, microlet lancets, ondansetron, order for dme, pantoprazole, pregabalin, proair hfa, topiramate, trazodone, and venlafaxine.    ROS     ROS: 10 point ROS neg other than the symptoms noted above in the HPI.    Physical Exam   VS: /77 (BP Location: Right arm, Patient Position: Chair, Cuff Size: Adult Regular)   Pulse 89   Temp 98.5  F (36.9  C) (Oral)   Resp 14   Ht 1.581 m (5' 2.25\")   Wt 68.6 kg (151 lb 4.8 oz)   LMP  (LMP Unknown)   Breastfeeding No   BMI 27.45 kg/m    GENERAL: AXOX3, NAD, well dressed, answering questions appropriately, appears stated age.  HEENT: no exophthalmos, no proptosis no lig lag, no retraction  NECK:  Thyroid gland palpably normal, no distinct nodules, no adenopathy.  CV: RRR, no rubs, gallops, no murmurs  LUNGS: CTAB, no wheezes, rales, or rhonchi  EXTREMITIES: no edema, feet with 2+ pulses, 10-gram plantar sensation 6/6 bilaterally, no open lesions.  NEUROLOGY: CN grossly intact, + monofilament, no tremors  MSK: grossly intact    LABS:  A1c:   Component      Latest Ref Rng & Units 2/6/2019 10/24/2019 1/23/2020   Hemoglobin A1C      0 - 5.6 % 6.5 (H) 5.6 6.0 (H)     Basic Metabolic Panel:  !COMPREHENSIVE Latest Ref Rng & Units 10/23/2019   SODIUM 133 - 144 mmol/L 140   POTASSIUM 3.4 - 5.3 mmol/L 3.6   CHLORIDE 94 - 109 mmol/L 109   BUN 7 - 30 mg/dL 9   Creatinine 0.52 - 1.04 mg/dL 0.83   Glucose 70 - 99 mg/dL 91   ANION GAP 3 - 14 mmol/L 5   CALCIUM 8.5 - 10.1 mg/dL 9.5   ALBUMIN 3.4 - 5.0 g/dL 3.9   PROTEIN, TOTAL 6.8 - 8.8 g/dL 6.9   AST 0 - 45 U/L 20     LFTS/Cholesterol Panel:  !LIPID/HEPATIC Latest Ref Rng & Units 2/6/2019   CHOLESTEROL <200 mg/dL 186   TRIGLYCERIDES <150 mg/dL 376 (H) "   HDL CHOLESTEROL >49 mg/dL 36 (L)   LDL CHOLESTEROL, CALCULATED <100 mg/dL 75   VLDL-CHOLESTEROL 0 - 30 mg/dL    NON HDL CHOLESTEROL <130 mg/dL 150 (H)     !LIPID/HEPATIC Latest Ref Rng & Units 10/23/2019   AST 0 - 45 U/L 20   ALT 0 - 50 U/L 28     Thyroid Function:   !THYROID Latest Ref Rng & Units 1/13/2020 10/24/2019 5/2/2019   TSH 0.40 - 4.00 mU/L 0.23 (L) 0.45 0.84   T4 FREE 0.76 - 1.46 ng/dL 1.10 1.18 0.95     Urine MicroAlbumin:  Component      Latest Ref Rng & Units 6/19/2018 10/1/2018   Creatinine Urine      mg/dL 21 97   Albumin Urine mg/L      mg/L 8 17   Albumin Urine mg/g Cr      0 - 25 mg/g Cr 37.38 (H) 17.65     Vitamin D:    All pertinent notes, labs, and images personally reviewed by me.     A/P  Ms.Jessica TONYA Muñoz is a 44 year old here for the management of:    1. DM2 - Controlled.  A1c 6.0%, increased slightly from 5.6%.  Continue Trulicity for now.  Continue to monitor blood sugars 1-3 times per day.  New Contour EZ meter given today.  There is some variability among people, most will usually develop symptoms suggestive of hypoglycemia when blood glucose levels are lowered to the mid 60's. The first set of symptoms are called adrenergic. Patients may experience any of the following nervousness, sweating, intense hunger, trembling, weakness, palpitations, and difficulty speaking.   The acute management of hypoglycemia involves the rapid delivery of a source of easily absorbed sugar. Regular soda, juice, lifesavers, table sugar, are good options. 15 grams of glucose is the dose that is given, followed by an assessment of symptoms and a blood glucose check if possible. If after 10 minutes there is no improvement, another 10-15 grams should be given. This can be repeated up to three times. The equivalency of 10-15 grams of glucose (approximate servings) are: 3-5 hard candies, 3 teaspoons of sugar, or 1/2 cup of regular soda or juice.    2. Hypothyroidism.  Currently treated with levothyroxine 50  mcg/day.  TSH now below normal + increased irritability.  Decrease levothyroxine slightly to 6.5 tabs/week.  Recheck TFT's in 6-8 weeks.  Schedule thyroid ultrasound due to complaints of difficulty swallowing.    Labs ordered today:   Orders Placed This Encounter   Procedures     FOOT EXAM     US Thyroid     Hemoglobin A1c     TSH     T4 FREE       Radiology/Consults ordered today: C FOOT EXAM  NO CHARGE  US THYROID    More than 50% of the time spent with Ms. Muñoz on counseling / coordinating her care discussing the above plan of care.The patient indicates understanding of the above issues and agrees with the plan set forth.  Total face to face time was 25 minutes.    Follow-up:  3 months    Tammy Louie NP  Endocrinology  [facility]  CC: Evaristo Machado

## 2020-01-23 NOTE — LETTER
1/23/2020         RE: Melody Muñoz  [address] [place] [address]        Dear Colleague,    Thank you for referring your patient, Melody Muñoz, to the [facility]. Please see a copy of my visit note below.    Name: Melody Muñoz  F/u for Diabetes (Last seen 3/21/2019).  HPI:  Melody Muñoz is a 44 year old female who presents for the management of:    1. Type 2 DM:  Originally diagnosed: 3/2017.   Initially treated with metformin - did not tolerate due to GI side effects    Current Regimen: Trulicity 0.75 mg weekly,  Trulicity was started 11/2018.  Briefly treated with januvia x 1 month - discontinued due to ineffectiveness.  Trial off Trulicity - BG levels were running higher so Trulicity was restarted.    BS checks: Meter not working, needs a new meter.   Meter Download:     No FH of diabetes.  Personal history of GD with 4th pregnancy - pregnant with twins at the time, was diet controlled.        Complications:   Diabetes Complications  Description / Detail    Diabetic Retinopathy  No retinopathy.  Last exam < 1 year ago - reports having an eye exam since last seen ? [facility]   CAD / PAD  No   Neuropathy  No numbness, burning or tingling in her feet but has noted 2 months history of numbness in her hands.  States feet are cold.   Nephropathy / Microalbuminuria  elevated urine microalbumin x 1 (6/2018) - repeat urine microalbumin in normal range 10/2018 and 10/2019   Gastroparesis  No   Hypoglycemia Unawareness  N0     2. Blood Pressure Blood Pressure today:   BP Readings from Last 3 Encounters:   01/23/20 118/77   01/13/20 124/78   12/03/19 116/70   Blood pressure medications include none  3. Lipids: Takes no medications for lipid control.      4. Prevention:  Flu Shot- 9/23/2019  Pneumovax- Recommended  Opthalmology-annually  Dental-Recommended semiannually  ASA-No  Smoking- No  4. Hypothyroidism. Currently treated with levothyroxine 50 mcg/day.   Noting increased irritability but also under more recent stress (abdullahiugher diagnosed with an eating disorder, son leaving soon for boot camp).  Recent TSH was slightly below normal.  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     Migraines     otc excedrin prn     Sleep apnea 2014    uses Cpap     Substance abuse (H)     no use since summer 2013     Suicidal ideation 2013    in past, not current, sees psychiatrist and therapist     Vertigo 2015     Past Surgical History:   Procedure Laterality Date     BIOPSY       COLONOSCOPY       GENITOURINARY SURGERY  May/2014    bladder sling     GYN SURGERY      laparoscopy- endometriosis     GYN SURGERY       for twins     LAPAROSCOPIC APPENDECTOMY N/A 2017    Procedure: LAPAROSCOPIC APPENDECTOMY;  LAPAROSCOPIC APPENDECTOMY and resection of epiploic tag;  Surgeon: Roberto Robins MD;  Location: [facility]     little finger surgery left       tubal ligation bilateral       wisdom teeth removal       Family Hx:  Family History   Problem Relation Age of Onset     Hypertension Mother      Heart Disease Father         palpitations     Depression Sister      Anxiety Disorder Sister      Heart Disease Maternal Grandmother         CHF     Cancer Maternal Grandfather 72        Pancreatic Cancer     Alzheimer Disease Paternal Grandfather      Bipolar Disorder Other      Autism Spectrum Disorder Other      Thyroid disease: No         DM2: No         Autoimmune: DM1, SLE, RA, Vitiligo cousin  with type 1 diabetes    Social Hx:  Social History     Socioeconomic History     Marital status:      Spouse name: Not on file     Number of children: Not on file     Years of education: Not on file     Highest education level: Not on file   Occupational History     Employer: SELF EMPLOYED.     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   Social Needs     Financial resource strain: Not on file     Food insecurity:     Worry: Not on file     Inability: Not on file     Transportation needs:     Medical: Not on file     Non-medical: Not on file   Tobacco Use     Smoking status: Former Smoker     Packs/day: 0.00     Years: 17.00     Pack years: 0.00     Types: Cigarettes     Last attempt to quit: 3/18/2019     Years since quittin.8     Smokeless tobacco: Never Used   Substance and Sexual Activity     Alcohol use: No     Comment: no etoh since      Drug use: No     Sexual activity: Not Currently     Partners: Male     Birth control/protection: Surgical     Comment: tubal ligation   Lifestyle     Physical activity:     Days per week: Not on file     Minutes per session: Not on file     Stress: Not on file   Relationships     Social connections:     Talks on phone: Not on file     Gets together: Not on file     Attends Yarsani service: Not on file     Active member of club or organization: Not on file     Attends meetings of clubs or organizations: Not on file     Relationship status: Not on file     Intimate partner violence:     Fear of current or ex partner: Not on file     Emotionally abused: Not on file     Physically abused: Not on file     Forced sexual activity: Not on file   Other Topics Concern     Parent/sibling w/ CABG, MI or angioplasty before 65F 55M? No      Service Not Asked     Blood Transfusions Not Asked     Caffeine Concern Not Asked     Occupational Exposure Not Asked     Hobby Hazards Not Asked     Sleep Concern Not Asked     Stress Concern Not Asked     Weight Concern  "Not Asked     Special Diet Not Asked     Back Care Not Asked     Exercise Not Asked     Bike Helmet Yes     Seat Belt Yes     Self-Exams Not Asked   Social History Narrative     Not on file          MEDICATIONS:  has a current medication list which includes the following prescription(s): blood glucose, blood glucose monitoring, dulaglutide, levothyroxine, albuterol, almotriptan, butalbital-acetaminophen-caffeine, carisoprodol, cefdinir, clonazepam, dextroamphetamine, dextroamphetamine, fluticasone, guanfacine, lamotrigine, latuda, microlet lancets, ondansetron, order for dme, pantoprazole, pregabalin, proair hfa, topiramate, trazodone, and venlafaxine.    ROS     ROS: 10 point ROS neg other than the symptoms noted above in the HPI.    Physical Exam   VS: /77 (BP Location: Right arm, Patient Position: Chair, Cuff Size: Adult Regular)   Pulse 89   Temp 98.5  F (36.9  C) (Oral)   Resp 14   Ht 1.581 m (5' 2.25\")   Wt 68.6 kg (151 lb 4.8 oz)   LMP  (LMP Unknown)   Breastfeeding No   BMI 27.45 kg/m     GENERAL: AXOX3, NAD, well dressed, answering questions appropriately, appears stated age.  HEENT: no exophthalmos, no proptosis no lig lag, no retraction  NECK:  Thyroid gland palpably normal, no distinct nodules, no adenopathy.  CV: RRR, no rubs, gallops, no murmurs  LUNGS: CTAB, no wheezes, rales, or rhonchi  EXTREMITIES: no edema, feet with 2+ pulses, 10-gram plantar sensation 6/6 bilaterally, no open lesions.  NEUROLOGY: CN grossly intact, + monofilament, no tremors  MSK: grossly intact    LABS:  A1c:   Component      Latest Ref Rng & Units 2/6/2019 10/24/2019 1/23/2020   Hemoglobin A1C      0 - 5.6 % 6.5 (H) 5.6 6.0 (H)     Basic Metabolic Panel:  !COMPREHENSIVE Latest Ref Rng & Units 10/23/2019   SODIUM 133 - 144 mmol/L 140   POTASSIUM 3.4 - 5.3 mmol/L 3.6   CHLORIDE 94 - 109 mmol/L 109   BUN 7 - 30 mg/dL 9   Creatinine 0.52 - 1.04 mg/dL 0.83   Glucose 70 - 99 mg/dL 91   ANION GAP 3 - 14 mmol/L 5 "   CALCIUM 8.5 - 10.1 mg/dL 9.5   ALBUMIN 3.4 - 5.0 g/dL 3.9   PROTEIN, TOTAL 6.8 - 8.8 g/dL 6.9   AST 0 - 45 U/L 20     LFTS/Cholesterol Panel:  !LIPID/HEPATIC Latest Ref Rng & Units 2/6/2019   CHOLESTEROL <200 mg/dL 186   TRIGLYCERIDES <150 mg/dL 376 (H)   HDL CHOLESTEROL >49 mg/dL 36 (L)   LDL CHOLESTEROL, CALCULATED <100 mg/dL 75   VLDL-CHOLESTEROL 0 - 30 mg/dL    NON HDL CHOLESTEROL <130 mg/dL 150 (H)     !LIPID/HEPATIC Latest Ref Rng & Units 10/23/2019   AST 0 - 45 U/L 20   ALT 0 - 50 U/L 28     Thyroid Function:   !THYROID Latest Ref Rng & Units 1/13/2020 10/24/2019 5/2/2019   TSH 0.40 - 4.00 mU/L 0.23 (L) 0.45 0.84   T4 FREE 0.76 - 1.46 ng/dL 1.10 1.18 0.95     Urine MicroAlbumin:  Component      Latest Ref Rng & Units 6/19/2018 10/1/2018   Creatinine Urine      mg/dL 21 97   Albumin Urine mg/L      mg/L 8 17   Albumin Urine mg/g Cr      0 - 25 mg/g Cr 37.38 (H) 17.65     Vitamin D:    All pertinent notes, labs, and images personally reviewed by me.     A/P  Ms.Jessica TONYA Muñoz is a 44 year old here for the management of:    1. DM2 - Controlled.  A1c 6.0%, increased slightly from 5.6%.  Continue Trulicity for now.  Continue to monitor blood sugars 1-3 times per day.  New Contour EZ meter given today.  There is some variability among people, most will usually develop symptoms suggestive of hypoglycemia when blood glucose levels are lowered to the mid 60's. The first set of symptoms are called adrenergic. Patients may experience any of the following nervousness, sweating, intense hunger, trembling, weakness, palpitations, and difficulty speaking.   The acute management of hypoglycemia involves the rapid delivery of a source of easily absorbed sugar. Regular soda, juice, lifesavers, table sugar, are good options. 15 grams of glucose is the dose that is given, followed by an assessment of symptoms and a blood glucose check if possible. If after 10 minutes there is no improvement, another 10-15 grams should be  given. This can be repeated up to three times. The equivalency of 10-15 grams of glucose (approximate servings) are: 3-5 hard candies, 3 teaspoons of sugar, or 1/2 cup of regular soda or juice.    2. Hypothyroidism.  Currently treated with levothyroxine 50 mcg/day.  TSH now below normal + increased irritability.  Decrease levothyroxine slightly to 6.5 tabs/week.  Recheck TFT's in 6-8 weeks.  Schedule thyroid ultrasound due to complaints of difficulty swallowing.    Labs ordered today:   Orders Placed This Encounter   Procedures     FOOT EXAM     US Thyroid     Hemoglobin A1c     TSH     T4 FREE       Radiology/Consults ordered today: C FOOT EXAM  NO CHARGE  US THYROID    More than 50% of the time spent with Ms. Muñoz on counseling / coordinating her care discussing the above plan of care.The patient indicates understanding of the above issues and agrees with the plan set forth.  Total face to face time was 25 minutes.    Follow-up:  3 months    Tammy Louie NP  Endocrinology  [facility]  CC: Evaristo Machado    Again, thank you for allowing me to participate in the care of your patient.        Sincerely,        KIRAN Carballo CNP

## 2020-01-23 NOTE — PATIENT INSTRUCTIONS
In an effort to stay on schedule, please remember to check in for your next clinic appointment 15-20 minutes early.  This is necessary so your insulin pump/blood glucose meter or CGM can be uploaded and any labs can be done if necessary.  We appreciate your understanding.    Component      Latest Ref Rng & Units 2/6/2019 10/24/2019 1/23/2020   Hemoglobin A1C      0 - 5.6 % 6.5 (H) 5.6 6.0 (H)     Your A1c is great and indicates good diabetes control  6.0% is equivalent to an average of 120.    [facility] will contact you to schedule the thyroid ultrasound.    Decrease your levothyroxine to one tablet six days per week and 1/2 tablet one day each week.  Schedule a lab only appointment in about 6-8 weeks to recheck.    Tammy Louie NP  Endocrinology

## 2020-01-29 ENCOUNTER — OFFICE VISIT (OUTPATIENT)
Dept: FAMILY MEDICINE | Facility: CLINIC | Age: 45
End: 2020-01-29
Payer: COMMERCIAL

## 2020-01-29 ENCOUNTER — TELEPHONE (OUTPATIENT)
Dept: FAMILY MEDICINE | Facility: CLINIC | Age: 45
End: 2020-01-29

## 2020-01-29 VITALS
SYSTOLIC BLOOD PRESSURE: 104 MMHG | WEIGHT: 158.8 LBS | DIASTOLIC BLOOD PRESSURE: 66 MMHG | HEIGHT: 62 IN | TEMPERATURE: 99.2 F | OXYGEN SATURATION: 99 % | HEART RATE: 98 BPM | BODY MASS INDEX: 29.22 KG/M2

## 2020-01-29 DIAGNOSIS — E03.4 HYPOTHYROIDISM DUE TO ACQUIRED ATROPHY OF THYROID: ICD-10-CM

## 2020-01-29 DIAGNOSIS — F41.9 ANXIETY: ICD-10-CM

## 2020-01-29 DIAGNOSIS — J32.9 RECURRENT SINUS INFECTIONS: Primary | ICD-10-CM

## 2020-01-29 DIAGNOSIS — Z20.828 EXPOSURE TO THE FLU: Primary | ICD-10-CM

## 2020-01-29 DIAGNOSIS — F34.1 PERSISTENT DEPRESSIVE DISORDER: ICD-10-CM

## 2020-01-29 PROCEDURE — 99214 OFFICE O/P EST MOD 30 MIN: CPT | Performed by: FAMILY MEDICINE

## 2020-01-29 RX ORDER — VENLAFAXINE HYDROCHLORIDE 75 MG/1
75 CAPSULE, EXTENDED RELEASE ORAL DAILY
Qty: 90 CAPSULE | Refills: 1
Start: 2020-01-29 | End: 2020-02-26

## 2020-01-29 ASSESSMENT — MIFFLIN-ST. JEOR: SCORE: 1332.53

## 2020-01-29 NOTE — PATIENT INSTRUCTIONS
"I did not appreciate any abnormalities on the patient's neck exam.  She does have an order already placed for a thyroid ultrasound and will get that accomplished.  She has a follow-up appointment in April with the CDE.  She has an appointment in the next week or so at the [facility] for a second opinion on her sinuses with ENT.  I am a little puzzled about the previous ENT response to her sinuses being \"stress\".  Clearly there are abnormalities on her CT scan of her sinuses.  Her stress level has improved of late.  Her daughter is finishing treatment for her eating disorder.  Her son is almost done with high school and when done will be going into the [employer].  I will have the patient follow-up with me in March.  "

## 2020-01-29 NOTE — TELEPHONE ENCOUNTER
Pt would like flu swab to rule out if symptoms are due to her flu or sinus. She has HA, cough , chills and sinus congestion. She has had sinus drainage for 3 days but has chills.HA and body aches today. She was seen today but forgot to mention her son tested positive for influenza. Pt did have flu shot this year. Please advice on request. Can she come in for a nurse only appt?

## 2020-01-29 NOTE — TELEPHONE ENCOUNTER
Reason for call:  Patient reporting a symptom    Symptom or request: body aches chills    Duration (how long have symptoms been present): 1 day    Have you been treated for this before? No    Additional comments: Pt forgot to ask Dr Machado today at office visit  Son tested positive for flu  Wants flu test    Phone Number patient can be reached at:  Home number on file [phone] (home)    Best Time:  any    Can we leave a detailed message on this number:  YES    Call taken on 1/29/2020 at 3:49 PM by MARIAN FUENTES

## 2020-01-29 NOTE — PROGRESS NOTES
Subjective     Melody Muñoz is a 44 year old female who presents to clinic today for the following health issues:    HPI   Lump      Duration: X2 months    Description (location/character/radiation): Patient feels lumps on right side of sternum and right clavicle and neck    Intensity:  mild    Accompanying signs and symptoms: Intermittent ongoing sinus infections    History (similar episodes/previous evaluation): None    Precipitating or alleviating factors: None    Therapies tried and outcome: None     Sinusitis      Duration: Off-and-on over a long period of time    Description (location/character/radiation): Congestion with facial pain    Intensity:  moderate    Accompanying signs and symptoms: Nasal drainage that can be green/yellow    History (similar episodes/previous evaluation): ENT consultation and CT scan of the sinuses    Precipitating or alleviating factors: None    Therapies tried and outcome: Has been on antibiotics in the past.  Currently using Flonase.  Her last consultation with ENT did not go well.  She had to go to a counseling appointment and so they said well, your sinus issues are just stress.     Anxiety Follow-Up    How are you doing with your anxiety since your last visit?  Slightly improved    Are you having other symptoms that might be associated with anxiety? Yes:  Lots of physical issues    Have you had a significant life event? Health Concerns     Are you feeling depressed? No    Do you have any concerns with your use of alcohol or other drugs? No    Social History     Tobacco Use     Smoking status: Former Smoker     Packs/day: 0.00     Years: 17.00     Pack years: 0.00     Types: Cigarettes     Last attempt to quit: 3/18/2019     Years since quittin.8     Smokeless tobacco: Never Used   Substance Use Topics     Alcohol use: No     Comment: no etoh since      Drug use: No     ROWDY-7 SCORE 2018   Total Score - - -   Total Score 5 (mild anxiety) 13  (moderate anxiety) -   Total Score 5 13 14   Total Score - - -   Total Score BEH Adult - - -     PHQ 2/6/2019 5/24/2019 12/3/2019   PHQ-9 Total Score 11 20 12   Q9: Thoughts of better off dead/self-harm past 2 weeks Not at all Several days More than half the days   F/U: Thoughts of suicide or self-harm - - No   F/U: Safety concerns - - No           Patient Active Problem List   Diagnosis     Endometriosis s/po EDWIGE 10-17     Mantoux: positive     Latent tuberculosis     Major depression     Generalized anxiety disorder     Constipation     Nightmares     Knee pain     Bipolar 1 disorder  with psychosis     Chronic fatigue     Female stress incontinence     Suicidal ideation     Acne     Chronic pain syndrome     Insomnia     Chronic migraine without aura without status migrainosus, not intractable     Nausea     JASWANT (obstructive sleep apnea)     Elevated liver enzymes     TMJ (temporomandibular joint syndrome)     Hypothyroidism due to acquired atrophy of thyroid     Hostile behavior     Mild persistent asthma without complication     Uncontrolled type 2 diabetes mellitus with hyperglycemia (H)     Allergic rhinitis     Incontinence of urine in female     Migraine     Screening for diabetic peripheral neuropathy     Need for prophylactic vaccination against Streptococcus pneumoniae (pneumococcus)     Sepsis (H)     Abdominal pain     Morbid obesity (H)     Former tobacco use     Mixed simple and mucopurulent chronic bronchitis (H): Spirometry 6-4-16 lung age = 63y/o in 39 y/o FVC=90%&FEV1=78%     Class 2 obesity due to excess calories with serious comorbidity and body mass index (BMI) of 36.0 to 36.9 in adult     Mixed hyperlipidemia     Acquired hypothyroidism     Dysuria     Other chronic back pain     Headache disorder     Persistent depressive disorder     Irritable bowel syndrome with both constipation and diarrhea     Anxiety     Recurrent sinus infections     Past Surgical History:   Procedure Laterality Date  "    BIOPSY       COLONOSCOPY  2010     GENITOURINARY SURGERY  May/2014    bladder sling     GYN SURGERY      laparoscopy- endometriosis     GYN SURGERY  2009     for twins     LAPAROSCOPIC APPENDECTOMY N/A 2017    Procedure: LAPAROSCOPIC APPENDECTOMY;  LAPAROSCOPIC APPENDECTOMY and resection of epiploic tag;  Surgeon: Roberto Robins MD;  Location: [facility] OR     little finger surgery left  1980     tubal ligation bilateral       wisdom teeth removal         Social History     Tobacco Use     Smoking status: Former Smoker     Packs/day: 0.00     Years: 17.00     Pack years: 0.00     Types: Cigarettes     Last attempt to quit: 3/18/2019     Years since quittin.8     Smokeless tobacco: Never Used   Substance Use Topics     Alcohol use: No     Comment: no etoh since      Family History   Problem Relation Age of Onset     Hypertension Mother      Heart Disease Father         palpitations     Depression Sister      Anxiety Disorder Sister      Heart Disease Maternal Grandmother         CHF     Cancer Maternal Grandfather 72        Pancreatic Cancer     Alzheimer Disease Paternal Grandfather      Bipolar Disorder Other      Autism Spectrum Disorder Other              Reviewed and updated as needed this visit by Provider         Review of Systems   ROS COMP: Constitutional, HEENT, cardiovascular, pulmonary, gi and gu systems are negative, except as otherwise noted.      Objective    /66 (Patient Position: Sitting, Cuff Size: Adult Regular)   Pulse 98   Temp 99.2  F (37.3  C) (Tympanic)   Ht 1.581 m (5' 2.25\")   Wt 72 kg (158 lb 12.8 oz)   LMP  (LMP Unknown)   SpO2 99%   Breastfeeding No   BMI 28.81 kg/m    Body mass index is 28.81 kg/m .  Physical Exam   GENERAL APPEARANCE: healthy, alert and no distress  NECK: no adenopathy, no asymmetry, masses, or scars and where the patient can feel her \"asymmetry\" I do not feel anything at this point.  RESP: I did not appreciate any masses on the chest " "wall.  Where the patient feels a little discomfort to palpation is at 1 of the costochondral junctions.  I did not feel anything abnormal there but she says it is mildly tender to palpation.            Assessment & Plan       ICD-10-CM    1. Recurrent sinus infections J32.9    2. Anxiety F41.9    3. Persistent depressive disorder F34.1    4. Hypothyroidism due to acquired atrophy of thyroid E03.4           Patient Instructions   I did not appreciate any abnormalities on the patient's neck exam.  She does have an order already placed for a thyroid ultrasound and will get that accomplished.  She has a follow-up appointment in April with the CDE.  She has an appointment in the next week or so at the [facility] for a second opinion on her sinuses with ENT.  I am a little puzzled about the previous ENT response to her sinuses being \"stress\".  Clearly there are abnormalities on her CT scan of her sinuses.  Her stress level has improved of late.  Her daughter is finishing treatment for her eating disorder.  Her son is almost done with high school and when done will be going into the [employer].  I will have the patient follow-up with me in March.      Return in about 6 weeks (around 3/11/2020) for anxiety, depression, Thyroid.    Evaristo Machado MD  [facility]      "

## 2020-01-30 ENCOUNTER — ALLIED HEALTH/NURSE VISIT (OUTPATIENT)
Dept: NURSING | Facility: CLINIC | Age: 45
End: 2020-01-30
Payer: COMMERCIAL

## 2020-01-30 DIAGNOSIS — R68.89 FLU-LIKE SYMPTOMS: Primary | ICD-10-CM

## 2020-01-30 DIAGNOSIS — J10.1 INFLUENZA A: Primary | ICD-10-CM

## 2020-01-30 LAB
FLUAV+FLUBV AG SPEC QL: NEGATIVE
FLUAV+FLUBV AG SPEC QL: POSITIVE
SPECIMEN SOURCE: ABNORMAL

## 2020-01-30 PROCEDURE — 99207 ZZC NO CHARGE LOS: CPT

## 2020-01-30 PROCEDURE — 87804 INFLUENZA ASSAY W/OPTIC: CPT | Performed by: FAMILY MEDICINE

## 2020-01-30 RX ORDER — OSELTAMIVIR PHOSPHATE 75 MG/1
75 CAPSULE ORAL 2 TIMES DAILY
Qty: 10 CAPSULE | Refills: 0 | Status: SHIPPED | OUTPATIENT
Start: 2020-01-30 | End: 2020-03-06

## 2020-01-30 NOTE — TELEPHONE ENCOUNTER
Pt called this morning reporting chills. She would like to proceed with flu swab. Order was placed and nurse only appt was scheduled.

## 2020-01-30 NOTE — NURSING NOTE
Patient presents for flu swab only. Son was + for influenza earlier this week. Swab completed and was positive for influenza A. Dr. Machado was notified and will sent script to pharmacy for TamiFlu. Patient was notified.    Hannah Sood LPN

## 2020-01-31 ENCOUNTER — NURSE TRIAGE (OUTPATIENT)
Dept: NURSING | Facility: CLINIC | Age: 45
End: 2020-01-31

## 2020-01-31 ENCOUNTER — TELEPHONE (OUTPATIENT)
Dept: OTOLARYNGOLOGY | Facility: CLINIC | Age: 45
End: 2020-01-31

## 2020-01-31 NOTE — TELEPHONE ENCOUNTER
Called patient to reschedule 2/4 appointment from Dr. Mendez to Dr. Valles, at the request of Daina PETERSEN.    Patient stated that her provider stated she should see Dr. Mendez, as she has been having sleep issues, as well. Patient requested our fax number to fax over sleep records, which I provided her with.    Patient requested a call back from clinic staff to discuss who would be best suited to see her, as she is very overwhelmed by her multiple ongoing symptoms...    -drainage from nose / sinusitis  -sleep issues  -Influenza A    (patient kept describing other symptoms until I stopped her and informed pt that I just schedule appointments and cannot advise for medical concerns).    Told patient that she can expect a call back from clinic staff later today or next week. For now, we will leave 2/4 appointment as is. Patient stated understanding.

## 2020-01-31 NOTE — TELEPHONE ENCOUNTER
LVM informing patient that Dr. Mendez is not a sinus specialist, and because she is being referred by another ENT she should see a rhinologist. Also informed patient that Dr. Mendez would be happy to see her for sleep apnea, she would just need to obtain a referral for that condition as well. Gave call center number and requested a call back if she would like to discuss further.    Daina Moncada, EMT

## 2020-02-01 NOTE — TELEPHONE ENCOUNTER
Seen yesterday. Tested positive for Influenza A. Started Tamiflu last night at [facility]. C/o itchy all over starting this AM. Skin is red and splotchy in most areas w/ no bumps. There are some bumps that may be hives (raised, red/white, like mosquito bites, itchy) on lower abdomen and on buttocks. Says Benadryl 50 mg not helping. No breathing or swallowing difficulty. Advised see provider w/i 24 hrs per guideline. Pt thinks rash caused by Tamiflu. Advised hold Tamiflu for now until discussing w/ doctor tomorrow.    Reason for Disposition    Hives or itching    Additional Information    Negative: [1] Life-threatening reaction (anaphylaxis) in the past to the same drug AND [2] < 2 hours since exposure    Negative: Difficulty breathing or wheezing    Negative: [1] Hoarseness or cough AND [2] started soon after 1st dose of drug    Negative: [1] Swollen tongue AND [2] started soon after 1st dose of drug    Negative: [1] Purple or blood-colored rash (spots or dots) AND [2] fever    Negative: Sounds like a life-threatening emergency to the triager    Negative: Rash is only on 1 part of the body (localized)    Negative: Taking new non-prescription (OTC) antihistamine, decongestant, ear drops, eye drops, or other OTC cough/cold medicine    Negative: Taking new prescription antihistamine, allergy medicine, asthma medicine, eye drops, ear drops or nose drops    Negative: Rash started more than 3 days after stopping new prescription medicine    Negative: Swollen tongue    Negative: [1] Widespread hives AND [2] onset < 2 hours of exposure to 1st dose of drug    Negative: Fever    Negative: Patient sounds very sick or weak to the triager    Negative: [1] Purple or blood-colored rash (spots or dots) AND [2] no fever AND [3] sounds well to triager    Negative: [1] Taking new prescription medication AND [2] rash within 4 hours of 1st dose    Negative: Large or small blisters on skin (i.e., fluid filled bubbles or sacs)    Negative: Bloody  crusts on lips or sores in mouth    Negative: Face or lip swelling    Protocols used: RASH - WIDESPREAD ON DRUGS-A-AH

## 2020-02-03 ENCOUNTER — OFFICE VISIT (OUTPATIENT)
Dept: FAMILY MEDICINE | Facility: CLINIC | Age: 45
End: 2020-02-03
Payer: COMMERCIAL

## 2020-02-03 ENCOUNTER — NURSE TRIAGE (OUTPATIENT)
Dept: FAMILY MEDICINE | Facility: CLINIC | Age: 45
End: 2020-02-03

## 2020-02-03 VITALS
OXYGEN SATURATION: 98 % | SYSTOLIC BLOOD PRESSURE: 134 MMHG | HEIGHT: 62 IN | TEMPERATURE: 99.8 F | HEART RATE: 100 BPM | WEIGHT: 158 LBS | RESPIRATION RATE: 14 BRPM | DIASTOLIC BLOOD PRESSURE: 86 MMHG | BODY MASS INDEX: 29.08 KG/M2

## 2020-02-03 DIAGNOSIS — R51.9 FACIAL PAIN, ACUTE: ICD-10-CM

## 2020-02-03 DIAGNOSIS — J32.9 RECURRENT SINUS INFECTIONS: Primary | ICD-10-CM

## 2020-02-03 PROCEDURE — 99214 OFFICE O/P EST MOD 30 MIN: CPT | Performed by: FAMILY MEDICINE

## 2020-02-03 RX ORDER — AMOXICILLIN 500 MG/1
1000 CAPSULE ORAL 3 TIMES DAILY
Qty: 60 CAPSULE | Refills: 0 | Status: SHIPPED | OUTPATIENT
Start: 2020-02-03 | End: 2020-03-06

## 2020-02-03 ASSESSMENT — MIFFLIN-ST. JEOR: SCORE: 1328.9

## 2020-02-03 NOTE — PROGRESS NOTES
Subjective     Melody Muñoz is a 44 year old female who presents to clinic today for the following health issues:    HPI   F/U Cough, Chest discomfort      Duration: Ongoing    Description (location/character/radiation): Cough/Wheeze, chest discomfort, fatigue, left upper dental pain with pussy drainage and sinus pain    Intensity:  moderate    Accompanying signs and symptoms: Anxiety, patient tearful    History (similar episodes/previous evaluation): Yes, + for flu last week    Precipitating or alleviating factors: None    Therapies tried and outcome: TamiFlu had to stop due to Rash     Sore gums      Duration: Past week    Description (location/character/radiation): Left upper side of the mouth.    Intensity:  moderate    Accompanying signs and symptoms: Yellow discharge from the area.    History (similar episodes/previous evaluation): Previous sinus infections    Precipitating or alleviating factors: None    Therapies tried and outcome: None       Patient Active Problem List   Diagnosis     Endometriosis s/po EDWIGE 10-17     Mantoux: positive     Latent tuberculosis     Major depression     Generalized anxiety disorder     Constipation     Nightmares     Knee pain     Bipolar 1 disorder  with psychosis     Chronic fatigue     Female stress incontinence     Suicidal ideation     Acne     Chronic pain syndrome     Insomnia     Chronic migraine without aura without status migrainosus, not intractable     Nausea     JASWANT (obstructive sleep apnea)     Elevated liver enzymes     TMJ (temporomandibular joint syndrome)     Hypothyroidism due to acquired atrophy of thyroid     Hostile behavior     Mild persistent asthma without complication     Uncontrolled type 2 diabetes mellitus with hyperglycemia (H)     Allergic rhinitis     Incontinence of urine in female     Migraine     Screening for diabetic peripheral neuropathy     Need for prophylactic vaccination against Streptococcus pneumoniae (pneumococcus)     Sepsis  (H)     Abdominal pain     Morbid obesity (H)     Former tobacco use     Mixed simple and mucopurulent chronic bronchitis (H): Spirometry 16 lung age = 61y/o in 39 y/o FVC=90%&FEV1=78%     Class 2 obesity due to excess calories with serious comorbidity and body mass index (BMI) of 36.0 to 36.9 in adult     Mixed hyperlipidemia     Acquired hypothyroidism     Dysuria     Other chronic back pain     Headache disorder     Persistent depressive disorder     Irritable bowel syndrome with both constipation and diarrhea     Anxiety     Recurrent sinus infections     Past Surgical History:   Procedure Laterality Date     BIOPSY       COLONOSCOPY       GENITOURINARY SURGERY  May/2014    bladder sling     GYN SURGERY      laparoscopy- endometriosis     GYN SURGERY       for twins     LAPAROSCOPIC APPENDECTOMY N/A 2017    Procedure: LAPAROSCOPIC APPENDECTOMY;  LAPAROSCOPIC APPENDECTOMY and resection of epiploic tag;  Surgeon: Roberto Robins MD;  Location: [facility] OR     little finger surgery left  1980     tubal ligation bilateral       wisdom teeth removal         Social History     Tobacco Use     Smoking status: Former Smoker     Packs/day: 0.00     Years: 17.00     Pack years: 0.00     Types: Cigarettes     Last attempt to quit: 3/18/2019     Years since quittin.8     Smokeless tobacco: Never Used   Substance Use Topics     Alcohol use: No     Comment: no etoh since      Family History   Problem Relation Age of Onset     Hypertension Mother      Heart Disease Father         palpitations     Depression Sister      Anxiety Disorder Sister      Heart Disease Maternal Grandmother         CHF     Cancer Maternal Grandfather 72        Pancreatic Cancer     Alzheimer Disease Paternal Grandfather      Bipolar Disorder Other      Autism Spectrum Disorder Other              Reviewed and updated as needed this visit by Provider         Review of Systems   ROS COMP: Constitutional, HEENT,  "cardiovascular, pulmonary, gi and gu systems are negative, except as otherwise noted.      Objective    /86 (Patient Position: Sitting, Cuff Size: Adult Regular)   Pulse 100   Temp 99.8  F (37.7  C) (Tympanic)   Resp 14   Ht 1.581 m (5' 2.25\")   Wt 71.7 kg (158 lb)   LMP  (LMP Unknown)   SpO2 98%   BMI 28.67 kg/m    Body mass index is 28.67 kg/m .  Physical Exam   GENERAL APPEARANCE: healthy, alert and mild distress  EYES: Eyes grossly normal to inspection, PERRL and conjunctivae and sclerae normal  HENT: ear canals and TM's normal, nose and mouth without ulcers or lesions, maxillary sinus tenderness left and she is missing a number of teeth and some of her teeth are in disrepair.  I do not see any discharge from the gums on the left side of the mouth where she is having all the discomfort.  NECK: no adenopathy and no asymmetry, masses, or scars  RESP: lungs clear to auscultation - no rales, rhonchi or wheezes  LYMPHATICS: no cervical adenopathy            Assessment & Plan       ICD-10-CM    1. Recurrent sinus infections J32.9 amoxicillin (AMOXIL) 500 MG capsule     acetaminophen-codeine (TYLENOL #3) 300-30 MG tablet   2. Facial pain, acute R51           Patient Instructions   The patient does have an appointment with ENT on Wednesday coming up.  I did not see anything inside the mouth that would explain the yellow discharge that she says she is getting.  Given her facial pain on that side this may all be due to recurrent sinus infections.  I covered her with amoxicillin 1000 mg 3 times daily.  Further plan will be pending review by ENT.  I placed her on Tylenol with codeine for her facial pain.  The patient will return in 2 months for her annual exam.  She is due for her Pap smear at that time.      Return in about 2 months (around 4/3/2020) for preventive visit.    Evaristo Machado MD  [facility]"

## 2020-02-03 NOTE — TELEPHONE ENCOUNTER
Reason for Call:  Other call back    Detailed comments: Melody called to say she was diagnosed with influenza A after she last saw Dr. Machado.  Melody says she is needing to use her inhaler too much.  She asks when should she be put on prednisone.     Melody asks a nurse return a call.    Phone Number Patient can be reached at: Cell number on file:    Telephone Information:   Mobile [phone]       Best Time: any    Can we leave a detailed message on this number? YES    Call taken on 2/3/2020 at 12:28 PM by Mary Rodriguez

## 2020-02-03 NOTE — TELEPHONE ENCOUNTER
"Patient Contact    Patient calling re: recent dx of influenza. She states the cough has been really bad, causing her to use her inhaler q 30-45 minutes.     She states she was itchy and nauseous after starting tamiflu. She spoke with triage RN who told her to discontinue. She is still feeling a little itchy, but not as much as before. States that benadryl wasn't working.    She is wondering, \"When do I need to be started on prednisone?\" She has appt. Scheduled for today at 2:00 PM with PCP.     Huddled with PCP who suggests she keep appointment.     Called patient back and relayed message. Also instructed not to take inhaler more frequently than prescribed. She states, \"What do you want me to do just wheeze??\" Suggested she discuss these concerns today during her OV with Dr. Machado so they can come up with a plan.     No further action needed.    Additional Information    Negative: Severe difficulty breathing (e.g., struggling for each breath, speaks in single words)    Negative: Bluish (or gray) lips or face    Negative: Shock suspected (e.g., cold/pale/clammy skin, too weak to stand, low BP, rapid pulse)    Negative: Sounds like a life-threatening emergency to the triager    Negative: Chest pain (EXCEPTION: MILD central chest pain, present only when coughing)    Negative: Headache and stiff neck (can't touch chin to chest)    Negative: Asthma attack (coughing, wheezing) is main concern and previously diagnosed with asthma OR using asthma medicines    Negative: Difficulty breathing that is not severe and not relieved by cleaning out the nose    Negative: Patient sounds very sick or weak to the triager    Patient wants to be seen    Negative: Fever present > 3 days (72 hours)    Negative: Fever returns after gone for over 24 hours and symptoms worse or not improved    Negative: Using nasal washes and pain medicine > 24 hours and sinus pain (around cheekbone or eye) persists    Negative: Earache    Answer " "Assessment - Initial Assessment Questions  1. DIAGNOSIS CONFIRMATION: \"When was the influenza diagnosed?\" \"By whom?\" \"Did you get a test for it?\"      Over the weekend - Thursday of last week.   2. MEDICINES: \"Were you prescribed any medications for the influenza?\"  (e.g., zanamivir [Relenza], oseltamavir [Tamiflu]).       Tamiflu - did not complete. Took for 1 1/2 days, then was told to stop due to itching.   3. ONSET of SYMPTOMS: \"When did your symptoms start?\"  Last week Thursday.         4. SYMPTOMS: \"What symptoms are you most concerned about?\" (e.g., runny nose, stuffy nose, sore throat, cough, breathing difficulty, fever)  \"Lungs are burning. I am using my inhaler every 30-45 minutes.\"   Still very fatigued.         5. COUGH: \"How bad is the cough?\"  \"It makes my head hurt and my chest feels like it is on fire.\"        6. FEVER: \"Do you have a fever?\" If so, ask: \"What is your temperature, how was it measured, and when did it start?\"  Never had a fever with the influenza. No temp now.         7. RESPIRATORY DISTRESS: \"Are you having any trouble breathing?\" If yes, ask: \"Describe your breathing.\"   Yes.        8. FLU VACCINE: \"Did you receive a flu shot this year?\" (e.g., seasonal influenza, H1N1)  Yes        9. PREGNANCY: \"Is there any chance you are pregnant?\" \"When was your last menstrual period?\"  NO        10. HIGH RISK for COMPLICATIONS: \"Do you have any heart or lung problems? Do you have a weakened immune system?\" (e.g., CHF, COPD, asthma, HIV positive, chemotherapy, renal failure, diabetes mellitus, sickle cell anemia)  no    Protocols used: INFLUENZA FOLLOW-UP CALL-A-OH      "

## 2020-02-03 NOTE — TELEPHONE ENCOUNTER
Spoke to patient regarding symptoms. Explained that since she has seen an ENT previously, it would be more appropriate for her to see a sinus surgeon, such as Dr. Valles. Informed patient that if Dr. Valles believes she should see Dr. Mendez for her JASWANT, he will make an internal referral. Patient expressed understanding. Appointment with Dr. Mendez cancelled and rescheduled with Dr. Valles on 2/5/2020 at 10:30am.    Daina Moncada, EMT

## 2020-02-04 ENCOUNTER — PRE VISIT (OUTPATIENT)
Dept: OTOLARYNGOLOGY | Facility: CLINIC | Age: 45
End: 2020-02-04

## 2020-02-04 DIAGNOSIS — J32.9 RECURRENT SINUS INFECTIONS: Primary | ICD-10-CM

## 2020-02-04 NOTE — PATIENT INSTRUCTIONS
The patient does have an appointment with ENT on Wednesday coming up.  I did not see anything inside the mouth that would explain the yellow discharge that she says she is getting.  Given her facial pain on that side this may all be due to recurrent sinus infections.  I covered her with amoxicillin 1000 mg 3 times daily.  Further plan will be pending review by ENT.  I placed her on Tylenol with codeine for her facial pain.  The patient will return in 2 months for her annual exam.  She is due for her Pap smear at that time.

## 2020-02-05 ENCOUNTER — ANCILLARY PROCEDURE (OUTPATIENT)
Dept: CT IMAGING | Facility: CLINIC | Age: 45
End: 2020-02-05
Attending: OTOLARYNGOLOGY
Payer: COMMERCIAL

## 2020-02-05 ENCOUNTER — OFFICE VISIT (OUTPATIENT)
Dept: OTOLARYNGOLOGY | Facility: CLINIC | Age: 45
End: 2020-02-05
Attending: PHYSICIAN ASSISTANT
Payer: COMMERCIAL

## 2020-02-05 ENCOUNTER — TELEPHONE (OUTPATIENT)
Dept: FAMILY MEDICINE | Facility: CLINIC | Age: 45
End: 2020-02-05

## 2020-02-05 VITALS — BODY MASS INDEX: 28.71 KG/M2 | HEIGHT: 62 IN | WEIGHT: 156 LBS

## 2020-02-05 DIAGNOSIS — J32.9 RECURRENT SINUS INFECTIONS: ICD-10-CM

## 2020-02-05 DIAGNOSIS — J32.9 RECURRENT SINUS INFECTIONS: Primary | ICD-10-CM

## 2020-02-05 DIAGNOSIS — J32.2 CHRONIC ETHMOIDAL SINUSITIS: ICD-10-CM

## 2020-02-05 DIAGNOSIS — J32.0 CHRONIC MAXILLARY SINUSITIS: ICD-10-CM

## 2020-02-05 DIAGNOSIS — R09.81 NASAL CONGESTION: ICD-10-CM

## 2020-02-05 ASSESSMENT — PAIN SCALES - GENERAL: PAINLEVEL: MODERATE PAIN (4)

## 2020-02-05 ASSESSMENT — MIFFLIN-ST. JEOR: SCORE: 1319.73

## 2020-02-05 NOTE — TELEPHONE ENCOUNTER
Reason for Call:  Other call back    Detailed comments: unclear what pt wants asked for nurse call    Phone Number Patient can be reached at: Cell number on file:    Telephone Information:   Mobile [phone]       Best Time:     Can we leave a detailed message on this number? YES    Call taken on 2/5/2020 at 12:32 PM by MADELIN PADRON

## 2020-02-05 NOTE — LETTER
2/5/2020       RE: Melody Muñoz  [address] [place] [address]     Dear Colleague,    Thank you for referring your patient, Melody Muñoz, to the [facility] at [facility]. Please see a copy of my visit note below.                       [facility]                   New Patient Visit      Encounter date: February 5, 2020    Referring Provider:   Paradise Hennessy, KIANNA  [address] [place] [address]    Chief Complaint:   Facial pain, chronic sinusitis     History of Present Illness:   Melody Muñoz is a 44 year old woman here for consultation and evaluation of facial pain and chronic sinusitis. I am seeing her at the request of Paradise Hennessy.      The patient has previously seen Dr. Bowman on 12/2/2019 and Dr. Tejeda on 1/6/2020 through ENT specialty care. She had CT sinus performed on 11/26/19 with results as below, consistent with chronic sinusitis.     The patient reports recurrent sinus infections over the last year and estimates having 3-6 courses of antibiotics in this time period. She associates pressure and intense pain over the bridge of her nose and bilateral maxilla, congestion, increased green postnasal drip and drainage when she has infection. This is improved with antibiotics but will return shortly after. She states she has had lifelong issues with recurrent sinusitis, but this has been worse over the last 3-4 months.     The patient is understandably concerned by her increase in sinus symptoms and has also noticed some other symptoms in a similar time frame of 3-4 months. She states her eyesight has worsened, despite having her prescription update within the last month, has had blurred vision and difficulty reading signs. She expresses concern for an eye cancer that has been undetected. She has had issues with dental pain and has been recommended to try a bite guard but she is concerned  this is related to her sinus infections. She did have a maxillary tooth extracted in the last few months as well. The patient has also noticed extreme fatigue and is concerned this is related to sinusitis. Last, she has noticed a lump near her right clavicle and she did share this with her primary. She is concerned this is related to her sinus symptoms as well, again expressing concern for malignancy.     The patient just started a course of amoxicillin for a recent flare of sinus pressure, congestion, drainage and feels this has not alleviated symptoms yet.     Review of systems: A 14-point review of systems has been conducted and was negative for any notable symptoms, except as dictated in the history of present illness.     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     Migraines     otc excedrin prn     Sleep apnea 2014    uses Cpap     Substance abuse (H)     no use since summer 2013     Suicidal ideation 2013    in past, not current, sees psychiatrist and therapist     Vertigo 2015        Past Surgical History:   Procedure Laterality Date     BIOPSY       COLONOSCOPY       GENITOURINARY SURGERY  May/2014    bladder sling     GYN SURGERY      laparoscopy- endometriosis     GYN SURGERY       for twins     LAPAROSCOPIC APPENDECTOMY N/A 2017    Procedure: LAPAROSCOPIC APPENDECTOMY;  LAPAROSCOPIC APPENDECTOMY and resection of epiploic tag;  Surgeon: Roberto Robins MD;  Location: [facility] OR     little finger surgery left        tubal ligation bilateral       wisdom teeth removal          Family History   Problem Relation Age of Onset     Hypertension Mother      Heart Disease Father         palpitations     Depression Sister      Anxiety Disorder Sister      Heart Disease Maternal Grandmother         CHF     Cancer Maternal Grandfather 72        Pancreatic Cancer     Alzheimer Disease Paternal Grandfather      Bipolar Disorder Other      Autism Spectrum Disorder Other         Social History     Socioeconomic History     Marital status:      Spouse name: None     Number of children: None     Years of education: None     Highest education level: None   Occupational History     Employer: SELF EMPLOYED.     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   Social Needs     Financial resource strain: None     Food insecurity:     Worry: None     Inability: None     Transportation needs:     Medical: None     Non-medical: None   Tobacco Use     Smoking status: Former Smoker     Packs/day: 0.00     Years: 17.00     Pack years: 0.00     Types: Cigarettes     Last attempt to quit: 3/18/2019     Years since quittin.8     Smokeless tobacco: Never Used   Substance and Sexual Activity     Alcohol use: No     Comment: no etoh since      Drug use: No     Sexual activity: Not Currently     Partners: Male     Birth control/protection: Surgical     Comment: tubal ligation   Lifestyle     Physical activity:     Days per week: None     Minutes per session: None     Stress: None   Relationships     Social connections:     Talks on phone: None     Gets together: None     Attends Cheondoism service: None     Active member of club or organization: None     Attends meetings of clubs or organizations: None     Relationship status: None     Intimate partner violence:     Fear of current or ex partner: None     Emotionally abused: None     Physically abused: None     Forced sexual activity: None   Other Topics Concern     Parent/sibling w/ CABG, MI or  "angioplasty before 65F 55M? No      Service Not Asked     Blood Transfusions Not Asked     Caffeine Concern Not Asked     Occupational Exposure Not Asked     Hobby Hazards Not Asked     Sleep Concern Not Asked     Stress Concern Not Asked     Weight Concern Not Asked     Special Diet Not Asked     Back Care Not Asked     Exercise Not Asked     Bike Helmet Yes     Seat Belt Yes     Self-Exams Not Asked   Social History Narrative     None        Physical Exam:  Vital signs: Ht 1.581 m (5' 2.24\")   Wt 70.8 kg (156 lb)   LMP  (LMP Unknown)   BMI 28.31 kg/m      General Appearance: No acute distress, appropriate demeanor, conversant  Eyes: moist conjunctivae; EOMI; pupils symmetric; visual acuity grossly intact; no proptosis  Head: normocephalic; overall symmetric appearance without deformity  Face: overall symmetric without deformity; HB I/VI  Ears: Normal appearance of external ear; external meatus normal in appearance; TMs intact without perforation bilaterally;   Nose: No external deformity; septum deviated to left causing greater than 70% obstruction; inferior turbinates with significant hypertrophy  Oral Cavity/oropharynx: Normal appearance of mucosa; tongue midline; no mass or lesions; oropharynx without obvious mucosal abnormality  Neck: no palpable lymphadenopathy; thyroid without palpable nodules  Lungs: symmetric chest rise; no wheezing  CV: Good distal perfusion; normal hear rate  Extremities: No deformity  Neurologic Exam: Cranial nerves  II-XII are grossly intact; no focal deficit      Procedure Note  Procedure performed: Rigid nasal endoscopy  Indication: To evaluate for sinonasal pathology not visualized on routine anterior rhinoscopy  Anesthesia: 4% topical lidocaine with 0.05% oxymetazoline  Description of procedure: A 30 degree, 3 mm rigid endoscope was inserted into bilateral nasal cavities and the nasal valves, nasal cavity, middle meatus, sphenoethmoid recess, and nasopharynx were " thoroughly evaluated for evidence of obstruction, edema, purulence, polyps and/or mass/lesion.       [place]-[facility] Endoscopic Scoring System  Endoscopic observation Right Left   Polyps in middle meatus (0 = absent, 1 = restricted to middle meatus, 2 = Beyond middle meatus) 0 0   Discharge (0 = absent, 1 = thin and clear, 2 = thick, purulent) 1 1   Edema (0 = absent, 1 = mild-moderate, 2 = moderate-severe) 1 1   Crusting (0 = absent, 1 = mild-moderate, 2 = moderate-severe) 0 0   Scarring (0= absent, 1 = mild-moderate, 2 = moderate-severe) 0 0   Total 2 2     Findings  RT: MM edematous with clear thick discharge; SER relatively clear  LT: MM edematous with clear, thick discharge; SER relatively clear  Nasopharynx clear    The patient tolerated the procedure well without complication.     Laboratory Review:  n/a    Imaging Review:  CT sinuses with [employer] navigation device. 11/26/19:   Impression:   1. Moderate mucosal thickening in the ethmoid sinuses and left frontal sinus.   2. Opacified left sphenoid sinus with air-fluid level and obstructed sphenoid ostium.   3. Mild mucosal thickening in the maxillary sinuses with patent osteomeatal units.   4. S-shaped nasal septum.     Pathology Review:  n/a    Assessment/Medical Decision Making:  Melody Muñoz is a 44 year old woman here for evaluation of facial pain and chronic sinusitis. She has a longstanding history of recurrent sinus infections, but these have been more severe in the last 3-4 months with intense facial pain. Her CT is consistent with chronic sinusitis and we discussed these findings in detail today. She has needed 5-6 courses of antibiotics in the last year to control her pain and congestion associated with flares. She is using BID Flonase and saline irrigations. I do think she is a candidate for surgical management and discussed this with the patient; however, I strongly cautioned Melody and her  that while surgery is very good at reducing  the frequency and severity of symptoms related to sinus inflammation/infection, it may not necessarily help her visual complaints, complex headache, or facial pain issues.  She was somewhat distraught, even tearful, regarding he symptoms, and did seem somewhat preoccupied with her symptoms representing some life-threatening pathology. I tried my best to  Melody that based on the information we have today, I do not think she is afflicted with a life-threatening disease.  I do want to help Melody with her symptoms, but also want to be sure that her symptoms do match a picture of sinus inflammation, so since she feels as though she is experiencing a sinus flare today, I did recommend obtaining an updated CT, which she and her  were amenable to obtaining.       Plan:  1. Continue Flonase BID and saline irrigations.   2. Obtain sinus CT and arrange appropriate follow up after resulted.     Micha Valles MD    [facility]  Rhinology  Endoscopic Skull Base Surgery  [facility]  Department of Otolaryngology - Head & Neck Surgery     Scribe Disclosure:  I, Bianka Miguel Angel, am serving as a scribe to document services personally performed by Micha Valles MD at this visit, based upon the provider's statements to me. All documentation has been reviewed by the aforementioned provider prior to being entered into the official medical record.     Again, thank you for allowing me to participate in the care of your patient.      Sincerely,    Micha Valles MD

## 2020-02-05 NOTE — NURSING NOTE
"Chief Complaint   Patient presents with     Consult     Chronic sinusits     Height 1.581 m (5' 2.24\"), weight 70.8 kg (156 lb), not currently breastfeeding.    Daina Moncada, EMT    "

## 2020-02-05 NOTE — TELEPHONE ENCOUNTER
Patient Contact    Attempt # 1    Was call answered?  No.  Left message on voicemail with information to call me back.    On call back:    -What does pt need

## 2020-02-05 NOTE — TELEPHONE ENCOUNTER
"Call from patient today.     Patient states that she is very frustrated and scared. She feels like she keeps seeing her doctors and they are not listening to her and helping to stop her symptoms.     Says after her appointment today with ENT she feels, \"unheard, they think I exacerate my symptoms, something not right and she needs help, and generally feels stuck\"    Her eyesight is getting worse and very quickly. States that she got a new pair of glasses 3-4 weeks ago and even since then her eyesight had gotten much worse. She was told to reach out to her eye doctor, but states it will do no help and she doesn't have any money t do that.     Main concern at this time for PCP review:  Going for the US this Friday at 1045.     Would like her endocrinologist Tammy Louie and PCP to collaborate together to find an US that would actually find out what is wrong with her. States that the US is just for the thyroid, but she would like this to include a few other areas just to be thorough.     Would like this US to extend down to her clavicle AND would like for it to look a few inches past her thyroid (width) on both the right and left side.     Can her US be updated to include these extra area of her body?              "

## 2020-02-05 NOTE — PROGRESS NOTES
[facility]                   New Patient Visit      Encounter date: February 5, 2020    Referring Provider:   Paradise Hennessy PA-C  [address] [place] [address]    Chief Complaint:   Facial pain, chronic sinusitis     History of Present Illness:   Melody Muñoz is a 44 year old woman here for consultation and evaluation of facial pain and chronic sinusitis. I am seeing her at the request of Paradise Hennessy.      The patient has previously seen Dr. Bowman on 12/2/2019 and Dr. Tejeda on 1/6/2020 through ENT specialty care. She had CT sinus performed on 11/26/19 with results as below, consistent with chronic sinusitis.     The patient reports recurrent sinus infections over the last year and estimates having 3-6 courses of antibiotics in this time period. She associates pressure and intense pain over the bridge of her nose and bilateral maxilla, congestion, increased green postnasal drip and drainage when she has infection. This is improved with antibiotics but will return shortly after. She states she has had lifelong issues with recurrent sinusitis, but this has been worse over the last 3-4 months.     The patient is understandably concerned by her increase in sinus symptoms and has also noticed some other symptoms in a similar time frame of 3-4 months. She states her eyesight has worsened, despite having her prescription update within the last month, has had blurred vision and difficulty reading signs. She expresses concern for an eye cancer that has been undetected. She has had issues with dental pain and has been recommended to try a bite guard but she is concerned this is related to her sinus infections. She did have a maxillary tooth extracted in the last few months as well. The patient has also noticed extreme fatigue and is concerned this is related to sinusitis. Last, she has noticed a lump near her right clavicle and she did share this with her  primary. She is concerned this is related to her sinus symptoms as well, again expressing concern for malignancy.     The patient just started a course of amoxicillin for a recent flare of sinus pressure, congestion, drainage and feels this has not alleviated symptoms yet.     Review of systems: A 14-point review of systems has been conducted and was negative for any notable symptoms, except as dictated in the history of present illness.     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     Migraines     otc excedrin prn     Sleep apnea 2014    uses Cpap     Substance abuse (H)     no use since summer 2013     Suicidal ideation 2013    in past, not current, sees psychiatrist and therapist     Vertigo 2015        Past Surgical History:   Procedure Laterality Date     BIOPSY       COLONOSCOPY  2010     GENITOURINARY SURGERY  May/2014    bladder sling     GYN SURGERY      laparoscopy- endometriosis     GYN SURGERY       for twins     LAPAROSCOPIC APPENDECTOMY N/A 2017    Procedure: LAPAROSCOPIC APPENDECTOMY;  LAPAROSCOPIC APPENDECTOMY and resection of epiploic tag;  Surgeon: Roberto Robins MD;  Location: [facility] OR     little finger surgery left  1980     tubal ligation bilateral       wisdom teeth removal          Family History   Problem Relation Age of Onset     Hypertension Mother      Heart Disease Father         palpitations     Depression Sister      Anxiety Disorder Sister      Heart Disease Maternal Grandmother         CHF     Cancer  Maternal Grandfather 72        Pancreatic Cancer     Alzheimer Disease Paternal Grandfather      Bipolar Disorder Other      Autism Spectrum Disorder Other         Social History     Socioeconomic History     Marital status:      Spouse name: None     Number of children: None     Years of education: None     Highest education level: None   Occupational History     Employer: SELF EMPLOYED.     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   Social Needs     Financial resource strain: None     Food insecurity:     Worry: None     Inability: None     Transportation needs:     Medical: None     Non-medical: None   Tobacco Use     Smoking status: Former Smoker     Packs/day: 0.00     Years: 17.00     Pack years: 0.00     Types: Cigarettes     Last attempt to quit: 3/18/2019     Years since quittin.8     Smokeless tobacco: Never Used   Substance and Sexual Activity     Alcohol use: No     Comment: no etoh since      Drug use: No     Sexual activity: Not Currently     Partners: Male     Birth control/protection: Surgical     Comment: tubal ligation   Lifestyle     Physical activity:     Days per week: None     Minutes per session: None     Stress: None   Relationships     Social connections:     Talks on phone: None     Gets together: None     Attends Scientologist service: None     Active member of club or organization: None     Attends meetings of clubs or organizations: None     Relationship status: None     Intimate partner violence:     Fear of current or ex partner: None     Emotionally abused: None     Physically abused: None     Forced sexual activity: None   Other Topics Concern     Parent/sibling w/ CABG, MI or angioplasty before 65F 55M? No      Service Not Asked     Blood Transfusions Not Asked     Caffeine Concern Not Asked     Occupational Exposure Not Asked     Hobby Hazards Not Asked     Sleep Concern Not Asked     Stress Concern Not Asked     Weight Concern Not Asked     Special Diet  "Not Asked     Back Care Not Asked     Exercise Not Asked     Bike Helmet Yes     Seat Belt Yes     Self-Exams Not Asked   Social History Narrative     None        Physical Exam:  Vital signs: Ht 1.581 m (5' 2.24\")   Wt 70.8 kg (156 lb)   LMP  (LMP Unknown)   BMI 28.31 kg/m     General Appearance: No acute distress, appropriate demeanor, conversant  Eyes: moist conjunctivae; EOMI; pupils symmetric; visual acuity grossly intact; no proptosis  Head: normocephalic; overall symmetric appearance without deformity  Face: overall symmetric without deformity; HB I/VI  Ears: Normal appearance of external ear; external meatus normal in appearance; TMs intact without perforation bilaterally;   Nose: No external deformity; septum deviated to left causing greater than 70% obstruction; inferior turbinates with significant hypertrophy  Oral Cavity/oropharynx: Normal appearance of mucosa; tongue midline; no mass or lesions; oropharynx without obvious mucosal abnormality  Neck: no palpable lymphadenopathy; thyroid without palpable nodules  Lungs: symmetric chest rise; no wheezing  CV: Good distal perfusion; normal hear rate  Extremities: No deformity  Neurologic Exam: Cranial nerves  II-XII are grossly intact; no focal deficit      Procedure Note  Procedure performed: Rigid nasal endoscopy  Indication: To evaluate for sinonasal pathology not visualized on routine anterior rhinoscopy  Anesthesia: 4% topical lidocaine with 0.05% oxymetazoline  Description of procedure: A 30 degree, 3 mm rigid endoscope was inserted into bilateral nasal cavities and the nasal valves, nasal cavity, middle meatus, sphenoethmoid recess, and nasopharynx were thoroughly evaluated for evidence of obstruction, edema, purulence, polyps and/or mass/lesion.       [place]-[facility] Endoscopic Scoring System  Endoscopic observation Right Left   Polyps in middle meatus (0 = absent, 1 = restricted to middle meatus, 2 = Beyond middle meatus) 0 0   Discharge (0 = absent, " 1 = thin and clear, 2 = thick, purulent) 1 1   Edema (0 = absent, 1 = mild-moderate, 2 = moderate-severe) 1 1   Crusting (0 = absent, 1 = mild-moderate, 2 = moderate-severe) 0 0   Scarring (0= absent, 1 = mild-moderate, 2 = moderate-severe) 0 0   Total 2 2     Findings  RT: MM edematous with clear thick discharge; SER relatively clear  LT: MM edematous with clear, thick discharge; SER relatively clear  Nasopharynx clear    The patient tolerated the procedure well without complication.     Laboratory Review:  n/a    Imaging Review:  CT sinuses with [employer] navigation device. 11/26/19:   Impression:   1. Moderate mucosal thickening in the ethmoid sinuses and left frontal sinus.   2. Opacified left sphenoid sinus with air-fluid level and obstructed sphenoid ostium.   3. Mild mucosal thickening in the maxillary sinuses with patent osteomeatal units.   4. S-shaped nasal septum.     Pathology Review:  n/a    Assessment/Medical Decision Making:  Melody Muñoz is a 44 year old woman here for evaluation of facial pain and chronic sinusitis. She has a longstanding history of recurrent sinus infections, but these have been more severe in the last 3-4 months with intense facial pain. Her CT is consistent with chronic sinusitis and we discussed these findings in detail today. She has needed 5-6 courses of antibiotics in the last year to control her pain and congestion associated with flares. She is using BID Flonase and saline irrigations. I do think she is a candidate for surgical management and discussed this with the patient; however, I strongly cautioned Melody and her  that while surgery is very good at reducing the frequency and severity of symptoms related to sinus inflammation/infection, it may not necessarily help her visual complaints, complex headache, or facial pain issues.  She was somewhat distraught, even tearful, regarding he symptoms, and did seem somewhat preoccupied with her symptoms representing  some life-threatening pathology. I tried my best to  Melody that based on the information we have today, I do not think she is afflicted with a life-threatening disease.  I do want to help Melody with her symptoms, but also want to be sure that her symptoms do match a picture of sinus inflammation, so since she feels as though she is experiencing a sinus flare today, I did recommend obtaining an updated CT, which she and her  were amenable to obtaining.       Plan:  1. Continue Flonase BID and saline irrigations.   2. Obtain sinus CT and arrange appropriate follow up after resulted.     Micha Valles MD    [facility]  Rhinology  Endoscopic Skull Base Surgery  [facility]  Department of Otolaryngology - Head & Neck Surgery     Scribe Disclosure:  I, Bianka Michelle, am serving as a scribe to document services personally performed by Micha Valles MD at this visit, based upon the provider's statements to me. All documentation has been reviewed by the aforementioned provider prior to being entered into the official medical record.

## 2020-02-05 NOTE — TELEPHONE ENCOUNTER
She can try discussing that with the ultrasound tech when she goes.  I am not exactly sure how I would even order what she is requesting.  I am certainly not opposed to her getting the other areas done with an ultrasound.

## 2020-02-06 ENCOUNTER — TELEPHONE (OUTPATIENT)
Dept: OTOLARYNGOLOGY | Facility: CLINIC | Age: 45
End: 2020-02-06

## 2020-02-06 NOTE — TELEPHONE ENCOUNTER
Pt was called with providers message. States she doesn't feel heard. States she would just have to go to [facility] with compassionate providers. She would like triage to call radiology to find out how to add order  requested below. Radiology phone number is [phone].

## 2020-02-06 NOTE — TELEPHONE ENCOUNTER
[facility] Call Center    Phone Message    May a detailed message be left on voicemail: yes     Reason for Call: Requesting Results   Name/type of test: CT Scan  Date of test: 2-5-2020  Was test done at a location other than [facility] (Please fill in the location if not [facility])?: No      Action Taken: Message routed to:  [facility] ENT    Travel Screening: Not Applicable

## 2020-02-06 NOTE — TELEPHONE ENCOUNTER
Triage talked to ultrasound tech. She advised pt talk to US tech and point out areas of concern. Radiology cannot do thyroid ultrasound head and neck the same day. If pt is concerned with neck lumps radilogy may be may be able to include images of this areas within thyroid. Pt was advised to discuss with US tech at visit tomorrow. She verbalized agreement with plan.

## 2020-02-07 ENCOUNTER — HOSPITAL ENCOUNTER (OUTPATIENT)
Dept: ULTRASOUND IMAGING | Facility: CLINIC | Age: 45
Discharge: HOME | End: 2020-02-07
Attending: CLINICAL NURSE SPECIALIST | Admitting: CLINICAL NURSE SPECIALIST
Payer: COMMERCIAL

## 2020-02-07 ENCOUNTER — PATIENT OUTREACH (OUTPATIENT)
Dept: CARE COORDINATION | Facility: CLINIC | Age: 45
End: 2020-02-07

## 2020-02-07 ENCOUNTER — TELEPHONE (OUTPATIENT)
Dept: OTOLARYNGOLOGY | Facility: CLINIC | Age: 45
End: 2020-02-07

## 2020-02-07 DIAGNOSIS — E03.4 HYPOTHYROIDISM DUE TO ACQUIRED ATROPHY OF THYROID: ICD-10-CM

## 2020-02-07 DIAGNOSIS — Z78.9 TRANSITION OF CARE PERFORMED WITH SHARING OF CLINICAL SUMMARY: Primary | ICD-10-CM

## 2020-02-07 DIAGNOSIS — K29.50 OTHER CHRONIC GASTRITIS WITHOUT HEMORRHAGE: ICD-10-CM

## 2020-02-07 DIAGNOSIS — J32.9 RECURRENT SINUS INFECTIONS: ICD-10-CM

## 2020-02-07 DIAGNOSIS — E06.3 HASHIMOTO'S THYROIDITIS: ICD-10-CM

## 2020-02-07 PROCEDURE — 76536 US EXAM OF HEAD AND NECK: CPT

## 2020-02-07 RX ORDER — PANTOPRAZOLE SODIUM 40 MG/1
TABLET, DELAYED RELEASE ORAL
Qty: 90 TABLET | Refills: 2 | Status: SHIPPED | OUTPATIENT
Start: 2020-02-07 | End: 2020-12-09

## 2020-02-07 RX ORDER — VENLAFAXINE HYDROCHLORIDE 75 MG/1
CAPSULE, EXTENDED RELEASE ORAL
Qty: 16 CAPSULE | OUTPATIENT
Start: 2020-02-07

## 2020-02-07 NOTE — TELEPHONE ENCOUNTER
Patient decision to not move forward with Dr Mendez in the care of her sleep Apnea at this time.  Patient coming into clinic to see Dr Valles and discuss sinus surgical options.  Scheduled on 2-12-19@315.  Patient happy with this plan and states has no other concerns at this time.  Marge Juarez RN

## 2020-02-07 NOTE — TELEPHONE ENCOUNTER
FUTURE VISIT INFORMATION      FUTURE VISIT INFORMATION:    Date: 2/25/20    Time: 2 PM    [place] [facility]  REFERRAL INFORMATION:    Referring provider:  Dr. Valles    Referring providers clinic:  [facility] - [facility]    Reason for visit/diagnosis: Sleep Apnea    RECORDS REQUESTED FROM:       Clinic name Comments Records Status Imaging Status   [facility] 2/5/20 - OV with Dr. Valles [facility] 1/3/20 - Telephone Note from Dr. Jeannette Mak    [facility] 6/2/15 to 6/8/15 - IP Stay with Dr. Gomez Mak    [facility] 7/29/15 to 12/10/19 - OV with Dr. Peguero Scanned In    [facility] 4/30/14 - Sleep Study  3/19/14 - Sleep Study Scanned In

## 2020-02-07 NOTE — TELEPHONE ENCOUNTER
"Requested Prescriptions   Pending Prescriptions Disp Refills     venlafaxine (EFFEXOR-XR) 75 MG 24 hr capsule [Pharmacy Med Name: VENLAFAXINE 75MG ER CAP]  Last Written Prescription Date:  1/29/2020  Last Fill Quantity: 90 capsule,  # refills: 1   Last office visit: 2/3/2020 with prescribing provider:  Jeannette   Future Office Visit:   Next 5 appointments (look out 90 days)    Apr 23, 2020  8:30 AM CDT  (Arrive by 8:15 AM)  Return Visit with KIRAN Carballo CNP  [facility] ([facility]) [address] [facility] [address]  [phone]          16 capsule      Sig: TAKE 1 CAPSULE BY MOUTH DAILY WITH FOOD       Serotonin-Norepinephrine Reuptake Inhibitors  Passed - 2/7/2020  1:05 PM        Passed - Blood pressure under 140/90 in past 12 months     BP Readings from Last 3 Encounters:   02/03/20 134/86   01/29/20 104/66   01/23/20 118/77                 Passed - Recent (12 mo) or future (30 days) visit within the authorizing provider's specialty     Patient has had an office visit with the authorizing provider or a provider within the authorizing providers department within the previous 12 mos or has a future within next 30 days. See \"Patient Info\" tab in inbasket, or \"Choose Columns\" in Meds & Orders section of the refill encounter.              Passed - Medication is active on med list        Passed - Patient is age 18 or older        Passed - No active pregnancy on record        Passed - Normal serum creatinine on file in past 12 months     Recent Labs   Lab Test 11/23/19  1742   CR 0.68             Passed - No positive pregnancy test in past 12 months           "

## 2020-02-07 NOTE — TELEPHONE ENCOUNTER
"Patient calling clinic stating she needs to cancel appointment with Dr. Winn today. She states that Dr. Hollins knows her best and she is requesting a refill of the tylenol #3. She states, \"If Dr. Hollins doesn't refill my medication, then I don't know what I'm going to do.\" She states she cannot handle one more thing today and is \"overly exhausted from going from ENT to Ultrasound.\"    Patient saw Dr. Machado on 2/3/2020. Per OV note:   The patient does have an appointment with ENT on Wednesday coming up.  I did not see anything inside the mouth that would explain the yellow discharge that she says she is getting.  Given her facial pain on that side this may all be due to recurrent sinus infections.  I covered her with amoxicillin 1000 mg 3 times daily.  Further plan will be pending review by ENT.  I placed her on Tylenol with codeine for her facial pain.  The patient will return in 2 months for her annual exam.  She is due for her Pap smear at that time.    Per chart review, patient followed up with ENT on 2/5/2020. States she has not been able to follow-up with Dentist due to financial concerns. States \"I was going to go to [facility] but it is going to be too expensive. I will just end up going back to [facility] but they aren't open on Fridays. I just can't get to a dentist where it isn't over $500.\" Also mentions needing to wait until her  gets paid this week before she can schedule dentist appointment. Confirmed via website they are \"available by appointment only\". Patient also states, \"I am guessing they will refer me to a periodontist.\"    She feels her immune system is really run down right now and said, \"Something is hitting really quick.\"    She currently has 4 tabs left of rx, which will not last her through the weekend. Routing to provider to advise. Patient does not currently have appointment set up on Monday, but is \"confident she can get in because they get emergencies in when needed.\" Does " provider want to do short supply through weekend? Also pended order for care coordination to assist with financial resources/reduced fee dental care.    Controlled Substance Refill Request for   Requested Prescriptions   Pending Prescriptions Disp Refills     acetaminophen-codeine (TYLENOL #3) 300-30 MG tablet  Last Written Prescription Date:  2/3/2020  Last Fill Quantity: 20,  # refills: 0   Last office visit: 2/3/2020 with prescribing provider:  Jeannette   Future Office Visit:   Next 5 appointments (look out 90 days)    Apr 23, 2020  8:30 AM CDT  (Arrive by 8:15 AM)  Return Visit with KIRAN Carballo CNP  [facility] ([facility]) [address]  [facility] [address] [phone]          20 tablet 0     Sig: Take 1 tablet by mouth every 6 hours as needed for severe pain       There is no refill protocol information for this order          Problem List Complete:  Yes  Patient is followed by VIRAL Deleon for ongoing prescription of pain medication.  All refills should be approved by this provider, or covering partner.    Medication(s):  Lyrica 150 mg bid, Fioricet with codeine prn, klonopin and ambien to be prescribed by [facility], not [facility].  Maximum quantity per month: 60, 20  Clinic visit frequency required: Q 3 months     Controlled substance agreement on file: Yes       Date(s): 9/8/2015  New CSA needed.  Pain Clinic evaluation in the past: No    DIRE Total Score(s):  No flowsheet data found.    Last [facility] website verification: 12/20/2019 multiple meds from multiple outside providers    Clonazepam and dextroamphetamine prescribed by outside providers.    checked in past 3 months?  Yes 2/7/2020, recommend provider review. Outside prescribers. Clonazepam and dextroamphetamine prescribed by outside prescribers.     RX [place] ([place]) reviewed:  reviewed- recommend provider review    [place]:  [email or web address]  Routing  refill request to provider for review/approval because:  Drug not on the [facility] refill protocol

## 2020-02-07 NOTE — TELEPHONE ENCOUNTER
[facility] Call Center    Phone Message    May a detailed message be left on voicemail: yes     Reason for Call: Other: Patient called to schedule an appointment with Dr. Mendez per Dr. Valles, Patient is scheduled for 2/25/20 but is requesting a return call to discuss her condition and pain, patient does not want to wait until 02/25/20 for the next step in her care. Please call to [facility] thank you.      Action Taken: Message routed to:  [facility] ENT    Travel Screening: Not Applicable

## 2020-02-07 NOTE — TELEPHONE ENCOUNTER
"Pt calling to clinic.    -States she was seen on 02/03 with oral pain.  -States that her mouth pain has not gotten any better     Requesting appt. Does not want to tell RN more details besides mouth pain \"so bad, I keep having it\".    Scheduled appt with FERNANDO Winn at 1400.    States she was supposed to go into the dentist today. States that her primary dentist does not see patients on Friday. States she made an appt with a different dentist, and, \"It will cost me $500 to be seen and I just can't do that. So I scheduled an appointment for Monday and I need a refill of my pain medications to get me through the weekend\".    Of note, pt also placed on abx at last OV.    Then, requesting that Dr. Hollins refill her pain medication without her being seen. Specifically requesting that Dr. Hollins looks at refill- no other MD. States he usually fills medications for her.     FYI above to MD per pt request. She has appt scheduled today, at 2 pm.   "

## 2020-02-07 NOTE — TELEPHONE ENCOUNTER
I called Melody and discussed her CT scan results in context of her symptoms and future plan of care.     Micha Valles MD    [facility]  Rhinology  Endoscopic Skull Base Surgery  [facility]  Department of Otolaryngology - Head & Neck Surgery

## 2020-02-07 NOTE — PROGRESS NOTES
The clinic Community Health Worker spoke with the patient today at the request of the PCP to discuss possible Clinic Care Coordination enrollment.  The service was described to the patient and immediate needs were discussed.  The patient declined enrollment at this time.  The PCP is encouraged to refer in the future if the patient's needs change.    The patient requested resources for free or reduced dental services.  The CHW gave the patient that information and also will be mailing out other dental resources.

## 2020-02-07 NOTE — LETTER
February 7, 2020        Melody Muñoz  [address] [place] [address] [address] [address]          Dear Melody,    It was nice talking with you today.     I have enclosed a list of free dental clinics and sliding scale dental clinics based on income.  Please inquire if clinic is free or on a sliding fee before you schedule an appointment. You can also check with your insurance provider to verify insurance coverage before scheduling an appointment if needed.    Please feel free to contact me, the Community Health Worker at [phone] with any questions or concern.  We are focused on providing you with the highest-quality healthcare experience possible and that all starts with you.    Sincerely,    ADRIANO Cordova                                                                     Clinic Care Coordination                                          [facility]   Phone: [phone]

## 2020-02-07 NOTE — LETTER
[facility]  [address] [place], [place], [address]  [phone]        February 10, 2020    Melody Muñoz                                                                                                                                                       [address] [place] [address] [address] [facility] [address]            Dear Melody,    Your thyroid ultrasound shows a normal thyroid gland.  This is not the reason for your difficulty swallowing.  If the swallowing difficulty continues, I would recommend following up with your primary care provider for further evaluation.       Tammy Louie NP   Endocrinology     Results for orders placed or performed during the hospital encounter of 02/07/20   US Thyroid     Status: None    Narrative    ULTRASOUND THYROID  2/7/2020 11:02 AM     COMPARISON: None.    HISTORY: Hashimoto's thyroiditis. Hypothyroidism due to acquired  atrophy of thyroid.    FINDINGS: The right lobe measures 4.8 x 1.4 x 1.6 cm. The left lobe  measures 3.4 x 1.6 x 1.1 cm. The isthmus is normal in thickness.  Thyroid parenchyma is homogenous in echotexture.    Thyroid nodules as follows: No discrete thyroid nodules demonstrated.      Impression    IMPRESSION: Normal sonographic appearance of the thyroid.    JAYDON KELLER MD

## 2020-02-07 NOTE — TELEPHONE ENCOUNTER
"Patient had multiple questions. She said she wasn't sure if Dr. Valles and Andrea Mehta where going to do surgery at the same time and that's why he wanted her to see Dr. Mendez or if she needed 2 surgeries and he wanted her to see Dr. Mendez to get her opinion on which surgery to do first the sleep apnea surgery or sinus surgery.   Stating if that is the case \" I don't even want to see Andrea Mehta now, I just want to get my sinus fixed the sleep apnea can wait.\" She wants to know what the reason is for seeing Dr. Mendez .   "

## 2020-02-10 ENCOUNTER — TELEPHONE (OUTPATIENT)
Dept: FAMILY MEDICINE | Facility: CLINIC | Age: 45
End: 2020-02-10

## 2020-02-10 NOTE — TELEPHONE ENCOUNTER
Reason for Call:  Other call back    Detailed comments: The patient called and stated that she had an US on 2/7 and she is anxious to know what the results are.  She stated that there is something wrong with there thyroid because when the US tech was pushing on her thyroid to get the images it made it hard for her to breathe.  She would like a call back to discuss the results of her imaging.    Phone Number Patient can be reached at: Home number on file [phone] (home)    Best Time: Any    Can we leave a detailed message on this number? YES    Call taken on 2/10/2020 at 9:55 AM by Melody Arboleda

## 2020-02-10 NOTE — TELEPHONE ENCOUNTER
Pt was called with endocrinologist notes below.    Your thyroid ultrasound shows a normal thyroid gland.  This is not the reason for your difficulty swallowing.  If the swallowing difficulty continues, I would recommend following up with your primary care provider for further evaluation.    Pt states she was told by radiology tech that the right side of her thyroids looked inflamed. She feels there is a lump. States she has been told numerous times by providers to stop touching thyroid but pt feels there is something in her neck. She is requesting an MRI from  head to chest. Pt declined appt. Please advice on request.

## 2020-02-10 NOTE — RESULT ENCOUNTER NOTE
Please mail comments and results to the patient.  Melody,  Your thyroid ultrasound shows a normal thyroid gland.  This is not the reason for your difficulty swallowing.  If the swallowing difficulty continues, I would recommend following up with your primary care provider for further evaluation.  Tammy Louie NP  Endocrinology

## 2020-02-11 ENCOUNTER — TELEPHONE (OUTPATIENT)
Dept: FAMILY MEDICINE | Facility: CLINIC | Age: 45
End: 2020-02-11

## 2020-02-11 ENCOUNTER — TELEPHONE (OUTPATIENT)
Dept: OTOLARYNGOLOGY | Facility: CLINIC | Age: 45
End: 2020-02-11

## 2020-02-11 DIAGNOSIS — J32.0 CHRONIC MAXILLARY SINUSITIS: Primary | ICD-10-CM

## 2020-02-11 DIAGNOSIS — G89.4 CHRONIC PAIN SYNDROME: ICD-10-CM

## 2020-02-11 NOTE — TELEPHONE ENCOUNTER
[facility] Call Center    Phone Message    May a detailed message be left on voicemail: yes     Reason for Call: Other: Patient cancelled 2/12/20 appt with Dr. Valles for now.  Patient wants to meet with Dr. Mendez on 2/25/20 and then possibly do both surgeries at once, as she doesn't want to have a surgery, recovery, and then have another surgery.  Any questions, please have Dr. Valles reach out to patient.  Thank you!     Action Taken: Message routed to:  [facility] ([facility]): [facility] ENT [facility]    Travel Screening: Not Applicable

## 2020-02-11 NOTE — TELEPHONE ENCOUNTER
Reason for Call: Request for an order or referral:  Order or referral being requested: referral for [facility]  Date needed: as soon as possible  Has the patient been seen by the PCP for this problem? YES  Additional comments: please call patient when referral is in the chart  Phone number Patient can be reached at:  Home number on file [phone] (home)  Best Time:  any  Can we leave a detailed message on this number?  YES  Call taken on 2/11/2020 at 12:36 PM by BREANN WARREN

## 2020-02-11 NOTE — TELEPHONE ENCOUNTER
Reason for Call:  Other call back    Detailed comments: Melody called to say her mouth is in pain.  She saw her dentist today who suggested to go to a periodontist.  The dentist would not prescribe Percocet, which is the pain medication that does not make Melody sick.    Melody asks that Dr. Machado or a nurse call her yet today.    Phone Number Patient can be reached at: Cell number on file:    Telephone Information:   Mobile [phone]       Best Time: yet today    Can we leave a detailed message on this number? YES    Call taken on 2/11/2020 at 3:06 PM by Mary Rodriguez

## 2020-02-11 NOTE — TELEPHONE ENCOUNTER
"Routing to covering providers to advise. Patient has not been prescribed percocet since 2016.     Patient Contact    Called patient to find out more about her request. She states she saw the dentist today who said \"there is nothing in your mouth that has to do with your pain.\" Melody asked, \"then why does it hurt, why does it throb, why is there a sore, and why is pus coming from it?\" Patient states the dentist was \"cruel and cold.\" Patient started crying stating, \"I don't know why I keep having these experiences.\"    Per patient, the dentist told her, \"your muscles are just sore.\" She asked if she could see a periodontist and was given a referral. She states she has not made appointment yet because she just got home and needs to take her daughter to dietician appointment right now. Patient talking to her daughter and needed to get off phone urgently to take daughter to appointment.    Patient is requesting refill of percocet because it is the only thing that doesn't make her sick. Per conversation last week, she had been given tylenol #3 - now patient is stating \"I can only take that if I am sitting still.\" Pended order for percocet, routing to provider to advise.    Controlled Substance Refill Request for   Requested Prescriptions   Pending Prescriptions Disp Refills     oxyCODONE-acetaminophen (PERCOCET) 5-325 MG tablet  Last Written Prescription Date: 4/21/2016  Last Fill Quantity: 10,  # refills: 0   Last office visit: 2/3/2020 with prescribing provider:  Jeannette   Future Office Visit:   Next 5 appointments (look out 90 days)    Apr 23, 2020  8:30 AM CDT  (Arrive by 8:15 AM)  Return Visit with KIRAN Carballo CNP  [facility] ([facility]) [address]  [place] [address] [phone]          10 tablet 0     Sig: Take 1-2 tablets by mouth every 4 hours as needed for pain       There is no refill protocol information for this order          Problem " List Complete:  No     PROVIDER TO CONSIDER COMPLETION OF PROBLEM LIST AND OVERVIEW/CONTROLLED SUBSTANCE AGREEMENT    Future Office visit:   Next 5 appointments (look out 90 days)    Apr 23, 2020  8:30 AM CDT  (Arrive by 8:15 AM)  Return Visit with KIRAN Carballo CNP  [facility]) [address]  [facility] [address]  [phone]          Controlled substance agreement:   Encounter-Level CSA - 09/08/2015:    Controlled Substance Agreement - Scan on 9/9/2015  2:41 PM: [facility], Controlled Substance Contract, 09-08-15     Patient-Level CSA:    There are no patient-level csa.         Last Urine Drug Screen: No results found for: CDAUT, No results found for: COMDAT, No results found for: THC13, PCP13, COC13, MAMP13, OPI13, AMP13, BZO13, TCA13, MTD13, BAR13, OXY13, PPX13, BUP13     Processing:  Rx to be electronically transmitted to pharmacy by provider      [email or web address]       checked in past 3 months?  Yes 2/11/2020, recommend provider review   RX monitoring program ([place]) reviewed:  not reviewed/not due - last done on 2/11/2020    [place] profile:  [email or web address]  Routing refill request to provider for review/approval because:  Drug not on the [facility] refill protocol

## 2020-02-11 NOTE — TELEPHONE ENCOUNTER
If the endocrinologist thinks that her thyroid is normal I would trust that before I would trust the technologist to do the ultrasound.  I think getting an MRI of her neck and chest is probably a complete waste of time and money.

## 2020-02-11 NOTE — TELEPHONE ENCOUNTER
Patient called back and stated that she wanted the nurse to know that she is able to stop by around 4:30 PM today to get the prescription if Dr. Machado is willing to give her a prescription for it.

## 2020-02-12 DIAGNOSIS — J45.20 INTERMITTENT ASTHMA, UNCOMPLICATED: ICD-10-CM

## 2020-02-12 DIAGNOSIS — R51.9 FACIAL PAIN, ACUTE: ICD-10-CM

## 2020-02-12 DIAGNOSIS — R51.9 HEADACHE DISORDER: ICD-10-CM

## 2020-02-12 DIAGNOSIS — E03.4 HYPOTHYROIDISM DUE TO ACQUIRED ATROPHY OF THYROID: ICD-10-CM

## 2020-02-12 DIAGNOSIS — F34.1 PERSISTENT DEPRESSIVE DISORDER: ICD-10-CM

## 2020-02-12 DIAGNOSIS — K58.2 IRRITABLE BOWEL SYNDROME WITH BOTH CONSTIPATION AND DIARRHEA: ICD-10-CM

## 2020-02-12 DIAGNOSIS — G89.4 CHRONIC PAIN SYNDROME: Primary | ICD-10-CM

## 2020-02-12 DIAGNOSIS — F41.9 ANXIETY: ICD-10-CM

## 2020-02-12 RX ORDER — OXYCODONE AND ACETAMINOPHEN 5; 325 MG/1; MG/1
1-2 TABLET ORAL EVERY 4 HOURS PRN
Qty: 10 TABLET | Refills: 0 | Status: SHIPPED | OUTPATIENT
Start: 2020-02-12 | End: 2020-02-26

## 2020-02-12 NOTE — TELEPHONE ENCOUNTER
"RN spoke with pt:    Pt is concerned that she is very tired of seeing multiple doctors regarding the pain that she is having in her jaw. States she feels that the providers she is seeing are not listening to her concerns and would like to see a provider in [place].    Concerns about lumps in throat. Concerns about breast lumps \"between the breasts\". States she does breast exams 2x a month, \"and I notice the differences!\". RN educated pt that differences in feelings of breast can also be due to hormones, and generally breast exams are indicated 1x a month. Pt states, \"I know that's what they usually say and I take hormones into account. I just want to do it twice a month because it makes me feel better\".     Concerned about heavy smoking which could have caused issues in the chest. States she wants to catch throat and lung cancer early if this is the issue.     At the dentist yesterday, pt was very upset. \"My gums have receded like crazy in the past 2 weeks. There is something wrong. I have seen pictures of what healthy gums look like and my gums are not healthy.\" Pt states the dentist did not take her concerns seriously.     States she also cancelled ENT appt because they were asking to do 2 separate surgeries and pt did not want to do this.    Concerns about auto immune issues that a different provider wanted pt to go see about at [place]. She also states she would like to switch to [place] because they can provide her the collaborative care that she wants.    Would like referral for ENT and auto immune referral for [place].     Additionally states that she has had Tylenol #3 and that has made her sick. Vicodin has made pt sick. \"Only strong pain medicines do not make me sick\". Asked to add tylenol #3 into allergy.    Then states that she is upset that her blood has not been drawn since hospital visit in November and that these issues have been happening since then. \"What if my white blood count is high?\"    Of note: RN spent " 16 minutes on the phone with this patient.

## 2020-02-12 NOTE — TELEPHONE ENCOUNTER
Can we check with the patient to see which medical issues she is wishing to get looked at at [place]?  I can then do the referral

## 2020-02-12 NOTE — TELEPHONE ENCOUNTER
"Requested Prescriptions   Pending Prescriptions Disp Refills     PROAIR  (90 Base) MCG/ACT inhaler [Pharmacy Med Name: PROAIR HFA  AER]  Last Written Prescription Date:  11/13/2018  Last Fill Quantity: 51 g,  # refills: 3   Last office visit: 2/3/2020 with prescribing provider:  Jeannette   Future Office Visit:   Next 5 appointments (look out 90 days)    Apr 23, 2020  8:30 AM CDT  (Arrive by 8:15 AM)  Return Visit with KIRAN Carballo CNP  [facility] ([facility] [address]  [facility] [address]  [phone]          17 g      Sig: INHALE 1-2 PUFFS INTO THE LUNGS EVERY FOUR HOURS AS NEEDED FOR WHEEZING, SHORTNESS OF       Asthma Maintenance Inhalers - Anticholinergics Passed - 2/12/2020  2:05 PM        Passed - Patient is age 12 years or older        Passed - Asthma control assessment score within normal limits in last 6 months     Please review ACT score.   ACT Total Scores 9/23/2019 10/24/2019 12/3/2019   ACT TOTAL SCORE - - -   ASTHMA ER VISITS - - -   ASTHMA HOSPITALIZATIONS - - -   ACT TOTAL SCORE (Goal Greater than or Equal to 20) 19 19 21   In the past 12 months, how many times did you visit the emergency room for your asthma without being admitted to the hospital? 0 0 0   In the past 12 months, how many times were you hospitalized overnight because of your asthma? 0 0 0             Passed - Medication is active on med list        Passed - Recent (6 mo) or future (30 days) visit within the authorizing provider's specialty     Patient had office visit in the last 6 months or has a visit in the next 30 days with authorizing provider or within the authorizing provider's specialty.  See \"Patient Info\" tab in inbasket, or \"Choose Columns\" in Meds & Orders section of the refill encounter.               "

## 2020-02-12 NOTE — TELEPHONE ENCOUNTER
Per RN nurse coordinator Daniella   Please call patient back and let them know that surgery will likely not be performed together. She will need to meet with both surgeons in order to develop a plan. Unfortunately, it will likely be one surgery followed by recovery, and then a second surgery.      Called LM for patient with above information, informed if had any other questions to give us a call back.

## 2020-02-13 ENCOUNTER — HOSPITAL ENCOUNTER (EMERGENCY)
Facility: CLINIC | Age: 45
Discharge: HOME | End: 2020-02-13
Attending: EMERGENCY MEDICINE | Admitting: EMERGENCY MEDICINE
Payer: COMMERCIAL

## 2020-02-13 ENCOUNTER — TELEPHONE (OUTPATIENT)
Dept: FAMILY MEDICINE | Facility: CLINIC | Age: 45
End: 2020-02-13

## 2020-02-13 ENCOUNTER — APPOINTMENT (OUTPATIENT)
Dept: GENERAL RADIOLOGY | Facility: CLINIC | Age: 45
End: 2020-02-13
Attending: EMERGENCY MEDICINE
Payer: COMMERCIAL

## 2020-02-13 ENCOUNTER — NURSE TRIAGE (OUTPATIENT)
Dept: FAMILY MEDICINE | Facility: CLINIC | Age: 45
End: 2020-02-13

## 2020-02-13 VITALS
DIASTOLIC BLOOD PRESSURE: 81 MMHG | RESPIRATION RATE: 18 BRPM | HEART RATE: 73 BPM | OXYGEN SATURATION: 97 % | SYSTOLIC BLOOD PRESSURE: 116 MMHG | TEMPERATURE: 97.8 F

## 2020-02-13 DIAGNOSIS — R06.02 SHORTNESS OF BREATH: ICD-10-CM

## 2020-02-13 DIAGNOSIS — H53.9 VISION CHANGES: ICD-10-CM

## 2020-02-13 LAB
ALBUMIN UR-MCNC: 10 MG/DL
ANION GAP SERPL CALCULATED.3IONS-SCNC: 5 MMOL/L (ref 3–14)
APPEARANCE UR: ABNORMAL
BACTERIA #/AREA URNS HPF: ABNORMAL /HPF
BASOPHILS # BLD AUTO: 0.1 10E9/L (ref 0–0.2)
BASOPHILS NFR BLD AUTO: 0.7 %
BILIRUB UR QL STRIP: NEGATIVE
BUN SERPL-MCNC: 9 MG/DL (ref 7–30)
CALCIUM SERPL-MCNC: 9.2 MG/DL (ref 8.5–10.1)
CHLORIDE SERPL-SCNC: 107 MMOL/L (ref 94–109)
CO2 SERPL-SCNC: 27 MMOL/L (ref 20–32)
COLOR UR AUTO: YELLOW
CREAT SERPL-MCNC: 0.88 MG/DL (ref 0.52–1.04)
D DIMER PPP FEU-MCNC: 0.3 UG/ML FEU (ref 0–0.5)
DIFFERENTIAL METHOD BLD: ABNORMAL
EOSINOPHIL # BLD AUTO: 0.8 10E9/L (ref 0–0.7)
EOSINOPHIL NFR BLD AUTO: 6.7 %
ERYTHROCYTE [DISTWIDTH] IN BLOOD BY AUTOMATED COUNT: 12.7 % (ref 10–15)
GFR SERPL CREATININE-BSD FRML MDRD: 81 ML/MIN/{1.73_M2}
GLUCOSE SERPL-MCNC: 157 MG/DL (ref 70–99)
GLUCOSE UR STRIP-MCNC: NEGATIVE MG/DL
HCT VFR BLD AUTO: 44.3 % (ref 35–47)
HGB BLD-MCNC: 14.4 G/DL (ref 11.7–15.7)
HGB UR QL STRIP: NEGATIVE
IMM GRANULOCYTES # BLD: 0 10E9/L (ref 0–0.4)
IMM GRANULOCYTES NFR BLD: 0.4 %
KETONES UR STRIP-MCNC: ABNORMAL MG/DL
LEUKOCYTE ESTERASE UR QL STRIP: ABNORMAL
LYMPHOCYTES # BLD AUTO: 3.6 10E9/L (ref 0.8–5.3)
LYMPHOCYTES NFR BLD AUTO: 31.1 %
MCH RBC QN AUTO: 27.5 PG (ref 26.5–33)
MCHC RBC AUTO-ENTMCNC: 32.5 G/DL (ref 31.5–36.5)
MCV RBC AUTO: 85 FL (ref 78–100)
MONOCYTES # BLD AUTO: 0.6 10E9/L (ref 0–1.3)
MONOCYTES NFR BLD AUTO: 4.8 %
MUCOUS THREADS #/AREA URNS LPF: PRESENT /LPF
NEUTROPHILS # BLD AUTO: 6.4 10E9/L (ref 1.6–8.3)
NEUTROPHILS NFR BLD AUTO: 56.3 %
NITRATE UR QL: NEGATIVE
NRBC # BLD AUTO: 0 10*3/UL
NRBC BLD AUTO-RTO: 0 /100
PH UR STRIP: 7.5 PH (ref 5–7)
PLATELET # BLD AUTO: 411 10E9/L (ref 150–450)
POTASSIUM SERPL-SCNC: 3.8 MMOL/L (ref 3.4–5.3)
RBC # BLD AUTO: 5.24 10E12/L (ref 3.8–5.2)
RBC #/AREA URNS AUTO: <1 /HPF (ref 0–2)
SODIUM SERPL-SCNC: 139 MMOL/L (ref 133–144)
SOURCE: ABNORMAL
SP GR UR STRIP: 1.02 (ref 1–1.03)
SQUAMOUS #/AREA URNS AUTO: 1 /HPF (ref 0–1)
UROBILINOGEN UR STRIP-MCNC: NORMAL MG/DL (ref 0–2)
WBC # BLD AUTO: 11.4 10E9/L (ref 4–11)
WBC #/AREA URNS AUTO: <1 /HPF (ref 0–5)

## 2020-02-13 PROCEDURE — 85379 FIBRIN DEGRADATION QUANT: CPT | Performed by: EMERGENCY MEDICINE

## 2020-02-13 PROCEDURE — 96361 HYDRATE IV INFUSION ADD-ON: CPT

## 2020-02-13 PROCEDURE — 81001 URINALYSIS AUTO W/SCOPE: CPT | Performed by: EMERGENCY MEDICINE

## 2020-02-13 PROCEDURE — 80048 BASIC METABOLIC PNL TOTAL CA: CPT | Performed by: EMERGENCY MEDICINE

## 2020-02-13 PROCEDURE — 25000128 H RX IP 250 OP 636: Performed by: EMERGENCY MEDICINE

## 2020-02-13 PROCEDURE — 85025 COMPLETE CBC W/AUTO DIFF WBC: CPT | Performed by: EMERGENCY MEDICINE

## 2020-02-13 PROCEDURE — 96374 THER/PROPH/DIAG INJ IV PUSH: CPT

## 2020-02-13 PROCEDURE — 25800030 ZZH RX IP 258 OP 636: Performed by: EMERGENCY MEDICINE

## 2020-02-13 PROCEDURE — 99284 EMERGENCY DEPT VISIT MOD MDM: CPT | Mod: 25

## 2020-02-13 PROCEDURE — 71046 X-RAY EXAM CHEST 2 VIEWS: CPT

## 2020-02-13 RX ORDER — KETOROLAC TROMETHAMINE 15 MG/ML
15 INJECTION, SOLUTION INTRAMUSCULAR; INTRAVENOUS ONCE
Status: COMPLETED | OUTPATIENT
Start: 2020-02-13 | End: 2020-02-13

## 2020-02-13 RX ORDER — ALBUTEROL SULFATE 90 UG/1
AEROSOL, METERED RESPIRATORY (INHALATION)
Qty: 18 G | Refills: 1 | Status: SHIPPED | OUTPATIENT
Start: 2020-02-13 | End: 2020-06-24

## 2020-02-13 RX ADMIN — KETOROLAC TROMETHAMINE 15 MG: 15 INJECTION, SOLUTION INTRAMUSCULAR; INTRAVENOUS at 20:11

## 2020-02-13 RX ADMIN — SODIUM CHLORIDE 1000 ML: 9 INJECTION, SOLUTION INTRAVENOUS at 20:10

## 2020-02-13 ASSESSMENT — ENCOUNTER SYMPTOMS
SINUS PRESSURE: 1
DYSURIA: 0
COUGH: 1
BACK PAIN: 1
ROS SKIN COMMENTS: BUMP ON CHEST
CHILLS: 1
SHORTNESS OF BREATH: 1
FEVER: 0

## 2020-02-13 NOTE — TELEPHONE ENCOUNTER
Patient called reporting eye pain double vision, having trouble reading street lights, dry eyes, headache and stiff neck. Denies fever Has seen ENT and opthamology. She plans to see [facility] but cant wait to see them given worsening symptoms today. She was advised to see ED today given report of sudden vision changes worsening today. Pt verbalized agreement with plan.    Additional Information    Negative: Followed an eye injury    Negative: Eye pain from chemical in the eye    Negative: Eye pain from foreign body in eye    Has sinus pain or pressure    Negative: Sounds like a life-threatening emergency to the triager    Negative: Difficulty breathing, and not from stuffy nose (e.g., not relieved by cleaning out the nose)    Negative: SEVERE headache and has fever    Negative: Patient sounds very sick or weak to the triager    SEVERE sinus pain    Protocols used: EYE PAIN-A-OH, SINUS PAIN AND CONGESTION-A-OH

## 2020-02-13 NOTE — TELEPHONE ENCOUNTER
The referral for [facility] was faxed to [facility] with notes from providers and specialties for the past 4 months.  The referral was faxed to [phone]

## 2020-02-13 NOTE — TELEPHONE ENCOUNTER
Prescription approved per [facility] Refill Protocol for 6 months of refills since last appointment, which was 02/03/20

## 2020-02-13 NOTE — ED AVS SNAPSHOT
[facility] Emergency Department  [address]  [facility] [address]  Phone:  [phone]  Fax:  [phone]                                    Melody Muñoz   MRN: [id number]    Department:  [facility] Emergency Department   Date of Visit:  2/13/2020           After Visit Summary Signature Page    I have received my discharge instructions, and my questions have been answered. I have discussed any challenges I see with this plan with the nurse or doctor.    ..........................................................................................................................................  Patient/Patient Representative Signature      ..........................................................................................................................................  Patient Representative Print Name and Relationship to Patient    ..................................................               ................................................  Date                                   Time    ..........................................................................................................................................  Reviewed by Signature/Title    ...................................................              ..............................................  Date                                               Time          22EPIC Rev 08/18

## 2020-02-14 ASSESSMENT — ENCOUNTER SYMPTOMS
SINUS PAIN: 1
NUMBNESS: 0
EYE PAIN: 1

## 2020-02-14 NOTE — ED TRIAGE NOTES
"Patient presents to ED due to multiple complaints. Patient reports hx sinus infections on and off and has been following up with ENT .     Today c/o    1. \" I woke up with double vision and both my eyes hurt\"  2. \"my gums are deteriorating where they hurt and I don't know what is going on\"  3. \" I have been having sinus infections and and dental pain for the past month and have seen ENT and I don't know if this what is going on \"  4.\" I am scared that this might be pneumonia because my chest I have bone protruding when I touch it\"  5. \" I have been having cough, back, neck pain. \"    Patient c/o dizziness    Reports taking prescribed oxycodone with tylenol with minimal relief     ABC intact   A/o x4       "

## 2020-02-14 NOTE — DISCHARGE INSTRUCTIONS
Regarding your eyes I would recommend you return to the ophthalmologist and discuss worsening symptoms.  If you change your mind regarding CT scan of your head you can return to the emergency department.  Regarding your ongoing sinus/mouth/gum issues and the sensation you are having difficulty breathing in your throat you can return to ENT.   Regarding concerns of lump in your breast you should follow-up with OB/GYN to discuss this finding.  If you choose to wait for [facility] referral that is your choice though you should continue aggressive follow-up to determine the cause of your symptoms.  Return with vision loss, severe difficulty breathing or new pain, headaches, numbness or tingling, or any other new or concerning symptoms.

## 2020-02-14 NOTE — ED PROVIDER NOTES
"  History   Chief Complaint:  Multiple Complaints     The history is provided by the patient.      Melody Muñoz is a 44 year old female with history of diabetes mellitus type 2 and mental illness who presents with multiple complaints. Melody has recurrent sinus infections and has been struggling with infections particularly over the last month. She recently saw ENT and completed a course of antibiotics today. She does not feel any improvement after these. It has been suggested that she may improve with surgery but she also also been told she needs a surgery for her JASWANT and wants to do these at the same time so she has not scheduled either. She remarks she has ongoing issues with her teeth and gums worsening over the past month, describing her gums as \"deteriorating.\"    Melody goes on to discuss worsening vision changes over the last 6 months. She was seen by an ophthalmologist and got an updated glasses prescription but feels each day she awakens and her vision is worse. She felt this worsening was more acute this morning, describing being \"unable to read things I can typically read.\" She feels there is a blurry sensation with symptoms worse on the right, but bilateral, and associated with eye pain. She denies numbness or tingling aside from across her face which her ENT told her was normal. She called the nurse line and was told to come to the ER for these symptoms, but remarks, \"I don't really care about that, I can follow up with my eye doctor.\" Instead expressing her primary concern is actually in her chest.    She felt a pain in her left medial breast several months ago which she did see her OB/Gyn about. Since that visit she has developed a lump in this area that is worsening such that she feels there are lumps all the way up her chest over the sternum to her neck and are now \"obstructing my breathing.\" She describes a burning sensation in her chest in this area. She expresses concern for cancer as " she was previously a heavy smoker. She has a cough productive of yellow, green, or white sputum that has been present for quite some time. She is also feeling short of breath. She is concerned because today she also developed upper pain pain and chills so she feels she may have pneumonia. She denies recent travel, fever, or dysuria but remarks she has ongoing GI issues she attributes to her IBS.    Allergies:  Hydrocodone  Tamiflu [Oseltamivir]  Tylenol W/Codeine [Acetaminophen-Codeine]    Medications:   Oxycodone  Albuterol  Tylenol #3  Fioricet  Soma  Clonazepam  Trulicity  Lamictal  Dextroamphetamine   Guanfacine  Latuda   Levothyroxine  Topamax  Trazodone   Effexor     Past Medical History:    Anxiety   Bipolar disorder 1  Diabetes mellitus type 2  Endometriosis   Intermittent asthma  Latent tuberculosis   Major depression  Migraines  Sleep apnea  Substance abuse   Suicidal ideation  Vertigo   Recurrent sinus infections  Irritable bowel syndrome with both constipation and diarrhea  Chronic back pain  Acquired hypothyroidism  Class 2 obesity   Hyperlipidemia   Former tobacco user   Hostile behavior   Obstructive sleep apnea  temporomandibular joint syndrome  Insomnia   Female incontinence  Kidney stone      Past Surgical History:    Bladder sling   Laparoscopy endometriosis   c section  Laparoscopic appendectomy   Little finger surgery, left  Tubal ligation bilateral  [place] teeth removal     Family History:    Mother: hypertension  Father: heart disease  Sister: depression, anxiety   Other: bipolar disorder     Social History:  Smoking Status:  Former smoker for 17 years  Smokeless Tobacco: Never Used  Alcohol Use: No  Drug Use: No  PCP: Evaristo Machado   Presents with significant other.    Review of Systems   Constitutional: Positive for chills. Negative for fever.   HENT: Positive for dental problem, sinus pressure and sinus pain.    Eyes: Positive for pain and visual disturbance.   Respiratory:  "Positive for cough and shortness of breath.    Cardiovascular: Positive for chest pain.   Genitourinary: Negative for dysuria.   Musculoskeletal: Positive for back pain.   Skin:        \"bump on chest\"   Neurological: Negative for numbness.   All other systems reviewed and are negative.    Physical Exam     Patient Vitals for the past 24 hrs:   BP Temp Pulse Heart Rate Resp SpO2   02/13/20 2200 -- -- -- -- -- 97 %   02/13/20 2135 -- -- -- -- -- 97 %   02/13/20 2134 116/81 -- 73 -- -- --   02/13/20 2000 107/73 -- 84 -- -- 95 %   02/13/20 1945 (!) 126/91 -- 94 -- -- 97 %   02/13/20 1920 -- 97.8  F (36.6  C) -- 102 18 100 %       Physical Exam  General: Well-developed and well-nourished. Well appearing middle aged  woman. Cooperative.  Head:  Atraumatic.  Eyes:  Conjunctivae, lids, and sclerae are normal.  PERRL.  EOMI. Visualized portions of retina are normal. Visual Acuity: Right 20/40; Left 20/30.  ENT:    Normal nose. Moist mucous membranes.  Chronic appearing gum disease.  Mild lymphadenopathy to the left anterior cervical chain without obvious masses or edema of the neck or submandibular, sublingual regions.  Neck:  Supple. Normal range of motion.  CV:  Regular rate and rhythm. Normal heart sounds with no murmurs, rubs, or gallops detected.  Resp:  No respiratory distress. Clear to auscultation bilaterally without decreased breath sounds, wheezing, rales, or rhonchi.  GI:  Soft. Non-distended. Non-tender.    MS:  Normal ROM. No bilateral lower extremity edema.  Questionable 0.5 cm mobile mass in the breast tissue just left of the sternum without palpable masses elsewhere on the chest wall including other areas of concern to the patient.  Skin:  Warm. Non-diaphoretic. No pallor.  Neuro: Awake. A&Ox3. Normal strength.    No facial droop. No dysarthria.    No aphasia.    No visual field deficits.    No dysmetria.    No dysdiadochokinesis.  Psych: Normal mood and affect. Normal speech.  Vitals " reviewed.    Emergency Department Course   Imaging:  Radiology findings were communicated with the patient who voiced understanding of the findings.    XR Chest 2 Views  Heart size is normal. The lungs are clear. There is no evidence for pneumonia. No pneumothorax or pleural effusion.  Reading per radiology     Laboratory:  Laboratory findings were communicated with the patient who voiced understanding of the findings.    CBC: WBC 11.4(H), HGB 14.4,   BMP: glucose 157(H) o/w WNL (Creatinine 0.88)  Ddimer: 0.3    UA with microscopic: urineketon trace, pH 7.5(H), protein albumin 10(H), leukocyte esterase trace, bacteria few, mucous present o/w WNL    Interventions:  2010 NS 1000 mL IV  2011 Toradol 15 mg IV  2111 Pepcid 20 mg IV    Emergency Department Course:  Nursing notes and vitals reviewed.    1945 I performed an exam of the patient as documented above.     IV was inserted and blood was drawn for laboratory testing, results above.     The patient provided a urine sample here in the emergency department. This was sent for laboratory testing, findings above.     The patient was sent for a XR Chest while in the emergency department, results above.      2152 I rechecked the patient. Explained findings to the Patient. The patient was concerned about her vision and a CT Head was offered but she ultimately declined.     Findings and plan explained to the patient. Patient discharged home with instructions regarding supportive care, medications, and reasons to return. The importance of close follow-up was reviewed.     Impression & Plan    Medical Decision Making:  Melody is a 44 year old woman who presents with numerous complaints ranging from progressively worsening vision changes to concern for cancer as per HPI above.  She seems to express the greatest concern for which she describes as difficulty breathing and small mass in her left breast though she does not truly have chest pain but rather a burning  "sensation radiating from this mass up into her throat causing her difficulty breathing.  Her exam is unremarkable including neurologic exam without visual field deficits and her visual acuity is grossly normal as above.  She has no dysmetria, dysdiadochokinesis, or other suggestion of intracranial cause for her vision changes.  Ultimately, she was upset that she did not have any answer for these vision changes which she describes as progressively worsening, and I offered to do a head CT although she ultimately declined this stating instead she believes she needs imaging - preferably an MRI - of her entire body from brain to pelvis.  She was upset I did not perform a CT scan as she \"does not trust x-rays.\"  Fortunately, her work-up here is very reassuring.  Her d-dimer is negative essentially ruling out pulmonary embolism and chest x-ray does not reveal acute pathology to explain her pain or cough including no evidence of pneumonia.  Leukocytosis to 11.4 is identified and appears to be near her baseline.  Otherwise mild hyperglycemia to 157 is noted.  Urinalysis is without evidence of infection.  She was treated with Toradol, Pepcid, and IV fluids and on repeat evaluation states she is feeling \"tired.\"    At this point, there is no indication for further work-up in the emergency department as her symptoms are all chronic and she has a very reassuring exam and work-up thus far without evidence of an emergent pathology.  She continues to express frustration that she does not have a definitive diagnosis that ties all of her symptoms together and is concerned for rheumatologic disease or cancer.  She has already requested a referral to [place], and I recommended she pursue this further.  However, I have explained to her that it is difficult to do large work-ups for nonspecific, chronic symptoms in the emergency department and these are best completed as an outpatient.  Regarding her eye symptoms, I recommended she return to " "the ophthalmologist to discuss worsening though she understands she can return to the emergency department if she changes her mind regarding CT scan of her head. Interestingly, she describes inability to read, but had visual acuities of 20/30 and 20/40. I suspect her vision changes may be related to the ongoing sinus and dental/gum issues for which she needs to return to ENT. I recommended she discuss with them the sensation there is something obstructing her throat making it difficult for her to breathe. She has no obvious masses on exam and no respiratory distress.  Regarding the concern for a lump in her breast, I recommended she follow-up with her gynecologist though she expresses that she will likely wait to discuss all the symptoms at [facility]. Her significant other suggested she return to her primary care provider though she expresses frustration with her primary care provider as she is \"requested an MRI\" that has not been completed. She seems to perseverate on concern for malignancy and tells me \"no one is listening to me.\" I assured her I heard her concerns and invited her to continuing pursuing outpatient work up and treatment for her symptoms. I have also invited her to return to the emergency department if she has sudden changes in symptoms or development of new concerns.  All of her questions were answered and she verbalized understanding.  Amenable to discharge.    Diagnosis:    ICD-10-CM    1. Shortness of breath R06.02    2. Vision changes H53.9        Disposition:   discharged home    Scribe Disclosure:  I, Alley Miller, am serving as a scribe at 7:31 PM on 2/13/2020 to document services personally performed by Pilar Vernon MD based on my observations and the provider's statements to me.   [facility] EMERGENCY DEPARTMENT       Pilar Vernon MD  02/14/20 1414    "

## 2020-02-17 ENCOUNTER — NURSE TRIAGE (OUTPATIENT)
Dept: FAMILY MEDICINE | Facility: CLINIC | Age: 45
End: 2020-02-17

## 2020-02-17 NOTE — TELEPHONE ENCOUNTER
I'm not going to give her augmentin.  Someone else can if they want to, recommend she keep her appointment, CAESAR will be back tomorrow.

## 2020-02-17 NOTE — TELEPHONE ENCOUNTER
"RN called back to pt.    -Pt states she will wait for PCP to come back to review.  -States she will cancel appt tomorrow. RN urged for pt to keep appt, pt states, \"I just don't see the point in it. I'm not going to pay another copay just to see another doctor. Just let me know what he says tomorrow.\"    Routing to PCP for review.   "

## 2020-02-17 NOTE — TELEPHONE ENCOUNTER
"RN called back to pt.    -Same sx as for the past month.   -States her amoxicillin has not worked  -Requesting to try Augmentin for recurrent infection  -States she does not want to come in office tomorrow, as she has an [facility] appt   -Appt with ENT on the 25th    Additional Information    Negative: Sounds like a life-threatening emergency to the triager    Negative: Difficulty breathing, and not from stuffy nose (e.g., not relieved by cleaning out the nose)    Negative: SEVERE headache and has fever    Negative: Patient sounds very sick or weak to the triager    Negative: SEVERE sinus pain    Negative: Severe headache    Negative: Redness or swelling on the cheek, forehead, or around the eye    Negative: Fever > 103 F (39.4 C)    Negative: Fever > 101 F (38.3 C) and over 60 years of age    Negative: Fever > 100.0 F (37.8 C) and has diabetes mellitus or a weak immune system (e.g., HIV positive, cancer chemotherapy, organ transplant, splenectomy, chronic steroids)    Negative: Fever > 100.0 F (37.8 C) and bedridden (e.g., nursing home patient, stroke, chronic illness, recovering from surgery)    Negative: Fever present > 3 days (72 hours)    Negative: Fever returns after gone for over 24 hours and symptoms worse or not improved    Negative: Sinus pain (not just congestion) and fever    Negative: Earache    Sinus congestion (pressure, fullness) present > 10 days    Answer Assessment - Initial Assessment Questions  1. LOCATION: \"Where does it hurt?\"       Sinus pressure    2. ONSET: \"When did the sinus pain start?\"  (e.g., hours, days)       A month ago    3. SEVERITY: \"How bad is the pain?\"   (Scale 1-10; mild, moderate or severe)    - MILD (1-3): doesn't interfere with normal activities     - MODERATE (4-7): interferes with normal activities (e.g., work or school) or awakens from sleep    - SEVERE (8-10): excruciating pain and patient unable to do any normal activities         Mild/moderate, severe bone pain " "though     4. RECURRENT SYMPTOM: \"Have you ever had sinus problems before?\" If so, ask: \"When was the last time?\" and \"What happened that time?\"       Yes. Has been seen multiple times and referred to ENT    5. NASAL CONGESTION: \"Is the nose blocked?\" If so, ask, \"Can you open it or must you breathe through the mouth?\"      Can breathe through nose    6. NASAL DISCHARGE: \"Do you have discharge from your nose?\" If so ask, \"What color?\"      No, less discharge than before    7. FEVER: \"Do you have a fever?\" If so, ask: \"What is it, how was it measured, and when did it start?\"       No    8. OTHER SYMPTOMS: \"Do you have any other symptoms?\" (e.g., sore throat, cough, earache, difficulty breathing)      Gum issues    9. PREGNANCY: \"Is there any chance you are pregnant?\" \"When was your last menstrual period?\"      no    Protocols used: SINUS PAIN AND CONGESTION-A-OH      "

## 2020-02-17 NOTE — TELEPHONE ENCOUNTER
Reason for call:  Patient reporting a symptom  Symptom or request: SINUS INFECTION  Duration (how long have symptoms been present): 2 weeks  Have you been treated for this before? Yes  Additional comments: not better from last treatment  Phone Number patient can be reached at:  Home number on file [phone] (home)  Best Time:  any  Can we leave a detailed message on this number:  YES  Call taken on 2/17/2020 at 11:32 AM by BREANN WARREN

## 2020-02-18 ENCOUNTER — HOSPITAL ENCOUNTER (EMERGENCY)
Facility: CLINIC | Age: 45
Discharge: HOME | End: 2020-02-18
Attending: EMERGENCY MEDICINE | Admitting: EMERGENCY MEDICINE
Payer: COMMERCIAL

## 2020-02-18 ENCOUNTER — APPOINTMENT (OUTPATIENT)
Dept: CT IMAGING | Facility: CLINIC | Age: 45
End: 2020-02-18
Attending: EMERGENCY MEDICINE
Payer: COMMERCIAL

## 2020-02-18 ENCOUNTER — DOCUMENTATION ONLY (OUTPATIENT)
Dept: FAMILY MEDICINE | Facility: CLINIC | Age: 45
End: 2020-02-18

## 2020-02-18 VITALS
DIASTOLIC BLOOD PRESSURE: 82 MMHG | OXYGEN SATURATION: 95 % | RESPIRATION RATE: 29 BRPM | SYSTOLIC BLOOD PRESSURE: 122 MMHG | HEART RATE: 73 BPM | TEMPERATURE: 98.5 F

## 2020-02-18 DIAGNOSIS — R91.8 OPACITY OF LUNG ON IMAGING STUDY: ICD-10-CM

## 2020-02-18 DIAGNOSIS — R09.89 THROAT TIGHTNESS: ICD-10-CM

## 2020-02-18 DIAGNOSIS — J45.30 MILD PERSISTENT ASTHMA WITHOUT COMPLICATION: ICD-10-CM

## 2020-02-18 DIAGNOSIS — R06.02 SHORTNESS OF BREATH: ICD-10-CM

## 2020-02-18 LAB
ANION GAP SERPL CALCULATED.3IONS-SCNC: 2 MMOL/L (ref 3–14)
B-HCG FREE SERPL-ACNC: <5 IU/L
BASOPHILS # BLD AUTO: 0.1 10E9/L (ref 0–0.2)
BASOPHILS NFR BLD AUTO: 0.5 %
BUN SERPL-MCNC: 9 MG/DL (ref 7–30)
CALCIUM SERPL-MCNC: 9.4 MG/DL (ref 8.5–10.1)
CHLORIDE SERPL-SCNC: 110 MMOL/L (ref 94–109)
CO2 SERPL-SCNC: 28 MMOL/L (ref 20–32)
CREAT SERPL-MCNC: 0.79 MG/DL (ref 0.52–1.04)
DIFFERENTIAL METHOD BLD: ABNORMAL
EOSINOPHIL # BLD AUTO: 0.8 10E9/L (ref 0–0.7)
EOSINOPHIL NFR BLD AUTO: 6.8 %
ERYTHROCYTE [DISTWIDTH] IN BLOOD BY AUTOMATED COUNT: 13.1 % (ref 10–15)
FLUAV+FLUBV AG SPEC QL: NEGATIVE
FLUAV+FLUBV AG SPEC QL: NEGATIVE
GFR SERPL CREATININE-BSD FRML MDRD: >90 ML/MIN/{1.73_M2}
GLUCOSE SERPL-MCNC: 105 MG/DL (ref 70–99)
HCT VFR BLD AUTO: 46.6 % (ref 35–47)
HGB BLD-MCNC: 14.8 G/DL (ref 11.7–15.7)
IMM GRANULOCYTES # BLD: 0 10E9/L (ref 0–0.4)
IMM GRANULOCYTES NFR BLD: 0.2 %
INTERPRETATION ECG - MUSE: NORMAL
LYMPHOCYTES # BLD AUTO: 2.9 10E9/L (ref 0.8–5.3)
LYMPHOCYTES NFR BLD AUTO: 26 %
MCH RBC QN AUTO: 27.3 PG (ref 26.5–33)
MCHC RBC AUTO-ENTMCNC: 31.8 G/DL (ref 31.5–36.5)
MCV RBC AUTO: 86 FL (ref 78–100)
MONOCYTES # BLD AUTO: 0.4 10E9/L (ref 0–1.3)
MONOCYTES NFR BLD AUTO: 3.3 %
NEUTROPHILS # BLD AUTO: 7 10E9/L (ref 1.6–8.3)
NEUTROPHILS NFR BLD AUTO: 63.2 %
NRBC # BLD AUTO: 0 10*3/UL
NRBC BLD AUTO-RTO: 0 /100
PLATELET # BLD AUTO: 384 10E9/L (ref 150–450)
POTASSIUM SERPL-SCNC: 3.7 MMOL/L (ref 3.4–5.3)
RBC # BLD AUTO: 5.43 10E12/L (ref 3.8–5.2)
SODIUM SERPL-SCNC: 140 MMOL/L (ref 133–144)
SPECIMEN SOURCE: NORMAL
T4 FREE SERPL-MCNC: 0.94 NG/DL (ref 0.76–1.46)
TROPONIN I SERPL-MCNC: <0.015 UG/L (ref 0–0.04)
TSH SERPL DL<=0.005 MIU/L-ACNC: 0.33 MU/L (ref 0.4–4)
WBC # BLD AUTO: 11.1 10E9/L (ref 4–11)

## 2020-02-18 PROCEDURE — 93005 ELECTROCARDIOGRAM TRACING: CPT

## 2020-02-18 PROCEDURE — 96374 THER/PROPH/DIAG INJ IV PUSH: CPT

## 2020-02-18 PROCEDURE — 71275 CT ANGIOGRAPHY CHEST: CPT

## 2020-02-18 PROCEDURE — 70491 CT SOFT TISSUE NECK W/DYE: CPT

## 2020-02-18 PROCEDURE — 84484 ASSAY OF TROPONIN QUANT: CPT | Performed by: EMERGENCY MEDICINE

## 2020-02-18 PROCEDURE — 25000125 ZZHC RX 250: Performed by: EMERGENCY MEDICINE

## 2020-02-18 PROCEDURE — 84702 CHORIONIC GONADOTROPIN TEST: CPT

## 2020-02-18 PROCEDURE — 99285 EMERGENCY DEPT VISIT HI MDM: CPT | Mod: 25

## 2020-02-18 PROCEDURE — 87804 INFLUENZA ASSAY W/OPTIC: CPT | Mod: 59 | Performed by: EMERGENCY MEDICINE

## 2020-02-18 PROCEDURE — 84439 ASSAY OF FREE THYROXINE: CPT | Performed by: EMERGENCY MEDICINE

## 2020-02-18 PROCEDURE — 84443 ASSAY THYROID STIM HORMONE: CPT | Performed by: EMERGENCY MEDICINE

## 2020-02-18 PROCEDURE — 25000128 H RX IP 250 OP 636: Performed by: EMERGENCY MEDICINE

## 2020-02-18 PROCEDURE — 85025 COMPLETE CBC W/AUTO DIFF WBC: CPT | Performed by: EMERGENCY MEDICINE

## 2020-02-18 PROCEDURE — 80048 BASIC METABOLIC PNL TOTAL CA: CPT | Performed by: EMERGENCY MEDICINE

## 2020-02-18 PROCEDURE — 87804 INFLUENZA ASSAY W/OPTIC: CPT | Performed by: EMERGENCY MEDICINE

## 2020-02-18 RX ORDER — IOPAMIDOL 755 MG/ML
500 INJECTION, SOLUTION INTRAVASCULAR ONCE
Status: COMPLETED | OUTPATIENT
Start: 2020-02-18 | End: 2020-02-18

## 2020-02-18 RX ORDER — LORAZEPAM 2 MG/ML
1 INJECTION INTRAMUSCULAR ONCE
Status: COMPLETED | OUTPATIENT
Start: 2020-02-18 | End: 2020-02-18

## 2020-02-18 RX ORDER — DOXYCYCLINE 100 MG/1
100 CAPSULE ORAL 2 TIMES DAILY
Qty: 14 CAPSULE | Refills: 0 | Status: SHIPPED | OUTPATIENT
Start: 2020-02-18 | End: 2020-03-12

## 2020-02-18 RX ADMIN — IOPAMIDOL 97 ML: 755 INJECTION, SOLUTION INTRAVENOUS at 14:15

## 2020-02-18 RX ADMIN — SODIUM CHLORIDE 88 ML: 9 INJECTION, SOLUTION INTRAVENOUS at 14:15

## 2020-02-18 RX ADMIN — LORAZEPAM 1 MG: 2 INJECTION INTRAMUSCULAR; INTRAVENOUS at 13:31

## 2020-02-18 ASSESSMENT — ENCOUNTER SYMPTOMS: SHORTNESS OF BREATH: 1

## 2020-02-18 NOTE — TELEPHONE ENCOUNTER
Huddle with lead RN and due to same sx, please see triage note from 02/17.  Closing this encounter, will reroute the prior conversation to triage.

## 2020-02-18 NOTE — TELEPHONE ENCOUNTER
Reason for call:  Patient reporting a symptom    Symptom or request: sinus-sinus pressure pain headache ear pain sore throat    Duration (how long have symptoms been present): ?    Have you been treated for this before? Yes    Additional comments: wants call from CAESAR to ask if wants to put her on different antibiotic states did not go to appointment she had this AM as didn't want to pay a copay- will not be available to return call until after 11:30 AM    Phone Number patient can be reached at:  Home number on file [phone] (home)    Best Time:  After 11:30    Can we leave a detailed message on this number:  YES    Call taken on 2/18/2020 at 9:25 AM by MADELIN PADRON

## 2020-02-18 NOTE — ED AVS SNAPSHOT
[facility] Emergency Department  [address]  [facility] [address]  Phone:  [phone]  Fax:  [phone]                                    Melody Muñoz   MRN: [id number]    Department:  [facility] Emergency Department   Date of Visit:  2/18/2020           After Visit Summary Signature Page    I have received my discharge instructions, and my questions have been answered. I have discussed any challenges I see with this plan with the nurse or doctor.    ..........................................................................................................................................  Patient/Patient Representative Signature      ..........................................................................................................................................  Patient Representative Print Name and Relationship to Patient    ..................................................               ................................................  Date                                   Time    ..........................................................................................................................................  Reviewed by Signature/Title    ...................................................              ..............................................  Date                                               Time          22EPIC Rev 08/18

## 2020-02-18 NOTE — TELEPHONE ENCOUNTER
I do not think changing to Augmentin is going to help the issue.  I think she should treat all of her symptoms and wait until she gets back to see the otolaryngologist.

## 2020-02-18 NOTE — ED PROVIDER NOTES
"  History     Chief Complaint:  Shortness of Breath and Cough       HPI   Melody Muñoz is a 44 year old female with a history of diabetes, asthma, and anxiety who presents with shortness of breath. The patient states that she was driving earlier today and started having a difficult time taking a deep breath along with shortness of breath. She reports feeling like she was going to  \"literally stop breathing\" and pass out. The patient called EMS and was subsequently transferred to the ER for further evaluation. She denies having a panic attack and states that she took a clonopin this morning. Here, the patient also complains of bumps extending from the middle of her chest up to the sides of her neck for the past several months. She has seen ENT and OB/GYN for her bumps and has a scheduled breast exam tomorrow. Of note, the patient was recently seen in the ER on 2/13 for shortness of breath and had laboratory workup as well as a chest xray as noted below.     XR Chest 2 Views 2/13/20  Heart size is normal. The lungs are clear. There is no evidence for pneumonia. No pneumothorax or pleural effusion.  Reading per radiology     2/13/20  CBC: WBC 11.4(H), HGB 14.4,   BMP: glucose 157(H) o/w WNL (Creatinine 0.88)  Ddimer: 0.3  UA with microscopic: urineketon trace, pH 7.5(H), protein albumin 10(H), leukocyte esterase trace, bacteria few, mucous present o/w WNL    Allergies:  Hydrocodone  Tamiflu [Oseltamivir]  Tylenol W/Codeine [Acetaminophen-Codeine]     Medications:   Oxycodone  Albuterol  Tylenol #3  Fioricet  Soma  Clonazepam  Trulicity  Lamictal  Dextroamphetamine   Guanfacine  Latuda   Levothyroxine  Topamax  Trazodone   Effexor      Past Medical History:    Anxiety   Bipolar disorder 1  Diabetes mellitus type 2  Endometriosis   Intermittent asthma  Latent tuberculosis   Major depression  Migraines  Sleep apnea  Substance abuse   Suicidal ideation  Vertigo   Recurrent sinus infections  Irritable bowel " syndrome with both constipation and diarrhea  Chronic back pain  Acquired hypothyroidism  Class 2 obesity   Hyperlipidemia   Former tobacco user   Hostile behavior   Obstructive sleep apnea  temporomandibular joint syndrome  Insomnia   Female incontinence  Kidney stone       Past Surgical History:    Bladder sling   Laparoscopy endometriosis   c section  Laparoscopic appendectomy   Little finger surgery, left  Tubal ligation bilateral  [place] teeth removal      Family History:    Mother: hypertension  Father: heart disease  Sister: depression, anxiety   Other: bipolar disorder      Social History:  Smoking Status:  Former smoker for 17 years  Smokeless Tobacco: Never Used  Alcohol Use: No  Drug Use: No  PCP: Evaristo Machado   Presents with significant other.      Review of Systems   Respiratory: Positive for shortness of breath.    Musculoskeletal:        Bumps on chest   All other systems reviewed and are negative.        Physical Exam     Patient Vitals for the past 24 hrs:   BP Temp Temp src Heart Rate Resp SpO2   02/18/20 1259 (!) 140/96 98.5  F (36.9  C) Oral 78 16 96 %        Physical Exam  VS: Reviewed per above  HENT: Mucous membranes moist, no stridor, Uvula midline, no tonsillar hypertrophy nor asymmetry. Tolerating secretions, normal phonation. No nuchal rigidity.  EYES: sclera anicteric  CV: Rate as noted, regular rhythm.   RESP: Effort normal. Breath sounds are normal bilaterally.  GI: no tenderness/rebound/guarding, not distended.  NEURO: Alert, moving all extremities  MSK: No deformity of the extremities, no calf swelling or asymmetry.  Chaperoned chest wall exam does not reveal any palpable masses.  SKIN: Warm and dry    Emergency Department Course   ECG:  ECG taken at 1340  Normal sinus rhythm  Normal ECG  Rate 78 bpm. MI interval 176 ms. QRS duration 108 ms. QT/QTc 398/453 ms. P-R-T axes 64 -7 58.     Imaging:  Radiology findings were communicated with the patient who voiced understanding of  the findings.    Soft tissue neck CT w contrast  Preliminary Result  IMPRESSION:    1. Unremarkable CT scan of the neck without suspicious mass, fluid  collection, or inflammation.  2. Please see dedicated chest CT report for details below the neck  including the patchy opacities in the lung apices.   Reading per radiology     CT Chest Pulmonary Embolism w Contrast  Preliminary Result  IMPRESSION:   1. No evidence of pulmonary embolism.  2. A few patchy left upper lobe pulmonary opacities, worrisome for  infection.  Reading per radiology     Laboratory:  Laboratory findings were communicated with the patient who voiced understanding of the findings.    Influenza A/B antigen: negative      ISTAT HCG quantitative pregnancy POCT: <5.0    Troponin(1329):  <0.015      CBC:  WBC 11.1 (H), HGB 14.8, , o/w WNL     BMP: Glucose 105 (H), chloride 110 (H), anion gap 2 (L), o/w WNL (Creatinine: 0.79)     TSH with free T4 reflex: 0.33 (L)     T4 free: 0.94     Interventions:  1331 Ativan 1 mg IV    Emergency Department Course:  Past medical records, nursing notes, and vitals reviewed.    1300 I performed an exam of the patient as documented above.    IV was inserted and blood was drawn for laboratory testing, results above.     The patient was sent for a CT chest PE and soft tissue neck CT while in the emergency department, results above.       1455 Patient rechecked and updated.       Findings and plan explained to the Patient. Patient discharged home with instructions regarding supportive care, medications, and reasons to return. The importance of close follow-up was reviewed. The patient was prescribed doxycycline hyclate.       Impression & Plan       Medical Decision Making:  Melody Muñoz is a 44 year old female who presents to the emergency department today for evaluation of multiple complaints.    Patient is concerned about subacute shortness of breath and tightness in her throat.  She describes it as  episodic.  She reports having seen ENT for this and sinus issues and she feels that this specific complaint was not adequately addressed.  She came to the ER 3 days ago for further evaluation of this, at which point thorough evaluation was performed. Patient is also concerned about sensation of lumps versus masses over her parasternal anterior chest wall going up to the neck.  I discussed my reassuring exam as well as reassuring labs from 3 days ago including negative d-dimer.  Patient unfortunately was quite worried about sinister process in the deep spaces of the neck versus chest.  She was adamant about obtaining CT imaging of the neck and chest.  Given ongoing concerns as well as theoretical possibility of PE in the setting of negative d-dimer (<2%), CT PE study was ordered.  Additionally given concerning symptoms with respect to throat tightness and masses, CT of the neck was obtained to evaluate for other occult deep space process.  The studies revealed no acute findings in the neck and no evidence of PE.  There was some nonspecific pulmonary opacities and given possible cough recently, will treat with doxycycline for possible pneumonia.  EKG and troponin are not suggestive of ACS, nor his history.  Other labs are reassuring.  Plan for follow-up in the primary care setting.  Close return precautions were discussed prior to discharge.      Discharge Diagnosis:    ICD-10-CM    1. Opacity of lung on imaging study R91.8    2. Shortness of breath R06.02    3. Throat tightness R68.89        Disposition:  Discharged to home.    Discharge Medications:  New Prescriptions    DOXYCYCLINE HYCLATE 100 MG PO CAPSULE    Take 1 capsule (100 mg) by mouth 2 times daily for 7 days       Scribe Disclosure:  I, Sivakumar Thompson, am serving as a scribe at 1:00 PM on 2/18/2020 to document services personally performed by Vasiliy Gonzales MD based on my observations and the provider's statements to me.      2/18/2020   Vasiliy Gonzales MD        Vasiliy Gonzales MD  02/18/20 1549

## 2020-02-19 NOTE — TELEPHONE ENCOUNTER
Patient would like her provider to know she went the hospital because she could not breathe due to her asthma. She states she have pneumonia.

## 2020-02-19 NOTE — TELEPHONE ENCOUNTER
Patient Contact    Attempt # 1    Was call answered?  No.  Left message on voicemail with information to call triage back.    On call back: relay provider message.

## 2020-02-20 RX ORDER — VENLAFAXINE HYDROCHLORIDE 75 MG/1
75 CAPSULE, EXTENDED RELEASE ORAL DAILY
Qty: 90 CAPSULE | Refills: 1 | OUTPATIENT
Start: 2020-02-20

## 2020-02-20 RX ORDER — ALBUTEROL SULFATE 0.83 MG/ML
2.5 SOLUTION RESPIRATORY (INHALATION) EVERY 6 HOURS PRN
Qty: 25 VIAL | Refills: 0 | Status: SHIPPED | OUTPATIENT
Start: 2020-02-20 | End: 2020-07-14

## 2020-02-20 NOTE — TELEPHONE ENCOUNTER
"Requested Prescriptions   Pending Prescriptions Disp Refills     venlafaxine 75 MG PO 24 hr capsule  Last Written Prescription Date:  1/29/2020  Last Fill Quantity: 90 capsule,  # refills: 1   Last office visit: 2/3/2020 with prescribing provider:  Jeannette   Future Office Visit:   Next 5 appointments (look out 90 days)    Feb 24, 2020  9:00 AM CST  Office Visit with Evaristo [facility]ado MD  [facility] [address] [place] [id number]  [phone]   Apr 23, 2020  8:30 AM CDT  (Arrive by 8:15 AM)  Return Visit with KIRAN Carballo CNP  [facility]) [address] [facility] [address]  [phone]          90 capsule 1     Sig: Take 1 capsule (75 mg) by mouth daily       Serotonin-Norepinephrine Reuptake Inhibitors  Passed - 2/20/2020 12:17 PM        Passed - Blood pressure under 140/90 in past 12 months     BP Readings from Last 3 Encounters:   02/18/20 122/82   02/13/20 116/81   02/03/20 134/86                 Passed - Recent (12 mo) or future (30 days) visit within the authorizing provider's specialty     Patient has had an office visit with the authorizing provider or a provider within the authorizing providers department within the previous 12 mos or has a future within next 30 days. See \"Patient Info\" tab in inbasket, or \"Choose Columns\" in Meds & Orders section of the refill encounter.              Passed - Medication is active on med list        Passed - Patient is age 18 or older        Passed - No active pregnancy on record        Passed - Normal serum creatinine on file in past 12 months     Recent Labs   Lab Test 02/18/20  1329   CR 0.79             Passed - No positive pregnancy test in past 12 months           "

## 2020-02-20 NOTE — TELEPHONE ENCOUNTER
Patient is requesting Effexor refill. States she only has 4 pills. Per chart review, Rx is on patients medication list 01/29/20 but is listed as no print.

## 2020-02-20 NOTE — TELEPHONE ENCOUNTER
Patient was informed PCP reviewed ED notes. She has scheduled future hospital follow up visit 02/24. Pt is requesting refill for albuterol neb solution. States she has intermittent wheezing while laying down when she is not coughing. Denies chest pain, fever or severe cough. She agreed to seek care before appt if worsening symptoms before appt. Please advice on Rx approval.     Routing refill request to provider for review/approval because:  A break in medication

## 2020-02-24 ENCOUNTER — HOSPITAL ENCOUNTER (OUTPATIENT)
Dept: ULTRASOUND IMAGING | Facility: CLINIC | Age: 45
End: 2020-02-24
Attending: REGISTERED NURSE
Payer: COMMERCIAL

## 2020-02-24 ENCOUNTER — HOSPITAL ENCOUNTER (OUTPATIENT)
Dept: MAMMOGRAPHY | Facility: CLINIC | Age: 45
Discharge: HOME | End: 2020-02-24
Attending: REGISTERED NURSE | Admitting: REGISTERED NURSE
Payer: COMMERCIAL

## 2020-02-24 DIAGNOSIS — N63.0 LUMP IN FEMALE BREAST: ICD-10-CM

## 2020-02-24 DIAGNOSIS — N64.4 BREAST PAIN: ICD-10-CM

## 2020-02-24 PROCEDURE — G0279 TOMOSYNTHESIS, MAMMO: HCPCS

## 2020-02-24 PROCEDURE — 76642 ULTRASOUND BREAST LIMITED: CPT | Mod: 50

## 2020-02-25 ENCOUNTER — PRE VISIT (OUTPATIENT)
Dept: OTOLARYNGOLOGY | Facility: CLINIC | Age: 45
End: 2020-02-25

## 2020-02-26 ENCOUNTER — NURSE TRIAGE (OUTPATIENT)
Dept: FAMILY MEDICINE | Facility: CLINIC | Age: 45
End: 2020-02-26

## 2020-02-26 ENCOUNTER — OFFICE VISIT (OUTPATIENT)
Dept: FAMILY MEDICINE | Facility: CLINIC | Age: 45
End: 2020-02-26
Payer: COMMERCIAL

## 2020-02-26 VITALS
TEMPERATURE: 98.4 F | OXYGEN SATURATION: 97 % | WEIGHT: 156 LBS | DIASTOLIC BLOOD PRESSURE: 74 MMHG | RESPIRATION RATE: 14 BRPM | HEART RATE: 89 BPM | SYSTOLIC BLOOD PRESSURE: 106 MMHG | BODY MASS INDEX: 28.31 KG/M2

## 2020-02-26 DIAGNOSIS — F31.9 BIPOLAR 1 DISORDER (H): Primary | Chronic | ICD-10-CM

## 2020-02-26 DIAGNOSIS — K58.2 IRRITABLE BOWEL SYNDROME WITH BOTH CONSTIPATION AND DIARRHEA: ICD-10-CM

## 2020-02-26 DIAGNOSIS — F34.1 PERSISTENT DEPRESSIVE DISORDER: ICD-10-CM

## 2020-02-26 DIAGNOSIS — G89.4 CHRONIC PAIN SYNDROME: ICD-10-CM

## 2020-02-26 DIAGNOSIS — R10.31 ABDOMINAL PAIN, RIGHT LOWER QUADRANT: Primary | ICD-10-CM

## 2020-02-26 DIAGNOSIS — F41.9 ANXIETY: ICD-10-CM

## 2020-02-26 LAB
ALBUMIN UR-MCNC: NEGATIVE MG/DL
APPEARANCE UR: CLEAR
BASOPHILS # BLD AUTO: 0.1 10E9/L (ref 0–0.2)
BASOPHILS NFR BLD AUTO: 0.6 %
BILIRUB UR QL STRIP: NEGATIVE
COLOR UR AUTO: YELLOW
DIFFERENTIAL METHOD BLD: ABNORMAL
EOSINOPHIL # BLD AUTO: 0.8 10E9/L (ref 0–0.7)
EOSINOPHIL NFR BLD AUTO: 8.8 %
ERYTHROCYTE [DISTWIDTH] IN BLOOD BY AUTOMATED COUNT: 12.8 % (ref 10–15)
GLUCOSE UR STRIP-MCNC: NEGATIVE MG/DL
HCT VFR BLD AUTO: 45.2 % (ref 35–47)
HGB BLD-MCNC: 14.6 G/DL (ref 11.7–15.7)
HGB UR QL STRIP: ABNORMAL
KETONES UR STRIP-MCNC: NEGATIVE MG/DL
LEUKOCYTE ESTERASE UR QL STRIP: NEGATIVE
LYMPHOCYTES # BLD AUTO: 4.2 10E9/L (ref 0.8–5.3)
LYMPHOCYTES NFR BLD AUTO: 46.2 %
MCH RBC QN AUTO: 27.5 PG (ref 26.5–33)
MCHC RBC AUTO-ENTMCNC: 32.3 G/DL (ref 31.5–36.5)
MCV RBC AUTO: 85 FL (ref 78–100)
MONOCYTES # BLD AUTO: 0.6 10E9/L (ref 0–1.3)
MONOCYTES NFR BLD AUTO: 6.1 %
NEUTROPHILS # BLD AUTO: 3.4 10E9/L (ref 1.6–8.3)
NEUTROPHILS NFR BLD AUTO: 38.3 %
NITRATE UR QL: NEGATIVE
NON-SQ EPI CELLS #/AREA URNS LPF: ABNORMAL /LPF
PH UR STRIP: 6 PH (ref 5–7)
PLATELET # BLD AUTO: 337 10E9/L (ref 150–450)
RBC # BLD AUTO: 5.3 10E12/L (ref 3.8–5.2)
RBC #/AREA URNS AUTO: ABNORMAL /HPF
SOURCE: ABNORMAL
SP GR UR STRIP: 1.01 (ref 1–1.03)
UROBILINOGEN UR STRIP-ACNC: 0.2 EU/DL (ref 0.2–1)
WBC # BLD AUTO: 9 10E9/L (ref 4–11)
WBC #/AREA URNS AUTO: ABNORMAL /HPF

## 2020-02-26 PROCEDURE — 99214 OFFICE O/P EST MOD 30 MIN: CPT | Performed by: FAMILY MEDICINE

## 2020-02-26 PROCEDURE — 85025 COMPLETE CBC W/AUTO DIFF WBC: CPT | Performed by: FAMILY MEDICINE

## 2020-02-26 PROCEDURE — 81001 URINALYSIS AUTO W/SCOPE: CPT | Performed by: FAMILY MEDICINE

## 2020-02-26 PROCEDURE — 36415 COLL VENOUS BLD VENIPUNCTURE: CPT | Performed by: FAMILY MEDICINE

## 2020-02-26 RX ORDER — OXYCODONE AND ACETAMINOPHEN 5; 325 MG/1; MG/1
1-2 TABLET ORAL EVERY 4 HOURS PRN
Qty: 10 TABLET | Refills: 0 | Status: SHIPPED | OUTPATIENT
Start: 2020-02-26 | End: 2020-02-29

## 2020-02-26 RX ORDER — VENLAFAXINE HYDROCHLORIDE 75 MG/1
75 CAPSULE, EXTENDED RELEASE ORAL DAILY
Qty: 90 CAPSULE | Refills: 1 | Status: SHIPPED | OUTPATIENT
Start: 2020-02-26 | End: 2020-08-26

## 2020-02-26 NOTE — TELEPHONE ENCOUNTER
Reason for Call:  Other call back    Detailed comments: The patient called and stated that she is experiencing right sided and mid horizontal abdominal pain and low back pain.  She used a laxative and nothing has come out.  On the directions of the laxative it states to contact your provider if no results occur after using the product. She wants a nurse to call her and give her advice on what she should do because she is concerned for a right sided GI constipation.     Phone Number Patient can be reached at: Home number on file [phone] (home)    Best Time: Any    Can we leave a detailed message on this number? YES    Call taken on 2/26/2020 at 9:17 AM by Melody Arboleda

## 2020-02-26 NOTE — TELEPHONE ENCOUNTER
"Requested Prescriptions   Pending Prescriptions Disp Refills     venlafaxine (EFFEXOR-XR) 75 MG 24 hr capsule 90 capsule 1     Sig: Take 1 capsule (75 mg) by mouth daily   Last Written Prescription Date:  1/29/2020  Last Fill Quantity: 90,  # refills: 1   Last office visit: No previous visit found with prescribing provider:  2/26/2020 Jeannette   Future Office Visit:   Next 5 appointments (look out 90 days)    Apr 23, 2020  8:30 AM CDT  (Arrive by 8:15 AM)  [facility] [address]  [facility] [id number] [phone]             There is no refill protocol information for this order        Patient was seen in clinic today and approached writer stating her venlafaxine was not sent in.  Patient then asked the quantity of pain medications the provider sent in today.  Advised patient provider sent in 10 tablets of oxycodone and patient stated, \"can he send in more?  That's not enough and another provider won't send any in without seeing me and I can't wait 2 days for Dr. Machado to get back\".  Advised patient a message would be sent to the provider and patient seeked out provider's MA.    Routing to provider for review.    INDIRA HerediaN, RN  [employer] [facility]    "

## 2020-02-26 NOTE — PATIENT INSTRUCTIONS
I think the patient's symptoms are mostly compatible with her irritable bowel syndrome.  I recommended increasing her fiber supplement by 1 tablespoon every week until her stools are soft, no straining and regular.  I did give her 10 tablets of Percocet to take primarily at night for the discomfort so she can get some sleep.  We had a discussion about whether or not she should continue with her search to go to [facility] for a second opinion.  She is contemplating doing that.  I am also pretty convinced that a large part of this has to do with all of her mental health issues.

## 2020-02-26 NOTE — PROGRESS NOTES
Subjective     Melody Muñoz is a 44 year old female who presents to clinic today for the following health issues:    HPI   Abdominal Pain      Duration: Sudden onset last night    Description (location/character/radiation): RLQ pain and radiating into back       Associated flank pain,     Intensity:  7/10    Accompanying signs and symptoms:        Fever/Chills: no        Gas/Bloating: YES--bloated sensation ongoing       Nausea/vomitting: YES--slight nausea, used Zofran and Tylenol       Diarrhea: no        Dysuria or Hematuria: no     History (previous similar pain/trauma/previous testing): Yes    Precipitating or alleviating factors:       Pain worse with eating/BM/urination: no       Pain relieved by BM: no     Therapies tried and outcome: Used Suppository with no results.    LMP:  not applicable    ED/UC Followup:    Facility: ED  Date of visit: 2/18  Reason for visit: Weakness and cough  Current Status: Improved but still having a little tiny bit of a cough.         Patient Active Problem List   Diagnosis     Endometriosis s/po EDWIGE 10-17     Mantoux: positive     Latent tuberculosis     Major depression     Generalized anxiety disorder     Constipation     Nightmares     Knee pain     Bipolar 1 disorder  with psychosis     Chronic fatigue     Female stress incontinence     Suicidal ideation     Acne     Chronic pain syndrome     Insomnia     Chronic migraine without aura without status migrainosus, not intractable     Nausea     JASWANT (obstructive sleep apnea)     Elevated liver enzymes     TMJ (temporomandibular joint syndrome)     Hypothyroidism due to acquired atrophy of thyroid     Hostile behavior     Mild persistent asthma without complication     Uncontrolled type 2 diabetes mellitus with hyperglycemia (H)     Allergic rhinitis     Incontinence of urine in female     Migraine     Screening for diabetic peripheral neuropathy     Need for prophylactic vaccination against Streptococcus pneumoniae  (pneumococcus)     Sepsis (H)     Abdominal pain     Morbid obesity (H)     Former tobacco use     Mixed simple and mucopurulent chronic bronchitis (H): Spirometry 16 lung age = 63y/o in 41 y/o FVC=90%&FEV1=78%     Class 2 obesity due to excess calories with serious comorbidity and body mass index (BMI) of 36.0 to 36.9 in adult     Mixed hyperlipidemia     Acquired hypothyroidism     Dysuria     Other chronic back pain     Headache disorder     Persistent depressive disorder     Irritable bowel syndrome with both constipation and diarrhea     Anxiety     Recurrent sinus infections     Past Surgical History:   Procedure Laterality Date     BIOPSY       COLONOSCOPY       GENITOURINARY SURGERY  May/2014    bladder sling     GYN SURGERY      laparoscopy- endometriosis     GYN SURGERY       for twins     LAPAROSCOPIC APPENDECTOMY N/A 2017    Procedure: LAPAROSCOPIC APPENDECTOMY;  LAPAROSCOPIC APPENDECTOMY and resection of epiploic tag;  Surgeon: Roberto Robins MD;  Location: [facility] OR     little finger surgery left  1980     tubal ligation bilateral       wisdom teeth removal         Social History     Tobacco Use     Smoking status: Former Smoker     Packs/day: 0.00     Years: 17.00     Pack years: 0.00     Types: Cigarettes     Last attempt to quit: 3/18/2019     Years since quittin.9     Smokeless tobacco: Never Used   Substance Use Topics     Alcohol use: No     Comment: no etoh since      Family History   Problem Relation Age of Onset     Hypertension Mother      Heart Disease Father         palpitations     Depression Sister      Anxiety Disorder Sister      Heart Disease Maternal Grandmother         CHF     Cancer Maternal Grandfather 72        Pancreatic Cancer     Alzheimer Disease Paternal Grandfather      Bipolar Disorder Other      Autism Spectrum Disorder Other              Reviewed and updated as needed this visit by Provider         Review of Systems   ROS COMP:  Constitutional, HEENT, cardiovascular, pulmonary, gi and gu systems are negative, except as otherwise noted.      Objective    /74 (Patient Position: Sitting, Cuff Size: Adult Regular)   Pulse 89   Temp 98.4  F (36.9  C) (Tympanic)   Resp 14   Wt 70.8 kg (156 lb)   LMP  (LMP Unknown)   SpO2 97%   Breastfeeding No   BMI 28.31 kg/m    Body mass index is 28.31 kg/m .  Physical Exam   GENERAL APPEARANCE: healthy, alert, no distress, crying and over weight  RESP: lungs clear to auscultation - no rales, rhonchi or wheezes  ABDOMEN: soft, nontender, without hepatosplenomegaly or masses, bowel sounds normal and she did have discomfort to palpation on the right side of her abdomen.  There is no rebound or guarding.  I did not appreciate any masses.   (female): The patient is convinced that she has some lumps in both groin folds.  However on palpation I did not feel any lymphadenopathy and no other masses.  On bimanual exam her ovaries felt normal by my exam and they were nontender.    Diagnostic Test Results:  Results for orders placed or performed in visit on 02/26/20 (from the past 24 hour(s))   UA with Microscopic   Result Value Ref Range    Color Urine Yellow     Appearance Urine Clear     Glucose Urine Negative NEG^Negative mg/dL    Bilirubin Urine Negative NEG^Negative    Ketones Urine Negative NEG^Negative mg/dL    Specific Gravity Urine 1.015 1.003 - 1.035    pH Urine 6.0 5.0 - 7.0 pH    Protein Albumin Urine Negative NEG^Negative mg/dL    Urobilinogen Urine 0.2 0.2 - 1.0 EU/dL    Nitrite Urine Negative NEG^Negative    Blood Urine Small (A) NEG^Negative    Leukocyte Esterase Urine Negative NEG^Negative    Source Midstream Urine     WBC Urine 0 - 5 OTO5^0 - 5 /HPF    RBC Urine O - 2 OTO2^O - 2 /HPF    Squamous Epithelial /LPF Urine Few FEW^Few /LPF   CBC with platelets differential   Result Value Ref Range    WBC 9.0 4.0 - 11.0 10e9/L    RBC Count 5.30 (H) 3.8 - 5.2 10e12/L    Hemoglobin 14.6 11.7 -  15.7 g/dL    Hematocrit 45.2 35.0 - 47.0 %    MCV 85 78 - 100 fl    MCH 27.5 26.5 - 33.0 pg    MCHC 32.3 31.5 - 36.5 g/dL    RDW 12.8 10.0 - 15.0 %    Platelet Count 337 150 - 450 10e9/L    Diff Method Automated Method     % Neutrophils 38.3 %    % Lymphocytes 46.2 %    % Monocytes 6.1 %    % Eosinophils 8.8 %    % Basophils 0.6 %    Absolute Neutrophil 3.4 1.6 - 8.3 10e9/L    Absolute Lymphocytes 4.2 0.8 - 5.3 10e9/L    Absolute Monocytes 0.6 0.0 - 1.3 10e9/L    Absolute Eosinophils 0.8 (H) 0.0 - 0.7 10e9/L    Absolute Basophils 0.1 0.0 - 0.2 10e9/L     *Note: Due to a large number of results and/or encounters for the requested time period, some results have not been displayed. A complete set of results can be found in Results Review.           Assessment & Plan       ICD-10-CM    1. Abdominal pain, right lower quadrant R10.31 UA with Microscopic     CBC with platelets differential   2. Irritable bowel syndrome with both constipation and diarrhea K58.2    3. Anxiety F41.9    4. Persistent depressive disorder F34.1           Patient Instructions   I think the patient's symptoms are mostly compatible with her irritable bowel syndrome.  I recommended increasing her fiber supplement by 1 tablespoon every week until her stools are soft, no straining and regular.  I did give her 10 tablets of Percocet to take primarily at night for the discomfort so she can get some sleep.  We had a discussion about whether or not she should continue with her search to go to [facility] for a second opinion.  She is contemplating doing that.  I am also pretty convinced that a large part of this has to do with all of her mental health issues.      Return in about 1 week (around 3/4/2020) for If symptoms persist.    Evaristo Machado MD  [facility]

## 2020-02-26 NOTE — TELEPHONE ENCOUNTER
Patient called back. She has intermittent 7/10 abdominal pain that started at midnight yesterday. Pain is in the mid upper right back and over lower abdomen at ovary level, also at upper right side. She had 2 BMs yesterday, one of them was soft and regular, lots of gas. Pain is much worse than last night. Pt advised to schedule an OV today. OV scheduled with PCP.

## 2020-02-27 ENCOUNTER — HOSPITAL ENCOUNTER (OUTPATIENT)
Dept: ULTRASOUND IMAGING | Facility: CLINIC | Age: 45
Discharge: HOME | End: 2020-02-27
Attending: OBSTETRICS & GYNECOLOGY | Admitting: OBSTETRICS & GYNECOLOGY
Payer: COMMERCIAL

## 2020-02-27 ENCOUNTER — HOSPITAL ENCOUNTER (EMERGENCY)
Facility: CLINIC | Age: 45
Discharge: LEFT BEFORE DISCHARGE | End: 2020-02-27
Payer: COMMERCIAL

## 2020-02-27 VITALS
DIASTOLIC BLOOD PRESSURE: 79 MMHG | RESPIRATION RATE: 18 BRPM | TEMPERATURE: 98.2 F | BODY MASS INDEX: 27.97 KG/M2 | OXYGEN SATURATION: 98 % | HEIGHT: 62 IN | WEIGHT: 152 LBS | SYSTOLIC BLOOD PRESSURE: 107 MMHG

## 2020-02-27 DIAGNOSIS — R59.9 ENLARGED LYMPH NODE: ICD-10-CM

## 2020-02-27 DIAGNOSIS — G89.4 CHRONIC PAIN SYNDROME: ICD-10-CM

## 2020-02-27 PROCEDURE — 76882 US LMTD JT/FCL EVL NVASC XTR: CPT | Mod: RT

## 2020-02-27 RX ORDER — OXYCODONE AND ACETAMINOPHEN 5; 325 MG/1; MG/1
1-2 TABLET ORAL EVERY 4 HOURS PRN
Qty: 10 TABLET | Refills: 0 | OUTPATIENT
Start: 2020-02-27

## 2020-02-27 ASSESSMENT — MIFFLIN-ST. JEOR: SCORE: 1297.72

## 2020-02-27 NOTE — TELEPHONE ENCOUNTER
Controlled Substance Refill Request for oxyCODONE-acetaminophen (PERCOCET) 5-325 MG tablet    Problem List Complete:  Yes    Patient is followed by VIRAL Deleon for ongoing prescription of pain medication.  All refills should be approved by this provider, or covering partner.    Medication(s):  Lyrica 150 mg bid, Fioricet with codeine prn, klonopin and ambien to be prescribed by [facility], not [facility].  Maximum quantity per month: 60, 20  Clinic visit frequency required: Q 3 months     Controlled substance agreement on file: Yes       Date(s): 9/8/2015  New CSA needed.  Pain Clinic evaluation in the past: No    DIRE Total Score(s):  No flowsheet data found.    Last [facility] website verification: 2/11/2020 multiple meds from multiple outside providers    Clonazepam and dextroamphetamine prescribed by outside providers.    Last Written Prescription Date:  02/26/2020  Last Fill Quantity: 10 tablet,  # refills: 0   Last office visit: 2/26/2020 with prescribing provider:  Jeannette   Future Office Visit:     Next 5 appointments (look out 90 days)    Apr 23, 2020  8:30 AM CDT  (Arrive by 8:15 AM)  Return Visit with KIRAN Carballo [facility] ([facility]) [address]  [facility] [address] [phone]           Controlled substance agreement:   Encounter-Level CSA - 09/08/2015:    Controlled Substance Agreement - Scan on 9/9/2015  2:41 PM: [facility], Controlled Substance Contract, 09-08-15     Patient-Level CSA:    There are no patient-level csa.         Last Urine Drug Screen: No results found for: CDAUT, No results found for: COMDAT, No results found for: THC13, PCP13, COC13, MAMP13, OPI13, AMP13, BZO13, TCA13, MTD13, BAR13, OXY13, PPX13, BUP13     Processing:  Rx to be electronically transmitted to pharmacy by provider     [email or web address]   checked in past 3 months?  Yes 02/11/2020

## 2020-02-27 NOTE — ED TRIAGE NOTES
Pt here with c/o RLQ pain for past 2 days. Saw PCP yesterday. Had US today showing enlarged lymph node. ABC intact. No n/v/d or dysuria.

## 2020-02-27 NOTE — TELEPHONE ENCOUNTER
Routing refill request to provider for review/approval because:  Drug not on the [facility] refill protocol     Pt is currently in ED? Did not mention anything about this when she called.

## 2020-02-27 NOTE — TELEPHONE ENCOUNTER
"Pt calling to triage.    -States she saw Dr. ALBERTS yesterday. States she was diagnosed with IBS and does not feel that it was IBS.  -Went to see OB and it was NOT ovarian pain.  -Pt states she is making appt with GI.  -Wants refill on oxy.    States she has 6 oxy left. \"I need enough to get through the weekend\".   "

## 2020-02-28 ENCOUNTER — TELEPHONE (OUTPATIENT)
Dept: FAMILY MEDICINE | Facility: CLINIC | Age: 45
End: 2020-02-28

## 2020-02-28 DIAGNOSIS — G89.4 CHRONIC PAIN SYNDROME: ICD-10-CM

## 2020-02-28 DIAGNOSIS — F31.9 BIPOLAR 1 DISORDER (H): Chronic | ICD-10-CM

## 2020-02-28 DIAGNOSIS — R11.0 NAUSEA: ICD-10-CM

## 2020-02-28 RX ORDER — ONDANSETRON 4 MG/1
TABLET, ORALLY DISINTEGRATING ORAL
Qty: 30 TABLET | Refills: 11 | Status: CANCELLED | OUTPATIENT
Start: 2020-02-28

## 2020-02-28 RX ORDER — VENLAFAXINE HYDROCHLORIDE 75 MG/1
CAPSULE, EXTENDED RELEASE ORAL
Qty: 16 CAPSULE | OUTPATIENT
Start: 2020-02-28

## 2020-02-28 NOTE — TELEPHONE ENCOUNTER
Reason for Call:  Medication or medication refill:    Do you use a [facility]?  Name of the pharmacy and phone number for the current request:     [id number] [place], [place] - [address] AT [place] [address] & [facility]    Name of the medication requested: ondansetron (ZOFRAN-ODT) 4 MG ODT tab      Other request: The patient called and stated that she needs this medication refilled. She stated that she is completely out of the medication and will need it filled as soon as possible.     Can we leave a detailed message on this number? YES    Phone number patient can be reached at: Cell number on file:    Telephone Information:   Mobile [phone]       Best Time: Any    Call taken on 2/28/2020 at 11:26 AM by Melody Espinoza

## 2020-02-28 NOTE — TELEPHONE ENCOUNTER
"Rx was just sent to pharmacy on 2/26. Called Ab and asked to transfer from [facility]. No further action needed.    Rx denied, duplicate.    Requested Prescriptions   Pending Prescriptions Disp Refills     venlafaxine (EFFEXOR-XR) 75 MG 24 hr capsule [Pharmacy Med Name: VENLAFAXINE 75MG ER CAP]  Last Written Prescription Date:  2/26/2020  Last Fill Quantity: 90,  # refills: 1   Last office visit: 2/26/2020 with prescribing provider:  Daniel   Future Office Visit:   Next 5 appointments (look out 90 days)    Apr 23, 2020  8:30 AM CDT  (Arrive by 8:15 AM)  Return Visit with KIRAN Carballo CNP  [facility] [address]  [facility] [address] [phone]          16 capsule      Sig: TAKE 1 CAPSULE BY MOUTH DAILY WITH FOOD       Serotonin-Norepinephrine Reuptake Inhibitors  Passed - 2/28/2020  3:20 PM        Passed - Blood pressure under 140/90 in past 12 months     BP Readings from Last 3 Encounters:   02/26/20 106/74   02/18/20 122/82   02/13/20 116/81                 Passed - Recent (12 mo) or future (30 days) visit within the authorizing provider's specialty     Patient has had an office visit with the authorizing provider or a provider within the authorizing providers department within the previous 12 mos or has a future within next 30 days. See \"Patient Info\" tab in inbasket, or \"Choose Columns\" in Meds & Orders section of the refill encounter.              Passed - Medication is active on med list        Passed - Patient is age 18 or older        Passed - No active pregnancy on record        Passed - Normal serum creatinine on file in past 12 months     Recent Labs   Lab Test 02/18/20  1329   CR 0.79             Passed - No positive pregnancy test in past 12 months          "

## 2020-02-28 NOTE — TELEPHONE ENCOUNTER
Oxycodone refill request.     This was pended for the team to review.     States she was not in the ED yesterday. She did see a triage nurse, but left before seeing an MD. She did not feel they were going to help her so she left.     States her provider told her to call the clinic later in the week if she still needs medication and she did not understand why he told he to call if he wont be here to fill the medication.     She did complain that even her PCP does not listen to her anymore. She does not know what to do. She feels that no one is listening to her. She knows there is something going on and no one will help her out.     She was updated that here PCP will be back in clinic tomorrow AM and will be able to review this request. She was upset this will not be completed today for her and hung up the phone.     Patient called right back and spoke with nurse again. States in the beginning of the conversation she does not want the Oxycodone request send to her provider and covering team because they will not help anyway. They don't care about her. She does not believe in the medial field. No one is willing to help her.     At the end of the conversation it was clarified because she said she does not want the medication sent to covering team. She said to go ahead and send the request to her provider and covering team for review. She was told her provider will be in tomorrow to review her request, otherwise the covering team will be viewing it today as well.

## 2020-02-28 NOTE — TELEPHONE ENCOUNTER
Pt calling in.     States that she went to OB, called to GI, and both of them referred her back to us.    Expresses frustration that she is getting bounced around.    Would like to know next steps of action for this GI/lower abdominal pain that don't necessarily involve [facility].

## 2020-02-28 NOTE — TELEPHONE ENCOUNTER
"Last OV 02/26/2020.     Requested Prescriptions   Pending Prescriptions Disp Refills     ondansetron (ZOFRAN-ODT) 4 MG ODT tab 30 tablet 11        Antivertigo/Antiemetic Agents Passed - 2/28/2020 11:27 AM        Passed - Recent (12 mo) or future (30 days) visit within the authorizing provider's specialty     Patient has had an office visit with the authorizing provider or a provider within the authorizing providers department within the previous 12 mos or has a future within next 30 days. See \"Patient Info\" tab in inbasket, or \"Choose Columns\" in Meds & Orders section of the refill encounter.              Passed - Medication is active on med list        Passed - Patient is 18 years of age or older        Writer called [facility] and verified pt has refills. Pt was advised to contact her pharmacy for Rx. She verbalized agreement with plan.    "

## 2020-02-29 RX ORDER — OXYCODONE AND ACETAMINOPHEN 5; 325 MG/1; MG/1
1 TABLET ORAL 3 TIMES DAILY PRN
Qty: 10 TABLET | Refills: 0 | Status: SHIPPED | OUTPATIENT
Start: 2020-02-29 | End: 2020-03-06

## 2020-03-02 NOTE — TELEPHONE ENCOUNTER
I do not actually know any periodontist's.  She may want to check with her regular dentist about who a good periodontist would be.

## 2020-03-02 NOTE — TELEPHONE ENCOUNTER
Pt was called with providers message. States that is the opposite the that GI told her. Notes she has to see Periodontist and would like referral from PCP. States she has serious issues in her mouth. ENT just looked at her mouth and referred her for a periodontist. She has white patches in her gums. Asked if PCP can advised her were she can be seen. She wants to go see someone she can trust. Please advice on request.

## 2020-03-04 ENCOUNTER — HOSPITAL ENCOUNTER (EMERGENCY)
Facility: CLINIC | Age: 45
Discharge: HOME | End: 2020-03-05
Attending: EMERGENCY MEDICINE | Admitting: EMERGENCY MEDICINE
Payer: COMMERCIAL

## 2020-03-04 ENCOUNTER — TELEPHONE (OUTPATIENT)
Dept: FAMILY MEDICINE | Facility: CLINIC | Age: 45
End: 2020-03-04

## 2020-03-04 VITALS
DIASTOLIC BLOOD PRESSURE: 68 MMHG | TEMPERATURE: 97.8 F | RESPIRATION RATE: 20 BRPM | SYSTOLIC BLOOD PRESSURE: 105 MMHG | OXYGEN SATURATION: 97 % | HEART RATE: 77 BPM

## 2020-03-04 DIAGNOSIS — R59.1 LYMPHADENOPATHY: ICD-10-CM

## 2020-03-04 DIAGNOSIS — R51.9 ACUTE NONINTRACTABLE HEADACHE, UNSPECIFIED HEADACHE TYPE: ICD-10-CM

## 2020-03-04 DIAGNOSIS — R20.2 PARESTHESIA: ICD-10-CM

## 2020-03-04 LAB
ANION GAP SERPL CALCULATED.3IONS-SCNC: 4 MMOL/L (ref 3–14)
BASOPHILS # BLD AUTO: 0.1 10E9/L (ref 0–0.2)
BASOPHILS NFR BLD AUTO: 0.5 %
BUN SERPL-MCNC: 19 MG/DL (ref 7–30)
CALCIUM SERPL-MCNC: 9.3 MG/DL (ref 8.5–10.1)
CHLORIDE SERPL-SCNC: 105 MMOL/L (ref 94–109)
CO2 SERPL-SCNC: 28 MMOL/L (ref 20–32)
CREAT SERPL-MCNC: 0.7 MG/DL (ref 0.52–1.04)
DIFFERENTIAL METHOD BLD: ABNORMAL
EOSINOPHIL # BLD AUTO: 0.7 10E9/L (ref 0–0.7)
EOSINOPHIL NFR BLD AUTO: 5.2 %
ERYTHROCYTE [DISTWIDTH] IN BLOOD BY AUTOMATED COUNT: 12.6 % (ref 10–15)
GFR SERPL CREATININE-BSD FRML MDRD: >90 ML/MIN/{1.73_M2}
GLUCOSE BLDC GLUCOMTR-MCNC: 95 MG/DL (ref 70–99)
GLUCOSE SERPL-MCNC: 98 MG/DL (ref 70–99)
HCT VFR BLD AUTO: 45.3 % (ref 35–47)
HGB BLD-MCNC: 14.8 G/DL (ref 11.7–15.7)
IMM GRANULOCYTES # BLD: 0.1 10E9/L (ref 0–0.4)
IMM GRANULOCYTES NFR BLD: 0.5 %
LYMPHOCYTES # BLD AUTO: 4.9 10E9/L (ref 0.8–5.3)
LYMPHOCYTES NFR BLD AUTO: 37.6 %
MCH RBC QN AUTO: 27.6 PG (ref 26.5–33)
MCHC RBC AUTO-ENTMCNC: 32.7 G/DL (ref 31.5–36.5)
MCV RBC AUTO: 85 FL (ref 78–100)
MONOCYTES # BLD AUTO: 0.9 10E9/L (ref 0–1.3)
MONOCYTES NFR BLD AUTO: 6.9 %
NEUTROPHILS # BLD AUTO: 6.4 10E9/L (ref 1.6–8.3)
NEUTROPHILS NFR BLD AUTO: 49.3 %
NRBC # BLD AUTO: 0 10*3/UL
NRBC BLD AUTO-RTO: 0 /100
PLATELET # BLD AUTO: 409 10E9/L (ref 150–450)
POTASSIUM SERPL-SCNC: 3.5 MMOL/L (ref 3.4–5.3)
RBC # BLD AUTO: 5.36 10E12/L (ref 3.8–5.2)
SODIUM SERPL-SCNC: 138 MMOL/L (ref 133–144)
WBC # BLD AUTO: 13 10E9/L (ref 4–11)

## 2020-03-04 PROCEDURE — 25000128 H RX IP 250 OP 636: Performed by: EMERGENCY MEDICINE

## 2020-03-04 PROCEDURE — 96375 TX/PRO/DX INJ NEW DRUG ADDON: CPT

## 2020-03-04 PROCEDURE — 00000146 ZZHCL STATISTIC GLUCOSE BY METER IP

## 2020-03-04 PROCEDURE — 93005 ELECTROCARDIOGRAM TRACING: CPT

## 2020-03-04 PROCEDURE — 99285 EMERGENCY DEPT VISIT HI MDM: CPT | Mod: 25

## 2020-03-04 PROCEDURE — 96374 THER/PROPH/DIAG INJ IV PUSH: CPT

## 2020-03-04 PROCEDURE — 80048 BASIC METABOLIC PNL TOTAL CA: CPT | Performed by: EMERGENCY MEDICINE

## 2020-03-04 PROCEDURE — 85025 COMPLETE CBC W/AUTO DIFF WBC: CPT | Performed by: EMERGENCY MEDICINE

## 2020-03-04 RX ORDER — METOCLOPRAMIDE HYDROCHLORIDE 5 MG/ML
10 INJECTION INTRAMUSCULAR; INTRAVENOUS ONCE
Status: COMPLETED | OUTPATIENT
Start: 2020-03-04 | End: 2020-03-04

## 2020-03-04 RX ORDER — KETOROLAC TROMETHAMINE 15 MG/ML
15 INJECTION, SOLUTION INTRAMUSCULAR; INTRAVENOUS ONCE
Status: COMPLETED | OUTPATIENT
Start: 2020-03-04 | End: 2020-03-04

## 2020-03-04 RX ORDER — DIPHENHYDRAMINE HYDROCHLORIDE 50 MG/ML
25 INJECTION INTRAMUSCULAR; INTRAVENOUS ONCE
Status: COMPLETED | OUTPATIENT
Start: 2020-03-04 | End: 2020-03-04

## 2020-03-04 RX ORDER — DEXAMETHASONE SODIUM PHOSPHATE 10 MG/ML
10 INJECTION, SOLUTION INTRAMUSCULAR; INTRAVENOUS ONCE
Status: COMPLETED | OUTPATIENT
Start: 2020-03-04 | End: 2020-03-04

## 2020-03-04 RX ADMIN — DIPHENHYDRAMINE HYDROCHLORIDE 25 MG: 50 INJECTION, SOLUTION INTRAMUSCULAR; INTRAVENOUS at 23:01

## 2020-03-04 RX ADMIN — DEXAMETHASONE SODIUM PHOSPHATE 10 MG: 10 INJECTION, SOLUTION INTRAMUSCULAR; INTRAVENOUS at 23:01

## 2020-03-04 RX ADMIN — METOCLOPRAMIDE HYDROCHLORIDE 10 MG: 5 INJECTION INTRAMUSCULAR; INTRAVENOUS at 23:01

## 2020-03-04 RX ADMIN — KETOROLAC TROMETHAMINE 15 MG: 15 INJECTION, SOLUTION INTRAMUSCULAR; INTRAVENOUS at 23:01

## 2020-03-04 NOTE — TELEPHONE ENCOUNTER
Pt has pain in multiple areas:groin, buttocks, neck and spine. Also complains in tender area of her belly. Reports she seen at [facility] and she was informed she had swollen lymph nodes. Pt's wants to know what to do. Should she have blood work ? OV was scheduled to discuss concerns. Advised pt seek care before if worsening symptoms. She verbalized agreement with plan.

## 2020-03-04 NOTE — TELEPHONE ENCOUNTER
Reason for Call:  Other call back    Detailed comments: The patient called and stated that she met with a GI doctor today. She stated that it was found that she has swollen lymph nodes all over her body but they were unable to tell her why this may be. She stated that these swollen lymph nodes are the cause of a lot of her pain. She is wondering what she should do next as she thinks there is possibly some sort of infection and that is why they are swollen. She would like a call back to discuss this.     Phone Number Patient can be reached at: Cell number on file:    Telephone Information:   Mobile [phone]       Best Time: Any    Can we leave a detailed message on this number? YES    Call taken on 3/4/2020 at 1:39 PM by Melody Espinoza

## 2020-03-04 NOTE — ED AVS SNAPSHOT
[facility] Emergency Department  [address]  [facility] [address]  Phone:  [phone]  Fax:  [phone]                                    Melody Muñoz   MRN: [id number]    Department:  [facility] Emergency Department   Date of Visit:  3/4/2020           After Visit Summary Signature Page    I have received my discharge instructions, and my questions have been answered. I have discussed any challenges I see with this plan with the nurse or doctor.    ..........................................................................................................................................  Patient/Patient Representative Signature      ..........................................................................................................................................  Patient Representative Print Name and Relationship to Patient    ..................................................               ................................................  Date                                   Time    ..........................................................................................................................................  Reviewed by Signature/Title    ...................................................              ..............................................  Date                                               Time          22EPIC Rev 08/18

## 2020-03-05 ENCOUNTER — APPOINTMENT (OUTPATIENT)
Dept: MRI IMAGING | Facility: CLINIC | Age: 45
End: 2020-03-05
Attending: EMERGENCY MEDICINE
Payer: COMMERCIAL

## 2020-03-05 LAB — INTERPRETATION ECG - MUSE: NORMAL

## 2020-03-05 PROCEDURE — 70553 MRI BRAIN STEM W/O & W/DYE: CPT

## 2020-03-05 PROCEDURE — 25500064 ZZH RX 255 OP 636: Performed by: EMERGENCY MEDICINE

## 2020-03-05 PROCEDURE — A9585 GADOBUTROL INJECTION: HCPCS | Performed by: EMERGENCY MEDICINE

## 2020-03-05 RX ORDER — GADOBUTROL 604.72 MG/ML
7.5 INJECTION INTRAVENOUS ONCE
Status: COMPLETED | OUTPATIENT
Start: 2020-03-05 | End: 2020-03-05

## 2020-03-05 RX ADMIN — GADOBUTROL 7 ML: 604.72 INJECTION INTRAVENOUS at 00:25

## 2020-03-05 NOTE — ED PROVIDER NOTES
History     Chief Complaint:  Facial Numbness                 Melody Muñoz is a 44 year old female who presents to the emergency department for evaluation of facial numbness.    Patient has multiple complaints.  Has recently been followed by PCP and specialist for multiple body areas of lymphadenopathy including the groin chest and neck.  Has not had a biopsy.  Has not been told at least that she can relate to us specifically what the concerns are.  Presenting today because of concerns of left facial numbness headache and worsening body wide pain both upper extremities and both lower extremities.  The facial numbness is primarily left-sided but does cross the midline into the right lips as well.  No falls or trauma or fever.  No current chest pain or shortness of breath.  No history of a seizure disorder.      Allergies:  Hydrocodone  Tamiflu  Tylenol w/ Codeine    Medications:    Axert  Fioricet  Soma  Klonopin  Dexedrine spansule  Dextrostat  Trulicity  Lamictal  Flonase  Tenex  Lamictal  Latuda  Levothyroxine  Zofran  Percocet  Protonix  Lyrica  Topamax  Desyrel  Effexor    Past Medical History:    Endometriosis  Mantoux positive  Latent tuberculosis  Depression  ROWDY  Constipation  Nightmares  Knee pain  Bipolar 1 disorder  Chronic fatigue  Female stress incontinence  Suicidal ideation  Acne  Chronic pain syndrome  Insomnia  Chronic migraine  Nausea  JASWANT  Elevated liver enzymes  TMJ  Hypothyroidism  Hostile behavior  Asthma  Diabetes mellitus type 2  Allergic rhinitis  Incontinence of urine in female  Diabetic peripheral neuropathy  Sepsis  Abdominal pain  Morbid obesity  Mixed simple and mucopurulent chronic bronchitis  Hyperlipidemia    Hypothyroidism  Dysuria  Chronic back pain  Headache disorder  Depression  IBS  Anxiety  Recurrent sinus infections  Abnormal pap  Endometriosis  Latent tuberculosis  Substance abuse  Suicidal ideation  Vertigo    Past Surgical History:    Biopsy  Colonoscopy  Bladder  sling  Laparoscopy - endometriosis    Laparoscopic appendectomy  Little finger surgery left  Tubal ligation bilateral  [place] teeth removal    Family History:    Hypertension  Palpitations  Depression  Anxiety    Social History:    Former smoker  Negative for alcohol use.  Negative for drug use.  Marital Status:      Review of Systems   ROS: 10 point ROS neg other than the symptoms noted above in the HPI.      Physical Exam     Patient Vitals for the past 24 hrs:   BP Temp Temp src Pulse Resp SpO2   20 2236 -- 97.8  F (36.6  C) Oral -- -- --   20 2235 139/84 -- -- 91 20 99 %     Physical Exam    HEENT:  PERRL, measuring 4mm bilaterally, EOMI, visual acuity and fields intact, conjunctiva and lids normal, external ears unremarkable, Nares clear bilaterally, mmm, oropharynx without tonsillar hypertrophy/erythema/exudate    Neck: supple, painless ROM, mild anterior bilateral adenopathy    Lungs:  CTAB,  no resp distress    CV: rrr, no m/r/g, ppi    Abd: soft, nontender, nondistended, no rebound/masses/guarding/hsm    Ext: no peripheral edema    Skin: warm, dry, well perused, no rashes/bruising/lesions on exposed skin    Neuro:   alert, follows commands, speech clear, CN 2-12 intact,   strength 5/5 and symmetric in BUE intrinsic hand muscles as well as BLE  SILT in all 4 ext  Negative Pronator drift  cerebellar testing unremarkable      Psych: Normal mood, normal affect        Emergency Department Course     ECG:  Time: 2307  Vent. Rate 81 bpm. HI interval 174. QRS duration 106. QT/QTc 406/471. P-R-T axis 60 -6 56.  Normal sinus rhythm  Normal ECG  Read time: 2309    Imaging:  Radiology findings were communicated with the patient who voiced understanding of the findings.      As per radiology.    MR Brain w/o & w Contrast   Final Result   IMPRESSION:   1.  No evidence of an acute intracranial abnormality.                Laboratory:  Laboratory findings were communicated with the patient who  voiced understanding of the findings.  Labs Ordered and Resulted from Time of ED Arrival Up to the Time of Departure from the ED   CBC WITH PLATELETS DIFFERENTIAL - Abnormal; Notable for the following components:       Result Value    WBC 13.0 (*)     RBC Count 5.36 (*)     All other components within normal limits   GLUCOSE BY METER   BASIC METABOLIC PANEL       Glucose by meter 95     Procedures:       Interventions:  2301 Reglan 10 mg IV    2301 Benadryl 25 mg IV    2301 Decadron 10 mg IV    2301 Toradol 15 mg IV    Emergency Department Course:    2237 Nursing notes and vitals reviewed.    2239 I performed an exam of the patient as documented above.     2307 EKG obtained as noted above.      0054 -discussed results and plan of treatment.  Work-up negative will discharge home    Impression & Plan       Medical Decision Making:  Melody Muñoz is a 44 year old female who presents to the emergency department today with multiple symptoms including headache, paresthesias, recent work-up for lymphadenopathy.  Concerns here were for space-occupying lesion, TIA, CVA, subarachnoid hemorrhage or intraparenchymal hemorrhage.  These catastrophic diagnoses are unlikely and is much more likely this is a benign primary headache and nonspecific paresthesias.  She has no gross neurologic deficits here there is no associated fever nor any recent trauma.  Has follow-up for her lymphadenopathy Joshua in place.  MRI was negative here for any acute pathology.  Basic blood test unremarkable with a mild leukocytosis which is nonspecificAnd does not require further treatment at this time.  I think she is safe and stable to discharge home and clinically declare herself.  She was comfortable          Discharge Diagnosis:    ICD-10-CM    1. Acute nonintractable headache, unspecified headache type R51    2. Paresthesia R20.2    3. Lymphadenopathy R59.1        Disposition:  Home    Discharge Medications:  Discharge Medication List as of  3/5/2020 12:44 AM          Scribe Disclosure:  I, Niraj Yee, am serving as a scribe at 10:48 PM on 3/4/2020 to document services personally performed by Bobby Britt MD based on my observations and the provider's statements to me.        Bobby Britt MD  03/05/20 0056

## 2020-03-05 NOTE — ED TRIAGE NOTES
Been having issues with lymph nodes all over the body. Swelling in the lymph nodes.     About 45 min ago started having L facial numbness. Pt states she has a headache in the back of the head. Pt states she feels weird and off. Pt states she can't feel upper and lower lip. Possible slight L sided mouth droop.

## 2020-03-06 ENCOUNTER — OFFICE VISIT (OUTPATIENT)
Dept: FAMILY MEDICINE | Facility: CLINIC | Age: 45
End: 2020-03-06
Payer: COMMERCIAL

## 2020-03-06 VITALS
TEMPERATURE: 98 F | OXYGEN SATURATION: 100 % | DIASTOLIC BLOOD PRESSURE: 72 MMHG | SYSTOLIC BLOOD PRESSURE: 110 MMHG | HEIGHT: 62 IN | HEART RATE: 78 BPM | BODY MASS INDEX: 28.52 KG/M2 | WEIGHT: 155 LBS | RESPIRATION RATE: 14 BRPM

## 2020-03-06 DIAGNOSIS — G89.4 CHRONIC PAIN SYNDROME: ICD-10-CM

## 2020-03-06 DIAGNOSIS — M25.50 MULTIPLE JOINT PAIN: ICD-10-CM

## 2020-03-06 DIAGNOSIS — R59.1 LYMPHADENOPATHY: ICD-10-CM

## 2020-03-06 DIAGNOSIS — Z78.9 ACUTE MEDICAL ILLNESS: Primary | ICD-10-CM

## 2020-03-06 PROCEDURE — 99214 OFFICE O/P EST MOD 30 MIN: CPT | Performed by: PHYSICIAN ASSISTANT

## 2020-03-06 RX ORDER — OXYCODONE AND ACETAMINOPHEN 5; 325 MG/1; MG/1
1 TABLET ORAL 2 TIMES DAILY PRN
Qty: 6 TABLET | Refills: 0 | Status: SHIPPED | OUTPATIENT
Start: 2020-03-06 | End: 2020-03-11

## 2020-03-06 ASSESSMENT — ENCOUNTER SYMPTOMS
EYES NEGATIVE: 1
RESPIRATORY NEGATIVE: 1
PSYCHIATRIC NEGATIVE: 1
GASTROINTESTINAL NEGATIVE: 1
NEUROLOGICAL NEGATIVE: 1
CONSTITUTIONAL NEGATIVE: 1

## 2020-03-06 ASSESSMENT — MIFFLIN-ST. JEOR: SCORE: 1311.33

## 2020-03-06 NOTE — PROGRESS NOTES
Subjective     Melody Muñoz is a 44 year old female who presents to clinic today for the following health issues:    HPI   Lymph Nodes      Duration: 5 weeks    Description (location/character/radiation): enlarged lymph nodes    Intensity:  mild    Accompanying signs and symptoms: groin, hips, neck, chest- spots are very painful    History (similar episodes/previous evaluation): Ultrasound rt extremity    Precipitating or alleviating factors: None    Therapies tried and outcome: oxycodone for the pain     Patient is concerned about swelling in her chest (superior breast tissue, negative diagnostic mammo and US on 2/24/2020), and groin (right worse than left).   She also has pain in the right groin and upper thigh, and bilateral low back.   Chest CT from 2/18/2020 notes several prominent lymph nodes  Neck CT from 2/18/2020 notes no cervical lymphadenopathy    Patient has never had rheumatoid workup that she can remember  She requests an HIV test today as well        Patient Active Problem List   Diagnosis     Endometriosis s/po EDWIGE 10-17     Mantoux: positive     Latent tuberculosis     Major depression     Generalized anxiety disorder     Constipation     Nightmares     Knee pain     Bipolar 1 disorder  with psychosis     Chronic fatigue     Female stress incontinence     Suicidal ideation     Acne     Chronic pain syndrome     Insomnia     Chronic migraine without aura without status migrainosus, not intractable     Nausea     JASWANT (obstructive sleep apnea)     Elevated liver enzymes     TMJ (temporomandibular joint syndrome)     Hypothyroidism due to acquired atrophy of thyroid     Hostile behavior     Mild persistent asthma without complication     Uncontrolled type 2 diabetes mellitus with hyperglycemia (H)     Allergic rhinitis     Incontinence of urine in female     Migraine     Screening for diabetic peripheral neuropathy     Need for prophylactic vaccination against Streptococcus pneumoniae  (pneumococcus)     Sepsis (H)     Abdominal pain     Morbid obesity (H)     Former tobacco use     Mixed simple and mucopurulent chronic bronchitis (H): Spirometry 16 lung age = 61y/o in 39 y/o FVC=90%&FEV1=78%     Class 2 obesity due to excess calories with serious comorbidity and body mass index (BMI) of 36.0 to 36.9 in adult     Mixed hyperlipidemia     Acquired hypothyroidism     Dysuria     Other chronic back pain     Headache disorder     Persistent depressive disorder     Irritable bowel syndrome with both constipation and diarrhea     Anxiety     Recurrent sinus infections     Past Surgical History:   Procedure Laterality Date     BIOPSY       COLONOSCOPY       GENITOURINARY SURGERY  May/2014    bladder sling     GYN SURGERY      laparoscopy- endometriosis     GYN SURGERY       for twins     LAPAROSCOPIC APPENDECTOMY N/A 2017    Procedure: LAPAROSCOPIC APPENDECTOMY;  LAPAROSCOPIC APPENDECTOMY and resection of epiploic tag;  Surgeon: Roberto Robins MD;  Location: [facility] OR     little finger surgery left  1980     tubal ligation bilateral       wisdom teeth removal         Social History     Tobacco Use     Smoking status: Former Smoker     Packs/day: 0.00     Years: 17.00     Pack years: 0.00     Types: Cigarettes     Last attempt to quit: 3/18/2019     Years since quittin.9     Smokeless tobacco: Never Used   Substance Use Topics     Alcohol use: No     Comment: no etoh since      Family History   Problem Relation Age of Onset     Hypertension Mother      Heart Disease Father         palpitations     Depression Sister      Anxiety Disorder Sister      Heart Disease Maternal Grandmother         CHF     Cancer Maternal Grandfather 72        Pancreatic Cancer     Alzheimer Disease Paternal Grandfather      Bipolar Disorder Other      Autism Spectrum Disorder Other          Current Outpatient Medications   Medication Sig Dispense Refill     albuterol (2.5 MG/3ML) 0.083% IN neb  solution Take 1 vial (2.5 mg) by nebulization every 6 hours as needed for shortness of breath / dyspnea or wheezing 25 vial 0     almotriptan (AXERT) 6.25 MG tablet TK 1 T PO ONCE.  MAY REPEAT IN 2 HOURS AS DIRECTED.  MAX 2 DOSES PER DAY 30 tablet 1     blood glucose (CONTOUR NEXT TEST) test strip Use to test blood sugar 3 times daily or as directed. 100 strip 11     blood glucose monitoring (LAURA MICROLET) lancets Use to test blood sugar 3 times daily or as directed. 100 each 11     butalbital-acetaminophen-caffeine (FIORICET/ESGIC) -40 MG tablet Take 1 tablet by mouth every 4 hours as needed for headaches 20 tablet 3     clonazePAM (KLONOPIN) 1 MG tablet Take 1 mg by mouth 2 times daily  1     dextroamphetamine (DEXEDRINE SPANSULE) 5 MG 24 hr capsule TK 1 C PO BID  0     dextroamphetamine (DEXTROSTAT) 10 MG tablet TK 1 T PO TID  0     dulaglutide (TRULICITY) 0.75 MG/0.5ML pen Inject 0.75 mg Subcutaneous every 7 days 2 mL 11     fluticasone (FLONASE) 50 MCG/ACT nasal spray Spray 2 sprays into both nostrils daily 16 g 11     guanFACINE (TENEX) 2 MG tablet TAKE TWO TABLETS BY MOUTH AT BEDTIME 180 tablet 3     lamoTRIgine (LAMICTAL) 200 MG tablet Take 1 tablet (200 mg) by mouth every morning 90 tablet 4     LATUDA 120 MG TABS tablet TK 1 T PO QD  3     levothyroxine (SYNTHROID/LEVOTHROID) 50 MCG tablet One tab po six days per week, 1.2 tab po one day each week. 90 tablet 3     MICROLET LANCETS MISC TEST TWICE DAILY AS DIRECTED 100 each 0     ondansetron (ZOFRAN-ODT) 4 MG ODT tab DISSOLVE 1 TO 2 TABLETS UNDER THE TONGUE EVERY 8 HOURS AS NEEDED FOR NAUSEA 30 tablet 11     order for DME Equipment being ordered: Blood glucose monitoring kit 1 Device 0     oxyCODONE-acetaminophen (PERCOCET) 5-325 MG tablet Take 1 tablet by mouth 2 times daily as needed for pain 6 tablet 0     pantoprazole (PROTONIX) 40 MG EC tablet TAKE 1 TABLET BY MOUTH DAILY 90 tablet 2     pregabalin (LYRICA) 150 MG capsule TAKE 1 CAPSULE BY  "MOUTH TWICE A  capsule 1     PROAIR  (90 Base) MCG/ACT inhaler INHALE 1-2 PUFFS INTO THE LUNGS EVERY FOUR HOURS AS NEEDED FOR WHEEZING, SHORTNESS OF 18 g 1     topiramate (TOPAMAX) 50 MG tablet 2 tablets every night 180 tablet 3     traZODone (DESYREL) 50 MG tablet   2     venlafaxine (EFFEXOR-XR) 75 MG 24 hr capsule Take 1 capsule (75 mg) by mouth daily 90 capsule 1     acetaminophen-codeine (TYLENOL #3) 300-30 MG tablet Take 1 tablet by mouth every 6 hours as needed for severe pain (Patient not taking: Reported on 3/6/2020) 20 tablet 0     carisoprodol (SOMA) 350 MG tablet Take 1 tablet (350 mg) by mouth 3 times daily as needed for muscle spasms (Patient not taking: Reported on 3/6/2020) 90 tablet 5     Allergies   Allergen Reactions     Hydrocodone Nausea and Vomiting     Tamiflu [Oseltamivir]      Rash     Tylenol W/Codeine [Acetaminophen-Codeine] Nausea and Vomiting     Pt request that RN add this       Reviewed and updated as needed this visit by Provider         Review of Systems   Constitutional: Negative.    HENT: Negative.    Eyes: Negative.    Respiratory: Negative.    Cardiovascular:        As in HPI   Gastrointestinal: Negative.    Genitourinary: Negative.    Musculoskeletal:        As in HPI   Skin: Negative.    Neurological: Negative.    Psychiatric/Behavioral: Negative.          Objective    /72 (Cuff Size: Adult Regular)   Pulse 78   Temp 98  F (36.7  C) (Tympanic)   Resp 14   Ht 1.575 m (5' 2\")   Wt 70.3 kg (155 lb)   LMP  (LMP Unknown)   SpO2 100%   BMI 28.35 kg/m    Physical Exam  Constitutional:       Appearance: She is well-developed.   HENT:      Head: Normocephalic and atraumatic.      Right Ear: External ear normal.      Left Ear: External ear normal.      Nose: Nose normal.   Eyes:      Conjunctiva/sclera: Conjunctivae normal.   Pulmonary:      Effort: Pulmonary effort is normal.   Abdominal:      Tenderness: There is abdominal tenderness in the right lower " quadrant and suprapubic area.   Musculoskeletal:      Right hip: She exhibits tenderness (groin crease).      Lumbar back: She exhibits tenderness, pain and spasm.   Lymphadenopathy:      Cervical:      Right cervical: No superficial, deep or posterior cervical adenopathy.     Left cervical: No superficial, deep or posterior cervical adenopathy.      Upper Body:      Right upper body: No supraclavicular, axillary or pectoral adenopathy.      Left upper body: Pectoral adenopathy present. No supraclavicular or axillary adenopathy.      Lower Body: No right inguinal adenopathy. No left inguinal adenopathy.   Skin:     General: Skin is warm and dry.      Findings: No bruising, erythema or rash.   Neurological:      Mental Status: She is alert and oriented to person, place, and time.      Sensory: No sensory deficit.         Diagnostic Test Results:  No results found. However, due to the size of the patient record, not all encounters were searched. Please check Results Review for a complete set of results.        Assessment & Plan   Problem List Items Addressed This Visit        Nervous and Auditory    Chronic pain syndrome    Relevant Medications    oxyCODONE-acetaminophen (PERCOCET) 5-325 MG tablet      Other Visit Diagnoses     Acute medical illness    -  Primary    Relevant Orders    HIV Antigen Antibody Combo    Lymphadenopathy        Relevant Orders    HIV Antigen Antibody Combo    Treponema Abs w Reflex to RPR and Titer    Multiple joint pain        Relevant Medications    oxyCODONE-acetaminophen (PERCOCET) 5-325 MG tablet    Other Relevant Orders    Anti Nuclear Rosanna IgG by IFA with Reflex    Rheumatoid factor    Cyclic Citrullinated Peptide Antibody IgG         Pain - no specific finding to correlate with pain   Patient has history of chronic pain - possible fibromyalgia   Rheumatoid workup as above  Reassure patient with recent normal CT, mammo, US  Refilled enough oxycodone for the weekend - 6 tablets  only      There are no Patient Instructions on file for this visit.    Return in about 2 weeks (around 3/20/2020) for a recheck if you are not improved.    Paradise Hennessy PA-C  [facility]

## 2020-03-09 ENCOUNTER — TELEPHONE (OUTPATIENT)
Dept: FAMILY MEDICINE | Facility: CLINIC | Age: 45
End: 2020-03-09

## 2020-03-09 NOTE — TELEPHONE ENCOUNTER
Spoke with patient today.     States that she feels like she remembers being told in the early 90's that she has HPV. She wants to know what she should be doing to protect herself and her  to keep him and herself safe.     Review of her records does not indicate any positive tests for HPV in her record. States hx of hysterectomy.     Spoke with patient about what the HPV test tests for (cervical cancer). That does not mean she has cancer, but just a higher risk for it.  But she states its other things too, so she would like her provider to review and let her know if she does have HPV and what she should be doing to protect herself and her  because they are sexually active.

## 2020-03-09 NOTE — TELEPHONE ENCOUNTER
Whoever did Melody's Pap smear last would have checked her HPV status.  She should check with the office where she had that done.  I do not see that result in our chart and it says it was abstracted from outside records.

## 2020-03-09 NOTE — TELEPHONE ENCOUNTER
Reason for Call:  Other hpv      Detailed comments: wants to  Know if hpv dx is still in her chart.    Phone Number Patient can be reached at: Home number on file [phone] (home)    Best Time: any    Can we leave a detailed message on this number? YES    Call taken on 3/9/2020 at 2:04 PM by MARIAN FUENTES

## 2020-03-10 NOTE — TELEPHONE ENCOUNTER
Patient Contact    Attempt # 1    Was call answered?  No.  Mailbox full, unable to leave VM.    On call back: relay provider message below.

## 2020-03-10 NOTE — TELEPHONE ENCOUNTER
Patient Contact    Attempt # 2    Was call answered?  No.  Unable to leave voicemail, mailbox full.    On call back: relay provider message below.

## 2020-03-11 ENCOUNTER — NURSE TRIAGE (OUTPATIENT)
Dept: FAMILY MEDICINE | Facility: CLINIC | Age: 45
End: 2020-03-11

## 2020-03-11 ENCOUNTER — HOSPITAL ENCOUNTER (EMERGENCY)
Facility: CLINIC | Age: 45
Discharge: LEFT BEFORE DISCHARGE | End: 2020-03-11
Payer: COMMERCIAL

## 2020-03-11 ENCOUNTER — TELEPHONE (OUTPATIENT)
Dept: OTOLARYNGOLOGY | Facility: CLINIC | Age: 45
End: 2020-03-11

## 2020-03-11 DIAGNOSIS — G89.4 CHRONIC PAIN SYNDROME: ICD-10-CM

## 2020-03-11 RX ORDER — OXYCODONE AND ACETAMINOPHEN 5; 325 MG/1; MG/1
1 TABLET ORAL 2 TIMES DAILY PRN
Qty: 6 TABLET | Refills: 0 | Status: SHIPPED | OUTPATIENT
Start: 2020-03-11 | End: 2020-03-12

## 2020-03-11 NOTE — TELEPHONE ENCOUNTER
"RN relayed provider message to pt.    Pt becomes very angry. Pt started yelling at RN and crying on phone.    States the ED is not a helpful place for her. States she keeps going and going, and no one is able to get her answers. States PCP is not able to get her answers. States she is \"so sick of all of this and I am just throwing my hands up this is ridiculous\".     \"You know what, I'm not going to go to the ER. Just tell me where I need to go to  my prescriptions\".    RN advised pt on where prescription was. Pt asked how many pills were scripted. RN advised pt that 6 were scripted. Pt states this is fine.    RN asked pt what her plan for [facility] was. Pt states she has not tried [facility] yet. States she is very frustrated because a [facility] consult is \"so far out\". States she has not even completed basic paperwork for [facility] yet.    Pt further asks, \"Ok and when I do get to [facility] and they tell me it is a 3 month long wait what do I do in the meantime? I don't want to keep calling back every 3 days to keep asking for pain pills because I hate them!\"    RN let pt know she will seek provider approval for pain clinic, and for pt to try and work with [facility]. Pt states she will fill out paperwork in the morning to start getting into [facility].    Why has she not gone to the pain specialists yet? Can we refer her??  "

## 2020-03-11 NOTE — TELEPHONE ENCOUNTER
Called patient and she is stating that Dr. Valles told her that he could not do surgery at the same time as Dr. Mendez.  I explained that often two surgeons will operate on the patient at the same time provided that is appropriate for the patient.  I encouraged her to wait to see Dr. Mendez so we have formal surgical receommendations from both surgeons and a mutual decision can be agreed upon at that time.    Juancarlos Pryor RN  3/11/2020 2:00 PM

## 2020-03-11 NOTE — TELEPHONE ENCOUNTER
Reason for Call:  Other prescription  Detailed comments: patient would like some pain medication  Phone Number Patient can be reached at: Home number on file [phone] (home)  Best Time: any  Can we leave a detailed message on this number? YES  Call taken on 3/11/2020 at 11:13 AM by BREANN WARREN

## 2020-03-11 NOTE — TELEPHONE ENCOUNTER
[facility] Call Center    Phone Message    May a detailed message be left on voicemail: yes     Reason for Call: Other: Pt called in wanting to know why she can't have surgery with both Dr. Valles and Dr. Mendez at the same time.  She is requesting a call back on this     Action Taken: Message routed to:  [facility] Ent    Travel Screening: Not Applicable

## 2020-03-11 NOTE — TELEPHONE ENCOUNTER
"Pt called to get directives from PCP. She has ongoing right groin pain and feels that side is swollen. She is having a upper abdominal ultrasound Friday. Pt asked if providers recommends anymore imaging for groin during that appt. FYI- groin pain was discussed 03/06/2020. \"Should I just let it be\"? She wants to make sure she is proactive in seeking care for her symptoms. Pt would also like a call back with Percocet Rx response.   "

## 2020-03-11 NOTE — TELEPHONE ENCOUNTER
Pt is requesting rx for Percocet. Complains of abdominal pain since September. Pain in her right side on and off but has worsen. She had colonoscopy yesterday as part of GI evaluation and will have ultrasound Friday. Pt would like to get Rx for 6-10 Percocet tablets until she is able to get results. GI provider advised she follow up PCP for pain management. Please advice on request.    Last [facility] website verification: 2/11/2020 multiple meds from multiple outside providers

## 2020-03-11 NOTE — TELEPHONE ENCOUNTER
I symptoms have not changed since 3/6/2020 appt, I do not think we need to do further imaging.  Patient should go to ED if pain becomes severe.     Ok for 6 more tablets of oxycodone, then no further refills.     Christofer Hennessy PA-C

## 2020-03-11 NOTE — TELEPHONE ENCOUNTER
Left a detailed voice message with information below. Asked pt to call triage back if further questions.

## 2020-03-12 ENCOUNTER — HOSPITAL ENCOUNTER (EMERGENCY)
Facility: CLINIC | Age: 45
Discharge: HOME | End: 2020-03-12
Attending: EMERGENCY MEDICINE | Admitting: EMERGENCY MEDICINE
Payer: COMMERCIAL

## 2020-03-12 ENCOUNTER — APPOINTMENT (OUTPATIENT)
Dept: MRI IMAGING | Facility: CLINIC | Age: 45
End: 2020-03-12
Attending: EMERGENCY MEDICINE
Payer: COMMERCIAL

## 2020-03-12 ENCOUNTER — APPOINTMENT (OUTPATIENT)
Dept: CT IMAGING | Facility: CLINIC | Age: 45
End: 2020-03-12
Attending: EMERGENCY MEDICINE
Payer: COMMERCIAL

## 2020-03-12 ENCOUNTER — APPOINTMENT (OUTPATIENT)
Dept: ULTRASOUND IMAGING | Facility: CLINIC | Age: 45
End: 2020-03-12
Attending: EMERGENCY MEDICINE
Payer: COMMERCIAL

## 2020-03-12 VITALS
RESPIRATION RATE: 20 BRPM | OXYGEN SATURATION: 96 % | TEMPERATURE: 98.5 F | SYSTOLIC BLOOD PRESSURE: 132 MMHG | DIASTOLIC BLOOD PRESSURE: 91 MMHG | WEIGHT: 155 LBS | BODY MASS INDEX: 28.35 KG/M2 | HEART RATE: 82 BPM

## 2020-03-12 VITALS
DIASTOLIC BLOOD PRESSURE: 86 MMHG | RESPIRATION RATE: 20 BRPM | BODY MASS INDEX: 28.35 KG/M2 | OXYGEN SATURATION: 94 % | TEMPERATURE: 97.2 F | WEIGHT: 155 LBS | SYSTOLIC BLOOD PRESSURE: 123 MMHG

## 2020-03-12 DIAGNOSIS — M54.50 ACUTE MIDLINE LOW BACK PAIN WITHOUT SCIATICA: ICD-10-CM

## 2020-03-12 DIAGNOSIS — M79.661 PAIN OF RIGHT LOWER LEG: ICD-10-CM

## 2020-03-12 DIAGNOSIS — G89.29 OTHER CHRONIC PAIN: ICD-10-CM

## 2020-03-12 LAB
ALBUMIN UR-MCNC: NEGATIVE MG/DL
ANION GAP SERPL CALCULATED.3IONS-SCNC: 4 MMOL/L (ref 3–14)
APPEARANCE UR: NORMAL
BASOPHILS # BLD AUTO: 0 10E9/L (ref 0–0.2)
BASOPHILS NFR BLD AUTO: 0.5 %
BILIRUB UR QL STRIP: NEGATIVE
BUN SERPL-MCNC: 9 MG/DL (ref 7–30)
CALCIUM SERPL-MCNC: 9.2 MG/DL (ref 8.5–10.1)
CHLORIDE SERPL-SCNC: 111 MMOL/L (ref 94–109)
CO2 SERPL-SCNC: 26 MMOL/L (ref 20–32)
COLOR UR AUTO: NORMAL
CREAT SERPL-MCNC: 0.7 MG/DL (ref 0.52–1.04)
DIFFERENTIAL METHOD BLD: NORMAL
EOSINOPHIL # BLD AUTO: 0.6 10E9/L (ref 0–0.7)
EOSINOPHIL NFR BLD AUTO: 6.8 %
ERYTHROCYTE [DISTWIDTH] IN BLOOD BY AUTOMATED COUNT: 13.2 % (ref 10–15)
GFR SERPL CREATININE-BSD FRML MDRD: >90 ML/MIN/{1.73_M2}
GLUCOSE SERPL-MCNC: 82 MG/DL (ref 70–99)
GLUCOSE UR STRIP-MCNC: NEGATIVE MG/DL
HCT VFR BLD AUTO: 42.5 % (ref 35–47)
HGB BLD-MCNC: 14.2 G/DL (ref 11.7–15.7)
HGB UR QL STRIP: NEGATIVE
IMM GRANULOCYTES # BLD: 0 10E9/L (ref 0–0.4)
IMM GRANULOCYTES NFR BLD: 0.1 %
KETONES UR STRIP-MCNC: NEGATIVE MG/DL
LEUKOCYTE ESTERASE UR QL STRIP: NEGATIVE
LYMPHOCYTES # BLD AUTO: 3.5 10E9/L (ref 0.8–5.3)
LYMPHOCYTES NFR BLD AUTO: 39.9 %
MCH RBC QN AUTO: 27.9 PG (ref 26.5–33)
MCHC RBC AUTO-ENTMCNC: 33.4 G/DL (ref 31.5–36.5)
MCV RBC AUTO: 84 FL (ref 78–100)
MONOCYTES # BLD AUTO: 0.7 10E9/L (ref 0–1.3)
MONOCYTES NFR BLD AUTO: 7.4 %
NEUTROPHILS # BLD AUTO: 4 10E9/L (ref 1.6–8.3)
NEUTROPHILS NFR BLD AUTO: 45.3 %
NITRATE UR QL: NEGATIVE
PH UR STRIP: 7 PH (ref 5–7)
PLATELET # BLD AUTO: 394 10E9/L (ref 150–450)
POTASSIUM SERPL-SCNC: 3.7 MMOL/L (ref 3.4–5.3)
RBC # BLD AUTO: 5.09 10E12/L (ref 3.8–5.2)
SODIUM SERPL-SCNC: 141 MMOL/L (ref 133–144)
SOURCE: NORMAL
SP GR UR STRIP: 1.01 (ref 1–1.03)
UROBILINOGEN UR STRIP-MCNC: NORMAL MG/DL (ref 0–2)
WBC # BLD AUTO: 8.8 10E9/L (ref 4–11)

## 2020-03-12 PROCEDURE — 85025 COMPLETE CBC W/AUTO DIFF WBC: CPT | Performed by: EMERGENCY MEDICINE

## 2020-03-12 PROCEDURE — 25000125 ZZHC RX 250: Performed by: EMERGENCY MEDICINE

## 2020-03-12 PROCEDURE — 93971 EXTREMITY STUDY: CPT | Mod: RT

## 2020-03-12 PROCEDURE — 96374 THER/PROPH/DIAG INJ IV PUSH: CPT | Mod: 59

## 2020-03-12 PROCEDURE — 25000128 H RX IP 250 OP 636: Performed by: EMERGENCY MEDICINE

## 2020-03-12 PROCEDURE — 96375 TX/PRO/DX INJ NEW DRUG ADDON: CPT

## 2020-03-12 PROCEDURE — 96374 THER/PROPH/DIAG INJ IV PUSH: CPT

## 2020-03-12 PROCEDURE — 81003 URINALYSIS AUTO W/O SCOPE: CPT | Performed by: EMERGENCY MEDICINE

## 2020-03-12 PROCEDURE — 93976 VASCULAR STUDY: CPT

## 2020-03-12 PROCEDURE — 74177 CT ABD & PELVIS W/CONTRAST: CPT

## 2020-03-12 PROCEDURE — 72148 MRI LUMBAR SPINE W/O DYE: CPT

## 2020-03-12 PROCEDURE — 99285 EMERGENCY DEPT VISIT HI MDM: CPT | Mod: 25

## 2020-03-12 PROCEDURE — 25000132 ZZH RX MED GY IP 250 OP 250 PS 637: Performed by: EMERGENCY MEDICINE

## 2020-03-12 PROCEDURE — 80048 BASIC METABOLIC PNL TOTAL CA: CPT | Performed by: EMERGENCY MEDICINE

## 2020-03-12 RX ORDER — OXYCODONE HYDROCHLORIDE 5 MG/1
10 TABLET ORAL ONCE
Status: COMPLETED | OUTPATIENT
Start: 2020-03-12 | End: 2020-03-12

## 2020-03-12 RX ORDER — KETOROLAC TROMETHAMINE 15 MG/ML
15 INJECTION, SOLUTION INTRAMUSCULAR; INTRAVENOUS ONCE
Status: DISCONTINUED | OUTPATIENT
Start: 2020-03-12 | End: 2020-03-12 | Stop reason: HOSPADM

## 2020-03-12 RX ORDER — HYDROMORPHONE HYDROCHLORIDE 1 MG/ML
0.5 INJECTION, SOLUTION INTRAMUSCULAR; INTRAVENOUS; SUBCUTANEOUS
Status: DISCONTINUED | OUTPATIENT
Start: 2020-03-12 | End: 2020-03-12 | Stop reason: HOSPADM

## 2020-03-12 RX ORDER — PREDNISONE 20 MG/1
TABLET ORAL
Qty: 10 TABLET | Refills: 0 | Status: SHIPPED | OUTPATIENT
Start: 2020-03-12 | End: 2020-04-22

## 2020-03-12 RX ORDER — IOPAMIDOL 755 MG/ML
78 INJECTION, SOLUTION INTRAVASCULAR ONCE
Status: COMPLETED | OUTPATIENT
Start: 2020-03-12 | End: 2020-03-12

## 2020-03-12 RX ADMIN — OXYCODONE HYDROCHLORIDE 10 MG: 5 TABLET ORAL at 08:50

## 2020-03-12 RX ADMIN — IOPAMIDOL 78 ML: 755 INJECTION, SOLUTION INTRAVENOUS at 16:27

## 2020-03-12 RX ADMIN — HYDROMORPHONE HYDROCHLORIDE 1 MG: 1 INJECTION, SOLUTION INTRAMUSCULAR; INTRAVENOUS; SUBCUTANEOUS at 15:33

## 2020-03-12 RX ADMIN — HYDROMORPHONE HYDROCHLORIDE 0.5 MG: 1 INJECTION, SOLUTION INTRAMUSCULAR; INTRAVENOUS; SUBCUTANEOUS at 06:40

## 2020-03-12 RX ADMIN — SODIUM CHLORIDE 62 ML: 9 INJECTION, SOLUTION INTRAVENOUS at 16:26

## 2020-03-12 ASSESSMENT — ENCOUNTER SYMPTOMS
FEVER: 0
BACK PAIN: 1
BACK PAIN: 1
FREQUENCY: 0
DYSURIA: 0
HEMATURIA: 0
ABDOMINAL PAIN: 1
NUMBNESS: 1

## 2020-03-12 NOTE — ED NOTES
Bed: ED06  Expected date:   Expected time:   Means of arrival:   Comments:  marixa - 595 - 47 abd pain eta now

## 2020-03-12 NOTE — ED PROVIDER NOTES
"  History     Chief Complaint:  Back Pain    HPI   Melody Muñoz is a 44 year old female who presents for evaluation of low back pain. The patient reports low back pain that has been going on for almost a month. She describes this pain as if something is \"pressing on her spine\" and radiates down to her tailbone and into her groin bilaterally, right more than left. She also notes intermittent paresthesias in her right leg. She has been using narcotic pain medication and heat packs at home for her symptoms. She has also been going to a massage therapist which has helped her pain until about a week ago at which point she reports the pressure got worse. Since then, she reports not getting any relief from the heat, medication, or massages and the pain has only worsened with time. Yesterday evening, she reports one episode of urinary incontinence while driving and she reports a past provider told her to \"not take this lightly when you have low back pain,\" thus prompting presentation to the ED. Over the course of the last nine hours, the patient has presented multiple times to the ED but refused to wait as there was high patient volume overnight. Currently, the patient reports low back pain, suprapubic abdominal pressure, and tingling down her right leg and into both groins. She denies any fevers, incontinence of stool, or recent trauma to her back. She does report a history of stress incontinence, but denies any history of IV drug use or previous back surgeries. She does endorse a history of low back pain but states it is \"nothing like this.\" She has seen a neurologist in the past, but has never seen a spine specialist.     Allergies:  Hydrocodone  Tamiflu [Oseltamivir]  Tylenol W/Codeine [Acetaminophen-Codeine]    Medications:    Albuterol neb  Almotriptan  Fioricet  Klonopin  Dextroamphetamine  Trulicity  Lamictal   Latuda  Levothyroxine  Zofran  Protonix  Lyrica   Proair inhaler  Topamax  Trazodone  Effexor " XR  Percocet     Past Medical History:    Anxiety & Depression   Bipolar 1 disorder  Diabetes  Endometriosis  Suicidal ideation   Asthma  Latent TB  Kidney stone  Migraines  JASWANT  Substance abuse  Vertigo   Hypotension   Recurrent sinus infections  Hyperlipidemia   Female stress incontinence     Past Surgical History:       Appendectomy   Exploratory laparoscopy   Left pinky surgery   Bladder sling  Tubal ligation   Dental extractions    Family History:    Hypertension  Heart disease  Anxiety & Depression     Social History:  Marital Status:   [2]  Former smoker. Quit 2019.   Negative for alcohol use. Sober since .   Negative for drug use.      Review of Systems   Constitutional: Negative for fever.   Gastrointestinal: Positive for abdominal pain (suprapubic).        Denies stool incontinence   Genitourinary: Negative for dysuria, frequency, hematuria, urgency and vaginal discharge.        One episode of urinary incontinence   Musculoskeletal: Positive for back pain.   Neurological: Positive for numbness (right leg, bilateral groin).   All other systems reviewed and are negative.      Physical Exam     Patient Vitals for the past 24 hrs:   BP Temp Temp src Heart Rate Resp SpO2 Weight   20 0905 123/86 -- -- 83 -- 94 % --   20 0755 (!) 126/91 -- -- 78 -- 97 % --   20 0316 (!) 137/99 97.2  F (36.2  C) Temporal 83 20 100 % 70.3 kg (155 lb)      Physical Exam  General: Alert, in distress 2/2 low back pain  HEENT:  Moist mucous membranes.  Conjunctiva normal.   CV:  RRR, no m/r/g, skin warm and well perfused  Pulm:  CTAB, no wheezes/ronchi/rales.  No acute distress, breathing comfortably  GI:  Soft, nontender, nondistended.  No rebound or guarding.  Normal bowel sounds  MSK:  Moving all extremities.  No focal areas of edema, erythema; midline lumbar tenderness, no cervical/thoracic midline tenderness; 5/5 BLE strength, SILT to BLE; DP/PT pulses 2+ bilaterally  Skin:  WWP, no rashes, no  "lower extremity edema, skin color normal, no diaphoresis  Psych:  Well-appearing, normal affect, regular speech    Emergency Department Course   Imaging:  Radiographic findings were communicated with the patient who voiced understanding of the findings.  MR Lumbar spine w/o contrast:  Mild degenerative changes of the lumbar spine as detailed   above with no significant spinal canal or neural foraminal narrowing   at any level, as per radiology.     US Pelvic, Complete w Transvaginal & Abd/Pel Duplex Limited:  Hysterectomy. No specific abnormality by ultrasound, as per radiology.    US Lower Extremity Venous Duplex, right:  No deep venous thrombosis in the right lower extremity, as per radiology.     Laboratory:  UA w/o Microscopic: All WNL     Procedures:  None     Interventions:  0640 Dilaudid, 0.5 mg, IV injection   0850 Oxycodone, 10 mg, PO    Emergency Department Course:  Nursing notes and vitals reviewed.   The patient provided a urine sample here in the emergency department. This was sent for laboratory testing, findings above.     0600: I performed an exam of the patient as documented above.      The patient was sent for a lumbar spine MRI while in the emergency department, results above.      0729: I rechecked the patient and discussed the results of her workup thus far. She is very concerned about the new onset right pelvic pain at this time.     0750: Per RN, the patient no longer wants the pelvic ultrasound as she feels that \"no one cares about her.\" She is now requesting discharge.     0800: I rechecked the patient and answered all the questions she has. At this time, she is willing to stay for the pelvic ultrasound. She also would like her right leg pain evaluated.      The patient was sent for right lower extremity and pelvic ultrasounds while in the emergency department, results above.      0858: I rechecked the patient and discussed the ultrasound results, as well as discharge instructions. She " expresses understanding that we don't have the answer.     Findings and plan explained to the Patient. Patient discharged home with instructions regarding supportive care, medications, and reasons to return. The importance of close follow-up was reviewed. I will provide a referral to OBGYN. She was prescribed prednisone.     I personally reviewed the laboratory and imaging results with the Patient and answered all related questions prior to discharge.    Impression & Plan      Medical Decision Making:  Melody Muñoz is a 44 year old female who presents with low back pain as noted in HPI.  She complains of an episode of incontinence although she does have stress incontinence in her history.  She denies any IV drug use or fevers.  She is CMS intact to the distal extremities.  She had some mild tenderness of the lumbar spine and no abdominal tenderness whatsoever on exam so doubt referred intra-abdominal pathology.  She underwent MRI of the lumbar spine which fortunately negative for any signs of spinal cord compression syndrome, discitis, bone abnormality.  This was discussed with the patient who continue to have right groin/pelvic.  Consider the possibility of possible pelvic etiologies like ovarian cyst or torsion or endometriosis.  She denies any abnormal discharge, doubt PID, TOA.  She has had hysterectomy.  UA here is unremarkable for infection.  Discussed MRI findings with patient.  She had continued concerns regarding her groin pain and bladder pain in addition to her right leg pain.  Fortunately, RLE US and pelvic US were normal and had no acute pathology (specifically DVT, torsion, ovarian cyst).  Doubt compartment syndrome or arterial clot or arterial insufficiency. She has no abdominal tenderness and symptoms so doubt intraabdominal catastrophe, appendicitis or other acute pathology that would warrant further imaging or ER workup. Patient is disappointed in the lack of definitive diagnosis but is  reassured that no life threats are identified today.  I feel she is appropriate for discharge for outpatient follow up and she was in agreement.  Referral information given for urology and GYN.  Reasons to return to the ER were discussed, all questions were answered.    Diagnosis:    ICD-10-CM    1. Acute midline low back pain without sciatica  M54.5    2. Pain of right lower leg  M79.661        Disposition:  discharged to home    Discharge Medications:  Discharge Medication List as of 3/12/2020  9:01 AM      START taking these medications    Details   predniSONE (DELTASONE) 20 MG tablet Take two tablets (= 40mg) each day for 5 (five) days, Disp-10 tablet,R-0, Local Print           Scribe Disclosure:  I, Shey Morgan, am serving as a scribe on 3/12/2020 at 6:00 AM to personally document services performed by Korey Montana MD based on my observations and the provider's statements to me.       3/12/2020   [facility] EMERGENCY DEPARTMENT       Korey Montana MD  03/12/20 1047

## 2020-03-12 NOTE — ED NOTES
Pt to triage desk requesting information regarding pt relations. Pt provided with business card.

## 2020-03-12 NOTE — ED NOTES
"Screaming at nurses and doctor time of discharge.  Threatening staff.  Came out into nurses station and screaming.  Security called.  She yelled, \"Fuck you guys.  You don't care about anything else, than making money.\"  She was asked several times to go back to her room, but she was not listening or cooperating.  I took her IV out while she was yelling and spitting in my face.  Daina BALLESTEROS, helped assist her to get dressed.  Finally discharged and escorted by nurse and security out of the hospital.  "

## 2020-03-12 NOTE — ED AVS SNAPSHOT
[facility] Emergency Department  [address]  [facility] [address]  Phone:  [phone]  Fax:  [phone]                                    Melody Muñoz   MRN: [id number]    Department:  [facility] Emergency Department   Date of Visit:  3/12/2020           After Visit Summary Signature Page    I have received my discharge instructions, and my questions have been answered. I have discussed any challenges I see with this plan with the nurse or doctor.    ..........................................................................................................................................  Patient/Patient Representative Signature      ..........................................................................................................................................  Patient Representative Print Name and Relationship to Patient    ..................................................               ................................................  Date                                   Time    ..........................................................................................................................................  Reviewed by Signature/Title    ...................................................              ..............................................  Date                                               Time          22EPIC Rev 08/18

## 2020-03-12 NOTE — ED TRIAGE NOTES
Pt out in triage stating that she wet her pants earlier and stated that her psychiatrist said it is an emergency if she does that so she needs to be seen.  Pt angry stating loudly that she is here for an emergency and needs to wait her turn sarcastically

## 2020-03-12 NOTE — ED TRIAGE NOTES
Pt complains of low back pain that radiates down the back of both legs.  Was here earlier and did not want to wait.  Took percocet at 2000 without relief

## 2020-03-12 NOTE — ED PROVIDER NOTES
History     Chief Complaint:  Groin Pain    HPI   Melody Muñoz is a 44 year old female who presents via EMS with groin pain. Per the patient, she has had groin pain that starts in the RLQ and radiates to her right leg since December and chronic back pain with unknown onset. Eight days ago, she had difficulty walking due to the pain and presented to the ER. She was diagnosed with an acute non-intractable headache and discharged. Yes[place], [place] and could barely twist. She was seen at the ER at 0300 today (about 12 hours ago) and was diagnosed with acute midline low back pain without sciatica and right lower leg pain, and was discharged home with a prescription for Deltasone.    The right leg pain, groin pain, and back pain has not improved. She endorses right leg swelling and a facial rash as well while at the ER. She has been taking 1 Percocet every other day, and has been going to [facility] for pain management. She has seen many doctors without resolving the pain, and has not seen a back pain. She also endorses urinary frequency, which has been going on for months.    MR Lumbar spine w/o contrast (3/12/2020)  Mild degenerative changes of the lumbar spine as detailed   above with no significant spinal canal or neural foraminal narrowing   at any level, as per radiology.     US Pelvic, complete w transvaginal & abd/pel duplex limited (3/12/2020)  Hysterectomy. No specific abnormality by ultrasound, as per radiology.    US Lower extremity venous duplex, right (3/12/2020)  No deep venous thrombosis in the right lower extremity, as per radiology.     UA w/o microscopic (3/12/2020)  All WNL    Allergies:  Hydrocodone  Tamiflu  Acetaminophen-Codeine    Medications:    Bentyl  Topamax  Effexor  Synthroid  Lithobid  Cymbalta  Dexedrine  Midrin  Antivert  Klonopin  Provigil  Inderal  Lyrica  Lamictal  Zofran  Zoloft  Flomax  Tenex    Past Medical History:    Anxiety  Bipolar I  disorder  Diabetes mellitus type II  Endometriosis  Intermittent asthma  Latent tuberculosis  Major depression  JASWANT  Substance abuse  Vertigo  Morbid obesity  Chronic back pain  IBS  Hypothyroidism  TMJ  Chronic fatigue    Past Surgical History:      Appendectomy  Little finger surgery left  Sling bladder  Tubal ligation, bilateral  [place] teeth removal    Family History:    Mother: Hypertension  Father: Palpitations  Sister: Depression, anxiety disorder    Social History:  Smoking Status: Former Smoker (Quit 2019)  Smokeless Tobacco: Never Used  Drug Use: Negative  PCP: Evaristo Machado  Marital Status:      Review of Systems   Genitourinary: Positive for pelvic pain.   Musculoskeletal: Positive for back pain.   All other systems reviewed and are negative.    Physical Exam     Patient Vitals for the past 24 hrs:   BP Temp Temp src Pulse Resp SpO2 Weight   20 1351 (!) 132/91 98.5  F (36.9  C) Oral 82 20 96 % 70.3 kg (155 lb)       Physical Exam  SKIN:  Warm, dry.  Normal equal tactile temperature of the distal lower extremities.  HEMATOLOGIC/IMMUNOLOGIC/LYMPHATIC:  No pallor.    EYES:  Conjunctivae normal.  CARDIOVASCULAR:  Regular rate and rhythm.  No murmur.  Normal equal palpable pedal pulses.  RESPIRATORY:  No respiratory distress, breath sounds equal and normal.  GASTROINTESTINAL:  Soft, nontender abdomen with active bowel sounds.  No distention or palpable mass.  MUSCULOSKELETAL: Obese body habitus, any attempted range of motion of torso or explore extremities exacerbates pain.  NEUROLOGIC:  Alert, conversant.  No gross motor or sensory deficit.  Stable gait.  PSYCHIATRIC:  Labile and largely irritable mood.  At times histrionic.    Emergency Department Course     Imaging:  Radiology findings were communicated with the patient who voiced understanding of the findings.    CT Abdomen Pelvis w Contrast  1.  No acute abnormality. No cause for pain identified.  Reading per  radiology.    Laboratory:  Laboratory findings were communicated with the patient who voiced understanding of the findings.    CBC: WBC 8.8, HGB 14.2,   BMP: Chloride 111(H) o/w WNL (Creatinine 0.70)    Interventions:  1533: Dilaudid, 1 mg, IV    Emergency Department Course:    1400 IV was inserted and blood was drawn for laboratory testing, results above.    1404 Nursing notes and vitals reviewed.    1510 I performed an exam of the patient as documented above.     1612 The patient was sent for a CT while in the emergency department, results above.     1617 Patient rechecked and updated.     1729 Patient rechecked and updated.     1806 Findings and plan explained to the patient. Patient discharged home with instructions regarding supportive care, medications, and reasons to return. The importance of close follow-up was reviewed.    Impression & Plan      Medical Decision Making:  Melody Muñoz is a 44 year old female who presents to the emergency department today for evaluation for concern about pain affecting the entirety of her back which seems to radiate around her pelvis and into the right upper inner thigh. She's been evaluated as an out-patient periodically and most recently last night at [facility] emergency department. Several tests performed recently which included a colonoscopy as an out-patient several days ago that was negative. Workup at her [facility] ED visit 12 hours ago included lumbar spine MRI, pelvic ultrasound, urinalysis and DVT study of right lower extremity all of which was negative. Given her abdominal/groin complaints the above evaluation was undertaken tonight which was reassuring. She was medicated and treated as above. On recheck the patient appeared comfortable but then became intensely angry and histrionic as she did not have a diagnosis for her chronic pain and exited her patient room and commenced screaming at both myself and staff regarding her dissatisfaction with pain  management and diagnostics.  The patient during this time was ambulating appeared to be moving easily with respect to pain. I doubt cauda equina syndrome with a negative MRI 12 hours ago.  Clearly not suffering a serious abdominal etiology of her symptoms given the negative evaluation tonight. I advised the patient she can be further safely evaluated and managed as an out-patient.  This comment made the patient furious as she thought she should be admitted to the hospital for urgent investigation and pain management. I did the reference the [place] prescription monitoring program and the patient has had 96 prescriptions for various controlled substances over the last 12 months. Of course drug seeking and/or dependence is a serious concern. I was not inclined to prescribe controlled substances.  After screaming at myself and the nurse the patient abruptly departed the emergency department prior to formal discharge.    Diagnosis:    ICD-10-CM    1. Other chronic pain  G89.29      Disposition:   Patient was discharged home.    Scribe Disclosure:  I, Reno Day, am serving as a scribe at 2:07 PM on 3/12/2020 to document services personally performed by Marcos Espinal MD based on my observations and the provider's statements to me.     EMERGENCY DEPARTMENT       Marcos Espinal MD  03/13/20 5616

## 2020-03-12 NOTE — ED TRIAGE NOTES
R pelvic pain since December, swollen labia for 2.5 weeks, seen in ED at 0300 this AM for increase in pain in groin, R leg, and low back. Had uncontrolled urination last night and is concerned

## 2020-03-12 NOTE — ED NOTES
"Pt refusing Toradol and pain pills \"bhaskar had these meds before  And they dont work, \" \"how can you just let me go to MRI in such pain\"  "

## 2020-03-12 NOTE — ED NOTES
She got verbally abusive when the MD let her know he could see the medications she is on because it is all in the system.

## 2020-03-12 NOTE — ED AVS SNAPSHOT
Emergency Department  [address] [facility] [address]  Phone:  [phone]  Fax:  [phone]                                    Melody Muñoz   MRN: [id number]    Department:   Emergency Department   Date of Visit:  3/12/2020           After Visit Summary Signature Page    I have received my discharge instructions, and my questions have been answered. I have discussed any challenges I see with this plan with the nurse or doctor.    ..........................................................................................................................................  Patient/Patient Representative Signature      ..........................................................................................................................................  Patient Representative Print Name and Relationship to Patient    ..................................................               ................................................  Date                                   Time    ..........................................................................................................................................  Reviewed by Signature/Title    ...................................................              ..............................................  Date                                               Time          22EPIC Rev 08/18

## 2020-03-13 ENCOUNTER — TELEPHONE (OUTPATIENT)
Dept: FAMILY MEDICINE | Facility: CLINIC | Age: 45
End: 2020-03-13

## 2020-03-13 NOTE — TELEPHONE ENCOUNTER
2nd call from patient today.     Just wanted to add a couple of things to what she wants to make sure her provider is aware of.     Wanted to add that she does have a rash on her face, down her neck, into her chest. Small red bumps noted. She tried to show these to the ED MD's and they said they didn't see what she was seeing.     Also having pain when the nurse pushed through the contrast dye while she was in the hospital before her imaging test. She thought this was odd and concerning for her.     States that she did have a lot of pain right after drinking the solution before having her CT scan.     Just FYI to her PCP.

## 2020-03-13 NOTE — TELEPHONE ENCOUNTER
Reason for Call:  Other wants to go to hospital    Detailed comments: pt is in a lot of pain and wants to be admitted for tests.  Was in ER yesterday also.  Wants to be in hospital.    Phone Number Patient can be reached at: Home number on file [phone] (home)    Best Time: any    Can we leave a detailed message on this number? YES    Call taken on 3/13/2020 at 12:36 PM by MARIAN FUENTES

## 2020-03-13 NOTE — TELEPHONE ENCOUNTER
"Call back made to patient.     \"I cannot sit in pain in longer\" \" I have been in the clinic, and in the hospital so many times and no one has compassion\" \" I can take this anymore\" \"I need help\"     Shows she was seen in the ED 3 times within the last couple of days. First seen in [facility] and left after triage and did not see MD. Early the next AM she went back. She was them seen by MD, they did testing that did come back as negative. She was given prednisone at that time. She then went to the [facility] ED because she wanted further testing. This was completed. There was nothing further found in this visit. They wanted her to follow up with urology or OB at this time.     \"Going to OB and Urology will not be helpful\" \" I need further testing\" \" I need biopsy's done\"     \"They are not doing the right imaging. They are not being thorough enough\".    Told the ED doctors, \"admit me for pain control until you can find out what is wrong with me\"     She wanted our providers to call the hospital admit her. [facility] providers do not have admitting rights and will not be able to do this.     Reviewed her pain complaints told her to seek medical attention in the ED if her pain is very high and she is having trouble functioning.     She was asked what next steps she would like to see from her provider that we can do in the clinic setting.     States I wants a biopsy done. This is something that my provider can order.     Her right groin is swollen. She wants this biopsied and checked.  also added she wants a biopsy of the lymph nodes in her chest because she has been having issues with her chest for awhile as well.     OB referral- not needed. States she can just call to schedule an appointment and she plans to do that at this time.     Kang was not able to stay on topic at any time on the phone. She was talking about hospital concerns, not being happy with her care, to wanting to be seen by [facility], to further testing that needs " to be done.      She is aware that her provider will not be back until next week to review this concern.

## 2020-03-16 ENCOUNTER — TELEPHONE (OUTPATIENT)
Dept: FAMILY MEDICINE | Facility: CLINIC | Age: 45
End: 2020-03-16

## 2020-03-16 NOTE — TELEPHONE ENCOUNTER
"Patient calling stating she called last week and was in ED. She is in very serious back pain. States it is symptom after symptom and \"something is not right.\" States she is in extreme pain.     She requests Dr. Machado deal with this. \"I can wait another day, I've been dealing with this. I want to figure out what is going on. I have swollen lymph nodes, and I'm itchy all over. Bumps on my chest. Everyone thinks I'm making this up. But this is real.\" Questioning biopsy of lymph nodes and states she had weak legs. Also states she lost bladder control.\"     Routing to provider to advise.   "

## 2020-03-16 NOTE — TELEPHONE ENCOUNTER
Reason for Call:  Other prescription    Detailed comments:   Patient is requesting pain meds to help her sleep at night    She states her itching has spread over her entire body     Please see previous note     Phone Number Patient can be reached at: Home number on file [phone] (home)    Best Time: anytime  Can we leave a detailed message on this number? YES    Call taken on 3/16/2020 at 10:28 AM by KYLER DE LA PAZ

## 2020-03-17 ENCOUNTER — VIRTUAL VISIT (OUTPATIENT)
Dept: FAMILY MEDICINE | Facility: CLINIC | Age: 45
End: 2020-03-17
Payer: COMMERCIAL

## 2020-03-17 DIAGNOSIS — G89.4 CHRONIC PAIN SYNDROME: ICD-10-CM

## 2020-03-17 DIAGNOSIS — F41.1 GENERALIZED ANXIETY DISORDER: ICD-10-CM

## 2020-03-17 DIAGNOSIS — F33.0 MILD EPISODE OF RECURRENT MAJOR DEPRESSIVE DISORDER (H): ICD-10-CM

## 2020-03-17 DIAGNOSIS — R10.32 BILATERAL GROIN PAIN: Primary | ICD-10-CM

## 2020-03-17 DIAGNOSIS — R10.31 BILATERAL GROIN PAIN: Primary | ICD-10-CM

## 2020-03-17 PROCEDURE — 99443 ZZC PHYSICIAN TELEPHONE EVALUATION 21-30 MIN: CPT | Performed by: FAMILY MEDICINE

## 2020-03-17 RX ORDER — OXYCODONE AND ACETAMINOPHEN 5; 325 MG/1; MG/1
1 TABLET ORAL
Qty: 6 TABLET | Refills: 0 | Status: SHIPPED | OUTPATIENT
Start: 2020-03-17 | End: 2020-03-24

## 2020-03-17 NOTE — TELEPHONE ENCOUNTER
"Patient states \"I don't know what to do, I am in severe pain.\" Patient states benadryl does not make her sleepy. Patient starts crying and says her back is throbbing and \"I don't know why everyone is making me deal with this.\" Feels like back labor - like I am having a baby.\"     Patient states, \"I'm not asking for a lot of pain medications, I'm just asking for pain medications at night. I am willing to deal with it during the day but not at night.\" Patient continues to say she feels like she is not heard and no one is helping her. Patient states \"I don't understand why he wants me to live with pain. My body is deteriorating and I don't know who to turn to. Him telling me to take benadryl at night is so unfair.\" She is using heat and ice, but states it is uncomfortable to walk.     She mentions following up with [facility] or another group, but does not want to go out right now because of the coronavirus. She is unsure how she will manage over the next few months.     Patient also expresses fatigue/exhaustion even with sleeping 8 hours at night. She relays that she has had many tests but nothing has been found. She expresses frustration that rare conditions aren't being considered at this point and is unsure what she should do next. \"What if there is something going on with my lymph nodes? Why are we ignoring the possible for what could be wrong?\"    Patient states she has gone to ED and they won't look at her symptoms because they rule out anything emergent and send her home.    Does provider have any recommendations for patient? Scheduled long  telephone visit for 3/17 to discuss with PCP.   "

## 2020-03-17 NOTE — PROGRESS NOTES
"Melody uMñoz is a 44 year old female who is being evaluated via a telephone visit.      The patient has been notified of following (by BARRIE Odell CMA       \"We have found that certain health care needs can be provided without the need for a physical exam.  This service lets us provide the care you need with a short phone conversation.  If a prescription is necessary we can send it directly to your pharmacy.  If lab work is needed we can place an order for that and you can then stop by our lab to have the test done at a later time.    This telephone visit will be conducted via 3 way call with the you (the patient) , the physician/provider, and a me all on the line at the same time.  This allows your physician/provider to have the phone conversation with you while I will be taking notes for your medical record.  We will have full access to your [facility] medical record during this entire phone call.    Since this is like an office visit,  will bill your insurance company for this service.  Please check with your medical insurance if this type of telephone/virtual is covered . You may be responsible for the cost of this service if insurance coverage is denied.  The typical cost is $30 (10min), $59(11-20min) and $85 (21-30min)     If during the course of the call the physician/provider feels a telephone visit is not appropriate, you will not be charged for this service\"    Consent has been obtained for this service by care team member: yes.  See the scanned image in the medical record.    S: The history as provided by the patient to the provider during this 3 way call include diffuse pain, problems sleeping, frustration with lack of specific diagnosis    Pertinent parts of the the patient's medical history reviewed and confirmed by the provider included : See below    Total time of call between patient and provider was 20 minutes     Evaristo Machado M.D. (MD " "signature)  ===================================================    I have reviewed the note as documented above.  This accurately captures the substance of my conversation with the patient,    Additional provider notes: I had a 22-minute telephone conversation with the patient about all of her symptoms.  She is extraordinarily frustrated that there is not a specific diagnosis that is leading to her physical ailments.  We did discuss the psychological aspect of that and she is adamant that that has nothing to do with her physical symptoms.  With multiple specialists and ER visits and with me all coming up \"negative\" I still think that a lot of this may have some psychological aspect to it.  The patient is absolutely certain that that has nothing to do with it.  She is seeing her obstetrician/gynecologist tomorrow and will get further testing done for her bilateral groin pain where she is absolutely certain she has adenopathy and would like a biopsy done.    Assessment/Plan:        I put a prescription in for Percocet to take 1 at bedtime quantity #6.  She will follow-up with her obstetrician.  I again reiterated to the patient that I think some of this may be psychological and she is adamant that that is not the issue.        "

## 2020-03-17 NOTE — TELEPHONE ENCOUNTER
She could try taking 25-50 mg of Benadryl at bedtime for sleeping.  I do not think pain medications are appropriate.

## 2020-03-18 ENCOUNTER — TELEPHONE (OUTPATIENT)
Dept: FAMILY MEDICINE | Facility: CLINIC | Age: 45
End: 2020-03-18

## 2020-03-18 PROBLEM — R10.32 BILATERAL GROIN PAIN: Status: ACTIVE | Noted: 2020-03-18

## 2020-03-18 PROBLEM — R10.31 BILATERAL GROIN PAIN: Status: ACTIVE | Noted: 2020-03-18

## 2020-03-18 NOTE — TELEPHONE ENCOUNTER
Reason for Call:  Other call back    Detailed comments: Melody called saying her OB/GYN doctor is advising her to see her primary MD.  Melody is asking for a nurse to return a call to her and advise.     Phone Number Patient can be reached at: Cell number on file:    Telephone Information:   Mobile [phone]       Best Time: any    Can we leave a detailed message on this number? YES    Call taken on 3/18/2020 at 8:48 AM by Mary Rodriguez  ;

## 2020-03-18 NOTE — TELEPHONE ENCOUNTER
Pt called back and said to disregard this message.  She doesn't want to deal with anyone from  or Dr Machado right now.

## 2020-03-19 ENCOUNTER — TELEPHONE (OUTPATIENT)
Dept: FAMILY MEDICINE | Facility: CLINIC | Age: 45
End: 2020-03-19

## 2020-03-19 DIAGNOSIS — R59.1 LYMPHADENOPATHY: Primary | ICD-10-CM

## 2020-03-19 NOTE — TELEPHONE ENCOUNTER
Reason for call:  Patient reporting a symptom    Symptom or request: swollen lymph nodes    Duration (how long have symptoms been present): unsure    Have you been treated for this before? No    Additional comments: PT DOES NOT WANT CALL BACK JUST WANTS DR QUIJANO TO  KNOW.  SHE WILL NOW BE USING THE [facility]. SHE STATES NO ONE  HERE BELIEVES HER.      Phone Number patient can be reached at:  Home number on file [phone] (home)    Best Time:  ANY    Can we leave a detailed message on this number:  YES    Call taken on 3/19/2020 at 10:03 AM by MARIAN FUENTES

## 2020-03-19 NOTE — TELEPHONE ENCOUNTER
Pt was called with general surgeons phone number [phone]. Referral was also faxed to [phone] No further action needed at this time.

## 2020-03-19 NOTE — TELEPHONE ENCOUNTER
"Pt calling the [facility] clinic as she feels that her provider is not addressing her issues and feels that \"this is out of control.\"  She states that her psychiatrist told her her issues are not psychological and her symptoms are fatigue, multiple sinus infections, lumps and bumps growing.  She is complaining of enlarged groin lymph nodes and it hurts to walk some times.  She also has large lymph nodes under arms.      With all the things going on she is not sure what to do at this point or if she needs to go to another clinic or facility.    Pt wants to know if any [facility] provider will do a telephone visit to discuss her lymph nodes or can see her in clinic as she states no one listens to her and her nodes have \"rapidly grown in the past 3 weeks\" and she is worried about infection or some other cause.    Please advise    Ginny Licea RN, BSN    "

## 2020-03-24 ENCOUNTER — TELEPHONE (OUTPATIENT)
Dept: FAMILY MEDICINE | Facility: CLINIC | Age: 45
End: 2020-03-24

## 2020-03-24 DIAGNOSIS — R10.32 BILATERAL GROIN PAIN: ICD-10-CM

## 2020-03-24 DIAGNOSIS — R10.31 BILATERAL GROIN PAIN: ICD-10-CM

## 2020-03-24 RX ORDER — OXYCODONE AND ACETAMINOPHEN 5; 325 MG/1; MG/1
1 TABLET ORAL 2 TIMES DAILY PRN
Qty: 60 TABLET | Refills: 0 | Status: SHIPPED | OUTPATIENT
Start: 2020-03-24 | End: 2020-04-14

## 2020-03-24 NOTE — TELEPHONE ENCOUNTER
Spoke with patient today.     Does not plan to se general surgery.     Plans to see [facility] Dr. HERNÁNDEZ (unsure actual name, but goes by TYREE) to further assess her lymph nodes instead.     Just FYI. No further follow up needed.

## 2020-03-24 NOTE — TELEPHONE ENCOUNTER
She can increase her Percocet to twice a day.  I will send a new prescription to her pharmacy.  I do not see an appointment with general surgery to have a consultation about her swollen lymph nodes.  Please reinforce with her the importance of having someone who has a higher skill set in terms of doing a lymph node exam to get a consultation.

## 2020-03-24 NOTE — TELEPHONE ENCOUNTER
"Pt calling to clinic.    Pt states, \"This is something that needs to be addressed. Dr. ALBERTS really has to work with me on my severe severe pain. Last time he told me it was psychological\".    States she spoke with psychiatrist and psychiatrist told her she is not psychosomatic. States her psychologist also told her she is not psychosomatic.    \"I have been telling him something is wrong for MONTHS. I have been dealing with oncology and she is going to be reviewing all my scans from the hospital visits\".    States, the nurse at the oncology clinic told her this was not normal, and that it has to be checked out. States she went to OB yesterday hoping that she had an infection, and OB told her that she does not have an infection. OB redirected pt back to PCP.     Pt starts being tearful. \"I just don't know what's going on, I have so much pain, pain in my back and pain in my legs. The oncologist is going to try and get me in\".     States she has been taking ibuprofen with tylenol like she was told. States she is not supposed to be taking her ibuprofen because of her IBS.     RN asked what pt needs out of this call, as there are many issues and RN would like to focus on the most important.     Pt states, \"I need my primary to be dealing with my pain management. You know what, you can NOT leave a patient in severe pain. You can NOT just do that to someone. It is not fair. I am not drug seeking, I don't want to be on this pain medication, I know so many other people are having more issues than I am... It is no psychological, it is not mental, it is physical, I am in PHYSICAL pain\".     RN explained to pt that she can check with PCP for pain management. RN further asked pt about recent percocet prescription. Pt agreed with RN that this was a short term script and she finished it yesterday.     Pt states, \"Well because he wanted me to take one a day, and I just can't do that. It's getting worse every single day and I just keep " "waking up with more severe pain in all these different places. I feel like I am coming to this office so many times in tears, and I really haven't asked for much, I feel like I have been very careful, and I will continue to be very careful, and you can tell him that.\"      Pt further states, \"What am I supposed to do? I can't stick with a general practitioner who tells me this is psychological, I need someone who takes me seriously\". Mentions that at this time, she is not able to get in with a new PCP, so she \"has to continue\" with Dr. Machado.    Overall- pt in pain, wants pain meds, does not want to go to ER for severe pain. One percocet a day is not helping her.   "

## 2020-03-26 ENCOUNTER — TELEPHONE (OUTPATIENT)
Dept: SURGERY | Facility: CLINIC | Age: 45
End: 2020-03-26

## 2020-03-26 DIAGNOSIS — G89.4 CHRONIC PAIN SYNDROME: ICD-10-CM

## 2020-03-26 NOTE — TELEPHONE ENCOUNTER
PREGABALIN 150MG CAP         Last Written Prescription Date:  09/23/2019  Last Fill Quantity: 180 capsule,  # refills: 1   Last office visit: 3/17/2020 with prescribing provider:  Jeannette   Future Office Visit:     Next 5 appointments (look out 90 days)    Apr 23, 2020  8:30 AM CDT  (Arrive by 8:15 AM)  Return Visit with KIRAN [facility]o CNP  [facility] [address]  [facility] [id number] [phone]         Routing refill request to provider for review/approval because:  Drug not on the [facility], [facility] or  Health refill protocol or controlled substance

## 2020-03-26 NOTE — TELEPHONE ENCOUNTER
PT called to schedule consult for lymph node bx.  I advised PT we are seeing very few consults and this is something I would need to run by our surgeon.  She was tearful but understanding.  Dr Watt reviewed the chart and determined that this was not a case that needs to be seen at this time.  PT was called and understands this.  MMS

## 2020-03-27 RX ORDER — PREGABALIN 150 MG/1
CAPSULE ORAL
Qty: 180 CAPSULE | Refills: 1 | Status: SHIPPED | OUTPATIENT
Start: 2020-03-27 | End: 2020-11-13

## 2020-03-30 ENCOUNTER — TELEPHONE (OUTPATIENT)
Dept: FAMILY MEDICINE | Facility: CLINIC | Age: 45
End: 2020-03-30

## 2020-03-30 NOTE — TELEPHONE ENCOUNTER
"Patient Contact    Called patient. She would like to know where she should go from here. States, \"He has me taking two oxycodone a day. That is not even helping to take one at a time. I've gotten to the point of not even taking it. What is the point if it won't help?\"     Patient states, \"There is a lot of fear going on because I don't know what is wrong with my body. The lymph nodes are swollen under both arms. My  and mom can feel them. Something is going on. There is a significant amount of pain. It isn't every single day. In the last three days, it has been every single day. It isn't all day long, but majority of the day. It is excruciating. I don't know what he wants me to do. I don't know if people are still going to pain management clinics. I don't know if you can do that without a diagnosis. I can't get diagnosed with anything with the state of the world.\"    States she already followed up with OBGYN. States that \"groin wasn't as swollen that day so no biopsy was ordered. They are the ones that noted the right side swollen area.\"    States she is most worried a bout the swollen groin and lymph nodes and the extreme pain in the last three weeks has only gotten worse. Routing to provider. Patient wants to know what her next steps are.              "

## 2020-03-30 NOTE — TELEPHONE ENCOUNTER
Reason for Call:  Other call back    Detailed comments: The patient called and stated that she would like Dr. Brooke's nurse to call her back with some advice on what to do.  She stated that the pain regimen that Dr. Machado has her on is not working and she is still experiencing pain.      Phone Number Patient can be reached at: Home number on file [phone] (home)    Best Time: Any    Can we leave a detailed message on this number? YES    Call taken on 3/30/2020 at 3:41 PM by Melody Arboleda

## 2020-03-31 NOTE — TELEPHONE ENCOUNTER
I already suggested that she see general surgery for a lymph node examination and she was going to do that through hematology/oncology.  Has she made that appointment?

## 2020-03-31 NOTE — TELEPHONE ENCOUNTER
Patient Contact    Attempt # 1    Was call answered?  No.  Left message on voicemail with information to call me back.    On call back:    -Relay provider message

## 2020-04-01 NOTE — TELEPHONE ENCOUNTER
Left voice message asking pt to call triage back. On call back,please review providers message with pt.

## 2020-04-01 NOTE — TELEPHONE ENCOUNTER
Left a detailed voice message with providers response. Asked pt to call triage back if further questions.

## 2020-04-01 NOTE — TELEPHONE ENCOUNTER
Patient Contact    Patient called back. Relayed provider message She states her GI doctor told her not to take ibuprofen at all. She states she will try a smaller dose of 400 mg only two times a day.     Routing to provider as JEAN CARLOS. Are you okay with her taking ibuprofen at this dose even though GI said not to take?

## 2020-04-01 NOTE — TELEPHONE ENCOUNTER
"Pt reports she called oncology and they will not see her. They are not doing biopsies now due to corona virus. Nurse at oncology said her scans looked fine and she didn't have cancer. She is questioning that since \"they didn't scan all my body\".  Pt complains of bilateral axillary lymph nodes for over a week. She  Reported this to gynecologist but nothing was done as MD was in a rush to see other patients. Pt reports her breast are tender and sore. She also expressed concern of oral cancer due to a lump on her left earlobe under chin, lips are burning. Pt wants to know  what she should do about for pain management. She doesn't want to take more than two percocet daily. Should pt schedule a phone visit to discuss?  "

## 2020-04-03 ENCOUNTER — TELEPHONE (OUTPATIENT)
Dept: FAMILY MEDICINE | Facility: CLINIC | Age: 45
End: 2020-04-03

## 2020-04-03 NOTE — TELEPHONE ENCOUNTER
"Pt calling in to clinic.    States the first time she went into the ER her legs were going numb.    Today she is calling in because she is having severe pain.     \"I am about ready to bring my  and my mother in to talk with Dr. Machado because they see me and they see how much pain I am in\"    \"I have had an MRI of the spine and was referred to spinal surgery. They found normal wear and tear and spinal stenosis. There is a lot of things they... I mean... they didn't do contrast dye... I mean, no one is looking at these symptoms very aggressively.\"    Pt starts crying.    \"I know with the severe back pain and numbness it is an emergent situation... I'm NOT going to the ER\".    RN states, \"with new onset numbness and pain we need you to go to the ER\".    \"Ok but this is NOT new onset numbness! I had to go to the ER and advocate for myself to get these scans done\". Pt continues to state that there are areas of the body which scans can not see. States there is pain on her pubic area which hurts more.    \"I'm sorry, but the percocet just doesn't do anything.\" Begins crying again. \"These pain meds make me so crabby, but at the same time I can not be in this pain!\"    \"This needs to be dealt with, even in the midst of COVID. It's just not fair to leave someone in pain like this\".     RN asked pt what clinic needs to do to get her through the weekend.    \"I don't know. I just don't know. You have doctors there. I am just a patient. I'm just a normal patient. What would a normal doctor do for a patient who is having horrible horrible back pain and leg numbness?\".     RN relayed to pt that clinic has exhausted options about what clinic can do for pt. Explained this is why pt was referred to different specialties.    Pt states that she has NOT been referred by this clinic anywhere.    Pt proceeds to say that she is in a lot of pain. States her ibuprofen is not working for her and her percocet 2 times a day. She wants " "an approval to take her percocet 3 times a day. \"I'm not going to do this until I get a doctor's approval\"  "

## 2020-04-06 ENCOUNTER — TELEPHONE (OUTPATIENT)
Dept: FAMILY MEDICINE | Facility: CLINIC | Age: 45
End: 2020-04-06

## 2020-04-06 NOTE — TELEPHONE ENCOUNTER
Pt called reporting she has numbness tingling in her arms,has a smokers cough, edema in her legs, arms and face. She took a shower and her skin hurt with water pressure. Denies chest pain, breathing problems dizziness. She has a phone visit with oncology tomorrow. Pt asked if she should stop percocet because is not helping her pain.     Pt was advised was given providers message below. She questioned who she should see at [facility] for a consult. She agreed to try taking 3 percocet tablets daily and follow up with oncology tomorrow 04/07/2020.    Please advice if any other directives.           I still think she needs to see surgery or get a consult at the [facility] as I am out of ideas.  She can try 3 percocet daily for a week and let us know.

## 2020-04-06 NOTE — TELEPHONE ENCOUNTER
I left a voice mail for the patient regarding increasing Percocet to 3 tabs per day, but no more than than and to call back with update in one week. She was also advised to follow up with surgery consult as directed by provider. Advised to call back with questions or concerns.    Hannah Sood LPN

## 2020-04-06 NOTE — TELEPHONE ENCOUNTER
Reason for Call:  Other call back    Detailed comments: The patient called and stated she has a phone visit with her oncology doctor this week but she has been having a lot of pain. She stated that narcotics are not even helping with her pain. She is wondering what Dr. Machado would suggest to help. She would like a call back to discuss this.      Phone Number Patient can be reached at: Cell number on file:    Telephone Information:   Mobile [phone]       Best Time: Any    Can we leave a detailed message on this number? YES    Call taken on 4/6/2020 at 8:12 AM by Melody Espinoza

## 2020-04-06 NOTE — TELEPHONE ENCOUNTER
I still think she needs to see surgery or get a consult at the [facility] as I am out of ideas.  She can try 3 percocet daily for a week and let us know.

## 2020-04-06 NOTE — TELEPHONE ENCOUNTER
Spoke with patient.     Before able to introduce self and clinic patient asked if I was Jim. She would only like to speak with Jim at this time.     Patient was told that triage nurse was not available at this time and asked what we could help her with today.     States she would prefer to wait for Jim, and to get her a message to call back.

## 2020-04-07 ENCOUNTER — TELEPHONE (OUTPATIENT)
Dept: FAMILY MEDICINE | Facility: CLINIC | Age: 45
End: 2020-04-07

## 2020-04-07 ENCOUNTER — TRANSFERRED RECORDS (OUTPATIENT)
Dept: HEALTH INFORMATION MANAGEMENT | Facility: CLINIC | Age: 45
End: 2020-04-07

## 2020-04-07 ENCOUNTER — DOCUMENTATION ONLY (OUTPATIENT)
Dept: SURGERY | Facility: CLINIC | Age: 45
End: 2020-04-07

## 2020-04-07 NOTE — PROGRESS NOTES
[facility]  Pre-visit chart review    Patient's chart extensively reviewed. Patient has been having waxing and waning subjective lymphadenopathy of her bilateral groins and axillae for 2 months. These areas are painful for her as are many other areas of her body, including bilateral legs, pelvis, back, low abdomen, and chest. She has had multiple imaging modalities including CT scans of her neck, chest, abdomen and pelvis as well as an ultrasound of the right groin. On my personal review of these scans, all lymph node basins appear normal with the exception of perhaps 1 oblong lymph node in her right groin, which by my measurement is 19 mm x 11 mm x 8 mm.    She has many CT scans through the years for comparison and all visible lymph nodes in the axilla and groins have been the exact same size as far back as 2014 (axilla) and 2010 (groin), including the 19 mm lymph node in the right groin. With imaging as stable as this, my concern for underlying malignancy, or even infection, is extremely low.     Since all the lymph node basins in question have been imaged by CT and are completely stable in size for nearly a decade, I do not believe that my physical examination of the patient will provide any new or better information on the size of her lymph nodes.     I would not offer an excisional biopsy to this patient unless specifically requested by an oncologist. This is especially true given the ongoing pandemic and our restrictions for operating room use at the present time.     Ania Dyer MD  General Surgery

## 2020-04-07 NOTE — TELEPHONE ENCOUNTER
Reason for Call:  Other referral    Detailed comments: The patient called and stated that she will not be able to see the general surgeon due to them being selective of who they are seeing right now with the COVID-19 outbreak.  The patient is hoping that Dr. Machado will call on her behalf to see if he can persuade the surgeon to perform the biopsy.  She stated that even though there is a virus outbreak she still needs help.      Phone Number Patient can be reached at: Home number on file [phone] (home)    Best Time: Any    Can we leave a detailed message on this number? YES    Call taken on 4/7/2020 at 12:37 PM by Melody Arboleda

## 2020-04-07 NOTE — TELEPHONE ENCOUNTER
Please see chart review from general surgery in [place].  Scans have been stable for almost a decade and they do not see any reason to do any surgeries or biopsies at the present time.

## 2020-04-07 NOTE — TELEPHONE ENCOUNTER
Dr. ALBERTS-    Please advise.   I know they are obviously selective of who the clinic is seeing.  What other options do we have?

## 2020-04-08 NOTE — TELEPHONE ENCOUNTER
Pt called back  and she feels that Dr Machado is not listening to her.  Pt states that her legs and arms should not be numb and that there is something wrong.  Pt feels that her needs are not being adressed.  Pt states that when all this Covid stuff is over she will find a new Dr.

## 2020-04-09 ENCOUNTER — TELEPHONE (OUTPATIENT)
Dept: ENDOCRINOLOGY | Facility: CLINIC | Age: 45
End: 2020-04-09

## 2020-04-09 NOTE — TELEPHONE ENCOUNTER
"Spoke with patient.  Has on-going swelling \"all over her body\"--not pitting edema per patient.  See the various telephone encounters and office visit from primary care provider and general surgery.    Having leg numbness in thighs (has on-going back pain).    Has had multiple ER visits (see dictations).    Has many issues going on.  States \"something doesn't feel right in my body.\"    Please call patient.  "

## 2020-04-09 NOTE — TELEPHONE ENCOUNTER
General Call:   Who is calling:  Melody  Reason for Call:  Patient has been working with her primary provider to figure out why she is swollen everywhere, but isn't getting anywhere. Patient is requesting for Tammy Louie to take a look and see what is the next step.  Date of last appointment with provider: 01/23/2020 Tammy Louie  Okay to leave a detailed message:Yes at Home number on file [phone] (home)     Marge Turner

## 2020-04-10 NOTE — TELEPHONE ENCOUNTER
Please triage this call - it does not sound like an endo-specific issue based on information provided.  If you fell it is endo related, you can route back to me.  Thanks,  Tammy Louie NP  Endocrinology

## 2020-04-13 ENCOUNTER — APPOINTMENT (OUTPATIENT)
Dept: CT IMAGING | Facility: CLINIC | Age: 45
End: 2020-04-13
Attending: PHYSICIAN ASSISTANT
Payer: COMMERCIAL

## 2020-04-13 ENCOUNTER — NURSE TRIAGE (OUTPATIENT)
Dept: NURSING | Facility: CLINIC | Age: 45
End: 2020-04-13

## 2020-04-13 ENCOUNTER — TELEPHONE (OUTPATIENT)
Dept: FAMILY MEDICINE | Facility: CLINIC | Age: 45
End: 2020-04-13

## 2020-04-13 ENCOUNTER — HOSPITAL ENCOUNTER (EMERGENCY)
Facility: CLINIC | Age: 45
Discharge: HOME | End: 2020-04-13
Attending: PHYSICIAN ASSISTANT | Admitting: PHYSICIAN ASSISTANT
Payer: COMMERCIAL

## 2020-04-13 VITALS
DIASTOLIC BLOOD PRESSURE: 73 MMHG | SYSTOLIC BLOOD PRESSURE: 104 MMHG | RESPIRATION RATE: 18 BRPM | OXYGEN SATURATION: 96 % | TEMPERATURE: 98.3 F | HEART RATE: 84 BPM

## 2020-04-13 DIAGNOSIS — M79.89 SOFT TISSUE MASS: ICD-10-CM

## 2020-04-13 DIAGNOSIS — E11.65 UNCONTROLLED TYPE 2 DIABETES MELLITUS WITH HYPERGLYCEMIA (H): Primary | ICD-10-CM

## 2020-04-13 DIAGNOSIS — M54.9 BACK PAIN: ICD-10-CM

## 2020-04-13 DIAGNOSIS — R10.32 BILATERAL GROIN PAIN: ICD-10-CM

## 2020-04-13 DIAGNOSIS — R10.31 BILATERAL GROIN PAIN: ICD-10-CM

## 2020-04-13 DIAGNOSIS — R20.2 PARESTHESIAS: ICD-10-CM

## 2020-04-13 DIAGNOSIS — R51.9 HEADACHE: ICD-10-CM

## 2020-04-13 DIAGNOSIS — N32.89 BLADDER SPASM: ICD-10-CM

## 2020-04-13 LAB
ALBUMIN UR-MCNC: NEGATIVE MG/DL
APPEARANCE UR: CLEAR
BACTERIA #/AREA URNS HPF: ABNORMAL /HPF
BILIRUB UR QL STRIP: NEGATIVE
COLOR UR AUTO: YELLOW
GLUCOSE UR STRIP-MCNC: NEGATIVE MG/DL
HGB UR QL STRIP: NEGATIVE
KETONES UR STRIP-MCNC: NEGATIVE MG/DL
LEUKOCYTE ESTERASE UR QL STRIP: NEGATIVE
MUCOUS THREADS #/AREA URNS LPF: PRESENT /LPF
NITRATE UR QL: NEGATIVE
PH UR STRIP: 6.5 PH (ref 5–7)
RBC #/AREA URNS AUTO: <1 /HPF (ref 0–2)
SOURCE: ABNORMAL
SP GR UR STRIP: 1 (ref 1–1.03)
SQUAMOUS #/AREA URNS AUTO: 1 /HPF (ref 0–1)
UROBILINOGEN UR STRIP-MCNC: NEGATIVE MG/DL (ref 0–2)
WBC #/AREA URNS AUTO: <1 /HPF (ref 0–5)

## 2020-04-13 PROCEDURE — 25000132 ZZH RX MED GY IP 250 OP 250 PS 637: Performed by: PHYSICIAN ASSISTANT

## 2020-04-13 PROCEDURE — 96372 THER/PROPH/DIAG INJ SC/IM: CPT

## 2020-04-13 PROCEDURE — 99285 EMERGENCY DEPT VISIT HI MDM: CPT | Mod: 25

## 2020-04-13 PROCEDURE — 25000128 H RX IP 250 OP 636: Performed by: PHYSICIAN ASSISTANT

## 2020-04-13 PROCEDURE — 81001 URINALYSIS AUTO W/SCOPE: CPT | Performed by: PHYSICIAN ASSISTANT

## 2020-04-13 PROCEDURE — 72131 CT LUMBAR SPINE W/O DYE: CPT

## 2020-04-13 RX ORDER — OXYCODONE HYDROCHLORIDE 5 MG/1
5 TABLET ORAL ONCE
Status: COMPLETED | OUTPATIENT
Start: 2020-04-13 | End: 2020-04-13

## 2020-04-13 RX ORDER — CYCLOBENZAPRINE HCL 10 MG
10 TABLET ORAL ONCE
Status: COMPLETED | OUTPATIENT
Start: 2020-04-13 | End: 2020-04-13

## 2020-04-13 RX ADMIN — OXYCODONE HYDROCHLORIDE 5 MG: 5 TABLET ORAL at 21:11

## 2020-04-13 RX ADMIN — CYCLOBENZAPRINE HYDROCHLORIDE 10 MG: 10 TABLET, FILM COATED ORAL at 21:11

## 2020-04-13 RX ADMIN — HYDROMORPHONE HYDROCHLORIDE 1 MG: 1 INJECTION, SOLUTION INTRAMUSCULAR; INTRAVENOUS; SUBCUTANEOUS at 21:53

## 2020-04-13 ASSESSMENT — ENCOUNTER SYMPTOMS
FEVER: 0
SORE THROAT: 0
WHEEZING: 0
VOMITING: 0
CHILLS: 0
BACK PAIN: 1
RHINORRHEA: 0
DIAPHORESIS: 0
HEADACHES: 1
NUMBNESS: 1
COUGH: 0

## 2020-04-13 NOTE — TELEPHONE ENCOUNTER
Has been getting more and more back pain - and swollen lymph nodes under both arms. Reports severe pain up spine - feels like it contracts around tummy - making both legs numb. Reports pain level 8/10 - patient doesn't appear to be thinking clearly on the phone.    Per protocol, advised ED evaluation - Ivette Bloom - patient and  state that they are afraid to go to the hospital. Advised them that every precaution would be taken - however, that is the advice and they can proceed per their wishes. Unclear if they will go in or not.    Martina Davidson RN on 4/13/2020 at 6:57 PM      Reason for Disposition    [1] SEVERE back pain (e.g., excruciating, unable to do any normal activities) AND [2] not improved 2 hours after pain medicine    Additional Information    Negative: Passed out (i.e., lost consciousness, collapsed and was not responding)    Negative: Shock suspected (e.g., cold/pale/clammy skin, too weak to stand, low BP, rapid pulse)    Negative: Sounds like a life-threatening emergency to the triager    Negative: Major injury to the back (e.g., MVA, fall > 10 feet or 3 meters, penetrating injury, etc.)    Negative: Followed a tailbone injury    Negative: [1] Pain in the upper back over the ribs (rib cage) AND [2] radiates (travels, goes) into chest    Negative: [1] Pain in the upper back over the ribs (rib cage) AND [2] worsened by coughing (or clearly increases with breathing)    Negative: Back pain during pregnancy    Negative: Pain mainly in flank (i.e., in the side, over the lower ribs or just below the ribs)    Negative: [1] SEVERE back pain (e.g., excruciating) AND [2] sudden onset AND [3] age > 60    Negative: [1] Unable to urinate (or only a few drops) > 4 hours AND     [2] bladder feels very full (e.g., palpable bladder or strong urge to urinate)    Negative: [1] Urinary or bowel incontinence (i.e., loss of bladder or bowel control) AND [2] new onset    Negative: Numbness in groin or rectal area  (i.e., loss of sensation)    Negative: [1] SEVERE abdominal pain AND [2] present > 1 hour    Negative: [1] Abdominal pain AND [2] age > 60    Negative: Weakness of a leg or foot (e.g., unable to bear weight, dragging foot)    Negative: Unable to walk    Negative: Patient sounds very sick or weak to the triager    Protocols used: BACK PAIN-A-AH

## 2020-04-13 NOTE — TELEPHONE ENCOUNTER
Please find out if she had this problem before when she took the Trulicity?  I don't recall her reporting any hives at the injections site.  Can you find out if it is just redness around the injection site or does it itch too.  Is it painful?  How long does it last?  Is it getting better, worse, or about the same with each subsequent injection?  How much does it bother her?  Let me know.  Thanks,  Tammy Louie NP  Endocrinology

## 2020-04-13 NOTE — TELEPHONE ENCOUNTER
Triage spoke with patient.     Patient stated pain has helped some since she has increased the percocet to TID;,    Patient reports with the Percocernt she will take 2 tablets of IBU (400mg) in am and at night.  [place] medication regimen reduces the pain enough to help her sleep and breath comfortably. It is helping more than the past BID dosing she reports using.     Patient will be hoping to see an Internal Med provider within [facility] location (Patient stated she is going to call to set up an appointment)/ Also  [facility]    Patient states she will require a new prescription by Wednesday, since she has increased the dose last Monday.    Percocet  Last Written Prescription Date:  3/24/2020  Last Fill Quantity: 60,   # refills: 0  Last Office Visit: 3/17/2020-Virtual Visit with Dr. aMchado  Future Office visit:    Next 5 appointments (look out 90 days)    Apr 23, 2020  8:30 AM CDT  (Arrive by 8:15 AM)  Return Visit with KIRAN Carballo [facility] ([facility]) [address]  [facility] [id number] [phone]           Routing refill request to provider for review/approval because:  Drug not on the [facility], [facility] or  Health refill protocol or controlled substance      Mayuri ROUSSEAU, RN, PHN

## 2020-04-13 NOTE — TELEPHONE ENCOUNTER
Called pt, informs:    ~last 3 injections experienced localized reaction, couple area of hives  ~itchy starts pretty soon after giving injection  ~informs  1/4 of stomach area red and itchy  ~lasted couple of days and resolved, returns with next injection    Pt had to leave call due to spine pain issue, routed to , please advise, route for call back to inform    Next 5 appointments (look out 90 days)    Apr 23, 2020  8:30 AM CDT  (Arrive by 8:15 AM)  Return Visit with KIRAN Carballo CNP  [facility] [address] [facility] [address]  [phone]            Yoselin Roberts, RN, BSN  Message handled by Nurse Triage.

## 2020-04-13 NOTE — ED AVS SNAPSHOT
[facility]  [address]  [facility] [address]  Phone:  [phone]  Fax:  [phone]                                    Melody Muñoz   MRN: [id number]    Department:  [facility]   Date of Visit:  4/13/2020           After Visit Summary Signature Page    I have received my discharge instructions, and my questions have been answered. I have discussed any challenges I see with this plan with the nurse or doctor.    ..........................................................................................................................................  Patient/Patient Representative Signature      ..........................................................................................................................................  Patient Representative Print Name and Relationship to Patient    ..................................................               ................................................  Date                                   Time    ..........................................................................................................................................  Reviewed by Signature/Title    ...................................................              ..............................................  Date                                               Time          22EPIC Rev 08/18

## 2020-04-13 NOTE — TELEPHONE ENCOUNTER
21 minutes spent on phone with patient     S-(situation): phone call to patient to get more information per Tammy Louie CNP below     B-(background): swollen groin nodes, under arm nodes, has had scan's however nothing showing up and patient feels that she is not getting the help she needs     A-(assessment): swelling nodules/breast area/sternum   Lymph nodes in groin and under arms swollen   Recurrent sinus infection symptoms, feels like taking antibiotics are useless because ends up with another infection right away   No fever  No shortness of breath   Generalized weakness   Pain above knees, even water from shower is painful - feels like she is losing muscle tone     Patient feels that her pcp is not helping her, she sent a note to [facility] last night and hoping to get in with IM provider there.   pcp advised patient that symptoms may be psycological (per patient, she states she does have PTSD)   Patient has spoken with oncology regarding symptoms as well, patient notes symptoms have been going on for over a year but seem to be worsening     R-(recommendations): advised patient to schedule visit with new provider within [facility] as she no longer would like to continue with previous pcp, RN suggested patient schedule with IM provider as that is who she would like to see at [facility] and unclear if patient will be accepted by [facility] she agrees to this     Danny Worthy Nurse   [facility]

## 2020-04-14 ENCOUNTER — TELEPHONE (OUTPATIENT)
Dept: FAMILY MEDICINE | Facility: CLINIC | Age: 45
End: 2020-04-14

## 2020-04-14 RX ORDER — OXYCODONE AND ACETAMINOPHEN 5; 325 MG/1; MG/1
1 TABLET ORAL 3 TIMES DAILY PRN
Qty: 90 TABLET | Refills: 0 | Status: SHIPPED | OUTPATIENT
Start: 2020-04-15 | End: 2020-05-06

## 2020-04-14 NOTE — ED TRIAGE NOTES
Here for lower mid back pain ongoing for a while and getting worse, pain radiating up spine, headache, and bilateral leg numbness. Denies any loss of bowel/bladder control. Took Ibuprofen at 2pm but did not help. ABCs intact.

## 2020-04-14 NOTE — ED PROVIDER NOTES
"  History     Chief Complaint:  Back Pain      The history is provided by the patient.      Melody Muñoz is a 44 year old female who presents with back pain. She states this has been occurring for a while but is getting worse and feels different daily. The pain is concentrated to her spine and shoots up to the neck. She is also experiencing headaches and numbness and tingling in the entirety of her legs. She states her legs \"feel like jell-o\" and the numbness has never lasted this long. She also notes she frequently feels the need to urinate and believes these are bladder spasms which have been occurring for months. She believes the lymph nodes in her armpits are swollen and would like these examined. She denies any recent trauma or injury but \"just doesn't feel right\". She denies fever, chills, sweats, runny nose, sore throat, coughing, wheezing, vomiting, diarrhea, or blood in the urine. She did not drive herself into the ED today.     Allergies:  Hydrocodone  Tamiflu  Tylenol w/codeine     Medications:    Albuterol   Axert  Fioricet/ESGIC  Klonopin  Dextroamphetamine  Trulicity  Flonase  Tenex  Lamotrigine  Latuda  Levothyroxine  Ondansetron  Percocet  Protonix  Lyrica  Tompamax  Trazodone  Venlafaxine  Zyprexa  Zoloft  Depakote ER  Ativan  Inituniv  Provigil  Ortho tri-cyclen  Norco    Past Medical History:    Endometriosis  Latent TB  Depression  ROWDY  Constipation  Bipolar 1 disorder w/psychosis  Chronic fatigue  Female stress incontinence  Suicidal ideation  Chronic pain syndrome  Insomnia  Migraine   JASWANT  Elevated liver enzymes  TMJ  Hypothyroidism  Asthma  Type 2 diabetes  Sepsis  Abdominal pain  Morbid obesity  Chronic bronchitis  Hyperlipidemia   Dysuria  IBS  Recurrent sinus infections  Vertigo  Substance abuse     Past Surgical History:     section  Colonoscopy  Laparoscopic appendectomy  Laparoscopy  Finger surgery, left little finger  Sling bladder NOS  Bilateral tubal ligation  Zurich " teeth removal    Family History:    Hypertension  Heart disease  Depression  Anxiety disorder  Bipolar disorder  Autism spectrum disorder    Social History:  Former smoker  Not significant for alcohol use  Marital Status:   [2]    Review of Systems   Constitutional: Negative for chills, diaphoresis and fever.   HENT: Negative for rhinorrhea and sore throat.    Respiratory: Negative for cough and wheezing.    Gastrointestinal: Negative for vomiting.   Genitourinary: Positive for urgency.   Musculoskeletal: Positive for back pain.   Neurological: Positive for numbness and headaches.   All other systems reviewed and are negative.    Physical Exam     Patient Vitals for the past 24 hrs:   BP Temp Temp src Pulse Heart Rate Resp SpO2   04/13/20 1940 (!) 128/91 98.3  F (36.8  C) Oral 103 103 18 100 %     Physical Exam  Vitals signs reviewed.   Constitutional:       General: She is not in acute distress.     Appearance: She is not diaphoretic.   HENT:      Head: Atraumatic.      Mouth/Throat:      Pharynx: No oropharyngeal exudate.   Eyes:      General: No scleral icterus.     Pupils: Pupils are equal, round, and reactive to light.   Cardiovascular:      Rate and Rhythm: Regular rhythm.      Pulses: Normal pulses.   Pulmonary:      Effort: Pulmonary effort is normal. No respiratory distress.   Abdominal:      Palpations: Abdomen is soft.      Tenderness: There is no abdominal tenderness.   Musculoskeletal:         General: No tenderness.      Comments: Baseline ROM, strength, sensation lumbar spine, BLE at all major joints through all cardinal motion   Skin:     General: Skin is warm.      Findings: No rash.   Neurological:      Mental Status: She is alert.      Comments: Unremarkable Babinski reflex bilaterally. Normal sensation BLE       Emergency Department Course     Imaging:  Radiology findings were communicated with the patient who voiced understanding of the findings.    Lumbar Spine CT w/o Contrast:   1. No  high-grade spinal canal or foraminal stenosis.   2. Mild disc bulge at L5-S1    Laboratory:  Laboratory findings were communicated with the patient who voiced understanding of the findings.    UA with micro: specific gravity 1.000 (L), bacteria moderate, mucous urine present, o/w negative      Interventions:  2111 Roxicodone 5 mg PO   Flexeril 10 mg PO  2153 Hydromorphone 1 mg IM    Emergency Department Course:  Past medical records, nursing notes, and vitals reviewed.    2020 I performed an exam of the patient as documented above.     The patient provided a urine sample here in the emergency department. This was sent for laboratory testing, findings above.  The patient was sent for a CT while in the emergency department, results above.     2139 I rechecked the patient and discussed the results of her workup thus far.     Findings and plan explained to the Patient. Patient discharged home with instructions regarding supportive care, medications, and reasons to return. The importance of close follow-up was reviewed.    I personally reviewed the laboratory and imaging results with the Patient and answered all related questions prior to discharge.     Impression & Plan     Medical Decision Making:  She presents for evaluation of multiple complaints. This appears to be a chronic process. Upon multiple evaluations and extensive questioning all of her symptoms appear chronic. She has undergone evaluation for these same symptoms in the past undergoing CT, US, and MRI without appreciable findings. She does voice today having symptoms for an extended duration however she does voice these symptoms in the past and on phone notes. CT obtained again today of lumbar spine to evaluate for obvious pathology. This does not clinically appear most c/w spinal epidural abscess, cauda equina syndrome, or any acute pathology given duration of symptoms. MRI lumbar spine, US pelvis, and CT imaging from the last 45 days were reviewed. Her  physical examination is unremarkable. She voices having sensation of soft tissue densities throughout her body, on initial evaluation stating L axilla, and on repeat evaluation throughout both of her legs. There is no appreciable density noted in her areas of concern.  Advised of need for outpatient evaluation, analgesia provided in ED and discharged.      Critical Care Time: was 0 minutes for this patient excluding procedures    Diagnosis:    ICD-10-CM    1. Back pain  M54.9    2. Bladder spasm  N32.89    3. Paresthesias  R20.2    4. Headache  R51    5. Soft tissue mass  M79.89      Disposition:  Discharged to home.    Scribe Disclosure:  I, Anna Coppola, am serving as a scribe at 8:29 PM on 4/13/2020 to document services personally performed by Jeremy Contreras PA-C based on my observations and the provider's statements to me.       Jeremy Contreras PA-C  04/13/20 3038

## 2020-04-14 NOTE — TELEPHONE ENCOUNTER
"Routing to PCP:     1. Lab Result Clarification  Call from patient. She states she was in ER yesterday, and she was looking at her AVS. She saw \"mucous urine\" and is wanting to know what this means.  Provider to advise if any action is needed based on this. Is this simply noting that mucous was present in the urine?     Mucous Urine  Present  Abnormal    NEG^Negative     2. FYI   She wants Dr. Machado to be aware that the ER doctor noted a mass/growth on her leg/shin. She states this is consistent with the growth on her chest that Dr. ALBERTS is aware of. Per chart review, RN unable to find this documented in ED note.     3. Medication Clarification  She wants to clarify if she can take carisoprodol (SOMA) with her oxycodone-acetaminophen (PERCOCET). She is currently not taking them together, but is continuing to have pain. Please advise.    Per patient, pelvic pain has changed from \"on and off\" to now being constant. States this changed yesterday and it is now constant pain.States the medication is helping \"to the degree that it helps me function. It helps the kids not notice that I am in pain.\"       "

## 2020-04-14 NOTE — TELEPHONE ENCOUNTER
The mucus in the urine is incidental finding and does not need any treatment.  The soma is strictly a muscle relaxant and should not do anything for helping with her pain.

## 2020-04-14 NOTE — TELEPHONE ENCOUNTER
Call from patient today.     Gave update from ED visit last night:  Was in the ED last night do to extreme pain. CT showed bulging disc close to a nerve.     Wants to make sure that she stays on top of the pain because if she is not taking her medications scheduled than she is in extreme pain.     Will talk to her counselor on how to manage the worry and pain later this week for her appointment.     Main follow up question from patient. Wanted to make sure that medication was increased from 2 pills to 3 pills and also that the order was sent to her pharmacy to start tomorrow 4/15/2020.     She was told that this task was completed.

## 2020-04-15 ENCOUNTER — TELEPHONE (OUTPATIENT)
Dept: FAMILY MEDICINE | Facility: CLINIC | Age: 45
End: 2020-04-15

## 2020-04-15 DIAGNOSIS — R10.31 BILATERAL GROIN PAIN: ICD-10-CM

## 2020-04-15 DIAGNOSIS — R10.32 BILATERAL GROIN PAIN: ICD-10-CM

## 2020-04-15 DIAGNOSIS — E11.65 UNCONTROLLED TYPE 2 DIABETES MELLITUS WITH HYPERGLYCEMIA (H): ICD-10-CM

## 2020-04-15 RX ORDER — SEMAGLUTIDE 1.34 MG/ML
INJECTION, SOLUTION SUBCUTANEOUS
Qty: 1 PEN | Refills: 2 | Status: CANCELLED | OUTPATIENT
Start: 2020-04-15

## 2020-04-15 RX ORDER — OXYCODONE AND ACETAMINOPHEN 5; 325 MG/1; MG/1
1 TABLET ORAL 3 TIMES DAILY PRN
Qty: 90 TABLET | Refills: 0 | OUTPATIENT
Start: 2020-04-15

## 2020-04-15 RX ORDER — OXYCODONE AND ACETAMINOPHEN 5; 325 MG/1; MG/1
1 TABLET ORAL 3 TIMES DAILY PRN
Qty: 90 TABLET | Refills: 0 | Status: CANCELLED | OUTPATIENT
Start: 2020-04-15

## 2020-04-15 RX ORDER — SEMAGLUTIDE 1.34 MG/ML
INJECTION, SOLUTION SUBCUTANEOUS
Qty: 1 PEN | Refills: 2 | Status: SHIPPED | OUTPATIENT
Start: 2020-04-15 | End: 2020-04-22

## 2020-04-15 NOTE — TELEPHONE ENCOUNTER
Reason for Call: Request for an order or referral:  Order or referral being requested: CT SCAN FOR UNDER BOTH ARM  Date needed: as soon as possible  Has the patient been seen by the PCP for this problem? YES  Additional comments: PLEASE CALL PATIENT WHEN ORDER IS IN FOR THE CT SCAN  Phone number Patient can be reached at:  Home number on file [phone] (home)  Best Time:  ANY  Can we leave a detailed message on this number?  YES  Call taken on 4/15/2020 at 12:17 PM by BREANN WARREN

## 2020-04-15 NOTE — TELEPHONE ENCOUNTER
[facility] that this was sent to earlier today is completely shut down due to a covid exposure for cleaning.  Needs script sent to different [facility].

## 2020-04-15 NOTE — TELEPHONE ENCOUNTER
Pt reports [facility] pharmacy was closed temporarily due to Covid related insident and needs Rx's ( Percocet and Semaglutide) resent to [facility]'s. Please advice on rx approval. Medication and pharmacy pended.

## 2020-04-15 NOTE — TELEPHONE ENCOUNTER
I recommend we try changing to a different GLP-1.  I sent a new RX for Ozempic to her pharmacy but I don't know if it is on her formulary.  Please let her know.  If she has the same reaction to Ozempic, have her let us know.  If Ozempic isn't on her formulary, maybe she can check to see what else is covered (Bydureon, Victoza).  Thanks,  Tammy Louie NP  Endocrinology

## 2020-04-15 NOTE — TELEPHONE ENCOUNTER
I do not see anything in the patient's chart that would suggest a CT scan of the chest or axilla is necessary.

## 2020-04-15 NOTE — TELEPHONE ENCOUNTER
Noted. Waiting for providers response re: Rx refill see previous encounter. We can give pt providers during call back.

## 2020-04-16 ENCOUNTER — TELEPHONE (OUTPATIENT)
Dept: FAMILY MEDICINE | Facility: CLINIC | Age: 45
End: 2020-04-16

## 2020-04-16 NOTE — TELEPHONE ENCOUNTER
Pt clarified she is requesting order for ultrasound and asked if she can schedule a phone visit with PCP 04/20/20.  She has multiple symptoms that she feels need to be addressed and doesn't feel she is being heard. Wants ultrasound to have general surgery take her symptoms seriously and know what to do about her lymph nodes. She has pain in her legs, arms elbows, jaw, cheek and right eye socket. She wants this symptoms to be documented. ER doctor said lumps were more likely hormonal related. She has a phone appt scheduled with eye doctor to discuss vision problems. Pt states her symptoms are not psychological and wasn't to discuss with PCP. Please advice on approval for phone visit.

## 2020-04-16 NOTE — TELEPHONE ENCOUNTER
"Patient notified of message from Tammy, she was notified by pharmacy that medication was completed so she feels it was covered.   If not covered or has reaction will return call to clinic     Patient rambles on about her eye swelling and pain, she has been seen in the ER and pcp for \"growths\" and has been advised nothing is wrong. She is waiting for a visit with [facility] - Advised patient with diabetes, pain, blurry vision and swelling to call eye doctor today to discuss. Patient plans on calling Perufabian Muñoz Registered Nurse   [facility]     "

## 2020-04-16 NOTE — TELEPHONE ENCOUNTER
Melody called to say the [facility] that was previously closed is now open.      Melody said no prescriptions need to be sent elsewhere.    She will  her prescriptions this morning at the [facility] in [place] where the prescriptions were originally sent.

## 2020-04-16 NOTE — TELEPHONE ENCOUNTER
Reason for call:  Patient reporting a symptom    Symptom or request:   Patient is going to [facility] regarding increasing pain and swelling in her right eye  She wants Dr Machado to know this and push for ultrasound of lymph nodes     Please call to discuss    Duration (how long have symptoms been present): ongoing but increasing    Have you been treated for this before? Yes    Additional comments:    Please call patient    Phone Number patient can be reached at:  Cell number on file:    Telephone Information:   Mobile [phone]       Best Time:  anytime    Can we leave a detailed message on this number:  YES    Call taken on 4/16/2020 at 11:59 AM by KYLER DE LA PAZ

## 2020-04-17 ENCOUNTER — TELEPHONE (OUTPATIENT)
Dept: ENDOCRINOLOGY | Facility: CLINIC | Age: 45
End: 2020-04-17

## 2020-04-17 DIAGNOSIS — E11.65 UNCONTROLLED TYPE 2 DIABETES MELLITUS WITH HYPERGLYCEMIA (H): ICD-10-CM

## 2020-04-17 NOTE — TELEPHONE ENCOUNTER
Central Prior Authorization Team   Phone: [phone]      Forms received, completed and faxed back to insurance.

## 2020-04-17 NOTE — TELEPHONE ENCOUNTER
Prior Authorization Retail Medication Request    Medication/Dose: Semaglutide,0.25 or 0.5MG/DOS, (OZEMPIC, 0.25 OR 0.5 MG/DOSE,) 2 MG/1.5ML SOPN   ICD code (if different than what is on RX):    Previously Tried and Failed:  JANUVIA, GLUCOPHAGE  Rationale:  Pt is taking along with Trulicity    Insurance Name:  [facility]  Insurance ID:  Unknown  CoverMyMeds Key:[id number]      Pharmacy Information (if different than what is on RX)  Name:    Phone:

## 2020-04-17 NOTE — TELEPHONE ENCOUNTER
Central Prior Authorization Team   Phone: [phone]      Initiated pa via phone - [facility] faxing forms for completion.

## 2020-04-18 ENCOUNTER — NURSE TRIAGE (OUTPATIENT)
Dept: NURSING | Facility: CLINIC | Age: 45
End: 2020-04-18

## 2020-04-18 NOTE — TELEPHONE ENCOUNTER
Pt calling w/ pain in her chest area.  She feels this has been over looked by her primary care provider. She says she has skin masses that area causing her pain; everyday she has skin change/new masses.  This has been evaluated by her primary care provider and in the ED.  The newest mass is in her head.   She has all over body pain.  She is taking percocet TID, muscle relaxers TID, ibuprofen BID.  This regimen is not providing adequate pain relief. She says if she goes to the ED, do a scan, give her IV med and send her home and do not care about the masses she tell them she has  Pain options:  She has tried topical meds:  Icy hot and bengay.  Heat and pressure makes her pain worse.   Writer recommended she try the OTC lidocaine 4% to the painful areas where the masses are.            She does have an appointment on Tuesday with a [facility] doctor to manage this eye mass that she has.    She talks w/ a counselor 2x/week. She states she is currently in the best mental state she has ever been in.     She has called and left 2 message to be evaluated by an internal medicine specialist at [facility].  She is going to call [facility] again to see about getting in for a 2nd opinion.      Pt says she would like the head masses to be triaged/addressed today as she is concerned about them.    Reason for Disposition    [1] Swelling is painful to touch AND [2] no fever    Additional Information    Negative: Sounds like a life-threatening emergency to the triager    Negative: Patient sounds very sick or weak to the triager    Negative: SEVERE pain (e.g., excruciating)    Negative: [1] Swelling is painful to touch AND [2] fever    Negative: [1] Swelling is red AND [2] fever    Negative: [1] Swelling is red AND [2] size > 2 inches (5.0 cm) (Exception: itchy area of skin)    Negative: [1] Swelling of groin (inguinal area) AND [2] painful    Answer Assessment - Initial Assessment Questions  1. APPEARANCE of SWELLING:  "\"What does it look like?\" (e.g., lymph node, insect bite, mole)      Unsure. She states her head feel wavy/ripples.  Large raised areas.  Base of skull is not symmetrical.   2. SIZE: \"How large is the swelling?\" (inches, cm or compare to coins)      Four wavy ripples on the back of her head.  Unable to say the size.    3. LOCATION: \"Where is the swelling located?\"      All over head. She says it feels wavy.    4. ONSET: \"When did the swelling start?\"      Last night  5. PAIN: \"Is it painful?\" If so, ask: \"How much?\"      Yes. Lots of pressure. The pain is intermittent.  Hurts when you apply pressure.   6. ITCH: \"Does it itch?\" If so, ask: \"How much?\"      no  7. CAUSE: \"What do you think caused the swelling?\"      Unsure. Is being worked up for this.  8. OTHER SYMPTOMS: \"Do you have any other symptoms?\" (e.g., fever)      No.  No drainage    Protocols used: SKIN LUMP OR LOCALIZED SWELLING-A-AH    Disposition/Instructions    Patient to have an [facility] Visit with a provider (Preferred option). Follow System Ambulatory Workflow for COVID 19.     To do this follow these instructions:  1. Go to the website [email or web address]  2. Create an account (you will need your insurance information)  3. Start a new visit  4. Choose your diagnosis (e.g. COVID19)  5. Fill out the information about your symptoms  6. A provider will reach out to you by text, phone call or video visit based on your request    Call Back If: Your symptoms worsen before you are able to complete your [facility] Visit with a provider.    fiorella aaron RN on 4/18/2020 at 3:02 PM  "

## 2020-04-20 NOTE — TELEPHONE ENCOUNTER
IPatient Contact    Attempt # 1    Was call answered?  No.  Left message on voicemail with information to call triage back.    On call back, patient needs face-to-face appointment. Routing to patient care team for follow-up calls.    I called patient and scheduled face to face with MLO on Wednesday as requested per JRLEXUS.    Hannah Sood LPN

## 2020-04-20 NOTE — TELEPHONE ENCOUNTER
Central Prior Authorization Team   Phone: [phone]        Required dispensing pharmacy is [facility] - please escribe to [facility] in [place], [place] or call [phone].  Also, the patient must sign up to a  co-pay card.        Prior Authorization Approval    Authorization Effective Date: 4/20/2020  Authorization Expiration Date: 4/20/2023  Medication: Semaglutide,0.25 or 0.5MG/DOS, (OZEMPIC, 0.25 OR 0.5 MG/DOSE,) 2 MG/1.5ML SOPN - APPROVED  Approved Dose/Quantity: 1 FOR 28  Reference #:     Insurance Company: [employer] - Phone [phone] Fax [phone]

## 2020-04-20 NOTE — TELEPHONE ENCOUNTER
Central Prior Authorization Team   Phone: [phone]      Additional information requested and faxed back to insurance.

## 2020-04-21 ENCOUNTER — TRANSFERRED RECORDS (OUTPATIENT)
Dept: HEALTH INFORMATION MANAGEMENT | Facility: CLINIC | Age: 45
End: 2020-04-21

## 2020-04-21 NOTE — TELEPHONE ENCOUNTER
T'd up [facility] pharmacy.    Where does patient go to sign up for  co-pay card?    Perez Hanna RN

## 2020-04-22 ENCOUNTER — OFFICE VISIT (OUTPATIENT)
Dept: FAMILY MEDICINE | Facility: CLINIC | Age: 45
End: 2020-04-22
Payer: COMMERCIAL

## 2020-04-22 VITALS
TEMPERATURE: 98.2 F | WEIGHT: 155 LBS | HEART RATE: 98 BPM | SYSTOLIC BLOOD PRESSURE: 120 MMHG | BODY MASS INDEX: 28.35 KG/M2 | RESPIRATION RATE: 20 BRPM | OXYGEN SATURATION: 96 % | DIASTOLIC BLOOD PRESSURE: 70 MMHG

## 2020-04-22 DIAGNOSIS — Z72.0 CURRENT OCCASIONAL SMOKER: ICD-10-CM

## 2020-04-22 DIAGNOSIS — R35.0 URINARY FREQUENCY: ICD-10-CM

## 2020-04-22 DIAGNOSIS — G89.4 CHRONIC PAIN SYNDROME: ICD-10-CM

## 2020-04-22 DIAGNOSIS — R51.9 HEADACHE DISORDER: ICD-10-CM

## 2020-04-22 DIAGNOSIS — R91.8 OPACITY OF LUNG ON IMAGING STUDY: Primary | ICD-10-CM

## 2020-04-22 DIAGNOSIS — M25.50 MULTIPLE JOINT PAIN: ICD-10-CM

## 2020-04-22 DIAGNOSIS — H53.9 VISUAL DISTURBANCE: ICD-10-CM

## 2020-04-22 DIAGNOSIS — R59.1 LYMPHADENOPATHY: ICD-10-CM

## 2020-04-22 LAB
ALBUMIN UR-MCNC: NEGATIVE MG/DL
APPEARANCE UR: CLEAR
BILIRUB UR QL STRIP: NEGATIVE
COLOR UR AUTO: YELLOW
GLUCOSE UR STRIP-MCNC: NEGATIVE MG/DL
HGB UR QL STRIP: NEGATIVE
KETONES UR STRIP-MCNC: NEGATIVE MG/DL
LEUKOCYTE ESTERASE UR QL STRIP: NEGATIVE
NITRATE UR QL: NEGATIVE
PH UR STRIP: 6.5 PH (ref 5–7)
SOURCE: NORMAL
SP GR UR STRIP: 1.02 (ref 1–1.03)
UROBILINOGEN UR STRIP-ACNC: 0.2 EU/DL (ref 0.2–1)

## 2020-04-22 PROCEDURE — 36415 COLL VENOUS BLD VENIPUNCTURE: CPT | Performed by: PHYSICIAN ASSISTANT

## 2020-04-22 PROCEDURE — 81003 URINALYSIS AUTO W/O SCOPE: CPT | Performed by: INTERNAL MEDICINE

## 2020-04-22 PROCEDURE — 86038 ANTINUCLEAR ANTIBODIES: CPT | Performed by: PHYSICIAN ASSISTANT

## 2020-04-22 PROCEDURE — 86200 CCP ANTIBODY: CPT | Performed by: PHYSICIAN ASSISTANT

## 2020-04-22 PROCEDURE — 86431 RHEUMATOID FACTOR QUANT: CPT | Performed by: PHYSICIAN ASSISTANT

## 2020-04-22 PROCEDURE — 99214 OFFICE O/P EST MOD 30 MIN: CPT | Performed by: INTERNAL MEDICINE

## 2020-04-22 PROCEDURE — 87389 HIV-1 AG W/HIV-1&-2 AB AG IA: CPT | Performed by: PHYSICIAN ASSISTANT

## 2020-04-22 PROCEDURE — 87086 URINE CULTURE/COLONY COUNT: CPT | Performed by: INTERNAL MEDICINE

## 2020-04-22 PROCEDURE — 86780 TREPONEMA PALLIDUM: CPT | Performed by: PHYSICIAN ASSISTANT

## 2020-04-22 RX ORDER — SEMAGLUTIDE 1.34 MG/ML
INJECTION, SOLUTION SUBCUTANEOUS
Qty: 1 PEN | Refills: 3 | Status: SHIPPED | OUTPATIENT
Start: 2020-04-22 | End: 2020-04-22

## 2020-04-22 NOTE — PROGRESS NOTES
"Subjective     Melody Muñoz is a 44 year old female who presents to clinic today for the following health issues:    HPI     Check legs, arms, chest and pelvic area- lumps all in those areas, unknown how long they have been there. Some discomfort.                     I have not met this patient before.  I reviewed much of her recent history of, but not all of it.                             She started off with a 15-minute recitation of some of her symptoms and her frustrations.         She complains of widespread pain and lumps and bumps and skin changes etc.  She describes chest bumps, medial breast lumps, lower leg lumps, left wrist lumps, axillary and inguinal adenopathy, low back and thigh pain, tingling skin, which is painful to palpation.        She reports \"rapid changes\" in these issues.          She worries that \" a mass is growing on her spine\" that is \"not showing on imaging\".         She also worries about smoking related issues in her lungs especially.  She also worries that there could be a bladder abnormality.         She also saw ophthalmology today, who is concerned about some visual changes and some protrusion of her right eye.  She states the ophthalmologist wanted us to order some imaging and that a report would be sent over here but that has not been obtained yet.           She also saw gynecology recently and apparently no significant abnormalities were found.              Apparently general surgery has reviewed  some of her CT scans, and found minimal adenopathy which has not changed and no biopsy was recommended.  She also had a video visit with hematology oncology, and no specific further diagnostic work-up was recommended.                She has had imaging of multiple parts of her body including a lumbar spine MRI, CT abdomen and pelvis, and a chest CT in February which showed some lung opacities.  States she was treated with antibiotics at that time.            She still does smoke " "\"occasionally\".  She has some minimal chronic cough.          She also had a CT soft tissue of the neck 2 months ago that was normal.                  She also has urinary frequency and some urge incontinence.          Recent urine sample in the emergency room showed bacteriuria but no pyuria.  Culture was not done.         We reviewed her medications.  Thyroid functions showed her dose was correct.              A1c was 6.0.       She had a colonoscopy and an EGD 1 year ago.  She had a mammogram with breast ultrasound in 2/20.                       After she left the office, I found an MRI of the brain and the orbits which were unremarkable.           I did not see that when I typed out the after visit summary for her.                 Reviewed and updated as needed this visit by Provider         Review of Systems   ROS COMP: CONSTITUTIONAL:NEGATIVE for fever, chills, change in weight and POSITIVE  for fatigue and malaise  ENT/MOUTH: NEGATIVE for epistaxis and hoarseness  RESP:NEGATIVE for hemoptysis and wheezing  CV: NEGATIVE for palpitations and paroxysmal nocturnal dyspnea  : negative for hematuria  MUSCULOSKELETAL: POSITIVE  for arthralgias  and myalgia  NEURO: NEGATIVE for hx of CVA  HEME/ALLERGY/IMMUNE: NEGATIVE for bleeding problems      Objective    /70   Pulse 98   Temp 98.2  F (36.8  C)   Resp 20   Wt 70.3 kg (155 lb)   SpO2 96%   BMI 28.35 kg/m    Body mass index is 28.35 kg/m .  Physical Exam   GENERAL APPEARANCE: alert, mild distress and over weight  NECK: no adenopathy, no asymmetry, masses, or scars and thyroid normal to palpation  RESP: lungs clear to auscultation - no rales, rhonchi or wheezes  BREAST: normal without masses, tenderness or nipple discharge and no palpable axillary masses or adenopathy  CV: regular rates and rhythm, normal S1 S2, no S3 or S4 and no murmur, click or rub  LYMPHATICS: no cervical adenopathy  axillary: no adenopathy  inguinal: no adenopathy  No " hepato-splenomegaly  ABDOMEN: soft, nontender, without hepatosplenomegaly or masses  SKIN: no suspicious lesions or rashes and diffuse  PSYCH: Pressured speech    Diagnostic Test Results:  Pending        Assessment & Plan     Melody was seen today for derm problem.    Diagnoses and all orders for this visit:    Opacity of lung on imaging study  -     CT Chest w/o Contrast; Future    Urinary frequency  -     UA reflex to Microscopic  -     Urine Culture Aerobic Bacterial    Current occasional smoker  -     CT Chest w/o Contrast; Future    Visual disturbance  Comments:  R ;   ophthalmopathy report pending      Chronic pain syndrome    Headache disorder    Lymphadenopathy  -     Treponema Abs w Reflex to RPR and Titer  -     HIV Antigen Antibody Combo    Multiple joint pain  -     Rheumatoid factor  -     Cyclic Citrullinated Peptide Antibody IgG  -     Anti Nuclear Rosanna IgG by IFA with Reflex           Summary and implications:  We reviewed multiple issues.           We reviewed all of the issues on the diagnoses list.                 So far, the only objective findings that I can find that need follow-up are the opacities on her chest CT of 2 months ago.                      She has a difficult to manage chronic pain syndrome with somatization.                                      She will have some of the labs done that were ordered for her last month as listed above.      We will also recheck her urine to look for urinary tract infection.               As mentioned above, I did not find the MRI of her brain and orbits until after I typed out the patient instructions.           If I get a response from her ophthalmologist, I will let him know about her brain and orbital MRI that has already been done.             35 minute visit,over half counseling and coordination of care    Patient Instructions   Let's do this:   You are planning to have your Ophthalmologist send his report regarding what sort of imaging he  wants.                  We are going to do another chest CT.    Call [phone] to set this up.                                                                                    Urine and lab work today.               No follow-ups on file.    Rene Luz MD  [facility]

## 2020-04-22 NOTE — PATIENT INSTRUCTIONS
Let's do this:   You are planning to have your Ophthalmologist send his report regarding what sort of imaging he wants.                  We are going to do another chest CT.    Call [phone] to set this up.                                                                                    Urine and lab work today.

## 2020-04-22 NOTE — TELEPHONE ENCOUNTER
She should be able to access a savings card at [email or web address].  We also have savings cards in clinic so Jennifer could mail her one if she prefers (but it's pretty easy to find on the website).  Thanks.  Tammy Louie NP  Endocrinology

## 2020-04-23 ENCOUNTER — VIRTUAL VISIT (OUTPATIENT)
Dept: ENDOCRINOLOGY | Facility: CLINIC | Age: 45
End: 2020-04-23
Payer: COMMERCIAL

## 2020-04-23 DIAGNOSIS — E11.9 TYPE 2 DIABETES MELLITUS WITHOUT COMPLICATION, WITHOUT LONG-TERM CURRENT USE OF INSULIN (H): Primary | ICD-10-CM

## 2020-04-23 DIAGNOSIS — E06.3 HASHIMOTO'S THYROIDITIS: ICD-10-CM

## 2020-04-23 LAB
BACTERIA SPEC CULT: NO GROWTH
SPECIMEN SOURCE: NORMAL

## 2020-04-23 PROCEDURE — 99213 OFFICE O/P EST LOW 20 MIN: CPT | Mod: 95 | Performed by: CLINICAL NURSE SPECIALIST

## 2020-04-23 RX ORDER — LEVOTHYROXINE SODIUM 50 UG/1
TABLET ORAL
Qty: 90 TABLET | Refills: 3 | Status: SHIPPED | OUTPATIENT
Start: 2020-04-23 | End: 2020-08-14

## 2020-04-23 RX ORDER — SEMAGLUTIDE 1.34 MG/ML
0.5 INJECTION, SOLUTION SUBCUTANEOUS WEEKLY
Qty: 1 PEN | Refills: 5 | Status: SHIPPED | OUTPATIENT
Start: 2020-04-23 | End: 2020-08-14

## 2020-04-23 NOTE — PATIENT INSTRUCTIONS
Melody,  As discussed today, please decrease levothyroxine to 6.5 tabs per week - take on tab six days per week and 1/2 tab one day per week.  Start Ozempic 0.5 mg once weekly.  Let me know if you have any significant GI side effects from the Ozempic.  We can consider increasing the Ozempic to 1 mg weekly in the future if tolerated and needed.    Tammy Louie NP  Endocrinology

## 2020-04-23 NOTE — PROGRESS NOTES
"  Melody Muñoz is a 44 year old female who is being evaluated via a billable telephone visit.      The patient has been notified of following:     \"This telephone visit will be conducted via a call between you and your physician/provider. We have found that certain health care needs can be provided without the need for a physical exam.  This service lets us provide the care you need with a short phone conversation.  If a prescription is necessary we can send it directly to your pharmacy.  If lab work is needed we can place an order for that and you can then stop by our lab to have the test done at a later time.    Telephone visits are billed at different rates depending on your insurance coverage. During this emergency period, for some insurers they may be billed the same as an in-person visit.  Please reach out to your insurance provider with any questions.    If during the course of the call the physician/provider feels a telephone visit is not appropriate, you will not be charged for this service.\"    Patient has given verbal consent for Telephone visit?  Yes    How would you like to obtain your AVS? Mail a copy    Subjective     Melody Muñoz is a 44 year old female being evaluated via phone visit due to COVID-19 pandemic for the following health issues:  F/u for Diabetes (Last seen 1/23/2020).  HPI:  1. Type 2 DM:  Originally diagnosed: 3/2017.   Initially treated with metformin - did not tolerate due to GI side effects    Current Regimen: Trulicity 0.75 mg weekly,  Trulicity was started 11/2018.  Briefly treated with januvia x 1 month - discontinued due to ineffectiveness.  Trial off Trulicity - BG levels were running higher so Trulicity was restarted.  Recently experiencing localized skin reaction at the site of Trulicity injection - redness, welt, itching, discomfort.  She states the last Trulicity injection did not have this reaction.  Ozempic PA was approved 4/20/2020 to 4/20/2023.    INDIRA checks: " checks 6x/day about 3-4 days per week.  Meter Download:  Unable to download meter remotely.  She reports blood sugars < 120 fasting and < 180 after eating.   She denies any low blood sugars    No FH of diabetes.  Personal history of GD with 4th pregnancy - pregnant with twins at the time, was diet controlled.        Complications:   Diabetes Complications  Description / Detail    Diabetic Retinopathy  No retinopathy.  Last exam 2/2020 at [facility].  More recently saw [facility] 4/2020.  No retinopathy.  Concerns about decreased vision - states more extensive thyroid testing was discussed but she's unclear.  States they are planning to do an MRI of her chest and ?head.   CAD / PAD  No   Neuropathy  No numbness, burning or tingling in her feet but has noted 2 months history of numbness in her hands.  States feet are cold.   Nephropathy / Microalbuminuria  elevated urine microalbumin x 1 (6/2018) - repeat urine microalbumin in normal range 10/2018 and 10/2019   Gastroparesis  No   Hypoglycemia Unawareness  N0     2. Blood Pressure Blood Pressure:   BP Readings from Last 3 Encounters:   04/22/20 120/70   04/13/20 104/73   03/12/20 (!) 132/91   Blood pressure medications include none  3. Lipids: Takes no medications for lipid control.      4. Prevention:  Flu Shot- 9/23/2019  Pneumovax- Recommended  Opthalmology-annually  Dental-Recommended semiannually  ASA-No  Smoking- No  4. Hypothyroidism. Currently treated with levothyroxine 50 mcg/day.  Noting increased irritability but also under more recent stress (daugher diagnosed with an eating disorder, son leaving soon for boot camp).  Recent TSH was slightly below normal.  PMH/PSH:  Past Medical History:   Diagnosis Date     Abnormal pap age 24    and paps normal ever since and no other testing needed per patient     Anxiety      Bipolar 1 disorder (H)     with psychosis      Diabetes mellitus 03/21/2017     Endometriosis      h/o Suicidal ideation 01/01/2013    in  past, not current, sees psychiatrist and therapist     Intermittent asthma 2012     Kidney stone      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     Major depression     Ted Pope  [id number]     Migraines     otc excedrin prn     JASWANT (obstructive sleep apnea)      Sleep apnea 2014    uses Cpap     Substance abuse (H)     no use since summer 2013     Vertigo 2015     Past Surgical History:   Procedure Laterality Date      SECTION       for twins     COLONOSCOPY       LAPAROSCOPIC APPENDECTOMY N/A 2017    Procedure: LAPAROSCOPIC APPENDECTOMY;  LAPAROSCOPIC APPENDECTOMY and resection of epiploic tag;  Surgeon: Roberto Robins MD;  Location: [facility] OR     LAPAROSCOPY      - endometriosis     little finger surgery left  1980     SLING BLADDER NOS  2014     tubal ligation bilateral       wisdom teeth removal       Family Hx:  Family History   Problem Relation Age of Onset     Hypertension Mother      Heart Disease Father         palpitations     Depression Sister      Anxiety Disorder Sister      Heart Disease Maternal Grandmother         CHF     Cancer Maternal Grandfather 72        Pancreatic Cancer     Alzheimer Disease Paternal Grandfather      Bipolar Disorder Other      Autism Spectrum Disorder Other      Thyroid disease: No         DM2: No         Autoimmune: DM1, SLE, RA, Vitiligo cousin with type 1 diabetes    Social Hx:  Social History     Socioeconomic History     Marital status:      Spouse name: Not on file     Number of children: Not on file     Years of education: Not on file     Highest education level: Not on file   Occupational History     Employer: SELF EMPLOYED.     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   Social Needs     Financial resource strain: Not on file     Food insecurity     Worry: Not on file     Inability: Not on file     Transportation needs     Medical: Not on file     Non-medical: Not on  file   Tobacco Use     Smoking status: Former Smoker     Packs/day: 0.00     Years: 17.00     Pack years: 0.00     Types: Cigarettes     Last attempt to quit: 3/18/2019     Years since quittin.1     Smokeless tobacco: Never Used   Substance and Sexual Activity     Alcohol use: No     Comment: no etoh since      Drug use: No     Sexual activity: Not Currently     Comment: tubal ligation   Lifestyle     Physical activity     Days per week: Not on file     Minutes per session: Not on file     Stress: Not on file   Relationships     Social connections     Talks on phone: Not on file     Gets together: Not on file     Attends Jainism service: Not on file     Active member of club or organization: Not on file     Attends meetings of clubs or organizations: Not on file     Relationship status: Not on file     Intimate partner violence     Fear of current or ex partner: Not on file     Emotionally abused: Not on file     Physically abused: Not on file     Forced sexual activity: Not on file   Other Topics Concern     Parent/sibling w/ CABG, MI or angioplasty before 65F 55M? No      Service Not Asked     Blood Transfusions Not Asked     Caffeine Concern Not Asked     Occupational Exposure Not Asked     Hobby Hazards Not Asked     Sleep Concern Not Asked     Stress Concern Not Asked     Weight Concern Not Asked     Special Diet Not Asked     Back Care Not Asked     Exercise Not Asked     Bike Helmet Yes     Seat Belt Yes     Self-Exams Not Asked   Social History Narrative     Not on file          MEDICATIONS:  has a current medication list which includes the following prescription(s): albuterol, almotriptan, butalbital-acetaminophen-caffeine, clonazepam, dextroamphetamine, dextroamphetamine, fluticasone, guanfacine, lamotrigine, latuda, levothyroxine, ondansetron, oxycodone-acetaminophen, pantoprazole, pregabalin, proair hfa, ozempic (0.25 or 0.5 mg/dose), topiramate, trazodone, and venlafaxine.    ROS      ROS: 10 point ROS neg other than the symptoms noted above in the HPI.    Objective   Reported vitals:  LMP  (LMP Unknown)    PSYCH: Alert and oriented times 3; coherent speech, normal   rate and volume, able to articulate logical thoughts, able   to abstract reason, no tangential thoughts, no hallucinations   or delusions  Her affect is normal and pleasant  RESP: No cough, no audible wheezing, able to talk in full sentences  Remainder of exam unable to be completed due to telephone visits      LABS:  A1c:   Component      Latest Ref Rng & Units 2/6/2019 10/24/2019 1/23/2020   Hemoglobin A1C      0 - 5.6 % 6.5 (H) 5.6 6.0 (H)     Basic Metabolic Panel:  !COMPREHENSIVE Latest Ref Rng & Units 3/12/2020   SODIUM 133 - 144 mmol/L 141   POTASSIUM 3.4 - 5.3 mmol/L 3.7   CHLORIDE 94 - 109 mmol/L 111 (H)   BUN 7 - 30 mg/dL 9   Creatinine 0.52 - 1.04 mg/dL 0.70   Glucose 70 - 99 mg/dL 82   ANION GAP 3 - 14 mmol/L 4   CALCIUM 8.5 - 10.1 mg/dL 9.2       LFTS/Cholesterol Panel:  !LIPID/HEPATIC Latest Ref Rng & Units 2/6/2019   CHOLESTEROL <200 mg/dL 186   TRIGLYCERIDES <150 mg/dL 376 (H)   HDL CHOLESTEROL >49 mg/dL 36 (L)   LDL CHOLESTEROL, CALCULATED <100 mg/dL 75   VLDL-CHOLESTEROL 0 - 30 mg/dL    NON HDL CHOLESTEROL <130 mg/dL 150 (H)     !LIPID/HEPATIC Latest Ref Rng & Units 10/23/2019   AST 0 - 45 U/L 20   ALT 0 - 50 U/L 28     Thyroid Function:   ENDO THYROID LABS-UMP Latest Ref Rng & Units 2/18/2020 1/13/2020   TSH 0.40 - 4.00 mU/L 0.33 (L) 0.23 (L)   TSH REFLEX 0.33 - 5.73 UIL/mL     T4 FREE 0.76 - 1.46 ng/dL 0.94 1.10     ENDO THYROID LABS-UMP Latest Ref Rng & Units 10/24/2019   TSH 0.40 - 4.00 mU/L 0.45   TSH REFLEX 0.33 - 5.73 UIL/mL    T4 FREE 0.76 - 1.46 ng/dL 1.18     Urine MicroAlbumin:  Component      Latest Ref Rng & Units 10/24/2019   Creatinine Urine      mg/dL 149   Albumin Urine mg/L      mg/L 12   Albumin Urine mg/g Cr      0 - 25 mg/g Cr 8.32       Vitamin D:    All pertinent notes, labs, and images  personally reviewed by me.     A/P  Ms.Jessica TONYA Muñoz is a 44 year old being evaluated via phone visit for the management of:    1. DM2 - Controlled.  A1c 6.0%, increased slightly from 5.6%.  Trulicity has been recently causing localized discomfort - redness, welt, itching.  Discontinue Trulicity.  Start Ozempic 0.5 mg weekly.  Continue to monitor blood sugars 1-3 times per day.    There is some variability among people, most will usually develop symptoms suggestive of hypoglycemia when blood glucose levels are lowered to the mid 60's. The first set of symptoms are called adrenergic. Patients may experience any of the following nervousness, sweating, intense hunger, trembling, weakness, palpitations, and difficulty speaking.   The acute management of hypoglycemia involves the rapid delivery of a source of easily absorbed sugar. Regular soda, juice, lifesavers, table sugar, are good options. 15 grams of glucose is the dose that is given, followed by an assessment of symptoms and a blood glucose check if possible. If after 10 minutes there is no improvement, another 10-15 grams should be given. This can be repeated up to three times. The equivalency of 10-15 grams of glucose (approximate servings) are: 3-5 hard candies, 3 teaspoons of sugar, or 1/2 cup of regular soda or juice.    2. Hypothyroidism.  Currently treated with levothyroxine 50 mcg/day.  TSH now below normal + increased irritability.  Decrease levothyroxine slightly to 6.5 tabs/week.  Recheck TFT's post COVID.    Labs ordered today:   No orders of the defined types were placed in this encounter.      Radiology/Consults ordered today: None      Follow-up:  3 months    Tammy Louie NP  Endocrinology  [facility]  CC:     Phone call duration:  22 minutes.  Call start time: 8:38 am, call end time: 9:00 am.

## 2020-04-24 ENCOUNTER — TELEPHONE (OUTPATIENT)
Dept: FAMILY MEDICINE | Facility: CLINIC | Age: 45
End: 2020-04-24

## 2020-04-24 ENCOUNTER — NURSE TRIAGE (OUTPATIENT)
Dept: FAMILY MEDICINE | Facility: CLINIC | Age: 45
End: 2020-04-24

## 2020-04-24 LAB
CCP AB SER IA-ACNC: 2 U/ML
HIV 1+2 AB+HIV1 P24 AG SERPL QL IA: NONREACTIVE
RHEUMATOID FACT SER NEPH-ACNC: <7 IU/ML (ref 0–20)
T PALLIDUM AB SER QL: NONREACTIVE

## 2020-04-24 NOTE — TELEPHONE ENCOUNTER
Patient Contact    Attempt # 1    Was call answered?  No.  Left message on voicemail with information to call triage back.    On call back, relay provider message.

## 2020-04-24 NOTE — TELEPHONE ENCOUNTER
Pt is requesting directions for pain management over the weekend. Complains of pain under ribs. Also reports  pelvic pain & tingling like she has masses in pelvic area. Pt expressed concern of creating dependence on percocet.  She takes Percocet TID, ibuprofen 400 mg every 6 hours, and soma 350 mg tablet TID.  She wants provider aware she has CT scheduled 04/28/2020. Please advice on medication Tx.

## 2020-04-24 NOTE — TELEPHONE ENCOUNTER
Reason for Call:  Other call back    Detailed comments: Melody called asking on how to manage pain over the weekend.    She asks for a nurse to return a call yet today.    Phone Number Patient can be reached at: Cell number on file:    Telephone Information:   Mobile [phone]       Best Time: any    Can we leave a detailed message on this number? YES    Call taken on 4/24/2020 at 11:17 AM by Mary Rodriguez

## 2020-04-24 NOTE — TELEPHONE ENCOUNTER
Cut back Percocet to twice daily to start to taper the dose.  She can continue the other medications. Please let her know.

## 2020-04-24 NOTE — TELEPHONE ENCOUNTER
Please advice any other directives to help control  pain? She has been advised to try tylenol and ibuprofen in the past. Currently takes ibuprofen, Percocet and Soma. Anything else she can take ?

## 2020-04-24 NOTE — TELEPHONE ENCOUNTER
Reason for Call:  Other     Detailed comments: has pain behind knee and calf of leg.  Could it be a blood clot.  Quite pain full.    Phone Number Patient can be reached at: Home number on file [phone] (home)    Best Time: today    Can we leave a detailed message on this number? YES    Call taken on 4/24/2020 at 1:31 PM by NANCY MAYO

## 2020-04-24 NOTE — TELEPHONE ENCOUNTER
"Pt was called with providers response. \"My concern is dealing with pain\". \"I will not do that\". She doesn't want to decrease but increase pain medication dose at this time. She doesn't think tapering at this time is what she needs. Pt clarified she is not concerned of dependence now, but is aware she may need to taper in future.  She is asking if she should double Percocet dose at night since pain is worst then. Pt also expressed she doesn't feel many of her symptoms were addressed at appointment. Like pelvic masses  that hurt when she presses. Is concerned of blood clot since she has Rt leg pain behind calf for two day. DVT symptoms reviewed. She agreed to monitor symptoms for now and seek care at ER if persistent or worsening symptoms. Please if Percocet dose increase is appropriate.   "

## 2020-04-25 ENCOUNTER — HOSPITAL ENCOUNTER (EMERGENCY)
Facility: CLINIC | Age: 45
Discharge: HOME | End: 2020-04-25
Attending: PHYSICIAN ASSISTANT
Payer: COMMERCIAL

## 2020-04-25 ENCOUNTER — APPOINTMENT (OUTPATIENT)
Dept: ULTRASOUND IMAGING | Facility: CLINIC | Age: 45
End: 2020-04-25
Attending: PHYSICIAN ASSISTANT
Payer: COMMERCIAL

## 2020-04-25 VITALS
RESPIRATION RATE: 19 BRPM | HEIGHT: 62 IN | HEART RATE: 77 BPM | DIASTOLIC BLOOD PRESSURE: 89 MMHG | BODY MASS INDEX: 28.35 KG/M2 | OXYGEN SATURATION: 97 % | TEMPERATURE: 98.5 F | SYSTOLIC BLOOD PRESSURE: 142 MMHG

## 2020-04-25 VITALS
DIASTOLIC BLOOD PRESSURE: 83 MMHG | RESPIRATION RATE: 18 BRPM | TEMPERATURE: 98.3 F | OXYGEN SATURATION: 97 % | HEART RATE: 78 BPM | SYSTOLIC BLOOD PRESSURE: 136 MMHG

## 2020-04-25 DIAGNOSIS — R10.30 GROIN PAIN: ICD-10-CM

## 2020-04-25 DIAGNOSIS — G89.4 CHRONIC PAIN SYNDROME: ICD-10-CM

## 2020-04-25 DIAGNOSIS — R10.30 LOWER ABDOMINAL PAIN: ICD-10-CM

## 2020-04-25 DIAGNOSIS — M54.9 BACK PAIN: ICD-10-CM

## 2020-04-25 DIAGNOSIS — M79.604 RIGHT LEG PAIN: ICD-10-CM

## 2020-04-25 DIAGNOSIS — R19.09 GROIN SWELLING: ICD-10-CM

## 2020-04-25 LAB
ALBUMIN SERPL-MCNC: 3.6 G/DL (ref 3.4–5)
ALBUMIN UR-MCNC: NEGATIVE MG/DL
ALP SERPL-CCNC: 82 U/L (ref 40–150)
ALT SERPL W P-5'-P-CCNC: 41 U/L (ref 0–50)
ANION GAP SERPL CALCULATED.3IONS-SCNC: 5 MMOL/L (ref 3–14)
APPEARANCE UR: CLEAR
AST SERPL W P-5'-P-CCNC: 19 U/L (ref 0–45)
BASOPHILS # BLD AUTO: 0.1 10E9/L (ref 0–0.2)
BASOPHILS NFR BLD AUTO: 0.9 %
BILIRUB SERPL-MCNC: 0.2 MG/DL (ref 0.2–1.3)
BILIRUB UR QL STRIP: NEGATIVE
BUN SERPL-MCNC: 15 MG/DL (ref 7–30)
CALCIUM SERPL-MCNC: 8.7 MG/DL (ref 8.5–10.1)
CHLORIDE SERPL-SCNC: 108 MMOL/L (ref 94–109)
CO2 SERPL-SCNC: 27 MMOL/L (ref 20–32)
COLOR UR AUTO: ABNORMAL
CREAT SERPL-MCNC: 0.74 MG/DL (ref 0.52–1.04)
DIFFERENTIAL METHOD BLD: NORMAL
EOSINOPHIL # BLD AUTO: 0.6 10E9/L (ref 0–0.7)
EOSINOPHIL NFR BLD AUTO: 6.8 %
ERYTHROCYTE [DISTWIDTH] IN BLOOD BY AUTOMATED COUNT: 12.5 % (ref 10–15)
GFR SERPL CREATININE-BSD FRML MDRD: >90 ML/MIN/{1.73_M2}
GLUCOSE SERPL-MCNC: 104 MG/DL (ref 70–99)
GLUCOSE UR STRIP-MCNC: NEGATIVE MG/DL
HCT VFR BLD AUTO: 44.8 % (ref 35–47)
HGB BLD-MCNC: 14.3 G/DL (ref 11.7–15.7)
HGB UR QL STRIP: NEGATIVE
IMM GRANULOCYTES # BLD: 0 10E9/L (ref 0–0.4)
IMM GRANULOCYTES NFR BLD: 0.5 %
KETONES UR STRIP-MCNC: NEGATIVE MG/DL
LEUKOCYTE ESTERASE UR QL STRIP: NEGATIVE
LYMPHOCYTES # BLD AUTO: 3.1 10E9/L (ref 0.8–5.3)
LYMPHOCYTES NFR BLD AUTO: 37.3 %
MCH RBC QN AUTO: 28.1 PG (ref 26.5–33)
MCHC RBC AUTO-ENTMCNC: 31.9 G/DL (ref 31.5–36.5)
MCV RBC AUTO: 88 FL (ref 78–100)
MONOCYTES # BLD AUTO: 0.5 10E9/L (ref 0–1.3)
MONOCYTES NFR BLD AUTO: 5.8 %
MUCOUS THREADS #/AREA URNS LPF: PRESENT /LPF
NEUTROPHILS # BLD AUTO: 4 10E9/L (ref 1.6–8.3)
NEUTROPHILS NFR BLD AUTO: 48.7 %
NITRATE UR QL: NEGATIVE
NRBC # BLD AUTO: 0 10*3/UL
NRBC BLD AUTO-RTO: 0 /100
PH UR STRIP: 6.5 PH (ref 5–7)
PLATELET # BLD AUTO: 398 10E9/L (ref 150–450)
POTASSIUM SERPL-SCNC: 4.2 MMOL/L (ref 3.4–5.3)
PROT SERPL-MCNC: 6.7 G/DL (ref 6.8–8.8)
RBC # BLD AUTO: 5.08 10E12/L (ref 3.8–5.2)
RBC #/AREA URNS AUTO: 1 /HPF (ref 0–2)
SODIUM SERPL-SCNC: 140 MMOL/L (ref 133–144)
SOURCE: ABNORMAL
SP GR UR STRIP: 1.01 (ref 1–1.03)
SQUAMOUS #/AREA URNS AUTO: 1 /HPF (ref 0–1)
UROBILINOGEN UR STRIP-MCNC: NORMAL MG/DL (ref 0–2)
WBC # BLD AUTO: 8.2 10E9/L (ref 4–11)
WBC #/AREA URNS AUTO: <1 /HPF (ref 0–5)

## 2020-04-25 PROCEDURE — 25000128 H RX IP 250 OP 636: Performed by: PHYSICIAN ASSISTANT

## 2020-04-25 PROCEDURE — 85025 COMPLETE CBC W/AUTO DIFF WBC: CPT | Performed by: PHYSICIAN ASSISTANT

## 2020-04-25 PROCEDURE — 96372 THER/PROPH/DIAG INJ SC/IM: CPT | Mod: 59

## 2020-04-25 PROCEDURE — 25000132 ZZH RX MED GY IP 250 OP 250 PS 637: Performed by: PHYSICIAN ASSISTANT

## 2020-04-25 PROCEDURE — 81001 URINALYSIS AUTO W/SCOPE: CPT | Performed by: PHYSICIAN ASSISTANT

## 2020-04-25 PROCEDURE — 80053 COMPREHEN METABOLIC PANEL: CPT | Performed by: PHYSICIAN ASSISTANT

## 2020-04-25 PROCEDURE — 76856 US EXAM PELVIC COMPLETE: CPT

## 2020-04-25 PROCEDURE — 99285 EMERGENCY DEPT VISIT HI MDM: CPT | Mod: 25

## 2020-04-25 PROCEDURE — 96374 THER/PROPH/DIAG INJ IV PUSH: CPT | Mod: XE

## 2020-04-25 PROCEDURE — 93971 EXTREMITY STUDY: CPT | Mod: RT

## 2020-04-25 PROCEDURE — 96374 THER/PROPH/DIAG INJ IV PUSH: CPT

## 2020-04-25 RX ORDER — HYDROMORPHONE HYDROCHLORIDE 1 MG/ML
0.5 INJECTION, SOLUTION INTRAMUSCULAR; INTRAVENOUS; SUBCUTANEOUS ONCE
Status: COMPLETED | OUTPATIENT
Start: 2020-04-25 | End: 2020-04-25

## 2020-04-25 RX ORDER — OXYCODONE HYDROCHLORIDE 5 MG/1
5 TABLET ORAL ONCE
Status: DISCONTINUED | OUTPATIENT
Start: 2020-04-25 | End: 2020-04-25 | Stop reason: HOSPADM

## 2020-04-25 RX ORDER — ACETAMINOPHEN 325 MG/1
975 TABLET ORAL ONCE
Status: DISCONTINUED | OUTPATIENT
Start: 2020-04-25 | End: 2020-04-25

## 2020-04-25 RX ORDER — HYDROMORPHONE HYDROCHLORIDE 1 MG/ML
0.5 INJECTION, SOLUTION INTRAMUSCULAR; INTRAVENOUS; SUBCUTANEOUS
Status: COMPLETED | OUTPATIENT
Start: 2020-04-25 | End: 2020-04-25

## 2020-04-25 RX ORDER — KETOROLAC TROMETHAMINE 15 MG/ML
15 INJECTION, SOLUTION INTRAMUSCULAR; INTRAVENOUS ONCE
Status: DISCONTINUED | OUTPATIENT
Start: 2020-04-25 | End: 2020-04-25

## 2020-04-25 RX ORDER — OXYCODONE HYDROCHLORIDE 5 MG/1
5 TABLET ORAL ONCE
Status: DISCONTINUED | OUTPATIENT
Start: 2020-04-25 | End: 2020-04-25

## 2020-04-25 RX ADMIN — HYDROMORPHONE HYDROCHLORIDE 0.5 MG: 1 INJECTION, SOLUTION INTRAMUSCULAR; INTRAVENOUS; SUBCUTANEOUS at 21:26

## 2020-04-25 RX ADMIN — HYDROMORPHONE HYDROCHLORIDE 0.5 MG: 1 INJECTION, SOLUTION INTRAMUSCULAR; INTRAVENOUS; SUBCUTANEOUS at 13:45

## 2020-04-25 RX ADMIN — HYDROMORPHONE HYDROCHLORIDE 0.5 MG: 1 INJECTION, SOLUTION INTRAMUSCULAR; INTRAVENOUS; SUBCUTANEOUS at 22:42

## 2020-04-25 ASSESSMENT — ENCOUNTER SYMPTOMS
MYALGIAS: 1
COUGH: 0
JOINT SWELLING: 1
BACK PAIN: 1
ABDOMINAL PAIN: 1
BACK PAIN: 1

## 2020-04-25 NOTE — ED PROVIDER NOTES
"  History   Chief Complaint:  Leg Pain    HPI   Melody Muñoz is a 44 year old female with a history of chronic pain, Bipolar disorder, endometriosis s/p hysterectomy, and diabetes who presents with leg pain.  The patient reports that she has a longstanding history of chronic pain in the lower back/groin area and is frustrated because she has no clear diagnosis. She also notes pain into her back and her right leg which from what I gather is also a chronic issue(patient is unclear about this). She has been seen in the ED on several occasions for this pain and has followed with her primary doctor as well. On review of the patient's chart, she was seen in the ED most recently on 03/12 and had an extensive workup (as seen below). The patient states that she has had increased swelling in her groin as well as increased pain in her right leg and lower back with paresthesias in the right leg. She describes having pain in \"masslike areas\" in her chest, back and pelic region. Pain is exacerbated by heat and she denies alleviating factors. She has been managing pain with Percocet as prescribed by her doctor as well ibuprofen which have not provided significant relief. She last took these medications at 1100 this morning. The patient denies cough and recent travel or immobilizations. She has a surgical history of appendectomy and hysterectomy. The patient denies any history of blood clots.    PE/DVT RISK FACTORS:  Sex:    Female  Hormones:   No  Tobacco:   No  Cancer:   No  Travel:   No  Surgery:   No  Other immobilization: No  Personal history:  No  Family history:  No    MR Lumbar Spine- 03/12/2020:  Mild degenerative changes of the lumbar spine as detailed  above with no significant spinal canal or neural foraminal narrowing  at any level.    US Pelvic Complete- 03/12/2020:  Hysterectomy. No specific abnormality by ultrasound.    US Lower Extremity Venous Duplex Right- 03/12/2020:  No deep venous thrombosis in the right " "lower extremity.    CT Abdomen/Pelvis w Contrast- 2020:  1.  No acute abnormality. No cause for pain identified.    Allergies:  Hydrocodone  Tamiflu [Oseltamivir]  Tylenol W/Codeine [Acetaminophen-Codeine]      Medications:    Albuterol   Almotriptan   Fioricet   Clonazepam   Dextroamphetamine   Lamotrigine   Latuda   Levothyroxine   Zofran   Percocet   Pregabalin   Semaglutide   Topiramate   Trazodone   Venlafaxine     Past Medical History:    Abnormal pap smear   Anxiety  Bipolar 1 disorder  Diabetes mellitus   Endometriosis   History of suicidal ideation   Asthma   Latent TB   Major depression   Migraines  Obstructive Sleep Apnea   Sleep apnea   Substance abuse   Vertigo   Opacity of lung on imaging study   Bilateral groin pain   IBS    Past Surgical History:     section  Laparoscopic appendectomy   Left fifth finger surgery  Sling bladder  Tubal ligation  Hysterectomy  [place] teeth removal    Family History:    Hypertension  Palpitations  Depression  Anxiety disorder    Social History:  Smoking status- former smoker  Alcohol use- no  Drug use- no  Marital Status-  [2]     Review of Systems   Respiratory: Negative for cough.    Musculoskeletal: Positive for back pain.        + right leg pain.   Neurological:        + paresthesias in right leg.   All other systems reviewed and are negative.    Physical Exam   Patient Vitals for the past 24 hrs:   BP Temp Temp src Pulse Heart Rate Resp SpO2 Height   20 1445 (!) 142/89 -- -- 77 -- -- 97 % --   20 1430 134/81 -- -- 83 -- -- 97 % --   20 1415 116/69 -- -- 81 -- -- 99 % --   20 1400 (!) 141/100 -- -- 75 -- -- 99 % --   20 1345 (!) 124/94 -- -- -- -- -- -- --   20 1330 -- -- -- -- -- -- 98 % --   20 1315 -- -- -- -- -- -- 100 % --   20 1300 -- -- -- -- -- -- 94 % --   20 1245 -- -- -- -- -- -- 98 % --   20 1237 (!) 133/99 98.5  F (36.9  C) Oral -- 104 19 99 % 1.575 m (5' 2\")     Physical " Exam  General: Alert and oriented. Uncomfortable-appearing.  Head:  The scalp, face, and head appear normal. No evidence of head injury.  Eyes:  Conjunctivae and sclerae are normal    Neck:  No lymphadenopathy  CV:  Nomal S1/S2.    Peripheral pulses intact in all four extremities. No peripheral edema  Resp:  Lungs are clear to auscultation    Non-labored    No rales or wheezing  GI:  Abdomen is soft, non-distended.     There is tenderness to the suprapubic region, no rebound or guarding.    Normal bowel sounds  MS:  There is tenderness to the right calf and right bilateral paralumbar area. There is no midline lumbar, thoracic, or cervical spinal tenderness. There is no tenderness to the ribs. . 5/5 strength with hip flexion/extension, knee flexion/extension, dorsi/plantar flexion, and big toe extension bilaterally.   Skin:  No rash or acute skin lesions noted  Neuro: Speech is normal and fluent. GCS 15. Moving the upper extremities. Sensation intact throughout all four extremities. Patellar reflexes intact bilaterally.     Emergency Department Course   Laboratory:  Laboratory findings were communicated with the patient who voiced understanding of the findings.    CBC: WNL (WBC 8.2, HGB 14.3, )  CMP: Glucose 104 (H), Protein Total 6.7 (L) o/w WNL (Creatinine 0.74)    UA with Microscopic: Mucous- present o/w negative    Interventions:  1345 Dilaudid 0.5 mg IV    Emergency Department Course:  Past medical records, nursing notes, and vitals reviewed.   1305 I performed an exam of the patient and obtained history, as documented above.     IV inserted and blood drawn.     1415 The patient eloped from the ED before I was able to explain findings or risk of leaving.    Impression & Plan    Medical Decision Making:  Melody Muñoz is a 44 year old female with a history of chronic pain and diabetes who presents with leg pain and pelvic pain.  Please refer to the HPI for full details.  The patient has been evaluated  "for similar symptoms frequently over the last couple of months in the emergency department.  Please refer to the above imaging findings noted below the HPI.  Patient describes this as a pressure discomfort and \"masslike\" areas.  Imaging and other work-up has been negative thus far including CT abdomen/pelvis, MRI of the lumbar spine, US of the right leg, and pelvic US. The patient is frustrated given no clear findings.  She also notes that her current home pain regimen is not helping with her pain, prompting her evaluation.  On exam, she has tenderness to the bilateral pelvic area.  She also has pain into the back that radiates down the right leg.  I did discuss my evaluation with the patient, and did suggest blood work, urine, and ultrasounds of the areas.  Basic labs are unremarkable.  UA is normal without evidence of infection.  Patient had a hysterectomy so no clinical concern for pregnancy. The patient was given above interventions without improvement.  She became very irritated, and did elope.  I was unable to discuss with the patient the risk of leaving or any discharge or return precautions before she eloped. Please refer to the nuurses note for further details regarding the patient's elopement.     Diagnosis:    ICD-10-CM    1. Back pain  M54.9    2. Right leg pain  M79.604          Disposition:  Eloped.        Evaristo Boogieow  4/25/2020   [facility] EMERGENCY DEPARTMENT  I, Evaristo Felipe, am serving as a scribe at 12:58 PM on 4/25/2020 to document services personally performed by Sharon Srinivasan PA-C based on my observations and the provider's statements to me.        Sharon Srinivasan PA-C  04/25/20 2047    "

## 2020-04-25 NOTE — ED NOTES
Patient declines oral medications citing they take 45 minutes to work and she doesn't want to wait that long. She rates her pain as 8/10. Medication returned to [facility] and a note has been left for the provider.

## 2020-04-25 NOTE — ED TRIAGE NOTES
States groin swelling for past 6 weeks and now has back pain and R inner upper thigh for the last few days.

## 2020-04-25 NOTE — ED NOTES
Patient up in room, fully dressed, requests I remove her IV, states she is leaving and is not signing anything. She reports she has a ride home. She was instructed no driving today due to narcotics given in ED. Waiting in lobby for her ride.

## 2020-04-25 NOTE — ED AVS SNAPSHOT
[facility]  [address]  [facility] [address]  Phone:  [phone]  Fax:  [phone]                                    Melody Muñoz   MRN: [id number]    Department:  [facility]   Date of Visit:  4/25/2020           After Visit Summary Signature Page    I have received my discharge instructions, and my questions have been answered. I have discussed any challenges I see with this plan with the nurse or doctor.    ..........................................................................................................................................  Patient/Patient Representative Signature      ..........................................................................................................................................  Patient Representative Print Name and Relationship to Patient    ..................................................               ................................................  Date                                   Time    ..........................................................................................................................................  Reviewed by Signature/Title    ...................................................              ..............................................  Date                                               Time          22EPIC Rev 08/18

## 2020-04-26 NOTE — ED PROVIDER NOTES
"  History     Chief Complaint:  Leg Pain      HPI   Melody Muñoz is a 44 year old female with a history of chronic pain syndrome, diabetes, anxiety, bipolar 1 disorder, and endometriosis s/p hysterectomy and appendectomy who presents for evaluation of leg pain. Patient was seen in the emergency department earlier today with the same pain, noted to have been seen in the ED several times prior to today as well on chart review (see below). The patient noted that she has a longstanding history of chronic pain in the lower back, groin, and right leg. Patient has been managing with Percocet and Effexor prescribed by her PMD, but these have not provided significant relief. Work up today was overall unremarkable and patient received one dose of 0.5mg IV Dilaudid prior to eloping from the emergency department.     Patient states that she eloped because she felt that she was not being taken seriously and became very frustrated. Melody notes that she tried to call her PMD yesterday but he was out of the office, so she spoke with a colleague who she states had no answer for her. She feels that something is seriously wrong because of the significance of her pain. Ultimately, she states that she is looking for a way to manage her pain better and is requesting an ultrasound of the leg as well. The pain that she is experiencing currently radiates from her right leg up into her right groin and abdomen, as well as into her back. She describes swelling around her groin coming up from her leg. She states that her current pain is very different from her chronic pain. Additionally, Melody states that her \"skin feels very different.\" Overall, Melody states that her pain is \"rapidly changing,\" describing \"soft tissue mass growth\" and \"significant skin changes\" alongside pain that she can no longer manage and \"is unlivable.\" She denies trauma to her leg.     Laboratory 4/25/2020:  CBC: WNL (WBC 8.2, HGB 14.3, )  CMP: Glucose " 104 (H), Protein Total 6.7 (L) o/w WNL (Creatinine 0.74)     UA with Microscopic: Mucous- present o/w negative    MR Lumbar Spine- 2020:  Mild degenerative changes of the lumbar spine as detailed  above with no significant spinal canal or neural foraminal narrowing  at any level.     US Pelvic Complete- 2020:  Hysterectomy. No specific abnormality by ultrasound.     US Lower Extremity Venous Duplex Right- 2020:  No deep venous thrombosis in the right lower extremity.     CT Abdomen/Pelvis w Contrast- 2020:  1.  No acute abnormality. No cause for pain identified.    Allergies:  Hydrocodone  Tamiflu [Oseltamivir]  Tylenol W/Codeine [Acetaminophen-Codeine]      Medications:    Albuterol  Fioricet   Clonazepam   Dextroamphetamine   Lamotrigine   Latuda   Levothyroxine   Zofran   Pantoprazole  Percocet    Pregabalin   Semaglutide   Topiramate   Venlafaxine   Trazodone      Past Medical History:    Abnormal pap smear   Anxiety  Bipolar 1 disorder  Diabetes mellitus   Endometriosis   History of suicidal ideation   Asthma   Latent TB   Major depression   Migraines  Obstructive Sleep Apnea   Sleep apnea   Substance abuse   Vertigo   Opacity of lung on imaging study   Bilateral groin pain   IBS  Chronic pain syndrome, followed by KATIE Machado MD   Hostile behavior   TMJ syndome  Morbid obesity in female   Headache disorder   Chronic fatigue      Past Surgical History:     section  Laparoscopic appendectomy   Left fifth finger surgery  Sling bladder  Tubal ligation  Hysterectomy  [place] teeth removal    Family History:    Mother - hypertension  Father - palpations  Sister - depression, anxiety disorder     Social History:  The patient was alone.  Smoking Status: Former smoker  Smokeless Tobacco: Never  Alcohol Use: No    Marital Status:        Review of Systems   Cardiovascular: Positive for leg swelling.   Gastrointestinal: Positive for abdominal pain.   Musculoskeletal: Positive for  back pain, gait problem, joint swelling and myalgias.   All other systems reviewed and are negative.    Physical Exam     Patient Vitals for the past 24 hrs:   BP Temp Temp src Pulse Heart Rate Resp SpO2   04/25/20 2230 -- -- -- -- -- -- 97 %   04/25/20 2215 136/83 -- -- 78 -- -- 98 %   04/25/20 2013 -- 98.3  F (36.8  C) Oral -- 96 18 94 %       Physical Exam  General: Crying on the bed.  Tearful.  Anxious.  Skin: Good turgor, no rash, no unusual bruising or prominent lesions.  HEENT: Head: Normocephalic, atraumatic, no visible masses.   Eyes: Conjunctiva clear.  Cardiac: Normal rate and regular rhythm, no murmur or gallop.   Lungs: Clear to auscultation.  Abdomen: Mild tenderness palpation throughout the posterior right thigh.  No obvious edema throughout this region.  No swelling within the groin region.  Mild diffuse lower abdominal tenderness, greatest in the right lower region.  Negative Pope's.  No rebound or guarding.  Musculoskeletal: Normal gait and station.  No cervical, thoracic, lumbar spine tenderness.  Full range of motion neck without difficulty.  Neurologic: Oriented x 3. GCS: 15.  Psychiatric: Intact recent and remote memory, judgment and insight, normal mood and affect.     Emergency Department Course     Imaging:  Radiology findings were communicated with the patient who voiced understanding of the findings.    US Pelvic Complete w Transvaginal   Preliminary Result   IMPRESSION:    1. The ovaries are not visualized.   2. Prior hysterectomy.   3. No free fluid in the pelvis.       US Lower Extremity Venous Duplex Right   Preliminary Result   IMPRESSION:    No evidence of thrombus in the major veins of the right lower extremity.         Interventions:  2126 Dilaudid 0.5mg IV    2242 Dilaudid 0.5mg IM    Emergency Department Course:  Past medical records, nursing notes, and vitals reviewed.    2025 I performed an exam of the patient as documented above.     2036 I looked the patient up on [facility].  "   She largely has one narcotic prescriber and her last prescription was written on 4/13 for 90 tablets Percocet.    She receives monthly narcotic prescriptions from this one provider.     The patient was sent for a US Lower Extremity Venous Duplex Rt, US Pelvis Complete w Transvaginal while in the emergency department, results above.     2047 Patient is declining the medications I have ordered (5mg PO Oxycodone, 15mg IM Toradol, and 975mg acetaminophen) and instead requesting a \"stronger medication\" at this time.     2235 I rechecked the patient and discussed the results of her workup thus far.     2240 I rechecked the patient.    2245 patient refuses to leave until her discharge disposition includes her groin swelling.    Findings and plan explained to the Patient. Patient discharged home with instructions regarding supportive care, medications, and reasons to return. The importance of close follow-up was reviewed.     I personally reviewed the imaging results with the Patient and answered all related questions prior to discharge.     Impression & Plan     Medical Decision Making:  Melody Muñoz is a 44 year old female with history of chronic pain syndrome, who returns for the second time in 1 day to the ED for evaluation of right-sided abdominal and leg pain.  Details the patient's history can be known the HPI.  Differentials considered included appendicitis, diverticulitis, bowel obstruction, DVT, cellulitis, tubo-ovarian abscess, ovarian torsion, ectopic pregnancy, amongst others.  On my exam she was dramatic, anxious, tearful.  Diffuse lower abdominal tenderness to, without rebound or guarding.  No obvious edema throughout the leg or groin region.  Ultrasounds obtained, which showed no DVT.  Ovaries are not able to be visualized.  She has had a prior hysterectomy.  Labs have been completed this morning, there which showed no leukocytosis.  Urine was also negative.    Per chart review, patient has " "presented the ED numerous times in the past for similar complaints.  The pain in the distribution which she is describing does not appear to be new.  She does have multiple care plans.  I also looked her up on the [place] prescription monitoring program for which she receives numerous narcotic medications.  She was tearful and occasionally yelling at staff here due to her significant discomfort.  She was given a dose of IV pain medicine.  She notes that this does not \"last long enough.\"  We did compromise on an IM dose pain medicine as well.  She then refused to leave the emergency department as I did not put in a groin swelling in her discharge disposition.  I do not appreciate any groin swelling on my exam.  We have put this in with, and saying subjective per patient as she was otherwise escalating here in the emergency department.  She has no signs of concerning a back/spine etiology to suggest cauda equina syndrome, epidural hematoma, epidural abscess.  She has had her appendix removed and has a history of hysterectomy.  Advised her to continue her pain medicines at home, and to follow-up with her primary and pain clinic next week.  There is a strong concern for pain seeking.  I do not feel she warrants a CT at this time, she has had numerous scans in the past per chart review, and again pain seems unchanged from her chronic distribution.  However if pain is worsening, she has uncontrolled vomiting, fevers, she will return for further evaluation.  All questions were answered prior to discharge.  She was in agreement with the treatment plan as stated above.  There is a strong concern for    Diagnosis:    ICD-10-CM    1. Chronic pain syndrome  G89.4    2. Lower abdominal pain  R10.30    3. Groin pain  R10.30    4. Groin swelling  R19.09     Subjective per patient       Disposition:  Discharged to home.    Discharge Medications:  New Prescriptions    No medications on file       Scribe Disclosure:  I, Brenda " reji Taylor serving as a scribe at 8:30 PM on 4/25/2020 to document services personally performed by Juana Rodriguez PA based on my observations and the provider's statements to me. 4/25/2020   [facility] EMERGENCY DEPARTMENT    This was created at least in part with a voice recognition software. Mistakes/typos may be present.      Juana Rodriguez PA  04/25/20 4157

## 2020-04-26 NOTE — DISCHARGE INSTRUCTIONS
Call your primary care provider or pain clinic next week for follow-up.  Return for changing worsening symptoms, new concerns.      Discharge Instructions  Abdominal Pain    Abdominal pain can be caused by many things. Your evaluation today does not show the exact cause for your pain. Your doctor today has decided that it is unlikely your pain is due to a life threatening problem, or a problem requiring surgery or hospital admission. Sometimes those problems cannot be found right away, so it is very important that you follow up as directed.  Sometimes only the changes which occur over time allow the cause of your pain to be found.    Return to the Emergency Department for a recheck in 8-12 hours if your pain continues.  If your pain gets worse, changes in location, or feels different, return to the Emergency Department right away.    ADULTS:  Return to the Emergency Department right away if:    You get an oral temperature above 102oF or as directed by your doctor.  You have blood in your stools (bright red or black, tarry stools).  You keep throwing up or can t drink liquids.  You see blood when you throw up.  You can t have a bowel movement or you can t pass gas.  Your stomach gets bloated or bigger.  Your skin or the whites of your eyes look yellow.  You faint.  You have bloody, frequent or painful urination.  You have new symptoms or anything that worries you.    CHILDREN:  Return to the Emergency Department right away if your child has any of the above-listed symptoms or the following:    Pushes your hand away or screams/cries when his/her belly is touched.  You notice your child is very fussy or weak.  Your child is very tired and is too tired to eat or drink.  Your child is dehydrated.  Signs of dehydration can be:  Your infant has had no wet diapers in 4-5 hours.  Your older child has not passed urine in 6-8 hours.  Your infant or child starts to have dry mouth and lips, or no saliva or tears.    PREGNANT  WOMEN:  Return to the Emergency Department right away if you have any of the above-listed symptoms or the following:    You have bleeding, leaking fluid or passing tissue from the vagina.  You have worse pain or cramping, or pain in your shoulder or back.  You have vomiting that will not stop.  You have painful or bloody urination.  You have a temperature of 100oF or more.  Your baby is not moving as much as usual.  You faint.  You get a bad headache with or without eye problems and abdominal pain.  You have a convulsion or seizure.  You have unusual discharge from your vagina and abdominal pain.    Abdominal pain is pretty common during pregnancy.  Your pain may or may not be related to your pregnancy. You should follow-up closely with your OB doctor so they can evaluate you and your baby.  Until you follow-up with your regular doctor, do the following:     Avoid sex and do not put anything in your vagina.  Drink clear fluids.  Only take medications approved by your doctor.    MORE INFORMATION:    Appendicitis:  A possible cause of abdominal pain in any person who still has their appendix is acute appendicitis. Appendicitis is often hard to diagnose.  Testing does not always rule out early appendicitis or other causes of abdominal pain. Close follow-up with your doctor and re-evaluations may be needed to figure out the reason for your abdominal pain.    Follow-up:  It is very important that you make an appointment with your clinic and go to the appointment.  If you do not follow-up with your primary doctor, it may result in missing an important development which could result in permanent injury or disability and/or lasting pain.  If there is any problem keeping your appointment, call your doctor or return to the Emergency Department.    Medications:  Take your medications as directed by your doctor today.  Before using over-the-counter medications, ask your doctor and make sure to take the medications as directed.   "If you have any questions about medications, ask your doctor.    Diet:  Resume your normal diet as much as possible, but do not eat fried, fatty or spicy foods while you have pain.  Do not drink alcohol or have caffeine.  Do not smoke tobacco.    Probiotics: If you have been given an antibiotic, you may want to also take a probiotic pill or eat yogurt with live cultures. Probiotics have \"good bacteria\" to help your intestines stay healthy. Studies have shown that probiotics help prevent diarrhea and other intestine problems (including C. diff infection) when you take antibiotics. You can buy these without a prescription in the pharmacy section of the store.     If you were given a prescription for medicine here today, be sure to read all of the information (including the package insert) that comes with your prescription.  This will include important information about the medicine, its side effects, and any warnings that you need to know about.  The pharmacist who fills the prescription can provide more information and answer questions you may have about the medicine.  If you have questions or concerns that the pharmacist cannot address, please call or return to the Emergency Department.         Opioid Medication Information    Pain medications are among the most commonly prescribed medicines, so we are including this information for all our patients. If you did not receive pain medication or get a prescription for pain medicine, you can ignore it.     You may have been given a prescription for an opioid (narcotic) pain medicine and/or have received a pain medicine while here in the Emergency Department. These medicines can make you drowsy or impaired. You must not drive, operate dangerous equipment, or engage in any other dangerous activities while taking these medications. If you drive while taking these medications, you could be arrested for DUI, or driving under the influence. Do not drink any alcohol while you " are taking these medications.     Opioid pain medications can cause addiction. If you have a history of chemical dependency of any type, you are at a higher risk of becoming addicted to pain medications.  Only take these prescribed medications to treat your pain when all other options have been tried. Take it for as short a time and as few doses as possible. Store your pain pills in a secure place, as they are frequently stolen and provide a dangerous opportunity for children or visitors in your house to start abusing these powerful medications. We will not replace any lost or stolen medicine.  As soon as your pain is better, you should flush all your remaining medication.     Many prescription pain medications contain Tylenol  (acetaminophen), including Vicodin , Tylenol #3 , Norco , Lortab , and Percocet .  You should not take any extra pills of Tylenol  if you are using these prescription medications or you can get very sick.  Do not ever take more than 3000 mg of acetaminophen in any 24 hour period.    All opioids tend to cause constipation. Drink plenty of water and eat foods that have a lot of fiber, such as fruits, vegetables, prune juice, apple juice and high fiber cereal.  Take a laxative if you don t move your bowels at least every other day. Miralax , Milk of Magnesia, Colace , or Senna  can be used to keep you regular.      Remember that you can always come back to the Emergency Department if you are not able to see your regular doctor in the amount of time listed above, if you get any new symptoms, or if there is anything that worries you.

## 2020-04-26 NOTE — ED TRIAGE NOTES
"Pt eloped from this facility earlier today after being seen for leg/back pain. Pt states she was supposed to have an ultrasound performed and she would like to receive the ultrasound and \"I need better pain control, this is unlivable.\" ABCs intact GCS 15  "

## 2020-04-27 ENCOUNTER — VIRTUAL VISIT (OUTPATIENT)
Dept: FAMILY MEDICINE | Facility: CLINIC | Age: 45
End: 2020-04-27
Payer: COMMERCIAL

## 2020-04-27 DIAGNOSIS — R91.8 OPACITY OF LUNG ON IMAGING STUDY: Primary | ICD-10-CM

## 2020-04-27 DIAGNOSIS — F34.1 PERSISTENT DEPRESSIVE DISORDER: ICD-10-CM

## 2020-04-27 DIAGNOSIS — R10.84 GENERALIZED ABDOMINAL PAIN: ICD-10-CM

## 2020-04-27 DIAGNOSIS — R10.32 BILATERAL GROIN PAIN: ICD-10-CM

## 2020-04-27 DIAGNOSIS — N39.3 FEMALE STRESS INCONTINENCE: ICD-10-CM

## 2020-04-27 DIAGNOSIS — R10.31 BILATERAL GROIN PAIN: ICD-10-CM

## 2020-04-27 DIAGNOSIS — F41.9 ANXIETY: ICD-10-CM

## 2020-04-27 DIAGNOSIS — G89.4 CHRONIC PAIN SYNDROME: ICD-10-CM

## 2020-04-27 PROCEDURE — 99214 OFFICE O/P EST MOD 30 MIN: CPT | Mod: 95 | Performed by: FAMILY MEDICINE

## 2020-04-27 NOTE — RESULT ENCOUNTER NOTE
Please inform patient of the following:       Melody    Your lab tests are complete and I have reviewed the results.     - Your lab results look great; everything is normal.    If you have any questions or concerns, please feel free to call or send a L2C message.    Sincerely,  Christofer Hennessy PA-C

## 2020-04-27 NOTE — PATIENT INSTRUCTIONS
We will await the records from her appointment with ophthalmology.  She is getting her CT scan of her chest done today.  She did have an MRI of the brain including the orbits that was unremarkable back in March.

## 2020-04-27 NOTE — PROGRESS NOTES
"Melody Muñoz is a 44 year old female who is being evaluated via a billable telephone visit.      The patient has been notified of following:     \"This telephone visit will be conducted via a call between you and your physician/provider. We have found that certain health care needs can be provided without the need for a physical exam.  This service lets us provide the care you need with a short phone conversation.  If a prescription is necessary we can send it directly to your pharmacy.  If lab work is needed we can place an order for that and you can then stop by our lab to have the test done at a later time.    Telephone visits are billed at different rates depending on your insurance coverage. During this emergency period, for some insurers they may be billed the same as an in-person visit.  Please reach out to your insurance provider with any questions.    If during the course of the call the physician/provider feels a telephone visit is not appropriate, you will not be charged for this service.\"    Patient has given verbal consent for Telephone visit?  Yes    How would you like to obtain your AVS? Mail a copy    Subjective     Melody Muñoz is a 44 year old female who presents to clinic today for the following health issues:    HPI  Pain management      Duration:     Description (location/character/radiation): pt would like to discuss pain management.  Pt is waiting to have some more imaging done.  Pt says she doesn't need anymore pain meds but would like to discuss a plan going forward.    Intensity:  moderate    Accompanying signs and symptoms: none    History (similar episodes/previous evaluation): None    Precipitating or alleviating factors: None    Therapies tried and outcome: Percocet has helped some with her pain.  She has been taking 1 tablet 3 times daily accompanied by 2 ibuprofen tablets.  She says that if she takes more than that amount of ibuprofen it really messes up her intestines due " to her IBS.         ED/UC Followup:    Facility: [facility]  Date of visit: 4/25  Reason for visit: Leg swelling  Current Status: No change but did have testing done which was negative for DVT.  Her CBC, CMP and urinalysis were all negative.  She has had a CT scan of the abdomen and pelvis, MRI of the lumbar spine and ultrasound of the right leg and a pelvic ultrasound.  No etiology has been forthcoming for her symptoms.  She has been to gynecology and had a pelvic exam which was unremarkable and had a transvaginal pelvic ultrasound.  She did get irritated with the personnel in the ER thinking that they were not listening to her and left essentially AMA.         Patient Active Problem List   Diagnosis     Endometriosis s/po EDWIGE 10-17     Mantoux: positive     Latent tuberculosis     Major depression     Generalized anxiety disorder     Constipation     Nightmares     Knee pain     Bipolar 1 disorder  with psychosis     Chronic fatigue     Female stress incontinence     Suicidal ideation     Acne     Chronic pain syndrome     Insomnia     Chronic migraine without aura without status migrainosus, not intractable     Nausea     JASWANT (obstructive sleep apnea)     Elevated liver enzymes     TMJ (temporomandibular joint syndrome)     Hypothyroidism due to acquired atrophy of thyroid     Hostile behavior     Mild persistent asthma without complication     Uncontrolled type 2 diabetes mellitus with hyperglycemia (H)     Allergic rhinitis     Incontinence of urine in female     Migraine     Screening for diabetic peripheral neuropathy     Need for prophylactic vaccination against Streptococcus pneumoniae (pneumococcus)     Sepsis (H)     Abdominal pain     Morbid obesity (H)     Former tobacco use     Mixed simple and mucopurulent chronic bronchitis (H): Spirometry 6-4-16 lung age = 61y/o in 41 y/o FVC=90%&FEV1=78%     Class 2 obesity due to excess calories with serious comorbidity and body mass index (BMI) of 36.0 to  36.9 in adult     Mixed hyperlipidemia     Acquired hypothyroidism     Dysuria     Other chronic back pain     Headache disorder     Persistent depressive disorder     Irritable bowel syndrome with both constipation and diarrhea     Anxiety     Recurrent sinus infections     Bilateral groin pain     Opacity of lung on imaging study     Visual disturbance     Past Surgical History:   Procedure Laterality Date      SECTION  2009     for twins     COLONOSCOPY  2010     LAPAROSCOPIC APPENDECTOMY N/A 2017    Procedure: LAPAROSCOPIC APPENDECTOMY;  LAPAROSCOPIC APPENDECTOMY and resection of epiploic tag;  Surgeon: Roberto Robins MD;  Location: [facility] OR     LAPAROSCOPY      - endometriosis     little finger surgery left  1980     SLING BLADDER NOS  2014     tubal ligation bilateral       wisdom teeth removal         Social History     Tobacco Use     Smoking status: Former Smoker     Packs/day: 0.00     Years: 17.00     Pack years: 0.00     Types: Cigarettes     Last attempt to quit: 3/18/2019     Years since quittin.1     Smokeless tobacco: Never Used   Substance Use Topics     Alcohol use: No     Comment: no etoh since      Family History   Problem Relation Age of Onset     Hypertension Mother      Heart Disease Father         palpitations     Depression Sister      Anxiety Disorder Sister      Heart Disease Maternal Grandmother         CHF     Cancer Maternal Grandfather 72        Pancreatic Cancer     Alzheimer Disease Paternal Grandfather      Bipolar Disorder Other      Autism Spectrum Disorder Other            Reviewed and updated as needed this visit by Provider         Review of Systems   ROS COMP: Constitutional, HEENT, cardiovascular, pulmonary, GI, , musculoskeletal, neuro, skin, endocrine and psych systems are negative, except as otherwise noted.  She has been to ophthalmology and is requesting that a copy of that visit gets sent to us.  The eye doctor is apparently concerned  "about her right eye.  She does have an MRI of the head and orbits from a few months back that was normal(3/5/20)       Objective   Reported vitals:  LMP  (LMP Unknown)    alert and sounds very frustrated over the telephone.  PSYCH: Alert and oriented times 3; coherent speech, normal   rate and volume, able to articulate logical thoughts, able   to abstract reason, no tangential thoughts, no hallucinations   or delusions  Her affect is normal and frustrated  RESP: No cough, no audible wheezing, able to talk in full sentences  Remainder of exam unable to be completed due to telephone visits            Assessment/Plan:  1. Opacity of lung on imaging study  Patient has a CT scan of the chest scheduled for today.    2. Anxiety      3. Bilateral groin pain  We discussed the patient's pain medications.  She has more pain at night when she goes to bed.  We had a discussion about changing her pain medications so that she takes 1 in the morning and 2 at bedtime.  With that she can take the 400 mg of ibuprofen that she is currently doing with each of those doses.  She can also take 2 more doses of the ibuprofen during the day if she needs to.    4. Chronic pain syndrome  See bullet point #3    5. Female stress incontinence  We briefly had a conversation about getting consultation from urology.  She is not anxious to get that done until she is got some other \"answers\" with the tests that she is doing right now.    6. Persistent depressive disorder      7. Generalized abdominal pain          No follow-ups on file.      Phone call duration:  26 minutes    Evaristo Machado MD        "

## 2020-04-28 ENCOUNTER — HOSPITAL ENCOUNTER (OUTPATIENT)
Dept: CT IMAGING | Facility: CLINIC | Age: 45
Discharge: HOME | End: 2020-04-28
Attending: INTERNAL MEDICINE | Admitting: INTERNAL MEDICINE
Payer: COMMERCIAL

## 2020-04-28 ENCOUNTER — TELEPHONE (OUTPATIENT)
Dept: FAMILY MEDICINE | Facility: CLINIC | Age: 45
End: 2020-04-28

## 2020-04-28 DIAGNOSIS — R91.8 OPACITY OF LUNG ON IMAGING STUDY: ICD-10-CM

## 2020-04-28 DIAGNOSIS — Z72.0 CURRENT OCCASIONAL SMOKER: ICD-10-CM

## 2020-04-28 LAB — ANA SER QL IF: NEGATIVE

## 2020-04-28 PROCEDURE — 71250 CT THORAX DX C-: CPT

## 2020-04-28 NOTE — TELEPHONE ENCOUNTER
Reason for Call:  Other call back    Detailed comments: Melody is calling to request speaking with a nurse in regards to MRI orders with full contrast. She has questions regards to the orders and the placement of orders.     Phone Number Patient can be reached at: Cell number on file:    Telephone Information:   Mobile [phone]       Best Time: asap    Can we leave a detailed message on this number? YES    Call taken on 4/28/2020 at 1:25 PM by Ania Malagon

## 2020-04-28 NOTE — TELEPHONE ENCOUNTER
Patient called back to talk to the doctors clinic needing records for 1 year to go to [facility] she has an appointment at [facility] on Wednesday 5/6/2020 [facility] pain please call patient regarding this cell phone is [phone] asap mri orbit area is what she said

## 2020-04-28 NOTE — TELEPHONE ENCOUNTER
"Outgoing call to pt, but unable to leave , because \"Mailbox is full.\"    Notes from 4/27 virtual phone visit state:    \"\"The eye doctor is apparently concerned about her right eye.  She does have an MRI of the head and orbits from a few months back that was normal(3/5/20).\"    \"We will await the records from her appointment with ophthalmology.  She is getting her CT scan of her chest done today.  She did have an MRI of the brain including the orbits that was unremarkable back in March.\"    Pt needs to have the [facility] fax over her records & results from March, for PCP to review.  "

## 2020-04-28 NOTE — TELEPHONE ENCOUNTER
She just had a normal CT scan.  This is the best test to look at the lungs.  Will route to [facility].  Asked radiologist to re-review her last CT scan per pt request.

## 2020-04-28 NOTE — TELEPHONE ENCOUNTER
2 ED visits over the weekend and virtual visit done yesterday for these concerns.    No further action needed at this time.

## 2020-04-29 NOTE — TELEPHONE ENCOUNTER
When we talked the other day we talked about taking one pain med in the AM and 2 at bedtime. She was going to take 400mg of ibuprofen 3-4 times/day. I dont think we should increase from that.

## 2020-04-29 NOTE — TELEPHONE ENCOUNTER
Pt agrees that the CT is normal.  Pt disagrees that there is nothing wrong with the position of her ribs or her spine.  Pt is seeing [place] next week.  Pt is asking for an increase in pain medication, please advise.

## 2020-04-29 NOTE — TELEPHONE ENCOUNTER
Patient called back the clinic. She requested 4/22/20 OV with Dr. Luz read to her, which was done by Triage nurse. Pt refused to have a copy mailed or faxed to her. She completely does not agree with axillary assessment:    BREAST: normal without masses, tenderness or nipple discharge and no palpable axillary masses or adenopathy    She said that she has visible lumps under her armpits in various locations and can not believe the doctor would say there is not reason for her pain. She believes she is not being heard or believed.     She has decided to go to [facility] for further assessment. She verbalized that she does not want anymore care from [facility].

## 2020-04-29 NOTE — TELEPHONE ENCOUNTER
I think Melody needs to wait until she gets down to [facility] before we adjust her pain medication further.  We do not actually have a reason for her discomfort diagnosed at this point.

## 2020-04-29 NOTE — TELEPHONE ENCOUNTER
Left message regarding denial of medication adjustments. She is to follow up at the [facility] regarding pain issues and diagnosis    Hannah Sood LPN

## 2020-04-29 NOTE — TELEPHONE ENCOUNTER
I agree with Nancy's comment about CT being the best test to evaluate the lungs from an imaging standpoint

## 2020-04-30 ENCOUNTER — TELEPHONE (OUTPATIENT)
Dept: OTOLARYNGOLOGY | Facility: CLINIC | Age: 45
End: 2020-04-30

## 2020-04-30 NOTE — TELEPHONE ENCOUNTER
Called patient and left a VM.     Offered a sooner virtual/telephone visit with Dr. Mendez.      Patient is welcome to reschedule to any open slot as a virtual or phone visit if they would like to reschedule appointment to sooner date.      If patient would like to do sooner video visit, please confirm email and let patient know that a clinic staff member will reach out 2 days prior to help set up appointment. If patient would prefer phone visit please confirm best phone number for patient.

## 2020-05-01 ENCOUNTER — TELEPHONE (OUTPATIENT)
Dept: FAMILY MEDICINE | Facility: CLINIC | Age: 45
End: 2020-05-01

## 2020-05-01 NOTE — TELEPHONE ENCOUNTER
Reason for Call:  Other call back    Detailed comments: Melody called asking that a nurse return a call to her this afternoon.  She is getting information pulled together for going to the [facility].    Phone Number Patient can be reached at: Cell number on file:    Telephone Information:   Mobile [phone]       Best Time: any    Can we leave a detailed message on this number? YES    Call taken on 5/1/2020 at 12:48 PM by Mary Rodriguez

## 2020-05-01 NOTE — TELEPHONE ENCOUNTER
"RN called back to pt.    Pt states that on recent exam, she feels that Dr. Luz did not complete a thorough physical exam. She wanted notes that there was no looking or feeling along the groin and legs where she states she has swelling.     \"He felt one armpit and said there was no swollen lymph nodes. He said in his notes there are no masses, but there are swollen lymph nodes- I'm not saying there are masses. But there are swollen lymph nodes... He needs to tell me what he is finding and what he is not finding instead of just walking out of the room. It's absurd, he needs to ask me what I am feeling and what he is feeling should be the same as what I am feeling.\"    \"I need to go to [place] where they can look me over without judgement.\"    \"What your doctors are doing is looking at my medications [lists psych meds] and then judging me based off these medications. It's so judgmental. Do you know how judgemental that is?\"   RN states she can not imagine. Patients starts crying.    \"It's so painful. I am telling them this and everyone is saying that they do not care. They are saying I am crazy and I'm not crazy\"    Pt further wondering if [place] and [place] both use Epic. RN confirms that [place] uses Epic but states is unsure about [place]. Advises that even if both use Epic, that does not necessarily mean that they will be able to see all information. Pt states, \"Well I think I will just need to hand carry all my notes over\". RN directs to medical records if this is the case. RN also advised that if [place] needs specific information they can reach out to clinic and as long as [facility] has pt permission, [facility] can fax any needed info to [place].    Pt states she would like the following addendums to her recent in person OV notes:    \"I did not look at her groin, I did not feel the area, I did not touch the other armpit, and in my good conscience am not able to say there are no masses and no swollen lymph nodes\".    Patient " "begins crying again. \"You know, never mind. I think I just need to let all this go. I need to let my anger go. It is messing with my inner peace. I am not angry and you, I am not angry at the clinic. You guys have helped me a lot. So there is actually no need to do anything right now. Thank you for taking my call. I hope you enjoy the weekend and I hope you are able to enjoy the weather\".    RN relayed this back to pt and advised that pt can call to clinic or medical records if anything further is needed.    Pt hung up. No further action needed at this time.  "

## 2020-05-04 ENCOUNTER — NURSE TRIAGE (OUTPATIENT)
Dept: FAMILY MEDICINE | Facility: CLINIC | Age: 45
End: 2020-05-04

## 2020-05-04 DIAGNOSIS — R11.0 NAUSEA: ICD-10-CM

## 2020-05-04 RX ORDER — ONDANSETRON 4 MG/1
TABLET, ORALLY DISINTEGRATING ORAL
Qty: 30 TABLET | Refills: 10 | Status: SHIPPED | OUTPATIENT
Start: 2020-05-04 | End: 2021-05-27

## 2020-05-04 NOTE — TELEPHONE ENCOUNTER
"Patient called crying reporting severe pelvic/back pain.  She declines to see ED since she doesn't feel she is getting heard. \"I know this is not a kidney stone\". She is requesting something stronger than percocet. Notes she is taking the max ibuprofen dose (400 mg ) she can tolerate since she has IBS. Pt states she also takes 3 percocet tablets daily. Pt asked if she could double her percocet dose. Pt declined virtual visit. Please advice.     Additional Information    Negative: Shock suspected (e.g., cold/pale/clammy skin, too weak to stand, low BP, rapid pulse)    Negative: Sounds like a life-threatening emergency to the triager    Negative: Urinary catheter, questions about    Negative: Recent back or abdominal injury    Negative: Recent genital injury    Negative: Unable to urinate (or only a few drops) > 4 hours and bladder feels very full (e.g., palpable bladder or strong urge to urinate)    Negative: Passing pure blood or large blood clots (i.e., larger than a dime or grape)    Negative: Fever > 100.5 F (38.1 C)    Negative: Patient sounds very sick or weak to the triager    Negative: Known sickle cell disease    Negative: Taking Coumadin (warfarin) or other strong blood thinner, or known bleeding disorder (e.g., thrombocytopenia)    Side (flank) or back pain present    Answer Assessment - Initial Assessment Questions  1. COLOR of URINE: \"Describe the color of the urine.\"  (e.g., tea-colored, pink, red, blood clots, bloody)      today  2. ONSET: \"When did the bleeding start?\"         3. EPISODES: \"How many times has there been blood in the urine?\" or \"How many times today?\"      once  4. PAIN with URINATION: \"Is there any pain with passing your urine?\" If so, ask: \"How bad is the pain?\"  (Scale 1-10; or mild, moderate, severe)     - MILD - complains slightly about urination hurting     - MODERATE - interferes with normal activities       - SEVERE - excruciating, unwilling or unable to urinate because of the " "pain       severe  5. FEVER: \"Do you have a fever?\" If so, ask: \"What is your temperature, how was it measured, and when did it start?\"        6. ASSOCIATED SYMPTOMS: \"Are you passing urine more frequently than usual?\"      Yes  7. OTHER SYMPTOMS: \"Do you have any other symptoms?\" (e.g., back/flank pain, abdominal pain, vomiting)      Low back and pelvic swollen in inner tights   8. PREGNANCY: \"Is there any chance you are pregnant?\" \"When was your last menstrual period?\"      I have hysterectomy    Protocols used: URINE - BLOOD IN-A-OH    "

## 2020-05-04 NOTE — TELEPHONE ENCOUNTER
"Called patient and relayed provider message. She states, \"I can easily double my pain meds myself, but I won't do that. I'm trying to do things the right way.\"     Patient states, \"I tried to reach out and tell him this is something serious. This is real. I honestly think they'll admit me ([facility]). This pain is out of control. The swelling in my legs is outrageous. Dr. Luz didn't even look at my groin and told me I didn't have swollen lymph nodes under my arm.\" Patient expresses, \"I feel so unheard and so invalidated.\" Patient states, \"I just want to be present for my kids and hep them with homework.\"    Recommended patient go to ED if her pain is this bad. She refuses to go to ED stating, \"They just think you're fat or it is your normal body shape. If they would actually feel it, they would feel the lumps.\"     She expresses, \"this is gas lighting from these doctors.\" Patient states, \"To leave me this way... I'll offer it up to God.\"     Unable to triage patients symptoms further as she hung up on RN.    Routing to provider as FYI.  "

## 2020-05-04 NOTE — TELEPHONE ENCOUNTER
Reason for Call:  Other call back    Detailed comments: Melody called to say this morning she has blood in her urine.    She is going to the [facility] 5/6/2020.    In the meantime, what is she to do with her pain.    Phone Number Patient can be reached at: Cell number on file:    Telephone Information:   Mobile [phone]       Best Time: any    Can we leave a detailed message on this number? YES    Call taken on 5/4/2020 at 12:30 PM by Mary Rodriguez

## 2020-05-04 NOTE — TELEPHONE ENCOUNTER
Reason for Call:  Medication or medication refill:    Do you use a [facility]?  Name of the pharmacy and phone number for the current request:     [id number] [place], [place] - [address] AT [place] [address] & [facility]      Name of the medication requested: ondansetron (ZOFRAN-ODT) 4 MG ODT tab      Other request: The patient called and stated that she needs this medication filled.    Can we leave a detailed message on this number? YES    Phone number patient can be reached at: Cell number on file:    Telephone Information:   Mobile [phone]       Best Time: Any    Call taken on 5/4/2020 at 1:08 PM by Melody Espinoza

## 2020-05-04 NOTE — TELEPHONE ENCOUNTER
I do not think doubling the patient's dose of pain medication is a good idea since we do not even know what the pain we are treating is coming from.  She is supposed to be having an appointment this week at [facility] and can bring the pain discussion up with them.

## 2020-05-06 ENCOUNTER — TELEPHONE (OUTPATIENT)
Dept: FAMILY MEDICINE | Facility: CLINIC | Age: 45
End: 2020-05-06

## 2020-05-06 DIAGNOSIS — R10.32 BILATERAL GROIN PAIN: ICD-10-CM

## 2020-05-06 DIAGNOSIS — R10.31 BILATERAL GROIN PAIN: ICD-10-CM

## 2020-05-06 RX ORDER — OXYCODONE AND ACETAMINOPHEN 5; 325 MG/1; MG/1
1 TABLET ORAL 3 TIMES DAILY PRN
Qty: 90 TABLET | Refills: 0 | Status: SHIPPED | OUTPATIENT
Start: 2020-05-08 | End: 2020-06-02

## 2020-05-06 NOTE — TELEPHONE ENCOUNTER
Reason for Call:  Other prescription  Detailed comments: patient would like a prescription for oxyCODONE-acetaminophen (PERCOCET) 5-325 MG tablet because she will be out soon and in pain  Phone Number Patient can be reached at: Home number on file [phone] (home)  Best Time: any  Can we leave a detailed message on this number? YES  Call taken on 5/6/2020 at 2:44 PM by BREANN WARREN

## 2020-05-07 ENCOUNTER — TRANSFERRED RECORDS (OUTPATIENT)
Dept: HEALTH INFORMATION MANAGEMENT | Facility: CLINIC | Age: 45
End: 2020-05-07

## 2020-05-19 ENCOUNTER — TELEPHONE (OUTPATIENT)
Dept: FAMILY MEDICINE | Facility: CLINIC | Age: 45
End: 2020-05-19

## 2020-05-19 DIAGNOSIS — E03.9 ACQUIRED HYPOTHYROIDISM: ICD-10-CM

## 2020-05-19 DIAGNOSIS — H53.9 VISUAL DISTURBANCE: Primary | ICD-10-CM

## 2020-05-19 NOTE — TELEPHONE ENCOUNTER
Fax from [facility] asking for the following orders for Melody:    1) CT orbits w/ contrast-looking for thyroid mass  2) Thyroid studies (TSH, free T4, and TSI)    Please fax results to [phone], Dr. Salazar

## 2020-05-22 ENCOUNTER — HOSPITAL ENCOUNTER (OUTPATIENT)
Dept: CT IMAGING | Facility: CLINIC | Age: 45
Discharge: HOME | End: 2020-05-22
Attending: FAMILY MEDICINE | Admitting: FAMILY MEDICINE
Payer: COMMERCIAL

## 2020-05-22 DIAGNOSIS — H53.9 VISUAL DISTURBANCE: ICD-10-CM

## 2020-05-22 PROCEDURE — 25000128 H RX IP 250 OP 636: Performed by: FAMILY MEDICINE

## 2020-05-22 PROCEDURE — 25800030 ZZH RX IP 258 OP 636: Performed by: FAMILY MEDICINE

## 2020-05-22 PROCEDURE — 70482 CT ORBIT/EAR/FOSSA W/O&W/DYE: CPT

## 2020-05-22 RX ORDER — IOPAMIDOL 755 MG/ML
500 INJECTION, SOLUTION INTRAVASCULAR ONCE
Status: COMPLETED | OUTPATIENT
Start: 2020-05-22 | End: 2020-05-22

## 2020-05-22 RX ADMIN — SODIUM CHLORIDE 65 ML: 9 INJECTION, SOLUTION INTRAVENOUS at 11:51

## 2020-05-22 RX ADMIN — IOPAMIDOL 50 ML: 755 INJECTION, SOLUTION INTRAVENOUS at 11:51

## 2020-05-26 ENCOUNTER — TELEPHONE (OUTPATIENT)
Dept: FAMILY MEDICINE | Facility: CLINIC | Age: 45
End: 2020-05-26

## 2020-05-26 NOTE — TELEPHONE ENCOUNTER
Reason for Call:  Request for results:    Name of test or procedure: CT    Date of test of procedure: 5/22/2020    Location of the test or procedure: [facility]    OK to leave the result message on voice mail or with a family member? YES    Phone number Patient can be reached at:  Cell number on file:    Telephone Information:   Mobile [phone]       Additional comments: n/a    Call taken on 5/26/2020 at 10:05 AM by Mary Rodriguez

## 2020-05-27 NOTE — TELEPHONE ENCOUNTER
Pt notified report was faxed to Dr Elkins but she still wants to talk to a RN regarding the results of the CT. Can a RN please call her? Thanks!

## 2020-05-27 NOTE — TELEPHONE ENCOUNTER
RN called back to pt.  Pt was wondering if she should wait for results with [facility].  RN advised this would be best.  Pt states understanding.    Please advise if this needs to be addressed by our clinic.  No action needs to be taken if not.

## 2020-06-02 ENCOUNTER — TRANSFERRED RECORDS (OUTPATIENT)
Dept: HEALTH INFORMATION MANAGEMENT | Facility: CLINIC | Age: 45
End: 2020-06-02

## 2020-06-02 DIAGNOSIS — R10.32 BILATERAL GROIN PAIN: ICD-10-CM

## 2020-06-02 DIAGNOSIS — G89.4 CHRONIC PAIN SYNDROME: ICD-10-CM

## 2020-06-02 DIAGNOSIS — R10.31 BILATERAL GROIN PAIN: ICD-10-CM

## 2020-06-02 RX ORDER — OXYCODONE AND ACETAMINOPHEN 5; 325 MG/1; MG/1
1 TABLET ORAL 3 TIMES DAILY PRN
Qty: 90 TABLET | Refills: 0 | Status: SHIPPED | OUTPATIENT
Start: 2020-06-05 | End: 2020-06-22

## 2020-06-02 NOTE — TELEPHONE ENCOUNTER
Controlled Substance Refill Request for oxyCODONE-acetaminophen (PERCOCET) 5-325 MG tablet    Problem List Complete:  Yes    Chronic pain syndrome   9/8/2015    Overview    Patient is followed by [facility] for ongoing prescription of pain medication.  All refills should be approved by this provider, or covering partner.     Medication(s):  Lyrica 150 mg bid, Fioricet with codeine prn, klonopin and ambien to be prescribed by psych, not [facility].  Maximum quantity per month: 60, 20  Clinic visit frequency required: Q 3 months      Controlled substance agreement on file: Yes       Date(s): 9/8/2015  New CSA needed.  Pain Clinic evaluation in the past: No     DIRE Total Score(s):  No flowsheet data found.     Last [facility] website verification: 06/02/2020 multiple meds from multiple outside providers     Clonazepam and dextroamphetamine prescribed by outside providers.        checked in past 3 months?  No, route to RN

## 2020-06-02 NOTE — TELEPHONE ENCOUNTER
Routing refill request to provider for review/approval because:  Drug not on the [facility] refill protocol     LAST FILL 05/09 FOR 30 DAYS

## 2020-06-05 ENCOUNTER — NURSE TRIAGE (OUTPATIENT)
Dept: NURSING | Facility: CLINIC | Age: 45
End: 2020-06-05

## 2020-06-06 ENCOUNTER — NURSE TRIAGE (OUTPATIENT)
Dept: NURSING | Facility: CLINIC | Age: 45
End: 2020-06-06

## 2020-06-06 NOTE — TELEPHONE ENCOUNTER
"Melody reports headache for 3 days, sore throat, nausea. No known exposure to COVID, but has been at the [facility] several times. She is on oxycodone and Tylenol for pain but has not helped to provide relief.    Reports multiple concerns:  1) runny nose  2) weakness and numbness for over 2 months   3) feeling shaky  4) has stiff neck  5) has a \"long time eye concern\" which she has been evaluated for - reports having problems with her vision    No fever. No SOB.    Asks on how to go about testing for COVID, but states she does not want to get tested. [facility] informed her that COVID testing is the only way to find out if she has the infection or not.    [facility] informed her that she needs to reach out to PCP or be seen by  (virtual visit) for COVID testing orders and she stated \"I can't understand why I have to go through those steps for COVID testing. Good Bye\" and hung up. Triage not completed. No disposition provided to patient.    Given multiple health concerns above, [facility] would have recommended ED.    Amparo Myers RN/[facility]                Additional Information    Negative: Difficult to awaken or acting confused (e.g., disoriented, slurred speech)    Negative: [1] Weakness of the face, arm or leg on one side of the body AND [2] new onset    Negative: [1] Numbness of the face, arm or leg on one side of the body AND [2] new onset    Negative: [1] Loss of speech or garbled speech AND [2] new onset    Negative: Passed out (i.e., lost consciousness, collapsed and was not responding)    Negative: Sounds like a life-threatening emergency to the triager    Negative: Unable to walk, or can only walk with assistance (e.g., requires support)    Negative: Stiff neck (can't touch chin to chest)    Negative: Severe pain in one eye    Negative: [1] Other family members (or roommates) with headaches AND [2] possibility of carbon monoxide exposure    Negative: [1] SEVERE headache (e.g., excruciating) AND [2] \"worst " "headache\" of life    Negative: [1] SEVERE headache AND [2] sudden-onset (i.e., reaching maximum intensity within seconds)    Negative: [1] SEVERE headache AND [2] fever    Negative: Loss of vision or double vision (Exception: same as prior migraines)    Negative: [1] Fever > 100.0 F (37.8 C) AND [2] diabetes mellitus or weak immune system (e.g., HIV positive, cancer chemo, splenectomy, organ transplant, chronic steroids)    Negative: Patient sounds very sick or weak to the triager    Protocols used: HEADACHE-A-AH      "

## 2020-06-06 NOTE — TELEPHONE ENCOUNTER
"Clinic Action Needed:Yes, Please call patient [phone] (home)     Reason for Call:Patient calling stating she was seen by her OB/GYN at [facility]. Please see notes from 6/3/20.   Stating she was advised to request Frieda .1mg/.02 mg birth control from her PCP.      Shey Lee RN  [facility]            Reason for Disposition    Caller requesting a NON-URGENT new prescription or refill and triager unable to refill per unit policy    Additional Information    Negative: Drug overdose and nurse unable to answer question    Negative: Caller requesting information not related to medicine    Negative: Caller requesting a prescription for Strep throat and has a positive culture result    Negative: Rash while taking a medication or within 3 days of stopping it    Negative: Immunization reaction suspected    Negative: [1] Asthma AND [2] having symptoms of asthma (cough, wheezing, etc)    Negative: MORE THAN A DOUBLE DOSE of a prescription or over-the-counter (OTC) drug    Negative: [1] DOUBLE DOSE (an extra dose or lesser amount) of over-the-counter (OTC) drug AND [2] any symptoms (e.g., dizziness, nausea, pain, sleepiness)    Negative: [1] DOUBLE DOSE (an extra dose or lesser amount) of prescription drug AND [2] any symptoms (e.g., dizziness, nausea, pain, sleepiness)    Negative: Took another person's prescription drug    Negative: [1] DOUBLE DOSE (an extra dose or lesser amount) of prescription drug AND [2] NO symptoms (Exception: a double dose of antibiotics)    Negative: Diabetes drug error or overdose (e.g., insulin or extra dose)    Negative: [1] Request for URGENT new prescription or refill of \"essential\" medication (i.e., likelihood of harm to patient if not taken) AND [2] triager unable to fill per unit policy    Negative: [1] Prescription not at pharmacy AND [2] was prescribed today by PCP    Negative: Pharmacy calling with prescription questions and triager unable to answer question    " Negative: Caller has urgent medication question about med that PCP prescribed and triager unable to answer question    Negative: Caller has NON-URGENT medication question about med that PCP prescribed and triager unable to answer question    Protocols used: MEDICATION QUESTION CALL-A-AH

## 2020-06-07 ENCOUNTER — HOSPITAL ENCOUNTER (EMERGENCY)
Facility: CLINIC | Age: 45
Discharge: HOME | End: 2020-06-07
Attending: EMERGENCY MEDICINE | Admitting: EMERGENCY MEDICINE
Payer: COMMERCIAL

## 2020-06-07 ENCOUNTER — APPOINTMENT (OUTPATIENT)
Dept: GENERAL RADIOLOGY | Facility: CLINIC | Age: 45
End: 2020-06-07
Attending: EMERGENCY MEDICINE
Payer: COMMERCIAL

## 2020-06-07 VITALS
HEART RATE: 81 BPM | RESPIRATION RATE: 20 BRPM | OXYGEN SATURATION: 95 % | DIASTOLIC BLOOD PRESSURE: 74 MMHG | TEMPERATURE: 98.4 F | BODY MASS INDEX: 29.26 KG/M2 | SYSTOLIC BLOOD PRESSURE: 105 MMHG | WEIGHT: 160 LBS

## 2020-06-07 DIAGNOSIS — G89.4 CHRONIC PAIN SYNDROME: ICD-10-CM

## 2020-06-07 LAB
ALBUMIN SERPL-MCNC: 4.1 G/DL (ref 3.4–5)
ALP SERPL-CCNC: 83 U/L (ref 40–150)
ALT SERPL W P-5'-P-CCNC: 34 U/L (ref 0–50)
ANION GAP SERPL CALCULATED.3IONS-SCNC: 4 MMOL/L (ref 3–14)
AST SERPL W P-5'-P-CCNC: 15 U/L (ref 0–45)
BASOPHILS # BLD AUTO: 0.1 10E9/L (ref 0–0.2)
BASOPHILS NFR BLD AUTO: 0.8 %
BILIRUB SERPL-MCNC: 0.3 MG/DL (ref 0.2–1.3)
BUN SERPL-MCNC: 11 MG/DL (ref 7–30)
CALCIUM SERPL-MCNC: 8.9 MG/DL (ref 8.5–10.1)
CHLORIDE SERPL-SCNC: 105 MMOL/L (ref 94–109)
CO2 SERPL-SCNC: 29 MMOL/L (ref 20–32)
CREAT SERPL-MCNC: 0.8 MG/DL (ref 0.52–1.04)
DIFFERENTIAL METHOD BLD: ABNORMAL
EOSINOPHIL # BLD AUTO: 0.7 10E9/L (ref 0–0.7)
EOSINOPHIL NFR BLD AUTO: 5.9 %
ERYTHROCYTE [DISTWIDTH] IN BLOOD BY AUTOMATED COUNT: 12.2 % (ref 10–15)
GFR SERPL CREATININE-BSD FRML MDRD: 89 ML/MIN/{1.73_M2}
GLUCOSE SERPL-MCNC: 81 MG/DL (ref 70–99)
HCT VFR BLD AUTO: 45.7 % (ref 35–47)
HGB BLD-MCNC: 14.7 G/DL (ref 11.7–15.7)
IMM GRANULOCYTES # BLD: 0.1 10E9/L (ref 0–0.4)
IMM GRANULOCYTES NFR BLD: 0.5 %
LIPASE SERPL-CCNC: 117 U/L (ref 73–393)
LYMPHOCYTES # BLD AUTO: 3.6 10E9/L (ref 0.8–5.3)
LYMPHOCYTES NFR BLD AUTO: 32.5 %
MCH RBC QN AUTO: 28.1 PG (ref 26.5–33)
MCHC RBC AUTO-ENTMCNC: 32.2 G/DL (ref 31.5–36.5)
MCV RBC AUTO: 87 FL (ref 78–100)
MONOCYTES # BLD AUTO: 0.6 10E9/L (ref 0–1.3)
MONOCYTES NFR BLD AUTO: 5.2 %
NEUTROPHILS # BLD AUTO: 6.1 10E9/L (ref 1.6–8.3)
NEUTROPHILS NFR BLD AUTO: 55.1 %
NRBC # BLD AUTO: 0 10*3/UL
NRBC BLD AUTO-RTO: 0 /100
PLATELET # BLD AUTO: 386 10E9/L (ref 150–450)
POTASSIUM SERPL-SCNC: 3.7 MMOL/L (ref 3.4–5.3)
PROT SERPL-MCNC: 7.6 G/DL (ref 6.8–8.8)
RBC # BLD AUTO: 5.23 10E12/L (ref 3.8–5.2)
SODIUM SERPL-SCNC: 138 MMOL/L (ref 133–144)
WBC # BLD AUTO: 11 10E9/L (ref 4–11)

## 2020-06-07 PROCEDURE — 74019 RADEX ABDOMEN 2 VIEWS: CPT

## 2020-06-07 PROCEDURE — 96374 THER/PROPH/DIAG INJ IV PUSH: CPT

## 2020-06-07 PROCEDURE — 96375 TX/PRO/DX INJ NEW DRUG ADDON: CPT

## 2020-06-07 PROCEDURE — 25000125 ZZHC RX 250: Performed by: EMERGENCY MEDICINE

## 2020-06-07 PROCEDURE — 25000128 H RX IP 250 OP 636: Performed by: EMERGENCY MEDICINE

## 2020-06-07 PROCEDURE — 80053 COMPREHEN METABOLIC PANEL: CPT | Performed by: EMERGENCY MEDICINE

## 2020-06-07 PROCEDURE — 99285 EMERGENCY DEPT VISIT HI MDM: CPT | Mod: 25

## 2020-06-07 PROCEDURE — 83690 ASSAY OF LIPASE: CPT | Performed by: EMERGENCY MEDICINE

## 2020-06-07 PROCEDURE — 85025 COMPLETE CBC W/AUTO DIFF WBC: CPT | Performed by: EMERGENCY MEDICINE

## 2020-06-07 PROCEDURE — 25000132 ZZH RX MED GY IP 250 OP 250 PS 637: Performed by: EMERGENCY MEDICINE

## 2020-06-07 RX ORDER — KETOROLAC TROMETHAMINE 30 MG/ML
30 INJECTION, SOLUTION INTRAMUSCULAR; INTRAVENOUS ONCE
Status: COMPLETED | OUTPATIENT
Start: 2020-06-07 | End: 2020-06-07

## 2020-06-07 RX ORDER — ONDANSETRON 2 MG/ML
4 INJECTION INTRAMUSCULAR; INTRAVENOUS ONCE
Status: COMPLETED | OUTPATIENT
Start: 2020-06-07 | End: 2020-06-07

## 2020-06-07 RX ORDER — HYDROMORPHONE HYDROCHLORIDE 1 MG/ML
0.5 INJECTION, SOLUTION INTRAMUSCULAR; INTRAVENOUS; SUBCUTANEOUS ONCE
Status: COMPLETED | OUTPATIENT
Start: 2020-06-07 | End: 2020-06-07

## 2020-06-07 RX ORDER — OXYCODONE AND ACETAMINOPHEN 5; 325 MG/1; MG/1
2 TABLET ORAL ONCE
Status: COMPLETED | OUTPATIENT
Start: 2020-06-07 | End: 2020-06-07

## 2020-06-07 RX ADMIN — HYDROMORPHONE HYDROCHLORIDE 0.5 MG: 1 INJECTION, SOLUTION INTRAMUSCULAR; INTRAVENOUS; SUBCUTANEOUS at 20:56

## 2020-06-07 RX ADMIN — KETOROLAC TROMETHAMINE 30 MG: 30 INJECTION, SOLUTION INTRAMUSCULAR at 18:28

## 2020-06-07 RX ADMIN — ONDANSETRON 4 MG: 2 INJECTION INTRAMUSCULAR; INTRAVENOUS at 18:07

## 2020-06-07 RX ADMIN — OXYCODONE HYDROCHLORIDE AND ACETAMINOPHEN 2 TABLET: 5; 325 TABLET ORAL at 19:01

## 2020-06-07 RX ADMIN — LIDOCAINE HYDROCHLORIDE 30 ML: 20 SOLUTION ORAL; TOPICAL at 18:28

## 2020-06-07 ASSESSMENT — ENCOUNTER SYMPTOMS
ABDOMINAL PAIN: 1
CONSTIPATION: 0
DIARRHEA: 0
FEVER: 0
DYSURIA: 0
SHORTNESS OF BREATH: 0
BLOOD IN STOOL: 0
VOMITING: 0
DIFFICULTY URINATING: 0
NAUSEA: 1
BACK PAIN: 1
CHILLS: 0
COUGH: 0

## 2020-06-07 NOTE — ED TRIAGE NOTES
Epigastric pain that radiates up and over to right side.  Nauseated but no vomiting.  Pain began today but has been seeing doctors at [facility] for other spine and pelvic issues

## 2020-06-07 NOTE — ED PROVIDER NOTES
"  History     Chief Complaint:  Abdominal Pain    HPI   Melody Muñoz is a 44 year old female who presents for evaluation of right sided abdominal pain. She has been dealing with abdominal, pelvic, and back pain for the last few months and is currently following with [facility] for most of it. Today, she reports the onset of epigastric pain a couple hours after she ate lunch. She states this pain is different from the issues she has been dealing with recently because it radiates into her back. This change in symptomatology prompted her to present to the ED. Here, she describes feeling as if \"something [were] going to explode\" in her abdomen, but otherwise does not know how to describe the pain. It is associated with nausea, but she denies any vomiting, fevers, recent stool changes, recent urinary symptoms, lower extremity swelling, chest pain, or shortness of breath. When she has had upper abdominal pain in the past, she reports \"they\" thought it was related to gallstones but no exact cause has been found. Additionally, she denies any history of addiction concerns.     Allergies:  Hydrocodone  Tamiflu [Oseltamivir]  Tylenol W/Codeine [Acetaminophen-Codeine]    Medications:    Albuterol inhaler  Almotriptan   Soma   Klonopin  Dextroamphetamine  Trulicity   Cymbalta  Guanfacine  Lamictal   Levothyroxine  Latuda  Nicorette  Percocet   Protonix  Lyrica  Semaglutide  Topamax  Effexor XR    Past Medical History:    Anxiety   Bipolar I disorder  Diabetes  Endometriosis  Asthma   Kidney stone  Depression   Migraines  JASWANT  Substance abuse  Vertigo   TMJ syndrome  Hyperlipidemia   Stress incontinence   Hypothyroidism   Chronic pain syndrome   IBS    Past Surgical History:       Appendectomy   Laparoscopy   Left little finger surgery   Bladder sling  Tubal ligation     Family History:    Hypertension  Heart disease  Anxiety & Depression     Social History:  Marital Status:   [2]  Positive for tobacco use. 1 " pack/week.   Positive for alcohol use. 1 drink/week.   Negative for drug use.      Review of Systems   Constitutional: Negative for chills and fever.   Respiratory: Negative for cough and shortness of breath.    Cardiovascular: Negative for chest pain and leg swelling.   Gastrointestinal: Positive for abdominal pain and nausea. Negative for blood in stool, constipation, diarrhea and vomiting.   Genitourinary: Negative for difficulty urinating and dysuria.   Musculoskeletal: Positive for back pain.   All other systems reviewed and are negative.      Physical Exam     Patient Vitals for the past 24 hrs:   BP Temp Temp src Pulse Heart Rate Resp SpO2 Weight   06/07/20 2000 116/78 -- -- 92 -- -- 95 % --   06/07/20 1945 104/68 -- -- -- -- -- 96 % --   06/07/20 1812 -- -- -- -- -- -- 97 % --   06/07/20 1811 114/64 -- -- 88 -- -- -- --   06/07/20 1655 108/81 98.4  F (36.9  C) Oral -- 99 20 97 % 72.6 kg (160 lb)      Physical Exam  Constitutional: Well developed, nontox appearance, appears somewhat uncomfortable   Head: Atraumatic.   Mouth/Throat: Oropharynx is clear and moist.   Neck:  no stridor  Eyes: no scleral icterus  Cardiovascular: RRR, 2+ bilat radial pulses  Pulmonary/Chest: nml resp effort, Clear BS bilat  Abdominal: ND, +BS, soft, diffuse tenderness worst in epigastrium, no rebound or guarding   : no CVA tenderness bilat  Ext: Warm, well perfused, no edema  Neurological: A&O, symmetric facies, moves ext x4  Skin: Skin is warm and dry.   Psychiatric: Behavior is normal. Thought content normal.   Nursing note and vitals reviewed.    Emergency Department Course   Imaging:  Radiographic findings were communicated with the patient who voiced understanding of the findings.  XR Abdomen 2 views:   Negative abdomen. Bowel gas pattern is normal. Nothing for obstruction or free air. No evidence for renal stones, as per radiology.     Laboratory:  CBC: WBC: 11.0, HGB: 14.7, PLT: 386  CMP: all WNL (Creatinine:  0.80)    Lipase: 117    Interventions:  1807 Zofran, 4 mg, IV injection   GI Cocktail (Maalox/Mylanta and viscous Lidocaine), 30 mL suspension, PO    Toradol, 30 mg, IV injection   Percocet, 2 tablet, PO   Dilaudid, 0.5 mg, IV injection     Emergency Department Course:  Nursing notes and vitals reviewed.   : I performed an exam of the patient as documented above.      IV was inserted and blood was drawn for laboratory testing, results above. Medicine administered as documented above.     The patient was sent for an abdominal x-ray while in the emergency department, results above.      : I rechecked the patient and discussed the results of her workup thus far.     Findings and plan explained to the Patient. Patient discharged home with instructions regarding supportive care, medications, and reasons to return. The importance of close follow-up was reviewed.     I personally reviewed the laboratory and imaging results with the Patient and answered all related questions prior to discharge.    Impression & Plan      Medical Decision Makin year old female presenting w/ diffuse abd pain, worse in the epigastrium     DDx includes chronic abdominal pain, chronic pain syndrome, hepatitis, pancreatitis, gastritis, peptic ulcer disease, abdominal pain NOS.  Doubt atypical ACS, PE, acute vascular catastrophe given similar presentations previously, symptoms ongoing for the last 2 months according to patient report although she has had presentations for abdominal pain with CTs and ultrasounds returning negative as far back as 2019.  Labs as noted above and reassuring.  Given previous negative CTs, patient age, sex in context of radiation risk, CT imaging initially deferred.  Abdominal x-ray with results as noted above and unremarkable.  Discussed with the patient given her chronic pain, plan to avoid IV opioids which the patient initially requested.  She was very upset at not receiving IV opioids  and initially refused oral opioid pain medication saying that she tried those medications at home.  Upon reevaluation, patient continued to have abdominal pain.  I discussed radiation risk and offered CT abdomen pelvis although given reassuring labs and history, it is likely not indicated at this time.  The patient refused CT and requested discharge again requesting a dose of IV or IM opioids.  Presentation consistent with chronic abdominal pain/chronic pain syndrome.  I advised her to follow-up with her PCP, [facility] as well as pain management tomorrow.  At this time I feel the pt is safe for discharge.  Recommendations given regarding return to the emergency department as needed for new or worsening symptoms.  Pt counseled on all results, disposition and diagnosis.  They are understanding and agreeable to plan. Patient discharged in stable condition.      Diagnosis:    ICD-10-CM    1. Chronic pain syndrome  G89.4        Disposition:  discharged to home      Scribe Disclosure:  I, Shey Morgan, am serving as a scribe on 6/7/2020 at 5:51 PM to personally document services performed by Jose Cruz Jung MD based on my observations and the provider's statements to me.       6/7/2020   [facility] EMERGENCY DEPARTMENT       Jose Cruz Jung MD  06/08/20 1894       Jose Cruz Jung MD  06/08/20 9868

## 2020-06-07 NOTE — ED AVS SNAPSHOT
[facility]  [address]  [facility] [address]  Phone:  [phone]  Fax:  [phone]                                    Melody Muñoz   MRN: [id number]    Department:  [facility]   Date of Visit:  6/7/2020           After Visit Summary Signature Page    I have received my discharge instructions, and my questions have been answered. I have discussed any challenges I see with this plan with the nurse or doctor.    ..........................................................................................................................................  Patient/Patient Representative Signature      ..........................................................................................................................................  Patient Representative Print Name and Relationship to Patient    ..................................................               ................................................  Date                                   Time    ..........................................................................................................................................  Reviewed by Signature/Title    ...................................................              ..............................................  Date                                               Time          22EPIC Rev 08/18

## 2020-06-08 ENCOUNTER — VIRTUAL VISIT (OUTPATIENT)
Dept: FAMILY MEDICINE | Facility: CLINIC | Age: 45
End: 2020-06-08
Payer: COMMERCIAL

## 2020-06-08 ENCOUNTER — TELEPHONE (OUTPATIENT)
Dept: FAMILY MEDICINE | Facility: CLINIC | Age: 45
End: 2020-06-08

## 2020-06-08 DIAGNOSIS — R31.9 HEMATURIA, UNSPECIFIED TYPE: ICD-10-CM

## 2020-06-08 DIAGNOSIS — R06.02 SHORTNESS OF BREATH: Primary | ICD-10-CM

## 2020-06-08 DIAGNOSIS — H53.9 VISUAL DISTURBANCE: ICD-10-CM

## 2020-06-08 DIAGNOSIS — R53.83 FATIGUE, UNSPECIFIED TYPE: ICD-10-CM

## 2020-06-08 DIAGNOSIS — J32.9 RECURRENT SINUS INFECTIONS: Primary | ICD-10-CM

## 2020-06-08 DIAGNOSIS — R10.84 GENERALIZED ABDOMINAL PAIN: ICD-10-CM

## 2020-06-08 DIAGNOSIS — R11.0 NAUSEA: ICD-10-CM

## 2020-06-08 PROCEDURE — 99214 OFFICE O/P EST MOD 30 MIN: CPT | Mod: 95 | Performed by: FAMILY MEDICINE

## 2020-06-08 RX ORDER — DOXYCYCLINE HYCLATE 100 MG
100 TABLET ORAL 2 TIMES DAILY
Qty: 20 TABLET | Refills: 0 | Status: SHIPPED | OUTPATIENT
Start: 2020-06-08 | End: 2020-08-12

## 2020-06-08 NOTE — TELEPHONE ENCOUNTER
"Patient Contact    Called patient. She states, \"I didn't see it mentioned in the notes either. They had just mentioned it might be an option.\" Recommended she reach back out to [facility] for more details/rationale for rx. If they do suggest this, recommended they write letter or provide documentation so PCP can know the plan. She verbalizes understanding and will let us know if anything is needed in the future.       "

## 2020-06-08 NOTE — TELEPHONE ENCOUNTER
She could certainly get tested.  I will put an order in for the PCR testing and centralized scheduling will call her to get an appointment set up.

## 2020-06-08 NOTE — PROGRESS NOTES
"Melody Muñoz is a 44 year old female who is being evaluated via a billable telephone visit.      The patient has been notified of following:     \"This telephone visit will be conducted via a call between you and your physician/provider. We have found that certain health care needs can be provided without the need for a physical exam.  This service lets us provide the care you need with a short phone conversation.  If a prescription is necessary we can send it directly to your pharmacy.  If lab work is needed we can place an order for that and you can then stop by our lab to have the test done at a later time.    Telephone visits are billed at different rates depending on your insurance coverage. During this emergency period, for some insurers they may be billed the same as an in-person visit.  Please reach out to your insurance provider with any questions.    If during the course of the call the physician/provider feels a telephone visit is not appropriate, you will not be charged for this service.\"    Patient has given verbal consent for Telephone visit?  Yes    What phone number would you like to be contacted at? [phone]    How would you like to obtain your AVS? Mail a copy    Subjective     Melody Muñoz is a 44 year old female who presents via phone visit today for the following health issues:    HPI  SINUS SYMPTOMS      Duration: 5 DAYS    Description  Facial/eye pain/pressure, headache and fatigue/malaise    Severity: moderate    Accompanying signs and symptoms: productive cough    History (predisposing factors):  Pt was seen in ER yesterday for abdominal pain    Precipitating or alleviating factors: None    Therapies tried and outcome:  AMOXICILLIN- Pt had it on hand and her headache started to improve.  However, she is convinced that amoxicillin does not work for her and would like to be on doxycycline.         URINARY TRACT SYMPTOMS      Duration: Many weeks    Description  She has noted " intermittent blood on the toilet tissue after wiping from using the bathroom for emptying her bladder.  She was recently seen at [facility] and had a GYN exam done which was negative.  They apparently did not do a urine specimen on her.  They did make a comment that it could be from her bladder.  She did have a bladder tear when she had her hysterectomy.  But that was many years ago.    Intensity:  mild    Accompanying signs and symptoms:  Fever/chills: no   Flank pain no   Nausea and vomiting: YES has had nausea and is on pantoprazole but has not had any vomiting.  Vaginal symptoms: none  Abdominal/Pelvic Pain: YES    History  History of frequent UTI's: no   History of kidney stones: no   Sexually Active: YES  Possibility of pregnancy: No    Precipitating or alleviating factors: None    Therapies tried and outcome: none   Outcome: N/A      Patient Active Problem List   Diagnosis     Endometriosis s/po EDWIGE 10-17     Mantoux: positive     Latent tuberculosis     Major depression     Generalized anxiety disorder     Constipation     Nightmares     Knee pain     Bipolar 1 disorder  with psychosis     Chronic fatigue     Female stress incontinence     Suicidal ideation     Acne     Chronic pain syndrome     Insomnia     Chronic migraine without aura without status migrainosus, not intractable     Nausea     JASWANT (obstructive sleep apnea)     Elevated liver enzymes     TMJ (temporomandibular joint syndrome)     Hypothyroidism due to acquired atrophy of thyroid     Hostile behavior     Mild persistent asthma without complication     Uncontrolled type 2 diabetes mellitus with hyperglycemia (H)     Allergic rhinitis     Incontinence of urine in female     Migraine     Screening for diabetic peripheral neuropathy     Need for prophylactic vaccination against Streptococcus pneumoniae (pneumococcus)     Sepsis (H)     Abdominal pain     Morbid obesity (H)     Former tobacco use     Mixed simple and mucopurulent chronic  bronchitis (H): Spirometry 16 lung age = 63y/o in 41 y/o FVC=90%&FEV1=78%     Class 2 obesity due to excess calories with serious comorbidity and body mass index (BMI) of 36.0 to 36.9 in adult     Mixed hyperlipidemia     Acquired hypothyroidism     Dysuria     Other chronic back pain     Headache disorder     Persistent depressive disorder     Irritable bowel syndrome with both constipation and diarrhea     Anxiety     Recurrent sinus infections     Bilateral groin pain     Opacity of lung on imaging study     Visual disturbance     Past Surgical History:   Procedure Laterality Date      SECTION       for twins     COLONOSCOPY       LAPAROSCOPIC APPENDECTOMY N/A 2017    Procedure: LAPAROSCOPIC APPENDECTOMY;  LAPAROSCOPIC APPENDECTOMY and resection of epiploic tag;  Surgeon: Roberto Robins MD;  Location: [facility] OR     LAPAROSCOPY      - endometriosis     little finger surgery left  1980     SLING BLADDER NOS  2014     tubal ligation bilateral       wisdom teeth removal         Social History     Tobacco Use     Smoking status: Former Smoker     Packs/day: 0.00     Years: 17.00     Pack years: 0.00     Types: Cigarettes     Last attempt to quit: 3/18/2019     Years since quittin.2     Smokeless tobacco: Never Used   Substance Use Topics     Alcohol use: No     Comment: no etoh since      Family History   Problem Relation Age of Onset     Hypertension Mother      Heart Disease Father         palpitations     Depression Sister      Anxiety Disorder Sister      Heart Disease Maternal Grandmother         CHF     Cancer Maternal Grandfather 72        Pancreatic Cancer     Alzheimer Disease Paternal Grandfather      Bipolar Disorder Other      Autism Spectrum Disorder Other            Reviewed and updated as needed this visit by Provider         Review of Systems   Constitutional, HEENT, cardiovascular, pulmonary, gi and gu systems are negative, except as otherwise noted.    "    Objective   Reported vitals:  LMP  (LMP Unknown)    alert, mild distress and she is very frustrated without having a definitive answer from anyone to date as to the cause of her fatigue, the blood on the toilet tissue when she wipes, sinus issues and feeling like she has \"inflammation\" in multiple areas.  PSYCH: Alert and oriented times 3; coherent speech, normal   rate and volume, able to articulate logical thoughts, able   to abstract reason, no tangential thoughts, no hallucinations   or delusions  Her affect is tearful  RESP: No cough, no audible wheezing, able to talk in full sentences  Remainder of exam unable to be completed due to telephone visits            Assessment/Plan:  1. Recurrent sinus infections    - doxycycline hyclate (VIBRA-TABS) 100 MG tablet; Take 1 tablet (100 mg) by mouth 2 times daily for 10 days  Dispense: 20 tablet; Refill: 0    2. Hematuria, unspecified type    - UA with Microscopic; Future    3. Fatigue, unspecified type      4. Visual disturbance      5. Generalized abdominal pain      6. Nausea    She was supposed to have a follow-up clinic appointment at [facility] today.  I think that was for pain clinic follow-up and setting up rehab.  She canceled that because she does not feel like she has been getting coordinated care at [facility].  We did discuss getting a urinalysis done to see if in fact there is blood in her urine.  She will come in tomorrow to get that test done.  We also talked about her recurrent sinus issues and I placed her on doxycycline twice daily for the next 10 days.  She is leaving tomorrow to go to her cabin for 1 week.  She was in the emergency room over the weekend with increased abdominal pain.  They did do a CBC and plain x-ray of the abdomen with no findings.      No follow-ups on file.      Phone call duration:  27 minutes    Evaristo Machado MD        "

## 2020-06-08 NOTE — TELEPHONE ENCOUNTER
I do not see anything in the GYN consultation from [facility] but suggests placing the patient on any hormonal therapy.  Also, I just got off the phone with the patient for half hour visit that occurred after this note, and the patient did not mention anything about hormones.  She is status post hysterectomy with ovaries still in place.  I am not sure what the logic would be behind placing her on birth control.  This is more of an FYI should the patient call back requesting medication.

## 2020-06-08 NOTE — ED NOTES
"Medication orders reviewed with the patient.  Percocet has been ordered.  Melody reports that the Percocet she took at home did not help and that she would like something that will be \"stronger\" and that she will not have to \"wait 45 minutes for it start working.\"  MD notified and he will discuss the pain management plan with the patient.  "

## 2020-06-08 NOTE — DISCHARGE INSTRUCTIONS
Discharge Instructions  Abdominal Pain    Abdominal pain (belly pain) can be caused by many things. Your evaluation today does not show the exact cause for your pain. Your provider today has decided that it is unlikely your pain is due to a life threatening problem, or a problem requiring surgery or hospital admission. Sometimes those problems cannot be found right away, so it is very important that you follow up as directed.  Sometimes only the changes which occur over time allow the cause of your pain to be found.    Generally, every Emergency Department visit should have a follow-up clinic visit with either a primary or a specialty clinic/provider. Please follow-up as instructed by your emergency provider today. With abdominal pain, we often recommend very close follow-up, such as the following day.    ADULTS:  Return to the Emergency Department right away if:    You get an oral temperature above 102oF or as directed by your provider.  You have blood in your stools. This may be bright red or appear as black, tarry stools.    You keep vomiting (throwing up) or cannot drink liquids.  You see blood when you vomit.   You cannot have a bowel movement or you cannot pass gas.  Your stomach gets bloated or bigger.  Your skin or the whites of your eyes look yellow.  You faint.  You have bloody, frequent or painful urination (peeing).  You have new symptoms or anything that worries you.    CHILDREN:  Return to the Emergency Department right away if your child has any of the above-listed symptoms or the following:    Pushes your hand away or screams/cries when his/her belly is touched.  You notice your child is very fussy or weak.  Your child is very tired and is too tired to eat or drink.  Your child is dehydrated.  Signs of dehydration can be:  Significant change in the amount of wet diapers/urine.  Your infant or child starts to have dry mouth and lips, or no saliva (spit) or tears.    PREGNANT WOMEN:  Return to  the Emergency Department right away if you have any of the above-listed symptoms or the following:    You have bleeding, leaking fluid or passing tissue from the vagina.  You have worse pain or cramping, or pain in your shoulder or back.  You have vomiting that will not stop.  You have a temperature of 100oF or more.  Your baby is not moving as much as usual.  You faint.  You get a bad headache with or without eye problems and abdominal pain.  You have a seizure.  You have unusual discharge from your vagina and abdominal pain.    Abdominal pain is pretty common during pregnancy.  Your pain may or may not be related to your pregnancy. You should follow-up closely with your OB provider so they can evaluate you and your baby.  Until you follow-up with your regular provider, do the following:     Avoid sex and do not put anything in your vagina.  Drink clear fluids.  Only take medications approved by your provider.    MORE INFORMATION:    Appendicitis:  A possible cause of abdominal pain in any person who still has their appendix is acute appendicitis. Appendicitis is often hard to diagnose.  Testing does not always rule out early appendicitis or other causes of abdominal pain. Close follow-up with your provider and re-evaluations may be needed to figure out the reason for your abdominal pain.    Follow-up:  It is very important that you make an appointment with your clinic and go to the appointment.  If you do not follow-up with your primary provider, it may result in missing an important development which could result in permanent injury or disability and/or lasting pain.  If there is any problem keeping your appointment, call your provider or return to the Emergency Department.    Medications:  Take your medications as directed by your provider today.  Before using over-the-counter medications, ask your provider and make sure to take the medications as directed.  If you have any questions about medications, ask your  "provider.    Diet:  Resume your normal diet as much as possible, but do not eat fried, fatty or spicy foods while you have pain.  Do not drink alcohol or have caffeine.  Do not smoke tobacco.    Probiotics: If you have been given an antibiotic, you may want to also take a probiotic pill or eat yogurt with live cultures. Probiotics have \"good bacteria\" to help your intestines stay healthy. Studies have shown that probiotics help prevent diarrhea (loose stools) and other intestine problems (including C. diff infection) when you take antibiotics. You can buy these without a prescription in the pharmacy section of the store.     If you were given a prescription for medicine here today, be sure to read all of the information (including the package insert) that comes with your prescription.  This will include important information about the medicine, its side effects, and any warnings that you need to know about.  The pharmacist who fills the prescription can provide more information and answer questions you may have about the medicine.  If you have questions or concerns that the pharmacist cannot address, please call or return to the Emergency Department.       Remember that you can always come back to the Emergency Department if you are not able to see your regular provider in the amount of time listed above, if you get any new symptoms, or if there is anything that worries you.        Discharge Instructions  Chronic Pain Management  You were seen today for an issue regarding chronic or recurrent pain. You may have a condition that gives you pain every day, or a condition that causes pain that keeps coming back, or several conditions involving pain.   We will evaluate you for your symptoms to ensure that there is no medical emergency. This evaluation usually takes the form of a good medical history (interview) and physical examination. Rarely, imaging (x-rays or CAT scans) and lab work (blood tests) may be necessary in " addition to the history and examination. This approach is similar to our approach to other chronic/recurrent diseases where we evaluate for emergencies but leave much of the management of the problem to the regular provider/clinic.  We will offer suggestions to manage your pain but we do not generally utilize opiate pain medications for recurrent or chronic pain. There is an ongoing public health crisis with respect to opiates and we are aware of the risks to our patients from opiate pain medications. Opiates are strong pain relievers but they carry significant risks including sedation, confusion, constipation, falls, as well as dependence, addiction, and overdose-related deaths. These medications represent a significant risk to your health and are therefore reserved for the management of select conditions associated with acute, severe pain. For many conditions, the risks of opiate treatment simply outweigh the benefits. Additionally, for patients with chronic/recurrent pain or who frequently use opiates, management of pain needs to be performed by a single physician/provider who can follow their care consistently. As a result, we generally do not provide refills of opiates or treat chronic pain with injected opiates in the Emergency Department. It is with your best interests in mind that we adhere to these widely accepted best practices.    As with other chronic diseases like diabetes and asthma, management of chronic pain is best performed by regular visits to the same provider. In the case of chronic pain, this may be your primary physician/provider, your neurologist or other specialist, or with a chronic pain clinic/provider.  If you have concerns about our policy, please discuss them with your primary care provider or pain specialist, who can contact us if necessary for further information or clarification.  If you were given a prescription for medicine here today, be sure to read all of the information  (including the package insert) that comes with your prescription.  This will include important information about the medicine, its side effects, and any warnings that you need to know about.  The pharmacist who fills the prescription can provide more information and answer questions you may have about the medicine.  If you have questions or concerns that the pharmacist cannot address, please call or return to the Emergency Department.   Remember that you can always come back to the Emergency Department if you develop any new symptoms or if there is anything that worries you.

## 2020-06-08 NOTE — TELEPHONE ENCOUNTER
Reason for Call:  Other call back    Detailed comments: The patient called and stated that she is experiencing shortness of breathe and she is wondering if she should get tested for COVID?  She had a telephone visit with Jeannette Mehta today and is suppose to come in for a lab only visit tomorrow and she is wondering if a COVID test should be done before she comes in for these lab tests?     Phone Number Patient can be reached at: Home number on file [phone] (home)    Best Time: Any    Can we leave a detailed message on this number? YES    Call taken on 6/8/2020 at 1:00 PM by Melody Arboleda

## 2020-06-08 NOTE — ED NOTES
Patient preparing to go home, she has states she wonders if it could be a kidney stone, stating she feels like she will have blood in her urine.  I have previously offered to do urinalysis which patient declined.

## 2020-06-08 NOTE — ED NOTES
Patient has returned from radiology. No significant change in pain or nausea.  Patient is willing to take Percocet at this time, however she has expressed that she does not feel that this will provide adequate pain relief as it did not help at home.

## 2020-06-08 NOTE — PATIENT INSTRUCTIONS
She was supposed to have a follow-up clinic appointment at [facility] today.  I think that was for pain clinic follow-up and setting up rehab.  She canceled that because she does not feel like she has been getting coordinated care at [facility].  We did discuss getting a urinalysis done to see if in fact there is blood in her urine.  She will come in tomorrow to get that test done.  We also talked about her recurrent sinus issues and I placed her on doxycycline twice daily for the next 10 days.  She is leaving tomorrow to go to her cabin for 1 week.  She was in the emergency room over the weekend with increased abdominal pain.  They did do a CBC and plain x-ray of the abdomen with no findings.

## 2020-06-09 ENCOUNTER — NURSE TRIAGE (OUTPATIENT)
Dept: FAMILY MEDICINE | Facility: CLINIC | Age: 45
End: 2020-06-09

## 2020-06-09 DIAGNOSIS — R31.9 HEMATURIA, UNSPECIFIED TYPE: ICD-10-CM

## 2020-06-09 DIAGNOSIS — R82.90 NONSPECIFIC FINDING ON EXAMINATION OF URINE: Primary | ICD-10-CM

## 2020-06-09 LAB
ALBUMIN UR-MCNC: NEGATIVE MG/DL
APPEARANCE UR: CLEAR
BACTERIA #/AREA URNS HPF: ABNORMAL /HPF
BILIRUB UR QL STRIP: NEGATIVE
COLOR UR AUTO: YELLOW
GLUCOSE UR STRIP-MCNC: NEGATIVE MG/DL
HGB UR QL STRIP: NEGATIVE
HYALINE CASTS #/AREA URNS LPF: ABNORMAL /LPF
KETONES UR STRIP-MCNC: NEGATIVE MG/DL
LEUKOCYTE ESTERASE UR QL STRIP: NEGATIVE
MUCOUS THREADS #/AREA URNS LPF: PRESENT /LPF
NITRATE UR QL: NEGATIVE
NON-SQ EPI CELLS #/AREA URNS LPF: ABNORMAL /LPF
PH UR STRIP: 5.5 PH (ref 5–7)
RBC #/AREA URNS AUTO: ABNORMAL /HPF
SOURCE: ABNORMAL
SP GR UR STRIP: 1.01 (ref 1–1.03)
UROBILINOGEN UR STRIP-ACNC: 0.2 EU/DL (ref 0.2–1)
WBC #/AREA URNS AUTO: ABNORMAL /HPF

## 2020-06-09 PROCEDURE — 81001 URINALYSIS AUTO W/SCOPE: CPT | Performed by: FAMILY MEDICINE

## 2020-06-09 PROCEDURE — 87086 URINE CULTURE/COLONY COUNT: CPT | Performed by: FAMILY MEDICINE

## 2020-06-09 NOTE — TELEPHONE ENCOUNTER
Reason for Call:  Other call back    Detailed comments: patient requests call from nurse states is nauseated every time she eats feels like food will not go down-refused telephone or video visit    Phone Number Patient can be reached at: Home number on file [phone] (home)    Best Time:     Can we leave a detailed message on this number? YES    Call taken on 6/9/2020 at 2:23 PM by MADELIN PADRON

## 2020-06-09 NOTE — TELEPHONE ENCOUNTER
Reached out to patient.    States she has been nauseous since last Wednesday 6/3/2020.     Concerned that she may have gall stones or pancreatitis    When she goes to the bathroom and wipes, she does fin that she has some yellow discharge and mucus. This is coming from her urethra and not from the vagina.     States she is coming in to the [facility] today to give a UA. States she would like her provider to review these results when they come back later today. Lab is scheduled at 3:40pm.     Additional Information    Negative: Shock suspected (e.g., cold/pale/clammy skin, too weak to stand, low BP, rapid pulse)    Negative: Sounds like a life-threatening emergency to the triager    Negative: Nausea or vomiting and pregnancy < 20 weeks    Negative: Menstrual Period - Missed or Late (i.e., pregnancy suspected)    Negative: Heat exhaustion suspected (i.e., dehydration from heat exposure)    Negative: Anxiety or stress suspected (i.e., nausea with anxiety attacks or stressful situations)    Negative: Traumatic Brain Injury (TBI) suspected    Negative: Vomiting occurs    Negative: Other symptom is present, see that guideline.  (e.g., chest pain, headache, dizziness, abdominal pain, colds, sore throat, etc.).    Negative: Unable to walk, or can only walk with assistance (e.g., requires support)    Negative: Difficulty breathing    Negative: Insulin-dependent diabetes (Type I) and glucose > 400 mg/dL (22 mmol/L)    Negative: Drinking very little and dehydration suspected (e.g., no urine > 12 hours, very dry mouth, very lightheaded)    Negative: Patient sounds very sick or weak to the triager    Negative: Fever > 104 F (40 C)    Negative: Fever > 101 F (38.3 C) and age > 60    Negative: Fever > 100.0 F (37.8 C) and bedridden (e.g., nursing home patient, CVA, chronic illness, recovering from surgery)    Negative: Fever > 100.0 F (37.8 C) and diabetes mellitus or weak immune system (e.g., HIV positive, cancer chemo,  "splenectomy, chronic steroids)    Negative: Taking any of the following medications: digoxin (Lanoxin), lithium, theophylline, phenytoin (Dilantin)    Negative: Yellowish color of the skin or white of the eye (i.e., jaundice)    Negative: Fever present > 3 days (72 hours)    Negative: Patient wants to be seen    Negative: Receiving cancer chemotherapy medication    Negative: Taking prescription medication that could cause nausea (e.g., narcotics/opiates, antibiotics, OCPs, many others)    Nausea lasts > 1 week    Answer Assessment - Initial Assessment Questions  1. NAUSEA SEVERITY: \"How bad is the nausea?\" (e.g., mild, moderate, severe; dehydration, weight loss)    - MILD: loss of appetite without change in eating habits    - MODERATE: decreased oral intake without significant weight loss, dehydration, or malnutrition    - SEVERE: inadequate caloric or fluid intake, significant weight loss, symptoms of dehydration      Moderate, pushing fluids   2. ONSET: \"When did the nausea begin?\"      Wednesday 6/3  3. VOMITING: \"Any vomiting?\" If so, ask: \"How many times today?\"      States she has vomited 3 times since last wednesday  4. RECURRENT SYMPTOM: \"Have you had nausea before?\" If so, ask: \"When was the last time?\" \"What happened that time?\"      Yes. Many times.   5. CAUSE: \"What do you think is causing the nausea?\"      Thinks it may be gall stones or pancreatitis   6. PREGNANCY: \"Is there any chance you are pregnant?\" (e.g., unprotected intercourse, missed birth control pill, broken condom)      no    Protocols used: NAUSEA-A-OH      "

## 2020-06-09 NOTE — TELEPHONE ENCOUNTER
Waiting on US results. After received, please update provider for review of symptoms and UA results.

## 2020-06-10 ENCOUNTER — TELEPHONE (OUTPATIENT)
Dept: FAMILY MEDICINE | Facility: CLINIC | Age: 45
End: 2020-06-10

## 2020-06-10 NOTE — TELEPHONE ENCOUNTER
Patient calling back.     Reiterated UA results and pcp finding. Also advised urine culture is still pending as we are investigating further if bacteria is present. Patient reported she feels she has something going on within her bladder or kidney's as she has constant lower right pain around pelvic area and radiates to back with low appetite and feels like she is nauseated. Questions if she has kidney disease or another concern with bladder?    Triage educated patient on CR and GFR, patient reported frustration because all test 'show up' normal.     Triage offered to inform pcp. Patient declined.    Mayuri JACOBSONN, RN, PHN

## 2020-06-10 NOTE — TELEPHONE ENCOUNTER
Reason for Call:  Other call back  Detailed comments: please call patient concerning her urine test has some questions  Phone Number Patient can be reached at: Home number on file [phone] (home)  Best Time: any  Can we leave a detailed message on this number? YES  Call taken on 6/10/2020 at 7:20 AM by BREANN WARREN

## 2020-06-10 NOTE — TELEPHONE ENCOUNTER
Just to be on the safe side we did do a urine culture.  However her urine looks pretty clear.  I think she is good to go to her cabin.

## 2020-06-10 NOTE — TELEPHONE ENCOUNTER
See previous telephone encounter dated 06/09/2020. Message was sent to PCP for review and will follow up with pt with providers response.This encounter will be closed.

## 2020-06-10 NOTE — TELEPHONE ENCOUNTER
Called pt and gave her Dr Machado's message.  Pt didn't like his answer and doesn't understand why she has puss coming out of her urethra, pt said she would go see a urologist then and hung up

## 2020-06-11 LAB
BACTERIA SPEC CULT: NORMAL
SPECIMEN SOURCE: NORMAL

## 2020-06-17 ENCOUNTER — TELEPHONE (OUTPATIENT)
Dept: FAMILY MEDICINE | Facility: CLINIC | Age: 45
End: 2020-06-17

## 2020-06-17 NOTE — TELEPHONE ENCOUNTER
See advice. Triage left voice message this morning asking her to keep consult and test as ordered from [facility]. Pt called back with request below. Any new recommendations?

## 2020-06-17 NOTE — TELEPHONE ENCOUNTER
Reason for Call:  Other call back    Detailed comments: states she cant be told [facility] is handling her issue as they are not states she must have CT scans by [facility] said can not be expected to drive to [facility]. States needs a call back  asap states can not ignore her symptoms states cant be put off    Phone Number Patient can be reached at: Home number on file [phone] (home)    Best Time:     Can we leave a detailed message on this number? YES    Call taken on 6/17/2020 at 1:24 PM by MADELIN PADRON

## 2020-06-17 NOTE — TELEPHONE ENCOUNTER
"Pt complains of multiple bone tissue deformities.States she has protusion of sternum, rib and facial asymmetry. Left side of head scar tissue growth.     She has a future nuclear bone scan 07/16/2020 at [facility], but doesn't feel she can wait until then to have this test done. Triage advised she contact [facility] provider  to update him of symptoms. States she has sent [facility] multiple messages but they are not responding for why she asked that PCP intervene. Per pt, [facility] staff keeps thinking her symptoms are perceived since PCP noted symptoms were psych somatic on previous notes. Reports she is dismissed and doesn't know what else to do. Would like to know if PCP could \"push\" for nuclear test before 07/16. Advised pt to contact [facility] again with request. Please advice if any further directives for patient.  "

## 2020-06-17 NOTE — TELEPHONE ENCOUNTER
Melody needs to stick with her current consultant and the testing and there requesting.  I do not think it is appropriate for us to get in the middle of all of that.

## 2020-06-18 ENCOUNTER — NURSE TRIAGE (OUTPATIENT)
Dept: FAMILY MEDICINE | Facility: CLINIC | Age: 45
End: 2020-06-18

## 2020-06-18 NOTE — TELEPHONE ENCOUNTER
"RN called back to pt.  States the hands don't look extremely abnormal.  Itching and some redness between her fingers and up to the hands.  \"It looks scaly and red, like I've been tanning a lot\".  Itching only on her hands.    Has been using benadryl, aquafor and hydrocortisone cream without relief.    Please advise on any other itch relief for pt.  Otherwise please advise if this is a side effect of doxy.    Additional Information    Negative: Patient sounds very sick or weak to the triager    Negative: Looks infected (e.g., spreading redness, red streak, pus)    Negative: MODERATE-SEVERE local itching (i.e., interferes with work, school, activities) and not improved after 24 hours of hydrocortisone cream    Negative: Patient wants to be seen    Negative: Cause unknown and present > 7 days    Mild localized itching    Answer Assessment - Initial Assessment Questions  1. DESCRIPTION: \"Describe the itching you are having.\" \"Where is it located?\"      Moderate itching with both hands    2. SEVERITY: \"How bad is it?\"     - MILD - doesn't interfere with normal activities    - MODERATE-SEVERE: interferes with work, school, sleep, or other activities       Mild to moderate    3. SCRATCHING: \"Are there any scratch marks? Bleeding?\"      Yes she has been scratching    4. ONSET: \"When did the itching begin?\"       2 days ago    5. CAUSE: \"What do you think is causing the itching?\"       Reaction to doxy    6. OTHER SYMPTOMS: \"Do you have any other symptoms?\"       Some redness and dry skin    7. PREGNANCY: \"Is there any chance you are pregnant?\" \"When was your last menstrual period?\"      No    Protocols used: ITCHING - [id number]      "

## 2020-06-18 NOTE — TELEPHONE ENCOUNTER
Huddle with CRISTIAN.    Itching should go away when doxy is out of system.     4x a day of cortisone.  Cerave as recommended by dermatology.

## 2020-06-18 NOTE — TELEPHONE ENCOUNTER
RN relayed this information.  Pt states understanding.    No further action needed at this time.

## 2020-06-22 ENCOUNTER — VIRTUAL VISIT (OUTPATIENT)
Dept: FAMILY MEDICINE | Facility: CLINIC | Age: 45
End: 2020-06-22
Payer: COMMERCIAL

## 2020-06-22 DIAGNOSIS — M25.561 CHRONIC PAIN OF BOTH KNEES: ICD-10-CM

## 2020-06-22 DIAGNOSIS — M25.562 CHRONIC PAIN OF BOTH KNEES: ICD-10-CM

## 2020-06-22 DIAGNOSIS — R10.32 BILATERAL GROIN PAIN: ICD-10-CM

## 2020-06-22 DIAGNOSIS — G89.29 OTHER CHRONIC BACK PAIN: Primary | ICD-10-CM

## 2020-06-22 DIAGNOSIS — G89.29 CHRONIC PAIN OF BOTH KNEES: ICD-10-CM

## 2020-06-22 DIAGNOSIS — M54.89 OTHER CHRONIC BACK PAIN: Primary | ICD-10-CM

## 2020-06-22 DIAGNOSIS — R10.31 BILATERAL GROIN PAIN: ICD-10-CM

## 2020-06-22 DIAGNOSIS — R10.84 GENERALIZED ABDOMINAL PAIN: ICD-10-CM

## 2020-06-22 DIAGNOSIS — G89.4 CHRONIC PAIN SYNDROME: ICD-10-CM

## 2020-06-22 PROCEDURE — 99214 OFFICE O/P EST MOD 30 MIN: CPT | Mod: 95 | Performed by: FAMILY MEDICINE

## 2020-06-22 RX ORDER — OXYCODONE AND ACETAMINOPHEN 5; 325 MG/1; MG/1
2 TABLET ORAL 3 TIMES DAILY
Qty: 108 TABLET | Refills: 0 | Status: SHIPPED | OUTPATIENT
Start: 2020-06-27 | End: 2020-07-21

## 2020-06-22 NOTE — PROGRESS NOTES
"Melody Muñoz is a 44 year old female who is being evaluated via a billable telephone visit.      The patient has been notified of following:     \"This telephone visit will be conducted via a call between you and your physician/provider. We have found that certain health care needs can be provided without the need for a physical exam.  This service lets us provide the care you need with a short phone conversation.  If a prescription is necessary we can send it directly to your pharmacy.  If lab work is needed we can place an order for that and you can then stop by our lab to have the test done at a later time.    Telephone visits are billed at different rates depending on your insurance coverage. During this emergency period, for some insurers they may be billed the same as an in-person visit.  Please reach out to your insurance provider with any questions.    If during the course of the call the physician/provider feels a telephone visit is not appropriate, you will not be charged for this service.\"    Patient has given verbal consent for Telephone visit?  Yes    What phone number would you like to be contacted at? [phone]    How would you like to obtain your AVS? Mail a copy    Subjective     Melody Muñoz is a 44 year old female who presents via phone visit today for the following health issues:    HPI    pt has an apt with [facility] mid July July 13-15 pt will be at [place] for consultation and treatment   Pt needs help with pain management until pt gets approved into the [facility] for treatment  Pt is taking pain meds 2 times a day, dosent feel like the 3 pills werent helping         Chronic Pain Follow-Up    Where in your body do you have pain?  Numerous spots  How has your pain affected your ability to work? Not applicable  Which of these pain treatments have you tried since your last clinic visit? Other: Increased pain medication to 1 tablet 3 times daily.  That did not work well.  She then " tapered back to 1 twice daily.  How well are you sleeping? Poor  How has your mood been since your last visit? Much worse  Have you had a significant life event? Other: Her son just left for  duty  Other aggravating factors: none  Taking medication as directed? Yes and she states that when she had major surgery like her hysterectomy, she had much better effect from taking 2 Percocet at a time.    PHQ-9 SCORE 2/6/2019 5/24/2019 12/3/2019   PHQ-9 Total Score - - -   PHQ-9 Total Score MyChart 11 (Moderate depression) - 12 (Moderate depression)   PHQ-9 Total Score - - -   PHQ-9 Total Score 11 20 12   PHQ-9 Total Score - - -     ROWDY-7 SCORE 11/6/2018 2/6/2019 5/24/2019   Total Score - - -   Total Score 5 (mild anxiety) 13 (moderate anxiety) -   Total Score 5 13 14   Total Score - - -   Total Score BEH Adult - - -     No flowsheet data found.  Encounter-Level CSA - 09/08/2015:    Controlled Substance Agreement - Scan on 9/9/2015  2:41 PM: [facility], Controlled Substance Contract, 09-08-15     Patient-Level CSA:    There are no patient-level csa.         Patient Active Problem List   Diagnosis     Endometriosis s/po EDWIGE 10-17     Mantoux: positive     Latent tuberculosis     Major depression     Generalized anxiety disorder     Constipation     Nightmares     Knee pain     Bipolar 1 disorder  with psychosis     Chronic fatigue     Female stress incontinence     Suicidal ideation     Acne     Chronic pain syndrome     Insomnia     Chronic migraine without aura without status migrainosus, not intractable     Nausea     JASWANT (obstructive sleep apnea)     Elevated liver enzymes     TMJ (temporomandibular joint syndrome)     Hypothyroidism due to acquired atrophy of thyroid     Hostile behavior     Mild persistent asthma without complication     Uncontrolled type 2 diabetes mellitus with hyperglycemia (H)     Allergic rhinitis     Incontinence of urine in female     Migraine     Screening for diabetic peripheral neuropathy      Need for prophylactic vaccination against Streptococcus pneumoniae (pneumococcus)     Sepsis (H)     Abdominal pain     Morbid obesity (H)     Former tobacco use     Mixed simple and mucopurulent chronic bronchitis (H): Spirometry 16 lung age = 61y/o in 41 y/o FVC=90%&FEV1=78%     Class 2 obesity due to excess calories with serious comorbidity and body mass index (BMI) of 36.0 to 36.9 in adult     Mixed hyperlipidemia     Acquired hypothyroidism     Dysuria     Other chronic back pain     Headache disorder     Persistent depressive disorder     Irritable bowel syndrome with both constipation and diarrhea     Anxiety     Recurrent sinus infections     Bilateral groin pain     Opacity of lung on imaging study     Visual disturbance     Past Surgical History:   Procedure Laterality Date      SECTION       for twins     COLONOSCOPY       LAPAROSCOPIC APPENDECTOMY N/A 2017    Procedure: LAPAROSCOPIC APPENDECTOMY;  LAPAROSCOPIC APPENDECTOMY and resection of epiploic tag;  Surgeon: Roberto Robins MD;  Location: [facility] OR     LAPAROSCOPY      - endometriosis     little finger surgery left  1980     SLING BLADDER NOS  2014     tubal ligation bilateral       wisdom teeth removal         Social History     Tobacco Use     Smoking status: Former Smoker     Packs/day: 0.00     Years: 17.00     Pack years: 0.00     Types: Cigarettes     Last attempt to quit: 3/18/2019     Years since quittin.2     Smokeless tobacco: Never Used   Substance Use Topics     Alcohol use: No     Comment: no etoh since      Family History   Problem Relation Age of Onset     Hypertension Mother      Heart Disease Father         palpitations     Depression Sister      Anxiety Disorder Sister      Heart Disease Maternal Grandmother         CHF     Cancer Maternal Grandfather 72        Pancreatic Cancer     Alzheimer Disease Paternal Grandfather      Bipolar Disorder Other      Autism Spectrum Disorder Other             Reviewed and updated as needed this visit by Provider         Review of Systems   Constitutional, HEENT, cardiovascular, pulmonary, gi and gu systems are negative, except as otherwise noted.       Objective   Reported vitals:  LMP  (LMP Unknown)    alert  PSYCH: Alert and oriented times 3; coherent speech, normal   rate and volume, able to articulate logical thoughts, able   to abstract reason, no tangential thoughts, no hallucinations   or delusions  Her affect is tearful  RESP: No cough, no audible wheezing, able to talk in full sentences  Remainder of exam unable to be completed due to telephone visits            Assessment/Plan:  1. Bilateral groin pain    - oxyCODONE-acetaminophen (PERCOCET) 5-325 MG tablet; Take 2 tablets by mouth 3 times daily for 18 days  Dispense: 108 tablet; Refill: 0    2. Other chronic back pain      3. Generalized abdominal pain      4. Chronic pain syndrome      5. Chronic pain of both knees        No follow-ups on file.      Phone call duration:  16 minutes    Evaristo Machado MD

## 2020-06-22 NOTE — PATIENT INSTRUCTIONS
The patient does have an upcoming appointment at [facility] that will extend from 7/13-7/15.  We did discuss potentially increasing her pain medication to 2 tablets at a time up to 3 times daily.  I changed her prescription at the pharmacy.  I gave her enough medications to last her through 15 July.

## 2020-06-24 DIAGNOSIS — J45.20 INTERMITTENT ASTHMA, UNCOMPLICATED: ICD-10-CM

## 2020-06-24 RX ORDER — ALBUTEROL SULFATE 90 UG/1
AEROSOL, METERED RESPIRATORY (INHALATION)
Qty: 8.5 G | Refills: 11 | Status: SHIPPED | OUTPATIENT
Start: 2020-06-24 | End: 2020-07-14

## 2020-07-06 ENCOUNTER — VIRTUAL VISIT (OUTPATIENT)
Dept: FAMILY MEDICINE | Facility: CLINIC | Age: 45
End: 2020-07-06
Payer: COMMERCIAL

## 2020-07-06 DIAGNOSIS — B37.9 ANTIBIOTIC-INDUCED YEAST INFECTION: ICD-10-CM

## 2020-07-06 DIAGNOSIS — T36.95XA ANTIBIOTIC-INDUCED YEAST INFECTION: ICD-10-CM

## 2020-07-06 DIAGNOSIS — J32.9 RECURRENT SINUS INFECTIONS: ICD-10-CM

## 2020-07-06 DIAGNOSIS — J45.31 MILD PERSISTENT ASTHMA WITH ACUTE EXACERBATION: Primary | ICD-10-CM

## 2020-07-06 DIAGNOSIS — J20.9 ACUTE BRONCHITIS WITH SYMPTOMS > 10 DAYS: ICD-10-CM

## 2020-07-06 PROCEDURE — 99214 OFFICE O/P EST MOD 30 MIN: CPT | Mod: 95 | Performed by: PHYSICIAN ASSISTANT

## 2020-07-06 RX ORDER — LEVOFLOXACIN 750 MG/1
750 TABLET, FILM COATED ORAL DAILY
Qty: 10 TABLET | Refills: 0 | Status: SHIPPED | OUTPATIENT
Start: 2020-07-06 | End: 2020-09-24

## 2020-07-06 RX ORDER — FLUCONAZOLE 150 MG/1
150 TABLET ORAL ONCE
Qty: 1 TABLET | Refills: 0 | Status: SHIPPED | OUTPATIENT
Start: 2020-07-06 | End: 2020-08-12

## 2020-07-06 RX ORDER — PREDNISONE 20 MG/1
40 TABLET ORAL DAILY
Qty: 10 TABLET | Refills: 0 | Status: SHIPPED | OUTPATIENT
Start: 2020-07-06 | End: 2020-07-11

## 2020-07-06 NOTE — PROGRESS NOTES
"Melody Muñoz is a 44 year old female who is being evaluated via a billable telephone visit.      The patient has been notified of following:     \"This telephone visit will be conducted via a call between you and your physician/provider. We have found that certain health care needs can be provided without the need for a physical exam.  This service lets us provide the care you need with a short phone conversation.  If a prescription is necessary we can send it directly to your pharmacy.  If lab work is needed we can place an order for that and you can then stop by our lab to have the test done at a later time.    Telephone visits are billed at different rates depending on your insurance coverage. During this emergency period, for some insurers they may be billed the same as an in-person visit.  Please reach out to your insurance provider with any questions.    If during the course of the call the physician/provider feels a telephone visit is not appropriate, you will not be charged for this service.\"    Patient has given verbal consent for Telephone visit?  Yes    What phone number would you like to be contacted at? [phone]    How would you like to obtain your AVS? Mail a copy    Subjective     Melody Muñoz is a 44 year old female who presents via phone visit today for the following health issues:    HPI  RESPIRATORY/SINUS SYMPTOMS      Duration: 1 month    Description  nasal congestion, facial pain/pressure, cough, wheezing and headache    Severity: moderate    Accompanying signs and symptoms: None    History (predisposing factors):  asthma    Precipitating or alleviating factors: None    Therapies tried and outcome:  Neb treatments, also on doxycycline for sinus infection already    - Patient prescribed doxycycline on 6/8/2020 for acute sinusitis. Patient took 1 per day x10 days, rather than BID as prescribed. Sx improved but didn't completely resolve.  - Continues to have facial pressure, pain and " "congestion. Sx have now \"moved into my chest.\" Reports wheeze, chest tightness, and increased dry cough. Using nebulizer every 2-3 hours. Isn't prescribed a maintenance inhaler, states she is in the process of establishing care with pulmonologist at [facility].    Patient Active Problem List   Diagnosis     Endometriosis s/po EDWIGE 10-17     Mantoux: positive     Latent tuberculosis     Major depression     Generalized anxiety disorder     Constipation     Nightmares     Knee pain     Bipolar 1 disorder  with psychosis     Chronic fatigue     Female stress incontinence     Suicidal ideation     Acne     Chronic pain syndrome     Insomnia     Chronic migraine without aura without status migrainosus, not intractable     Nausea     JASWANT (obstructive sleep apnea)     Elevated liver enzymes     TMJ (temporomandibular joint syndrome)     Hypothyroidism due to acquired atrophy of thyroid     Hostile behavior     Mild persistent asthma without complication     Uncontrolled type 2 diabetes mellitus with hyperglycemia (H)     Allergic rhinitis     Incontinence of urine in female     Migraine     Screening for diabetic peripheral neuropathy     Need for prophylactic vaccination against Streptococcus pneumoniae (pneumococcus)     Sepsis (H)     Abdominal pain     Morbid obesity (H)     Former tobacco use     Mixed simple and mucopurulent chronic bronchitis (H): Spirometry 6-4-16 lung age = 61y/o in 39 y/o FVC=90%&FEV1=78%     Class 2 obesity due to excess calories with serious comorbidity and body mass index (BMI) of 36.0 to 36.9 in adult     Mixed hyperlipidemia     Acquired hypothyroidism     Dysuria     Other chronic back pain     Headache disorder     Persistent depressive disorder     Irritable bowel syndrome with both constipation and diarrhea     Anxiety     Recurrent sinus infections     Bilateral groin pain     Opacity of lung on imaging study     Visual disturbance     Past Surgical History:   Procedure Laterality Date "      SECTION       for twins     COLONOSCOPY       LAPAROSCOPIC APPENDECTOMY N/A 2017    Procedure: LAPAROSCOPIC APPENDECTOMY;  LAPAROSCOPIC APPENDECTOMY and resection of epiploic tag;  Surgeon: Roberto Robins MD;  Location: [facility] OR     LAPAROSCOPY      - endometriosis     little finger surgery left  1980     SLING BLADDER NOS  2014     tubal ligation bilateral       wisdom teeth removal         Social History     Tobacco Use     Smoking status: Former Smoker     Packs/day: 0.00     Years: 17.00     Pack years: 0.00     Types: Cigarettes     Last attempt to quit: 3/18/2019     Years since quittin.3     Smokeless tobacco: Never Used   Substance Use Topics     Alcohol use: No     Comment: no etoh since      Family History   Problem Relation Age of Onset     Hypertension Mother      Heart Disease Father         palpitations     Depression Sister      Anxiety Disorder Sister      Heart Disease Maternal Grandmother         CHF     Cancer Maternal Grandfather 72        Pancreatic Cancer     Alzheimer Disease Paternal Grandfather      Bipolar Disorder Other      Autism Spectrum Disorder Other          Current Outpatient Medications   Medication Sig Dispense Refill     albuterol (2.5 MG/3ML) 0.083% IN neb solution Take 1 vial (2.5 mg) by nebulization every 6 hours as needed for shortness of breath / dyspnea or wheezing 25 vial 0     almotriptan (AXERT) 6.25 MG tablet TK 1 T PO ONCE.  MAY REPEAT IN 2 HOURS AS DIRECTED.  MAX 2 DOSES PER DAY 30 tablet 1     butalbital-acetaminophen-caffeine (FIORICET/ESGIC) -40 MG tablet Take 1 tablet by mouth every 4 hours as needed for headaches 20 tablet 3     clonazePAM (KLONOPIN) 1 MG tablet Take 1 mg by mouth 2 times daily  1     dextroamphetamine (DEXEDRINE SPANSULE) 5 MG 24 hr capsule TK 1 C PO BID  0     dextroamphetamine (DEXTROSTAT) 10 MG tablet TK 1 T PO TID  0     fluconazole (DIFLUCAN) 150 MG tablet Take 1 tablet (150 mg) by mouth  once for 1 dose 1 tablet 0     fluticasone (FLONASE) 50 MCG/ACT nasal spray Spray 2 sprays into both nostrils daily 16 g 11     guanFACINE (TENEX) 2 MG tablet TAKE TWO TABLETS BY MOUTH AT BEDTIME 180 tablet 3     lamoTRIgine (LAMICTAL) 200 MG tablet Take 1 tablet (200 mg) by mouth every morning 90 tablet 4     LATUDA 120 MG TABS tablet TK 1 T PO QD  3     levofloxacin (LEVAQUIN) 750 MG tablet Take 1 tablet (750 mg) by mouth daily 10 tablet 0     levothyroxine (SYNTHROID/LEVOTHROID) 50 MCG tablet One tab po six days per week, 1.2 tab po one day each week. 90 tablet 3     ondansetron (ZOFRAN-ODT) 4 MG ODT tab DISSOLVE 1 TO 2 TABLETS UNDER THE TONGUE EVERY 8 HOURS AS NEEDED FOR NAUSEA 30 tablet 10     oxyCODONE-acetaminophen (PERCOCET) 5-325 MG tablet Take 2 tablets by mouth 3 times daily for 18 days 108 tablet 0     pantoprazole (PROTONIX) 40 MG EC tablet TAKE 1 TABLET BY MOUTH DAILY 90 tablet 2     predniSONE (DELTASONE) 20 MG tablet Take 2 tablets (40 mg) by mouth daily for 5 days 10 tablet 0     pregabalin (LYRICA) 150 MG capsule TAKE 1 CAPSULE BY MOUTH TWICE A  capsule 1     PROAIR  (90 Base) MCG/ACT inhaler INHALE 1-2 PUFFS INTO THE LUNGS EVERY FOUR HOURS AS NEEDED FOR WHEEZING, SHORTNESS OF 8.5 g 11     Semaglutide,0.25 or 0.5MG/DOS, (OZEMPIC, 0.25 OR 0.5 MG/DOSE,) 2 MG/1.5ML SOPN Inject 0.5 mg Subcutaneous once a week 1 pen 5     topiramate (TOPAMAX) 50 MG tablet 2 tablets every night 180 tablet 3     traZODone (DESYREL) 50 MG tablet   2     venlafaxine (EFFEXOR-XR) 75 MG 24 hr capsule Take 1 capsule (75 mg) by mouth daily 90 capsule 1       Reviewed and updated as needed this visit by Provider  Tobacco  Allergies  Meds  Problems  Med Hx  Surg Hx  Fam Hx         Review of Systems   Constitutional, HEENT, cardiovascular, pulmonary, GI, , musculoskeletal, neuro, skin, endocrine and psych systems are negative, except as otherwise noted.       Objective   Reported vitals:  LMP  (LMP Unknown)     PSYCH: Alert and oriented times 3; coherent speech, normal   rate and volume, able to articulate logical thoughts, able   to abstract reason, no tangential thoughts, no hallucinations   or delusions  Her affect is normal  RESP: Intermittent dry cough; faint audible wheeze during cough, able to talk in full sentences  Remainder of exam unable to be completed due to telephone visits    Diagnostic Test Results:  none         Assessment/Plan:  1. Mild persistent asthma with acute exacerbation  - predniSONE (DELTASONE) 20 MG tablet; Take 2 tablets (40 mg) by mouth daily for 5 days  Dispense: 10 tablet; Refill: 0    2. Acute bronchitis with symptoms > 10 days  - levofloxacin (LEVAQUIN) 750 MG tablet; Take 1 tablet (750 mg) by mouth daily  Dispense: 10 tablet; Refill: 0    3. Recurrent sinus infections  - levofloxacin (LEVAQUIN) 750 MG tablet; Take 1 tablet (750 mg) by mouth daily  Dispense: 10 tablet; Refill: 0    4. Antibiotic-induced yeast infection  - fluconazole (DIFLUCAN) 150 MG tablet; Take 1 tablet (150 mg) by mouth once for 1 dose  Dispense: 1 tablet; Refill: 0    - Suspect sinusitis under treated due unclear dosing on patient's part. Given repeat treatment < 1 month, will switch antibx regimen. Consider Augmentin/Omnicef + Zpak, however patient feels she wouldn't remember to take two different pills. Will start Levquin 750 mg every day x10 days.  - Patient also requests prednisone due to asthma exacerbation. Discussed risks of prednisone + fluoroquinolone, recommend addition of ICS first, but patient declines due to cost. Will treat with prednisone 40 mg every day x5 days. Continue albuterol prn.   - Discussed sx that warrant recheck.    Return in about 1 week (around 7/13/2020), or if symptoms worsen or fail to improve.      Phone call duration:  12 minutes    Yudy Cabrales PA-C

## 2020-07-07 ENCOUNTER — NURSE TRIAGE (OUTPATIENT)
Dept: FAMILY MEDICINE | Facility: CLINIC | Age: 45
End: 2020-07-07

## 2020-07-07 NOTE — TELEPHONE ENCOUNTER
"Patient Contact    Attempt # 1    Was call answered?  No.  Left message on voicemail with information to call triage back.    On call back:   Per provider below-  1. \"Prednisone is used to treat back pain so as she expected, I don't believe it is causing this.\"  2. She can stop the prednisone and the pain should resolve if it is causing her back pain.  "

## 2020-07-07 NOTE — TELEPHONE ENCOUNTER
"Pt states ever since she has been taking prednisone, she has been having back pain.  She thinks that the prednisone has been making the pain worse.  Pt started it yesterday afternoon.    Low back and moves to sides, and then radiates up.   No concerns for UTI or kidney stones.  No fevers.    \"This is going to get to a point that doctors are going to say this medication is not the thing causing this, but it is happening to me and that needs to be acknowledged and someone has to figure it out\".     Pt would like advise on what to do for her back pain based on this information.    Additional Information    Negative: Passed out (i.e., fainted, collapsed and was not responding)    Negative: Shock suspected (e.g., cold/pale/clammy skin, too weak to stand, low BP, rapid pulse)    Negative: Sounds like a life-threatening emergency to the triager    Negative: Major injury to the back (e.g., MVA, fall > 10 feet or 3 meters, penetrating injury, etc.)    Negative: Pain in the upper back over the ribs (rib cage) that radiates (travels) into the chest    Negative: Pain in the upper back over the ribs (rib cage) and worsened by coughing (or clearly increases with breathing)    Negative: SEVERE back pain of sudden onset and age > 60    Negative: SEVERE abdominal pain (e.g., excruciating)    Negative: Abdominal pain and age > 60    Negative: Unable to urinate (or only a few drops) and bladder feels very full    Negative: Loss of bladder or bowel control (urine or bowel incontinence; wetting self, leaking stool) of new onset    Negative: Numbness (loss of sensation) in groin or rectal area    Negative: Pain radiates into groin, scrotum    Negative: Blood in urine (red, pink, or tea-colored)    Negative: Vomiting and pain over lower ribs of back (i.e., flank - kidney area)    Negative: Weakness of a leg or foot (e.g., unable to bear weight, dragging foot)    Negative: Patient sounds very sick or weak to the triager    Negative: Fever " > 100.5 F (38.1 C) and flank pain    Negative: Pain or burning with passing urine (urination)    SEVERE back pain (e.g., excruciating, unable to do any normal activities) and not improved after pain medicine and CARE ADVICE    Protocols used: BACK PAIN-A-OH

## 2020-07-07 NOTE — TELEPHONE ENCOUNTER
Per bradford with RN - she believes the prednisone is causing her back pain to be worse. She wants us to do something to help with her pain. Are there any other recommendations you have for patient?

## 2020-07-08 NOTE — TELEPHONE ENCOUNTER
"RN called back to pt.  Relayed provider information.    Pt states, \"Well someone needs to look into this, because no one is looking into this. My friend looked it up the other day and four percent of people get back pain on prednisone so something clearly needs to be done\".     Per [employer] check, back pain is not side effect of prednisone.    RN advised pt that clinic does not have the ability to start a clinical trial for prednisone. Pt mentions she has \"put this in [facility]'s hands\", and \"they will take over for this because Dr. Machado isn't able to handle this\".    RN inquired what else RN can help with at this time.    Pt states, \"It may not be a nurses job to connect with other human beings, that's clearly not the job you chose, you chose a job to take notes and relay medical information so I get that, so just have a good day\".    Pt hung up on RN.  "

## 2020-07-10 ENCOUNTER — TELEPHONE (OUTPATIENT)
Dept: FAMILY MEDICINE | Facility: CLINIC | Age: 45
End: 2020-07-10

## 2020-07-10 DIAGNOSIS — G89.4 CHRONIC PAIN SYNDROME: ICD-10-CM

## 2020-07-10 DIAGNOSIS — F41.9 ANXIETY: Primary | ICD-10-CM

## 2020-07-10 DIAGNOSIS — F33.0 MILD EPISODE OF RECURRENT MAJOR DEPRESSIVE DISORDER (H): Chronic | ICD-10-CM

## 2020-07-10 NOTE — TELEPHONE ENCOUNTER
"Called patient to notify that Dr. Machado is out of office, but we will share message with him. She is aware that covering providers may review, but as FYI - mostly intended for Dr. Machado.    She notes that she cancelled her appointment today with Dr. Sánchez because she couldn't do anything to help. She states her psychiatrist is out of office toady. Pt states, \"I hate trying to mask feelings and cover them up. I don't know how much a body can go through. I don't know how much I can physically take.\" She acknowledges taking clonazepam, but states it isn't helping enough. She describes feeling anxiety and states it is, \"overpowering everything right now.\" She notes feeling shakey. She acknowledges here physical pain is still there and real. She notices that her pain is worse when she is anxious and has big emotions. She also notes that her pain is still there when she relaxes her body, \"It never really goes away.\"     Patient states her whole body pain is made worse by overwhelming feelings related to saying goodbye to here son this weekend when he leaves. She reports using her coping skills - she has been doing a mandala and listening to calming music. She tries to focus on the current moment. She acknowledges that she has social support and is able to talk with others about her feelings. She has psychiatrist who is helping to manage her clonazepam.     States she is trying to get over pneumonia right now. States the big emotions cause her to get infections. \"I cannot explain how real the mind-body connection is.\" She state that [facility] is dealing with this. \"I'm not sure what they will find. I have to let go.\"     RN questioning if referral to integrated primary care would be helpful with addressing mind-body connection of her pain, emotions, and anxiety. Provider to review patient concerns and advise.      "

## 2020-07-10 NOTE — TELEPHONE ENCOUNTER
Reason for Call:  Other FYI      Detailed comments: The patient called and stated that she is suffering from anxiety and pain.  She believes that it is due to having to say good bye to her son this weekend.  She wanting Dr. Machado to know that she realized that her mind and body are linked and knowing that she has to say good bye to her son this weekend, it is causing her body to go into a lot of pain.  She had an appointment scheduled with Dr. Sánchez today but decided that there was nothing Dr. Sánchez could do for her and that it is something that she has to take care of on her own.  She just wanted Dr. Machado to be aware of this.     Phone Number Patient can be reached at: Home number on file [phone] (home)    Best Time: Any    Can we leave a detailed message on this number? YES    Call taken on 7/10/2020 at 12:49 PM by Melody Arboleda

## 2020-07-13 NOTE — TELEPHONE ENCOUNTER
Called patient. She states she is doing better after a difficult weekend saying goodbye to her Son. She states he is going into the [employer] and she won't be able to talk with him for 4 months.     Relayed provider message above. She states she is open to integrated primary care. She states [facility] is having her see psychiatry and psychology. She also mentions having bone density and neuro scans. She is concerned because she has pain when she is relaxed, so she doesn't think it is 100% emotional.     Advised we get referral for integrated primary care - and patient can talk with them and have any questions answered with scheduling. She agrees to this plan. Pended order, routing to provider to review/approve.

## 2020-07-14 DIAGNOSIS — J45.20 INTERMITTENT ASTHMA, UNCOMPLICATED: ICD-10-CM

## 2020-07-14 DIAGNOSIS — J45.30 MILD PERSISTENT ASTHMA WITHOUT COMPLICATION: ICD-10-CM

## 2020-07-14 RX ORDER — ALBUTEROL SULFATE 0.83 MG/ML
2.5 SOLUTION RESPIRATORY (INHALATION) EVERY 6 HOURS PRN
Qty: 25 VIAL | Refills: 3 | Status: SHIPPED | OUTPATIENT
Start: 2020-07-14 | End: 2022-06-24

## 2020-07-14 RX ORDER — ALBUTEROL SULFATE 90 UG/1
AEROSOL, METERED RESPIRATORY (INHALATION)
Qty: 8.5 G | Refills: 11 | Status: SHIPPED | OUTPATIENT
Start: 2020-07-14 | End: 2021-08-06

## 2020-07-14 NOTE — TELEPHONE ENCOUNTER
Routing refill request to provider for review/approval because:  Labs not current:  ACT    Patient is requesting refill of these medications  At this time. Almost out of both

## 2020-07-14 NOTE — TELEPHONE ENCOUNTER
JEAN CARLOS:  Spoke with [facility] today for an update. They received the referal from PCP> It can take up to 4 weeks to be processed to see if patient meets qualifications. Looking to have patients scheduled end up August or early September.     Also AUBREYI:  Spoke with patient today.     States she is done with her prednisone and 1 day left of her Levaquin. She is still not feeling well.     Symptoms 7/6/2020 during OV with provider: Nasal congestion, facial pain/pressure, cough, wheezing and headache    States that her symptoms subsided for a while while taking medication, but most of her symptoms are back.     Current symptoms: Nasal congestion, chest congestion, cough, wheezing and headache. Facial pressure is gone.     Using her nebs and inhaler daily. This helps relieve symptoms for a short time.     Her albuterol neb and inhaler are getting low and she needs refills. These will be pended and sent to the refill pool as a high priority for triage review.     Also wondering if she should be testing for COVID at this time.     Follow up: Was told order for COVID was already in the system to call and schedule appointment to have this done. Also told that her medications would be refilled as she requested.

## 2020-07-14 NOTE — TELEPHONE ENCOUNTER
Reason for Call:  Other call back    Detailed comments: The patient called and stated that she will finish the antibiotics that were prescribed by Yudy Cabrales and she is not feeling better. She is wanting a call back to discuss more options for her./     Phone Number Patient can be reached at: Home number on file [phone] (home)    Best Time: Any    Can we leave a detailed message on this number? YES    Call taken on 7/14/2020 at 2:36 PM by Melody Arboleda

## 2020-07-15 ENCOUNTER — TELEPHONE (OUTPATIENT)
Dept: FAMILY MEDICINE | Facility: CLINIC | Age: 45
End: 2020-07-15

## 2020-07-15 NOTE — TELEPHONE ENCOUNTER
Patient Contact    Attempt # 1    Was call answered?  No.  Left message on voicemail with information to call me back.    On call back:    -Please triage as appropriate

## 2020-07-15 NOTE — TELEPHONE ENCOUNTER
Reason for call:  Patient reporting a symptom  Symptom or request: asthma  Duration (how long have symptoms been present): 1 week  Have you been treated for this before? Yes  Additional comments: please call patient  Phone Number patient can be reached at:  Home number on file [phone] (home)  Best Time:  any  Can we leave a detailed message on this number:  YES  Call taken on 7/15/2020 at 8:04 AM by BREANN WARREN

## 2020-07-16 NOTE — TELEPHONE ENCOUNTER
"JEAN CARLOS  Pt was called. States she wants to be honest with PCP and wants him aware she has not been taking Percocet two times a day since \" I couln't doit it\" \"it was too much for me\". States she was \"out of it\" while taking two Percocet tablets daily. She still takes two tablets daily occasionally.    Her  heard of the benefits of mariguana and brought some home for pt. Pt tried smoking some but it worsen her asthma sympstoms. She is doing better now that she stopped and now is using CBD oil to see if it helps the pain. She has been using oil with 1 tablet of Percocet daily. States she is afraid PCP will be upset to know this. She just wants provider aware of what she is doing for pain management.     Pt was unable to keep her appt with [facility] since she had other things going on with her kids but plans to reschedule with [place] [facility] spine clinic..   "

## 2020-07-20 ENCOUNTER — TELEPHONE (OUTPATIENT)
Dept: FAMILY MEDICINE | Facility: CLINIC | Age: 45
End: 2020-07-20

## 2020-07-21 DIAGNOSIS — R10.31 BILATERAL GROIN PAIN: ICD-10-CM

## 2020-07-21 DIAGNOSIS — R10.32 BILATERAL GROIN PAIN: ICD-10-CM

## 2020-07-21 RX ORDER — OXYCODONE AND ACETAMINOPHEN 5; 325 MG/1; MG/1
2 TABLET ORAL 3 TIMES DAILY
Qty: 108 TABLET | Refills: 0 | Status: SHIPPED | OUTPATIENT
Start: 2020-07-24 | End: 2020-08-18

## 2020-07-21 NOTE — TELEPHONE ENCOUNTER
Call from patient today.     Oxycodone refill requested.     States she has to reschedule her [facility] appointments for 2 weeks ago to today and tomorrow so has not talked with anyone about her pain at this time.     She has been taking less than what is prescribed in her chart. Will take 1-2 pills 1-3 times a day. Its different everyday, but she has not been taking it as much as it was prescribed to her.     Still has 3 more days left of her current medication.     Currently at [facility] today and tomorrow completing her appointments. Seeing IM MD tomorrow to discuss all results for tests. So far they have not talked about her pain.     Start date Friday the 24th as she have enough medication until then. Medication was pended for PCP review.       Controlled Substance Refill Request for Percocet  Problem List Complete:  Yes    Chronic pain syndrome   9/8/2015     Overview    Patient is followed by VIRAL Deleon for ongoing prescription of pain medication.  All refills should be approved by this provider, or covering partner.     Medication(s):  Lyrica 150 mg bid, Fioricet with codeine prn, klonopin and ambien to be prescribed by psych, not [place].  Maximum quantity per month: 60, 20  Clinic visit frequency required: Q 3 months      Controlled substance agreement on file: Yes       Date(s): 9/8/2015  New CSA needed.  Pain Clinic evaluation in the past: No     DIRE Total Score(s):  No flowsheet data found.     Last [facility] website verification: 06/02/2020 multiple meds from multiple outside providers     Clonazepam and dextroamphetamine prescribed by outside providers.         checked in past 3 months?  Yes 06/02/2020 multiple meds from multiple outside providers     Routing refill request to provider for review/approval because:  Drug not on the [facility] refill protocol

## 2020-07-23 ENCOUNTER — TRANSFERRED RECORDS (OUTPATIENT)
Dept: HEALTH INFORMATION MANAGEMENT | Facility: CLINIC | Age: 45
End: 2020-07-23

## 2020-07-24 NOTE — TELEPHONE ENCOUNTER
Patient was referred to the Integrated Primarycare clinic, chart was reviewed, medically complex, no addiction issues, chronic pain, working with the [facility] in [facility] for pain but they have referred her to Psych, unclear if she is seeing anyone, MH not well controled, depression, anxiety, bipolar with psychosis,   Patient does qualify for our program, please schedule with Dr. Cherry and a [facility]    Viktoria Crain FNP  [facility]

## 2020-07-31 DIAGNOSIS — G89.4 CHRONIC PAIN SYNDROME: Primary | ICD-10-CM

## 2020-07-31 RX ORDER — CARISOPRODOL 350 MG/1
350 TABLET ORAL 3 TIMES DAILY PRN
Qty: 90 TABLET | Refills: 0 | Status: SHIPPED | OUTPATIENT
Start: 2020-07-31 | End: 2020-09-30

## 2020-07-31 NOTE — TELEPHONE ENCOUNTER
Reason for Call:  Medication or medication refill:    Do you use a [facility]?  Name of the pharmacy and phone number for the current request:     [id number] [address] AT [place] [address]    Name of the medication requested: carisoprodol (SOMA) 350 MG tablet       Other request: The patient called and stated that she needs this medication filled. She stated that she will need this refilled as soon as possible as she will be out of medication by this evening.     Can we leave a detailed message on this number? YES    Phone number patient can be reached at: Cell number on file:    Telephone Information:   Mobile [phone]       Best Time: Any    Call taken on 7/31/2020 at 11:30 AM by Melody Espinoza

## 2020-07-31 NOTE — TELEPHONE ENCOUNTER
Chart reviewed, seeing [facility] re: chronic pain but refills still per PCP. Medication refills given.

## 2020-08-07 NOTE — TELEPHONE ENCOUNTER
Writer was able to get a hold of patient, she stated that she is no longer interested in becoming a patient at [facility]. Patient was also informed that her referral is good for up to 1 year. Writer is closing this encounter, no further action needed.       Alana Osman    [facility]

## 2020-08-12 ENCOUNTER — VIRTUAL VISIT (OUTPATIENT)
Dept: FAMILY MEDICINE | Facility: CLINIC | Age: 45
End: 2020-08-12
Payer: COMMERCIAL

## 2020-08-12 DIAGNOSIS — M54.89 OTHER CHRONIC BACK PAIN: Primary | ICD-10-CM

## 2020-08-12 DIAGNOSIS — F51.01 PRIMARY INSOMNIA: ICD-10-CM

## 2020-08-12 DIAGNOSIS — G89.29 OTHER CHRONIC BACK PAIN: Primary | ICD-10-CM

## 2020-08-12 DIAGNOSIS — G89.4 CHRONIC PAIN SYNDROME: ICD-10-CM

## 2020-08-12 PROCEDURE — 99213 OFFICE O/P EST LOW 20 MIN: CPT | Mod: 95 | Performed by: FAMILY MEDICINE

## 2020-08-12 NOTE — PROGRESS NOTES
"Melody Muñoz is a 44 year old female who is being evaluated via a billable telephone visit.      The patient has been notified of following:     \"This telephone visit will be conducted via a call between you and your physician/provider. We have found that certain health care needs can be provided without the need for a physical exam.  This service lets us provide the care you need with a short phone conversation.  If a prescription is necessary we can send it directly to your pharmacy.  If lab work is needed we can place an order for that and you can then stop by our lab to have the test done at a later time.    Telephone visits are billed at different rates depending on your insurance coverage. During this emergency period, for some insurers they may be billed the same as an in-person visit.  Please reach out to your insurance provider with any questions.    If during the course of the call the physician/provider feels a telephone visit is not appropriate, you will not be charged for this service.\"    Patient has given verbal consent for Telephone visit?  Yes    What phone number would you like to be contacted at? [phone]    How would you like to obtain your AVS? Joshuat    Subjective     Melody Muñoz is a 44 year old female who presents via phone visit today for the following health issues:    HPI    Chronic Pain Follow-Up    Where in your body do you have pain? F/u from [place]  How has your pain affected your ability to work? Unable to work  Which of these pain treatments have you tried since your last clinic visit? Other: [place] work up  How well are you sleeping? Poor  How has your mood been since your last visit? Slightly worse  Have you had a significant life event? No  Other aggravating factors: none  Taking medication as directed? Yes    PHQ-9 SCORE 2/6/2019 5/24/2019 12/3/2019   PHQ-9 Total Score - - -   PHQ-9 Total Score [facility] 11 (Moderate depression) - 12 (Moderate depression)   PHQ-9 " Total Score - - -   PHQ-9 Total Score 11 20 12   PHQ-9 Total Score - - -     ROWDY-7 SCORE 11/6/2018 2/6/2019 5/24/2019   Total Score - - -   Total Score 5 (mild anxiety) 13 (moderate anxiety) -   Total Score 5 13 14   Total Score - - -   Total Score [facility] Adult - - -     No flowsheet data found.  Encounter-Level CSA - 09/08/2015:    Controlled Substance Agreement - Scan on 9/9/2015  2:41 PM: [facility], Controlled Substance Contract, 09-08-15     Patient-Level CSA:    There are no patient-level csa.         How many servings of fruits and vegetables do you eat daily?  0-1    On average, how many sweetened beverages do you drink each day (Examples: soda, juice, sweet tea, etc.  Do NOT count diet or artificially sweetened beverages)?   0    How many days per week do you exercise enough to make your heart beat faster? 3 or less    How many minutes a day do you exercise enough to make your heart beat faster? 9 or less    How many days per week do you miss taking your medication? 0          Patient Active Problem List   Diagnosis     Endometriosis s/po EDWIGE 10-17     Mantoux: positive     Latent tuberculosis     Major depression     Generalized anxiety disorder     Constipation     Nightmares     Knee pain     Bipolar 1 disorder  with psychosis     Chronic fatigue     Female stress incontinence     Suicidal ideation     Acne     Chronic pain syndrome     Insomnia     Chronic migraine without aura without status migrainosus, not intractable     Nausea     JASWANT (obstructive sleep apnea)     Elevated liver enzymes     TMJ (temporomandibular joint syndrome)     Hypothyroidism due to acquired atrophy of thyroid     Hostile behavior     Mild persistent asthma without complication     Uncontrolled type 2 diabetes mellitus with hyperglycemia (H)     Allergic rhinitis     Incontinence of urine in female     Migraine     Screening for diabetic peripheral neuropathy     Need for prophylactic vaccination against Streptococcus pneumoniae  (pneumococcus)     Sepsis (H)     Abdominal pain     Morbid obesity (H)     Former tobacco use     Mixed simple and mucopurulent chronic bronchitis (H): Spirometry 16 lung age = 60y/o in 41 y/o FVC=90%&FEV1=78%     Class 2 obesity due to excess calories with serious comorbidity and body mass index (BMI) of 36.0 to 36.9 in adult     Mixed hyperlipidemia     Acquired hypothyroidism     Dysuria     Other chronic back pain     Headache disorder     Persistent depressive disorder     Irritable bowel syndrome with both constipation and diarrhea     Anxiety     Recurrent sinus infections     Bilateral groin pain     Opacity of lung on imaging study     Visual disturbance     Past Surgical History:   Procedure Laterality Date      SECTION       for twins     COLONOSCOPY       LAPAROSCOPIC APPENDECTOMY N/A 2017    Procedure: LAPAROSCOPIC APPENDECTOMY;  LAPAROSCOPIC APPENDECTOMY and resection of epiploic tag;  Surgeon: Roberto Robins MD;  Location: [facility] OR     LAPAROSCOPY      - endometriosis     little finger surgery left  1980     SLING BLADDER NOS  2014     tubal ligation bilateral       wisdom teeth removal         Social History     Tobacco Use     Smoking status: Former Smoker     Packs/day: 0.00     Years: 17.00     Pack years: 0.00     Types: Cigarettes     Last attempt to quit: 3/18/2019     Years since quittin.4     Smokeless tobacco: Never Used   Substance Use Topics     Alcohol use: No     Comment: no etoh since      Family History   Problem Relation Age of Onset     Hypertension Mother      Heart Disease Father         palpitations     Depression Sister      Anxiety Disorder Sister      Heart Disease Maternal Grandmother         CHF     Cancer Maternal Grandfather 72        Pancreatic Cancer     Alzheimer Disease Paternal Grandfather      Bipolar Disorder Other      Autism Spectrum Disorder Other            Reviewed and updated as needed this visit by Provider      "    Review of Systems   Constitutional, HEENT, cardiovascular, pulmonary, gi and gu systems are negative, except as otherwise noted.       Objective          Vitals:  No vitals were obtained today due to virtual visit.    healthy, alert and no distress  PSYCH: Alert and oriented times 3; coherent speech, normal   rate and volume, able to articulate logical thoughts, able   to abstract reason, no tangential thoughts, no hallucinations   or delusions  Her affect is normal  RESP: No cough, no audible wheezing, able to talk in full sentences  Remainder of exam unable to be completed due to telephone visits            Assessment & Plan       ICD-10-CM    1. Other chronic back pain  M54.9     G89.29    2. Chronic pain syndrome  G89.4    3. Primary insomnia  F51.01         BMI:   Estimated body mass index is 29.26 kg/m  as calculated from the following:    Height as of 4/25/20: 1.575 m (5' 2\").    Weight as of 6/7/20: 72.6 kg (160 lb).       [facility] wanted to place Melody in a 3-week pain medication tapering program.  Their intent was going to be to taper her off Soma, oxycodone and clonazepam in the 3-week program.  Melody has thought about that and discussed that with her psychiatrist and her therapist.  She wants to have a little bit more control over how quickly this is done and how it is done.  We discussed today starting with tapering of her Soma.  For the next 2 weeks she will take a half a tablet for 1 dose and a whole tablet for the other.  She had already cut down from 3/day to 2/day.  The next 2 weeks after that she will taper down to 1/2 tablet twice daily.  The week after that she will go to a half a tablet daily and then discontinue the Soma.  She will contact us after the 5-week taper to start discussing tapering her oxycodone.  She was very comfortable with the plan.    On a completely different note her son is doing well in [employer].    There are no Patient Instructions on file for this " visit.    Return in about 5 weeks (around 9/16/2020) for chronic pain.    Evaristo Machado MD  [facility]    Return in about 5 weeks (around 9/16/2020) for chronic pain.     Evaristo Machado MD     Telephone call length: 12 minutes

## 2020-08-14 ENCOUNTER — VIRTUAL VISIT (OUTPATIENT)
Dept: ENDOCRINOLOGY | Facility: CLINIC | Age: 45
End: 2020-08-14
Payer: COMMERCIAL

## 2020-08-14 DIAGNOSIS — E03.8 SUBCLINICAL HYPOTHYROIDISM: ICD-10-CM

## 2020-08-14 DIAGNOSIS — E11.9 TYPE 2 DIABETES MELLITUS WITHOUT COMPLICATION, WITHOUT LONG-TERM CURRENT USE OF INSULIN (H): Primary | ICD-10-CM

## 2020-08-14 PROCEDURE — 99214 OFFICE O/P EST MOD 30 MIN: CPT | Mod: 95 | Performed by: CLINICAL NURSE SPECIALIST

## 2020-08-14 RX ORDER — SEMAGLUTIDE 1.34 MG/ML
0.5 INJECTION, SOLUTION SUBCUTANEOUS WEEKLY
Qty: 1 PEN | Refills: 5 | Status: SHIPPED | OUTPATIENT
Start: 2020-08-14 | End: 2020-09-10

## 2020-08-14 NOTE — PROGRESS NOTES
"    Melody Muñoz is a 44 year old female who is being evaluated via a billable telephone visit.      The patient has been notified of following:     \"This telephone visit will be conducted via a call between you and your physician/provider. We have found that certain health care needs can be provided without the need for a physical exam.  This service lets us provide the care you need with a short phone conversation.  If a prescription is necessary we can send it directly to your pharmacy.  If lab work is needed we can place an order for that and you can then stop by our lab to have the test done at a later time.    Telephone visits are billed at different rates depending on your insurance coverage. During this emergency period, for some insurers they may be billed the same as an in-person visit.  Please reach out to your insurance provider with any questions.    If during the course of the call the physician/provider feels a telephone visit is not appropriate, you will not be charged for this service.\"    Patient has given verbal consent for Telephone visit?  Yes    What phone number would you like to be contacted at? [phone]    How would you like to obtain your AVS? Mail a copy    F/u for Diabetes (Last seen 4/23/2020).  HPI:  1. Type 2 DM:  Originally diagnosed: 3/2017.   Initially treated with metformin - did not tolerate due to GI side effects    Current Regimen: Ozempic 0.5 mg weekly    Trulicity was started 11/2018.  Briefly treated with januvia x 1 month - discontinued due to ineffectiveness.  Trial off Trulicity - BG levels were running higher so Trulicity was restarted.  Was experiencing localized skin reaction at the site of Trulicity injection - redness, welt, itching, discomfort.    Trulicity was discontinued and Ozempic started 4/2020  Ozempic PA was approved 4/20/2020 to 4/20/2023.    BS checks: not testing  Meter Download:  N/A    No FH of diabetes.  Personal history of GD with 4th " pregnancy - pregnant with twins at the time, was diet controlled.      Went to [place] for evaluation of multiple symptoms.  Was unhappy with the results of her evaluation.    Complications:   Diabetes Complications  Description / Detail    Diabetic Retinopathy  No retinopathy.  Last exam 2/2020 at [facility].  More recently saw [facility] 4/2020.  No retinopathy.   CAD / PAD  No   Neuropathy  No    Nephropathy / Microalbuminuria  elevated urine microalbumin x 1 (6/2018) - repeat urine microalbumin in normal range 10/2018 and 10/2019   Gastroparesis  No   Hypoglycemia Unawareness  N0     2. Blood Pressure Blood Pressure:   BP Readings from Last 3 Encounters:   06/07/20 105/74   04/25/20 136/83   04/25/20 (!) 142/89   Blood pressure medications include none  3. Lipids: Takes no medications for lipid control.      4. Prevention:  Flu Shot- 9/23/2019  Pneumovax- Recommended  Opthalmology-annually  Dental-Recommended semiannually  ASA-No  Smoking- No  4. Hypothyroidism.  Has been treated with levothyroxine 50 mcg for subclinical hypothyroidism.  Noting increased irritibility.  TSH below normal.  She is currently tapering off levothyroxine and plans to stop entirely.  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    treated with inh for 9 month     Major depression     Ted Pope  [phone]     Migraines     otc excedrin prn     JASWANT (obstructive sleep apnea)      Sleep apnea March/2014    uses Cpap     Substance abuse (H)     no use since summer 2013     Vertigo 1/22/2015     Past Surgical History:   Procedure  Laterality Date      SECTION       for twins     COLONOSCOPY       LAPAROSCOPIC APPENDECTOMY N/A 2017    Procedure: LAPAROSCOPIC APPENDECTOMY;  LAPAROSCOPIC APPENDECTOMY and resection of epiploic tag;  Surgeon: Roberto Robins MD;  Location: [facility] OR     LAPAROSCOPY      - endometriosis     little finger surgery left  1980     SLING BLADDER NOS  2014     tubal ligation bilateral       wisdom teeth removal       Family Hx:  Family History   Problem Relation Age of Onset     Hypertension Mother      Heart Disease Father         palpitations     Depression Sister      Anxiety Disorder Sister      Heart Disease Maternal Grandmother         CHF     Cancer Maternal Grandfather 72        Pancreatic Cancer     Alzheimer Disease Paternal Grandfather      Bipolar Disorder Other      Autism Spectrum Disorder Other      Thyroid disease: No         DM2: No         Autoimmune: DM1, SLE, RA, Vitiligo cousin with type 1 diabetes    Social Hx:  Social History     Socioeconomic History     Marital status:      Spouse name: Not on file     Number of children: Not on file     Years of education: Not on file     Highest education level: Not on file   Occupational History     Employer: SELF EMPLOYED.     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   Social Needs     Financial resource strain: Not on file     Food insecurity     Worry: Not on file     Inability: Not on file     Transportation needs     Medical: Not on file     Non-medical: Not on file   Tobacco Use     Smoking status: Former Smoker     Packs/day: 0.00     Years: 17.00     Pack years: 0.00     Types: Cigarettes     Last attempt to quit: 3/18/2019     Years since quittin.4     Smokeless tobacco: Never Used   Substance and Sexual Activity     Alcohol use: No     Comment: no etoh since      Drug use: No     Sexual activity: Not Currently     Comment: tubal ligation   Lifestyle     Physical activity     Days per week: Not on  file     Minutes per session: Not on file     Stress: Not on file   Relationships     Social connections     Talks on phone: Not on file     Gets together: Not on file     Attends Lutheran service: Not on file     Active member of club or organization: Not on file     Attends meetings of clubs or organizations: Not on file     Relationship status: Not on file     Intimate partner violence     Fear of current or ex partner: Not on file     Emotionally abused: Not on file     Physically abused: Not on file     Forced sexual activity: Not on file   Other Topics Concern     Parent/sibling w/ CABG, MI or angioplasty before 65F 55M? No      Service Not Asked     Blood Transfusions Not Asked     Caffeine Concern Not Asked     Occupational Exposure Not Asked     Hobby Hazards Not Asked     Sleep Concern Not Asked     Stress Concern Not Asked     Weight Concern Not Asked     Special Diet Not Asked     Back Care Not Asked     Exercise Not Asked     Bike Helmet Yes     Seat Belt Yes     Self-Exams Not Asked   Social History Narrative     Not on file          MEDICATIONS:  has a current medication list which includes the following prescription(s): albuterol, albuterol, almotriptan, butalbital-acetaminophen-caffeine, carisoprodol, clonazepam, dextroamphetamine, dextroamphetamine, duloxetine, fluticasone, guanfacine, lamotrigine, latuda, levofloxacin, levothyroxine, ondansetron, oxycodone-acetaminophen, pantoprazole, pregabalin, ozempic (0.25 or 0.5 mg/dose), topiramate, trazodone, and venlafaxine.    ROS     ROS: 10 point ROS neg other than the symptoms noted above in the HPI.    Objective   Reported vitals:  LMP  (LMP Unknown)    PSYCH: Alert and oriented times 3; coherent speech, normal   rate and volume, able to articulate logical thoughts, able   to abstract reason, no tangential thoughts, no hallucinations   or delusions  Her affect is normal and pleasant  RESP: No cough, no audible wheezing, able to talk in full  sentences  Remainder of exam unable to be completed due to telephone visits      LABS:  A1c:   Component      Latest Ref Rng & Units 1/23/2020   Hemoglobin A1C      0 - 5.6 % 6.0 (H)     Basic Metabolic Panel:  !COMPREHENSIVE Latest Ref Rng & Units 6/7/2020   SODIUM 133 - 144 mmol/L 138   POTASSIUM 3.4 - 5.3 mmol/L 3.7   CHLORIDE 94 - 109 mmol/L 105   BUN 7 - 30 mg/dL 11   Creatinine 0.52 - 1.04 mg/dL 0.80   Glucose 70 - 99 mg/dL 81   ANION GAP 3 - 14 mmol/L 4   CALCIUM 8.5 - 10.1 mg/dL 8.9   ALBUMIN 3.4 - 5.0 g/dL 4.1     LFTS/Cholesterol Panel:  !LIPID/HEPATIC Latest Ref Rng & Units 2/6/2019   CHOLESTEROL <200 mg/dL 186   TRIGLYCERIDES <150 mg/dL 376 (H)   HDL CHOLESTEROL >49 mg/dL 36 (L)   LDL CHOLESTEROL, CALCULATED <100 mg/dL 75   VLDL-CHOLESTEROL 0 - 30 mg/dL    NON HDL CHOLESTEROL <130 mg/dL 150 (H)     !LIPID/HEPATIC Latest Ref Rng & Units 10/23/2019   AST 0 - 45 U/L 20   ALT 0 - 50 U/L 28     Thyroid Function:   !THYROID Latest Ref Rng & Units 2/18/2020 1/13/2020 10/24/2019   TSH 0.40 - 4.00 mU/L 0.33 (L) 0.23 (L) 0.45   T4 FREE 0.76 - 1.46 ng/dL 0.94 1.10 1.18     Urine MicroAlbumin:  Component      Latest Ref Rng & Units 10/24/2019   Creatinine Urine      mg/dL 149   Albumin Urine mg/L      mg/L 12   Albumin Urine mg/g Cr      0 - 25 mg/g Cr 8.32       Vitamin D:    All pertinent notes, labs, and images personally reviewed by me.     A/P  Ms.Jessica TONYA Muñoz is a 44 year old being evaluated via phone visit for the management of:    1. DM2 - Controlled.  A1c 6.0% (1/2020)  Currently treated with Ozempic 0.5 mg weekly.  Tolerating the Ozempic well, plan to continue.   2. Hypothyroidism.  Tapering off levothyroxine.  Plan to remain off levothyroxine at this time.  Recommend monitoring TFT's periodically.    Labs ordered today:   No orders of the defined types were placed in this encounter.      Radiology/Consults ordered today: None      Follow-up:  Plans to continued follow up for her diabetes with her  PCP    Tammy Louie NP  Endocrinology  [facility]  CC:     Phone call duration:  26 minutes.  Call start time: 9:23 am, call end time: 9:49 am.

## 2020-08-14 NOTE — LETTER
"    8/14/2020         RE: Melody Muñoz  [address] [place] [address]        Dear Colleague,    Thank you for referring your patient, Melody Muñoz, to the [facility]. Please see a copy of my visit note below.        Melody Muñoz is a 44 year old female who is being evaluated via a billable telephone visit.      The patient has been notified of following:     \"This telephone visit will be conducted via a call between you and your physician/provider. We have found that certain health care needs can be provided without the need for a physical exam.  This service lets us provide the care you need with a short phone conversation.  If a prescription is necessary we can send it directly to your pharmacy.  If lab work is needed we can place an order for that and you can then stop by our lab to have the test done at a later time.    Telephone visits are billed at different rates depending on your insurance coverage. During this emergency period, for some insurers they may be billed the same as an in-person visit.  Please reach out to your insurance provider with any questions.    If during the course of the call the physician/provider feels a telephone visit is not appropriate, you will not be charged for this service.\"    Patient has given verbal consent for Telephone visit?  Yes    What phone number would you like to be contacted at? [phone]    How would you like to obtain your AVS? Mail a copy    F/u for Diabetes (Last seen 4/23/2020).  HPI:  1. Type 2 DM:  Originally diagnosed: 3/2017.   Initially treated with metformin - did not tolerate due to GI side effects    Current Regimen: Ozempic 0.5 mg weekly    Trulicity was started 11/2018.  Briefly treated with januvia x 1 month - discontinued due to ineffectiveness.  Trial off Trulicity - BG levels were running higher so Trulicity was restarted.  Was experiencing localized skin reaction at the site of Trulicity " injection - redness, welt, itching, discomfort.    Trulicity was discontinued and Ozempic started 4/2020  Ozempic PA was approved 4/20/2020 to 4/20/2023.    BS checks: not testing  Meter Download:  N/A    No FH of diabetes.  Personal history of GD with 4th pregnancy - pregnant with twins at the time, was diet controlled.      Went to [place] for evaluation of multiple symptoms.  Was unhappy with the results of her evaluation.    Complications:   Diabetes Complications  Description / Detail    Diabetic Retinopathy  No retinopathy.  Last exam 2/2020 at [facility].  More recently saw [facility] 4/2020.  No retinopathy.   CAD / PAD  No   Neuropathy  No    Nephropathy / Microalbuminuria  elevated urine microalbumin x 1 (6/2018) - repeat urine microalbumin in normal range 10/2018 and 10/2019   Gastroparesis  No   Hypoglycemia Unawareness  N0     2. Blood Pressure Blood Pressure:   BP Readings from Last 3 Encounters:   06/07/20 105/74   04/25/20 136/83   04/25/20 (!) 142/89   Blood pressure medications include none  3. Lipids: Takes no medications for lipid control.      4. Prevention:  Flu Shot- 9/23/2019  Pneumovax- Recommended  Opthalmology-annually  Dental-Recommended semiannually  ASA-No  Smoking- No  4. Hypothyroidism.  Has been treated with levothyroxine 50 mcg for subclinical hypothyroidism.  Noting increased irritibility.  TSH below normal.  She is currently tapering off levothyroxine and plans to stop entirely.  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    treated with inh for 9 month     Major depression     Ted  Niko  [phone]     Migraines     otc excedrin prn     JASWANT (obstructive sleep apnea)      Sleep apnea 2014    uses Cpap     Substance abuse (H)     no use since summer 2013     Vertigo 2015     Past Surgical History:   Procedure Laterality Date      SECTION       for twins     COLONOSCOPY       LAPAROSCOPIC APPENDECTOMY N/A 2017    Procedure: LAPAROSCOPIC APPENDECTOMY;  LAPAROSCOPIC APPENDECTOMY and resection of epiploic tag;  Surgeon: Roberto Robins MD;  Location: [facility] OR     LAPAROSCOPY      - endometriosis     little finger surgery left  1980     SLING BLADDER NOS  2014     tubal ligation bilateral       wisdom teeth removal       Family Hx:  Family History   Problem Relation Age of Onset     Hypertension Mother      Heart Disease Father         palpitations     Depression Sister      Anxiety Disorder Sister      Heart Disease Maternal Grandmother         CHF     Cancer Maternal Grandfather 72        Pancreatic Cancer     Alzheimer Disease Paternal Grandfather      Bipolar Disorder Other      Autism Spectrum Disorder Other      Thyroid disease: No         DM2: No         Autoimmune: DM1, SLE, RA, Vitiligo cousin with type 1 diabetes    Social Hx:  Social History     Socioeconomic History     Marital status:      Spouse name: Not on file     Number of children: Not on file     Years of education: Not on file     Highest education level: Not on file   Occupational History     Employer: SELF EMPLOYED.     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   Social Needs     Financial resource strain: Not on file     Food insecurity     Worry: Not on file     Inability: Not on file     Transportation needs     Medical: Not on file     Non-medical: Not on file   Tobacco Use     Smoking status: Former Smoker     Packs/day: 0.00     Years: 17.00     Pack years: 0.00     Types: Cigarettes     Last attempt to quit: 3/18/2019     Years since quittin.4      Smokeless tobacco: Never Used   Substance and Sexual Activity     Alcohol use: No     Comment: no etoh since 2013     Drug use: No     Sexual activity: Not Currently     Comment: tubal ligation   Lifestyle     Physical activity     Days per week: Not on file     Minutes per session: Not on file     Stress: Not on file   Relationships     Social connections     Talks on phone: Not on file     Gets together: Not on file     Attends Orthodox service: Not on file     Active member of club or organization: Not on file     Attends meetings of clubs or organizations: Not on file     Relationship status: Not on file     Intimate partner violence     Fear of current or ex partner: Not on file     Emotionally abused: Not on file     Physically abused: Not on file     Forced sexual activity: Not on file   Other Topics Concern     Parent/sibling w/ CABG, MI or angioplasty before 65F 55M? No      Service Not Asked     Blood Transfusions Not Asked     Caffeine Concern Not Asked     Occupational Exposure Not Asked     Hobby Hazards Not Asked     Sleep Concern Not Asked     Stress Concern Not Asked     Weight Concern Not Asked     Special Diet Not Asked     Back Care Not Asked     Exercise Not Asked     Bike Helmet Yes     Seat Belt Yes     Self-Exams Not Asked   Social History Narrative     Not on file          MEDICATIONS:  has a current medication list which includes the following prescription(s): albuterol, albuterol, almotriptan, butalbital-acetaminophen-caffeine, carisoprodol, clonazepam, dextroamphetamine, dextroamphetamine, duloxetine, fluticasone, guanfacine, lamotrigine, latuda, levofloxacin, levothyroxine, ondansetron, oxycodone-acetaminophen, pantoprazole, pregabalin, ozempic (0.25 or 0.5 mg/dose), topiramate, trazodone, and venlafaxine.    ROS     ROS: 10 point ROS neg other than the symptoms noted above in the HPI.    Objective   Reported vitals:  LMP  (LMP Unknown)    PSYCH: Alert and oriented times 3;  coherent speech, normal   rate and volume, able to articulate logical thoughts, able   to abstract reason, no tangential thoughts, no hallucinations   or delusions  Her affect is normal and pleasant  RESP: No cough, no audible wheezing, able to talk in full sentences  Remainder of exam unable to be completed due to telephone visits      LABS:  A1c:   Component      Latest Ref Rng & Units 1/23/2020   Hemoglobin A1C      0 - 5.6 % 6.0 (H)     Basic Metabolic Panel:  !COMPREHENSIVE Latest Ref Rng & Units 6/7/2020   SODIUM 133 - 144 mmol/L 138   POTASSIUM 3.4 - 5.3 mmol/L 3.7   CHLORIDE 94 - 109 mmol/L 105   BUN 7 - 30 mg/dL 11   Creatinine 0.52 - 1.04 mg/dL 0.80   Glucose 70 - 99 mg/dL 81   ANION GAP 3 - 14 mmol/L 4   CALCIUM 8.5 - 10.1 mg/dL 8.9   ALBUMIN 3.4 - 5.0 g/dL 4.1     LFTS/Cholesterol Panel:  !LIPID/HEPATIC Latest Ref Rng & Units 2/6/2019   CHOLESTEROL <200 mg/dL 186   TRIGLYCERIDES <150 mg/dL 376 (H)   HDL CHOLESTEROL >49 mg/dL 36 (L)   LDL CHOLESTEROL, CALCULATED <100 mg/dL 75   VLDL-CHOLESTEROL 0 - 30 mg/dL    NON HDL CHOLESTEROL <130 mg/dL 150 (H)     !LIPID/HEPATIC Latest Ref Rng & Units 10/23/2019   AST 0 - 45 U/L 20   ALT 0 - 50 U/L 28     Thyroid Function:   !THYROID Latest Ref Rng & Units 2/18/2020 1/13/2020 10/24/2019   TSH 0.40 - 4.00 mU/L 0.33 (L) 0.23 (L) 0.45   T4 FREE 0.76 - 1.46 ng/dL 0.94 1.10 1.18     Urine MicroAlbumin:  Component      Latest Ref Rng & Units 10/24/2019   Creatinine Urine      mg/dL 149   Albumin Urine mg/L      mg/L 12   Albumin Urine mg/g Cr      0 - 25 mg/g Cr 8.32       Vitamin D:    All pertinent notes, labs, and images personally reviewed by me.     A/P  Ms.Jessica TONYA Muñoz is a 44 year old being evaluated via phone visit for the management of:    1. DM2 - Controlled.  A1c 6.0% (1/2020)  Currently treated with Ozempic 0.5 mg weekly.  Tolerating the Ozempic well, plan to continue.   2. Hypothyroidism.  Tapering off levothyroxine.  Plan to remain off levothyroxine at  this time.  Recommend monitoring TFT's periodically.    Labs ordered today:   No orders of the defined types were placed in this encounter.      Radiology/Consults ordered today: None      Follow-up:  Plans to continued follow up for her diabetes with her PCP    Tammy Louie NP  Endocrinology  [facility]  CC:     Phone call duration:  26 minutes.  Call start time: 9:23 am, call end time: 9:49 am.    Again, thank you for allowing me to participate in the care of your patient.        Sincerely,        KIRAN Carballo CNP

## 2020-08-17 ENCOUNTER — NURSE TRIAGE (OUTPATIENT)
Dept: FAMILY MEDICINE | Facility: CLINIC | Age: 45
End: 2020-08-17

## 2020-08-17 DIAGNOSIS — J32.9 RECURRENT SINUS INFECTIONS: Primary | ICD-10-CM

## 2020-08-17 RX ORDER — DOXYCYCLINE HYCLATE 100 MG
100 TABLET ORAL 2 TIMES DAILY
Qty: 28 TABLET | Refills: 0 | Status: SHIPPED | OUTPATIENT
Start: 2020-08-17 | End: 2020-08-20

## 2020-08-17 NOTE — TELEPHONE ENCOUNTER
"Pt has been having pressure and HA for about 5 days.  Yellow drainage down throat and when blowing nose.  No fevers, pt states she usually does not get fevers with her sinus infections.    Pt believes this to be sinus infection as same sx.   Amox did not work last time- notes doxy and levaquin did work.    PCP please advise on abx script or need for appt      Additional Information    Negative: Sounds like a life-threatening emergency to the triager    Negative: Difficulty breathing, and not from stuffy nose (e.g., not relieved by cleaning out the nose)    Negative: SEVERE headache and has fever    Negative: Patient sounds very sick or weak to the triager    Negative: SEVERE sinus pain    Negative: Severe headache    Negative: Redness or swelling on the cheek, forehead, or around the eye    Negative: Fever > 103 F (39.4 C)    Negative: Fever > 101 F (38.3 C) and over 60 years of age    Negative: Fever > 100.0 F (37.8 C) and has diabetes mellitus or a weak immune system (e.g., HIV positive, cancer chemotherapy, organ transplant, splenectomy, chronic steroids)    Negative: Fever > 100.0 F (37.8 C) and bedridden (e.g., nursing home patient, stroke, chronic illness, recovering from surgery)    Negative: Fever present > 3 days (72 hours)    Negative: Fever returns after gone for over 24 hours and symptoms worse or not improved    Negative: Sinus pain (not just congestion) and fever    Negative: Earache    Using nasal washes and pain medicine > 24 hours and sinus pain (lower forehead, cheekbone, or eye) persists    Negative: Sinus congestion (pressure, fullness) present > 10 days    Negative: Nasal discharge present > 10 days    Answer Assessment - Initial Assessment Questions  1. LOCATION: \"Where does it hurt?\"       Head and nose    2. ONSET: \"When did the sinus pain start?\"  (e.g., hours, days)       5 days    3. SEVERITY: \"How bad is the pain?\"   (Scale 1-10; mild, moderate or severe)    - MILD (1-3): doesn't " "interfere with normal activities     - MODERATE (4-7): interferes with normal activities (e.g., work or school) or awakens from sleep    - SEVERE (8-10): excruciating pain and patient unable to do any normal activities         Moderate    4. RECURRENT SYMPTOM: \"Have you ever had sinus problems before?\" If so, ask: \"When was the last time?\" and \"What happened that time?\"       Yes, got abx    5. NASAL CONGESTION: \"Is the nose blocked?\" If so, ask, \"Can you open it or must you breathe through the mouth?\"      Moderately    6. NASAL DISCHARGE: \"Do you have discharge from your nose?\" If so ask, \"What color?\"      Yes, yellow drainige    7. FEVER: \"Do you have a fever?\" If so, ask: \"What is it, how was it measured, and when did it start?\"       No    8. OTHER SYMPTOMS: \"Do you have any other symptoms?\" (e.g., sore throat, cough, earache, difficulty breathing)      Jaw pain, congestion    9. PREGNANCY: \"Is there any chance you are pregnant?\" \"When was your last menstrual period?\"      N/a    Protocols used: SINUS PAIN AND CONGESTION-A-OH      "

## 2020-08-17 NOTE — TELEPHONE ENCOUNTER
Reason for Call:  Other symptoms    Detailed comments: The patient called and stated that has been having sinus infection like symptoms for about 4 days now. She stated that she has been having headaches and a lot of yellow mucous. She is wondering if Dr. Machado could prescribe her an antibiotic without an appointment. She would like a call back to discuss if this would be possible or not.     Phone Number Patient can be reached at: Cell number on file:    Telephone Information:   Mobile [phone]       Best Time: Any    Can we leave a detailed message on this number? YES    Call taken on 8/17/2020 at 1:34 PM by Melody Espinoza

## 2020-08-18 DIAGNOSIS — G89.4 CHRONIC PAIN SYNDROME: ICD-10-CM

## 2020-08-18 DIAGNOSIS — R10.31 BILATERAL GROIN PAIN: ICD-10-CM

## 2020-08-18 DIAGNOSIS — R10.32 BILATERAL GROIN PAIN: ICD-10-CM

## 2020-08-18 RX ORDER — OXYCODONE AND ACETAMINOPHEN 5; 325 MG/1; MG/1
2 TABLET ORAL 3 TIMES DAILY
Qty: 108 TABLET | Refills: 0 | Status: SHIPPED | OUTPATIENT
Start: 2020-08-20 | End: 2020-09-17

## 2020-08-18 NOTE — TELEPHONE ENCOUNTER
RN called to pt to relay info.  Pt states understanding.    Will continue doing sinus rinses and monitor.    No further action needed at this time.

## 2020-08-18 NOTE — TELEPHONE ENCOUNTER
Controlled Substance Refill Request for: oxyCODONE-acetaminophen (PERCOCET) 5-325 MG tablet   Problem List Complete:  Yes    Patient is followed by VIRAL Deleon for ongoing prescription of pain medication.  All refills should be approved by this provider, or covering partner.    Medication(s):  Lyrica 150 mg bid, Fioricet with codeine prn, klonopin and ambien to be prescribed by psych, not [facility].  Maximum quantity per month: 60, 20  Clinic visit frequency required: Q 3 months     Controlled substance agreement on file: Yes       Date(s): 9/8/2015  New CSA needed.  Pain Clinic evaluation in the past: No    DIRE Total Score(s):  No flowsheet data found.    Last [facility] website verification: 06/02/2020 multiple meds from multiple outside providers    Clonazepam and dextroamphetamine prescribed by outside providers.      checked in past 3 months?  Yes 6/2/20

## 2020-08-20 ENCOUNTER — TELEPHONE (OUTPATIENT)
Dept: FAMILY MEDICINE | Facility: CLINIC | Age: 45
End: 2020-08-20

## 2020-08-20 DIAGNOSIS — J32.9 RECURRENT SINUS INFECTIONS: ICD-10-CM

## 2020-08-20 RX ORDER — DOXYCYCLINE HYCLATE 100 MG
100 TABLET ORAL 2 TIMES DAILY
Qty: 28 TABLET | Refills: 0 | Status: SHIPPED | OUTPATIENT
Start: 2020-08-20 | End: 2020-09-24

## 2020-08-20 NOTE — TELEPHONE ENCOUNTER
Reason for Call:  Other call back    Detailed comments: Mleody lost her recent antibiotics that she is taking.     She is asking for replacement for the lost antibiotics.     Phone Number Patient can be reached at: Cell number on file:    Telephone Information:   Mobile [phone]       Best Time: any    Can we leave a detailed message on this number? YES    Call taken on 8/20/2020 at 11:00 AM by Mary Rodriguez

## 2020-08-26 DIAGNOSIS — F31.9 BIPOLAR 1 DISORDER (H): Chronic | ICD-10-CM

## 2020-08-26 RX ORDER — VENLAFAXINE HYDROCHLORIDE 75 MG/1
CAPSULE, EXTENDED RELEASE ORAL
Qty: 90 CAPSULE | Refills: 3 | Status: SHIPPED | OUTPATIENT
Start: 2020-08-26 | End: 2021-05-27

## 2020-09-03 ENCOUNTER — NURSE TRIAGE (OUTPATIENT)
Dept: FAMILY MEDICINE | Facility: CLINIC | Age: 45
End: 2020-09-03

## 2020-09-03 NOTE — TELEPHONE ENCOUNTER
"Pt states she noted some eye twitching and \"strange sensation\" in her gums.  Does note that she is increasingly stressed at this time.    Main reason for call is that pt was wondering if she is having a stroke.  No HA. No weakness. Speech clear and articulate. Pt notes face is symmetrical. Notes chronic vision changes and will be calling to optometrist about this.     RN advised on needs for EMS or to go to ED. Pt states understanding. She will further reach out to [facility] to transfer bone scan imaging to .     Pt has upcoming appt 09/15 with PCP she can also discuss this at that time if not worsening.    Additional Information    Negative: Weakness of the face, arm or leg on one side of the body    Negative: Followed getting substance in the eye    Negative: Foreign body or object is or was lodged in the eye    Negative: Followed an eye injury    Negative: Followed sun lamp or sun exposure (UV keratitis)    Negative: Yellow or green discharge (pus) in the eye    Negative: Pregnant    Negative: Complete loss of vision in 1 or both eyes    Negative: Severe eye pain    Negative: Severe headache    Negative: Double vision    Negative: Blurred vision or visual changes and present now and sudden onset or new (e.g., minutes, hours, days) (Exception: seeing floaters / black specks OR previously diagnosed migraine headaches with same symptoms)    Negative: Patient sounds very sick or weak to the triager    Negative: Flashes of light  (Exception: brief from pressing on the eyeball)    Negative: Eye pain and brief (now gone) blurred vision or visual changes    Negative: Taking digoxin (e.g., Lanoxin, Digitek, Cardoxin, Lanoxicaps; Toloxin in [place]) and blurred vision, yellow vision, or yellow-green halos    Negative: Many floaters in the eye    Negative: Jaw pain while eating and age > 50    Negative: Headache and age > 50    Negative: Patient wants to be seen    Negative: Single floater (i.e., small speck seems to " "float across the eye)    Negative: Brief (now gone) blurred vision and unexplained    Negative: Laser light exposure and blurred vision present > 15 minutes    Blurred vision or visual changes and gradual onset (e.g., weeks, months)    Answer Assessment - Initial Assessment Questions  1. DESCRIPTION: \"What is the vision loss like? Describe it for me.\" (e.g., complete vision loss, blurred vision, double vision, floaters, etc.)      Loss of peripheral vision, gradual    2. LOCATION: \"One or both eyes?\" If one, ask: \"Which eye?\"      Both    3. SEVERITY: \"Can you see anything?\" If so, ask: \"What can you see?\" (e.g., fine print)      Yes, able to see    4. ONSET: \"When did this begin?\" \"Did it start suddenly or has this been gradual?\"      Gradual    5. PATTERN: \"Does this come and go, or has it been constant since it started?\"      N/a    6. PAIN: \"Is there any pain in your eye(s)?\"  (Scale 1-10; or mild, moderate, severe)      Moderate    7. CONTACTS-GLASSES: \"Do you wear contacts or glasses?\"      No    8. CAUSE: \"What do you think is causing this visual problem?\"      Unsure    9. OTHER SYMPTOMS: \"Do you have any other symptoms?\" (e.g., confusion, headache, arm or leg weakness, speech problems)      No    10. PREGNANCY: \"Is there any chance you are pregnant?\" \"When was your last menstrual period?\"        N/a    Protocols used: VISION LOSS OR CHANGE-A-OH      "

## 2020-09-08 ENCOUNTER — TELEPHONE (OUTPATIENT)
Dept: ENDOCRINOLOGY | Facility: CLINIC | Age: 45
End: 2020-09-08

## 2020-09-08 DIAGNOSIS — E11.65 UNCONTROLLED TYPE 2 DIABETES MELLITUS WITH HYPERGLYCEMIA (H): Primary | ICD-10-CM

## 2020-09-08 NOTE — TELEPHONE ENCOUNTER
Can the patient find out if there is a preferred alternative - Trulicity, Victoza, Bydureon? Thanks.

## 2020-09-08 NOTE — TELEPHONE ENCOUNTER
Fax also from [facility],     Pharmacy Benefit Information:    Provider: BRUNILDA  Phone #: [phone]  ID#: [id number]  Medication/SIG: Ozempic  Pharmacy: [facility]    Routed to , please advise RX CHANGE OR PA, ROUTE BACK FOR PROCESSING    Yoselin Roberts RN, BSN  Message handled by CLINIC NURSE.

## 2020-09-08 NOTE — TELEPHONE ENCOUNTER
Cover my meds is giving us a call regarding Semaglutide,0.25 or 0.5MG/DOS,  They stated that they sent a prior authorization and they have not received it back , please call back at [phone]  Ref# [id number]    Marija Jansen

## 2020-09-09 NOTE — TELEPHONE ENCOUNTER
Called patient,    Requesting to go back to Trulicity medication, states her insurance does cover this medication.    Magno Manriquez RN

## 2020-09-10 RX ORDER — DULAGLUTIDE 0.75 MG/.5ML
0.75 INJECTION, SOLUTION SUBCUTANEOUS
Qty: 2 ML | Refills: 5 | Status: SHIPPED | OUTPATIENT
Start: 2020-09-10 | End: 2020-10-19

## 2020-09-10 NOTE — TELEPHONE ENCOUNTER
Can you call her back - Is she aware Ozempic PA was approved 4/29/2020 - 4/20/2023?  Or does she have new insurance?  I am happy to change the Rx back to Trulicity if she prefers but if she liked the Ozempic better, she should be able to stay on it.  I'll send the Rx for Trulicity to her pharmacy in case she wants to switch back.  Tammy Louie NP  Endocrinology      Central Prior Authorization Team   Phone: [phone]           Required dispensing pharmacy is [facility] - please escribe to [facility] in [place], [place] or call [phone].  Also, the patient must sign up to a  co-pay card.           Prior Authorization Approval     Authorization Effective Date: 4/20/2020  Authorization Expiration Date: 4/20/2023  Medication: Semaglutide,0.25 or 0.5MG/DOS, (OZEMPIC, 0.25 OR 0.5 MG/DOSE,) 2 MG/1.5ML SOPN - APPROVED  Approved Dose/Quantity: 1 FOR 28  Reference #:     Insurance Company: [employer] - Phone [phone] Fax [phone]

## 2020-09-10 NOTE — TELEPHONE ENCOUNTER
Patient is going to stay taking Ozempic, miscommunication previously, no further action needed    Magno Manriquez RN

## 2020-09-17 DIAGNOSIS — R10.32 BILATERAL GROIN PAIN: ICD-10-CM

## 2020-09-17 DIAGNOSIS — R10.31 BILATERAL GROIN PAIN: ICD-10-CM

## 2020-09-17 RX ORDER — OXYCODONE AND ACETAMINOPHEN 5; 325 MG/1; MG/1
2 TABLET ORAL 3 TIMES DAILY
Qty: 108 TABLET | Refills: 0 | Status: SHIPPED | OUTPATIENT
Start: 2020-09-17 | End: 2020-10-15

## 2020-09-17 NOTE — TELEPHONE ENCOUNTER
Reason for Call:  Medication or medication refill:    Do you use a [facility]?  Name of the pharmacy and phone number for the current request:     [id number] [address] AT [place] [address]        Name of the medication requested: oxyCODONE-acetaminophen (PERCOCET) 5-325 MG tablet    Other request: The patient called and stated that she needs a refill for this medication.      Can we leave a detailed message on this number? YES    Phone number patient can be reached at: Home number on file [phone] (home)    Best Time: Any    Call taken on 9/17/2020 at 10:28 AM by Melody Arboleda

## 2020-09-17 NOTE — TELEPHONE ENCOUNTER
Controlled Substance Refill Request for Percocet  Problem List Complete:  Yes  Chronic pain syndrome   9/8/2015     Overview    Patient is followed by VIRAL Deleon for ongoing prescription of pain medication.  All refills should be approved by this provider, or covering partner.     Medication(s):  Lyrica 150 mg bid, Fioricet with codeine prn, klonopin and ambien to be prescribed by psych, not [place].  Maximum quantity per month: 60, 20  Clinic visit frequency required: Q 3 months      Controlled substance agreement on file: Yes       Date(s): 9/8/2015  New CSA needed.  Pain Clinic evaluation in the past: No     DIRE Total Score(s):  No flowsheet data found.     Last [facility] website verification: 06/02/2020 multiple meds from multiple outside providers     Clonazepam and dextroamphetamine prescribed by outside providers.     Chronic migraine without aura without status migrainosus, not intractable   11/23/2015    Overview    fioricet plain Q 6 hrs, no more than 4/day and gets 20 at a time        CSA on file from Dr Husain, dated 9/8/15  (update with current primary care provider)  Last  check - 11/26/18 provider to review=- multiple meds from multiple providers        checked in past 3 months?  Yes 9/17/2020, non [facility] providers listed. Provider review recommended   Routing refill request to provider for review/approval because:  Drug not on the G refill protocol

## 2020-09-24 ENCOUNTER — TELEPHONE (OUTPATIENT)
Dept: FAMILY MEDICINE | Facility: CLINIC | Age: 45
End: 2020-09-24

## 2020-09-24 ENCOUNTER — VIRTUAL VISIT (OUTPATIENT)
Dept: FAMILY MEDICINE | Facility: CLINIC | Age: 45
End: 2020-09-24
Payer: COMMERCIAL

## 2020-09-24 DIAGNOSIS — Z53.9 ERRONEOUS ENCOUNTER--DISREGARD: Primary | ICD-10-CM

## 2020-09-24 NOTE — TELEPHONE ENCOUNTER
Pt states she needs help with her emotions and would like hormones checked per psychologist advice. States she doesn't trust gynecologist. Pt asked for a phone appointment to discuss hormonal evaluation. Future appt was scheduled. No further action needed unless provider would like appt change.

## 2020-09-24 NOTE — TELEPHONE ENCOUNTER
Patient would like to speak to a nurse about getting back on birth control. She states her psychiatrist suggested she speak with her primary in order to get this done. She also states her hormones may be in sync with her daughter because she has now started her menstrual. She states she is not able to wait until Dr Machado returns to clinic.

## 2020-09-30 ENCOUNTER — VIRTUAL VISIT (OUTPATIENT)
Dept: FAMILY MEDICINE | Facility: CLINIC | Age: 45
End: 2020-09-30
Payer: COMMERCIAL

## 2020-09-30 DIAGNOSIS — E03.4 HYPOTHYROIDISM DUE TO ACQUIRED ATROPHY OF THYROID: ICD-10-CM

## 2020-09-30 DIAGNOSIS — F34.1 PERSISTENT DEPRESSIVE DISORDER: ICD-10-CM

## 2020-09-30 DIAGNOSIS — G89.29 OTHER CHRONIC BACK PAIN: ICD-10-CM

## 2020-09-30 DIAGNOSIS — G43.709 CHRONIC MIGRAINE WITHOUT AURA WITHOUT STATUS MIGRAINOSUS, NOT INTRACTABLE: Primary | ICD-10-CM

## 2020-09-30 DIAGNOSIS — M54.89 OTHER CHRONIC BACK PAIN: ICD-10-CM

## 2020-09-30 DIAGNOSIS — F41.9 ANXIETY: ICD-10-CM

## 2020-09-30 PROCEDURE — 99214 OFFICE O/P EST MOD 30 MIN: CPT | Mod: 95 | Performed by: FAMILY MEDICINE

## 2020-09-30 NOTE — PROGRESS NOTES
"  Melody Muñoz is a 44 year old female who is being evaluated via a billable telephone visit.      The patient has been notified of following:     \"This telephone visit will be conducted via a call between you and your physician/provider. We have found that certain health care needs can be provided without the need for a physical exam.  This service lets us provide the care you need with a short phone conversation.  If a prescription is necessary we can send it directly to your pharmacy.  If lab work is needed we can place an order for that and you can then stop by our lab to have the test done at a later time.    Telephone visits are billed at different rates depending on your insurance coverage. During this emergency period, for some insurers they may be billed the same as an in-person visit.  Please reach out to your insurance provider with any questions.    If during the course of the call the physician/provider feels a telephone visit is not appropriate, you will not be charged for this service.\"    Patient has given verbal consent for Telephone visit?  Yes    What phone number would you like to be contacted at? [phone]    How would you like to obtain your AVS? Mail a copy    Subjective     Melody Muñoz is a 44 year old female who presents via phone visit today for the following health issues:    HPI    Medication Followup of percocet    Taking Medication as prescribed: yes    Side Effects:  None    Medication Helping Symptoms:  Yes, pt to discuss starting a taper          Chronic Pain Follow-Up    Where in your body do you have pain?  Diffusely and has had more headaches since stopping Soma.  How has your pain affected your ability to work? Not applicable  Which of these pain treatments have you tried since your last clinic visit? Other: Slow taper of Soma  How well are you sleeping? Fair  How has your mood been since your last visit? About the same  Have you had a significant life event? " No  Other aggravating factors: none  Taking medication as directed? Yes    PHQ-9 SCORE 2/6/2019 5/24/2019 12/3/2019   PHQ-9 Total Score - - -   PHQ-9 Total Score MyChart 11 (Moderate depression) - 12 (Moderate depression)   PHQ-9 Total Score - - -   PHQ-9 Total Score 11 20 12   PHQ-9 Total Score - - -     ROWDY-7 SCORE 11/6/2018 2/6/2019 5/24/2019   Total Score - - -   Total Score 5 (mild anxiety) 13 (moderate anxiety) -   Total Score 5 13 14   Total Score - - -   Total Score BEH Adult - - -     No flowsheet data found.  Encounter-Level CSA - 09/08/2015:    Controlled Substance Agreement - Scan on 9/9/2015  2:41 PM: MARISSA, Controlled Substance Contract, 09-08-15     Patient-Level CSA:    There are no patient-level csa.         How many servings of fruits and vegetables do you eat daily?  2-3    On average, how many sweetened beverages do you drink each day (Examples: soda, juice, sweet tea, etc.  Do NOT count diet or artificially sweetened beverages)?   0    How many days per week do you exercise enough to make your heart beat faster? 3 or less    How many minutes a day do you exercise enough to make your heart beat faster? 10 - 19    How many days per week do you miss taking your medication? 0      Review of Systems   Constitutional, HEENT, cardiovascular, pulmonary, gi and gu systems are negative, except as otherwise noted.       Objective          Vitals:  No vitals were obtained today due to virtual visit.    healthy, alert and no distress  PSYCH: Alert and oriented times 3; coherent speech, normal   rate and volume, able to articulate logical thoughts, able   to abstract reason, no tangential thoughts, no hallucinations   or delusions  Her affect is normal  RESP: No cough, no audible wheezing, able to talk in full sentences  Remainder of exam unable to be completed due to telephone visits            Assessment/Plan:    Assessment & Plan     Chronic migraine without aura without status migrainosus, not  "intractable      Other chronic back pain      Persistent depressive disorder      Anxiety      Hypothyroidism due to acquired atrophy of thyroid         BMI:   Estimated body mass index is 29.26 kg/m  as calculated from the following:    Height as of 4/25/20: 1.575 m (5' 2\").    Weight as of 6/7/20: 72.6 kg (160 lb).           Patient Instructions   The patient has been frustrated with her care at [facility] thinking that they are overlooking some of the details and not paying attention.  We do have a review of her care from July in the chart.  It does describe testing that they have done.  She has talked to patient services 5 times at [facility] and is just frustrated that she does not think things are being paid attention to.    She did successfully, although with some difficulty, taper off her Soma.  With that she has had more migraine headaches.  She is currently taking her pain medication 1 in the morning 1 midday and 2 at bedtime.  We discussed doing 1 in the morning, 1 midday and alternating odd days with 1 at bedtime even days with 2 at bedtime.  She will do that for the next month and then follow-up.  We did discuss potentially at that point going to one 3 times daily.      No follow-ups on file.    Evairsto Machado MD  [facility]    Phone call duration:  15 minutes                "

## 2020-09-30 NOTE — PATIENT INSTRUCTIONS
The patient has been frustrated with her care at [facility] thinking that they are overlooking some of the details and not paying attention.  We do have a review of her care from July in the chart.  It does describe testing that they have done.  She has talked to patient services 5 times at [facility] and is just frustrated that she does not think things are being paid attention to.    She did successfully, although with some difficulty, taper off her Soma.  With that she has had more migraine headaches.  She is currently taking her pain medication 1 in the morning 1 midday and 2 at bedtime.  We discussed doing 1 in the morning, 1 midday and alternating odd days with 1 at bedtime even days with 2 at bedtime.  She will do that for the next month and then follow-up.  We did discuss potentially at that point going to one 3 times daily.

## 2020-10-03 ENCOUNTER — HOSPITAL ENCOUNTER (EMERGENCY)
Facility: CLINIC | Age: 45
Discharge: HOME | End: 2020-10-03
Attending: EMERGENCY MEDICINE | Admitting: EMERGENCY MEDICINE
Payer: COMMERCIAL

## 2020-10-03 VITALS
BODY MASS INDEX: 29.4 KG/M2 | TEMPERATURE: 98.8 F | RESPIRATION RATE: 20 BRPM | WEIGHT: 160.72 LBS | HEART RATE: 92 BPM | OXYGEN SATURATION: 95 % | SYSTOLIC BLOOD PRESSURE: 98 MMHG | DIASTOLIC BLOOD PRESSURE: 76 MMHG

## 2020-10-03 DIAGNOSIS — R10.11 ABDOMINAL PAIN, RIGHT UPPER QUADRANT: ICD-10-CM

## 2020-10-03 LAB
ALBUMIN SERPL-MCNC: 3.7 G/DL (ref 3.4–5)
ALBUMIN UR-MCNC: NEGATIVE MG/DL
ALP SERPL-CCNC: 93 U/L (ref 40–150)
ALT SERPL W P-5'-P-CCNC: 22 U/L (ref 0–50)
ANION GAP SERPL CALCULATED.3IONS-SCNC: 4 MMOL/L (ref 3–14)
APPEARANCE UR: CLEAR
AST SERPL W P-5'-P-CCNC: 14 U/L (ref 0–45)
B-HCG FREE SERPL-ACNC: <5 IU/L
BACTERIA #/AREA URNS HPF: ABNORMAL /HPF
BASOPHILS # BLD AUTO: 0.1 10E9/L (ref 0–0.2)
BASOPHILS NFR BLD AUTO: 1.1 %
BILIRUB SERPL-MCNC: 0.2 MG/DL (ref 0.2–1.3)
BILIRUB UR QL STRIP: NEGATIVE
BUN SERPL-MCNC: 12 MG/DL (ref 7–30)
CALCIUM SERPL-MCNC: 8.9 MG/DL (ref 8.5–10.1)
CHLORIDE SERPL-SCNC: 107 MMOL/L (ref 94–109)
CO2 SERPL-SCNC: 26 MMOL/L (ref 20–32)
COLOR UR AUTO: ABNORMAL
CREAT SERPL-MCNC: 0.87 MG/DL (ref 0.52–1.04)
DIFFERENTIAL METHOD BLD: ABNORMAL
EOSINOPHIL # BLD AUTO: 1.1 10E9/L (ref 0–0.7)
EOSINOPHIL NFR BLD AUTO: 10.6 %
ERYTHROCYTE [DISTWIDTH] IN BLOOD BY AUTOMATED COUNT: 12.5 % (ref 10–15)
GFR SERPL CREATININE-BSD FRML MDRD: 81 ML/MIN/{1.73_M2}
GLUCOSE SERPL-MCNC: 92 MG/DL (ref 70–99)
GLUCOSE UR STRIP-MCNC: NEGATIVE MG/DL
HCT VFR BLD AUTO: 44.7 % (ref 35–47)
HGB BLD-MCNC: 14.2 G/DL (ref 11.7–15.7)
HGB UR QL STRIP: NEGATIVE
IMM GRANULOCYTES # BLD: 0 10E9/L (ref 0–0.4)
IMM GRANULOCYTES NFR BLD: 0.4 %
KETONES UR STRIP-MCNC: NEGATIVE MG/DL
LEUKOCYTE ESTERASE UR QL STRIP: NEGATIVE
LIPASE SERPL-CCNC: 93 U/L (ref 73–393)
LYMPHOCYTES # BLD AUTO: 3.7 10E9/L (ref 0.8–5.3)
LYMPHOCYTES NFR BLD AUTO: 34.8 %
MCH RBC QN AUTO: 28.3 PG (ref 26.5–33)
MCHC RBC AUTO-ENTMCNC: 31.8 G/DL (ref 31.5–36.5)
MCV RBC AUTO: 89 FL (ref 78–100)
MONOCYTES # BLD AUTO: 0.6 10E9/L (ref 0–1.3)
MONOCYTES NFR BLD AUTO: 5.5 %
MUCOUS THREADS #/AREA URNS LPF: PRESENT /LPF
NEUTROPHILS # BLD AUTO: 5 10E9/L (ref 1.6–8.3)
NEUTROPHILS NFR BLD AUTO: 47.6 %
NITRATE UR QL: NEGATIVE
NRBC # BLD AUTO: 0 10*3/UL
NRBC BLD AUTO-RTO: 0 /100
PH UR STRIP: 6.5 PH (ref 5–7)
PLATELET # BLD AUTO: 390 10E9/L (ref 150–450)
POTASSIUM SERPL-SCNC: 4.3 MMOL/L (ref 3.4–5.3)
PROT SERPL-MCNC: 6.8 G/DL (ref 6.8–8.8)
RBC # BLD AUTO: 5.01 10E12/L (ref 3.8–5.2)
RBC #/AREA URNS AUTO: 0 /HPF (ref 0–2)
SODIUM SERPL-SCNC: 137 MMOL/L (ref 133–144)
SOURCE: ABNORMAL
SP GR UR STRIP: 1.01 (ref 1–1.03)
SQUAMOUS #/AREA URNS AUTO: <1 /HPF (ref 0–1)
UROBILINOGEN UR STRIP-MCNC: NORMAL MG/DL (ref 0–2)
WBC # BLD AUTO: 10.6 10E9/L (ref 4–11)
WBC #/AREA URNS AUTO: <1 /HPF (ref 0–5)

## 2020-10-03 PROCEDURE — 81001 URINALYSIS AUTO W/SCOPE: CPT | Performed by: NURSE PRACTITIONER

## 2020-10-03 PROCEDURE — 99283 EMERGENCY DEPT VISIT LOW MDM: CPT

## 2020-10-03 PROCEDURE — 83690 ASSAY OF LIPASE: CPT | Performed by: EMERGENCY MEDICINE

## 2020-10-03 PROCEDURE — 80053 COMPREHEN METABOLIC PANEL: CPT | Performed by: EMERGENCY MEDICINE

## 2020-10-03 PROCEDURE — 84702 CHORIONIC GONADOTROPIN TEST: CPT

## 2020-10-03 PROCEDURE — 85025 COMPLETE CBC W/AUTO DIFF WBC: CPT | Performed by: EMERGENCY MEDICINE

## 2020-10-03 RX ORDER — OXYCODONE HYDROCHLORIDE 5 MG/1
10 TABLET ORAL ONCE
Status: DISCONTINUED | OUTPATIENT
Start: 2020-10-03 | End: 2020-10-03 | Stop reason: HOSPADM

## 2020-10-03 ASSESSMENT — ENCOUNTER SYMPTOMS
ABDOMINAL PAIN: 1
FEVER: 0
NAUSEA: 0
DIARRHEA: 0
BACK PAIN: 1
VOMITING: 0

## 2020-10-03 NOTE — ED NOTES
"Pt in nurses station saying she needs her pain meds immediately and she refused to leave the station when asked d/t HIPPA problems. Pt refusing to put a mask on or to return to her room. Pt yelling \"I'm taking all of you to fucking court.\"   "

## 2020-10-03 NOTE — ED NOTES
"Writer in to offer patient oral pain medications per MD order. Pt states \"I don't want oxycodone, I have oxycodone I take everyday. I don't want to wait 45 minutes for it.\" MD informed and writer and provider in to discuss with patient medication choices. Pt repeating similar statements as above. Pt requested to speak with charge nurse.  "

## 2020-10-03 NOTE — ED AVS SNAPSHOT
[facility] Emergency Dept  [address]  [facility] [address]  Phone: [phone]  Fax: [phone]                                    Melody Muñoz   MRN: [id number]    Department: [facility] Emergency Dept   Date of Visit: 10/3/2020           After Visit Summary Signature Page    I have received my discharge instructions, and my questions have been answered. I have discussed any challenges I see with this plan with the nurse or doctor.    ..........................................................................................................................................  Patient/Patient Representative Signature      ..........................................................................................................................................  Patient Representative Print Name and Relationship to Patient    ..................................................               ................................................  Date                                   Time    ..........................................................................................................................................  Reviewed by Signature/Title    ...................................................              ..............................................  Date                                               Time          22EPIC Rev 08/18

## 2020-10-03 NOTE — ED TRIAGE NOTES
Pt arrives to the ED due to abdominal pain over past couple days. Pt states pain located in bilateral lower back and comes around to right front of abdomen. Pt states pain is intermittent. Pt states pain will be improved sometimes after going to bathroom. Denies nausea, vomiting or diarrhea. Pt states that when she was wiping today she noticed some blood, which she thinks may be coming from her urethra.

## 2020-10-03 NOTE — ED NOTES
"Pt continues to exhibit aggressive behaviors. Pt has been offered oral pain medications twice and both times initially states she would take it, but then states she would take her own home medications instead. Pt continues to state \"Your not treating me fairly. You're judging me because of what is in my chart. Your whole fucking system is going to get shot to hell when my lawsuit against [place] goes through. You're all going down with them when I marisela you too.\" Security present outside of the patient's room due to safety concerns given the patient's aggressive behaviors and unwillingness to remain in her room due to privacy concerns. Charge nurse and Nursing supervisor present and working to assist care of the patient.  "

## 2020-10-03 NOTE — ED NOTES
"Pt showing aggressive behaviors and loudly stating \"I want to see a doctor now, I'm having an emergency.\" ED tech in to verbally reassure the patient that she is being monitored and the provider will be in to see her. BP and pulse oximetry monitors remain in place with vital signs WNL.  "

## 2020-10-03 NOTE — ED NOTES
LESLI Varghese asked for my assistance with a patient who was yelling and being belligerent toward staff. I came to speak to the patient, patient was yelling that we were not treating her appropriately and providing appropriate care, demanded to see her labs. I showed the patient the record with her lab results, and told her that there was nothing significant on them. Patient was yelling that we were not treating her pain. I discussed with her that she had been offered oxycodone pain medication and she had refused and we had offered her an xray and she had refused. Patient several times agreed to the xray and pain medication and the subsequently refused. I brought the pain medication to her room and told her she was in line for her xray for the 3rd time and she refused both and stated she wanted to go home. I provided the patient with discharge paperwork, patient was unhappy stating that she did with the pain medication. I explained that she had refused it twice within a few minutes when it was brought to her and therefor has not been discharged. Patient left with appropriate discharge information and was told she could take her home pain medications.

## 2020-10-03 NOTE — ED NOTES
"Overhead pt screaming in her room. Pt angry that she didn't get IV pain meds. Pt says \"I'm not putting up with this any fucking more. I'm taking all your asses to court.\"   "

## 2020-10-03 NOTE — ED PROVIDER NOTES
"  History     Chief Complaint:  Abdominal Pain    HPI   Melody Muñoz is a 44 year old female with a history of a kidney stone who presents with abdominal pain. Yesterday the patient started feeling abdominal pain in her right abdomen. She states that the pain radiates to her back and gets worse lower in her abdomen and down into the suprapubic area. She states that her pain feels more superficial higher in her abdomen up into her breast. She had her second MBI at [facility] recently and they found cancer in her breasts. Today she was wiping and states that she saw some blood in her urine. She states that the pain feels better when she urinates but the pain comes back after. She states that she, \"doesn't want us to look in her chart,\" and repeatedly states that, \"she just wants to be treated like everyone else.\" She states that she has had a lack of appetite, but denies any fever, vomiting, nausea, or diarrhea.    Imaging (06/07/2020)  XR Abdomen 2 views:   Negative abdomen. Bowel gas pattern is normal. Nothing for obstruction or free air. No evidence for renal stones, as per radiology.     Allergies:  Acetaminophen-Codeine  Hydrocodone  Tamiflu    Medications:    Albuterol  Axert  Fioricet  Klonopin  Dextroamphetamine  Dextrostat  Trulicity  Cymbalta  Flonase  Tenex  Lamictal  Latuda  Percocet  Protonix  Lyrica  Topamax  Desyrel  Effexor    Past Medical History:    Anxiety  Bipolar 1 disorder  Diabetes mellitus  Endometriosis  Suicidal ideation  Internal asthma  Kidney stone  Latent tuberculosis  Major depression  Migraines  JASWANT   Sleep apnea  Substance abuse  Vertigo  Allergic rhinitis  Female stress incontinence  Chronic fatigue  TMJ  Hypothyroidism  Hostile behavior  Type 2 diabetes  Sepsis  Morbid obesity  Hyperlipidemia  IBS  Recurrent sinus infections    Past Surgical History:    C section  Colonoscopy  Laparoscopic appendectomy  Endometriosis  Little finger left  Ling bladder NOS  Tubal ligation, " bilateral  [place] teeth  Appendectomy  Hysterectomy  Bladder suspension    Family History:    Mother: Hypertension  Father: Palpitations, Heart disease  Sister: Depression, Anxiety Disorder    Social History:  The patient was unaccompanied to the ED.  Smoking Status: Former Smoker  Smokeless Tobacco: Never Used  Alcohol Use: Negative  Drug Use: Negative  PCP: Evaristo Machado  Marital Status:       Review of Systems   Constitutional: Negative for fever.   Cardiovascular: Positive for chest pain.   Gastrointestinal: Positive for abdominal pain. Negative for diarrhea, nausea and vomiting.   Musculoskeletal: Positive for back pain.       Physical Exam     Patient Vitals for the past 24 hrs:   BP Temp Temp src Pulse Resp SpO2 Weight   10/03/20 1630 106/86 -- -- 74 -- 97 % --   10/03/20 1615 111/79 -- -- 67 -- 98 % --   10/03/20 1600 112/81 -- -- 67 -- 97 % --   10/03/20 1554 (!) 125/92 -- -- 69 -- 99 % --   10/03/20 1541 117/85 98.8  F (37.1  C) Oral 91 20 98 % 72.9 kg (160 lb 11.5 oz)       Physical Exam  Constitutional:       Comments: Pleasant and cooperative   HENT:      Right Ear: Tympanic membrane normal.      Left Ear: Tympanic membrane normal.      Mouth/Throat:      Pharynx: No posterior oropharyngeal erythema.   Eyes:      Conjunctiva/sclera: Conjunctivae normal.   Neck:      Musculoskeletal: Neck supple.   Cardiovascular:      Rate and Rhythm: Normal rate and regular rhythm.      Heart sounds: Normal heart sounds.   Pulmonary:      Effort: Pulmonary effort is normal.      Breath sounds: Normal breath sounds.   Abdominal:      General: Bowel sounds are normal. There is no distension.      Palpations: Abdomen is soft.      Tenderness: There is abdominal tenderness in the right upper quadrant and suprapubic area. There is right CVA tenderness. There is no guarding or rebound.   Musculoskeletal: Normal range of motion.   Skin:     General: Skin is warm and dry.   Neurological:      Mental Status: She  is alert.         Emergency Department Course     Laboratory:  Laboratory findings were communicated with the patient who voiced understanding of the findings.    UA with Microscopic reflex to Culture: Bacteria Few (A), Mucous Urine Present (A) o/w WNL     CBC: WBC 10.6, HGB 14.2,   CMP: WNL (Creatinine 0.87)    ISTAT HCG Quantitative Pregnancy POCT: <5.0    Lipase: 93    Emergency Department Course:    Past medical records, nursing notes, and vitals reviewed.  1627: I performed an exam of the patient and obtained history, as documented above.     IV was inserted and blood was drawn for laboratory testing, results above.    The patient provided a urine sample here in the emergency department. This was sent for laboratory testing, findings above.    I personally reviewed the laboratory results with the Patient and answered all related questions prior to discharge.     Findings and plan explained to the Patient. Patient discharged home with instructions regarding supportive care, medications, and reasons to return. The importance of close follow-up was reviewed.     Impression & Plan      Medical Decision Making:  Melody Muñoz is a 44 year old female who presents to the emergency department today for evaluation of right-sided abdominal pain.  I tried to clarify with her in full way that this differed from previous episodes of abdominal pain.  As noted above she began to become upset and said she did not want me looking in her old chart.  She implied that she has felt discriminated against by medical professionals.  I could not tell to what extent her visits at [facility] regarded this abdominal pain versus being strictly breast issue, though then she did also mention issues with her spine.    As I read her chart the complaints today sounded very similar to previous visits when she has had multiple imaging studies which have been unremarkable.  Today vital signs are normal and laboratory tests are unremarkable.   Although she had some tenderness in the abdomen there were no peritoneal findings.  I did not think imaging would be appropriate.  As I visited with her more she did call her primary physician about the symptoms yesterday and he increased her pain medicine.  She kept talking about being unsure how she was going to make it through the weekend with uncontrolled pain.  It started sounded like the main issue was just increased pain.  I offered oral oxycodone consistent with her chronic pain policy and ordered an x-ray of the abdomen.  She sent the nurse away saying that she had Percocet at home and did not want oral pain medicines but instead insisted on IV pain medicines.  I returned to the room to explain to her our policy only allowed IV medications for objective conditions with a limited period of pain such as appendicitis or a broken bone.  She kept interrupting, yelling, swearing.  We contacted security.  I had the ED charge nurse come to visit with the patient and we also contacted the hospital nurse supervisor.  The patient said that she did not want the abdominal x-ray so I canceled that.  She then said she did want it and I reordered it.  She came to the desk without a mask and was standing very close behind the scribe.  I asked her to replace her mask and return to her room.  She said she would just stand there.  She was in a position where she could view confidential patient information on the computer so I insisted that she leave.  She again became upset and said she just wanted to be treated like everyone else.  I reassured her that our policy was designed to ensure that all are treated equally.  Eventually the patient did accept oral pain medication but declined the x-ray and left very angry.    Diagnosis:    ICD-10-CM    1. Abdominal pain, right upper quadrant  R10.11        Disposition:   The patient is discharged to home.    Scribe Disclosure:  I, Alberto Ceballos, am serving as a scribe at 4:34 PM on  10/3/2020 to document services personally performed by Sally Davis MD based on my observations and the provider's statements to me.  [facility] EMERGENCY DEPT       Sally Davis MD  10/03/20 1816

## 2020-10-09 ENCOUNTER — TELEPHONE (OUTPATIENT)
Dept: FAMILY MEDICINE | Facility: CLINIC | Age: 45
End: 2020-10-09

## 2020-10-09 ENCOUNTER — TELEPHONE (OUTPATIENT)
Dept: OTOLARYNGOLOGY | Facility: CLINIC | Age: 45
End: 2020-10-09

## 2020-10-09 NOTE — TELEPHONE ENCOUNTER
[facility] Call Center    Phone Message    May a detailed message be left on voicemail: yes     Reason for Call: Other:   Pt would like a call back from Dr Valles/care team to discuss a couple of questions.     One of pt's questions is about the CT sinus order by Hai. Pt is wondering if a bigger picture could be ordered and if Hai would be able to review the results of that or if Hai would only be able to focus on the sinus.      If Hai wants to read the neuro bone scan, pt can provide that. Pt states that it may be related to pt's chronic sinusitis.     Pt provided lots of information and is also concerned about cancer cells that [place] is not treating pt seriously for.     Pt needs a compassionate doctor at this point and has many questions/concern. Please follow-up.     Action Taken: Other:  ent    Travel Screening: Not Applicable

## 2020-10-09 NOTE — TELEPHONE ENCOUNTER
Thanks, Daniella. I remember Melody. OK to have her follow up with me. Please have her send over her outside imaging.  No need for updated CT just yet.     Thanks,  Senthil

## 2020-10-14 ENCOUNTER — TELEPHONE (OUTPATIENT)
Dept: FAMILY MEDICINE | Facility: CLINIC | Age: 45
End: 2020-10-14

## 2020-10-14 DIAGNOSIS — R10.32 BILATERAL GROIN PAIN: ICD-10-CM

## 2020-10-14 DIAGNOSIS — R10.31 BILATERAL GROIN PAIN: ICD-10-CM

## 2020-10-14 NOTE — TELEPHONE ENCOUNTER
Reason for Call:  Medication or medication refill:    Do you use a [facility]?  Name of the pharmacy and phone number for the current request:  [email or web address]    Name of the medication requested:   oxyCODONE-acetaminophen (PERCOCET) 5-325 MG tablet 108 tablet         Other request:     Can we leave a detailed message on this number? YES    Phone number patient can be reached at: Home number on file [phone] (home)    Best Time:     Call taken on 10/14/2020 at 8:57 AM by JACQUELINE SMITH

## 2020-10-14 NOTE — TELEPHONE ENCOUNTER
"Reach out made to patient.     30 minute conversation with patient about her concerns with experience with [facility] system. States that she is not allowed to call and speak with them anymore if she is to talk with anyone there is needs to be with patient experience. They closed her case and her online portal as a patient. States she has reached out to patient experience at least 20 times and is currently waiting for a call back from them. Patient states they are not being honest with her and changing her results and changing what they are saying to her.     States they have found our what is wrong with her and they will not tell her what it is. \"They know what 2 medications are causing her pain but they wont tell her\"    She has asked to go back so they can explain things to her, but they tell her her case is closed, there is nothing wrong with her, and there is no way to sit down and talk in further detail.     She wants Dr. Machado to know the severity of them knowing what is wrong with her and not telling her. This is not right. She feels wronged and cheated.     People keep telling her that nothing is wrong no matter what speciality she goes to. She just does not know what to do anymore. She is sick of being treated so badly.     What is the concern for [facility]: States that Dr. Machado needs to know what severity on this issues with [facility].     Scheduling an appointment with PCP is risky. \"Because they ([facility]) might think that she has gone back to him, so they tghink they are off the hook\"    PCP: \"Does not need to do anything. Just wait at this point\"     Does not want to make any appointments at this time because she does not feel it is not safe to do so.         "

## 2020-10-14 NOTE — TELEPHONE ENCOUNTER
Reason for Call:  Other call back    Detailed comments: The patient called and stated that she needs a nurse of Dr. Machado to call her to discuss her medical treatment from the [facility] and how it is going to effect Dr. Machado.  She stated that she missed his telephone visit today and rescheduled for 10/19 but is very hesitant to take that appointment.  She stated that [facility] may use that as a way to say that she is going back to Dr. Machado for treatment.  She would like a call back to discuss this.     Phone Number Patient can be reached at: Home number on file [phone] (home)    Best Time: Any    Can we leave a detailed message on this number? YES    Call taken on 10/14/2020 at 1:40 PM by Melody Arboleda

## 2020-10-15 ENCOUNTER — TELEPHONE (OUTPATIENT)
Dept: FAMILY MEDICINE | Facility: CLINIC | Age: 45
End: 2020-10-15

## 2020-10-15 RX ORDER — OXYCODONE AND ACETAMINOPHEN 5; 325 MG/1; MG/1
2 TABLET ORAL 3 TIMES DAILY
Qty: 30 TABLET | Refills: 0 | Status: SHIPPED | OUTPATIENT
Start: 2020-10-15 | End: 2020-10-19

## 2020-10-15 NOTE — TELEPHONE ENCOUNTER
I will approve refill for just # 30 tabs.  Rx approved, sent electronically to pharmacy.  That should last her at least until appt with Dr Machado on Monday 10/19

## 2020-10-15 NOTE — TELEPHONE ENCOUNTER
Able to fill until her OV with Jeannette on Monday in office?   SUBJECTIVE  Jhon Elam is a 79 year old  male who presents today for a new complaint of having pain in his right lower back running down his right leg down into the foot area.  It is predominantly posterior but can seem a little more lateral in the upper thigh area.  Patient reports that this started up about 6 weeks ago but has gotten much worse in the last week or so.  Has been using some Tylenol with minimal benefit.  No true weakness noted.  No GI or  symptoms reported.  Does have a history of having had sciatica about 3 years ago and responded fairly well to meloxicam therapy.        The patient's prior medical history is significant for:  Patient Active Problem List   Diagnosis   • Prostate cancer (CMS/HCC)   • Urge incontinence   • Allergic rhinoconjunctivitis   • Coronary artery disease involving native coronary artery of native heart without angina pectoris   • Diaphragmatic hernia   • Eosinophilic esophagitis   • History of colonic polyps   • Impotence of organic origin   • Mixed hyperlipidemia   • Osteoarthritis of lumbar spine   • Other forms of angina pectoris (CMS/HCC)   • Primary pulmonary hypertension (CMS/HCC)   • Reflux esophagitis   • S/P cholecystectomy   • S/P prostatectomy   • Stricture and stenosis of esophagus   • Vitamin D deficiency   • Nonocclusive coronary atherosclerosis of native coronary artery       ALLERGIES:   Allergen Reactions   • Amoxicillin RASH       Current Outpatient Medications   Medication Sig Dispense Refill   • acetaminophen (TYLENOL) 500 MG tablet Take 500 mg by mouth every 6 hours as needed for Pain.     • omeprazole (PRILOSEC) 40 MG capsule Take 1 capsule by mouth 2 times daily. 60 capsule 1   • pravastatin (PRAVACHOL) 20 MG tablet Take 1 tablet by mouth daily. 90 tablet 3   • aspirin 81 MG EC tablet Take 81 mg by mouth daily.      • Cholecalciferol (VITAMIN D) 2000 units tablet Take 2,000 Units by mouth daily.      • tolterodine (DETROL LA) 4 MG 24 hr capsule  Take 1 capsule by mouth daily. 90 capsule 3   • predniSONE (DELTASONE) 10 MG tablet 2 po BID x2 days, then 3 po QAM x 2 days, , then 2 po QAM x2 days, then 1 po QAM x2 days and stop 20 tablet 0   • meloxicam (MOBIC) 7.5 MG tablet Take 1 tablet by mouth daily. Starting when prednisone done 30 tablet 5   • acetaminophen-codeine (TYLENOL NO.3) 300-30 MG per tablet Take 1-2 tablet by mouth every 4 to 6 hours as needed for pain. 50 tablet 1     No current facility-administered medications for this visit.        Social History     Socioeconomic History   • Marital status: /Civil Union     Spouse name: Not on file   • Number of children: Not on file   • Years of education: Not on file   • Highest education level: Not on file   Occupational History   • Not on file   Social Needs   • Financial resource strain: Not on file   • Food insecurity:     Worry: Not on file     Inability: Not on file   • Transportation needs:     Medical: Not on file     Non-medical: Not on file   Tobacco Use   • Smoking status: Former Smoker   • Smokeless tobacco: Never Used   Substance and Sexual Activity   • Alcohol use: Yes     Comment: socially   • Drug use: Never   • Sexual activity: Not on file   Lifestyle   • Physical activity:     Days per week: 0 days     Minutes per session: 0 min   • Stress: Not on file   Relationships   • Social connections:     Talks on phone: Not on file     Gets together: Not on file     Attends Voodoo service: Not on file     Active member of club or organization: Not on file     Attends meetings of clubs or organizations: Not on file     Relationship status: Not on file   • Intimate partner violence:     Fear of current or ex partner: Not on file     Emotionally abused: Not on file     Physically abused: Not on file     Forced sexual activity: Not on file   Other Topics Concern   • Not on file   Social History Narrative   • Not on file         ROS:  Other than for the above,  Skin: No problems with hair or  nails, no rash, no new skin lesions    Gastrointestinal: No diarrhea, constipation, abdominal pain or other complaints noted  Genitourinary: Patient denies urinary pain, bleeding or voiding problems   Musculoskeletal: Pain in right lower back and right leg  Neurologic:Patient denies syncope, seizures, involuntary movements or gait problems      OBJECTIVE:  Visit Vitals  /66   Pulse 62   Temp 98.5 °F (36.9 °C) (Temporal)   Ht 5' 9\" (1.753 m)   Wt 76.2 kg (168 lb)   SpO2 98%   BMI 24.81 kg/m²     Jhon's BMI is Body mass index is 24.81 kg/m²., which is normal  Physical exam   General appearance - in mild distress  Skin - Normal, Warm, moist and  no suspicious lesions  Back: Patient has some tenderness in the right SI joint area  Heart - RRR w/o S3, S4, or murmurs.  The PMI is not displaced.  There is no JVD  Lungs - CTA throughout without crackles, rhonchi, or wheezes.  Patient has good air exchange without pleuritic symptoms.  Abdomen - BS X 4, soft, non-tender, no masses, organomegaly, rebound or guarding.  Extremities - Extremities normal. No deformities, edema, or skin discoloration    Neuro -strength in the lower extremities is normal.  Patient has 1+ heel jerk reflex compared to 2+ on the left.  Straight leg raising is negative bilaterally        ASSESSMENT/PLAN    Lower extremity sciatica on the right -exact etiology unclear.  Rule out osteoarthritis as etiology versus spinal stenosis, rule out other etiology      • Prostate cancer (CMS/HCC)   • Urge incontinence   • Allergic rhinoconjunctivitis   • Coronary artery disease involving native coronary artery of native heart without angina pectoris   • Diaphragmatic hernia   • Eosinophilic esophagitis   • History of colonic polyps   • Impotence of organic origin   • Mixed hyperlipidemia   • Osteoarthritis of lumbar spine   • Other forms of angina pectoris (CMS/HCC)   • Primary pulmonary hypertension (CMS/HCC)   • Reflux esophagitis   • S/P cholecystectomy   •  S/P prostatectomy   • Stricture and stenosis of esophagus   • Vitamin D deficiency   • Nonocclusive coronary atherosclerosis of native coronary artery   Plan x-rays of LS-spine to be checked.  Patient started on prednisone taper to be followed by meloxicam.  Tylenol 3 for more severe pain      Instructed to call if the problem worsens or does not improve or if any problem with the medications  Follow-up appointment in 3 weeks    Electronically Signed by: Aleksey Overton MD 4/25/2019

## 2020-10-15 NOTE — TELEPHONE ENCOUNTER
The patient called and is checking on the status of this refill request. She stated that she has 1.5 tablets left and needs this filled as soon as possible. The stated that Dr. Machado can be the only provider to fill her medications.  The writer informed her that Dr. Machado was out of the office today and won't be back in office until Monday she stated that the only other doctors she would trust to send this prescription in was Dr. Hollins or Paradise Hennessy.

## 2020-10-15 NOTE — TELEPHONE ENCOUNTER
Controlled Substance Refill Request for Percocet  Problem List Complete:  Yes  Chronic pain syndrome  9/8/2015    Overview   Patient is followed by VIRAL Deleon for ongoing prescription of pain medication.  All refills should be approved by this provider, or covering partner.     Medication(s):  Lyrica 150 mg bid, Fioricet with codeine prn, klonopin and ambien to be prescribed by psych, not [place].  Maximum quantity per month: 60, 20  Clinic visit frequency required: Q 3 months      Controlled substance agreement on file: Yes       Date(s): 9/8/2015  New CSA needed.  Pain Clinic evaluation in the past: No     DIRE Total Score(s):  No flowsheet data found.     Last [facility] website verification: 06/02/2020 multiple meds from multiple outside providers     Clonazepam and dextroamphetamine prescribed by outside providers.     Chronic migraine without aura without status migrainosus, not intractable  11/23/2015   Overview   fioricet plain Q 6 hrs, no more than 4/day and gets 20 at a time        CSA on file from Dr Husain, dated 9/8/15  (update with current primary care provider)  Last  check - 11/26/18 provider to review=- multiple meds from multiple providers        checked in past 3 months?  Yes 10/15/2020. non [facility] providers noted. provider review needed.      Routing refill request to provider for review/approval because:  Drug not on the G refill protocol

## 2020-10-15 NOTE — TELEPHONE ENCOUNTER
Reason for Call:  Other call back    Detailed comments: The patient called and stated that she would like a nurse to call her to go over her medication list with her.  She stated that the medications that should be on the list are the ones that she was on before she went to [facility].  She stated that there shouldn't be anything else on this list.     Phone Number Patient can be reached at: Home number on file [phone] (home)    Best Time: Any    Can we leave a detailed message on this number? YES    Call taken on 10/15/2020 at 9:35 AM by Melody Arboleda

## 2020-10-15 NOTE — TELEPHONE ENCOUNTER
Wondering what she should be doing about her medications.     She was told she needs to set up an OV with provider to discuss what medication is needed as well as concerns that her pain is not being identified by [facility].     OV in person set up with patient for next monday    No further follow up needed today.

## 2020-10-19 ENCOUNTER — TELEPHONE (OUTPATIENT)
Dept: FAMILY MEDICINE | Facility: CLINIC | Age: 45
End: 2020-10-19

## 2020-10-19 ENCOUNTER — OFFICE VISIT (OUTPATIENT)
Dept: FAMILY MEDICINE | Facility: CLINIC | Age: 45
End: 2020-10-19
Payer: COMMERCIAL

## 2020-10-19 VITALS
BODY MASS INDEX: 26.96 KG/M2 | TEMPERATURE: 98.1 F | OXYGEN SATURATION: 97 % | WEIGHT: 146.5 LBS | DIASTOLIC BLOOD PRESSURE: 62 MMHG | HEART RATE: 95 BPM | SYSTOLIC BLOOD PRESSURE: 118 MMHG | RESPIRATION RATE: 20 BRPM | HEIGHT: 62 IN

## 2020-10-19 DIAGNOSIS — F34.1 PERSISTENT DEPRESSIVE DISORDER: ICD-10-CM

## 2020-10-19 DIAGNOSIS — Z23 NEED FOR PROPHYLACTIC VACCINATION AND INOCULATION AGAINST INFLUENZA: ICD-10-CM

## 2020-10-19 DIAGNOSIS — E03.4 HYPOTHYROIDISM DUE TO ACQUIRED ATROPHY OF THYROID: ICD-10-CM

## 2020-10-19 DIAGNOSIS — R10.31 BILATERAL GROIN PAIN: ICD-10-CM

## 2020-10-19 DIAGNOSIS — R10.32 BILATERAL GROIN PAIN: ICD-10-CM

## 2020-10-19 DIAGNOSIS — J32.9 RECURRENT SINUS INFECTIONS: ICD-10-CM

## 2020-10-19 DIAGNOSIS — F41.9 ANXIETY: Primary | ICD-10-CM

## 2020-10-19 DIAGNOSIS — R91.8 OPACITY OF LUNG ON IMAGING STUDY: ICD-10-CM

## 2020-10-19 PROCEDURE — 90686 IIV4 VACC NO PRSV 0.5 ML IM: CPT | Performed by: FAMILY MEDICINE

## 2020-10-19 PROCEDURE — 99214 OFFICE O/P EST MOD 30 MIN: CPT | Mod: 25 | Performed by: FAMILY MEDICINE

## 2020-10-19 PROCEDURE — 90471 IMMUNIZATION ADMIN: CPT | Performed by: FAMILY MEDICINE

## 2020-10-19 RX ORDER — OXYCODONE AND ACETAMINOPHEN 5; 325 MG/1; MG/1
TABLET ORAL
Qty: 40 TABLET | Refills: 0 | Status: SHIPPED | OUTPATIENT
Start: 2020-10-22 | End: 2020-10-30

## 2020-10-19 ASSESSMENT — MIFFLIN-ST. JEOR: SCORE: 1272.77

## 2020-10-19 NOTE — PATIENT INSTRUCTIONS
"The patient is incredibly frustrated with her care at [facility].  She thinks they have altered her medical records and not followed up on her \"abnormal\" tests.  She had a molecular breast imaging study done at [facility] which showed no signs of any breast cancer.  She is pretty certain that the test was actually positive but just not followed up on.  She has an opportunity to have follow-up on her chronic sinus infections at the [facility] with Dr. Valles.  When she makes that appointment and goes there she will ask if there is someone at the [facility] that she could get a second opinion on her breast issues.  She was accompanied on her appointment today by her mother.  I encouraged her to get a second opinion about her molecular breast imaging study at the [facility].  I am not sure who that will be with so I suggested she ask the ENT specialist to that might be.  I spent 25 minutes with the patient and her mother going over her concerns.  I also refilled her oxycodone as she will run out using 4 daily while she is in [place] for her son's graduation.  I did place on the prescription that she can get it filled early.  She will follow up with me in a couple of weeks to go over further tapering of her pain medications.  "

## 2020-10-19 NOTE — PROGRESS NOTES
Subjective     Melody Muñoz is a 44 year old female who presents to clinic today for the following health issues:    HPI         Follow up on multiple health issues    Depression and Anxiety Follow-Up    How are you doing with your depression since your last visit? No change    How are you doing with your anxiety since your last visit?  No change    Are you having other symptoms that might be associated with depression or anxiety? Yes:  Lots of complaints of pain mostly on the right side of her body.  She is also extraordinarily frustrated with her recent visits at [facility] as she says that they have changed her records.    Have you had a significant life event? OTHER: She will be flying to [place] for her son's graduation from boot camp for [employer].     Do you have any concerns with your use of alcohol or other drugs? No    Social History     Tobacco Use     Smoking status: Former Smoker     Packs/day: 0.00     Years: 17.00     Pack years: 0.00     Types: Cigarettes     Quit date: 3/18/2019     Years since quittin.5     Smokeless tobacco: Never Used   Substance Use Topics     Alcohol use: No     Comment: no etoh since      Drug use: No     PHQ 2019 2019 12/3/2019   PHQ-9 Total Score 11 20 12   Q9: Thoughts of better off dead/self-harm past 2 weeks Not at all Several days More than half the days   F/U: Thoughts of suicide or self-harm - - No   F/U: Safety concerns - - No     ROWDY-7 SCORE 2018   Total Score - - -   Total Score 5 (mild anxiety) 13 (moderate anxiety) -   Total Score 5 13 14   Total Score - - -   Total Score BEH Adult - - -     Last PHQ-9 12/3/2019   1.  Little interest or pleasure in doing things 0   2.  Feeling down, depressed, or hopeless 1   3.  Trouble falling or staying asleep, or sleeping too much 3   4.  Feeling tired or having little energy 3   5.  Poor appetite or overeating 0   6.  Feeling bad about yourself 1   7.  Trouble concentrating 0   8.   "Moving slowly or restless 2   Q9: Thoughts of better off dead/self-harm past 2 weeks 2   PHQ-9 Total Score 12   Difficulty at work, home, or with people -   In the past two weeks have you had thoughts of suicide or self harm? No   Do you have concerns about your personal safety or the safety of others? No     ROWDY-7  5/24/2019   1. Feeling nervous, anxious, or on edge 2   2. Not being able to stop or control worrying 2   3. Worrying too much about different things 3   4. Trouble relaxing 2   5. Being so restless that it is hard to sit still 1   6. Becoming easily annoyed or irritable 2   7. Feeling afraid, as if something awful might happen 2   ROWDY-7 Total Score 14   If you checked any problems, how difficult have they made it for you to do your work, take care of things at home, or get along with other people? Very difficult       Suicide Assessment Five-step Evaluation and Treatment (SAFE-T)      How many servings of fruits and vegetables do you eat daily?  2-3    On average, how many sweetened beverages do you drink each day (Examples: soda, juice, sweet tea, etc.  Do NOT count diet or artificially sweetened beverages)?   0    How many days per week do you exercise enough to make your heart beat faster? 3 or less    How many minutes a day do you exercise enough to make your heart beat faster? 9 or less    How many days per week do you miss taking your medication? 0      Review of Systems   Constitutional, HEENT, cardiovascular, pulmonary, gi and gu systems are negative, except as otherwise noted.      Objective    /62   Pulse 95   Temp 98.1  F (36.7  C)   Resp 20   Ht 1.575 m (5' 2\")   Wt 66.5 kg (146 lb 8 oz)   SpO2 97%   BMI 26.80 kg/m    Body mass index is 26.8 kg/m .  Physical Exam   GENERAL APPEARANCE: healthy, alert and no distress  PSYCH: mentation appears normal, anxious, worried and at times was teary-eyed.            Assessment & Plan     Anxiety      Persistent depressive " "disorder      Recurrent sinus infections      Bilateral groin pain    - oxyCODONE-acetaminophen (PERCOCET) 5-325 MG tablet  Dispense: 40 tablet; Refill: 0    Need for prophylactic vaccination and inoculation against influenza    - INFLUENZA VACCINE IM > 6 MONTHS VALENT IIV4 [35285]  - Vaccine Administration, Initial [97588]    Hypothyroidism due to acquired atrophy of thyroid      Opacity of lung on imaging study         BMI:   Estimated body mass index is 26.8 kg/m  as calculated from the following:    Height as of this encounter: 1.575 m (5' 2\").    Weight as of this encounter: 66.5 kg (146 lb 8 oz).   Weight management plan: Discussed healthy diet and exercise guidelines         Patient Instructions   The patient is incredibly frustrated with her care at [facility].  She thinks they have altered her medical records and not followed up on her \"abnormal\" tests.  She had a molecular breast imaging study done at [facility] which showed no signs of any breast cancer.  She is pretty certain that the test was actually positive but just not followed up on.  She has an opportunity to have follow-up on her chronic sinus infections at the [facility] with Dr. Valles.  When she makes that appointment and goes there she will ask if there is someone at the [facility] that she could get a second opinion on her breast issues.  She was accompanied on her appointment today by her mother.  I encouraged her to get a second opinion about her molecular breast imaging study at the [facility].  I am not sure who that will be with so I suggested she ask the ENT specialist to that might be.  I spent 25 minutes with the patient and her mother going over her concerns.  I also refilled her oxycodone as she will run out using 4 daily while she is in [place] for her son's graduation.  I did place on the prescription that she can get it filled early.  She will follow up with me in a couple of weeks to go over further tapering of " her pain medications.      Return in about 3 weeks (around 11/9/2020) for chronic pain.    Evaristo Machado MD  [facility]

## 2020-10-19 NOTE — TELEPHONE ENCOUNTER
Reason for Call:  Other call back    Detailed comments: Melody called to say the the pharmacy will not refill her Percocet until 10/22/2020.    Melody leaves town on the 10/22/2020 and requests a refill  on 10/21/2020.    Please return a call to confirm that this can be accomplished.    Phone Number Patient can be reached at: Cell number on file:    Telephone Information:   Mobile [phone]       Best Time: any    Can we leave a detailed message on this number? YES    Call taken on 10/19/2020 at 10:40 AM by Mary Rodriguez

## 2020-10-29 ENCOUNTER — TELEPHONE (OUTPATIENT)
Dept: FAMILY MEDICINE | Facility: CLINIC | Age: 45
End: 2020-10-29

## 2020-10-29 DIAGNOSIS — R10.32 BILATERAL GROIN PAIN: ICD-10-CM

## 2020-10-29 DIAGNOSIS — R10.31 BILATERAL GROIN PAIN: ICD-10-CM

## 2020-10-29 NOTE — TELEPHONE ENCOUNTER
Reason for Call:  Other Update    Detailed comments:    She also wanted Dr. Machado to be aware of the fact that the [facility] stated that she is asymmetrical with her skeletal system.  She stated that with leg when she walks she wants to walk straight and but her leg goes side ways.  She also stated that it feels like her spine is twisting and that is why she is having so much pain.  The patient stated that she is in so much pain that she is having to smoke marijuana at times to help with the pain. She stated that [facility] is aware of this and they stated that it was good if it was helping with the pain.     Phone Number Patient can be reached at: Home number on file [phone] (home)    Best Time: Any    Can we leave a detailed message on this number? YES    Call taken on 10/29/2020 at 11:12 AM by Melody Arboleda

## 2020-10-29 NOTE — TELEPHONE ENCOUNTER
Reason for Call:  Medication or medication refill:    Do you use a [facility]?  Name of the pharmacy and phone number for the current request:     [id number] [address] AT [place] [address]      Name of the medication requested: oxyCODONE-acetaminophen (PERCOCET) 5-325 MG tablet    Other request: The patient called and stated she needs a refill for this medication.  She stated that she will have enough to get her to Monday.  She stated that since Dr. Machado is out of the office she would trust [facility] to fill this if Dr. Machado is not available.       Can we leave a detailed message on this number? YES    Phone number patient can be reached at: Home number on file [phone] (home)    Best Time: Any    Call taken on 10/29/2020 at 11:04 AM by Melody Arboleda

## 2020-10-29 NOTE — TELEPHONE ENCOUNTER
Controlled Substance Refill Request for Oxycodone-acetaminophen   Problem List Complete:  Yes  Chronic pain syndrome  9/8/2015    Overview   Patient is followed by VIRAL Deleon for ongoing prescription of pain medication.  All refills should be approved by this provider, or covering partner.     Medication(s):  Lyrica 150 mg bid, Fioricet with codeine prn, klonopin and ambien to be prescribed by psych, not [place].  Maximum quantity per month: 60, 20  Clinic visit frequency required: Q 3 months      Controlled substance agreement on file: Yes       Date(s): 9/8/2015  New CSA needed.  Pain Clinic evaluation in the past: No     DIRE Total Score(s):  No flowsheet data found.     Last [facility] website verification: 06/02/2020 multiple meds from multiple outside providers     Clonazepam and dextroamphetamine prescribed by outside providers.     Chronic migraine without aura without status migrainosus, not intractable  11/23/2015   Overview   fioricet plain Q 6 hrs, no more than 4/day and gets 20 at a time        CSA on file from Dr Husain, dated 9/8/15  (update with current primary care provider)  Last  check - 11/26/18 provider to review=- multiple meds from multiple providers      checked in past 3 months?  Yes 10/29/2020, provider review needed. Multiple non-[facility] providers noted.     Routing refill request to provider for review/approval because:  Drug not on the [facility] refill protocol

## 2020-10-29 NOTE — TELEPHONE ENCOUNTER
Please see FYI noted below.    Pt has appt with you 11/04.    This may be a good time to discuss medical certification vs opioids, as per information from message below.

## 2020-10-30 RX ORDER — OXYCODONE AND ACETAMINOPHEN 5; 325 MG/1; MG/1
TABLET ORAL
Qty: 40 TABLET | Refills: 0 | Status: SHIPPED | OUTPATIENT
Start: 2020-10-30 | End: 2020-11-09

## 2020-11-04 ENCOUNTER — TELEPHONE (OUTPATIENT)
Dept: FAMILY MEDICINE | Facility: CLINIC | Age: 45
End: 2020-11-04

## 2020-11-04 DIAGNOSIS — N63.0 BREAST MASS: Primary | ICD-10-CM

## 2020-11-04 NOTE — TELEPHONE ENCOUNTER
Reason for Call:  Other call back    Detailed comments: Patient is very upset with her doctors at [facility].  She would like to speak with you ASAP.    Phone Number Patient can be reached at: Cell number on file:    Telephone Information:   Mobile [phone]       Best Time: Anytime    Can we leave a detailed message on this number? YES    Call taken on 11/4/2020 at 4:22 PM by Kenneth Naranjo

## 2020-11-05 ENCOUNTER — TELEPHONE (OUTPATIENT)
Dept: FAMILY MEDICINE | Facility: CLINIC | Age: 45
End: 2020-11-05

## 2020-11-05 NOTE — TELEPHONE ENCOUNTER
Patient Contact    Attempt # 1    Was call answered?  No.  Unable to leave message, mailbox is full.

## 2020-11-05 NOTE — TELEPHONE ENCOUNTER
Reason for call:  Patient reporting a symptom    Symptom or request: lymph node--radioactive uptake of cancer cells    Duration (how long have symptoms been present): a long time    Have you been treated for this before? Yes    Additional comments: Pt has a lot of information to share.    Phone Number patient can be reached at:  Home number on file [phone] (home)    Best Time:  anytime    Can we leave a detailed message on this number:  YES    Call taken on 11/5/2020 at 12:15 PM by MARTIN VARMA

## 2020-11-06 NOTE — TELEPHONE ENCOUNTER
Spoke with patient today.     Patient states she does not remember why she called.   lymph node--radioactive uptake of cancer cells- patient was reminded by the message below.     States Dr. Machado should already know about this and she has an appointment with him on Monday. She will just talk to him then. Patient hung up the phone at that time.

## 2020-11-09 ENCOUNTER — VIRTUAL VISIT (OUTPATIENT)
Dept: FAMILY MEDICINE | Facility: CLINIC | Age: 45
End: 2020-11-09
Payer: COMMERCIAL

## 2020-11-09 DIAGNOSIS — N64.59 BREAST COMPLAINT: ICD-10-CM

## 2020-11-09 DIAGNOSIS — R10.31 BILATERAL GROIN PAIN: Primary | ICD-10-CM

## 2020-11-09 DIAGNOSIS — E03.4 HYPOTHYROIDISM DUE TO ACQUIRED ATROPHY OF THYROID: ICD-10-CM

## 2020-11-09 DIAGNOSIS — G89.29 OTHER CHRONIC BACK PAIN: ICD-10-CM

## 2020-11-09 DIAGNOSIS — R10.32 BILATERAL GROIN PAIN: Primary | ICD-10-CM

## 2020-11-09 DIAGNOSIS — F34.1 PERSISTENT DEPRESSIVE DISORDER: ICD-10-CM

## 2020-11-09 DIAGNOSIS — F41.9 ANXIETY: ICD-10-CM

## 2020-11-09 DIAGNOSIS — M54.89 OTHER CHRONIC BACK PAIN: ICD-10-CM

## 2020-11-09 PROCEDURE — 99214 OFFICE O/P EST MOD 30 MIN: CPT | Mod: 95 | Performed by: FAMILY MEDICINE

## 2020-11-09 RX ORDER — OXYCODONE AND ACETAMINOPHEN 5; 325 MG/1; MG/1
TABLET ORAL
Qty: 40 TABLET | Refills: 0
Start: 2020-11-09 | End: 2020-11-16
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
## 2020-11-11 ENCOUNTER — TELEPHONE (OUTPATIENT)
Dept: FAMILY MEDICINE | Facility: CLINIC | Age: 45
End: 2020-11-11

## 2020-11-13 DIAGNOSIS — R10.32 BILATERAL GROIN PAIN: ICD-10-CM

## 2020-11-13 DIAGNOSIS — R10.31 BILATERAL GROIN PAIN: ICD-10-CM

## 2020-11-13 DIAGNOSIS — G89.4 CHRONIC PAIN SYNDROME: ICD-10-CM

## 2020-11-13 RX ORDER — PREGABALIN 150 MG/1
CAPSULE ORAL
Qty: 180 CAPSULE | Refills: 1 | Status: SHIPPED | OUTPATIENT
Start: 2020-11-13 | End: 2021-05-24

## 2020-11-13 RX ORDER — OXYCODONE AND ACETAMINOPHEN 5; 325 MG/1; MG/1
TABLET ORAL
Qty: 40 TABLET | Refills: 0 | OUTPATIENT
Start: 2020-11-13

## 2020-11-13 NOTE — TELEPHONE ENCOUNTER
Controlled Substance Refill Request for Lyrica, Percocet  Problem List Complete:  Yes   Chronic pain syndrome  9/8/2015   Overview   Patient is followed by VIRAL Deleon for ongoing prescription of pain medication.  All refills should be approved by this provider, or covering partner.     Medication(s):  Lyrica 150 mg bid, Fioricet with codeine prn, klonopin and ambien to be prescribed by psych, not [place].  Maximum quantity per month: 60, 20  Clinic visit frequency required: Q 3 months      Controlled substance agreement on file: Yes       Date(s): 9/8/2015  New CSA needed.  Pain Clinic evaluation in the past: No     DIRE Total Score(s):  No flowsheet data found.     Last [facility] website verification: 06/02/2020 multiple meds from multiple outside providers     Clonazepam and dextroamphetamine prescribed by outside providers.     Chronic migraine without aura without status migrainosus, not intractable  11/23/2015   Overview   fioricet plain Q 6 hrs, no more than 4/day and gets 20 at a time        CSA on file from Dr Husain, dated 9/8/15  (update with current primary care provider)  Last  check - 11/26/18 provider to review=- multiple meds from multiple providers        checked in past 3 months?  Yes 11/13/2020, Multiple non[facility] providers noted. Provider review needed     Routing refill request to provider for review/approval because:  Drug not on the [facility] refill protocol       '

## 2020-11-13 NOTE — TELEPHONE ENCOUNTER
Reason for Call:  Medication or medication refill:    Do you use a [facility]?  Name of the pharmacy and phone number for the current request:  [facility]    Name of the medication requested: oxyCODONE-acetaminophen (PERCOCET) 5-325 MG tablet and lyrica    Other request: none    Can we leave a detailed message on this number? YES    Phone number patient can be reached at: Home number on file [phone] (home)    Best Time: any    Call taken on 11/13/2020 at 8:51 AM by NANCY MAYO

## 2020-11-15 ENCOUNTER — HOSPITAL ENCOUNTER (EMERGENCY)
Facility: CLINIC | Age: 45
Discharge: HOME | End: 2020-11-15
Attending: PHYSICIAN ASSISTANT | Admitting: PHYSICIAN ASSISTANT
Payer: COMMERCIAL

## 2020-11-15 VITALS
OXYGEN SATURATION: 97 % | RESPIRATION RATE: 20 BRPM | SYSTOLIC BLOOD PRESSURE: 119 MMHG | HEART RATE: 96 BPM | DIASTOLIC BLOOD PRESSURE: 88 MMHG | TEMPERATURE: 98.3 F

## 2020-11-15 DIAGNOSIS — S61.432A PUNCTURE WOUND OF LEFT HAND WITHOUT FOREIGN BODY, INITIAL ENCOUNTER: ICD-10-CM

## 2020-11-15 PROCEDURE — 99282 EMERGENCY DEPT VISIT SF MDM: CPT

## 2020-11-15 ASSESSMENT — ENCOUNTER SYMPTOMS: WOUND: 1

## 2020-11-15 NOTE — ED AVS SNAPSHOT
[facility] Emergency Dept  [address]  [facility] [address]  Phone: [phone]  Fax: [phone]                                    Melody Muñoz   MRN: [id number]    Department: [facility] Emergency Dept   Date of Visit: 11/15/2020           After Visit Summary Signature Page    I have received my discharge instructions, and my questions have been answered. I have discussed any challenges I see with this plan with the nurse or doctor.    ..........................................................................................................................................  Patient/Patient Representative Signature      ..........................................................................................................................................  Patient Representative Print Name and Relationship to Patient    ..................................................               ................................................  Date                                   Time    ..........................................................................................................................................  Reviewed by Signature/Title    ...................................................              ..............................................  Date                                               Time          22EPIC Rev 08/18

## 2020-11-16 ENCOUNTER — TELEPHONE (OUTPATIENT)
Dept: FAMILY MEDICINE | Facility: CLINIC | Age: 45
End: 2020-11-16

## 2020-11-16 DIAGNOSIS — R10.31 BILATERAL GROIN PAIN: ICD-10-CM

## 2020-11-16 DIAGNOSIS — R10.32 BILATERAL GROIN PAIN: ICD-10-CM

## 2020-11-16 RX ORDER — OXYCODONE AND ACETAMINOPHEN 5; 325 MG/1; MG/1
TABLET ORAL
Qty: 40 TABLET | Refills: 0 | OUTPATIENT
Start: 2020-11-16

## 2020-11-16 RX ORDER — OXYCODONE AND ACETAMINOPHEN 5; 325 MG/1; MG/1
TABLET ORAL
Qty: 40 TABLET | Refills: 0 | Status: SHIPPED | OUTPATIENT
Start: 2020-11-16 | End: 2020-12-02

## 2020-11-16 NOTE — ED PROVIDER NOTES
History     Chief Complaint:  Puncture Wound    HPI  Melody Muñoz is a 45 year old female who presents for evaluation of puncture wound. The patient reports that around a hour ago she was using a kebab skewer in the kitchen when she accidentally stabbed herself in her left palm. She washed and dressed the wound as fast as possible and came here for evaluation. The pain from the wound radiates into her arm but she is otherwise okay. She denies any other injuries at this time. Tetanus last updated .     Allergies:  Hydrocodone  Tamiflu [Oseltamivir]  Tylenol W/Codeine [Acetaminophen-Codeine]    Medications:    Albuterol HFA 90  Axert  Kloponin  Dextrostat  Cymbalta  Tenex  Lamictal  Latuda  Protonix  Lyrica  Topamax  Desyrel  Venlafaxine    Past Medical History:    Abnormal pap   Anxiety   Bipolar 1 disorder   Diabetes mellitus type II  Endometriosis   Suicidal ideation   Intermittent asthma   Kidney stone   Latent tuberculosis   Major depression   Migraines   JASWANT (obstructive sleep apnea)   Sleep apnea   Substance abuse   Vertigo   IBS w/ constipation and diarrhea  TMJ  Insomnia  Chronic Pain Syndrome  Chronic Fatigue    Past Surgical History:     Section  Colonoscopy  Laparoscopic Appendectomy  Laparoscopy  Left Little Finger Surgery  Sling Bladder NOS  Tubal Ligation Bilateral  [place] Teeth removal    Family History:    Hypertension  Heart Disease  Depression  Anxiety Disease    Social History:  The patient presents to the ED alone.   Marital Status:   Tobacco Use: Former  Alcohol Use: No  Drug Use: No    Review of Systems   Skin: Positive for wound (Puncture).   All other systems reviewed and are negative.    Physical Exam     Patient Vitals for the past 24 hrs:   BP Temp Temp src Pulse Resp SpO2   11/15/20 1927 119/88 98.3  F (36.8  C) Oral 96 20 97 %       Physical Exam  Constitutional: Alert, attentive,   CV: 2+ radial pulse, brisk distal cap refill  Pulm: No respiratory distress  MSK:  Full ROM of the left hand   Neurological: Alert, attentive. The finger flexors (FDS/FDP) and extensors are intact. There are no sensory deficits, Median, Ulnar, and Radial nerve function is normal.   Skin: Skin is warm and dry. Small, superficial, puncture wound to the left hypothenar eminence. No FB.    Psych: Normal mood and affect     Emergency Department Course     Emergency Department Course:  Past medical records, nursing notes, and vitals reviewed.    1945 I performed an exam of the patient as documented above.     1948 I cleaned and dressed wound. Plan of care discussed and questions answered.     Findings and plan explained to the Patient. Patient discharged home with instructions regarding supportive care, medications, and reasons to return. The importance of close follow-up was reviewed.    I personally reviewed and answered all related questions prior to discharge.     Impression & Plan     Medical Decision Making:  Melody Muñoz is a 45 year old female presents for evaluation of a puncture wound to left hypothenar eminence. The wound was carefully evaluated and explored. There was no foreign body identified. CMS is intact.  There is no evidence of muscular, tendon, bone, or nerve damage with this wound. Possible complications (infection, scarring) were reviewed with the patient. Appropriate wound dressing was placed and daily cares were discussed. Tetanus last updated 2012. Recommended abx ointment twice daily. They will return immediately for fevers, purulent drainage, spreading redness, increased pain or any other concerning symptoms. Patient agrees with the plan and all questions/concerns addressed prior to discharge home.    Diagnosis:    ICD-10-CM    1. Puncture wound of left hand without foreign body, initial encounter  S61.432A        Disposition:  Discharged to home.    Scribe Disclosure:  I, Jorge A Bedolla, am serving as a scribe at 7:45 PM on 11/15/2020 to document services personally  performed by Angela Morales PA-C based on my observations and the provider's statements to me.      Angela Morales PA-C  11/16/20 0107

## 2020-11-16 NOTE — TELEPHONE ENCOUNTER
"Patient Contact    Called patient. She states \"what did the report say, what diagnosis did it have, and what did he recommend?\" Per patient, she is upset with how the radiology order // referral to [facility] was written because, \"Dr. Machados referral is exactly what they came out with. They didn't look for anything else.\" She states, \"There is something that Jonathan is missing and there is something that Dr. Machado is missing.\"     Patient notes that the pain in her left lymph node and pain in her body is getting worse. She states, \"that doesn't happen with fibromyalgia, but with true pain.\" She doesn't understand why she was denied an ultrasound last week. She states she was told that her report was reviewed and there was no reason for it. She is upset, stating \"they are going from a report and not listening to my symptoms.\"     She is concerned that providers are not listening to her. She states she is not coming back to our clinic, but also states that Dr. Machado is responsible for pain management until she decides who to see. Provider to advise if planning to refill patient's medication. She states she is out of medication. She states she found a Alevism doctor who she trusts who works at [facility]. She will not share the name because she doesn't trust tiffany or Dr. Machaod. She would like someone to examine her breast independently without being told what to look for. She states that this new doctor will, \"look into it for malignancy and not inflammation. They will look at it correctly.\"    She states that [facility] is falsifying information. She states that reports from [facility] are being changed. Pt states, \"when it really comes out to light and they find out what is wrong with me, [facility] is screwed.\" She states she has tried to work with [facility] and ask her questions. She states, \"I'm done because my voice isn't heard there.\" States that providers are \"leaving out sentences that she says in her questions. " "They are picking and choosing which messages I ask. They piece together all of this stuff in a message I never receive.\" She is concerned about, \"lack of continuity of care and not feeling respected as a human.\" Patient talked with nurse and heard report about \"cancer reuptake cells.\" She states her pain is not fibromyalgia pain because it was sudden onset and it is not chronic.Patient states, \"It makes you question your sanity when you are being lied to.\" Describes her treatment as gaslighting. She states she feels judged by Dr. Machado.    Patient questions:   1. Will provider refill her pain medication? Appears this was sent to PCP today, but appears to be denied.  2. Did PCP start process for medical marijuana for her? She states she cannot keep smoking it, but needs to for sleep.   3. At end of conversation, sounds like her seeing the new doctor is only to get the scan/ultrasound she is requesting in an unbiased way. Will continue to see Dr. Machado for other concerns.  4. Please review remaining concerns as FYI.  "

## 2020-11-16 NOTE — TELEPHONE ENCOUNTER
Last percocet Rx 11/9/20 was ordered as no print out.     oxyCODONE-acetaminophen (PERCOCET) 5-325 MG tablet 40 tablet 0 11/9/2020  No   Sig: Take 3 tablets daily   Class: No Print Out     Should this Rx have been electronically sent to the pharmacy? If so, this should be reordered and resent. Provider to review.

## 2020-11-16 NOTE — DISCHARGE INSTRUCTIONS
*antibiotic ointment twice daily.   *Keep wound clean and dry   *Return for fevers, spreading redness, focal numbness/tingling or weakness, or any other concerning symptoms.

## 2020-11-16 NOTE — TELEPHONE ENCOUNTER
Reason for Call:  Other re-send prescription    Detailed comments:   [facility] store [id number], [place]  Called to say they never received the prescription sent on   11/09/2020 of:    oxyCODONE-acetaminophen (PERCOCET) 5-325 MG tablet    Pharmacy asks to please resend.    Phone Number Patient can be reached at:   [email or web address]: [phone]    Best Time: any    Can we leave a detailed message on this number? YES    Call taken on 11/16/2020 at 1:21 PM by Mary Rodriguez

## 2020-11-16 NOTE — TELEPHONE ENCOUNTER
Pt went all weekend without pain medication and now needs a refill . Please approve.Miriam Temple RN

## 2020-11-16 NOTE — ED TRIAGE NOTES
Pt states skewered her left hand with kabob skewer today around 1730. 2 puncture wounds noted to palmar surface left hand. No active bleeding in triage. ABCs intact GCS 15

## 2020-11-16 NOTE — TELEPHONE ENCOUNTER
Reason for Call:  Other call back  Detailed comments: patient has multi questions  Phone Number Patient can be reached at: Home number on file [phone] (home)  Best Time: any  Can we leave a detailed message on this number? YES  Call taken on 11/16/2020 at 2:31 PM by BREANN WARREN
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
## 2020-11-18 ENCOUNTER — HOSPITAL ENCOUNTER (EMERGENCY)
Facility: CLINIC | Age: 45
Discharge: HOME | End: 2020-11-18
Attending: EMERGENCY MEDICINE | Admitting: EMERGENCY MEDICINE
Payer: COMMERCIAL

## 2020-11-18 ENCOUNTER — APPOINTMENT (OUTPATIENT)
Dept: CT IMAGING | Facility: CLINIC | Age: 45
End: 2020-11-18
Attending: EMERGENCY MEDICINE
Payer: COMMERCIAL

## 2020-11-18 VITALS
HEIGHT: 70 IN | OXYGEN SATURATION: 97 % | RESPIRATION RATE: 18 BRPM | BODY MASS INDEX: 20.76 KG/M2 | TEMPERATURE: 98.9 F | DIASTOLIC BLOOD PRESSURE: 80 MMHG | HEART RATE: 115 BPM | SYSTOLIC BLOOD PRESSURE: 121 MMHG | WEIGHT: 145 LBS

## 2020-11-18 DIAGNOSIS — H53.9 VISION CHANGES: ICD-10-CM

## 2020-11-18 DIAGNOSIS — G44.89 OTHER HEADACHE SYNDROME: ICD-10-CM

## 2020-11-18 LAB
ANION GAP SERPL CALCULATED.3IONS-SCNC: 2 MMOL/L (ref 3–14)
BASOPHILS # BLD AUTO: 0 10E9/L (ref 0–0.2)
BASOPHILS NFR BLD AUTO: 0.2 %
BUN SERPL-MCNC: 16 MG/DL (ref 7–30)
CALCIUM SERPL-MCNC: 8.8 MG/DL (ref 8.5–10.1)
CHLORIDE SERPL-SCNC: 108 MMOL/L (ref 94–109)
CO2 SERPL-SCNC: 27 MMOL/L (ref 20–32)
CREAT SERPL-MCNC: 0.74 MG/DL (ref 0.52–1.04)
DIFFERENTIAL METHOD BLD: ABNORMAL
EOSINOPHIL # BLD AUTO: 0 10E9/L (ref 0–0.7)
EOSINOPHIL NFR BLD AUTO: 0.2 %
ERYTHROCYTE [DISTWIDTH] IN BLOOD BY AUTOMATED COUNT: 12.1 % (ref 10–15)
GFR SERPL CREATININE-BSD FRML MDRD: >90 ML/MIN/{1.73_M2}
GLUCOSE SERPL-MCNC: 165 MG/DL (ref 70–99)
HCT VFR BLD AUTO: 48.8 % (ref 35–47)
HGB BLD-MCNC: 15.9 G/DL (ref 11.7–15.7)
IMM GRANULOCYTES # BLD: 0 10E9/L (ref 0–0.4)
IMM GRANULOCYTES NFR BLD: 0.4 %
LYMPHOCYTES # BLD AUTO: 0.6 10E9/L (ref 0.8–5.3)
LYMPHOCYTES NFR BLD AUTO: 6.1 %
MCH RBC QN AUTO: 28.7 PG (ref 26.5–33)
MCHC RBC AUTO-ENTMCNC: 32.6 G/DL (ref 31.5–36.5)
MCV RBC AUTO: 88 FL (ref 78–100)
MONOCYTES # BLD AUTO: 0.1 10E9/L (ref 0–1.3)
MONOCYTES NFR BLD AUTO: 0.9 %
NEUTROPHILS # BLD AUTO: 9.5 10E9/L (ref 1.6–8.3)
NEUTROPHILS NFR BLD AUTO: 92.2 %
NRBC # BLD AUTO: 0 10*3/UL
NRBC BLD AUTO-RTO: 0 /100
PLATELET # BLD AUTO: 380 10E9/L (ref 150–450)
POTASSIUM SERPL-SCNC: 4.2 MMOL/L (ref 3.4–5.3)
RBC # BLD AUTO: 5.54 10E12/L (ref 3.8–5.2)
SODIUM SERPL-SCNC: 137 MMOL/L (ref 133–144)
WBC # BLD AUTO: 10.3 10E9/L (ref 4–11)

## 2020-11-18 PROCEDURE — 96374 THER/PROPH/DIAG INJ IV PUSH: CPT | Mod: 59

## 2020-11-18 PROCEDURE — 85025 COMPLETE CBC W/AUTO DIFF WBC: CPT | Performed by: EMERGENCY MEDICINE

## 2020-11-18 PROCEDURE — 250N000009 HC RX 250: Performed by: EMERGENCY MEDICINE

## 2020-11-18 PROCEDURE — 258N000003 HC RX IP 258 OP 636: Performed by: EMERGENCY MEDICINE

## 2020-11-18 PROCEDURE — 70482 CT ORBIT/EAR/FOSSA W/O&W/DYE: CPT

## 2020-11-18 PROCEDURE — 250N000011 HC RX IP 250 OP 636: Performed by: EMERGENCY MEDICINE

## 2020-11-18 PROCEDURE — 96361 HYDRATE IV INFUSION ADD-ON: CPT

## 2020-11-18 PROCEDURE — 80048 BASIC METABOLIC PNL TOTAL CA: CPT | Performed by: EMERGENCY MEDICINE

## 2020-11-18 PROCEDURE — 99285 EMERGENCY DEPT VISIT HI MDM: CPT | Mod: 25

## 2020-11-18 RX ORDER — IOPAMIDOL 755 MG/ML
50 INJECTION, SOLUTION INTRAVASCULAR ONCE
Status: COMPLETED | OUTPATIENT
Start: 2020-11-18 | End: 2020-11-18

## 2020-11-18 RX ORDER — KETOROLAC TROMETHAMINE 15 MG/ML
15 INJECTION, SOLUTION INTRAMUSCULAR; INTRAVENOUS ONCE
Status: COMPLETED | OUTPATIENT
Start: 2020-11-18 | End: 2020-11-18

## 2020-11-18 RX ORDER — LIDOCAINE 40 MG/G
CREAM TOPICAL
Status: DISCONTINUED | OUTPATIENT
Start: 2020-11-18 | End: 2020-11-18 | Stop reason: HOSPADM

## 2020-11-18 RX ORDER — SODIUM CHLORIDE 9 MG/ML
INJECTION, SOLUTION INTRAVENOUS CONTINUOUS
Status: DISCONTINUED | OUTPATIENT
Start: 2020-11-18 | End: 2020-11-18 | Stop reason: HOSPADM

## 2020-11-18 RX ADMIN — SODIUM CHLORIDE 1000 ML: 9 INJECTION, SOLUTION INTRAVENOUS at 15:24

## 2020-11-18 RX ADMIN — IOPAMIDOL 50 ML: 755 INJECTION, SOLUTION INTRAVENOUS at 17:00

## 2020-11-18 RX ADMIN — KETOROLAC TROMETHAMINE 15 MG: 15 INJECTION, SOLUTION INTRAMUSCULAR; INTRAVENOUS at 15:24

## 2020-11-18 RX ADMIN — SODIUM CHLORIDE 30 ML: 9 INJECTION, SOLUTION INTRAVENOUS at 16:59

## 2020-11-18 ASSESSMENT — ENCOUNTER SYMPTOMS
NAUSEA: 0
VOMITING: 0
FEVER: 0
DIARRHEA: 0
SORE THROAT: 0
EYE PAIN: 1
SINUS PRESSURE: 1
HEADACHES: 1

## 2020-11-18 ASSESSMENT — MIFFLIN-ST. JEOR: SCORE: 1387.97

## 2020-11-18 NOTE — TELEPHONE ENCOUNTER
Patient was called with provider's message. She agrees with this plan and appreciates and respects Dr. Machado for offering this option rather than oral Abx.     She also said that she appreciates all the triage nurses who have listened well and working with her.     She had a very good and productive visit with her therapist this morning where she shared her past. The therapist was very understanding told pt that he can understand where she is coming from and how past is affecting her present. Pt said she is accepting to face her fears and feelings rather than numb them with medication. She will try her best to trust the therapist and Dr. Machado that they have her best in mind because she feels out of control and they are smarter than her.
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
## 2020-11-18 NOTE — ED AVS SNAPSHOT
[facility] Emergency Dept  [address]  [facility] [address]  Phone: [phone]  Fax: [phone]                                    Melody Muñoz   MRN: [id number]    Department: [facility] Emergency Dept   Date of Visit: 11/18/2020           After Visit Summary Signature Page    I have received my discharge instructions, and my questions have been answered. I have discussed any challenges I see with this plan with the nurse or doctor.    ..........................................................................................................................................  Patient/Patient Representative Signature      ..........................................................................................................................................  Patient Representative Print Name and Relationship to Patient    ..................................................               ................................................  Date                                   Time    ..........................................................................................................................................  Reviewed by Signature/Title    ...................................................              ..............................................  Date                                               Time          22EPIC Rev 08/18

## 2020-11-18 NOTE — ED PROVIDER NOTES
History     Chief Complaint:  Eye Problem    HPI   Melody Muñoz is a 45 year old female with a history of type II diabetes and chronic migraines who presents with pain behind her right eye and vision loss. Her right eye began drooping a few hours ago, and she describes losing control of her eyelid and eyeball. She also endorses intermittent double vision, grey vision in her right peripheral field with the inability to see shapes or objects, and sinus pressure on the left side of her face. Her left eyesight is normal. No sore throat, fever, loss of taste or smell, nausea, vomiting, diarrhea. Patient has a history of similar issues with her right eye for which she has been evaluated at [facility] and [facility]. She states today that she has had the drooping before, but never the grey vision/vision loss or the feeling like she cannot control her eye. Patient is insistent that this is not a migraine.     CT Orbits wo & w Contrast; 2020   1. Muscle bellies of the inferior and medial rectus muscles appear slightly larger compared to other muscle bellies, right more conspicuous than left. Cannot exclude subtle thyroid orbitopathy. Classic findings of thyroid orbitopathy are not present.  2. No CT evidence of proptosis.    Allergies:  Hydrocodone  Tamiflu  Tylenol w/ codeine     Medications:    Klonopin  Dextrostat  Cymbalta  Tenex  Lamictal  Latuda  Percocet  Lyrica  Topamax  Desyrel  Effexor    Past Medical History:    Recurrent sinus infections  IBS  Anxiety  Asthma   Depression  Hypothyroidism  Hyperlipidemia   Sepsis  JASWANT  Diabetes mellitus, type II   Chronic migraine  Insomnia  Chronic pain syndrome  Chronic fatigue  Bipolar 1 disorder   Latent tuberculosis  Endometriosis  Kidney stone  Vertigo    Past Surgical History:      Laparoscopic appendectomy  Hysterectomy  Tubal ligation bilateral  [place] teeth removal  Left little finger surgery  Dilation and curettage  Bladder sling     Family  "History:    Mother: hypertension  Father: palpitations  Sister: depression, anxiety    Social History:  Smoking Status: Former Smoker  Smokeless Tobacco: Never Used  Alcohol Use: Negative  Drug Use: Hx of substance abuse   PCP: Evaristo Machado  Marital Status:       Review of Systems   Constitutional: Negative for fever.   HENT: Positive for sinus pressure. Negative for sore throat.         No loss of taste or smell   Eyes: Positive for pain and visual disturbance.   Gastrointestinal: Negative for diarrhea, nausea and vomiting.   Neurological: Positive for headaches.   All other systems reviewed and are negative.        Physical Exam     Patient Vitals for the past 24 hrs:   BP Temp Pulse Resp SpO2 Height Weight   11/18/20 1337 121/80 98.9  F (37.2  C) 115 18 97 % 1.778 m (5' 10\") 65.8 kg (145 lb)     Physical Exam  General: The patient is alert, in no respiratory distress.    HENT: Mucous membranes moist. No eyelid droop or eye swelling.    Cardiovascular: Regular rate and rhythm. Good pulses in all four extremities. Normal capillary refill and skin turgor.     Respiratory: Lungs are clear. No nasal flaring. No retractions. No wheezing, no crackles.    Gastrointestinal: Abdomen soft. No guarding, no rebound. No palpable hernias.     Musculoskeletal: No gross deformity.     Skin: No rashes or petechiae.     Neurologic: The patient is alert and oriented x3. GCS 15. No testable cranial nerve deficit. Follows commands with clear and appropriate speech. Gives appropriate answers. Good strength in all extremities. No gross neurologic deficit. Gross sensation intact. Pupils are round and reactive. No meningismus.    Lymphatic: No cervical adenopathy. No lower extremity swelling.    Psychiatric: The patient is non-tearful.    Emergency Department Course     Imaging:  CT Orbits wo & w Contrast    (Results Pending)     Laboratory:  Laboratory findings were communicated with the patient who voiced understanding " of the findings.    CBC: WBC 10.3, HGB 15.9(H),   BMP: Anion gap 2(L) Glucose 165(H) o/w WNL (Creatinine 0.74)    Interventions:  1524 NS 1000 mL IV  1524 Toradol 15 mg IV    Emergency Department Course:    1346 Nursing notes and vitals reviewed.    1352 I performed an exam of the patient as documented above.     1428 I spoke with Dr. Dixon of the ophthalmology service from [facility] regarding patient's presentation, findings, and plan of care.    IV was inserted and blood was drawn for laboratory testing, results above.    The patient was signed out to my colleague, Dr. Jung, pending CT results.    Impression & Plan      Medical Decision Making:  Melody Muñoz is a 45 year old female who presents to the emergency department today for evaluation of vision changes with right-sided headache.  The patient's previous records were reviewed and I did discuss the case with the covering ophthalmologist.  Patient has a history of migraines which is a possible cause behind this however her previous CT orbits there was some minimal prominence of the ocular muscles felt to be potentially Graves' disease however follow-up with [place] did not correlate with this.  The patient reports that starting this morning she noted some drooping of the right eyelid some pain behind her eye.  These were associated with sinus type symptoms and therefore sinusitis is definitely a possibility.  She was improved after Toradol.  She did have some peripheral vision haziness but no curtain coming down over her eye.  I discussed the case with ophthalmology who felt that a CT of the orbits was indicated I think migraine is on the differential and stressed the patient to follow-up with ophthalmology as an outpatient this CT was signed out to Dr. Jung with anticipated discharge.    Diagnosis:  Headache    Disposition:   Patient signed out to Dr. Jung.     Scribe Disclosure:  LATONYA, Fabien Magallon, am serving as a scribe at 2:07 PM  on 11/18/2020 to document services personally performed by Jose Cruz Kearns MD based on my observations and the provider's statements to me.    I, Radha Parks, am serving as a scribe at 3:29 PM on 11/18/2020 to document services personally performed by Jose Cruz Kearns MD based on my observations and the provider's statements to me.      [facility] EMERGENCY DEPT       Jose Cruz Kearns MD  11/20/20 2332

## 2020-11-18 NOTE — ED TRIAGE NOTES
ABCs intact. Pt c/o R vision changes and eye drooping starting about 1030. Pt has been having going to [place] for similar eye problems for the past few months. Pt was referred to pain management for eye pain.

## 2020-11-18 NOTE — TELEPHONE ENCOUNTER
I think if she keeps it clean and puts some bacitracin ointment on it a couple of times a day she should not need any oral antibiotics.

## 2020-11-18 NOTE — ED PROVIDER NOTES
45 year old female received in signout from Dr. Kearns who presents to the ED w/ sensation of drooping eye lid.  Please see primary note for full details.  At time of signout, patient is awaiting CT orbits for further evaluation after primary ED physician contacted ophthalmology for recommendations.  Plan at signout is that if the CT returns fairly unremarkable, patient should follow-up with ophthalmology for reevaluation.  CT orbits returned as noted below and unremarkable other than mildly prominent extraocular muscles on the right.  I informed the patient of her CT result at which point the patient was somewhat upset and demanding requesting to go through all of her home medications, somewhat argumentative in regard to the results.  Discussed with the patient the a forementioned plan and advised her to follow-up with ophthalmology and to follow-up with her PCP as as well for nonemergent medication management issues.  At this time I feel the patient is safe for discharge.  Recommendations given regarding follow up with PCP and return to the emergency department as needed for new or worsening symptoms.  Counseled on all results, diagnosis and disposition.   Patient discharged in stable condition.                     Jose Cruz Jung MD  11/19/20 7830

## 2020-11-19 ENCOUNTER — NURSE TRIAGE (OUTPATIENT)
Dept: FAMILY MEDICINE | Facility: CLINIC | Age: 45
End: 2020-11-19

## 2020-11-19 NOTE — ED NOTES
Sat with pt and reviewed test results of this visit and prior visit from [place]. Pt upset stating she does not trust [place] or [place] and that shew has been given fictitious discharge paperwork. Pt looking around room, refusing eye contact. Stating she does not trust health care. Then asking for directions to exit.

## 2020-11-19 NOTE — TELEPHONE ENCOUNTER
It does not sound to me like she will need oral antibiotics.  We should have her increase the frequency of the bacitracin to 3-4 times daily.

## 2020-11-19 NOTE — TELEPHONE ENCOUNTER
Reason for Call:  Other patient called with information.    Detailed comments:   Melody was in the Emergency Room on 11/18/2020.  She had a CT scan.    Melody is concerned about proptosis from 5/22/2020 CT scan not being met.  In her recent finding it does not say that.      Melody is going to call her eye doctor and let him compare the two CDs.     Melody said she does not need a call back, but wants Dr. Machado to be aware of her ER visit for him to review.    Phone Number Patient can be reached at: Cell number on file:    Telephone Information:   Mobile [phone]       Best Time: any    Can we leave a detailed message on this number? YES    Call taken on 11/19/2020 at 7:33 AM by Mary Rodriguez

## 2020-11-19 NOTE — TELEPHONE ENCOUNTER
"Patient calling,     1) States puncture wound on one area looks more red and bruised (purple and tender to touch). Drainage, is white and yellow from the small puncture wound. She notes this started yesterday despite using the bacitracin and bandage. She reports ER looked at it yesterday, and said to continue to do what PCP recommended but add warm compresses. Patient states she has done so but this morning area looks more red. Denies chills. Triage had patient check temperature while on phone 97.5, orally.    Patient wanting to know if she will need to be considered if she needs an oral antibiotics.     Patient declined in clinic visit for visual assessment. Triage advised to continue the topical ointment, monitor, and when not doing anything with hands may want to allow area to air dry. Patient eventually stated,  \"I guess I'm not worried about it so I will keep doing everything and see what it looks like on Friday\".    2) Patient also requesting PCP to note her recent ER visit and lab results.  She is questioning if perhaps her ER labs have any connection between the nodule or mammogram? Notes several labs on CBC and Anion Gap, and HGB levels are not WNL.    Mayuri JACOBSONN, RN, PHN      "

## 2020-11-19 NOTE — ED NOTES
"This RN went into room, as patient's call light was on. Pt requested the MD come to the room now to discuss CT results. Explained to patient that MD is in another room. Pt states that her symptoms are more serious than anybody in the ER now. Reassured patient that I will speak to her MD. Pt then outside of room, requesting a caffeinated beverage. Explained to patient that we have [place] here, but unfortunately no soda with caffeine. Pt states she knows this RN is lying because she has brought her children here and they are given caffeinated beverages. Patient agreed to [place]. MD in room again to speak with patient. When MD stepped out, patient requested her IV to come out. Pt was up for discharge at this time. RN went to take IV out and patient asked several questions regarding her case. Explained to patient that I am not her nurse, and am not familiar with her signs or symptoms. Patient then requested her nurse to take her IV out instead. This RN agreed, and RN told patient she would get her nurse. Pt then stated \"just take my IV out because I want to leave.\" RN took IV out. Pt again requested MD in room. At this time, this RN explained to patient's RN these findings and her RN in room to speak to patient.   "

## 2020-11-20 NOTE — TELEPHONE ENCOUNTER
"Patient calling to update RN and Dr. Machado that she is having pain as she is relaxing. She states, \"This is the first time I have noticed this.\" She attributes her pain to having processed a lot and 'let go' of a lot through her conversations with her therapist and psychiatrist. However, when RN asked for clarification she stated, \"It isn't just psychological.\" She notes the pain is \"hard to rate\" but also rated it as 10/10 and is located by her spleen, liver, and into her legs. She states that she has had this pain for \"quite some time.\" Patient states, \"My body is failing. It just wants to go.\" Patient clarified that she is not suicidal. She states, \"I feel that there is something in me and something is wrong.\"     Patient unable to identify what makes the pain better or worse, but states pain medications help. She questions if this is a placebo effect. She also notes that diverting her attention helps.     As we were talking, patient stated that talking about it was helping and she abruptly needed to get off the phone. RN asked what she needed from clinic staff and she shared she just wanted to provide an update. Reviewed pain medications with patient and she has enough medications at this time. Advised to call back to nurse triage with worsening/continued symptoms. Advised that Dr. Machado was out of office today but will return Monday and we would forward these concerns to him to review.    Routing to covering team to review and advise if any action needed. Pt recently seen in ED on 11/15 and 11/18 for other concerns.     Answer Assessment - Initial Assessment Questions  1. LOCATION: \"Where does it hurt?\"     Over the spleen area and by the liver.         2. RADIATION: \"Does the pain shoot anywhere else?\" (e.g., chest, back)        Down the spine and into her legs.     3. ONSET: \"When did the pain begin?\" (e.g., minutes, hours or days ago)     This pain started \"It is always there.\" She is just noticing " "that she is letting go.  States when she gets worked up it takes the pain away. Now that she is relaxed, the pain hurts more.       Talked with therapist twice, psychiatrist, with counselor last night. Stated she has been bringing a lot of stuff up. Yet at the same time she states it is not psychological.    \"My body is failing. It wants to go.\" Denies that she is suicidal and talked with her counselor last night. \"I feel that there is something in me and something is wrong.\"     4. SUDDEN: \"Gradual or sudden onset?\"    Pain is worse when she relaxes.     5. PATTERN \"Does the pain come and go, or is it constant?\"     - If constant: \"Is it getting better, staying the same, or worsening?\"       (Note: Constant means the pain never goes away completely; most serious pain is constant and it progresses)      - If intermittent: \"How long does it last?\" \"Do you have pain now?\"      (Note: Intermittent means the pain goes away completely between bouts)    Pain is constant.         6. SEVERITY: \"How bad is the pain?\"  (e.g., Scale 1-10; mild, moderate, or severe)    - MILD (1-3): doesn't interfere with normal activities, abdomen soft and not tender to touch     - MODERATE (4-7): interferes with normal activities or awakens from sleep, tender to touch     - SEVERE (8-10): excruciating pain, doubled over, unable to do any normal activities     Rates pain right now as \"it is just so heavy. It feels like I can't take it anymore.\" \"It is hard to rate because I feel this peace like I can handle it, but it feels like it is the worst pain ever like a 10/10.\"          7. RECURRENT SYMPTOM: \"Have you ever had this type of abdominal pain before?\" If so, ask: \"When was the last time?\" and \"What happened that time?\"     Ongoing for a while  - since before [place]   \"I just don't talk about it because I've been fighting what is really wrong.\"     8. CAUSE: \"What do you think is causing the abdominal pain?\"    Patient states, \"I don't know. " "They checked for polycythemia vera. That is something that comes on slowly and if left untreated it can lead to acute leukemia.\"     9. RELIEVING/AGGRAVATING FACTORS: \"What makes it better or worse?\" (e.g., movement, antacids, bowel movement)    Relaxing makes it worse.   She is unsure what makes it better. \"I have to work on that.\" Pain medication helps. She states, \"I am unsure if that is a placebo and it diverts my mind or I don't know.\"         10. OTHER SYMPTOMS: \"Has there been any vomiting, diarrhea, constipation, or urine problems?\"    Not asksed           11. PREGNANCY: \"Is there any chance you are pregnant?\" \"When was your last menstrual period?\"    Not asked    Protocols used: ABDOMINAL PAIN - FEMALE-A-OH      "

## 2020-11-20 NOTE — TELEPHONE ENCOUNTER
Patient Contact    Attempt # 1    Was call answered?  No.  Unable to leave a message. Mailbox full.     On call back: Please share provider message below.     She may note provider did not not answer her 2nd question, but on labs review there is nothing that requires further assessment at this time. Some of her labs are nothing WNL, but they are in a normal range for her. No further follow up is needed.

## 2020-11-20 NOTE — TELEPHONE ENCOUNTER
"Call from patient. She states, \"I am continuing to go into [place] of pain and not feeling well.\"     She shared about her recent ED experience stating they did not compare her CT on 11/18 to past CTs. She has reached out to her eye doctor to review them. At first during conversation, she wanted Dr. Machado to review her CT scans and compare them to past scans, but then later noted she wanted her eye doctor to review them. She would at least like PCP to be aware that ED did not compare her results. Her concern is 'muscle mass growth' that is more on the right than the left.    She also has concerns that there were significant findings from her labs that were not addressed. She would like these to be reviewed by Dr. Machado.    She states she will also bring the scan and labs to her new doctor. She said, \"when he called me he sounded frustrated, saying they won't take me back as a patient because I am getting frustrated with the nurses.\" She states, \"I will find a doctor who will take the time to do it. I know she will go above and beyond. I need a doctor who will care for someone's life.\"    Advised that PCP is out of office today, and we may not get back to her until next week. She verbalized understanding.      "

## 2020-11-23 ENCOUNTER — TELEPHONE (OUTPATIENT)
Dept: FAMILY MEDICINE | Facility: CLINIC | Age: 45
End: 2020-11-23

## 2020-11-23 NOTE — TELEPHONE ENCOUNTER
Reason for Call:  Other medication usage review    Detailed comments: the patient wants to talk with a nurse regarding the medications she is currently on to make sure her medication list is up to date, then she would like a copy sent to her.    Phone Number Patient can be reached at: Home number on file [phone] (home)    Best Time: anytime    Can we leave a detailed message on this number? YES    Call taken on 11/23/2020 at 2:44 PM by Mary Short

## 2020-11-24 NOTE — TELEPHONE ENCOUNTER
Spoke with patient today.     States she just wants to review all her medications to make sure they ae correct.     Reviewed medications with patient.     Requests print out.     This was printed and put out at  for patient.     No further follow up needed.

## 2020-12-01 DIAGNOSIS — R10.31 BILATERAL GROIN PAIN: ICD-10-CM

## 2020-12-01 DIAGNOSIS — R10.32 BILATERAL GROIN PAIN: ICD-10-CM

## 2020-12-01 NOTE — TELEPHONE ENCOUNTER
Routing refill request to provider for review/approval because:  Drug not on the [facility] refill protocol     This RN unable to access  at this time

## 2020-12-01 NOTE — TELEPHONE ENCOUNTER
Reason for Call:  Medication or medication refill:    Do you use a [facility]?  Name of the pharmacy and phone number for the current request:  [id number] [address] AT [place] [address]    Name of the medication requested: oxyCODONE-acetaminophen (PERCOCET) 5-325 MG     Other request:  Going through Trauma and experienced Psychogenic Seizures. Not ready to taper off anything right now. Had an episode last Thursday. She is in so much pain.     Can we leave a detailed message on this number? YES    Phone number patient can be reached at: Home number on file [phone] (home)    Best Time: Any    Call taken on 12/1/2020 at 12:22 PM by Margarito Dalton

## 2020-12-01 NOTE — TELEPHONE ENCOUNTER
Controlled Substance Refill Request for oxyCODONE-acetaminophen (PERCOCET) 5-325 MG tablet    Problem List Complete:  Yes   Chronic pain syndrome  9/8/2015   Overview   Patient is followed by VIRAL Deleon for ongoing prescription of pain medication.  All refills should be approved by this provider, or covering partner.     Medication(s):  Lyrica 150 mg bid, Fioricet with codeine prn, klonopin and ambien to be prescribed by psych, not [place].  Maximum quantity per month: 60, 20  Clinic visit frequency required: Q 3 months      Controlled substance agreement on file: Yes       Date(s): 9/8/2015  New CSA needed.  Pain Clinic evaluation in the past: No     DIRE Total Score(s):  No flowsheet data found.     Last [facility] website verification: 06/02/2020 multiple meds from multiple outside providers     Clonazepam and dextroamphetamine prescribed by outside providers.         checked in past 3 months?  No, route to RN

## 2020-12-02 RX ORDER — OXYCODONE AND ACETAMINOPHEN 5; 325 MG/1; MG/1
TABLET ORAL
Qty: 40 TABLET | Refills: 0 | Status: SHIPPED | OUTPATIENT
Start: 2020-12-02 | End: 2020-12-18

## 2020-12-09 DIAGNOSIS — K29.50 OTHER CHRONIC GASTRITIS WITHOUT HEMORRHAGE: ICD-10-CM

## 2020-12-09 RX ORDER — PANTOPRAZOLE SODIUM 40 MG/1
40 TABLET, DELAYED RELEASE ORAL DAILY
Qty: 90 TABLET | Refills: 2 | Status: SHIPPED | OUTPATIENT
Start: 2020-12-09 | End: 2021-05-27

## 2020-12-12 ENCOUNTER — TRANSFERRED RECORDS (OUTPATIENT)
Dept: HEALTH INFORMATION MANAGEMENT | Facility: CLINIC | Age: 45
End: 2020-12-12

## 2020-12-12 LAB
ALT SERPL-CCNC: 15 IU/L (ref 8–45)
AST SERPL-CCNC: 15 IU/L (ref 2–40)
CREAT SERPL-MCNC: 0.96 MG/DL (ref 0.57–1.11)
GFR SERPL CREATININE-BSD FRML MDRD: >60 ML/MIN/1.73M2
GLUCOSE SERPL-MCNC: 108 MG/DL (ref 65–100)
INR PPP: 1
POTASSIUM SERPL-SCNC: 3.8 MMOL/L (ref 3.5–5)

## 2020-12-13 ENCOUNTER — TRANSFERRED RECORDS (OUTPATIENT)
Dept: HEALTH INFORMATION MANAGEMENT | Facility: CLINIC | Age: 45
End: 2020-12-13

## 2020-12-14 ENCOUNTER — TRANSFERRED RECORDS (OUTPATIENT)
Dept: HEALTH INFORMATION MANAGEMENT | Facility: CLINIC | Age: 45
End: 2020-12-14

## 2020-12-18 DIAGNOSIS — R10.32 BILATERAL GROIN PAIN: ICD-10-CM

## 2020-12-18 DIAGNOSIS — R10.31 BILATERAL GROIN PAIN: ICD-10-CM

## 2020-12-18 RX ORDER — OXYCODONE AND ACETAMINOPHEN 5; 325 MG/1; MG/1
TABLET ORAL
Qty: 40 TABLET | Refills: 0 | Status: SHIPPED | OUTPATIENT
Start: 2020-12-18 | End: 2020-12-31

## 2020-12-22 DIAGNOSIS — R10.31 BILATERAL GROIN PAIN: ICD-10-CM

## 2020-12-22 DIAGNOSIS — R10.32 BILATERAL GROIN PAIN: ICD-10-CM

## 2020-12-22 NOTE — TELEPHONE ENCOUNTER
Controlled Substance Refill Request for oxycodone  Problem List Complete:  Yes    Last Written Prescription Date:  12/18/20  Last Fill Quantity: 40,   # refills: 0    THE MOST RECENT OFFICE VISIT MUST BE WITHIN THE PAST 3 MONTHS. AT LEAST ONE FACE TO FACE VISIT MUST OCCUR EVERY 6 MONTHS. ADDITIONAL VISITS CAN BE VIRTUAL.  (THIS STATEMENT SHOULD BE DELETED.)    Last Office Visit with [facility] primary care provider: 11/25/20    Future Office visit:     Controlled substance agreement:   Encounter-Level CSA - 09/08/2015:    Controlled Substance Agreement - Scan on 9/9/2015  2:41 PM: MARISSA, Controlled Substance Contract, 09-08-15     Patient-Level CSA:    There are no patient-level csa.         Last Urine Drug Screen: No results found for: CDAUT, No results found for: COMDAT, No results found for: THC13, PCP13, COC13, MAMP13, OPI13, AMP13, BZO13, TCA13, MTD13, BAR13, OXY13, PPX13, BUP13     Processing:  Rx to be electronically transmitted to pharmacy by provider     [email or web address]   checked in past 3 months?  Yes 11/13/20

## 2020-12-23 ENCOUNTER — TRANSFERRED RECORDS (OUTPATIENT)
Dept: HEALTH INFORMATION MANAGEMENT | Facility: CLINIC | Age: 45
End: 2020-12-23

## 2020-12-31 RX ORDER — OXYCODONE AND ACETAMINOPHEN 5; 325 MG/1; MG/1
TABLET ORAL
Qty: 42 TABLET | Refills: 0 | Status: SHIPPED | OUTPATIENT
Start: 2020-12-31 | End: 2021-01-14

## 2020-12-31 NOTE — TELEPHONE ENCOUNTER
Pt calling back to check status of this. Please respond. She is out of the medication. She states she is tapering to 2.5 per day starting on 12/29.

## 2021-01-12 DIAGNOSIS — G43.009 MIGRAINE WITHOUT AURA AND WITHOUT STATUS MIGRAINOSUS, NOT INTRACTABLE: ICD-10-CM

## 2021-01-12 DIAGNOSIS — R10.32 BILATERAL GROIN PAIN: ICD-10-CM

## 2021-01-12 DIAGNOSIS — R10.31 BILATERAL GROIN PAIN: ICD-10-CM

## 2021-01-14 RX ORDER — TOPIRAMATE 50 MG/1
TABLET, FILM COATED ORAL
Qty: 180 TABLET | Refills: 3 | Status: ON HOLD | OUTPATIENT
Start: 2021-01-14 | End: 2023-12-13

## 2021-01-14 RX ORDER — OXYCODONE AND ACETAMINOPHEN 5; 325 MG/1; MG/1
TABLET ORAL
Qty: 42 TABLET | Refills: 0 | Status: SHIPPED | OUTPATIENT
Start: 2021-01-14 | End: 2021-01-26

## 2021-01-14 NOTE — TELEPHONE ENCOUNTER
"Routing refill request to provider for review/approval because:  Drug not on the [facility] refill protocol   Requested Prescriptions   Pending Prescriptions Disp Refills     oxyCODONE-acetaminophen (PERCOCET) 5-325 MG tablet 42 tablet 0     Sig: Take 3 tablets daily       There is no refill protocol information for this order        topiramate (TOPAMAX) 50 MG tablet 180 tablet 3     Si tablets every night       Anti-Seizure Meds Protocol  Failed - 2021  3:50 PM        Failed - Review Authorizing provider's last note.      Refer to last progress notes: confirm request is for original authorizing provider (cannot be through other providers).          Failed - Normal CBC on file in past 26 months     Recent Labs   Lab Test 20  1455   WBC 10.3   RBC 5.54*   HGB 15.9*   HCT 48.8*                    Passed - Recent (12 mo) or future (30 days) visit within the authorizing provider's specialty     Patient has had an office visit with the authorizing provider or a provider within the authorizing providers department within the previous 12 mos or has a future within next 30 days. See \"Patient Info\" tab in inbasket, or \"Choose Columns\" in Meds & Orders section of the refill encounter.              Passed - Normal ALT or AST on file in past 26 months     Recent Labs   Lab Test 20   ALT 15     Recent Labs   Lab Test 20   AST 15             Passed - Normal platelet count on file in past 26 months     Recent Labs   Lab Test 20  1455                  Passed - Medication is active on med list        Passed - No active pregnancy on record        Passed - No positive pregnancy test in last 12 months                   "

## 2021-01-25 ENCOUNTER — TRANSFERRED RECORDS (OUTPATIENT)
Dept: HEALTH INFORMATION MANAGEMENT | Facility: CLINIC | Age: 46
End: 2021-01-25

## 2021-01-26 DIAGNOSIS — R10.31 BILATERAL GROIN PAIN: ICD-10-CM

## 2021-01-26 DIAGNOSIS — R10.32 BILATERAL GROIN PAIN: ICD-10-CM

## 2021-01-26 RX ORDER — OXYCODONE AND ACETAMINOPHEN 5; 325 MG/1; MG/1
TABLET ORAL
Qty: 42 TABLET | Refills: 0 | Status: SHIPPED | OUTPATIENT
Start: 2021-01-28 | End: 2021-02-09

## 2021-02-03 ENCOUNTER — TELEPHONE (OUTPATIENT)
Dept: INTERNAL MEDICINE | Facility: CLINIC | Age: 46
End: 2021-02-03

## 2021-02-03 ENCOUNTER — TRANSFERRED RECORDS (OUTPATIENT)
Dept: HEALTH INFORMATION MANAGEMENT | Facility: CLINIC | Age: 46
End: 2021-02-03

## 2021-02-03 NOTE — TELEPHONE ENCOUNTER
Reason for Call:  Other FYI    Detailed comments: Patient called to cancel appointment today with Dr Machado. States she is going to in patient at [facility] for suicidal feelings. Same place she was at before. She just wanted to let you know.     Phone Number Patient can be reached at: Home number on file [phone] (home)    Best Time: Any    Can we leave a detailed message on this number? YES    Call taken on 2/3/2021 at 11:37 AM by Dottie Patton

## 2021-02-08 DIAGNOSIS — R10.32 BILATERAL GROIN PAIN: ICD-10-CM

## 2021-02-08 DIAGNOSIS — R10.31 BILATERAL GROIN PAIN: ICD-10-CM

## 2021-02-09 RX ORDER — OXYCODONE AND ACETAMINOPHEN 5; 325 MG/1; MG/1
TABLET ORAL
Qty: 42 TABLET | Refills: 0 | Status: SHIPPED | OUTPATIENT
Start: 2021-02-10 | End: 2021-02-23

## 2021-02-10 ENCOUNTER — PATIENT OUTREACH (OUTPATIENT)
Dept: NURSING | Facility: CLINIC | Age: 46
End: 2021-02-10
Payer: COMMERCIAL

## 2021-02-10 DIAGNOSIS — R45.851 SUICIDAL IDEATION: Primary | ICD-10-CM

## 2021-02-10 NOTE — PROGRESS NOTES
Clinic Care Coordination Contact    Clinic Care Coordination Contact  OUTREACH    Referral Information:  Referral Source: [facility]    Primary Diagnosis: Behavioral Health    Chief Complaint   Patient presents with     Clinic Care Coordination - Post Hospital     Suicidal ideation, bipolar affective disorder, ADHD, chronic pain     Clinic Care Coordination - Initial     Needs assessment        Universal Utilization:   Clinic Utilization  No PCP office visit in Past Year: No (11/9/20)    Utilization    Last refreshed: 2/10/2021 11:27 AM: Hospital Admissions 0           Last refreshed: 2/10/2021 11:27 AM: ED Visits 13           Last refreshed: 2/10/2021 11:27 AM: No Show Count (past year) 5              Current as of: 2/10/2021 11:27 AM              Clinical Concerns:  Pt was hospitalized at [facility] 2/3/21 to 2/9/21 for suicidal ideation, bipolar affective disorder, ADHD, and chronic pain.     Inpatient notes indicate IRTS was discussed, but they did not accept pt's insurance.     SW CC outreach to pt for hospital follow up. Offered to help make PCP but pt didn't have schedule in front of her.     Pt discussed her ongoing concerns with care given to mental health needs in hospital and medical field. Reported she does not trust mental health system, dont trust that they care to help, that no one understands, doesn't trust [facility], they don't fully comprehend chronic pain and PTSD connection.     Pt plans to do outpatient EMDR. Reported it is unfortunate that she cannot do inpatient, out of pocket $25k or more.    Discussed plan to taper oxyCODONE with PCP.     AVS notes:    Psychiatric Provider: Medication Management   Dr. French, [address] [address]   Phone: [phone]   Wednesday, March 10th at 2 pm.   This will be an in-person appointment.     Therapist: [facility]  [address]   [address]  [place] [address]   Phone:[phone]  Fax: [phone]   Friday, February 12th at 1 pm.   Thursday, February 18th at 1 pm.   *these are in-person appointments.     Primary Care Physician/Clinic: Evaristo Machado MD [facility] [address] [place] [address] Phone:  [phone]   Not scheduled     Medication Management:  Pt inquired if PCP refilled oxyCODONE-Acetaminophen 5-325 MG Oral Tablet (PERCOCET). Refill encounter 2/8/21 indicates this was refilled, message relayed.     Discharge medication list:      albuterol HFA (PRO-AIR; VENTOLIN; PROVENTIL) 90 mcg/actuation inhaler 2 Puff 2 Puff Inhalation q4h prn     aluminum-magnesium hydroxide-simethicone (200 MG-200 MG-20 MG/5 ML) (MAALOX PLUS; MYLANTA) 30 mL oral liquid 30 mL Oral qid prn      cholecalciferol 1,000 unit tablet (VITAMIN D3) 1,000 unit Oral daily      clonazePAM 1 mg tablet (KLONOPIN) 1 mg Oral tid     dextroamphetamine-amphetamine 10 mg tablet (ADDERALL) 10 mg Oral bid     doxycycline 100 mg tablet (VIBRAMYCIN) 100 mg Oral bid     DULoxetine 60 mg capsule, delayed release (CYMBALTA) 60 mg Oral daily     fluticasone (50 mcg per actuation) nasal solution 2 Spray (FLONASE) 2 Spray Nasal daily    guanFACINE 4 mg tablet (TENEX) 4 mg Oral daily      lamoTRIgine 100 mg tablet (LAMICTAL) 100 mg Oral bid     lurasidone 120 mg tablet (LATUDA) 120 mg Oral with dinner     nicotine 14 mg/24 hr patch 1 Patch (NICODERM; HABITROL) 1 Patch Transdermal daily     nicotine gum 4 mg (NICORETTE) 4 mg Oral q1h while awake PRN     oxyCODONE-acetaminophen (5-325 mg) 1 tablet (PERCOCET) 1 tablet Oral tid     pregabalin 150 mg capsule (LYRICA) 150 mg Oral bid     topiramate 50 mg tablet (TOPAMAX) 50 mg Oral bedtime     traZODone 150 mg (DESYREL) **Multi Dispense** 150 mg Oral bedtime prn      Post-discharge medication reconciliation status: Discharge medications reviewed and reconciled.  Continue medications without change.     Living Situation:  Current  living arrangement: I live in a private home with family    Lifestyle & Psychosocial Needs:  Mental health DX: Yes  Mental health DX how managed: Medication, Psychiatrist, Outpatient Counseling  Mental health management concern: Yes  Informal Support system: Family     Socioeconomic History     Marital status:      Spouse name: Not on file     Number of children: Not on file     Years of education: Not on file     Highest education level: Not on file   Occupational History     Employer: SELF EMPLOYED.     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 Upon review, changes were not made.      Resources and Interventions:  Current Resources:   Community Resources: OP Mental Health     Advance Care Plan/Directive  Advanced Care Plans/Directives on file: No    Referrals Placed: None     Patient/Caregiver understanding: Pt reports understanding and denies any additional questions or concerns at this times. AMY CC engaged in AIDET communication during encounter.    Plan: No further outreaches will be made at this time unless a new referral is made or a change in the pt's status occurs.     Patient was provided with this writer's contact information and encouraged to call with any questions or concerns. Pt will make PCP appt at a later date. Pt will follow up with outpatient mental health providers.      AMY CC will mail care coordination introduction letter to patient.    MARLENE Álvarez   Social Work Clinic Care Coordinator   [facility]   [facility]  PH: [phone]  [email or web address]

## 2021-02-10 NOTE — LETTER
[facility] CARE COORDINATION  [facility]  [address] [address]  Tel. [phone]  Fax [phone]    February 10, 2021    Melody Muñoz  [address] [place] [address] [address] [place] [address]      Dear Melody,    I am a clinic care coordinator who works with Evaristo Machado MD at [facility]. I wanted to thank you for spending the time to talk with me.  Below is a description of clinic care coordination and how I can further assist you.      The clinic care coordination team is made up of a registered nurse,  and community health worker who understand the health care system. The goal of clinic care coordination is to help you manage your health and improve access to the health care system in the most efficient manner. The team can assist you in meeting your health care goals by providing education, coordinating services, strengthening the communication among your providers and supporting you with any resource needs.    Please feel free to contact the Community Health Worker at [phone] with any questions or concerns. We are focused on providing you with the highest-quality healthcare experience possible and that all starts with you.     Sincerely,     MARLENE Álvarez   Social Work Clinic Care Coordinator   [facility]  PH: [phone]  [email or web address]

## 2021-02-23 DIAGNOSIS — R10.32 BILATERAL GROIN PAIN: ICD-10-CM

## 2021-02-23 DIAGNOSIS — G89.4 CHRONIC PAIN SYNDROME: ICD-10-CM

## 2021-02-23 DIAGNOSIS — R10.31 BILATERAL GROIN PAIN: ICD-10-CM

## 2021-02-23 RX ORDER — OXYCODONE AND ACETAMINOPHEN 5; 325 MG/1; MG/1
TABLET ORAL
Qty: 42 TABLET | Refills: 0 | Status: SHIPPED | OUTPATIENT
Start: 2021-02-23 | End: 2021-03-10

## 2021-02-23 NOTE — TELEPHONE ENCOUNTER
Controlled Substance Refill Request for: oxyCODONE-acetaminophen (PERCOCET) 5-325 MG tablet  Problem List Complete:  Yes    Patient is followed by VIRAL Deleon for ongoing prescription of pain medication.  All refills should be approved by this provider, or covering partner.    Medication(s):  Lyrica 150 mg bid, Fioricet with codeine prn, klonopin and ambien to be prescribed by psych, not [facility].  Maximum quantity per month: 60, 20  Clinic visit frequency required: Q 3 months     Controlled substance agreement on file: Yes       Date(s): 9/8/2015  New CSA needed.  Pain Clinic evaluation in the past: No    DIRE Total Score(s):  No flowsheet data found.    Last [facility] website verification: 2/23/21 multiple meds from multiple outside providers    Clonazepam and dextroamphetamine prescribed by outside providers.    checked in past 3 months?  Yes 2/23/21 - recommend provider to review

## 2021-02-26 DIAGNOSIS — F41.1 GENERALIZED ANXIETY DISORDER: Primary | Chronic | ICD-10-CM

## 2021-02-26 DIAGNOSIS — F98.8 ATTENTION DEFICIT DISORDER (ADD) WITHOUT HYPERACTIVITY: ICD-10-CM

## 2021-02-26 DIAGNOSIS — F51.5 NIGHTMARES: ICD-10-CM

## 2021-02-26 RX ORDER — GUANFACINE 2 MG/1
TABLET ORAL
Qty: 28 TABLET | Refills: 0 | Status: SHIPPED | OUTPATIENT
Start: 2021-02-26 | End: 2021-05-27

## 2021-02-26 RX ORDER — CLONAZEPAM 1 MG/1
1 TABLET ORAL 3 TIMES DAILY PRN
Qty: 42 TABLET | Refills: 0 | Status: SHIPPED | OUTPATIENT
Start: 2021-02-26 | End: 2021-05-27

## 2021-02-26 RX ORDER — DEXTROAMPHETAMINE SACCHARATE, AMPHETAMINE ASPARTATE, DEXTROAMPHETAMINE SULFATE AND AMPHETAMINE SULFATE 2.5; 2.5; 2.5; 2.5 MG/1; MG/1; MG/1; MG/1
10 TABLET ORAL 2 TIMES DAILY
Qty: 28 TABLET | Refills: 0 | Status: SHIPPED | OUTPATIENT
Start: 2021-02-26 | End: 2021-05-27

## 2021-02-26 NOTE — TELEPHONE ENCOUNTER
To Dr. Machado (& partner for absent PCP),     Pt calling asking for a refill of meds---  1)  Adderall 10mg, BID.  2)  Guanfacine 2mg, 2 tabs at bedtime.  3) clonazepam 1 mg, 3 times per day.    These have been managed by psychiatry, and she has spoken to her psychiatry clinic, [facility], requesting refills.   Notes from her psychiatrist, [facility], Alysia Harris MD state:        So pt is calling PCP for med refills.   She will be leaving for out of town tomorrow, back on Wed, and needs both med refilled before she leaves.   If you only want to fill for 2 weeks until her appt with Dr. French she is fine with that.      2/26 TE notes:  Medication name: dextroamphetamine-amphetamine (ADDERALL) 10 mg tablet   clonazePAM (KLONOPIN) 1 mg tablet and  guanFACINE (TENEX) 2 mg tablet    She is going out of town today and would like them refilled. Please advise.

## 2021-03-01 ENCOUNTER — TELEPHONE (OUTPATIENT)
Dept: INTERNAL MEDICINE | Facility: CLINIC | Age: 46
End: 2021-03-01

## 2021-03-01 NOTE — TELEPHONE ENCOUNTER
Prior Authorization Retail Medication Request    Medication/Dose: amphetamine-dextroamphetamine (ADDERALL) 10 MG tablet  ICD code (if different than what is on RX):  F98.8  Previously Tried and Failed:    Rationale:      Insurance Name:  [facility]  Insurance ID:  [id number]      Pharmacy Information (if different than what is on RX)  Name:  [facility]  Phone:  [phone]

## 2021-03-02 NOTE — TELEPHONE ENCOUNTER
Central Prior Authorization Team   Phone: [phone]    PA Initiation    Medication: amphetamine-dextroamphetamine (ADDERALL) 10 MG tablet  Insurance Company: [employer] - Phone [phone] Fax [phone]  Pharmacy Filling the Rx: [id number] [address] AT [place] [address]  Filling Pharmacy Phone: [phone]  Filling Pharmacy Fax:    Start Date: 3/2/2021    Called and spoke with Antionette at [email or web address].  They will be faxing over PA forms to be completed.  Pt ID: [id number]

## 2021-03-03 NOTE — TELEPHONE ENCOUNTER
Prior Authorization Approval    Authorization Effective Date:    Authorization Expiration Date:    Medication: amphetamine-dextroamphetamine (ADDERALL) 10 MG tablet-APPROVED  Approved Dose/Quantity:  Reference #:     Insurance Company: [email or web address] - Phone [phone] Fax [phone]  Expected CoPay:       CoPay Card Available:      Foundation Assistance Needed:    Which Pharmacy is filling the prescription (Not needed for infusion/clinic administered): [id number] [address] AT [place] [address]  Pharmacy Notified: Yes  Patient Notified: No    Pharmacy will notify patient when medication is ready.

## 2021-03-10 DIAGNOSIS — R10.31 BILATERAL GROIN PAIN: ICD-10-CM

## 2021-03-10 DIAGNOSIS — R10.32 BILATERAL GROIN PAIN: ICD-10-CM

## 2021-03-10 RX ORDER — OXYCODONE AND ACETAMINOPHEN 5; 325 MG/1; MG/1
TABLET ORAL
Qty: 42 TABLET | Refills: 0 | Status: SHIPPED | OUTPATIENT
Start: 2021-03-10 | End: 2021-03-23

## 2021-03-22 ENCOUNTER — TELEPHONE (OUTPATIENT)
Dept: INTERNAL MEDICINE | Facility: CLINIC | Age: 46
End: 2021-03-22

## 2021-03-22 DIAGNOSIS — R10.31 BILATERAL GROIN PAIN: ICD-10-CM

## 2021-03-22 DIAGNOSIS — R10.32 BILATERAL GROIN PAIN: ICD-10-CM

## 2021-03-22 NOTE — TELEPHONE ENCOUNTER
Pt called to request test for left lymph node, enlarged and sore, worse, wants [facility] to figure out what is going on. First tests were done at [facility]. Inflamed but not cancerous.  Repeat CT of eye orbit, eye sight getting worse.    Call pt when placed  [phone], ok to lv detailed message

## 2021-03-23 DIAGNOSIS — R10.31 BILATERAL GROIN PAIN: ICD-10-CM

## 2021-03-23 DIAGNOSIS — H53.9 VISUAL DISTURBANCE: Primary | ICD-10-CM

## 2021-03-23 DIAGNOSIS — R22.30 AXILLARY MASS, UNSPECIFIED LATERALITY: ICD-10-CM

## 2021-03-23 DIAGNOSIS — R10.32 BILATERAL GROIN PAIN: ICD-10-CM

## 2021-03-23 RX ORDER — OXYCODONE AND ACETAMINOPHEN 5; 325 MG/1; MG/1
TABLET ORAL
Qty: 42 TABLET | Refills: 0 | Status: CANCELLED | OUTPATIENT
Start: 2021-03-23

## 2021-03-23 RX ORDER — OXYCODONE AND ACETAMINOPHEN 5; 325 MG/1; MG/1
TABLET ORAL
Qty: 42 TABLET | Refills: 0 | Status: SHIPPED | OUTPATIENT
Start: 2021-03-24 | End: 2021-04-06

## 2021-03-23 NOTE — TELEPHONE ENCOUNTER
Dr. Machado,     Spoke to pt,   1) she would like a re-order of the CT Orbits wo & w Contrast, which she had done in 11/18/2020.  Says eyesight is getting worse. Right eye seems to be more bothersome than left, it twitches, and moves around involuntarily.     2) re: the left lymph node swelling, it is located inside her armpit. Has been there for months. And is getting bigger. Feels about the size of a golf ball, with other areas that are tender to the touch (and marble size).   She would like a referral placed to see specialist.     3) refill request for :  oxyCODONE-acetaminophen (PERCOCET) 5-325 MG tablet. RX last ordered on 3/10 for 2 week supply.

## 2021-03-25 NOTE — TELEPHONE ENCOUNTER
LM on VM to call back. Please give patient US/CT scheduling number as well as General surgery referral contact information.     Mayuri ROUSSEAU, RN, PHN

## 2021-04-05 ENCOUNTER — TELEPHONE (OUTPATIENT)
Dept: INTERNAL MEDICINE | Facility: CLINIC | Age: 46
End: 2021-04-05

## 2021-04-05 DIAGNOSIS — R10.31 BILATERAL GROIN PAIN: ICD-10-CM

## 2021-04-05 DIAGNOSIS — R10.32 BILATERAL GROIN PAIN: ICD-10-CM

## 2021-04-05 NOTE — TELEPHONE ENCOUNTER
Pt has appt for a CT orbit tomorrow. Radiologist at [facility] suggests an MRI orbits instead. If you agree, please order MRI orbit for [facility] radiology. Also, CT appt will have to be cancelled and rescheduled as MRI. Pt will need to be called to let her know if it is changed.    [phone] CT [facility] Care center for questions

## 2021-04-06 DIAGNOSIS — H53.9 VISUAL DISTURBANCE: Primary | ICD-10-CM

## 2021-04-06 RX ORDER — OXYCODONE AND ACETAMINOPHEN 5; 325 MG/1; MG/1
TABLET ORAL
Qty: 42 TABLET | Refills: 0 | Status: SHIPPED | OUTPATIENT
Start: 2021-04-06 | End: 2021-04-20

## 2021-04-06 NOTE — TELEPHONE ENCOUNTER
Spoke to pt, she will call [facility], # [phone], and cancel appt for CT and schedule appt for MRI.        Pt also requesting refill of:  oxyCODONE-Acetaminophen 5-325 MG Oral Tablet (PERCOCET)  Take 3 tablets daily.  Last filled on 3/24 for a 2 week supply.  RX is pending.

## 2021-04-07 ENCOUNTER — HOSPITAL ENCOUNTER (OUTPATIENT)
Dept: MRI IMAGING | Facility: CLINIC | Age: 46
Discharge: HOME | End: 2021-04-07
Attending: FAMILY MEDICINE | Admitting: FAMILY MEDICINE
Payer: COMMERCIAL

## 2021-04-07 DIAGNOSIS — H53.9 VISUAL DISTURBANCE: ICD-10-CM

## 2021-04-07 PROCEDURE — A9585 GADOBUTROL INJECTION: HCPCS | Performed by: FAMILY MEDICINE

## 2021-04-07 PROCEDURE — 255N000002 HC RX 255 OP 636: Performed by: FAMILY MEDICINE

## 2021-04-07 PROCEDURE — 70543 MRI ORBT/FAC/NCK W/O &W/DYE: CPT

## 2021-04-07 RX ORDER — GADOBUTROL 604.72 MG/ML
7.5 INJECTION INTRAVENOUS ONCE
Status: COMPLETED | OUTPATIENT
Start: 2021-04-07 | End: 2021-04-07

## 2021-04-07 RX ADMIN — GADOBUTROL 6 ML: 604.72 INJECTION INTRAVENOUS at 19:45

## 2021-04-12 ENCOUNTER — TELEPHONE (OUTPATIENT)
Dept: INTERNAL MEDICINE | Facility: CLINIC | Age: 46
End: 2021-04-12

## 2021-04-12 NOTE — TELEPHONE ENCOUNTER
IMPRESSION:   1.  Mild bilateral exophthalmos with mild enlargement of the muscle bellies of the inferior and medial rectus muscles bilaterally. However, the classic appearance of orbital ophthalmopathy is not present.       MD please advise on radiology report . Shifting of the deviated septum . Need to look at and compare to CT last year . Please advise .Miriam Temple RN

## 2021-04-15 NOTE — TELEPHONE ENCOUNTER
"I do not see much in the way of change given the report.  However, she should be discussing the findings either with her eye doctor or the person that thought she needed this test in the first place.      Patient informed. Became upset and frustrated. 'does not want to be shifted around to another doctor. There something not right. My eye site is getting worse'.      Patient is asking provider to look at this. And advised to look at CT and compare with the CT. Patient stated, \"I won't do it. He can deal with why I am in pain. He can search why I am dealing. I am at the end of the rope\".     Mayuri JACOBSONN, RN, PHN      "

## 2021-04-16 NOTE — TELEPHONE ENCOUNTER
Lm on VM.    See provider note:    'If vision is getting worse patient to see an opthalmology to determine cause. They will be the best to help figure out what is cause her issues'.     If patient requires a referral, advised to call back and ask for triage team.    Mayuri ROUSSEAU, RN, PHN

## 2021-04-19 ENCOUNTER — NURSE TRIAGE (OUTPATIENT)
Dept: INTERNAL MEDICINE | Facility: CLINIC | Age: 46
End: 2021-04-19

## 2021-04-19 DIAGNOSIS — R10.32 BILATERAL GROIN PAIN: ICD-10-CM

## 2021-04-19 DIAGNOSIS — R10.31 BILATERAL GROIN PAIN: ICD-10-CM

## 2021-04-19 RX ORDER — PREDNISONE 10 MG/1
10 TABLET ORAL ONCE
Status: CANCELLED | OUTPATIENT
Start: 2021-04-19 | End: 2021-04-19

## 2021-04-19 NOTE — TELEPHONE ENCOUNTER
Percocet    Last Written Prescription Date:  4/6/2021  Last Fill Quantity: 42,  # refills: 0     Future Office Visit:   Next 5 appointments (look out 90 days)    Apr 27, 2021  5:40 PM  Office Visit with Evaristo Machado MD  [facility] [address] [id number] [phone]          Routing refill request to provider for review/approval because:  Drug not on the [facility] refill protocol

## 2021-04-19 NOTE — TELEPHONE ENCOUNTER
"PCP Dr Ortiz sent a message via Routing Comment viewed by Marge Espinal and Sylvie Islas directing Triage to \"Patient needs office visit or UC.\"  See routing history.  This note was note saved as it was sent as a routing comment.    Contacted patient to communicate PCPs instructions to schedule office visit or UC visit. Patient was angry and abusive to RN stating this is \"Inconsistent with previous care.\" Pt refused to schedule either OV or go to UC but only wanted Rx.  Eventually Triage did schedule virtual visit with another provider as PCP was on admin today.  At that point, afraid she wouldn't get the Rx she wanted pt decided to go to UC.    Marge Espinal, MSN, RN  Triage RN  [facility]    "

## 2021-04-19 NOTE — TELEPHONE ENCOUNTER
Pt called and said she got a cold and it resulted in a sinus infection. She said it has gone to her lungs now. She would like an antibiotic and prednisone to be sent to a new pharmacy.     Pt didn't know pharmacy information so please call her to get that from her.    [phone] pt phone

## 2021-04-20 RX ORDER — OXYCODONE AND ACETAMINOPHEN 5; 325 MG/1; MG/1
TABLET ORAL
Qty: 42 TABLET | Refills: 0 | Status: SHIPPED | OUTPATIENT
Start: 2021-04-20 | End: 2021-05-10

## 2021-04-23 NOTE — TELEPHONE ENCOUNTER
Problem: Pain  Goal: #Acceptable pain level achieved/maintained at rest using NRS/Faces  Description: This goal is used for patients who can self-report.  Acceptable means the level is at or below the identified comfort/function goal.  Outcome: Outcome Met, Continue evaluating goal progress toward completion  Goal: # Acceptable pain level achieved/maintained at rest using NRS/Faces without oversedation (opioid naive or PCA/Epidural infusion)  Description: This goal is used if Opioid-naïve or on PCA/Epidural Infusion.  Outcome: Outcome Met, Continue evaluating goal progress toward completion  Goal: # Acceptable pain level achieved/maintained with activity using NRS/Faces  Description: This goal is used for patients who can self-report and are not achieving acceptable pain control during activity.  Outcome: Outcome Met, Continue evaluating goal progress toward completion  Goal: Acceptable pain/comfort level is achieved/maintained at rest (based on Pain Behaviors Scale)  Description: This goal is used for patients who are not able to self-report pain and are assessed for pain using the Pain Behaviors Scale  Outcome: Outcome Met, Continue evaluating goal progress toward completion  Goal: Acceptable pain/comfort level is achieved/maintained at rest based on PAINAID scale (Dementia)  Description: This goal is used for patients who are not able to self-report pain, have dementia, and assessed using the PAINAD scale.  Outcome: Outcome Met, Continue evaluating goal progress toward completion  Goal: Acceptable pain/comfort level is achieved/maintained at rest (based on pediatric behavior tool: NIPS, NPASS, or FLACC)  Description: This goal is used for pediatric patients who are not able to self report pain.  Outcome: Outcome Met, Continue evaluating goal progress toward completion  Goal: # Verbalizes understanding of pain management  Description: Documented in Patient Education Activity  Outcome: Outcome Met, Continue  DUPLICATE   evaluating goal progress toward completion

## 2021-04-24 ENCOUNTER — TRANSFERRED RECORDS (OUTPATIENT)
Dept: HEALTH INFORMATION MANAGEMENT | Facility: CLINIC | Age: 46
End: 2021-04-24

## 2021-04-25 ENCOUNTER — TRANSFERRED RECORDS (OUTPATIENT)
Dept: HEALTH INFORMATION MANAGEMENT | Facility: CLINIC | Age: 46
End: 2021-04-25

## 2021-04-26 ENCOUNTER — TRANSFERRED RECORDS (OUTPATIENT)
Dept: HEALTH INFORMATION MANAGEMENT | Facility: CLINIC | Age: 46
End: 2021-04-26

## 2021-05-10 DIAGNOSIS — R10.32 BILATERAL GROIN PAIN: ICD-10-CM

## 2021-05-10 DIAGNOSIS — F31.9 BIPOLAR 1 DISORDER (H): Chronic | ICD-10-CM

## 2021-05-10 DIAGNOSIS — F31.9 BIPOLAR 1 DISORDER (H): Primary | Chronic | ICD-10-CM

## 2021-05-10 DIAGNOSIS — R10.31 BILATERAL GROIN PAIN: ICD-10-CM

## 2021-05-10 RX ORDER — OXYCODONE AND ACETAMINOPHEN 5; 325 MG/1; MG/1
TABLET ORAL
Qty: 42 TABLET | Refills: 0 | Status: SHIPPED | OUTPATIENT
Start: 2021-05-10 | End: 2021-05-24

## 2021-05-10 RX ORDER — LAMOTRIGINE 200 MG/1
200 TABLET ORAL EVERY MORNING
Qty: 90 TABLET | Refills: 4 | OUTPATIENT
Start: 2021-05-10

## 2021-05-10 NOTE — TELEPHONE ENCOUNTER
Routing refill request to provider for review/approval because:  Drug not on the [facility] refill protocol     Lamictal  Routing refill request to provider for review/approval because:  Labs not current:  CBC

## 2021-05-11 ENCOUNTER — PATIENT OUTREACH (OUTPATIENT)
Dept: NURSING | Facility: CLINIC | Age: 46
End: 2021-05-11
Payer: COMMERCIAL

## 2021-05-11 ASSESSMENT — ACTIVITIES OF DAILY LIVING (ADL): DEPENDENT_IADLS:: INDEPENDENT

## 2021-05-11 NOTE — LETTER
May 11, 2021    Melody Muñoz  [address] [place] [address] [address] [place] [address]      Dear Melody,    I am a clinic care coordinator who works with Evaristo Machado MD at [facility]. Below is a description of clinic care coordination and how I can further assist you.      The clinic care coordination team is made up of a registered nurse,  and community health worker who understand the health care system. The goal of clinic care coordination is to help you manage your health and improve access to the health care system in the most efficient manner. The team can assist you in meeting your health care goals by providing education, coordinating services, strengthening the communication among your providers and supporting you with any resource needs.    Please feel free to contact me at [phone] with any questions or concerns. We are focused on providing you with the highest-quality healthcare experience possible and that all starts with you.     Sincerely,     Dori Licona    Enclosed: I have enclosed a copy of the Complex Care Plan. This has helpful information and goals that we have talked about. Please keep this in an easy to access place to use as needed.

## 2021-05-11 NOTE — LETTER
[facility]  Complex Care Plan  About Me:    Patient Name:  Melody Muñoz    YOB: 1975  Age:         45 year old   Ivette MRN:    [id number] Telephone Information:  Home Phone [phone]   Mobile [phone]       Address:  [address] [place] [address] Email address:  [email or web address]      Emergency Contact(s)    Name Relationship Lgl Grd Work Phone Home Phone Mobile Phone   Sebastian MUÑOZ,SAMU* Spouse   [phone]           Primary language:  English     needed? No   [facility]:  [phone] op. 1  Other communication barriers:    Preferred Method of Communication:  Phone  Current living arrangement: I live in a private home  Mobility Status/ Medical Equipment: Independent    Health Maintenance  Health Maintenance Reviewed: Up to date    My Access Plan  Medical Emergency 911   Primary Clinic Line   - [phone]   24 Hour Appointment Line [phone] or  [phone] ([phone]) (toll-free)   24 Hour Nurse Line [phone] (toll-free)   Preferred Urgent Care     Preferred Hospital [facility]  [phone]   Preferred Pharmacy [id number] [address] AT [place] [address]     Behavioral Health Crisis Line The National Suicide Prevention Lifeline at 1-636.681.6031 or 911             My Care Team Members  Patient Care Team       Relationship Specialty Notifications Start End    Evaristo Machado MD PCP - General Family Practice All results, Admissions 2/18/16     Phone: [phone] Fax: [phone]         [address] [facility] [address]    Dotty Peguero MD MD Pulmonary Disease  3/31/14     Kerwin Foreman MD    3/31/14      [address] [facility] [address]    [facility]  Licensed Mental Health  3/31/14     Phone: [phone] [address]  [place]  [address]    Evaristo Machado MD Assigned PCP   6/19/16     Phone: [phone] Fax: [phone]         [facility] [place] [address] [place] [facility] [address]    Yoselin Salazar RD Diabetes Educator Dietitian, Registered  11/5/18     Phone: [phone]          [facility] [address] [place] [address]    Micha Valles MD Assigned Surgical Provider   10/23/20     Phone: [phone] Fax: [phone]         [address] [facility] [address]            My Care Plans  Self Management and Treatment Plan  Goals and (Comments)  Goals        General    Problem Solving (pt-stated)     Notes - Note created  11/5/2018  2:17 PM by Yoselin Salazar RD    My Goal: I will reduce total carbs in my diet- aim for no more than 45 gms per meal    What I need to meet my goal: read labels, review carb counting informaiton    I plan to meet my goal by this date: 2 weeks (next CDE appt)        Psychosocial (pt-stated)     Notes - Note created  5/11/2021 11:02 AM by Dori Licona LSW    Goal Statement: I would like to apply for Social Security disability in the next two months  Date Goal set: 5/11/21  Barriers: Eligibility  Strengths: long hx of mental health issues  Date to Achieve By: two months  Patient expressed understanding of goal: Yes  Action steps to achieve this goal:  1. CC SW will send referral to   2. I will answer when they call and complete intake  3. I will reach out to CC SW if I have questions or need resources             Action Plans on File:   Asthma     COPD  Depression     Migraine    Advance Care Plans/Directives Type:        My Medical and Care Information  Problem List   Patient Active Problem List   Diagnosis     Endometriosis s/po EDWIGE 10-17     Mantoux: positive     Latent tuberculosis     Major depression     Generalized anxiety disorder     Constipation     Nightmares     Knee pain     Bipolar  1 disorder  with psychosis     Chronic fatigue     Female stress incontinence     Suicidal ideation     Acne     Chronic pain syndrome     Insomnia     Chronic migraine without aura without status migrainosus, not intractable     Nausea     JASWANT (obstructive sleep apnea)     Elevated liver enzymes     TMJ (temporomandibular joint syndrome)     Hypothyroidism due to acquired atrophy of thyroid     Hostile behavior     Mild persistent asthma without complication     Uncontrolled type 2 diabetes mellitus with hyperglycemia (H)     Allergic rhinitis     Incontinence of urine in female     Migraine     Screening for diabetic peripheral neuropathy     Need for prophylactic vaccination against Streptococcus pneumoniae (pneumococcus)     Sepsis (H)     Abdominal pain     Morbid obesity (H)     Former tobacco use     Mixed simple and mucopurulent chronic bronchitis (H): Spirometry 6-4-16 lung age = 61y/o in 41 y/o FVC=90%&FEV1=78%     Class 2 obesity due to excess calories with serious comorbidity and body mass index (BMI) of 36.0 to 36.9 in adult     Mixed hyperlipidemia     Acquired hypothyroidism     Dysuria     Other chronic back pain     Headache disorder     Persistent depressive disorder     Irritable bowel syndrome with both constipation and diarrhea     Anxiety     Recurrent sinus infections     Bilateral groin pain     Opacity of lung on imaging study     Visual disturbance     Breast complaint      Current Medications and Allergies:  See printed Medication Report.    Care Coordination Start Date: No linked episodes   Frequency of Care Coordination:     Form Last Updated: 05/11/2021

## 2021-05-11 NOTE — PROGRESS NOTES
Clinic Care Coordination Contact    Clinic Care Coordination Contact  OUTREACH    Referral Information:  Referral Source: (Hospital discharge follow up)    Primary Diagnosis: (Bipolar/Anxiety)    Background: Pt with hx of Bipolar, manic with psychoisis, Opiate use disorder, narcolepsy admitted to [facility]. Pt was committed  Per chart review;  Pt monitored closely to ensure safety and provided group and milieu programming. Initially patient was agitated, uncooperative, emotionally labile. She required restraint and seclusions. Hx was obtained from  and care-everywhere notes were reviewed. I was concerned for her ongoing safety given extreme mood dysregulation and recent suicide attempt. Petition for commitment was initiated and supported. However, as hospitalization progressed, pt become much more cooperative, accepting of medication changes and showed marked improvement with resolution of herbert. She continues to harbor some suspicion of undiscovered health problem and Castillo covering up info, but much less overtly delusional. She showed insight suggesting that klonopin was not helping her and that mood symptoms worsened after stopping lithium last year. She was agreeable to continue to taper off of sedatives and follow-up with outpatient psychiatry and therapy.      Assessment: CLARE REYES reached out to patient today for post hospital discharge follow up as well as assessing for CC needs.    Patient shares that she is doing fine, but continues to have high anxiety, which has even prevented her from driving.  Pt shares that she met with her therapist yesterday and will have appt with psychiatrist today.    Pt notes interest in applying for disability at this point due to not being able to work and long hx of mental illness. Spouse works, but they live paycheck to paycheck and have 3 children to support. CLARE REYES offers to help with this process and patient agrees.      Chief Complaint   Patient presents with      Clinic Care Coordination - Initial     Post hospital follow up        Universal Utilization:   Clinic Utilization  Difficulty keeping appointments:: No  Compliance Concerns: No  No-Show Concerns: No  Utilization    Last refreshed: 5/11/2021 10:37 AM: Hospital Admissions 1           Last refreshed: 5/11/2021 10:37 AM: ED Visits 4           Last refreshed: 5/11/2021 10:37 AM: No Show Count (past year) 2              Current as of: 5/11/2021 10:37 AM              Clinical Concerns:  Current Medical Concerns:  Narcolepsy, hypothyroidism, Hyperlipidemia    Current Behavioral Concerns: Anxiety, Bipolar w/psychoisis Persistent depressive disorder   Education Provided to patient: Care Coordination  Pain  Pain (GOAL):: No  Health Maintenance Reviewed: Up to date  Clinical Pathway: Clinic Care Coordination Anxiety Assessment       Symptoms:   Anxiety  Currently being treated or treated in past for your anxiety?: Yes  Type of treatment:: Medication, Counseling, Inpatient hospitalization  Symptom relief: No  Picture where you want to be in the future, what steps would you take to get there?: Medications  How confident are you with the plan we have identified?: 3  Overall your anxiety symptoms are (GOAL):: Stable    Medication Management:  No concerns    Functional Status:  Dependent ADLs:: Independent  Dependent IADLs:: Independent  Bed or wheelchair confined:: No  Mobility Status: Independent  Fallen 2 or more times in the past year?: No  Any fall with injury in the past year?: No    Living Situation:  Current living arrangement:: I live in a private home    Lifestyle & Psychosocial Needs:      Inadequate nutrition (GOAL):: No  Tube Feeding: No  Inadequate activity/exercise (GOAL):: No  Significant changes in sleep pattern (GOAL): No  Transportation means:: Accessible car(Not driving right now due to the way medications are making her feel)     Samaritan or spiritual beliefs that impact treatment:: No  Mental health DX::  Yes  Mental health DX how managed:: Medication, Mental Health Targeted Care Manager  Mental health management concern (GOAL):: No  Chemical Dependency Status: No Current Concerns  Informal Support system:: Family   Socioeconomic History     Marital status:      Spouse name: Not on file     Number of children: Not on file     Years of education: Not on file     Highest education level: Not on file   Occupational History     Employer: SELF EMPLOYED.     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     Tobacco Use     Smoking status: Former Smoker     Packs/day: 0.00     Years: 17.00     Pack years: 0.00     Types: Cigarettes     Quit date: 3/18/2019     Years since quittin.1     Smokeless tobacco: Never Used   Substance and Sexual Activity     Alcohol use: No     Comment: no etoh since      Drug use: No     Sexual activity: Not Currently     Comment: tubal ligation        Resources and Interventions:  Current Resources:   -Referral sent to      Community Resources: Op Mental health supports through [facility]  Supplies used at home:: None  Equipment Currently Used at Home: none  Employment Status: unemployed)   )    Advance Care Plan/Directive  Advanced Care Plans/Directives on file:: No  Advanced Care Plan/Directive Status: Declined Further Information    Referrals Placed: (Disability)     Goals        Patient Stated      Problem Solving (pt-stated)      My Goal: I will reduce total carbs in my diet- aim for no more than 45 gms per meal    What I need to meet my goal: read labels, review carb counting informaiton    I plan to meet my goal by this date: 2 weeks (next CDE appt)          Psychosocial (pt-stated)      Goal Statement: I would like to apply for Social Security disability in the next two months  Date Goal set: 21  Barriers: Eligibility  Strengths: long hx of mental health issues  Date to Achieve By: two months  Patient expressed understanding of goal: Yes  Action  steps to achieve this goal:  1. CC SW will send referral to   2. I will answer when they call and complete intake  3. I will reach out to CC SW if I have questions or need resources            Patient/Caregiver understanding: Yes       Future Appointments              In 1 week Evaristo Machado MD [facility]          Plan:   Introduction letter and Complex Care Plan sent via mail.  CC SW to follow up with patient in two weeks.

## 2021-05-24 ENCOUNTER — PATIENT OUTREACH (OUTPATIENT)
Dept: CARE COORDINATION | Facility: CLINIC | Age: 46
End: 2021-05-24

## 2021-05-24 DIAGNOSIS — R10.32 BILATERAL GROIN PAIN: ICD-10-CM

## 2021-05-24 DIAGNOSIS — G89.4 CHRONIC PAIN SYNDROME: ICD-10-CM

## 2021-05-24 DIAGNOSIS — R10.31 BILATERAL GROIN PAIN: ICD-10-CM

## 2021-05-24 RX ORDER — OXYCODONE AND ACETAMINOPHEN 5; 325 MG/1; MG/1
TABLET ORAL
Qty: 42 TABLET | Refills: 0 | Status: SHIPPED | OUTPATIENT
Start: 2021-05-24 | End: 2021-06-08

## 2021-05-24 RX ORDER — PREGABALIN 150 MG/1
CAPSULE ORAL
Qty: 180 CAPSULE | Refills: 1 | Status: SHIPPED | OUTPATIENT
Start: 2021-05-24 | End: 2021-08-23

## 2021-05-24 NOTE — PROGRESS NOTES
Clinic Care Coordination Contact  [facility]       Clinical Data: Care Coordinator Outreach    Outreach attempted x 2. CC AMY attempted to reach patient but phone would be answered and then hung up.    Plan: CLARE REYES will try to reach patient again within the next two weeks.    MARLENE Reese   Social Work Clinic Care Coordinator   [facility]  PH: [phone]  [email or web address]

## 2021-05-27 ENCOUNTER — VIRTUAL VISIT (OUTPATIENT)
Dept: INTERNAL MEDICINE | Facility: CLINIC | Age: 46
End: 2021-05-27
Payer: COMMERCIAL

## 2021-05-27 DIAGNOSIS — F99 INSOMNIA DUE TO OTHER MENTAL DISORDER: ICD-10-CM

## 2021-05-27 DIAGNOSIS — F51.05 INSOMNIA DUE TO OTHER MENTAL DISORDER: ICD-10-CM

## 2021-05-27 DIAGNOSIS — F41.1 GENERALIZED ANXIETY DISORDER: Chronic | ICD-10-CM

## 2021-05-27 DIAGNOSIS — F31.9 BIPOLAR 1 DISORDER (H): Primary | Chronic | ICD-10-CM

## 2021-05-27 DIAGNOSIS — F13.20 MODERATE BENZODIAZEPINE USE DISORDER (H): ICD-10-CM

## 2021-05-27 PROBLEM — R30.0 DYSURIA: Status: RESOLVED | Noted: 2018-11-08 | Resolved: 2021-05-27

## 2021-05-27 PROBLEM — A41.9 SEPSIS (H): Status: RESOLVED | Noted: 2017-11-19 | Resolved: 2021-05-27

## 2021-05-27 PROCEDURE — 99215 OFFICE O/P EST HI 40 MIN: CPT | Mod: TEL | Performed by: FAMILY MEDICINE

## 2021-05-27 RX ORDER — LITHIUM CARBONATE 450 MG
900 TABLET, EXTENDED RELEASE ORAL AT BEDTIME
Status: ON HOLD | COMMUNITY
Start: 2021-05-07 | End: 2023-12-15

## 2021-05-27 RX ORDER — ZOLPIDEM TARTRATE 10 MG/1
10 TABLET ORAL AT BEDTIME
COMMUNITY
Start: 2021-05-07

## 2021-05-27 RX ORDER — TRAZODONE HYDROCHLORIDE 150 MG/1
150 TABLET ORAL AT BEDTIME
Status: ON HOLD
Start: 2021-05-27 | End: 2023-12-13

## 2021-05-27 RX ORDER — OLANZAPINE 5 MG/1
5 TABLET ORAL AT BEDTIME
COMMUNITY
End: 2021-05-27

## 2021-05-27 RX ORDER — PHENOBARBITAL 30 MG/1
TABLET ORAL
COMMUNITY
Start: 2021-05-06 | End: 2021-08-23

## 2021-05-27 NOTE — PROGRESS NOTES
Melody is a 45 year old who is being evaluated via a billable telephone visit.      What phone number would you like to be contacted at? [phone]  How would you like to obtain your AVS? Ori    Assessment & Plan     Bipolar 1 disorder  with psychosis      Generalized anxiety disorder      Moderate benzodiazepine use disorder (H)      Insomnia due to other mental disorder    - traZODone (DESYREL) 150 MG tablet; Take 1 tablet (150 mg) by mouth At Bedtime    Review of external notes as documented elsewhere in note         Patient Instructions   I had a 45-minute total time spent with the patient and reviewing her discharge summary from care everywhere.  We reconciled her medication list.  We discussed having her psychiatrist to be the proper person to adjust medications.  She had been using Lyrica she says, on an off label basis for anxiety.  She was taking that 3 times daily.  She is currently on that twice daily.  She will discuss that with the psychiatrist next week.  She continues on a phenobarbital taper after having come off clonazepam.  She is been having some symptoms that she attributes to hormone issues.  She is having some breast discomfort.  With all of her recent changes in her medications I do not think we should be changing anything else at present.  She needs to get adjusted to the new medication regimen.  We talked about potentially tapering her Percocet at some point.  She will raise that issue also with her psychiatrist to see if that person thinks that that might be a good idea right now or if we should be waiting and doing that later.  She will keep me informed as to what the psychiatrist test to say about her medical issues.  We also discussed my upcoming senior living.  She will likely switch her care to either a clinic closer to her that is in [place] or to Christofer Hennessy at [facility].      Return in about 4 weeks (around 6/24/2021) for anxiety, depression, bipolar 1.    Evaristo  Orestes Machado MD  [facility] THOMAS Oliver is a 45 year old who presents for the following health issues     HPI       Hospital Follow-up Visit:    Hospital/Nursing Home/IP Rehab Facility: [facility]  Date of Admission: 2021  Date of Discharge: 2021  Reason(s) for Admission: Nausea, sedative withdrawal, bipolar affective disorder      Was your hospitalization related to COVID-19? No   Problems taking medications regularly:  None  Medication changes since discharge: Lithum, phenobarbital, zolpidem, zyprexa  Problems adhering to non-medication therapy:  None    Summary of hospitalization:  CareEverywhere information obtained and reviewed  Diagnostic Tests/Treatments reviewed.  Follow up needed: none  Other Healthcare Providers Involved in Patient s Care:         Specialist appointment - With psychiatry coming up beginning of .  Update since discharge: fluctuating course. Post Discharge Medication Reconciliation: discharge medications reconciled, continue medications without change.  Plan of care communicated with patient          Depression and Anxiety Follow-Up    How are you doing with your depression since your last visit?  Fluctuating course    How are you doing with your anxiety since your last visit?  See above    Are you having other symptoms that might be associated with depression or anxiety? No    Have you had a significant life event? Health Concerns with recent hospitalization for bipolar 1 disorder with psychosis.    Do you have any concerns with your use of alcohol or other drugs? No    Social History     Tobacco Use     Smoking status: Former Smoker     Packs/day: 0.00     Years: 17.00     Pack years: 0.00     Types: Cigarettes     Quit date: 3/18/2019     Years since quittin.1     Smokeless tobacco: Never Used   Substance Use Topics     Alcohol use: No     Comment: no etoh since      Drug use: No     PHQ 2019  12/3/2019   PHQ-9 Total Score 11 20 12   Q9: Thoughts of better off dead/self-harm past 2 weeks Not at all Several days More than half the days   F/U: Thoughts of suicide or self-harm - - No   F/U: Safety concerns - - No     ROWDY-7 SCORE 11/6/2018 2/6/2019 5/24/2019   Total Score - - -   Total Score 5 (mild anxiety) 13 (moderate anxiety) -   Total Score 5 13 14   Total Score - - -   Total Score BEH Adult - - -     Last PHQ-9 12/3/2019   1.  Little interest or pleasure in doing things 0   2.  Feeling down, depressed, or hopeless 1   3.  Trouble falling or staying asleep, or sleeping too much 3   4.  Feeling tired or having little energy 3   5.  Poor appetite or overeating 0   6.  Feeling bad about yourself 1   7.  Trouble concentrating 0   8.  Moving slowly or restless 2   Q9: Thoughts of better off dead/self-harm past 2 weeks 2   PHQ-9 Total Score 12   Difficulty at work, home, or with people -   In the past two weeks have you had thoughts of suicide or self harm? No   Do you have concerns about your personal safety or the safety of others? No     ROWDY-7  5/24/2019   1. Feeling nervous, anxious, or on edge 2   2. Not being able to stop or control worrying 2   3. Worrying too much about different things 3   4. Trouble relaxing 2   5. Being so restless that it is hard to sit still 1   6. Becoming easily annoyed or irritable 2   7. Feeling afraid, as if something awful might happen 2   ROWDY-7 Total Score 14   If you checked any problems, how difficult have they made it for you to do your work, take care of things at home, or get along with other people? Very difficult       Suicide Assessment Five-step Evaluation and Treatment (SAFE-T)      Review of Systems   Constitutional, HEENT, cardiovascular, pulmonary, GI, , musculoskeletal, neuro, skin, endocrine and psych systems are negative, except as otherwise noted.      Objective    Vitals - Patient Reported  Weight (Patient Reported): 70.3 kg (155 lb)      Vitals:  No  vitals were obtained today due to virtual visit.    Physical Exam   healthy, alert and no distress  PSYCH: Alert and oriented times 3; coherent speech, normal   rate and volume, able to articulate logical thoughts, able   to abstract reason, no tangential thoughts, no hallucinations   or delusions  Her affect is normal  RESP: No cough, no audible wheezing, able to talk in full sentences  Remainder of exam unable to be completed due to telephone visits                Phone call duration: 25 minutes

## 2021-05-27 NOTE — PATIENT INSTRUCTIONS
I had a 45-minute total time spent with the patient and reviewing her discharge summary from care everywhere.  We reconciled her medication list.  We discussed having her psychiatrist to be the proper person to adjust medications.  She had been using Lyrica she says, on an off label basis for anxiety.  She was taking that 3 times daily.  She is currently on that twice daily.  She will discuss that with the psychiatrist next week.  She continues on a phenobarbital taper after having come off clonazepam.  She is been having some symptoms that she attributes to hormone issues.  She is having some breast discomfort.  With all of her recent changes in her medications I do not think we should be changing anything else at present.  She needs to get adjusted to the new medication regimen.  We talked about potentially tapering her Percocet at some point.  She will raise that issue also with her psychiatrist to see if that person thinks that that might be a good idea right now or if we should be waiting and doing that later.  She will keep me informed as to what the psychiatrist test to say about her medical issues.  We also discussed my upcoming skilled nursing.  She will likely switch her care to either a clinic closer to her that is in [place] or to Christofer Hennessy at [facility].

## 2021-05-28 ENCOUNTER — APPOINTMENT (OUTPATIENT)
Dept: ULTRASOUND IMAGING | Facility: CLINIC | Age: 46
End: 2021-05-28
Attending: EMERGENCY MEDICINE
Payer: COMMERCIAL

## 2021-05-28 ENCOUNTER — HOSPITAL ENCOUNTER (EMERGENCY)
Facility: CLINIC | Age: 46
Discharge: HOME | End: 2021-05-28
Attending: EMERGENCY MEDICINE | Admitting: EMERGENCY MEDICINE
Payer: COMMERCIAL

## 2021-05-28 ENCOUNTER — TELEPHONE (OUTPATIENT)
Dept: INTERNAL MEDICINE | Facility: CLINIC | Age: 46
End: 2021-05-28

## 2021-05-28 VITALS
RESPIRATION RATE: 16 BRPM | HEART RATE: 70 BPM | OXYGEN SATURATION: 97 % | SYSTOLIC BLOOD PRESSURE: 113 MMHG | TEMPERATURE: 98 F | DIASTOLIC BLOOD PRESSURE: 81 MMHG

## 2021-05-28 DIAGNOSIS — R11.0 NAUSEA: ICD-10-CM

## 2021-05-28 DIAGNOSIS — R10.11 RUQ ABDOMINAL PAIN: ICD-10-CM

## 2021-05-28 DIAGNOSIS — R19.7 DIARRHEA, UNSPECIFIED TYPE: ICD-10-CM

## 2021-05-28 LAB
ALBUMIN SERPL-MCNC: 3.9 G/DL (ref 3.4–5)
ALBUMIN UR-MCNC: NEGATIVE MG/DL
ALP SERPL-CCNC: 92 U/L (ref 40–150)
ALT SERPL W P-5'-P-CCNC: 79 U/L (ref 0–50)
ANION GAP SERPL CALCULATED.3IONS-SCNC: 5 MMOL/L (ref 3–14)
APPEARANCE UR: CLEAR
AST SERPL W P-5'-P-CCNC: 22 U/L (ref 0–45)
BASOPHILS # BLD AUTO: 0.1 10E9/L (ref 0–0.2)
BASOPHILS NFR BLD AUTO: 0.8 %
BILIRUB SERPL-MCNC: 0.3 MG/DL (ref 0.2–1.3)
BILIRUB UR QL STRIP: NEGATIVE
BUN SERPL-MCNC: 9 MG/DL (ref 7–30)
CALCIUM SERPL-MCNC: 9.3 MG/DL (ref 8.5–10.1)
CHLORIDE SERPL-SCNC: 108 MMOL/L (ref 94–109)
CO2 SERPL-SCNC: 26 MMOL/L (ref 20–32)
COLOR UR AUTO: ABNORMAL
CREAT SERPL-MCNC: 0.72 MG/DL (ref 0.52–1.04)
DIFFERENTIAL METHOD BLD: ABNORMAL
EOSINOPHIL # BLD AUTO: 0.5 10E9/L (ref 0–0.7)
EOSINOPHIL NFR BLD AUTO: 4.2 %
ERYTHROCYTE [DISTWIDTH] IN BLOOD BY AUTOMATED COUNT: 13 % (ref 10–15)
GFR SERPL CREATININE-BSD FRML MDRD: >90 ML/MIN/{1.73_M2}
GLUCOSE SERPL-MCNC: 113 MG/DL (ref 70–99)
GLUCOSE UR STRIP-MCNC: NEGATIVE MG/DL
HCT VFR BLD AUTO: 47.5 % (ref 35–47)
HGB BLD-MCNC: 15.4 G/DL (ref 11.7–15.7)
HGB UR QL STRIP: NEGATIVE
IMM GRANULOCYTES # BLD: 0.1 10E9/L (ref 0–0.4)
IMM GRANULOCYTES NFR BLD: 0.8 %
KETONES UR STRIP-MCNC: NEGATIVE MG/DL
LEUKOCYTE ESTERASE UR QL STRIP: NEGATIVE
LIPASE SERPL-CCNC: 179 U/L (ref 73–393)
LYMPHOCYTES # BLD AUTO: 3.3 10E9/L (ref 0.8–5.3)
LYMPHOCYTES NFR BLD AUTO: 27.8 %
MCH RBC QN AUTO: 29.2 PG (ref 26.5–33)
MCHC RBC AUTO-ENTMCNC: 32.4 G/DL (ref 31.5–36.5)
MCV RBC AUTO: 90 FL (ref 78–100)
MONOCYTES # BLD AUTO: 0.6 10E9/L (ref 0–1.3)
MONOCYTES NFR BLD AUTO: 5.5 %
MUCOUS THREADS #/AREA URNS LPF: PRESENT /LPF
NEUTROPHILS # BLD AUTO: 7.2 10E9/L (ref 1.6–8.3)
NEUTROPHILS NFR BLD AUTO: 60.9 %
NITRATE UR QL: NEGATIVE
NRBC # BLD AUTO: 0 10*3/UL
NRBC BLD AUTO-RTO: 0 /100
PH UR STRIP: 7.5 PH (ref 5–7)
PLATELET # BLD AUTO: 437 10E9/L (ref 150–450)
POTASSIUM SERPL-SCNC: 3.7 MMOL/L (ref 3.4–5.3)
PROT SERPL-MCNC: 7.4 G/DL (ref 6.8–8.8)
RBC # BLD AUTO: 5.27 10E12/L (ref 3.8–5.2)
RBC #/AREA URNS AUTO: 1 /HPF (ref 0–2)
SODIUM SERPL-SCNC: 139 MMOL/L (ref 133–144)
SOURCE: ABNORMAL
SP GR UR STRIP: 1 (ref 1–1.03)
SQUAMOUS #/AREA URNS AUTO: 1 /HPF (ref 0–1)
UROBILINOGEN UR STRIP-MCNC: NORMAL MG/DL (ref 0–2)
WBC # BLD AUTO: 11.7 10E9/L (ref 4–11)
WBC #/AREA URNS AUTO: <1 /HPF (ref 0–5)

## 2021-05-28 PROCEDURE — 81001 URINALYSIS AUTO W/SCOPE: CPT | Performed by: EMERGENCY MEDICINE

## 2021-05-28 PROCEDURE — 76705 ECHO EXAM OF ABDOMEN: CPT

## 2021-05-28 PROCEDURE — 80053 COMPREHEN METABOLIC PANEL: CPT | Performed by: EMERGENCY MEDICINE

## 2021-05-28 PROCEDURE — 258N000003 HC RX IP 258 OP 636: Performed by: EMERGENCY MEDICINE

## 2021-05-28 PROCEDURE — 250N000011 HC RX IP 250 OP 636: Performed by: EMERGENCY MEDICINE

## 2021-05-28 PROCEDURE — 96374 THER/PROPH/DIAG INJ IV PUSH: CPT

## 2021-05-28 PROCEDURE — 250N000013 HC RX MED GY IP 250 OP 250 PS 637: Performed by: EMERGENCY MEDICINE

## 2021-05-28 PROCEDURE — 85025 COMPLETE CBC W/AUTO DIFF WBC: CPT | Performed by: EMERGENCY MEDICINE

## 2021-05-28 PROCEDURE — 83690 ASSAY OF LIPASE: CPT | Performed by: EMERGENCY MEDICINE

## 2021-05-28 PROCEDURE — 96376 TX/PRO/DX INJ SAME DRUG ADON: CPT

## 2021-05-28 PROCEDURE — 96361 HYDRATE IV INFUSION ADD-ON: CPT

## 2021-05-28 PROCEDURE — 96375 TX/PRO/DX INJ NEW DRUG ADDON: CPT

## 2021-05-28 PROCEDURE — 99285 EMERGENCY DEPT VISIT HI MDM: CPT | Mod: 25

## 2021-05-28 PROCEDURE — 250N000009 HC RX 250: Performed by: EMERGENCY MEDICINE

## 2021-05-28 RX ORDER — ONDANSETRON 2 MG/ML
4 INJECTION INTRAMUSCULAR; INTRAVENOUS EVERY 30 MIN PRN
Status: DISCONTINUED | OUTPATIENT
Start: 2021-05-28 | End: 2021-05-28 | Stop reason: HOSPADM

## 2021-05-28 RX ORDER — MORPHINE SULFATE 4 MG/ML
4 INJECTION, SOLUTION INTRAMUSCULAR; INTRAVENOUS
Status: DISCONTINUED | OUTPATIENT
Start: 2021-05-28 | End: 2021-05-28 | Stop reason: HOSPADM

## 2021-05-28 RX ORDER — ONDANSETRON 4 MG/1
4 TABLET, ORALLY DISINTEGRATING ORAL EVERY 8 HOURS PRN
Qty: 10 TABLET | Refills: 0 | Status: SHIPPED | OUTPATIENT
Start: 2021-05-28 | End: 2021-05-31

## 2021-05-28 RX ORDER — FAMOTIDINE 20 MG/1
20 TABLET, FILM COATED ORAL ONCE
Status: COMPLETED | OUTPATIENT
Start: 2021-05-28 | End: 2021-05-28

## 2021-05-28 RX ORDER — SODIUM CHLORIDE 9 MG/ML
INJECTION, SOLUTION INTRAVENOUS CONTINUOUS
Status: DISCONTINUED | OUTPATIENT
Start: 2021-05-28 | End: 2021-05-28 | Stop reason: HOSPADM

## 2021-05-28 RX ADMIN — SODIUM CHLORIDE 1000 ML: 9 INJECTION, SOLUTION INTRAVENOUS at 10:20

## 2021-05-28 RX ADMIN — ONDANSETRON 4 MG: 2 INJECTION INTRAMUSCULAR; INTRAVENOUS at 10:28

## 2021-05-28 RX ADMIN — LIDOCAINE HYDROCHLORIDE 30 ML: 20 SOLUTION ORAL; TOPICAL at 11:53

## 2021-05-28 RX ADMIN — MORPHINE SULFATE 4 MG: 4 INJECTION INTRAVENOUS at 12:25

## 2021-05-28 RX ADMIN — FAMOTIDINE 20 MG: 20 TABLET ORAL at 11:53

## 2021-05-28 RX ADMIN — MORPHINE SULFATE 4 MG: 4 INJECTION INTRAVENOUS at 10:31

## 2021-05-28 RX ADMIN — ONDANSETRON 4 MG: 2 INJECTION INTRAMUSCULAR; INTRAVENOUS at 12:23

## 2021-05-28 ASSESSMENT — ENCOUNTER SYMPTOMS
FREQUENCY: 0
HEMATURIA: 0
ABDOMINAL PAIN: 1
BLOOD IN STOOL: 0
DYSURIA: 0
NAUSEA: 1
SHORTNESS OF BREATH: 0
VOMITING: 0
DIFFICULTY URINATING: 0
DIARRHEA: 1

## 2021-05-28 NOTE — ED TRIAGE NOTES
Pt c/o RUQ abdominal pain.  Has been having problems with pain after eating, but today has had significant worsening of pain.

## 2021-05-28 NOTE — ED PROVIDER NOTES
History   Chief Complaint  Abdominal Pain    HPI  Melody Muñoz is a 45 year old female with a history of kidney stones, IBS, and chronic pain syndrome on Percocet, s/p appendectomy, who presents for evaluation of RUQ abdominal pain. The patient reports that the pain has been ongoing for a few weeks now; however it was much worse this morning. The pain is associated with significant nausea but no vomiting. She had diarrhea this morning as well which is new. The pain does not seem to be associated with eating. She denies any black/bloody stools, urinary changes, chest pain, or shortness of breath. Denies any history of gallstones.     Review of Systems   Respiratory: Negative for shortness of breath.    Cardiovascular: Negative for chest pain.   Gastrointestinal: Positive for abdominal pain, diarrhea and nausea. Negative for blood in stool and vomiting.   Genitourinary: Negative for difficulty urinating, dysuria, frequency, hematuria and urgency.   All other systems reviewed and are negative.    Allergies  Hydrocodone  Tamiflu   Acetaminophen-Codeine    Medications  Albuterol  Axert  Lamictal  Latuda  Luminal  Percocet  Lyrica  Topamax  Trazodone  Ambien    Past Medical History  Anxiety  Bipolar I disorder  Type II diabetes  Endometriosis  Suicidal ideation  Asthma  Kidney stone  Latent TB  Migraines  JASWANT  Substance abuse  Vertigo  Depression  Chronic pain syndrome  IBS  Hypothyroidism    Past Surgical History    Appendectomy  Endometriosis surgery  Tubal ligation    Family History  Hypertension  Depression  Anxiety    Social History  Presents to the ED alone.   Negative for tobacco use.  Negative for alcohol use.    Physical Exam     Patient Vitals for the past 24 hrs:   BP Temp Temp src Pulse Resp SpO2   21 1200 105/81 -- -- 67 -- --   21 1030 117/85 -- -- 79 -- 97 %   21 1003 (!) 142/95 98  F (36.7  C) Temporal 89 16 99 %     Physical Exam  General: Adult female sitting  upright  Eyes: PERRL, Conjunctive within normal limits. No scleral icterus.   ENT: Moist mucous membranes, oropharynx clear.   CV: Normal S1S2, no murmur, rub or gallop. Regular rate and rhythm  Resp: Clear to auscultation bilaterally, no wheezes, rales or rhonchi. Normal respiratory effort.  GI: Abdomen is soft and nondistended. RUQ tenderness to palpation with positive Pope's sign. No palpable masses. No rebound.  MSK: No edema. Nontender. Normal active range of motion.  Skin: Warm and dry. No rashes or lesions or ecchymoses on visible skin.  Neuro: Alert and oriented. Responds appropriately to all questions and commands. No focal findings appreciated. Normal muscle tone.  Psych: Normal mood and affect. Pleasant.    Emergency Department Course   Imaging:  US Abdomen, limited (RUQ):  Normal limited abdominal ultrasound. As per radiology.     Laboratory:  CBC: WBC: 11.7 (H), HGB: 15.4, PLT: 437  CMP: Glucose 113 (H), ALT 79 (H), o/w WNL (Creatinine: 0.72)  Lipase: 179    UA with Microscopic: ph 7.5 (H), mucous present (A), o/w WNL    Emergency Department Course:  Reviewed:  I reviewed nursing notes, vitals and past medical history    Assessments:  1019 I physically examined the patient as documented above.    1138 I rechecked the patient. She is still in pain but overall improved.    1243 Recheck. She notes her pain is currently improved. She feels comfortable with the plan for discharge home.     Interventions:  1020 NS 1L IV  1028 Zofran 4 mg IV  1031 Morphine 4 mg IV  1153 GI Cocktail (Maalox/Mylanta and viscous Lidocaine), 30 mL suspension, PO   1153 Pepcid 20 mg PO  1223 Zofran 4 mg IV  1225 Morphine 4 mg IV    Disposition:  The patient was discharged to home.     Impression & Plan   Medical Decision Making:  Melody Muñoz is a 45 year old female who presented to the Emergency Department with Abdominal Pain.  The laboratory testing has returned normal, with the exception of slight leukocytosis and slight  ALT elevation, nonspecific.  Imaging is noted to be normal.  The exact etiology of the abdominal pain is not clear.  The differential diagnosis of abdominal pain includes but is not limited to: Bowel Obstruction, Ulcer, Ischemia, Cholecystitis, Diverticulitis, Pancreatitis, UTI, Pyelonephritis, Enteritis/Colitis, AAA amongst many other etiologies.  Given her examination, biliary colic or gallbladder dysmotility thought most likely.  Peptic ulcer disease/gastritis also considered likely.  Abdomen was otherwise benign and I do not feel that she is having a bowel obstruction, appendicitis, AAA, etc.  CT scan of the abdomen did not seem clinically indicated on today's assessment.  The history, physical exam, and results detect no life threatening cause at this time.  The patient understands that the etiology is not clear.  For this reason the patient is advised to seek immediate re-evaluation in the ED if there is a worsening of the condition, and to be seen by a more consistent care-giver, such as their PMD, if the symptoms persist, getting reassessed as quickly as possible.  She can use antacid medications, nausea medication, and her chronic pain medication as needed.  She felt comfortable to plan.  All questions were answered prior to discharge    Diagnosis:    ICD-10-CM    1. RUQ abdominal pain  R10.11    2. Nausea  R11.0    3. Diarrhea, unspecified type  R19.7      Discharge Medications:  New Prescriptions    ONDANSETRON (ZOFRAN ODT) 4 MG ODT TAB    Take 1 tablet (4 mg) by mouth every 8 hours as needed for nausea or vomiting     Scribe Disclosure:  I, Fermin Cesar, am serving as a scribe at 10:08 AM on 5/28/2021 to document services personally performed by Naz Strong MD based on my observations and the provider's statements to me.      Naz Strong MD  05/28/21 [id number]

## 2021-05-28 NOTE — TELEPHONE ENCOUNTER
"Patient had virtual visit with Dr. Machado yesterday 5/27 and forgot to talk to him about \"increased cortizone levels\". She also has questions about what medications are \"right and wrong\" for her to be taking. Please call her back to discuss.   [phone]  "

## 2021-06-07 ENCOUNTER — TELEPHONE (OUTPATIENT)
Dept: INTERNAL MEDICINE | Facility: CLINIC | Age: 46
End: 2021-06-07

## 2021-06-07 DIAGNOSIS — R10.31 BILATERAL GROIN PAIN: ICD-10-CM

## 2021-06-07 DIAGNOSIS — R10.32 BILATERAL GROIN PAIN: ICD-10-CM

## 2021-06-08 ENCOUNTER — PATIENT OUTREACH (OUTPATIENT)
Dept: CARE COORDINATION | Facility: CLINIC | Age: 46
End: 2021-06-08

## 2021-06-08 RX ORDER — OXYCODONE AND ACETAMINOPHEN 5; 325 MG/1; MG/1
TABLET ORAL
Qty: 42 TABLET | Refills: 0 | Status: SHIPPED | OUTPATIENT
Start: 2021-06-08 | End: 2021-06-21

## 2021-06-08 NOTE — PROGRESS NOTES
Clinic Care Coordination Contact    Follow Up Progress Note      Assessment: AMY called and completed follow up with patient today who shares that she is doing well.    Pt reports that  did reach out to her and she completed intake, however, she was told her spouse was just on the border of making to much money. (He makes about $1.300 per week) Pt is questioning this after talking to others about it.   SW sent email to DA to inquire further about income limits in applying. CC AMY will send other resources for pt to explore regarding disability.      Goals addressed this encounter:   Goals Addressed                 This Visit's Progress       Patient Stated      Psychosocial (pt-stated)        Goal Statement: I would like to apply for Social Security disability in the next two months  Date Goal set: 5/11/21  Barriers: Eligibility  Strengths: long hx of mental health issues  Date to Achieve By: two months  Patient expressed understanding of goal: Yes  Action steps to achieve this goal:  1. CC AMY will send referral to (Said  made too much, would like to try through another agency)  2. I will answer when they call and complete intake(Completed)  3. I will reach out to CLARE REYES if I have questions or need resources             Intervention/Education provided during outreach:   [email or web address]       Outreach Frequency: monthly    Plan:   CLARE REYES to follow up again in one month.

## 2021-06-10 ENCOUNTER — TELEPHONE (OUTPATIENT)
Dept: INTERNAL MEDICINE | Facility: CLINIC | Age: 46
End: 2021-06-10

## 2021-06-10 DIAGNOSIS — Z30.41 ENCOUNTER FOR SURVEILLANCE OF CONTRACEPTIVE PILLS: Primary | ICD-10-CM

## 2021-06-10 RX ORDER — NORGESTIMATE AND ETHINYL ESTRADIOL 7DAYSX3 LO
1 KIT ORAL DAILY
Qty: 84 TABLET | Refills: 3 | Status: SHIPPED | OUTPATIENT
Start: 2021-06-10 | End: 2021-09-30

## 2021-06-10 NOTE — TELEPHONE ENCOUNTER
Pt called, she had appt yesterday with psychiatrist, Dr. French, and she said the orthotricyclen was fine to prescribe.     Pharmacy is [facility] and

## 2021-06-21 DIAGNOSIS — R10.31 BILATERAL GROIN PAIN: ICD-10-CM

## 2021-06-21 DIAGNOSIS — R10.32 BILATERAL GROIN PAIN: ICD-10-CM

## 2021-06-21 RX ORDER — OXYCODONE AND ACETAMINOPHEN 5; 325 MG/1; MG/1
TABLET ORAL
Qty: 42 TABLET | Refills: 0 | Status: SHIPPED | OUTPATIENT
Start: 2021-06-22 | End: 2021-07-06

## 2021-06-21 NOTE — TELEPHONE ENCOUNTER
Oxycodone-acetaminophen  Routing refill request to provider for review/approval because:  Drug not on the [facility] refill protocol   Last Written Prescription Date:  6/8/2021  Last Fill Quantity: 42,  # refills: 0   Last office visit: 5/27/21 with prescribing provider:  Dr. Machado    Future Office Visit:  None

## 2021-07-06 DIAGNOSIS — R10.31 BILATERAL GROIN PAIN: ICD-10-CM

## 2021-07-06 DIAGNOSIS — R10.32 BILATERAL GROIN PAIN: ICD-10-CM

## 2021-07-06 RX ORDER — OXYCODONE AND ACETAMINOPHEN 5; 325 MG/1; MG/1
TABLET ORAL
Qty: 42 TABLET | Refills: 0 | Status: SHIPPED | OUTPATIENT
Start: 2021-07-06 | End: 2021-07-19

## 2021-07-06 NOTE — TELEPHONE ENCOUNTER
Routing refill request to provider for review/approval because:  Drug not on the [facility] refill protocol     Signed Prescriptions:                        Disp   Refills    oxyCODONE-acetaminophen (PERCOCET) 5-325 M*42 tab*0        Sig: Take 3 tablets daily  Authorizing Provider: JOSE MANUEL QUIJANO    Last Written Prescription Date:  6-8-21  Last Fill Quantity: 42,  # refills: 0   Last office visit: Visit date not found with prescribing provider:  Dr. Quijano   Future Office Visit:  None    Ashley Irizarry RN  [facility]

## 2021-07-06 NOTE — TELEPHONE ENCOUNTER
Patient called again and wanted to be sure that PCP is clear that the patient took her last pills yesterday so has no meds for today.    Marge Espinal, Triage RN  [facility]

## 2021-07-07 ENCOUNTER — PATIENT OUTREACH (OUTPATIENT)
Dept: CARE COORDINATION | Facility: CLINIC | Age: 46
End: 2021-07-07

## 2021-07-07 NOTE — PROGRESS NOTES
Clinic Care Coordination Contact    Follow Up Progress Note      Assessment:  called and completed follow up with patient today who shares that she is doing well, but hasn't had a lot of motivation to look into disability. However, pt shares that she would like to do something about it or her  will have to get another job.    SW found email from disabilty specialist when I had inquired about denial due to being over income and shared this part below with patient. SW will forward email again to patient. Pt states that she will make it a goal to reach out this week.     Our office informed her that she was over by a little bit and the best thing for her to do is to contact [facility]/[place]licia at [phone], and have them assess her eligibility  for SSI. There may be some exclusions she could qualify for, or income that may be reduced or not included in the household income that we are not aware of.     If [facility] tells her that she is eligible for SSI, then she may contact us and we may assist her with a claim.         Goals addressed this encounter:   Goals Addressed                 This Visit's Progress       Patient Stated      Psychosocial (pt-stated)   On track     Goal Statement: I would like to apply for Social Security disability in the next two months  Date Goal set: 5/11/21  Barriers: Eligibility  Strengths: long hx of mental health issues  Date to Achieve By: two months  Patient expressed understanding of goal: Yes  Action steps to achieve this goal:  1. CC SW will send referral to (Said  made too much, would like to try through another agency)  2. I will answer when they call and complete intake(Completed)  3. I will reach out to CC SW if I have questions or need resources             Intervention/Education provided during outreach:   Discussed next steps with disability process      Plan:   Patient will call [facility] for eligibility. If approved, SW will  make another referral to 's.  SW will complete next outreach in one month.

## 2021-07-19 DIAGNOSIS — R10.32 BILATERAL GROIN PAIN: ICD-10-CM

## 2021-07-19 DIAGNOSIS — R10.31 BILATERAL GROIN PAIN: ICD-10-CM

## 2021-07-19 RX ORDER — OXYCODONE AND ACETAMINOPHEN 5; 325 MG/1; MG/1
TABLET ORAL
Qty: 42 TABLET | Refills: 0 | Status: SHIPPED | OUTPATIENT
Start: 2021-07-20 | End: 2021-08-02

## 2021-08-02 DIAGNOSIS — R10.32 BILATERAL GROIN PAIN: ICD-10-CM

## 2021-08-02 DIAGNOSIS — R10.31 BILATERAL GROIN PAIN: ICD-10-CM

## 2021-08-02 RX ORDER — OXYCODONE AND ACETAMINOPHEN 5; 325 MG/1; MG/1
TABLET ORAL
Qty: 42 TABLET | Refills: 0 | Status: SHIPPED | OUTPATIENT
Start: 2021-08-02 | End: 2021-08-16

## 2021-08-06 ENCOUNTER — TELEPHONE (OUTPATIENT)
Dept: INTERNAL MEDICINE | Facility: CLINIC | Age: 46
End: 2021-08-06

## 2021-08-06 DIAGNOSIS — J45.20 INTERMITTENT ASTHMA, UNCOMPLICATED: ICD-10-CM

## 2021-08-06 RX ORDER — ALBUTEROL SULFATE 90 UG/1
AEROSOL, METERED RESPIRATORY (INHALATION)
Qty: 18 G | Refills: 3 | Status: SHIPPED | OUTPATIENT
Start: 2021-08-06 | End: 2021-11-15

## 2021-08-16 DIAGNOSIS — R10.32 BILATERAL GROIN PAIN: ICD-10-CM

## 2021-08-16 DIAGNOSIS — R10.31 BILATERAL GROIN PAIN: ICD-10-CM

## 2021-08-16 RX ORDER — OXYCODONE AND ACETAMINOPHEN 5; 325 MG/1; MG/1
TABLET ORAL
Qty: 42 TABLET | Refills: 0 | Status: SHIPPED | OUTPATIENT
Start: 2021-08-16 | End: 2021-08-23

## 2021-08-17 ENCOUNTER — PATIENT OUTREACH (OUTPATIENT)
Dept: CARE COORDINATION | Facility: CLINIC | Age: 46
End: 2021-08-17

## 2021-08-17 ASSESSMENT — ACTIVITIES OF DAILY LIVING (ADL): DEPENDENT_IADLS:: INDEPENDENT

## 2021-08-17 NOTE — LETTER
[facility] CARE COORDINATION  [facility]  [address], [place] [address]      September 14, 2021    Melody Muñoz  [address] [place] [address] [address] [place] [address]      Dear Melody,    I have been attempting to reach you since our last contact. I would like to continue to work with you and provide any additional support you may need on achieving your health care related goals. I would appreciate if you would give me a call at [phone] to let me know if you would like to continue working together. I know that there are many things that can affect our ability to communicate and I hope we can continue to work together.    All of us at the [facility] are invested in your health and are here to assist you in meeting your goals.     Sincerely,    MARLENE Patel

## 2021-08-17 NOTE — PROGRESS NOTES
Clinic Care Coordination Contact  [facility]/Voicemail    Referral Source: PCP (Hospital discharge follow up)  Clinical Data: Care Coordinator Outreach  Outreach attempted x 1.  Left message on patient's voicemail with call back information and requested return call.  Plan:  Care Coordinator will try to reach patient again in 10 business days.    SANJAY Clarke  Clinic Care Coordinator  [facility]  [phone]  [email or web address]

## 2021-08-23 ENCOUNTER — VIRTUAL VISIT (OUTPATIENT)
Dept: FAMILY MEDICINE | Facility: CLINIC | Age: 46
End: 2021-08-23
Payer: COMMERCIAL

## 2021-08-23 DIAGNOSIS — F31.9 BIPOLAR 1 DISORDER (H): ICD-10-CM

## 2021-08-23 DIAGNOSIS — R10.31 BILATERAL GROIN PAIN: Primary | ICD-10-CM

## 2021-08-23 DIAGNOSIS — F41.9 ANXIETY: ICD-10-CM

## 2021-08-23 DIAGNOSIS — R10.32 BILATERAL GROIN PAIN: Primary | ICD-10-CM

## 2021-08-23 DIAGNOSIS — F34.1 PERSISTENT DEPRESSIVE DISORDER: ICD-10-CM

## 2021-08-23 PROCEDURE — 99214 OFFICE O/P EST MOD 30 MIN: CPT | Mod: TEL | Performed by: PHYSICIAN ASSISTANT

## 2021-08-23 RX ORDER — GABAPENTIN 300 MG/1
800 CAPSULE ORAL 4 TIMES DAILY
Status: ON HOLD | COMMUNITY
Start: 2021-07-22 | End: 2023-12-13

## 2021-08-23 RX ORDER — CLONAZEPAM 0.5 MG/1
0.5 TABLET ORAL
COMMUNITY
Start: 2021-08-11 | End: 2021-12-03

## 2021-08-23 RX ORDER — OXYCODONE AND ACETAMINOPHEN 5; 325 MG/1; MG/1
TABLET ORAL
Qty: 42 TABLET | Refills: 0 | Status: SHIPPED | OUTPATIENT
Start: 2021-08-29 | End: 2021-09-10

## 2021-08-23 RX ORDER — DULOXETIN HYDROCHLORIDE 20 MG/1
CAPSULE, DELAYED RELEASE ORAL
Status: ON HOLD | COMMUNITY
Start: 2021-08-10 | End: 2023-12-13

## 2021-08-23 RX ORDER — SPIRONOLACTONE 100 MG/1
TABLET, FILM COATED ORAL
COMMUNITY
Start: 2021-08-11 | End: 2023-02-06

## 2021-08-23 NOTE — PROGRESS NOTES
Melody is a 45 year old who is being evaluated via a billable telephone visit.      What phone number would you like to be contacted at? [phone]  How would you like to obtain your AVS? Ori    Assessment & Plan   Problem List Items Addressed This Visit        Nervous and Auditory    Bilateral groin pain - Primary    Relevant Medications    oxyCODONE-acetaminophen (PERCOCET) 5-325 MG tablet       Behavioral    Bipolar 1 disorder  with psychosis (Chronic)    Relevant Medications    DULoxetine (CYMBALTA) 20 MG capsule    Persistent depressive disorder    Relevant Medications    DULoxetine (CYMBALTA) 20 MG capsule       Other    Anxiety    Relevant Medications    clonazePAM (KLONOPIN) 0.5 MG tablet    DULoxetine (CYMBALTA) 20 MG capsule    gabapentin (NEURONTIN) 800 MG tablet         Patient has been seeing Dr. Machado for pain management previously - he has retired and she is establishing with me as PCP.   Plan for pain management - patient will continue with current dose of oxycodone until psych meds are stable - then we will start an opioid taper as tolerated.   I am agreeable to continue with prescribing her opioids.  New CSA needed at next in person appt.                    Return in about 4 weeks (around 9/20/2021) for Preventive Care Visit, Medication Recheck.    Paradise Hennessy PA-C  [facility]   Melody is a 45 year old who presents for the following health issues     HPI     New Patient/Transfer of Care-Establish care     Medication Followup of oxycodone 5-325 mg     Taking Medication as prescribed: yes    Side Effects:  None    Medication Helping Symptoms:  yes     Goal is to taper off of oxycodone over time.   Inpatient for psychiatric medication change in May  Completed a med wash   For now, she is remaining at the same dose of oxycodone until psych medications are well established.   Other medications include Cymbalta, clonazepam, lithium,  ambien, gabapentin.     After psych medications are stable, we will taper oxycodone.         Review of Systems         Objective           Vitals:  No vitals were obtained today due to virtual visit.    Physical Exam   healthy, alert and no distress  PSYCH: Alert and oriented times 3; coherent speech, normal   rate and volume, able to articulate logical thoughts, able   to abstract reason, no tangential thoughts, no hallucinations   or delusions  Her affect is normal  RESP: No cough, no audible wheezing, able to talk in full sentences  Remainder of exam unable to be completed due to telephone visits                Phone call duration: 12 minutes

## 2021-08-26 ENCOUNTER — VIRTUAL VISIT (OUTPATIENT)
Dept: INTERNAL MEDICINE | Facility: CLINIC | Age: 46
End: 2021-08-26
Payer: COMMERCIAL

## 2021-08-26 DIAGNOSIS — J32.9 RECURRENT SINUS INFECTIONS: Primary | ICD-10-CM

## 2021-08-26 PROCEDURE — 99213 OFFICE O/P EST LOW 20 MIN: CPT | Mod: TEL | Performed by: FAMILY MEDICINE

## 2021-08-26 RX ORDER — DOXYCYCLINE 100 MG/1
100 CAPSULE ORAL 2 TIMES DAILY
Qty: 28 CAPSULE | Refills: 0 | Status: SHIPPED | OUTPATIENT
Start: 2021-08-26 | End: 2021-09-09

## 2021-08-26 NOTE — PATIENT INSTRUCTIONS
Will push fluids.  Try tylenol and advil.  May use salt water nasal sprays. Could try a Daisy pot.  I placed the patient on doxycycline 100 mg twice daily for 14 days.

## 2021-08-26 NOTE — PROGRESS NOTES
Melody is a 45 year old who is being evaluated via a billable telephone visit.      What phone number would you like to be contacted at? [phone]  How would you like to obtain your AVS? Mail a copy    Assessment & Plan     Recurrent sinus infections    - doxycycline hyclate (VIBRAMYCIN) 100 MG capsule; Take 1 capsule (100 mg) by mouth 2 times daily for 14 days             Patient Instructions   Will push fluids.  Try tylenol and advil.  May use salt water nasal sprays. Could try a Daisy pot.  I placed the patient on doxycycline 100 mg twice daily for 14 days.      Return in about 2 weeks (around 9/9/2021) for If symptoms persist.    Evaristo Machado MD  [facility]    Chaz Oliver is a 45 year old who presents for the following health issues     HPI     Acute Illness  Acute illness concerns: sinus  Onset/Duration: 1 week ago  Symptoms:  Fever: no  Chills/Sweats: no  Headache (location?): YES- above eyebrow area  Sinus Pressure: YES- drainage in throat and sinus pressure into cheecks  Conjunctivitis:  Feel inflamed in the am and then it goes down a bit in the afternoon  Ear Pain: no  Rhinorrhea: no  Congestion: YES  Sore Throat: YES- just waking up in the morning  Cough: YES - occasionally   Wheeze: YES  Decreased Appetite: YES- a little nauseated due to increase in antidepressant.   Nausea: YES could be because of increase in medication  Vomiting: no  Diarrhea: YES digestion has been off do to stress  Dysuria/Freq.: no  Dysuria or Hematuria: no  Fatigue/Achiness: YES- fatigue  Sick/Strep Exposure: no  Therapies tried and outcome: Steam in shower, fluids, and fluids.        Review of Systems   Constitutional, HEENT, cardiovascular, pulmonary, gi and gu systems are negative, except as otherwise noted.      Objective           Vitals:  No vitals were obtained today due to virtual visit.    Physical Exam   healthy, alert and no distress  PSYCH: Alert and oriented times  3; coherent speech, normal   rate and volume, able to articulate logical thoughts, able   to abstract reason, no tangential thoughts, no hallucinations   or delusions  Her affect is normal  RESP: No cough, no audible wheezing, able to talk in full sentences  Remainder of exam unable to be completed due to telephone visits                Phone call duration: 7 minutes

## 2021-09-10 ENCOUNTER — TELEPHONE (OUTPATIENT)
Dept: FAMILY MEDICINE | Facility: CLINIC | Age: 46
End: 2021-09-10

## 2021-09-10 DIAGNOSIS — R10.31 BILATERAL GROIN PAIN: ICD-10-CM

## 2021-09-10 DIAGNOSIS — R10.32 BILATERAL GROIN PAIN: ICD-10-CM

## 2021-09-10 RX ORDER — OXYCODONE AND ACETAMINOPHEN 5; 325 MG/1; MG/1
TABLET ORAL
Qty: 90 TABLET | Refills: 0 | Status: SHIPPED | OUTPATIENT
Start: 2021-09-10 | End: 2021-09-30

## 2021-09-10 NOTE — TELEPHONE ENCOUNTER
Reason for Call:  Other prescription    Detailed comments: Pt will need a refill for Oxycodone will need by Monday the 13     Phone Number Patient can be reached at: Cell number on file:    Telephone Information:   Mobile [phone]       Best Time: Anytime     Can we leave a detailed message on this number? YES    Call taken on 9/10/2021 at 10:02 AM by Brett Bower

## 2021-09-10 NOTE — TELEPHONE ENCOUNTER
RN not approved delegate. Provider to check .   Problem list is from old PCP, consider updating.    Controlled Substance Refill Request for   Requested Prescriptions   Pending Prescriptions Disp Refills     oxyCODONE-acetaminophen (PERCOCET) 5-325 MG tablet  Last Written Prescription Date:  8/29/2021  Last Fill Quantity: 42,  # refills: 0   Last office visit: Visit date not found with prescribing provider:  M[place]o   Future Office Visit:   Next 5 appointments (look out 90 days)    Sep 16, 2021  1:40 PM  PHYSICAL with Paradise Hennessy PA-C  [facility] ([facility] [address] [place] [id number] [phone]          42 tablet 0     Sig: Take 3 tablets daily       There is no refill protocol information for this order          Problem List Complete:  Yes   Patient is followed by VIRAL Deleon for ongoing prescription of pain medication.  All refills should be approved by this provider, or covering partner.     Medication(s):  Lyrica 150 mg bid, Fioricet with codeine prn, klonopin and ambien to be prescribed by [facility], not [facility].  Maximum quantity per month: 60, 20  Clinic visit frequency required: Q 3 months      Controlled substance agreement on file: Yes       Date(s): 9/8/2015  New CSA needed.  Pain Clinic evaluation in the past: No     DIRE Total Score(s):  No flowsheet data found.     Last [facility] website verification: 2/23/21 multiple meds from multiple outside providers     Clonazepam and dextroamphetamine prescribed by outside providers.         checked in past 3 months?  No, route to RN     [place] reviewed[place]  reviewed- recommend provider review    [place]:  [email or web address]  Routing refill request to provider for review/approval because:  Drug not on the [facility] refill protocol

## 2021-09-14 NOTE — PROGRESS NOTES
Clinic Care Coordination Contact  [facility]/Voicemail    Referral Source: PCP (Hospital discharge follow up)  Clinical Data: Care Coordinator Outreach  Outreach attempted x 2.  Left message on patient's voicemail with call back information and requested return call.  Plan: Care Coordinator will send unable to contact letter with care coordinator contact information via mail. Care Coordinator will do no further outreaches at this time.            SANJAY Clarke  Clinic Care Coordinator  [facility]  [phone]  [email or web address]

## 2021-09-30 ENCOUNTER — OFFICE VISIT (OUTPATIENT)
Dept: FAMILY MEDICINE | Facility: CLINIC | Age: 46
End: 2021-09-30
Payer: COMMERCIAL

## 2021-09-30 VITALS
TEMPERATURE: 99.1 F | HEIGHT: 62 IN | OXYGEN SATURATION: 94 % | BODY MASS INDEX: 28.89 KG/M2 | WEIGHT: 157 LBS | SYSTOLIC BLOOD PRESSURE: 104 MMHG | HEART RATE: 90 BPM | DIASTOLIC BLOOD PRESSURE: 60 MMHG | RESPIRATION RATE: 16 BRPM

## 2021-09-30 DIAGNOSIS — G89.29 OTHER CHRONIC BACK PAIN: ICD-10-CM

## 2021-09-30 DIAGNOSIS — R23.2 HOT FLASHES: ICD-10-CM

## 2021-09-30 DIAGNOSIS — E11.65 UNCONTROLLED TYPE 2 DIABETES MELLITUS WITH HYPERGLYCEMIA (H): ICD-10-CM

## 2021-09-30 DIAGNOSIS — J45.31 MILD PERSISTENT ASTHMA WITH ACUTE EXACERBATION: ICD-10-CM

## 2021-09-30 DIAGNOSIS — R10.32 BILATERAL GROIN PAIN: ICD-10-CM

## 2021-09-30 DIAGNOSIS — Z00.00 ROUTINE GENERAL MEDICAL EXAMINATION AT A HEALTH CARE FACILITY: Primary | ICD-10-CM

## 2021-09-30 DIAGNOSIS — F31.9 BIPOLAR 1 DISORDER (H): ICD-10-CM

## 2021-09-30 DIAGNOSIS — Z12.31 ENCOUNTER FOR SCREENING MAMMOGRAM FOR BREAST CANCER: ICD-10-CM

## 2021-09-30 DIAGNOSIS — G89.4 CHRONIC PAIN SYNDROME: ICD-10-CM

## 2021-09-30 DIAGNOSIS — E55.9 VITAMIN D DEFICIENCY: ICD-10-CM

## 2021-09-30 DIAGNOSIS — R10.31 BILATERAL GROIN PAIN: ICD-10-CM

## 2021-09-30 DIAGNOSIS — M54.89 OTHER CHRONIC BACK PAIN: ICD-10-CM

## 2021-09-30 DIAGNOSIS — E03.4 HYPOTHYROIDISM DUE TO ACQUIRED ATROPHY OF THYROID: ICD-10-CM

## 2021-09-30 LAB
BASOPHILS # BLD AUTO: 0.1 10E3/UL (ref 0–0.2)
BASOPHILS NFR BLD AUTO: 1 %
CREAT UR-MCNC: 38 MG/DL
EOSINOPHIL # BLD AUTO: 1.3 10E3/UL (ref 0–0.7)
EOSINOPHIL NFR BLD AUTO: 9 %
ERYTHROCYTE [DISTWIDTH] IN BLOOD BY AUTOMATED COUNT: 12.8 % (ref 10–15)
FSH SERPL-ACNC: 10 IU/L
HBA1C MFR BLD: 5.7 % (ref 0–5.6)
HCT VFR BLD AUTO: 45.7 % (ref 35–47)
HGB BLD-MCNC: 14.9 G/DL (ref 11.7–15.7)
LYMPHOCYTES # BLD AUTO: 2.7 10E3/UL (ref 0.8–5.3)
LYMPHOCYTES NFR BLD AUTO: 19 %
MCH RBC QN AUTO: 28.9 PG (ref 26.5–33)
MCHC RBC AUTO-ENTMCNC: 32.6 G/DL (ref 31.5–36.5)
MCV RBC AUTO: 89 FL (ref 78–100)
MONOCYTES # BLD AUTO: 0.8 10E3/UL (ref 0–1.3)
MONOCYTES NFR BLD AUTO: 6 %
NEUTROPHILS # BLD AUTO: 9.1 10E3/UL (ref 1.6–8.3)
NEUTROPHILS NFR BLD AUTO: 65 %
PLATELET # BLD AUTO: 479 10E3/UL (ref 150–450)
RBC # BLD AUTO: 5.16 10E6/UL (ref 3.8–5.2)
WBC # BLD AUTO: 14 10E3/UL (ref 4–11)

## 2021-09-30 PROCEDURE — 82570 ASSAY OF URINE CREATININE: CPT | Performed by: PHYSICIAN ASSISTANT

## 2021-09-30 PROCEDURE — 36415 COLL VENOUS BLD VENIPUNCTURE: CPT | Performed by: PHYSICIAN ASSISTANT

## 2021-09-30 PROCEDURE — 90686 IIV4 VACC NO PRSV 0.5 ML IM: CPT | Performed by: PHYSICIAN ASSISTANT

## 2021-09-30 PROCEDURE — 82306 VITAMIN D 25 HYDROXY: CPT | Performed by: PHYSICIAN ASSISTANT

## 2021-09-30 PROCEDURE — 99214 OFFICE O/P EST MOD 30 MIN: CPT | Mod: 25 | Performed by: PHYSICIAN ASSISTANT

## 2021-09-30 PROCEDURE — 83036 HEMOGLOBIN GLYCOSYLATED A1C: CPT | Performed by: PHYSICIAN ASSISTANT

## 2021-09-30 PROCEDURE — 80307 DRUG TEST PRSMV CHEM ANLYZR: CPT | Performed by: PHYSICIAN ASSISTANT

## 2021-09-30 PROCEDURE — 99396 PREV VISIT EST AGE 40-64: CPT | Mod: 25 | Performed by: PHYSICIAN ASSISTANT

## 2021-09-30 PROCEDURE — 80050 GENERAL HEALTH PANEL: CPT | Performed by: PHYSICIAN ASSISTANT

## 2021-09-30 PROCEDURE — 90471 IMMUNIZATION ADMIN: CPT | Performed by: PHYSICIAN ASSISTANT

## 2021-09-30 PROCEDURE — 83001 ASSAY OF GONADOTROPIN (FSH): CPT | Performed by: PHYSICIAN ASSISTANT

## 2021-09-30 PROCEDURE — 80061 LIPID PANEL: CPT | Performed by: PHYSICIAN ASSISTANT

## 2021-09-30 PROCEDURE — 82043 UR ALBUMIN QUANTITATIVE: CPT | Performed by: PHYSICIAN ASSISTANT

## 2021-09-30 RX ORDER — OXYCODONE AND ACETAMINOPHEN 5; 325 MG/1; MG/1
TABLET ORAL
Qty: 90 TABLET | Refills: 0 | Status: SHIPPED | OUTPATIENT
Start: 2021-10-09 | End: 2021-11-08

## 2021-09-30 ASSESSMENT — ENCOUNTER SYMPTOMS
DIARRHEA: 0
CONSTIPATION: 0

## 2021-09-30 ASSESSMENT — ASTHMA QUESTIONNAIRES
QUESTION_2 LAST FOUR WEEKS HOW OFTEN HAVE YOU HAD SHORTNESS OF BREATH: MORE THAN ONCE A DAY
QUESTION_5 LAST FOUR WEEKS HOW WOULD YOU RATE YOUR ASTHMA CONTROL: POORLY CONTROLLED
ACT_TOTALSCORE: 9
QUESTION_3 LAST FOUR WEEKS HOW OFTEN DID YOUR ASTHMA SYMPTOMS (WHEEZING, COUGHING, SHORTNESS OF BREATH, CHEST TIGHTNESS OR PAIN) WAKE YOU UP AT NIGHT OR EARLIER THAN USUAL IN THE MORNING: TWO OR THREE NIGHTS A WEEK
QUESTION_4 LAST FOUR WEEKS HOW OFTEN HAVE YOU USED YOUR RESCUE INHALER OR NEBULIZER MEDICATION (SUCH AS ALBUTEROL): THREE OR MORE TIMES PER DAY
QUESTION_1 LAST FOUR WEEKS HOW MUCH OF THE TIME DID YOUR ASTHMA KEEP YOU FROM GETTING AS MUCH DONE AT WORK, SCHOOL OR AT HOME: SOME OF THE TIME

## 2021-09-30 ASSESSMENT — MIFFLIN-ST. JEOR: SCORE: 1320.53

## 2021-09-30 ASSESSMENT — PATIENT HEALTH QUESTIONNAIRE - PHQ9: SUM OF ALL RESPONSES TO PHQ QUESTIONS 1-9: 14

## 2021-09-30 ASSESSMENT — PAIN SCALES - GENERAL: PAINLEVEL: MILD PAIN (2)
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
## 2021-09-30 NOTE — LETTER
Opioid / Opioid Plus Controlled Substance Agreement    This is an agreement between you and your provider about the safe and appropriate use of controlled substance/opioids prescribed by your care team. Controlled substances are medicines that can cause physical and mental dependence (abuse).    There are strict laws about having and using these medicines. We here at [facility] are committing to working with you in your efforts to get better. To support you in this work, we ll help you schedule regular office appointments for medicine refills. If we must cancel or change your appointment for any reason, we ll make sure you have enough medicine to last until your next appointment.     As a Provider, I will:    Listen carefully to your concerns and treat you with respect.     Recommend a treatment plan that I believe is in your best interest. This plan may involve therapies other than opioid pain medication.     Talk with you often about the possible benefits, and the risk of harm of any medicine that we prescribe for you.     Provide a plan on how to taper (discontinue or go off) using this medicine if the decision is made to stop its use.    As a Patient, I understand that opioid(s):     Are a controlled substance prescribed by my care team to help me function or work and manage my condition(s).     Are strong medicines and can cause serious side effects such as:    Drowsiness, which can seriously affect my driving ability    A lower breathing rate, enough to cause death    Harm to my thinking ability     Depression     Abuse of and addiction to this medicine    Need to be taken exactly as prescribed. Combining opioids with certain medicines or chemicals (such as illegal drugs, sedatives, sleeping pills, and benzodiazepines) can be dangerous or even fatal. If I stop opioids suddenly, I may have severe withdrawal symptoms.    Do not work for all types of pain nor for all patients. If they re not helpful, I may  be asked to stop them.        The risks, benefits and side effects of these medicine(s) were explained to me. I agree that:  1. I will take part in other treatments as advised by my care team. This may be psychiatry or counseling, physical therapy, behavioral therapy, group treatment or a referral to a specialist.     2. I will keep all my appointments. I understand that this is part of the monitoring of opioids. My care team may require an office visit for EVERY opioid/controlled substance refill. If I miss appointments or don t follow instructions, my care team may stop my medicine.    3. I will take my medicines as prescribed. I will not change the dose or schedule unless my care team tells me to. There will be no refills if I run out early.     4. I may be asked to come to the clinic and complete a urine drug test or complete a pill count at any time. If I don t give a urine sample or participate in a pill count, the care team may stop my medicine.    5. I will only receive prescriptions from this clinic for chronic pain. If I am treated by another provider for acute pain issues, I will tell them that I am taking opioid pain medication for chronic pain and that I have a treatment agreement with this provider. I will inform my [facility] care team within one business day if I am given a prescription for any pain medication by another healthcare provider. My [facility] care team can contact other providers and pharmacists about my use of any medicines.    6. It is up to me to make sure that I don t run out of my medicines on weekends or holidays. If my care team is willing to refill my opioid prescription without a visit, I must request refills only during office hours. Refills may take up to 3 business days to process. I will use one pharmacy to fill all my opioid and other controlled substance prescriptions. I will notify the clinic about any changes to my insurance or medication  availability.    7. I am responsible for my prescriptions. If the medicine/prescription is lost, stolen or destroyed, it will not be replaced. I also agree not to share controlled substance medicines with anyone.    8. I am aware I should not use any illegal or recreational drugs. I agree not to drink alcohol unless my care team says I can.       9. If I enroll in the [place] Medical Cannabis program, I will tell my care team prior to my next refill.     10. I will tell my care team right away if I become pregnant, have a new medical problem treated outside of my regular clinic, or have a change in my medications.    11. I understand that this medicine can affect my thinking, judgment and reaction time. Alcohol and drugs affect the brain and body, which can affect the safety of my driving. Being under the influence of alcohol or drugs can affect my decision-making, behaviors, personal safety, and the safety of others. Driving while impaired (DWI) can occur if a person is driving, operating, or in physical control of a car, motorcycle, boat, snowmobile, ATV, motorbike, off-road vehicle, or any other motor vehicle ([place] Statute 169A.20). I understand the risk if I choose to drive or operate any vehicle or machinery.    I understand that if I do not follow any of the conditions above, my prescriptions or treatment may be stopped or changed.          Opioids  What You Need to Know    What are opioids?   Opioids are pain medicines that must be prescribed by a doctor. They are also known as narcotics.     Examples are:   1. morphine (MS Contin, Aneta)  2. oxycodone (Oxycontin)  3. oxycodone and acetaminophen (Percocet)  4. hydrocodone and acetaminophen (Vicodin, Norco)   5. fentanyl patch (Duragesic)   6. hydromorphone (Dilaudid)   7. methadone  8. codeine (Tylenol #3)     What do opioids do well?   Opioids are best for severe short-term pain such as after a surgery or injury. They may work well for cancer pain. They may  help some people with long-lasting (chronic) pain.     What do opioids NOT do well?   Opioids never get rid of pain entirely, and they don t work well for most patients with chronic pain. Opioids don t reduce swelling, one of the causes of pain.                                    Other ways to manage chronic pain and improve function include:       Treat the health problem that may be causing pain    Anti-inflammation medicines, which reduce swelling and tenderness, such as ibuprofen (Advil, Motrin) or naproxen (Aleve)    Acetaminophen (Tylenol)    Antidepressants and anti-seizure medicines, especially for nerve pain    Topical treatments such as patches or creams    Injections or nerve blocks    Chiropractic or osteopathic treatment    Acupuncture, massage, deep breathing, meditation, visual imagery, aromatherapy    Use heat or ice at the pain site    Physical therapy     Exercise    Stop smoking    Take part in therapy       Risks and side effects     Talk to your doctor before you start or decide to keep taking opioids. Possible side effects include:      Lowering your breathing rate enough to cause death    Overdose, including death, especially if taking higher than prescribed doses    Worse depression symptoms; less pleasure in things you usually enjoy    Feeling tired or sluggish    Slower thoughts or cloudy thinking    Being more sensitive to pain over time; pain is harder to control    Trouble sleeping or restless sleep    Changes in hormone levels (for example, less testosterone)    Changes in sex drive or ability to have sex    Constipation    Unsafe driving    Itching and sweating    Dizziness    Nausea, throwing up and dry mouth    What else should I know about opioids?    Opioids may lead to dependence, tolerance, or addiction.      Dependence means that if you stop or reduce the medicine too quickly, you will have withdrawal symptoms. These include loose poop (diarrhea), jitters, flu-like symptoms,  nervousness and tremors. Dependence is not the same as addiction.                       Tolerance means needing higher doses over time to get the same effect. This may increase the chance of serious side effects.      Addiction is when people improperly use a substance that harms their body, their mind or their relations with others. Use of opiates can cause a relapse of addiction if you have a history of drug or alcohol abuse.      People who have used opioids for a long time may have a lower quality of life, worse depression, higher levels of pain and more visits to doctors.    You can overdose on opioids. Take these steps to lower your risk of overdose:    1. Recognize the signs:  Signs of overdose include decrease or loss of consciousness (blackout), slowed breathing, trouble waking up and blue lips. If someone is worried about overdose, they should call 911.    2. Talk to your doctor about Narcan (naloxone).   If you are at risk for overdose, you may be given a prescription for Narcan. This medicine very quickly reverses the effects of opioids.   If you overdose, a friend or family member can give you Narcan while waiting for the ambulance. They need to know the signs of overdose and how to give Narcan.     3. Don't use alcohol or street drugs.   Taking them with opioids can cause death.    4. Do not take any of these medicines unless your doctor says it s OK. Taking these with opioids can cause death:    Benzodiazepines, such as lorazepam (Ativan), alprazolam (Xanax) or diazepam (Valium)    Muscle relaxers, such as cyclobenzaprine (Flexeril)    Sleeping pills like zolpidem (Ambien)     Other opioids      How to keep you and other people safe while taking opioids:    1. Never share your opioids with others.  Opioid medicines are regulated by the Drug Enforcement Agency (BELLE). Selling or sharing medications is a criminal act.    2. Be sure to store opioids in a secure place, locked up if possible. Young children  can easily swallow them and overdose.    3. When you are traveling with your medicines, keep them in the original bottles. If you use a pill box, be sure you also carry a copy of your medicine list from your clinic or pharmacy.    4. Safe disposal of opioids    Most pharmacies have places to get rid of medicine, called disposal kiosks. Medicine disposal options are also available in every [facility]. Search your county and  medication disposal  to find more options. You can find more details at:  [email or web address]-[email or web address]-[email or web address]-medications     I agree that my provider, clinic care team, and pharmacy may work with any city, state or federal law enforcement agency that investigates the misuse, sale, or other diversion of my controlled medicine. I will allow my provider to discuss my care with, or share a copy of, this agreement with any other treating provider, pharmacy or emergency room where I receive care.    I have read this agreement and have asked questions about anything I did not understand.    _______________________________________________________  Patient Signature - Melody Muñoz _____________________                   Date     _______________________________________________________  Provider Signature - Paradise Hennessy PA-C   _____________________                   Date     _______________________________________________________  Witness Signature (required if provider not present while patient signing)   _____________________                   Date

## 2021-09-30 NOTE — PATIENT INSTRUCTIONS
Preventive Health Recommendations  Female Ages 40 to 49    Yearly exam:     See your health care provider every year in order to  1. Review health changes.   2. Discuss preventive care.    3. Review your medicines if your doctor prescribed any.      Get a Pap test every three years (unless you have an abnormal result and your provider advises testing more often).      If you get Pap tests with HPV test, you only need to test every 5 years, unless you have an abnormal result. You do not need a Pap test if your uterus was removed (hysterectomy) and you have not had cancer.      You should be tested each year for STDs (sexually transmitted diseases), if you're at risk.     Ask your doctor if you should have a mammogram.      Have a colonoscopy (test for colon cancer) if someone in your family has had colon cancer or polyps before age 50.       Have a cholesterol test every 5 years.       Have a diabetes test (fasting glucose) after age 45. If you are at risk for diabetes, you should have this test every 3 years.    Shots: Get a flu shot each year. Get a tetanus shot every 10 years.     Nutrition:     Eat at least 5 servings of fruits and vegetables each day.    Eat whole-grain bread, whole-wheat pasta and brown rice instead of white grains and rice.    Get adequate Calcium and Vitamin D.      Lifestyle    Exercise at least 150 minutes a week (an average of 30 minutes a day, 5 days a week). This will help you control your weight and prevent disease.    Limit alcohol to one drink per day.    No smoking.     Wear sunscreen to prevent skin cancer.    See your dentist every six months for an exam and cleaning.        MENTAL HEALTH CRISIS NUMBERS:   For a medical emergency please call  911 or go to the nearest ER.     [facility]:   [facility] -[phone]   Crisis Residence [facility] -[phone]   Walk-In Counseling Center [facility] -[phone]   COPE 24/7 [place] Mobile Team  -[phone] (adults)/[phone] (child)   CHILD: [facility] needs assessment team - [phone]       [facility]:   [facility] - [phone]   Walk-in counseling [facility] - [phone]   Walk-in counseling [facility] - [phone]   Crisis Residence [facility] - [phone]   Urgent Care Adult Mental [facility]-[phone] mobile unit/ 24/7 crisis line     National Crisis Numbers:   National Suicide Prevention Lifeline: 0-012-305-TALK (979-915-0013)   Poison Control Center - 1-684.782.3512   [employer]/resources for a list of additional resources (SOS)   Trans Lifeline a hotline for transgender people 2-317-058-3378   The [employer] a hotline for LGBT youth 4-863-963-7853   Crisis Text Line: For any crisis 24/7   To: 664959  see www.crisistextline.org   - IF MAKING A CALL FEELS TOO HARD, send a text!             Plan to schedule a COVID vaccine booster after [place] - you qualify for this due to diabetes and your age.             You are due for a mammogram, which is a study to screen for breast cancer.  [facility] has several Breast Centers and also a walk-in mammogram service (no appointment needed!) at our [facility] location.  Below is the contact information.  Please complete the mammogram at your earliest convenience.    [facility] walk-in mammogram services:  Address:   [address] [place] [address]  Hours: M/Tu/Th 7:00AM-7:00PM, W/F 7:00AM-5:00PM    [facility]  [address]  [address]  [place] [address]  Appointments: [phone] or [phone]  Hours: M-F 7:00AM-5:00PM    [facility]  [address] [facility]  [address]  [place] [address]  Appointments: [phone] or [phone]

## 2021-09-30 NOTE — LETTER
October 7, 2021      Melody Muñoz  [address] [place] [address]        Dear ,    We are writing to inform you of your test results.    - Hemoglobin A1C shows prediabetes.  It is improved from your previous A1C.  - You FSH shows you are not in menopause or perimenopause.   - Your cholesterol is high , but not so high you need to start medications.  Below is your ASCVD risk - this is your risk of having a heart attack or stroke in the next 10 years.  We will continue to check your cholesterol every year.    The 10-year ASCVD risk score (Paul WOMACK Jr., et al., 2013) is: 4.2%    Values used to calculate the score:      Age: 45 years      Sex: Female      Is Non- : No      Diabetic: Yes      Tobacco smoker: Yes      Systolic Blood Pressure: 104 mmHg      Is BP treated: No      HDL Cholesterol: 55 mg/dL      Total Cholesterol: 224 mg/dL  Lifestyle changes to lower cholesterol:  1. Diet: Eating a Mediterranean diet can help lower cholesterol.   Check out this link from [facility]:  - [email or web address]-[email or web address]-for-lower-cholesterol/  2. Exercise:  I recommend increasing exercise by one time a week to start.    The end goal for exercise is 150 minutes of exercise each week!    Diet and exercise are important!  - Improving your diet and increasing exercise has been shown to improve your overall health.  It is THE BEST preventive health measure.  It is also beneficial to mental health.   - A  or dietician are both great resources if that is available to you.     All remaining labs are normal.       Resulted Orders   HEMOGLOBIN A1C   Result Value Ref Range    Hemoglobin A1C 5.7 (H) 0.0 - 5.6 %      Comment:      Normal <5.7%   Prediabetes 5.7-6.4%    Diabetes 6.5% or higher     Note: Adopted from ADA consensus guidelines.   Albumin Random Urine Quantitative with Creat Ratio   Result Value Ref Range    Creatinine Urine mg/dL 41 mg/dL     Albumin Urine mg/L <5 mg/L    Albumin Urine mg/g Cr        Comment:      Unable to calculate:  Urine creatinine or albumin value below detectable level   Lipid panel reflex to direct LDL Fasting   Result Value Ref Range    Cholesterol 224 (H) <200 mg/dL    Triglycerides 122 <150 mg/dL    Direct Measure HDL 55 >=50 mg/dL    LDL Cholesterol Calculated 145 (H) <=100 mg/dL    Non HDL Cholesterol 169 (H) <130 mg/dL    Patient Fasting > 8hrs? Yes     Narrative    Cholesterol  Desirable:  <200 mg/dL    Triglycerides  Normal:  Less than 150 mg/dL  Borderline High:  150-199 mg/dL  High:  200-499 mg/dL  Very High:  Greater than or equal to 500 mg/dL    Direct Measure HDL  Female:  Greater than or equal to 50 mg/dL   Male:  Greater than or equal to 40 mg/dL    LDL Cholesterol  Desirable:  <100mg/dL  Above Desirable:  100-129 mg/dL   Borderline High:  130-159 mg/dL   High:  160-189 mg/dL   Very High:  >= 190 mg/dL    Non HDL Cholesterol  Desirable:  130 mg/dL  Above Desirable:  130-159 mg/dL  Borderline High:  160-189 mg/dL  High:  190-219 mg/dL  Very High:  Greater than or equal to 220 mg/dL   Comprehensive metabolic panel (BMP + Alb, Alk Phos, ALT, AST, Total. Bili, TP)   Result Value Ref Range    Sodium 135 133 - 144 mmol/L    Potassium 4.5 3.4 - 5.3 mmol/L    Chloride 106 94 - 109 mmol/L    Carbon Dioxide (CO2) 22 20 - 32 mmol/L    Anion Gap 7 3 - 14 mmol/L    Urea Nitrogen 7 7 - 30 mg/dL    Creatinine 0.84 0.52 - 1.04 mg/dL    Calcium 9.5 8.5 - 10.1 mg/dL    Glucose 97 70 - 99 mg/dL    Alkaline Phosphatase 96 40 - 150 U/L    AST 19 0 - 45 U/L    ALT 31 0 - 50 U/L    Protein Total 7.6 6.8 - 8.8 g/dL    Albumin 3.9 3.4 - 5.0 g/dL    Bilirubin Total 0.2 0.2 - 1.3 mg/dL    GFR Estimate 85 >60 mL/min/1.73m2      Comment:      As of July 11, 2021, eGFR is calculated by the CKD-EPI creatinine equation, without race adjustment. eGFR can be influenced by muscle mass, exercise, and diet. The reported eGFR is an estimation only and  is only applicable if the renal function is stable.   TSH with free T4 reflex   Result Value Ref Range    TSH 1.24 0.40 - 4.00 mU/L   Vitamin D Deficiency   Result Value Ref Range    Vitamin D, Total (25-Hydroxy) 71 20 - 75 ug/L    Narrative    Season, race, dietary intake, and treatment affect the concentration of 25-hydroxy-Vitamin D. Values may decrease during winter months and increase during summer months. Values 20-29 ug/L may indicate Vitamin D insufficiency and values <20 ug/L may indicate Vitamin D deficiency.    Vitamin D determination is routinely performed by an immunoassay specific for 25 hydroxyvitamin D3.  If an individual is on vitamin D2(ergocalciferol) supplementation, please specify 25 OH vitamin D2 and D3 level determination by LCMSMS test VITD23.     Follicle stimulating hormone   Result Value Ref Range    FSH 10.0 IU/L      Comment:      FEMALE:   Age                         0 to 7 days: 3.4 U/L or less   8 to 15 days: 1.0 U/L or less  16 days to 10 years: 0.3-6.9 U/L  11 years: 0.4-9.0 U/L   12 years: 1.0-17.2 U/L   13 years: 1.8-9.9 U/L   14 to 16 years: 0.9-12.4 U/L   17 years: 1.2-9.6 U/L     Premenopausal, 18 and older:   Follicular: 2.5-10.2 U/L  Mid-cycle: 3.4-33.4 U/L  Luteal: 1.5-9.1 U/L  Postmenopausal: 23.0-116.3 U/L    Female Benjamin Stages   Stage I: 0.4-6.7 IU/L   Stage II: 0.5-8.7 IU/L   Stage III: 1.2-11.4 IU/L   Stage IV: 0.7-12.8 IU/L   Stage V: 1.0-11.6 IU/L     Puberty onset (transition from Benjamin Stage I to Benjamin Stage II) occurs for girls at a median age of 10.5 years.   There is evidence that it may occur up to 1 year earlier in obese girls and in  girls.   Progression through Benjamin stages is variable. Benjamin Stage V (adult) should be reached by age 18.    CBC with platelets and differential   Result Value Ref Range    WBC Count 14.0 (H) 4.0 - 11.0 10e3/uL    RBC Count 5.16 3.80 - 5.20 10e6/uL    Hemoglobin 14.9 11.7 - 15.7 g/dL    Hematocrit 45.7 35.0 -  47.0 %    MCV 89 78 - 100 fL    MCH 28.9 26.5 - 33.0 pg    MCHC 32.6 31.5 - 36.5 g/dL    RDW 12.8 10.0 - 15.0 %    Platelet Count 479 (H) 150 - 450 10e3/uL    % Neutrophils 65 %    % Lymphocytes 19 %    % Monocytes 6 %    % Eosinophils 9 %    % Basophils 1 %    Absolute Neutrophils 9.1 (H) 1.6 - 8.3 10e3/uL    Absolute Lymphocytes 2.7 0.8 - 5.3 10e3/uL    Absolute Monocytes 0.8 0.0 - 1.3 10e3/uL    Absolute Eosinophils 1.3 (H) 0.0 - 0.7 10e3/uL    Absolute Basophils 0.1 0.0 - 0.2 10e3/uL    Narrative    Results confirmed by repeat test.   Urine Drug Confirmation Panel   Result Value Ref Range    Oxymorphone ng/mL 110 (H) <50 ng/mL    Oxymorphone 289 Absent ng/mg [creat]      Comment:      Oxymorphone is an expected metabolite of oxycodone. Oxymorphone is also available as a scheduled prescription medication.    7-Aminoclonazepam Present (A) Absent      Comment:      7-aminoclonazepam is an expected metabolite of clonazepam. Sources of clonazepam are scheduled prescription medications.    Gabapentin (Neurontin) Present (A) Absent      Comment:      Sources of gabapentin are prescription medications.    Creatinine Urine mg/dL 38 mg/dL    Narrative    This test was developed and its performance characteristics determined by the [facility],  Special Chemistry Laboratory. It has not been cleared or approved by the FDA. The laboratory is regulated under CLIA as qualified to perform high-complexity testing. This test is used for clinical purposes. It should not be regarded as investigational or for research.   Creatinine random urine   Result Value Ref Range    Creatinine Urine mg/dL 38 mg/dL      Comment:      The reference range has not been established for creatinine in random urines. The results should be integrated into the clinical context for interpretation.       If you have any questions or concerns, please call the clinic at the number listed above.       Sincerely,      [place]  Leora Hennessy PA-C

## 2021-10-01 LAB
ALBUMIN SERPL-MCNC: 3.9 G/DL (ref 3.4–5)
ALP SERPL-CCNC: 96 U/L (ref 40–150)
ALT SERPL W P-5'-P-CCNC: 31 U/L (ref 0–50)
ANION GAP SERPL CALCULATED.3IONS-SCNC: 7 MMOL/L (ref 3–14)
AST SERPL W P-5'-P-CCNC: 19 U/L (ref 0–45)
BILIRUB SERPL-MCNC: 0.2 MG/DL (ref 0.2–1.3)
BUN SERPL-MCNC: 7 MG/DL (ref 7–30)
CALCIUM SERPL-MCNC: 9.5 MG/DL (ref 8.5–10.1)
CHLORIDE BLD-SCNC: 106 MMOL/L (ref 94–109)
CHOLEST SERPL-MCNC: 224 MG/DL
CO2 SERPL-SCNC: 22 MMOL/L (ref 20–32)
CREAT SERPL-MCNC: 0.84 MG/DL (ref 0.52–1.04)
CREAT UR-MCNC: 41 MG/DL
DEPRECATED CALCIDIOL+CALCIFEROL SERPL-MC: 71 UG/L (ref 20–75)
FASTING STATUS PATIENT QL REPORTED: YES
GFR SERPL CREATININE-BSD FRML MDRD: 85 ML/MIN/1.73M2
GLUCOSE BLD-MCNC: 97 MG/DL (ref 70–99)
HDLC SERPL-MCNC: 55 MG/DL
LDLC SERPL CALC-MCNC: 145 MG/DL
MICROALBUMIN UR-MCNC: <5 MG/L
MICROALBUMIN/CREAT UR: NORMAL MG/G{CREAT}
NONHDLC SERPL-MCNC: 169 MG/DL
POTASSIUM BLD-SCNC: 4.5 MMOL/L (ref 3.4–5.3)
PROT SERPL-MCNC: 7.6 G/DL (ref 6.8–8.8)
SODIUM SERPL-SCNC: 135 MMOL/L (ref 133–144)
TRIGL SERPL-MCNC: 122 MG/DL
TSH SERPL DL<=0.005 MIU/L-ACNC: 1.24 MU/L (ref 0.4–4)

## 2021-10-01 ASSESSMENT — ASTHMA QUESTIONNAIRES: ACT_TOTALSCORE: 9

## 2021-10-02 ENCOUNTER — NURSE TRIAGE (OUTPATIENT)
Dept: NURSING | Facility: CLINIC | Age: 46
End: 2021-10-02

## 2021-10-02 LAB
7AMINOCLONAZEPAM UR QL CFM: PRESENT
CREATININE URINE MG/DL  (SYNCED VALUE): 38 MG/DL
GABAPENTIN UR QL CFM: PRESENT
OXYMORPHONE UR CFM-MCNC: 110 NG/ML
OXYMORPHONE/CREAT UR: 289 NG/MG {CREAT}

## 2021-10-02 NOTE — TELEPHONE ENCOUNTER
History of asthma , has used nebulizer due to inhaler not effective.  She has been so tired, bad muscle aches , bad diarrhea and headache .  Overall pain as moderate. She has had her covid vaccinations.  Care advice is to go to the ED to be evaluated for shortness of breath.    Patient verbalized understanding and agrees with plan.    Linnea Parsons Nurse Triage Advisor 7:45 AM 10/2/2021    Reason for Disposition    [1] COVID-19 vaccine series completed (fully vaccinated) in past 3 months AND [2] new-onset of COVID-19 symptoms BUT [3] no known exposure    [1] MODERATE difficulty breathing (e.g., speaks in phrases, SOB even at rest, pulse 100-120) AND [2] NEW-onset or WORSE than normal    Additional Information    Negative: [1] Difficulty with breathing or swallowing AND [2] starts within 2 hours after injection    Negative: Difficult to awaken or acting confused (e.g., disoriented, slurred speech)    Negative: Unresponsive, passed out, or very weak    Negative: Sounds like a life-threatening emergency to the triager    Negative: Fever > 104 F (40 C)    Negative: [1] Fever > 101 F (38.3 C) AND [2] age > 60    Negative: [1] Fever > 100.0 F (37.8 C) AND [2] bedridden (e.g., nursing home patient, CVA, chronic illness, recovering from surgery)    Negative: [1] Fever > 100.0 F (37.8 C) AND [2] diabetes mellitus or weak immune system (e.g., HIV positive, cancer chemo, splenectomy, organ transplant, chronic steroids)    Negative: [1] Measles vaccine rash (onset day 6-12) AND [2] purple or blood-colored    Negative: Sounds like a severe, unusual reaction to the triager    Negative: [1] Redness or red streak around the injection site AND [2] begins > 48 hours after shot AND [3] fever    Negative: [1] Redness or red streak around the injection site AND [2] begins > 48 hours after shot AND [3] no fever  (Exception: red area < 1 inch or 2.5 cm wide)    Negative: Fever present > 3 days (72 hours)    Negative: [1] Over 3  days (72 hours) since shot AND [2] redness, swelling or pain getting worse    Negative: [1] Smallpox vaccine and [2] eye pain, eye redness, or rash on eyelids    Negative: [1] Pain, tenderness, or swelling at the injection site AND [2] persists > 3 days    Negative: [1] Measles vaccine rash (onset day 6-12) AND [2] persists > 3 days    Negative: [1] Deep lump follows (in 2 to 8 weeks) Td or TDaP  shot AND [2] becomes tender to the touch    Negative: SEVERE difficulty breathing (e.g., struggling for each breath, speaks in single words)    Negative: Difficult to awaken or acting confused (e.g., disoriented, slurred speech)    Negative: Bluish (or gray) lips or face now    Negative: Shock suspected (e.g., cold/pale/clammy skin, too weak to stand, low BP, rapid pulse)    Negative: Sounds like a life-threatening emergency to the triager    Protocols used: CORONAVIRUS (COVID-19) DIAGNOSED OR [id number] 3.25, BREATHING DIFFICULTY-A-AH, IMMUNIZATION [id number]

## 2021-10-07 ENCOUNTER — TELEPHONE (OUTPATIENT)
Dept: FAMILY MEDICINE | Facility: CLINIC | Age: 46
End: 2021-10-07
Payer: COMMERCIAL

## 2021-11-05 DIAGNOSIS — R10.31 BILATERAL GROIN PAIN: ICD-10-CM

## 2021-11-05 DIAGNOSIS — R10.32 BILATERAL GROIN PAIN: ICD-10-CM

## 2021-11-05 NOTE — TELEPHONE ENCOUNTER
Call from patient who reports she will be out of medication as of Monday 10/8. Requesting refill.     Controlled Substance Refill Request for   Requested Prescriptions   Pending Prescriptions Disp Refills     oxyCODONE-acetaminophen (PERCOCET) 5-325 MG tablet  Last Written Prescription Date:  10/9/2021  Last Fill Quantity: 90,  # refills: 0   Last office visit: 9/30/2021 with prescribing provider:  Minoo   Future Office Visit:     90 tablet 0     Sig: Take 3 tablets daily       There is no refill protocol information for this order          Problem List Complete:  Yes   Patient is followed by VIRAL Deleon for ongoing prescription of pain medication.  All refills should be approved by this provider, or covering partner.     Medication(s):  Lyrica 150 mg bid, Fioricet with codeine prn, klonopin and ambien to be prescribed by psych, not [place].  Maximum quantity per month: 60, 20  Clinic visit frequency required: Q 3 months      Controlled substance agreement on file: Yes       Date(s): 9/8/2015  New CSA needed.  Pain Clinic evaluation in the past: No     DIRE Total Score(s):  No flowsheet data found.     Last [facility] website verification: 2/23/21 multiple meds from multiple outside providers     Clonazepam and dextroamphetamine prescribed by outside providers.         checked in past 3 months?  No, route to RN

## 2021-11-08 ENCOUNTER — NURSE TRIAGE (OUTPATIENT)
Dept: FAMILY MEDICINE | Facility: CLINIC | Age: 46
End: 2021-11-08
Payer: COMMERCIAL

## 2021-11-08 RX ORDER — OXYCODONE AND ACETAMINOPHEN 5; 325 MG/1; MG/1
TABLET ORAL
Qty: 90 TABLET | Refills: 0 | Status: SHIPPED | OUTPATIENT
Start: 2021-11-08 | End: 2021-12-03

## 2021-11-08 NOTE — TELEPHONE ENCOUNTER
Pt calling and tested positive for COVID yesterday and has questions. Please call her back [phone]. Thank you.  Marce Camilo,

## 2021-11-10 ENCOUNTER — TELEPHONE (OUTPATIENT)
Dept: FAMILY MEDICINE | Facility: CLINIC | Age: 46
End: 2021-11-10
Payer: COMMERCIAL

## 2021-11-10 NOTE — TELEPHONE ENCOUNTER
Patient given message from Yudy Cabrales PA-C.  Patient took notes while being given message.  Patient agrees to MAB treatment and is will to make the trip to [facility]. States that she will wait for call to schedule.    Advised of 24/7 nurse line for new or worsening symptoms.    Writer did not document heart rate in note below. Patient has oximeter and heart rate is 74,  Marge Echavarria RN

## 2021-11-10 NOTE — TELEPHONE ENCOUNTER
Patient does not meet criteria for admission at this time, so do not feel an ED visit is warranted. Does not meet Well criteria for PE, but remains on differential. Does meet criteria for MAB.    Triage - please let patient know she qualifies for MAB; unfortunately the closest location is in [facility] (3 hours west of the [facility]). If she is able to make this drive, I recommend MAB treatment (the clinic will contact her to schedule). Otherwise, continue albuterol use frequently. Recommend deep breathing exercises (10 deep breaths in & out at least once per day). Stay well-hydrated. Alternate Tylenol and ibuprofen. Run humidifier, especially while asleep. Recommend sleeping on stomach if able (prone position) to help lungs expand better while asleep.

## 2021-11-10 NOTE — TELEPHONE ENCOUNTER
Pt called in requesting information for the covid antibody. Website link provided.     Rita Larsen RN

## 2021-11-10 NOTE — TELEPHONE ENCOUNTER
Nurse Triage SBAR    Is this a 2nd Level Triage? YES, LICENSED PRACTITIONER REVIEW IS REQUIRED    Situation  : Positive home test Sunday. Symptoms started Th or Friday. Worst symptom is breathing. Burning feeling when breaths in. Sats 91-94 %. States hard to take deep breath when laying down.     Background  : Asthma and obesity    Assessment : Positive COVID 19     (See information below for more triage details.)    Recommendation : Routing to provider for recommendation for PCP triage.    Protocol Recommended Disposition: Emergency department  Reason for Disposition    MILD difficulty breathing (e.g., minimal/no SOB at rest, SOB with walking, pulse <100)    Additional Information    Negative: SEVERE difficulty breathing (e.g., struggling for each breath, speaks in single words)    Negative: Difficult to awaken or acting confused (e.g., disoriented, slurred speech)    Negative: Bluish (or gray) lips or face now    Negative: Shock suspected (e.g., cold/pale/clammy skin, too weak to stand, low BP, rapid pulse)    Negative: Sounds like a life-threatening emergency to the triager    Negative: [1] COVID-19 exposure AND [2] no symptoms    Negative: COVID-19 vaccine reaction suspected (e.g., fever, headache, muscle aches) occurring 1 to 3 days after getting vaccine    Negative: COVID-19 vaccine, questions about    Negative: [1] Lives with someone known to have influenza (flu test positive) AND [2] flu-like symptoms (e.g., cough, runny nose, sore throat, SOB; with or without fever)    Negative: [1] Adult with possible COVID-19 symptoms AND [2] triager concerned about severity of symptoms or other causes    Negative: COVID-19 and breastfeeding, questions about    Negative: SEVERE or constant chest pain or pressure (Exception: mild central chest pain, present only when coughing)    Negative: MODERATE difficulty breathing (e.g., speaks in phrases, SOB even at rest, pulse 100-120)    Negative: [1] Headache AND [2] stiff neck  "(can't touch chin to chest)    Answer Assessment - Initial Assessment Questions  1. COVID-19 DIAGNOSIS: \"Who made your Coronavirus (COVID-19) diagnosis?\" \"Was it confirmed by a positive lab test?\" If not diagnosed by a HCP, ask \"Are there lots of cases (community spread) where you live?\" (See [facility] health department website, if unsure)      Tested positive for COVID 19 on Sunday. Home test.  Symptoms started last Thursday or Friday.   2. COVID-19 EXPOSURE: \"Was there any known exposure to COVID before the symptoms began?\" CDC Definition of close contact: within 6 feet (2 meters) for a total of 15 minutes or more over a 24-hour period.       No known exposure.   3. ONSET: \"When did the COVID-19 symptoms start?\"       11/4 or 11/5  4. WORST SYMPTOM: \"What is your worst symptom?\" (e.g., cough, fever, shortness of breath, muscle aches)      Reports O2 sat 91-94 %. States that if is hard to take a deep breath when lying down. Fatigue and headache.   5. COUGH: \"Do you have a cough?\" If Yes, ask: \"How bad is the cough?\"     \"   Productive cough. States that she has not looked at what she coughs up.  States that her cough isn't that bad.\" States that that frequent.  6. FEVER: \"Do you have a fever?\" If Yes, ask: \"What is your temperature, how was it measured, and when did it start?\"      99.4 , taking Tylenol  7. RESPIRATORY STATUS: \"Describe your breathing?\" (e.g., shortness of breath, wheezing, unable to speak)       \"Feels shallow\" Feels like not getting a full breath. Not wheezing.   8. BETTER-SAME-WORSE: \"Are you getting better, staying the same or getting worse compared to yesterday?\"  If getting worse, ask, \"In what way?\"   breathing is getting worse.  9. HIGH RISK DISEASE: \"Do you have any chronic medical problems?\" (e.g., asthma, heart or lung disease, weak immune system, obesity, etc.)      Asthma, obesity  10. PREGNANCY: \"Is there any chance you are pregnant?\" \"When was your last menstrual period?\"        No, " "hysterectom  11. OTHER SYMPTOMS: \"Do you have any other symptoms?\"  (e.g., chills, fatigue, headache, loss of smell or taste, muscle pain, sore throat; new loss of smell or taste especially support the diagnosis of COVID-19)        Chills, body aches, fatigue, headache,sore throat    Protocols used: CORONAVIRUS (COVID-19) DIAGNOSED OR [id number] 8.25.2021  Marge Echavarria RN    "

## 2021-11-14 DIAGNOSIS — J45.20 INTERMITTENT ASTHMA, UNCOMPLICATED: ICD-10-CM

## 2021-11-15 RX ORDER — ALBUTEROL SULFATE 90 UG/1
AEROSOL, METERED RESPIRATORY (INHALATION)
Qty: 8.5 G | Refills: 3 | Status: SHIPPED | OUTPATIENT
Start: 2021-11-15 | End: 2022-02-28

## 2021-11-15 NOTE — TELEPHONE ENCOUNTER
Routing refill request to provider for review/approval because:  ACT Total Scores 10/24/2019 12/3/2019 9/30/2021   ACT TOTAL SCORE - - -   ASTHMA ER VISITS - - -   ASTHMA HOSPITALIZATIONS - - -   ACT TOTAL SCORE (Goal Greater than or Equal to 20) 19 21 9   In the past 12 months, how many times did you visit the emergency room for your asthma without being admitted to the hospital? 0 0 0   In the past 12 months, how many times were you hospitalized overnight because of your asthma? 0 0 0      Carolina BARRIENTOS RN  EP Triage

## 2021-11-15 NOTE — TELEPHONE ENCOUNTER
Pt toni has COVID currently and understands that this might be early refill but states she needs this to assist with easier breathing.   Marylou Fleming

## 2021-11-19 NOTE — TELEPHONE ENCOUNTER
I'm confused as to what she wants from us.  Prednisone is used to treat back pain so as she expected, I don't believe it is causing this.   137

## 2021-12-01 ENCOUNTER — DOCUMENTATION ONLY (OUTPATIENT)
Dept: ADMINISTRATIVE | Facility: CLINIC | Age: 46
End: 2021-12-01
Payer: COMMERCIAL

## 2021-12-01 ENCOUNTER — HOSPITAL ENCOUNTER (EMERGENCY)
Facility: CLINIC | Age: 46
Discharge: HOME | End: 2021-12-01
Payer: COMMERCIAL

## 2021-12-01 VITALS
OXYGEN SATURATION: 97 % | HEART RATE: 102 BPM | WEIGHT: 156.53 LBS | DIASTOLIC BLOOD PRESSURE: 95 MMHG | RESPIRATION RATE: 20 BRPM | BODY MASS INDEX: 28.33 KG/M2 | TEMPERATURE: 98.8 F | SYSTOLIC BLOOD PRESSURE: 122 MMHG

## 2021-12-01 LAB
HOLD SPECIMEN: NORMAL

## 2021-12-02 ENCOUNTER — NURSE TRIAGE (OUTPATIENT)
Dept: FAMILY MEDICINE | Facility: CLINIC | Age: 46
End: 2021-12-02
Payer: COMMERCIAL

## 2021-12-02 ENCOUNTER — OFFICE VISIT (OUTPATIENT)
Dept: FAMILY MEDICINE | Facility: CLINIC | Age: 46
End: 2021-12-02
Payer: COMMERCIAL

## 2021-12-02 VITALS
DIASTOLIC BLOOD PRESSURE: 83 MMHG | SYSTOLIC BLOOD PRESSURE: 126 MMHG | HEART RATE: 94 BPM | RESPIRATION RATE: 20 BRPM | TEMPERATURE: 98.6 F | OXYGEN SATURATION: 95 %

## 2021-12-02 DIAGNOSIS — U07.1 INFECTION DUE TO 2019 NOVEL CORONAVIRUS: ICD-10-CM

## 2021-12-02 DIAGNOSIS — R25.1 TREMOR: Primary | ICD-10-CM

## 2021-12-02 DIAGNOSIS — R10.32 BILATERAL GROIN PAIN: ICD-10-CM

## 2021-12-02 DIAGNOSIS — E87.1 HYPONATREMIA: ICD-10-CM

## 2021-12-02 DIAGNOSIS — R10.31 BILATERAL GROIN PAIN: ICD-10-CM

## 2021-12-02 DIAGNOSIS — R10.9 FLANK PAIN: ICD-10-CM

## 2021-12-02 DIAGNOSIS — R79.89 ELEVATED PLATELET COUNT: ICD-10-CM

## 2021-12-02 DIAGNOSIS — D72.829 LEUKOCYTOSIS, UNSPECIFIED TYPE: ICD-10-CM

## 2021-12-02 LAB
ALBUMIN UR-MCNC: NEGATIVE MG/DL
APPEARANCE UR: CLEAR
BACTERIA #/AREA URNS HPF: ABNORMAL /HPF
BASOPHILS # BLD AUTO: 0.1 10E3/UL (ref 0–0.2)
BASOPHILS NFR BLD AUTO: 0 %
BILIRUB UR QL STRIP: NEGATIVE
COLOR UR AUTO: YELLOW
EOSINOPHIL # BLD AUTO: 1 10E3/UL (ref 0–0.7)
EOSINOPHIL NFR BLD AUTO: 6 %
ERYTHROCYTE [DISTWIDTH] IN BLOOD BY AUTOMATED COUNT: 12.8 % (ref 10–15)
GLUCOSE UR STRIP-MCNC: NEGATIVE MG/DL
HBA1C MFR BLD: 5.8 % (ref 0–5.6)
HCT VFR BLD AUTO: 45.4 % (ref 35–47)
HGB BLD-MCNC: 15 G/DL (ref 11.7–15.7)
HGB UR QL STRIP: NEGATIVE
KETONES UR STRIP-MCNC: NEGATIVE MG/DL
LEUKOCYTE ESTERASE UR QL STRIP: NEGATIVE
LYMPHOCYTES # BLD AUTO: 3.9 10E3/UL (ref 0.8–5.3)
LYMPHOCYTES NFR BLD AUTO: 25 %
MCH RBC QN AUTO: 28.7 PG (ref 26.5–33)
MCHC RBC AUTO-ENTMCNC: 33 G/DL (ref 31.5–36.5)
MCV RBC AUTO: 87 FL (ref 78–100)
MONOCYTES # BLD AUTO: 0.9 10E3/UL (ref 0–1.3)
MONOCYTES NFR BLD AUTO: 6 %
MUCOUS THREADS #/AREA URNS LPF: PRESENT /LPF
NEUTROPHILS # BLD AUTO: 9.9 10E3/UL (ref 1.6–8.3)
NEUTROPHILS NFR BLD AUTO: 63 %
NITRATE UR QL: NEGATIVE
PH UR STRIP: 5.5 [PH] (ref 5–7)
PLATELET # BLD AUTO: 514 10E3/UL (ref 150–450)
RBC # BLD AUTO: 5.22 10E6/UL (ref 3.8–5.2)
RBC #/AREA URNS AUTO: ABNORMAL /HPF
SP GR UR STRIP: 1.02 (ref 1–1.03)
SQUAMOUS #/AREA URNS AUTO: ABNORMAL /LPF
UROBILINOGEN UR STRIP-ACNC: 0.2 E.U./DL
WBC # BLD AUTO: 15.8 10E3/UL (ref 4–11)
WBC #/AREA URNS AUTO: ABNORMAL /HPF

## 2021-12-02 PROCEDURE — 83036 HEMOGLOBIN GLYCOSYLATED A1C: CPT | Performed by: PHYSICIAN ASSISTANT

## 2021-12-02 PROCEDURE — 81001 URINALYSIS AUTO W/SCOPE: CPT | Performed by: PHYSICIAN ASSISTANT

## 2021-12-02 PROCEDURE — 36415 COLL VENOUS BLD VENIPUNCTURE: CPT | Performed by: PHYSICIAN ASSISTANT

## 2021-12-02 PROCEDURE — 99214 OFFICE O/P EST MOD 30 MIN: CPT | Performed by: PHYSICIAN ASSISTANT

## 2021-12-02 PROCEDURE — 80050 GENERAL HEALTH PANEL: CPT | Performed by: PHYSICIAN ASSISTANT

## 2021-12-02 PROCEDURE — 80053 COMPREHEN METABOLIC PANEL: CPT | Performed by: PHYSICIAN ASSISTANT

## 2021-12-02 ASSESSMENT — PAIN SCALES - GENERAL: PAINLEVEL: EXTREME PAIN (8)

## 2021-12-02 NOTE — PROGRESS NOTES
Assessment & Plan   Problem List Items Addressed This Visit        Nervous and Auditory    Bilateral groin pain    Relevant Medications    oxyCODONE-acetaminophen (PERCOCET) 5-325 MG tablet      Other Visit Diagnoses     Tremor    -  Primary    Relevant Orders    TSH with free T4 reflex (Completed)    Glucose (Completed)    Hemoglobin A1c (Completed)    UA with Microscopic reflex to Culture - lab collect (Completed)    CBC with platelets and differential (Completed)    Urine Microscopic (Completed)    Infection due to 2019 novel coronavirus        Flank pain             New tremor - cause is not clear.  Labs ordered as above to evaluate.  Patient should also follow up with psychiatry regarding possible medication side effects.  She has been using albuterol every 2 hours - I would like her to gradually wean from this frequency to see if the tremors improve.  Monitor wheezing and shortness of breath.   Refill oxycodone per CSA   Flank pain - UA, urine culture to r/o infection or blood in the urine.      WBC slightly elevated with neutrophilia, eosinophilia, and elevated platelets.  Most likely due to resolving COVID-19 infection and/or underlying asthma.  Recheck in 1-2 weeks. Add peripheral smear and reticulocyte count to future labs.     ADDENDUM - patient concerned about lithium level, this was added to her labs and lithium level is within range.                Tobacco Cessation:   reports that she has been smoking cigarettes. She has a 17.00 pack-year smoking history. She has never used smokeless tobacco.  Tobacco Cessation Action Plan: not discussed today        Return in about 2 weeks (around 12/16/2021) for a recheck if you are not improved.    Paradise Hennessy PA-C  [facility] MARIA ISABEL Oliver is a 46 year old who presents for the following health issues     HPI     Concern - Tremors   Onset: x 2 week   Description: shaking, tremors, body feels cold, burning  sensation chest, cough, fatigue   Intensity: moderate, severe  Progression of Symptoms:  worsening and constant  Accompanying Signs & Symptoms: recently had Covid-19 in Nov 7th   Previous history of similar problem:   Precipitating factors:        Worsened by:   Alleviating factors:        Improved by:   Therapies tried and outcome: None    Tremors on and off, started 2 weeks ago but getting worse  Jerky when she brushes her teeth  No fevers, sometimes feels like chills  No history of tremors  Not specifically intentional or resting, could be either  Still very tired after COVID diagnosis   Sleep - ok  Added dexedrine about a month ago, taking it does not correlate with tremors    Urgency, no burning.  Hesitancy.   Drinking plenty of water    Since COVID - still burning in the chest and cough.  Still hard to breath, still using rescue inhaler every 2 hours as needed.             Review of Systems         Objective    /83   Pulse 94   Temp 98.6  F (37  C) (Tympanic)   Resp 20   LMP  (LMP Unknown)   SpO2 95%   There is no height or weight on file to calculate BMI.  Physical Exam  Constitutional:       General: She is not in acute distress.     Appearance: She is well-developed. She is not diaphoretic.   HENT:      Head: Normocephalic.      Right Ear: External ear normal.      Left Ear: External ear normal.      Nose: Nose normal.   Eyes:      Conjunctiva/sclera: Conjunctivae normal.   Pulmonary:      Effort: Pulmonary effort is normal.   Musculoskeletal:      Cervical back: Normal range of motion.   Neurological:      Mental Status: She is alert and oriented to person, place, and time.      GCS: GCS eye subscore is 4. GCS verbal subscore is 5. GCS motor subscore is 6.      Cranial Nerves: Cranial nerves are intact.      Motor: Tremor present. No weakness.      Coordination: Coordination is intact.      Gait: Gait is intact.   Psychiatric:         Judgment: Judgment normal.            Results for orders  placed or performed in visit on 12/02/21   TSH with free T4 reflex     Status: Normal   Result Value Ref Range    TSH 2.06 0.40 - 4.00 mU/L   Glucose     Status: Abnormal   Result Value Ref Range    Glucose 116 (H) 70 - 99 mg/dL    Patient Fasting > 8hrs? No    Hemoglobin A1c     Status: Abnormal   Result Value Ref Range    Hemoglobin A1C 5.8 (H) 0.0 - 5.6 %   UA with Microscopic reflex to Culture - lab collect     Status: Normal    Specimen: Urine, Midstream   Result Value Ref Range    Color Urine Yellow Colorless, Straw, Light Yellow, Yellow    Appearance Urine Clear Clear    Glucose Urine Negative Negative mg/dL    Bilirubin Urine Negative Negative    Ketones Urine Negative Negative mg/dL    Specific Gravity Urine 1.025 1.003 - 1.035    Blood Urine Negative Negative    pH Urine 5.5 5.0 - 7.0    Protein Albumin Urine Negative Negative mg/dL    Urobilinogen Urine 0.2 0.2, 1.0 E.U./dL    Nitrite Urine Negative Negative    Leukocyte Esterase Urine Negative Negative   CBC with platelets and differential     Status: Abnormal   Result Value Ref Range    WBC Count 15.8 (H) 4.0 - 11.0 10e3/uL    RBC Count 5.22 (H) 3.80 - 5.20 10e6/uL    Hemoglobin 15.0 11.7 - 15.7 g/dL    Hematocrit 45.4 35.0 - 47.0 %    MCV 87 78 - 100 fL    MCH 28.7 26.5 - 33.0 pg    MCHC 33.0 31.5 - 36.5 g/dL    RDW 12.8 10.0 - 15.0 %    Platelet Count 514 (H) 150 - 450 10e3/uL    % Neutrophils 63 %    % Lymphocytes 25 %    % Monocytes 6 %    % Eosinophils 6 %    % Basophils 0 %    Absolute Neutrophils 9.9 (H) 1.6 - 8.3 10e3/uL    Absolute Lymphocytes 3.9 0.8 - 5.3 10e3/uL    Absolute Monocytes 0.9 0.0 - 1.3 10e3/uL    Absolute Eosinophils 1.0 (H) 0.0 - 0.7 10e3/uL    Absolute Basophils 0.1 0.0 - 0.2 10e3/uL   Urine Microscopic     Status: Abnormal   Result Value Ref Range    Bacteria Urine Few (A) None Seen /HPF    RBC Urine 0-2 0-2 /HPF /HPF    WBC Urine 0-5 0-5 /HPF /HPF    Squamous Epithelials Urine Few (A) None Seen /LPF    Mucus Urine Present (A)  None Seen /LPF    Narrative    Urine Culture not indicated   Comprehensive metabolic panel (BMP + Alb, Alk Phos, ALT, AST, Total. Bili, TP)     Status: Abnormal   Result Value Ref Range    Sodium 132 (L) 133 - 144 mmol/L    Potassium 4.4 3.4 - 5.3 mmol/L    Chloride 102 94 - 109 mmol/L    Carbon Dioxide (CO2) 27 20 - 32 mmol/L    Anion Gap 3 3 - 14 mmol/L    Urea Nitrogen 8 7 - 30 mg/dL    Creatinine 0.94 0.52 - 1.04 mg/dL    Calcium 9.3 8.5 - 10.1 mg/dL    Glucose 114 (H) 70 - 99 mg/dL    Alkaline Phosphatase 90 40 - 150 U/L    AST 17 0 - 45 U/L    ALT 29 0 - 50 U/L    Protein Total 7.3 6.8 - 8.8 g/dL    Albumin 3.7 3.4 - 5.0 g/dL    Bilirubin Total 0.4 0.2 - 1.3 mg/dL    GFR Estimate 74 >60 mL/min/1.73m2   CBC with platelets and differential     Status: Abnormal    Narrative    The following orders were created for panel order CBC with platelets and differential.  Procedure                               Abnormality         Status                     ---------                               -----------         ------                     CBC with platelets and d...[[id number]]  Abnormal            Final result                 Please view results for these tests on the individual orders.

## 2021-12-02 NOTE — ED TRIAGE NOTES
Pt called her psyc. Nurse line with concerns over difficulty over completing tasks due to shakiness and uncontrollable movements of extremeties and head, which might be related to lithium levels.   Pt also states it feels like she is breathing through a straw and feels difficult to get a full breath. Pt also reports it hurts/burns every time she breaths in.  Pt had covid on November 7 2021. Pt had the antibody infusion after her breathing started to worsen.

## 2021-12-02 NOTE — TELEPHONE ENCOUNTER
Appt scheduled 12/2/21 3:00 pm.     Rita Larsen RN    Reason for Disposition    Patient wants to be seen    Neck pain (with neurologic deficit)    Additional Information    Negative: Bluish (or gray) lips or face    Negative: Severe difficulty breathing (e.g., struggling for each breath, speaks in single words)    Negative: Rapid onset of cough and has hives    Negative: Coughing started suddenly after medicine, an allergic food or bee sting    Negative: Difficulty breathing after exposure to flames, smoke, or fumes    Negative: Sounds like a life-threatening emergency to the triager    Negative: Previous asthma attacks and this feels like asthma attack    Negative: Chest pain present when not coughing    Negative: Difficulty breathing    Negative: Passed out (i.e., fainted, collapsed and was not responding)    Negative: Patient sounds very sick or weak to the triager    Negative: Coughed up > 1 tablespoon (15 ml) blood (Exception: blood-tinged sputum)    Negative: Fever > 103 F (39.4 C)    Negative: Fever > 101 F (38.3 C) and over 60 years of age    Negative: Fever > 100.0 F (37.8 C) and has diabetes mellitus or a weak immune system (e.g., HIV positive, cancer chemotherapy, organ transplant, splenectomy, chronic steroids)    Negative: Fever > 100.0 F (37.8 C) and bedridden (e.g., nursing home patient, stroke, chronic illness, recovering from surgery)    Negative: Increasing ankle swelling    Negative: Wheezing is present    Negative: SEVERE coughing spells (e.g., whooping sound after coughing, vomiting after coughing)    Negative: Coughing up steve-colored (reddish-brown) or blood-tinged sputum    Negative: Fever present > 3 days (72 hours)    Negative: Fever returns after gone for over 24 hours and symptoms worse or not improved    Negative: Using nasal washes and pain medicine > 24 hours and sinus pain persists    Negative: Known COPD or other severe lung disease (i.e., bronchiectasis, cystic fibrosis,  lung surgery) and worsening symptoms (i.e., increased sputum purulence or amount, increased breathing difficulty)    Negative: Continuous (nonstop) coughing interferes with work or school and no improvement using cough treatment per Care Advice    Negative: Difficult to awaken or acting confused (e.g., disoriented, slurred speech)    Negative: New neurologic deficit that is present NOW, sudden onset of ANY of the following: * Weakness of the face, arm, or leg on one side of the body* Numbness of the face, arm, or leg on one side of the body* Loss of speech or garbled speech    Negative: Sounds like a life-threatening emergency to the triager    Negative: Confusion, disorientation, or hallucinations is the main symptom    Negative: Dizziness is the main symptom    Negative: Followed a head injury within last 3 days    Negative: Headache (with neurologic deficit)    Negative: Unable to urinate (or only a few drops) and bladder feels very full    Negative: Loss of control of bowel or bladder (i.e., incontinence) of new onset    Negative: Back pain with numbness (loss of sensation) in groin or rectal area    Negative: Patient sounds very sick or weak to the triager    Negative: Neurologic deficit that was brief (now gone), ANY of the following: * Weakness of the face, arm, or leg on one side of the body * Numbness of the face, arm, or leg on one side of the body * Loss of speech or garbled speech    Negative: Neurologic deficit of gradual onset, ANY of the following: * Weakness of the face, arm, or leg on one side of the body * Numbness of the face, arm, or leg on one side of the body * Loss of speech or garbled speech    Negative: [place] palsy suspected (i.e., weakness only one side of the face, developing over hours to days, no other symptoms)    Negative: Tingling (e.g., pins and needles) of the face, arm or leg on one side of the body, that is  present now (Exceptions: chronic/recurrent symptom lasting > 4 weeks or  "tingling from known cause, such as: bumped elbow, carpal tunnel syndrome, pinched nerve, frostbite)    Answer Assessment - Initial Assessment Questions  1. ONSET: \"When did the cough begin?\"       In November when she tested positive for COVID   2. SEVERITY: \"How bad is the cough today?\"       \"It's not horrible\"   3. RESPIRATORY DISTRESS: \"Describe your breathing.\"       \" I take an inhaler to manage the wheezing\" \" I feel like I am using the inhaler more than before\"   4. FEVER: \"Do you have a fever?\" If so, ask: \"What is your temperature, how was it measured, and when did it start?\"      \" I don't know\" Unable to check   5. HEMOPTYSIS: \"Are you coughing up any blood?\" If so ask: \"How much?\" (flecks, streaks, tablespoons, etc.)      No   6. TREATMENT: \"What have you done so far to treat the cough?\" (e.g., meds, fluids, humidifier)      \"Dimeatap\"  7. CARDIAC HISTORY: \"Do you have any history of heart disease?\" (e.g., heart attack, congestive heart failure)       No  8. LUNG HISTORY: \"Do you have any history of lung disease?\"  (e.g., pulmonary embolus, asthma, emphysema)      Asthma   9. PE RISK FACTORS: \"Do you have a history of blood clots?\" (or: recent major surgery, recent prolonged travel, bedridden)      No   10. OTHER SYMPTOMS: \"Do you have any other symptoms? (e.g., runny nose, wheezing, chest pain)       Not currently wheezing. When wheezing her inhalers helps   11. PREGNANCY: \"Is there any chance you are pregnant?\" \"When was your last menstrual period?\"        No- hysterectomy   12. TRAVEL: \"Have you traveled out of the country in the last month?\" (e.g., travel history, exposures)        NO    Answer Assessment - Initial Assessment Questions  1. SYMPTOM: \"What is the main symptom you are concerned about?\" (e.g., weakness, numbness)      Involuntary movements throughout the body   2. ONSET: \"When did this start?\" (minutes, hours, days; while sleeping)      Started a couple of weeks ago   3. LAST NORMAL: " "\"When was the last time you were normal (no symptoms)?\"      Couple of weeks ago   4. PATTERN \"Does this come and go, or has it been constant since it started?\"  \"Is it present now?\"      Comes and goes- Currently having involuntary movement in arms and in the left thigh   5. CARDIAC SYMPTOMS: \"Have you had any of the following symptoms: chest pain, difficulty breathing, palpitations?\"      No   6. NEUROLOGIC SYMPTOMS: \"Have you had any of the following symptoms: headache, dizziness, vision loss, double vision, changes in speech, unsteady on your feet?\"      Yes- unsteady on feet   7. OTHER SYMPTOMS: \"Do you have any other symptoms?\"      Cough   8. PREGNANCY: \"Is there any chance you are pregnant?\" \"When was your last menstrual period?\"      No- Hysterectomy    Protocols used: COUGH-A-OH, NEUROLOGIC DEFICIT-A-OH      "

## 2021-12-02 NOTE — PATIENT INSTRUCTIONS
Try to use less albuterol, this can make you feel shaky.     We will add some labs to the blood draw from yesterday, and I will notify of the results.     If you do not improve with the change in albuterol, and the results are negative - we will adjust the plan as needed.     Urine test ordered today to eval for UTI.

## 2021-12-03 ENCOUNTER — NURSE TRIAGE (OUTPATIENT)
Dept: FAMILY MEDICINE | Facility: CLINIC | Age: 46
End: 2021-12-03
Payer: COMMERCIAL

## 2021-12-03 DIAGNOSIS — R10.9 FLANK PAIN: Primary | ICD-10-CM

## 2021-12-03 DIAGNOSIS — F31.9 BIPOLAR 1 DISORDER (H): ICD-10-CM

## 2021-12-03 DIAGNOSIS — R25.1 TREMOR: Primary | ICD-10-CM

## 2021-12-03 LAB
ALBUMIN SERPL-MCNC: 3.7 G/DL (ref 3.4–5)
ALP SERPL-CCNC: 90 U/L (ref 40–150)
ALT SERPL W P-5'-P-CCNC: 29 U/L (ref 0–50)
ANION GAP SERPL CALCULATED.3IONS-SCNC: 3 MMOL/L (ref 3–14)
AST SERPL W P-5'-P-CCNC: 17 U/L (ref 0–45)
BILIRUB SERPL-MCNC: 0.4 MG/DL (ref 0.2–1.3)
BUN SERPL-MCNC: 8 MG/DL (ref 7–30)
CALCIUM SERPL-MCNC: 9.3 MG/DL (ref 8.5–10.1)
CHLORIDE BLD-SCNC: 102 MMOL/L (ref 94–109)
CO2 SERPL-SCNC: 27 MMOL/L (ref 20–32)
CREAT SERPL-MCNC: 0.94 MG/DL (ref 0.52–1.04)
FASTING STATUS PATIENT QL REPORTED: NO
GFR SERPL CREATININE-BSD FRML MDRD: 74 ML/MIN/1.73M2
GLUCOSE BLD-MCNC: 114 MG/DL (ref 70–99)
GLUCOSE BLD-MCNC: 116 MG/DL (ref 70–99)
POTASSIUM BLD-SCNC: 4.4 MMOL/L (ref 3.4–5.3)
PROT SERPL-MCNC: 7.3 G/DL (ref 6.8–8.8)
SODIUM SERPL-SCNC: 132 MMOL/L (ref 133–144)
TSH SERPL DL<=0.005 MIU/L-ACNC: 2.06 MU/L (ref 0.4–4)

## 2021-12-03 RX ORDER — CLONAZEPAM 0.5 MG/1
1 TABLET ORAL DAILY
Qty: 14 TABLET | Refills: 0 | Status: ON HOLD | OUTPATIENT
Start: 2021-12-03 | End: 2023-12-13

## 2021-12-03 RX ORDER — OXYCODONE AND ACETAMINOPHEN 5; 325 MG/1; MG/1
1 TABLET ORAL 3 TIMES DAILY PRN
Qty: 90 TABLET | Refills: 0 | Status: SHIPPED | OUTPATIENT
Start: 2021-12-07 | End: 2022-01-05

## 2021-12-03 NOTE — TELEPHONE ENCOUNTER
"Call back from patient.     States she spoke with Christofer re: results. RN relayed message below again, and patient is aware.    She called psychiatrist and she is out of clinic. 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 She thought that increasing my clonopin right now... I'm just going to say for a week or until I see my therapist which is Wednesday..\"     Pt is requesting that Christofer increase her dose of clonopin because \"I cannot stand the shaking.\" She states, \"it is just too scary these tremors. Then we'll know if I take the higher dose and the tremors go away it is more anxiety.\"     Routing to PCP to review/adivse.      "

## 2021-12-03 NOTE — TELEPHONE ENCOUNTER
Regarding CBC - white blood cell count, platelets, and neutrophils are all elevated - this is a gradual change seen over the past 3 months.     This pattern often means there is an infection (like during an acute illness), however in that case I would expect it to improve.   It can also be caused by Lithium, stress, or smoking (all apply to this patient).      I have ordered a Lithium level, and metabolic panel to look for any other abnormalities.          Christofer Hennessy PA-C

## 2021-12-03 NOTE — TELEPHONE ENCOUNTER
"Call back from patient who requests lab results. Relayed results without interpreting. She has questions and wants to know next steps for treatment.     Regarding CBC she states, \"why would these be consistently high? Wouldn't doctors say 'what is going on?'. Pt was also hoping to have liver panel ordered. Advised this was not completed. She states, 'I know lithium can affect the liver and could cause tremors.\" Provider to advise on lab results and adding liver panel.    She states the area where she has chronic pain has been hurting a lot more. Describes as mid spine to lower back. Rates as 4/10. Scheduled follow-up appt with neurologist for February 2022. Continuing to have tremor and states she sometimes has difficulty holding a cup. She states she believes it is related to COVID.     Pt having burning at end of urination. Describes some intermittent shooting abdominal pain. \"I won't even rate it because it comes and goes so fast.\"     Routing to provider to review/advise.      Reason for Disposition    Pain or burning with passing urine (urination)    Additional Information    Negative: Passed out (i.e., fainted, collapsed and was not responding)    Negative: Shock suspected (e.g., cold/pale/clammy skin, too weak to stand, low BP, rapid pulse)    Negative: Sounds like a life-threatening emergency to the triager    Negative: Major injury to the back (e.g., MVA, fall > 10 feet or 3 meters, penetrating injury, etc.)    Negative: Pain in the upper back over the ribs (rib cage) that radiates (travels) into the chest    Negative: Pain in the upper back over the ribs (rib cage) and worsened by coughing (or clearly increases with breathing)    Negative: SEVERE back pain of sudden onset and age > 60    Negative: SEVERE abdominal pain (e.g., excruciating)    Negative: Abdominal pain and age > 60    Negative: Unable to urinate (or only a few drops) and bladder feels very full    Negative: Loss of bladder or bowel control " "(urine or bowel incontinence; wetting self, leaking stool) of new onset    Negative: Numbness (loss of sensation) in groin or rectal area    Negative: Pain radiates into groin, scrotum    Negative: Blood in urine (red, pink, or tea-colored)    Negative: Vomiting and pain over lower ribs of back (i.e., flank - kidney area)    Negative: Weakness of a leg or foot (e.g., unable to bear weight, dragging foot)    Negative: Patient sounds very sick or weak to the triager    Negative: Fever > 100.4 F (38.0 C) and flank pain    Answer Assessment - Initial Assessment Questions  1. ONSET: \"When did the pain begin?\"         Worsening breakthrough pain got worse starting today.     Questions if this is a long term effect from COVID (tremors and shakes)   - states her cognition is off  - memory is off  - cannot think of words  - has been ongoing since having COVID (11/7/2021) and sx are continuing     Hard to take a sip from a cup because her hands shake  - when grabbing and holding on to something, they start shaking     2. LOCATION: \"Where does it hurt?\" (upper, mid or lower back)        Mid-spine to tailbone     3. SEVERITY: \"How bad is the pain?\"  (e.g., Scale 1-10; mild, moderate, or severe)    - MILD (1-3): doesn't interfere with normal activities     - MODERATE (4-7): interferes with normal activities or awakens from sleep     - SEVERE (8-10): excruciating pain, unable to do any normal activities         4/10     4. PATTERN: \"Is the pain constant?\" (e.g., yes, no; constant, intermittent)         Intermittent     5. RADIATION: \"Does the pain shoot into your legs or elsewhere?\"        Radiates to tail bone  - from mid spine to tailbone   - was controlled with percocet and is taking now, but she is having breakthrough pain that she hasn't had before    6. CAUSE:  \"What do you think is causing the back pain?\"         Chronic back pain   - scheduled follow-up with neurologist in February 7. BACK OVERUSE:  \"Any recent " "lifting of heavy objects, strenuous work or exercise?\"        No   - has been resting a lot  - unsure if laying down would impact back     8. MEDICATIONS: \"What have you taken so far for the pain?\" (e.g., nothing, acetaminophen, NSAIDS)        Using percocet for pain management     No new medications     9. NEUROLOGIC SYMPTOMS: \"Do you have any weakness, numbness, or problems with bowel/bladder control?\"        No   - \"difference in my bladder since COVID\"   - feels urge to urinate and needs to go really bad, but only goes just a small amount     10. OTHER SYMPTOMS: \"Do you have any other symptoms?\" (e.g., fever, abdominal pain, burning with urination, blood in urine)          Lower abdominal pain (\"near bladder area, it is just sore\")  - shooting pain \"every now and again\" - even in urethra   - Rates pain as \"I won't even rate it because it comes and goes so fast\"    Mann towards the end of urinating     Denies blood in urine     11. PREGNANCY: \"Is there any chance you are pregnant?\" (e.g., yes, no; LMP)          No    Protocols used: BACK PAIN-A-OH      "

## 2021-12-03 NOTE — TELEPHONE ENCOUNTER
Ok for increase dose to 1 mg clonazepam daily for one week.  Then decrease to baseline dose.   Rx for one week sent to pharmacy.     Christofer Hennessy PA-C

## 2021-12-03 NOTE — TELEPHONE ENCOUNTER
Call back from pt who is wondering if Christofer can increase her clonazepam from 0.5 mg through the weekend.  States Dr. French will be back in office on Monday.    Pharmacy pended.    Pt can be reached at [phone]    Carolina BARRIENTOS RN  EP Triage

## 2021-12-03 NOTE — TELEPHONE ENCOUNTER
Pt calling requesting lab results from yesterday.  States she is shaking and wants answers today.  Is not able to hold a cup.    Advised results are in but Christofer Hennessy has not reviewed them yet.  Once reviewed pt will receive a call with provider message.  Pt wants answers today.    Can be reached at [phone]    Carolina BARRIENTOS RN  EP Triage

## 2021-12-05 NOTE — RESULT ENCOUNTER NOTE
Melody    Your lab tests are complete and I have reviewed the results.     Overall labs look stable  CBC consistent with recent infection.  Recheck in 2-4 weeks to evaluate elevated platelets.     If you have any questions or concerns, please feel free to call or send a Anke message.    Sincerely,  Christofer Hennessy PA-C

## 2021-12-06 ENCOUNTER — TELEPHONE (OUTPATIENT)
Dept: FAMILY MEDICINE | Facility: CLINIC | Age: 46
End: 2021-12-06
Payer: COMMERCIAL

## 2021-12-06 NOTE — TELEPHONE ENCOUNTER
PT called to check on lithium levels and liver levels. Advised liver wnl  Pt will call to get lab appt at [facility]  Idalmis Negro RN  [facility] RN Triage Team

## 2021-12-06 NOTE — TELEPHONE ENCOUNTER
Pt calling back and is very demanding and asking to also have her liver enzymes checked.   Marec Camilo,

## 2021-12-06 NOTE — TELEPHONE ENCOUNTER
Liver function looks completely normal.   Please inform patient.     I was NOT able to add the lithium test to her previous blood draw - please have patient make a lab only appointment ASAP to check her lithium level.     Christofer Hennessy PA-C

## 2021-12-06 NOTE — TELEPHONE ENCOUNTER
"Psych referred pt to PCP to check blood levels and follow up on symptoms     States she was referred back to PCP after consulting with psych     Having symptoms that are \"definitely not psychiatric\" - not shaking all the time     Has just been putting up with this over time     States \"several of my labs are way above normal and I was told it's normal for me, and I'm wondering why this is\"     Also states she is having UTI symptoms and asking about this, with \"few bacteria\" does she need this treated? Urethra has soreness and occasional sharp shooting pains     States \"she needs to put in a lab full panel to [facility] for everything\" to recheck her labs that were out of range     Also asks \"why was I referred to psych I don't know what's going on and feel really really offended by this\"    Please advise     David Sanchez RN  [facility]         "

## 2021-12-07 ENCOUNTER — TELEPHONE (OUTPATIENT)
Dept: FAMILY MEDICINE | Facility: CLINIC | Age: 46
End: 2021-12-07
Payer: COMMERCIAL

## 2021-12-07 NOTE — CONFIDENTIAL NOTE
Patient wants clarification on recent lab results. Wants to know where is the infection since she had COVID and a cold. Advised she schedule a VV before 12/17 if she has new concerns. Pt agreed to call clinic back this week to schedule a same day visit to go over lab results.

## 2021-12-08 ENCOUNTER — LAB (OUTPATIENT)
Dept: LAB | Facility: CLINIC | Age: 46
End: 2021-12-08
Payer: COMMERCIAL

## 2021-12-08 ENCOUNTER — TELEPHONE (OUTPATIENT)
Dept: FAMILY MEDICINE | Facility: CLINIC | Age: 46
End: 2021-12-08

## 2021-12-08 DIAGNOSIS — R30.0 DYSURIA: Primary | ICD-10-CM

## 2021-12-08 DIAGNOSIS — F31.9 BIPOLAR 1 DISORDER (H): ICD-10-CM

## 2021-12-08 LAB — LITHIUM SERPL-SCNC: 0.6 MMOL/L

## 2021-12-08 PROCEDURE — 36415 COLL VENOUS BLD VENIPUNCTURE: CPT

## 2021-12-08 PROCEDURE — 80178 ASSAY OF LITHIUM: CPT

## 2021-12-08 NOTE — TELEPHONE ENCOUNTER
Pt has a lab appt at 1:00 today at [facility]. She has symptoms of a UTI, burning and frequency, and would like to have a UA done. Please place order if appropriate. Thank you.   Marce Camilo,

## 2021-12-08 NOTE — TELEPHONE ENCOUNTER
Spoke to pt, states she has already been to the lab.  She will call them back and see if its okay to go back and leave UA. Manuel IGLESIAS, CMA

## 2021-12-08 NOTE — TELEPHONE ENCOUNTER
Pt calling requesting a CT or MRI of sinuses to see where the infection is.  States having an infection and not knowing where it is is causing increased anxiety for pt.  Since infection is not lower she can only think that the infection is in her sinuses.   has been on 2 rounds of abx since summer.    Pt can be reached at [phone]    Carolina BARRIENTOS RN  EP Triage

## 2021-12-08 NOTE — TELEPHONE ENCOUNTER
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, 9th floor  [address] [place] [address]  Phone: [phone]  Clinic Hours:   Monday-Friday:   7 am to 5:00 pm   closed weekends and major  holidays     If your fever is 100.5  or greater,   call the clinic during business hours.   After hours call [phone] and ask for the pediatric hematology / oncology physician to be paged for you.               Follow-ups after your visit        Your next 10 appointments already scheduled     May 14, 2018  2:00 PM CDT   PEDS TREATMENT with Imani Landers PT   [facility] ([facility])    [address] [facility] [address] [phone]            May 14, 2018  3:15 PM CDT   Treatment 45 with Elyse [place], JOSÉ LUISR   [facility] ([facility])    [address] [facility] [address] [phone]            May 16, 2018  1:30 PM CDT   Return Visit with ALAN Aguilar CNP   Peds Hematology Oncology ([facility])    [facility]  9th Floor  [address] [place] [id number] [phone]            May 21, 2018  2:00 PM CDT   PEDS TREATMENT with Imani Landers, LASHAE   [facility] ([facility])    [address] [facility] [address] [phone]            May 21, 2018  3:15 PM CDT   Treatment 45 with Elyse [place], OTR   [facility] ([facility])    [address]  Order placed.     Christofer Hennessy PA-C     [facility] [address]   [phone]            May 23, 2018  1:30 PM CDT   Return Visit with ALAN Aguilar CNP Hematology Oncology ([facility])    [place] [address]  [address] [place] [address]   [phone]            Jun 04, 2018  2:00 PM CDT   PEDS TREATMENT with Imani Landers, PT   [facility] ([facility])    [address] [facility] [id number] [phone]            Jun 04, 2018  3:15 PM CDT   Treatment 45 with Elyse [place], OTSON   [facility] ([facility])    [address] [facility] [address]   [phone]            Jun 06, 2018  1:30 PM CDT   Return Visit with ALAN Aguilar CNP Hematology Oncology ([facility])    [address] [address] [phone]            Jun 11, 2018  3:15 PM CDT   Treatment 45 with Elyse [place], OTSON   [facility] ([facility])    [address] [facility] [id number] [phone]              Who to contact     Please call your clinic at [phone] to:    Ask questions about your health    Make or cancel appointments    Discuss your medicines    Learn about your test results    Speak to your doctor            Additional Information About Your Visit        [employer] Information     [employer] gives you secure access to your electronic health record. If you see a primary care provider, you can also send messages to your care team and make appointments. If you have questions, please call your primary care clinic.  If you do not have a primary care provider, please call [phone] and they will assist you.      [employer] is an electronic gateway that provides easy, online access to your medical records. With [employer], you can request a clinic appointment, read your test results,  renew a prescription or communicate with your care team.     To access your existing account, please contact your [facility] or call [phone] for assistance.        Care EveryWhere ID     This is your Care EveryWhere ID. This could be used by other organizations to access your [place] medical records  [id number]        Your Vitals Were     Pulse Temperature Respirations Pulse Oximetry BMI (Body Mass Index)       71 97.1  F (36.2  C) (Axillary) 18 99% 22.43 kg/m2        Blood Pressure from Last 3 Encounters:   05/09/18 105/77   05/02/18 117/76   04/25/18 113/76    Weight from Last 3 Encounters:   05/09/18 72.2 kg (159 lb 2.8 oz) (63 %)*   05/02/18 71.2 kg (156 lb 15.5 oz) (60 %)*   04/25/18 72.1 kg (158 lb 15.2 oz) (63 %)*     * Growth percentiles are based on [facility] 2-20 Years data.              We Performed the Following     CBC with platelets differential     Comprehensive metabolic panel          Today's Medication Changes          These changes are accurate as of 5/9/18  6:17 PM.  If you have any questions, ask your nurse or doctor.               Start taking these medicines.        Dose/Directions    glycopyrrolate 1 MG/5ML solution   Commonly known as:  CUVPOSA   Used for:  Neoplasm of posterior cranial fossa (H), Ependymoma (H), Drooling   Started by:  Kristi Schuler APRN CNP        Dose:  20 mcg/kg   Take 7.08 mLs (1,416 mcg) by mouth 3 times daily   Quantity:  637.2 mL   Refills:  2            Where to get your medicines      These medications were sent to [facility]pharm[facility] #[id number] - [place], [place] - [address]  [address] [facility] [address]     Phone:  [phone]     glycopyrrolate 1 MG/5ML solution                Primary Care Provider Office Phone # Fax #    Jeffrey Espinoza [phone] [address] [facility] [address]        Equal Access to Services     DARYL HANDY AH: Xiomara Chao, jd cherry, qaybta  ciera hyattlit ferreira ah. [facility] [phone].    ATENCIÓN: Si giovanny castro, tiene a antonio disposición servicios gratuitos de asistencia lingüística. Heath al [phone].    We comply with applicable federal civil rights laws and [place] laws. We do not discriminate on the basis of race, color, national origin, age, disability, sex, sexual orientation, or gender identity.            Thank you!     Thank you for choosing PEDS HEMATOLOGY ONCOLOGY  for your care. Our goal is always to provide you with excellent care. Hearing back from our patients is one way we can continue to improve our services. Please take a few minutes to complete the written survey that you may receive in the mail after your visit with us. Thank you!             Your Updated Medication List - Protect others around you: Learn how to safely use, store and throw away your medicines at www.disposemymeds.org.          This list is accurate as of 5/9/18  6:17 PM.  Always use your most recent med list.                   Brand Name Dispense Instructions for use Diagnosis    calcium carbonate-vitamin D 600-400 MG-UNIT Chew     90 tablet    Take 2 tablets in the morning and 1 tablet in the evening.    Ependymoma (H)       Cholecalciferol 400 units Chew     60 tablet    Take 1 tablet (400 Units) by mouth every morning    Ependymoma (H)       dexamethasone 0.5 MG tablet    DECADRON    130 tablet    TAKE 1.5 TABLETS (0.75 MG) BY MOUTH 5 days out of 7.    Neoplasm of posterior cranial fossa (H), Ependymoma (H), Lung infection       docusate sodium 100 MG capsule    COLACE    60 capsule    Take 1 capsule (100 mg) by mouth 2 times daily as needed for constipation    Constipation, unspecified constipation type       fexofenadine 180 MG tablet    ALLEGRA     Take 180 mg by mouth daily        fluticasone 50 MCG/ACT spray    FLONASE    1 Bottle    Spray 1-2 sprays into both nostrils daily    Ependymoma (H), Chronic seasonal  allergic rhinitis, unspecified trigger       glycopyrrolate 1 MG/5ML solution    CUVPOSA    637.2 mL    Take 7.08 mLs (1,416 mcg) by mouth 3 times daily    Neoplasm of posterior cranial fossa (H), Ependymoma (H), Drooling       melatonin 3 MG tablet      Take 3 mg by mouth At Bedtime        * methylphenidate 20 MG tablet    RITALIN    30 tablet    Take 1 tablet (20 mg) by mouth daily    Neoplasm of posterior cranial fossa (H), Ependymoma (H), Executive function deficit       * methylphenidate 30 MG CR capsule    METADATE CD    28 capsule    Take 1 capsule (30 mg) by mouth every morning    Attention and concentration deficit       mupirocin 2 % ointment    BACTROBAN    22 g    Use 2 times a day to the buttock with flare    Bacterial folliculitis, Ependymoma (H)       omeprazole 20 MG CR capsule    priLOSEC    90 capsule    Take 1 capsule (20 mg) by mouth daily    Gastroesophageal reflux disease, esophagitis presence not specified       ondansetron 4 MG ODT tab    ZOFRAN-ODT    5 tablet    Take 1 tablet (4 mg) by mouth every 8 hours as needed for nausea        pentoxifylline 400 MG CR tablet    TRENtal    270 tablet    Take 1 tablet (400 mg) by mouth 3 times daily (with meals)    Ependymoma (H), 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       polyethylene glycol Packet    MIRALAX/GLYCOLAX     Take 17 g by mouth daily as needed for constipation    Slow transit constipation       potassium phosphate (monobasic) 500 MG tablet    K-PHOS    90 tablet    Take 1 tablet (500 mg) by mouth 3 times daily    Hypophosphatemia, Ependymoma (H)       sulfamethoxazole-trimethoprim 400-80 MG per tablet    BACTRIM/SEPTRA    24 tablet    Take 1 tablet by mouth 2 times daily On Saturdays and Sundays    Ependymoma (H)       vitamin E 400 UNIT capsule    GNP VITAMIN E    30 capsule    Take 1 capsule (400 Units) by mouth daily    Ependymoma (H)       * Notice:  This list has 2 medication(s) that are the same as other medications prescribed  for you. Read the directions carefully, and ask your doctor or other care provider to review them with you.

## 2021-12-08 NOTE — LETTER
December 10, 2021      Melody Muñoz  [address] [place] [address] [address] [place] [address]        Dear Melody Pace     Your lab tests are complete and I have reviewed the results.     - Your lithium level looks great; it is within normal range.     If you have any questions or concerns, please feel free to call or send a [employer] message.       Resulted Orders   Lithium level   Result Value Ref Range    Lithium 0.6   mmol/L      Comment:      Therapeutic: 0.60 - 1.20 mmol/L;  Potentially toxic: >1.50 mmol/L;  Severe toxicity: >2.50 mmol/L    Critical: Greater than 2.00 mmol/L       If you have any questions or concerns, please call the clinic at the number listed above.       Sincerely,      Paradise Hennessy PA-C

## 2021-12-09 NOTE — TELEPHONE ENCOUNTER
We can discuss at her visit next week - typically I refer to ENT before ordering sinus imaging.  I am not sure if it is necessary for this patient.     Christofer Hennessy PA-C

## 2021-12-09 NOTE — TELEPHONE ENCOUNTER
"Patient given message from Paradise Hennessy PA-C.    Patient asking about lithium result. Informed the patient that it is within the therapeutic range.    Patient states that the shaking is continuing. States that she was told by her psychologist and psychiatrist that it was not a psych issue.    Patient is asking if she should be referred to a neurologist if that is what Paradise Hennessy PA-C. Patient thinks that her shaking is due to \"motion weaknesses\" ie grabbing a cup her hand shakes.    Please advise. Triage to call the patient back.  Patient has appointment scheduled in clinic on 12/16. Patient would prefer to keep telephone visit for 12/17.   Or change 12/16 to a telephone visit and keep both appointment since they are for different reasons.   Marge Echavarria RN    "

## 2021-12-10 NOTE — RESULT ENCOUNTER NOTE
Please call patient with the results below:        Melody    Your lab tests are complete and I have reviewed the results.     - Your lithium level looks great; it is within normal range.     If you have any questions or concerns, please feel free to call or send a [employer] message.    Sincerely,  Christofer Hennessy PA-C

## 2021-12-17 ENCOUNTER — VIRTUAL VISIT (OUTPATIENT)
Dept: FAMILY MEDICINE | Facility: CLINIC | Age: 46
End: 2021-12-17
Payer: COMMERCIAL

## 2021-12-17 DIAGNOSIS — R25.1 TREMOR: Primary | ICD-10-CM

## 2021-12-17 DIAGNOSIS — F11.90 CHRONIC, CONTINUOUS USE OF OPIOIDS: ICD-10-CM

## 2021-12-17 PROCEDURE — 99214 OFFICE O/P EST MOD 30 MIN: CPT | Mod: TEL | Performed by: PHYSICIAN ASSISTANT

## 2021-12-17 NOTE — PROGRESS NOTES
Melody is a 46 year old who is being evaluated via a billable telephone visit.      What phone number would you like to be contacted at? [phone]  How would you like to obtain your AVS? Mail a copy    Assessment & Plan   Problem List Items Addressed This Visit     None      Visit Diagnoses     Tremor    -  Primary    Chronic, continuous use of opioids             For the tremor:  - Keep the appointment with the neurologist  - Watch for true muscle weakness or complete loss of sensation.     - oxycodone - taking 5/325 TID  Starting in Jan 2022, consider a slow wean - start with a change in the mid day dose  Cut down to 80 tabs for 1-2 months, then again decrease.                  Return in about 3 months (around 3/17/2022) for Medication Recheck.    Paradise Hennessy PA-C  [facility]    Chaz   Melody is a 46 year old who presents for the following health issues     HPI     Patient is here for a oxycodone prescription follow up  - taking as prescribed  - fatigue increase  - at this time feels that it is helping. Afraid that if she is taken off of it then that will change but knows she has to get off the medication       Tremor - stable, sometimes better.  When sitting up straight, it is worse - she feels trembling or unstable.           Review of Systems         Objective           Vitals:  No vitals were obtained today due to virtual visit.    Physical Exam   healthy, alert and no distress  PSYCH: Alert and oriented times 3; coherent speech, normal   rate and volume, able to articulate logical thoughts, able   to abstract reason, no tangential thoughts, no hallucinations   or delusions  Her affect is normal  RESP: No cough, no audible wheezing, able to talk in full sentences  Remainder of exam unable to be completed due to telephone visits                Phone call duration: 11 minutes

## 2022-01-05 DIAGNOSIS — R10.31 BILATERAL GROIN PAIN: ICD-10-CM

## 2022-01-05 DIAGNOSIS — R10.32 BILATERAL GROIN PAIN: ICD-10-CM

## 2022-01-05 RX ORDER — OXYCODONE AND ACETAMINOPHEN 5; 325 MG/1; MG/1
1 TABLET ORAL 3 TIMES DAILY PRN
Qty: 80 TABLET | Refills: 0 | Status: SHIPPED | OUTPATIENT
Start: 2022-01-05 | End: 2022-02-08

## 2022-01-05 NOTE — TELEPHONE ENCOUNTER
Oxycodone Rx changed to 80 tabs per month.   At patients recent office visit, we discussed decreasing the oxycodone gradually.   She was instructed to take half a tablet in the middle of the day for one month.  We will then recheck.      Christofer Hennessy PA-C

## 2022-02-08 DIAGNOSIS — R10.32 BILATERAL GROIN PAIN: ICD-10-CM

## 2022-02-08 DIAGNOSIS — G89.4 CHRONIC PAIN SYNDROME: Primary | ICD-10-CM

## 2022-02-08 DIAGNOSIS — R10.31 BILATERAL GROIN PAIN: ICD-10-CM

## 2022-02-08 RX ORDER — OXYCODONE AND ACETAMINOPHEN 5; 325 MG/1; MG/1
1 TABLET ORAL 3 TIMES DAILY PRN
Qty: 80 TABLET | Refills: 0 | Status: SHIPPED | OUTPATIENT
Start: 2022-02-08 | End: 2022-03-15

## 2022-02-08 NOTE — TELEPHONE ENCOUNTER
Reason for Call:  Medication or medication refill:    Do you use a [facility]?  Name of the pharmacy and phone number for the current request: [facility] [phone]    Name of the medication requested: oxyCODONE-acetaminophen (PERCOCET) 5-325 MG tablet      Other request: Pt is completely out of medication.    Can we leave a detailed message on this number? YES    Phone number patient can be reached at: Home number on file [phone] (home)    Best Time: Any    Call taken on 2/8/2022 at 9:23 AM by Funmilayo Garcia

## 2022-02-08 NOTE — TELEPHONE ENCOUNTER
Controlled Substance Refill Request for Oxycodone-Acetaminophen 5-325MG  Problem List Complete:  Yes    Last Written Prescription Date:  01/05/22   Last Fill Quantity: 80,   # refills: 0    THE MOST RECENT OFFICE VISIT MUST BE WITHIN THE PAST 3 MONTHS. AT LEAST ONE FACE TO FACE VISIT MUST OCCUR EVERY 6 MONTHS. ADDITIONAL VISITS CAN BE VIRTUAL.  (THIS STATEMENT SHOULD BE DELETED.)    [place] [facility] [place] [facility] [place]:12/08/21    Future Office visit:     Controlled substance agreement:   CSA -- Encounter Level on 09/08/2015:    Controlled Substance Agreement - Scan on 9/9/2015  2:41 PM: MARISSA, Controlled Substance Contract, 09-08-15     CSA -- Patient Level:    Controlled Substance Agreement - Opioid - Scan on 9/30/2021  2:39 PM         Last Urine Drug Screen: No results found for: CDAUT, No results found for: COMDAT, No results found for: THC13, PCP13, COC13, MAMP13, OPI13, AMP13, BZO13, TCA13, MTD13, BAR13, OXY13, PPX13, BUP13     Processing:  Rx to be electronically transmitted to pharmacy by provider     [email or web address]   checked in past 3 months    Lula YUAN RN  EP Triage

## 2022-02-27 DIAGNOSIS — J45.20 INTERMITTENT ASTHMA, UNCOMPLICATED: ICD-10-CM

## 2022-02-28 RX ORDER — ALBUTEROL SULFATE 90 UG/1
AEROSOL, METERED RESPIRATORY (INHALATION)
Qty: 8.5 G | Refills: 3 | Status: SHIPPED | OUTPATIENT
Start: 2022-02-28 | End: 2022-05-31

## 2022-03-14 DIAGNOSIS — R10.32 BILATERAL GROIN PAIN: ICD-10-CM

## 2022-03-14 DIAGNOSIS — R10.31 BILATERAL GROIN PAIN: ICD-10-CM

## 2022-03-14 NOTE — TELEPHONE ENCOUNTER
Routing refill request to provider for review/approval because:  Drug not on the [facility] refill protocol     Last office vist: 12/17/2021    Pended 80 tablets & 0- refills. Please Review.    Next office visit: none scheduled    Veronica Navarro RN  [facility]

## 2022-03-15 ENCOUNTER — TELEPHONE (OUTPATIENT)
Dept: FAMILY MEDICINE | Facility: CLINIC | Age: 47
End: 2022-03-15
Payer: COMMERCIAL

## 2022-03-15 DIAGNOSIS — R10.31 BILATERAL GROIN PAIN: ICD-10-CM

## 2022-03-15 DIAGNOSIS — R10.32 BILATERAL GROIN PAIN: ICD-10-CM

## 2022-03-15 RX ORDER — OXYCODONE AND ACETAMINOPHEN 5; 325 MG/1; MG/1
1 TABLET ORAL 3 TIMES DAILY PRN
Qty: 75 TABLET | Refills: 0 | Status: SHIPPED | OUTPATIENT
Start: 2022-03-15 | End: 2022-04-19

## 2022-03-15 RX ORDER — OXYCODONE AND ACETAMINOPHEN 5; 325 MG/1; MG/1
1 TABLET ORAL 3 TIMES DAILY PRN
Qty: 75 TABLET | Refills: 0 | Status: SHIPPED | OUTPATIENT
Start: 2022-03-15 | End: 2022-03-15

## 2022-03-15 NOTE — TELEPHONE ENCOUNTER
Pt calling states her oxycodone-acetaminophen was sent to the wrong pharmacy.  States rx needs to be sent to [facility].  States she has been out of meds for 3 days now.    Rx canceled at [facility] in [facility].    Pharmacy pended.    Carolina BARRIENTOS RN  EP Triage

## 2022-04-06 ENCOUNTER — TELEPHONE (OUTPATIENT)
Dept: FAMILY MEDICINE | Facility: CLINIC | Age: 47
End: 2022-04-06
Payer: COMMERCIAL

## 2022-04-06 NOTE — TELEPHONE ENCOUNTER
Patient needs an updated ROWDY-7 to meet quality criteria for chronic pain.     Please call patient to obtain ROWDY-7    MyChart inactive.     Christofer Hennessy PA-C

## 2022-04-06 NOTE — LETTER
April 13, 2022      Melody Muñoz  [address] [place], [place] [address] [place] [address]        Dear Melody,    I care about your health and have reviewed your health plan. I have reviewed your medical conditions, medication list, and lab results and am making recommendations based on this review, to better manage your health.    You are in particular need of attention regarding:  Mental Health    I am recommending that you:  -Pleasecomplete the encolsed PHQ9 and ROWDY 7 and mail back to the clinic. Thank you.    Here is a list of Health Maintenance topics that are due now or due soon:  Health Maintenance Due   Topic Date Due     Pneumococcal Vaccine (1 of 2 - PPSV23) Never done     Hepatitis B Vaccine (1 of 3 - Risk 3-dose series) Never done     Anxiety Assessment  11/24/2019     PAP Smear  04/01/2020     Asthma Action Plan - yearly  12/03/2020     Diabetic Foot Exam  01/23/2021     Eye Exam  04/21/2021     Mammogram  05/12/2021     COVID-19 Vaccine (3 - Booster for Pfizer series) 11/24/2021     Diptheria Tetanus Pertussis (DTAP/TDAP/TD) Vaccine (2 - Td or Tdap) 02/25/2022     Asthma Control Test  03/30/2022       Please call us at [phone] (or use [employer]) to address the above recommendations.     Thank you for trusting [facility] and we appreciate the opportunity to serve you.  We look forward to supporting your healthcare needs in the future.    Healthy Regards,    Christofer Hennessy PA-C

## 2022-04-18 DIAGNOSIS — R10.32 BILATERAL GROIN PAIN: ICD-10-CM

## 2022-04-18 DIAGNOSIS — R10.31 BILATERAL GROIN PAIN: ICD-10-CM

## 2022-04-18 NOTE — TELEPHONE ENCOUNTER
Pt is calling to request a refill on Oxycodone. She is completely out and is experiencing headaches due to withdrawal. Please refill when possible.    Angela Dalton,  [facility]

## 2022-04-19 RX ORDER — OXYCODONE AND ACETAMINOPHEN 5; 325 MG/1; MG/1
1 TABLET ORAL 3 TIMES DAILY PRN
Qty: 75 TABLET | Refills: 0 | Status: SHIPPED | OUTPATIENT
Start: 2022-04-19 | End: 2022-05-19

## 2022-04-19 NOTE — TELEPHONE ENCOUNTER
Routing refill request to provider for review/approval because:  Drug not on the [facility] refill protocol   Huma Driscoll BSN, RN

## 2022-05-18 DIAGNOSIS — R10.32 BILATERAL GROIN PAIN: ICD-10-CM

## 2022-05-18 DIAGNOSIS — R10.31 BILATERAL GROIN PAIN: ICD-10-CM

## 2022-05-18 NOTE — TELEPHONE ENCOUNTER
Pt calling to request oxycodone 325 mg tablets. Please refill when possible, thanks.    Angela Dalton,  [facility]

## 2022-05-18 NOTE — TELEPHONE ENCOUNTER
oxyCODONE-acetaminophen (PERCOCET) 5-325 MG tablet 75 tablet 0 4/19/2022  No   Sig - Route: Take 1 tablet by mouth 3 times daily as needed for severe pain Take 2 or 3 tabs daily, continue to gradually wean medication as tolerated. - Oral   Sent to pharmacy as: oxyCODONE-Acetaminophen 5-325 MG Oral Tablet (PERCOCET)   Class: E-Prescribe       Last office visit: 12/17/2021     Future Office Visit:        CSA -- Patient Level:    Controlled Substance Agreement - Opioid - Scan on 9/30/2021  2:39 PM           If PCP needs to have team do something (appointment etc ) please route to your team - thank you       Routing refill request to provider for review/approval because:  Drug not on the [facility] refill protocol     Bozena Galicia RN, BSN  [facility]

## 2022-05-19 RX ORDER — OXYCODONE AND ACETAMINOPHEN 5; 325 MG/1; MG/1
1 TABLET ORAL 3 TIMES DAILY PRN
Qty: 70 TABLET | Refills: 0 | Status: SHIPPED | OUTPATIENT
Start: 2022-05-19 | End: 2022-06-14

## 2022-05-19 NOTE — TELEPHONE ENCOUNTER
Rx sent for 70 tabs (5 fewer than last month).       Patient and I previously discussed gradual opioid wean.     May Rx - 70 tabs per month  June Rx plan - 70 tabs per month    Then decrease again in July    Christofer Hennessy PA-C

## 2022-05-27 DIAGNOSIS — J45.20 INTERMITTENT ASTHMA, UNCOMPLICATED: ICD-10-CM

## 2022-05-31 RX ORDER — ALBUTEROL SULFATE 90 UG/1
AEROSOL, METERED RESPIRATORY (INHALATION)
Qty: 8.5 G | Refills: 3 | Status: SHIPPED | OUTPATIENT
Start: 2022-05-31 | End: 2022-08-31

## 2022-05-31 NOTE — TELEPHONE ENCOUNTER
Routing refill request to provider for review/approval because:  Labs not current:    ACT Total Scores 10/24/2019 12/3/2019 9/30/2021   ACT TOTAL SCORE - - -   ASTHMA ER VISITS - - -   ASTHMA HOSPITALIZATIONS - - -   ACT TOTAL SCORE (Goal Greater than or Equal to 20) 19 21 9   In the past 12 months, how many times did you visit the emergency room for your asthma without being admitted to the hospital? 0 0 0   In the past 12 months, how many times were you hospitalized overnight because of your asthma? 0 0 0       Patient needs to be seen because it has been more than 6 months since last office visit.    Also routing to TC. Please reach out and schedule patient.    Annabelle Boyer, LESLI  [facility] RN Triage Team

## 2022-06-10 ENCOUNTER — NURSE TRIAGE (OUTPATIENT)
Dept: FAMILY MEDICINE | Facility: CLINIC | Age: 47
End: 2022-06-10
Payer: COMMERCIAL

## 2022-06-10 NOTE — TELEPHONE ENCOUNTER
Patient reports she has a bad cough and wheezing, with congestion and sinus pressure. She reports her sputum is yellow and does have some bloody streaks in it but she feels that could be from some throat irritation. She has increased her inhalers and is doing neb treatments per her asthma action plan which help somewhat for her symptoms but she feels that she may be developing pneumonia and a sinus infection. She would like to be seen today if possible. She was encouraged to go to urgent care if she is unable to be worked in.    Carol Shepherd RN    Reason for Disposition    Continuous (nonstop) coughing interferes with work or school and no improvement using cough treatment per Care Advice    Patient wants to be seen    Additional Information    Negative: Bluish (or gray) lips or face    Negative: Severe difficulty breathing (e.g., struggling for each breath, speaks in single words)    Negative: Rapid onset of cough and has hives    Negative: Coughing started suddenly after medicine, an allergic food or bee sting    Negative: Difficulty breathing after exposure to flames, smoke, or fumes    Negative: Sounds like a life-threatening emergency to the triager    Negative: Previous asthma attacks and this feels like asthma attack    Negative: Chest pain present when not coughing    Negative: Difficulty breathing    Negative: Passed out (i.e., fainted, collapsed and was not responding)    Negative: Patient sounds very sick or weak to the triager    Negative: Coughed up > 1 tablespoon (15 ml) blood (Exception: blood-tinged sputum)    Negative: Fever > 103 F (39.4 C)    Negative: Fever > 101 F (38.3 C) and over 60 years of age    Negative: Fever > 100.0 F (37.8 C) and has diabetes mellitus or a weak immune system (e.g., HIV positive, cancer chemotherapy, organ transplant, splenectomy, chronic steroids)    Negative: Fever > 100.0 F (37.8 C) and bedridden (e.g., nursing home patient, stroke, chronic illness, recovering from  "surgery)    Negative: Increasing ankle swelling    Negative: Wheezing is present    Answer Assessment - Initial Assessment Questions  1. ONSET: \"When did the cough begin?\"       A few days ago  2. SEVERITY: \"How bad is the cough today?\"       Severe, keeping her up all night  3. RESPIRATORY DISTRESS: \"Describe your breathing.\"       Has asthma and reports she has increased her nebs and daily inhaler per her asthma action plan  4. FEVER: \"Do you have a fever?\" If so, ask: \"What is your temperature, how was it measured, and when did it start?\"      No  5. HEMOPTYSIS: \"Are you coughing up any blood?\" If so ask: \"How much?\" (flecks, streaks, tablespoons, etc.)      Yes streaks but think it may be from throat irritation  6. TREATMENT: \"What have you done so far to treat the cough?\" (e.g., meds, fluids, humidifier)      Increased nebs, and inhalers  7. CARDIAC HISTORY: \"Do you have any history of heart disease?\" (e.g., heart attack, congestive heart failure)       No  8. LUNG HISTORY: \"Do you have any history of lung disease?\"  (e.g., pulmonary embolus, asthma, emphysema)      Yes, asthma  9. PE RISK FACTORS: \"Do you have a history of blood clots?\" (or: recent major surgery, recent prolonged travel, bedridden)      No  10. OTHER SYMPTOMS: \"Do you have any other symptoms? (e.g., runny nose, wheezing, chest pain)        Congestion, mild SOB  11. PREGNANCY: \"Is there any chance you are pregnant?\" \"When was your last menstrual period?\"        Unsure  12. TRAVEL: \"Have you traveled out of the country in the last month?\" (e.g., travel history, exposures)        No    Protocols used: COUGH-A-OH      "

## 2022-06-10 NOTE — TELEPHONE ENCOUNTER
Please advise patient to go to urgent care, unfortunately I am unable to work her in today.     Christofer Hennessy PA-C

## 2022-06-10 NOTE — TELEPHONE ENCOUNTER
Provider Recommendation Follow Up:   Reached patient/caregiver. Informed of provider's recommendations. Patient verbalized understanding and agrees with the plan.     Noemy MARTIN RN  [facility]

## 2022-06-14 DIAGNOSIS — R10.31 BILATERAL GROIN PAIN: ICD-10-CM

## 2022-06-14 DIAGNOSIS — R10.32 BILATERAL GROIN PAIN: ICD-10-CM

## 2022-06-14 RX ORDER — OXYCODONE AND ACETAMINOPHEN 5; 325 MG/1; MG/1
1 TABLET ORAL 3 TIMES DAILY PRN
Qty: 70 TABLET | Refills: 0 | Status: SHIPPED | OUTPATIENT
Start: 2022-06-14 | End: 2022-07-14

## 2022-06-24 DIAGNOSIS — J45.30 MILD PERSISTENT ASTHMA WITHOUT COMPLICATION: ICD-10-CM

## 2022-06-24 RX ORDER — ALBUTEROL SULFATE 0.83 MG/ML
2.5 SOLUTION RESPIRATORY (INHALATION) EVERY 6 HOURS PRN
Qty: 25 ML | Refills: 3 | Status: ON HOLD | OUTPATIENT
Start: 2022-06-24 | End: 2023-12-13

## 2022-06-24 NOTE — TELEPHONE ENCOUNTER
Pt called, has a cold and is almost out of her Albuterol neb solution (down to 1 vial)     Routing refill request to provider for review/approval because:  ACT not current, cannot complete over the phone       ACT Total Scores 10/24/2019 12/3/2019 9/30/2021   ACT TOTAL SCORE - - -   ASTHMA ER VISITS - - -   ASTHMA HOSPITALIZATIONS - - -   ACT TOTAL SCORE (Goal Greater than or Equal to 20) 19 21 9   In the past 12 months, how many times did you visit the emergency room for your asthma without being admitted to the hospital? 0 0 0   In the past 12 months, how many times were you hospitalized overnight because of your asthma? 0 0 0     Huma ALVES, Triage RN  [facility]

## 2022-06-27 NOTE — TELEPHONE ENCOUNTER
"Patient was called with provider's message. Stating \"am I just supposed to suffer because I don't have insurance for a month?\" Complains of pain and pressure under the eyes and above eyebrows. Asking if she can have something for pain then. She is not taking a decongestant OTC, but taking flonase daily. Is there anything she can try?   " no

## 2022-07-14 DIAGNOSIS — R10.32 BILATERAL GROIN PAIN: ICD-10-CM

## 2022-07-14 DIAGNOSIS — R10.31 BILATERAL GROIN PAIN: ICD-10-CM

## 2022-07-14 RX ORDER — OXYCODONE AND ACETAMINOPHEN 5; 325 MG/1; MG/1
1 TABLET ORAL 3 TIMES DAILY PRN
Qty: 70 TABLET | Refills: 0 | Status: SHIPPED | OUTPATIENT
Start: 2022-07-14 | End: 2022-08-17

## 2022-08-08 NOTE — ED NOTES
Bed: HW01  Expected date: 2/18/20  Expected time:   Means of arrival: Ambulance  Comments:  Maggie Ivan3   Peripheral IV  Performed by: Jovi Otto MD  Authorized by: Jovi Otto MD     Size:  22 G  Laterality:  Right  Location:  Wrist  Site Prep:  Alcohol  Attempts:  1  Anesthesiologist:  Jovi Otto MD  Start Time: 8/8/2022 10:47 AM

## 2022-08-15 DIAGNOSIS — R10.31 BILATERAL GROIN PAIN: ICD-10-CM

## 2022-08-15 DIAGNOSIS — R10.32 BILATERAL GROIN PAIN: ICD-10-CM

## 2022-08-15 NOTE — TELEPHONE ENCOUNTER
Medication Question or Refill    What medication are you calling about (include dose and sig)?: Oxycodone-acetaminophen    Controlled Substance Agreement on file:   CSA -- Patient Level:    Controlled Substance Agreement - Opioid - Scan on 9/30/2021  2:39 PM       Who prescribed the medication?: Christofer Hennessy    Do you need a refill? Yes:     When did you use the medication last? n/a    Patient offered an appointment? No    Do you have any questions or concerns?  No    Preferred Pharmacy:   [id number] - [place], [place] - [address] AT [place] [address] & [facility]  [address] [place] [address]  Phone: [phone] Fax: [phone]    Angela Dalton,  [facility]

## 2022-08-17 RX ORDER — OXYCODONE AND ACETAMINOPHEN 5; 325 MG/1; MG/1
1 TABLET ORAL 3 TIMES DAILY PRN
Qty: 70 TABLET | Refills: 0 | Status: SHIPPED | OUTPATIENT
Start: 2022-08-17 | End: 2022-09-13

## 2022-08-17 NOTE — TELEPHONE ENCOUNTER
Routing refill request to provider for review/approval because:  Drug not on the [facility] refill protocol   Noemy MARTIN RN  [facility]

## 2022-08-18 NOTE — TELEPHONE ENCOUNTER
Pt called regarding refill request. Informed her this was sent by PCP yesterday. She will reach out to pharmacy to get this. No further questions at this time.     Noemy MARTIN RN  [facility]

## 2022-08-30 DIAGNOSIS — J45.20 INTERMITTENT ASTHMA, UNCOMPLICATED: ICD-10-CM

## 2022-08-31 RX ORDER — ALBUTEROL SULFATE 90 UG/1
AEROSOL, METERED RESPIRATORY (INHALATION)
Qty: 8.5 G | Refills: 3 | Status: SHIPPED | OUTPATIENT
Start: 2022-08-31 | End: 2022-12-02

## 2022-08-31 NOTE — TELEPHONE ENCOUNTER
Routing refill request to provider for review/approval because:  Labs out of range:    ACT Total Scores 10/24/2019 12/3/2019 9/30/2021   ACT TOTAL SCORE - - -   ASTHMA ER VISITS - - -   ASTHMA HOSPITALIZATIONS - - -   ACT TOTAL SCORE (Goal Greater than or Equal to 20) 19 21 9   In the past 12 months, how many times did you visit the emergency room for your asthma without being admitted to the hospital? 0 0 0   In the past 12 months, how many times were you hospitalized overnight because of your asthma? 0 0 0     Patient needs to be seen because:  LOV 12/17/2021  Overdue for 6 month follow-up    Patient is completely out of inhaler

## 2022-09-13 DIAGNOSIS — R10.32 BILATERAL GROIN PAIN: ICD-10-CM

## 2022-09-13 DIAGNOSIS — R10.31 BILATERAL GROIN PAIN: ICD-10-CM

## 2022-09-13 RX ORDER — OXYCODONE AND ACETAMINOPHEN 5; 325 MG/1; MG/1
1 TABLET ORAL 3 TIMES DAILY PRN
Qty: 70 TABLET | Refills: 0 | Status: SHIPPED | OUTPATIENT
Start: 2022-09-13 | End: 2022-10-12

## 2022-09-13 NOTE — TELEPHONE ENCOUNTER
Pt called the clinic stating she will be having her last dose of percocet today. States  didn't call the clinic sooner, he is the one that gives her Percocet as psychiatrist wants him to do. She has been taking 2.5 on average and thought last Rx was for 80, but Rx was for 70. She plans to call back to schedule a follow up appt with CELESTINA Juárez to discuss a plan going forward. She is hoping to take less, but needs provider's help coming up with a plan. She did not have her calendar with her so didn't not schedule at the time of phone call.

## 2022-09-13 NOTE — TELEPHONE ENCOUNTER
Patient has been getting 70 tabs per month for the past 4 refills.  Plan is to continue with 70 tabs per month.     Christofer Hennessy PA-C

## 2022-10-10 DIAGNOSIS — R10.32 BILATERAL GROIN PAIN: ICD-10-CM

## 2022-10-10 DIAGNOSIS — R10.31 BILATERAL GROIN PAIN: ICD-10-CM

## 2022-10-10 NOTE — TELEPHONE ENCOUNTER
Medication Question or Refill    Contacts       Type Contact Phone/Fax    10/10/2022 02:58 PM CDT Phone (Incoming) Melody Muñoz TONYA (Self) [phone] (M)          What medication are you calling about (include dose and sig)?: Oxycodone    Controlled Substance Agreement on file:   CSA -- Patient Level:     [Media Unavailable] Controlled Substance Agreement - Opioid - Scan on 9/30/2021  2:39 PM       Who prescribed the medication?: Fitterer    Do you need a refill? Yes: pt calling for her monthly refill    When did you use the medication last? today    Patient offered an appointment? No    Do you have any questions or concerns?  No    Preferred Pharmacy:     [id number] [place], [place] - [address] AT [place] [address] & [facility]  [address] [address]  Phone: [phone] Fax: [phone]      Okay to leave a detailed message?: Yes at Home number on file [phone] (home)    Bess Garcia/Roddy-  [facility]

## 2022-10-11 NOTE — TELEPHONE ENCOUNTER
Routing refill request to provider for review/approval because:  Drug not on the [facility] refill protocol   Marge Echavarria RN

## 2022-10-12 RX ORDER — OXYCODONE AND ACETAMINOPHEN 5; 325 MG/1; MG/1
1 TABLET ORAL 3 TIMES DAILY PRN
Qty: 70 TABLET | Refills: 0 | Status: SHIPPED | OUTPATIENT
Start: 2022-10-12 | End: 2022-11-15

## 2022-11-15 DIAGNOSIS — R10.31 BILATERAL GROIN PAIN: ICD-10-CM

## 2022-11-15 DIAGNOSIS — R10.32 BILATERAL GROIN PAIN: ICD-10-CM

## 2022-11-15 RX ORDER — OXYCODONE AND ACETAMINOPHEN 5; 325 MG/1; MG/1
1 TABLET ORAL 3 TIMES DAILY PRN
Qty: 70 TABLET | Refills: 0 | Status: SHIPPED | OUTPATIENT
Start: 2022-11-15 | End: 2022-12-19

## 2022-11-15 NOTE — TELEPHONE ENCOUNTER
Attempted to reach out to pt and left message that a prescription was sent and to call her pharmacy. Also left clinic number for any further questions or concerns.     Noemy MARTIN RN  [facility]

## 2022-12-02 DIAGNOSIS — J45.20 INTERMITTENT ASTHMA, UNCOMPLICATED: ICD-10-CM

## 2022-12-05 RX ORDER — ALBUTEROL SULFATE 90 UG/1
AEROSOL, METERED RESPIRATORY (INHALATION)
Qty: 8.5 G | Refills: 3 | OUTPATIENT
Start: 2022-12-05

## 2022-12-05 NOTE — TELEPHONE ENCOUNTER
Denied request, duplicate. Rx just sent to the pharmacy on 12/2/22.   Noemy MARTIN RN  [facility]

## 2022-12-29 DIAGNOSIS — J45.20 INTERMITTENT ASTHMA, UNCOMPLICATED: ICD-10-CM

## 2022-12-30 RX ORDER — ALBUTEROL SULFATE 90 UG/1
AEROSOL, METERED RESPIRATORY (INHALATION)
Qty: 8.5 G | Refills: 0 | Status: SHIPPED | OUTPATIENT
Start: 2022-12-30 | End: 2023-01-23

## 2023-01-18 DIAGNOSIS — R10.31 BILATERAL GROIN PAIN: ICD-10-CM

## 2023-01-18 DIAGNOSIS — R10.32 BILATERAL GROIN PAIN: ICD-10-CM

## 2023-01-18 NOTE — TELEPHONE ENCOUNTER
Medication Question or Refill        What medication are you calling about (include dose and sig)?: oxyCODONE-acetaminophen (PERCOCET) 5-325 MG tablet    Controlled Substance Agreement on file:   CSA -- Patient Level:     [Media Unavailable] Controlled Substance Agreement - Opioid - Scan on 9/30/2021  2:39 PM       Who prescribed the medication?: Dr. Hennessy    Do you need a refill? Yes: Will be out on Friday 1/20/23    When did you use the medication last? This am    Patient offered an appointment? Yes: declined    Do you have any questions or concerns?  No    Preferred Pharmacy:     [id number] - [place], [place] - [address] AT [place] [address] & [address]  [address] [place] [address]  Phone: [phone] Fax: [phone]      Okay to leave a detailed message?: Yes at Cell number on file:    Telephone Information:   Mobile [phone]

## 2023-01-19 RX ORDER — OXYCODONE AND ACETAMINOPHEN 5; 325 MG/1; MG/1
1 TABLET ORAL 3 TIMES DAILY PRN
Qty: 15 TABLET | Refills: 0 | Status: SHIPPED | OUTPATIENT
Start: 2023-01-19 | End: 2023-01-23

## 2023-01-19 NOTE — TELEPHONE ENCOUNTER
15 tabs sent.   Patient is scheduled for chronic pain recheck on 1/23  Rx will last until follow up appointment.     Christofer Hennessy PA-C

## 2023-01-23 ENCOUNTER — OFFICE VISIT (OUTPATIENT)
Dept: FAMILY MEDICINE | Facility: CLINIC | Age: 48
End: 2023-01-23
Payer: COMMERCIAL

## 2023-01-23 VITALS
WEIGHT: 144.8 LBS | TEMPERATURE: 97.8 F | SYSTOLIC BLOOD PRESSURE: 124 MMHG | HEART RATE: 77 BPM | OXYGEN SATURATION: 98 % | DIASTOLIC BLOOD PRESSURE: 88 MMHG | BODY MASS INDEX: 26.65 KG/M2 | HEIGHT: 62 IN

## 2023-01-23 DIAGNOSIS — R10.32 BILATERAL GROIN PAIN: ICD-10-CM

## 2023-01-23 DIAGNOSIS — J45.20 INTERMITTENT ASTHMA, UNCOMPLICATED: ICD-10-CM

## 2023-01-23 DIAGNOSIS — Z23 NEED FOR TDAP VACCINATION: ICD-10-CM

## 2023-01-23 DIAGNOSIS — E03.9 ACQUIRED HYPOTHYROIDISM: ICD-10-CM

## 2023-01-23 DIAGNOSIS — N64.4 BREAST PAIN: ICD-10-CM

## 2023-01-23 DIAGNOSIS — Z23 NEED FOR IMMUNIZATION AGAINST INFLUENZA: ICD-10-CM

## 2023-01-23 DIAGNOSIS — R10.9 CHRONIC RIGHT FLANK PAIN: ICD-10-CM

## 2023-01-23 DIAGNOSIS — Z13.220 SCREENING FOR HYPERLIPIDEMIA: ICD-10-CM

## 2023-01-23 DIAGNOSIS — F13.20 MODERATE BENZODIAZEPINE USE DISORDER (H): ICD-10-CM

## 2023-01-23 DIAGNOSIS — Z79.899 ENCOUNTER FOR LONG-TERM (CURRENT) USE OF MEDICATIONS: ICD-10-CM

## 2023-01-23 DIAGNOSIS — E11.65 UNCONTROLLED TYPE 2 DIABETES MELLITUS WITH HYPERGLYCEMIA (H): ICD-10-CM

## 2023-01-23 DIAGNOSIS — R92.30 DENSE BREASTS: ICD-10-CM

## 2023-01-23 DIAGNOSIS — Z23 NEED FOR VACCINATION AGAINST STREPTOCOCCUS PNEUMONIAE: ICD-10-CM

## 2023-01-23 DIAGNOSIS — L65.9 HAIR LOSS: ICD-10-CM

## 2023-01-23 DIAGNOSIS — G89.29 CHRONIC RIGHT FLANK PAIN: ICD-10-CM

## 2023-01-23 DIAGNOSIS — N60.01 BENIGN BREAST CYST IN FEMALE, RIGHT: Primary | ICD-10-CM

## 2023-01-23 DIAGNOSIS — G47.33 OSA (OBSTRUCTIVE SLEEP APNEA): Chronic | ICD-10-CM

## 2023-01-23 DIAGNOSIS — R10.31 BILATERAL GROIN PAIN: ICD-10-CM

## 2023-01-23 DIAGNOSIS — R10.11 RUQ ABDOMINAL PAIN: ICD-10-CM

## 2023-01-23 LAB
ALBUMIN SERPL BCG-MCNC: 4.5 G/DL (ref 3.5–5.2)
ALBUMIN UR-MCNC: 30 MG/DL
ALP SERPL-CCNC: 89 U/L (ref 35–104)
ALT SERPL W P-5'-P-CCNC: 65 U/L (ref 10–35)
ANION GAP SERPL CALCULATED.3IONS-SCNC: 13 MMOL/L (ref 7–15)
APPEARANCE UR: CLEAR
AST SERPL W P-5'-P-CCNC: 48 U/L (ref 10–35)
BACTERIA #/AREA URNS HPF: ABNORMAL /HPF
BILIRUB SERPL-MCNC: 0.2 MG/DL
BILIRUB UR QL STRIP: NEGATIVE
BUN SERPL-MCNC: 5.7 MG/DL (ref 6–20)
CALCIUM SERPL-MCNC: 9.4 MG/DL (ref 8.6–10)
CHLORIDE SERPL-SCNC: 103 MMOL/L (ref 98–107)
CHOLEST SERPL-MCNC: 253 MG/DL
COLOR UR AUTO: YELLOW
CREAT SERPL-MCNC: 0.78 MG/DL (ref 0.51–0.95)
CREAT UR-MCNC: 55 MG/DL
CREAT UR-MCNC: 55.1 MG/DL
DEPRECATED HCO3 PLAS-SCNC: 24 MMOL/L (ref 22–29)
ERYTHROCYTE [DISTWIDTH] IN BLOOD BY AUTOMATED COUNT: 12.8 % (ref 10–15)
GFR SERPL CREATININE-BSD FRML MDRD: >90 ML/MIN/1.73M2
GLUCOSE SERPL-MCNC: 106 MG/DL (ref 70–99)
GLUCOSE UR STRIP-MCNC: NEGATIVE MG/DL
HBA1C MFR BLD: 5.8 % (ref 0–5.6)
HCT VFR BLD AUTO: 44.7 % (ref 35–47)
HDLC SERPL-MCNC: 47 MG/DL
HGB BLD-MCNC: 14.7 G/DL (ref 11.7–15.7)
HGB UR QL STRIP: NEGATIVE
KETONES UR STRIP-MCNC: NEGATIVE MG/DL
LDLC SERPL CALC-MCNC: 159 MG/DL
LEUKOCYTE ESTERASE UR QL STRIP: NEGATIVE
MCH RBC QN AUTO: 29.6 PG (ref 26.5–33)
MCHC RBC AUTO-ENTMCNC: 32.9 G/DL (ref 31.5–36.5)
MCV RBC AUTO: 90 FL (ref 78–100)
MICROALBUMIN UR-MCNC: 194 MG/L
MICROALBUMIN/CREAT UR: 352.09 MG/G CR (ref 0–25)
NITRATE UR QL: NEGATIVE
NONHDLC SERPL-MCNC: 206 MG/DL
PH UR STRIP: 7 [PH] (ref 5–7)
PLATELET # BLD AUTO: 541 10E3/UL (ref 150–450)
POTASSIUM SERPL-SCNC: 3.2 MMOL/L (ref 3.4–5.3)
PROT SERPL-MCNC: 6.8 G/DL (ref 6.4–8.3)
RBC # BLD AUTO: 4.97 10E6/UL (ref 3.8–5.2)
RBC #/AREA URNS AUTO: ABNORMAL /HPF
SODIUM SERPL-SCNC: 140 MMOL/L (ref 136–145)
SP GR UR STRIP: 1.01 (ref 1–1.03)
SQUAMOUS #/AREA URNS AUTO: ABNORMAL /LPF
TRIGL SERPL-MCNC: 234 MG/DL
TSH SERPL DL<=0.005 MIU/L-ACNC: 2.17 UIU/ML (ref 0.3–4.2)
UROBILINOGEN UR STRIP-ACNC: 0.2 E.U./DL
WBC # BLD AUTO: 13.9 10E3/UL (ref 4–11)
WBC #/AREA URNS AUTO: ABNORMAL /HPF

## 2023-01-23 PROCEDURE — 90677 PCV20 VACCINE IM: CPT | Performed by: PHYSICIAN ASSISTANT

## 2023-01-23 PROCEDURE — 36415 COLL VENOUS BLD VENIPUNCTURE: CPT | Performed by: PHYSICIAN ASSISTANT

## 2023-01-23 PROCEDURE — 80053 COMPREHEN METABOLIC PANEL: CPT | Performed by: PHYSICIAN ASSISTANT

## 2023-01-23 PROCEDURE — 90686 IIV4 VACC NO PRSV 0.5 ML IM: CPT | Performed by: PHYSICIAN ASSISTANT

## 2023-01-23 PROCEDURE — 90715 TDAP VACCINE 7 YRS/> IM: CPT | Performed by: PHYSICIAN ASSISTANT

## 2023-01-23 PROCEDURE — 84443 ASSAY THYROID STIM HORMONE: CPT | Performed by: PHYSICIAN ASSISTANT

## 2023-01-23 PROCEDURE — 80307 DRUG TEST PRSMV CHEM ANLYZR: CPT | Performed by: PHYSICIAN ASSISTANT

## 2023-01-23 PROCEDURE — 90472 IMMUNIZATION ADMIN EACH ADD: CPT | Performed by: PHYSICIAN ASSISTANT

## 2023-01-23 PROCEDURE — 83036 HEMOGLOBIN GLYCOSYLATED A1C: CPT | Performed by: PHYSICIAN ASSISTANT

## 2023-01-23 PROCEDURE — 85027 COMPLETE CBC AUTOMATED: CPT | Performed by: PHYSICIAN ASSISTANT

## 2023-01-23 PROCEDURE — 80061 LIPID PANEL: CPT | Performed by: PHYSICIAN ASSISTANT

## 2023-01-23 PROCEDURE — 82043 UR ALBUMIN QUANTITATIVE: CPT | Performed by: PHYSICIAN ASSISTANT

## 2023-01-23 PROCEDURE — 90471 IMMUNIZATION ADMIN: CPT | Performed by: PHYSICIAN ASSISTANT

## 2023-01-23 PROCEDURE — 99214 OFFICE O/P EST MOD 30 MIN: CPT | Mod: 25 | Performed by: PHYSICIAN ASSISTANT

## 2023-01-23 PROCEDURE — 82570 ASSAY OF URINE CREATININE: CPT | Performed by: PHYSICIAN ASSISTANT

## 2023-01-23 PROCEDURE — 81001 URINALYSIS AUTO W/SCOPE: CPT | Performed by: PHYSICIAN ASSISTANT

## 2023-01-23 RX ORDER — ALBUTEROL SULFATE 90 UG/1
1-2 AEROSOL, METERED RESPIRATORY (INHALATION) EVERY 4 HOURS PRN
Qty: 8.5 G | Refills: 4 | Status: SHIPPED | OUTPATIENT
Start: 2023-01-23 | End: 2023-06-12

## 2023-01-23 RX ORDER — OXYCODONE AND ACETAMINOPHEN 5; 325 MG/1; MG/1
1 TABLET ORAL 3 TIMES DAILY PRN
Qty: 70 TABLET | Refills: 0 | Status: SHIPPED | OUTPATIENT
Start: 2023-01-23 | End: 2023-02-27

## 2023-01-23 RX ORDER — METHYLPHENIDATE HYDROCHLORIDE 36 MG/1
36 TABLET, EXTENDED RELEASE ORAL
Status: ON HOLD | COMMUNITY
Start: 2022-12-30 | End: 2023-12-13

## 2023-01-23 RX ORDER — METHYLPHENIDATE HYDROCHLORIDE 10 MG/1
TABLET ORAL
Status: ON HOLD | COMMUNITY
Start: 2022-12-30 | End: 2023-12-13

## 2023-01-23 RX ORDER — METHYLPHENIDATE HYDROCHLORIDE 10 MG/1
TABLET ORAL
Status: ON HOLD | COMMUNITY
Start: 2022-11-03 | End: 2023-12-13

## 2023-01-23 ASSESSMENT — PATIENT HEALTH QUESTIONNAIRE - PHQ9
SUM OF ALL RESPONSES TO PHQ QUESTIONS 1-9: 16
10. IF YOU CHECKED OFF ANY PROBLEMS, HOW DIFFICULT HAVE THESE PROBLEMS MADE IT FOR YOU TO DO YOUR WORK, TAKE CARE OF THINGS AT HOME, OR GET ALONG WITH OTHER PEOPLE: EXTREMELY DIFFICULT
SUM OF ALL RESPONSES TO PHQ QUESTIONS 1-9: 16

## 2023-01-23 ASSESSMENT — ASTHMA QUESTIONNAIRES
QUESTION_4 LAST FOUR WEEKS HOW OFTEN HAVE YOU USED YOUR RESCUE INHALER OR NEBULIZER MEDICATION (SUCH AS ALBUTEROL): THREE OR MORE TIMES PER DAY
QUESTION_1 LAST FOUR WEEKS HOW MUCH OF THE TIME DID YOUR ASTHMA KEEP YOU FROM GETTING AS MUCH DONE AT WORK, SCHOOL OR AT HOME: NONE OF THE TIME
ACT_TOTALSCORE: 11
QUESTION_3 LAST FOUR WEEKS HOW OFTEN DID YOUR ASTHMA SYMPTOMS (WHEEZING, COUGHING, SHORTNESS OF BREATH, CHEST TIGHTNESS OR PAIN) WAKE YOU UP AT NIGHT OR EARLIER THAN USUAL IN THE MORNING: TWO OR THREE NIGHTS A WEEK
QUESTION_2 LAST FOUR WEEKS HOW OFTEN HAVE YOU HAD SHORTNESS OF BREATH: MORE THAN ONCE A DAY
QUESTION_5 LAST FOUR WEEKS HOW WOULD YOU RATE YOUR ASTHMA CONTROL: POORLY CONTROLLED
ACT_TOTALSCORE: 11

## 2023-01-23 ASSESSMENT — ANXIETY QUESTIONNAIRES
GAD7 TOTAL SCORE: 15
GAD7 TOTAL SCORE: 15
7. FEELING AFRAID AS IF SOMETHING AWFUL MIGHT HAPPEN: NEARLY EVERY DAY
IF YOU CHECKED OFF ANY PROBLEMS ON THIS QUESTIONNAIRE, HOW DIFFICULT HAVE THESE PROBLEMS MADE IT FOR YOU TO DO YOUR WORK, TAKE CARE OF THINGS AT HOME, OR GET ALONG WITH OTHER PEOPLE: EXTREMELY DIFFICULT
1. FEELING NERVOUS, ANXIOUS, OR ON EDGE: MORE THAN HALF THE DAYS
5. BEING SO RESTLESS THAT IT IS HARD TO SIT STILL: SEVERAL DAYS
2. NOT BEING ABLE TO STOP OR CONTROL WORRYING: MORE THAN HALF THE DAYS
3. WORRYING TOO MUCH ABOUT DIFFERENT THINGS: NEARLY EVERY DAY
7. FEELING AFRAID AS IF SOMETHING AWFUL MIGHT HAPPEN: NEARLY EVERY DAY
4. TROUBLE RELAXING: SEVERAL DAYS
8. IF YOU CHECKED OFF ANY PROBLEMS, HOW DIFFICULT HAVE THESE MADE IT FOR YOU TO DO YOUR WORK, TAKE CARE OF THINGS AT HOME, OR GET ALONG WITH OTHER PEOPLE?: EXTREMELY DIFFICULT
6. BECOMING EASILY ANNOYED OR IRRITABLE: NEARLY EVERY DAY
GAD7 TOTAL SCORE: 15

## 2023-01-23 NOTE — PATIENT INSTRUCTIONS
[facility]  [address] [facility]  [address]  [place] [address]  Appointments: [phone] or [phone]        Call to schedule your abdominal ultrasound at [facility]:  [phone]

## 2023-01-23 NOTE — LETTER
Opioid / Opioid Plus Controlled Substance Agreement    This is an agreement between you and your provider about the safe and appropriate use of controlled substance/opioids prescribed by your care team. Controlled substances are medicines that can cause physical and mental dependence (abuse).    There are strict laws about having and using these medicines. We here at [facility] are committing to working with you in your efforts to get better. To support you in this work, we ll help you schedule regular office appointments for medicine refills. If we must cancel or change your appointment for any reason, we ll make sure you have enough medicine to last until your next appointment.     As a Provider, I will:    Listen carefully to your concerns and treat you with respect.     Recommend a treatment plan that I believe is in your best interest. This plan may involve therapies other than opioid pain medication.     Talk with you often about the possible benefits, and the risk of harm of any medicine that we prescribe for you.     Provide a plan on how to taper (discontinue or go off) using this medicine if the decision is made to stop its use.    As a Patient, I understand that opioid(s):     Are a controlled substance prescribed by my care team to help me function or work and manage my condition(s).     Are strong medicines and can cause serious side effects such as:    Drowsiness, which can seriously affect my driving ability    A lower breathing rate, enough to cause death    Harm to my thinking ability     Depression     Abuse of and addiction to this medicine    Need to be taken exactly as prescribed. Combining opioids with certain medicines or chemicals (such as illegal drugs, sedatives, sleeping pills, and benzodiazepines) can be dangerous or even fatal. If I stop opioids suddenly, I may have severe withdrawal symptoms.    Do not work for all types of pain nor for all patients. If they re not helpful, I may  be asked to stop them.        The risks, benefits and side effects of these medicine(s) were explained to me. I agree that:  1. I will take part in other treatments as advised by my care team. This may be psychiatry or counseling, physical therapy, behavioral therapy, group treatment or a referral to a specialist.     2. I will keep all my appointments. I understand that this is part of the monitoring of opioids. My care team may require an office visit for EVERY opioid/controlled substance refill. If I miss appointments or don t follow instructions, my care team may stop my medicine.    3. I will take my medicines as prescribed. I will not change the dose or schedule unless my care team tells me to. There will be no refills if I run out early.     4. I may be asked to come to the clinic and complete a urine drug test or complete a pill count at any time. If I don t give a urine sample or participate in a pill count, the care team may stop my medicine.    5. I will only receive prescriptions from this clinic for chronic pain. If I am treated by another provider for acute pain issues, I will tell them that I am taking opioid pain medication for chronic pain and that I have a treatment agreement with this provider. I will inform my [facility] care team within one business day if I am given a prescription for any pain medication by another healthcare provider. My [facility] care team can contact other providers and pharmacists about my use of any medicines.    6. It is up to me to make sure that I don t run out of my medicines on weekends or holidays. If my care team is willing to refill my opioid prescription without a visit, I must request refills only during office hours. Refills may take up to 3 business days to process. I will use one pharmacy to fill all my opioid and other controlled substance prescriptions. I will notify the clinic about any changes to my insurance or medication  availability.    7. I am responsible for my prescriptions. If the medicine/prescription is lost, stolen or destroyed, it will not be replaced. I also agree not to share controlled substance medicines with anyone.    8. I am aware I should not use any illegal or recreational drugs. I agree not to drink alcohol unless my care team says I can.       9. If I enroll in the [place] [facility] Cannabis program, I will tell my care team prior to my next refill.     10. I will tell my care team right away if I become pregnant, have a new medical problem treated outside of my regular clinic, or have a change in my medications.    11. I understand that this medicine can affect my thinking, judgment and reaction time. Alcohol and drugs affect the brain and body, which can affect the safety of my driving. Being under the influence of alcohol or drugs can affect my decision-making, behaviors, personal safety, and the safety of others. Driving while impaired (DWI) can occur if a person is driving, operating, or in physical control of a car, motorcycle, boat, snowmobile, ATV, motorbike, off-road vehicle, or any other motor vehicle ([place] Statute 169[place].20). I understand the risk if I choose to drive or operate any vehicle or machinery.    I understand that if I do not follow any of the conditions above, my prescriptions or treatment may be stopped or changed.          Opioids  What You Need to Know    What are opioids?   Opioids are pain medicines that must be prescribed by a doctor. They are also known as narcotics.     Examples are:   1. morphine (MS Contin, Aneta)  2. oxycodone (Oxycontin)  3. oxycodone and acetaminophen (Percocet)  4. hydrocodone and acetaminophen (Vicodin, Norco)   5. fentanyl patch (Duragesic)   6. hydromorphone (Dilaudid)   7. methadone  8. codeine (Tylenol #3)     What do opioids do well?   Opioids are best for severe short-term pain such as after a surgery or injury. They may work well for cancer pain. They may  help some people with long-lasting (chronic) pain.     What do opioids NOT do well?   Opioids never get rid of pain entirely, and they don t work well for most patients with chronic pain. Opioids don t reduce swelling, one of the causes of pain.                                    Other ways to manage chronic pain and improve function include:       Treat the health problem that may be causing pain    Anti-inflammation medicines, which reduce swelling and tenderness, such as ibuprofen (Advil, Motrin) or naproxen (Aleve)    Acetaminophen (Tylenol)    Antidepressants and anti-seizure medicines, especially for nerve pain    Topical treatments such as patches or creams    Injections or nerve blocks    Chiropractic or osteopathic treatment    Acupuncture, massage, deep breathing, meditation, visual imagery, aromatherapy    Use heat or ice at the pain site    Physical therapy     Exercise    Stop smoking    Take part in therapy       Risks and side effects     Talk to your doctor before you start or decide to keep taking opioids. Possible side effects include:      Lowering your breathing rate enough to cause death    Overdose, including death, especially if taking higher than prescribed doses    Worse depression symptoms; less pleasure in things you usually enjoy    Feeling tired or sluggish    Slower thoughts or cloudy thinking    Being more sensitive to pain over time; pain is harder to control    Trouble sleeping or restless sleep    Changes in hormone levels (for example, less testosterone)    Changes in sex drive or ability to have sex    Constipation    Unsafe driving    Itching and sweating    Dizziness    Nausea, throwing up and dry mouth    What else should I know about opioids?    Opioids may lead to dependence, tolerance, or addiction.      Dependence means that if you stop or reduce the medicine too quickly, you will have withdrawal symptoms. These include loose poop (diarrhea), jitters, flu-like symptoms,  nervousness and tremors. Dependence is not the same as addiction.                       Tolerance means needing higher doses over time to get the same effect. This may increase the chance of serious side effects.      Addiction is when people improperly use a substance that harms their body, their mind or their relations with others. Use of opiates can cause a relapse of addiction if you have a history of drug or alcohol abuse.      People who have used opioids for a long time may have a lower quality of life, worse depression, higher levels of pain and more visits to doctors.    You can overdose on opioids. Take these steps to lower your risk of overdose:    1. Recognize the signs:  Signs of overdose include decrease or loss of consciousness (blackout), slowed breathing, trouble waking up and blue lips. If someone is worried about overdose, they should call 911.    2. Talk to your doctor about Narcan (naloxone).   If you are at risk for overdose, you may be given a prescription for Narcan. This medicine very quickly reverses the effects of opioids.   If you overdose, a friend or family member can give you Narcan while waiting for the ambulance. They need to know the signs of overdose and how to give Narcan.     3. Don't use alcohol or street drugs.   Taking them with opioids can cause death.    4. Do not take any of these medicines unless your doctor says it s OK. Taking these with opioids can cause death:    Benzodiazepines, such as lorazepam (Ativan), alprazolam (Xanax) or diazepam (Valium)    Muscle relaxers, such as cyclobenzaprine (Flexeril)    Sleeping pills like zolpidem (Ambien)     Other opioids      How to keep you and other people safe while taking opioids:    1. Never share your opioids with others.  Opioid medicines are regulated by the Drug Enforcement Agency ([employer]). Selling or sharing medications is a criminal act.    2. Be sure to store opioids in a secure place, locked up if possible. Young children  can easily swallow them and overdose.    3. When you are traveling with your medicines, keep them in the original bottles. If you use a pill box, be sure you also carry a copy of your medicine list from your clinic or pharmacy.    4. Safe disposal of opioids    Most pharmacies have places to get rid of medicine, called disposal kiosks. Medicine disposal options are also available in every [facility]. Search your county and  medication disposal  to find more options. You can find more details at:  [email or web address]-[email or web address]-medications     I agree that my provider, clinic care team, and pharmacy may work with any city, state or federal law enforcement agency that investigates the misuse, sale, or other diversion of my controlled medicine. I will allow my provider to discuss my care with, or share a copy of, this agreement with any other treating provider, pharmacy or emergency room where I receive care.    I have read this agreement and have asked questions about anything I did not understand.    _______________________________________________________  Patient Signature - Melody Muñoz _____________________                   Date     _______________________________________________________  Provider Signature - Paradise Hennessy PA-C   _____________________                   Date     _______________________________________________________  Witness Signature (required if provider not present while patient signing)   _____________________                   Date

## 2023-01-23 NOTE — LETTER
January 26, 2023      Melody Muñoz  [address] [place] [address] [address] [place] [address]        Dear ,    We are writing to inform you of your test results.    1. Diabetes control is very good.  H1c is normal.   2. Your cholesterol is high, and I recommend you start taking a cholesterol lowering medication.  You also have protein in your urine, and I would like you to start a medication called an ACE inhibitor for kidney protection.  I would like to discuss further with a virtual visit - I have asked my team to call you to schedule this.    The 10-year ASCVD risk score (Mayela MEJIA, et al., 2019) is: 9.4%    Values used to calculate the score:      Age: 47 years      Sex: Female      Is Non- : No      Diabetic: Yes      Tobacco smoker: Yes      Systolic Blood Pressure: 124 mmHg      Is BP treated: No      HDL Cholesterol: 47 mg/dL      Total Cholesterol: 253 mg/dL  3. Your potassium level is slightly low, so I would like you to add potassium-rich foods to your diet.  Foods high in potassium:  - baked potatoes with skin  - black beans  - banana  - raw carrots  - broccoli  - cantaloupe  - plain, low fat yogurt  - milk  4. Your thyroid is normal.   5. Urine is normal.   6. Drug screen is as expected. I have no concerns.     Resulted Orders   HEMOGLOBIN A1C   Result Value Ref Range    Hemoglobin A1C 5.8 (H) 0.0 - 5.6 %      Comment:      Normal <5.7%   Prediabetes 5.7-6.4%    Diabetes 6.5% or higher     Note: Adopted from ADA consensus guidelines.   Lipid panel reflex to direct LDL Non-fasting   Result Value Ref Range    Cholesterol 253 (H) <200 mg/dL    Triglycerides 234 (H) <150 mg/dL    Direct Measure HDL 47 (L) >=50 mg/dL    LDL Cholesterol Calculated 159 (H) <=100 mg/dL    Non HDL Cholesterol 206 (H) <130 mg/dL    Narrative    Cholesterol  Desirable:  <200 mg/dL    Triglycerides  Normal:  Less than 150 mg/dL  Borderline High:  150-199 mg/dL  High:  200-499 mg/dL  Very High:  Greater  than or equal to 500 mg/dL    Direct Measure HDL  Female:  Greater than or equal to 50 mg/dL   Male:  Greater than or equal to 40 mg/dL    LDL Cholesterol  Desirable:  <100mg/dL  Above Desirable:  100-129 mg/dL   Borderline High:  130-159 mg/dL   High:  160-189 mg/dL   Very High:  >= 190 mg/dL    Non HDL Cholesterol  Desirable:  130 mg/dL  Above Desirable:  130-159 mg/dL  Borderline High:  160-189 mg/dL  High:  190-219 mg/dL  Very High:  Greater than or equal to 220 mg/dL   Albumin Random Urine Quantitative with Creat Ratio   Result Value Ref Range    Albumin Urine mg/L 194.0 mg/L      Comment:      The reference ranges have not been established in urine albumin. The results should be integrated into the clinical context for interpretation.    Albumin Urine mg/g Cr 352.09 (H) 0.00 - 25.00 mg/g Cr      Comment:      Microalbuminuria is defined as an albumin:creatinine ratio of 17 to 299 for males and 25 to 299 for females. A ratio of albumin:creatinine of 300 or higher is indicative of overt proteinuria.  Due to biologic variability, positive results should be confirmed by a second, first-morning random or 24-hour timed urine specimen. If there is discrepancy, a third specimen is recommended. When 2 out of 3 results are in the microalbuminuria range, this is evidence for incipient nephropathy and warrants increased efforts at glucose control, blood pressure control, and institution of therapy with an angiotensin-converting-enzyme (ACE) inhibitor (if the patient can tolerate it).      Creatinine Urine mg/dL 55.1 mg/dL      Comment:      The reference ranges have not been established in urine creatinine. The results should be integrated into the clinical context for interpretation.   TSH WITH FREE T4 REFLEX   Result Value Ref Range    TSH 2.17 0.30 - 4.20 uIU/mL   Comprehensive metabolic panel (BMP + Alb, Alk Phos, ALT, AST, Total. Bili, TP)   Result Value Ref Range    Sodium 140 136 - 145 mmol/L    Potassium 3.2 (L)  3.4 - 5.3 mmol/L    Chloride 103 98 - 107 mmol/L    Carbon Dioxide (CO2) 24 22 - 29 mmol/L    Anion Gap 13 7 - 15 mmol/L    Urea Nitrogen 5.7 (L) 6.0 - 20.0 mg/dL    Creatinine 0.78 0.51 - 0.95 mg/dL    Calcium 9.4 8.6 - 10.0 mg/dL    Glucose 106 (H) 70 - 99 mg/dL    Alkaline Phosphatase 89 35 - 104 U/L    AST 48 (H) 10 - 35 U/L    ALT 65 (H) 10 - 35 U/L    Protein Total 6.8 6.4 - 8.3 g/dL    Albumin 4.5 3.5 - 5.2 g/dL    Bilirubin Total 0.2 <=1.2 mg/dL    GFR Estimate >90 >60 mL/min/1.73m2      Comment:      eGFR calculated using 2021 CKD-EPI equation.   CBC with platelets   Result Value Ref Range    WBC Count 13.9 (H) 4.0 - 11.0 10e3/uL    RBC Count 4.97 3.80 - 5.20 10e6/uL    Hemoglobin 14.7 11.7 - 15.7 g/dL    Hematocrit 44.7 35.0 - 47.0 %    MCV 90 78 - 100 fL    MCH 29.6 26.5 - 33.0 pg    MCHC 32.9 31.5 - 36.5 g/dL    RDW 12.8 10.0 - 15.0 %    Platelet Count 541 (H) 150 - 450 10e3/uL   UA with Microscopic reflex to Culture - lab collect   Result Value Ref Range    Color Urine Yellow Colorless, Straw, Light Yellow, Yellow    Appearance Urine Clear Clear    Glucose Urine Negative Negative mg/dL    Bilirubin Urine Negative Negative    Ketones Urine Negative Negative mg/dL    Specific Gravity Urine 1.010 1.003 - 1.035    Blood Urine Negative Negative    pH Urine 7.0 5.0 - 7.0    Protein Albumin Urine 30 (A) Negative mg/dL    Urobilinogen Urine 0.2 0.2, 1.0 E.U./dL    Nitrite Urine Negative Negative    Leukocyte Esterase Urine Negative Negative   Urine Drug Confirmation Panel   Result Value Ref Range    Oxycodone ng/mL 116 (H) <50 ng/mL    Oxycodone 211 Absent ng/mg [creat]      Comment:      Sources of oxycodone are scheduled prescription medications.    Oxymorphone ng/mL 344 (H) <50 ng/mL    Oxymorphone 625 Absent ng/mg [creat]      Comment:      Oxymorphone is an expected metabolite of oxycodone. Oxymorphone is also available as a scheduled prescription medication.    7-Aminoclonazepam Present (A) Absent       Comment:      7-aminoclonazepam is an expected metabolite of clonazepam. Sources of clonazepam are scheduled prescription medications.    Gabapentin (Neurontin) Present (A) Absent      Comment:      Sources of gabapentin are prescription medications.    Methylphenidate (Ritalin) ng/mL 278 (H) <50 ng/mL    Methylphenidate (Ritalin) 505 Absent ng/mg [creat]      Comment:      Sources of methylphenidate are scheduled prescription medications.    Ritalinic Acid ng/mL >12,540 (H) <50 ng/mL    Narrative    This test was developed and its performance characteristics determined by the [facility],  Special Chemistry Laboratory. It has not been cleared or approved by the FDA. The laboratory is regulated under CLIA as qualified to perform high-complexity testing. This test is used for clinical purposes. It should not be regarded as investigational or for research.   Urine Creatinine for Drug Screen Panel   Result Value Ref Range    Creatinine Urine for Drug Screen 55 mg/dL      Comment:      The reference range has not been established for creatinine in random urines. The results should be integrated into the clinical context for interpretation.   Urine Microscopic   Result Value Ref Range    Bacteria Urine Few (A) None Seen /HPF    RBC Urine 0-2 0-2 /HPF /HPF    WBC Urine 0-5 0-5 /HPF /HPF    Squamous Epithelials Urine Few (A) None Seen /LPF    Narrative    Urine Culture not indicated       If you have any questions or concerns, please call the clinic at the number listed above.       Sincerely,      Paradise Hennessy PA-C

## 2023-01-23 NOTE — PROGRESS NOTES
Assessment & Plan   Problem List Items Addressed This Visit        Nervous and Auditory    Bilateral groin pain       Respiratory    JASWANT (obstructive sleep apnea) (Chronic)    Relevant Orders    Adult Sleep Eval & Management  Referral       Endocrine    Acquired hypothyroidism    Relevant Orders    TSH WITH FREE T4 REFLEX (Completed)    Uncontrolled type 2 diabetes mellitus with hyperglycemia (H)    Relevant Orders    HEMOGLOBIN A1C (Completed)    Lipid panel reflex to direct LDL Non-fasting (Completed)    Albumin Random Urine Quantitative with Creat Ratio (Completed)    Comprehensive metabolic panel (BMP + Alb, Alk Phos, ALT, AST, Total. Bili, TP) (Completed)       Other    Moderate benzodiazepine use disorder (H)    Relevant Orders    [id number] - Urine Drug Confirmation Panel (Comprehensive) (Completed)   Other Visit Diagnoses     Benign breast cyst in female, right    -  Primary    Relevant Orders    MA Diagnostic Bilateral w/Damoen (Completed)    Screening for hyperlipidemia        Relevant Orders    Lipid panel reflex to direct LDL Non-fasting (Completed)    Encounter for long-term (current) use of medications        Breast pain        Relevant Orders    MA Diagnostic Bilateral w/Dameon (Completed)    Dense breasts        Relevant Orders    MA Diagnostic Bilateral w/Dameon (Completed)    Need for immunization against influenza        Relevant Orders    INFLUENZA VACCINE IM > 6 MONTHS VALENT IIV4 (AFLURIA/FLUZONE) (Completed)    Need for Tdap vaccination        Relevant Orders    TDAP VACCINE (Adacel, Boostrix) (Completed)    Need for vaccination against Streptococcus pneumoniae        Relevant Orders    Pneumococcal 20 Valent Conjugate (Prevnar 20) (Completed)    Hair loss        Relevant Orders    CBC with platelets (Completed)    Chronic right flank pain        Relevant Orders    UA with Microscopic reflex to Culture - lab collect (Completed)    US Abdomen Complete (Completed)    Urine Microscopic  "(Completed)    RUQ abdominal pain        Relevant Orders    US Abdomen Complete (Completed)    Intermittent asthma, uncomplicated        Relevant Medications    albuterol (PROAIR HFA/PROVENTIL HFA/VENTOLIN HFA) 108 (90 Base) MCG/ACT inhaler        Breast pain - US and diagnostic mammo ordered.  Patient has a history of dense breasts and benign breast mass.  No concerning findings on exam today, however with new change in breast pain imaging is appropriate.     Right upper quadrant abdominal pain and right flank pain - patient has a history of chronic pain in this area with multiple negative CT scans.  UA and renal US ordered today due to change in pain.  No evidence of acute abdomen on exam so CT is not recommended at this time.  Monitor for a change in the pain, hard abdomen, fever, blood in the urine.     Patient taking oxycodone for chronic groin pain - refill and urine drug screen completed today.     Labs and/or refills were updated as above.                  BMI:   Estimated body mass index is 26.14 kg/m  as calculated from the following:    Height as of this encounter: 1.585 m (5' 2.4\").    Weight as of this encounter: 65.7 kg (144 lb 12.8 oz).       Depression Screening Follow Up    PHQ 2/23/2023   PHQ-9 Total Score 17   Q9: Thoughts of better off dead/self-harm past 2 weeks Several days   F/U: Thoughts of suicide or self-harm -   F/U: Self harm-plan -   F/U: Self-harm action -   F/U: Safety concerns -         Return in about 3 months (around 4/23/2023) for Virtual Visit, Medication Recheck  -chronic pain check.    Paradise Hennessy PA-C  [facility] is [facility] 47 year [facility], presenting for the following health issues:  Breast Exam, LAB REQUEST, Recheck Medication, lung scan, and Abdominal Pain (Right sided)      History of Present Illness       Reason for visit:  Breast exam scans ordered percocet CBC    She eats 0-1 servings of fruits and vegetables " "daily.She consumes 5 sweetened beverage(s) daily.She exercises with enough effort to increase her heart rate 9 or less minutes per day.  She exercises with enough effort to increase her heart rate 3 or less days per week. She is missing 2 dose(s) of medications per week.  She is not taking prescribed medications regularly due to remembering to take.    Today's PHQ-9         PHQ-9 Total Score: 16    PHQ-9 Q9 Thoughts of better off dead/self-harm past 2 weeks :   Several days  Thoughts of suicide or self harm: (P) Yes  Self-harm Plan:   (P) No  Self-harm Action:     (P) No  Safety concerns for self or others: (P) No    How difficult have these problems made it for you to do your work, take care of things at home, or get along with other people: Extremely difficult  Today's ROWDY-7 Score: 15         Review of Systems         Objective    /88   Pulse 77   Temp 97.8  F (36.6  C)   Ht 1.585 m (5' 2.4\")   Wt 65.7 kg (144 lb 12.8 oz)   LMP  (LMP Unknown)   SpO2 98%   BMI 26.14 kg/m    Body mass index is 26.14 kg/m .  Physical Exam  Constitutional:       General: She is not in acute distress.     Appearance: She is well-developed.   HENT:      Right Ear: Tympanic membrane and external ear normal.      Left Ear: Tympanic membrane and external ear normal.   Eyes:      General:         Right eye: No discharge.         Left eye: No discharge.      Conjunctiva/sclera: Conjunctivae normal.      Pupils: Pupils are equal, round, and reactive to light.   Neck:      Thyroid: No thyromegaly.      Trachea: No tracheal deviation.   Cardiovascular:      Rate and Rhythm: Normal rate and regular rhythm.      Pulses: Normal pulses.      Heart sounds: Normal heart sounds, S1 normal and S2 normal. No murmur heard.    No friction rub. No S3 or S4 sounds.   Pulmonary:      Effort: Pulmonary effort is normal. No respiratory distress.      Breath sounds: Normal breath sounds. No wheezing or rales.   Chest:   Breasts:     Right: " Tenderness present. No mass or nipple discharge.      Left: No mass, nipple discharge or tenderness.   Abdominal:      Palpations: Abdomen is soft. There is no hepatomegaly, splenomegaly or mass.      Tenderness: There is abdominal tenderness in the right upper quadrant and right lower quadrant. There is right CVA tenderness. There is no guarding or rebound.   Musculoskeletal:         General: Normal range of motion.      Cervical back: Neck supple.   Lymphadenopathy:      Cervical: No cervical adenopathy.   Skin:     General: Skin is warm and dry.      Findings: No rash.   Neurological:      Mental Status: She is alert and oriented to person, place, and time.      Motor: No abnormal muscle tone.   Psychiatric:         Thought Content: Thought content normal.         Judgment: Judgment normal.            Results for orders placed or performed in visit on 01/23/23   HEMOGLOBIN A1C     Status: Abnormal   Result Value Ref Range    Hemoglobin A1C 5.8 (H) 0.0 - 5.6 %   Lipid panel reflex to direct LDL Non-fasting     Status: Abnormal   Result Value Ref Range    Cholesterol 253 (H) <200 mg/dL    Triglycerides 234 (H) <150 mg/dL    Direct Measure HDL 47 (L) >=50 mg/dL    LDL Cholesterol Calculated 159 (H) <=100 mg/dL    Non HDL Cholesterol 206 (H) <130 mg/dL    Narrative    Cholesterol  Desirable:  <200 mg/dL    Triglycerides  Normal:  Less than 150 mg/dL  Borderline High:  150-199 mg/dL  High:  200-499 mg/dL  Very High:  Greater than or equal to 500 mg/dL    Direct Measure HDL  Female:  Greater than or equal to 50 mg/dL   Male:  Greater than or equal to 40 mg/dL    LDL Cholesterol  Desirable:  <100mg/dL  Above Desirable:  100-129 mg/dL   Borderline High:  130-159 mg/dL   High:  160-189 mg/dL   Very High:  >= 190 mg/dL    Non HDL Cholesterol  Desirable:  130 mg/dL  Above Desirable:  130-159 mg/dL  Borderline High:  160-189 mg/dL  High:  190-219 mg/dL  Very High:  Greater than or equal to 220 mg/dL   Albumin Random Urine  Quantitative with Creat Ratio     Status: Abnormal   Result Value Ref Range    Albumin Urine mg/L 194.0 mg/L    Albumin Urine mg/g Cr 352.09 (H) 0.00 - 25.00 mg/g Cr    Creatinine Urine mg/dL 55.1 mg/dL   TSH WITH FREE T4 REFLEX     Status: Normal   Result Value Ref Range    TSH 2.17 0.30 - 4.20 uIU/mL   Comprehensive metabolic panel (BMP + Alb, Alk Phos, ALT, AST, Total. Bili, TP)     Status: Abnormal   Result Value Ref Range    Sodium 140 136 - 145 mmol/L    Potassium 3.2 (L) 3.4 - 5.3 mmol/L    Chloride 103 98 - 107 mmol/L    Carbon Dioxide (CO2) 24 22 - 29 mmol/L    Anion Gap 13 7 - 15 mmol/L    Urea Nitrogen 5.7 (L) 6.0 - 20.0 mg/dL    Creatinine 0.78 0.51 - 0.95 mg/dL    Calcium 9.4 8.6 - 10.0 mg/dL    Glucose 106 (H) 70 - 99 mg/dL    Alkaline Phosphatase 89 35 - 104 U/L    AST 48 (H) 10 - 35 U/L    ALT 65 (H) 10 - 35 U/L    Protein Total 6.8 6.4 - 8.3 g/dL    Albumin 4.5 3.5 - 5.2 g/dL    Bilirubin Total 0.2 <=1.2 mg/dL    GFR Estimate >90 >60 mL/min/1.73m2   CBC with platelets     Status: Abnormal   Result Value Ref Range    WBC Count 13.9 (H) 4.0 - 11.0 10e3/uL    RBC Count 4.97 3.80 - 5.20 10e6/uL    Hemoglobin 14.7 11.7 - 15.7 g/dL    Hematocrit 44.7 35.0 - 47.0 %    MCV 90 78 - 100 fL    MCH 29.6 26.5 - 33.0 pg    MCHC 32.9 31.5 - 36.5 g/dL    RDW 12.8 10.0 - 15.0 %    Platelet Count 541 (H) 150 - 450 10e3/uL   UA with Microscopic reflex to Culture - lab collect     Status: Abnormal    Specimen: Urine, NOS   Result Value Ref Range    Color Urine Yellow Colorless, Straw, Light Yellow, Yellow    Appearance Urine Clear Clear    Glucose Urine Negative Negative mg/dL    Bilirubin Urine Negative Negative    Ketones Urine Negative Negative mg/dL    Specific Gravity Urine 1.010 1.003 - 1.035    Blood Urine Negative Negative    pH Urine 7.0 5.0 - 7.0    Protein Albumin Urine 30 (A) Negative mg/dL    Urobilinogen Urine 0.2 0.2, 1.0 E.U./dL    Nitrite Urine Negative Negative    Leukocyte Esterase Urine Negative  Negative   Urine Drug Confirmation Panel     Status: Abnormal   Result Value Ref Range    Oxycodone ng/mL 116 (H) <50 ng/mL    Oxycodone 211 Absent ng/mg [creat]    Oxymorphone ng/mL 344 (H) <50 ng/mL    Oxymorphone 625 Absent ng/mg [creat]    7-Aminoclonazepam Present (A) Absent    Gabapentin (Neurontin) Present (A) Absent    Methylphenidate (Ritalin) ng/mL 278 (H) <50 ng/mL    Methylphenidate (Ritalin) 505 Absent ng/mg [creat]    Ritalinic Acid ng/mL >12,540 (H) <50 ng/mL    Narrative    This test was developed and its performance characteristics determined by the [facility],  Special Chemistry Laboratory. It has not been cleared or approved by the FDA. The laboratory is regulated under CLIA as qualified to perform high-complexity testing. This test is used for clinical purposes. It should not be regarded as investigational or for research.   Urine Creatinine for Drug Screen Panel     Status: None   Result Value Ref Range    Creatinine Urine for Drug Screen 55 mg/dL   Urine Microscopic     Status: Abnormal   Result Value Ref Range    Bacteria Urine Few (A) None Seen /HPF    RBC Urine 0-2 0-2 /HPF /HPF    WBC Urine 0-5 0-5 /HPF /HPF    Squamous Epithelials Urine Few (A) None Seen /LPF    Narrative    Urine Culture not indicated   [id number] - Urine Drug Confirmation Panel (Comprehensive)     Status: Abnormal    Narrative    The following orders were created for panel order [id number] - Urine Drug Confirmation Panel (Comprehensive).  Procedure                               Abnormality         Status                     ---------                               -----------         ------                     Urine Drug Confirmation ...[[id number]]  Abnormal            Final result               Urine Creatinine for Miko...[[id number]]                      Final result                 Please view results for these tests on the individual orders.                   Answers for HPI/ROS submitted by the  patient on 1/23/2023  If you checked off any problems, how difficult have these problems made it for you to do your work, take care of things at home, or get along with other people?: Extremely difficult  PHQ9 TOTAL SCORE: 16  ROWDY 7 TOTAL SCORE: 15

## 2023-01-25 ENCOUNTER — TELEPHONE (OUTPATIENT)
Dept: FAMILY MEDICINE | Facility: CLINIC | Age: 48
End: 2023-01-25

## 2023-01-25 LAB
7AMINOCLONAZEPAM UR QL CFM: PRESENT
GABAPENTIN UR QL CFM: PRESENT
ME-PHENIDATE UR CFM-MCNC: 278 NG/ML
ME-PHENIDATE UR CFM-MCNC: ABNORMAL NG/ML
ME-PHENIDATE/CREAT UR: 505 NG/MG {CREAT}
OXYCODONE UR CFM-MCNC: 116 NG/ML
OXYCODONE/CREAT UR: 211 NG/MG {CREAT}
OXYMORPHONE UR CFM-MCNC: 344 NG/ML
OXYMORPHONE/CREAT UR: 625 NG/MG {CREAT}

## 2023-01-25 NOTE — TELEPHONE ENCOUNTER
Pt called requesting to know which labs were out of range. No interpretation from provider is seen. Informed pt writer could not interpret the labs. Pt stated she just wanted to know which ones were out of range at this point. Read to pt the lab values and our reference ranges from 1/23/23 labs.     Pt is also requesting an interpretation of labs and to call pt back with pcps interpretation/recommendations.     Please call pt back with PCPs interpretation/recommendations.     Can we leave a detailed message on this number? YES  Phone number patient can be reached at: Cell number on file:    Telephone Information:   Mobile [phone]       Janet Mcintyre, RN  [facility] Triage

## 2023-01-26 ENCOUNTER — VIRTUAL VISIT (OUTPATIENT)
Dept: FAMILY MEDICINE | Facility: CLINIC | Age: 48
End: 2023-01-26
Payer: COMMERCIAL

## 2023-01-26 DIAGNOSIS — E11.65 UNCONTROLLED TYPE 2 DIABETES MELLITUS WITH HYPERGLYCEMIA (H): Primary | ICD-10-CM

## 2023-01-26 DIAGNOSIS — D75.839 THROMBOCYTOSIS, UNSPECIFIED: ICD-10-CM

## 2023-01-26 DIAGNOSIS — R80.9 ALBUMINURIA: ICD-10-CM

## 2023-01-26 DIAGNOSIS — E78.2 MIXED HYPERLIPIDEMIA: ICD-10-CM

## 2023-01-26 DIAGNOSIS — N64.4 BREAST PAIN: ICD-10-CM

## 2023-01-26 DIAGNOSIS — R59.0 LYMPHADENOPATHY, AXILLARY: ICD-10-CM

## 2023-01-26 DIAGNOSIS — R10.9 FLANK PAIN: ICD-10-CM

## 2023-01-26 PROCEDURE — 99214 OFFICE O/P EST MOD 30 MIN: CPT | Mod: 93 | Performed by: PHYSICIAN ASSISTANT

## 2023-01-26 RX ORDER — ROSUVASTATIN CALCIUM 5 MG/1
5 TABLET, COATED ORAL DAILY
Qty: 90 TABLET | Refills: 1 | Status: SHIPPED | OUTPATIENT
Start: 2023-01-26 | End: 2023-08-24

## 2023-01-26 RX ORDER — LOSARTAN POTASSIUM 25 MG/1
25 TABLET ORAL DAILY
Qty: 90 TABLET | Refills: 0 | Status: SHIPPED | OUTPATIENT
Start: 2023-01-26 | End: 2023-05-01

## 2023-01-26 NOTE — PROGRESS NOTES
"Melody is a 47 year old who is being evaluated via a billable telephone visit.      What phone number would you like to be contacted at? [phone]  How would you like to obtain your AVS? Mail a copy  Distant Location (provider location):  Off-site    Assessment & Plan   Problem List Items Addressed This Visit        Endocrine    Mixed hyperlipidemia    Relevant Medications    rosuvastatin (CRESTOR) 5 MG tablet    Uncontrolled type 2 diabetes mellitus with hyperglycemia (H) - Primary    Relevant Medications    rosuvastatin (CRESTOR) 5 MG tablet    Other Relevant Orders    Adult Nephrology  Referral   Other Visit Diagnoses     Albuminuria        Relevant Medications    losartan (COZAAR) 25 MG tablet    Other Relevant Orders    US Renal Complete Non-Vascular    Adult Nephrology  Referral    Flank pain        Relevant Orders    US Renal Complete Non-Vascular    Breast pain        Relevant Orders    US Axillary Bilateral    Lymphadenopathy, axillary        Relevant Orders    US Axillary Bilateral    Thrombocytosis, unspecified        Relevant Orders    Lab Blood Morphology Pathologist Review         - We reviewed labs from yesterday:  Overall labs are very stable, no evidence of UTI.  LFT are slightly elevated from baseline.    - patient is very concerned about the right flank and RUQ pain - she is most worried about her kidney.  Nephrology consult ordered per patient request.  We will start with a bilateral renal US.  I would like to avoid CT scans if possible due to radiation.  Patient has had many CTs in the past.   - breast lump concerns - I have added US bilateral axilla to her breast imaging orders.   - Elevated platelets - consistent for > 1 year, however gradually increasing.  Blood smear ordered.   - refill rosuvastatin.              BMI:   Estimated body mass index is 26.14 kg/m  as calculated from the following:    Height as of 1/23/23: 1.585 m (5' 2.4\").    Weight as of 1/23/23: 65.7 kg " (144 lb 12.8 oz).           No follow-ups on file.    Paradise Hennessy PA-C  [facility] MARIA ISABEL Oliver is a 47 year old, presenting for the following health issues:  Results      HPI     Concern - Discuss lab results             Review of Systems         Objective           Vitals:  No vitals were obtained today due to virtual visit.    Physical Exam   healthy, alert and no distress  PSYCH: Alert and oriented times 3; coherent speech, normal   rate and volume, able to articulate logical thoughts, able   to abstract reason, no tangential thoughts, no hallucinations   or delusions  Her affect is normal  RESP: No cough, no audible wheezing, able to talk in full sentences  Remainder of exam unable to be completed due to telephone visits                Phone call duration: 20 minutes

## 2023-01-26 NOTE — TELEPHONE ENCOUNTER
DIAGNOSIS: Uncontrolled type 2 diabetes mellitus with hyperglycemia (H) [E11.65]  Albuminuria    DATE RECEIVED: 02.16.2023    NOTES STATUS DETAILS   OFFICE NOTE from referring provider Internal 01.26.2023 Paradise Hennessy PA-C   OFFICE NOTE from other specialist      *Only VASCULITIS or LUPUS gather office notes for the following     *PULMONARY       *ENT     *DERMATOLOGY     *RHEUMATOLOGY     DISCHARGE SUMMARY from hospital     DISCHARGE REPORT from the ER     MEDICATION LIST Internal / CE    IMAGING  (NEED IMAGES AND REPORTS)     KIDNEY CT SCAN     KIDNEY ULTRASOUND     MR ABDOMEN     NUCLEAR MEDICINE RENAL     LABS     CBC Internal 01.23.2023   CMP Internal 01.23.2023   BMP Care Everywhere 05.03.2021   UA Internal 01.23.2023   URINE PROTEIN Internal 01.23.2023   RENAL PANEL     BIOPSY     KIDNEY BIOPSY

## 2023-01-27 ENCOUNTER — TELEPHONE (OUTPATIENT)
Dept: FAMILY MEDICINE | Facility: CLINIC | Age: 48
End: 2023-01-27
Payer: COMMERCIAL

## 2023-01-27 DIAGNOSIS — R10.11 RUQ ABDOMINAL PAIN: ICD-10-CM

## 2023-01-27 DIAGNOSIS — R10.9 FLANK PAIN: Primary | ICD-10-CM

## 2023-01-27 NOTE — TELEPHONE ENCOUNTER
Received call back from the pt stating she had a VM from the clinic. Relayed message from provider below. Provider pt with number. Pt states she would prefer to schedule CT at [facility]. Provided pt with the [facility] number [phone].     Advised pt to call back if any further questions to call back and if sx severe to go to ED per provider. Pt verbalized agreement. No further questions at this time.     Axel Henderson RN

## 2023-01-27 NOTE — TELEPHONE ENCOUNTER
Call attempt #1 Left message for patient that CT was ordered.  Information about scheduling was also communicated.     Patient stated the information could be left on her secure voicemail.     Please check to see if the CT was already scheduled before calling patient.     Ashley Irizarry RN  [facility]

## 2023-01-27 NOTE — TELEPHONE ENCOUNTER
Order for abdominal CT placed.  Patient should call to schedule.   Call to schedule your imaging test at [facility]:  [phone]      If pain becomes worse over the weekend, I recommend she go to the ED.  Ok to wait until next week to complete the CT scan.     Christofer Hennessy PA-C

## 2023-01-30 ENCOUNTER — TELEPHONE (OUTPATIENT)
Dept: ONCOLOGY | Facility: CLINIC | Age: 48
End: 2023-01-30
Payer: COMMERCIAL

## 2023-01-30 ENCOUNTER — TELEPHONE (OUTPATIENT)
Dept: MAMMOGRAPHY | Facility: CLINIC | Age: 48
End: 2023-01-30
Payer: COMMERCIAL

## 2023-01-30 ENCOUNTER — HOSPITAL ENCOUNTER (OUTPATIENT)
Dept: ULTRASOUND IMAGING | Facility: CLINIC | Age: 48
Discharge: HOME | End: 2023-01-30
Attending: PHYSICIAN ASSISTANT
Payer: COMMERCIAL

## 2023-01-30 ENCOUNTER — HOSPITAL ENCOUNTER (OUTPATIENT)
Dept: MAMMOGRAPHY | Facility: CLINIC | Age: 48
Discharge: HOME | End: 2023-01-30
Attending: PHYSICIAN ASSISTANT
Payer: COMMERCIAL

## 2023-01-30 DIAGNOSIS — N60.01 BENIGN BREAST CYST IN FEMALE, RIGHT: ICD-10-CM

## 2023-01-30 DIAGNOSIS — N64.4 BREAST PAIN: ICD-10-CM

## 2023-01-30 DIAGNOSIS — R92.30 DENSE BREASTS: ICD-10-CM

## 2023-01-30 PROCEDURE — 77062 BREAST TOMOSYNTHESIS BI: CPT

## 2023-01-30 PROCEDURE — 76882 US LMTD JT/FCL EVL NVASC XTR: CPT | Mod: RT

## 2023-01-30 NOTE — TELEPHONE ENCOUNTER
Patient has now decided she wants a biopsy of the painful lymph node she will be scheduled once orders have been received.

## 2023-01-30 NOTE — TELEPHONE ENCOUNTER
Patient calling in regarding diagnostic mammogram and US results from today.  Patient wants further clarification as to why a MBI wasn't ordered.  Explained to patient that additional orders would need to come from her PCP. Today exam will be dictated and sent to her provider for  review.  If additional orders are needed they will come from her PCP.  I have explained this several times to patient, she voices frustration. I have encouraged her to call her PCP.

## 2023-02-02 ENCOUNTER — NURSE TRIAGE (OUTPATIENT)
Dept: FAMILY MEDICINE | Facility: CLINIC | Age: 48
End: 2023-02-02
Payer: COMMERCIAL

## 2023-02-02 NOTE — TELEPHONE ENCOUNTER
FYI-  Pt called requesting a call from PCP. She wants provider to let her know records were received from [facility]. States [facility] reported some test she never had and  Information has been falsified. Pt complains of having worsening PTSD symptoms since Monday when she found that out. Triage advised she can request copy of records from [facility] if she wants to know what records they have. If having worsening metal health symptoms she can see empath unit at [facility]. Pt declines to schedule appt with nect available providers. Pt agreed to schedule an appt with PCP to discuss concerns above. Future pone visit was scheduled.      Future Appointments 2/2/2023 - 8/1/2023      Date Visit Type Length Department Provider     2/3/2023  8:50 AM US ABDOMEN COMPLETE 40 min RH ULTRASOUND CC [email or web address]    Location Instructions:     [facility] [address] [facility] [address] [place] [address]  Parking Imaging customers can park either in the lot to the right side of the driveway (opposite the parking ramp) as you approach the Specialty Care Center, or in the parking ramp.  Entrance and check-in location Enter at the front entrance (pictured to the right). As you enter the lobby, the Imaging Center is on the first floor, just past the elevators. Please check-in for your appointment at the desk in the Imaging Center waiting area, [address]              2/6/2023  2:30 PM OFFICE VISIT 30 min EC FP/IM/Paradise Zaragoza PA-C    Location Instructions:     [facility] is located at [address], across the street from [facility]. This is just south of the [address] exit off of [address]. Free lot parking is available.               2/8/2023  1:00 PM CT ABDOMEN PELVIS W 20 min RH CT SCAN RHCT2    Location Instructions:     [facility] [address] [place] [address]   Parking Imaging customers can park in the hospital s front lot. Enter from [address]. As you approach the front of the hospital, look for the Visitor Parking sign on your right and turn there.  Entrance and check-in location Enter through the main hospital entrance, facing [address] (pictured to the right). As you walk in the main entrance (off [address]) , please check in at the registration desk just inside the door. After you register, you will be directed to the Imaging Services department to a waiting area until it is time for your appointment.              2/9/2023  1:00 PM US BREAST BX CORE LYMPH NODE R 60 min RH ULTRASOUND BREAST [id number]     2/9/2023  1:00 PM US BREAST BX CORE LYMPH NODE R 60 min RH BREAST CENTER RH BREAST RAD     2/9/2023  1:00 PM US BREAST BX CORE LYMPH NODE R 60 min RH BREAST CENTER RH BREAST NURSE    Location Instructions:     [facility] [address], [address] [place] [id number]  [facility] Breast [place] customers can park in the lot adjacent to the entrance to the [facility].  Entrance and check-in location Enter at the front entrance, under the canopy. Take the stairs or elevator from the main entrance to the 2nd floor. Please check-in for your appointment at the desk in the Breast Center waiting area.              2/9/2023  2:00 PM MA POST PROCEDURE RIGHT 15 min RH BREAST CENTER [id number]    Location Instructions:     [facility] [address], [address] [place] [address]   [facility]  Breast [place] customers can park in the lot adjacent to the entrance to the [facility].  Entrance and check-in location  Enter at the front entrance, under the canopy. Take the stairs or elevator from the main entrance to the 2nd floor. Please check-in for your appointment at the desk  in the Breast Center waiting area.              2/16/2023  2:45 PM LAB 15 min [facility] UC LAB    Location Instructions:     Located in the [facility] at [address] [place] [address]. For parking options, enter the [facility] /arrival plaza from [facility] and attendants can assist you based on your needs.  parking is available for those with limited mobility M-F from 7 a.m. to 5 p.m. Due to short staffing, we are unable to offer  to all patients/visitors. Visit [email or web address] for more details.  Self-parking:&nbsp;  West Lot: Located across from the main entrance, this is a convenient option for patients. Enter on [address]. Parking attendants available most hours to assist.&nbsp;     [address] Ramp: Enter at the [address] entrance (one block north of the [facility] main entrance). Do not enter the ramp from [address] - this entrance is not staffed and is further from the [facility] main entrance.              2/16/2023  4:00 PM NEW GLOMERULAR 60 min  MEDICINE RENAL Mira Gomez MD    Location Instructions:     Located in the [facility] at [address] [place] [address]. For parking options, enter the [facility] /arrival plaza from [facility] and attendants can assist you based on your needs.  parking is available for those with limited mobility M-F from 7 a.m. to 5 p.m. Due to short staffing, we are unable to offer  to all patients/visitors. Visit [email or web address] for more details.  Self-parking:&nbsp;  West Lot: Located across from the main entrance, this is a convenient option for patients. Enter on [address]. Parking attendants available most hours to assist.&nbsp;     [address] Ramp: Enter at the [address] entrance (one block north of the [facility] main entrance). Do not enter the ramp from [address] - this entrance is not staffed and is further from the [facility] main entrance.              6/14/2023  8:00 AM NEW SLEEP 60  [facility] Nichelle Greene PA-C    Location Instructions:     Sleep Clinic appointments: Park in the parking lot or parking garage in front of the Specialty Center.&nbsp;  Take elevators up to the 3rd floor, to [address] and check in with the .     CPAP and Durable Medical Equipment (DME) appointments: Park in the parking lot or parking garage in front of the Specialty Center. Take elevators up to the 2nd floor, to [address] and check in with [facility].

## 2023-02-03 ENCOUNTER — HOSPITAL ENCOUNTER (OUTPATIENT)
Dept: ULTRASOUND IMAGING | Facility: CLINIC | Age: 48
Discharge: HOME | End: 2023-02-03
Attending: PHYSICIAN ASSISTANT | Admitting: PHYSICIAN ASSISTANT
Payer: COMMERCIAL

## 2023-02-03 DIAGNOSIS — R10.9 CHRONIC RIGHT FLANK PAIN: ICD-10-CM

## 2023-02-03 DIAGNOSIS — G89.29 CHRONIC RIGHT FLANK PAIN: ICD-10-CM

## 2023-02-03 DIAGNOSIS — R10.11 RUQ ABDOMINAL PAIN: ICD-10-CM

## 2023-02-03 PROCEDURE — 76700 US EXAM ABDOM COMPLETE: CPT

## 2023-02-06 ENCOUNTER — TELEPHONE (OUTPATIENT)
Dept: FAMILY MEDICINE | Facility: CLINIC | Age: 48
End: 2023-02-06

## 2023-02-06 ENCOUNTER — VIRTUAL VISIT (OUTPATIENT)
Dept: FAMILY MEDICINE | Facility: CLINIC | Age: 48
End: 2023-02-06
Payer: COMMERCIAL

## 2023-02-06 ENCOUNTER — NURSE TRIAGE (OUTPATIENT)
Dept: FAMILY MEDICINE | Facility: CLINIC | Age: 48
End: 2023-02-06

## 2023-02-06 DIAGNOSIS — R79.89 ABNORMAL LFTS: Primary | ICD-10-CM

## 2023-02-06 PROCEDURE — 99213 OFFICE O/P EST LOW 20 MIN: CPT | Mod: 93 | Performed by: PHYSICIAN ASSISTANT

## 2023-02-06 NOTE — TELEPHONE ENCOUNTER
"Pt has appointment scheduled later today 2/6/2023  at 230PM with Paradise Hennessy, scheduled 2/2/23.     Pt called requesting results of recent ultrasound.   Reviewed ultrasound impression and informed the patient that the message would be sent to provider requesting provider notes.   Pt asked why her kidney labs were elevated and why she was having pain. Pt was reassured, and was encouraged to discuss questions/concerns with her provider at the scheduled office visit later today or go to  for any symptom if needed before her appointment. She verbalized \"I just want Christofer to call me\".   Pt became teary eyed, verbalizing that she has PTSD from a previous experience involving [facility] and imaging, stating \"I don't trust anyone\". Pt reporting that she feels safe right now.  Pt refused triage for current anxiety symptoms stating \"I just want someone to tell me; 'i'm so sorry that happened to you' \". Pt declined  mental health helpline phone number stating \"I have friends I can call\" \"I just need to go smoke some pot\". Discussed counseling; pt stating she does have a mental health counselor.  Provided reassurance, phone triage available 24/7 and reviewed future appointment in about an hour and half with her PCP Paradise Hennessy. Pt thought that appointment was scheduled for tomorrow and said that her daughter has an appointment at 2pm.     Pt verbalizes understanding and agrees to plan of care to attend upcoming appointment. Pt no longer teary eyed, verbalizing that she is appreciative of support/information stating \"thank you, you've been helpful\".     Routing to PCP      Reason for Disposition    Patient sounds very upset or troubled to the triager    Additional Information    Negative: SEVERE difficulty breathing (e.g., struggling for each breath, speaks in single words)    Negative: Bluish (or gray) lips or face (unable to assess)    Negative: Difficult to awaken or acting confused (e.g., disoriented, slurred " speech)    Negative: Hysterical or combative behavior    Negative: Sounds like a life-threatening emergency to the triager    Negative: Chest pain    Negative: Palpitations, skipped heart beat, or rapid heart beat (unable to assess)    Negative: Cough is main symptom    Negative: Suicide thoughts, threats, attempts, or questions    Negative: Depression is main problem or symptom (e.g., feelings of sadness or hopelessness)    Negative: Difficulty breathing and persists > 10 minutes and not relieved by reassurance provided by triager    Negative: Lightheadedness or dizziness and persists > 10 minutes and not relieved by reassurance provided by triager (unable to assess)    Negative: Substance use (drug use) or unhealthy alcohol use, known or suspected, and feeling very shaky (i.e., visible tremors of hands) (unable to assess)    Negative: Patient sounds very sick or weak to the triager    Protocols used: ANXIETY AND PANIC ATTACK-A-OH

## 2023-02-06 NOTE — PROGRESS NOTES
"Mleody is a 47 year old who is being evaluated via a billable telephone visit.      What phone number would you like to be contacted at? [phone]  How would you like to obtain your AVS? Mail a copy  Distant Location (provider location):  On-site    Assessment & Plan   Problem List Items Addressed This Visit    None  Visit Diagnoses     Abnormal LFTs    -  Primary    Relevant Orders    Hepatic panel (Albumin, ALT, AST, Bili, Alk Phos, TP)         Patient has concerns as listed below  I would like to avoid unnecessary additional testing, as US was normal. Patient has had previous abdominal CT for similar concerns, and all available for review were negative.   We can recheck LFTs, as these were previously elevated.                  No follow-ups on file.    Paradise Hennessy PA-C  [facility]    Chaz Oliver is a 47 year old, presenting for the following health issues:  Follow Up      HPI     PTSD follow up    US normal, Pain in kidneys, kidney levels off - Patient would like to discuss results as she feels that these are wrong and she does not understand what is going on.      Discuss [facility] issue; see documentation dated 02/02/23: Patient is concerned that [facility] falsified documentation and denies that she had 3 tests done that [place] said she had done. Feels that [facility] is untruthful and \"attempting to cover up a potential lawsuit and that there is something actually wrong with her\" (per patient statement over the phone.)        Concern about the kidney pain, even though there is a normal ultrasound.  She feels strongly her symptoms are related to her ureter because she has pain when she has to go to the bathroom.   \"Something doesn't feel right\"  Unable to lie on her right side at night, due to intermittent pain.               Review of Systems         Objective           Vitals:  No vitals were obtained today due to virtual visit.    Physical Exam   healthy, " alert and no distress  PSYCH: Alert and oriented times 3; coherent speech, normal   rate and volume, able to articulate logical thoughts, able   to abstract reason, no tangential thoughts, no hallucinations   or delusions  Her affect is normal  RESP: No cough, no audible wheezing, able to talk in full sentences  Remainder of exam unable to be completed due to telephone visits                Phone call duration: 19 minutes

## 2023-02-06 NOTE — TELEPHONE ENCOUNTER
Reason for Disposition    Patient sounds very upset or troubled to the triager    Additional Information    Negative: SEVERE difficulty breathing (e.g., struggling for each breath, speaks in single words)    Negative: Bluish (or gray) lips or face    Negative: Difficult to awaken or acting confused (e.g., disoriented, slurred speech)    Negative: Hysterical or combative behavior    Negative: Sounds like a life-threatening emergency to the triager    Negative: Chest pain    Negative: Palpitations, skipped heart beat, or rapid heart beat    Negative: Cough is main symptom    Negative: Suicide thoughts, threats, attempts, or questions    Negative: Depression is main problem or symptom (e.g., feelings of sadness or hopelessness)    Negative: Difficulty breathing and persists > 10 minutes and not relieved by reassurance provided by triager    Negative: Lightheadedness or dizziness and persists > 10 minutes and not relieved by reassurance provided by triager    Negative: Substance use (drug use) or unhealthy alcohol use, known or suspected, and feeling very shaky (i.e., visible tremors of hands)    Negative: Patient sounds very sick or weak to the triager    Protocols used: ANXIETY AND PANIC ATTACK-A-OH     Patient called reporting that she went to pain management recommended that she call PCP for an antibiotic. Her lower legs are \"starting to look infected\". Slightly warm, no drainage. Please advise. Patient uses [address].

## 2023-02-08 ENCOUNTER — HOSPITAL ENCOUNTER (OUTPATIENT)
Dept: CT IMAGING | Facility: CLINIC | Age: 48
Discharge: HOME | End: 2023-02-08
Attending: PHYSICIAN ASSISTANT | Admitting: PHYSICIAN ASSISTANT
Payer: COMMERCIAL

## 2023-02-08 DIAGNOSIS — R10.9 FLANK PAIN: ICD-10-CM

## 2023-02-08 DIAGNOSIS — R10.11 RUQ ABDOMINAL PAIN: ICD-10-CM

## 2023-02-08 PROCEDURE — 250N000011 HC RX IP 250 OP 636: Performed by: PHYSICIAN ASSISTANT

## 2023-02-08 PROCEDURE — 74177 CT ABD & PELVIS W/CONTRAST: CPT

## 2023-02-08 RX ORDER — IOPAMIDOL 755 MG/ML
72 INJECTION, SOLUTION INTRAVASCULAR ONCE
Status: COMPLETED | OUTPATIENT
Start: 2023-02-08 | End: 2023-02-08

## 2023-02-08 RX ADMIN — IOPAMIDOL 72 ML: 755 INJECTION, SOLUTION INTRAVENOUS at 12:42

## 2023-02-10 ENCOUNTER — TELEPHONE (OUTPATIENT)
Dept: FAMILY MEDICINE | Facility: CLINIC | Age: 48
End: 2023-02-10
Payer: COMMERCIAL

## 2023-02-10 ENCOUNTER — TELEPHONE (OUTPATIENT)
Dept: NEPHROLOGY | Facility: CLINIC | Age: 48
End: 2023-02-10
Payer: COMMERCIAL

## 2023-02-10 NOTE — TELEPHONE ENCOUNTER
"Patient calling clinic requesting results of CT scan done on 2/8/2023. Per chart review, provider did not interpret results yet. Patient stated \"I have PTSD regarding medical stuff, I can't really wait much longer for these results\".     Routing to PCP for review. Is PCP the one to interpret results or will it be radiology?  "

## 2023-02-10 NOTE — TELEPHONE ENCOUNTER
Please inform patient - The radiology report is now complete.  There is nothing on the right side of the abdomen to explain the pain.  Kidneys, ureters, and bladder all appear normal.   Liver is normal. Gallbladder is normal.  Pancreas is normal.     Christofer Hennessy PA-C

## 2023-02-10 NOTE — TELEPHONE ENCOUNTER
"Called patient and relayed provider's message. Patient stated lots of confusion and frustration. Patient is \"not sure she can trust any results that are giving to her\". Patient is wanting another bone scan and the CT scan compared with the past 5 CT scans specifically comparing the position and appearances of the organs. Patient stated that something is causing her pain. She goes to the bathroom and instantly feels like her bladder fills up. At that point she experiencing kidney pain and ureter pain. Patient is wondering if it is really necessary for her to she the kidney doctor because she stated that they will just say \"all the labs and scans are normal\".     Julieth ROCA RN  [facility] Triage Team    "

## 2023-02-10 NOTE — TELEPHONE ENCOUNTER
Called patient with a appointment reminder for Thurs. 2/16/23 @ 4:00 pm with Dr. Gomez and a lab draw @ 2:45 pm    Gatito Moncada on 2/10/2023 at 3:19 PM

## 2023-02-13 DIAGNOSIS — R80.9 ALBUMINURIA: Primary | ICD-10-CM

## 2023-02-13 NOTE — TELEPHONE ENCOUNTER
I would like her to see the kidney doctor on 2/16 as scheduled and see what they suggest.  I am out of town until 2/21 - please have her schedule a virtual visit after 2/21 so we can discuss her concerns after the nephrology appointment.   I do not want to keep repeating scans because each time we do a CT scan she is exposed to radiation.      Christofer Hennessy PA-C

## 2023-02-13 NOTE — TELEPHONE ENCOUNTER
S/w pt and updated her with PCP message below. Pt very frustrated on phone stating that [facility] hid results from her on bone scan  and needs PCP to look into this and re order another bone scan. Pt stating she does not trust doctors and restated frustration. Writer again told that her concerns would be discussed at upcoming virtual appt. Pt verbalized understanding. Pt offered number for pt relations but pt declined. VV scheduled for 2/23 at 1130    Lula YUAN RN  [facility] Triage Team

## 2023-02-16 ENCOUNTER — PRE VISIT (OUTPATIENT)
Dept: NEPHROLOGY | Facility: CLINIC | Age: 48
End: 2023-02-16
Payer: COMMERCIAL

## 2023-02-23 ENCOUNTER — VIRTUAL VISIT (OUTPATIENT)
Dept: FAMILY MEDICINE | Facility: CLINIC | Age: 48
End: 2023-02-23
Payer: COMMERCIAL

## 2023-02-23 DIAGNOSIS — M25.511 CHRONIC PAIN OF BOTH SHOULDERS: Primary | ICD-10-CM

## 2023-02-23 DIAGNOSIS — R10.84 GENERALIZED ABDOMINAL PAIN: ICD-10-CM

## 2023-02-23 DIAGNOSIS — M25.512 CHRONIC PAIN OF BOTH SHOULDERS: Primary | ICD-10-CM

## 2023-02-23 DIAGNOSIS — R10.9 FLANK PAIN: ICD-10-CM

## 2023-02-23 DIAGNOSIS — G89.29 CHRONIC PAIN OF BOTH SHOULDERS: Primary | ICD-10-CM

## 2023-02-23 PROCEDURE — 99214 OFFICE O/P EST MOD 30 MIN: CPT | Mod: TEL | Performed by: PHYSICIAN ASSISTANT

## 2023-02-23 ASSESSMENT — ASTHMA QUESTIONNAIRES
QUESTION_4 LAST FOUR WEEKS HOW OFTEN HAVE YOU USED YOUR RESCUE INHALER OR NEBULIZER MEDICATION (SUCH AS ALBUTEROL): THREE OR MORE TIMES PER DAY
QUESTION_5 LAST FOUR WEEKS HOW WOULD YOU RATE YOUR ASTHMA CONTROL: WELL CONTROLLED
ACT_TOTALSCORE: 20
QUESTION_2 LAST FOUR WEEKS HOW OFTEN HAVE YOU HAD SHORTNESS OF BREATH: NOT AT ALL
ACT_TOTALSCORE: 20
QUESTION_1 LAST FOUR WEEKS HOW MUCH OF THE TIME DID YOUR ASTHMA KEEP YOU FROM GETTING AS MUCH DONE AT WORK, SCHOOL OR AT HOME: NONE OF THE TIME
QUESTION_3 LAST FOUR WEEKS HOW OFTEN DID YOUR ASTHMA SYMPTOMS (WHEEZING, COUGHING, SHORTNESS OF BREATH, CHEST TIGHTNESS OR PAIN) WAKE YOU UP AT NIGHT OR EARLIER THAN USUAL IN THE MORNING: NOT AT ALL

## 2023-02-23 ASSESSMENT — PATIENT HEALTH QUESTIONNAIRE - PHQ9: SUM OF ALL RESPONSES TO PHQ QUESTIONS 1-9: 17

## 2023-02-23 NOTE — PROGRESS NOTES
Melody is a 47 year old who is being evaluated via a billable telephone visit.      What phone number would you like to be contacted at?  [phone]  How would you like to obtain your AVS? Mail a copy  Distant Location (provider location):  Off-site    Assessment & Plan   Problem List Items Addressed This Visit    None  Visit Diagnoses     Chronic pain of both shoulders    -  Primary           Concern for needed follow up from previous imaging done at [facility]  I cannot unfortunately see these tests in the chart  Patient will drop off a disc with images to be imported and reviewed.                Depression Screening Follow Up    PHQ 2/23/2023   PHQ-9 Total Score 17   Q9: Thoughts of better off dead/self-harm past 2 weeks Several days   F/U: Thoughts of suicide or self-harm -   F/U: Self harm-plan -   F/U: Self-harm action -   F/U: Safety concerns -         No follow-ups on file.    Paradise Hennessy PA-C  [facility]    Chaz Oliver is a 47 year old, presenting for the following health issues:  No chief complaint on file.      History of Present Illness       Reason for visit:  Referral    She eats 0-1 servings of fruits and vegetables daily.She consumes 0 sweetened beverage(s) daily.She exercises with enough effort to increase her heart rate 9 or less minutes per day.  She exercises with enough effort to increase her heart rate 3 or less days per week. She is missing 1 dose(s) of medications per week.  She is not taking prescribed medications regularly due to remembering to take.       Patient needs a referral for bone scan    Patient is concerned medical records from [facility] have been changed  Today she gain verbal consent for Care Everywhere access to [facility] results    Symptoms of right flank and abdominal pain have been better since she started losartan and rosuvastatin.           Review of Systems         Objective           Vitals:  No vitals were  obtained today due to virtual visit.    Physical Exam   healthy, alert and no distress  PSYCH: Alert and oriented times 3; coherent speech, normal   rate and volume, able to articulate logical thoughts, able   to abstract reason, no tangential thoughts, no hallucinations   or delusions  Her affect is normal  RESP: No cough, no audible wheezing, able to talk in full sentences  Remainder of exam unable to be completed due to telephone visits                Phone call duration: 27 minutes

## 2023-02-27 ENCOUNTER — TELEPHONE (OUTPATIENT)
Dept: FAMILY MEDICINE | Facility: CLINIC | Age: 48
End: 2023-02-27
Payer: COMMERCIAL

## 2023-02-27 DIAGNOSIS — R10.32 BILATERAL GROIN PAIN: ICD-10-CM

## 2023-02-27 DIAGNOSIS — R10.31 BILATERAL GROIN PAIN: ICD-10-CM

## 2023-02-27 RX ORDER — OXYCODONE AND ACETAMINOPHEN 5; 325 MG/1; MG/1
1 TABLET ORAL 3 TIMES DAILY PRN
Qty: 70 TABLET | Refills: 0 | Status: SHIPPED | OUTPATIENT
Start: 2023-02-27 | End: 2023-04-04

## 2023-04-03 DIAGNOSIS — R10.31 BILATERAL GROIN PAIN: ICD-10-CM

## 2023-04-03 DIAGNOSIS — R10.32 BILATERAL GROIN PAIN: ICD-10-CM

## 2023-04-03 NOTE — TELEPHONE ENCOUNTER
Medication Question or Refill    Contacts       Type Contact Phone/Fax    04/03/2023 03:36 PM CDT Phone (Incoming) Melody Muñoz TONYA (Self) [phone] (M)          What medication are you calling about (include dose and sig)?:    oxyCODONE-acetaminophen (PERCOCET) 5-325 MG tablet  Preferred Pharmacy:  STORE #[id number] [facility] [address] AT [place] [address] [address]  [address] [place] [address]  Phone: [phone] Fax: [phone]      Controlled Substance Agreement on file:   CSA -- Patient Level:     [Media Unavailable] Controlled Substance Agreement - Opioid - Scan on 1/24/2023  8:13 AM   [Media Unavailable] Controlled Substance Agreement - Opioid - Scan on 9/30/2021  2:39 PM       Who prescribed the medication?: Fitterer    Do you need a refill? Yes    When did you use the medication last? today    Patient offered an appointment? No    Do you have any questions or concerns?  No    Bess Garcia/Roddy-  [facility]

## 2023-04-04 RX ORDER — OXYCODONE AND ACETAMINOPHEN 5; 325 MG/1; MG/1
1 TABLET ORAL 3 TIMES DAILY PRN
Qty: 70 TABLET | Refills: 0 | Status: SHIPPED | OUTPATIENT
Start: 2023-04-04 | End: 2023-05-05

## 2023-04-24 NOTE — ADDENDUM NOTE
Addended by: THOMAS NAVA on: 11/22/2019 04:49 PM     Modules accepted: Orders     Unique Flap 2 Name: Tenzel Flap

## 2023-04-29 DIAGNOSIS — R80.9 ALBUMINURIA: ICD-10-CM

## 2023-05-01 RX ORDER — LOSARTAN POTASSIUM 25 MG/1
TABLET ORAL
Qty: 90 TABLET | Refills: 0 | Status: SHIPPED | OUTPATIENT
Start: 2023-05-01 | End: 2023-10-13

## 2023-05-05 ENCOUNTER — TELEPHONE (OUTPATIENT)
Dept: FAMILY MEDICINE | Facility: CLINIC | Age: 48
End: 2023-05-05
Payer: COMMERCIAL

## 2023-05-05 DIAGNOSIS — R10.32 BILATERAL GROIN PAIN: ICD-10-CM

## 2023-05-05 DIAGNOSIS — R10.31 BILATERAL GROIN PAIN: ICD-10-CM

## 2023-05-05 RX ORDER — OXYCODONE AND ACETAMINOPHEN 5; 325 MG/1; MG/1
1 TABLET ORAL 3 TIMES DAILY PRN
Qty: 70 TABLET | Refills: 0 | Status: SHIPPED | OUTPATIENT
Start: 2023-05-05 | End: 2023-06-09

## 2023-05-05 NOTE — TELEPHONE ENCOUNTER
Medication Question or Refill    Contacts       Type Contact Phone/Fax    05/05/2023 11:14 AM CDT Phone (Incoming) Melody Muñoz (Self) [phone] (M)          What medication are you calling about (include dose and sig)?: oxyCODONE-acetaminophen (PERCOCET) 5-325 MG tablet       Preferred Pharmacy:   [facility] #[id number] [facility] [address] AT [place] [address] [address]  [address] [place] [address]  Phone: [phone] Fax: [phone]      Controlled Substance Agreement on file:   CSA -- Patient Level:     [Media Unavailable] Controlled Substance Agreement - Opioid - Scan on 1/24/2023  8:13 AM   [Media Unavailable] Controlled Substance Agreement - Opioid - Scan on 9/30/2021  2:39 PM       Who prescribed the medication?: Paradise Hennessy    Do you need a refill? Yes    When did you use the medication last? 5/5/23    Patient offered an appointment? No    Do you have any questions or concerns?  Yes: Will be out of medication by Monday      Okay to leave a detailed message?: Yes at Cell number on file:    Telephone Information:   Mobile [phone]     \

## 2023-05-16 ENCOUNTER — TELEPHONE (OUTPATIENT)
Dept: FAMILY MEDICINE | Facility: CLINIC | Age: 48
End: 2023-05-16
Payer: COMMERCIAL

## 2023-05-16 DIAGNOSIS — R79.89 LOW SERUM THYROID STIMULATING HORMONE (TSH): Primary | ICD-10-CM

## 2023-05-16 NOTE — TELEPHONE ENCOUNTER
Received a call from the patient stating she was looking through her records that she wad finally able to obtain from [place] and she came across a diagnosis of Grave's Disease. Patient states she knew she had a mass behind her R eye but she was never told about Grave's Disease. Patient states when she smiles she cannot keep her R eye open and this has progressively been getting worse. Patient states her eye doctor advised heart 3 times per day and moisturizing eye drops.     Patient would like more information about this diagnosis? Anything additional she can do to help her R eye?    Will route to PCP for review and recommendations.     Can we leave a detailed message on this number? YES  Phone number patient can be reached at: Cell number on file:    Telephone Information:   Mobile [phone]       Tamie Stern RN  [facility] Triage

## 2023-05-18 NOTE — TELEPHONE ENCOUNTER
I reviewed her recent lab work to test thyroid function.  She has had normal thyroid function for several years, so I am not concerned about Grave's disease at this time.  She should continue to have her thyroid function checked at least once a year.      I do not have any further recommendations for her eye - I suggest she follow her eye doctor's recommendations.     Christofer Hennessy PA-C

## 2023-05-19 NOTE — TELEPHONE ENCOUNTER
Patient given result message from Paradise Hennessy PA-C.      Patient states that she wants Paradise Hennessy PA-C to request the records. States that we need to be more aggressive about getting records from [place].  Patient states that she has been dismissed from [facility].     Patient states that she read on the [place] site that Thyrotropin receptor antibody should be tested? Patient states that she thinks that this was tested at [place], but she can't get the records. Patient doesn't know what this test is for.     Please advise. Triage to call the patient back.  Marge Echavarria RN

## 2023-05-19 NOTE — TELEPHONE ENCOUNTER
I would like her to see an endocrinologist for this evaluation, because it is out of the scope of practice of PCP.  There is nothing further we need to evaluate right now, since the TSH has been stable.  If further testing is needed, it will be determined by the specialist (endocrinology).   I have placed an order for endocrinology.     Christofer Hennessy PA-C

## 2023-05-23 ENCOUNTER — TELEPHONE (OUTPATIENT)
Dept: FAMILY MEDICINE | Facility: CLINIC | Age: 48
End: 2023-05-23
Payer: COMMERCIAL

## 2023-05-23 NOTE — TELEPHONE ENCOUNTER
TO PCP:     Pt called asking why she was referred to endocrinologist     She was told by  it was for low TSH. Confirmed diagnosis on referral has low TSH listed.     Pt states her last TSH was normal and was reassured of this; pt questions why she needs to see endocrinology if most recent TSH was normal?     Please advise    Huma ALVES Triage RN  [facility]

## 2023-05-23 NOTE — TELEPHONE ENCOUNTER
RECORDS RECEIVED FROM: internal    DATE RECEIVED: 6.14.23   NOTES (FOR ALL VISITS) STATUS DETAILS   OFFICE NOTES from referring provider internal  Paradise Hennessy PA-C     MEDICATION LIST internal     IMAGING        CT (HEAD/NECK/CHEST/ABDOMEN) internal  2.8.23      LABS     DIABETES: HBGA1C, CREATININE, FASTING LIPIDS, MICROALBUMIN URINE, POTASSIUM, TSH, T4    THYROID: TSH, T4, CBC, THYRODLONULIN, TOTAL T3, FREE T4, CALCITONIN, CEA internal  Cbc- 1.23.23  cmp- 1.23.23  TSH/T4- 1.23.23  Lipid- 1.23.23  HBGA1C- 1.23.23  Glucose- 12.2.21  FSH- 9.30.21

## 2023-05-24 NOTE — TELEPHONE ENCOUNTER
"I have referred patient to endocrinology because patient was requesting further evaluation of this previous problem.  I do not feel she needs to see endocrine at this time, however if she is requesting further work up and testing then an endocrine evaluation is needed.       See below for details of our previous messages:      I wrote the following message to the patient on 5/18/23:  \"I reviewed her recent lab work to test thyroid function.  She has had normal thyroid function for several years, so I am not concerned about Grave's disease at this time.  She should continue to have her thyroid function checked at least once a year.\"    Patient then responded with:    \"Patient states that she wants Paradise Hennessy PA-C to request the records. States that we need to be more aggressive about getting records from [place].  Patient states that she has been dismissed from [facility].      Patient states that she read on the [place] site that Thyrotropin receptor antibody should be tested? Patient states that she thinks that this was tested at [place], but she can't get the records. Patient doesn't know what this test is for.\"              "

## 2023-05-26 NOTE — TELEPHONE ENCOUNTER
Spoke to pt, relayed below information from Christofer.  Patient expressed concerns about being referral.  Transferred to RN's for further clarification on questions about thyroid levels.    Alisa MCCURDY    [facility]

## 2023-05-26 NOTE — TELEPHONE ENCOUNTER
Patient Contact    Attempt # 1    Was call answered?  No.  Left message on voicemail with information to call triage back at [phone], option 2.     On call back: give message below from Paradise Hennessy PA-C. Give endo referral information.  Marge Echavarria RN

## 2023-05-26 NOTE — TELEPHONE ENCOUNTER
Patient called the clinic to follow up on the sent on 5/23. Provider's message was relayed to patient. Patient was very frustrated and stated that needs the provider to order for specific thyroid test. Explained to patient that to have those test ordered she needs to see a specialist who is able to order those test that she is requesting. Patient stated understanding and had no further questions.    Julieth ROCA RN  [facility] Triage Team

## 2023-06-10 DIAGNOSIS — J45.20 INTERMITTENT ASTHMA, UNCOMPLICATED: ICD-10-CM

## 2023-06-12 RX ORDER — ALBUTEROL SULFATE 90 UG/1
AEROSOL, METERED RESPIRATORY (INHALATION)
Qty: 8.5 G | Refills: 1 | Status: SHIPPED | OUTPATIENT
Start: 2023-06-12 | End: 2023-08-29

## 2023-06-12 NOTE — TELEPHONE ENCOUNTER
Prescription approved per [facility] Refill Protocol.  Tamie Stern, RN  [facility] Triage Nurse

## 2023-06-14 ENCOUNTER — PRE VISIT (OUTPATIENT)
Dept: ENDOCRINOLOGY | Facility: CLINIC | Age: 48
End: 2023-06-14

## 2023-06-27 ENCOUNTER — NURSE TRIAGE (OUTPATIENT)
Dept: FAMILY MEDICINE | Facility: CLINIC | Age: 48
End: 2023-06-27
Payer: COMMERCIAL

## 2023-06-27 DIAGNOSIS — J01.01 ACUTE RECURRENT MAXILLARY SINUSITIS: Primary | ICD-10-CM

## 2023-06-27 NOTE — TELEPHONE ENCOUNTER
"Nurse Triage SBAR    Is this a 2nd Level Triage? NO    Situation: Received a call from the patient stating she thinks she has a sinus infection. Symptoms started 4 days ago.     Background: Patient states she has had sinus infections in the past. Previous PCP would prescribe Doxycycline and Prednisone.     Assessment: Upon conversing with the patient, the patient states she has pressure above her eyes, below her eyes, cheekbones, and teeth. Pain: 4-5/10: patient has been applying heat and taking Tylenol with little relief. No fever but patient states she has not checked her temperature over the past 4 days. No breathing difficulties. Constant throbbing headache. Nasal discharge: green/yellow in color.     Protocol Recommended Disposition:   See in Office Today or Tomorrow    Recommendation: Triage protocol recommending patient be seen in the office today or tomorrow. Patient states she is not able to come into the clinic. Patient is requesting scripts for Doxycycline and Prednisone to be sent to the pharmacy ([facility] in [place]). Will route to PCP for review.     Routed to provider    Does the patient meet one of the following criteria for ADS visit consideration? 16+ years old, with an [facility] PCP     TIP  Providers, please consider if this condition is appropriate for management at one of our Acute and Diagnostic Services sites.     If patient is a good candidate, please use dotphrase <dot>triageresponse and select Refer to ADS to document.    1. LOCATION: \"Where does it hurt?\"       Pressure above eyes, below eyes, cheek, back of teeth hurt  2. ONSET: \"When did the sinus pain start?\"  (e.g., hours, days)       4 days ago  3. SEVERITY: \"How bad is the pain?\"   (Scale 1-10; mild, moderate or severe)    - MILD (1-3): doesn't interfere with normal activities     - MODERATE (4-7): interferes with normal activities (e.g., work or school) or awakens from sleep    - SEVERE (8-10): excruciating pain and patient unable " "to do any normal activities         Pain: comes and goes, 4-5/10 (been applying heat)  4. RECURRENT SYMPTOM: \"Have you ever had sinus problems before?\" If Yes, ask: \"When was the last time?\" and \"What happened that time?\"       Sinus infections in the past: normally gets prescribed Doxycycline and Prednisone  5. NASAL CONGESTION: \"Is the nose blocked?\" If Yes, ask: \"Can you open it or must you breathe through your mouth?\"      Nose is not completely bocked  6. NASAL DISCHARGE: \"Do you have discharge from your nose?\" If so ask, \"What color?\"      \"Icky, green/yellow\"  7. FEVER: \"Do you have a fever?\" If Yes, ask: \"What is it, how was it measured, and when did it start?\"       No fever (have not checked in the past 4 days)  8. OTHER SYMPTOMS: \"Do you have any other symptoms?\" (e.g., sore throat, cough, earache, difficulty breathing)      Runny nose, congestion, constant throbbing headache  9. PREGNANCY: \"Is there any chance you are pregnant?\" \"When was your last menstrual period?\"      No    Can we leave a detailed message on this number? YES  Phone number patient can be reached at: Cell number on file:    Telephone Information:   Mobile [phone]       Reason for Disposition    Using nasal washes and pain medicine > 24 hours and sinus pain (lower forehead, cheekbone, or eye) persists    Additional Information    Negative: Sounds like a life-threatening emergency to the triager    Negative: Difficulty breathing, and not from stuffy nose (e.g., not relieved by cleaning out the nose)    Negative: SEVERE headache and has fever    Negative: Patient sounds very sick or weak to the triager    Negative: SEVERE sinus pain    Negative: Severe headache    Negative: Redness or swelling on the cheek, forehead, or around the eye    Negative: Fever > 103 F (39.4 C)    Negative: Fever > 101 F (38.3 C) and over 60 years of age    Negative: Fever > 100.0 F (37.8 C) and has diabetes mellitus or a weak immune system (e.g., HIV " positive, cancer chemotherapy, organ transplant, splenectomy, chronic steroids)    Negative: Fever > 100.0 F (37.8 C) and bedridden (e.g., nursing home patient, stroke, chronic illness, recovering from surgery)    Negative: Fever present > 3 days (72 hours)    Negative: Fever returns after gone for over 24 hours and symptoms worse or not improved    Negative: Sinus pain (not just congestion) and fever    Negative: Earache    Negative: Sinus congestion (pressure, fullness) present > 10 days    Negative: Nasal discharge present > 10 days    Protocols used: SINUS PAIN OR CONGESTION-A-OH    Tamie Stern RN

## 2023-06-28 RX ORDER — PREDNISONE 20 MG/1
20 TABLET ORAL DAILY
Qty: 3 TABLET | Refills: 0 | Status: SHIPPED | OUTPATIENT
Start: 2023-06-28 | End: 2023-07-01

## 2023-06-28 RX ORDER — DOXYCYCLINE 100 MG/1
100 CAPSULE ORAL 2 TIMES DAILY
Qty: 20 CAPSULE | Refills: 0 | Status: SHIPPED | OUTPATIENT
Start: 2023-06-28 | End: 2023-07-08

## 2023-06-28 NOTE — TELEPHONE ENCOUNTER
I recommend she wait until Day 7 of symptoms to start an antibiotic - I will send doxycycline.  I also do not recommend a steroid unless she fails the supportive treatments and antibiotic first.     Below are recommendations for sinusitis supportive treatments.    Treatments for sinus infection:  1. Flonase - use one spray in each nostril once a day  2. Sinus Rinse or Neti Pot - these can be purchased at any pharmacy.  Use 1-2 times a day to relieve sinus congestion.     Additional treatment options for symptom relief:  3. Take ibuprofen and acetaminophen (Tylenol) as needed for pain and/or fever.   4. Mucinex (guaifenisen) may help to thin the nasal mucus  5. Humidifiers or diffusers.  There is some evidence to support using essential oils such as eucalyptus, peppermint, citrus blends, or oregano in a diffuser for nasal congestion.  I do not recommend drinking the oils or placing them directly on the skin, since the concentrations of the oils are not regulated and vary between brands.       Christofer Hennessy PA-C

## 2023-06-28 NOTE — TELEPHONE ENCOUNTER
Provider Recommendation Follow Up:   Unable to reach patient/caregiver. Left detailed message with response from Christofer Hennessy PA-C, see below. Also left clinic number for any further questions or concerns.     Noemy MARTIN RN  [facility]

## 2023-06-28 NOTE — TELEPHONE ENCOUNTER
Patient Contact     S/w pt and relayed message from Christofer Hennessy PA-C, see below. Pt states she is resistant to antibiotics and Dr. Machado gave the Prednisone because antibiotics don't work for her. She states she saw ENT and was scheduled for surgery before Covid hit but hasn't gone back since. She states she gets chronic sinus infections and knows what's best with her body. She states she can't take Ibuprofen because of Lithium and Tylenol doesn't help. She states she doesn't have anything to help with inflammation and already tried Flonase. She states the Neti pot aggravates it and sinus provider has told her before that sometimes that happens with patients. She has taken Mucinex, doesn't help the pain att all. The pressure is all over her face and she has to put heat on her face to help with the pressure but can't do this all the time. She does have a diffuser and all kinds of oils and ones that help with upper congestion. She hasn't been on Prednisone for a while now, can't remember the last time.    Noemy MARTIN RN  [facility]

## 2023-07-10 NOTE — TELEPHONE ENCOUNTER
I don't think anything that we need to do before Dr Machado can review this next week.  She should move about, doing things to keep her mind off of it.     Spoke with patient and advised her of the new medication and instructions

## 2023-07-11 DIAGNOSIS — R10.31 BILATERAL GROIN PAIN: ICD-10-CM

## 2023-07-11 DIAGNOSIS — R10.32 BILATERAL GROIN PAIN: ICD-10-CM

## 2023-07-11 RX ORDER — OXYCODONE AND ACETAMINOPHEN 5; 325 MG/1; MG/1
1 TABLET ORAL 3 TIMES DAILY PRN
Qty: 70 TABLET | Refills: 0 | Status: ON HOLD | OUTPATIENT
Start: 2023-07-11 | End: 2023-12-13

## 2023-08-04 ENCOUNTER — OFFICE VISIT (OUTPATIENT)
Dept: FAMILY MEDICINE | Facility: CLINIC | Age: 48
End: 2023-08-04
Payer: COMMERCIAL

## 2023-08-04 VITALS
SYSTOLIC BLOOD PRESSURE: 100 MMHG | WEIGHT: 150 LBS | OXYGEN SATURATION: 95 % | TEMPERATURE: 97.9 F | HEART RATE: 78 BPM | DIASTOLIC BLOOD PRESSURE: 69 MMHG | HEIGHT: 62 IN | BODY MASS INDEX: 27.6 KG/M2

## 2023-08-04 DIAGNOSIS — T40.2X4S: ICD-10-CM

## 2023-08-04 DIAGNOSIS — F11.93 OPIATE WITHDRAWAL (H): Primary | ICD-10-CM

## 2023-08-04 DIAGNOSIS — G89.29 OTHER CHRONIC PAIN: ICD-10-CM

## 2023-08-04 PROCEDURE — 99214 OFFICE O/P EST MOD 30 MIN: CPT | Performed by: PHYSICIAN ASSISTANT

## 2023-08-04 RX ORDER — CLONIDINE HYDROCHLORIDE 0.1 MG/1
0.1 TABLET ORAL 2 TIMES DAILY
Qty: 20 TABLET | Refills: 0 | Status: ON HOLD | OUTPATIENT
Start: 2023-08-04 | End: 2023-12-13

## 2023-08-04 ASSESSMENT — PATIENT HEALTH QUESTIONNAIRE - PHQ9
SUM OF ALL RESPONSES TO PHQ QUESTIONS 1-9: 9
10. IF YOU CHECKED OFF ANY PROBLEMS, HOW DIFFICULT HAVE THESE PROBLEMS MADE IT FOR YOU TO DO YOUR WORK, TAKE CARE OF THINGS AT HOME, OR GET ALONG WITH OTHER PEOPLE: SOMEWHAT DIFFICULT
SUM OF ALL RESPONSES TO PHQ QUESTIONS 1-9: 9

## 2023-08-04 ASSESSMENT — ANXIETY QUESTIONNAIRES
GAD7 TOTAL SCORE: 6
1. FEELING NERVOUS, ANXIOUS, OR ON EDGE: SEVERAL DAYS
6. BECOMING EASILY ANNOYED OR IRRITABLE: SEVERAL DAYS
2. NOT BEING ABLE TO STOP OR CONTROL WORRYING: SEVERAL DAYS
4. TROUBLE RELAXING: SEVERAL DAYS
GAD7 TOTAL SCORE: 6
5. BEING SO RESTLESS THAT IT IS HARD TO SIT STILL: NOT AT ALL
7. FEELING AFRAID AS IF SOMETHING AWFUL MIGHT HAPPEN: SEVERAL DAYS
3. WORRYING TOO MUCH ABOUT DIFFERENT THINGS: SEVERAL DAYS
IF YOU CHECKED OFF ANY PROBLEMS ON THIS QUESTIONNAIRE, HOW DIFFICULT HAVE THESE PROBLEMS MADE IT FOR YOU TO DO YOUR WORK, TAKE CARE OF THINGS AT HOME, OR GET ALONG WITH OTHER PEOPLE: SOMEWHAT DIFFICULT

## 2023-08-04 ASSESSMENT — PAIN SCALES - GENERAL: PAINLEVEL: MILD PAIN (3)

## 2023-08-04 NOTE — PROGRESS NOTES
"    Assessment & Plan   Had a long discussion today with patient and her .  I am very concerned about her recent right eye as well as her pain regimen.  I informed her that in light of her recent overdose as well as with the other medications that she is prescribed (clonazepam) that I will not continue to prescribe her opiates on an ongoing basis and I do not believe it is safe for her to continue taking them. I have advised that she quickly taper off of this medication.  She agrees.   Advised that she can continue with her last three percocet.  On Monday, I will prescribe 7 additional tablets and we will follow up on 08/14.  At that point, we will taper down to 1/2 tablet per day for 1 week and this will be her last script from me. I have strongly encouraged her to see pain medicine clinic to further workup and address her chronic back pain. I also prescribed clonidine today to ease her withdrawal symptoms. She is worried about withdrawal side effects.  We discussed more about the symptoms can expect. Advised that she can call if needed.     Opiate withdrawal (H)  - cloNIDine (CATAPRES) 0.1 MG tablet; Take 1 tablet (0.1 mg) by mouth 2 times daily    Other chronic pain  - Pain Management  Referral; Future    Opioid overdose, undetermined intent, sequela  Total time: 60 minutes: time spent with chart review, discussing tapering plans and next steps in care.  All questions answered.      MED REC REQUIRED{  Post Medication Reconciliation Status:  Discharge medications reconciled and changed, see notes/orders  BMI:   Estimated body mass index is 27.08 kg/m  as calculated from the following:    Height as of this encounter: 1.585 m (5' 2.4\").    Weight as of this encounter: 68 kg (150 lb).       Depression Screening Follow Up        8/4/2023     1:42 PM   PHQ   PHQ-9 Total Score 9   Q9: Thoughts of better off dead/self-harm past 2 weeks Several days   F/U: Thoughts of suicide or self-harm No   F/U: Safety " concerns No         8/4/2023     1:42 PM   Last PHQ-9   1.  Little interest or pleasure in doing things 1   2.  Feeling down, depressed, or hopeless 1   3.  Trouble falling or staying asleep, or sleeping too much 1   4.  Feeling tired or having little energy 1   5.  Poor appetite or overeating 1   6.  Feeling bad about yourself 1   7.  Trouble concentrating 1   8.  Moving slowly or restless 1   Q9: Thoughts of better off dead/self-harm past 2 weeks 1   PHQ-9 Total Score 9   In the past two weeks have you had thoughts of suicide or self harm? No   Do you have concerns about your personal safety or the safety of others? No         [facility]NA [facility]    Chaz Oliver is a 47 year old, presenting for the following health issues:  Hospital F/U ([facility])      History of Present Illness       Back Pain:  She presents for follow up of back pain. Patient's back pain is a chronic problem.  Location of back pain:  Other  Description of back pain: sharp and other  Back pain spreads: right buttocks    Since patient first noticed back pain, pain is: unchanged  Does back pain interfere with her job:  Not applicable       She eats 0-1 servings of fruits and vegetables daily.She consumes 3 sweetened beverage(s) daily.She exercises with enough effort to increase her heart rate 10 to 19 minutes per day.  She exercises with enough effort to increase her heart rate 3 or less days per week.   She is taking medications regularly.     Melody Muñoz is a 47 year old female with a PMH significant for bipolar disorder, ROWDY, PTSD, and chronic back pain who is s/p recent hospital admission 7/12/23 - 7/14/23 for intentional opiate overdose of 22 percocet tablets. She reports that she was fighting with her  and preplanned to overdose. While hospitalized, she required narcan and charcoal.   She is following with psychiatry.  Opiates were prescribed to her for her chronic low  back pain.  She had been taking percocet TID for Chronic back pain. She has not seen pain clinic or ortho for her back pain.  MRI in 2020 showed DJD.    She was last prescribed percocet on 07/11, right eye occurred 07/12. Her  is now administering and monitoring her medication at home.  They report that she has 3 percocet left.  She reports she is down to taking one percocet tablet per day for the last 3 days.  She states she was initially taking 2.5 tablets per day after discharge from the hospital but realized she was running out of pills so she began to taper herself.  She presents today reporting irritability and sleep disturbance.  She had a video visit with psychiatry yesterday who recommended outpatient treatment through [facility].  She states the plan has always been to eventually taper completely off of the percocet and she expresses the desire to do so.  She states no further workup or treatment was recommended other than pain medication.         Hospital Follow-up Visit:    Hospital/Nursing Home/IP Rehab Facility:  [facility]   Date of Admission: 07/12/2023   Date of Discharge: 07/14/2023  Reason(s) for Admission: Intentional opiate overdose     Was your hospitalization related to COVID-19? No   Problems taking medications regularly:  None  Medication changes since discharge: None  Problems adhering to non-medication therapy:  None    Summary of hospitalization:  [facility] hospital discharge summary reviewed  Diagnostic Tests/Treatments reviewed.  Follow up needed: none  Other Healthcare Providers Involved in Patient s Care:         None  Update since discharge: improved.     Sees trauma therapy and regular therapy, starting therapy three times weekly at [facility] in [place]  Recently overdosed on percocet, took 22 tablets after a fight with her .       Plan of care communicated with patient and family           Review of Systems   Constitutional, HEENT, cardiovascular, pulmonary, GI, ,  "musculoskeletal, neuro, skin, endocrine and psych systems are negative, except as otherwise noted.      Objective    /69   Pulse 78   Temp 97.9  F (36.6  C) (Temporal)   Ht 1.585 m (5' 2.4\")   Wt 68 kg (150 lb)   LMP  (LMP Unknown)   SpO2 95%   BMI 27.08 kg/m    Body mass index is 27.08 kg/m .  Physical Exam   GENERAL: healthy, alert and no distress  PSYCH: mentation appears normal, affect normal/bright                  "

## 2023-08-07 DIAGNOSIS — G89.29 OTHER CHRONIC PAIN: Primary | ICD-10-CM

## 2023-08-07 DIAGNOSIS — Z79.891 CHRONIC USE OF OPIATE FOR THERAPEUTIC PURPOSE: ICD-10-CM

## 2023-08-07 RX ORDER — OXYCODONE HYDROCHLORIDE 5 MG/1
TABLET ORAL
Qty: 7 TABLET | Refills: 0 | Status: ON HOLD | OUTPATIENT
Start: 2023-08-07 | End: 2023-12-13

## 2023-08-09 ENCOUNTER — TELEPHONE (OUTPATIENT)
Dept: FAMILY MEDICINE | Facility: CLINIC | Age: 48
End: 2023-08-09
Payer: COMMERCIAL

## 2023-08-09 ENCOUNTER — TELEPHONE (OUTPATIENT)
Dept: ANESTHESIOLOGY | Facility: CLINIC | Age: 48
End: 2023-08-09

## 2023-08-09 NOTE — TELEPHONE ENCOUNTER
[facility] Call Center    Phone Message    May a detailed message be left on voicemail: no     Reason for Call: Other: Pt is calling and would like to schedule appt. Please review and call Pt back to schedule.     Action Taken: Message routed to:  [facility] Pain    Travel Screening: Not Applicable

## 2023-08-09 NOTE — TELEPHONE ENCOUNTER
Patient called to note that her referral is still in triage with the pain clinic and she is not able to schedule an appointment yet.

## 2023-08-09 NOTE — TELEPHONE ENCOUNTER
JEAN CARLOS Rodriguez.       Patient is having a hard time to taper off of medications  oxycodone.       Physical symptoms are pain, irritability and unable to sleep.     Patient has major life changes with son deploying and daughter leaving for college.     She has her appointment with you 8/14/2023.     She has gone through withdraw before.     She was encouraged to call and set up Pain Clinic appointment after today's phone call.  Patient asked for the number and states she will do this.       I think this is more of FYI to you for her upcoming appointment. She is not requesting more medication.       Ashley Irizarry RN  [facility]

## 2023-08-09 NOTE — TELEPHONE ENCOUNTER
Patient may continue the clonidine to aid in her withdrawal as we discussed.  I am also happy to place a referral for her to see the addiction medicine team for consideration of other medications that can help her.   If she is very uncomfortable, she can also present to the Recovery room at [facility] to meet with someone onsite to get help with opiate addiction.

## 2023-08-09 NOTE — TELEPHONE ENCOUNTER
Spoke with patient and she is taking the clonidine.      The pain clinic is going to call her back . They are reviewing her chart and will come up with appointment type, date and time.       Ashley Irizarry RN  [facility]

## 2023-08-14 ENCOUNTER — TELEPHONE (OUTPATIENT)
Dept: FAMILY MEDICINE | Facility: CLINIC | Age: 48
End: 2023-08-14

## 2023-08-28 DIAGNOSIS — J45.20 INTERMITTENT ASTHMA, UNCOMPLICATED: ICD-10-CM

## 2023-08-29 RX ORDER — ALBUTEROL SULFATE 90 UG/1
AEROSOL, METERED RESPIRATORY (INHALATION)
Qty: 8.5 G | Refills: 1 | Status: SHIPPED | OUTPATIENT
Start: 2023-08-29 | End: 2024-01-05

## 2023-09-08 NOTE — TELEPHONE ENCOUNTER
Reason for Call:  Medication or medication refill:  Do you use a [facility]?  Name of the pharmacy and phone number for the current request:  [facility] [address] [place], [place] [address] AT [place] [address]  Name of the medication requested: butalbital-acetaminophen-caffeine (FIORICET/ESGIC) -40 MG per tablet  Other request: patient will start Botox treatment in a couple weeks  Can we leave a detailed message on this number? YES  Phone number patient can be reached at: Home number on file [phone] (home)  Best Time: any  Call taken on 1/3/2017 at 8:35 AM by BREANN WARREN     6

## 2023-09-22 ENCOUNTER — NURSE TRIAGE (OUTPATIENT)
Dept: FAMILY MEDICINE | Facility: CLINIC | Age: 48
End: 2023-09-22
Payer: COMMERCIAL

## 2023-09-22 NOTE — TELEPHONE ENCOUNTER
"Reason for Disposition   Illness requiring prolonged bedrest in past month (e.g., immobilization, long hospital stay)    Additional Information   Negative: Looks like a broken bone or dislocated joint (e.g., crooked or deformed)   Negative: Sounds like a life-threatening emergency to the triager   Negative: Followed a hip injury   Negative: Followed a knee injury   Negative: Followed an ankle or foot injury   Negative: Back pain radiating (shooting) into leg(s)   Negative: Foot pain is main symptom   Negative: Ankle pain is main symptom   Negative: Knee pain is main symptom   Negative: Leg swelling is main symptom   Negative: Chest pain   Negative: Difficulty breathing   Negative: Entire foot is cool or blue in comparison to other side   Negative: Unable to walk   Negative: Fever and red area (or area very tender to touch)   Negative: Swollen joint and fever   Negative: Thigh or calf pain in only one leg and present > 1 hour   Negative: Thigh, calf, or ankle swelling in only one leg   Negative: Thigh, calf, or ankle swelling in both legs, but one side is definitely more swollen   Negative: History of prior 'blood clot' in leg or lungs (i.e., deep vein thrombosis, pulmonary embolism)   Negative: History of inherited increased risk of blood clots (e.g., factor 5 Leiden, antithrombin 3, protein C or protein S deficiency, prothrombin mutation)   Negative: Major surgery in past month   Negative: Hip or leg fracture (broken bone) in past month (or had cast on leg or ankle in past month)    Answer Assessment - Initial Assessment Questions  1. ONSET: \"When did the pain start?\"       During the night  2. LOCATION: \"Where is the pain located?\"       (L) lower leg  3. PAIN: \"How bad is the pain?\"    (Scale 1-10; or mild, moderate, severe)    -  MILD (1-3): doesn't interfere with normal activities     -  MODERATE (4-7): interferes with normal activities (e.g., work or school) or awakens from sleep, limping     -  SEVERE (8-10): " "excruciating pain, unable to do any normal activities, unable to walk      moderate  4. WORK OR EXERCISE: \"Has there been any recent work or exercise that involved this part of the body?\"       no  5. CAUSE: \"What do you think is causing the leg pain?\"      unknown  6. OTHER SYMPTOMS: \"Do you have any other symptoms?\" (e.g., chest pain, back pain, breathing difficulty, swelling, rash, fever, numbness, weakness)      When pushes on it indents  7. PREGNANCY: \"Is there any chance you are pregnant?\" \"When was your last menstrual period?\"      N/a    Protocols used: Leg Pain-A-[place]    "

## 2023-09-25 ENCOUNTER — TRANSFERRED RECORDS (OUTPATIENT)
Dept: HEALTH INFORMATION MANAGEMENT | Facility: CLINIC | Age: 48
End: 2023-09-25
Payer: COMMERCIAL

## 2023-09-29 ENCOUNTER — TELEPHONE (OUTPATIENT)
Dept: FAMILY MEDICINE | Facility: CLINIC | Age: 48
End: 2023-09-29
Payer: COMMERCIAL

## 2023-09-29 NOTE — TELEPHONE ENCOUNTER
Patient asking for refill of albuterol. Informed should be refill on file at pharmacy. Marge Echavarria RN

## 2023-10-13 DIAGNOSIS — R80.9 ALBUMINURIA: ICD-10-CM

## 2023-10-13 RX ORDER — LOSARTAN POTASSIUM 25 MG/1
25 TABLET ORAL DAILY
Qty: 90 TABLET | Refills: 0 | Status: ON HOLD | OUTPATIENT
Start: 2023-10-13 | End: 2023-12-15

## 2023-10-19 ENCOUNTER — TELEPHONE (OUTPATIENT)
Dept: FAMILY MEDICINE | Facility: CLINIC | Age: 48
End: 2023-10-19
Payer: COMMERCIAL

## 2023-10-19 NOTE — LETTER
November 16, 2023      Melody Muñoz  [address] [place] [address] [address] [place] [address]            Dear Melody,    I care about your health and have reviewed your health plan. I have reviewed your medical conditions, medication list, and lab results and am making recommendations based on this review, to better manage your health.    You are in particular need of attention regarding:  -Asthma  -Diabetes  -Cervical Cancer Screening  -Wellness (Physical) Visit     I am recommending that you:  -schedule a WELLNESS (Physical) APPOINTMENT with me.   I will check fasting labs the same day - nothing to eat except water and meds for 8-10 hours prior. (If you go elsewhere for Wellness visits then please disregard this reminder.)    -schedule a PAP SMEAR EXAM which is due.  Please disregard this reminder if you have had this exam elsewhere within the last year.  It would be helpful for us to have a copy of your recent pap smear report in our file so that we can best coordinate your care.    If you are under/uninsured, we recommend you contact the [facility]. They offer pap smears at no charge or on a sliding fee charge. You can schedule with them at [phone]. Please have them send us the results.    -Complete and return the attached ASTHMA CONTROL TEST.  If your total score is 19 or less or you have been to the ER or urgent care for your asthma, then please schedule an asthma followup appointment.    -Diabetic Eye Exam is due.  If this was done within the last 12 months then please contact the clinic with the date and location so we can update your records.    Here is a list of Health Maintenance topics that are due now or due soon:  Health Maintenance Due   Topic Date Due    HEPATITIS B IMMUNIZATION (1 of 3 - 3-dose series) Never done    HEPATITIS A IMMUNIZATION (1 of 2 - Risk 2-dose series) Never done    PAP  04/01/2020    ASTHMA ACTION PLAN  12/03/2020    DIABETIC FOOT EXAM  01/23/2021    EYE EXAM  04/21/2021     YEARLY PREVENTIVE VISIT  09/30/2022    A1C  07/23/2023    ASTHMA CONTROL TEST  08/23/2023    INFLUENZA VACCINE (1) 09/01/2023    COVID-19 Vaccine (3 - 2023-24 season) 09/01/2023        Please call us (or use [employer]) to address the above recommendations.     Thank you for trusting [facility] and we appreciate the opportunity to serve you.  We look forward to supporting your healthcare needs in the future.    Healthy Regards,    Michael Licea, MPH, PA-C

## 2023-10-19 NOTE — TELEPHONE ENCOUNTER
Patient Quality Outreach    Patient is due for the following:   Diabetes -  A1C, Eye Exam, and Foot Exam  Asthma  -  ACT needed  Cervical Cancer Screening - PAP Needed  Physical Preventive Adult Physical    Next Steps:   No follow up needed at this time.  Patient was assigned appropriate questionnaire to complete    Type of outreach:    Sent letter. and Copy of ACT mailed to patient.      Questions for provider review:    None           Zoey Contreras RN

## 2023-10-19 NOTE — LETTER
October 19, 2023      Melody Muñoz  [address] [place] [address] [address] [place] [address]          Dear Melody,    I care about your health and have reviewed your health plan. I have reviewed your medical conditions, medication list, and lab results and am making recommendations based on this review, to better manage your health.    You are in particular need of attention regarding:  -Asthma  -Diabetes  -Cervical Cancer Screening  -Wellness (Physical) Visit     I am recommending that you:  -schedule a WELLNESS (Physical) APPOINTMENT with me.   I will check fasting labs the same day - nothing to eat except water and meds for 8-10 hours prior. (If you go elsewhere for Wellness visits then please disregard this reminder.)    -schedule a PAP SMEAR EXAM which is due.  Please disregard this reminder if you have had this exam elsewhere within the last year.  It would be helpful for us to have a copy of your recent pap smear report in our file so that we can best coordinate your care.    If you are under/uninsured, we recommend you contact the [facility]. They offer pap smears at no charge or on a sliding fee charge. You can schedule with them at [phone]. Please have them send us the results.    -Complete and return the attached ASTHMA CONTROL TEST.  If your total score is 19 or less or you have been to the ER or urgent care for your asthma, then please schedule an asthma followup appointment.    -Diabetic Eye Exam is due.  If this was done within the last 12 months then please contact the clinic with the date and location so we can update your records.    Here is a list of Health Maintenance topics that are due now or due soon:  Health Maintenance Due   Topic Date Due    HEPATITIS B IMMUNIZATION (1 of 3 - 3-dose series) Never done    HEPATITIS A IMMUNIZATION (1 of 2 - Risk 2-dose series) Never done    PAP  04/01/2020    ASTHMA ACTION PLAN  12/03/2020    DIABETIC FOOT EXAM  01/23/2021    EYE EXAM  04/21/2021     YEARLY PREVENTIVE VISIT  09/30/2022    A1C  07/23/2023    ASTHMA CONTROL TEST  08/23/2023    INFLUENZA VACCINE (1) 09/01/2023    COVID-19 Vaccine (3 - 2023-24 season) 09/01/2023        Please call us (or use [employer]) to address the above recommendations.     Thank you for trusting [facility] and we appreciate the opportunity to serve you.  We look forward to supporting your healthcare needs in the future.    Healthy Regards,    Michael Licea, MPH, PA-C

## 2023-10-26 ENCOUNTER — TRANSFERRED RECORDS (OUTPATIENT)
Dept: HEALTH INFORMATION MANAGEMENT | Facility: CLINIC | Age: 48
End: 2023-10-26
Payer: COMMERCIAL

## 2023-10-27 DIAGNOSIS — E78.2 MIXED HYPERLIPIDEMIA: ICD-10-CM

## 2023-10-27 DIAGNOSIS — E11.65 UNCONTROLLED TYPE 2 DIABETES MELLITUS WITH HYPERGLYCEMIA (H): ICD-10-CM

## 2023-10-27 RX ORDER — ROSUVASTATIN CALCIUM 5 MG/1
5 TABLET, COATED ORAL DAILY
Qty: 90 TABLET | Refills: 1 | Status: ON HOLD | OUTPATIENT
Start: 2023-10-27 | End: 2023-12-15

## 2023-11-16 NOTE — TELEPHONE ENCOUNTER
Patient Quality Outreach    Patient is due for the following:   Diabetes -  A1C, Eye Exam, and Foot Exam  Asthma  -  ACT needed  Cervical Cancer Screening - PAP Needed  Physical Preventive Adult Physical    Next Steps:   No follow up needed at this time.  Patient was assigned appropriate questionnaire to complete    Type of outreach:    Sent letter. and Copy of ACT mailed to patient.    Next Steps:  Reach out within 90 days via Phone.    Max number of attempts reached: Yes. Will try again in 90 days if patient still on fail list.    Questions for provider review:    None           Zoey Contreras RN

## 2023-11-25 NOTE — TELEPHONE ENCOUNTER
Patient called requesting new prescription for Lyrica. Script needs to read ok for early fill. Reports she was upset and threw Lyrica away. Now she is having anxiety and clonazepam is not helping. Advised OV but pt declines since she is taking care of her kids. She has appointment Friday( 04/06)with doctor Gurvinder.Please advice. Ok for telephone visit?   PAST SURGICAL HISTORY:  Port-A-Cath in place     S/P Cholecystectomy s/p open cholecystectomy 2012    S/P Tonsillectomy and Adenoide     S/P  Shunt x6 revisions, last 2012 w/ Dr. Loza    Strabismus Repair x4

## 2023-11-28 ENCOUNTER — TRANSFERRED RECORDS (OUTPATIENT)
Dept: HEALTH INFORMATION MANAGEMENT | Facility: CLINIC | Age: 48
End: 2023-11-28
Payer: COMMERCIAL

## 2023-12-13 ENCOUNTER — APPOINTMENT (OUTPATIENT)
Dept: CT IMAGING | Facility: CLINIC | Age: 48
DRG: 917 | End: 2023-12-13
Attending: EMERGENCY MEDICINE
Payer: COMMERCIAL

## 2023-12-13 ENCOUNTER — HOSPITAL ENCOUNTER (INPATIENT)
Facility: CLINIC | Age: 48
LOS: 2 days | Discharge: HOME | DRG: 917 | End: 2023-12-15
Attending: EMERGENCY MEDICINE | Admitting: INTERNAL MEDICINE
Payer: COMMERCIAL

## 2023-12-13 ENCOUNTER — APPOINTMENT (OUTPATIENT)
Dept: GENERAL RADIOLOGY | Facility: CLINIC | Age: 48
DRG: 917 | End: 2023-12-13
Attending: EMERGENCY MEDICINE
Payer: COMMERCIAL

## 2023-12-13 DIAGNOSIS — T56.894A LITHIUM TOXICITY, UNDETERMINED INTENT, INITIAL ENCOUNTER: ICD-10-CM

## 2023-12-13 LAB
ALBUMIN SERPL BCG-MCNC: 4.5 G/DL (ref 3.5–5.2)
ALBUMIN UR-MCNC: 10 MG/DL
ALP SERPL-CCNC: 129 U/L (ref 40–150)
ALT SERPL W P-5'-P-CCNC: 17 U/L (ref 0–50)
AMMONIA PLAS-SCNC: 31 UMOL/L (ref 11–51)
AMPHETAMINES UR QL SCN: ABNORMAL
ANION GAP SERPL CALCULATED.3IONS-SCNC: 6 MMOL/L (ref 7–15)
APPEARANCE UR: CLEAR
AST SERPL W P-5'-P-CCNC: 14 U/L (ref 0–45)
BARBITURATES UR QL SCN: ABNORMAL
BASOPHILS # BLD AUTO: 0.1 10E3/UL (ref 0–0.2)
BASOPHILS NFR BLD AUTO: 1 %
BENZODIAZ UR QL SCN: ABNORMAL
BILIRUB SERPL-MCNC: 0.2 MG/DL
BILIRUB UR QL STRIP: NEGATIVE
BUN SERPL-MCNC: 2.7 MG/DL (ref 6–20)
BZE UR QL SCN: ABNORMAL
CALCIUM SERPL-MCNC: 9.9 MG/DL (ref 8.6–10)
CANNABINOIDS UR QL SCN: ABNORMAL
CHLORIDE SERPL-SCNC: 100 MMOL/L (ref 98–107)
COLOR UR AUTO: ABNORMAL
CREAT SERPL-MCNC: 0.86 MG/DL (ref 0.51–0.95)
DEPRECATED HCO3 PLAS-SCNC: 28 MMOL/L (ref 22–29)
EGFRCR SERPLBLD CKD-EPI 2021: 83 ML/MIN/1.73M2
EOSINOPHIL # BLD AUTO: 0.4 10E3/UL (ref 0–0.7)
EOSINOPHIL NFR BLD AUTO: 4 %
ERYTHROCYTE [DISTWIDTH] IN BLOOD BY AUTOMATED COUNT: 11.8 % (ref 10–15)
ETHANOL SERPL-MCNC: <0.01 G/DL
FENTANYL UR QL: ABNORMAL
GLUCOSE BLDC GLUCOMTR-MCNC: 131 MG/DL (ref 70–99)
GLUCOSE SERPL-MCNC: 116 MG/DL (ref 70–99)
GLUCOSE UR STRIP-MCNC: NEGATIVE MG/DL
HCO3 BLDV-SCNC: 29 MMOL/L (ref 21–28)
HCT VFR BLD AUTO: 42.7 % (ref 35–47)
HGB BLD-MCNC: 14.3 G/DL (ref 11.7–15.7)
HGB UR QL STRIP: NEGATIVE
HOLD SPECIMEN: NORMAL
IMM GRANULOCYTES # BLD: 0.1 10E3/UL
IMM GRANULOCYTES NFR BLD: 0 %
KETONES UR STRIP-MCNC: NEGATIVE MG/DL
LACTATE BLD-SCNC: 1.6 MMOL/L
LACTATE SERPL-SCNC: 1.7 MMOL/L (ref 0.7–2)
LEUKOCYTE ESTERASE UR QL STRIP: ABNORMAL
LIPASE SERPL-CCNC: 27 U/L (ref 13–60)
LITHIUM SERPL-SCNC: 2.28 MMOL/L (ref 0.6–1.2)
LITHIUM SERPL-SCNC: 2.67 MMOL/L (ref 0.6–1.2)
LITHIUM SERPL-SCNC: 2.77 MMOL/L (ref 0.6–1.2)
LYMPHOCYTES # BLD AUTO: 2.5 10E3/UL (ref 0.8–5.3)
LYMPHOCYTES NFR BLD AUTO: 20 %
MAGNESIUM SERPL-MCNC: 2.2 MG/DL (ref 1.7–2.3)
MCH RBC QN AUTO: 28.4 PG (ref 26.5–33)
MCHC RBC AUTO-ENTMCNC: 33.5 G/DL (ref 31.5–36.5)
MCV RBC AUTO: 85 FL (ref 78–100)
MONOCYTES # BLD AUTO: 0.7 10E3/UL (ref 0–1.3)
MONOCYTES NFR BLD AUTO: 6 %
NEUTROPHILS # BLD AUTO: 8.9 10E3/UL (ref 1.6–8.3)
NEUTROPHILS NFR BLD AUTO: 69 %
NITRATE UR QL: NEGATIVE
NRBC # BLD AUTO: 0 10E3/UL
NRBC BLD AUTO-RTO: 0 /100
OPIATES UR QL SCN: ABNORMAL
PCO2 BLDV: 54 MM HG (ref 40–50)
PCP QUAL URINE (ROCHE): ABNORMAL
PH BLDV: 7.33 [PH] (ref 7.32–7.43)
PH UR STRIP: 7 [PH] (ref 5–7)
PLATELET # BLD AUTO: 460 10E3/UL (ref 150–450)
PO2 BLDV: 31 MM HG (ref 25–47)
POTASSIUM SERPL-SCNC: 3.8 MMOL/L (ref 3.4–5.3)
PROT SERPL-MCNC: 6.9 G/DL (ref 6.4–8.3)
RBC # BLD AUTO: 5.03 10E6/UL (ref 3.8–5.2)
RBC URINE: <1 /HPF
SAO2 % BLDV: 54 % (ref 94–100)
SODIUM SERPL-SCNC: 134 MMOL/L (ref 135–145)
SP GR UR STRIP: 1 (ref 1–1.03)
SQUAMOUS EPITHELIAL: 3 /HPF
TROPONIN T SERPL HS-MCNC: <6 NG/L
TSH SERPL DL<=0.005 MIU/L-ACNC: 2.28 UIU/ML (ref 0.3–4.2)
UROBILINOGEN UR STRIP-MCNC: NORMAL MG/DL
WBC # BLD AUTO: 12.7 10E3/UL (ref 4–11)
WBC URINE: 8 /HPF

## 2023-12-13 PROCEDURE — 84443 ASSAY THYROID STIM HORMONE: CPT | Performed by: EMERGENCY MEDICINE

## 2023-12-13 PROCEDURE — 87086 URINE CULTURE/COLONY COUNT: CPT | Performed by: INTERNAL MEDICINE

## 2023-12-13 PROCEDURE — 84484 ASSAY OF TROPONIN QUANT: CPT | Performed by: EMERGENCY MEDICINE

## 2023-12-13 PROCEDURE — 82962 GLUCOSE BLOOD TEST: CPT

## 2023-12-13 PROCEDURE — 36415 COLL VENOUS BLD VENIPUNCTURE: CPT | Performed by: EMERGENCY MEDICINE

## 2023-12-13 PROCEDURE — 70450 CT HEAD/BRAIN W/O DYE: CPT

## 2023-12-13 PROCEDURE — 81003 URINALYSIS AUTO W/O SCOPE: CPT | Performed by: INTERNAL MEDICINE

## 2023-12-13 PROCEDURE — 120N000001 HC R&B MED SURG/OB

## 2023-12-13 PROCEDURE — 99285 EMERGENCY DEPT VISIT HI MDM: CPT | Mod: 25

## 2023-12-13 PROCEDURE — 99223 1ST HOSP IP/OBS HIGH 75: CPT | Mod: AI | Performed by: INTERNAL MEDICINE

## 2023-12-13 PROCEDURE — 80178 ASSAY OF LITHIUM: CPT | Performed by: EMERGENCY MEDICINE

## 2023-12-13 PROCEDURE — 83605 ASSAY OF LACTIC ACID: CPT | Performed by: EMERGENCY MEDICINE

## 2023-12-13 PROCEDURE — 83735 ASSAY OF MAGNESIUM: CPT | Performed by: EMERGENCY MEDICINE

## 2023-12-13 PROCEDURE — 82140 ASSAY OF AMMONIA: CPT | Performed by: EMERGENCY MEDICINE

## 2023-12-13 PROCEDURE — 82803 BLOOD GASES ANY COMBINATION: CPT

## 2023-12-13 PROCEDURE — 80307 DRUG TEST PRSMV CHEM ANLYZR: CPT | Performed by: INTERNAL MEDICINE

## 2023-12-13 PROCEDURE — 83690 ASSAY OF LIPASE: CPT | Performed by: EMERGENCY MEDICINE

## 2023-12-13 PROCEDURE — 71046 X-RAY EXAM CHEST 2 VIEWS: CPT

## 2023-12-13 PROCEDURE — 85025 COMPLETE CBC W/AUTO DIFF WBC: CPT | Performed by: EMERGENCY MEDICINE

## 2023-12-13 PROCEDURE — 93005 ELECTROCARDIOGRAM TRACING: CPT

## 2023-12-13 PROCEDURE — 82077 ASSAY SPEC XCP UR&BREATH IA: CPT | Performed by: EMERGENCY MEDICINE

## 2023-12-13 PROCEDURE — 258N000003 HC RX IP 258 OP 636: Performed by: INTERNAL MEDICINE

## 2023-12-13 PROCEDURE — 36415 COLL VENOUS BLD VENIPUNCTURE: CPT | Performed by: INTERNAL MEDICINE

## 2023-12-13 PROCEDURE — 80178 ASSAY OF LITHIUM: CPT | Performed by: INTERNAL MEDICINE

## 2023-12-13 PROCEDURE — 80053 COMPREHEN METABOLIC PANEL: CPT | Performed by: EMERGENCY MEDICINE

## 2023-12-13 PROCEDURE — 93005 ELECTROCARDIOGRAM TRACING: CPT | Mod: 76

## 2023-12-13 PROCEDURE — 258N000003 HC RX IP 258 OP 636: Performed by: EMERGENCY MEDICINE

## 2023-12-13 RX ORDER — TRAZODONE HYDROCHLORIDE 100 MG/1
100 TABLET ORAL AT BEDTIME
COMMUNITY

## 2023-12-13 RX ORDER — LOSARTAN POTASSIUM 25 MG/1
25 TABLET ORAL DAILY
Status: DISCONTINUED | OUTPATIENT
Start: 2023-12-14 | End: 2023-12-15 | Stop reason: HOSPADM

## 2023-12-13 RX ORDER — LAMOTRIGINE 100 MG/1
200 TABLET ORAL AT BEDTIME
COMMUNITY

## 2023-12-13 RX ORDER — SODIUM CHLORIDE 9 MG/ML
INJECTION, SOLUTION INTRAVENOUS CONTINUOUS
Status: DISCONTINUED | OUTPATIENT
Start: 2023-12-13 | End: 2023-12-15 | Stop reason: HOSPADM

## 2023-12-13 RX ORDER — LIDOCAINE 40 MG/G
CREAM TOPICAL
Status: DISCONTINUED | OUTPATIENT
Start: 2023-12-13 | End: 2023-12-15 | Stop reason: HOSPADM

## 2023-12-13 RX ORDER — TRAZODONE HYDROCHLORIDE 100 MG/1
100 TABLET ORAL AT BEDTIME
Status: DISCONTINUED | OUTPATIENT
Start: 2023-12-14 | End: 2023-12-15 | Stop reason: HOSPADM

## 2023-12-13 RX ORDER — LAMOTRIGINE 100 MG/1
100 TABLET ORAL AT BEDTIME
Status: DISCONTINUED | OUTPATIENT
Start: 2023-12-14 | End: 2023-12-15 | Stop reason: HOSPADM

## 2023-12-13 RX ORDER — ACETAMINOPHEN 325 MG/1
650 TABLET ORAL EVERY 4 HOURS PRN
Status: DISCONTINUED | OUTPATIENT
Start: 2023-12-13 | End: 2023-12-15 | Stop reason: HOSPADM

## 2023-12-13 RX ORDER — ACETAMINOPHEN 650 MG/1
650 SUPPOSITORY RECTAL EVERY 4 HOURS PRN
Status: DISCONTINUED | OUTPATIENT
Start: 2023-12-13 | End: 2023-12-15 | Stop reason: HOSPADM

## 2023-12-13 RX ORDER — CETIRIZINE HYDROCHLORIDE 10 MG/1
10 TABLET ORAL DAILY
Status: DISCONTINUED | OUTPATIENT
Start: 2023-12-14 | End: 2023-12-15 | Stop reason: HOSPADM

## 2023-12-13 RX ORDER — BUPROPION HYDROCHLORIDE 150 MG/1
150 TABLET ORAL EVERY MORNING
Status: DISCONTINUED | OUTPATIENT
Start: 2023-12-14 | End: 2023-12-15 | Stop reason: HOSPADM

## 2023-12-13 RX ORDER — AMOXICILLIN 250 MG
1 CAPSULE ORAL 2 TIMES DAILY PRN
Status: DISCONTINUED | OUTPATIENT
Start: 2023-12-13 | End: 2023-12-15 | Stop reason: HOSPADM

## 2023-12-13 RX ORDER — LITHIUM CARBONATE 300 MG/1
300 TABLET, FILM COATED, EXTENDED RELEASE ORAL EVERY MORNING
COMMUNITY

## 2023-12-13 RX ORDER — ZOLPIDEM TARTRATE 5 MG/1
10 TABLET ORAL
Status: DISCONTINUED | OUTPATIENT
Start: 2023-12-13 | End: 2023-12-15 | Stop reason: HOSPADM

## 2023-12-13 RX ORDER — BUPROPION HYDROCHLORIDE 150 MG/1
150 TABLET ORAL EVERY MORNING
COMMUNITY

## 2023-12-13 RX ORDER — DEXTROAMPHETAMINE SULFATE 10 MG/1
10 TABLET ORAL 4 TIMES DAILY
COMMUNITY

## 2023-12-13 RX ORDER — DEXTROAMPHETAMINE SULFATE 5 MG/1
5 CAPSULE, EXTENDED RELEASE ORAL 2 TIMES DAILY
COMMUNITY

## 2023-12-13 RX ORDER — CLONAZEPAM 1 MG/1
1 TABLET ORAL 3 TIMES DAILY
COMMUNITY

## 2023-12-13 RX ORDER — LURASIDONE HYDROCHLORIDE 40 MG/1
120 TABLET, FILM COATED ORAL
Status: DISCONTINUED | OUTPATIENT
Start: 2023-12-14 | End: 2023-12-15 | Stop reason: HOSPADM

## 2023-12-13 RX ORDER — ONDANSETRON 2 MG/ML
4 INJECTION INTRAMUSCULAR; INTRAVENOUS EVERY 6 HOURS PRN
Status: DISCONTINUED | OUTPATIENT
Start: 2023-12-13 | End: 2023-12-15 | Stop reason: HOSPADM

## 2023-12-13 RX ORDER — ONDANSETRON 4 MG/1
4 TABLET, ORALLY DISINTEGRATING ORAL EVERY 6 HOURS PRN
Status: DISCONTINUED | OUTPATIENT
Start: 2023-12-13 | End: 2023-12-15 | Stop reason: HOSPADM

## 2023-12-13 RX ORDER — CETIRIZINE HYDROCHLORIDE 10 MG/1
10 TABLET ORAL DAILY
COMMUNITY

## 2023-12-13 RX ORDER — GABAPENTIN 300 MG/1
300 CAPSULE ORAL 3 TIMES DAILY
COMMUNITY

## 2023-12-13 RX ORDER — VITAMIN B COMPLEX
1 TABLET ORAL DAILY
COMMUNITY

## 2023-12-13 RX ORDER — AMOXICILLIN 250 MG
2 CAPSULE ORAL 2 TIMES DAILY PRN
Status: DISCONTINUED | OUTPATIENT
Start: 2023-12-13 | End: 2023-12-15 | Stop reason: HOSPADM

## 2023-12-13 RX ORDER — POLYETHYLENE GLYCOL 3350 17 G/17G
17 POWDER, FOR SOLUTION ORAL 2 TIMES DAILY PRN
Status: DISCONTINUED | OUTPATIENT
Start: 2023-12-13 | End: 2023-12-15 | Stop reason: HOSPADM

## 2023-12-13 RX ORDER — GABAPENTIN 300 MG/1
300 CAPSULE ORAL 4 TIMES DAILY
Status: DISCONTINUED | OUTPATIENT
Start: 2023-12-14 | End: 2023-12-15 | Stop reason: HOSPADM

## 2023-12-13 RX ORDER — ROSUVASTATIN CALCIUM 5 MG/1
5 TABLET, COATED ORAL DAILY
Status: DISCONTINUED | OUTPATIENT
Start: 2023-12-14 | End: 2023-12-15 | Stop reason: HOSPADM

## 2023-12-13 RX ORDER — CLONAZEPAM 1 MG/1
1 TABLET ORAL 3 TIMES DAILY
Status: DISCONTINUED | OUTPATIENT
Start: 2023-12-14 | End: 2023-12-15 | Stop reason: HOSPADM

## 2023-12-13 RX ORDER — CALCIUM CARBONATE 500 MG/1
1000 TABLET, CHEWABLE ORAL 4 TIMES DAILY PRN
Status: DISCONTINUED | OUTPATIENT
Start: 2023-12-13 | End: 2023-12-15 | Stop reason: HOSPADM

## 2023-12-13 RX ORDER — DEXTROAMPHETAMINE SULFATE 5 MG/1
5 CAPSULE, EXTENDED RELEASE ORAL 2 TIMES DAILY
Status: DISCONTINUED | OUTPATIENT
Start: 2023-12-14 | End: 2023-12-15 | Stop reason: HOSPADM

## 2023-12-13 RX ORDER — DEXTROAMPHETAMINE SULFATE 5 MG/1
10 TABLET ORAL 2 TIMES DAILY
Status: DISCONTINUED | OUTPATIENT
Start: 2023-12-14 | End: 2023-12-15 | Stop reason: HOSPADM

## 2023-12-13 RX ADMIN — SODIUM CHLORIDE 1000 ML: 9 INJECTION, SOLUTION INTRAVENOUS at 19:23

## 2023-12-13 RX ADMIN — SODIUM CHLORIDE: 9 INJECTION, SOLUTION INTRAVENOUS at 22:37

## 2023-12-13 RX ADMIN — SODIUM CHLORIDE 1000 ML: 9 INJECTION, SOLUTION INTRAVENOUS at 21:08

## 2023-12-13 ASSESSMENT — ACTIVITIES OF DAILY LIVING (ADL)
ADLS_ACUITY_SCORE: 24
ADLS_ACUITY_SCORE: 37

## 2023-12-13 NOTE — ED TRIAGE NOTES
Pt. Presents to ED with ongoing but worsening symptoms of difficulty concentrating and ambulating. Reports when she is walking she is off balanced and has fallen. Pt. Reports she is newly tremulous as well. Pt. Also having some aphasia that seems to be slowly changing. AVSS on RA.  reports she fell in the middle of the night last night and couldn't get up. Pt. Reports she hit her head on her ottoman but denies LOC. Pt. also reports urinary/fecal incontinence at night.   Pt. Reports a hx of depression, anxiety, PTSD, graves disease. Pt. Reports she started dexedrine about 10 days ago. Unknown if having fevers but patient is always cold. Pt. Also reports a HA, took tylenol PTA.

## 2023-12-13 NOTE — ED PROVIDER NOTES
"  History     Chief Complaint:  Altered Mental Status     The history is provided by the patient and the spouse.      Melody Muñoz is a 48 year old female with a history of type 2 diabetes mellitus, bipolar 1 disorder, psychosis, and chronic pain syndrome who presents with shakiness and unsteadiness when walking for a few months. She notes she has right-sided weakness for months and occasional intermittent numbness which she last experienced last night. She has urinary incontinence and urinary frequency. She notes 4 years ago she had shakiness in the car and was not evaluated after the episode. Her spouse notes she fell this morning approximately 13 hours ago and hit her head on the ottoman and did not have the muscle strength to get up. She adds she started Dexedrine last night and has been on it before with no side effects. She stopped taking Vyvanse. She denies her symptoms worsening since changing medications. She notes trouble concentrating, reading, and writing. She notes she called to make an appointment with her PCP and was told to present to the ED. She denies chest pain, shortness of breath, difficulty breathing, weight loss, abdominal pain, urinary issues, or syncope. Her spouse notes \"she could be eating better.\" She has been stumbling around and unable to walk well the past few days per her spouse.  Patient is on lithium 300 mg in the morning and 900 mg at night.  She started the lithium about 6 months ago per the .    Independent Historian:   Spouse/Partner - They report as noted above.    Review of External Notes:      Reviewed nurse triage note  Medications:    Albuterol inhaler  Albuterol neb solution  Bupropion   Chlorpromazine   Clonazepam   Clonidine   Dextroamphetamine   Duloxetine  Gabapentin   Lamotrigine   Latuda   Lithium   Losartan   Methylphenidate   Naloxone   Oxycodone   Oxycodone-acetaminophen  Rosuvastatin   Topiramate   Trazodone   Zolpidem     Past Medical History:  "   Abnormal pap  Anxiety  Asthma   Bipolar 1 disorder  Endometriosis   Suicidal ideation  Intermittent asthma  Irritable bowel syndrome with both constipation and diarrhea   Kidney stone  Latent tuberculosis  Major depression  Migraines  JASWANT  Sleep apnea  Substance abuse  Vertigo  Intentional acetaminophen overdose   Poisoning by other opioids, intentional self-harm, initial encounter   Type 2 diabetes mellitus   Chronic pain syndrome   Psychosis  PTSD  Chemical dependency   Incontinence of urine in female   Chronic fatigue syndrome   Pulmonary tuberculosis   History of kidney stones     Past Surgical History:     section  Colonoscopy  Laparoscopic appendectomy   Laparoscopy  Sling bladder  Tubal ligation bilateral  [place] teeth removal    Dilation and curettage   Total hysterectomy     Physical Exam   Patient Vitals for the past 24 hrs:   BP Temp Temp src Pulse Resp SpO2   23 1830 104/73 -- -- 66 -- 93 %   23 1610 112/76 98.2  F (36.8  C) Oral 76 16 98 %      Physical Exam  Vitals reviewed.   Constitutional:       General: She is not in acute distress.     Appearance: She is not ill-appearing.   HENT:      Head: Normocephalic and atraumatic.   Eyes:      Extraocular Movements: Extraocular movements intact.   Cardiovascular:      Rate and Rhythm: Normal rate and regular rhythm.   Pulmonary:      Effort: Pulmonary effort is normal. No respiratory distress.      Breath sounds: Normal breath sounds. No wheezing.   Abdominal:      Palpations: Abdomen is soft.      Tenderness: There is no abdominal tenderness. There is no guarding.   Musculoskeletal:      Cervical back: Normal range of motion.   Skin:     General: Skin is warm and dry.   Neurological:      Mental Status: She is alert and oriented to person, place, and time.      GCS: GCS eye subscore is 4. GCS verbal subscore is 5. GCS motor subscore is 6.      Comments: Patient is alert, oriented.  She is sluggish to respond to questions but is able  to answer the questions.    She has tremors to bilateral upper extremities    Station grossly intact to the bilateral upper and lower extremities.  No facial asymmetry appreciated.   Psychiatric:         Behavior: Behavior normal.           Emergency Department Course   ECG  ECG taken at 1621, ECG read at 1624  Normal sinus rhythm   Nonspecific intraventricular conduction delay  T wave abnormality, consider inferior ischemia   T wave abnormality, consider anterolateral ischemia   Rate 76 bpm. AL interval 204 ms. QRS duration 120 ms. QT/QTc 436/490 ms. P-R-T axes 53 -8 62.    ECG #2  ECG taken at 1651  Normal sinus rhythm  Nonspecific intraventricular conduction delay  T wave abnormality, consider anterolateral ischemia    Rate 78 bpm. AL interval 196 ms. QRS duration 120 ms. QT/QTc 430/490 ms. P-R-T axes 61 44 72.     Imaging:  Head CT w/o contrast   Final Result   IMPRESSION:   1.  No acute intracranial process.      XR Chest 2 Views   Final Result   IMPRESSION: Heart size and pulmonary vessels are normal. New slight blunting left costophrenic angle consistent with tiny left pleural effusion. Faint nodular density also at left base though this is favored to represent a normal nipple shadow. Lungs    otherwise clear.         Laboratory:  Labs Ordered and Resulted from Time of ED Arrival to Time of ED Departure   GLUCOSE BY METER - Abnormal       Result Value    GLUCOSE BY METER POCT 131 (*)    COMPREHENSIVE METABOLIC PANEL - Abnormal    Sodium 134 (*)     Potassium 3.8      Carbon Dioxide (CO2) 28      Anion Gap 6 (*)     Urea Nitrogen 2.7 (*)     Creatinine 0.86      GFR Estimate 83      Calcium 9.9      Chloride 100      Glucose 116 (*)     Alkaline Phosphatase 129      AST 14      ALT 17      Protein Total 6.9      Albumin 4.5      Bilirubin Total 0.2     CBC WITH PLATELETS AND DIFFERENTIAL - Abnormal    WBC Count 12.7 (*)     RBC Count 5.03      Hemoglobin 14.3      Hematocrit 42.7      MCV 85      MCH 28.4       MCHC 33.5      RDW 11.8      Platelet Count 460 (*)     % Neutrophils 69      % Lymphocytes 20      % Monocytes 6      % Eosinophils 4      % Basophils 1      % Immature Granulocytes 0      NRBCs per 100 WBC 0      Absolute Neutrophils 8.9 (*)     Absolute Lymphocytes 2.5      Absolute Monocytes 0.7      Absolute Eosinophils 0.4      Absolute Basophils 0.1      Absolute Immature Granulocytes 0.1      Absolute NRBCs 0.0     ISTAT GASES LACTATE VENOUS POCT - Abnormal    Lactic Acid POCT 1.6      Bicarbonate Venous POCT 29 (*)     O2 Sat, Venous POCT 54 (*)     pCO2 Venous POCT 54 (*)     pH Venous POCT 7.33      pO2 Venous POCT 31     LITHIUM LEVEL - Abnormal    Lithium 2.77 (*)    TROPONIN T, HIGH SENSITIVITY - Normal    Troponin T, High Sensitivity <6     LACTIC ACID WHOLE BLOOD - Normal    Lactic Acid 1.7     TSH WITH FREE T4 REFLEX - Normal    TSH 2.28     ETHYL ALCOHOL LEVEL - Normal    Alcohol ethyl <0.01     LIPASE - Normal    Lipase 27     MAGNESIUM - Normal    Magnesium 2.2     AMMONIA - Normal    Ammonia 31     GLUCOSE MONITOR NURSING POCT   ROUTINE UA WITH MICROSCOPIC REFLEX TO CULTURE     Emergency Department Course & Assessments:    Interventions:  Medications   sodium chloride 0.9% BOLUS 1,000 mL (has no administration in time range)   sodium chloride 0.9% BOLUS 1,000 mL (has no administration in time range)          Assessments/Consultations/Discussion of Management or Tests:   ED Course as of 12/13/23 1847   Wed Dec 13, 2023   1636 I obtained the patient's history and examined as noted above.    1810 I rechecked the patient and explained findings.    1845 I consulted with Dr. Workman, hospitalist, regarding the patient's history and presentation here in the emergency department who accepted the patient for admission.       Social Determinants of Health affecting care:   None    Disposition:  The patient was admitted to the hospital under the care of Dr. Workman.     Impression & Plan      Medical Decision  Makin-year-old female presenting today with generalized weakness, tremors, shaking.  She has had decrease in appetite over the last couple of months.  States that she is noted the shaking to the extremities but worsened over the last few days.   noted that patient required a lot of assistance with ambulation today which was new.  She had a fall earlier this morning.  Unsure of any head injury.  Patient appears pale, ill-appearing.  She is alert, oriented.  Noted to have tremors throughout her extremities.  Lithium level was ordered and found to be elevated.  Patient's symptoms are consistent with lithium toxicity given her history of nausea, vomiting, tremors, weakness, difficulty with ambulation.  Her kidney function is within normal limits.  She was given IV fluids.  Patient admitted to hospitalist service.    Diagnosis:    ICD-10-CM    1. Lithium toxicity, undetermined intent, initial encounter  T56.894A          Scribe Disclosure:  Zaina HANKS, am serving as a scribe at 10:27 PM on 2023 to document services personally performed by Breanne Faith DO based on my observations and the provider's statements to me.      2023   Breanne Faith DO Doan, Tiffani, DO  23

## 2023-12-14 ENCOUNTER — APPOINTMENT (OUTPATIENT)
Dept: PHYSICAL THERAPY | Facility: CLINIC | Age: 48
DRG: 917 | End: 2023-12-14
Attending: INTERNAL MEDICINE
Payer: COMMERCIAL

## 2023-12-14 LAB
ANION GAP SERPL CALCULATED.3IONS-SCNC: 4 MMOL/L (ref 7–15)
ATRIAL RATE - MUSE: 76 BPM
ATRIAL RATE - MUSE: 78 BPM
BUN SERPL-MCNC: 3.6 MG/DL (ref 6–20)
CALCIUM SERPL-MCNC: 8.2 MG/DL (ref 8.6–10)
CHLORIDE SERPL-SCNC: 109 MMOL/L (ref 98–107)
CREAT SERPL-MCNC: 0.76 MG/DL (ref 0.51–0.95)
DEPRECATED HCO3 PLAS-SCNC: 25 MMOL/L (ref 22–29)
DIASTOLIC BLOOD PRESSURE - MUSE: NORMAL MMHG
DIASTOLIC BLOOD PRESSURE - MUSE: NORMAL MMHG
EGFRCR SERPLBLD CKD-EPI 2021: >90 ML/MIN/1.73M2
ERYTHROCYTE [DISTWIDTH] IN BLOOD BY AUTOMATED COUNT: 11.9 % (ref 10–15)
GLUCOSE SERPL-MCNC: 115 MG/DL (ref 70–99)
HCT VFR BLD AUTO: 34.2 % (ref 35–47)
HGB BLD-MCNC: 11.5 G/DL (ref 11.7–15.7)
INTERPRETATION ECG - MUSE: NORMAL
INTERPRETATION ECG - MUSE: NORMAL
LITHIUM SERPL-SCNC: 1.93 MMOL/L (ref 0.6–1.2)
LITHIUM SERPL-SCNC: 2.05 MMOL/L (ref 0.6–1.2)
MCH RBC QN AUTO: 29 PG (ref 26.5–33)
MCHC RBC AUTO-ENTMCNC: 33.6 G/DL (ref 31.5–36.5)
MCV RBC AUTO: 86 FL (ref 78–100)
P AXIS - MUSE: 53 DEGREES
P AXIS - MUSE: 61 DEGREES
PLATELET # BLD AUTO: 358 10E3/UL (ref 150–450)
POTASSIUM SERPL-SCNC: 3.7 MMOL/L (ref 3.4–5.3)
PR INTERVAL - MUSE: 196 MS
PR INTERVAL - MUSE: 204 MS
QRS DURATION - MUSE: 120 MS
QRS DURATION - MUSE: 120 MS
QT - MUSE: 430 MS
QT - MUSE: 436 MS
QTC - MUSE: 490 MS
QTC - MUSE: 490 MS
R AXIS - MUSE: -8 DEGREES
R AXIS - MUSE: 44 DEGREES
RBC # BLD AUTO: 3.96 10E6/UL (ref 3.8–5.2)
SODIUM SERPL-SCNC: 138 MMOL/L (ref 135–145)
SYSTOLIC BLOOD PRESSURE - MUSE: NORMAL MMHG
SYSTOLIC BLOOD PRESSURE - MUSE: NORMAL MMHG
T AXIS - MUSE: 62 DEGREES
T AXIS - MUSE: 72 DEGREES
VENTRICULAR RATE- MUSE: 76 BPM
VENTRICULAR RATE- MUSE: 78 BPM
WBC # BLD AUTO: 10.8 10E3/UL (ref 4–11)

## 2023-12-14 PROCEDURE — 120N000001 HC R&B MED SURG/OB

## 2023-12-14 PROCEDURE — 250N000013 HC RX MED GY IP 250 OP 250 PS 637: Performed by: INTERNAL MEDICINE

## 2023-12-14 PROCEDURE — 80048 BASIC METABOLIC PNL TOTAL CA: CPT | Performed by: INTERNAL MEDICINE

## 2023-12-14 PROCEDURE — 85014 HEMATOCRIT: CPT | Performed by: INTERNAL MEDICINE

## 2023-12-14 PROCEDURE — 99232 SBSQ HOSP IP/OBS MODERATE 35: CPT | Performed by: INTERNAL MEDICINE

## 2023-12-14 PROCEDURE — 97161 PT EVAL LOW COMPLEX 20 MIN: CPT | Mod: GP | Performed by: PHYSICAL THERAPIST

## 2023-12-14 PROCEDURE — 80178 ASSAY OF LITHIUM: CPT | Performed by: INTERNAL MEDICINE

## 2023-12-14 PROCEDURE — 97116 GAIT TRAINING THERAPY: CPT | Mod: GP | Performed by: PHYSICAL THERAPIST

## 2023-12-14 PROCEDURE — 36415 COLL VENOUS BLD VENIPUNCTURE: CPT | Performed by: INTERNAL MEDICINE

## 2023-12-14 PROCEDURE — 97530 THERAPEUTIC ACTIVITIES: CPT | Mod: GP | Performed by: PHYSICAL THERAPIST

## 2023-12-14 PROCEDURE — 258N000003 HC RX IP 258 OP 636: Performed by: INTERNAL MEDICINE

## 2023-12-14 RX ORDER — ALBUTEROL SULFATE 90 UG/1
1-2 AEROSOL, METERED RESPIRATORY (INHALATION) EVERY 4 HOURS PRN
Status: DISCONTINUED | OUTPATIENT
Start: 2023-12-14 | End: 2023-12-15 | Stop reason: HOSPADM

## 2023-12-14 RX ADMIN — LURASIDONE HYDROCHLORIDE 120 MG: 40 TABLET, COATED ORAL at 18:08

## 2023-12-14 RX ADMIN — ALBUTEROL SULFATE 2 PUFF: 90 AEROSOL, METERED RESPIRATORY (INHALATION) at 19:02

## 2023-12-14 RX ADMIN — CETIRIZINE HYDROCHLORIDE 10 MG: 10 TABLET, FILM COATED ORAL at 08:09

## 2023-12-14 RX ADMIN — CLONAZEPAM 1 MG: 1 TABLET ORAL at 21:24

## 2023-12-14 RX ADMIN — SODIUM CHLORIDE: 9 INJECTION, SOLUTION INTRAVENOUS at 10:04

## 2023-12-14 RX ADMIN — LURASIDONE HYDROCHLORIDE 120 MG: 40 TABLET, COATED ORAL at 00:12

## 2023-12-14 RX ADMIN — CLONAZEPAM 1 MG: 1 TABLET ORAL at 16:02

## 2023-12-14 RX ADMIN — LAMOTRIGINE 100 MG: 100 TABLET ORAL at 00:13

## 2023-12-14 RX ADMIN — TRAZODONE HYDROCHLORIDE 100 MG: 100 TABLET ORAL at 00:12

## 2023-12-14 RX ADMIN — BUPROPION HYDROCHLORIDE 150 MG: 150 TABLET, EXTENDED RELEASE ORAL at 08:09

## 2023-12-14 RX ADMIN — GABAPENTIN 300 MG: 300 CAPSULE ORAL at 21:24

## 2023-12-14 RX ADMIN — ROSUVASTATIN CALCIUM 5 MG: 5 TABLET, FILM COATED ORAL at 10:04

## 2023-12-14 RX ADMIN — CLONAZEPAM 1 MG: 1 TABLET ORAL at 08:08

## 2023-12-14 RX ADMIN — LOSARTAN POTASSIUM 25 MG: 25 TABLET, FILM COATED ORAL at 08:09

## 2023-12-14 RX ADMIN — GABAPENTIN 300 MG: 300 CAPSULE ORAL at 13:51

## 2023-12-14 RX ADMIN — LAMOTRIGINE 100 MG: 100 TABLET ORAL at 21:24

## 2023-12-14 RX ADMIN — SODIUM CHLORIDE: 9 INJECTION, SOLUTION INTRAVENOUS at 16:23

## 2023-12-14 RX ADMIN — SODIUM CHLORIDE: 9 INJECTION, SOLUTION INTRAVENOUS at 04:21

## 2023-12-14 RX ADMIN — GABAPENTIN 300 MG: 300 CAPSULE ORAL at 08:09

## 2023-12-14 RX ADMIN — GABAPENTIN 300 MG: 300 CAPSULE ORAL at 18:08

## 2023-12-14 RX ADMIN — TRAZODONE HYDROCHLORIDE 100 MG: 100 TABLET ORAL at 21:24

## 2023-12-14 ASSESSMENT — ACTIVITIES OF DAILY LIVING (ADL)
ADLS_ACUITY_SCORE: 26
ADLS_ACUITY_SCORE: 24
ADLS_ACUITY_SCORE: 26
ADLS_ACUITY_SCORE: 24
ADLS_ACUITY_SCORE: 26
ADLS_ACUITY_SCORE: 24
ADLS_ACUITY_SCORE: 26
ADLS_ACUITY_SCORE: 24

## 2023-12-14 NOTE — PROGRESS NOTES
"   12/14/23 1603   Appointment Info   Signing Clinician's Name / Credentials (PT) Ivonne Hampton, PT   Living Environment   People in Home child(celina), dependent;spouse   Current Living Arrangements house   Home Accessibility stairs to enter home;stairs within home   Number of Stairs, Main Entrance 2   Stair Railings, Main Entrance none   Number of Stairs, Within Home, Primary nine   Stair Railings, Within Home, Primary railings safe and in good condition   Transportation Anticipated family or friend will provide   Living Environment Comments reports split level home   Self-Care   Usual Activity Tolerance good   Current Activity Tolerance fair   Equipment Currently Used at Home none   Fall history within last six months yes   Number of times patient has fallen within last six months 1  (per patient report)   Activity/Exercise/Self-Care Comment reports normally being independent with mobility and cares   General Information   Onset of Illness/Injury or Date of Surgery 12/13/23   Referring Physician Mike Workman,    Patient/Family Therapy Goals Statement (PT) not stated   Pertinent History of Current Problem (include personal factors and/or comorbidities that impact the POC) per chart: \"Melody Muñoz is a 48 year old female with past medical history of bipolar, generalized anxiety disorder, PTSD, borderline personality disorder, hyperlipidemia who presented on 12/13/2023 with chief complaint of generalized weakness and altered mental status.  The patient presents with her significant other who is was bedside who assisted with her history.  The patient reports that approximately 6 months ago her lithium dose was increased.  Since then she has had progressive weakness.  On 12/5, the patient saw her psychiatrist via telemedicine and was started on Dexedrine.  The patient states that yesterday she was walking and tried to take a step when she fell forward onto the ground.  She was too weak to get off the ground. "  Given her weakness and the fall the day prior to admission, she decided to come to the emergency department for evaluation.  She denies any nausea or vomiting.  She does admit to periodic episodes of diarrhea; found to have  Acute Toxic Encephalopathy, Generalized Weakness, Lithium Toxicity;   Existing Precautions/Restrictions fall   General Observations patient in bed; agreeable to PT session   Cognition   Orientation Status (Cognition) oriented x 3  (thought it was 2024; difficulty with name of hospital)   Follows Commands (Cognition) 75-90% accuracy   Safety Deficit (Cognition) ability to follow commands;safety precautions awareness;safety precautions follow-through/compliance   Cognitive Status Comments appears to still have slowed cognition; would benefit from further assessment by OT; nurse to obtain an order   Pain Assessment   Patient Currently in Pain No   Range of Motion (ROM)   ROM Comment WFL   Strength (Manual Muscle Testing)   Strength Comments functional weakness; below baseline per patient report   Bed Mobility   Comment, (Bed Mobility) SBA; HOB elevated   Transfers   Comment, (Transfers) CGA with use of walker for sit<>stand; mild unsteadiness   Gait/Stairs (Locomotion)   Distance in Feet (Gait) 25 feet   Comment, (Gait/Stairs) CGA with use of walker   Balance   Balance Comments impaired; current need for walker and assist   Clinical Impression   Criteria for Skilled Therapeutic Intervention Yes, treatment indicated   PT Diagnosis (PT) impaired functional mobility   Influenced by the following impairments weakness; impaired cognition; decreased safety awareness; impaired balance   Functional limitations due to impairments impaired independence with mobility and cares secondary to above deficits   Clinical Presentation (PT Evaluation Complexity) evolving   Clinical Presentation Rationale clinical judgement; level of assist   Clinical Decision Making (Complexity) low complexity   Planned Therapy  Interventions (PT) gait training;patient/family education;stair training;strengthening;transfer training;progressive activity/exercise   Risk & Benefits of therapy have been explained evaluation/treatment results reviewed;care plan/treatment goals reviewed;risks/benefits reviewed;current/potential barriers reviewed;participants voiced agreement with care plan;patient;participants included   PT Total Evaluation Time   PT [place], Low Complexity Minutes (12518) 10   Physical Therapy Goals   PT Frequency Daily   PT Predicted Duration/Target Date for Goal Attainment 12/19/23   PT Goals Transfers;Stairs;Gait   PT: Transfers Supervision/stand-by assist;Sit to/from stand;Bed to/from chair;Assistive device   PT: Gait Supervision/stand-by assist;150 feet;Assistive device   PT: Stairs Supervision/stand-by assist;9 stairs  (with a rail)   PT Discharge Planning   PT Discharge Recommendation (DC Rec) home with assist   PT Rationale for DC Rec Anticipate once medications clear system, patient should be safe to discharge to home with assist from her family; may possibly need a walker or cane for discharge pending progress with her balance deficits; currently a falls risk   PT Brief overview of current status A x 1 with walker   PT Equipment Needed at Discharge walker, rolling;cane, straight

## 2023-12-14 NOTE — CONSULTS
Care Management Initial Consult    General Information  Assessment completed with: Patient, Patient  Type of CM/SW Visit: Initial Assessment    Primary Care Provider verified and updated as needed: No   Readmission within the last 30 days: no previous admission in last 30 days      Reason for Consult: discharge planning  Advance Care Planning:            Communication Assessment  Patient's communication style: spoken language (English or Bilingual)    Hearing Difficulty or Deaf: no   Wear Glasses or Blind: yes    Cognitive  Cognitive/Neuro/Behavioral: WDL  Level of Consciousness: alert  Arousal Level: opens eyes spontaneously  Orientation: oriented x 4  Mood/Behavior: cooperative  Best Language: 0 - No aphasia  Speech: clear    Living Environment:   People in home: child(celina), dependent, spouse     Current living Arrangements:        Able to return to prior arrangements: yes       Family/Social Support:  Care provided by: self, child(celina)  Provides care for:    Marital Status:              Description of Support System:           Current Resources:   Patient receiving home care services:       Community Resources:    Equipment currently used at home: none  Supplies currently used at home:      Employment/Financial:    Does the patient's insurance plan have a 3 day qualifying hospital stay waiver?  No    Lifestyle & Psychosocial Needs:  Social Determinants of Health     Food Insecurity: Not on file   Depression: Not at risk (8/4/2023)    PHQ-2     PHQ-2 Score: 2   Housing Stability: Not on file   Tobacco Use: High Risk (8/4/2023)    Patient History     Smoking Tobacco Use: Every Day     Smokeless Tobacco Use: Never     Passive Exposure: Not on file   Financial Resource Strain: Not on file   Alcohol Use: Not on file   Transportation Needs: Not on file   Physical Activity: Not on file   Interpersonal Safety: Not on file   Stress: Not on file   Social Connections: Not on file       Mental Health Status:      Mental Health Management: Medication    Care Management Discharge Note    Discharge Date: 12/15/2023       Discharge Disposition: Home    Discharge Services: None    Discharge DME: None    Discharge Transportation: spouse       Additional Information:  Patient has a high URR of 20%. Patient lives at home with her  and 14 year old twins. Patient states they are at school right now. Patient states all her needs are met and declines resources.     Patient's spouse will transport at discharge.     SYDNEY Christianson, LGAMY  Emergency Room   Please contact the SW on the floor in which the patient is staying for any questions or concerns

## 2023-12-14 NOTE — PROGRESS NOTES
[facility]    Medicine Progress Note - Hospitalist Service    Date of Admission:  12/13/2023    Primary Care Physician   [facility]  CONSULTANTS: none    Assessment & Plan     Melody Muñoz is a 48 year old female with past medical history of bipolar, generalized anxiety disorder, PTSD, borderline personality disorder, hyperlipidemia who presented on 12/13/2023 with chief complaint of generalized weakness and altered mental status.  The patient presents with her significant other who is was bedside who assisted with her history.  The patient reports that approximately 6 months ago her lithium dose was increased.  Since then she has had progressive weakness.  On 12/5, the patient saw her psychiatrist via telemedicine and was started on Dexedrine.  The patient states that yesterday she was walking and tried to take a step when she fell forward onto the ground.  She was too weak to get off the ground.  Given her weakness and the fall the day prior to admission, she decided to come to the emergency department for evaluation.  She denies any nausea or vomiting.  She does admit to periodic episodes of diarrhea.  She states her last dose of lithium was the morning of 12/13.     In the emergency department, the patient was found to have a temperature of 98.2  F, heart rate 76, blood pressure 112/76, respiratory rate 16, SpO2 98% on room air.  Initial lab work showed sodium 134, BUN/creatinine 2.7/0.86, hepatic panel within normal limits, lactic acid 1.7, high-sensitivity troponin less than 6, TSH 2.28, WBC 12.7, platelet 460.  Lithium level returned elevated at 2.77.  Chest x-ray showed new slight blunting at the left costophrenic angle consistent with tiny left pleural effusion.  CT head showed no acute process.  The patient was started on aggressive IV fluids and lithium level was trended.              Acute Toxic Encephalopathy, Generalized Weakness, Lithium  Toxicity  On admission the patient had an elevated Lithium level on arrival at 2.77. Interestingly her sodium was normal.   Last dose of lithium was AM of 12/13.  She was placed on aggressive IVF and held her lithium. Trended lithium level q4h and has come down but still high.   If worsening mentation or increase in lithium, would need to consider dialysis but at this point not needed.  Will need a lower lithium dose with follow up with psychiatry and her primary care provider for levels.   Will need to stay one more night and hopefully home 12/15.  Will have physical therapy see her and get her moving.       Bipolar Disorder, Borderline Personality Disorder, ADHD, PTSD, Polypharmacy  Patient follows with mental health as an outpatient through [facility].  Was recently started on Dexedrine for ADHD due to difficulty concentrating.  Possible this is secondary to her lithium toxicity vs polypharmacy.  Recommend close follow up with Psychiatry after discharge.  Resumed PTA latuda, lamotrigine, klonopin, topamax and held her lithium.  Would benefit from simplification of her meds.  Defer to outpatient psychiatrist pending improvement in mentation from above    Hyperlipidemia  Continue on her home Crestor    Obstructive sleep apnea  Have CPAP available, patient refused    Latent TB s/p 9 months of INH    Migraines    Hypertension   Patient to continue on her home Cozaar.  Her blood pressure is stable at /59-75.  Given her polypharmacy and good blood pressure control.  Will hold the cozaar for now and see what her blood pressure does as she may no longer need it.          4Ms:  Polypharmacy: Medications reviewed, patient is on multiple medications mostly psychiatric which all could potentially can contribute to side effects.  Patient needs close follow-up with psychiatry to titrate her medications as able.  Will be holding her Cozaar as her blood pressure seems stable at this point as she may not need this any  further.  Mentation:    Capacity intact to make her own medical decisions  Social support: Has a significant other      Discussed plan of care with nursing, social work and case management      Diet: Combination Diet Regular Diet Adult    DVT Prophylaxis: Pneumatic Compression Devices  Smith Catheter: Not present  Lines: None     Cardiac Monitoring: ACTIVE order. Indication: lithium toxicity    RESTRAINTS: not indicated  Code Status: Full Code        Follow up plan: Needs close psychiatric follow-up and primary care follow-up for polypharmacy as well as following lithium levels    This document was created using voice recognition technology.  Please excuse any typographical errors that may have occurred.  Please call with any questions.       Clinically Significant Risk Factors Present on Admission          # Hypocalcemia: Lowest Ca = 8.2 mg/dL in last 2 days, will monitor and replace as appropriate         # Hypertension: Home medication list includes antihypertensive(s)          # Asthma: noted on problem list        Disposition Plan      Expected Discharge Date: 12/15/2023                  Barrier to discharge: lithium level, improved weakness and confusion    Kyree Castaneda MD  Hospitalist Service  [facility]  Securely message with [employer] (more info)  Text page via [employer] Paging/Directory   ______________________________________________________________________    Interval History   Patient is new to me, continues to be a little bit confused and feels weak.  Her lithium level is coming down but still in the toxic range.  Refused CPAP overnight.  Has no new complaints.  Really wants to make sure she gets her Klonopin as scheduled.      ROS: A comprehensive review of systems was negative except for items noted in the HPI.  Patient currently denies any fever, chills, sweats, nausea, vomiting, diarrhea, shortness of breath, or chest pain.      Physical Exam   Vital Signs: Temp: 97.8  F (36.6   C) Temp src: Oral BP: 114/75 Pulse: 67   Resp: 16 SpO2: 94 % O2 Device: None (Room air)    Weight: 150 lbs 4.8 oz      General appearance: Patient is alert and oriented x3 though does seem somewhat confused, no apparent distress, pleasant and conversing normally, speaking in full sentences, appears stated age, sitting up in bed  HEENT:    Mucous membranes are moist  RESPIRATORY: Clear to auscultation bilateral, good air movement  CARDIOVASCULAR: Regular rate and rhythm, normal S1/S2, no murmurs, rubs, or gallops present, peripheral pulses intact  GASTROINTESTINAL: Non-distended, non-tender, soft, bowel sounds present throughout  NEUROLOGIC:  Cranial nerves II-XII intact, without any focal deficits, strength 5/5 throughout though seems globally a little weak  EXTREMITIES:  Moves all extremities, no clubbing, cyanosis, nor edema  :  Smith not present         Data     I have personally reviewed the following data over the past 24 hrs:    10.8  \   11.5 (L)   / 358     138 109 (H) 3.6 (L) /  115 (H)   3.7 25 0.76 \     ALT: 17 AST: 14 AP: 129 TBILI: 0.2   ALB: 4.5 TOT PROTEIN: 6.9 LIPASE: 27     Trop: <6 BNP: N/A     TSH: 2.28 T4: N/A A1C: N/A     Procal: N/A CRP: N/A Lactic Acid: 1.6         Imaging:   Results for orders placed or performed during the hospital encounter of 12/13/23   XR Chest 2 Views    Narrative    EXAM: XR CHEST 2 VIEWS  LOCATION: [facility]  DATE: 12/13/2023    INDICATION: Weakness  COMPARISON: Chest x-ray 09/10/2018. Abdominal CT 02/20/2023      Impression    IMPRESSION: Heart size and pulmonary vessels are normal. New slight blunting left costophrenic angle consistent with tiny left pleural effusion. Faint nodular density also at left base though this is favored to represent a normal nipple shadow. Lungs   otherwise clear.   Head CT w/o contrast    Narrative    EXAM: CT HEAD W/O CONTRAST  LOCATION: [facility]  DATE: 12/13/2023    INDICATION: Altered  mental status  COMPARISON: None.  TECHNIQUE: Routine CT Head without IV contrast. Multiplanar reformats. Dose reduction techniques were used.    FINDINGS:  INTRACRANIAL CONTENTS: No intracranial hemorrhage, extraaxial collection, or mass effect.  No CT evidence of acute infarct. Normal parenchymal attenuation. Normal ventricles and sulci.     VISUALIZED ORBITS/SINUSES/MASTOIDS: No intraorbital abnormality. No significant paranasal sinus mucosal disease. No middle ear or mastoid effusion.    BONES/SOFT TISSUES: No acute abnormality.      Impression    IMPRESSION:  1.  No acute intracranial process.     *Note: Due to a large number of results and/or encounters for the requested time period, some results have not been displayed. A complete set of results can be found in Results Review.     Procedures: none     I have personally have reviewed the patient's most up to date radiologic exams, EKG, labs, orders, and medications myself

## 2023-12-14 NOTE — PHARMACY-ADMISSION MEDICATION HISTORY
Pharmacist Admission Medication History    Admission medication history is complete. The information provided in this note is only as accurate as the sources available at the time of the update.    Information Source(s): [facility] and [facility] list  via N/A    Pertinent Information: Pt's  manages medications for pt.     Changes made to PTA medication list:  Added:   Bupropion  Dextroamphetamine  Vit D  Zyrtec  Deleted:   Alb neb  Clonidine  Duloxetine  Methylphenidate  Oxycodone / percocet  Topiramate  Changed:   Clonazepam 0.5mg taper -> 1mg TID  Gabapentin 800mg QID -> 300mg QID  Lamotrigine 200mg -> 100mg  Lithium 450mg BID -> 300mg AM + 900mg HS per med list  Trazodone 150mg -> 200mg  Zolpidem 5mg -> 10mg    Allergies reviewed with patient and updates made in EHR: no    Medication History Completed By: [facility] 12/13/2023 10:44 PM    PTA Med List   Medication Sig Last Dose    albuterol (PROAIR HFA/PROVENTIL HFA/VENTOLIN HFA) 108 (90 Base) MCG/ACT inhaler INHALE 1 TO 2 PUFFS INTO THE LUNGS EVERY 4 HOURS AS NEEDED FOR SHORTNESS OF BREATH OR WHEEZING Unknown at PRN    buPROPion (WELLBUTRIN XL) 150 MG 24 hr tablet Take 150 mg by mouth every morning 12/13/2023    cetirizine (ZYRTEC) 10 MG tablet Take 10 mg by mouth daily 12/13/2023 at AM    clonazePAM (KLONOPIN) 1 MG tablet Take 1 mg by mouth 3 times daily 12/13/2023    dextroamphetamine (DEXEDRINE SPANSULE) 5 MG 24 hr capsule Take 5 mg by mouth 2 times daily 12/13/2023    dextroamphetamine (DEXTROSTAT) 10 MG tablet Take 10 mg by mouth 2 times daily 12/13/2023    gabapentin (NEURONTIN) 300 MG capsule Take 300 mg by mouth 4 times daily 12/13/2023    lamoTRIgine (LAMICTAL) 100 MG tablet Take 100 mg by mouth at bedtime 12/12/2023    lithium (ESKALITH CR/LITHOBID) 450 MG CR tablet Take 900 mg by mouth at bedtime 12/12/2023    lithium ER (LITHOBID) 300 MG CR tablet Take 300 mg by mouth every morning 12/13/2023    losartan (COZAAR)  25 MG tablet Take 1 tablet (25 mg) by mouth daily 12/13/2023 at AM    lurasidone (LATUDA) 120 MG TABS tablet Take 120 mg by mouth daily (with dinner) 12/12/2023    rosuvastatin (CRESTOR) 5 MG tablet TAKE 1 TABLET(5 MG) BY MOUTH DAILY 12/13/2023 at AM    traZODone (DESYREL) 100 MG tablet Take 100 mg by mouth at bedtime 12/12/2023    Vitamin D3 (VITAMIN D, CHOLECALCIFEROL,) 25 mcg (1000 units) tablet Take 1 tablet by mouth daily 12/13/2023 at AM    zolpidem (AMBIEN) 10 MG tablet Take 10 mg by mouth at bedtime 12/12/2023

## 2023-12-14 NOTE — PLAN OF CARE
Goal Outcome Evaluation:       To Do:  End of Shift Summary  For vital signs and complete assessments, please see documentation flowsheets.     Pertinent assessments: Pt A/O. VSS. Refused CPAP. Denies pain, nausea and SOB. Onel LE tremors (baseline). Tele- SR BBB. Up assist x1.  Major Shift Events  0200- Lithium 2.05  Treatment Plan: Lithium/ neuro check Q4, IVF, tele and symptom management.  Bedside Nurse: Kasey Wang RN

## 2023-12-14 NOTE — H&P
[facility]  History and Physical  Hospitalist - Mike Workman DO       Date of Admission:  12/13/2023    Chief Complaint   Generalized weakness, altered mental status    History is obtained from the patient, patient's significant other bedside, the emergency department physician, as well as the electronic medical record.    History of Present Illness   Melody Muñoz is a 48 year old female with past medical history of bipolar, generalized anxiety disorder, PTSD, borderline personality disorder, hyperlipidemia who presented on 12/13/2023 with chief complaint of generalized weakness and altered mental status.  The patient presents with her significant other who is at bedside who assists with the history.  The patient reports that approximately 6 months ago her lithium dose was increased.  Since then she has had progressive weakness.  On 12/5, the patient saw her psychiatrist via telemedicine and was started on Dexedrine.  The patient states that yesterday she was walking and tried to take a step when she fell forward onto the ground.  She was too weak to get off the ground.  Given her weakness and the fall yesterday she decided to come to the emergency department for evaluation.  She denies any nausea or vomiting.  She does admit to periodic episodes of diarrhea.  She states her last dose of lithium was the morning of 12/13.    In the emergency department, the patient was found to have a temperature of 98.2  F, heart rate 76, blood pressure 112/76, respiratory rate 16, SpO2 98% on room air.  Initial lab work showed sodium 134, BUN/creatinine 2.7/0.86, hepatic panel within normal limits, lactic acid 1.7, high-sensitivity troponin less than 6, TSH 2.28, WBC 12.7, platelet 460.  Lithium level returned at 2.77.  Chest x-ray showed new slight blunting at the left costophrenic angle consistent with tiny left pleural effusion.  CT head showed no acute process.  The patient was started on aggressive  IV fluids and lithium level was trended.    ASSESSMENT/PLAN    Acute Toxic Encephalopathy  Generalized Weakness  Lithium Toxicity  - Lithium level on arrival was 2.77  - Last dose of lithium was AM of 12/13  - Aggressive IVF  - Trend lithium level q4h  - If worsening mentation or increase in lithium, will ask for nephrology evaluation for consideration for dialysis.  This was discussed with the patient and SO at bedside  - Telemetry  - Hold lithium  - UDS pending    Bipolar Disorder  Borderline Personality Disorder  ADHD  PTSD  Polypharmacy  - Pt follows with mental health as an outpatient through [facility].  Was recently started on Dexedrine for ADHD due to difficulty concentrating.  Possible this is secondary to her lithium toxicity vs polypharmacy  - Recommend close follow up with Psychiatry after discharge  - Resume PTA latuda, lamotrigine, klonopin, topamax once confirmed by pharmacy.  Would benefit from simplification of her meds.  Defer to outpatient psychiatrist pending improvement in mentation from above    Chronic Medical Problems:  Hyperlipidemia  JASWANT  Latent TB s/p 9 months of INH  Migraines    PLAN: Resume home medications as appropriate once confirmed by pharmacy.     DVT Prophylaxis: Pneumatic Compression Devices  Code Status: Full Code  Discharge Plan:    Expected Discharge Date: 12/15/2023                 Mike Workman DO  Securely message with [employer] (more info)  Text page via [employer] Paging/Directory     Primary Care Physician   [facility] [place]    -----------------------------------------------------------------------------------------------------------------------------------------------------------------------------------------------------    Past Medical History    I have reviewed this patient's medical history and updated it with pertinent information if needed.   Past Medical History:   Diagnosis Date    Abnormal pap age 24    and paps normal ever since and no other  testing needed per patient    Anxiety     Bipolar 1 disorder (H)     with psychosis     Diabetes mellitus 2017    Endometriosis     Endometriosis s/po EDWIGE 10-17     h/o Suicidal ideation 2013    in past, not current, sees psychiatrist and therapist    Intermittent asthma 2012    Kidney stone     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    Major depression     Ted Pope  [phone]    Migraines     otc excedrin prn    JASWANT (obstructive sleep apnea)     Sleep apnea 2014    uses Cpap    Substance abuse (H)     no use since summer 2013    Vertigo 2015       Past Surgical History   I have reviewed this patient's surgical history and updated it with pertinent information if needed.  Past Surgical History:   Procedure Laterality Date     SECTION       for twins    COLONOSCOPY      LAPAROSCOPIC APPENDECTOMY N/A 2017    Procedure: LAPAROSCOPIC APPENDECTOMY;  LAPAROSCOPIC APPENDECTOMY and resection of epiploic tag;  Surgeon: Roberto Robins MD;  Location: [facility] OR    LAPAROSCOPY      - endometriosis    little finger surgery left  1980    SLING BLADDER NOS  2014    tubal ligation bilateral      wisdom teeth removal         Prior to Admission Medications   Prior to Admission Medications   Prescriptions Last Dose Informant Patient Reported? Taking?   DULoxetine (CYMBALTA) 20 MG capsule   Yes No   LATUDA 120 MG TABS tablet   Yes No   Sig: TK 1 T PO QD   Methylphenidate HCl ER 36 MG 24H tablet   Yes No   Sig: Take 36 mg by mouth   albuterol (PROAIR HFA/PROVENTIL HFA/VENTOLIN HFA) 108 (90 Base) MCG/ACT inhaler   No No   Sig: INHALE 1 TO 2 PUFFS INTO THE LUNGS EVERY 4 HOURS AS NEEDED FOR SHORTNESS OF BREATH OR WHEEZING   albuterol (PROVENTIL) (2.5 MG/3ML) 0.083% neb solution   No No   Sig: Take 1 vial (2.5 mg) by nebulization every 6 hours as needed for shortness of breath / dyspnea or wheezing   cloNIDine (CATAPRES) 0.1 MG tablet   No  No   Sig: Take 1 tablet (0.1 mg) by mouth 2 times daily   clonazePAM (KLONOPIN) 0.5 MG tablet   No No   Sig: Take 2 tablets (1 mg) by mouth daily For one week, then decrease dose bask to your baseline of 0.5 mg daily   gabapentin (NEURONTIN) 800 MG tablet   Yes No   Si mg 4 times daily   lamoTRIgine (LAMICTAL) 200 MG tablet   No No   Sig: Take 1 tablet (200 mg) by mouth every morning   lithium (ESKALITH CR/LITHOBID) 450 MG CR tablet   Yes No   Sig: Take 450 mg by mouth 2 times daily   losartan (COZAAR) 25 MG tablet   No No   Sig: Take 1 tablet (25 mg) by mouth daily   methylphenidate (RITALIN) 10 MG tablet   Yes No   Sig: Take 2 tablets in the morning and 1 tablet in the afternoon   methylphenidate (RITALIN) 10 MG tablet   Yes No   Sig: TAKE 2 TABLETS BY MOUTH IN THE MORNING AND 1 TABLET IN THE AFTERNOON   naloxone (NARCAN) 4 MG/0.1ML nasal spray   No No   Sig: Spray 1 spray (4 mg) into one nostril alternating nostrils once as needed for opioid reversal every 2-3 minutes until assistance arrives   oxyCODONE (ROXICODONE) 5 MG tablet   No No   Sig: Take 5 mg daily x 7 days to taper off of this medication   oxyCODONE-acetaminophen (PERCOCET) 5-325 MG tablet   No No   Sig: Take 1 tablet by mouth 3 times daily as needed for severe pain Take 2 or 3 tabs daily, continue to gradually wean medication as tolerated.   rosuvastatin (CRESTOR) 5 MG tablet   No No   Sig: TAKE 1 TABLET(5 MG) BY MOUTH DAILY   topiramate (TOPAMAX) 50 MG tablet   No No   Si tablets every night   traZODone (DESYREL) 150 MG tablet   No No   Sig: Take 1 tablet (150 mg) by mouth At Bedtime   zolpidem (AMBIEN) 5 MG tablet   Yes No   Sig: Take 5-10 mg by mouth At Bedtime      Facility-Administered Medications: None     Allergies   Allergies   Allergen Reactions    Hydrocodone Nausea and Vomiting    Tamiflu [Oseltamivir]      Rash    Tylenol W/Codeine [Acetaminophen-Codeine] Nausea and Vomiting     Pt request that RN add this       Social History    I have reviewed this patient's social history and updated it with pertinent information if needed. Melody Muñoz  reports that she has been smoking cigarettes. She has a 17.00 pack-year smoking history. She has never used smokeless tobacco. She reports that she does not drink alcohol and does not use drugs.    Family History   I have reviewed this patient's family history and updated it with pertinent information if needed.   Family History   Problem Relation Age of Onset    Hypertension Mother     Heart Disease Father         palpitations    Depression Sister     Anxiety Disorder Sister     Breast Cancer Maternal Grandmother         before 50    Heart Disease Maternal Grandmother         CHF    Cancer Maternal Grandfather 72        Pancreatic Cancer    Alzheimer Disease Paternal Grandfather     Breast Cancer Paternal Aunt         early 60s    Bipolar Disorder Other     Autism Spectrum Disorder Other     Ovarian Cancer No family hx of        -----------------------------------------------------------------------------------------------------------------------------------------------------------------------------------------------------    Review of Systems   The 10 point Review of Systems is negative other than noted in the HPI or here.     Physical Exam   Temp: 98.2  F (36.8  C) Temp src: Oral BP: 98/75 Pulse: 71   Resp: 15 SpO2: 93 % O2 Device: None (Room air)    Vital Signs with Ranges  Temp:  [98.2  F (36.8  C)] 98.2  F (36.8  C)  Pulse:  [66-76] 71  Resp:  [15-16] 15  BP: ()/(73-76) 98/75  SpO2:  [93 %-98 %] 93 %  0 lbs 0 oz    Constitutional: Awake, alert, cooperative, no apparent distress.  Eyes: Conjunctiva and pupils examined and normal.  HEENT: Moist mucous membranes, normal dentition.  Respiratory: Clear to auscultation bilaterally, no crackles or wheezing.  Cardiovascular: Regular rate and rhythm, normal S1 and S2, and no murmur noted.  GI: Soft, non-distended, non-tender, normal bowel  sounds.  Lymph/Hematologic: No anterior cervical or supraclavicular adenopathy.  Skin: No rashes, no cyanosis, no edema.  Musculoskeletal: No joint swelling, erythema or tenderness.  Neurologic: Cranial nerves 2-12 intact, normal strength and sensation.  Psychiatric: Alert, oriented to person, place and time, no obvious anxiety or depression.     Data     Recent Labs   Lab 12/13/23  1636 12/13/23  1616   WBC 12.7*  --    HGB 14.3  --    MCV 85  --    *  --    *  --    POTASSIUM 3.8  --    CHLORIDE 100  --    CO2 28  --    BUN 2.7*  --    CR 0.86  --    ANIONGAP 6*  --    DENITA 9.9  --    * 131*   ALBUMIN 4.5  --    PROTTOTAL 6.9  --    BILITOTAL 0.2  --    ALKPHOS 129  --    ALT 17  --    AST 14  --    LIPASE 27  --        Recent Results (from the past 24 hour(s))   XR Chest 2 Views    Narrative    EXAM: XR CHEST 2 VIEWS  LOCATION: [facility]  DATE: 12/13/2023    INDICATION: Weakness  COMPARISON: Chest x-ray 09/10/2018. Abdominal CT 02/20/2023      Impression    IMPRESSION: Heart size and pulmonary vessels are normal. New slight blunting left costophrenic angle consistent with tiny left pleural effusion. Faint nodular density also at left base though this is favored to represent a normal nipple shadow. Lungs   otherwise clear.   Head CT w/o contrast    Narrative    EXAM: CT HEAD W/O CONTRAST  LOCATION: [facility]  DATE: 12/13/2023    INDICATION: Altered mental status  COMPARISON: None.  TECHNIQUE: Routine CT Head without IV contrast. Multiplanar reformats. Dose reduction techniques were used.    FINDINGS:  INTRACRANIAL CONTENTS: No intracranial hemorrhage, extraaxial collection, or mass effect.  No CT evidence of acute infarct. Normal parenchymal attenuation. Normal ventricles and sulci.     VISUALIZED ORBITS/SINUSES/MASTOIDS: No intraorbital abnormality. No significant paranasal sinus mucosal disease. No middle ear or mastoid effusion.    BONES/SOFT  TISSUES: No acute abnormality.      Impression    IMPRESSION:  1.  No acute intracranial process.

## 2023-12-14 NOTE — PLAN OF CARE
[facility]    ED Boarding Nurse Handoff Addendum Report:    Date/time: 12/13/2023, 8:18 PM    Activity Level: assist of 1    Fall Risk: Yes:  nonskid shoes/slippers when out of bed, patient and family education, assistive device/personal items within reach, and activity supervised    Active Infusions: NaCl bolus (needs one more additional bolus)    Current Meds Due: see MAR    Current care needs: Neuros Q4H, IV fluids, check lithium blood level    Oxygen requirements (liters/min and/or FiO2): n/a    Respiratory status: Room air    Vital signs (within last 30 minutes):    Vitals:    12/13/23 1905 12/13/23 1910 12/13/23 1915 12/13/23 2000   BP:    103/66   BP Location:       Pulse: 71 71 72 70   Resp: 15 10 10 14   Temp:    98.5  F (36.9  C)   TempSrc:    Oral   SpO2:           Focused assessment within last 30 minutes:    A/O x4. Pt complains of intermittent confusion. VSS on RA. Pupils are round and equally reactive but sluggish. Tremors and weakness present in extremities. Tongue extension and shoulder shrug slow but symmetric. NaCl bolus running @ 999 ml/hr. Pt still needs one additional bolus. Ax1 d/t weakness. Able to make needs known.     ED Boarding Nurse name: Laura Crandall RN

## 2023-12-14 NOTE — ED NOTES
[facility]  ED Nurse Handoff Report    ED Chief complaint: Altered Mental Status  . ED Diagnosis:   Final diagnoses:   Lithium toxicity, undetermined intent, initial encounter       Allergies:   Allergies   Allergen Reactions    Hydrocodone Nausea and Vomiting    Tamiflu [Oseltamivir]      Rash    Tylenol W/Codeine [Acetaminophen-Codeine] Nausea and Vomiting     Pt request that RN add this       Code Status: Full Code    Activity level - Baseline/Home:  in bed and independent.  Activity Level - Current:   assist of 1.   Lift room needed: No.   Bariatric: No   Needed: No   Isolation: No.   Infection: Not Applicable.     Respiratory status: Room air    Vital Signs (within 30 minutes):   Vitals:    12/13/23 1610 12/13/23 1830 12/13/23 1900   BP: 112/76 104/73 98/75   BP Location: Right arm     Pulse: 76 66 71   Resp: 16  15   Temp: 98.2  F (36.8  C)     TempSrc: Oral     SpO2: 98% 93%        Cardiac Rhythm:  ,      Pain level:    Patient confused: No.   Patient Falls Risk: nonskid shoes/slippers when out of bed and patient and family education.   Elimination Status: Has voided     Patient Report - Initial Complaint: Ongoing issues ambulated, concentrating and balancing over last 6 months.   Focused Assessment: Generalized weakness and balance issues. BEFAST negative     Abnormal Results:   Labs Ordered and Resulted from Time of ED Arrival to Time of ED Departure   GLUCOSE BY METER - Abnormal       Result Value    GLUCOSE BY METER POCT 131 (*)    COMPREHENSIVE METABOLIC PANEL - Abnormal    Sodium 134 (*)     Potassium 3.8      Carbon Dioxide (CO2) 28      Anion Gap 6 (*)     Urea Nitrogen 2.7 (*)     Creatinine 0.86      GFR Estimate 83      Calcium 9.9      Chloride 100      Glucose 116 (*)     Alkaline Phosphatase 129      AST 14      ALT 17      Protein Total 6.9      Albumin 4.5      Bilirubin Total 0.2     CBC WITH PLATELETS AND DIFFERENTIAL - Abnormal    WBC Count 12.7 (*)     RBC  Count 5.03      Hemoglobin 14.3      Hematocrit 42.7      MCV 85      MCH 28.4      MCHC 33.5      RDW 11.8      Platelet Count 460 (*)     % Neutrophils 69      % Lymphocytes 20      % Monocytes 6      % Eosinophils 4      % Basophils 1      % Immature Granulocytes 0      NRBCs per 100 WBC 0      Absolute Neutrophils 8.9 (*)     Absolute Lymphocytes 2.5      Absolute Monocytes 0.7      Absolute Eosinophils 0.4      Absolute Basophils 0.1      Absolute Immature Granulocytes 0.1      Absolute NRBCs 0.0     ISTAT GASES LACTATE VENOUS POCT - Abnormal    Lactic Acid POCT 1.6      Bicarbonate Venous POCT 29 (*)     O2 Sat, Venous POCT 54 (*)     pCO2 Venous POCT 54 (*)     pH Venous POCT 7.33      pO2 Venous POCT 31     LITHIUM LEVEL - Abnormal    Lithium 2.77 (*)    TROPONIN T, HIGH SENSITIVITY - Normal    Troponin T, High Sensitivity <6     LACTIC ACID WHOLE BLOOD - Normal    Lactic Acid 1.7     TSH WITH FREE T4 REFLEX - Normal    TSH 2.28     ETHYL ALCOHOL LEVEL - Normal    Alcohol ethyl <0.01     LIPASE - Normal    Lipase 27     MAGNESIUM - Normal    Magnesium 2.2     AMMONIA - Normal    Ammonia 31     GLUCOSE MONITOR NURSING POCT   ROUTINE UA WITH MICROSCOPIC REFLEX TO CULTURE        Head CT w/o contrast   Final Result   IMPRESSION:   1.  No acute intracranial process.      XR Chest 2 Views   Final Result   IMPRESSION: Heart size and pulmonary vessels are normal. New slight blunting left costophrenic angle consistent with tiny left pleural effusion. Faint nodular density also at left base though this is favored to represent a normal nipple shadow. Lungs    otherwise clear.          Treatments provided: See mar  Family Comments: At bedside  OBS brochure/video discussed/provided to patient:  N/A  ED Medications:   Medications   sodium chloride 0.9% BOLUS 1,000 mL (1,000 mLs Intravenous $New Bag 12/13/23 1923)   sodium chloride 0.9% BOLUS 1,000 mL (has no administration in time range)       Drips infusing:  No  For the  majority of the shift this patient was Green.   Interventions performed were N/A.    Sepsis treatment initiated: No    Cares/treatment/interventions/medications to be completed following ED care: Monitor mentation    ED Nurse Name: Zeynep Avery RN  6:48 PM      Up to the bathroom with one assist. Slightly unsteady on feet.   Still need urine sample.   1st of 2 boluses running now.  Ania Baptiste RN on 12/13/2023 at 7:26 PM

## 2023-12-14 NOTE — PLAN OF CARE
Goal Outcome Evaluation:    Pertinent assessments: PT A&O, on RA, VSS. IV infusing NS at 150, 2000ml bolus finished during admission. Up Ax1 with IV pole (not baseline). Upper extremity movement slow, mild tremors noted.     Major Shift Events: Admit to unit. UA collected.    Treatment Plan: Monitor Litium levels. Symptom management.     Bedside Nurse: Lexis Abraham RN

## 2023-12-15 VITALS
BODY MASS INDEX: 27.14 KG/M2 | SYSTOLIC BLOOD PRESSURE: 112 MMHG | OXYGEN SATURATION: 94 % | WEIGHT: 150.3 LBS | HEART RATE: 66 BPM | DIASTOLIC BLOOD PRESSURE: 62 MMHG | RESPIRATION RATE: 16 BRPM | TEMPERATURE: 98.5 F

## 2023-12-15 LAB
ANION GAP SERPL CALCULATED.3IONS-SCNC: 7 MMOL/L (ref 7–15)
BACTERIA UR CULT: NORMAL
BUN SERPL-MCNC: 2.1 MG/DL (ref 6–20)
CALCIUM SERPL-MCNC: 8.6 MG/DL (ref 8.6–10)
CHLORIDE SERPL-SCNC: 108 MMOL/L (ref 98–107)
CREAT SERPL-MCNC: 0.69 MG/DL (ref 0.51–0.95)
DEPRECATED HCO3 PLAS-SCNC: 23 MMOL/L (ref 22–29)
EGFRCR SERPLBLD CKD-EPI 2021: >90 ML/MIN/1.73M2
GLUCOSE SERPL-MCNC: 133 MG/DL (ref 70–99)
LITHIUM SERPL-SCNC: 0.93 MMOL/L (ref 0.6–1.2)
POTASSIUM SERPL-SCNC: 3.5 MMOL/L (ref 3.4–5.3)
SODIUM SERPL-SCNC: 138 MMOL/L (ref 135–145)

## 2023-12-15 PROCEDURE — 80048 BASIC METABOLIC PNL TOTAL CA: CPT | Performed by: INTERNAL MEDICINE

## 2023-12-15 PROCEDURE — 99239 HOSP IP/OBS DSCHRG MGMT >30: CPT | Performed by: INTERNAL MEDICINE

## 2023-12-15 PROCEDURE — 80178 ASSAY OF LITHIUM: CPT | Performed by: INTERNAL MEDICINE

## 2023-12-15 PROCEDURE — 258N000003 HC RX IP 258 OP 636: Performed by: INTERNAL MEDICINE

## 2023-12-15 PROCEDURE — 250N000013 HC RX MED GY IP 250 OP 250 PS 637: Performed by: INTERNAL MEDICINE

## 2023-12-15 PROCEDURE — 36415 COLL VENOUS BLD VENIPUNCTURE: CPT | Performed by: INTERNAL MEDICINE

## 2023-12-15 RX ORDER — LITHIUM CARBONATE 450 MG
450 TABLET, EXTENDED RELEASE ORAL AT BEDTIME
Qty: 30 TABLET | Refills: 0 | Status: SHIPPED | OUTPATIENT
Start: 2023-12-15

## 2023-12-15 RX ADMIN — GABAPENTIN 300 MG: 300 CAPSULE ORAL at 08:21

## 2023-12-15 RX ADMIN — ROSUVASTATIN CALCIUM 5 MG: 5 TABLET, FILM COATED ORAL at 08:21

## 2023-12-15 RX ADMIN — SODIUM CHLORIDE: 9 INJECTION, SOLUTION INTRAVENOUS at 05:44

## 2023-12-15 RX ADMIN — BUPROPION HYDROCHLORIDE 150 MG: 150 TABLET, EXTENDED RELEASE ORAL at 08:21

## 2023-12-15 RX ADMIN — CETIRIZINE HYDROCHLORIDE 10 MG: 10 TABLET, FILM COATED ORAL at 08:21

## 2023-12-15 RX ADMIN — CLONAZEPAM 1 MG: 1 TABLET ORAL at 08:21

## 2023-12-15 ASSESSMENT — ACTIVITIES OF DAILY LIVING (ADL)
ADLS_ACUITY_SCORE: 28
ADLS_ACUITY_SCORE: 24
ADLS_ACUITY_SCORE: 28

## 2023-12-15 NOTE — PLAN OF CARE
Pertinent assessments: Alert, disoriented to time. Neuros intact, some dizziness/tremors noted. Denies pain. Up SBA with IV pole. Alarms on for safety.     Major Shift Events: None  Treatment Plan: q4 hour neuros, trend lithium levels, pain management, IVF  Bedside Nurse: Monica Johnson RN

## 2023-12-15 NOTE — PROGRESS NOTES
Care Management Discharge Note    Discharge Date: 12/15/2023       Discharge Disposition: Home    Discharge Services: None    Discharge DME: None    Discharge Transportation: family or friend will provide    Private pay costs discussed: insurance costs out of pocket expenses    Does the patient's insurance plan have a 3 day qualifying hospital stay waiver?  No    PAS Confirmation Code:  [id number]  Patient/family educated on Medicare website which has current facility and service quality ratings: no    Education Provided on the Discharge Plan:    Persons Notified of Discharge Plans: Patient, MD, charge   Patient/Family in Agreement with the Plan:  yes     Handoff Referral Completed: No    Additional Information:  Met with patient at bedside to discuss home care. Patient states she is not home bound so SW unable to get home care. SW verified with patient's insurance, since patient does not have Medicare, that she has to be home bound for home care.     Patient requesting her psychiatrist phone number. Alysia French at [facility] phone number was provided to the patient.     SYDNEY Christianson, LGSW  Emergency Room   Please contact the SW on the floor in which the patient is staying for any questions or concerns

## 2023-12-15 NOTE — DISCHARGE SUMMARY
[facility]  Hospitalist Discharge Summary      Date of Admission:  12/13/2023  Date of Discharge:  12/15/2023  Discharging Provider: Kyree Castaneda MD  Discharge Service: Hospitalist Service  Primary Care Physician   [facility]    Discharge Diagnoses   Acute toxic encephalopathy due to lithium toxicity  Generalized weakness due to lithium toxicity  Bipolar disorder  Borderline personality disorder  ADHD  PTSD  Polypharmacy  Hyperlipidemia   obstructive sleep apnea  History of latent TB, status post 9 months INH  Migraines  Hypertension    Hospital Course     Melody Muñoz is a 48 year old female with past medical history of bipolar, generalized anxiety disorder, PTSD, borderline personality disorder, hyperlipidemia who presented on 12/13/2023 with chief complaint of generalized weakness and altered mental status.  The patient presents with her significant other who is was bedside who assisted with her history.  The patient reports that approximately 6 months ago her lithium dose was increased.  Since then she has had progressive weakness.  On 12/5, the patient saw her psychiatrist via telemedicine and was started on Dexedrine.  The patient states that yesterday she was walking and tried to take a step when she fell forward onto the ground.  She was too weak to get off the ground.  Given her weakness and the fall the day prior to admission, she decided to come to the emergency department for evaluation.  She denies any nausea or vomiting.  She does admit to periodic episodes of diarrhea.  She states her last dose of lithium was the morning of 12/13.     In the emergency department, the patient was found to have a temperature of 98.2  F, heart rate 76, blood pressure 112/76, respiratory rate 16, SpO2 98% on room air.  Initial lab work showed sodium 134, BUN/creatinine 2.7/0.86, hepatic panel within normal limits, lactic acid 1.7, high-sensitivity troponin  less than 6, TSH 2.28, WBC 12.7, platelet 460.  Lithium level returned elevated at 2.77.  Chest x-ray showed new slight blunting at the left costophrenic angle consistent with tiny left pleural effusion.  CT head showed no acute process.  The patient was started on aggressive IV fluids and lithium level was trended.                Acute Toxic Encephalopathy, Generalized Weakness, Lithium Toxicity  On admission the patient had an elevated Lithium level on arrival at 2.77. Interestingly her sodium was normal.   Last dose of lithium was AM of 12/13.  She was placed on aggressive IVF and held her lithium. Trended lithium level q4h and has come down to 0.93 at the time of discharge.   If worsening mentation or increase in lithium, would need to consider dialysis but at this point this was not needed.  She will go home on a lower lithium dose with 300 mg in the morning and 450 at night with follow up with psychiatry and her primary care provider to check her lithium levels.  She was seen by physical therapy, is good to go home with a walker along with home care with PT for gait training, OT for cognitive evaluation, and nursing medication compliance.         Bipolar Disorder, Borderline Personality Disorder, ADHD, PTSD, Polypharmacy  Patient follows with mental health as an outpatient through [facility].  Was recently started on Dexedrine for ADHD due to difficulty concentrating.  Possible this is secondary to her lithium toxicity vs polypharmacy.  Recommend close follow up with Psychiatry after discharge.  Resumed PTA latuda, lamotrigine, klonopin, topamax and decreased her lithium dose for home.  Would benefit from simplification of her meds.  Defer to outpatient psychiatrist pending improvement in mentation from above     Hyperlipidemia  Patient had been on home Crestor.  Given her polypharmacy and her weakness this was stopped.  She was only on a low-dose at home anyways.     Obstructive sleep apnea  Have CPAP available,  patient refused to use it here     Latent TB s/p 9 months of INH     Migraines     Hypertension   Patient has been on home Cozaar.  Her blood pressure is stable at /59-75.  Given her polypharmacy and good blood pressure control her Cozaar was stopped and her blood pressure at discharge was 112/62.                 4Ms:  Polypharmacy: Medications reviewed, patient is on multiple medications mostly psychiatric which all could potentially can contribute to side effects.  Patient needs close follow-up with psychiatry to titrate her medications as able.  Will be stopping her Cozaar as her blood pressure seems stable at this point as she may not need this any further.  Mentation:    Capacity intact to make her own medical decisions  Social support: Has a significant other    Clinically Significant Risk Factors          Significant Results and Procedures   Most Recent 3 CBC's:  Recent Labs   Lab Test 12/14/23  0629 12/13/23  1636 01/23/23  1125   WBC 10.8 12.7* 13.9*   HGB 11.5* 14.3 14.7   MCV 86 85 90    460* 541*     Most Recent 3 BMP's:  Recent Labs   Lab Test 12/15/23  0734 12/14/23  0629 12/13/23  1636    138 134*   POTASSIUM 3.5 3.7 3.8   CHLORIDE 108* 109* 100   CO2 23 25 28   BUN 2.1* 3.6* 2.7*   CR 0.69 0.76 0.86   ANIONGAP 7 4* 6*   DENITA 8.6 8.2* 9.9   * 115* 116*   ,   Results for orders placed or performed during the hospital encounter of 12/13/23   XR Chest 2 Views    Narrative    EXAM: XR CHEST 2 VIEWS  LOCATION: [facility]  DATE: 12/13/2023    INDICATION: Weakness  COMPARISON: Chest x-ray 09/10/2018. Abdominal CT 02/20/2023      Impression    IMPRESSION: Heart size and pulmonary vessels are normal. New slight blunting left costophrenic angle consistent with tiny left pleural effusion. Faint nodular density also at left base though this is favored to represent a normal nipple shadow. Lungs   otherwise clear.   Head CT w/o contrast    Narrative    EXAM: CT HEAD  W/O CONTRAST  LOCATION: [facility]  DATE: 12/13/2023    INDICATION: Altered mental status  COMPARISON: None.  TECHNIQUE: Routine CT Head without IV contrast. Multiplanar reformats. Dose reduction techniques were used.    FINDINGS:  INTRACRANIAL CONTENTS: No intracranial hemorrhage, extraaxial collection, or mass effect.  No CT evidence of acute infarct. Normal parenchymal attenuation. Normal ventricles and sulci.     VISUALIZED ORBITS/SINUSES/MASTOIDS: No intraorbital abnormality. No significant paranasal sinus mucosal disease. No middle ear or mastoid effusion.    BONES/SOFT TISSUES: No acute abnormality.      Impression    IMPRESSION:  1.  No acute intracranial process.     *Note: Due to a large number of results and/or encounters for the requested time period, some results have not been displayed. A complete set of results can be found in Results Review.             Follow up/instructions: Patient needs to follow-up with her primary care provider for blood pressure check as her Cozaar was stopped as well as a lithium level.  She also needs follow-up with her outpatient psychiatrist to see if we can change her medications to help with polypharmacy.    Pending test results at discharge:      Unresulted Labs Ordered in the Past 30 Days of this Admission       No orders found from 11/13/2023 to 12/14/2023.          Discharge Orders      Lithium level     Primary Care - Care Coordination Referral      Home Care Referral      Reason for your hospital stay    Lithium toxcity     Follow-up and recommended labs and tests     Follow up with primary care provider, [facility], within 7 days for hospital follow- up.  The following labs/tests are recommended:  Lithium level.      Follow up with your psychiatrist for polypharmacy, medication adjustments, and lithium level     Activity    Your activity upon discharge: activity as tolerated     Walker    I, the undersigned,  certify that the above prescribed supplies are medically necessary for this patient and is both reasonable and necessary in reference to accepted standards of medical and necessary in reference to accepted standards of medical practice in the treatment of this patient's condition and is not prescribed as a convenience.      Diet    Follow this diet upon discharge: Orders Placed This Encounter      Combination Diet Regular Diet Adult       Discharge Disposition   Discharged to home  Condition at discharge: Stable      Consultations This Hospital Stay   PHYSICAL THERAPY ADULT IP CONSULT  CARE MANAGEMENT / SOCIAL WORK IP CONSULT  OCCUPATIONAL THERAPY ADULT IP CONSULT    Code Status   Full Code    Time Spent on this Encounter   I, Kyree Castaneda MD, personally saw the patient today and spent greater than 30 minutes discharging this patient.  Discussed with night nurse, day nurse, social work/case management, also discussed with the patient by phone after I had seen her to explain the plan of care.           This document was created using voice recognition technology.  Please excuse any typographical errors that may have occurred.  Please call with any questions.       Kyree Castaneda MD  [address] SURGICAL  [address] [place] [address]  Phone: [phone]  Fax: [phone]  ______________________________________________________________________    Physical Exam   Vital Signs: Temp: 98.5  F (36.9  C) Temp src: Oral BP: 112/62 Pulse: 66   Resp: 16 SpO2: 94 % O2 Device: None (Room air)    Weight: 150 lbs 4.8 oz    Patient's exam did improve daily  General appearance: Patient is alert and oriented x3 though does seem somewhat confused, no apparent distress, pleasant and conversing normally, speaking in full sentences, appears stated age, sitting up in bed  HEENT:    Mucous membranes are moist  RESPIRATORY: Clear to auscultation bilateral, good air movement  CARDIOVASCULAR:  Regular rate and rhythm, normal S1/S2, no murmurs, rubs, or gallops present, peripheral pulses intact  GASTROINTESTINAL: Non-distended, non-tender, soft, bowel sounds present throughout  NEUROLOGIC:  Cranial nerves II-XII intact, without any focal deficits, strength 5/5 throughout though seems globally a little weak but improved from admission  EXTREMITIES:  Moves all extremities, no clubbing, cyanosis, nor edema  :  Smith not present            Discharge Medications   Current Discharge Medication List        CONTINUE these medications which have CHANGED    Details   !! lithium (ESKALITH CR/LITHOBID) 450 MG CR tablet Take 1 tablet (450 mg) by mouth at bedtime  Qty: 30 tablet, Refills: 0    Associated Diagnoses: Lithium toxicity, undetermined intent, initial encounter       !! - Potential duplicate medications found. Please discuss with provider.        CONTINUE these medications which have NOT CHANGED    Details   albuterol (PROAIR HFA/PROVENTIL HFA/VENTOLIN HFA) 108 (90 Base) MCG/ACT inhaler INHALE 1 TO 2 PUFFS INTO THE LUNGS EVERY 4 HOURS AS NEEDED FOR SHORTNESS OF BREATH OR WHEEZING  Qty: 8.5 g, Refills: 1    Comments: Pharmacy may dispense brand covered by insurance (Proair, or proventil or ventolin or generic albuterol inhaler)  Associated Diagnoses: Intermittent asthma, uncomplicated      buPROPion (WELLBUTRIN XL) 150 MG 24 hr tablet Take 150 mg by mouth every morning      cetirizine (ZYRTEC) 10 MG tablet Take 10 mg by mouth daily      clonazePAM (KLONOPIN) 1 MG tablet Take 1 mg by mouth 3 times daily      dextroamphetamine (DEXEDRINE SPANSULE) 5 MG 24 hr capsule Take 5 mg by mouth 2 times daily      dextroamphetamine (DEXTROSTAT) 10 MG tablet Take 10 mg by mouth 2 times daily      gabapentin (NEURONTIN) 300 MG capsule Take 300 mg by mouth 4 times daily      lamoTRIgine (LAMICTAL) 100 MG tablet Take 100 mg by mouth at bedtime      !! lithium ER (LITHOBID) 300 MG CR tablet Take 300 mg by mouth every morning       lurasidone (LATUDA) 120 MG TABS tablet Take 120 mg by mouth daily (with dinner)  Refills: 3      traZODone (DESYREL) 100 MG tablet Take 100 mg by mouth at bedtime      Vitamin D3 (VITAMIN D, CHOLECALCIFEROL,) 25 mcg (1000 units) tablet Take 1 tablet by mouth daily      zolpidem (AMBIEN) 10 MG tablet Take 10 mg by mouth at bedtime      naloxone (NARCAN) 4 MG/0.1ML nasal spray Spray 1 spray (4 mg) into one nostril alternating nostrils once as needed for opioid reversal every 2-3 minutes until assistance arrives  Qty: 0.2 mL, Refills: 1    Associated Diagnoses: Chronic pain syndrome       !! - Potential duplicate medications found. Please discuss with provider.        STOP taking these medications       losartan (COZAAR) 25 MG tablet Comments:   Reason for Stopping:         rosuvastatin (CRESTOR) 5 MG tablet Comments:   Reason for Stopping:             Allergies   Allergies   Allergen Reactions    Hydrocodone Nausea and Vomiting    Tamiflu [Oseltamivir]      Rash    Tylenol W/Codeine [Acetaminophen-Codeine] Nausea and Vomiting     Pt request that RN add this

## 2023-12-15 NOTE — PLAN OF CARE
End of Shift Summary  For vital signs and complete assessments, please see documentation flowsheets.     Pertinent assessments: Disoriented to time. Q4 hour neuros intact x dizziness w/ movement & disorientation to time. Denies pain. Up SBA, refusing gb. LS clear. HS regular. NS IV @ 150. Appetite is poor.     Major Shift Events: uneventful     Treatment Plan: q4 hour neuros, trend lithium levels, pain management, IVF    Bedside Nurse: Jemima Clemons RN

## 2023-12-15 NOTE — PLAN OF CARE
Physical Therapy Discharge Summary    Reason for therapy discharge:    Discharged to home.    Progress towards therapy goal(s). See goals on Care Plan in [facility] electronic health record for goal details.  Goals partially met.  Barriers to achieving goals:   discharge from facility.    Therapy recommendation(s):    Continue home exercise program.

## 2023-12-15 NOTE — PLAN OF CARE
Pt to D/C to home.  Pt provided with d/c instructions, including new medications, when medications were last given, and when to take them again- informed to decrease lithium to 450 mg @ bedtime and 300 mg in the morning- spoke w/ pharm discharge and informed pt should have lithium still left at home. Also informed patient to stop taking her statin and losartan per discharge orders and f/up with PCP about this. Pt also informed to f/u with PCP w/in 7 days and psychiatrist about lithium medication and levels. Pt verbalized understanding of all d/c and f/u instructions.  All questions were answered at this time.  Copy of paperwork sent with pt. Father to provide transport.  All personal belongings sent with pt.

## 2023-12-18 DIAGNOSIS — F31.9 BIPOLAR AFFECTIVE DISORDER (H): ICD-10-CM

## 2023-12-18 DIAGNOSIS — Z79.899 ENCOUNTER FOR LONG-TERM (CURRENT) USE OF MEDICATIONS: Primary | ICD-10-CM

## 2023-12-19 ENCOUNTER — PATIENT OUTREACH (OUTPATIENT)
Dept: CARE COORDINATION | Facility: CLINIC | Age: 48
End: 2023-12-19
Payer: COMMERCIAL

## 2023-12-19 NOTE — PROGRESS NOTES
[facility]:   [facility] Contact  [facility]/Voicemail     Clinical Data: Post-Discharge Outreach     Outreach attempted x 2.  Left message on patient's voicemail, providing [facility]'s central phone number of [phone] ([phone]) for questions/concerns and/or to schedule an appt with an [facility] provider, if they do not have a PCP.      Plan:  [facility] will do no further outreaches at this time.       Dayanara Farley  Community Health Worker  [facility], [facility]  [phone]      *Connected Care Resource Team does NOT follow patient ongoing. Referrals are identified based on internal discharge reports and the outreach is to ensure patient has an understanding of their discharge instructions.

## 2023-12-26 ENCOUNTER — LAB (OUTPATIENT)
Dept: LAB | Facility: CLINIC | Age: 48
End: 2023-12-26
Payer: COMMERCIAL

## 2023-12-26 DIAGNOSIS — Z79.899 ENCOUNTER FOR LONG-TERM (CURRENT) USE OF MEDICATIONS: ICD-10-CM

## 2023-12-26 DIAGNOSIS — F31.9 BIPOLAR AFFECTIVE DISORDER (H): ICD-10-CM

## 2023-12-26 DIAGNOSIS — T56.894A LITHIUM TOXICITY, UNDETERMINED INTENT, INITIAL ENCOUNTER: ICD-10-CM

## 2023-12-26 LAB
ANION GAP SERPL CALCULATED.3IONS-SCNC: 10 MMOL/L (ref 7–15)
BASOPHILS # BLD AUTO: 0 10E3/UL (ref 0–0.2)
BASOPHILS NFR BLD AUTO: 1 %
BUN SERPL-MCNC: 2.1 MG/DL (ref 6–20)
CALCIUM SERPL-MCNC: 9 MG/DL (ref 8.6–10)
CHLORIDE SERPL-SCNC: 101 MMOL/L (ref 98–107)
CREAT SERPL-MCNC: 0.73 MG/DL (ref 0.51–0.95)
DEPRECATED HCO3 PLAS-SCNC: 24 MMOL/L (ref 22–29)
EGFRCR SERPLBLD CKD-EPI 2021: >90 ML/MIN/1.73M2
EOSINOPHIL # BLD AUTO: 0.3 10E3/UL (ref 0–0.7)
EOSINOPHIL NFR BLD AUTO: 4 %
ERYTHROCYTE [DISTWIDTH] IN BLOOD BY AUTOMATED COUNT: 11.9 % (ref 10–15)
GLUCOSE SERPL-MCNC: 191 MG/DL (ref 70–99)
HCT VFR BLD AUTO: 38.9 % (ref 35–47)
HGB BLD-MCNC: 13 G/DL (ref 11.7–15.7)
IMM GRANULOCYTES # BLD: 0 10E3/UL
IMM GRANULOCYTES NFR BLD: 0 %
LITHIUM SERPL-SCNC: 0.88 MMOL/L (ref 0.6–1.2)
LYMPHOCYTES # BLD AUTO: 2.8 10E3/UL (ref 0.8–5.3)
LYMPHOCYTES NFR BLD AUTO: 36 %
MCH RBC QN AUTO: 28.6 PG (ref 26.5–33)
MCHC RBC AUTO-ENTMCNC: 33.4 G/DL (ref 31.5–36.5)
MCV RBC AUTO: 86 FL (ref 78–100)
MONOCYTES # BLD AUTO: 0.5 10E3/UL (ref 0–1.3)
MONOCYTES NFR BLD AUTO: 6 %
NEUTROPHILS # BLD AUTO: 4.2 10E3/UL (ref 1.6–8.3)
NEUTROPHILS NFR BLD AUTO: 53 %
PLATELET # BLD AUTO: 424 10E3/UL (ref 150–450)
POTASSIUM SERPL-SCNC: 3.6 MMOL/L (ref 3.4–5.3)
RBC # BLD AUTO: 4.54 10E6/UL (ref 3.8–5.2)
SODIUM SERPL-SCNC: 135 MMOL/L (ref 135–145)
WBC # BLD AUTO: 8 10E3/UL (ref 4–11)

## 2023-12-26 PROCEDURE — 85025 COMPLETE CBC W/AUTO DIFF WBC: CPT

## 2023-12-26 PROCEDURE — 36415 COLL VENOUS BLD VENIPUNCTURE: CPT

## 2023-12-26 PROCEDURE — 80178 ASSAY OF LITHIUM: CPT

## 2023-12-26 PROCEDURE — 80048 BASIC METABOLIC PNL TOTAL CA: CPT

## 2024-01-02 ENCOUNTER — TRANSFERRED RECORDS (OUTPATIENT)
Dept: HEALTH INFORMATION MANAGEMENT | Facility: CLINIC | Age: 49
End: 2024-01-02
Payer: COMMERCIAL

## 2024-01-03 DIAGNOSIS — J45.20 INTERMITTENT ASTHMA, UNCOMPLICATED: ICD-10-CM

## 2024-01-04 RX ORDER — ALBUTEROL SULFATE 90 UG/1
AEROSOL, METERED RESPIRATORY (INHALATION)
Qty: 8.5 G | Refills: 1 | OUTPATIENT
Start: 2024-01-04

## 2024-01-04 NOTE — TELEPHONE ENCOUNTER
Covering for Raile  Needs appointment with PCP before refill can be sent.  Daina Arredondo PA-C on 1/4/2024 at 8:43 AM

## 2024-01-05 RX ORDER — ALBUTEROL SULFATE 90 UG/1
AEROSOL, METERED RESPIRATORY (INHALATION)
Qty: 18 G | Refills: 0 | Status: SHIPPED | OUTPATIENT
Start: 2024-01-05 | End: 2024-01-12

## 2024-01-05 NOTE — TELEPHONE ENCOUNTER
Called patient and relayed provider's message. Patient stated understanding and had no further questions.     Julieth ROCA RN  [facility] Triage Team

## 2024-01-05 NOTE — TELEPHONE ENCOUNTER
Pt has scheduled appt on Monday with doctor Pj, but is out of Albuterol. States she is using her daughters albuterol.     Please advice if mahamed refill can be approved before Monday.      Future Appointments 1/5/2024 - 7/3/2024        Date Visit Type Length Department Provider     1/8/2024 11:30 AM OFFICE VISIT 30 min EC FP/IM/PEDS Sara Oliva MD    Location Instructions:     [facility] is located at [address], across the street from [facility]. This is just south of the [address] exit off of [address]. Free lot parking is available.

## 2024-01-12 ENCOUNTER — VIRTUAL VISIT (OUTPATIENT)
Dept: FAMILY MEDICINE | Facility: CLINIC | Age: 49
End: 2024-01-12
Payer: COMMERCIAL

## 2024-01-12 DIAGNOSIS — J45.20 INTERMITTENT ASTHMA, UNCOMPLICATED: ICD-10-CM

## 2024-01-12 DIAGNOSIS — F31.9 BIPOLAR 1 DISORDER (H): Chronic | ICD-10-CM

## 2024-01-12 DIAGNOSIS — F33.0 MILD EPISODE OF RECURRENT MAJOR DEPRESSIVE DISORDER (H): Chronic | ICD-10-CM

## 2024-01-12 DIAGNOSIS — Z09 HOSPITAL DISCHARGE FOLLOW-UP: Primary | ICD-10-CM

## 2024-01-12 PROCEDURE — 99441 PR PHYSICIAN TELEPHONE EVALUATION 5-10 MIN: CPT | Performed by: INTERNAL MEDICINE

## 2024-01-12 RX ORDER — BUDESONIDE AND FORMOTEROL FUMARATE DIHYDRATE 160; 4.5 UG/1; UG/1
2 AEROSOL RESPIRATORY (INHALATION) 2 TIMES DAILY PRN
Qty: 10.2 G | Refills: 1 | Status: SHIPPED | OUTPATIENT
Start: 2024-01-12

## 2024-01-12 RX ORDER — ALBUTEROL SULFATE 90 UG/1
AEROSOL, METERED RESPIRATORY (INHALATION)
Qty: 18 G | Refills: 1 | Status: SHIPPED | OUTPATIENT
Start: 2024-01-12 | End: 2024-07-02

## 2024-01-12 RX ORDER — BUDESONIDE AND FORMOTEROL FUMARATE DIHYDRATE 160; 4.5 UG/1; UG/1
AEROSOL RESPIRATORY (INHALATION)
Qty: 30.6 G | OUTPATIENT
Start: 2024-01-12

## 2024-01-12 ASSESSMENT — PATIENT HEALTH QUESTIONNAIRE - PHQ9: SUM OF ALL RESPONSES TO PHQ QUESTIONS 1-9: 15

## 2024-01-12 ASSESSMENT — ASTHMA QUESTIONNAIRES: ACT_TOTALSCORE: 20

## 2024-01-12 NOTE — PROGRESS NOTES
Melody is a 48 year old who is being evaluated via a billable telephone visit.      What phone number would you like to be contacted at? [phone]  How would you like to obtain your AVS? Ori    Distant Location (provider location):  On-site    Assessment & Plan   Problem List Items Addressed This Visit       Major depression (Chronic)    Bipolar 1 disorder  with psychosis (Chronic)     Other Visit Diagnoses       Hospital discharge follow-up    -  Primary    Intermittent asthma, uncomplicated        Relevant Medications    albuterol (PROAIR HFA/PROVENTIL HFA/VENTOLIN HFA) 108 (90 Base) MCG/ACT inhaler    budesonide-formoterol (SYMBICORT) 160-4.5 MCG/ACT Inhaler        Continue to see psychiatry for adjustment her psych medications.  Dose of lithium has been adjusted.  Reviewed labs kidney function test is normal.  Refill given for albuterol, can use Symbicort and as needed basis as well for any asthma flare discussed side effects of medication.  Discussed asthma action plan and reviewed ACT score of 20.  Patient follow-up with PCP.       MED REC REQUIRED  Post Medication Reconciliation Status: discharge medications reconciled, continue medications without change  Depression Screening Follow Up        1/12/2024     9:21 AM   PHQ   PHQ-9 Total Score 15   Q9: Thoughts of better off dead/self-harm past 2 weeks Several days         1/12/2024     9:21 AM   Last PHQ-9   1.  Little interest or pleasure in doing things 3   2.  Feeling down, depressed, or hopeless 3   3.  Trouble falling or staying asleep, or sleeping too much 0   4.  Feeling tired or having little energy 2   5.  Poor appetite or overeating 3   6.  Feeling bad about yourself 3   7.  Trouble concentrating 0   8.  Moving slowly or restless 0   Q9: Thoughts of better off dead/self-harm past 2 weeks 1   PHQ-9 Total Score 15   Difficulty at work, home, or with people Extremely dIfficult              No data to display                  Follow Up      Follow  Up Actions Taken  Referred patient back to mental health provider    Discussed the following ways the patient can remain in a safe environment:  be around others  Work on weight loss  Regular exercise  See Patient Instructions    Camila Norris MD  [facility] PAUL Oliver is a 48 year old, presenting for the following health issues:  Hospital F/U, Follow Up (Asthma/), and Health Maintenance (Eye exam: scheduling at [place])        1/12/2024     9:30 AM   Additional Questions   Roomed by Erik   Accompanied by Not applicable, by themselves       HPI       Hospital Follow-up Visit:    Hospital/Nursing Home/IP Rehab Facility: [facility]  Date of Admission: 12/13/2023  Date of Discharge: 12/15/2023  Reason(s) for Admission: Lithium toxicity, undetermined intent, initial encounter     Was your hospitalization related to COVID-19? No   Problems taking medications regularly:  None  Medication changes since discharge:        STOP taking these medications         losartan (COZAAR) 25 MG tablet Comments:   Reason for Stopping:            rosuvastatin (CRESTOR) 5 MG tablet Comments:   Reason for Stopping:            Problems adhering to non-medication therapy:  None    Summary of hospitalization:  [facility] discharge summary reviewed  Diagnostic Tests/Treatments reviewed.  Follow up needed:     Follow up with primary care provider, [facility], within 7 days for hospital follow- up.  The following labs/tests are recommended:  Lithium level.       Follow up with your psychiatrist for polypharmacy, medication adjustments, and lithium level     Other Healthcare Providers Involved in Patient s Care:         None  Update since discharge: stable. Still holding on to walk, no incontinence, better,         Plan of care communicated with patient           Patient is doing better.  She is seeing psychiatry.  Cuthbert dose has been  decreased.  Since she had stopped smoking 3 months ago she has not had any recent asthma flares.  As for the encephalopathy she feels she can speak more clearly now she does not slur her words she has not been using her walker at home anymore.  She reports OF inhalation of smoke specially when she was smoking triggered her asthma.  No other known asthma triggers.    Review of Systems   Constitutional, HEENT, cardiovascular, pulmonary, gi and gu systems are negative, except as otherwise noted.      Objective           Vitals:  No vitals were obtained today due to virtual visit.    Physical Exam   healthy, alert, and no distress  PSYCH: Alert and oriented times 3; coherent speech, normal   rate and volume, able to articulate logical thoughts, able   to abstract reason, no tangential thoughts, no hallucinations   or delusions  Her affect is normal  RESP: No cough, no audible wheezing, able to talk in full sentences  Remainder of exam unable to be completed due to telephone visits    Lab on 12/26/2023   Component Date Value Ref Range Status    Lithium 12/26/2023 0.88  0.60 - 1.20 mmol/L Final    Therapeutic: 0.60 - 1.20 mmol/L;   Toxic: >2.00 mmol/L    Sodium 12/26/2023 135  135 - 145 mmol/L Final    Reference intervals for this test were updated on 09/26/2023 to more accurately reflect our healthy population. There may be differences in the flagging of prior results with similar values performed with this method. Interpretation of those prior results can be made in the context of the updated reference intervals.     Potassium 12/26/2023 3.6  3.4 - 5.3 mmol/L Final    Chloride 12/26/2023 101  98 - 107 mmol/L Final    Carbon Dioxide (CO2) 12/26/2023 24  22 - 29 mmol/L Final    Anion Gap 12/26/2023 10  7 - 15 mmol/L Final    Urea Nitrogen 12/26/2023 2.1 (L)  6.0 - 20.0 mg/dL Final    Creatinine 12/26/2023 0.73  0.51 - 0.95 mg/dL Final    GFR Estimate 12/26/2023 >90  >60 mL/min/1.73m2 Final    Calcium 12/26/2023 9.0  8.6 -  10.0 mg/dL Final    Glucose 12/26/2023 191 (H)  70 - 99 mg/dL Final    WBC Count 12/26/2023 8.0  4.0 - 11.0 10e3/uL Final    RBC Count 12/26/2023 4.54  3.80 - 5.20 10e6/uL Final    Hemoglobin 12/26/2023 13.0  11.7 - 15.7 g/dL Final    Hematocrit 12/26/2023 38.9  35.0 - 47.0 % Final    MCV 12/26/2023 86  78 - 100 fL Final    MCH 12/26/2023 28.6  26.5 - 33.0 pg Final    MCHC 12/26/2023 33.4  31.5 - 36.5 g/dL Final    RDW 12/26/2023 11.9  10.0 - 15.0 % Final    Platelet Count 12/26/2023 424  150 - 450 10e3/uL Final    % Neutrophils 12/26/2023 53  % Final    % Lymphocytes 12/26/2023 36  % Final    % Monocytes 12/26/2023 6  % Final    % Eosinophils 12/26/2023 4  % Final    % Basophils 12/26/2023 1  % Final    % Immature Granulocytes 12/26/2023 0  % Final    Absolute Neutrophils 12/26/2023 4.2  1.6 - 8.3 10e3/uL Final    Absolute Lymphocytes 12/26/2023 2.8  0.8 - 5.3 10e3/uL Final    Absolute Monocytes 12/26/2023 0.5  0.0 - 1.3 10e3/uL Final    Absolute Eosinophils 12/26/2023 0.3  0.0 - 0.7 10e3/uL Final    Absolute Basophils 12/26/2023 0.0  0.0 - 0.2 10e3/uL Final    Absolute Immature Granulocytes 12/26/2023 0.0  <=0.4 10e3/uL Final           Phone call duration: 9 minutes

## 2024-02-06 ENCOUNTER — TRANSFERRED RECORDS (OUTPATIENT)
Dept: HEALTH INFORMATION MANAGEMENT | Facility: CLINIC | Age: 49
End: 2024-02-06
Payer: COMMERCIAL

## 2024-03-06 ENCOUNTER — TRANSFERRED RECORDS (OUTPATIENT)
Dept: HEALTH INFORMATION MANAGEMENT | Facility: CLINIC | Age: 49
End: 2024-03-06
Payer: COMMERCIAL

## 2024-04-18 NOTE — TELEPHONE ENCOUNTER
Caller is inquiring if there are any changes  added to her current  RX for Butalbital-APAP-Caff-Cod -07-30 MG CAPS  Advised that there are no new prescriptions placed today  Advised to call office tomorrow   understands and will comply   Neema Sauer RN  [facility]    Additional Information    [1] Follow-up call to recent contact AND [2] information only call, no triage required    Protocols used: INFORMATION ONLY CALL-ADULT-     Placed in room. Seen by Dr. Garcia. Spoke with patient. Bladder scan at 63 ml. RTC in 4-6 weeks for cystoscope.

## 2024-05-16 ENCOUNTER — TELEPHONE (OUTPATIENT)
Dept: INTERNAL MEDICINE | Facility: CLINIC | Age: 49
End: 2024-05-16

## 2024-05-16 NOTE — TELEPHONE ENCOUNTER
Patient was schedule with a video visit with Samara Raymond NP for 11:00am that provider feels is not appropriate. Patient wanted to discuss several issues including: UTI, GI issues, kidney stone and cough.    Provider requested this writer to contact patient to advise patient that she will need to be evaluated in person in clinic by same day provider or urgent care based on patient's symptoms and numerous complaints.     Call to patient to get more info and help assist to schedule in person visit.     Patient states that she has been having lower back pain/flank pain (moderate) and diarrhea since 05/13/2024. Patient is having urinary frequency and dysuria. Patient reports she had fever of 100.1 today. Patient reports abdominal pain has been going on since 05/13/2024 and is sharp, intermittent and 8/10 at worse.     Patient denies urgent care.     Appointment scheduled:    May 16, 2024  3:30 PM  (Arrive by 3:10 PM)  Provider Visit with KIRAN Haley CNP  [facility] ([facility] ) [phone]            Thank you,  Jarrell Mendez, Triage RN [facility]  11:36 AM 5/16/2024

## 2024-05-17 ENCOUNTER — OFFICE VISIT (OUTPATIENT)
Dept: FAMILY MEDICINE | Facility: CLINIC | Age: 49
End: 2024-05-17
Payer: COMMERCIAL

## 2024-05-17 VITALS
SYSTOLIC BLOOD PRESSURE: 112 MMHG | HEART RATE: 103 BPM | RESPIRATION RATE: 22 BRPM | OXYGEN SATURATION: 97 % | DIASTOLIC BLOOD PRESSURE: 54 MMHG | TEMPERATURE: 99.9 F

## 2024-05-17 DIAGNOSIS — Z12.4 CERVICAL CANCER SCREENING: ICD-10-CM

## 2024-05-17 DIAGNOSIS — J20.9 ACUTE BRONCHITIS WITH SYMPTOMS > 10 DAYS: ICD-10-CM

## 2024-05-17 DIAGNOSIS — E11.65 UNCONTROLLED TYPE 2 DIABETES MELLITUS WITH HYPERGLYCEMIA (H): ICD-10-CM

## 2024-05-17 DIAGNOSIS — N30.00 ACUTE CYSTITIS WITHOUT HEMATURIA: Primary | ICD-10-CM

## 2024-05-17 DIAGNOSIS — R30.0 DYSURIA: ICD-10-CM

## 2024-05-17 DIAGNOSIS — K58.9 IRRITABLE BOWEL SYNDROME WITHOUT DIARRHEA: ICD-10-CM

## 2024-05-17 LAB
ALBUMIN UR-MCNC: NEGATIVE MG/DL
APPEARANCE UR: CLEAR
BILIRUB UR QL STRIP: NEGATIVE
COLOR UR AUTO: YELLOW
GLUCOSE UR STRIP-MCNC: NEGATIVE MG/DL
HGB UR QL STRIP: NEGATIVE
KETONES UR STRIP-MCNC: NEGATIVE MG/DL
LEUKOCYTE ESTERASE UR QL STRIP: ABNORMAL
NITRATE UR QL: NEGATIVE
PH UR STRIP: 7 [PH] (ref 5–7)
RBC #/AREA URNS AUTO: NORMAL /HPF
SP GR UR STRIP: 1.01 (ref 1–1.03)
UROBILINOGEN UR STRIP-ACNC: 0.2 E.U./DL
WBC #/AREA URNS AUTO: NORMAL /HPF

## 2024-05-17 PROCEDURE — 81001 URINALYSIS AUTO W/SCOPE: CPT | Performed by: FAMILY MEDICINE

## 2024-05-17 PROCEDURE — 99214 OFFICE O/P EST MOD 30 MIN: CPT | Performed by: FAMILY MEDICINE

## 2024-05-17 PROCEDURE — 87635 SARS-COV-2 COVID-19 AMP PRB: CPT | Performed by: FAMILY MEDICINE

## 2024-05-17 RX ORDER — POLYETHYLENE GLYCOL 3350 17 G/17G
17 POWDER, FOR SOLUTION ORAL
COMMUNITY
Start: 2023-08-29

## 2024-05-17 RX ORDER — DICYCLOMINE HYDROCHLORIDE 10 MG/1
10 CAPSULE ORAL 3 TIMES DAILY PRN
Qty: 21 CAPSULE | Refills: 0 | Status: SHIPPED | OUTPATIENT
Start: 2024-05-17 | End: 2024-05-29

## 2024-05-17 RX ORDER — POLYETHYLENE GLYCOL 400 AND PROPYLENE GLYCOL 4; 3 MG/ML; MG/ML
1 SOLUTION/ DROPS OPHTHALMIC
COMMUNITY

## 2024-05-17 RX ORDER — DOXYCYCLINE 100 MG/1
100 CAPSULE ORAL 2 TIMES DAILY
Qty: 20 CAPSULE | Refills: 0 | Status: SHIPPED | OUTPATIENT
Start: 2024-05-17 | End: 2024-09-04

## 2024-05-17 ASSESSMENT — PAIN SCALES - GENERAL: PAINLEVEL: MODERATE PAIN (5)

## 2024-05-17 ASSESSMENT — ENCOUNTER SYMPTOMS: COUGH: 1

## 2024-05-17 NOTE — PROGRESS NOTES
"  Assessment & Plan     Dysuria  Worsening with sign of mild UTI, will have her to start abx   - UA with Microscopic reflex to Culture - lab collect; Future  - UA with Microscopic reflex to Culture - lab collect  - UA Microscopic with Reflex to Culture    Cough  Will review the lab results and update pt   - Symptomatic COVID-19 Virus (Coronavirus) by PCR Nose    Cervical cancer screening  Will have her to discuss with PCP at CPE    Uncontrolled type 2 diabetes mellitus with hyperglycemia (H)  Will have her to discuss with PCP at CPE    Acute cystitis without hematuria  Mentioned above   - doxycycline hyclate (VIBRAMYCIN) 100 MG capsule; Take 1 capsule (100 mg) by mouth 2 times daily    Acute bronchitis with symptoms > 10 days  Has been worsening with SOB and asthma exacerbation   Will have her to start abx   - doxycycline hyclate (VIBRAMYCIN) 100 MG capsule; Take 1 capsule (100 mg) by mouth 2 times daily    Irritable bowel syndrome without diarrhea  Has been worsening with sx of UTI, will have her to take bentyl as needed   - dicyclomine (BENTYL) 10 MG capsule; Take 1 capsule (10 mg) by mouth 3 times daily as needed          BMI  Estimated body mass index is 27.14 kg/m  as calculated from the following:    Height as of 8/4/23: 1.585 m (5' 2.4\").    Weight as of 12/13/23: 68.2 kg (150 lb 4.8 oz).   Weight management plan: Discussed healthy diet and exercise guidelines      FUTURE APPOINTMENTS:       - Follow-up visit with PCP as needed     Chaz Oliver is a 48 year old, presenting for the following health issues:  UTI (Urgency, dysuria, frequency, and back pain.) and Cough (Wheezing, shortness of breath, hisotory of asthma)        5/17/2024     9:53 AM   Additional Questions   Roomed by Mer CARO     UTI  Associated symptoms include coughing.   Cough    History of Present Illness       Back Pain:  She presents for follow up of back pain. Patient's back pain is a new problem.    Original cause of back pain: " "other  First noticed back pain: in the last week  Patient feels back pain: comes and goesLocation of back pain:  Right middle of back, left side of waist and other  Description of back pain: shooting  Back pain spreads: nowhere    Since patient first noticed back pain, pain is: always present, but gets better and worse  Does back pain interfere with her job:  Yes  On a scale of 1-10 (10 being the worst), patient describes pain as:  8  What makes back pain worse: other   Acupuncture: not tried  Acetaminophen: not helpful  Activity or exercise: not tried  Chiropractor:  Not tried  Cold: not helpful  Heat: not helpful  Massage: not tried  Muscle relaxants: not tried  NSAIDS: not helpful  Opioids: not tried  Physical Therapy: not tried  Rest: not helpful  Steroid Injection: not tried  Stretching: not tried  Surgery: not tried  TENS unit: not tried  Topical pain relievers: not tried  Other healthcare providers patient is seeing for back pain: None    Headaches:   Since the patient's last clinic visit, headaches are: worsened  The patient is getting headaches:  Everyday  She is not able to do normal daily activities when she has a migraine.  The patient is taking the following rescue/relief medications:  Ibuprofen (Advil, Motrin), Tylenol and Excedrin   Patient states \"I get no relief\" from the rescue/relief medications.   The patient is taking the following medications to prevent migraines:  No medications to prevent migraines  In the past 4 weeks, the patient has gone to an Urgent Care or Emergency Room 0 times times due to headaches.    Reason for visit:  Pain in bladder, IBS, back pain,asthma issues with cough  Symptom onset:  1-2 weeks ago  Symptom intensity:  Severe  Symptom progression:  Staying the same  Had these symptoms before:  Yes  Has tried/received treatment for these symptoms:  Yes  Previous treatment was successful:  No  What makes it worse:  Just sitting  What makes it better:  No    She eats 2-3 " servings of fruits and vegetables daily.She consumes 3 sweetened beverage(s) daily.She exercises with enough effort to increase her heart rate 9 or less minutes per day.  She exercises with enough effort to increase her heart rate 3 or less days per week.   She is taking medications regularly.         Genitourinary - Female  Onset/Duration: 1 week  Description:   Painful urination (Dysuria): YES           Frequency: YES  Blood in urine (Hematuria): No  Delay in urine (Hesitency): YES  Intensity: severe  Progression of Symptoms:  worsening  Accompanying Signs & Symptoms:  Fever/chills: No  Flank pain: YES  Nausea and vomiting: No  Vaginal symptoms: none  Abdominal/Pelvic Pain: YES  History:   History of frequent UTI s: No  History of kidney stones: YES  Sexually Active: YES  Possibility of pregnancy: No  Precipitating or alleviating factors: None  Therapies tried and outcome: none        Review of Systems  Constitutional, HEENT, cardiovascular, pulmonary, GI, , musculoskeletal, neuro, skin, endocrine and psych systems are negative, except as otherwise noted.      Objective    /54   Pulse 103   Temp 99.9  F (37.7  C) (Oral)   Resp 22   LMP  (LMP Unknown)   SpO2 97%   There is no height or weight on file to calculate BMI.  Physical Exam   GENERAL: alert and no distress  EYES: Eyes grossly normal to inspection, PERRL and conjunctivae and sclerae normal  HENT: ear canals and TM's normal, nose and mouth without ulcers or lesions  NECK: no adenopathy, no asymmetry, masses, or scars  RESP: lungs clear to auscultation - no rales, rhonchi or wheezes  CV: regular rate and rhythm, normal S1 S2, no S3 or S4, no murmur, click or rub, no peripheral edema  ABDOMEN: soft, nontender, no hepatosplenomegaly, no masses and bowel sounds normal  MS: no gross musculoskeletal defects noted, no edema  SKIN: no suspicious lesions or rashes  NEURO: Normal strength and tone, mentation intact and speech normal            Signed  Electronically by: Mark Mclean MD

## 2024-05-18 LAB — SARS-COV-2 RNA RESP QL NAA+PROBE: NEGATIVE

## 2024-05-19 ENCOUNTER — TELEPHONE (OUTPATIENT)
Dept: NURSING | Facility: CLINIC | Age: 49
End: 2024-05-19
Payer: COMMERCIAL

## 2024-05-19 NOTE — TELEPHONE ENCOUNTER
"Coronavirus (COVID-19) Notification    SARS CoV2 PCR (no units)   Date Value   05/17/2024 Negative         Your result was negative. This means that we didn't find the virus that causes COVID-19 in your sample. A test may show negative when you do actually have the virus. This can happen when the virus is in the early stages of infection, before you feel illness symptoms.    If you were exposed to COVID-19: Follow the CDC's instructions for quarantine:   www.cdc.gov/coronavirus/2019-ncov/your-health/quarantine-isolation.html     If you have symptoms   Limit contact with others to avoid spreading the illness  Clean \"high-touch\" surfaces often (doorknobs, counters, handles etc.)  Use household cleaning spray or wipes. You can find a full list on the EPA website: www.epa.gov/pesticide-registration/list-n-disinfectants-use-against-sars-cov-2  Cover your mouth and nose with a mask or other face covering to avoid spreading germs  Wash your hands and face often with soap and water  If symptoms get worse, or you still think you might have COVID, you may want to get tested again in 48 hours    Going back to work, school or   Check with your employer, school or  for any guidelines to follow for returning.  You are sent a letter which will serve as formal document notice that you tested negative for COVID-19, as of the testing date shown above.    Ruby Mendez RN    "

## 2024-05-21 ENCOUNTER — TELEPHONE (OUTPATIENT)
Dept: FAMILY MEDICINE | Facility: CLINIC | Age: 49
End: 2024-05-21

## 2024-05-21 NOTE — LETTER
June 27, 2024      Melody Muñoz  [address]  [facility] [address]          Dear Melody,    I care about your health and have reviewed your health plan. I have reviewed your medical conditions, medication list, and lab results and am making recommendations based on this review, to better manage your health.    You are in particular need of attention regarding:  -Asthma  -Depression  -Diabetes  -Cervical Cancer Screening  -Wellness (Physical) Visit     I am recommending that you:  -schedule a WELLNESS (Physical) APPOINTMENT with me.   I will check fasting labs the same day - nothing to eat except water and meds for 8-10 hours prior. (If you go elsewhere for Wellness visits then please disregard this reminder.)    -schedule a PAP SMEAR EXAM which is due.  Please disregard this reminder if you have had this exam elsewhere within the last year.  It would be helpful for us to have a copy of your recent pap smear report in our file so that we can best coordinate your care.    If you are under/uninsured, we recommend you contact the [facility]. They offer pap smears at no charge or on a sliding fee charge. You can schedule with them at [phone]. Please have them send us the results.    -Complete and return the attached ASTHMA CONTROL TEST.  If your total score is 19 or less or you have been to the ER or urgent care for your asthma, then please schedule an asthma followup appointment.       -Diabetic Eye Exam is due.  If this was done within the last 12 months then please contact the clinic with the date and location so we can update your records.    Here is a list of Health Maintenance topics that are due now or due soon:  Health Maintenance Due   Topic Date Due    HEPATITIS A IMMUNIZATION (1 of 2 - Risk 2-dose series) Never done    HEPATITIS B IMMUNIZATION (1 of 3 - 19+ 3-dose series) Never done    PAP  04/01/2020    ASTHMA ACTION PLAN  12/03/2020    DIABETIC FOOT EXAM  01/23/2021    EYE EXAM   04/21/2021    YEARLY PREVENTIVE VISIT  09/30/2022    A1C  07/23/2023    COVID-19 Vaccine (3 - 2023-24 season) 09/01/2023    LIPID  01/23/2024    MICROALBUMIN  01/23/2024    ANNUAL REVIEW OF HM ORDERS  01/26/2024    DEPRESSION 6 MO INDEX REPEAT PHQ-9  05/30/2024    ASTHMA CONTROL TEST  07/12/2024        Please call us (or use [employer]) to address the above recommendations.     Thank you for trusting [facility] and we appreciate the opportunity to serve you.  We look forward to supporting your healthcare needs in the future.    Healthy Regards,    Michael Licea, MPH, PA-C

## 2024-05-21 NOTE — TELEPHONE ENCOUNTER
Patient Quality Outreach    Patient is due for the following:   Diabetes -  A1C, Eye Exam, Microalbumin, and Statin  Cervical Cancer Screening - PAP Needed  Physical Preventive Adult Physical    Next Steps:   No follow up needed at this time.    Type of outreach:    Sent letter.      Questions for provider review:    None           Zoey Contreras RN

## 2024-05-21 NOTE — LETTER
May 21, 2024      Melody Muñoz  [address] [place] [address] [address] [place] [address]          Dear Melody,    I care about your health and have reviewed your health plan. I have reviewed your medical conditions, medication list, and lab results and am making recommendations based on this review, to better manage your health.    You are in particular need of attention regarding:  -Diabetes  -Cervical Cancer Screening  -Wellness (Physical) Visit     I am recommending that you:  -schedule a WELLNESS (Physical) APPOINTMENT with me.   I will check fasting labs the same day - nothing to eat except water and meds for 8-10 hours prior. (If you go elsewhere for Wellness visits then please disregard this reminder.)    -schedule a PAP SMEAR EXAM which is due.  Please disregard this reminder if you have had this exam elsewhere within the last year.  It would be helpful for us to have a copy of your recent pap smear report in our file so that we can best coordinate your care.    If you are under/uninsured, we recommend you contact the [facility]. They offer pap smears at no charge or on a sliding fee charge. You can schedule with them at [phone]. Please have them send us the results.    -Diabetic Eye Exam is due.  If this was done within the last 12 months then please contact the clinic with the date and location so we can update your records.    Here is a list of Health Maintenance topics that are due now or due soon:  Health Maintenance Due   Topic Date Due    HEPATITIS A IMMUNIZATION (1 of 2 - Risk 2-dose series) Never done    HEPATITIS B IMMUNIZATION (1 of 3 - 19+ 3-dose series) Never done    PAP  04/01/2020    ASTHMA ACTION PLAN  12/03/2020    DIABETIC FOOT EXAM  01/23/2021    EYE EXAM  04/21/2021    YEARLY PREVENTIVE VISIT  09/30/2022    A1C  07/23/2023    COVID-19 Vaccine (3 - 2023-24 season) 09/01/2023    LIPID  01/23/2024    MICROALBUMIN  01/23/2024    ANNUAL REVIEW OF HM ORDERS  01/26/2024        Please call us  (or use MyChart) to address the above recommendations.     Thank you for trusting [facility] and we appreciate the opportunity to serve you.  We look forward to supporting your healthcare needs in the future.    Healthy Regards,    Michael Licea, MPH, PA-C

## 2024-05-22 ENCOUNTER — TRANSFERRED RECORDS (OUTPATIENT)
Dept: HEALTH INFORMATION MANAGEMENT | Facility: CLINIC | Age: 49
End: 2024-05-22
Payer: COMMERCIAL

## 2024-05-22 ENCOUNTER — TELEPHONE (OUTPATIENT)
Dept: FAMILY MEDICINE | Facility: CLINIC | Age: 49
End: 2024-05-22
Payer: COMMERCIAL

## 2024-05-22 DIAGNOSIS — K58.9 IRRITABLE BOWEL SYNDROME WITHOUT DIARRHEA: ICD-10-CM

## 2024-05-22 DIAGNOSIS — R10.84 ABDOMINAL PAIN, GENERALIZED: Primary | ICD-10-CM

## 2024-05-22 NOTE — TELEPHONE ENCOUNTER
"Patient called back to check on this request     Informed of GI referral number given to schedule    When informing of xray patient states she is not getting an xray and would like a CT ordered informed generally xray is first step and she states I have had IBS for 10 years I'm not doing a first step \"I have been dicked around by [facility] for years and I'm not doing it I want him to order a CT\"     Informed RN can send message to provider    Please advise    Lars Miranda RN    "

## 2024-05-22 NOTE — TELEPHONE ENCOUNTER
I placed abd xray order, plea have her to schedule  And, I placed GI referral for her to be seen.  Please inform her    thx

## 2024-05-22 NOTE — TELEPHONE ENCOUNTER
Patient called as she has had no relief since 5/17 for her GI symptoms she is still having severe cramping that is causing vomiting any time she eats any foods    She is wondering what next steps are? Can she get imaging done and a GI referral?     UTI symptoms are greatly improved with the antibiotics    Lars Miranda RN

## 2024-05-23 NOTE — TELEPHONE ENCOUNTER
I want to R/O ileus with gas pattern. Xray is best modality for it. CT cannot show a typical sign of it.

## 2024-05-23 NOTE — TELEPHONE ENCOUNTER
Patient called back. States that scheduling could not see the xray order. Routed to imaging to schedule. Patient is scheduled for xray today. Marge Echavarria RN

## 2024-05-28 ENCOUNTER — HOSPITAL ENCOUNTER (OUTPATIENT)
Dept: GENERAL RADIOLOGY | Facility: CLINIC | Age: 49
Discharge: HOME | End: 2024-05-28
Attending: FAMILY MEDICINE | Admitting: FAMILY MEDICINE
Payer: COMMERCIAL

## 2024-05-28 ENCOUNTER — TELEPHONE (OUTPATIENT)
Dept: GASTROENTEROLOGY | Facility: CLINIC | Age: 49
End: 2024-05-28
Payer: COMMERCIAL

## 2024-05-28 DIAGNOSIS — R10.84 ABDOMINAL PAIN, GENERALIZED: ICD-10-CM

## 2024-05-28 DIAGNOSIS — R19.8 ABDOMINAL INVOLUNTARY GUARDING: ICD-10-CM

## 2024-05-28 DIAGNOSIS — K65.9: Primary | ICD-10-CM

## 2024-05-28 DIAGNOSIS — K58.9 IRRITABLE BOWEL SYNDROME WITHOUT DIARRHEA: ICD-10-CM

## 2024-05-28 PROCEDURE — 74019 RADEX ABDOMEN 2 VIEWS: CPT

## 2024-05-28 NOTE — TELEPHONE ENCOUNTER
[facility] Call Center    Phone Message    May a detailed message be left on voicemail: Yes    Reason for Call: Other: Patient is currently scheduled on 07/12/2024, as visit type New GI Urgent. This is outside the expected timeline for this referral. Patient has been added to the waitlist.      Action Taken: Message routed to:  Other: GI REFERRAL TRIAGE POOL     Travel Screening: Not Applicable

## 2024-05-29 RX ORDER — DICYCLOMINE HYDROCHLORIDE 10 MG/1
10 CAPSULE ORAL 3 TIMES DAILY PRN
Qty: 42 CAPSULE | Refills: 0 | Status: SHIPPED | OUTPATIENT
Start: 2024-05-29 | End: 2024-06-10

## 2024-05-29 NOTE — TELEPHONE ENCOUNTER
Pt reporting continuing IBS pain, several weeks. Nausea and pain have continued. Pt reporting irregular bowel movements, diarrhea and constipation.     Pt reporting intermittent pain, max 10/10, cramping, right upper and lower. No change since last appointment.       Doxycycline hyclate completed: UTI and URI symptoms improved.         GI discomfort with meals.     Nausea baseline.     Pt has an appointment with GI next month  Routing to provider; please review and advise: follow up appointment? Pt is requesting -Abdominal CT.   --dicyclomine; pt reporting symptoms improved. Pt requesting refill. Please see pending refill request and sign if approved.     Can we leave a detailed message on this number? YES  Phone number patient can be reached at: Home number on file [phone] (home)    Aria Peñaloza RN  [facility]  .

## 2024-05-29 NOTE — TELEPHONE ENCOUNTER
Spoke with patient and she was given information per Dr. Mclean.       Ashley Irizarry RN  [facility]

## 2024-06-04 ENCOUNTER — NURSE TRIAGE (OUTPATIENT)
Dept: FAMILY MEDICINE | Facility: CLINIC | Age: 49
End: 2024-06-04
Payer: COMMERCIAL

## 2024-06-04 NOTE — TELEPHONE ENCOUNTER
"Nurse Triage SBAR    Is this a 2nd Level Triage? NO    Situation: Pt calling in to follow up with recent visit regarding abd/pelvis pain. Pt has been able to pin point a more definite spot that the pain originates from. Pt states there is a lump about the size of marble \"doesn't move\" pt states the pain starts in the lump and radiates to her leg a little bit. Pain is 3-4/10 but gets up top a 7 at times.     Background: Had x-ray and CT scheduled for Thursday. Pt also reports Hernia off belly button-not new getting painful     Assessment: lump/cyst    Protocol Recommended Disposition:   See in Office Within 3 Days    Recommendation: Pt has abd CT scheduled 6/6/24, would an abd/pelvis be appropriate for this or an US for this lump.     1/30/2023 pt never completed lymph node biopsy ordered by [facility]nessy and would like to complete this now. Is a new order cyrus to be placed?    Routed to provider    Does the patient meet one of the following criteria for ADS visit consideration? 16+ years old, with an [facility] PCP     TIP  Providers, please consider if this condition is appropriate for management at one of our Acute and Diagnostic Services sites.     If patient is a good candidate, please use dotphrase <dot>triageresponse and select Refer to ADS to document.   Reason for Disposition   Patient wants to be seen    Additional Information   Negative: Small growth, spot, bump, or pigmented area of skin (e.g., moles, skin tags, wart, melanoma, skin cancer)   Negative: Inguinal hernia previously diagnosed by a doctor (or NP/PA)   Negative: Followed a skin injury   Negative: Followed an insect bite   Negative: Swelling of ankle joint   Negative: Swelling of entire face   Negative: Swelling of eyelid   Negative: Swelling of knee joint   Negative: Swelling of labia   Negative: Swelling of lymph node suspected   Negative: Swelling of rectum   Negative: Swelling of scrotum   Negative: Swelling of surgical incision   Negative: " "Swelling of vaccination site   Negative: Hernia suspected (bulge in groin or abdomen) and painful or vomiting   Negative: Swollen lump in groin and pulsating (like heartbeat)   Negative: Patient sounds very sick or weak to the triager   Negative: SEVERE pain (e.g., excruciating)   Negative: Swelling is painful to touch AND fever   Negative: Swelling is red and fever   Negative: Swelling is red and size > 2 inches (5.0 cm)   Negative: Swelling is painful to touch and no fever   Negative: Looks like a boil, infected sore, deep ulcer or other infected rash   Negative: Small swelling or lump present > 1 week   Negative: New-onset hernia suspected (reducible bulge in groin or abdomen; non-tender) and NO pain or vomiting    Answer Assessment - Initial Assessment Questions  1. APPEARANCE of SWELLING: \"What does it look like?\"      Lump     2. SIZE: \"How large is the swelling?\" (e.g., inches, cm; or compare to size of pinhead, tip of pen, eraser, coin, pea, grape, ping pong ball)       [place] other bumps     3. LOCATION: \"Where is the swelling located?\"      Groin R    4. ONSET: \"When did the swelling start?\"      No     5. COLOR: \"What color is it?\" \"Is there more than one color?\"      Unsure     6. PAIN: \"Is there any pain?\" If Yes, ask: \"How bad is the pain?\" (e.g., scale 1-10; or mild, moderate, severe)      - NONE (0): no pain    - MILD (1-3): doesn't interfere with normal activities     - MODERATE (4-7): interferes with normal activities or awakens from sleep     - SEVERE (8-10): excruciating pain, unable to do any normal activities      3-4/10  at worst 7/10    7. ITCH: \"Does it itch?\" If Yes, ask: \"How bad is the itch?\"       No    8. CAUSE: \"What do you think caused the swelling?\"  Unsure also mentions a hernia     9 OTHER SYMPTOMS: \"Do you have any other symptoms?\" (e.g., fever)      No    Protocols used: Skin Lump or Localized Swelling-A-[place]    "

## 2024-06-06 ENCOUNTER — HOSPITAL ENCOUNTER (OUTPATIENT)
Dept: CT IMAGING | Facility: CLINIC | Age: 49
Discharge: HOME | End: 2024-06-06
Attending: FAMILY MEDICINE | Admitting: FAMILY MEDICINE
Payer: COMMERCIAL

## 2024-06-06 DIAGNOSIS — R19.8 ABDOMINAL INVOLUNTARY GUARDING: ICD-10-CM

## 2024-06-06 DIAGNOSIS — K65.9: ICD-10-CM

## 2024-06-06 PROCEDURE — 250N000011 HC RX IP 250 OP 636: Performed by: FAMILY MEDICINE

## 2024-06-06 PROCEDURE — 250N000009 HC RX 250: Performed by: FAMILY MEDICINE

## 2024-06-06 PROCEDURE — 74177 CT ABD & PELVIS W/CONTRAST: CPT

## 2024-06-06 RX ORDER — IOPAMIDOL 755 MG/ML
75 INJECTION, SOLUTION INTRAVASCULAR ONCE
Status: COMPLETED | OUTPATIENT
Start: 2024-06-06 | End: 2024-06-06

## 2024-06-06 RX ADMIN — SODIUM CHLORIDE 63 ML: 9 INJECTION, SOLUTION INTRAVENOUS at 07:41

## 2024-06-06 RX ADMIN — IOPAMIDOL 75 ML: 755 INJECTION, SOLUTION INTRAVENOUS at 07:41

## 2024-06-06 NOTE — TELEPHONE ENCOUNTER
Patient had CT abdomen and pelvis done today. She is leaving town for vacation on 6/14 and wants to feel better before she goes.    Please advise on further imaging or next steps ASAP. Patient is very anxious to learn the results of today's CT.    Please advise. Triage to call the patient back.    Marge Echavarria RN

## 2024-06-07 NOTE — TELEPHONE ENCOUNTER
"Patient is requesting US for her enlarged lymph nodes and that she doesn't feel this is normal. She says the lymph nodes in her groin are painful and constant ache with throbbing and enlarged and \"could be leukemia.\" Describes marble ball that doesn't move on the R side.     Tried to read that there are no enlarged lymph nodes written in her results but patient was fixated on the pain and that they are \"reactive\" which she stated she googled and that should warrant an US.          Pt is requesting biopsy of her L axillary lymph nodes.  Pt requesting bone scan that looks for cancer and a DEXA.  [facility] was \"hiding bone scans\" and wants follow up done for these after being dismissed from [facility]. Pt doesn't want to pay a copay to speak to a provider about these concerns, she wants the message relayed due to mistrust of the medical system and PTSD.    Please advise.  Kina Xiao    "

## 2024-06-07 NOTE — TELEPHONE ENCOUNTER
Pt called the clinic to follow up on this.   Pt stated she needs a biopsy of L & R axillary lymph nodes. Pt stated the breast doctor found the right one a year ago.      Pt is also requesting an US of hernia in groin and wants to know what to do with it as she is in pain.     Routing to PCP to review and advise.     Please call pt back with PCPs recommendations.

## 2024-06-07 NOTE — TELEPHONE ENCOUNTER
Her CT is normal in general with very small umbilical hernia. Which doesn't contain intraperitoneal structure or organs. And there is no sign concerning strangulation. She doesn't need additional work up based on the finding. She can continue Bentyl as needed for sx control for now, and schedule seeing gastroenterologist if she is willing to       thx

## 2024-06-07 NOTE — TELEPHONE ENCOUNTER
FYI-  Pt called back requesting explanation why doctor Mclean didn't order an ultrasound of her groin. Pt also reports she has swelling lymph notes. Triage attempted to clarify if she is having new symptoms since last appt with doctor Mclean. Pt states she has groin pain and wants and ultrasound order.    Writer advised if she is having acute pain or has worsening symptoms since appt with doctor Mclean, she should be seen at ER.If not having new symptoms schedule next available appt with PCP.        Writer also informed pt providers would determine what orders to place. Pt agrees to go to ER today.

## 2024-06-10 ENCOUNTER — VIRTUAL VISIT (OUTPATIENT)
Dept: FAMILY MEDICINE | Facility: CLINIC | Age: 49
End: 2024-06-10
Payer: COMMERCIAL

## 2024-06-10 DIAGNOSIS — J01.90 ACUTE SINUSITIS WITH SYMPTOMS > 10 DAYS: ICD-10-CM

## 2024-06-10 DIAGNOSIS — K58.9 IRRITABLE BOWEL SYNDROME WITHOUT DIARRHEA: Primary | ICD-10-CM

## 2024-06-10 PROCEDURE — 99441 PR PHYSICIAN TELEPHONE EVALUATION 5-10 MIN: CPT | Performed by: FAMILY MEDICINE

## 2024-06-10 RX ORDER — DOXYCYCLINE HYCLATE 100 MG
100 TABLET ORAL 2 TIMES DAILY
Qty: 14 TABLET | Refills: 0 | Status: SHIPPED | OUTPATIENT
Start: 2024-06-10 | End: 2024-06-17

## 2024-06-10 RX ORDER — DICYCLOMINE HYDROCHLORIDE 10 MG/1
10 CAPSULE ORAL 3 TIMES DAILY PRN
Qty: 90 CAPSULE | Refills: 0 | Status: SHIPPED | OUTPATIENT
Start: 2024-06-10 | End: 2024-07-12

## 2024-06-10 RX ORDER — DICYCLOMINE HYDROCHLORIDE 10 MG/1
10 CAPSULE ORAL 3 TIMES DAILY PRN
Qty: 42 CAPSULE | Refills: 0 | Status: SHIPPED | OUTPATIENT
Start: 2024-06-10 | End: 2024-06-10

## 2024-06-10 NOTE — TELEPHONE ENCOUNTER
The best option is that  she should see her PCP and discuss her condition since her PCP knows her better. I defer to her PCP      zoe

## 2024-06-10 NOTE — PATIENT INSTRUCTIONS

## 2024-06-10 NOTE — TELEPHONE ENCOUNTER
I am listed as per PCP but have only met her once for a hospital follow up and she did not follow up after that.  I am happy to reestablish care with her but she will need an in person appointment for this as I have not seen her for this concern. I will not guarantee that I will order additional tests, this would be based on her exam and evaluation.

## 2024-06-10 NOTE — TELEPHONE ENCOUNTER
Patient disagrees with Dr. Mclean's interpretation of the CT results. She thinks that an US is indicated due to her pain level.     Patient feels that Dr. Mclean is reading the radiology report and not listening to her symptom of pain.    Patient is refusing to wait until upcoming appointment for further evaluation.    Patient states that her PCP would have ordered it. Patient thinks that Dr. Mclean is not ordering it because he doesn't know her.   Marge Echavarria RN

## 2024-06-10 NOTE — TELEPHONE ENCOUNTER
Axillary node- need to be seen by PCP in a routine mode. I don't see any urgency of finding per clinical picture.  Since it is symmetrical inguinal L/N with reactive change, it is not associated any neoplastic change. And, there is no mass present in CT.  Per size of the node, there is no serious pathologic concerns at this point. The issue is not associated with last visit with me, she can plan visiting her PCP for further discussion about the L/N if she wants.  Again, there is no logical evidence of seeking additional extended work up based on the finding as I mentioned at the results note      thx

## 2024-06-10 NOTE — TELEPHONE ENCOUNTER
"Pt called the clinic back. Relayed providers message form below.     Pt is asking why is she in pain? Pt stated she can't get a VV until the end of the month and how is that going to help her now? Pt is confrontational. Writer was trying to relay results of CT to pt from 6/6/24 of    The results were reviewed and relayed via triage nurse as below.     Her CT is normal in general with very small umbilical hernia. Which doesn't contain intraperitoneal structure or organs. And there is no sign concerning strangulation. She doesn't need additional work up based on the finding. She can continue Bentyl as needed for sx control for now, and schedule seeing gastroenterologist if she is willing to     However, pt stated \"that is my doctors appt calling. I gotta go\". Writer was unable to relay message from above.     Routing to team to follow up on and call pt back.     "

## 2024-06-10 NOTE — TELEPHONE ENCOUNTER
Triage Patient Outreach    Attempt # 1    Was call answered?  No.  Left voicemail to return call to [facility]    Marge Echavarria RN

## 2024-06-10 NOTE — PROGRESS NOTES
Melody is a 48 year old who is being evaluated via a billable telephone visit.    What phone number would you like to be contacted at? [phone]  How would you like to obtain your AVS? MyChart  Originating Location (pt. Location): Home    Distant Location (provider location):  Off-site    Assessment & Plan     Irritable bowel syndrome without diarrhea  Feels this has improved her symptoms-tolerating well.   - dicyclomine (BENTYL) 10 MG capsule  Dispense: 90 capsule; Refill: 0    Acute sinusitis with symptoms > 10 days  - doxycycline hyclate (VIBRA-TABS) 100 MG tablet  Dispense: 14 tablet; Refill: 0        Subjective   Melody is a 48 year old, presenting for the following health issues:  Sinus Problem        6/10/2024     9:28 AM   Additional Questions   Roomed by Marycruz STATON MA   Accompanied by Self     HPI     Acute Illness  Acute illness concerns: Sinus symptoms  Onset/Duration: Ongoing  Symptoms:  Fever: No  Chills/Sweats: No  Headache (location?): YES  Sinus Pressure: YES  Conjunctivitis:  No  Ear Pain: YES- feels little plugged  Rhinorrhea: No  Congestion: YES  Sore Throat: No  Cough: no  Wheeze: No  Decreased Appetite: No  Nausea: No  Vomiting: No  Diarrhea: No  Dysuria/Freq.: No  Dysuria or Hematuria: No  Fatigue/Achiness: No  Sick/Strep Exposure: No  Therapies tried and outcome: Sudafed, tylenol/ibuprofen    Was given doxycycline when she had acute cystitis, sinus symptoms and headache went away but has since came back since finishing antibiotic        Objective           Vitals:  No vitals were obtained today due to virtual visit.    Physical Exam   General: Alert and no distress //Respiratory: No audible wheeze, cough, or shortness of breath // Psychiatric:  Appropriate affect, tone, and pace of words            Phone call duration: 10 minutes  Signed Electronically by: YAZ ESCALANTE DO

## 2024-06-11 DIAGNOSIS — K58.9 IRRITABLE BOWEL SYNDROME WITHOUT DIARRHEA: ICD-10-CM

## 2024-06-11 NOTE — TELEPHONE ENCOUNTER
"Pt states she can't walk without pain, and the US would provide further information as to what is wrong. She states she is not like normal patients and the results don't reflect the true situation she is in.     Pt continued to expressed frustrations with being unable to have US ordered before she goes out of town Friday. She is going to visit her son before he is deployed. She states several times \"My PCP would of never let me go in this much pain\". Previous PCPs were Dr. Machado and Paradise Hennessy, both no longer with [facility].    Writer explained PCP in chart is listed as Michael Licea and relayed michael's message to patient.   Patient agrees she has only seen this provider once and did not establish care. She says she does not want to schedule an in-person appointment to establish care and drive to  from [place], she wants to establish care with a provider near her home in [place]. Writer explained I am unable to schedule appts at [place] due to scheduling differences, patient understands.     Patient states she does not want to see Dr. Mclean or Michael Licea, stating she does not want to see anyone that has talked to Dr. Mclean about her.     Patient is requesting to be scheduled with Dr. Sánchez in 6/13 next-day slot. She is aware appointment is not currently scheduled as the appointment slot needs provider approval first. She states she is writing it down to ensure she does not make other plans during that time, just in case she has approval to be scheduled.   "

## 2024-06-12 RX ORDER — DICYCLOMINE HYDROCHLORIDE 10 MG/1
CAPSULE ORAL
Qty: 270 CAPSULE | OUTPATIENT
Start: 2024-06-12

## 2024-06-12 NOTE — TELEPHONE ENCOUNTER
Recommend to continue follow current management by my team and her current PCP.  No additional inputs from me as per the chart reviewed.     Thank you  Dolores Sánchez MD on 6/12/2024

## 2024-06-12 NOTE — TELEPHONE ENCOUNTER
Spoke to patient and stated she does not remember wanting to be scheduled with Dr. Sánchez and states that she cannot come this week before leaving Friday. Pt states that Michael Licea is not her PCP and doesn't know why it's listed that way.     Pt states frustration with Dr. Mclean but states that he has seen her before and will have to discuss it with her further. Pt is already scheduled for 6/28 with Dr. Mclean.    No further questions at this time.    Kina Xiao RN

## 2024-06-20 NOTE — TELEPHONE ENCOUNTER
REFERRAL INFORMATION:  Referring Provider:  Dr. Mclean  Referring Clinic:  [facility]  Reason for Visit/Diagnosis: R10.84 (ICD-10-CM) - Abdominal pain, generalized      FUTURE VISIT INFORMATION:  Appointment Date: 7/12/24  Appointment Time:      NOTES STATUS DETAILS   OFFICE NOTE from Referring Provider Internal 5/17/24-Dr. Mclean   OFFICE NOTE from Other Specialist Internal 6/10/24- Dr. Chairez    1/23/23- Leora Hennessy Pa-C    [facility]: [facility] 4/16/19- Dr. Vieyra  4/2/19-Dr. Ellis  3/1/19-Dr. Ratliff  1/15/19-Dr. Ratliff   OPERATIVE REPORT Internal 11/14/17-LAPAROSCOPIC APPENDECTOMY and resection of epiploic tag - Dr. Robins   MEDICATION LIST Internal         ENDOSCOPY  Care Everywhere [facility]: 3/15/19  3/25/14   COLONOSCOPY Care Everywhere [facility]: 3/15/19   PERTINENT LABS Internal    IMAGING (CT, MRI, EGD, MRCP, Small Bowel Follow Through/SBT, MR/CT Enterography) Internal CT abdomen pelvis 6/6/24 + more in Epic    XR abdomen 5/28/24    XR chest 12/13/23    US abdomen 2/3/23    US abdomen 5/28/21

## 2024-06-27 NOTE — TELEPHONE ENCOUNTER
Patient Quality Outreach    Patient is due for the following:   Diabetes -  A1C, LDL (Fasting), Eye Exam, Microalbumin, and Foot Exam  Asthma  -  ACT needed  Cervical Cancer Screening - PAP Needed  Depression  -  PHQ-9 needed  Physical Preventive Adult Physical    Next Steps:   No follow up needed at this time.  Patient was assigned appropriate questionnaire to complete    Type of outreach:    Sent letter., Copy of ACT mailed to patient., and PHQ-9 with return envelope     Next Steps:  Reach out within 90 days via Phone.    Max number of attempts reached: Yes. Will try again in 90 days if patient still on fail list.    Questions for provider review:    None           Zoey Contreras RN

## 2024-06-28 ENCOUNTER — VIRTUAL VISIT (OUTPATIENT)
Dept: FAMILY MEDICINE | Facility: CLINIC | Age: 49
End: 2024-06-28
Payer: COMMERCIAL

## 2024-06-28 DIAGNOSIS — R10.2 PELVIC PAIN IN FEMALE: ICD-10-CM

## 2024-06-28 DIAGNOSIS — G47.33 OSA (OBSTRUCTIVE SLEEP APNEA): ICD-10-CM

## 2024-06-28 DIAGNOSIS — E11.65 UNCONTROLLED TYPE 2 DIABETES MELLITUS WITH HYPERGLYCEMIA (H): Primary | ICD-10-CM

## 2024-06-28 PROCEDURE — 99443 PR PHYSICIAN TELEPHONE EVALUATION 21-30 MIN: CPT | Performed by: FAMILY MEDICINE

## 2024-06-28 ASSESSMENT — PATIENT HEALTH QUESTIONNAIRE - PHQ9: SUM OF ALL RESPONSES TO PHQ QUESTIONS 1-9: 14

## 2024-06-28 NOTE — PROGRESS NOTES
Melody is a 48 year old who is being evaluated via a billable telephone visit.    What phone number would you like to be contacted at? [phone]  How would you like to obtain your AVS? Ori  Originating Location (pt. Location): Home    Distant Location (provider location):  On-site    Assessment & Plan     Uncontrolled type 2 diabetes mellitus with hyperglycemia (H)  She is going to start with new PCP, will deferred to the new PCP    JASWANT (obstructive sleep apnea)  Has worsening JASWANT and currently on CPAP, will have her to see sleep clinic for further evaluation   - Adult Sleep Eval & Management  Referral; Future    Pelvic pain in female  Has been having pubic pain, pt wants to work with new PCP for further evaluation including bone scan    Will have her to start PT as well to facilitate her care   - Physical Therapy  Referral; Future      FUTURE APPOINTMENTS:       - Follow-up visit with new PCP    Subjective   Melody is a 48 year old, presenting for the following health issues:  Results (Ongoing groin pain/pelvic area )        6/28/2024     7:40 AM   Additional Questions   Roomed by Manuel CARBAJAL     Pain History:  When did you first notice your pain? End of May - states noticed more after xray that was done 5/28  Have you seen anyone else for your pain? No  How has your pain affected your ability to work? Not currently working - unrelated to pain  Where in your body do you have pain? Abdominal/Groin  Onset/Duration: 4+ weeks ?  Description:   Character: Throbbing - radiating into right groin and around   Intensity: moderate  Progression of Symptoms:  waxing and waning  Accompanying Signs & Symptoms:  Fever/Chills: No  Gas/Bloating: No  Nausea: No  Vomitting: No  Diarrhea: Hx of IBS as well   Constipation: YES  Dysuria or Hematuria: No  History:   Previous tests done: x-ray and CT    No LMP recorded (lmp unknown). Patient has had a hysterectomy.        Review of Systems  Constitutional,  HEENT, cardiovascular, pulmonary, GI, , musculoskeletal, neuro, skin, endocrine and psych systems are negative, except as otherwise noted.      Objective           Vitals:  No vitals were obtained today due to virtual visit.    Physical Exam   General: Alert and no distress //Respiratory: No audible wheeze, cough, or shortness of breath // Psychiatric:  Appropriate affect, tone, and pace of words            Phone call duration: 26 minutes  Signed Electronically by: Mark Mclean MD

## 2024-07-02 DIAGNOSIS — J45.20 INTERMITTENT ASTHMA, UNCOMPLICATED: ICD-10-CM

## 2024-07-02 RX ORDER — ALBUTEROL SULFATE 90 UG/1
AEROSOL, METERED RESPIRATORY (INHALATION)
Qty: 18 G | Refills: 1 | Status: SHIPPED | OUTPATIENT
Start: 2024-07-02

## 2024-07-12 ENCOUNTER — PRE VISIT (OUTPATIENT)
Dept: GASTROENTEROLOGY | Facility: CLINIC | Age: 49
End: 2024-07-12

## 2024-07-12 ENCOUNTER — VIRTUAL VISIT (OUTPATIENT)
Dept: GASTROENTEROLOGY | Facility: CLINIC | Age: 49
End: 2024-07-12
Attending: FAMILY MEDICINE
Payer: COMMERCIAL

## 2024-07-12 DIAGNOSIS — R15.9 FULL INCONTINENCE OF FECES: ICD-10-CM

## 2024-07-12 DIAGNOSIS — R19.8 IRREGULAR BOWEL HABITS: Primary | ICD-10-CM

## 2024-07-12 DIAGNOSIS — R10.84 ABDOMINAL PAIN, GENERALIZED: ICD-10-CM

## 2024-07-12 DIAGNOSIS — K58.9 IRRITABLE BOWEL SYNDROME WITHOUT DIARRHEA: ICD-10-CM

## 2024-07-12 PROCEDURE — 99443 PR PHYSICIAN TELEPHONE EVALUATION 21-30 MIN: CPT | Performed by: DIETITIAN, REGISTERED

## 2024-07-12 RX ORDER — DICYCLOMINE HYDROCHLORIDE 10 MG/1
10 CAPSULE ORAL 3 TIMES DAILY PRN
Qty: 90 CAPSULE | Refills: 5 | Status: SHIPPED | OUTPATIENT
Start: 2024-07-12

## 2024-07-12 NOTE — LETTER
2024      Melody Muñoz  [address]  [facility] [address]      Dear Colleague,    Thank you for referring your patient, Melody Muñoz, to the [facility] [place]. Please see a copy of my visit note below.    GI CLINIC VISIT - NEW PATIENT    Virtual Visit Details    Type of service:  Video Visit     Originating Location (pt. Location): Home    Distant Location (provider location):  Off-site  Platform used for Video Visit: Telephone    --- Visit switched to telephone via WorkTouch due to patient did not have computer charged or ability to have video on her phone      CC/REFERRING MD:  Mark Mclean  REASON FOR CONSULTATION: abdominal pain    ASSESSMENT/PLAN:    Ms. Muñoz is a 48 year old year old female with history of obesity, DM2, JASWANT, migraines, chronic pain syndrome, bipolar disorder, depression, ROWDY, PTSD, Borderline Personality Disorder, history of substance use (benzodiapapines), IBS, s/p  (), s/p appendectomy () who presents for evaluation of abdominal pain and irregular bowel pattern.     #Abdominal pain, generalized, RUQ  #Irregular Bowel Pattern  #Fecal Incontinence    Long history of abdominal pain and irregular bowel pattern with oscillation between constipation and diarrhea, more of a tendency for constipation at baseline.  Abdominal pain tends to be right upper quadrant difficult for her to describe but reports it is burning but also bloating and sharp gas pain.  In the last 1-1.5 years she has also been experiencing fecal incontinence overnight without warning.  Prior upper endoscopy and colonoscopy in  were unremarkable.  Abdominal ultrasound in  with normal gallbladder.  She had a recent exacerbation of her symptoms late April/May in the context of significantly increased stress.  She did have a few days with nausea and vomiting with pain during this time.  Reassuringly symptoms have now significantly improved  with the addition of Bentyl, though continues to tend towards constipation with straining and hard stools with intermittent fecal incontinence.  Most suspicious for underlying IBS-M meeting criteria with abdominal pain with change in stool form and frequency, and improvement with Bentyl and possible pelvic floor dysfunction with fecal incontinence.  Other differential includes GERD/PUD with burning type pain and intermittent heartburn, biliary with pain in the right upper quadrant though not necessarily associated with eating, gastroparesis, intermittent SBO, microscopic colitis, IBD, SIBO, pancreatic etiology.  She is due for screening colonoscopy with family history of advanced polyps, we will proceed with this to ensure no findings to account for her constipation,, diarrhea or fecal incontinence with biopsies for microscopic colitis.  We will also proceed with upper endoscopy at the same time to assess for GERD, PUD, gastric outlet obstruction.  Will also proceed with abdominal ultrasound given right upper quadrant pain.  In the meantime she can continue Bentyl as this has been working well for her.  To regulate bowel pattern will start MiraLAX that she is done this in the past with some success, start 1 capful daily, if having  diarrhea okay to hold, advised against antidiarrheal agents.  Following this we can add soluble fiber starting at half tablespoon with slow titration upward by half tablespoon every couple weeks.      Discussed differential, outlined options and reviewed medications with patient.  Mutually agreed upon plan outlined below.  PLAN:  -- Continue Bentyl as needed for abdominal pain up to 3 times per day  -- Start Miralax 1 capful daily, ok to hold when having loose stools  -------- pending response after a few weeks when bowel pattern more regulated without significant constipation can add soluble fiber powder such as Metamucil, Citrucel, Benefiber starting at half tablespoon, increasing by  half tablespoon every 1 to 2 weeks until desired stool consistency achieved.  -- Get abdominal ultrasound to assess right upper sided pain  -- Schedule colonoscopy and upper endoscopy       Colorectal cancer screening: Last done  and normal, given history of advanced polyps in her father, recommend repeat in 5 years () for surveillance      RTC 3 months    Thank you for this consultation.  It was a pleasure to participate in the care of this patient; please contact us with any further questions.     70Minutes was spent on the date of the encounter during chart review, history and exam, documentation, and further activities as noted       Kristin Blanc PA-C  Division of Hepatology, Gastroenterology & Nutrition  [facility]      HPI  Ms. Muñoz is a 48 year old year old female with history of obesity, DM2, JASWANT, migraines, chronic pain syndrome, bipolar disorder, depression, ROWDY, PTSD, Borderline Personality Disorder, history of substance use (benzodiapapines), IBS, s/p  (), s/p appendectomy () who presents for evaluation of abdominal pain and irregular bowel pattern. They are new to the [facility] clinic and this is my first encounter with the patient.     Longstanding history of abdominal pain and irregular bowel movements, holds diagnosis of IBS-M.     Patient reports symptoms have been present for years with prior visit at [facility].  She tends to constipation, then will have days with normal stooling and diarrhea.  She also reports frequent abdominal pain that tends to be more upper and right-sided, difficult for her to describe exact location.  Reports it feels like a burning but also a bloating and gas pain.  Tends to have pain irregardless of bowel pattern.    Late May had worsening of abdominal pain and cramping, diarrhea, and had reported nausea with eating.  Abdominal XR done 24 which showed small amount of stool, no ileus or obstruction. CT A/P then done  6/6/24 which showed no acute findings in the abdomen or pelvis. Tiny fat containing umbilical hernia. Recommended Bentyl as needed for her IBS.    Today she reports that her abdominal pain has really improved with use of Bentyl.  Improves enough that she does not notice it at all.  She reports that she was having a very stressful time in May when symptoms were occurring, in general stress team seems to worsen her GI symptoms.  Reports she wants to get her GI health in order if she notices impacts the rest of her body.    Her bowel pattern continues to oscillate.  When constipated will have hard small stools which she needs straining to defecate and does not feel fully evacuated.  Following this she will typically have days of more normal stools.  Intermittently she will have more significant diarrhea with 10+ stools per day that are urgent and loose or liquid.  She also notes intermittent fecal leakage at night, stooling does not wake her up but wakes up to stool in her bed.  Currently happening about twice per month.  No blood in stool or melena.  She has used MiraLAX and Metamucil in the past but not consistently.    She denies nausea or vomiting.  Reports this is rare.      Occasional heartburn and acid regurgitation.  Not taking any acid suppression.  Uses Tums intermittently.    Last EGD and colonoscopy in 2019 were unremarkable. Biopsies of stomach not available. CT 2/2023 with possible mild colitis vs nondistention, otherwise unremarkable. Abdominal US 2/2022 normal.    Weight stable, no unintentional weight loss.    NSAIDs a couple times per month.  No tobacco or alcohol.  Uses medical cannabis, no other drugs.    Family history of advanced adenoma polyps in her father and colon cancer in her maternal grandfather (had other cancers as well unsure of primary).    ROS:  Complete 10 System ROS performed. All are negative except as documented below, in the HPI, or in patient questionnaire from today's  visit.    PROBLEM LIST  Patient Active Problem List    Diagnosis Date Noted     Lithium toxicity, undetermined intent, initial encounter 12/13/2023     Priority: Medium     Moderate benzodiazepine use disorder (H) 05/27/2021     Priority: Medium     Breast complaint 11/09/2020     Priority: Medium     Opacity of lung on imaging study 04/22/2020     Priority: Medium     Visual disturbance 04/22/2020     Priority: Medium     R ;   ophthalmopathy report pending         Bilateral groin pain 03/18/2020     Priority: Medium     Recurrent sinus infections 11/21/2019     Priority: Medium     Irritable bowel syndrome with both constipation and diarrhea 09/23/2019     Priority: Medium     Anxiety 09/23/2019     Priority: Medium     Persistent depressive disorder 05/24/2019     Priority: Medium     Other chronic back pain 02/21/2019     Priority: Medium     Headache disorder 02/21/2019     Priority: Medium     Acquired hypothyroidism 10/01/2018     Priority: Medium     Class 2 obesity due to excess calories with serious comorbidity and body mass index (BMI) of 36.0 to 36.9 in adult 09/13/2018     Priority: Medium     Mixed hyperlipidemia 09/13/2018     Priority: Medium     Morbid obesity (H) 08/09/2018     Priority: Medium     Former tobacco use 08/09/2018     Priority: Medium     Quit Smoking May 2018 with nicotine patches       Abdominal pain 11/21/2017     Priority: Medium     Incontinence of urine in female 11/10/2017     Priority: Medium     Overview:   Mixed, is seeing [facility], plans for med trial and procedure       Screening for diabetic peripheral neuropathy 11/10/2017     Priority: Medium     Need for prophylactic vaccination against Streptococcus pneumoniae (pneumococcus) 11/10/2017     Priority: Medium     Uncontrolled type 2 diabetes mellitus with hyperglycemia (H) 03/21/2017     Priority: Medium     Hostile behavior 04/28/2016     Priority: Medium     JASWANT (obstructive sleep apnea) 01/11/2016     Priority:  Medium     Elevated liver enzymes 01/11/2016     Priority: Medium     TMJ (temporomandibular joint syndrome) 01/11/2016     Priority: Medium     Nausea 12/15/2015     Priority: Medium     Chronic migraine without aura without status migrainosus, not intractable 11/23/2015     Priority: Medium     fioricet plain Q 6 hrs, no more than 4/day and gets 20 at a time      CSA on file from Dr Husain, dated 9/8/15  (update with current primary care provider)  Last  check - 11/26/18 provider to review=- multiple meds from multiple providers       Insomnia 11/03/2015     Priority: Medium     CSA on file from Dr Husain dated 9/8/15  Last  check-2/19/19 multiple meds from multiple outside providers       Chronic pain syndrome 09/08/2015     Priority: Medium     Patient is followed by VIRAL Deleon for ongoing prescription of pain medication.  All refills should be approved by this provider, or covering partner.    Medication(s):  Lyrica 150 mg bid, Fioricet with codeine prn, klonopin and ambien to be prescribed by psych, not [facility].  Maximum quantity per month: 60, 20  Clinic visit frequency required: Q 3 months     Controlled substance agreement on file: Yes       Date(s): 9/8/2015  New CSA needed.  Pain Clinic evaluation in the past: No    DIRE Total Score(s):  No flowsheet data found.    Last [facility] website verification: 2/23/21 multiple meds from multiple outside providers    Clonazepam and dextroamphetamine prescribed by outside providers.        Acne 08/06/2015     Priority: Medium     Suicidal ideation 02/16/2015     Priority: Medium     Chronic fatigue 05/22/2014     Priority: Medium     Female stress incontinence 05/22/2014     Priority: Medium     Bipolar 1 disorder  with psychosis 04/02/2014     Priority: Medium     Major depression      Priority: Medium     Sees psychiatrist Dr. Temple       Generalized anxiety disorder      Priority: Medium     1/11/16 Dr. Husain: Melody you must find a psychiatrist to take  over your complex medications before I retire at the end of the month. I recommend you make another appointment with the NP Adriana at [facility] and have her prescribe your meds even though you don't like her until you can be seen by Dr. Cisneros at the end of March and talk to him about Latuda.    2/23/16 - Dr Adriana Caputo will be the only provider ordering pt's Zolpidem from now on. Pt is going back up to 10 mg tab. Do not refill Zolpidem ([facility])    3/29/16- Adriana Caputo, TREMAINE will be prescribing clonazepam, not FVBX    CSA on file from Dr Husain dated 9/8/16 - needs with current primary care provider   Last  check - 11/26/18 provider to review       Constipation      Priority: Medium     Nightmares      Priority: Medium     Knee pain      Priority: Medium     Mild persistent asthma without complication 11/29/2012     Priority: Medium     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     Priority: Medium     Mantoux: positive 10/17/2012     Priority: Medium     Allergic rhinitis 07/27/2009     Priority: Medium     Overview:   Rhinitis Allergic  NOS       Migraine 06/07/2003     Priority: Medium     Overview:   Migraine Without Aura         PERTINENT PAST MEDICAL HISTORY:  Past Medical History:   Diagnosis Date     Abnormal pap age 24    and paps normal ever since and no other testing needed per patient     Anxiety      Bipolar 1 disorder (H)     with psychosis      Diabetes mellitus 03/21/2017     Endometriosis      Endometriosis s/po EDWIGE 10-17      h/o Suicidal ideation 01/01/2013    in past, not current, sees psychiatrist and therapist     Intermittent asthma 11/29/2012     Kidney stone      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    treated with inh for 9 month     Major depression     Ted Pope  [phone]     Migraines     otc excedrin prn     JASWANT (obstructive sleep apnea)      Sleep apnea March/2014    uses Cpap     Substance abuse (H)     no use since summer 2013     Vertigo 1/22/2015        PREVIOUS SURGERIES:  Past Surgical History:   Procedure Laterality Date      SECTION  2009     for twins     COLONOSCOPY       HYSTERECTOMY, PAP NO LONGER INDICATED Bilateral     pt reported     LAPAROSCOPIC APPENDECTOMY N/A 2017    Procedure: LAPAROSCOPIC APPENDECTOMY;  LAPAROSCOPIC APPENDECTOMY and resection of epiploic tag;  Surgeon: Roberto Robins MD;  Location: [facility] OR     LAPAROSCOPY      - endometriosis     little finger surgery left  1980     SLING BLADDER NOS  2014     tubal ligation bilateral       wisdom teeth removal         PREVIOUS ENDOSCOPY:  Colonoscopy 3/15/19: entire colon normal    EGD 3/15/19: normal esophagus, stomach, duodenum. We do not have biopsy results.    ALLERGIES:     Allergies   Allergen Reactions     Hydrocodone Nausea and Vomiting     Tamiflu [Oseltamivir]      Rash     Tylenol W/Codeine [Acetaminophen-Codeine] Nausea and Vomiting     Pt request that RN add this       PERTINENT MEDICATIONS:    Current Outpatient Medications:      albuterol (PROAIR HFA/PROVENTIL HFA/VENTOLIN HFA) 108 (90 Base) MCG/ACT inhaler, INHALE 1 TO 2 PUFFS INTO THE LUNGS EVERY 4 HOURS AS NEEDED FOR SHORTNESS OF BREATH OR WHEEZING, Disp: 18 g, Rfl: 1     budesonide-formoterol (SYMBICORT) 160-4.5 MCG/ACT Inhaler, Inhale 2 puffs into the lungs 2 times daily as needed (ASTHMA FLARE), Disp: 10.2 g, Rfl: 1     buPROPion (WELLBUTRIN XL) 150 MG 24 hr tablet, Take 150 mg by mouth every morning, Disp: , Rfl:      cetirizine (ZYRTEC) 10 MG tablet, Take 10 mg by mouth daily, Disp: , Rfl:      clonazePAM (KLONOPIN) 1 MG tablet, Take 1 mg by mouth 3 times daily, Disp: , Rfl:      dextroamphetamine (DEXEDRINE SPANSULE) 5 MG 24 hr capsule, Take 5 mg by mouth 2 times daily, Disp: , Rfl:      dextroamphetamine (DEXTROSTAT) 10 MG tablet, Take 10 mg by mouth 4 times daily, Disp: , Rfl:      dicyclomine (BENTYL) 10 MG capsule, Take 1 capsule (10 mg) by mouth 3 times daily as needed, Disp: 90  capsule, Rfl: 0     doxycycline hyclate (VIBRAMYCIN) 100 MG capsule, Take 1 capsule (100 mg) by mouth 2 times daily (Patient not taking: Reported on 6/28/2024), Disp: 20 capsule, Rfl: 0     gabapentin (NEURONTIN) 300 MG capsule, Take 300 mg by mouth 3 times daily, Disp: , Rfl:      lamoTRIgine (LAMICTAL) 100 MG tablet, Take 200 mg by mouth at bedtime, Disp: , Rfl:      lithium (ESKALITH CR/LITHOBID) 450 MG CR tablet, Take 1 tablet (450 mg) by mouth at bedtime (Patient not taking: Reported on 6/28/2024), Disp: 30 tablet, Rfl: 0     lithium ER (LITHOBID) 300 MG CR tablet, Take 300 mg by mouth every morning, Disp: , Rfl:      lurasidone (LATUDA) 120 MG TABS tablet, Take 120 mg by mouth daily (with dinner), Disp: , Rfl: 3     naloxone (NARCAN) 4 MG/0.1ML nasal spray, Spray 1 spray (4 mg) into one nostril alternating nostrils once as needed for opioid reversal every 2-3 minutes until assistance arrives (Patient not taking: Reported on 6/28/2024), Disp: 0.2 mL, Rfl: 1     nicotine polacrilex (NICORETTE) 4 MG gum, Take 4 mg by mouth, Disp: , Rfl:      polyethylene glycol (MIRALAX) 17 GM/Dose powder, 17 g by Nasogastric route, Disp: , Rfl:      polyethylene glycol-propylene glycol (SYSTANE) 0.4-0.3 % SOLN ophthalmic solution, Apply 1 drop to eye, Disp: , Rfl:      traZODone (DESYREL) 100 MG tablet, Take 100 mg by mouth at bedtime, Disp: , Rfl:      Vitamin D3 (VITAMIN D, CHOLECALCIFEROL,) 25 mcg (1000 units) tablet, Take 1 tablet by mouth daily, Disp: , Rfl:      zolpidem (AMBIEN) 10 MG tablet, Take 10 mg by mouth at bedtime, Disp: , Rfl:    No other NSAID/anticoagulation reported by patient.   No other OTC/herbal/supplements reported by patient.  Vitamin D and Fish oil    SOCIAL HISTORY:  Social History     Socioeconomic History     Marital status:      Spouse name: Not on file     Number of children: Not on file     Years of education: Not on file     Highest education level: Not on file   Occupational History      Employer: SELF EMPLOYED.     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 No      Service Not Asked     Blood Transfusions Not Asked     Caffeine Concern Not Asked     Occupational Exposure Not Asked     Hobby Hazards Not Asked     Sleep Concern Not Asked     Stress Concern Not Asked     Weight Concern Not Asked     Special Diet Not Asked     Back Care Not Asked     Exercise Not Asked     Bike Helmet Yes     Seat Belt Yes     Self-Exams Not Asked   Social History Narrative     Not on file     Social Determinants of Health     Financial Resource Strain: Medium Risk (6/3/2020)    Received from [facility]    Overall Financial Resource Strain (CARDIA)      Difficulty of Paying Living Expenses: Somewhat hard   Food Insecurity: Food Insecurity Present (6/3/2020)    Received from [facility]    Hunger Vital Sign      Worried About Running Out of Food in the Last Year: Sometimes true      Ran Out of Food in the Last Year: Never true   Transportation Needs: No Transportation Needs (6/3/2020)    Received from [facility]    PRAPARE - Transportation      Lack of Transportation (Medical): No      Lack of Transportation (Non-Medical): No   Physical Activity: Insufficiently Active (6/3/2020)    Received from [facility]    Exercise Vital Sign      Days of Exercise per Week: 5 days      Minutes of Exercise per Session: 20 min   Stress: No Stress Concern  Present (6/3/2020)    Received from [facility], [facility] - Occupational Stress Questionnaire      Feeling of Stress : Only a little   Social Connections: Unknown (6/20/2023)    [facility] from [facility] [employer]  [employer] [facility]    Social Connections      Frequency of Communication with Friends and Family: Not on file   Interpersonal Safety: Low Risk  (5/17/2024)    Interpersonal Safety      Do you feel physically and emotionally safe where you currently live?: Yes      Within the past 12 months, have you been hit, slapped, kicked or otherwise physically hurt by someone?: No      Within the past 12 months, have you been humiliated or emotionally abused in other ways by your partner or ex-partner?: No   Housing Stability: Not on file       Tobacco: No  Alcohol: No  Cannabis: Medical  Drugs: No    FAMILY HISTORY:  Father with advanced polyp  Maternal Grandfather - colon cancer    No colon/panc/esophageal/other GI CA. No other Sr or other HPS-related Jarred. No IBD or celiac disease. No other Autoimmune, Liver, or Thyroid disease.  Family History   Problem Relation Age of Onset     Hypertension Mother      Heart Disease Father         palpitations     Depression Sister      Anxiety Disorder Sister      Breast Cancer Maternal Grandmother         before 50     Heart Disease Maternal Grandmother         CHF     Cancer Maternal Grandfather 72        Pancreatic Cancer     Alzheimer Disease Paternal Grandfather      Breast Cancer Paternal Aunt         early 60s     Bipolar Disorder Other      Autism Spectrum Disorder Other      Ovarian Cancer No family hx of        Past/family/social history reviewed and no changes        PHYSICAL EXAMINATION:  Constitutional: AAOx3, cooperative, pleasant, not dyspneic/diaphoretic, no acute distress  Vitals reviewed: LMP  (LMP Unknown)   Wt:   Wt Readings from Last 2 Encounters:   12/13/23  68.2 kg (150 lb 4.8 oz)   08/04/23 68 kg (150 lb)      No physical exam with phone visit.    PERTINENT STUDIES:  Imaging  CT A/P 6/6/24:  IMPRESSION:   1.  No acute findings within the abdomen or pelvis.  2.  Tiny fat-containing umbilical hernia.  3.  Similar bilateral symmetric subcentimeter inguinal lymph nodes  that are likely reactive.    Abdominal XR 5/28/24:  FINDINGS: Small amount of stool. No free air. There are no air filled  distended loops of small bowel. The colon is not distended. The lung  bases are unremarkable.                                                                IMPRESSION: Nonobstructed bowel gas pattern.    Labs  Reviewed available        Again, thank you for allowing me to participate in the care of your patient.        Sincerely,        Kristin Blanc PA-C

## 2024-07-12 NOTE — PROGRESS NOTES
GI CLINIC VISIT - NEW PATIENT    Virtual Visit Details    Type of service:  Video Visit     Originating Location (pt. Location): Home    Distant Location (provider location):  Off-site  Platform used for Video Visit: Telephone  Start Time: 1:05 PM  End Time: 1:34 PM  --- Visit switched to telephone via Doximity due to patient did not have computer charged or ability to have video on her phone      CC/REFERRING MD:  Mark Mclean  REASON FOR CONSULTATION: abdominal pain    ASSESSMENT/PLAN:    Ms. Muñoz is a 48 year old year old female with history of obesity, DM2, JASWANT, migraines, chronic pain syndrome, bipolar disorder, depression, ROWDY, PTSD, Borderline Personality Disorder, history of substance use (benzodiapapines), IBS, s/p  (), s/p appendectomy () who presents for evaluation of abdominal pain and irregular bowel pattern.     #Abdominal pain, generalized, RUQ  #Irregular Bowel Pattern  #Fecal Incontinence    Long history of abdominal pain and irregular bowel pattern with oscillation between constipation and diarrhea, more of a tendency for constipation at baseline.  Abdominal pain tends to be right upper quadrant difficult for her to describe but reports it is burning but also bloating and sharp gas pain.  In the last 1-1.5 years she has also been experiencing fecal incontinence overnight without warning.  Prior upper endoscopy and colonoscopy in  were unremarkable.  Abdominal ultrasound in  with normal gallbladder.  She had a recent exacerbation of her symptoms late April/May in the context of significantly increased stress.  She did have a few days with nausea and vomiting with pain during this time.  Reassuringly symptoms have now significantly improved with the addition of Bentyl, though continues to tend towards constipation with straining and hard stools with intermittent fecal incontinence.  Most suspicious for underlying IBS-M meeting criteria with abdominal pain with change in  stool form and frequency, and improvement with Bentyl and possible pelvic floor dysfunction with fecal incontinence.  Other differential includes GERD/PUD with burning type pain and intermittent heartburn, biliary with pain in the right upper quadrant though not necessarily associated with eating, gastroparesis, intermittent SBO, microscopic colitis, IBD, SIBO, pancreatic etiology.  She is due for screening colonoscopy with family history of advanced polyps, we will proceed with this to ensure no findings to account for her constipation,, diarrhea or fecal incontinence with biopsies for microscopic colitis.  We will also proceed with upper endoscopy at the same time to assess for GERD, PUD, gastric outlet obstruction.  Will also proceed with abdominal ultrasound given right upper quadrant pain.  In the meantime she can continue Bentyl as this has been working well for her.  To regulate bowel pattern will start MiraLAX that she is done this in the past with some success, start 1 capful daily, if having  diarrhea okay to hold, advised against antidiarrheal agents.  Following this we can add soluble fiber starting at half tablespoon with slow titration upward by half tablespoon every couple weeks.      Discussed differential, outlined options and reviewed medications with patient.  Mutually agreed upon plan outlined below.  PLAN:  -- Continue Bentyl as needed for abdominal pain up to 3 times per day  -- Start Miralax 1 capful daily, ok to hold when having loose stools  -------- pending response after a few weeks when bowel pattern more regulated without significant constipation can add soluble fiber powder such as Metamucil, Citrucel, Benefiber starting at half tablespoon, increasing by half tablespoon every 1 to 2 weeks until desired stool consistency achieved.  -- Get abdominal ultrasound to assess right upper sided pain  -- Schedule colonoscopy and upper endoscopy       Colorectal cancer screening: Last done 2019  and normal, given history of advanced polyps in her father, recommend repeat in 5 years () for surveillance      RTC 3 months    Thank you for this consultation.  It was a pleasure to participate in the care of this patient; please contact us with any further questions.     70Minutes was spent on the date of the encounter during chart review, history and exam, documentation, and further activities as noted       Kristin Blanc PA-C  Division of Hepatology, Gastroenterology & Nutrition  [facility]      HPI  Ms. Muñoz is a 48 year old year old female with history of obesity, DM2, JASWANT, migraines, chronic pain syndrome, bipolar disorder, depression, ROWDY, PTSD, Borderline Personality Disorder, history of substance use (benzodiapapines), IBS, s/p  (), s/p appendectomy () who presents for evaluation of abdominal pain and irregular bowel pattern. They are new to the [facility] GI clinic and this is my first encounter with the patient.     Longstanding history of abdominal pain and irregular bowel movements, holds diagnosis of IBS-M.     Patient reports symptoms have been present for years with prior visit at [facility].  She tends to constipation, then will have days with normal stooling and diarrhea.  She also reports frequent abdominal pain that tends to be more upper and right-sided, difficult for her to describe exact location.  Reports it feels like a burning but also a bloating and gas pain.  Tends to have pain irregardless of bowel pattern.    Late May had worsening of abdominal pain and cramping, diarrhea, and had reported nausea with eating.  Abdominal XR done 24 which showed small amount of stool, no ileus or obstruction. CT A/P then done 24 which showed no acute findings in the abdomen or pelvis. Tiny fat containing umbilical hernia. Recommended Bentyl as needed for her IBS.    Today she reports that her abdominal pain has really improved with use of Bentyl.  Improves  enough that she does not notice it at all.  She reports that she was having a very stressful time in May when symptoms were occurring, in general stress team seems to worsen her GI symptoms.  Reports she wants to get her GI health in order if she notices impacts the rest of her body.    Her bowel pattern continues to oscillate.  When constipated will have hard small stools which she needs straining to defecate and does not feel fully evacuated.  Following this she will typically have days of more normal stools.  Intermittently she will have more significant diarrhea with 10+ stools per day that are urgent and loose or liquid.  She also notes intermittent fecal leakage at night, stooling does not wake her up but wakes up to stool in her bed.  Currently happening about twice per month.  No blood in stool or melena.  She has used MiraLAX and Metamucil in the past but not consistently.    She denies nausea or vomiting.  Reports this is rare.      Occasional heartburn and acid regurgitation.  Not taking any acid suppression.  Uses Tums intermittently.    Last EGD and colonoscopy in 2019 were unremarkable. Biopsies of stomach not available. CT 2/2023 with possible mild colitis vs nondistention, otherwise unremarkable. Abdominal US 2/2022 normal.    Weight stable, no unintentional weight loss.    NSAIDs a couple times per month.  No tobacco or alcohol.  Uses medical cannabis, no other drugs.    Family history of advanced adenoma polyps in her father and colon cancer in her maternal grandfather (had other cancers as well unsure of primary).    ROS:  Complete 10 System ROS performed. All are negative except as documented below, in the HPI, or in patient questionnaire from today's visit.    PROBLEM LIST  Patient Active Problem List    Diagnosis Date Noted    Lithium toxicity, undetermined intent, initial encounter 12/13/2023     Priority: Medium    Moderate benzodiazepine use disorder (H) 05/27/2021     Priority: Medium     Breast complaint 11/09/2020     Priority: Medium    Opacity of lung on imaging study 04/22/2020     Priority: Medium    Visual disturbance 04/22/2020     Priority: Medium     R ;   ophthalmopathy report pending        Bilateral groin pain 03/18/2020     Priority: Medium    Recurrent sinus infections 11/21/2019     Priority: Medium    Irritable bowel syndrome with both constipation and diarrhea 09/23/2019     Priority: Medium    Anxiety 09/23/2019     Priority: Medium    Persistent depressive disorder 05/24/2019     Priority: Medium    Other chronic back pain 02/21/2019     Priority: Medium    Headache disorder 02/21/2019     Priority: Medium    Acquired hypothyroidism 10/01/2018     Priority: Medium    Class 2 obesity due to excess calories with serious comorbidity and body mass index (BMI) of 36.0 to 36.9 in adult 09/13/2018     Priority: Medium    Mixed hyperlipidemia 09/13/2018     Priority: Medium    Morbid obesity (H) 08/09/2018     Priority: Medium    Former tobacco use 08/09/2018     Priority: Medium     Quit Smoking May 2018 with nicotine patches      Abdominal pain 11/21/2017     Priority: Medium    Incontinence of urine in female 11/10/2017     Priority: Medium     Overview:   Mixed, is seeing [facility], plans for med trial and procedure      Screening for diabetic peripheral neuropathy 11/10/2017     Priority: Medium    Need for prophylactic vaccination against Streptococcus pneumoniae (pneumococcus) 11/10/2017     Priority: Medium    Uncontrolled type 2 diabetes mellitus with hyperglycemia (H) 03/21/2017     Priority: Medium    Hostile behavior 04/28/2016     Priority: Medium    JASWANT (obstructive sleep apnea) 01/11/2016     Priority: Medium    Elevated liver enzymes 01/11/2016     Priority: Medium    TMJ (temporomandibular joint syndrome) 01/11/2016     Priority: Medium    Nausea 12/15/2015     Priority: Medium    Chronic migraine without aura without status migrainosus, not intractable 11/23/2015      Priority: Medium     fioricet plain Q 6 hrs, no more than 4/day and gets 20 at a time      CSA on file from Dr Husain, dated 9/8/15  (update with current primary care provider)  Last  check - 11/26/18 provider to review=- multiple meds from multiple providers      Insomnia 11/03/2015     Priority: Medium     CSA on file from Dr Husain dated 9/8/15  Last  check-2/19/19 multiple meds from multiple outside providers      Chronic pain syndrome 09/08/2015     Priority: Medium     Patient is followed by VIRAL Deleon for ongoing prescription of pain medication.  All refills should be approved by this provider, or covering partner.    Medication(s):  Lyrica 150 mg bid, Fioricet with codeine prn, klonopin and ambien to be prescribed by psych, not [facility].  Maximum quantity per month: 60, 20  Clinic visit frequency required: Q 3 months     Controlled substance agreement on file: Yes       Date(s): 9/8/2015  New CSA needed.  Pain Clinic evaluation in the past: No    DIRE Total Score(s):  No flowsheet data found.    Last [facility] website verification: 2/23/21 multiple meds from multiple outside providers    Clonazepam and dextroamphetamine prescribed by outside providers.       Acne 08/06/2015     Priority: Medium    Suicidal ideation 02/16/2015     Priority: Medium    Chronic fatigue 05/22/2014     Priority: Medium    Female stress incontinence 05/22/2014     Priority: Medium    Bipolar 1 disorder  with psychosis 04/02/2014     Priority: Medium    Major depression      Priority: Medium     Sees psychiatrist Dr. Temple      Generalized anxiety disorder      Priority: Medium     1/11/16 Dr. Husain: Melody you must find a psychiatrist to take over your complex medications before I retire at the end of the month. I recommend you make another appointment with the NP Adriana at [facility] and have her prescribe your meds even though you don't like her until you can be seen by Dr. Cisneros at the end of March and talk to him  about Latuda.    16 - Dr Adriana Caputo will be the only provider ordering pt's Zolpidem from now on. Pt is going back up to 10 mg tab. Do not refill Zolpidem ([facility])    3/29/16- Adriana Caputo CNP will be prescribing clonazepam, not FVBX    CSA on file from Dr Husain dated 16 - needs with current primary care provider   Last  check - 18 provider to review      Constipation      Priority: Medium    Nightmares      Priority: Medium    Knee pain      Priority: Medium    Mild persistent asthma without complication 2012     Priority: Medium    Latent tuberculosis 2012     Priority: Medium    Mantoux: positive 10/17/2012     Priority: Medium    Allergic rhinitis 2009     Priority: Medium     Overview:   Rhinitis Allergic  NOS      Migraine 2003     Priority: Medium     Overview:   Migraine Without Aura         PERTINENT PAST MEDICAL HISTORY:  Past Medical History:   Diagnosis Date    Abnormal pap age 24    and paps normal ever since and no other testing needed per patient    Anxiety     Bipolar 1 disorder (H)     with psychosis     Diabetes mellitus 2017    Endometriosis     Endometriosis s/po EDWIGE 10-     h/o Suicidal ideation 2013    in past, not current, sees psychiatrist and therapist    Intermittent asthma 2012    Kidney stone     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    Major depression     Ted Pope  [id number]    Migraines     otc excedrin prn    JASWANT (obstructive sleep apnea)     Sleep apnea 2014    uses Cpap    Substance abuse (H)     no use since summer 2013    Vertigo 2015       PREVIOUS SURGERIES:  Past Surgical History:   Procedure Laterality Date     SECTION       for twins    COLONOSCOPY      HYSTERECTOMY, PAP NO LONGER INDICATED Bilateral     pt reported    LAPAROSCOPIC APPENDECTOMY N/A 2017    Procedure: LAPAROSCOPIC APPENDECTOMY;  LAPAROSCOPIC  APPENDECTOMY and resection of epiploic tag;  Surgeon: Roberto Robins MD;  Location: [facility] OR    LAPAROSCOPY      - endometriosis    little finger surgery left  1980    SLING BLADDER NOS  05/2014    tubal ligation bilateral      wisdom teeth removal         PREVIOUS ENDOSCOPY:  Colonoscopy 3/15/19: entire colon normal    EGD 3/15/19: normal esophagus, stomach, duodenum. We do not have biopsy results.    ALLERGIES:     Allergies   Allergen Reactions    Hydrocodone Nausea and Vomiting    Tamiflu [Oseltamivir]      Rash    Tylenol W/Codeine [Acetaminophen-Codeine] Nausea and Vomiting     Pt request that RN add this       PERTINENT MEDICATIONS:    Current Outpatient Medications:     albuterol (PROAIR HFA/PROVENTIL HFA/VENTOLIN HFA) 108 (90 Base) MCG/ACT inhaler, INHALE 1 TO 2 PUFFS INTO THE LUNGS EVERY 4 HOURS AS NEEDED FOR SHORTNESS OF BREATH OR WHEEZING, Disp: 18 g, Rfl: 1    budesonide-formoterol (SYMBICORT) 160-4.5 MCG/ACT Inhaler, Inhale 2 puffs into the lungs 2 times daily as needed (ASTHMA FLARE), Disp: 10.2 g, Rfl: 1    buPROPion (WELLBUTRIN XL) 150 MG 24 hr tablet, Take 150 mg by mouth every morning, Disp: , Rfl:     cetirizine (ZYRTEC) 10 MG tablet, Take 10 mg by mouth daily, Disp: , Rfl:     clonazePAM (KLONOPIN) 1 MG tablet, Take 1 mg by mouth 3 times daily, Disp: , Rfl:     dextroamphetamine (DEXEDRINE SPANSULE) 5 MG 24 hr capsule, Take 5 mg by mouth 2 times daily, Disp: , Rfl:     dextroamphetamine (DEXTROSTAT) 10 MG tablet, Take 10 mg by mouth 4 times daily, Disp: , Rfl:     dicyclomine (BENTYL) 10 MG capsule, Take 1 capsule (10 mg) by mouth 3 times daily as needed, Disp: 90 capsule, Rfl: 0    doxycycline hyclate (VIBRAMYCIN) 100 MG capsule, Take 1 capsule (100 mg) by mouth 2 times daily (Patient not taking: Reported on 6/28/2024), Disp: 20 capsule, Rfl: 0    gabapentin (NEURONTIN) 300 MG capsule, Take 300 mg by mouth 3 times daily, Disp: , Rfl:     lamoTRIgine (LAMICTAL) 100 MG tablet, Take 200 mg by mouth  at bedtime, Disp: , Rfl:     lithium (ESKALITH CR/LITHOBID) 450 MG CR tablet, Take 1 tablet (450 mg) by mouth at bedtime (Patient not taking: Reported on 6/28/2024), Disp: 30 tablet, Rfl: 0    lithium ER (LITHOBID) 300 MG CR tablet, Take 300 mg by mouth every morning, Disp: , Rfl:     lurasidone (LATUDA) 120 MG TABS tablet, Take 120 mg by mouth daily (with dinner), Disp: , Rfl: 3    naloxone (NARCAN) 4 MG/0.1ML nasal spray, Spray 1 spray (4 mg) into one nostril alternating nostrils once as needed for opioid reversal every 2-3 minutes until assistance arrives (Patient not taking: Reported on 6/28/2024), Disp: 0.2 mL, Rfl: 1    nicotine polacrilex (NICORETTE) 4 MG gum, Take 4 mg by mouth, Disp: , Rfl:     polyethylene glycol (MIRALAX) 17 GM/Dose powder, 17 g by Nasogastric route, Disp: , Rfl:     polyethylene glycol-propylene glycol (SYSTANE) 0.4-0.3 % SOLN ophthalmic solution, Apply 1 drop to eye, Disp: , Rfl:     traZODone (DESYREL) 100 MG tablet, Take 100 mg by mouth at bedtime, Disp: , Rfl:     Vitamin D3 (VITAMIN D, CHOLECALCIFEROL,) 25 mcg (1000 units) tablet, Take 1 tablet by mouth daily, Disp: , Rfl:     zolpidem (AMBIEN) 10 MG tablet, Take 10 mg by mouth at bedtime, Disp: , Rfl:    No other NSAID/anticoagulation reported by patient.   No other OTC/herbal/supplements reported by patient.  Vitamin D and Fish oil    SOCIAL HISTORY:  Social History     Socioeconomic History    Marital status:      Spouse name: Not on file    Number of children: Not on file    Years of education: Not on file    Highest education level: Not on file   Occupational History     Employer: SELF EMPLOYED.     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 No     Service Not Asked    Blood Transfusions Not Asked    Caffeine Concern Not Asked    Occupational Exposure Not Asked    Hobby Hazards Not Asked    Sleep Concern Not Asked    Stress Concern Not Asked    Weight Concern Not Asked    Special Diet Not Asked    Back Care Not Asked    Exercise Not Asked    Bike Helmet Yes    Seat Belt Yes    Self-Exams Not Asked   Social History Narrative    Not on file     Social Determinants of Health     Financial Resource Strain: Medium Risk (6/3/2020)    Received from [facility]    Overall Financial Resource Strain (CARDIA)     Difficulty of Paying Living Expenses: Somewhat hard   Food Insecurity: Food Insecurity Present (6/3/2020)    Received from [facility]    Hunger Vital Sign     Worried About Running Out of Food in the Last Year: Sometimes true     Ran Out of Food in the Last Year: Never true   Transportation Needs: No Transportation Needs (6/3/2020)    Received from [facility]    PRAPARE - Transportation     Lack of Transportation (Medical): No     Lack of Transportation (Non-Medical): No   Physical Activity: Insufficiently Active (6/3/2020)    Received from [facility]    Exercise Vital Sign     Days of Exercise per Week: 5 days     Minutes of Exercise per Session: 20 min   Stress: No Stress Concern Present (6/3/2020)    Received from [facility]    [employer] - Occupational Stress Questionnaire     Feeling of Stress : Only a little   Social Connections: Unknown (6/20/2023)    Received from [facility] [employer]    Social Connections     Frequency of Communication with  Friends and Family: Not on file   Interpersonal Safety: Low Risk  (5/17/2024)    Interpersonal Safety     Do you feel physically and emotionally safe where you currently live?: Yes     Within the past 12 months, have you been hit, slapped, kicked or otherwise physically hurt by someone?: No     Within the past 12 months, have you been humiliated or emotionally abused in other ways by your partner or ex-partner?: No   Housing Stability: Not on file       Tobacco: No  Alcohol: No  Cannabis: Medical  Drugs: No    FAMILY HISTORY:  Father with advanced polyp  Maternal Grandfather - colon cancer    No colon/panc/esophageal/other GI CA. No other Sr or other HPS-related Jarred. No IBD or celiac disease. No other Autoimmune, Liver, or Thyroid disease.  Family History   Problem Relation Age of Onset    Hypertension Mother     Heart Disease Father         palpitations    Depression Sister     Anxiety Disorder Sister     Breast Cancer Maternal Grandmother         before 50    Heart Disease Maternal Grandmother         CHF    Cancer Maternal Grandfather 72        Pancreatic Cancer    Alzheimer Disease Paternal Grandfather     Breast Cancer Paternal Aunt         early 60s    Bipolar Disorder Other     Autism Spectrum Disorder Other     Ovarian Cancer No family hx of        Past/family/social history reviewed and no changes        PHYSICAL EXAMINATION:  Constitutional: AAOx3, cooperative, pleasant, not dyspneic/diaphoretic, no acute distress  Vitals reviewed: LMP  (LMP Unknown)   Wt:   Wt Readings from Last 2 Encounters:   12/13/23 68.2 kg (150 lb 4.8 oz)   08/04/23 68 kg (150 lb)      No physical exam with phone visit.    PERTINENT STUDIES:  Imaging  CT A/P 6/6/24:  IMPRESSION:   1.  No acute findings within the abdomen or pelvis.  2.  Tiny fat-containing umbilical hernia.  3.  Similar bilateral symmetric subcentimeter inguinal lymph nodes  that are likely reactive.    Abdominal XR 5/28/24:  FINDINGS: Small amount of stool. No  free air. There are no air filled  distended loops of small bowel. The colon is not distended. The lung  bases are unremarkable.                                                                IMPRESSION: Nonobstructed bowel gas pattern.    Labs  Reviewed available

## 2024-07-12 NOTE — PATIENT INSTRUCTIONS
It was a pleasure taking care of you today.  I've included a brief summary of our discussion and care plan from today's visit below.  Please review this information with your primary care provider.  ______________________________________________________________________    My recommendations are summarized as follows:  -- Continue Bentyl as needed for abdominal pain up to 3 times per day  -- Start Miralax 1 capful daily, ok to hold when having loose stools  -- Get abdominal ultrasound to assess right upper sided pain  -- Schedule colonoscopy and upper endoscopy    -- please see scheduling information provided below     Return to GI Clinic in 4 months to review your progress.    ______________________________________________________________________    How do I schedule labs, imaging studies, or procedures that were ordered in clinic today?     Labs: To schedule lab appointment at the [facility], use my chart or call [phone]. If you have a [facility] lab closer to home where you are regularly seen you can give them a call.     Procedures: If a colonoscopy, upper endoscopy, breath test, esophageal manometry, or pH impedence was ordered today, our endoscopy team will call you to schedule this. If you have not heard from our endoscopy team within a week, please call [phone] to schedule.     Imaging Studies: If you were scheduled for a CT scan, X-ray, MRI, ultrasound, HIDA scan or other imaging study, please call [phone] to have this scheduled.     Referral: If a referral to another specialty was ordered, expect a phone call or follow instructions above. If you have not heard from anyone regarding your referral in a week, please call our clinic to check the status.     Who do I call with any questions after my visit?  Please be in touch if there are any further questions that arise following today's visit.  There are multiple ways to contact your gastroenterology care team.      During  business hours, you may reach a Gastroenterology nurse at [phone]    To schedule or reschedule an appointment, please call [phone].     You can always send a secure message through CineFlow.  CineFlow messages are answered by your nurse or doctor typically within 24 hours.  Please allow extra time on weekends and holidays.      For urgent/emergent questions after business hours, you may reach the on-call GI Fellow by contacting the [facility] at [phone].     How will I get the results of any tests ordered?    You will receive all of your results.  If you have signed up for [employer] System[employer], any tests ordered at your visit will be available to you after your provider reviews them.  Typically this takes 1-2 weeks.  If there are urgent results that require a change in your care plan, your provider or nurse will call you to discuss the next steps.      What is CineFlow?  CineFlow is a secure way for you to access all of your healthcare records from the [facility].  It is a web based computer program, so you can sign on to it from any location.  It also allows you to send secure messages to your care team.  I recommend signing up for CineFlow access if you have not already done so and are comfortable with using a computer.      How to I schedule a follow-up visit?  If you did not schedule a follow-up visit today, please call [phone] to schedule a follow-up office visit.      Sincerely,    Kristin Blanc PA-C  Division of Gastroenterology, Hepatology & Nutrition  [facility]

## 2024-07-12 NOTE — NURSING NOTE
Current patient location: [address] [place] [id number]    Is the patient currently in the state of [place]? YES    Visit mode:VIDEO    If the visit is dropped, the patient can be reconnected by: VIDEO VISIT: Text to cell phone:   Telephone Information:   Mobile [phone]       Will anyone else be joining the visit? NO  (If patient encounters technical issues they should call [phone]    How would you like to obtain your AVS? MyChart    Are changes needed to the allergy or medication list? No    Are refills needed on medications prescribed by this physician? NO    Reason for visit: Consult    Galo GRAMAJO

## 2024-07-18 ENCOUNTER — NURSE TRIAGE (OUTPATIENT)
Dept: FAMILY MEDICINE | Facility: CLINIC | Age: 49
End: 2024-07-18
Payer: COMMERCIAL

## 2024-07-18 NOTE — TELEPHONE ENCOUNTER
Patient calls with concerns of an asthma attack. She reports wheezing, shortness of breath and a productive cough. Pt is able to speak in full sentences. She uses a daily inhaler, she is unsure of name. Also using albuterol nebs and albuterol rescue inhaler. Current HR is 96 and O2 is 93%. Pt reports she has had many asthma exacerbations in the past and is requesting prednisone. Dispo is to go to ED/C now. Pt will go to  now. Her daughter will drive her.    Advised to present to ER with any worsening symptoms.    Skye Carpenter RN on 7/18/2024 at 12:32 PM    Reason for Disposition   Wheezing (high pitched whistling sound) and previous asthma attacks or use of asthma medicines   MODERATE asthma attack (e.g., SOB at rest, speaks in phrases, audible wheezes) and not resolved after 2 or 3 inhaler or nebulizer treatments given 20 minutes apart    Additional Information   Negative: SEVERE asthma attack (e.g., struggling for each breath, speaks in single words, loud wheezes, pulse >120) (RED Zone)   Negative: Bluish (or gray) lips or face   Negative: Difficult to awaken or acting confused (e.g., disoriented, slurred speech)   Negative: Passed out (i.e., fainted, collapsed and was not responding)   Negative: Wheezing started suddenly after medicine, an allergic food, or bee sting   Negative: Sounds like a life-threatening emergency to the triager   Negative: SEVERE difficulty breathing (e.g., struggling for each breath, speaks in single words, pulse > 120)   Negative: Breathing stopped and hasn't returned   Negative: Choking on something   Negative: Bluish (or gray) lips or face   Negative: Difficult to awaken or acting confused (e.g., disoriented, slurred speech)   Negative: Passed out (i.e., fainted, collapsed and was not responding)   Negative: Wheezing started suddenly after medicine, an allergic food, or bee sting   Negative: Stridor (harsh sound while breathing in)   Negative: Slow, shallow and weak breathing    Negative: Sounds like a life-threatening emergency to the triager   Negative: Chest pain   Negative: MODERATE asthma attack (e.g., SOB at rest, speaks in phrases, audible wheezes) AND doesn't have neb or inhaler available   Negative: Peak flow rate less than 50% of baseline level (RED Zone)   Negative: Oxygen level (e.g., pulse oximetry) 90% or lower   Negative: Hospitalized before with asthma; now feels same    Answer Assessment - Initial Assessment Questions  1. RESPIRATORY STATUS: wheezing, coughing, shortness of breath   2. ONSET: 3 days ago   3. TRIGGER: Unsure  4. PEAK EXPIRATORY FLOW RATE (PEFR): Denies  5. SEVERITY:MODERATE: SOB at rest, SOB with minimal exertion and prefers to sit, cannot lie down flat  6. ASTHMA MEDICINES:  albuterol nebs 3-4 times, uses daily inhaler, unsure of name.   Rescue albuterol inhaler  7. INHALED QUICK-RELIEF TREATMENTS FOR THIS ATTACK: last neb tx was 8am  8. OTHER SYMPTOMS: Denies: chest pain, fever, runny nose  9. O2 SATURATION MONITOR:  93%  10. PREGNANCY: Denies    Answer Assessment - Initial Assessment Questions  1. RESPIRATORY STATUS: coughing, shortness of breath, wheezing  2. ONSET: 3 days  3. PATTERN: constant  4. SEVERITY: MODERATE: SOB at rest, SOB with minimal exertion and prefers to sit, cannot lie down flat  5. RECURRENT SYMPTOM: Prednisone with asthma exacerbations   6. CARDIAC HISTORY: Denies: heart attack, angina, bypass surgery, angioplasty  7. LUNG HISTORY: Reports: asthma  Denies: hx of pulmonary embolism       8. CAUSE: asthma exacerbation   9. OTHER SYMPTOMS:   Reports: productive cough, denies hemoptysis   Denies: dizziness, runny nose, chest pain, fever  10. O2 SATURATION MONITOR: 93%  11. PREGNANCY: Denies  12. TRAVEL: Denies    Protocols used: Breathing Difficulty-A-[place], Asthma Attack-A-[place]

## 2024-07-26 ENCOUNTER — TELEPHONE (OUTPATIENT)
Dept: GASTROENTEROLOGY | Facility: CLINIC | Age: 49
End: 2024-07-26
Payer: COMMERCIAL

## 2024-07-26 NOTE — TELEPHONE ENCOUNTER
Left Voicemail (1st Attempt) for the patient to call back and schedule the following:    Appointment type: return  Provider: Guanaco  Return date: 11/12/24  Specialty phone number: [phone]  Additional appointment(s) needed:   Additonal Notes:

## 2024-08-01 ENCOUNTER — TRANSFERRED RECORDS (OUTPATIENT)
Dept: HEALTH INFORMATION MANAGEMENT | Facility: CLINIC | Age: 49
End: 2024-08-01

## 2024-08-07 NOTE — TELEPHONE ENCOUNTER
Exercise Session Details    Tammy Louie CNP -     Patient reports that for the past 3 weeks after giving herself Trulicity injections she gets hives around the area of injection - denies shortness of breath. Hives are not widespread only around injection site     Routing to Tammy Louie CNP for recommendations - patient does have upcoming visit that likely will need to be changed to phone visit     Next 5 appointments (look out 90 days)    Apr 23, 2020  8:30 AM CDT  (Arrive by 8:15 AM)  Return Visit with KIRAN Carballo CNP  [facility] ([facility]) [address]  [facility] [address]  [phone]        Patrizia Muñoz, Registered Nurse   [facility]

## 2024-09-04 ENCOUNTER — APPOINTMENT (OUTPATIENT)
Dept: GENERAL RADIOLOGY | Facility: CLINIC | Age: 49
End: 2024-09-04
Attending: EMERGENCY MEDICINE
Payer: COMMERCIAL

## 2024-09-04 ENCOUNTER — HOSPITAL ENCOUNTER (EMERGENCY)
Facility: CLINIC | Age: 49
Discharge: HOME | End: 2024-09-04
Attending: EMERGENCY MEDICINE | Admitting: EMERGENCY MEDICINE
Payer: COMMERCIAL

## 2024-09-04 ENCOUNTER — APPOINTMENT (OUTPATIENT)
Dept: ULTRASOUND IMAGING | Facility: CLINIC | Age: 49
End: 2024-09-04
Attending: EMERGENCY MEDICINE
Payer: COMMERCIAL

## 2024-09-04 ENCOUNTER — VIRTUAL VISIT (OUTPATIENT)
Dept: FAMILY MEDICINE | Facility: CLINIC | Age: 49
End: 2024-09-04
Payer: COMMERCIAL

## 2024-09-04 VITALS
BODY MASS INDEX: 27.15 KG/M2 | HEART RATE: 72 BPM | OXYGEN SATURATION: 97 % | DIASTOLIC BLOOD PRESSURE: 88 MMHG | RESPIRATION RATE: 20 BRPM | WEIGHT: 150.35 LBS | TEMPERATURE: 97.9 F | SYSTOLIC BLOOD PRESSURE: 113 MMHG

## 2024-09-04 DIAGNOSIS — R05.9 COUGH, UNSPECIFIED TYPE: ICD-10-CM

## 2024-09-04 DIAGNOSIS — J34.89 SINUS PRESSURE: ICD-10-CM

## 2024-09-04 DIAGNOSIS — R10.11 RUQ ABDOMINAL PAIN: ICD-10-CM

## 2024-09-04 DIAGNOSIS — J06.9 URI WITH COUGH AND CONGESTION: Primary | ICD-10-CM

## 2024-09-04 DIAGNOSIS — R06.02 SOB (SHORTNESS OF BREATH): ICD-10-CM

## 2024-09-04 LAB
ALBUMIN SERPL BCG-MCNC: 4.3 G/DL (ref 3.5–5.2)
ALBUMIN UR-MCNC: NEGATIVE MG/DL
ALP SERPL-CCNC: 95 U/L (ref 40–150)
ALT SERPL W P-5'-P-CCNC: 18 U/L (ref 0–50)
ANION GAP SERPL CALCULATED.3IONS-SCNC: 12 MMOL/L (ref 7–15)
APPEARANCE UR: CLEAR
AST SERPL W P-5'-P-CCNC: 16 U/L (ref 0–45)
BACTERIA #/AREA URNS HPF: ABNORMAL /HPF
BASOPHILS # BLD AUTO: 0.1 10E3/UL (ref 0–0.2)
BASOPHILS NFR BLD AUTO: 1 %
BILIRUB SERPL-MCNC: <0.2 MG/DL
BILIRUB UR QL STRIP: NEGATIVE
BUN SERPL-MCNC: 10.5 MG/DL (ref 6–20)
CALCIUM SERPL-MCNC: 9.5 MG/DL (ref 8.8–10.4)
CHLORIDE SERPL-SCNC: 103 MMOL/L (ref 98–107)
COLOR UR AUTO: ABNORMAL
CREAT SERPL-MCNC: 0.78 MG/DL (ref 0.51–0.95)
EGFRCR SERPLBLD CKD-EPI 2021: >90 ML/MIN/1.73M2
EOSINOPHIL # BLD AUTO: 0.6 10E3/UL (ref 0–0.7)
EOSINOPHIL NFR BLD AUTO: 5 %
ERYTHROCYTE [DISTWIDTH] IN BLOOD BY AUTOMATED COUNT: 12.7 % (ref 10–15)
FLUAV RNA SPEC QL NAA+PROBE: NEGATIVE
FLUBV RNA RESP QL NAA+PROBE: NEGATIVE
GLUCOSE SERPL-MCNC: 100 MG/DL (ref 70–99)
GLUCOSE UR STRIP-MCNC: NEGATIVE MG/DL
GROUP A STREP BY PCR: NOT DETECTED
HCO3 SERPL-SCNC: 22 MMOL/L (ref 22–29)
HCT VFR BLD AUTO: 40.8 % (ref 35–47)
HGB BLD-MCNC: 13.2 G/DL (ref 11.7–15.7)
HGB UR QL STRIP: NEGATIVE
HYALINE CASTS: 1 /LPF
IMM GRANULOCYTES # BLD: 0 10E3/UL
IMM GRANULOCYTES NFR BLD: 0 %
KETONES UR STRIP-MCNC: NEGATIVE MG/DL
LEUKOCYTE ESTERASE UR QL STRIP: NEGATIVE
LIPASE SERPL-CCNC: 68 U/L (ref 13–60)
LYMPHOCYTES # BLD AUTO: 3.4 10E3/UL (ref 0.8–5.3)
LYMPHOCYTES NFR BLD AUTO: 33 %
MCH RBC QN AUTO: 27.9 PG (ref 26.5–33)
MCHC RBC AUTO-ENTMCNC: 32.4 G/DL (ref 31.5–36.5)
MCV RBC AUTO: 86 FL (ref 78–100)
MONOCYTES # BLD AUTO: 0.6 10E3/UL (ref 0–1.3)
MONOCYTES NFR BLD AUTO: 6 %
MUCOUS THREADS #/AREA URNS LPF: PRESENT /LPF
NEUTROPHILS # BLD AUTO: 5.6 10E3/UL (ref 1.6–8.3)
NEUTROPHILS NFR BLD AUTO: 55 %
NITRATE UR QL: NEGATIVE
NRBC # BLD AUTO: 0 10E3/UL
NRBC BLD AUTO-RTO: 0 /100
PH UR STRIP: 6 [PH] (ref 5–7)
PLATELET # BLD AUTO: 512 10E3/UL (ref 150–450)
POTASSIUM SERPL-SCNC: 4.1 MMOL/L (ref 3.4–5.3)
PROT SERPL-MCNC: 6.7 G/DL (ref 6.4–8.3)
RBC # BLD AUTO: 4.73 10E6/UL (ref 3.8–5.2)
RBC URINE: <1 /HPF
RSV RNA SPEC NAA+PROBE: NEGATIVE
SARS-COV-2 RNA RESP QL NAA+PROBE: NEGATIVE
SODIUM SERPL-SCNC: 137 MMOL/L (ref 135–145)
SP GR UR STRIP: 1.01 (ref 1–1.03)
SQUAMOUS EPITHELIAL: 2 /HPF
UROBILINOGEN UR STRIP-MCNC: NORMAL MG/DL
WBC # BLD AUTO: 10.3 10E3/UL (ref 4–11)
WBC URINE: 2 /HPF

## 2024-09-04 PROCEDURE — 87651 STREP A DNA AMP PROBE: CPT | Performed by: EMERGENCY MEDICINE

## 2024-09-04 PROCEDURE — 96374 THER/PROPH/DIAG INJ IV PUSH: CPT

## 2024-09-04 PROCEDURE — 96375 TX/PRO/DX INJ NEW DRUG ADDON: CPT

## 2024-09-04 PROCEDURE — 93005 ELECTROCARDIOGRAM TRACING: CPT

## 2024-09-04 PROCEDURE — 250N000009 HC RX 250: Performed by: EMERGENCY MEDICINE

## 2024-09-04 PROCEDURE — 87637 SARSCOV2&INF A&B&RSV AMP PRB: CPT | Performed by: EMERGENCY MEDICINE

## 2024-09-04 PROCEDURE — 99442 PR PHYSICIAN TELEPHONE EVALUATION 11-20 MIN: CPT | Performed by: NURSE PRACTITIONER

## 2024-09-04 PROCEDURE — 81001 URINALYSIS AUTO W/SCOPE: CPT | Performed by: EMERGENCY MEDICINE

## 2024-09-04 PROCEDURE — 99285 EMERGENCY DEPT VISIT HI MDM: CPT | Mod: 25

## 2024-09-04 PROCEDURE — 82040 ASSAY OF SERUM ALBUMIN: CPT | Performed by: EMERGENCY MEDICINE

## 2024-09-04 PROCEDURE — 76705 ECHO EXAM OF ABDOMEN: CPT

## 2024-09-04 PROCEDURE — 85025 COMPLETE CBC W/AUTO DIFF WBC: CPT | Performed by: EMERGENCY MEDICINE

## 2024-09-04 PROCEDURE — 83690 ASSAY OF LIPASE: CPT | Performed by: EMERGENCY MEDICINE

## 2024-09-04 PROCEDURE — 71046 X-RAY EXAM CHEST 2 VIEWS: CPT

## 2024-09-04 PROCEDURE — G2211 COMPLEX E/M VISIT ADD ON: HCPCS | Mod: 93 | Performed by: NURSE PRACTITIONER

## 2024-09-04 PROCEDURE — 94640 AIRWAY INHALATION TREATMENT: CPT

## 2024-09-04 PROCEDURE — 250N000011 HC RX IP 250 OP 636: Performed by: EMERGENCY MEDICINE

## 2024-09-04 PROCEDURE — 250N000013 HC RX MED GY IP 250 OP 250 PS 637: Performed by: EMERGENCY MEDICINE

## 2024-09-04 PROCEDURE — 36415 COLL VENOUS BLD VENIPUNCTURE: CPT | Performed by: EMERGENCY MEDICINE

## 2024-09-04 RX ORDER — IPRATROPIUM BROMIDE AND ALBUTEROL SULFATE 2.5; .5 MG/3ML; MG/3ML
6 SOLUTION RESPIRATORY (INHALATION) ONCE
Status: COMPLETED | OUTPATIENT
Start: 2024-09-04 | End: 2024-09-04

## 2024-09-04 RX ORDER — PREDNISONE 20 MG/1
TABLET ORAL
Qty: 10 TABLET | Refills: 0 | Status: SHIPPED | OUTPATIENT
Start: 2024-09-04 | End: 2024-09-27

## 2024-09-04 RX ORDER — KETOROLAC TROMETHAMINE 15 MG/ML
10 INJECTION, SOLUTION INTRAMUSCULAR; INTRAVENOUS ONCE
Status: COMPLETED | OUTPATIENT
Start: 2024-09-04 | End: 2024-09-04

## 2024-09-04 RX ORDER — DOXYCYCLINE 100 MG/1
100 CAPSULE ORAL 2 TIMES DAILY
Qty: 14 CAPSULE | Refills: 0 | Status: SHIPPED | OUTPATIENT
Start: 2024-09-04 | End: 2024-09-11

## 2024-09-04 RX ORDER — ONDANSETRON 2 MG/ML
4 INJECTION INTRAMUSCULAR; INTRAVENOUS EVERY 30 MIN PRN
Status: DISCONTINUED | OUTPATIENT
Start: 2024-09-04 | End: 2024-09-05 | Stop reason: HOSPADM

## 2024-09-04 RX ORDER — ALBUTEROL SULFATE 0.83 MG/ML
2.5 SOLUTION RESPIRATORY (INHALATION) EVERY 6 HOURS PRN
Qty: 90 ML | Refills: 0 | Status: SHIPPED | OUTPATIENT
Start: 2024-09-04

## 2024-09-04 RX ORDER — ACETAMINOPHEN 325 MG/1
975 TABLET ORAL ONCE
Status: COMPLETED | OUTPATIENT
Start: 2024-09-04 | End: 2024-09-04

## 2024-09-04 RX ADMIN — ACETAMINOPHEN 975 MG: 325 TABLET, FILM COATED ORAL at 18:49

## 2024-09-04 RX ADMIN — IPRATROPIUM BROMIDE AND ALBUTEROL SULFATE 6 ML: .5; 3 SOLUTION RESPIRATORY (INHALATION) at 18:03

## 2024-09-04 RX ADMIN — ONDANSETRON 4 MG: 2 INJECTION INTRAMUSCULAR; INTRAVENOUS at 18:50

## 2024-09-04 RX ADMIN — KETOROLAC TROMETHAMINE 10 MG: 15 INJECTION, SOLUTION INTRAMUSCULAR; INTRAVENOUS at 18:50

## 2024-09-04 ASSESSMENT — PATIENT HEALTH QUESTIONNAIRE - PHQ9
SUM OF ALL RESPONSES TO PHQ QUESTIONS 1-9: 8
10. IF YOU CHECKED OFF ANY PROBLEMS, HOW DIFFICULT HAVE THESE PROBLEMS MADE IT FOR YOU TO DO YOUR WORK, TAKE CARE OF THINGS AT HOME, OR GET ALONG WITH OTHER PEOPLE: SOMEWHAT DIFFICULT
SUM OF ALL RESPONSES TO PHQ QUESTIONS 1-9: 8

## 2024-09-04 ASSESSMENT — COLUMBIA-SUICIDE SEVERITY RATING SCALE - C-SSRS
2. HAVE YOU ACTUALLY HAD ANY THOUGHTS OF KILLING YOURSELF IN THE PAST MONTH?: NO
6. HAVE YOU EVER DONE ANYTHING, STARTED TO DO ANYTHING, OR PREPARED TO DO ANYTHING TO END YOUR LIFE?: NO
1. IN THE PAST MONTH, HAVE YOU WISHED YOU WERE DEAD OR WISHED YOU COULD GO TO SLEEP AND NOT WAKE UP?: NO

## 2024-09-04 ASSESSMENT — ACTIVITIES OF DAILY LIVING (ADL)
ADLS_ACUITY_SCORE: 40
ADLS_ACUITY_SCORE: 38
ADLS_ACUITY_SCORE: 40
ADLS_ACUITY_SCORE: 40

## 2024-09-04 NOTE — PATIENT INSTRUCTIONS
To Urgent Care or ER today for evaluation and treatment.    Establish care for asthma, hyperglycemia, and preventative exam.

## 2024-09-04 NOTE — ED TRIAGE NOTES
Patient states that she's having SOB and increased chest pain from her lungs. States her chest feels tight and did a neb just before arriving to the ED. Sob and tightness started yesterday. Also noted that she is having sinus pain, throat pain as well as gall bladder pain. Did a home Covid test that was negative. ABCs intact. VSS.

## 2024-09-04 NOTE — ED PROVIDER NOTES
"  Emergency Department Note      History of Present Illness     Chief Complaint   Chest Pain and Pharyngitis      HPI   Melody Muñoz is a 48 year old female with a history as noted below who presents to the ED today for evaluation of sinus pressure and a cough. The patient reports she has had sinus pressure above and below her eyes for the past two days along with progressively worsening chest pressure. She reports that her chest feels very tight, it is difficult to breathe. She also reports a cough with grey mucus coming up. She has been treating her symptoms with frequent inhaler use and nebulizer treatments every 3 hours that have helped her symptoms, but they always come back. She has also had chills, but denies any fever. She mentions some intermittent upper right quadrant \"throbbing\" pain that started happening in the past day. She reports a history of an abdominal surgical history of a  section and an appendectomy, but she hasn't had abdominal pain like this in the past. She confirms that she still has her gallbladder. She mentions that she has asthma and frequent sinus infections that have been treated with doxycycline.     Independent Historian   None    Review of External Notes   Provider telephone note from today reviewed.  Complained of shortness of breath, recommended to go to the ED or urgent care because of frequent inhaler use.  Noted history of chronic sinusitis.     Past Medical History     Medical History and Problem List   Abnormal pap  Anxiety  Bipolar 1 disorder  Diabetes mellitus   Endometriosis  H/o suicidal ideation   Intermittent asthma  Kidney stone  Latent tuberculosis   Major depression  Migraines  JASWANT  Substance abuse  Vertigo   ADHD  Chronic gastritis without bleeding  Type 2 diabetes mellitus   Intentional acetaminophen overdose  Psychosis  Thrombocytosis  Headache disorder    Medications   Albuterol   Budesonide-formoterol  Bupropion   Cetirizine   Clonazepam "   Dextroamphetamine   Dextroamphetamine   Dicyclomine   Doxycycline hyclate   Gabapentin   Lamotrigine   Lithium   Lurasidone   Naloxone   Nicotine polacrilex   Polyethylene glycol   Trazodone   Zolpidem   Senna  Pristiq  Zyprexa  Gabapentin  Levothyroxine  Topiramate  Levofloxacin  Prednisone  Florinal  Sertraline   Tamsulosin   Zofran  Meclizine     Surgical History    section  Hysterectomy  Appendectomy   Laparoscopy   Left little finger surgery  Bladder sling  Tubal ligation bilateral  [place] teeth removal  Filation and curettage     Physical Exam     Patient Vitals for the past 24 hrs:   BP Temp Temp src Pulse Resp SpO2 Weight   24 2100 104/73 -- -- 70 -- -- --   24 2030 106/70 -- -- 75 -- -- --   24 1745 111/79 -- -- -- -- 95 % --   24 1624 121/82 97.9  F (36.6  C) Temporal 84 20 97 % 68.2 kg (150 lb 5.7 oz)     Physical Exam  General: Sitting on the ED chair  HEENT: Normocephalic, atraumatic  Cardiac: Warm and well perfused  Pulm: Breathing comfortably, no accessory muscle usage, no conversational dyspnea, fine expiratory wheeze bilaterally  GI: Abdomen soft, nontender, no rigidity or guarding  MSK: No bony deformities  Skin: Warm and dry  Neuro: Moves all extremities  Psych: Pleasant mood and affect    Diagnostics     Lab Results   Labs Ordered and Resulted from Time of ED Arrival to Time of ED Departure   ROUTINE UA WITH MICROSCOPIC REFLEX TO CULTURE - Abnormal       Result Value    Color Urine Light Yellow      Appearance Urine Clear      Glucose Urine Negative      Bilirubin Urine Negative      Ketones Urine Negative      Specific Gravity Urine 1.012      Blood Urine Negative      pH Urine 6.0      Protein Albumin Urine Negative      Urobilinogen Urine Normal      Nitrite Urine Negative      Leukocyte Esterase Urine Negative      Bacteria Urine Few (*)     Mucus Urine Present (*)     RBC Urine <1      WBC Urine 2      Squamous Epithelials Urine 2 (*)     Hyaline Casts Urine  1     COMPREHENSIVE METABOLIC PANEL - Abnormal    Sodium 137      Potassium 4.1      Carbon Dioxide (CO2) 22      Anion Gap 12      Urea Nitrogen 10.5      Creatinine 0.78      GFR Estimate >90      Calcium 9.5      Chloride 103      Glucose 100 (*)     Alkaline Phosphatase 95      AST 16      ALT 18      Protein Total 6.7      Albumin 4.3      Bilirubin Total <0.2     LIPASE - Abnormal    Lipase 68 (*)    CBC WITH PLATELETS AND DIFFERENTIAL - Abnormal    WBC Count 10.3      RBC Count 4.73      Hemoglobin 13.2      Hematocrit 40.8      MCV 86      MCH 27.9      MCHC 32.4      RDW 12.7      Platelet Count 512 (*)     % Neutrophils 55      % Lymphocytes 33      % Monocytes 6      % Eosinophils 5      % Basophils 1      % Immature Granulocytes 0      NRBCs per 100 WBC 0      Absolute Neutrophils 5.6      Absolute Lymphocytes 3.4      Absolute Monocytes 0.6      Absolute Eosinophils 0.6      Absolute Basophils 0.1      Absolute Immature Granulocytes 0.0      Absolute NRBCs 0.0     INFLUENZA A/B, RSV, & SARS-COV2 PCR - Normal    Influenza A PCR Negative      Influenza B PCR Negative      RSV PCR Negative      SARS CoV2 PCR Negative     GROUP A STREPTOCOCCUS PCR THROAT SWAB - Normal    Group A strep by PCR Not Detected         Imaging   XR Chest 2 Views   Final Result   IMPRESSION: Negative chest.      US Abdomen Limited (RUQ)   Final Result   IMPRESSION:   1.  No sonographic evidence of cholelithiasis or cholecystitis.   2.  Positive sonographic Pope sign, with patient reporting significant tenderness while imaging over the gallbladder. Depending on the clinical scenario, consider CT or MRCP as warranted.                EKG   ECG taken at 1733, ECG read at 1741  Normal sinus rhythm  Left axis deviation   Precordial TWI has resolved as compared to prior, dated 12/13/23.  Rate 71 bpm. MT interval 184 ms. QRS duration 106 ms. QT/QTc 402/436 ms. P-R-T axes 64 -31 43.    Independent Interpretation   CXR: No  infiltrate.    ED Course      Medications Administered   Medications   ondansetron (ZOFRAN) injection 4 mg (4 mg Intravenous $Given 9/4/24 1850)   ipratropium - albuterol 0.5 mg/2.5 mg/3 mL (DUONEB) neb solution 6 mL (6 mLs Nebulization $Given 9/4/24 1803)   ketorolac (TORADOL) injection 10 mg (10 mg Intravenous $Given 9/4/24 1850)   acetaminophen (TYLENOL) tablet 975 mg (975 mg Oral $Given 9/4/24 1849)       Procedures   Procedures     Discussion of Management   None    ED Course   ED Course as of 09/04/24 2142   Wed Sep 04, 2024   1707 I obtained history and examined the patient as noted above.    2131 I rechecked and updated the patient.        Additional Documentation  None    Medical Decision Making / Diagnosis     CMS Diagnoses: None    MIPS       None    MDM   49yo female presents with concerns for an exacerbation of her chronic sinusitis, shortness of breath with wheeze, and intermittent right upper quadrant abdominal pain.  Vital signs are stable.  She does have some faint wheeze on exam which we will treat with DuoNebs here.  I discussed her sinusitis symptoms with her, seems stable and present for 3 days.  Discussed the usual treatment of watchful waiting for 7 to 10 days before starting antibiotics.  Patient feels strongly that she has had the symptoms in the past and they have been responsive to antibiotics.  With that in mind, though not indicated yet based on timeline, we will start a course of doxycycline.  Seemingly separate from her respiratory symptoms and sinus symptoms, she also complains of intermittent right upper quad abdominal pain.  She is nontender on exam today initially.  Lab work shows a mild lipase elevation but is overall reassuring and right upper quadrant ultrasound shows no evidence of cholecystitis.  CBD is normal caliber and there is no transaminitis, lower suspicion for a common duct stone.  Patient may have a small aspect of right upper quadrant pain from a mild pancreatitis.   Ultrasound reported a positive Pope sign.  Upon reevaluation, the patient's abdomen remains nontender on my exam.  Patient requesting discharge.  Plan is to discharge home with recommendation for PCP follow-up, as well as ENT follow-up for the patient's chronic sinus symptoms.     Disposition   The patient was discharged.     Diagnosis     ICD-10-CM    1. Cough, unspecified type  R05.9       2. Sinus pressure  J34.89       3. RUQ abdominal pain  R10.11            Discharge Medications   New Prescriptions    DOXYCYCLINE HYCLATE (VIBRAMYCIN) 100 MG CAPSULE    Take 1 capsule (100 mg) by mouth 2 times daily for 7 days.    PREDNISONE (DELTASONE) 20 MG TABLET    Take two tablets (= 40mg) each day for 5 (five) days         Scribe Disclosure:  I, Agustina Ramirez, am serving as a scribe at 5:31 PM on 9/4/2024 to document services personally performed by David Whitley MD based on my observations and the provider's statements to me.        David Whitley MD  09/04/24 [id number]

## 2024-09-04 NOTE — PROGRESS NOTES
Melody is a 48 year old who is being evaluated via a billable telephone visit.      What phone number would you like to be contacted at? [phone]   How would you like to obtain your AVS? Mail a copy  Originating Location (pt. Location): Home  Distant Location (provider location):  on site        Assessment & Plan   (J06.9) URI with cough and congestion  (primary encounter diagnosis)  Comment: Recommend Urgent Care or ER for evaluation and treatment; today.  Using albuterol neb or inhaler every 3 hours; 4+ times per day.  Needs evaluation in person and not over the phone.       (R06.02) SOB (shortness of breath)  Comment: Recommend Urgent Care or ER visit today.      Needs to establish care for follow-up regarding asthma, hyperglycemia, and preventative care.       The longitudinal plan of care for the diagnosis(es)/condition(s) as documented were addressed during this visit. Due to the added complexity in care, I will continue to support Melody in the subsequent management and with ongoing continuity of care.      Depression Screening Follow Up        9/4/2024    12:59 PM   PHQ   PHQ-9 Total Score 8   Q9: Thoughts of better off dead/self-harm past 2 weeks Several days   F/U: Thoughts of suicide or self-harm No   F/U: Safety concerns No           9/4/2024    12:59 PM   Last PHQ-9   1.  Little interest or pleasure in doing things 1   2.  Feeling down, depressed, or hopeless 1   3.  Trouble falling or staying asleep, or sleeping too much 1   4.  Feeling tired or having little energy 1   5.  Poor appetite or overeating 1   6.  Feeling bad about yourself 1   7.  Trouble concentrating 1   8.  Moving slowly or restless 0   Q9: Thoughts of better off dead/self-harm past 2 weeks 1   PHQ-9 Total Score 8   In the past two weeks have you had thoughts of suicide or self harm? No   Do you have concerns about your personal safety or the safety of others? No           Declined assessment.            Follow Up Actions  Taken  Patient declined referral.    Discussed the following ways the patient can remain in a safe environment:   declined discussion.        Chaz Oliver is a 48 year old, presenting for the following health issues:  Sinus Problem (Patient stated she needs to follow up with ENT, and have sinus surgery. She has had symptoms now for 3 days/Refused to answer questionnaires. States no concerns.)        9/4/2024    12:51 PM   Additional Questions   Roomed by Joana CARBAJAL:  Has cold symptoms as noted below.  Upper jaw and tooth pain on both sides of face.   Has cough with whitish color of sputum.  Has SOB and chest pain with cough.  Uses albuterol nebulizer or inhaler every 3 hours; greater than 4 times per day.  Wants prednisone and doxycycline for her sinus and chest issue.  Has h/o chronic sinusitis.  Was supposed to follow-up with ENT, but has not done this for quite some time.  Needs to establish care with a new PCP, but has not had any time to do this.      Acute Illness  Acute illness concerns: sinus infection  Onset/Duration: 3 days  Symptoms:  Fever: No  Chills/Sweats: No  Headache (location?): YES  Sinus Pressure: YES  Conjunctivitis:  YES  Ear Pain: YES- Ear pressure of both ears.   Rhinorrhea: YES  Congestion: YES  Sore Throat: No  Cough: YES-productive of clear sputum, productive of yellow sputum, with shortness of breath.  Wheeze: YES  Decreased Appetite: No  Nausea: No  Vomiting: No  Diarrhea: No  Dysuria/Freq.: No  Dysuria or Hematuria: No  Fatigue/Achiness: YES  Sick/Strep Exposure: No  Therapies tried and outcome: neti pot, albuterol inhaler, hot showers      Review of Systems  Constitutional, neuro, ENT, endocrine, pulmonary, cardiac, gastrointestinal, genitourinary, musculoskeletal, integument and psychiatric systems are negative, except as otherwise noted.        Objective    Vitals - Patient Reported  Weight (Patient Reported): 67.1 kg (148 lb)  SpO2 (Patient Reported): 99    Vitals:  No  vitals were obtained today due to virtual visit.    Physical Exam   General: Alert and no distress //Respiratory: No audible wheeze, cough, or shortness of breath // Psychiatric:  Appropriate affect, tone, and pace of words.  Upset about not getting antibiotic or prednisone.        Phone call duration: 15 minutes      Signed Electronically by: KIRAN Dee CNP    Answers submitted by the patient for this visit:  Patient Health Questionnaire (Submitted on 9/4/2024)  If you checked off any problems, how difficult have these problems made it for you to do your work, take care of things at home, or get along with other people?: Somewhat difficult  PHQ9 TOTAL SCORE: 8

## 2024-09-05 LAB
ATRIAL RATE - MUSE: 71 BPM
DIASTOLIC BLOOD PRESSURE - MUSE: NORMAL MMHG
INTERPRETATION ECG - MUSE: NORMAL
P AXIS - MUSE: 64 DEGREES
PR INTERVAL - MUSE: 184 MS
QRS DURATION - MUSE: 106 MS
QT - MUSE: 402 MS
QTC - MUSE: 436 MS
R AXIS - MUSE: -31 DEGREES
SYSTOLIC BLOOD PRESSURE - MUSE: NORMAL MMHG
T AXIS - MUSE: 43 DEGREES
VENTRICULAR RATE- MUSE: 71 BPM

## 2024-09-10 ENCOUNTER — TRANSFERRED RECORDS (OUTPATIENT)
Dept: HEALTH INFORMATION MANAGEMENT | Facility: CLINIC | Age: 49
End: 2024-09-10
Payer: COMMERCIAL

## 2024-09-10 ENCOUNTER — NURSE TRIAGE (OUTPATIENT)
Dept: FAMILY MEDICINE | Facility: CLINIC | Age: 49
End: 2024-09-10
Payer: COMMERCIAL

## 2024-09-10 NOTE — TELEPHONE ENCOUNTER
"Nurse Triage SBAR    Is this a 2nd Level Triage? NO    Situation: Patient called to discuss ED visit results from 9/4/24. While on the phone, she mentioned she is still having frequent intermittent moderate RUQ abdominal pain and nausea. She currently does not have abdominal pain.    Background: Patient went to [facility] ED on 9/4/24 with shortness of breath, a cough, sinus pressure, and RUQ abdominal pain.     Discharge Medications        New Prescriptions     DOXYCYCLINE HYCLATE (VIBRAMYCIN) 100 MG CAPSULE    Take 1 capsule (100 mg) by mouth 2 times daily for 7 days.     PREDNISONE (DELTASONE) 20 MG TABLET    Take two tablets (= 40mg) each day for 5 (five) days     ED note states:   \"Seemingly separate from her respiratory symptoms and sinus symptoms, she also complains of intermittent right upper quad abdominal pain.  She is nontender on exam today initially.  Lab work shows a mild lipase elevation but is overall reassuring and right upper quadrant ultrasound shows no evidence of cholecystitis.  CBD is normal caliber and there is no transaminitis, lower suspicion for a common duct stone.  Patient may have a small aspect of right upper quadrant pain from a mild pancreatitis.  Ultrasound reported a positive Pope sign.  Upon reevaluation, the patient's abdomen remains nontender on my exam.  Patient requesting discharge.  Plan is to discharge home with recommendation for PCP follow-up, as well as ENT follow-up for the patient's chronic sinus symptoms.\"    Assessment: Pain came on gradually about 1 week ago.     She has never had this type of pain in the past.     Patient rates abdominal pain 5/10 when she has it. It lasts 5-30 minutes depending on the time.     She is not sure what triggers it - it happens after eating, but also randomly.     She denies pain right now, difficulty breathing, chest pain, weakness, fainting, sweating, running a fever, vomiting, severe pain.    RN advised patient she should be " seen again for ongoing pain and for ED follow up to review her questions about the EKG and labs. She agrees with the plan. She is not able to make it to the clinic this morning for visit due to conflicting appointment she has scheduled. RN scheduled her tomorrow, 9/11/24 with Gianni Portillo at 9:10 am.     RN reviewed when to call back, and ED precautions. She verbalized understanding.     Protocol Recommended Disposition:   See in Office Today or Tomorrow    Recommendation: Provider, please review and advise if it is okay to wait until tomorrow or if patient should be seen sooner. Thank you.     Routed to provider    Does the patient meet one of the following criteria for ADS visit consideration? 16+ years old, with an [facility] PCP     TIP  Providers, please consider if this condition is appropriate for management at one of our Acute and Diagnostic Services sites.     If patient is a good candidate, please use dotphrase <dot>triageresponse and select Refer to ADS to document.    Pam Romero, RN, BSN, PHN  [facility]     Reason for Disposition   MODERATE pain that comes and goes (cramps) lasts > 24 hours    Additional Information   Negative: SEVERE difficulty breathing (e.g., struggling for each breath, speaks in single words)   Negative: Shock suspected (e.g., cold/pale/clammy skin, too weak to stand, low BP, rapid pulse)   Negative: Difficult to awaken or acting confused (e.g., disoriented, slurred speech)   Negative: Passed out (i.e., lost consciousness, collapsed and was not responding)   Negative: Visible sweat on face or sweat is dripping down   Negative: Sounds like a life-threatening emergency to the triager   Negative: Followed an abdomen (stomach) injury   Negative: Chest pain   Negative: Abdominal pain and pregnant < 20 weeks   Negative: Abdominal pain and pregnant 20 or more weeks   Negative: Abdomen bloating or swelling are main symptoms   Negative: SEVERE abdominal  pain (e.g., excruciating)   Negative: Pain lasting > 10 minutes and over 50 years old   Negative: Pain lasting > 10 minutes and over 40 years old and associated chest, arm, neck, upper back, or jaw pain   Negative: Pain lasting > 10 minutes and over 35 years old and at least one cardiac risk factor (e.g., diabetes, high cholesterol, hypertension, obesity, smoker or strong family history of heart disease)   Negative: Pain lasting > 10 minutes and history of heart disease (i.e., heart attack, bypass surgery, angina, angioplasty, CHF)   Negative: Pain lasts > 10 minutes and difficulty breathing   Negative: Vomiting red blood or black (coffee ground) material  (Exception: Few streaks and only occurred once.)   Negative: Blood in bowel movements  (Exception: Blood on surface of BM with constipation.)   Negative: Black or tarry bowel movements  (Exception: Chronic-unchanged black-grey BMs AND is taking iron pills or Pepto-Bismol.)   Negative: Pregnant 20 weeks or more and new hand or face swelling   Negative: MILD TO MODERATE constant pain lasting > 2 hours   Negative: Vomiting and abdomen looks much more swollen than usual   Negative: White of the eyes have turned yellow (i.e., jaundice)   Negative: Fever > 103 F (39.4 C)   Negative: Fever > 101 F (38.3 C) and over 60 years of age   Negative: Fever > 100.0 F (37.8 C) and has diabetes mellitus or a weak immune system (e.g., HIV positive, cancer chemotherapy, organ transplant, splenectomy, chronic steroids)   Negative: Fever > 100.0 F (37.8 C) and bedridden (e.g., CVA, chronic illness, recovering from surgery)   Negative: Patient wants to be seen   Negative: MILD TO MODERATE pain and not relieved by antacid medicine   Negative: Vomiting bile (green color)   Negative: Patient sounds very sick or weak to the triager    Protocols used: Abdominal Pain - Upper-A-OH

## 2024-09-10 NOTE — TELEPHONE ENCOUNTER
"Outgoing call to patient. Notified Patient of KIRAN Barnes, CNP's message: \"I think tomorrow should be ok, however if pain increases she should go back to the ER, at the very least call clinic to see if ADS available. She may need further imaging given her elevated lipase and concerns for pancreatitis. In the meantime would recommend  low residue, low fat, soft diet, if there is no evidence of ileus or significant N/V.\"    Person was given an opportunity to ask questions, verbalized understanding of plan, and is agreeable.    Idalmis Yu RN on 9/10/2024 at 9:56 AM  "

## 2024-09-10 NOTE — TELEPHONE ENCOUNTER
I think tomorrow should be ok, however if pain increases she should go back to the ER, at the very least call clinic to see if ADS available. She may need further imaging given her elevated lipase and concerns for pancreatitis. In the meantime would recommend  low residue, low fat, soft diet, if there is no evidence of ileus or significant N/V.    KIRAN Barnes CNP

## 2024-09-11 ENCOUNTER — OFFICE VISIT (OUTPATIENT)
Dept: FAMILY MEDICINE | Facility: CLINIC | Age: 49
End: 2024-09-11
Payer: COMMERCIAL

## 2024-09-11 VITALS
WEIGHT: 145.2 LBS | TEMPERATURE: 98 F | RESPIRATION RATE: 18 BRPM | HEART RATE: 90 BPM | DIASTOLIC BLOOD PRESSURE: 85 MMHG | SYSTOLIC BLOOD PRESSURE: 126 MMHG | BODY MASS INDEX: 26.72 KG/M2 | OXYGEN SATURATION: 96 % | HEIGHT: 62 IN

## 2024-09-11 DIAGNOSIS — R10.11 RUQ ABDOMINAL PAIN: Primary | ICD-10-CM

## 2024-09-11 DIAGNOSIS — R10.13 EPIGASTRIC PAIN: ICD-10-CM

## 2024-09-11 DIAGNOSIS — R10.12 LUQ ABDOMINAL PAIN: ICD-10-CM

## 2024-09-11 DIAGNOSIS — Z23 NEED FOR PROPHYLACTIC VACCINATION AND INOCULATION AGAINST INFLUENZA: ICD-10-CM

## 2024-09-11 DIAGNOSIS — Z23 ENCOUNTER FOR IMMUNIZATION: ICD-10-CM

## 2024-09-11 DIAGNOSIS — R74.8 ELEVATED LIPASE: ICD-10-CM

## 2024-09-11 DIAGNOSIS — G47.33 OSA (OBSTRUCTIVE SLEEP APNEA): Chronic | ICD-10-CM

## 2024-09-11 LAB — LIPASE SERPL-CCNC: 58 U/L (ref 13–60)

## 2024-09-11 PROCEDURE — 99214 OFFICE O/P EST MOD 30 MIN: CPT | Mod: 25

## 2024-09-11 PROCEDURE — 83690 ASSAY OF LIPASE: CPT

## 2024-09-11 PROCEDURE — 36415 COLL VENOUS BLD VENIPUNCTURE: CPT

## 2024-09-11 PROCEDURE — 90471 IMMUNIZATION ADMIN: CPT

## 2024-09-11 PROCEDURE — 90656 IIV3 VACC NO PRSV 0.5 ML IM: CPT

## 2024-09-11 ASSESSMENT — ASTHMA QUESTIONNAIRES
QUESTION_1 LAST FOUR WEEKS HOW MUCH OF THE TIME DID YOUR ASTHMA KEEP YOU FROM GETTING AS MUCH DONE AT WORK, SCHOOL OR AT HOME: MOST OF THE TIME
QUESTION_4 LAST FOUR WEEKS HOW OFTEN HAVE YOU USED YOUR RESCUE INHALER OR NEBULIZER MEDICATION (SUCH AS ALBUTEROL): THREE OR MORE TIMES PER DAY
QUESTION_5 LAST FOUR WEEKS HOW WOULD YOU RATE YOUR ASTHMA CONTROL: NOT CONTROLLED AT ALL
EMERGENCY_ROOM_LAST_YEAR_TOTAL: ONE
ACT_TOTALSCORE: 6
QUESTION_2 LAST FOUR WEEKS HOW OFTEN HAVE YOU HAD SHORTNESS OF BREATH: MORE THAN ONCE A DAY
QUESTION_3 LAST FOUR WEEKS HOW OFTEN DID YOUR ASTHMA SYMPTOMS (WHEEZING, COUGHING, SHORTNESS OF BREATH, CHEST TIGHTNESS OR PAIN) WAKE YOU UP AT NIGHT OR EARLIER THAN USUAL IN THE MORNING: FOUR OR MORE NIGHTS A WEEK
ACT_TOTALSCORE: 6

## 2024-09-11 NOTE — PROGRESS NOTES
Assessment & Plan     (R10.11) RUQ abdominal pain  (primary encounter diagnosis)  (R10.13) Epigastric pain  (R10.12) LUQ abdominal pain  Comment: patient continues to have 5/10 abdominal pain. Patient lipase levels mildly elevated at ED, possible pancreatitis contributing. Patient declines ETOH intake. Has discomfort w/ eating. Labs otherwise stable at ED presentation.  Given continuation shared decision to get CT scan along w/ lipase levels. Will follow up on results and whether further direction is necessary. Patient fully understands and is agreeable with plan of care, at this point patient will follow up as needed unless acute concerns arise in the meantime.  Plan: CT Abdomen Pelvis w/o & w Contrast    (R74.8) Elevated lipase  Comment: as above.   Plan: Lipase    (G47.33) JASWANT (obstructive sleep apnea)  Comment: patient has uncontrolled sleep apnea. Would like to have referral to establish w/ different sleep provider. Provided.   Plan: Adult Sleep Eval & Management  Referral     (Z23) Encounter for immunization  Comment: due, administered.   Plan:    INFLUENZA VACCINE, SPLIT VIRUS, TRIVALENT,PF (FLUZONE\FLUARIX)        The longitudinal plan of care for the diagnosis(es)/condition(s) as documented were addressed during this visit. Due to the added complexity in care, I will continue to support Melody in the subsequent management and with ongoing continuity of care.    MED REC REQUIRED  Post Medication Reconciliation Status: discharge medications reconciled, continue medications without change        Chaz Oliver is a 48 year old, presenting for the following health issues:  Hospital F/U        9/11/2024     9:20 AM   Additional Questions   Roomed by Joana CARBAJAL     ED/UC Followup:    Facility:  [facility] ED  Date of visit: 9/4/24  Reason for visit: cough, pharyngitis, RUQ pain  Current Status: still in pain, but the sinus upper respiratory is better    Asthma and sinus's have  "improved   Patient continues to have abdominal pain   Denies any fever, chills, myalgias.     Review of Systems  Constitutional, HEENT, cardiovascular, pulmonary, gi and gu systems are negative, except as otherwise noted.      Objective    /85 (BP Location: Right arm, Patient Position: Sitting, Cuff Size: Adult Regular)   Pulse 90   Temp 98  F (36.7  C) (Oral)   Resp 18   Ht 1.585 m (5' 2.4\")   Wt 65.9 kg (145 lb 3.2 oz)   LMP  (LMP Unknown)   SpO2 96%   BMI 26.22 kg/m    Body mass index is 26.22 kg/m .  Physical Exam  Vitals and nursing note reviewed.   Constitutional:       General: She is not in acute distress.     Appearance: Normal appearance. She is not ill-appearing.   Cardiovascular:      Rate and Rhythm: Normal rate and regular rhythm.      Heart sounds: No murmur heard.     No friction rub. No gallop.   Pulmonary:      Effort: Pulmonary effort is normal. No respiratory distress.      Breath sounds: No wheezing, rhonchi or rales.   Abdominal:      General: Bowel sounds are normal. There is no distension.      Palpations: Abdomen is soft.      Tenderness: There is abdominal tenderness in the right upper quadrant, epigastric area and left upper quadrant. There is no guarding.   Skin:     General: Skin is warm and dry.   Neurological:      Mental Status: She is alert.   Psychiatric:         Mood and Affect: Mood normal.         Behavior: Behavior normal. Behavior is cooperative.         Thought Content: Thought content normal.         Judgment: Judgment normal.            Signed Electronically by: KIRAN Barnes CNP    "

## 2024-09-11 NOTE — PATIENT INSTRUCTIONS
CT scan to be scheduled  Lipase level recheck to check for concerns for pancreatitis  -if pain worsens please follow up, if severe please re present to the ED for evaluation.     Sleep referral placed    Flu shot today

## 2024-09-23 ENCOUNTER — HOSPITAL ENCOUNTER (OUTPATIENT)
Dept: CT IMAGING | Facility: CLINIC | Age: 49
Discharge: HOME | End: 2024-09-23
Payer: COMMERCIAL

## 2024-09-23 DIAGNOSIS — R10.11 RUQ ABDOMINAL PAIN: ICD-10-CM

## 2024-09-23 DIAGNOSIS — R10.12 LUQ ABDOMINAL PAIN: ICD-10-CM

## 2024-09-23 DIAGNOSIS — R10.13 EPIGASTRIC PAIN: ICD-10-CM

## 2024-09-23 PROCEDURE — 74177 CT ABD & PELVIS W/CONTRAST: CPT

## 2024-09-23 PROCEDURE — 250N000009 HC RX 250

## 2024-09-23 PROCEDURE — 250N000011 HC RX IP 250 OP 636

## 2024-09-23 RX ORDER — IOPAMIDOL 755 MG/ML
500 INJECTION, SOLUTION INTRAVASCULAR ONCE
Status: COMPLETED | OUTPATIENT
Start: 2024-09-23 | End: 2024-09-23

## 2024-09-23 RX ADMIN — SODIUM CHLORIDE 58 ML: 9 INJECTION, SOLUTION INTRAVENOUS at 09:33

## 2024-09-23 RX ADMIN — IOPAMIDOL 73 ML: 755 INJECTION, SOLUTION INTRAVENOUS at 09:33

## 2024-09-23 NOTE — RESULT ENCOUNTER NOTE
Andres Oliver,     Your abdominal CT scan looks pretty good.    Possible constipation that could be causing some of your pain.   Please follow-up with your PCP or Gianni in the near future if you still have symptoms.   Hope you are doing well.     Take care,  KIRAN Dee CNP

## 2024-09-26 ENCOUNTER — MYC MEDICAL ADVICE (OUTPATIENT)
Dept: FAMILY MEDICINE | Facility: CLINIC | Age: 49
End: 2024-09-26
Payer: COMMERCIAL

## 2024-09-26 ENCOUNTER — NURSE TRIAGE (OUTPATIENT)
Dept: NURSING | Facility: CLINIC | Age: 49
End: 2024-09-26
Payer: COMMERCIAL

## 2024-09-27 ENCOUNTER — VIRTUAL VISIT (OUTPATIENT)
Dept: PEDIATRICS | Facility: CLINIC | Age: 49
End: 2024-09-27
Payer: COMMERCIAL

## 2024-09-27 DIAGNOSIS — R10.11 RUQ ABDOMINAL PAIN: ICD-10-CM

## 2024-09-27 DIAGNOSIS — R39.89 BLADDER PAIN: ICD-10-CM

## 2024-09-27 DIAGNOSIS — G43.001 MIGRAINE WITHOUT AURA AND WITH STATUS MIGRAINOSUS, NOT INTRACTABLE: Primary | ICD-10-CM

## 2024-09-27 PROCEDURE — 99443 PR PHYSICIAN TELEPHONE EVALUATION 21-30 MIN: CPT | Mod: 93 | Performed by: INTERNAL MEDICINE

## 2024-09-27 RX ORDER — ONDANSETRON 4 MG/1
4-8 TABLET, ORALLY DISINTEGRATING ORAL EVERY 8 HOURS PRN
Qty: 12 TABLET | Refills: 1 | Status: SHIPPED | OUTPATIENT
Start: 2024-09-27

## 2024-09-27 RX ORDER — BUTALBITAL, ACETAMINOPHEN, CAFFEINE AND CODEINE PHOSPHATE 50; 325; 40; 30 MG/1; MG/1; MG/1; MG/1
1 CAPSULE ORAL EVERY 4 HOURS PRN
Qty: 8 CAPSULE | Refills: 0 | Status: SHIPPED | OUTPATIENT
Start: 2024-09-27

## 2024-09-27 NOTE — PATIENT INSTRUCTIONS
Call your psychiatrist to check about clonazepam plus fioriecet and risk for respiratory suppression.  You shouldn't take them together.    If you aren't getting better with this, please go to the UC/ER.    They will call you to schedule the HIDA scan.      They will call you to schedule with the urologist.

## 2024-09-27 NOTE — TELEPHONE ENCOUNTER
Order request:     Pt returning a call to the clinic in response to them asking her to make an appointment to assess and place orders. Pt is asking for an order for Fioricet with Codeine. Pt states that she is not going to make an appointment per her PCP's request.    Writer offered to check with scheduling for a virtual appt for patient because she states that she is with her mother who is dying. Pt became upset with needing an appointment for this new order and hung up the telephone.     Megha Giraldo RN   Triage Nurse Advisor on 9/26/2024 at 7:18 PM    Reason for Disposition   Caller requesting a CONTROLLED substance prescription refill (e.g., narcotics, ADHD medicines)    Protocols used: Medication Refill and Renewal Call-A-

## 2024-09-27 NOTE — PROGRESS NOTES
"Melody is a 48 year old who is being evaluated via a billable telephone visit.      Originating Location (pt. Location): Home    Distant Location (provider location):  Off-site    Assessment & Plan     Migraine without aura and with status migrainosus, not intractable  Uncontrolled with stress with mom being no hospice.  Reviewed PDMP.  Prescription per orders.  Discussed risk of respiratory suppression with fioricet plus clonazepam.  She will not take together and will reach out to psychiatry about adjusting dose.  - ondansetron (ZOFRAN ODT) 4 MG ODT tab; Take 1-2 tablets (4-8 mg) by mouth every 8 hours as needed for nausea.  - butalbital-APAP-caffeine-codeine (FIORICET WITH CODEINE) -43-30 MG per capsule; Take 1 capsule by mouth every 4 hours as needed for headaches.    RUQ abdominal pain  Check HIDA, CT scan without cause  - NM Hepatobiliary Scan with GB EF and/or Pharm; Future    Bladder pain  Refer to urology.  Normal CT and UA  - Adult Urology  Referral; Future      See Patient Instructions    Subjective   Melody is a 48 year old, presenting for the following health issues:  No chief complaint on file.    HPI   Has had migraines since a teenager.  Taking excedrin which usually works but not working this time.  Is under a lot of stress with mom's health.  Has been going on for two days.  Is having nausea.  No emesis.  Symptoms seem like usual migraines - doesn't get aura.  Doesn't get them much anymore.  Used to take fioricet with codeine.  States doesn't do well with triptans as \"jolt\" her and gets really anxious. Is on clonazepam from psych.  States worried about prednisone and bone density and only takes when really needs it.  Also gets anxiety and mood issues with it.  States won't take klonopin if takes fioricet.    Had acute URI 9/4 and seen in ER.    Has issues with groin.  When has to urinate will suddenly have to go and get sharp, shooting pain up to RUQ and down to thigh.  Thinks it " doesn't feel like an infection - doesn't burn.     Also gets pain in upper abdomen whenever she eats.  Had normal HIDA scan in 2019.  This pain is now three month.            Objective           Vitals:  No vitals were obtained today due to virtual visit.    Physical Exam   General: Alert and mild distress //Respiratory: No audible wheeze, cough, or shortness of breath // Psychiatric: tearful, anxious            Phone call duration: 24 minutes  Signed Electronically by: Bridgette Weller MD

## 2024-09-27 NOTE — TELEPHONE ENCOUNTER
Patient calling.  Yelling and swearing.  Discussed previous.  Scheduled telephone visit at 9 am to discuss.  Josephine Noriega RN

## 2024-09-30 NOTE — TELEPHONE ENCOUNTER
Dr. Harshal EVANGELISTA. See pt's ClearFlow message.     Routed to Dr. Weller.     Shey VERDIN, RN   Clinic RN  [facility]

## 2024-10-03 ENCOUNTER — HOSPITAL ENCOUNTER (EMERGENCY)
Facility: CLINIC | Age: 49
Discharge: HOME | End: 2024-10-03
Attending: EMERGENCY MEDICINE | Admitting: EMERGENCY MEDICINE
Payer: COMMERCIAL

## 2024-10-03 VITALS
WEIGHT: 147.71 LBS | DIASTOLIC BLOOD PRESSURE: 67 MMHG | OXYGEN SATURATION: 99 % | BODY MASS INDEX: 26.67 KG/M2 | SYSTOLIC BLOOD PRESSURE: 115 MMHG | RESPIRATION RATE: 14 BRPM | HEART RATE: 89 BPM

## 2024-10-03 DIAGNOSIS — T50.902A OVERDOSE, INTENTIONAL SELF-HARM, INITIAL ENCOUNTER (H): ICD-10-CM

## 2024-10-03 LAB
ALBUMIN SERPL BCG-MCNC: 4.2 G/DL (ref 3.5–5.2)
ALP SERPL-CCNC: 100 U/L (ref 40–150)
ALT SERPL W P-5'-P-CCNC: 16 U/L (ref 0–50)
ANION GAP BLD CALCULATED.3IONS-SCNC: 17 MMOL/L (ref 3–14)
ANION GAP SERPL CALCULATED.3IONS-SCNC: 8 MMOL/L (ref 7–15)
APAP SERPL-MCNC: <5 UG/ML (ref 10–30)
AST SERPL W P-5'-P-CCNC: 23 U/L (ref 0–45)
BASOPHILS # BLD AUTO: 0.1 10E3/UL (ref 0–0.2)
BASOPHILS NFR BLD AUTO: 1 %
BILIRUB SERPL-MCNC: <0.2 MG/DL
BUN SERPL-MCNC: 5 MG/DL (ref 7–30)
BUN SERPL-MCNC: 6.3 MG/DL (ref 6–20)
CA-I BLD-MCNC: 5.2 MG/DL (ref 4.4–5.2)
CALCIUM SERPL-MCNC: 10 MG/DL (ref 8.8–10.4)
CHLORIDE BLD-SCNC: 104 MMOL/L (ref 94–109)
CHLORIDE SERPL-SCNC: 103 MMOL/L (ref 98–107)
CO2 BLD-SCNC: 24 MMOL/L (ref 20–32)
CREAT BLD-MCNC: 0.7 MG/DL (ref 0.5–1)
CREAT SERPL-MCNC: 0.78 MG/DL (ref 0.51–0.95)
EGFRCR SERPLBLD CKD-EPI 2021: >90 ML/MIN/1.73M2
EOSINOPHIL # BLD AUTO: 0.6 10E3/UL (ref 0–0.7)
EOSINOPHIL NFR BLD AUTO: 6 %
ERYTHROCYTE [DISTWIDTH] IN BLOOD BY AUTOMATED COUNT: 13 % (ref 10–15)
GLUCOSE BLD-MCNC: 129 MG/DL (ref 70–99)
GLUCOSE BLDC GLUCOMTR-MCNC: 127 MG/DL (ref 70–99)
GLUCOSE SERPL-MCNC: 130 MG/DL (ref 70–99)
HCO3 BLDV-SCNC: 27 MMOL/L (ref 21–28)
HCO3 SERPL-SCNC: 25 MMOL/L (ref 22–29)
HCT VFR BLD AUTO: 42.9 % (ref 35–47)
HCT VFR BLD CALC: 42 % (ref 35–47)
HGB BLD-MCNC: 14 G/DL (ref 11.7–15.7)
HGB BLD-MCNC: 14.3 G/DL (ref 11.7–15.7)
HOLD SPECIMEN: NORMAL
IMM GRANULOCYTES # BLD: 0 10E3/UL
IMM GRANULOCYTES NFR BLD: 0 %
LACTATE BLD-SCNC: 1.5 MMOL/L
LYMPHOCYTES # BLD AUTO: 2.9 10E3/UL (ref 0.8–5.3)
LYMPHOCYTES NFR BLD AUTO: 30 %
MCH RBC QN AUTO: 28.2 PG (ref 26.5–33)
MCHC RBC AUTO-ENTMCNC: 32.6 G/DL (ref 31.5–36.5)
MCV RBC AUTO: 86 FL (ref 78–100)
MONOCYTES # BLD AUTO: 0.6 10E3/UL (ref 0–1.3)
MONOCYTES NFR BLD AUTO: 6 %
NEUTROPHILS # BLD AUTO: 5.4 10E3/UL (ref 1.6–8.3)
NEUTROPHILS NFR BLD AUTO: 57 %
NRBC # BLD AUTO: 0 10E3/UL
NRBC BLD AUTO-RTO: 0 /100
PCO2 BLDV: 46 MM HG (ref 40–50)
PH BLDV: 7.37 [PH] (ref 7.32–7.43)
PLATELET # BLD AUTO: 585 10E3/UL (ref 150–450)
PO2 BLDV: 32 MM HG (ref 25–47)
POTASSIUM BLD-SCNC: 3.4 MMOL/L (ref 3.4–5.3)
POTASSIUM SERPL-SCNC: 4.1 MMOL/L (ref 3.4–5.3)
PROT SERPL-MCNC: 6.7 G/DL (ref 6.4–8.3)
RBC # BLD AUTO: 4.97 10E6/UL (ref 3.8–5.2)
SALICYLATES SERPL-MCNC: <0.3 MG/DL
SAO2 % BLDV: 60 % (ref 70–75)
SODIUM BLD-SCNC: 140 MMOL/L (ref 135–145)
SODIUM SERPL-SCNC: 136 MMOL/L (ref 135–145)
WBC # BLD AUTO: 9.5 10E3/UL (ref 4–11)

## 2024-10-03 PROCEDURE — 250N000013 HC RX MED GY IP 250 OP 250 PS 637: Performed by: EMERGENCY MEDICINE

## 2024-10-03 PROCEDURE — 96361 HYDRATE IV INFUSION ADD-ON: CPT

## 2024-10-03 PROCEDURE — 36415 COLL VENOUS BLD VENIPUNCTURE: CPT | Performed by: EMERGENCY MEDICINE

## 2024-10-03 PROCEDURE — 258N000003 HC RX IP 258 OP 636: Performed by: EMERGENCY MEDICINE

## 2024-10-03 PROCEDURE — 80143 DRUG ASSAY ACETAMINOPHEN: CPT | Performed by: EMERGENCY MEDICINE

## 2024-10-03 PROCEDURE — 99285 EMERGENCY DEPT VISIT HI MDM: CPT | Mod: 25

## 2024-10-03 PROCEDURE — 80053 COMPREHEN METABOLIC PANEL: CPT | Performed by: EMERGENCY MEDICINE

## 2024-10-03 PROCEDURE — 250N000011 HC RX IP 250 OP 636: Mod: JZ | Performed by: EMERGENCY MEDICINE

## 2024-10-03 PROCEDURE — 85004 AUTOMATED DIFF WBC COUNT: CPT | Performed by: EMERGENCY MEDICINE

## 2024-10-03 PROCEDURE — 93005 ELECTROCARDIOGRAM TRACING: CPT

## 2024-10-03 PROCEDURE — 80179 DRUG ASSAY SALICYLATE: CPT | Performed by: EMERGENCY MEDICINE

## 2024-10-03 PROCEDURE — 96372 THER/PROPH/DIAG INJ SC/IM: CPT | Performed by: EMERGENCY MEDICINE

## 2024-10-03 PROCEDURE — 96360 HYDRATION IV INFUSION INIT: CPT

## 2024-10-03 PROCEDURE — 85014 HEMATOCRIT: CPT

## 2024-10-03 PROCEDURE — 82962 GLUCOSE BLOOD TEST: CPT

## 2024-10-03 PROCEDURE — 82803 BLOOD GASES ANY COMBINATION: CPT

## 2024-10-03 RX ORDER — OLANZAPINE 10 MG/2ML
10 INJECTION, POWDER, FOR SOLUTION INTRAMUSCULAR ONCE
Status: COMPLETED | OUTPATIENT
Start: 2024-10-03 | End: 2024-10-03

## 2024-10-03 RX ADMIN — OLANZAPINE 10 MG: 10 INJECTION, POWDER, FOR SOLUTION INTRAMUSCULAR at 11:30

## 2024-10-03 RX ADMIN — SODIUM CHLORIDE 1000 ML: 9 INJECTION, SOLUTION INTRAVENOUS at 10:18

## 2024-10-03 RX ADMIN — NICOTINE POLACRILEX 2 MG: 2 GUM, CHEWING BUCCAL at 11:00

## 2024-10-03 ASSESSMENT — COLUMBIA-SUICIDE SEVERITY RATING SCALE - C-SSRS
5. HAVE YOU STARTED TO WORK OUT OR WORKED OUT THE DETAILS OF HOW TO KILL YOURSELF? DO YOU INTEND TO CARRY OUT THIS PLAN?: YES
6. HAVE YOU EVER DONE ANYTHING, STARTED TO DO ANYTHING, OR PREPARED TO DO ANYTHING TO END YOUR LIFE?: YES
4. HAVE YOU HAD THESE THOUGHTS AND HAD SOME INTENTION OF ACTING ON THEM?: NO
3. HAVE YOU BEEN THINKING ABOUT HOW YOU MIGHT KILL YOURSELF?: YES
1. IN THE PAST MONTH, HAVE YOU WISHED YOU WERE DEAD OR WISHED YOU COULD GO TO SLEEP AND NOT WAKE UP?: YES
2. HAVE YOU ACTUALLY HAD ANY THOUGHTS OF KILLING YOURSELF IN THE PAST MONTH?: YES

## 2024-10-03 ASSESSMENT — ACTIVITIES OF DAILY LIVING (ADL)
ADLS_ACUITY_SCORE: 40

## 2024-10-03 NOTE — DISCHARGE INSTRUCTIONS
Please follow up with psychiatry.  Please follow up with your group meeting tonight.  Please return to the emergency department if you have any thought of hurting yourself or others.

## 2024-10-03 NOTE — ED PROVIDER NOTES
Emergency Department Note      History of Present Illness     Chief Complaint   Drug Overdose      HPI   Melody Muñoz is a 48 year old female with a history of intentional overdose, anxiety, depression, substance use disorder, and type 2 diabetes who presents to the ED via EMS from home for evaluation of a drug overdose. Per EMS report, the patient took 30 10 mg Ambien and 20 1 mg Ativan about 30 min prior to arrival. She started to become lethargic just prior to arrival here but she has been vitally stable. States the Ativan prescription is new and she picked it up this morning. Notes a history of PTSD and self-harm. She has never been hospitalized for mental health in the past. Patient denies pain, chest pain, or shortness of breath.     Independent Historian   EMS as detailed above.    Review of External Notes   Reviewing patient's medication list on her chart, she has been on clonazepam as well as Ambien for over a year.    Reviewed patient's psychiatry note from yesterday, patient increased her prazosin to 3 times daily.  Also added Ativan 1 mg every 6 hours #20.    Past Medical History     Medical History and Problem List   ADHD  Chronic gastritis  Intentional acetaminophen overdose  Intentional opioid poisoning  Psychosis  Thrombocytosis  Type 2 diabetes  Asthma  Bipolar 1 disorder  Chronic fatigue syndrome  Chronic pain syndrome  IBS  Anxiety  Depression  JASWANT  Chronic migraine  Obesity  Female stress incontinence  Insomnia  Latent TB  Lithium toxicity  HLD  Benzodiazepine use disorder  TMJ  Endometriosis     Medications   Pristiq  Ativan  Zyprexa  Minipress  Lamictal  Albuterol  Symbicort  Wellbutrin  Fioricet with codeine  Klonopin  Dexedrine  Gabapentin  Bentyl  Lithium  Latuda  Naloxone  Ondansetron  Trazodone  Ambien    Surgical History    section  Hysterectomy  Appendectomy  Unspecified fifth finger surgery (L)  Bladder sling  Tubal ligation (B)    Physical Exam     Patient Vitals for  the past 24 hrs:   BP Pulse Resp SpO2 Weight   10/03/24 1330 117/73 85 23 -- --   10/03/24 1315 117/76 77 16 -- --   10/03/24 1300 117/87 78 18 -- --   10/03/24 1245 (!) 125/97 73 14 -- --   10/03/24 1230 133/89 72 17 100 % --   10/03/24 1220 -- -- 13 -- --   10/03/24 1215 116/80 76 (!) 9 -- --   10/03/24 1200 102/75 79 (!) 9 97 % --   10/03/24 1145 (!) 126/92 74 13 98 % --   10/03/24 1130 121/82 75 12 99 % --   10/03/24 1115 116/75 75 11 100 % --   10/03/24 1100 112/71 75 11 99 % --   10/03/24 1045 106/69 77 13 97 % --   10/03/24 1030 108/66 84 11 97 % --   10/03/24 1013 101/68 86 15 95 % --   10/03/24 1006 111/73 85 19 98 % 67 kg (147 lb 11.3 oz)     Physical Exam  General: Well-nourished, resting comfortably when I enter the room  Eyes: Pupils equal, conjunctivae pink no scleral icterus or conjunctival injection  ENT:  Moist mucus membranes  Respiratory:  Lungs clear to auscultation bilaterally, no crackles/rubs/wheezes.  Good air movement  CV: Normal rate and rhythm, no murmurs  GI:  Abdomen soft and non-distended.  No tenderness, guarding or rebound  Skin: Warm, dry.  No rashes or petechiae  Musculoskeletal: No peripheral edema or calf tenderness  Neuro: Alert and oriented to person/place/time  Psychiatric: Normal affect    Diagnostics     Lab Results   Labs Ordered and Resulted from Time of ED Arrival to Time of ED Departure   GLUCOSE BY METER - Abnormal       Result Value    GLUCOSE BY METER POCT 127 (*)    ISTAT GASES LACTATE VENOUS POCT - Abnormal    Lactic Acid POCT 1.5      Bicarbonate Venous POCT 27      O2 Sat, Venous POCT 60 (*)     pCO2 Venous POCT 46      pH Venous POCT 7.37      pO2 Venous POCT 32     ISTAT BASIC CHEM ICA HEMATOCRIT POCT - Abnormal    Chloride POCT 104      Potassium POCT 3.4      Sodium POCT 140      UREA NITROGEN POCT 5 (*)     Calcium, Ionized Whole Blood POCT 5.2      Glucose Whole Blood POCT 129 (*)     Anion Gap POCT 17.0 (*)     Hemoglobin POCT 14.3      Hematocrit POCT 42       Creatinine POCT 0.7      TOTAL CO2 POCT 24     ACETAMINOPHEN LEVEL - Abnormal    Acetaminophen <5.0 (*)    COMPREHENSIVE METABOLIC PANEL - Abnormal    Sodium 136      Potassium 4.1      Carbon Dioxide (CO2) 25      Anion Gap 8      Urea Nitrogen 6.3      Creatinine 0.78      GFR Estimate >90      Calcium 10.0      Chloride 103      Glucose 130 (*)     Alkaline Phosphatase 100      AST 23      ALT 16      Protein Total 6.7      Albumin 4.2      Bilirubin Total <0.2     CBC WITH PLATELETS AND DIFFERENTIAL - Abnormal    WBC Count 9.5      RBC Count 4.97      Hemoglobin 14.0      Hematocrit 42.9      MCV 86      MCH 28.2      MCHC 32.6      RDW 13.0      Platelet Count 585 (*)     % Neutrophils 57      % Lymphocytes 30      % Monocytes 6      % Eosinophils 6      % Basophils 1      % Immature Granulocytes 0      NRBCs per 100 WBC 0      Absolute Neutrophils 5.4      Absolute Lymphocytes 2.9      Absolute Monocytes 0.6      Absolute Eosinophils 0.6      Absolute Basophils 0.1      Absolute Immature Granulocytes 0.0      Absolute NRBCs 0.0     SALICYLATE LEVEL - Normal    Salicylate <0.3     COVID-19 VIRUS (CORONAVIRUS) BY PCR       Imaging   No orders to display       EKG   ECG taken at 1006, ECG read at 1012  Normal sinus rhythm  Right superior axis deviation  Pulmonary disease pattern  Prolonged QT   T wave inversion no longer evident in anterolateral leads, QT has shortened as compared to prior, dated 12/13/23.  Rate 84 bpm. ME interval 162 ms. QRS duration 98 ms. QT/QTc 408/482 ms. P-R-T axes 52 251 57.    Independent Interpretation   None    ED Course      Medications Administered   Medications   nicotine (NICORETTE) gum 2 mg (2 mg Buccal $Given 10/3/24 1100)   sodium chloride 0.9% BOLUS 1,000 mL (1,000 mLs Intravenous $New Bag 10/3/24 1018)   OLANZapine (zyPREXA) injection 10 mg (10 mg Intramuscular $Given 10/3/24 1130)       Procedures   Procedures     Discussion of Management   1020 Spoke with pharmacist, she  did call poison control.  They state that peak effect of medication should be about 2 to 3 hours.  Drowsiness is the side affect that is most concerning.  May consider flumazenil before intubation if the patient's drowsiness worsens.    1300 I spoke with Yoselin ORDAZ.  She would recommend discharge home.  The patient has severe PTSD that is related to inpatient mental health admissions.  Patient's  was contacted as well and does not believe that an inpatient admission would be good for the patient.  He states that she never gets any better when she is admitted and does have a lot of PTSD from previous admissions.    ED Course as of 10/03/24 1440   Thu Oct 03, 2024   1003 I obtained history and performed a physical exam as noted above.    1120 Patient's behavior is escalating, she is yelling and screaming.  She states that she is not going to a mental health inpatient place.  She states that she will get up and leave this facility.  I did inform her that she is on a hold.  Means that she will need to be evaluated by our mental health team and then a plan will be made.  Patient states that she would like police to come and arrest her and she will go and sit in [facility].  Attempted to verbally de-escalate the patient.  She started calling staff derogatory names.  She then tried to get up out of the bed.  I ordered 10 mg of Zyprexa.  Code 21 was called.   2383 Spoke with Dr. French, the patient's outpatient psychiatrist. Is ok with discharge home. Does not think an inpatient admission would be helpful for the patient.       Additional Documentation  None    Medical Decision Making / Diagnosis     CMS Diagnoses: None    MIPS       None    MDM   Melody Muñoz is a 48 year old female with a history of bipolar affective disorder, generalized anxiety disorder, PTSD, and borderline personality disorder presents to the emergency department after a suicide attempt by overdose.  Patient took 20 mg of Ativan and 300 mg  of Ambien 30 minutes prior to arrival to the emergency department.  Patient's  called 911.  On exam patient is awake, alert, answering questions.  She is a little bit sleepy but does not have any other complaints.  She has not had any nausea, vomiting.  She does report this was an attempt to hurt herself.  She does not have any thoughts of hurting anybody else.  Patient reports that she has had previous attempts of suicide in the past.  Poison control recommends monitoring.  Peak effects of the medication should be at about 3 hours.  Workup does not reveal any signs of Tylenol or salicylate overdose.  Lab work is all fairly benign.  Poison control is contacted for a second time, they would not expect any worsening symptoms after 2 hours.  No need for further observation to medically clear the patient.  DEC did meet with the patient.  They would recommend discharge home.  The patient has a lot of outpatient resources, including a meeting tonight.  She did meet with her psychiatrist yesterday.  Patient has had a lot of stressors at home, and is felt that this episode was triggered by specific stressor.  The stressors have been eliminated today.  They do not believe that an inpatient psychiatric hospitalization will be beneficial to the patient as she does have severe PTSD from previous admissions.  Patient as well as the patient's  also expresses the same concern. I did call and speak with Dr. French, the patient's psychiatrist that she has had for years.  She agrees with discharge of the patient.  On reevaluation, the patient is awake, alert, answering questions appropriately.  No sign of somnolence.  As the patient has now been calm and cooperative since speaking with DEC, she does have outpatient resources including a psychiatrist and group therapy, and I do not think that an inpatient psychiatric hospitalization will be beneficial to the patient I do think that she is safe for discharge at this  time.     Disposition   The patient was discharged.     Diagnosis     ICD-10-CM    1. Overdose, intentional self-harm, initial encounter (H)  T50.902A            Discharge Medications   New Prescriptions    No medications on file         Scribe Disclosure:  I, Zaina Lucas, am serving as a scribe at 10:09 AM on 10/3/2024 to document services personally performed by Rizwana Moran MD based on my observations and the provider's statements to me.        Rizwana Moran MD  10/03/24 [id number]

## 2024-10-03 NOTE — PHARMACY-CONSULT NOTE
Pharmacy Poison Control Note      At ~0930, patient ingested #30 tablets of Ambien 10mg and #20 tablets of lorazepam 1mg tablets. Poison Control was contacted by the ED pharmacist with recommendations below.     Concerning symptoms: CNS depression     Monitoring parameters: APAP level and salicylate level     Treatment: Airway support, advanced airway if needed     Time to peak:     Lorazepam: 2-3 hours     Zolpidem: 2-3 hours     Half-life:     Lorazepam: ~12 hours     Zolpidem: 2.5 hours (1.4-4.5 hours)    Alyx Fleming PharmD, [facility]   Emergency Medicine Pharmacist  [phone] or [place]  October 3, 2024
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
## 2024-10-03 NOTE — ED TRIAGE NOTES
The following hx is provided by EMS:    Arrives from home for intentional overdose. Patient took 30 ambien (10mg) and 20 ativan (1mg) about 30 mins ago. Ativan is a new prescription she picked up this morning.  called 911 from work. Hx of PTSD. VSS. /70. HR 80. O2 96%.

## 2024-10-03 NOTE — ED TRIAGE NOTES
Triage Assessment (Adult)       Row Name 10/03/24 1013          Triage Assessment    Airway WDL WDL        Respiratory WDL    Respiratory WDL WDL        Cardiac WDL    Cardiac WDL WDL        Cognitive/Neuro/Behavioral WDL    Cognitive/Neuro/Behavioral WDL X;arousability     Level of Consciousness lethargic     Arousal Level arouses to voice

## 2024-10-04 ENCOUNTER — TELEPHONE (OUTPATIENT)
Dept: BEHAVIORAL HEALTH | Facility: CLINIC | Age: 49
End: 2024-10-04
Payer: COMMERCIAL

## 2024-10-08 LAB
ATRIAL RATE - MUSE: 84 BPM
DIASTOLIC BLOOD PRESSURE - MUSE: NORMAL MMHG
INTERPRETATION ECG - MUSE: NORMAL
P AXIS - MUSE: 52 DEGREES
PR INTERVAL - MUSE: 162 MS
QRS DURATION - MUSE: 98 MS
QT - MUSE: 408 MS
QTC - MUSE: 482 MS
R AXIS - MUSE: 251 DEGREES
SYSTOLIC BLOOD PRESSURE - MUSE: NORMAL MMHG
T AXIS - MUSE: 57 DEGREES
VENTRICULAR RATE- MUSE: 84 BPM

## 2024-10-10 ENCOUNTER — TRANSFERRED RECORDS (OUTPATIENT)
Dept: HEALTH INFORMATION MANAGEMENT | Facility: CLINIC | Age: 49
End: 2024-10-10

## 2024-10-22 ENCOUNTER — MYC MEDICAL ADVICE (OUTPATIENT)
Dept: FAMILY MEDICINE | Facility: CLINIC | Age: 49
End: 2024-10-22
Payer: COMMERCIAL

## 2024-10-22 DIAGNOSIS — G44.219 EPISODIC TENSION-TYPE HEADACHE, NOT INTRACTABLE: Primary | ICD-10-CM

## 2024-10-22 DIAGNOSIS — J45.20 INTERMITTENT ASTHMA, UNCOMPLICATED: ICD-10-CM

## 2024-10-22 RX ORDER — BUTALBITAL, ACETAMINOPHEN AND CAFFEINE 50; 325; 40 MG/1; MG/1; MG/1
1 TABLET ORAL EVERY 4 HOURS PRN
Qty: 20 TABLET | Refills: 0 | Status: SHIPPED | OUTPATIENT
Start: 2024-10-22 | End: 2024-11-06

## 2024-10-23 RX ORDER — ALBUTEROL SULFATE 90 UG/1
INHALANT RESPIRATORY (INHALATION)
Qty: 18 G | Refills: 0 | Status: SHIPPED | OUTPATIENT
Start: 2024-10-23

## 2024-11-04 ENCOUNTER — NURSE TRIAGE (OUTPATIENT)
Dept: FAMILY MEDICINE | Facility: CLINIC | Age: 49
End: 2024-11-04
Payer: COMMERCIAL

## 2024-11-04 ENCOUNTER — OFFICE VISIT (OUTPATIENT)
Dept: URGENT CARE | Facility: URGENT CARE | Age: 49
End: 2024-11-04
Payer: COMMERCIAL

## 2024-11-04 VITALS
SYSTOLIC BLOOD PRESSURE: 122 MMHG | BODY MASS INDEX: 27.21 KG/M2 | HEART RATE: 95 BPM | DIASTOLIC BLOOD PRESSURE: 83 MMHG | WEIGHT: 150.7 LBS | TEMPERATURE: 98.3 F | OXYGEN SATURATION: 96 %

## 2024-11-04 DIAGNOSIS — J45.31 MILD PERSISTENT ASTHMA WITH EXACERBATION: ICD-10-CM

## 2024-11-04 DIAGNOSIS — J01.90 ACUTE SINUSITIS WITH SYMPTOMS > 10 DAYS: Primary | ICD-10-CM

## 2024-11-04 PROCEDURE — 99214 OFFICE O/P EST MOD 30 MIN: CPT | Performed by: PHYSICIAN ASSISTANT

## 2024-11-04 RX ORDER — DOXYCYCLINE 100 MG/1
100 CAPSULE ORAL 2 TIMES DAILY
Qty: 20 CAPSULE | Refills: 0 | Status: SHIPPED | OUTPATIENT
Start: 2024-11-04 | End: 2024-11-14

## 2024-11-04 RX ORDER — ALBUTEROL SULFATE 0.83 MG/ML
2.5 SOLUTION RESPIRATORY (INHALATION) EVERY 4 HOURS PRN
Qty: 90 ML | Refills: 0 | Status: SHIPPED | OUTPATIENT
Start: 2024-11-04

## 2024-11-04 RX ORDER — PREDNISONE 20 MG/1
40 TABLET ORAL DAILY
Qty: 10 TABLET | Refills: 0 | Status: SHIPPED | OUTPATIENT
Start: 2024-11-04 | End: 2024-11-09

## 2024-11-04 ASSESSMENT — PAIN SCALES - GENERAL: PAINLEVEL_OUTOF10: MODERATE PAIN (4)

## 2024-11-04 NOTE — PROGRESS NOTES
SUBJECTIVE:  Melody Muñoz is a 49 year old female nebing few times day  wheeozng on occaion feels ithgt.   Hx sinus issues.  Saw ENT on 2020 and was going to do surhery.      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    treated with inh for 9 month    Major depression     Ted Pope  [phone]    Migraines     otc excedrin prn    JASWANT (obstructive sleep apnea)     Sleep apnea March/2014    uses Cpap    Substance abuse (H)     no use since summer 2013    Vertigo 1/22/2015     Patient Active Problem List   Diagnosis    Mantoux: positive    Latent tuberculosis    Major depression    Generalized anxiety disorder    Constipation    Nightmares    Knee pain    Bipolar 1 disorder  with psychosis    Chronic fatigue    Female stress incontinence    Suicidal ideation    Acne    Chronic pain syndrome    Insomnia    Chronic migraine without aura without status migrainosus, not intractable    Nausea    JASWANT (obstructive sleep apnea)    Elevated liver enzymes    TMJ (temporomandibular joint syndrome)    Hostile behavior    Mild persistent asthma without complication    Uncontrolled type 2 diabetes mellitus with hyperglycemia (H)    Allergic rhinitis    Incontinence of urine in female    Migraine    Screening for diabetic peripheral neuropathy    Need for prophylactic vaccination against Streptococcus pneumoniae (pneumococcus)    Abdominal pain    Morbid obesity (H)    Former tobacco use    Class 2 obesity due to excess calories with serious comorbidity and body mass index (BMI) of 36.0 to 36.9 in adult     Mixed hyperlipidemia    Acquired hypothyroidism    Other chronic back pain    Headache disorder    Persistent depressive disorder    Irritable bowel syndrome with both constipation and diarrhea    Anxiety    Recurrent sinus infections    Bilateral groin pain    Opacity of lung on imaging study    Visual disturbance    Breast complaint    Moderate benzodiazepine use disorder (H)    Lithium toxicity, undetermined intent, initial encounter     Current Outpatient Medications   Medication Sig Dispense Refill    albuterol (PROAIR HFA/PROVENTIL HFA/VENTOLIN HFA) 108 (90 Base) MCG/ACT inhaler INHALE 1 TO 2 PUFFS INTO THE LUNGS EVERY 4 HOURS AS NEEDED FOR SHORTNESS OF BREATH OR WHEEZING 18 g 0    albuterol (PROVENTIL) (2.5 MG/3ML) 0.083% neb solution Take 1 vial (2.5 mg) by nebulization every 6 hours as needed for shortness of breath, wheezing or cough. 90 mL 0    budesonide-formoterol (SYMBICORT) 160-4.5 MCG/ACT Inhaler Inhale 2 puffs into the lungs 2 times daily as needed (ASTHMA FLARE) 10.2 g 1    butalbital-acetaminophen-caffeine (ESGIC) -40 MG tablet Take 1 tablet by mouth every 4 hours as needed for headaches. 20 tablet 0    cetirizine (ZYRTEC) 10 MG tablet Take 10 mg by mouth daily      clonazePAM (KLONOPIN) 1 MG tablet Take 1 mg by mouth 3 times daily      dextroamphetamine (DEXEDRINE SPANSULE) 5 MG 24 hr capsule Take 5 mg by mouth 2 times daily      dextroamphetamine (DEXTROSTAT) 10 MG tablet Take 10 mg by mouth 4 times daily      dicyclomine (BENTYL) 10 MG capsule Take 1 capsule (10 mg) by mouth 3 times daily as needed (for abdominal pain) 90 capsule 5    gabapentin (NEURONTIN) 300 MG capsule Take 300 mg by mouth 3 times daily      lurasidone (LATUDA) 120 MG TABS tablet Take 120 mg by mouth daily (with dinner)  3    naloxone (NARCAN) 4 MG/0.1ML nasal spray Spray 1 spray (4 mg) into one nostril alternating nostrils once as needed for opioid reversal every 2-3 minutes until assistance arrives 0.2 mL 1     nicotine polacrilex (NICORETTE) 4 MG gum Take 4 mg by mouth      ondansetron (ZOFRAN ODT) 4 MG ODT tab Take 1-2 tablets (4-8 mg) by mouth every 8 hours as needed for nausea. 12 tablet 1    polyethylene glycol (MIRALAX) 17 GM/Dose powder 17 g by Nasogastric route.      polyethylene glycol-propylene glycol (SYSTANE) 0.4-0.3 % SOLN ophthalmic solution Apply 1 drop to eye      traZODone (DESYREL) 100 MG tablet Take 100 mg by mouth at bedtime      Vitamin D3 (VITAMIN D, CHOLECALCIFEROL,) 25 mcg (1000 units) tablet Take 1 tablet by mouth daily      zolpidem (AMBIEN) 10 MG tablet Take 5 mg by mouth at bedtime.       No current facility-administered medications for this visit.     Social History     Socioeconomic History    Marital status:      Spouse name: Not on file    Number of children: Not on file    Years of education: Not on file    Highest education level: Not on file   Occupational History     Employer: SELF EMPLOYED.     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 No     Service Not Asked    Blood Transfusions Not Asked    Caffeine Concern Not Asked    Occupational Exposure Not Asked    Hobby Hazards Not Asked    Sleep Concern Not Asked    Stress Concern Not Asked    Weight Concern Not Asked    Special Diet Not Asked    Back Care Not Asked    Exercise Not Asked    Bike Helmet Yes    Seat Belt Yes    Self-Exams Not Asked   Social History Narrative    Not on file     Social Drivers of Health      Financial Resource Strain: Low Risk  (10/8/2024)    Received from [facility]    Financial Resource Strain     Difficulty of Paying Living Expenses: 3     Difficulty of Paying Living Expenses: Not on file   Food Insecurity: No Food Insecurity (10/8/2024)    Received from [facility]    Food Insecurity     Do you worry your food will run out before you are able to buy more?: 1   Transportation Needs: No Transportation Needs (10/8/2024)    Received from [facility]    Transportation Needs     Does lack of transportation keep you from medical appointments?: 1     Does lack of transportation keep you from work, meetings or getting things that you need?: 1   Physical Activity: Insufficiently Active (6/3/2020)    [facility]    Exercise Vital Sign     Days of Exercise per Week: 5 days     Minutes of Exercise per Session: 20 min   Stress: No Stress Concern Present (6/3/2020)    Received from [facility], [facility]    [employer] [place] [employer] - Occupational Stress Questionnaire     Feeling of Stress : Only a little   Social Connections: Socially Isolated (10/8/2024)    Received from [facility]    Social Connections     Do you often feel lonely or isolated from those around you?: 4   Interpersonal Safety: Low Risk  (5/17/2024)    Interpersonal Safety     Do you feel physically and emotionally safe where you currently live?: Yes     Within the past 12 months, have you been hit, slapped, kicked or otherwise physically hurt by someone?: No     Within the past 12 months, have you been humiliated or emotionally abused in other ways by your partner or ex-partner?: No   Housing Stability: Low Risk  (10/8/2024)    Received from [facility]    Housing Stability     What is your housing situation today?: 1     ROS  negative  other than stated above    Exam:  {:[id number]}    ***

## 2024-11-04 NOTE — TELEPHONE ENCOUNTER
"Pt calls.    She does not have a primary.  She says she has a sinus infection.  She was supposed to see ENT.  She has not seen them in 4 years.  She has sinus pain and pressure, congestion, her asthma is really bad.  She is doing nebs because her inhaler is not doing it.  She is doing the netti pot.  She is not sure if she has a fever.  She said she has horrible pressure in her head.  She says she has swelling around her eyes.  She has pain in her face and the top of her head.  Her left ear hurts off and on.      She said her mom just .      Per protocol, pt to go to the office now.  No appts in [place], [place], [facility] today.  Advised to be seen in  today.  Locations were given.        Reason for Disposition   Redness or swelling on the cheek, forehead, or around the eye    Additional Information   Negative: Sounds like a life-threatening emergency to the triager   Negative: Difficulty breathing, and not from stuffy nose (e.g., not relieved by cleaning out the nose)   Negative: SEVERE headache and has fever   Negative: Patient sounds very sick or weak to the triager   Negative: SEVERE sinus pain   Negative: SEVERE headache    Answer Assessment - Initial Assessment Questions  1. LOCATION: \"Where does it hurt?\"       Face and the top of her head   2. ONSET: \"When did the sinus pain start?\"  (e.g., hours, days)       Last week   3. SEVERITY: \"How bad is the pain?\"   (Scale 1-10; mild, moderate or severe)    - MILD (1-3): doesn't interfere with normal activities     - MODERATE (4-7): interferes with normal activities (e.g., work or school) or awakens from sleep    - SEVERE (8-10): excruciating pain and patient unable to do any normal activities         3-4/10, hurts worse when she lays down   4. RECURRENT SYMPTOM: \"Have you ever had sinus problems before?\" If Yes, ask: \"When was the last time?\" and \"What happened that time?\"       Has had these infections before   5. NASAL CONGESTION: \"Is the nose blocked?\" If " "Yes, ask: \"Can you open it or must you breathe through your mouth?\"      Yes, hurts around her eyes and they are swollen     6. NASAL DISCHARGE: \"Do you have discharge from your nose?\" If so ask, \"What color?\"      No   7. FEVER: \"Do you have a fever?\" If Yes, ask: \"What is it, how was it measured, and when did it start?\"       She is not sure   8. OTHER SYMPTOMS: \"Do you have any other symptoms?\" (e.g., sore throat, cough, earache, difficulty breathing)      Her left ear hurts off and on  9. PREGNANCY: \"Is there any chance you are pregnant?\" \"When was your last menstrual period?\"      no    Protocols used: Sinus Pain or Congestion-A-[place]    "

## 2024-11-06 ENCOUNTER — MYC REFILL (OUTPATIENT)
Dept: FAMILY MEDICINE | Facility: CLINIC | Age: 49
End: 2024-11-06
Payer: COMMERCIAL

## 2024-11-06 DIAGNOSIS — J45.20 INTERMITTENT ASTHMA, UNCOMPLICATED: ICD-10-CM

## 2024-11-06 DIAGNOSIS — G44.219 EPISODIC TENSION-TYPE HEADACHE, NOT INTRACTABLE: ICD-10-CM

## 2024-11-06 RX ORDER — BUDESONIDE AND FORMOTEROL FUMARATE DIHYDRATE 160; 4.5 UG/1; UG/1
2 AEROSOL RESPIRATORY (INHALATION) 2 TIMES DAILY PRN
Qty: 10.2 G | Refills: 1 | Status: CANCELLED | OUTPATIENT
Start: 2024-11-06

## 2024-11-08 NOTE — TELEPHONE ENCOUNTER
FYI - Status Update    Who is Calling: patient    Update: Patient called really upset. Her headaches are bothering her and she has not heard back. She would like this today.    Does caller want a call/response back: Yes     Could we send this information to you in [employer] or would you prefer to receive a phone call?:   Patient would prefer a phone call   Okay to leave a detailed message?: Yes at Cell number on file:    Telephone Information:   Mobile [phone]

## 2024-11-08 NOTE — TELEPHONE ENCOUNTER
Dr mclean was the last person to see this patient.  I have not seen in > 1 year.  Routing to Dr. Mclean to consider refill

## 2024-11-08 NOTE — TELEPHONE ENCOUNTER
Received red line call for medication refill. Was told Patient had been waiting for sometime for a refill request. Told TC I can route the refill request. TC said she can do that and will talk with patient      Laurence Paul RN

## 2024-11-10 NOTE — TELEPHONE ENCOUNTER
The pt has refill done at 10-22-24. Since it is controlled substance, we suggest her not to take this frequently  The refill request is declined

## 2024-11-11 RX ORDER — BUTALBITAL, ACETAMINOPHEN AND CAFFEINE 50; 325; 40 MG/1; MG/1; MG/1
1 TABLET ORAL EVERY 4 HOURS PRN
Qty: 5 TABLET | Refills: 0 | Status: SHIPPED | OUTPATIENT
Start: 2024-11-11

## 2024-11-11 NOTE — TELEPHONE ENCOUNTER
Incoming call from patient. Is very upset this was declined. I read her the message from Dr Mclean below. Patient was not aware this was a controlled substance. I did let her know it is. Patient states that it is the only medication she has found to help her headaches. She gets headaches at least every other day. It has been more frequent since the stress of her mother passing away last month. Requests refill.    Idalmis Yu RN

## 2024-11-12 NOTE — TELEPHONE ENCOUNTER
Patient appreciated the 5 tablets, but is concerned that she will not make it to her 12/12/24 to establish care in [place].   Patient scheduled video visit with Michael Licea PA-C for 11/19 for tension headache to help patient make it to her next appointment.    Please advise if OK for this virtual or needs in person. Marge Echavarria RN

## 2024-11-13 NOTE — TELEPHONE ENCOUNTER
Called patient and relayed provider's message. Patient stated understanding and had no further questions.    Julieth ROCA RN  [facility] Triage Team

## 2024-11-19 ENCOUNTER — TELEPHONE (OUTPATIENT)
Dept: FAMILY MEDICINE | Facility: CLINIC | Age: 49
End: 2024-11-19

## 2024-11-19 ENCOUNTER — VIRTUAL VISIT (OUTPATIENT)
Dept: INTERNAL MEDICINE | Facility: CLINIC | Age: 49
End: 2024-11-19
Payer: COMMERCIAL

## 2024-11-19 DIAGNOSIS — J45.20 INTERMITTENT ASTHMA, UNCOMPLICATED: ICD-10-CM

## 2024-11-19 DIAGNOSIS — F45.41 STRESS HEADACHES: Primary | ICD-10-CM

## 2024-11-19 PROCEDURE — 99215 OFFICE O/P EST HI 40 MIN: CPT | Mod: 95 | Performed by: INTERNAL MEDICINE

## 2024-11-19 RX ORDER — BUTALBITAL, ACETAMINOPHEN AND CAFFEINE 50; 325; 40 MG/1; MG/1; MG/1
1 TABLET ORAL EVERY 4 HOURS PRN
Status: CANCELLED | OUTPATIENT
Start: 2024-11-19

## 2024-11-19 NOTE — TELEPHONE ENCOUNTER
Patient calling, expressing frustration that VV today was canceled without being notified. Patient states that she was waiting for VV to start but provider did not log on, provider out sick today.     Writer apologized for trouble, offered to try to find another VV today, as patient states that she really needed appt to get a refill for stress headache meds prior to upcoming well check with new PCP on 12/12/24.    One open appt, appt offered, VV scheduled:  11/19/2024 4:30 PM (Arrive by 4:25 PM) Ruth Ruiz MD [facility]     Allison Lee RN  -[facility]

## 2024-11-19 NOTE — PROGRESS NOTES
"  VIDEO VISIT                                                       ASSESSMENT/PLAN                                                      (F45.41) Stress headaches  (primary encounter diagnosis)  Comment: poorly-controlled on current regimen; simply renewing Rx will not help and may worsen her headaches; alternative treatment options offered but declined.    Total time of video call between patient and provider was 16 minutes (4:28-4:44pm). Provider location: office. Patient location: home. Platform: [employer].     40 minutes spent on the date of encounter doing chart review, review of outside records, patient visit, and documentation.    Ruth Ruiz MD   [facility]  [address] [place] [id number]  T: [phone], F: [phone]    SUBJECTIVE                                                      Melody Muñoz is a very pleasant 49 year old female who requested a video visit to discuss \"stress headaches:\"    Patient was made aware that this visit will be billed the same as an in-person visit and has given verbal consent to proceed with this video visit.     Patient has been suffering from frequent stress headaches as of late.  She describes her stress headaches as feeling like her whole body and head tensed up. She has been trying both meditation and breathing exercises to combat her stress to no avail.    She was restarted on Fioricet back in September for these headaches. Last use prior to that was in October 2019. She was given #8 at that time. She requested another refill, without a visit, in October - given #20. Requested another refill earlier this month, without a visit - given #5.     Discussed with patient that using Fiorcet more than 3 days/month is not recommended as it increases the risk of medication overuse headaches and transformation of episodic headaches into chronic headaches.     Suggested trial of a prescription NSAID (like Mobic or Celebrex), an oral steroid burst " or taper, an increase in her gabapentin dosing, or the addition of amitriptyline (or nortriptyline) to her regimen. Patient declines all of these options - only wants Fiorcet. She says that if I don't give it to her, she will go to urgent care to get it. She also reminded me that she has a history of multiple suicide attempts and that my denying her Fiorcet represents a traumatic life event that will negatively affect her mood.     Apologized that she and I do not agree on a treatment plan and wished her well going forward.     OBJECTIVE                                                       Vitals: No vitals were obtained today due to virtual visit.    General: appears healthy, alert and no distress  Psychiatric: mentation normal, affect normal/bright, judgement and insight intact, normal speech and appearance well-groomed    ---    (Note was completed, in part, with [employer] voice-recognition software. Documentation reviewed, but some grammatical, spelling, and word errors may remain.)

## 2024-11-19 NOTE — TELEPHONE ENCOUNTER
Medication Refill    What medication are you calling about (include dose and sig)?:   albuterol (PROAIR HFA/PROVENTIL HFA/VENTOLIN HFA) 108 (90 Base) MCG/ACT inhaler     Preferred Pharmacy:  [id number] - [place], [place] - [address] AT [place] [address] & [address]  [address] [place] [address]  Phone: [phone] Fax: [phone]    Controlled Substance Agreement on file:   CSA -- Patient Level:     [Media Unavailable] Controlled Substance Agreement - Opioid - Scan on 1/24/2023  8:13 AM   [Media Unavailable] Controlled Substance Agreement - Opioid - Scan on 9/30/2021  2:39 PM     Who prescribed the medication?: Yudy Cabrales PA-C   PCP is Michael Licea    Do you need a refill? Yes,   Pt also stated because Rx wasn't filled today (11/19/24) that she will need to smoke marijuana so she will need an inhaler for that reason.     When did you use the medication last?   current inhaler is barely misting.  Will need a new inhaler.      Patient offered an appointment? No    Do you have any questions or concerns?  Yes    Could we send this information to you in [email or web address] or would you prefer to receive a phone call?:   Patient would prefer a phone call     Okay to leave a detailed message?: Yes at Cell number on file:    Telephone Information:   Mobile [phone]     Summer TONYA Neri on 11/19/2024 at 5:31 PM

## 2024-11-20 RX ORDER — ALBUTEROL SULFATE 90 UG/1
INHALANT RESPIRATORY (INHALATION)
Qty: 18 G | Refills: 0 | OUTPATIENT
Start: 2024-11-20

## 2024-11-25 RX ORDER — ALBUTEROL SULFATE 90 UG/1
INHALANT RESPIRATORY (INHALATION)
Qty: 18 G | Refills: 0 | Status: SHIPPED | OUTPATIENT
Start: 2024-11-25

## 2024-11-25 NOTE — TELEPHONE ENCOUNTER
Calling again asking for inhaler. Still having cough with slimy yellow sputum. Would like refill. Plans to stop smoking marijuana when she gets her teeth pulled for implants in around 3 weeks and thinks smoking is directly related to cough. Plans to discuss referral for pulmonary when she establishes care with new provider in December.     Can we leave a detailed message on this number? YES  Phone number patient can be reached at: Cell number on file:    Telephone Information:   Mobile [phone]       Silvana Kunz RN  [facility] Triage      Bess Kunz RN

## 2024-12-09 ENCOUNTER — VIRTUAL VISIT (OUTPATIENT)
Dept: SLEEP MEDICINE | Facility: CLINIC | Age: 49
End: 2024-12-09
Payer: COMMERCIAL

## 2024-12-09 VITALS — WEIGHT: 146 LBS | BODY MASS INDEX: 26.87 KG/M2 | HEIGHT: 62 IN

## 2024-12-09 DIAGNOSIS — G47.33 OSA (OBSTRUCTIVE SLEEP APNEA): Primary | ICD-10-CM

## 2024-12-09 PROCEDURE — 99203 OFFICE O/P NEW LOW 30 MIN: CPT | Mod: 95 | Performed by: INTERNAL MEDICINE

## 2024-12-09 ASSESSMENT — SLEEP AND FATIGUE QUESTIONNAIRES
HOW LIKELY ARE YOU TO NOD OFF OR FALL ASLEEP WHILE SITTING AND READING: HIGH CHANCE OF DOZING
HOW LIKELY ARE YOU TO NOD OFF OR FALL ASLEEP WHILE SITTING INACTIVE IN A PUBLIC PLACE: WOULD NEVER DOZE
HOW LIKELY ARE YOU TO NOD OFF OR FALL ASLEEP WHEN YOU ARE A PASSENGER IN A CAR FOR AN HOUR WITHOUT A BREAK: SLIGHT CHANCE OF DOZING
HOW LIKELY ARE YOU TO NOD OFF OR FALL ASLEEP IN A CAR, WHILE STOPPED FOR A FEW MINUTES IN TRAFFIC: WOULD NEVER DOZE
HOW LIKELY ARE YOU TO NOD OFF OR FALL ASLEEP WHILE SITTING AND TALKING TO SOMEONE: WOULD NEVER DOZE
HOW LIKELY ARE YOU TO NOD OFF OR FALL ASLEEP WHILE WATCHING TV: SLIGHT CHANCE OF DOZING
HOW LIKELY ARE YOU TO NOD OFF OR FALL ASLEEP WHILE SITTING QUIETLY AFTER LUNCH WITHOUT ALCOHOL: WOULD NEVER DOZE
HOW LIKELY ARE YOU TO NOD OFF OR FALL ASLEEP WHILE LYING DOWN TO REST IN THE AFTERNOON WHEN CIRCUMSTANCES PERMIT: MODERATE CHANCE OF DOZING

## 2024-12-09 ASSESSMENT — PAIN SCALES - GENERAL: PAINLEVEL_OUTOF10: NO PAIN (0)

## 2024-12-09 NOTE — PATIENT INSTRUCTIONS
Please do not drive drowsy under any circumstances.  If you find yourself in a situation in which you are driving and feeling sleepy, please pull over right away in a safe place and take a quick nap before getting back on the road.    Equipment Instructions    We will process your PAP order and send it to a Durable Medical Equipment (DME) provider.    The medical equipment company should call you within 7 days.  If you have not heard from the company, please contact them to see if they received your order and are planning to call you.    Please call us at [phone] if you are unable to contact the medical equipment company or if they do not have the order.    If you are starting a new PAP machine, please call us after you use it the first night to let us know how it went. This call also helps us know that you received your equipment and that everything is ready. Please use our central phone number [phone]    Contact information for [employer] company:    [facility] Tel: [phone]

## 2024-12-09 NOTE — NURSING NOTE
Current patient location: [address] [place] [address] [address] [address] [address]    Is the patient currently in the state of [place]? YES    Visit mode:VIDEO    If the visit is dropped, the patient can be reconnected by:VIDEO VISIT: Text to cell phone:   Telephone Information:   Mobile [phone]       Will anyone else be joining the visit? NO  (If patient encounters technical issues they should call [phone]    Are changes needed to the allergy or medication list? No    Are refills needed on medications prescribed by this physician? NO    Rooming Documentation:  Questionnaire(s) completed    Reason for visit: Consult    Renny GRAMAJO

## 2024-12-09 NOTE — PROGRESS NOTES
Virtual Visit Details    Type of service:  Video Visit     Originating Location (pt. Location): Home  Distant Location (provider location):  Off-site  Platform used for Video Visit: AmWell    Additional 15 minutes on the date of service was spent performing the following:    -Preparing to see the patient  -Obtaining and/or reviewing separately obtained history   -Ordering medications, tests, or procedures   -Documenting clinical information in the electronic or other health record     Thank you for the opportunity to participate in the care of Melody Muñoz.     She is a 49 year old y/o female patient who comes to the sleep medicine clinic for transfer of care. The patient was diagnosed with JASWANT on 03/12/2014 (AHI=48). The patient states that her CPAP machine is malfunctioning so she is not able to use her machine for more than a year. She would like to establish care to get a new CPAP machine from [facility].     Assessment and Plan:  In summary Melody Muñoz is a 49 year old year old female who is here for follow up.    1. JASWANT (obstructive sleep apnea) (Primary)  I will write a prescription for the patient to receive a replacement CPAP machine as per her request.  - COMPREHENSIVE DME    Lab reviewed: Discussed with patient.    Sleep-Wake Cycle:    The patient likes to initiate sleep at around 10 PM with a sleep latency of less than 20 minutes. The patient has zero nocturnal awakenings. Final wake up time is around 6-7 AM.    ARSEN:  ARSEN Total Score: (Patient-Rptd) 18  Total score - [place]: (Proxy-Rptd) 7 (12/9/2024  1:03 PM)    Failed to redirect to the Timeline version of the [employer] SmartLink.   Patient Active Problem List   Diagnosis    Mantoux: positive    Latent tuberculosis    Major depression    Generalized anxiety disorder    Constipation    Nightmares    Knee pain    Bipolar 1 disorder  with psychosis    Chronic fatigue    Female stress incontinence    Suicidal ideation    Acne     Chronic pain syndrome    Insomnia    Chronic migraine without aura without status migrainosus, not intractable    Nausea    JASWANT (obstructive sleep apnea)    Elevated liver enzymes    TMJ (temporomandibular joint syndrome)    Hostile behavior    Mild persistent asthma without complication    Uncontrolled type 2 diabetes mellitus with hyperglycemia (H)    Allergic rhinitis    Incontinence of urine in female    Migraine    Screening for diabetic peripheral neuropathy    Need for prophylactic vaccination against Streptococcus pneumoniae (pneumococcus)    Abdominal pain    Morbid obesity (H)    Former tobacco use    Class 2 obesity due to excess calories with serious comorbidity and body mass index (BMI) of 36.0 to 36.9 in adult    Mixed hyperlipidemia    Acquired hypothyroidism    Other chronic back pain    Headache disorder    Persistent depressive disorder    Irritable bowel syndrome with both constipation and diarrhea    Anxiety    Recurrent sinus infections    Bilateral groin pain    Opacity of lung on imaging study    Visual disturbance    Breast complaint    Moderate benzodiazepine use disorder (H)    Lithium toxicity, undetermined intent, initial encounter       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    treated with inh for 9 month    Major depression     Ted Pope  [id number]    Migraines     otc excedrin prn    JASWANT (obstructive sleep apnea)     Sleep apnea March/2014    uses Cpap    Substance abuse (H)     no use since summer 2013    Vertigo 1/22/2015       Past Surgical History:   Procedure  Laterality Date     SECTION       for twins    COLONOSCOPY      HYSTERECTOMY, PAP NO LONGER INDICATED Bilateral     pt reported    LAPAROSCOPIC APPENDECTOMY N/A 2017    Procedure: LAPAROSCOPIC APPENDECTOMY;  LAPAROSCOPIC APPENDECTOMY and resection of epiploic tag;  Surgeon: Roberto Robins MD;  Location: [facility] OR    LAPAROSCOPY      - endometriosis    little finger surgery left      SLING BLADDER NOS  2014    tubal ligation bilateral      wisdom teeth removal         Current Outpatient Medications   Medication Sig Dispense Refill    albuterol (PROAIR HFA/PROVENTIL HFA/VENTOLIN HFA) 108 (90 Base) MCG/ACT inhaler INHALE 1 TO 2 PUFFS INTO THE LUNGS EVERY 4 HOURS AS NEEDED FOR SHORTNESS OF BREATH OR WHEEZING 18 g 0    albuterol (PROVENTIL) (2.5 MG/3ML) 0.083% neb solution Take 1 vial (2.5 mg) by nebulization every 4 hours as needed for shortness of breath, wheezing or cough. 90 mL 0    albuterol (PROVENTIL) (2.5 MG/3ML) 0.083% neb solution Take 1 vial (2.5 mg) by nebulization every 6 hours as needed for shortness of breath, wheezing or cough. 90 mL 0    budesonide-formoterol (SYMBICORT) 160-4.5 MCG/ACT Inhaler Inhale 2 puffs into the lungs 2 times daily as needed (ASTHMA FLARE) 10.2 g 1    cetirizine (ZYRTEC) 10 MG tablet Take 10 mg by mouth daily      clonazePAM (KLONOPIN) 1 MG tablet Take 1 mg by mouth 3 times daily      dextroamphetamine (DEXEDRINE SPANSULE) 5 MG 24 hr capsule Take 5 mg by mouth 2 times daily      dicyclomine (BENTYL) 10 MG capsule Take 1 capsule (10 mg) by mouth 3 times daily as needed (for abdominal pain) (Patient not taking: Reported on 2024) 90 capsule 5    gabapentin (NEURONTIN) 300 MG capsule Take 300 mg by mouth 3 times daily      lurasidone (LATUDA) 120 MG TABS tablet Take 120 mg by mouth daily (with dinner)  3    naloxone (NARCAN) 4 MG/0.1ML nasal spray Spray 1 spray (4 mg) into one nostril alternating nostrils once as needed for opioid reversal  every 2-3 minutes until assistance arrives 0.2 mL 1    nicotine polacrilex (NICORETTE) 4 MG gum Take 4 mg by mouth      ondansetron (ZOFRAN ODT) 4 MG ODT tab Take 1-2 tablets (4-8 mg) by mouth every 8 hours as needed for nausea. 12 tablet 1    polyethylene glycol (MIRALAX) 17 GM/Dose powder 17 g by Nasogastric route. (Patient not taking: Reported on 11/19/2024)      polyethylene glycol-propylene glycol (SYSTANE) 0.4-0.3 % SOLN ophthalmic solution Apply 1 drop to eye      traZODone (DESYREL) 100 MG tablet Take 100 mg by mouth at bedtime      Vitamin D3 (VITAMIN D, CHOLECALCIFEROL,) 25 mcg (1000 units) tablet Take 1 tablet by mouth daily         Allergies   Allergen Reactions    Hydrocodone Nausea and Vomiting    Tamiflu [Oseltamivir]      Rash    Tylenol W/Codeine [Acetaminophen-Codeine] Nausea and Vomiting       Visual  Exam:  GEN: NAD,  Psych: normal mood, normal affect    Labs/Studies:      Lab Results   Component Value Date     (H) 10/03/2024     (H) 10/03/2024     Lab Results   Component Value Date    HGB 14.3 10/03/2024    HGB 14.0 10/03/2024     Lab Results   Component Value Date    BUN 5 (L) 10/03/2024    BUN 6.3 10/03/2024    CR 0.7 10/03/2024    CR 0.78 10/03/2024     Lab Results   Component Value Date    AST 23 10/03/2024    AST 16 09/04/2024    ALT 16 10/03/2024    ALT 18 09/04/2024    GGT 48 (H) 05/25/2012    ALKPHOS 100 10/03/2024    ALKPHOS 95 09/04/2024    BILITOTAL <0.2 10/03/2024    BILITOTAL <0.2 09/04/2024    BILICONJ 0.0 04/04/2014    BILICONJ 0.0 01/24/2011    DARELL 31 12/13/2023     Lab Results   Component Value Date    UAMP Screen Positive 12/13/2023    UAMP  06/02/2015     Negative   Cutoff for a negative amphetamine is 500 ng/mL or less.      UBARB Screen Negative 12/13/2023    UBARB Screen Negative 11/26/2020    UBARB  06/02/2015     Negative   Cutoff for a negative barbiturate is 200 ng/mL or less.      BENZODIAZEUR  06/02/2015     Negative   Cutoff for a negative  benzodiazepine is 200 ng/mL or less.      UCANN Screen Negative 12/13/2023    UCANN  06/02/2015     Negative   Cutoff for a negative cannabinoid is 50 ng/mL or less.      UCOC Screen Negative 12/13/2023    UCOC Screen Negative 11/26/2020    UCOC  06/02/2015     Negative   Cutoff for a negative cocaine is 300 ng/mL or less.      OPIT Screen Negative 12/13/2023    OPIT  06/02/2015     Negative   Cutoff for a negative opiate is 300 ng/mL or less.      UPCP Screen Negative 12/13/2023    UPCP Screen Negative 11/26/2020    UPCP  05/03/2015     Negative   Cutoff for a negative PCP is 25 ng/mL or less.         Recent Labs   Lab Test 10/03/24  1013 10/03/24  1010 10/03/24  1008 09/04/24  1755    136  --  137   POTASSIUM 3.4 4.1  --  4.1   CHLORIDE 104 103  --  103   CO2  --  25  --  22   ANIONGAP  --  8  --  12   * 130*   < > 100*   BUN 5* 6.3  --  10.5   CR 0.7 0.78  --  0.78   DENITA  --  10.0  --  9.5    < > = values in this interval not displayed.       Patient was strongly advised in AVS to avoid driving, operating any heavy machinery or other hazardous situations while drowsy or sleepy. Patient was counseled on the importance of driving while alert, to pull over if drowsy, or nap before getting into the vehicle if sleepy.     Patient verbalized understanding of these issues, agrees with the plan and all questions were answered today. Patient was given an opportuntity to voice any other symptoms or concerns not listed above. Patient did not have any other symptoms or concerns.      Manjit Bahena DO  Board Certified in Internal Medicine and Sleep Medicine    (Note created with Dragon voice recognition and unintended spelling errors and word substitutions may occur)     Audio and visual devices were used for this virtual clinic visit with permission from patient.

## 2024-12-12 ENCOUNTER — PATIENT OUTREACH (OUTPATIENT)
Dept: ONCOLOGY | Facility: CLINIC | Age: 49
End: 2024-12-12

## 2024-12-12 ENCOUNTER — TELEPHONE (OUTPATIENT)
Dept: FAMILY MEDICINE | Facility: CLINIC | Age: 49
End: 2024-12-12

## 2024-12-12 DIAGNOSIS — J45.30 MILD PERSISTENT ASTHMA WITHOUT COMPLICATION: Primary | ICD-10-CM

## 2024-12-12 DIAGNOSIS — J45.20 INTERMITTENT ASTHMA, UNCOMPLICATED: ICD-10-CM

## 2024-12-12 RX ORDER — BUDESONIDE AND FORMOTEROL FUMARATE DIHYDRATE 160; 4.5 UG/1; UG/1
2 AEROSOL RESPIRATORY (INHALATION) 2 TIMES DAILY PRN
Qty: 10.2 G | Refills: 0 | Status: SHIPPED | OUTPATIENT
Start: 2024-12-12

## 2024-12-12 NOTE — PROGRESS NOTES
New Patient Oncology Nurse Navigator Note     Referring provider: Shanita Tamayo APRN CNP      Referring Clinic/Organization: [facility]      Referred to (specialty:) Breast Provider Referral      Date Referral Received: December 12, 2024     Evaluation for:  R92.8 (ICD-10-CM) - Abnormal finding on breast imaging     Clinical History (per Nurse review of records provided):      2024   Patient presented to ordering provider's office with complaint of bilateral breast pain.   Diagnostic breast imaging is ordered (not scheduled yet as of 12/16 but Specialty Breast Imaging Scheduling left voice message 12/13).    2023  Most recent breast imaging available at writing was 1/30/23 when patient reported painful lump in right breast and pain in left axilla. Imaging showed nothing for malignancy in either breast. No worrisome interval change. Bilateral axillary marah enlargement, slightly more prominent than on 2/24/2020 but with prior negative left axillary FNA compatible with physiologic reactive etiology. Ultrasound shows multiple bilateral symmetric enlarged axillary lymph nodes with cortical thickness up to 0.6 cm in the right axilla and 0.5 cm in the left axilla previously up to 0.4 cm on 5/12/2020.    2020  Personal history of breast cysts, and negative left axillary FNA performed at [place] on 5/14/2020 5/14/20 NR-  A. Lymph node, Left axilla, fine needle aspiration (smears): Negative for malignancy. Lymphocytes consistent with sampled lymph node.     Prior mammograms dating back to 12/5/2018.     Family history of breast cancer in 1 paternal aunt and ovarian cancer on another paternal aunt.    12/17 5222 - Telephoned and spoke with Melody. Explained my role and purpose for the call. Call warm transferred to Specialty Breast Imaging Scheduling to book diagnostic breast imaging.    Patient reiterated that she has only had one MBI in the past. (A second one in the records is erroneous  per patient.)    Records Location: Care Everywhere, Media, and See Bookmarked material     Records Needed: Breast imaging for past 5 years

## 2024-12-13 ENCOUNTER — TELEPHONE (OUTPATIENT)
Dept: FAMILY MEDICINE | Facility: CLINIC | Age: 49
End: 2024-12-13
Payer: COMMERCIAL

## 2024-12-13 DIAGNOSIS — E11.65 UNCONTROLLED TYPE 2 DIABETES MELLITUS WITH HYPERGLYCEMIA (H): ICD-10-CM

## 2024-12-13 NOTE — TELEPHONE ENCOUNTER
Prior Authorization Retail Medication Request    Medication/Dose: semaglutide (OZEMPIC) 2 MG/3ML pen  Diagnosis and ICD code (if different than what is on RX):    New/renewal/insurance change PA/secondary ins. PA:  Previously Tried and Failed:    Rationale:      Donaldson: [id number]    Yue Cui TC

## 2024-12-16 ENCOUNTER — PATIENT OUTREACH (OUTPATIENT)
Dept: NURSING | Facility: CLINIC | Age: 49
End: 2024-12-16
Payer: COMMERCIAL

## 2024-12-16 ASSESSMENT — ACTIVITIES OF DAILY LIVING (ADL): DEPENDENT_IADLS:: INDEPENDENT

## 2024-12-16 NOTE — LETTER
[facility] CARE COORDINATION    December 17, 2024    Melody Muñoz  [address] [place] [address]   [place] [address]      Dear Melody,    I am a clinic care coordinator who works with KIRAN Cisneros CNP with the [facility]. I wanted to thank you for spending the time to talk with me.  Below is a description of clinic care coordination and how I can further assist you.       The clinic care coordination team is made up of a registered nurse, , financial resource worker and community health worker who understand the health care system. The goal of clinic care coordination is to help you manage your health and improve access to the health care system. Our team works alongside your provider to assist you in determining your health and social needs. We can help you obtain health care and community resources, providing you with necessary information and education. We can work with you through any barriers and develop a care plan that helps coordinate and strengthen the communication between you and your care team.  Our services are voluntary and are offered without charge to you personally.    Please feel free to contact me with any questions or concerns regarding care coordination and what we can offer.      We are focused on providing you with the highest-quality healthcare experience possible.    Sincerely,     MARLENE Rodriguez   Care Coordination Team  [phone]      Enclosed: I have enclosed a copy of the Patient Centered Plan of Care. This has helpful information and goals that we have talked about. Please keep this in an easy to access place to use as needed.

## 2024-12-16 NOTE — LETTER
[facility]  Patient Centered Plan of Care  About Me:        Patient Name:  Melody Muñoz    YOB: 1975  Age:         49 year old   Ivette MRN:    [id number] Telephone Information:  Home Phone [phone]   Mobile [phone]       Address:  [address]  [facility] [address] Email address:  [email or web address]      Emergency Contact(s)    Name Relationship Lgl Grd Work Phone Home Phone Mobile Phone   SUZY CHOUDHURY Spouse    [phone]           Primary language:  English     needed? No   [employer]:  [phone] op. 1  Other communication barriers:None    Preferred Method of Communication:  Phone  Current living arrangement: I live in a private home    Mobility Status/ Medical Equipment: Independent        Health Maintenance  Health Maintenance Reviewed: Due/Overdue   Health Maintenance Due   Topic Date Due    ASTHMA ACTION PLAN  12/03/2020    DIABETIC FOOT EXAM  01/23/2021    EYE EXAM  04/21/2021    LIPID  01/23/2024    TSH W/FREE T4 REFLEX  12/13/2024          My Access Plan  Medical Emergency 911   Primary Clinic Line [facility] - [phone]   24 Hour Appointment Line [phone] or  [phone]) (toll-free)   24 Hour Nurse Line [phone] (toll-free)   Preferred Urgent Care No data recorded   Preferred Hospital [facility]  [phone]     Preferred Pharmacy [id number] [place], [place] - [address] AT [facility] [address]     Behavioral Health Crisis Line The National Suicide Prevention Lifeline at 1-680.178.4427 or Text/Call 128           My Care Team Members  Patient Care Team         Relationship Specialty Notifications Start End    Shanita Tamayo APRN CNP PCP - General Family Medicine All results, Admissions 12/12/24     Phone: [phone] Fax: [phone] [address] [facility] [address]    [place]  KIANNA Foster Physician Assistant Gastroenterology  5/28/24     Phone: [phone] Fax: [phone]         [address] [facility] [address]    Mark Mclean MD Assigned PCP   7/23/24     Phone: [phone] Fax: [phone]         [address] [facility] [address]    Kristin Blanc PA-C Assigned Gastroenterology Provider   7/23/24     Phone: [phone] Fax: [phone]         [facility] [address]    Manjit Bahena, DO Hospitalist Sleep Medicine  12/12/24     Phone: [phone] Fax: [phone]         [address] [address]    Noemi Saleh, LSW Lead Care Coordinator Primary Care - CC Admissions 12/13/24     Phone: [phone]         Marimar Alvarez, CHW Community Health Worker Primary Care - [facility] Admissions 12/16/24     Phone: [phone]                     My Care Plans  Self Management and Treatment Plan    Care Plan  Care Plan: Financial Wellbeing       Problem: Patient expresses financial resource strain       Goal: Create an action plan to increase financial stability       Start Date: 12/16/2024 Expected End Date: 2/28/2025    Priority: Medium    Note:     Barriers: I don't qualify for most programs based on household income  Strengths: Family support   Patient expressed understanding of goal: Yes  Action steps to achieve this goal:  1. I will access food resources such as [employer] and [employer] as needed to stretch dollars.                                   Action Plans on File:   Asthma     COPD  Depression     Migraine    Advance Care Plans/Directives:   Advanced Care Plan/Directives on file: No    Discussed with patient/caregiver(s): No data recorded  Honoring Choices    Advance Care Planning and Health Care Directives  When it comes to decisions about your health care, it s important that your voice is heard. You may not always be able to speak for yourself.    We encourage you to have discussions with  your loved ones, cultural or spiritual leaders and health care providers about your goals, values, beliefs and choices.    We are a part of [employer] [place] , supporting and promoting the benefits of advance care planning conversations.    Our goals are to:  Help you make informed decisions about your healthcare choices and ensure that those choices are honored  Offer advance care planning discussions with trained staff  Ensure your choices are clearly defined, documented and available in your medical record  Translate your choices into medical orders as needed    Why is Advance Care Planning important?  Know what your health care choices are and decide what is right for you  An unexpected illness or injury could leave you unable to participate in important treatment decisions  When choices are left to others to decide that responsibility can be difficult and stressful  By discussing and outlining your choices, your voice is heard in the care you want to receive    How can I learn more?  We offer free classes at multiple locations, days and times. Our trained facilitators will provide information and guide you through a Health Care Directive document. They can also review, notarize and add your document to your medical record.    Call [employer] at [phone] or toll free at [phone] for assistance.          My Medical and Care Information  Problem List   Patient Active Problem List   Diagnosis    Mantoux: positive    Latent tuberculosis    Major depression    Generalized anxiety disorder    Constipation    Nightmares    Knee pain    Bipolar 1 disorder  with psychosis    Chronic fatigue    Female stress incontinence    Suicidal ideation    Acne    Chronic pain syndrome    Insomnia    Chronic migraine without aura without status migrainosus, not intractable    Nausea    JASWANT (obstructive sleep apnea)    Elevated liver enzymes    TMJ (temporomandibular joint syndrome)    Mild persistent  asthma without complication    Uncontrolled type 2 diabetes mellitus with hyperglycemia (H)    Allergic rhinitis    Incontinence of urine in female    Migraine    Screening for diabetic peripheral neuropathy    Need for prophylactic vaccination against Streptococcus pneumoniae (pneumococcus)    Abdominal pain    Morbid obesity (H)    Former tobacco use    Mixed hyperlipidemia    Acquired hypothyroidism    Other chronic back pain    Headache disorder    Persistent depressive disorder    Irritable bowel syndrome with both constipation and diarrhea    Anxiety    Recurrent sinus infections    Bilateral groin pain    Opacity of lung on imaging study    Visual disturbance    Breast complaint    Moderate benzodiazepine use disorder (H)    Lithium toxicity, undetermined intent, initial encounter      Current Medications:  Please refer to the most recent medication list provided to you by your medical team and reach out to your provider with any questions or to make any corrections.    Care Coordination Start Date: 12/12/2024   Frequency of Care Coordination: monthly, more frequently as needed     Form Last Updated: 12/17/2024

## 2024-12-17 ENCOUNTER — MYC MEDICAL ADVICE (OUTPATIENT)
Dept: FAMILY MEDICINE | Facility: CLINIC | Age: 49
End: 2024-12-17
Payer: COMMERCIAL

## 2024-12-17 NOTE — PROGRESS NOTES
Clinic Care Coordination Contact  Clinic Care Coordination Contact  OUTREACH    Referral Information:  Referral Source: PCP         Chief Complaint   Patient presents with    Clinic Care Coordination - Initial     Financial concerns        Universal Utilization: 91% Admission or ED Risk  Clinic Utilization  Difficulty keeping appointments:: No  Compliance Concerns: No  No-Show Concerns: No  No PCP office visit in Past Year: No  Utilization      No Show Count (past year)  4             ED Visits  2             Hospital Admissions  0                    Current as of: 12/16/2024  6:07 PM                Clinical Concerns:  Current Medical Concerns:    Patient Active Problem List   Diagnosis    Mantoux: positive    Latent tuberculosis    Major depression    Generalized anxiety disorder    Constipation    Nightmares    Knee pain    Bipolar 1 disorder  with psychosis    Chronic fatigue    Female stress incontinence    Suicidal ideation    Acne    Chronic pain syndrome    Insomnia    Chronic migraine without aura without status migrainosus, not intractable    Nausea    JASWANT (obstructive sleep apnea)    Elevated liver enzymes    TMJ (temporomandibular joint syndrome)    Mild persistent asthma without complication    Uncontrolled type 2 diabetes mellitus with hyperglycemia (H)    Allergic rhinitis    Incontinence of urine in female    Migraine    Screening for diabetic peripheral neuropathy    Need for prophylactic vaccination against Streptococcus pneumoniae (pneumococcus)    Abdominal pain    Morbid obesity (H)    Former tobacco use    Mixed hyperlipidemia    Acquired hypothyroidism    Other chronic back pain    Headache disorder    Persistent depressive disorder    Irritable bowel syndrome with both constipation and diarrhea    Anxiety    Recurrent sinus infections    Bilateral groin pain    Opacity of lung on imaging study    Visual disturbance    Breast complaint    Moderate benzodiazepine use disorder (H)    Lithium  toxicity, undetermined intent, initial encounter        Melody shared that she doesn't qualify for any [facility] support due to income being too high,  She tried applying for Social Security in the past and was denied.   She doesn't wish to try again at this me as she is certain she will be denied again.  They continue to support 18 year old who is at college and living in her own place and that has  made things financially tighter for the family,      She has not reviewed the [employer] information yet sent by CHW.   CC will send  information about  local food shelves  via [employer].              Current Behavioral Concerns: None noted    Education Provided to patient: CLARE [employer]      Health Maintenance Reviewed: Due/Overdue   Health Maintenance Due   Topic Date Due    ASTHMA ACTION PLAN  12/03/2020    DIABETIC FOOT EXAM  01/23/2021    EYE EXAM  04/21/2021    LIPID  01/23/2024    TSH W/FREE T4 REFLEX  12/13/2024          Medication Management:  Medication review status: Medications reviewed and no changes reported per patient.             Functional Status:  Dependent ADLs:: Independent  Dependent IADLs:: Independent  Mobility Status: Independent    Living Situation:  Current living arrangement:: I live in a private home  Type of residence:: [facility]    Lifestyle & Psychosocial Needs:    Social Drivers of Health     Food Insecurity: Low Risk  (12/9/2024)    Food Insecurity     Within the past 12 months, did you worry that your food would run out before you got money to buy more?: No     Within the past 12 months, did the food you bought just not last and you didn t have money to get more?: No   Depression: Not at risk (12/12/2024)    PHQ-2     PHQ-2 Score: 2   Recent Concern: Depression - At risk (12/4/2024)    Received from [employer] [facility]    PHQ-2     PHQ-2 TOTAL SCORE: 5   Housing Stability: Low Risk  (12/9/2024)    Housing Stability     Do you have housing? :  Yes     Are you worried about losing your housing?: No   Tobacco Use: High Risk (12/12/2024)    Patient History     Smoking Tobacco Use: Every Day     Smokeless Tobacco Use: Never     Passive Exposure: Current   Financial Resource Strain: High Risk (12/9/2024)    Financial Resource Strain     Within the past 12 months, have you or your family members you live with been unable to get utilities (heat, electricity) when it was really needed?: Yes   Alcohol Use: Not on file (10/8/2024)   Transportation Needs: Low Risk  (12/9/2024)    Transportation Needs     Within the past 12 months, has lack of transportation kept you from medical appointments, getting your medicines, non-medical meetings or appointments, work, or from getting things that you need?: No   Physical Activity: Insufficiently Active (12/9/2024)    Exercise Vital Sign     Days of Exercise per Week: 3 days     Minutes of Exercise per Session: 30 min   Interpersonal Safety: Low Risk  (12/12/2024)    Interpersonal Safety     Do you feel physically and emotionally safe where you currently live?: Yes     Within the past 12 months, have you been hit, slapped, kicked or otherwise physically hurt by someone?: No     Within the past 12 months, have you been humiliated or emotionally abused in other ways by your partner or ex-partner?: No   Stress: Stress Concern Present (12/9/2024)    [place] [employer] - Occupational Stress Questionnaire     Feeling of Stress : To some extent   Social Connections: Unknown (12/9/2024)    Social Connection and Isolation Panel [NHANES]     Frequency of Communication with Friends and Family: Not on file     Frequency of Social Gatherings with Friends and Family: Once a week     Attends Mandaeism Services: Not on file     Active Member of Clubs or Organizations: Not on file     Attends Club or Organization Meetings: Not on file     Marital Status: Not on file   Recent Concern: Social Connections - Socially Isolated  (10/8/2024)    [facility] [employer] [facility]    Social Connections     Do you often feel lonely or isolated from those around you?: 4   Health Literacy: Not on file     Inadequate nutrition (GOAL):: No  Tube Feeding: No  Inadequate activity/exercise (GOAL):: No  Significant changes in sleep pattern (GOAL): No  Transportation means:: Regular car     Mu-ism or spiritual beliefs that impact treatment:: No  Mental health DX:: Yes  Mental health management concern (GOAL):: No  Informal Support system:: Family             Resources and Interventions:  Current Resources:         Supplies Currently Used at Home: None  Equipment Currently Used at Home: none  Employment Status: unemployed         Advance Care Plan/Directive  Advanced Care Plans/Directives on file:: No    Referrals Placed: Community Resources       Care Plan:  Care Plan: Financial Wellbeing       Problem: Patient expresses financial resource strain       Goal: Create an action plan to increase financial stability       Start Date: 12/16/2024 Expected End Date: 2/28/2025    Priority: Medium    Note:     Barriers: I don't qualify for most programs based on household income  Strengths: Family support   Patient expressed understanding of goal: Yes  Action steps to achieve this goal:  1. I will access food resources such as [employer] and [employer] Food Shelves as needed to stretch dollars.                                   Patient/Caregiver understanding: Yes    Outreach Frequency: monthly, more frequently as needed  Future Appointments                In 6 days RHBCMAD1 M [facility]    In 6 days RHBCUS1 M [facility]    In 2 months Kristin Blanc PA-C M [facility] Gastroenterology [facility]            Plan: Melody will review the resources sent via My Chart. CHW will outreach again in one month      MARLENE Rodriguez  [facility]  Coordination Team  [phone]

## 2024-12-17 NOTE — TELEPHONE ENCOUNTER
PA Initiation    Medication: OZEMPIC (0.25 OR 0.5 MG/DOSE) 2 MG/3ML SC SOPN  Insurance Company: [employer] - Phone [phone] Fax [phone]  Pharmacy Filling the Rx: [id number] [place], [place] - [address] AT [place] [address] & [facility]  Filling Pharmacy Phone: [phone]  Filling Pharmacy Fax: [phone]  Start Date: 12/17/2024

## 2024-12-17 NOTE — TELEPHONE ENCOUNTER
Shanita Tamayo, APRN CNP -   Please review my chart message and advise on Fioricet dosing   RN wanted to ensure you saw the ER visit from 10/3/2024 prior to increasing quantity     Thank you   Patrizia Muñoz, Registered Nurse  [facility]

## 2024-12-17 NOTE — TELEPHONE ENCOUNTER
Prior Authorization Not Needed per Insurance    Medication: OZEMPIC (0.25 OR 0.5 MG/DOSE) 2 MG/3ML SC SOPN  Insurance Company: [employer] - Phone [phone] Fax [phone]  Expected CoPay: $    Pharmacy Filling the Rx: [id number] [address] AT [place] [address] & [facility]  Pharmacy Notified: yes  Patient Notified: **Instructed pharmacy to notify patient when script is ready to /ship.**    Patient needs to fill at [facility] pharmacy.  Please send new script

## 2024-12-18 NOTE — TELEPHONE ENCOUNTER
How do we get this sent to the correct pharmacy. Does not look like to can be e-prescribed. Thanks.

## 2024-12-18 NOTE — TELEPHONE ENCOUNTER
Please let patient know that I would like for her to make an appointment to discuss further. Ok to make an appointment with extender if needed. She has a very complex history and her recent visit was for a preventative visit. Thanks

## 2024-12-18 NOTE — TELEPHONE ENCOUNTER
Shanita Tamayo CNP-    If prescription unable to be e-prescribed, the prescription can be printed and faxed to the pharmacy.     Once signed you can route to TC to fax to the requested location.    INDIRA BarrosoN, RN  [facility]

## 2024-12-18 NOTE — TELEPHONE ENCOUNTER
Patient notified of message below via my chart     Patrizia Muñoz, Registered Nurse  [facility]

## 2024-12-23 ENCOUNTER — HOSPITAL ENCOUNTER (OUTPATIENT)
Dept: ULTRASOUND IMAGING | Facility: CLINIC | Age: 49
Discharge: HOME | End: 2024-12-23
Payer: COMMERCIAL

## 2024-12-23 ENCOUNTER — HOSPITAL ENCOUNTER (OUTPATIENT)
Dept: MAMMOGRAPHY | Facility: CLINIC | Age: 49
Discharge: HOME | End: 2024-12-23
Payer: COMMERCIAL

## 2024-12-23 DIAGNOSIS — N64.4 BREAST PAIN: ICD-10-CM

## 2024-12-23 PROCEDURE — 77066 DX MAMMO INCL CAD BI: CPT

## 2024-12-23 PROCEDURE — 76642 ULTRASOUND BREAST LIMITED: CPT | Mod: 50

## 2024-12-23 PROCEDURE — 77062 BREAST TOMOSYNTHESIS BI: CPT

## 2024-12-23 RX ORDER — BUDESONIDE AND FORMOTEROL FUMARATE DIHYDRATE 160; 4.5 UG/1; UG/1
AEROSOL RESPIRATORY (INHALATION)
Qty: 30.6 G | OUTPATIENT
Start: 2024-12-23

## 2024-12-31 ENCOUNTER — PATIENT OUTREACH (OUTPATIENT)
Dept: FAMILY MEDICINE | Facility: CLINIC | Age: 49
End: 2024-12-31
Payer: COMMERCIAL

## 2024-12-31 ENCOUNTER — MYC MEDICAL ADVICE (OUTPATIENT)
Dept: FAMILY MEDICINE | Facility: CLINIC | Age: 49
End: 2024-12-31
Payer: COMMERCIAL

## 2024-12-31 NOTE — TELEPHONE ENCOUNTER
Patient Quality Outreach    Patient is due for the following:   Asthma  -  ACT needed    Action(s) Taken:   Patient was assigned appropriate questionnaire to complete    Type of outreach:    Sent Slack message.    Questions for provider review:    None           Melody Mejía CMA

## 2025-01-03 ASSESSMENT — ASTHMA QUESTIONNAIRES
ACT_TOTALSCORE: 14
QUESTION_4 LAST FOUR WEEKS HOW OFTEN HAVE YOU USED YOUR RESCUE INHALER OR NEBULIZER MEDICATION (SUCH AS ALBUTEROL): ONE OR TWO TIMES PER DAY
QUESTION_1 LAST FOUR WEEKS HOW MUCH OF THE TIME DID YOUR ASTHMA KEEP YOU FROM GETTING AS MUCH DONE AT WORK, SCHOOL OR AT HOME: A LITTLE OF THE TIME
QUESTION_3 LAST FOUR WEEKS HOW OFTEN DID YOUR ASTHMA SYMPTOMS (WHEEZING, COUGHING, SHORTNESS OF BREATH, CHEST TIGHTNESS OR PAIN) WAKE YOU UP AT NIGHT OR EARLIER THAN USUAL IN THE MORNING: ONCE A WEEK
QUESTION_5 LAST FOUR WEEKS HOW WOULD YOU RATE YOUR ASTHMA CONTROL: SOMEWHAT CONTROLLED
ACT_TOTALSCORE: 14
QUESTION_2 LAST FOUR WEEKS HOW OFTEN HAVE YOU HAD SHORTNESS OF BREATH: ONCE A DAY

## 2025-01-08 ENCOUNTER — DOCUMENTATION ONLY (OUTPATIENT)
Dept: SLEEP MEDICINE | Facility: CLINIC | Age: 50
End: 2025-01-08
Payer: COMMERCIAL

## 2025-01-08 DIAGNOSIS — G47.33 OBSTRUCTIVE SLEEP APNEA (ADULT) (PEDIATRIC): Primary | ICD-10-CM

## 2025-01-08 NOTE — PROGRESS NOTES
Patient was offered choice of vendor and chose [facility].  Patient Melody Muñoz was set up at [place] on January 8, 2025. Patient received a Resmed Airsense 10 Pressures were set at  5-20 cm H2O.   Patient s ramp is 5 cm H2O for Auto and FLEX/EPR is EPR, 2.  Patient received a Resmed Mask name: LLOYD FX JOYCE  Pillow mask size For Her, heated tubing and heated humidifier.  Patient has the following compliance requirements: none  Patient has a follow up on TBD with Dr. Bahena.    Naz Conn

## 2025-01-10 PROBLEM — E11.65 TYPE 2 DIABETES MELLITUS WITH HYPERGLYCEMIA (H): Status: ACTIVE | Noted: 2017-03-21

## 2025-01-10 PROBLEM — K29.50 CHRONIC GASTRITIS WITHOUT BLEEDING: Status: ACTIVE | Noted: 2020-12-14

## 2025-01-23 ENCOUNTER — VIRTUAL VISIT (OUTPATIENT)
Dept: EDUCATION SERVICES | Facility: CLINIC | Age: 50
End: 2025-01-23
Payer: COMMERCIAL

## 2025-01-23 DIAGNOSIS — E11.9 TYPE 2 DIABETES MELLITUS WITHOUT COMPLICATION, WITHOUT LONG-TERM CURRENT USE OF INSULIN (H): Primary | ICD-10-CM

## 2025-01-23 NOTE — PROGRESS NOTES
Diabetes Self-Management Education & Support    Presents for: Initial Assessment for new diagnosis    Type of service:  Video Visit    If the video visit is dropped, the video visit invitation should be resent by: Text to cell phone: [phone]    Originating Location (pt. Location): Home  Distant Location (provider location): [facility] DIABETES EDUCATION [place]  Mode of Communication:  Video Conference via [employer]    Video Start Time:  856am  Video End Time (time video stopped): 959am    How would patient like to obtain AVS? [employer] but will also mail as I am mailing her materials for new ed.      Assessment  Melody is seen for diabetes education by video visit today.  She has had diabetes since about 2017.  She has not had any previous diabetes education.  Her A1C went from 5.8 to 6.7 in December and her PCP, Shanita Tamayo, started her on Ozempic.  Melody had been on Ozempic in the past.    Diabetes Medication(s)       Incretin Mimetic Agents       semaglutide (OZEMPIC) 2 MG/3ML pen Inject 0.25 mg subcutaneously every 7 days for 28 days, THEN 0.5 mg every 7 days for 28 days.          Melody told me she was on Trulicity initially (up to 1.5mg), but it was changed to Ozempic due to formulary.  She was not on Ozempic that long.  Not sure if she ever made it up to the 0.5mg weekly dose?    So far, she has had 3 doses of the 0.25mg weekly,  and she has 1 refill on the 0.5mg pen.  Stomach doing fine.  Talked about eating more frequent small meals to avoid nausea/vomiting.    H/o GDM in 2009 but wants refresher on all the diet stuff.    No complications that she knows of.  Her Hands and feet get cold.  Wasn't sure if this was diabetes or not.  Vision change- thinks it is from Graves disease (from [place] in 2020).  Will follow up with  on this as she wasn't aware- just found it in her records.    Discussed pathophysiology, progression of disease, A1C levels and what they mean.    Diet:  Stopped  "drinking Sprite in the last couple months.  Discussed usual diet, see log below.  Pretty carb heavy.  Made recommendations.  Discussed importance of protein.    Exercise:  Likes outdoor exercise- summer goes for walks.  Winter no activity.  We discussed ways to be active in the winter.  She made a goal to start getting active- walking indoors.    Monitoring:  Has BGM- this morning fasting was 114.  Wasn't testing much at all previously.    Risk:  Does smoke, working on quitting- said \"I WILL QUIT\".  Hasn't had foot exam or eye exam yet.  Does see dentist regularly.    Coping:   Coping well, has had dx for many years already.    Overall, doing well.  Accepted all education and will work on diet/exercise/smoking cessation/ monitoring Bgs/ taking meds.  Will meet again just before she could move to the 1mg Ozempic dose.    Patient's most recent   Lab Results   Component Value Date    A1C 6.7 12/12/2024    A1C 6.0 01/23/2020     is meeting goal of <7.0    Diabetes knowledge and skills assessment:   Patient is knowledgeable in diabetes management concepts related to: Taking Medication, Reducing Risks, and Healthy Coping    Based on learning assessment above, most appropriate setting for further diabetes education would be: Individual setting.    Care Plan and Education Provided:  Healthy Eating: Balanced meals, Carbohydrate Counting, Eating out, Label reading, Plate planning method, and Portion control, Being Active: Amount recommended (150 minutes moderate or 75 minutes vigorous activity and 2-3 days strength training per week) and Finding a physical activity routine that works for you, Monitoring: Frequency of monitoring and Individual glucose targets, Taking Medication: Action of prescribed medication(s) and Side effects of prescribed medication(s), Problem Solving: Low glucose - causes, signs/symptoms, treatment and prevention and Rule of 15 and carrying a carbohydrate source at all times in case of low glucose, " Reducing Risks: Complications of diabetes, Dental care, Eye care, Foot care, Goal for A1c, how it relates to glucose and how often to check, and Smoking cessation, and Healthy Coping: Benefits of making appropriate lifestyle changes, Recognize feelings about diagnosis, and Utilize support systems    Patient verbalized understanding of diabetes self-management education concepts discussed, opportunities for ongoing education and support, and recommendations provided today.    Plan     Schedule dilated eye exam and have this done every year.   Test blood sugars 3 to 4 times a week.  Mix up the times between morning fasting () and 2 hours after supper (under 180)   Goal for exercise is 150 minutes/week.  Your goal to start is 30 minutes per day at least 3 days per week.   Have your doctor do a foot exam every year.   Follow the plate method for meals, have whole wheat breads, pastas, brown rice, etc.  Always have protein with meals and snacks.  NO more than 4 carb choices (60g) per meal.  More frequent, small meals will help prevent nausea from Ozempic.    Topics to cover at upcoming visits: Healthy Eating, Being Active, Monitoring, Taking Medication, and Reducing Risks    Follow-up:  Upcoming Diabetes Ed Appointments     Visit Type Date Time Department    DIABETES ED 1/23/2025  9:00 AM PH DIABETES EDUCATION        See Care Plan for co-developed, patient-state behavior change goals.    Education Materials Provided:  - [facility] [employer] Healthy Living with Diabetes Book  - My Plate Planner   Mailed today    Subjective/Objective  Melody is an 49 year old year old, presenting for the following diabetes education related to: Presents for: Initial Assessment for new diagnosis  Accompanied by: Self  Diabetes education in the past 24mo: (Patient-Rptd) No  Focus of Visit: Patient Unsure  Diabetes type: (Patient-Rptd) Type 2  Date of diagnosis: 2017 per snapshot  Disease course: (Patient-Rptd) Improving  How confident  "are you filling out medical forms by yourself:: (Patient-Rptd) Quite a bit  Transportation concerns: No  Difficulty affording diabetes medication?: No  Difficulty affording diabetes testing supplies?: No  Other concerns:: Glasses  Cultural Influences/Ethnic Background:  Not  or       Diabetes Symptoms & Complications:  Diabetes Related Symptoms: (Patient-Rptd) Polydipsia (increased thirst), Visual change, Slow healing wounds  Weight trend: (Patient-Rptd) Fluctuating  Symptom course: (Patient-Rptd) Stable  Disease course: (Patient-Rptd) Improving  Complications assessed today?: Yes  Autonomic neuropathy: No  CVA: No  Heart disease: No  Nephropathy: No  Peripheral neuropathy: No  Peripheral Vascular Disease: No  Retinopathy: No  Sexual dysfunction: No    Patient Problem List and Family Medical History reviewed for relevant medical history, current medical status, and diabetes risk factors.    Vitals:  LMP  (LMP Unknown)   Estimated body mass index is 27.07 kg/m  as calculated from the following:    Height as of 12/12/24: 1.575 m (5' 2\").    Weight as of 12/12/24: 67.1 kg (148 lb).   Last 3 BP:   BP Readings from Last 3 Encounters:   12/12/24 116/86   11/04/24 122/83   10/03/24 115/67       History   Smoking Status    Every Day    Types: Cigarettes   Smokeless Tobacco    Never       Labs:  Lab Results   Component Value Date    A1C 6.7 12/12/2024    A1C 6.0 01/23/2020     Lab Results   Component Value Date     10/03/2024     10/03/2024     10/03/2024     12/02/2021     12/02/2021     05/28/2021     Lab Results   Component Value Date     01/23/2023    LDL 75 02/06/2019     HDL Cholesterol   Date Value Ref Range Status   02/06/2019 36 (L) >49 mg/dL Final     Direct Measure HDL   Date Value Ref Range Status   01/23/2023 47 (L) >=50 mg/dL Final   ]  GFR Estimate   Date Value Ref Range Status   10/03/2024 >90 >60 mL/min/1.73m2 Final     Comment:     eGFR " "calculated using 2021 CKD-EPI equation.   05/28/2021 >90 >60 mL/min/[1.73_m2] Final     Comment:     Non  GFR Calc  Starting 12/18/2018, serum creatinine based estimated GFR (eGFR) will be   calculated using the Chronic Kidney Disease Epidemiology Collaboration   (CKD-EPI) equation.       GFR Estimate If Black   Date Value Ref Range Status   05/28/2021 >90 >60 mL/min/[1.73_m2] Final     Comment:      GFR Calc  Starting 12/18/2018, serum creatinine based estimated GFR (eGFR) will be   calculated using the Chronic Kidney Disease Epidemiology Collaboration   (CKD-EPI) equation.       Lab Results   Component Value Date    CR 0.7 10/03/2024    CR 0.78 10/03/2024    CR 0.72 05/28/2021     No results found for: \"MICROALBUMIN\"    Healthy Eating:  Healthy Eating Assessed Today: Yes  Cultural/Zoroastrian diet restrictions?: (Patient-Rptd) No  Do you have any food allergies or intolerances?: Yes  Please list your food allergies / intolerances: gluten intolerance  Meal planning/habits: Avoiding sweets  Who cooks/prepares meals for you?: Self, Spouse  Who purchases food in  your home?: Self, Spouse  How many times a week on average do you eat food made away from home (restaurant/take-out)?: (Patient-Rptd) 2  Meals include: Dinner, Breakfast, Lunch  Breakfast: 8am: 1 ww toast/PB/half banana/half cup OJ OR Cheerios/milk/half banana.  Always coffee with a little bit of creamer.  Lunch: 1230-1pm: tk sandwich with 2 slices ww bread OR lentil soup OR soup from [employer] OR triscut crackers/cheese  Dinner: 630pm:  pastas with marinara/gege with veg and fruit OR ravioli with vege and fruit OR grilled chicken/zucchini noodles half the time/  Snacks: protein bar OR nachos OR cherries  Other: Take out (usually for dinner): [employer] or Larry Laura or Marilu Chin  Beverages: Water, Coffee, Juice, Energy drinks (celsuis zero calorie)  Has patient met with a dietitian in the past?: No    Being Active:  Barrier to " exercise: (Patient-Rptd) Physical limitation    Monitoring:  Monitoring Assessed Today: Yes  Did patient bring glucose meter to appointment? : No  Blood Glucose Meter: (Patient-Rptd) Accu-chek  Times checking blood sugar at home (number): (Patient-Rptd) 2  Times checking blood sugar at home (per): (Patient-Rptd) Week        Taking Medications:  Diabetes Medication(s)       Incretin Mimetic Agents       semaglutide (OZEMPIC) 2 MG/3ML pen Inject 0.25 mg subcutaneously every 7 days for 28 days, THEN 0.5 mg every 7 days for 28 days.          Taking Medication Assessed Today: Yes  Current Treatments: (Patient-Rptd) Non-insulin Injectables  Problems taking diabetes medications regularly?: No  Diabetes medication side effects?: No    Problem Solving:  Problem Solving Assessed Today: Yes  Is the patient at risk for hypoglycemia?: No  Is the patient at risk for DKA?: No  Does patient have severe weather/disaster plan for diabetes management?: Not Needed  Does patient have sick day plan for diabetes management?: No        Reducing Risks:  Reducing Risks Assessed Today: Yes  Diabetes Risks: Age over 45 years, History of gestational diabetes, Hypertriglyceridemia  Additional female risks: Gestational Diabetes  CAD Risks: Diabetes Mellitus, Family history, Tobacco exposure  Has dilated eye exam at least once a year?: (Patient-Rptd) No  Sees dentist every 6 months?: (Patient-Rptd) Yes  Feet checked by healthcare provider in the last year?: (Patient-Rptd) No    Healthy Coping:  Healthy Coping Assessed Today: Yes  Emotional response to diabetes: Confidence diabetes can be controlled, Acceptance  Informal Support system:: (Patient-Rptd) Children, Lucila based, Family, Friends, Neighbors, Parent, Spouse  Stage of change: PREPARATION (Decided to change - considering how)    Skye Rodriguez, PharmD, [facility], [facility] and [place] Diabetes Education    Time Spent: 62 minutes  Encounter Type: Individual    Any diabetes medication  dose changes were made via the [facility] Standing Orders under the patient's referring provider.

## 2025-01-23 NOTE — LETTER
1/23/2025         RE: Meoldy Muñoz  [address] [place] [address]        Dear Colleague,    Thank you for referring your patient, Melody Muñoz, to the [facility] DIABETES EDUCATION [place]. Please see a copy of my visit note below.    Diabetes Self-Management Education & Support    Presents for: Initial Assessment for new diagnosis    Type of service:  Video Visit    If the video visit is dropped, the video visit invitation should be resent by: Text to cell phone: [phone]    Originating Location (pt. Location): Home  Distant Location (provider location): [facility] DIABETES [facility]  Mode of Communication:  Video Conference via [employer]    Video Start Time:  856am  Video End Time (time video stopped): 959am    How would patient like to obtain AVS? [employer] but will also mail as I am mailing her materials for new ed.      Assessment  Melody is seen for diabetes education by video visit today.  She has had diabetes since about 2017.  She has not had any previous diabetes education.  Her A1C went from 5.8 to 6.7 in December and her PCP, Shanita Tamayo, started her on Ozempic.  Melody had been on Ozempic in the past.    Diabetes Medication(s)       Incretin Mimetic Agents       semaglutide (OZEMPIC) 2 MG/3ML pen Inject 0.25 mg subcutaneously every 7 days for 28 days, THEN 0.5 mg every 7 days for 28 days.          Melody told me she was on Trulicity initially (up to 1.5mg), but it was changed to Ozempic due to formulary.  She was not on Ozempic that long.  Not sure if she ever made it up to the 0.5mg weekly dose?    So far, she has had 3 doses of the 0.25mg weekly,  and she has 1 refill on the 0.5mg pen.  Stomach doing fine.  Talked about eating more frequent small meals to avoid nausea/vomiting.    H/o GDM in 2009 but wants refresher on all the diet stuff.    No complications that she knows of.  Her Hands and feet get cold.  Wasn't sure if this was  "diabetes or not.  Vision change- thinks it is from Graves disease (from [place] in 2020).  Will follow up with  on this as she wasn't aware- just found it in her records.    Discussed pathophysiology, progression of disease, A1C levels and what they mean.    Diet:  Stopped drinking Sprite in the last couple months.  Discussed usual diet, see log below.  Pretty carb heavy.  Made recommendations.  Discussed importance of protein.    Exercise:  Likes outdoor exercise- summer goes for walks.  Winter no activity.  We discussed ways to be active in the winter.  She made a goal to start getting active- walking indoors.    Monitoring:  Has BGM- this morning fasting was 114.  Wasn't testing much at all previously.    Risk:  Does smoke, working on quitting- said \"I WILL QUIT\".  Hasn't had foot exam or eye exam yet.  Does see dentist regularly.    Coping:   Coping well, has had dx for many years already.    Overall, doing well.  Accepted all education and will work on diet/exercise/smoking cessation/ monitoring Bgs/ taking meds.  Will meet again just before she could move to the 1mg Ozempic dose.    Patient's most recent   Lab Results   Component Value Date    A1C 6.7 12/12/2024    A1C 6.0 01/23/2020     is meeting goal of <7.0    Diabetes knowledge and skills assessment:   Patient is knowledgeable in diabetes management concepts related to: Taking Medication, Reducing Risks, and Healthy Coping    Based on learning assessment above, most appropriate setting for further diabetes education would be: Individual setting.    Care Plan and Education Provided:  Healthy Eating: Balanced meals, Carbohydrate Counting, Eating out, Label reading, Plate planning method, and Portion control, Being Active: Amount recommended (150 minutes moderate or 75 minutes vigorous activity and 2-3 days strength training per week) and Finding a physical activity routine that works for you, Monitoring: Frequency of monitoring and Individual glucose " targets, Taking Medication: Action of prescribed medication(s) and Side effects of prescribed medication(s), Problem Solving: Low glucose - causes, signs/symptoms, treatment and prevention and Rule of 15 and carrying a carbohydrate source at all times in case of low glucose, Reducing Risks: Complications of diabetes, Dental care, Eye care, Foot care, Goal for A1c, how it relates to glucose and how often to check, and Smoking cessation, and Healthy Coping: Benefits of making appropriate lifestyle changes, Recognize feelings about diagnosis, and Utilize support systems    Patient verbalized understanding of diabetes self-management education concepts discussed, opportunities for ongoing education and support, and recommendations provided today.    Plan     Schedule dilated eye exam and have this done every year.   Test blood sugars 3 to 4 times a week.  Mix up the times between morning fasting () and 2 hours after supper (under 180)   Goal for exercise is 150 minutes/week.  Your goal to start is 30 minutes per day at least 3 days per week.   Have your doctor do a foot exam every year.   Follow the plate method for meals, have whole wheat breads, pastas, brown rice, etc.  Always have protein with meals and snacks.  NO more than 4 carb choices (60g) per meal.  More frequent, small meals will help prevent nausea from Ozempic.    Topics to cover at upcoming visits: Healthy Eating, Being Active, Monitoring, Taking Medication, and Reducing Risks    Follow-up:  Upcoming Diabetes Ed Appointments     Visit Type Date Time Department    DIABETES ED 1/23/2025  9:00 AM PH DIABETES EDUCATION        See Care Plan for co-developed, patient-state behavior change goals.    Education Materials Provided:  -  [employer] Healthy Living with Diabetes Book  - My Plate Planner   Mailed today    Subjective/Objective  Melody is an 49 year old year old, presenting for the following diabetes education related to: Presents for:  "Initial Assessment for new diagnosis  Accompanied by: Self  Diabetes education in the past 24mo: (Patient-Rptd) No  Focus of Visit: Patient Unsure  Diabetes type: (Patient-Rptd) Type 2  Date of diagnosis: 2017 per snapshot  Disease course: (Patient-Rptd) Improving  How confident are you filling out medical forms by yourself:: (Patient-Rptd) Quite a bit  Transportation concerns: No  Difficulty affording diabetes medication?: No  Difficulty affording diabetes testing supplies?: No  Other concerns:: Glasses  Cultural Influences/Ethnic Background:  Not  or       Diabetes Symptoms & Complications:  Diabetes Related Symptoms: (Patient-Rptd) Polydipsia (increased thirst), Visual change, Slow healing wounds  Weight trend: (Patient-Rptd) Fluctuating  Symptom course: (Patient-Rptd) Stable  Disease course: (Patient-Rptd) Improving  Complications assessed today?: Yes  Autonomic neuropathy: No  CVA: No  Heart disease: No  Nephropathy: No  Peripheral neuropathy: No  Peripheral Vascular Disease: No  Retinopathy: No  Sexual dysfunction: No    Patient Problem List and Family Medical History reviewed for relevant medical history, current medical status, and diabetes risk factors.    Vitals:  LMP  (LMP Unknown)   Estimated body mass index is 27.07 kg/m  as calculated from the following:    Height as of 12/12/24: 1.575 m (5' 2\").    Weight as of 12/12/24: 67.1 kg (148 lb).   Last 3 BP:   BP Readings from Last 3 Encounters:   12/12/24 116/86   11/04/24 122/83   10/03/24 115/67       History   Smoking Status     Every Day     Types: Cigarettes   Smokeless Tobacco     Never       Labs:  Lab Results   Component Value Date    A1C 6.7 12/12/2024    A1C 6.0 01/23/2020     Lab Results   Component Value Date     10/03/2024     10/03/2024     10/03/2024     12/02/2021     12/02/2021     05/28/2021     Lab Results   Component Value Date     01/23/2023    LDL 75 02/06/2019     HDL " "Cholesterol   Date Value Ref Range Status   02/06/2019 36 (L) >49 mg/dL Final     Direct Measure HDL   Date Value Ref Range Status   01/23/2023 47 (L) >=50 mg/dL Final   ]  GFR Estimate   Date Value Ref Range Status   10/03/2024 >90 >60 mL/min/1.73m2 Final     Comment:     eGFR calculated using 2021 CKD-EPI equation.   05/28/2021 >90 >60 mL/min/[1.73_m2] Final     Comment:     Non  GFR Calc  Starting 12/18/2018, serum creatinine based estimated GFR (eGFR) will be   calculated using the Chronic Kidney Disease Epidemiology Collaboration   (CKD-EPI) equation.       GFR Estimate If Black   Date Value Ref Range Status   05/28/2021 >90 >60 mL/min/[1.73_m2] Final     Comment:      GFR Calc  Starting 12/18/2018, serum creatinine based estimated GFR (eGFR) will be   calculated using the Chronic Kidney Disease Epidemiology Collaboration   (CKD-EPI) equation.       Lab Results   Component Value Date    CR 0.7 10/03/2024    CR 0.78 10/03/2024    CR 0.72 05/28/2021     No results found for: \"MICROALBUMIN\"    Healthy Eating:  Healthy Eating Assessed Today: Yes  Cultural/Congregation diet restrictions?: (Patient-Rptd) No  Do you have any food allergies or intolerances?: Yes  Please list your food allergies / intolerances: gluten intolerance  Meal planning/habits: Avoiding sweets  Who cooks/prepares meals for you?: Self, Spouse  Who purchases food in  your home?: Self, Spouse  How many times a week on average do you eat food made away from home (restaurant/take-out)?: (Patient-Rptd) 2  Meals include: Dinner, Breakfast, Lunch  Breakfast: 8am: 1 ww toast/PB/half banana/half cup OJ OR Cheerios/milk/half banana.  Always coffee with a little bit of creamer.  Lunch: 1230-1pm: tk sandwich with 2 slices ww bread OR lentil soup OR soup from [employer] OR triscut crackers/cheese  Dinner: 630pm:  pastas with marinara/gege with veg and fruit OR ravioli with vege and fruit OR grilled chicken/zucchini noodles half the " time/  Snacks: protein bar OR nachos OR cherries  Other: Take out (usually for dinner): [employer] or Larry Laura or [employer]il[employer] Chin  Beverages: Water, Coffee, Juice, Energy drinks (celsuis zero calorie)  Has patient met with a dietitian in the past?: No    Being Active:  Barrier to exercise: (Patient-Rptd) Physical limitation    Monitoring:  Monitoring Assessed Today: Yes  Did patient bring glucose meter to appointment? : No  Blood Glucose Meter: (Patient-Rptd) Accu-chek  Times checking blood sugar at home (number): (Patient-Rptd) 2  Times checking blood sugar at home (per): (Patient-Rptd) Week        Taking Medications:  Diabetes Medication(s)       Incretin Mimetic Agents       semaglutide (OZEMPIC) 2 MG/3ML pen Inject 0.25 mg subcutaneously every 7 days for 28 days, THEN 0.5 mg every 7 days for 28 days.          Taking Medication Assessed Today: Yes  Current Treatments: (Patient-Rptd) Non-insulin Injectables  Problems taking diabetes medications regularly?: No  Diabetes medication side effects?: No    Problem Solving:  Problem Solving Assessed Today: Yes  Is the patient at risk for hypoglycemia?: No  Is the patient at risk for DKA?: No  Does patient have severe weather/disaster plan for diabetes management?: Not Needed  Does patient have sick day plan for diabetes management?: No        Reducing Risks:  Reducing Risks Assessed Today: Yes  Diabetes Risks: Age over 45 years, History of gestational diabetes, Hypertriglyceridemia  Additional female risks: Gestational Diabetes  CAD Risks: Diabetes Mellitus, Family history, Tobacco exposure  Has dilated eye exam at least once a year?: (Patient-Rptd) No  Sees dentist every 6 months?: (Patient-Rptd) Yes  Feet checked by healthcare provider in the last year?: (Patient-Rptd) No    Healthy Coping:  Healthy Coping Assessed Today: Yes  Emotional response to diabetes: Confidence diabetes can be controlled, Acceptance  Informal Support system:: (Patient-Rptd) Children, Lucila based,  Family, Friends, Neighbors, Parent, Spouse  Stage of change: PREPARATION (Decided to change - considering how)    Skye Rodriguez, PharmD, [facility], [facility] and [place] Diabetes Education    Time Spent: 62 minutes  Encounter Type: Individual    Any diabetes medication dose changes were made via the [facility] Standing Orders under the patient's referring provider.

## 2025-01-23 NOTE — PATIENT INSTRUCTIONS
Schedule dilated eye exam and have this done every year.   Test blood sugars 3 to 4 times a week.  Mix up the times between morning fasting () and 2 hours after supper (under 180)   Goal for exercise is 150 minutes/week.  Your goal to start is 30 minutes per day at least 3 days per week.   Have your doctor do a foot exam every year.   Follow the plate method for meals, have whole wheat breads, pastas, brown rice, etc.  Always have protein with meals and snacks.  NO more than 4 carb choices (60g) per meal.  More frequent, small meals will help prevent nausea from Ozempic.    Skye Rodriguez, PharmD, [facility], [facility] and [facility] [place] [facility]    [place] [facility] for the [facility]:  For Your Diabetes Education and Nutrition Appointments Call:  [phone]   For Diabetes Education or Nutrition Related Questions:   Phone: [phone]  Send [employer] Message   If you need a medication refill please contact your pharmacy. Please allow 3 business days for your refills to be completed.     Patient was sent their letter (s) via [employer]. Patient was told to call back in 2 business days if the information was not received through [employer].

## 2025-01-28 ENCOUNTER — PATIENT OUTREACH (OUTPATIENT)
Dept: CARE COORDINATION | Facility: CLINIC | Age: 50
End: 2025-01-28
Payer: COMMERCIAL

## 2025-01-28 ASSESSMENT — ACTIVITIES OF DAILY LIVING (ADL): DEPENDENT_IADLS:: INDEPENDENT

## 2025-01-28 NOTE — PROGRESS NOTES
Clinic Care Coordination Contact  Care Coordination Clinician Chart Review    Situation: Patient chart reviewed by Care Coordinator.       Background: Care Coordination Program started: 12/12/2024. Initial assessment completed and patient-centered care plan(s) were developed with participation from patient. Lead CC handed patient off to CHW for continued outreaches.       Assessment: Per chart review, patient outreach completed by CC CHW on 1/17/25.  Patient is actively working to accomplish goal(s). Patient's goal(s) appropriate and relevant at this time. Patient is not due for updated Plan of Care.  Assessments will be completed annually or as needed/with change of patient status.      Care Plan: Financial Wellbeing       Problem: Patient expresses financial resource strain       Goal: Create an action plan to increase financial stability       Start Date: 12/16/2024 Expected End Date: 2/28/2025    This Visit's Progress: 10%    Priority: Medium    Note:     Barriers: I don't qualify for most programs based on household income  Strengths: Family support   Patient expressed understanding of goal: Yes  Action steps to achieve this goal:  1. I will access food resources such as [employer] and [employer] as needed to stretch dollars.                                      Plan/Recommendations: The patient will continue working with Care Coordination to achieve goal(s) as above. CHW will continue outreaches at minimum every 30 days and will involve Lead CC as needed or if patient is ready to move to Maintenance. Lead CC will continue to monitor CHW outreaches and patient's progress to goal(s) every 6 weeks.     Plan of Care updated and sent to patient: MARLENE Vega   Care Coordination Team  [phone]

## 2025-01-29 NOTE — TELEPHONE ENCOUNTER
Patient states that she developed nausea on Saturday morning with vomiting x1.She has had to take zofran q8 hours.Pt is wondering if it is her diabetes and wondering if the Dr wants to put her on a diabetic medication now.She doesn't check her blood glucose. Will forward to provider for further advice.  
Patient was called back who made an appt for tomorrow with PCP.   
Reason for Call: Nausea     Detailed comments: Patient has been experiencing nausea since Saturday 3/25/17 and she has taken Zofran 8 mg every 8 hrs. She is questioning if the symptoms are related to her glucose readings.    Phone Number Patient can be reached at: Cell number on file:    Telephone Information:   Mobile [phone]     Best Time: Anytime    Can we leave a detailed message on this number? YES    Call taken on 3/27/2017 at 10:22 AM by Mor Cronin    
That should not be due to her diabetes. We should see her to discuss diabetes medications.  
No

## 2025-01-31 NOTE — TELEPHONE ENCOUNTER
Pt was called and pt scheduled annual physical 6/20/2025 9:00   She can take it up to 2 times daily

## 2025-02-02 DIAGNOSIS — K58.9 IRRITABLE BOWEL SYNDROME WITHOUT DIARRHEA: ICD-10-CM

## 2025-02-02 RX ORDER — DICYCLOMINE HYDROCHLORIDE 10 MG/1
10 CAPSULE ORAL 3 TIMES DAILY PRN
Qty: 90 CAPSULE | Refills: 5 | Status: SHIPPED | OUTPATIENT
Start: 2025-02-02

## 2025-02-02 NOTE — TELEPHONE ENCOUNTER
dicyclomine (BENTYL) 10 MG capsule   Start: 07/12/2024   Disp 90  R  5    Kristin Blanc PA-C  Gastroenterology  [facility] 7/12/24  GI c[facility]  [facility]  3/11/25      Oral Anticholinergic Agents Passed

## 2025-02-13 ENCOUNTER — OFFICE VISIT (OUTPATIENT)
Dept: INTERNAL MEDICINE | Facility: CLINIC | Age: 50
End: 2025-02-13
Payer: COMMERCIAL

## 2025-02-13 ENCOUNTER — ANCILLARY PROCEDURE (OUTPATIENT)
Dept: GENERAL RADIOLOGY | Facility: CLINIC | Age: 50
End: 2025-02-13
Payer: COMMERCIAL

## 2025-02-13 VITALS
HEART RATE: 107 BPM | TEMPERATURE: 98.2 F | SYSTOLIC BLOOD PRESSURE: 142 MMHG | RESPIRATION RATE: 18 BRPM | BODY MASS INDEX: 26.67 KG/M2 | DIASTOLIC BLOOD PRESSURE: 90 MMHG | WEIGHT: 145.8 LBS | OXYGEN SATURATION: 93 %

## 2025-02-13 DIAGNOSIS — G44.219 EPISODIC TENSION-TYPE HEADACHE, NOT INTRACTABLE: ICD-10-CM

## 2025-02-13 DIAGNOSIS — J01.90 ACUTE BACTERIAL RHINOSINUSITIS: ICD-10-CM

## 2025-02-13 DIAGNOSIS — J45.20 INTERMITTENT ASTHMA, UNCOMPLICATED: Primary | ICD-10-CM

## 2025-02-13 DIAGNOSIS — B96.89 ACUTE BACTERIAL RHINOSINUSITIS: ICD-10-CM

## 2025-02-13 DIAGNOSIS — R09.89 CHEST CONGESTION: ICD-10-CM

## 2025-02-13 PROBLEM — F13.20 MODERATE BENZODIAZEPINE USE DISORDER (H): Status: RESOLVED | Noted: 2021-05-27 | Resolved: 2025-02-13

## 2025-02-13 PROBLEM — T56.894A: Status: RESOLVED | Noted: 2023-12-13 | Resolved: 2025-02-13

## 2025-02-13 PROBLEM — R10.9 ABDOMINAL PAIN: Status: RESOLVED | Noted: 2017-11-21 | Resolved: 2025-02-13

## 2025-02-13 RX ORDER — BUTALBITAL, ACETAMINOPHEN, CAFFEINE AND CODEINE PHOSPHATE 50; 325; 40; 30 MG/1; MG/1; MG/1; MG/1
1 CAPSULE ORAL
COMMUNITY
Start: 2024-09-27

## 2025-02-13 RX ORDER — PREDNISONE 20 MG/1
40 TABLET ORAL DAILY
Qty: 10 TABLET | Refills: 0 | Status: SHIPPED | OUTPATIENT
Start: 2025-02-13 | End: 2025-02-18

## 2025-02-13 RX ORDER — ALBUTEROL SULFATE 0.83 MG/ML
2.5 SOLUTION RESPIRATORY (INHALATION) EVERY 6 HOURS PRN
Qty: 90 ML | Refills: 1 | Status: SHIPPED | OUTPATIENT
Start: 2025-02-13

## 2025-02-13 RX ORDER — BUTALBITAL, ACETAMINOPHEN AND CAFFEINE 50; 325; 40 MG/1; MG/1; MG/1
1 TABLET ORAL EVERY 4 HOURS PRN
Qty: 5 TABLET | Refills: 0 | Status: SHIPPED | OUTPATIENT
Start: 2025-02-13

## 2025-02-13 RX ORDER — DOXYCYCLINE 100 MG/1
100 CAPSULE ORAL 2 TIMES DAILY
Qty: 20 CAPSULE | Refills: 0 | Status: SHIPPED | OUTPATIENT
Start: 2025-02-13

## 2025-02-13 RX ORDER — LORAZEPAM 1 MG/1
.5-1 TABLET ORAL
COMMUNITY
Start: 2024-10-23 | End: 2025-02-13

## 2025-02-13 ASSESSMENT — PATIENT HEALTH QUESTIONNAIRE - PHQ9
SUM OF ALL RESPONSES TO PHQ QUESTIONS 1-9: 7
SUM OF ALL RESPONSES TO PHQ QUESTIONS 1-9: 7
10. IF YOU CHECKED OFF ANY PROBLEMS, HOW DIFFICULT HAVE THESE PROBLEMS MADE IT FOR YOU TO DO YOUR WORK, TAKE CARE OF THINGS AT HOME, OR GET ALONG WITH OTHER PEOPLE: SOMEWHAT DIFFICULT

## 2025-02-13 ASSESSMENT — ASTHMA QUESTIONNAIRES
QUESTION_1 LAST FOUR WEEKS HOW MUCH OF THE TIME DID YOUR ASTHMA KEEP YOU FROM GETTING AS MUCH DONE AT WORK, SCHOOL OR AT HOME: MOST OF THE TIME
QUESTION_5 LAST FOUR WEEKS HOW WOULD YOU RATE YOUR ASTHMA CONTROL: POORLY CONTROLLED
ACT_TOTALSCORE: 8
QUESTION_2 LAST FOUR WEEKS HOW OFTEN HAVE YOU HAD SHORTNESS OF BREATH: MORE THAN ONCE A DAY
QUESTION_4 LAST FOUR WEEKS HOW OFTEN HAVE YOU USED YOUR RESCUE INHALER OR NEBULIZER MEDICATION (SUCH AS ALBUTEROL): THREE OR MORE TIMES PER DAY
QUESTION_3 LAST FOUR WEEKS HOW OFTEN DID YOUR ASTHMA SYMPTOMS (WHEEZING, COUGHING, SHORTNESS OF BREATH, CHEST TIGHTNESS OR PAIN) WAKE YOU UP AT NIGHT OR EARLIER THAN USUAL IN THE MORNING: TWO OR THREE NIGHTS A WEEK
ACT_TOTALSCORE: 8

## 2025-02-13 ASSESSMENT — PAIN SCALES - GENERAL: PAINLEVEL_OUTOF10: MODERATE PAIN (4)

## 2025-02-13 ASSESSMENT — ENCOUNTER SYMPTOMS: COUGH: 1

## 2025-02-13 NOTE — PATIENT INSTRUCTIONS
Albuterol: 2.5 mg every 20 minutes for 3 doses; if good response, can lengthen interval to every 3 to 4 hours as needed    Complete doxycycline antibiotic regimen.   Take antibiotic with food to reduce stomach upset.   Take a probiotic or eat yogurt to further reduce stomach upset.   Return to office if symptoms get worse or if you develop a fever    Take prednisone by mouth in the morning with food to reduce jitteriness and insomnia.

## 2025-02-13 NOTE — PROGRESS NOTES
Assessment & Plan     Intermittent asthma, uncomplicated  rhonchi R mid posterior, and inspiratory wheezes R upper anterior, R upper posterior, R mid anterior, R mid posterior, L upper anterior, L upper posterior, L mid anterior, and L mid posterior  - albuterol (PROVENTIL) (2.5 MG/3ML) 0.083% neb solution; Take 1 vial (2.5 mg) by nebulization every 6 hours as needed for shortness of breath, wheezing or cough.  - Adult Pulmonary Medicine  Referral; Future  - predniSONE (DELTASONE) 20 MG tablet; Take 2 tablets (40 mg) by mouth daily for 5 days.    Acute bacterial rhinosinusitis  Patient states that she feels like the doxycycline was helping and her sinus infection and congestion was almost resolved but came back  within a couple days of completing the regimen.  Patient states she normally takes doxycycline for a longer regimen.    - Adult Pulmonary Medicine  Referral; Future  - predniSONE (DELTASONE) 20 MG tablet; Take 2 tablets (40 mg) by mouth daily for 5 days.  - doxycycline hyclate (VIBRAMYCIN) 100 MG capsule; Take 1 capsule (100 mg) by mouth 2 times daily.    Episodic tension-type headache, not intractable  Patient requested a refill  - butalbital-acetaminophen-caffeine (ESGIC) -40 MG tablet; Take 1 tablet by mouth every 4 hours as needed for headaches.    Chest congestion  Pt reports nasal congestion of yellow-green secretions and post nasal drip that triggers a persistent unproductive cough.   - XR Chest 2 Views; Future                Chaz Oliver is a 49 year old, presenting for the following health issues: Pt reports that pt has had a cough for 3 weeks. Pt was seen by  on 1/30/2025. presents with few days cough, congestion, sinus pain/pressure, and left ear pain. Reports history of asthma and reports wheezing and shortness of breath worsened. Suspect asthma exacerbation and they prescribed prednisone  and doxycycline for sinus infection.    Today pt presents with coughing  up yellow secretions. Pt denies any fevers, night sweats or chills. Pt already started with taking Prednisone 20mg yesterday at 1600 and this morning at 0800 with relief. Pt isn't smoking presently but normally smokes 6 cigarettes a day. Pt is able to quit on her own. Pt endorses wheezing that is  happening throughout the day. Pt reports nasal congestion of yellow-green secretions and post nasal drip that triggers a persistent unproductive cough.  Patient states that she is not taking her Symbicort actively.  Explained to the patient that if she takes the Symbicort which has an inhaled corticosteroid component to it that will manage her bronchoconstriction more effectively and reduce her need of using her albuterol nebulizer so much.  Discussed with patient that she can use her albuterol nebulizer for 3 doses and if she responds well to it that she can lengthen the interval to every 3-4 hours as needed.  Pt uses Netti pot and flonase for nasal congestion.  Patient states that she feels like the doxycycline was helping and her sinus infection and congestion was almost resolved but came back  within a couple days of completing the regimen.  Patient states she normally takes doxycycline for a longer regimen.    Cough (With head pressure, pain with deep breathing.) and Sinus Problem (Frequent sinus infection. Having a hard time sleeping and had to use a nebulizer.)        2/13/2025    10:10 AM   Additional Questions   Roomed by Roopa Castro CMA   Accompanied by Self         2/13/2025    10:10 AM   Patient Reported Additional Medications   Patient reports taking the following new medications yes - prednisone     Cough    Sinus Problem   Associated symptoms include cough.   History of Present Illness       Reason for visit:  Asthma flare up and sinus infection recurrence  Symptom onset:  3-7 days ago  Symptom intensity:  Severe  Symptom progression:  Worsening  Had these symptoms before:  Yes  Has tried/received treatment  for these symptoms:  Yes  Previous treatment was successful:  Yes  Prior treatment description:  Doxycycline and prednisone  What makes it worse:  Laying down  What makes it better:  Nebulizers and ibuprophen   She is taking medications regularly.                 Review of Systems  Constitutional, HEENT, cardiovascular, pulmonary, gi and gu systems are negative, except as otherwise noted.      Objective    BP (!) 142/90 (BP Location: Right arm, Patient Position: Sitting, Cuff Size: Adult Regular)   Pulse 107   Temp 98.2  F (36.8  C) (Oral)   Resp 18   Wt 66.1 kg (145 lb 12.8 oz)   LMP  (LMP Unknown)   SpO2 93%   BMI 26.67 kg/m    Body mass index is 26.67 kg/m .  Physical Exam   GENERAL: alert and no distress  HENT: ear canals and TM's normal, nose and mouth without ulcers or lesions  NECK: bilateral anterior cervical adenopathy, no asymmetry, masses, or scars, and thyroid normal to palpation  RESP: no rales , rhonchi R mid posterior, and inspiratory wheezes R upper anterior, R upper posterior, R mid anterior, R mid posterior, L upper anterior, L upper posterior, L mid anterior, and L mid posterior  CV: regular rate and rhythm, normal S1 S2, no S3 or S4, no murmur, click or rub, no peripheral edema  ABDOMEN: soft, nontender, no hepatosplenomegaly, no masses and bowel sounds normal  MS: no gross musculoskeletal defects noted, no edema  SKIN: no suspicious lesions or rashes  NEURO: Normal strength and tone, mentation intact and speech normal  PSYCH: mentation appears normal, affect normal/bright            Signed Electronically by: KIRAN Dixon CNP

## 2025-02-14 DIAGNOSIS — J45.30 MILD PERSISTENT ASTHMA WITHOUT COMPLICATION: Primary | Chronic | ICD-10-CM

## 2025-02-14 NOTE — Clinical Note
Future Appointments 3/11/2025  10:30 AM   Kristin Blanc PA-C         [facility] 3/12/2025  9:00 AM    Skye Rodriguez, [facility] 4/10/2025  8:00 AM    Faheem Ortiz MD           [facility] 5/1/2025   9:00 AM    RH PULMONARY FUNCTION      RHRESP              [facility] 5/6/2025   9:00 AM    Christie Mcgee PA* CSPULM              CS 12/29/2025 2:00 PM    Shanita Tamayo APRN CNP   CRFP                CR

## 2025-02-17 ENCOUNTER — TELEPHONE (OUTPATIENT)
Dept: PULMONOLOGY | Facility: CLINIC | Age: 50
End: 2025-02-17

## 2025-02-17 NOTE — TELEPHONE ENCOUNTER
Spoke with pt and they are currently not feeling well and have started prednisone. Will add scheduled appt with Christie Simon to waitlist.

## 2025-02-18 ENCOUNTER — PATIENT OUTREACH (OUTPATIENT)
Dept: CARE COORDINATION | Facility: CLINIC | Age: 50
End: 2025-02-18
Payer: COMMERCIAL

## 2025-02-18 NOTE — PROGRESS NOTES
Clinic Care Coordination Contact  [facility]/Voicemail    Clinical Data: Care Coordinator Outreach    Outreach Documentation Number of Outreach Attempt   2/18/2025   2:33 PM 1       Left message on patient's voicemail with call back information and requested return call.      Plan: Care Coordinator will try to reach patient again in 10 business days.      [place], B.S. [facility] Care Coordination  [facility] Clinics:  [place], [place] and [place]  [phone]  [email or web address]

## 2025-02-24 ENCOUNTER — TELEPHONE (OUTPATIENT)
Dept: FAMILY MEDICINE | Facility: CLINIC | Age: 50
End: 2025-02-24

## 2025-02-24 ENCOUNTER — OFFICE VISIT (OUTPATIENT)
Dept: FAMILY MEDICINE | Facility: CLINIC | Age: 50
End: 2025-02-24
Payer: COMMERCIAL

## 2025-02-24 VITALS
BODY MASS INDEX: 27.18 KG/M2 | DIASTOLIC BLOOD PRESSURE: 85 MMHG | SYSTOLIC BLOOD PRESSURE: 136 MMHG | RESPIRATION RATE: 16 BRPM | WEIGHT: 147.7 LBS | OXYGEN SATURATION: 95 % | TEMPERATURE: 98.8 F | HEART RATE: 94 BPM | HEIGHT: 62 IN

## 2025-02-24 DIAGNOSIS — M25.511 CHRONIC RIGHT SHOULDER PAIN: Primary | ICD-10-CM

## 2025-02-24 DIAGNOSIS — G89.29 CHRONIC RIGHT SHOULDER PAIN: Primary | ICD-10-CM

## 2025-02-24 PROCEDURE — 99214 OFFICE O/P EST MOD 30 MIN: CPT | Performed by: PHYSICIAN ASSISTANT

## 2025-02-24 PROCEDURE — G2211 COMPLEX E/M VISIT ADD ON: HCPCS | Performed by: PHYSICIAN ASSISTANT

## 2025-02-24 RX ORDER — CARISOPRODOL 250 MG/1
250 TABLET ORAL 2 TIMES DAILY PRN
Qty: 14 TABLET | Refills: 0 | Status: SHIPPED | OUTPATIENT
Start: 2025-02-24 | End: 2025-02-24

## 2025-02-24 RX ORDER — METHOCARBAMOL 500 MG/1
500 TABLET, FILM COATED ORAL 4 TIMES DAILY PRN
Qty: 40 TABLET | Refills: 0 | Status: SHIPPED | OUTPATIENT
Start: 2025-02-24

## 2025-02-24 RX ORDER — CARISOPRODOL 250 MG/1
250 TABLET ORAL 2 TIMES DAILY PRN
Qty: 30 TABLET | Refills: 0 | Status: SHIPPED | OUTPATIENT
Start: 2025-02-24 | End: 2025-02-24 | Stop reason: ALTCHOICE

## 2025-02-24 NOTE — TELEPHONE ENCOUNTER
Michael Alexandre PA-C requested for RN to go and speak with patient after patient returned to clinic  following OV today 2/24. Per Michael Alexandre PA-C, patient was very upset during OV and was rude to clinic staff. She now has further questions after she went to pick-up medication from pharmacy. MA placed patient in private exam room to avoid disrupting others in lobby.     This RN went to speak with patient in-person. Upon entering exam room, patient was clearly frustrated, raising voice, and making demands. RN attempted to de-escalate situation and gather more background info. Patient states she went to pick-up carisoprodol (SOMA) that was prescribed in OV today 2/24, but [facility] does not have stock - would need to order in. RN notes carisoprodol (SOMA) was discontinued today and instead Methocarbamol was ordered. Patient became very emotional demanding Michael Alexandre PA-C switch medication back as Methocarbamol is ineffective. Would like carisoprodol (SOMA) sent to the [facility] in [place] on [address].    RN utilized active listening and empathic communication with patient. Huddled with Michael Alexandre PA-C who switched medication back. Patient requesting to schedule additional OV with different provider to further discuss pain management. RN scheduled OV with Zaina Townsend PA-C 2/26/25 at 1:10pm.      carisoprodol (SOMA) 250 MG tablet Take 1 tablet (250 mg) by mouth 2 times daily as needed for muscle spasms. No Refills. Do not drive or drink alcohol while taking. Do not take Trazodone or Ambien while taking. 14 tablet 0 ordered 02/24/2025 --     Patient was given an opportunity to ask questions, verbalized understanding of plan, and is agreeable.    Lyly MONTANA RN  [facility]

## 2025-02-24 NOTE — PROGRESS NOTES
Assessment & Plan     Chronic right shoulder pain    Patient demanded that I send in Soma despite informing her that it wasn't the ideal medication to be on (especially with her other sedating medications and controlled substances). Sent 7 days supply of low dose. No refills to be given. Recommended Robaxin or Flexeril but she states they don't work for her. Recommended heat and PT.     ADDENDUM: Patient called threatening suicide by overdose and alcohol. Cancelled Soma script.    - Physical Therapy  Referral; Future  - carisoprodol (SOMA) 250 MG tablet; Take 1 tablet (250 mg) by mouth 2 times daily as needed for muscle spasms. No Refills. Do not drive or drink alcohol while taking. Do not take Trazodone or Ambien while taking.      Patient also stated that [facility] lied to her and she wanted me to order a NM bone scan to repeat what she had done in 2019. Explained that I have no medical indication to order this scan and cannot do so. She then stated she was going to switch to Maggie and marisela Steele because we lied to her. 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      The longitudinal plan of care for the diagnosis(es)/condition(s) as documented were addressed during this visit. Due to the added complexity in care, I will continue to support Melody in the subsequent management and with ongoing continuity of care.        Chaz Oliver is a 49 year old, presenting for the following health issues:  Musculoskeletal Problem        2/24/2025    12:43 PM   Additional Questions   Roomed by Abbey Hurley     HPI       Pain History:  Have you seen this provider for your pain in the past? No   Where in your body do your have pain? Right scapula, [facility] told her she has Arthritis  Are you seeing anyone else for your pain? No  What makes your pain better? patient states it started to feel better when she was on Prednisone but does not to  "continue it,   What makes your pain worse? none  How has pain affected your ability to work? Not currently working - unrelated to pain  Who lives in your household?  and twins    Has tried Tylenol and ibuprofen without improvement. Has tried epsom salt baths, heat with some improvement.    Joint Pain  Onset: years but worsening since last week  Description:   Location: right shoulder  Character: Cramping/muscle spasms  Intensity: moderate  Progression of Symptoms: worse  Accompanying Signs & Symptoms:  Other symptoms: none  History:   Previous similar pain: no     Precipitating factors:   Trauma or overuse: no   Alleviating factors:  Improved by: heat and epsom salt baths          11/19/2024     8:51 AM 12/12/2024     2:47 PM 2/13/2025    10:08 AM   PHQ-9 SCORE   PHQ-9 Total Score MyChart 14 (Moderate depression) 5 (Mild depression) 7 (Mild depression)   PHQ-9 Total Score 14  5  7        Patient-reported           5/16/2024    11:03 AM 11/19/2024     8:52 AM 12/12/2024     2:48 PM   ROWDY-7 SCORE   Total Score  12 (moderate anxiety) 9 (mild anxiety)   Total Score 12 12  9        Patient-reported               2/24/2025    12:53 PM   PEG Score   PEG Total Score 7.33           Review of Systems  Constitutional, HEENT, cardiovascular, pulmonary, gi and gu systems are negative, except as otherwise noted.        Objective    /85 (BP Location: Right arm, Patient Position: Sitting, Cuff Size: Adult Regular)   Pulse 94   Temp 98.8  F (37.1  C) (Oral)   Resp 16   Ht 1.575 m (5' 2\")   Wt 67 kg (147 lb 11.2 oz)   LMP  (LMP Unknown)   SpO2 95%   BMI 27.01 kg/m    Body mass index is 27.01 kg/m .      Physical Exam   GENERAL: alert and no distress  EYES: Eyes grossly normal to inspection, PERRL and conjunctivae and sclerae normal  MS: no gross musculoskeletal defects noted, no edema  SKIN: no suspicious lesions or rashes  NEURO: Normal strength and tone, mentation intact and speech normal  Right shoulder: There " is no erythema, edema, or ecchymosis. Tender to palpation over trapezius primarily. ROM intact but with pain. Strength 5/5 for  strength and flexion. CMS intact.              Signed Electronically by: Michael Alexandre PA-C

## 2025-02-24 NOTE — TELEPHONE ENCOUNTER
"Patient calling in stating she is going to \"down her bottle of Klonopin and drinking an entire bottle of vodka with it\" if her PCP does not order a bone scan for her. Patient is prescribed Klonopin and I assume she has the means to a bottle of alcohol. Notified patient that I would request provider order a bone scan but I would also need to have a welfare check done since patient is threatening suicide. Patient disconnected call. Attempted to call back, no answer.   "

## 2025-02-24 NOTE — TELEPHONE ENCOUNTER
"LESLI Berrios calling to request RN huddle with PCP of patient or provider she saw today to inform of conversation below and recommendations from providers.    Per chart review  OV 2/242/25 with Michael Alexandre  \"Patient also stated that [facility] lied to her and she wanted me to order a NM bone scan to repeat what she had done in 2019. Explained that I have no medical indication to order this scan and cannot do so. She then stated she was going to switch to Allcharlie and marisela Steele because we lied to her. 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 Welfare call made.    Trisha Pelaez RN    "

## 2025-02-24 NOTE — TELEPHONE ENCOUNTER
"Incoming call from patient     Did not think her doctor visit went well today     Today when in clinic experienced a trigger, that caused some behaviors     Patient states she came in with pain near scapula today concerned as she had a bone scan in the past at [facility] that showed some abnormalities, really wants to have this scan rechecked for \"peace of mind\"   RN advised we can not order imaging tests just for peace of mind, we need medical reason to order imaging and for insurance to cover the cost of the imaging     Had been seen at [facility] and she was discharged from their care and they have falsified documents in her chart     Patient felt that the provider today at her visit was laughing at her and she is upset about this and felt that the provider did not listen to her during the visit and it caused some dissociative behavior that is just not like her, she then gets upset/irate   Patient is very scared and worried because the symptoms she is having, she feels that a NM bone scan is needed for repeat due to the past results at [facility]     After patient went home from the visit, she did call and  threatened suicide and patient states this is not right on her part and she knows this it was a trigger of PTSD for providers not listening to her     Patient does have a psychiatrist at [facility]   Supposed to take zyprexa when behaviors present did not take today as she wanted to try and calm herself   Now believes she should have taken the Zyprexa     Patient did receive a call from the [place] Crisis line (initiated by RN in clinic)   who advised to use her skills DBT, therapy     Patient is feeling shaky at this time due to this situation today, trying to calm herself     Patient states she is NOT going to harm herself or others at this time  Patient feels that when she is not being heard it causes some PTSD and behaviors   No weapons in the home   Patient is currently home with BETSY spouse     RN tried to give patient " "relations number and patient declined     RN apologized that we are not able to place the order for the NM bone scan as there is no indication medically at this time to repeat the test     RN advised that provider cancelled the rx of Soma, after the call with suicidal comments     Patient replied \"now you are just trying to piss me off\"     RN advised she was not trying to upset patient, relaying information from the provider   Explained that this RN has listened empathetically during phone call today, gave time and space for patient to discuss her concerns and is trying to help     \" will get a \"    RN recommended reaching out to psychiatry to discuss episode today and advised to take the medications ordered by the psychiatry team     \"Well you are helpful aren't you\" and patient ended the call     Patrizia Muñoz Registered Nurse  [facility]     "

## 2025-02-24 NOTE — TELEPHONE ENCOUNTER
Outgoing call to [place] [employer] requesting a welfare check on patient. Police will leave to check on patient now.

## 2025-02-24 NOTE — TELEPHONE ENCOUNTER
Huddled with Michael Alexandre PA-C. See her note below  Huddled with Nichelle Perkins, Clinic Manager. She is aware of situation.  Teams messaged LESLI Suresh who was point of contact for welfare check. Police do plan to call back to [facility] once they complete welfare check. Will monitor chart for note after call back from police.    Let's plan to call patient with notice of Soma discontinuation (see office visit note from today) once we see note confirming welfare check completed.    Idalmis Yu RN

## 2025-02-24 NOTE — TELEPHONE ENCOUNTER
I am not willing to order the bone scan as there is no medical indication to do so. If she is determined to be a risk to herself or others, she should be transported to ED by police.    Michael Alexadnre PA-C on 2/24/2025 at 3:13 PM

## 2025-02-25 ENCOUNTER — TELEPHONE (OUTPATIENT)
Dept: FAMILY MEDICINE | Facility: CLINIC | Age: 50
End: 2025-02-25
Payer: COMMERCIAL

## 2025-02-25 ENCOUNTER — TELEPHONE (OUTPATIENT)
Dept: INTERNAL MEDICINE | Facility: CLINIC | Age: 50
End: 2025-02-25
Payer: COMMERCIAL

## 2025-02-25 DIAGNOSIS — G89.29 CHRONIC RIGHT SHOULDER PAIN: ICD-10-CM

## 2025-02-25 DIAGNOSIS — M25.511 CHRONIC RIGHT SHOULDER PAIN: ICD-10-CM

## 2025-02-25 NOTE — TELEPHONE ENCOUNTER
I do feel badly but I just don't think Soma is a safe medication for her to be on with her other medications and access to alcohol. I would really recommend trying the Robaxin as I do feel that it would be helpful. Heat will also be really helpful.    Michael Alexandre PA-C on 2/25/2025 at 11:28 AM

## 2025-02-25 NOTE — TELEPHONE ENCOUNTER
Reason for Call:  Appointment Request    Patient requesting this type of appt: Chronic Diease Management/Medication/Follow-Up    Requested provider:  Bess Pinto    Reason patient unable to be scheduled: Not with their preferred provider    When does patient want to be seen/preferred time:  asap    Comments: Patient is requesting to see only Bess Pinto and would like to get an appointment to go over results from [facility].     Could we send this information to you in [facility] or would you prefer to receive a phone call?:   Patient would prefer a phone call   Okay to leave a detailed message?: Yes at Cell number on file:    Telephone Information:   Mobile [phone]       Call taken on 2/25/2025 at 10:12 AM by Erick Gunter

## 2025-02-25 NOTE — TELEPHONE ENCOUNTER
Patient calls to express concerns related to appointment yesterday and bone scan that was performed at [facility].   Patient reports she was never informed by [facility] regarding results of bone scans.   -Patient reports she had a horrible experience at [facility] and was referred there by a provider within [facility]. Informs [place] falsified scas, documents, patient portal messages and appointments.     Patient expressed concerned regarding Michael Alexandre PA-C. Feels provider laughed at patient. Patient feels [facility] is hiding something, patient is scared and traumatized by [place] experience and came to clinic to discuss concerns and request Whole Body Bone scan. RN apologized for patients experience and offered number for patient relations.     Patient does not understand why the bone scans cannot be ordered. Her mental health effects medical health so feels this is medically necessary.   -Patient reports she is in fear that the scan was hidden and what was found at [place] was falsified. Informs she was kicked out of clinic.     Patient asks what doctor to see that will order the whole body bone scan. Feels there is shifting in the cartilage and nasal bones due to being overly prescribed prednisone.   -Patient feels strongly there is something that is going on in her bones.   -RN informed patient that it is each providers discretion regarding medical necessity and RN could not guarantee that any provider would order the bone scan.     Patient reports Michael Alexandre PA-C laughed in patients face. RN apologized and directed patient to contact patient relations, number for patient relations provided.   Patient declines patient experience number for [facility].   Patient requests records from 2020-present at [facility]. RN advised patient call medical records. Medical records number provided.     Patient reports she will go to [facility] for care as it is a holistic environment.   -Patient feels the doctors here are  vindictive & do not care about the patients. Patient informs there has been lack of continuity of care, findings have not been addressed and there is lack of follow up. RN apologized and directed patient to patient relations.   -Patient informs she will find another doctor who will listen to her. RN discussed the importance of finding a provider who patient trusts and feels safe and encouraged her to continue to look for a provider that she feels she will have a good relationship. RN apologized to patient and advised patient contact patient  relations to discuss this further. Patient verbalized understanding and agreeable.     ONELIA Barroso, RN  [facility]

## 2025-02-25 NOTE — TELEPHONE ENCOUNTER
Patient calling regarding visit yesterday.  She states she needs the med that was cancelled due to her pain.  She states it was a PTSD reaction to provider denying test due to not feeling it was necessary and waited 4 years to ask for due to fear of what would happen and exactly what she thought would happen happened and triggered her reaction.  She knows it was wrong to say she was suicidal but it was a reaction and Crisis Line knew she was safe and she states she says she is suicidal but would never commit suicide.  She states there is no reason for provider to call and cancel RX as she is not suicidal and does not feel provider can deny her care.  She states she understands if provider cancelled for her safety.  She states she welcomes the provider to call and speak to her psychiatrist.  She is hoping provider can have some mahamed and RX so she can get some relief for awhile.  Please advise.  Josephine Noriega RN

## 2025-02-25 NOTE — TELEPHONE ENCOUNTER
"Patient calling back again-    Patient yells stating, \"I wanted to let Michael Alexandre PA-C know that I talked to my psychiatrist and she ordered me my Soma 350 MG. She gave me a whole month supply for 90 tablets. So you can give Michael a big F U for me. I'm done with [place]. You all can fuck off!\"    Patient abruptly disconnected call before RN could respond appropriately.     Per chart review, RN notes 2/25 Maggie  refill encounter:  \"Mental Health Refill Request  Medication: clonazePAM (KLONOPIN) 1 mg tablet, TAKE 1 TABLET(1 MG) BY MOUTH THREE TIMES DAILY   Last Prescribed Date: 1/29/2025, 90 tablets  [place]  reviewed: sold 1/30/25    Last in-person appt: Last visit with MAYELIN MCFARLAND was on: 06/09/2021 in [facility]  Last visit with MAYELIN MCFARLAND was on: 02/07/2025 in [facility]  -clonazePAM (KlonoPIN) 1 mg tablet   -Return for next visit in 4 weeks   Next visit with MAYELIN MCFARLAND is on: 03/06/2025 in [facility]\"    Lyly MONTANA RN  [facility]   "

## 2025-02-25 NOTE — TELEPHONE ENCOUNTER
"RN spoke to patient     Reviewed message below from Michael Alexandre PAC      Patient stated that she does not drink alcohol   RN advised that unfortunately on 2/24/25 patient reported to a nurse she would \"down her bottle of Klonopin and drink an entire bottle of vodka\"   With this being said, it is not safe to prescribe the requested medication Soma     Patient yelled at this RN - stated that she does not drink alcohol and will go to [facility] to get the requested medication     Patrizia Muñoz Registered Nurse  [facility]       "

## 2025-02-27 DIAGNOSIS — J45.20 INTERMITTENT ASTHMA, UNCOMPLICATED: ICD-10-CM

## 2025-03-01 RX ORDER — ALBUTEROL SULFATE 90 UG/1
INHALANT RESPIRATORY (INHALATION)
Qty: 18 G | Refills: 1 | Status: SHIPPED | OUTPATIENT
Start: 2025-03-01

## 2025-03-04 ENCOUNTER — TELEPHONE (OUTPATIENT)
Dept: FAMILY MEDICINE | Facility: CLINIC | Age: 50
End: 2025-03-04
Payer: COMMERCIAL

## 2025-03-04 NOTE — TELEPHONE ENCOUNTER
"Patient calls, upset with provider from visit last week 2/24. Reports nobody is listening to her about the pain in her back that she is experiencing. Provider did not prescribe medication to her, patient states that she lied in her mychart message. Patient reports her psychiatrist did not prescribe soma, she just said that because she was mad.  RN asked what I could do to help her right now, and she states she wants to see someone else.  RN offered next day appointment with listed PCP. Patient accepts appointment although she states PCP \"does not really know me, I've only seen her once\". Did agree to appointment 3/5 at 1:30 with 1:10 arrival time.    Dilia Juares RN   "

## 2025-03-10 NOTE — LETTER
February 7, 2020      Melody Muñoz  [address] [place] [address] [address] [place] [address]        Dear ,    We are writing to inform you of your test results.    Your thyroid ultrasound was com;pletely normal.    Resulted Orders   US Thyroid    Narrative    ULTRASOUND THYROID  2/7/2020 11:02 AM     COMPARISON: None.    HISTORY: Hashimoto's thyroiditis. Hypothyroidism due to acquired  atrophy of thyroid.    FINDINGS: The right lobe measures 4.8 x 1.4 x 1.6 cm. The left lobe  measures 3.4 x 1.6 x 1.1 cm. The isthmus is normal in thickness.  Thyroid parenchyma is homogenous in echotexture.    Thyroid nodules as follows: No discrete thyroid nodules demonstrated.      Impression    IMPRESSION: Normal sonographic appearance of the thyroid.    JAYDON KELLER MD       If you have any questions or concerns, please call the clinic at the number listed above.       Sincerely,        SILVA Deleon.                 The defibrillation pads were placed in the posterior/lateral position.

## 2025-03-12 ENCOUNTER — VIRTUAL VISIT (OUTPATIENT)
Dept: EDUCATION SERVICES | Facility: OTHER | Age: 50
End: 2025-03-12
Payer: COMMERCIAL

## 2025-03-12 DIAGNOSIS — E11.9 TYPE 2 DIABETES MELLITUS WITHOUT COMPLICATION, WITHOUT LONG-TERM CURRENT USE OF INSULIN (H): Primary | ICD-10-CM

## 2025-03-12 PROCEDURE — 99207 PR NON-BILLABLE SERV PER CHARTING: CPT | Mod: 95 | Performed by: PHARMACIST

## 2025-03-12 NOTE — PROGRESS NOTES
Diabetes Self-Management Education & Support    Presents for: Follow-up for DM2    Type of service:  Video Visit    If the video visit is dropped, the video visit invitation should be resent by: Text to cell phone: [phone]    Originating Location (pt. Location): Home  Distant Location (provider location): [facility]  Mode of Communication:  Video Conference via [facility]    Video Start Time:  901am  Video End Time (time video stopped): 922am    How would patient like to obtain AVS? MyChart      Assessment    Melody is doing great with her diet, starting to exercise as able, testing Bgs- her numbers are excellent.  She is tolerating Ozempic and ready to move to the 1mg weekly dosage.  This was ordered today.  Will follow-up in 2 months.  Recommended she see her PCP (advised follow-up in 3 months in Dec note).  Still needs to schedule eye exam.    Patient's most recent   Lab Results   Component Value Date    A1C 6.7 12/12/2024    A1C 6.0 01/23/2020     is meeting goal of <7.0    Diabetes knowledge and skills assessment:   Patient is knowledgeable in diabetes management concepts related to: Healthy Eating, Being Active, Monitoring, and Taking Medication      Care Plan and Education Provided:  Healthy Eating: Balanced meals, Plate planning method, Portion control, and Weight Management, Being Active: Amount recommended (150 minutes moderate or 75 minutes vigorous activity and 2-3 days strength training per week) and Finding a physical activity routine that works for you, Monitoring: Individual glucose targets, Taking Medication: Administering and storing injectable diabetes medications, Side effects of prescribed medication(s), and When to take medication(s), and Reducing Risks: Eye care    Patient verbalized understanding of diabetes self-management education concepts discussed, opportunities for ongoing education and support, and recommendations provided today.    Plan    Patient  Instructions   Schedule dilated eye exam.  Increase Ozempic to 1mg weekly. This will be a new pen and it was ordered from your mail order pharmacy.  Focus on more frequent, smaller meals and avoid greasy/spicy foods to help you avoid side effects.  Schedule follow-up appt with Shanita Tamayo for diabetes    Skye Rodriguez, PharmD, [facility], [facility] and [place] [facility]    [place] [facility] for the [facility] Area:  For Your Diabetes Education and Nutrition Appointments Call:  [phone]   For Diabetes Education or Nutrition Related Questions:   Phone: [phone]  Send [facility] Message   If you need a medication refill please contact your pharmacy. Please allow 3 business days for your refills to be completed.          Follow-up:  Upcoming Diabetes Ed Appointments     Visit Type Date Time Department    DIABETES ED 3/12/2025  9:00 AM ER DIABETES ED    Follow-up appt scheduled in May    See Care Plan for co-developed, patient-state behavior change goals.    Education Materials Provided:  No new materials provided today      Subjective/Objective  Melody is an 49 year old year old, presenting for the following diabetes education related to: Presents for: Follow-up  Accompanied by: Self  Diabetes education in the past 24mo: Yes  Focus of Visit: Taking Medication  Diabetes type: Type 2  Date of diagnosis: 2017 per snapshot  Disease course: Improving  How confident are you filling out medical forms by yourself:: Quite a bit  Transportation concerns: No  Difficulty affording diabetes medication?: No  Difficulty affording diabetes testing supplies?: No  Other concerns:: Glasses  Cultural Influences/Ethnic Background:  Not  or     Melody CASTANEDA Toni is seen for diabetes education follow up.  Our last visit was on 1/23/25.  She was restarted on Ozempic.  She has been taking 0.5mg weekly x 6 weeks now.  Did have some nausea on Monday- meri tea  "helped.    Ozempic is not on med list- she is not sure why. She has been taking it.    Has been checking Bgs, reported as:  Fasting PP  126  93  87  114  95  90    Weights are about the same.      Has started walking 1 mile per day the last 4 days, before then was sporadic.  Plans to continue daily now with nicer weather.      Diabetes Symptoms & Complications:  Diabetes Related Symptoms: Polydipsia (increased thirst), Visual change, Slow healing wounds  Weight trend: Fluctuating  Symptom course: Stable  Disease course: Improving  Complications assessed today?: Yes  Autonomic neuropathy: No  CVA: No  Heart disease: No  Nephropathy: No  Peripheral neuropathy: No  Peripheral Vascular Disease: No  Retinopathy: No  Sexual dysfunction: No    Patient Problem List and Family Medical History reviewed for relevant medical history, current medical status, and diabetes risk factors.    Vitals:  LMP  (LMP Unknown)   Estimated body mass index is 27.01 kg/m  as calculated from the following:    Height as of 2/24/25: 1.575 m (5' 2\").    Weight as of 2/24/25: 67 kg (147 lb 11.2 oz).   Last 3 BP:   BP Readings from Last 3 Encounters:   02/24/25 136/85   02/13/25 (!) 142/90   12/12/24 116/86       History   Smoking Status    Every Day    Types: Cigarettes   Smokeless Tobacco    Never       Labs:  Lab Results   Component Value Date    A1C 6.7 12/12/2024    A1C 6.0 01/23/2020     Lab Results   Component Value Date     10/03/2024     10/03/2024     10/03/2024     12/02/2021     12/02/2021     05/28/2021     Lab Results   Component Value Date     01/23/2023    LDL 75 02/06/2019     HDL Cholesterol   Date Value Ref Range Status   02/06/2019 36 (L) >49 mg/dL Final     Direct Measure HDL   Date Value Ref Range Status   01/23/2023 47 (L) >=50 mg/dL Final   ]  GFR Estimate   Date Value Ref Range Status   10/03/2024 >90 >60 mL/min/1.73m2 Final     Comment:     eGFR calculated using 2021 CKD-EPI " "equation.   05/28/2021 >90 >60 mL/min/[1.73_m2] Final     Comment:     Non  GFR Calc  Starting 12/18/2018, serum creatinine based estimated GFR (eGFR) will be   calculated using the Chronic Kidney Disease Epidemiology Collaboration   (CKD-EPI) equation.       GFR Estimate If Black   Date Value Ref Range Status   05/28/2021 >90 >60 mL/min/[1.73_m2] Final     Comment:      GFR Calc  Starting 12/18/2018, serum creatinine based estimated GFR (eGFR) will be   calculated using the Chronic Kidney Disease Epidemiology Collaboration   (CKD-EPI) equation.       Lab Results   Component Value Date    CR 0.7 10/03/2024    CR 0.78 10/03/2024    CR 0.72 05/28/2021     No results found for: \"MICROALBUMIN\"    Healthy Eating:  Healthy Eating Assessed Today: Yes  Cultural/Oriental orthodox diet restrictions?: No  Do you have any food allergies or intolerances?: Yes  Please list your food allergies / intolerances: gluten intolerance  Meal planning/habits: Avoiding sweets  Who cooks/prepares meals for you?: Self, Spouse  Who purchases food in  your home?: Self, Spouse  How many times a week on average do you eat food made away from home (restaurant/take-out)?: 2  Meals include: Dinner, Breakfast, Lunch  Breakfast: 8am: 1 ww toast/PB/half banana/half cup OJ OR Cheerios/milk/half banana.  Always coffee with a little bit of creamer.  Lunch: 1230-1pm: tk sandwich with 2 slices ww bread OR lentil soup OR soup from [employer] OR triscut crackers/cheese  Dinner: 630pm:  pastas with marinara/gege with veg and fruit OR ravioli with vege and fruit OR grilled chicken/zucchini noodles half the time/  Snacks: protein bar OR nachos OR cherries  Other: Take out (usually for dinner): [employer] or Larry Laura or Marilu Chin  Beverages: Water, Coffee, Juice, Energy drinks (celsuis zero calorie)  Has patient met with a dietitian in the past?: No    Being Active:  Being Active Assessed Today: Yes  Exercise:: Yes  Days per week of " moderate to strenuous exercise (like a brisk walk): 7  On average, minutes per day of exercise at this level: 20  How intense was your typical exercise? : Light (like stretching or slow walking)  Exercise Minutes per Week: 140  Barrier to exercise: Physical limitation    Monitoring:  Monitoring Assessed Today: Yes  Did patient bring glucose meter to appointment? : No  Blood Glucose Meter: Accu-chek  Times checking blood sugar at home (number): 3  Times checking blood sugar at home (per): Week        Taking Medications:  Diabetes Medication(s)       Incretin Mimetic Agents       Semaglutide, 1 MG/DOSE, (OZEMPIC) 4 MG/3ML pen Inject 1 mg subcutaneously every 7 days.          Taking Medication Assessed Today: Yes  Current Treatments: Non-insulin Injectables  Problems taking diabetes medications regularly?: No  Diabetes medication side effects?: No    Reducing Risks:  Reducing Risks Assessed Today: Yes  Diabetes Risks: Age over 45 years, History of gestational diabetes, Hypertriglyceridemia  Additional female risks: Gestational Diabetes  CAD Risks: Diabetes Mellitus, Family history, Tobacco exposure  Has dilated eye exam at least once a year?: No  Sees dentist every 6 months?: Yes  Feet checked by healthcare provider in the last year?: No    Skye Rodriguez, PharmD, [facility], BC-ADM  [facility] [place] [facility]    Time Spent: 21 minutes  Encounter Type: Individual    Any diabetes medication dose changes were made via the [facility] Standing Orders under the patient's referring provider.

## 2025-03-12 NOTE — LETTER
3/12/2025         RE: Melody Muñoz  [address] [place] [address]        Dear Colleague,    Thank you for referring your patient, Melody Muñoz, to the [facility]. Please see a copy of my visit note below.    Diabetes Self-Management Education & Support    Presents for: Follow-up for DM2    Type of service:  Video Visit    If the video visit is dropped, the video visit invitation should be resent by: Text to cell phone: [phone]    Originating Location (pt. Location): Home  Distant Location (provider location): [facility]  Mode of Communication:  Video Conference via [employer]    Video Start Time:  901am  Video End Time (time video stopped): 922am    How would patient like to obtain AVS? MyChart      Assessment    Melody is doing great with her diet, starting to exercise as able, testing Bgs- her numbers are excellent.  She is tolerating Ozempic and ready to move to the 1mg weekly dosage.  This was ordered today.  Will follow-up in 2 months.  Recommended she see her PCP (advised follow-up in 3 months in Dec note).  Still needs to schedule eye exam.    Patient's most recent   Lab Results   Component Value Date    A1C 6.7 12/12/2024    A1C 6.0 01/23/2020     is meeting goal of <7.0    Diabetes knowledge and skills assessment:   Patient is knowledgeable in diabetes management concepts related to: Healthy Eating, Being Active, Monitoring, and Taking Medication      Care Plan and Education Provided:  Healthy Eating: Balanced meals, Plate planning method, Portion control, and Weight Management, Being Active: Amount recommended (150 minutes moderate or 75 minutes vigorous activity and 2-3 days strength training per week) and Finding a physical activity routine that works for you, Monitoring: Individual glucose targets, Taking Medication: Administering and storing injectable diabetes medications, Side effects of prescribed medication(s),  and When to take medication(s), and Reducing Risks: Eye care    Patient verbalized understanding of diabetes self-management education concepts discussed, opportunities for ongoing education and support, and recommendations provided today.    Plan    Patient Instructions   Schedule dilated eye exam.  Increase Ozempic to 1mg weekly. This will be a new pen and it was ordered from your mail order pharmacy.  Focus on more frequent, smaller meals and avoid greasy/spicy foods to help you avoid side effects.  Schedule follow-up appt with Shanita Tamayo for diabetes    Skye Rodriguez, PharmD, [facility], [facility] and [place] [facility]    [place] [facility] for the [facility]:  For Your Diabetes Education and Nutrition Appointments Call:  [phone]   For Diabetes Education or Nutrition Related Questions:   Phone: [phone]  Send [employer] Message   If you need a medication refill please contact your pharmacy. Please allow 3 business days for your refills to be completed.          Follow-up:  Upcoming Diabetes Ed Appointments     Visit Type Date Time Department    DIABETES ED 3/12/2025  9:00 AM ER DIABETES ED    Follow-up appt scheduled in May    See Care Plan for co-developed, patient-state behavior change goals.    Education Materials Provided:  No new materials provided today      Subjective/Objective  Melody is an 49 year old year old, presenting for the following diabetes education related to: Presents for: Follow-up  Accompanied by: Self  Diabetes education in the past 24mo: Yes  Focus of Visit: Taking Medication  Diabetes type: Type 2  Date of diagnosis: 2017 per snapshot  Disease course: Improving  How confident are you filling out medical forms by yourself:: Quite a bit  Transportation concerns: No  Difficulty affording diabetes medication?: No  Difficulty affording diabetes testing supplies?: No  Other concerns:: Glasses  Cultural Influences/Ethnic  "Background:  Not  or     Melody Muñoz is seen for diabetes education follow up.  Our last visit was on 1/23/25.  She was restarted on Ozempic.  She has been taking 0.5mg weekly x 6 weeks now.  Did have some nausea on Monday- meri tea helped.    Ozempic is not on med list- she is not sure why. She has been taking it.    Has been checking Bgs, reported as:  Fasting PP  126  93  87  114  95  90    Weights are about the same.      Has started walking 1 mile per day the last 4 days, before then was sporadic.  Plans to continue daily now with nicer weather.      Diabetes Symptoms & Complications:  Diabetes Related Symptoms: Polydipsia (increased thirst), Visual change, Slow healing wounds  Weight trend: Fluctuating  Symptom course: Stable  Disease course: Improving  Complications assessed today?: Yes  Autonomic neuropathy: No  CVA: No  Heart disease: No  Nephropathy: No  Peripheral neuropathy: No  Peripheral Vascular Disease: No  Retinopathy: No  Sexual dysfunction: No    Patient Problem List and Family Medical History reviewed for relevant medical history, current medical status, and diabetes risk factors.    Vitals:  LMP  (LMP Unknown)   Estimated body mass index is 27.01 kg/m  as calculated from the following:    Height as of 2/24/25: 1.575 m (5' 2\").    Weight as of 2/24/25: 67 kg (147 lb 11.2 oz).   Last 3 BP:   BP Readings from Last 3 Encounters:   02/24/25 136/85   02/13/25 (!) 142/90   12/12/24 116/86       History   Smoking Status     Every Day     Types: Cigarettes   Smokeless Tobacco     Never       Labs:  Lab Results   Component Value Date    A1C 6.7 12/12/2024    A1C 6.0 01/23/2020     Lab Results   Component Value Date     10/03/2024     10/03/2024     10/03/2024     12/02/2021     12/02/2021     05/28/2021     Lab Results   Component Value Date     01/23/2023    LDL 75 02/06/2019     HDL Cholesterol   Date Value Ref Range Status " "  02/06/2019 36 (L) >49 mg/dL Final     Direct Measure HDL   Date Value Ref Range Status   01/23/2023 47 (L) >=50 mg/dL Final   ]  GFR Estimate   Date Value Ref Range Status   10/03/2024 >90 >60 mL/min/1.73m2 Final     Comment:     eGFR calculated using 2021 CKD-EPI equation.   05/28/2021 >90 >60 mL/min/[1.73_m2] Final     Comment:     Non  GFR Calc  Starting 12/18/2018, serum creatinine based estimated GFR (eGFR) will be   calculated using the Chronic Kidney Disease Epidemiology Collaboration   (CKD-EPI) equation.       GFR Estimate If Black   Date Value Ref Range Status   05/28/2021 >90 >60 mL/min/[1.73_m2] Final     Comment:      GFR Calc  Starting 12/18/2018, serum creatinine based estimated GFR (eGFR) will be   calculated using the Chronic Kidney Disease Epidemiology Collaboration   (CKD-EPI) equation.       Lab Results   Component Value Date    CR 0.7 10/03/2024    CR 0.78 10/03/2024    CR 0.72 05/28/2021     No results found for: \"MICROALBUMIN\"    Healthy Eating:  Healthy Eating Assessed Today: Yes  Cultural/[place] diet restrictions?: No  Do you have any food allergies or intolerances?: Yes  Please list your food allergies / intolerances: gluten intolerance  Meal planning/habits: Avoiding sweets  Who cooks/prepares meals for you?: Self, Spouse  Who purchases food in  your home?: Self, Spouse  How many times a week on average do you eat food made away from home (restaurant/take-out)?: 2  Meals include: Dinner, Breakfast, Lunch  Breakfast: 8am: 1 ww toast/PB/half banana/half cup OJ OR Cheerios/milk/half banana.  Always coffee with a little bit of creamer.  Lunch: 1230-1pm: tk sandwich with 2 slices ww bread OR lentil soup OR soup from [employer] OR triscut crackers/cheese  Dinner: 630pm:  pastas with marinara/gege with veg and fruit OR ravioli with vege and fruit OR grilled chicken/zucchini noodles half the time/  Snacks: protein bar OR nachos OR cherries  Other: Take out " (usually for dinner): [employer] or Larry Laura or Marilu Chin  Beverages: Water, Coffee, Juice, Energy drinks (celsuis zero calorie)  Has patient met with a dietitian in the past?: No    Being Active:  Being Active Assessed Today: Yes  Exercise:: Yes  Days per week of moderate to strenuous exercise (like a brisk walk): 7  On average, minutes per day of exercise at this level: 20  How intense was your typical exercise? : Light (like stretching or slow walking)  Exercise Minutes per Week: 140  Barrier to exercise: Physical limitation    Monitoring:  Monitoring Assessed Today: Yes  Did patient bring glucose meter to appointment? : No  Blood Glucose Meter: Accu-chek  Times checking blood sugar at home (number): 3  Times checking blood sugar at home (per): Week        Taking Medications:  Diabetes Medication(s)       Incretin Mimetic Agents       Semaglutide, 1 MG/DOSE, (OZEMPIC) 4 MG/3ML pen Inject 1 mg subcutaneously every 7 days.          Taking Medication Assessed Today: Yes  Current Treatments: Non-insulin Injectables  Problems taking diabetes medications regularly?: No  Diabetes medication side effects?: No    Reducing Risks:  Reducing Risks Assessed Today: Yes  Diabetes Risks: Age over 45 years, History of gestational diabetes, Hypertriglyceridemia  Additional female risks: Gestational Diabetes  CAD Risks: Diabetes Mellitus, Family history, Tobacco exposure  Has dilated eye exam at least once a year?: No  Sees dentist every 6 months?: Yes  Feet checked by healthcare provider in the last year?: No    Skye Rodriguez, PharmD, [facility], [facility] and [place] [facility]    Time Spent: 21 minutes  Encounter Type: Individual    Any diabetes medication dose changes were made via the [facility] Standing Orders under the patient's referring provider.

## 2025-03-12 NOTE — PATIENT INSTRUCTIONS
Schedule dilated eye exam.  Increase Ozempic to 1mg weekly. This will be a new pen and it was ordered from your mail order pharmacy.  Focus on more frequent, smaller meals and avoid greasy/spicy foods to help you avoid side effects.  Schedule follow-up appt with Shanita Tamayo for diabetes    Skye Rodriguez, PharmD, [facility], [facility] and [place] [facility]    [place] [facility] for the [facility]:  For Your Diabetes Education and Nutrition Appointments Call:  [phone]   For Diabetes Education or Nutrition Related Questions:   Phone: [phone]  Send [employer] Message   If you need a medication refill please contact your pharmacy. Please allow 3 business days for your refills to be completed.

## 2025-03-21 NOTE — TELEPHONE ENCOUNTER
Hematology/Oncology Follow-up Note  [facility]    Date of Admission:  3/14/2025   Reason for Consult: follicular lymphoma       ASSESSMENT : Bhavesh Landeros is a 71 year old year old male who presented this hospitalization with shortness of breath and abdominal bloating. Found to have significant ascites, PE, and extensive retroperitoneal soft tissue concerning for malignancy. He was diagnosed with follicular lymphoma this admission.   Retroperitoneal adenopathy  PE  Ascites    PLAN:  Patients labs are indicating tumor lysis, will increase allopurinol and add IV Rasburicase. Would add IV fluids. Discussed with Dr. Olson   Hemoglobin is stable this morning at 8.7, this is post one unit of RBC's last evening.  Patient is reporting no changes to symptoms in LLE to indicate worsening hematoma.   Will continue to trend hemoglobin and possibly restart anticoagulation tomorrow  Hem/Onc will follow over the weekend     PE  Hematoma   Pulmonary embolism secondary to malignancy, life-long anticoagulation   03/13/2025 CT CAP Small volume of right lower lobe pulmonary embolus without right heart strain.   03/13/2025 BLE US negative for DVT  03/17/2025 BLE US negative for DVT  Heparin gtt initiated on admission. Later discontinued on 03/16/2025 d/t hematoma concerns from biopsy as described below.   03/16/2025 CT AP showing New large left-sided retroperitoneal hematoma with intramuscular component.   IVC filter was not being placed-  IVC thrombus is compressed and DVT negative   03/18/2025 heparin gtt re-initiated   03/20/2025 Eliquis 5 mg, one dose given in AM then held due to worsening hemoglobin at afternoon recheck.     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  Likely associated significant compression of the IVC, not optimally assessed. Moderately large volume of abdominal and pelvic ascites.  03/14/2025 CT  Results are in for UA.     Sending to provider for review.     Patient is planning to go to her cabin tomorrow if she thinks her provider does not think anything is serious.     Requesting review from PCP ASAP. Does she need ABX? What should she do with these results. Feels like there is something really wrong with her and is unsure if she should go out of town.        guided retroperitoneal lymph node biopsy showing follicular lymphoma grade 1-2   03/15/2025 US paracentesis 4.7 L removed - cytology negative for malignancy   03/18/2025 US paracentesis 2.7 L removed   03/18/2025 Bone marrow biopsy   Bone marrow involvement by low-grade B-cell lymphoma follicular center origin.  B-cell lymphoma comprises an estimated 10 to 20% of marrow cellularity.  Flow showing CD10 positive Lambda-monotypic B cells ( 6 %)   03/21/2025 US paracentesis 2.75 L removed   Treatment History   03/19/2025 C1D1 IP Rituximab & Bendamustine   Severe reaction to Rituximab. Not re-challenged.     Tumor Lysis Syndrome   Secondary to chemotherapy  03/20/2025 Creatinine 1.43/ K 5.4/ Ph 4.3/ Uric Acid 7.1  03/19/2025 Allopurinol 100 mg initiated with chemotherapy   03/21/2025 Creatinine 1.68/ K normal/ Ph 4.8/ Uric Acid 8.2  Increased allopurinol to 200 mg   Rasburicase 6 mg IV once     EXAM:  Subjective  Denies fever, chills, night sweats. Denies nausea, vomiting, diarrhea, constipation. Denies lightheaded or dizziness.Denies headache, fatigue, lack of appetite. Denies bleeding, bruising, rashes. Denies shortness of breath, cough.     Denies worsening numbness/tingling/pain/tightness to LLE where hematoma is located.     Objective  GENERAL/CONSTITUTIONAL: No acute distress.  NEUROLOGIC: Alert, oriented, answers questions appropriately.         Labs Reviewed: CBC, CMP, labs as above   Imaging Reviewed: as above    45 minutes spent on the date of the encounter doing review of outside records, review of test results, interpretation of tests,  implementing/ creating plan, coordinating care, and documentation     Malu DUNCAN CNP  Hematology/Oncology  [facility]   Securely message with [employer]        unknown

## 2025-04-01 ENCOUNTER — TRANSFERRED RECORDS (OUTPATIENT)
Dept: HEALTH INFORMATION MANAGEMENT | Facility: CLINIC | Age: 50
End: 2025-04-01
Payer: COMMERCIAL

## 2025-04-01 LAB — RETINOPATHY: NEGATIVE

## 2025-04-08 ENCOUNTER — VIRTUAL VISIT (OUTPATIENT)
Dept: FAMILY MEDICINE | Facility: CLINIC | Age: 50
End: 2025-04-08
Payer: COMMERCIAL

## 2025-04-08 DIAGNOSIS — M77.12 LATERAL EPICONDYLITIS OF LEFT ELBOW: ICD-10-CM

## 2025-04-08 DIAGNOSIS — E11.9 TYPE 2 DIABETES MELLITUS WITHOUT COMPLICATION, WITHOUT LONG-TERM CURRENT USE OF INSULIN (H): Primary | ICD-10-CM

## 2025-04-08 PROCEDURE — 98006 SYNCH AUDIO-VIDEO EST MOD 30: CPT

## 2025-04-08 PROCEDURE — 1125F AMNT PAIN NOTED PAIN PRSNT: CPT | Mod: 95

## 2025-04-08 ASSESSMENT — PATIENT HEALTH QUESTIONNAIRE - PHQ9
SUM OF ALL RESPONSES TO PHQ QUESTIONS 1-9: 0
SUM OF ALL RESPONSES TO PHQ QUESTIONS 1-9: 0
10. IF YOU CHECKED OFF ANY PROBLEMS, HOW DIFFICULT HAVE THESE PROBLEMS MADE IT FOR YOU TO DO YOUR WORK, TAKE CARE OF THINGS AT HOME, OR GET ALONG WITH OTHER PEOPLE: NOT DIFFICULT AT ALL

## 2025-04-08 ASSESSMENT — ASTHMA QUESTIONNAIRES
QUESTION_4 LAST FOUR WEEKS HOW OFTEN HAVE YOU USED YOUR RESCUE INHALER OR NEBULIZER MEDICATION (SUCH AS ALBUTEROL): ONCE A WEEK OR LESS
QUESTION_5 LAST FOUR WEEKS HOW WOULD YOU RATE YOUR ASTHMA CONTROL: WELL CONTROLLED
QUESTION_1 LAST FOUR WEEKS HOW MUCH OF THE TIME DID YOUR ASTHMA KEEP YOU FROM GETTING AS MUCH DONE AT WORK, SCHOOL OR AT HOME: NONE OF THE TIME
QUESTION_3 LAST FOUR WEEKS HOW OFTEN DID YOUR ASTHMA SYMPTOMS (WHEEZING, COUGHING, SHORTNESS OF BREATH, CHEST TIGHTNESS OR PAIN) WAKE YOU UP AT NIGHT OR EARLIER THAN USUAL IN THE MORNING: NOT AT ALL
ACT_TOTALSCORE: 22
QUESTION_2 LAST FOUR WEEKS HOW OFTEN HAVE YOU HAD SHORTNESS OF BREATH: ONCE OR TWICE A WEEK

## 2025-04-08 NOTE — PROGRESS NOTES
Melody is a 49 year old who is being evaluated via a billable video visit.    How would you like to obtain your AVS? MyChart  If the video visit is dropped, the invitation should be resent by: Text to cell phone: [phone]  Will anyone else be joining your video visit? No      Assessment & Plan     (E11.9) Type 2 diabetes mellitus without complication, without long-term current use of insulin (H)  (primary encounter diagnosis)  Comment: Chronic medical condition due for monitoring.  Patient has currently been taking semaglutide 1 mg daily and is due for an A1c check.  She will come into the lab to have this completed.  Patient reports that she is doing well on the semaglutide and that her sugars have improved.  Her fasting blood sugars are in the 90s and her postprandials are typically not above 120.  Patient has not noticed any weight loss on her current dose.  Will increase to 2 mg weekly.  Follow-up in 3 to 6 months depending on A1c results, sooner as needed.  Plan: Hemoglobin A1c, Semaglutide, 2 MG/DOSE,         (OZEMPIC) 8 MG/3ML pen    (M77.12) Lateral epicondylitis of left elbow  Comment: Newly diagnosed medical condition in which patient is seeing Ortho and attending OT.  Patient's pain has not been under control.  Discussed treatment options and patient would like to increase her nighttime dose of gabapentin to 600 mg from 300 mg.  She will notify me if she needs a new prescription due to the dose change.  Will continue to monitor.    The longitudinal plan of care for the diagnosis(es)/condition(s) as documented were addressed during this visit. Due to the added complexity in care, I will continue to support Melody in the subsequent management and with ongoing continuity of care.    Chaz Oliver is a 49 year old, presenting for the following health issues:  Diabetes and Consult (Pain in arm - went to  3/31/25)        4/8/2025     3:13 PM   Additional Questions   Roomed by connor lizama  "  Accompanied by self     History of Present Illness       Diabetes:   She presents for follow up of diabetes.  She is checking home blood glucose a few times a week.   She checks blood glucose before meals, after meals and before and after meals.  Blood glucose is never over 200 and never under 70.  When her blood glucose is low, the patient is asymptomatic for confusion, blurred vision, lethargy and reports not feeling dizzy, shaky, or weak.  She is concerned about other.    She is not experiencing numbness or burning in feet, excessive thirst, blurry vision, weight changes or redness, sores or blisters on feet.           She eats 2-3 servings of fruits and vegetables daily.She consumes 0 sweetened beverage(s) daily.She exercises with enough effort to increase her heart rate 20 to 29 minutes per day.  She exercises with enough effort to increase her heart rate 3 or less days per week.   She is taking medications regularly.      Blood sugars have been running 90s fasting; usually less than 125 post prandial.     Hand specialist: Going to [facility] for lateral epicondylitis; going OT this week.      Pain History:  When did you first notice your pain? 2-3 weeks   Have you seen anyone else for your pain? Yes - urgent care 3/31/25  How has your pain affected your ability to work? Can work part time with limitations   What type of work do you or did you do?     Where in your body do you have pain?  Left elbow pain        Objective    Vitals - Patient Reported  Weight (Patient Reported): 66.7 kg (147 lb)  Height (Patient Reported): 157.5 cm (5' 2\")  BMI (Based on Pt Reported Ht/Wt): 26.89  Pain Score: Mild Pain (3)  Pain Loc: Arm        Physical Exam   GENERAL: alert and no distress  EYES: Eyes grossly normal to inspection.  No discharge or erythema, or obvious scleral/conjunctival abnormalities.  RESP: No audible wheeze, cough, or visible cyanosis.    SKIN: Visible skin clear. No significant rash, abnormal " pigmentation or lesions.  NEURO: Cranial nerves grossly intact.  Mentation and speech appropriate for age.  PSYCH: Appropriate affect, tone, and pace of words        Video-Visit Details    Type of service:  Video Visit   Originating Location (pt. Location): Home    Distant Location (provider location):  On-site  Platform used for Video Visit: [employer]  Signed Electronically by: KIRAN Cisneros CNP

## 2025-04-22 ENCOUNTER — TELEPHONE (OUTPATIENT)
Dept: FAMILY MEDICINE | Facility: CLINIC | Age: 50
End: 2025-04-22
Payer: COMMERCIAL

## 2025-04-22 NOTE — TELEPHONE ENCOUNTER
Patient calling and states she feels she has a sinus infection.  She is on doxycycline from eye doctor for rosacea.  She is questions eye doctor diagnosing her with rosacea as she has never been told she has rosacea.  She is wondering about labs additional labs for Sjogren's as she needs to come for A1C.  She states she is going out of state Thursday am and wants Prednisone to take with her just in case her asthma flares up.  Scheduled with Zaina tomorrow 8:40 am.  Josephine Noriega RN

## 2025-04-23 ENCOUNTER — OFFICE VISIT (OUTPATIENT)
Dept: FAMILY MEDICINE | Facility: CLINIC | Age: 50
End: 2025-04-23

## 2025-04-23 ENCOUNTER — E-VISIT (OUTPATIENT)
Dept: FAMILY MEDICINE | Facility: CLINIC | Age: 50
End: 2025-04-23
Payer: COMMERCIAL

## 2025-04-23 ENCOUNTER — ANCILLARY PROCEDURE (OUTPATIENT)
Dept: GENERAL RADIOLOGY | Facility: CLINIC | Age: 50
End: 2025-04-23
Attending: PHYSICIAN ASSISTANT
Payer: COMMERCIAL

## 2025-04-23 VITALS
SYSTOLIC BLOOD PRESSURE: 129 MMHG | WEIGHT: 146 LBS | RESPIRATION RATE: 16 BRPM | HEART RATE: 102 BPM | HEIGHT: 62 IN | BODY MASS INDEX: 26.87 KG/M2 | DIASTOLIC BLOOD PRESSURE: 80 MMHG | OXYGEN SATURATION: 96 % | TEMPERATURE: 98.1 F

## 2025-04-23 DIAGNOSIS — M54.2 CERVICAL PAIN: ICD-10-CM

## 2025-04-23 DIAGNOSIS — M54.6 PAIN IN THORACIC SPINE: ICD-10-CM

## 2025-04-23 DIAGNOSIS — G44.219 EPISODIC TENSION-TYPE HEADACHE, NOT INTRACTABLE: ICD-10-CM
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
## 2025-04-23 DIAGNOSIS — J45.20 INTERMITTENT ASTHMA, UNCOMPLICATED: Primary | ICD-10-CM

## 2025-04-23 DIAGNOSIS — J01.90 ACUTE SINUSITIS, RECURRENCE NOT SPECIFIED, UNSPECIFIED LOCATION: ICD-10-CM

## 2025-04-23 PROCEDURE — 72070 X-RAY EXAM THORAC SPINE 2VWS: CPT | Mod: TC | Performed by: RADIOLOGY

## 2025-04-23 PROCEDURE — G2211 COMPLEX E/M VISIT ADD ON: HCPCS | Performed by: PHYSICIAN ASSISTANT

## 2025-04-23 PROCEDURE — 99214 OFFICE O/P EST MOD 30 MIN: CPT | Performed by: PHYSICIAN ASSISTANT

## 2025-04-23 RX ORDER — PREDNISONE 20 MG/1
40 TABLET ORAL DAILY
Qty: 10 TABLET | Refills: 0 | Status: SHIPPED | OUTPATIENT
Start: 2025-04-23 | End: 2025-04-28

## 2025-04-23 RX ORDER — BUTALBITAL, ACETAMINOPHEN AND CAFFEINE 50; 325; 40 MG/1; MG/1; MG/1
1 TABLET ORAL EVERY 4 HOURS PRN
Qty: 10 TABLET | Refills: 0 | Status: SHIPPED | OUTPATIENT
Start: 2025-04-23

## 2025-04-23 RX ORDER — MELOXICAM 15 MG/1
15 TABLET ORAL
COMMUNITY
Start: 2025-03-31

## 2025-04-23 RX ORDER — DOXYCYCLINE HYCLATE 50 MG/1
50 CAPSULE ORAL 2 TIMES DAILY
COMMUNITY
Start: 2025-04-16

## 2025-04-23 RX ORDER — TIZANIDINE 2 MG/1
2-4 TABLET ORAL 3 TIMES DAILY PRN
Qty: 30 TABLET | Refills: 1 | Status: SHIPPED | OUTPATIENT
Start: 2025-04-23
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
## 2025-04-23 NOTE — PATIENT INSTRUCTIONS
Thank you for choosing us for your care. I have placed an order for a prescription so that you can start treatment:  Orders Placed This Encounter   Medications     predniSONE (DELTASONE) 20 MG tablet     Sig: Take 2 tablets (40 mg) by mouth daily for 5 days.     Dispense:  10 tablet     Refill:  0        View your full visit summary for details by clicking on the link below. Your pharmacist will able to address any questions you may have about the medication.     If you're not feeling better within 5-7 days, please schedule an appointment.  You can schedule an appointment right here in [facility], or call [phone]  If the visit is for the same symptoms as your eVisit, we'll refund the cost of your eVisit if seen within seven days.

## 2025-04-29 ENCOUNTER — TELEPHONE (OUTPATIENT)
Dept: FAMILY MEDICINE | Facility: CLINIC | Age: 50
End: 2025-04-29
Payer: COMMERCIAL

## 2025-04-29 NOTE — TELEPHONE ENCOUNTER
"Incoming call from patient     Continues with left wrist/arm pain has been seen by [facility] and does not feel a \"connection\" to them     Ween by [facility] 2-3 weeks ago, diagnosed with tennis elbow/wrist left   Meloxicam given by ortho and it is not helping     Pcp increased gabapentin now taking 600 mg PM and it is not helping with the pain     Requesting stronger pain medication / recommendations on how to manage the pain \"I do not want to go straight to narcotics\"     RN advised visit needed for assessment and to place orders if indicated     Patient agreeable to appt - RN scheduled as below, advised if unable to make it to appt due to pain increase to call back to check for openings at  or to go to Urgent care (openings at this time at  however patient declined and wants to wait until tomorrow due to home work she needs to complete for another appt)     Appointments in Next Year      Apr 30, 2025 9:00 AM  (Arrive by 8:40 AM)  Provider Visit with Zaina Townsend PA-C  [facility] ([facility] [place] ) [phone]     Patient states agreement to this plan and has no further questions     Patrizia Muñoz, Registered Nurse  [facility]           "

## 2025-08-19 ENCOUNTER — TELEPHONE (OUTPATIENT)
Dept: FAMILY MEDICINE | Facility: CLINIC | Age: 50
End: 2025-08-19
Payer: COMMERCIAL

## (undated) DEVICE — DRSG GAUZE 4X4" 8044

## (undated) DEVICE — Device

## (undated) DEVICE — CLIP APPLIER ENDO 05MM MED/LG 176630

## (undated) DEVICE — SUCTION IRR STRYKERFLOW II W/TIP 250-070-520

## (undated) DEVICE — ESU GROUND PAD ADULT W/CORD E7507

## (undated) DEVICE — STPL ENDO RELOAD 45MM VASCULAR MEDIUM TAN EGIA45AVM

## (undated) DEVICE — SU VICRYL 4-0 PS-2 18" UND J496H

## (undated) DEVICE — GLOVE PROTEXIS BLUE W/NEU-THERA 8.0  2D73EB80

## (undated) DEVICE — BAG CLEAR TRASH 1.3M 39X33" P4040C

## (undated) DEVICE — SOL NACL 0.9% INJ 1000ML BAG 2B1324X

## (undated) DEVICE — ENDO TROCAR BLUNT 100MM W/THRD ANCHOR BLUNTPORT BPT12STS

## (undated) DEVICE — SUCTION CANISTER MEDIVAC LINER 3000ML W/LID 65651-530

## (undated) DEVICE — PREP SCRUB SOL EXIDINE 4% CHG 4OZ 29002-404

## (undated) DEVICE — ESU CORD MONOPOLAR 10'  E0510

## (undated) DEVICE — LINEN HALF SHEET 5512

## (undated) DEVICE — SU VICRYL 1 CT-2 27" J335H

## (undated) DEVICE — STPL ENDO RELOAD GIA 45X2MM ROTIC 030453

## (undated) DEVICE — LINEN POUCH DBL 5427

## (undated) DEVICE — DRSG GAUZE 2X2" 8042

## (undated) DEVICE — ENDO CANNULA 05MM VERSAONE UNIVERSAL UNVCA5STF

## (undated) DEVICE — GLOVE PROTEXIS W/NEU-THERA 7.5  2D73TE75

## (undated) DEVICE — ENDO TROCAR 05MM VERSAONE BLADELESS W/STD FIX CAN NONB5STF

## (undated) DEVICE — ENDO POUCH GOLD 10MM ECATCH 173050G

## (undated) DEVICE — STPL ENDO GIA 12MM UNIV 030449

## (undated) DEVICE — LINEN FULL SHEET 5511

## (undated) DEVICE — LINEN TOWEL PACK X5 5464

## (undated) DEVICE — PREP SKIN SCRUB TRAY 4461A

## (undated) RX ORDER — DEXAMETHASONE SODIUM PHOSPHATE 4 MG/ML
INJECTION, SOLUTION INTRA-ARTICULAR; INTRALESIONAL; INTRAMUSCULAR; INTRAVENOUS; SOFT TISSUE
Status: DISPENSED
Start: 2017-11-14

## (undated) RX ORDER — OXYCODONE HYDROCHLORIDE 5 MG/1
TABLET ORAL
Status: DISPENSED
Start: 2017-11-14

## (undated) RX ORDER — NEOSTIGMINE METHYLSULFATE 1 MG/ML
VIAL (ML) INJECTION
Status: DISPENSED
Start: 2017-11-14

## (undated) RX ORDER — GLYCOPYRROLATE 0.2 MG/ML
INJECTION INTRAMUSCULAR; INTRAVENOUS
Status: DISPENSED
Start: 2017-11-14

## (undated) RX ORDER — FENTANYL CITRATE 50 UG/ML
INJECTION, SOLUTION INTRAMUSCULAR; INTRAVENOUS
Status: DISPENSED
Start: 2017-11-14

## (undated) RX ORDER — PROPOFOL 10 MG/ML
INJECTION, EMULSION INTRAVENOUS
Status: DISPENSED
Start: 2017-11-14

## (undated) RX ORDER — ONDANSETRON 2 MG/ML
INJECTION INTRAMUSCULAR; INTRAVENOUS
Status: DISPENSED
Start: 2017-11-14

## (undated) RX ORDER — BUPIVACAINE HYDROCHLORIDE 2.5 MG/ML
INJECTION, SOLUTION EPIDURAL; INFILTRATION; INTRACAUDAL
Status: DISPENSED
Start: 2017-11-14

## (undated) RX ORDER — ONDANSETRON 4 MG/1
TABLET, ORALLY DISINTEGRATING ORAL
Status: DISPENSED
Start: 2017-11-14

## (undated) RX ORDER — BUPIVACAINE HYDROCHLORIDE AND EPINEPHRINE 5; 5 MG/ML; UG/ML
INJECTION, SOLUTION EPIDURAL; INTRACAUDAL; PERINEURAL
Status: DISPENSED
Start: 2017-11-14